# Patient Record
Sex: MALE
[De-identification: names, ages, dates, MRNs, and addresses within clinical notes are randomized per-mention and may not be internally consistent; named-entity substitution may affect disease eponyms.]

---

## 2019-03-18 ENCOUNTER — HOSPITAL ENCOUNTER (EMERGENCY)
Dept: HOSPITAL 31 - C.ER | Age: 69
Discharge: HOME | End: 2019-03-18
Payer: MEDICARE

## 2019-03-18 VITALS
RESPIRATION RATE: 20 BRPM | SYSTOLIC BLOOD PRESSURE: 138 MMHG | TEMPERATURE: 98.6 F | OXYGEN SATURATION: 99 % | HEART RATE: 67 BPM | DIASTOLIC BLOOD PRESSURE: 84 MMHG

## 2019-03-18 VITALS — BODY MASS INDEX: 28.2 KG/M2

## 2019-03-18 DIAGNOSIS — E78.00: ICD-10-CM

## 2019-03-18 DIAGNOSIS — H11.32: Primary | ICD-10-CM

## 2019-03-18 DIAGNOSIS — I10: ICD-10-CM

## 2019-03-18 NOTE — C.PDOC
History Of Present Illness


68 year old male presents to ED with complaint of redness to the left eye. 

Patient is not currently taking any blood thinners. He noticed blood in his eye 

when he was looking in the mirror. He denies wearing glasses or contact lenses. 

Patient denies any pain, visual changes, trauma, headache, fever, and chills. 





Chief Complaint (Nursing): Eye Problem


History Per: Patient


History/Exam Limitations: no limitations


Current Symptoms Are (Timing): Still Present


Associated Symptoms: Other (rendess)





Past Medical History


Reviewed: Historical Data, Nursing Documentation, Vital Signs


Vital Signs: 





                                Last Vital Signs











Temp  98.6 F   03/18/19 08:56


 


Pulse  67   03/18/19 08:56


 


Resp  20   03/18/19 08:56


 


BP  138/84   03/18/19 08:56


 


Pulse Ox  99   03/18/19 08:56














- Medical History


PMH: HTN, Hypercholesterolemia


Surgical History: No Surg Hx


Family History: States: Unknown Family Hx





- Social History


Hx Alcohol Use: No


Hx Substance Use: No





- Immunization History


Hx Tetanus Toxoid Vaccination: No


Hx Influenza Vaccination: No


Hx Pneumococcal Vaccination: No





Review Of Systems


Constitutional: Negative for: Fever, Chills, Weakness


Eyes: Positive for: Redness, Other (blood in the left eye).  Negative for: 

Vision Change


Skin: Negative for: Rash


Neurological: Negative for: Weakness, Numbness, Dizziness





Physical Exam





- Physical Exam


Appears: Well, Non-toxic, No Acute Distress


Skin: Normal Color, Warm, Dry


Head: Atraumatic, Normacephalic


Eye(s): bilateral: PERRL, EOMI, left: Other (subconjunctival hemorrhage)


Throat: Normal, No Erythema, No Exudate


Neck: Normal ROM, Supple


Chest: Symmetrical, No Deformity


Cardiovascular: Rhythm Regular, No Murmur


Respiratory: No Accessory Muscle Use


Neurological/Psych: Oriented x3, Normal Speech, Normal Cognition





ED Course And Treatment


O2 Sat by Pulse Oximetry: 99 (in RA)





Medical Decision Making


Medical Decision Making: 


Impression: 68 year old male presents to ED with complaint of redness to the 

left eye





Plan:





Patient told to follow up with PCP. 








Disposition


Counseled Patient/Family Regarding: Diagnosis, Need For Followup





- Disposition


Referrals: 


Tiffany Martinez MD [Medical Doctor] - 


Disposition: HOME/ ROUTINE


Disposition Time: 09:34


Condition: STABLE


Instructions:  Subconjunctival Hemorrhage


Forms:  Gen Discharge Inst [place]uneian, [facility] (Bruneian), Work Excuse


Print Language: Beninese





- Clinical Impression


Clinical Impression: 


 Subconjunctival hemorrhage of left eye








- Scribe Statement


The provider has reviewed the documentation as recorded by the Scribe (Norah Yates)








All medical record entries made by the Scribe were at my direction and 

personally dictated by me. I have reviewed the chart and agree that the record 

accurately reflects my personal performance of the history, physical exam, 

medical decision making, and the department course for this patient. I have also

personally directed, reviewed, and agree with the discharge instructions and 

disposition.

## 2020-03-06 ENCOUNTER — OFFICE VISIT (OUTPATIENT)
Dept: URGENT CARE | Facility: CLINIC | Age: 70
End: 2020-03-06
Payer: COMMERCIAL

## 2020-03-06 VITALS
HEIGHT: 69 IN | TEMPERATURE: 99 F | WEIGHT: 177 LBS | OXYGEN SATURATION: 97 % | HEART RATE: 80 BPM | RESPIRATION RATE: 18 BRPM | DIASTOLIC BLOOD PRESSURE: 77 MMHG | SYSTOLIC BLOOD PRESSURE: 127 MMHG | BODY MASS INDEX: 26.22 KG/M2

## 2020-03-06 DIAGNOSIS — R05.9 COUGH: Primary | ICD-10-CM

## 2020-03-06 PROCEDURE — 99202 OFFICE O/P NEW SF 15 MIN: CPT | Performed by: PHYSICIAN ASSISTANT

## 2020-03-06 PROCEDURE — 94640 AIRWAY INHALATION TREATMENT: CPT | Performed by: PHYSICIAN ASSISTANT

## 2020-03-06 RX ORDER — INHALER, ASSIST DEVICES
SPACER (EA) MISCELLANEOUS SEE ADMIN INSTRUCTIONS
Qty: 1 DEVICE | Refills: 0 | Status: SHIPPED | OUTPATIENT
Start: 2020-03-06

## 2020-03-06 RX ORDER — IPRATROPIUM BROMIDE AND ALBUTEROL SULFATE 2.5; .5 MG/3ML; MG/3ML
3 SOLUTION RESPIRATORY (INHALATION) ONCE
Status: COMPLETED | OUTPATIENT
Start: 2020-03-06 | End: 2020-03-06

## 2020-03-06 RX ORDER — ROSUVASTATIN CALCIUM 10 MG/1
10 TABLET, COATED ORAL DAILY
COMMUNITY

## 2020-03-06 RX ORDER — ALBUTEROL SULFATE 90 UG/1
2 AEROSOL, METERED RESPIRATORY (INHALATION) EVERY 4 HOURS PRN
Qty: 8.5 G | Refills: 0 | Status: SHIPPED | OUTPATIENT
Start: 2020-03-06

## 2020-03-06 RX ORDER — SODIUM CHLORIDE FOR INHALATION 0.9 %
3 VIAL, NEBULIZER (ML) INHALATION ONCE
Status: COMPLETED | OUTPATIENT
Start: 2020-03-06 | End: 2020-03-06

## 2020-03-06 RX ORDER — LISINOPRIL 10 MG/1
10 TABLET ORAL DAILY
COMMUNITY

## 2020-03-06 RX ADMIN — IPRATROPIUM BROMIDE AND ALBUTEROL SULFATE 3 ML: 2.5; .5 SOLUTION RESPIRATORY (INHALATION) at 16:59

## 2020-03-06 RX ADMIN — Medication 3 ML: at 16:59

## 2020-03-06 NOTE — PROGRESS NOTES
[facility]        NAME: Yany Conde is a 81 y o  male  : 1950    MRN: [id number]  DATE: 2020  TIME: 5:30 PM    Assessment and Plan   Cough [R05]  1  Cough  ipratropium-albuterol (DUO-NEB) 0 5-2 5 mg/3 mL inhalation solution 3 mL    sodium chloride 0 9 % inhalation solution 3 mL    albuterol (ProAir HFA) 90 mcg/act inhaler    Spacer Device for Inhaler    Mini neb     Patient Instructions   Use the inhaler as directed  Rinse your mouth after use  Mucinex DM for cough and congestion  Use a humidifier at bedtime  Steam showers may also help to reduce cough  Follow up with your family doctor in 3-5 days  Proceed to the ER if symptoms worsen  Discussed with patient that symptoms appear viral in nature  Advised use of inhaler for wheezing and SOB, Mucinex DM for cough and congestion relief, and follow-up if symptoms persist or worsen  Patient verbalized understanding and agreement  No questions at this time  Precautions given  Chief Complaint     Chief Complaint   Patient presents with    Shortness of Breath     Pt reports of worsening cough and shortness of breath x 3 days    Cough     History of Present Illness     51-year-old [place]an-speaking male accompanied by his wife presenting with complaint of cough x3 days  Reports a harsh, nonproductive cough associated with ECHOLS  Symptoms exacerbated in the cold  No fever, chills, sweats, nasal congestion, runny nose, sore throat, wheezing, abdominal pains, nausea or vomiting  No treatments tried  Wife sick with similar symptoms  No recent travel  Nonsmoker  Had the flu shot  Review of Systems   Review of Systems   Constitutional: Negative for chills, diaphoresis and fever  HENT: Negative for congestion, ear pain, rhinorrhea and sore throat  Respiratory: Positive for cough and shortness of breath  Negative for wheezing  Cardiovascular: Negative for chest pain and palpitations     Gastrointestinal: Negative for abdominal pain, diarrhea, nausea and vomiting  Musculoskeletal: Negative for myalgias  Skin: Negative for rash  Neurological: Negative for dizziness and headaches  Current Medications       Current Outpatient Medications:     Empagliflozin (Jardiance) 10 MG TABS, Take 10 mg by mouth every morning, Disp: , Rfl:     GLIMEPIRIDE PO, Take by mouth, Disp: , Rfl:     insulin detemir (LEVEMIR) 100 units/mL subcutaneous injection, Inject under the skin daily at bedtime, Disp: , Rfl:     lisinopril (ZESTRIL) 10 mg tablet, Take 10 mg by mouth daily, Disp: , Rfl:     rosuvastatin (CRESTOR) 10 MG tablet, Take 10 mg by mouth daily, Disp: , Rfl:     albuterol (ProAir HFA) 90 mcg/act inhaler, Inhale 2 puffs every 4 (four) hours as needed for wheezing or shortness of breath, Disp: 8 5 g, Rfl: 0  No current facility-administered medications for this visit  Current Allergies     Allergies as of 03/06/2020    (No Known Allergies)            The following portions of the patient's history were reviewed and updated as appropriate: allergies, current medications, past family history, past medical history, past social history, past surgical history and problem list      Past Medical History:   Diagnosis Date    Diabetes mellitus ([facility] [address] )     High cholesterol     Hypertension      History reviewed  No pertinent surgical history  History reviewed  No pertinent family history  Medications have been verified  Objective   /77   Pulse 80   Temp 99 °F (37 2 °C)   Resp 18   Ht 5' 9" (1 753 m)   Wt 80 3 kg (177 lb)   SpO2 97%   BMI 26 14 kg/m²      Physical Exam     Physical Exam   Constitutional: Vital signs are normal  He appears well-developed and well-nourished  He is cooperative  He does not appear ill  No distress  HENT:   Head: Normocephalic and atraumatic     Right Ear: Hearing, tympanic membrane, external ear and ear canal normal    Left Ear: Hearing, tympanic membrane, external ear and ear canal normal    Nose: Nose normal    Mouth/Throat: Uvula is midline, oropharynx is clear and moist and mucous membranes are normal  Mucous membranes are not pale, not dry and not cyanotic  No oral lesions  No uvula swelling  No oropharyngeal exudate, posterior oropharyngeal edema, posterior oropharyngeal erythema or tonsillar abscesses  Eyes: Conjunctivae and lids are normal  Right eye exhibits no discharge and no exudate  Left eye exhibits no discharge and no exudate  Neck: Trachea normal and phonation normal  Neck supple  No tracheal tenderness present  No neck rigidity  No edema and no erythema present  Cardiovascular: Normal rate, regular rhythm and normal heart sounds  Exam reveals no distant heart sounds  Pulmonary/Chest: Effort normal  No stridor  No respiratory distress  He has decreased breath sounds (diffuse)  He has wheezes (Diffuse end expiratory wheezing)  He has no rhonchi  He has no rales  Abdominal: Soft  Bowel sounds are normal  He exhibits no distension and no mass  There is no tenderness  There is no rigidity, no rebound and no guarding  Lymphadenopathy:     He has no cervical adenopathy  Neurological: He is alert  He is not disoriented  No cranial nerve deficit  Coordination and gait normal    Skin: Skin is warm, dry and intact  No rash noted  He is not diaphoretic  No erythema  No pallor  Psychiatric: He has a normal mood and affect  His behavior is normal  Judgment and thought content normal    Nursing note and vitals reviewed  Mini neb  Performed by: Rhett Gay PA-C  Authorized by: Rhett Gay PA-C     Number of treatments:  1  Treatment 1:   Pre-Procedure     Symptoms:  Cough and shortness of breath    Lung Sounds:  Diffuse end expiratory wheezing  decreased throughout  HR:  80    SP02:  97    Medication Administered:  Duoneb - Albuterol 2 5 mg/Atrovent 0 5 mg  Post-Procedure     Post-treatment symptoms: improved  Lung sounds:  Wheezing of upper lobes b/l  Otherwise CTA      HR:  80    SP02:  98      97

## 2020-03-06 NOTE — PATIENT INSTRUCTIONS
Use the inhaler as directed  Rinse your mouth after use  Mucinex DM for cough and congestion  Use a humidifier at bedtime  Steam showers may also help to reduce cough  Follow up with your family doctor in 3-5 days  Proceed to the ER if symptoms worsen  Use el inhalador barron se le indique  Enjuague luevano boca después de usar  Mucinex DM para la tos y la congestión  Use un humidificador a la hora de acostarse  Las duchas de vapor también pueden ayudar a reducir la tos  Senthil un seguimiento con luevano médico de jass en 3-5 días  Proceda a la bianka de emergencias si los síntomas empeoran  Forma de usar un inhalador con dosis medida   LO QUE [employer] SABER:   El inhalador con dosis medida es un dispositivo portátil que le administra jasmin dosis de medicamento en forma de scott  Usted inhala el medicamento profundamente hacia los pulmones para abrir ramona vías respiratorias  El medicamento o le da un alivio rápido, o le controla los síntomas en un término [place]  Los broncodilatadores le abren ramona vías respiratorias  [address] secreciones y facilita que usted las expulse  Los esteroides disminuyen la inflamación en ramona vías respiratorias  INSTRUCCIONES SOBRE EL [facility]ITAL[facility]:   Regrese a la bianka de emergencias si:   · Ramona labios y uñas se ponen azules o grises  · La piel entre ramona costillas o alrededor de luevano addison se hunde con cada respiración  · Usted tiene falta de [place], aun después de usar luevano inhalador  Pregúntele a luevano Johnny Marcial vitaminas y minerales son adecuados para usted  · Usted siente que la atomización (spray) del medicamento se quedo en luevano lengua o garganta en vez de irse para ramona pulmones  · Usted tiene que usar más atomizaciones del inhalador de lo que le indicaron para poder [place]  · A usted se le acaba el medicamento antes de la fecha de luevano próximo abastecimiento  · Usted siente que luevano medicamento no le está mejorando ramona síntomas      · Usted tiene preguntas o inquietudes acerca de luevano condición o cuidado  Cómo usar un inhalador con dosis medida:  Practique el uso del inhalador  Luevano medicamento funcionará mejor si usted lo [place] correctamente  Los siguientes pasos lo ayudarán a usar luevano inhalador correctamente:  · Prepare luevano inhalador:     ¨ Retire la tapa  Revise la boquilla para asegurarse que no hay objetos extraños que pudieran obstruir la salida del medicamento  ¨ Agite el inhalador para mezclar el medicamento  ¨ Sostenga el inhalador en posición vertical en forma de [place] con la boquilla dirigida hacia luevano boca  · Prepárese para inhalar el medicamento:      ¨ Mantenga la boca alejada de la boquilla del inhalador y exhale completamente para limpiar rose pulmones  ¨ Coloque la boquilla entre rose labios  Cierre rose labios alrededor de la boquilla para sellarla y evitar que se gotee el medicamento  · Comience a inhalar lentamente a través de luevano boca  conforme  presiona el frasco hacia abajo  [place] ayuda a que el medicamento llegue a rose pulmones  · Contenga luevano respiración al menos 5 segundos  [place] ayudará a que el medicamento llegue a cada parte de rose pulmones, incluyendo las partes más pequeñas llamadas alvéolos  · Despacio exhale el aire por los labios fruncidos  [place] ayuda a mantener rose vías respiratorias abiertas y permite que el medicamento sea absorbido a más áreas  · [address] se lo indique luevano médico  Espere aproximadamente 2 minutos entre cada inhalación  Si usted necesita usar un broncodilatador y un inhalador de esteroides, use el broncodilatador bird  Espere 5 minutos y use el inhalador esteroideo  · Senthil gárgaras con agua tibia para remover el medicamento que quedó en luevano boca y garganta  Cuide luevano inhalador correctamente:  [place] de nuevo la tapa al inhalador después de cada uso para mantener la boquilla limpia  Limpie el inhalador por lo menos jasmin vez a la semana   Quite el frasco y lave el soporte con agua tibia con Pamila Rout  Enjuague y deje que se sequen al [employer]  Asegúrese de que el soporte esté completamente seco antes de poner el envase de nuevo  Senthil jasmin gabriel de control con carpenter médico o especialista barron se lo indicaron:  Lleve carpenter inhalador a todas las citas  Es posible que le soliciten que use carpenter inhalador en las consultas médicas para que carpenter médico se asegure de que lo está utilizando en forma correcta  Anote rose preguntas para que se acuerde de hacerlas percy rose visitas  © 2017 [address] Information is for End User's use only and may not be sold, redistributed or otherwise used for commercial purposes  All illustrations and images included in CareNotes® are the copyrighted property of A D A M , Inc  or Jmaar Shine  Esta información es sólo para uso en educación  Carpenter intención no es darle un consejo médico sobre enfermedades o tratamientos  Colsulte con carpenter Kermitt Console farmacéutico antes de seguir cualquier régimen médico para saber si es seguro y efectivo para usted

## 2020-04-03 ENCOUNTER — OFFICE VISIT (OUTPATIENT)
Dept: URGENT CARE | Facility: CLINIC | Age: 70
End: 2020-04-03
Payer: COMMERCIAL

## 2020-04-03 VITALS
OXYGEN SATURATION: 95 % | TEMPERATURE: 97.4 F | HEART RATE: 82 BPM | HEIGHT: 69 IN | RESPIRATION RATE: 16 BRPM | BODY MASS INDEX: 26.22 KG/M2 | WEIGHT: 177 LBS

## 2020-04-03 DIAGNOSIS — R50.9 FEVER, UNSPECIFIED FEVER CAUSE: Primary | ICD-10-CM

## 2020-04-03 DIAGNOSIS — R05.9 COUGH: ICD-10-CM

## 2020-04-03 PROCEDURE — 87635 SARS-COV-2 COVID-19 AMP PRB: CPT | Performed by: NURSE PRACTITIONER

## 2020-04-03 PROCEDURE — 99213 OFFICE O/P EST LOW 20 MIN: CPT | Performed by: NURSE PRACTITIONER

## 2020-04-06 LAB — SARS-COV-2 RNA SPEC QL NAA+PROBE: DETECTED

## 2020-04-07 ENCOUNTER — TELEPHONE (OUTPATIENT)
Dept: URGENT CARE | Facility: CLINIC | Age: 70
End: 2020-04-07

## 2020-05-06 ENCOUNTER — PATIENT OUTREACH (OUTPATIENT)
Dept: CASE MANAGEMENT | Facility: HOSPITAL | Age: 70
End: 2020-05-06

## 2021-03-01 DIAGNOSIS — Z23 ENCOUNTER FOR IMMUNIZATION: ICD-10-CM

## 2021-12-08 ENCOUNTER — APPOINTMENT (OUTPATIENT)
Dept: RADIOLOGY | Facility: CLINIC | Age: 71
End: 2021-12-08
Payer: COMMERCIAL

## 2021-12-08 ENCOUNTER — OFFICE VISIT (OUTPATIENT)
Dept: URGENT CARE | Facility: CLINIC | Age: 71
End: 2021-12-08
Payer: COMMERCIAL

## 2021-12-08 VITALS
HEART RATE: 73 BPM | SYSTOLIC BLOOD PRESSURE: 140 MMHG | BODY MASS INDEX: 26.67 KG/M2 | WEIGHT: 176 LBS | OXYGEN SATURATION: 99 % | RESPIRATION RATE: 14 BRPM | TEMPERATURE: 98.1 F | DIASTOLIC BLOOD PRESSURE: 70 MMHG | HEIGHT: 68 IN

## 2021-12-08 DIAGNOSIS — S99.921A RIGHT FOOT INJURY, INITIAL ENCOUNTER: ICD-10-CM

## 2021-12-08 DIAGNOSIS — S90.31XA CONTUSION OF RIGHT FOOT, INITIAL ENCOUNTER: Primary | ICD-10-CM

## 2021-12-08 PROCEDURE — 73630 X-RAY EXAM OF FOOT: CPT

## 2021-12-08 PROCEDURE — 99213 OFFICE O/P EST LOW 20 MIN: CPT | Performed by: PHYSICIAN ASSISTANT

## 2021-12-08 RX ORDER — IBUPROFEN 600 MG/1
600 TABLET ORAL EVERY 8 HOURS PRN
Qty: 24 TABLET | Refills: 0 | Status: SHIPPED | OUTPATIENT
Start: 2021-12-08 | End: 2021-12-16

## 2023-03-16 ENCOUNTER — TELEPHONE (OUTPATIENT)
Dept: UROLOGY | Facility: AMBULATORY SURGERY CENTER | Age: 73
End: 2023-03-16

## 2023-03-16 NOTE — TELEPHONE ENCOUNTER
New Patient    What is the reason for the patient’s appointment? Urinary frequency pt is diabetic     What office location does the patient prefer? [facility]     Imaging/Lab Results:    Do we accept the patient's insurance or is the patient Self-Pay? Yes     Insurance Provider: Carey Mayers: [id number]  Member ID#: [id number]    Has the patient had any previous Urologist(s)? No     Have patient records been requested? If not are records showing in [address] [place] [address] Rd: records in [facility]     Has the patient had any outside testing done? No     Does the patient have a personal history of cancer?  No

## 2023-05-17 ENCOUNTER — HOSPITAL ENCOUNTER (EMERGENCY)
Facility: HOSPITAL | Age: 73
Discharge: HOME | End: 2023-05-17
Attending: EMERGENCY MEDICINE

## 2023-05-17 VITALS
RESPIRATION RATE: 18 BRPM | WEIGHT: 167.6 LBS | OXYGEN SATURATION: 98 % | HEART RATE: 68 BPM | BODY MASS INDEX: 24.75 KG/M2 | TEMPERATURE: 97.2 F | DIASTOLIC BLOOD PRESSURE: 80 MMHG | SYSTOLIC BLOOD PRESSURE: 160 MMHG

## 2023-05-17 DIAGNOSIS — R35.0 URINARY FREQUENCY: Primary | ICD-10-CM

## 2023-05-17 LAB
BACTERIA UR QL AUTO: NORMAL /HPF
BILIRUB UR QL STRIP: NEGATIVE
CLARITY UR: CLEAR
COLOR UR: YELLOW
GLUCOSE UR STRIP-MCNC: ABNORMAL MG/DL
HGB UR QL STRIP.AUTO: NEGATIVE
KETONES UR STRIP-MCNC: NEGATIVE MG/DL
LEUKOCYTE ESTERASE UR QL STRIP: ABNORMAL
NITRITE UR QL STRIP: NEGATIVE
NON-SQ EPI CELLS URNS QL MICRO: NORMAL /HPF
PH UR STRIP.AUTO: 6 [PH]
PROT UR STRIP-MCNC: ABNORMAL MG/DL
RBC #/AREA URNS AUTO: NORMAL /HPF
SP GR UR STRIP.AUTO: 1.02 (ref 1–1.03)
UROBILINOGEN UR QL STRIP.AUTO: 0.2 E.U./DL
WBC #/AREA URNS AUTO: NORMAL /HPF

## 2023-05-17 NOTE — Clinical Note
Radha London was seen and treated in our emergency department on 5/17/2023  Diagnosis:     Sylvain Gunn  may return to work on return date  He may return on this date: 05/19/2023         If you have any questions or concerns, please don't hesitate to call        Lilo Carter DO    ______________________________           _______________          _______________  Hospital Representative                              Date                                Time

## 2023-05-17 NOTE — Clinical Note
Kimberly Birmingham was seen and treated in our emergency department on 5/17/2023  Diagnosis:     Todd Carrillo  may return to work on return date  He may return on this date: 05/19/2023         If you have any questions or concerns, please don't hesitate to call        Jessica Mejia, DO    ______________________________           _______________          _______________  Hospital Representative                              Date                                Time

## 2023-05-17 NOTE — DISCHARGE INSTRUCTIONS
Return to the ER for further concerns or worsening symptoms  Follow up with your primary care physician and urology in 1-2 days

## 2023-05-18 LAB
C TRACH DNA SPEC QL NAA+PROBE: NEGATIVE
N GONORRHOEA DNA SPEC QL NAA+PROBE: NEGATIVE

## 2023-05-19 NOTE — ED PROVIDER NOTES
History  Chief Complaint   Patient presents with   • Urinary Frequency     Pt being followed by urology for frequent urination  Hx BPH  States needs work note because he cannot work because he constantly has to urinate  Patient is a 24-year-old male, primarily Gabonese-speaking that presents emergency department with complaint of persistent urinary frequency  Patient is requesting a note to excuse him from work, as he has to urinate every 20 minutes  Patient has a history of similar symptoms, follows with urology  He denies abdominal pain, fevers or chills  Patient also has a history of diabetes, hyperlipidemia, hypertension  He is requesting a prescription for Bladuril, which he got from [place] native [place]  Patient denies sexual activity, dysuria, penile discharge  Differential diagnosis includes but is not limited to UTI, overactive bladder, urethritis  History provided by:  Patient   used: Yes    Urinary Frequency  Severity:  Moderate  Onset quality:  Gradual  Timing:  Constant  Progression:  Unchanged  Chronicity:  Chronic  Associated symptoms: no abdominal pain, no chest pain, no cough, no diarrhea, no fever, no nausea, no rash, no shortness of breath, no vomiting and no wheezing        Prior to Admission Medications   Prescriptions Last Dose Informant Patient Reported? Taking?    Empagliflozin (JARDIANCE) 10 MG TABS tablet   Yes No   Sig: Take 10 mg by mouth every morning   Invokana 100 MG   Yes No   Levemir FlexPen 100 units/mL injection pen   Yes No   Lovaza 1 g capsule   Yes No   Multiple Vitamins-Minerals (Sentry) TABS   Yes No   OneTouch Verio test strip   Yes No   Spacer/Aero-Holding Chambers (AEROCHAMBER MINI CHAMBER) BILLY   No No   Sig: by Does not apply route see administration instructions   albuterol (ProAir HFA) 90 mcg/act inhaler   No No   Sig: Inhale 2 puffs every 4 (four) hours as needed for wheezing or shortness of breath   clotrimazole-betamethasone (LOTRISONE) 1-0 05 % cream   No No   Sig: Apply topically 2 (two) times a day   glimepiride (AMARYL) 4 mg tablet   Yes No   ibuprofen (MOTRIN) 600 mg tablet   No No   Sig: Take 1 tablet (600 mg total) by mouth every 8 (eight) hours as needed for mild pain for up to 8 days   lisinopril (ZESTRIL) 10 mg tablet   Yes No   Sig: Take 10 mg by mouth daily   rosuvastatin (CRESTOR) 10 MG tablet   Yes No   Sig: Take 10 mg by mouth daily   tamsulosin (FLOMAX) 0 4 mg   No No   Sig: Take 1 capsule (0 4 mg total) by mouth daily with dinner      Facility-Administered Medications: None       Past Medical History:   Diagnosis Date   • [facility] ([facility] )    • High cholesterol    • Hypertension        History reviewed  No pertinent surgical history  History reviewed  No pertinent family history  I have reviewed and agree with the history as documented  E-Cigarette/Vaping   • E-Cigarette Use Never User      E-Cigarette/Vaping Substances     Social History     Tobacco Use   • Smoking status: Former   • Smokeless tobacco: Never   Vaping Use   • Vaping Use: Never used   Substance Use Topics   • Alcohol use: Yes   • Drug use: Never       Review of Systems   Constitutional: Negative for chills and fever  Respiratory: Negative for cough, shortness of breath and wheezing  Cardiovascular: Negative for chest pain and palpitations  Gastrointestinal: Negative for abdominal pain, constipation, diarrhea, nausea and vomiting  Genitourinary: Positive for frequency  Negative for dysuria, flank pain, hematuria and urgency  Musculoskeletal: Negative for back pain  Skin: Negative for color change and rash  All other systems reviewed and are negative  Physical Exam  Physical Exam  Vitals and nursing note reviewed  Constitutional:       Appearance: He is well-developed  HENT:      Head: Normocephalic and atraumatic  Eyes:      Pupils: Pupils are equal, round, and reactive to light     Cardiovascular:      Rate and Rhythm: Normal rate and regular rhythm  Heart sounds: Normal heart sounds  Pulmonary:      Effort: Pulmonary effort is normal       Breath sounds: Normal breath sounds  Abdominal:      General: Bowel sounds are normal  There is no distension  Palpations: Abdomen is soft  There is no mass  Tenderness: There is no abdominal tenderness  There is no guarding or rebound  Musculoskeletal:      Cervical back: Normal range of motion and neck supple  Skin:     General: Skin is warm and dry  Capillary Refill: Capillary refill takes less than 2 seconds  Neurological:      General: No focal deficit present  Mental Status: He is alert and oriented to person, place, and time  Psychiatric:         Behavior: Behavior normal          Thought Content: Thought content normal          Judgment: Judgment normal          Vital Signs  ED Triage Vitals [05/17/23 0953]   Temperature Pulse Respirations Blood Pressure SpO2   (!) 97 2 °F (36 2 °C) 68 18 160/80 98 %      Temp Source Heart Rate Source Patient Position - Orthostatic VS BP Location FiO2 (%)   Tympanic Monitor Sitting Right arm --      Pain Score       --           Vitals:    05/17/23 0953   BP: 160/80   Pulse: 68   Patient Position - Orthostatic VS: Sitting         Visual Acuity      ED Medications  Medications - No data to display    Diagnostic Studies  Results Reviewed     Procedure Component Value Units Date/Time    Chlamydia/GC amplified DNA by PCR [[id number]]  (Normal) Collected: 05/17/23 1108    Lab Status: Final result Specimen: Urine, Other Updated: 05/18/23 1418     N gonorrhoeae, DNA Probe Negative     Chlamydia trachomatis, DNA Probe Negative    Narrative: This test was performed using the FDA-approved [place] 6800 CT/NG assay ([employer])  This test uses real-time PCR to detect Chlamydia trachomatis (CT) and Neisseria gonorrhoeae (NG)   This instrument and assay have been validated by the  and performing laboratory and verified by the performing laboratory  This test is intended as an aid in the diagnosis of chlamydial and gonococcal disease  This test has not been evaluated in patients younger than 15years of age and is not recommended for evaluation of suspected sexual abuse  This assay is not intended to replace other exams or tests for diagnosis of urogenital infection by causative factors other than Chalmydia trachomatis (CT) and Neisseria gonorrhoeae (NG)  Additional testing is recommended when the results do not correlate with clinical signs and symptoms  Procedural Limitations  This assay has only been validated for use with male and female urine, clinician-instructed self-collected vaginal swab specimens, clinician-collected vaginal swab specimens, endocervical swab specimens collected in yemi® PCR Media and cervical specimens collected in PreservCyt® Solution  Assay performance has not been validated for use with other collection media and/or specimen types  Detection of C  trachomatis and Jesus Minium is dependent on the number of organisms present in the specimen  Detection may be affected by specimen collection methods, patient factors, stage of infection, infecting strains, and presence of polymerase/PCR inhibitors  When CT is present at very high concentrations, the detection of NG present at concentrations near the limit of detection may be impacted  The presence of mucus in endocervical specimens may lead to false negative results  The presence of whole blood in urine and cervical specimens collected in PreservCyt Solution may lead to false negative and/or invalid test results  Urine testing is recommended to be performed on first catch urine samples  The effects of other collection variables have not been evaluated at this time  The effects of vaginal discharge, tampon use, douching, and other collection variables have not been evaluated at this time     This assay has not been evaluated with patients currently being treated with antimicrobial agents active against CT or NG, or patients with a history of hysterectomy  Urine Microscopic [[id number]]  (Normal) Collected: 05/17/23 1039    Lab Status: Final result Specimen: Urine, Clean Catch Updated: 05/17/23 1109     RBC, UA 0-1 /hpf      WBC, UA 0-1 /hpf      Epithelial Cells None Seen /hpf      Bacteria, UA None Seen /hpf     UA w Reflex to Microscopic w Reflex to Culture [[id number]]  (Abnormal) Collected: 05/17/23 1039    Lab Status: Final result Specimen: Urine, Clean Catch Updated: 05/17/23 1101     Color, UA Yellow     Clarity, UA Clear     Specific Orange, UA 1 020     pH, UA 6 0     Leukocytes, UA Elevated glucose may cause decreased leukocyte values  See urine microscopic for UWBC result     Nitrite, UA Negative     Protein, UA Trace mg/dl      Glucose, UA >=1000 (1%) mg/dl      Ketones, UA Negative mg/dl      Urobilinogen, UA 0 2 E U /dl      Bilirubin, UA Negative     Occult Blood, UA Negative                 No orders to display              Procedures  Procedures         ED Course                               SBIRT 20yo+    Flowsheet Row Most Recent Value   Initial Alcohol Screen: US AUDIT-C     1  How often do you have a drink containing alcohol? 0 Filed at: 05/17/2023 1052   2  How many drinks containing alcohol do you have on a typical day you are drinking? 0 Filed at: 05/17/2023 1052   3a  Male UNDER 65: How often do you have five or more drinks on one occasion? 0 Filed at: 05/17/2023 1052   3b  FEMALE Any Age, or MALE 65+: How often do you have 4 or more drinks on one occassion? 0 Filed at: 05/17/2023 1052   Audit-C Score 0 Filed at: 05/17/2023 1052   YANNI: How many times in the past year have you    Used an illegal drug or used a prescription medication for non-medical reasons?  Never Filed at: 05/17/2023 1052                    Medical Decision Making  68-year-old male presents with complaint of persistent urinary frequency  UA ordered and reviewed  No concern for UTI at this time  We will order GC cultures  Will defer empiric treatment, given that patient is not sexually active  Patient advised to follow-up with urology for persistent symptoms, and informed that I am unable to provide a work note excusing him for many weeks and will provide a note for 2 days  Patient agrees with discharge at this time  Amount and/or Complexity of Data Reviewed  Labs: ordered  Disposition  Final diagnoses:   Urinary frequency     Time reflects when diagnosis was documented in both MDM as applicable and the Disposition within this note     Time User Action Codes Description Comment    5/17/2023 11:50 AM Rylan Jessenia Add [R35 0] Urinary frequency       ED Disposition     ED Disposition   Discharge    Condition   Stable    Date/Time   Wed May 17, 2023 11:50 AM    Comment   Kimberly Birmingham discharge to home/self care                 Follow-up Information     Follow up With Specialties Details Why Contact Info Additional [address] [facility] Schedule an appointment as soon as possible for a visit in 1 day for follow up [address] [facility] [address]  [address] [facility] [address]   [address], [place], [place], [address], [phone]    Lai Cyr MD Urology Schedule an appointment as soon as possible for a visit in 1 day for follow up [address]  [phone]             Discharge Medication List as of 5/17/2023 11:52 AM      CONTINUE these medications which have NOT CHANGED    Details   albuterol (ProAir HFA) 90 mcg/act inhaler Inhale 2 puffs every 4 (four) hours as needed for wheezing or shortness of breath, Starting Fri 3/6/2020, Normal      clotrimazole-betamethasone (LOTRISONE) 1-0 05 % cream Apply topically 2 (two) times a day, Starting Tue 4/18/2023, Normal Empagliflozin (JARDIANCE) 10 MG TABS tablet Take 10 mg by mouth every morning, Historical Med      glimepiride (AMARYL) 4 mg tablet Starting Fri 4/7/2023, Historical Med      ibuprofen (MOTRIN) 600 mg tablet Take 1 tablet (600 mg total) by mouth every 8 (eight) hours as needed for mild pain for up to 8 days, Starting Wed 12/8/2021, Until Thu 12/16/2021 at 2359, Normal      Invokana 100 MG Starting Wed 3/8/2023, Historical Med      Levemir FlexPen 100 units/mL injection pen Starting Wed 3/8/2023, Historical Med      lisinopril (ZESTRIL) 10 mg tablet Take 10 mg by mouth daily, Historical Med      Lovaza 1 g capsule Starting Wed 3/8/2023, Historical Med      Multiple Vitamins-Minerals (Sentry) TABS Starting Wed 3/8/2023, Historical Med      OneTouch Verio test strip Historical Med      rosuvastatin (CRESTOR) 10 MG tablet Take 10 mg by mouth daily, Historical Med      Spacer/Aero-Holding Chambers (AEROCHAMBER MINI CHAMBER) BILLY by Does not apply route see administration instructions, Starting Fri 3/6/2020, Normal      tamsulosin (FLOMAX) 0 4 mg Take 1 capsule (0 4 mg total) by mouth daily with dinner, Starting Tue 4/18/2023, Normal             No discharge procedures on file      PDMP Review     None          ED Provider  Electronically Signed by           Britney Mcclellan DO  05/19/23 5218

## 2023-05-24 ENCOUNTER — APPOINTMENT (OUTPATIENT)
Dept: LAB | Facility: HOSPITAL | Age: 73
End: 2023-05-24

## 2023-05-24 DIAGNOSIS — N13.8 BPH WITH OBSTRUCTION/LOWER URINARY TRACT SYMPTOMS: ICD-10-CM

## 2023-05-24 DIAGNOSIS — N40.1 BPH WITH OBSTRUCTION/LOWER URINARY TRACT SYMPTOMS: ICD-10-CM

## 2023-05-24 DIAGNOSIS — R35.1 NOCTURIA: ICD-10-CM

## 2023-05-24 LAB — PSA SERPL-MCNC: 145.73 NG/ML (ref 0–4)

## 2023-05-26 DIAGNOSIS — C61 PROSTATE CANCER (HCC): Primary | ICD-10-CM

## 2023-06-02 ENCOUNTER — TELEPHONE (OUTPATIENT)
Dept: UROLOGY | Facility: CLINIC | Age: 73
End: 2023-06-02

## 2023-06-02 NOTE — TELEPHONE ENCOUNTER
----- Message from [facility] sent at 6/2/2023  3:38 PM EDT -----  Please assist in scheduling CAT scan and bone scan and follow-up for this patient with me after the test results

## 2023-06-07 NOTE — TELEPHONE ENCOUNTER
Called patient to notify him to schedule testing prior to his follow up appointment  Phone number has voicemail that was not set up and was not able to leave message  Tried calling the emergency contact his wife and same message no voicemail set up  Informed office of outcome of the call

## 2023-06-20 ENCOUNTER — APPOINTMENT (OUTPATIENT)
Dept: LAB | Facility: HOSPITAL | Age: 73
DRG: 723 | End: 2023-06-20
Payer: COMMERCIAL

## 2023-06-20 ENCOUNTER — HOSPITAL ENCOUNTER (OUTPATIENT)
Dept: RADIOLOGY | Facility: HOSPITAL | Age: 73
Discharge: HOME | DRG: 723 | End: 2023-06-20
Payer: COMMERCIAL

## 2023-06-20 DIAGNOSIS — C61 PROSTATE CANCER (HCC): ICD-10-CM

## 2023-06-20 LAB
ANION GAP SERPL CALCULATED.3IONS-SCNC: 7 MMOL/L (ref 4–13)
BUN SERPL-MCNC: 16 MG/DL (ref 5–25)
CALCIUM SERPL-MCNC: 9.2 MG/DL (ref 8.4–10.2)
CHLORIDE SERPL-SCNC: 104 MMOL/L (ref 96–108)
CO2 SERPL-SCNC: 25 MMOL/L (ref 21–32)
CREAT SERPL-MCNC: 1.92 MG/DL (ref 0.6–1.3)
GFR SERPL CREATININE-BSD FRML MDRD: 34 ML/MIN/1.73SQ M
GLUCOSE P FAST SERPL-MCNC: 55 MG/DL (ref 65–99)
POTASSIUM SERPL-SCNC: 4.4 MMOL/L (ref 3.5–5.3)
SODIUM SERPL-SCNC: 136 MMOL/L (ref 135–147)

## 2023-06-20 PROCEDURE — A9503 TC99M MEDRONATE: HCPCS

## 2023-06-20 PROCEDURE — 36415 COLL VENOUS BLD VENIPUNCTURE: CPT

## 2023-06-20 PROCEDURE — 74176 CT ABD & PELVIS W/O CONTRAST: CPT

## 2023-06-20 PROCEDURE — 80048 BASIC METABOLIC PNL TOTAL CA: CPT

## 2023-06-20 PROCEDURE — G1004 CDSM NDSC: HCPCS

## 2023-06-20 PROCEDURE — 78306 BONE IMAGING WHOLE BODY: CPT

## 2023-06-21 ENCOUNTER — PREP FOR PROCEDURE (OUTPATIENT)
Dept: INTERVENTIONAL RADIOLOGY/VASCULAR | Facility: CLINIC | Age: 73
End: 2023-06-21

## 2023-06-21 ENCOUNTER — TELEPHONE (OUTPATIENT)
Dept: UROLOGY | Facility: CLINIC | Age: 73
End: 2023-06-21

## 2023-06-21 ENCOUNTER — OFFICE VISIT (OUTPATIENT)
Dept: UROLOGY | Facility: CLINIC | Age: 73
End: 2023-06-21
Payer: COMMERCIAL

## 2023-06-21 ENCOUNTER — HOSPITAL ENCOUNTER (INPATIENT)
Facility: HOSPITAL | Age: 73
LOS: 5 days | Discharge: HOME HEALTH | DRG: 723 | End: 2023-06-26
Attending: EMERGENCY MEDICINE | Admitting: INTERNAL MEDICINE
Payer: COMMERCIAL

## 2023-06-21 VITALS
OXYGEN SATURATION: 99 % | DIASTOLIC BLOOD PRESSURE: 82 MMHG | SYSTOLIC BLOOD PRESSURE: 132 MMHG | HEART RATE: 72 BPM | BODY MASS INDEX: 24.88 KG/M2 | WEIGHT: 168 LBS | HEIGHT: 69 IN

## 2023-06-21 DIAGNOSIS — N17.9 AKI (ACUTE KIDNEY INJURY) (HCC): ICD-10-CM

## 2023-06-21 DIAGNOSIS — C61 PROSTATE CANCER (HCC): ICD-10-CM

## 2023-06-21 DIAGNOSIS — R31.9 HEMATURIA: ICD-10-CM

## 2023-06-21 DIAGNOSIS — R79.89 ELEVATED SERUM CREATININE: ICD-10-CM

## 2023-06-21 DIAGNOSIS — R33.9 URINARY RETENTION: ICD-10-CM

## 2023-06-21 DIAGNOSIS — N13.39 OTHER HYDRONEPHROSIS: Primary | ICD-10-CM

## 2023-06-21 DIAGNOSIS — N39.0 URINARY TRACT INFECTION: ICD-10-CM

## 2023-06-21 DIAGNOSIS — R97.20 ELEVATED PSA: Primary | ICD-10-CM

## 2023-06-21 PROBLEM — E11.9 TYPE 2 DIABETES MELLITUS, WITH LONG-TERM CURRENT USE OF INSULIN (HCC): Status: ACTIVE | Noted: 2023-06-21

## 2023-06-21 PROBLEM — Z79.4 TYPE 2 DIABETES MELLITUS, WITH LONG-TERM CURRENT USE OF INSULIN (HCC): Status: ACTIVE | Noted: 2023-06-21

## 2023-06-21 PROBLEM — I10 HYPERTENSION: Status: ACTIVE | Noted: 2023-06-21

## 2023-06-21 LAB
ALBUMIN SERPL BCP-MCNC: 3.9 G/DL (ref 3.5–5)
ALP SERPL-CCNC: 77 U/L (ref 34–104)
ALT SERPL W P-5'-P-CCNC: 12 U/L (ref 7–52)
ANION GAP SERPL CALCULATED.3IONS-SCNC: 7 MMOL/L
APTT PPP: 30 SECONDS (ref 23–37)
AST SERPL W P-5'-P-CCNC: 19 U/L (ref 13–39)
BACTERIA UR QL AUTO: ABNORMAL /HPF
BASOPHILS # BLD AUTO: 0.04 THOUSANDS/ÂΜL (ref 0–0.1)
BASOPHILS NFR BLD AUTO: 1 % (ref 0–1)
BILIRUB SERPL-MCNC: 0.35 MG/DL (ref 0.2–1)
BILIRUB UR QL STRIP: ABNORMAL
BUN SERPL-MCNC: 23 MG/DL (ref 5–25)
CALCIUM SERPL-MCNC: 8.9 MG/DL (ref 8.4–10.2)
CHLORIDE SERPL-SCNC: 101 MMOL/L (ref 96–108)
CLARITY UR: ABNORMAL
CO2 SERPL-SCNC: 27 MMOL/L (ref 21–32)
COLOR UR: ABNORMAL
CREAT SERPL-MCNC: 2.17 MG/DL (ref 0.6–1.3)
EOSINOPHIL # BLD AUTO: 0.08 THOUSAND/ÂΜL (ref 0–0.61)
EOSINOPHIL NFR BLD AUTO: 1 % (ref 0–6)
ERYTHROCYTE [DISTWIDTH] IN BLOOD BY AUTOMATED COUNT: 13.8 % (ref 11.6–15.1)
GFR SERPL CREATININE-BSD FRML MDRD: 29 ML/MIN/1.73SQ M
GLUCOSE SERPL-MCNC: 163 MG/DL (ref 65–140)
GLUCOSE UR STRIP-MCNC: ABNORMAL MG/DL
HCT VFR BLD AUTO: 41.5 % (ref 36.5–49.3)
HGB BLD-MCNC: 13.9 G/DL (ref 12–17)
HGB UR QL STRIP.AUTO: ABNORMAL
IMM GRANULOCYTES # BLD AUTO: 0.03 THOUSAND/UL (ref 0–0.2)
IMM GRANULOCYTES NFR BLD AUTO: 0 % (ref 0–2)
INR PPP: 0.98 (ref 0.84–1.19)
KETONES UR STRIP-MCNC: NEGATIVE MG/DL
LEUKOCYTE ESTERASE UR QL STRIP: ABNORMAL
LYMPHOCYTES # BLD AUTO: 2.33 THOUSANDS/ÂΜL (ref 0.6–4.47)
LYMPHOCYTES NFR BLD AUTO: 27 % (ref 14–44)
MCH RBC QN AUTO: 31.2 PG (ref 26.8–34.3)
MCHC RBC AUTO-ENTMCNC: 33.5 G/DL (ref 31.4–37.4)
MCV RBC AUTO: 93 FL (ref 82–98)
MONOCYTES # BLD AUTO: 0.67 THOUSAND/ÂΜL (ref 0.17–1.22)
MONOCYTES NFR BLD AUTO: 8 % (ref 4–12)
MUCOUS THREADS UR QL AUTO: ABNORMAL
NEUTROPHILS # BLD AUTO: 5.48 THOUSANDS/ÂΜL (ref 1.85–7.62)
NEUTS SEG NFR BLD AUTO: 63 % (ref 43–75)
NITRITE UR QL STRIP: POSITIVE
NON-SQ EPI CELLS URNS QL MICRO: ABNORMAL /HPF
NRBC BLD AUTO-RTO: 0 /100 WBCS
PH UR STRIP.AUTO: 6 [PH]
PLATELET # BLD AUTO: 298 THOUSANDS/UL (ref 149–390)
PMV BLD AUTO: 9 FL (ref 8.9–12.7)
POST-VOID RESIDUAL VOLUME, ML POC: 380 ML
POTASSIUM SERPL-SCNC: 4.6 MMOL/L (ref 3.5–5.3)
PROT SERPL-MCNC: 7.1 G/DL (ref 6.4–8.4)
PROT UR STRIP-MCNC: ABNORMAL MG/DL
PROTHROMBIN TIME: 13.1 SECONDS (ref 11.6–14.5)
RBC # BLD AUTO: 4.45 MILLION/UL (ref 3.88–5.62)
RBC #/AREA URNS AUTO: ABNORMAL /HPF
SODIUM SERPL-SCNC: 135 MMOL/L (ref 135–147)
SP GR UR STRIP.AUTO: 1.02 (ref 1–1.03)
UROBILINOGEN UR QL STRIP.AUTO: 1 E.U./DL
WBC # BLD AUTO: 8.63 THOUSAND/UL (ref 4.31–10.16)
WBC #/AREA URNS AUTO: ABNORMAL /HPF

## 2023-06-21 PROCEDURE — 84300 ASSAY OF URINE SODIUM: CPT | Performed by: INTERNAL MEDICINE

## 2023-06-21 PROCEDURE — 36415 COLL VENOUS BLD VENIPUNCTURE: CPT | Performed by: EMERGENCY MEDICINE

## 2023-06-21 PROCEDURE — 80053 COMPREHEN METABOLIC PANEL: CPT | Performed by: EMERGENCY MEDICINE

## 2023-06-21 PROCEDURE — 96365 THER/PROPH/DIAG IV INF INIT: CPT

## 2023-06-21 PROCEDURE — 82043 UR ALBUMIN QUANTITATIVE: CPT | Performed by: INTERNAL MEDICINE

## 2023-06-21 PROCEDURE — 81001 URINALYSIS AUTO W/SCOPE: CPT | Performed by: EMERGENCY MEDICINE

## 2023-06-21 PROCEDURE — 96375 TX/PRO/DX INJ NEW DRUG ADDON: CPT

## 2023-06-21 PROCEDURE — 82570 ASSAY OF URINE CREATININE: CPT | Performed by: INTERNAL MEDICINE

## 2023-06-21 PROCEDURE — 99213 OFFICE O/P EST LOW 20 MIN: CPT | Performed by: PHYSICIAN ASSISTANT

## 2023-06-21 PROCEDURE — 85610 PROTHROMBIN TIME: CPT | Performed by: EMERGENCY MEDICINE

## 2023-06-21 PROCEDURE — 85025 COMPLETE CBC W/AUTO DIFF WBC: CPT | Performed by: EMERGENCY MEDICINE

## 2023-06-21 PROCEDURE — 51798 US URINE CAPACITY MEASURE: CPT | Performed by: PHYSICIAN ASSISTANT

## 2023-06-21 PROCEDURE — 87086 URINE CULTURE/COLONY COUNT: CPT | Performed by: EMERGENCY MEDICINE

## 2023-06-21 PROCEDURE — 99284 EMERGENCY DEPT VISIT MOD MDM: CPT

## 2023-06-21 PROCEDURE — 85730 THROMBOPLASTIN TIME PARTIAL: CPT | Performed by: EMERGENCY MEDICINE

## 2023-06-21 RX ORDER — INSULIN GLARGINE 100 [IU]/ML
20 INJECTION, SOLUTION SUBCUTANEOUS
Status: DISCONTINUED | OUTPATIENT
Start: 2023-06-22 | End: 2023-06-26 | Stop reason: HOSPADM

## 2023-06-21 RX ORDER — ACETAMINOPHEN 325 MG/1
650 TABLET ORAL EVERY 6 HOURS PRN
Status: DISCONTINUED | OUTPATIENT
Start: 2023-06-21 | End: 2023-06-21

## 2023-06-21 RX ORDER — OXYCODONE HYDROCHLORIDE 5 MG/1
5 TABLET ORAL EVERY 6 HOURS PRN
Status: DISCONTINUED | OUTPATIENT
Start: 2023-06-21 | End: 2023-06-26 | Stop reason: HOSPADM

## 2023-06-21 RX ORDER — ATORVASTATIN CALCIUM 20 MG/1
20 TABLET, FILM COATED ORAL
Status: DISCONTINUED | OUTPATIENT
Start: 2023-06-22 | End: 2023-06-26 | Stop reason: HOSPADM

## 2023-06-21 RX ORDER — HYDROMORPHONE HCL/PF 1 MG/ML
0.5 SYRINGE (ML) INJECTION EVERY 6 HOURS PRN
Status: DISCONTINUED | OUTPATIENT
Start: 2023-06-21 | End: 2023-06-26 | Stop reason: HOSPADM

## 2023-06-21 RX ORDER — ALBUTEROL SULFATE 90 UG/1
2 AEROSOL, METERED RESPIRATORY (INHALATION) EVERY 4 HOURS PRN
Status: DISCONTINUED | OUTPATIENT
Start: 2023-06-21 | End: 2023-06-26 | Stop reason: HOSPADM

## 2023-06-21 RX ORDER — CEFTRIAXONE 1 G/50ML
1000 INJECTION, SOLUTION INTRAVENOUS ONCE
Status: COMPLETED | OUTPATIENT
Start: 2023-06-21 | End: 2023-06-21

## 2023-06-21 RX ORDER — TAMSULOSIN HYDROCHLORIDE 0.4 MG/1
0.4 CAPSULE ORAL
Status: DISCONTINUED | OUTPATIENT
Start: 2023-06-22 | End: 2023-06-26 | Stop reason: HOSPADM

## 2023-06-21 RX ORDER — ACETAMINOPHEN 325 MG/1
650 TABLET ORAL EVERY 6 HOURS PRN
Status: DISCONTINUED | OUTPATIENT
Start: 2023-06-21 | End: 2023-06-26 | Stop reason: HOSPADM

## 2023-06-21 RX ORDER — HYDROMORPHONE HCL/PF 1 MG/ML
0.5 SYRINGE (ML) INJECTION ONCE
Status: COMPLETED | OUTPATIENT
Start: 2023-06-21 | End: 2023-06-21

## 2023-06-21 RX ORDER — SODIUM CHLORIDE 9 MG/ML
75 INJECTION, SOLUTION INTRAVENOUS CONTINUOUS
Status: DISCONTINUED | OUTPATIENT
Start: 2023-06-21 | End: 2023-06-26 | Stop reason: HOSPADM

## 2023-06-21 RX ORDER — CEFTRIAXONE 1 G/50ML
1000 INJECTION, SOLUTION INTRAVENOUS EVERY 24 HOURS
Status: DISCONTINUED | OUTPATIENT
Start: 2023-06-22 | End: 2023-06-26 | Stop reason: HOSPADM

## 2023-06-21 RX ORDER — LIDOCAINE HYDROCHLORIDE 20 MG/ML
JELLY TOPICAL ONCE
Status: COMPLETED | OUTPATIENT
Start: 2023-06-21 | End: 2023-06-21

## 2023-06-21 RX ORDER — INSULIN LISPRO 100 [IU]/ML
1-5 INJECTION, SOLUTION INTRAVENOUS; SUBCUTANEOUS EVERY 6 HOURS SCHEDULED
Status: DISCONTINUED | OUTPATIENT
Start: 2023-06-22 | End: 2023-06-22

## 2023-06-21 RX ADMIN — CEFTRIAXONE 1000 MG: 1 INJECTION, SOLUTION INTRAVENOUS at 22:19

## 2023-06-21 RX ADMIN — LIDOCAINE HYDROCHLORIDE 5 APPLICATION: 20 JELLY TOPICAL at 22:40

## 2023-06-21 RX ADMIN — HYDROMORPHONE HYDROCHLORIDE 0.5 MG: 1 INJECTION, SOLUTION INTRAMUSCULAR; INTRAVENOUS; SUBCUTANEOUS at 23:11

## 2023-06-21 NOTE — TELEPHONE ENCOUNTER
Patient needs an ASAP prostate biopsy and cysto/TRUS with any doctor per Anastasia Campbell  Please assist with scheduling as schedules are booking out  Thank you! Also, clerical, please make sure IR appointment gets scheduled  Provider note:      Return for ASAP cysto and TRUS prostate biopsy/amb referral to IR for B/L PCN repeat BMP afterward

## 2023-06-21 NOTE — PROGRESS NOTES
"  UROLOGY PROGRESS NOTE   Patient Identifiers: Suzi Cline (MRN [id number])  Date of Service: 6/21/2023    Subjective:   82-year-old Yoruba-speaking male I saw him in April with lower urinary tract symptoms  Part of his evaluation included a PSA which came back at 145  I ordered a bone scan which was negative as well as a CAT scan which showed bilateral hydronephrosis and distended urinary bladder  There was abnormality in the bladder as well as an abnormal looking prostate  This was done without contrast his creatinine was 1 9  He comes in with his family today for follow-up  His son acts as an   He has pain while voiding  PVR over 300  He has lost about 5 pounds since I saw him      Reason for visit: Prostate cancer follow-up    Objective:     VITALS:    Vitals:    06/21/23 1448   BP: 132/82   Pulse: 72   SpO2: 99%           LABS:  No results found for: \"HGB\", \"HCT\", \"WBC\", \"PLT\"]    Lab Results   Component Value Date    K 4 4 06/20/2023     06/20/2023    CO2 25 06/20/2023    BUN 16 06/20/2023    CREATININE 1 92 (H) 06/20/2023    CALCIUM 9 2 06/20/2023   ]        INPATIENT MEDS:    Current Outpatient Medications:   •  albuterol (ProAir HFA) 90 mcg/act inhaler, Inhale 2 puffs every 4 (four) hours as needed for wheezing or shortness of breath, Disp: 8 5 g, Rfl: 0  •  clotrimazole-betamethasone (LOTRISONE) 1-0 05 % cream, Apply topically 2 (two) times a day, Disp: 30 g, Rfl: 0  •  Empagliflozin (JARDIANCE) 10 MG TABS tablet, Take 10 mg by mouth every morning, Disp: , Rfl:   •  Invokana 100 MG, , Disp: , Rfl:   •  Levemir FlexPen 100 units/mL injection pen, , Disp: , Rfl:   •  lisinopril (ZESTRIL) 10 mg tablet, Take 10 mg by mouth daily, Disp: , Rfl:   •  Lovaza 1 g capsule, , Disp: , Rfl:   •  Multiple Vitamins-Minerals (Sentry) TABS, , Disp: , Rfl:   •  OneTouch Verio test strip, , Disp: , Rfl:   •  rosuvastatin (CRESTOR) 10 MG tablet, Take 10 mg by mouth daily, Disp: , Rfl:   •  " "Spacer/Aero-Holding Chambers (AEROCHAMBER MINI CHAMBER) BILLY, by Does not apply route see administration instructions, Disp: 1 Device, Rfl: 0  •  tamsulosin (FLOMAX) 0 4 mg, Take 1 capsule (0 4 mg total) by mouth daily with dinner, Disp: 90 capsule, Rfl: 3  •  glimepiride (AMARYL) 4 mg tablet, , Disp: , Rfl:   •  ibuprofen (MOTRIN) 600 mg tablet, Take 1 tablet (600 mg total) by mouth every 8 (eight) hours as needed for mild pain for up to 8 days (Patient not taking: Reported on 6/21/2023), Disp: 24 tablet, Rfl: 0      Physical Exam:   /82 (BP Location: Left arm, Patient Position: Sitting, Cuff Size: Adult)   Pulse 72   Ht 5' 9\" (1 753 m)   Wt 76 2 kg (168 lb)   SpO2 99%   BMI 24 81 kg/m²   GEN: no acute distress    RESP: breathing comfortably with no accessory muscle use    ABD: Slightly tender and distended     EXT: no significant peripheral edema       RADIOLOGY:   IMPRESSION:        1  Severe bilateral hydronephrosis, worse on the right with suggestion of mass in the urinary bladder which is difficult to separate from the prostate gland  Extraprostatic extension is noted  The presence of a bladder mass needs to be excluded  2  Nonspecific  right lower lobe nodule  In view of patient's history of malignancy attention is needed on follow-up exam    Assessment:   #1  Elevated PSA and likely advanced prostate cancer  #2  Incomplete bladder emptying and hydronephrosis secondary to #1  #3    Acute kidney injury    Plan:   -I had a long discussion with the patient and his family  -I explained that there is a strong likelihood he has advanced prostate cancer  -Since he is not emptying his bladder we discussed 3 options of management  -0ne would be to wait until his cystoscopy and prostate biopsy  -2 would be to place a Cazares catheter and I explained that this may be difficult and uncomfortable and could cause bleeding  -3 in the 1 I recommended was for him to go to interventional radiology for bilateral " nephrostomy tubes which will divert urine away from his bladder and drain his kidneys most efficiently without the risk of bleeding  -They do agree to go to interventional radiology and I put in an ambulatory referral and spoke to Dr Rajesh Byrd  -They will contact him and arrange for the procedure as soon as possible  -He then will return to the office for a cystoscopy and prostate biopsy  -He will need to be started on ADT in the form of degarelix as well as an ambulatory referral to medical oncology and radiation oncology  -All questions answered  -The plan was also discussed with Dr Jayashree Johnson

## 2023-06-22 ENCOUNTER — ANESTHESIA (INPATIENT)
Dept: NON INVASIVE DIAGNOSTICS | Facility: HOSPITAL | Age: 73
DRG: 723 | End: 2023-06-22
Payer: COMMERCIAL

## 2023-06-22 ENCOUNTER — APPOINTMENT (OUTPATIENT)
Dept: NON INVASIVE DIAGNOSTICS | Facility: HOSPITAL | Age: 73
DRG: 723 | End: 2023-06-22
Attending: RADIOLOGY
Payer: COMMERCIAL

## 2023-06-22 DIAGNOSIS — N17.9 AKI (ACUTE KIDNEY INJURY) (HCC): ICD-10-CM

## 2023-06-22 DIAGNOSIS — R31.9 HEMATURIA: ICD-10-CM

## 2023-06-22 DIAGNOSIS — C61 PROSTATE CANCER (HCC): ICD-10-CM

## 2023-06-22 DIAGNOSIS — R33.9 URINARY RETENTION: ICD-10-CM

## 2023-06-22 DIAGNOSIS — R79.89 ELEVATED SERUM CREATININE: ICD-10-CM

## 2023-06-22 DIAGNOSIS — N13.39 OTHER HYDRONEPHROSIS: ICD-10-CM

## 2023-06-22 DIAGNOSIS — N39.0 URINARY TRACT INFECTION: ICD-10-CM

## 2023-06-22 LAB
ALBUMIN SERPL BCP-MCNC: 3.6 G/DL (ref 3.5–5)
ALP SERPL-CCNC: 70 U/L (ref 34–104)
ALT SERPL W P-5'-P-CCNC: 11 U/L (ref 7–52)
ANION GAP SERPL CALCULATED.3IONS-SCNC: 6 MMOL/L
APTT PPP: 33 SECONDS (ref 23–37)
AST SERPL W P-5'-P-CCNC: 18 U/L (ref 13–39)
BASOPHILS # BLD AUTO: 0.03 THOUSANDS/ÂΜL (ref 0–0.1)
BASOPHILS NFR BLD AUTO: 0 % (ref 0–1)
BILIRUB SERPL-MCNC: 0.42 MG/DL (ref 0.2–1)
BUN SERPL-MCNC: 22 MG/DL (ref 5–25)
CALCIUM SERPL-MCNC: 8.7 MG/DL (ref 8.4–10.2)
CHLORIDE SERPL-SCNC: 106 MMOL/L (ref 96–108)
CO2 SERPL-SCNC: 26 MMOL/L (ref 21–32)
CREAT SERPL-MCNC: 1.98 MG/DL (ref 0.6–1.3)
CREAT UR-MCNC: 106.2 MG/DL
CREAT UR-MCNC: 110 MG/DL
EOSINOPHIL # BLD AUTO: 0.09 THOUSAND/ÂΜL (ref 0–0.61)
EOSINOPHIL NFR BLD AUTO: 1 % (ref 0–6)
ERYTHROCYTE [DISTWIDTH] IN BLOOD BY AUTOMATED COUNT: 13.7 % (ref 11.6–15.1)
GFR SERPL CREATININE-BSD FRML MDRD: 33 ML/MIN/1.73SQ M
GLUCOSE SERPL-MCNC: 126 MG/DL (ref 65–140)
GLUCOSE SERPL-MCNC: 145 MG/DL (ref 65–140)
GLUCOSE SERPL-MCNC: 151 MG/DL (ref 65–140)
GLUCOSE SERPL-MCNC: 84 MG/DL (ref 65–140)
GLUCOSE SERPL-MCNC: 97 MG/DL (ref 65–140)
GLUCOSE SERPL-MCNC: 98 MG/DL (ref 65–140)
GLUCOSE SERPL-MCNC: 99 MG/DL (ref 65–140)
HCT VFR BLD AUTO: 41.1 % (ref 36.5–49.3)
HCV AB SER QL: NORMAL
HGB BLD-MCNC: 13.6 G/DL (ref 12–17)
IMM GRANULOCYTES # BLD AUTO: 0.04 THOUSAND/UL (ref 0–0.2)
IMM GRANULOCYTES NFR BLD AUTO: 0 % (ref 0–2)
INR PPP: 1.03 (ref 0.84–1.19)
LYMPHOCYTES # BLD AUTO: 2.45 THOUSANDS/ÂΜL (ref 0.6–4.47)
LYMPHOCYTES NFR BLD AUTO: 22 % (ref 14–44)
MAGNESIUM SERPL-MCNC: 2.4 MG/DL (ref 1.9–2.7)
MCH RBC QN AUTO: 31.1 PG (ref 26.8–34.3)
MCHC RBC AUTO-ENTMCNC: 33.1 G/DL (ref 31.4–37.4)
MCV RBC AUTO: 94 FL (ref 82–98)
MICROALBUMIN UR-MCNC: 469 MG/L (ref 0–20)
MICROALBUMIN/CREAT 24H UR: 426 MG/G CREATININE (ref 0–30)
MONOCYTES # BLD AUTO: 0.91 THOUSAND/ÂΜL (ref 0.17–1.22)
MONOCYTES NFR BLD AUTO: 8 % (ref 4–12)
NEUTROPHILS # BLD AUTO: 7.8 THOUSANDS/ÂΜL (ref 1.85–7.62)
NEUTS SEG NFR BLD AUTO: 69 % (ref 43–75)
NRBC BLD AUTO-RTO: 0 /100 WBCS
PLATELET # BLD AUTO: 305 THOUSANDS/UL (ref 149–390)
PMV BLD AUTO: 9.4 FL (ref 8.9–12.7)
POTASSIUM SERPL-SCNC: 4.5 MMOL/L (ref 3.5–5.3)
PROT SERPL-MCNC: 6.6 G/DL (ref 6.4–8.4)
PROTHROMBIN TIME: 13.6 SECONDS (ref 11.6–14.5)
RBC # BLD AUTO: 4.38 MILLION/UL (ref 3.88–5.62)
SODIUM 24H UR-SCNC: 50 MOL/L
SODIUM SERPL-SCNC: 138 MMOL/L (ref 135–147)
WBC # BLD AUTO: 11.32 THOUSAND/UL (ref 4.31–10.16)

## 2023-06-22 PROCEDURE — NC001 PR NO CHARGE: Performed by: INTERNAL MEDICINE

## 2023-06-22 PROCEDURE — 85025 COMPLETE CBC W/AUTO DIFF WBC: CPT | Performed by: INTERNAL MEDICINE

## 2023-06-22 PROCEDURE — 97166 OT EVAL MOD COMPLEX 45 MIN: CPT

## 2023-06-22 PROCEDURE — 0T9430Z DRAINAGE OF LEFT KIDNEY PELVIS WITH DRAINAGE DEVICE, PERCUTANEOUS APPROACH: ICD-10-PCS | Performed by: STUDENT IN AN ORGANIZED HEALTH CARE EDUCATION/TRAINING PROGRAM

## 2023-06-22 PROCEDURE — 50432 PLMT NEPHROSTOMY CATHETER: CPT

## 2023-06-22 PROCEDURE — C1729 CATH, DRAINAGE: HCPCS

## 2023-06-22 PROCEDURE — 85610 PROTHROMBIN TIME: CPT | Performed by: INTERNAL MEDICINE

## 2023-06-22 PROCEDURE — 85730 THROMBOPLASTIN TIME PARTIAL: CPT | Performed by: INTERNAL MEDICINE

## 2023-06-22 PROCEDURE — 50432 PLMT NEPHROSTOMY CATHETER: CPT | Performed by: INTERNAL MEDICINE

## 2023-06-22 PROCEDURE — 86803 HEPATITIS C AB TEST: CPT | Performed by: INTERNAL MEDICINE

## 2023-06-22 PROCEDURE — 80053 COMPREHEN METABOLIC PANEL: CPT | Performed by: INTERNAL MEDICINE

## 2023-06-22 PROCEDURE — 99223 1ST HOSP IP/OBS HIGH 75: CPT | Performed by: INTERNAL MEDICINE

## 2023-06-22 PROCEDURE — C1894 INTRO/SHEATH, NON-LASER: HCPCS

## 2023-06-22 PROCEDURE — 0T9330Z DRAINAGE OF RIGHT KIDNEY PELVIS WITH DRAINAGE DEVICE, PERCUTANEOUS APPROACH: ICD-10-PCS | Performed by: STUDENT IN AN ORGANIZED HEALTH CARE EDUCATION/TRAINING PROGRAM

## 2023-06-22 PROCEDURE — 83735 ASSAY OF MAGNESIUM: CPT | Performed by: INTERNAL MEDICINE

## 2023-06-22 PROCEDURE — 82948 REAGENT STRIP/BLOOD GLUCOSE: CPT

## 2023-06-22 RX ORDER — SODIUM CHLORIDE 9 MG/ML
10 INJECTION INTRAVENOUS DAILY
Qty: 900 ML | Refills: 0 | Status: SHIPPED | OUTPATIENT
Start: 2023-06-22 | End: 2023-06-30 | Stop reason: SDUPTHER

## 2023-06-22 RX ORDER — PROPOFOL 10 MG/ML
INJECTION, EMULSION INTRAVENOUS CONTINUOUS PRN
Status: DISCONTINUED | OUTPATIENT
Start: 2023-06-22 | End: 2023-06-22

## 2023-06-22 RX ORDER — INSULIN LISPRO 100 [IU]/ML
1-5 INJECTION, SOLUTION INTRAVENOUS; SUBCUTANEOUS
Status: DISCONTINUED | OUTPATIENT
Start: 2023-06-22 | End: 2023-06-23

## 2023-06-22 RX ORDER — INSULIN LISPRO 100 [IU]/ML
1-5 INJECTION, SOLUTION INTRAVENOUS; SUBCUTANEOUS
Status: DISCONTINUED | OUTPATIENT
Start: 2023-06-22 | End: 2023-06-26 | Stop reason: HOSPADM

## 2023-06-22 RX ORDER — MIDAZOLAM HYDROCHLORIDE 2 MG/2ML
INJECTION, SOLUTION INTRAMUSCULAR; INTRAVENOUS AS NEEDED
Status: DISCONTINUED | OUTPATIENT
Start: 2023-06-22 | End: 2023-06-22

## 2023-06-22 RX ORDER — PROPOFOL 10 MG/ML
INJECTION, EMULSION INTRAVENOUS AS NEEDED
Status: DISCONTINUED | OUTPATIENT
Start: 2023-06-22 | End: 2023-06-22

## 2023-06-22 RX ORDER — INSULIN LISPRO 100 [IU]/ML
1-5 INJECTION, SOLUTION INTRAVENOUS; SUBCUTANEOUS EVERY 6 HOURS SCHEDULED
Status: DISCONTINUED | OUTPATIENT
Start: 2023-06-22 | End: 2023-06-22

## 2023-06-22 RX ORDER — FENTANYL CITRATE 50 UG/ML
INJECTION, SOLUTION INTRAMUSCULAR; INTRAVENOUS AS NEEDED
Status: DISCONTINUED | OUTPATIENT
Start: 2023-06-22 | End: 2023-06-22

## 2023-06-22 RX ADMIN — ATORVASTATIN CALCIUM 20 MG: 20 TABLET, FILM COATED ORAL at 16:41

## 2023-06-22 RX ADMIN — FENTANYL CITRATE 25 MCG: 50 INJECTION, SOLUTION INTRAMUSCULAR; INTRAVENOUS at 11:53

## 2023-06-22 RX ADMIN — DEXMEDETOMIDINE 0.2 MCG/KG/HR: 100 INJECTION, SOLUTION, CONCENTRATE INTRAVENOUS at 11:24

## 2023-06-22 RX ADMIN — CEFTRIAXONE 1000 MG: 1 INJECTION, SOLUTION INTRAVENOUS at 22:43

## 2023-06-22 RX ADMIN — IOHEXOL 15 ML: 350 INJECTION, SOLUTION INTRAVENOUS at 12:13

## 2023-06-22 RX ADMIN — AMPICILLIN SODIUM AND SULBACTAM SODIUM 3 G: 2; 1 INJECTION, POWDER, FOR SOLUTION INTRAMUSCULAR; INTRAVENOUS at 11:36

## 2023-06-22 RX ADMIN — OXYCODONE HYDROCHLORIDE 5 MG: 5 TABLET ORAL at 05:53

## 2023-06-22 RX ADMIN — SODIUM CHLORIDE 150 ML/HR: 0.9 INJECTION, SOLUTION INTRAVENOUS at 05:50

## 2023-06-22 RX ADMIN — FENTANYL CITRATE 50 MCG: 50 INJECTION, SOLUTION INTRAMUSCULAR; INTRAVENOUS at 11:24

## 2023-06-22 RX ADMIN — SODIUM CHLORIDE 150 ML/HR: 0.9 INJECTION, SOLUTION INTRAVENOUS at 00:46

## 2023-06-22 RX ADMIN — TAMSULOSIN HYDROCHLORIDE 0.4 MG: 0.4 CAPSULE ORAL at 16:41

## 2023-06-22 RX ADMIN — INSULIN LISPRO 1 UNITS: 100 INJECTION, SOLUTION INTRAVENOUS; SUBCUTANEOUS at 22:43

## 2023-06-22 RX ADMIN — PROPOFOL 50 MCG/KG/MIN: 10 INJECTION, EMULSION INTRAVENOUS at 11:24

## 2023-06-22 RX ADMIN — INSULIN GLARGINE 20 UNITS: 100 INJECTION, SOLUTION SUBCUTANEOUS at 22:43

## 2023-06-22 RX ADMIN — INSULIN GLARGINE 20 UNITS: 100 INJECTION, SOLUTION SUBCUTANEOUS at 00:51

## 2023-06-22 RX ADMIN — FENTANYL CITRATE 25 MCG: 50 INJECTION, SOLUTION INTRAMUSCULAR; INTRAVENOUS at 11:49

## 2023-06-22 RX ADMIN — MIDAZOLAM 2 MG: 1 INJECTION INTRAMUSCULAR; INTRAVENOUS at 11:16

## 2023-06-22 RX ADMIN — PROPOFOL 30 MG: 10 INJECTION, EMULSION INTRAVENOUS at 11:24

## 2023-06-22 NOTE — TELEMEDICINE
e-Consult (IPC)  - Interventional Radiology  Blanca Kelley 81 y o  male MRN: [id number]  Unit/Bed#: [address] A Encounter: [id number]          Interventional Radiology has been consulted to evaluate Blanca Kelley    We were consulted by urology concerning this patient with hydronephrosis  Inpatient Consult to IR  Consult performed by: Bubba Dailey MD  Consult ordered by: William Eastman DO        06/22/23    Assessment/Recommendation:   Patient is a 81year-old male with urinary retention, hematuria, elveated PSA, found to have a mass in urinary bladder  CT of the abdomen and pelvis showed bilateral hydronephrosis  Bilateral PCNs were requested for decompression  We will plan for bilateral PCN placement with anesthesia today  Please maintain NPO status  5-10 minutes, >50% of the total time devoted to medical consultative verbal/EMR discussion between providers  Written report will be generated in the EMR  Thank you for allowing Interventional Radiology to participate in the care of Blanca Kelley  Please don't hesitate to call or TigerText us with any questions       Bubba Dailey MD

## 2023-06-22 NOTE — ASSESSMENT & PLAN NOTE
"Lab Results   Component Value Date    HGBA1C 7 4 (H) 05/24/2023       No results for input(s): \"POCGLU\" in the last 72 hours      Blood Sugar Average: Last 72 hrs:  · On PTA Levemir 40 units daily as well as Jardiance, Invokana, glimepiride  · We will put on glargine 20 units nightly as well as sliding scale insulin while n p o  overnight; hold PTA oral hypoglycemic agents  "

## 2023-06-22 NOTE — SEDATION DOCUMENTATION
Consent obtained with , wife at bedside procedure explained, questions answered   Wife back to pt room, pt taken into procedure room, prepped for b/l PCNs

## 2023-06-22 NOTE — SEDATION DOCUMENTATION
B/l PCN placed, 8 5 Fr to leg bags, continues with bloody urine  Pt to go to PACU before returning to room

## 2023-06-22 NOTE — OCCUPATIONAL THERAPY NOTE
OT EVALUATION       06/22/23 1030   Note Type   Note type Evaluation   Pain Assessment   Pain Assessment Tool 0-10   Pain Score 8   Pain Location/Orientation Location: Groin  (groin area)   Restrictions/Precautions   Other Precautions Pain   Home Living   Type of Home House   Home Layout Two level; Able to live on main level with bedroom/bathroom  (1 [address]NA)   Additional Comments pt was independent with ADLS and IADLS prior to admission   Prior Function   Level of [place] Independent with ADLs; Independent with functional mobility; Independent with IADLS   Lives With Spouse   Receives Help From Family   General   Family/Caregiver Present Yes  (wife)   ADL   Eating Assistance 7  Independent   Grooming Assistance 7  Independent   UB Bathing Assistance 7  Independent   LB [address] 5  Supervision/Setup   UB Dressing Assistance 7  Independent    [address]  5  Supervision/Setup   Bed Mobility   Supine to Sit 4  Minimal assistance   Sit to Supine 5  Supervision   Transfers   Sit to Stand 5  Supervision   Stand to Sit 5  Supervision   Toilet transfer 5  Supervision   Functional Mobility   Functional Mobility   (min assist/supervision to and from bathroom and short household distance in room)   Balance   Static Sitting Good   Dynamic Sitting Good   Static Standing Fair +   Dynamic Standing Fair   Activity Tolerance   Activity Tolerance Patient limited by pain   RUE Assessment   RUE Assessment WFL   LUE Assessment   LUE Assessment WFL   Cognition   Overall Cognitive Status WFL   Arousal/Participation Cooperative   Attention Within functional limits   Orientation Level Oriented X4   Following Commands Follows multistep commands with increased time or repetition   Comments pt speaks Citizen of [place] and some English   Assessment   Limitation Decreased ADL status; Decreased UE strength;Decreased Safe judgement during ADL;Decreased endurance;Decreased self-care trans;Decreased high-level ADLs  (decreased balance and mobility)   Prognosis Good   Assessment Patient evaluated by Occupational Therapy  Patient admitted with Hematuria  The patients occupational profile, medical and therapy history includes a extensive additional review of physical, cognitive, or psychosocial history related to current functional performance  Comorbidities affecting functional mobility and ADLS include: diabetes and hypertension  Prior to admission, patient was independent with functional mobility without assistive device, independent with ADLS and independent with IADLS  The evaluation identifies the following performance deficits: weakness, impaired balance, decreased endurance, increased fall risk, new onset of impairment of functional mobility, decreased ADLS, decreased IADLS, pain, decreased activity tolerance, decreased safety awareness and decreased strength, that result in activity limitations and/or participation restrictions  This evaluation requires clinical decision making of moderate complexity, because the patient may present with comorbidities that affect occupational performance and required minimal or moderate modification of tasks or assistance with the consideration of several treatment options  The Barthel Index was used as a functional outcome tool presenting with a score of Barthel Index Score: 60, indicating moderate limitations of functional mobility and ADLS  The patient's raw score on the -PAC Daily Activity Inpatient Short Form is 21  A raw score of greater than or equal to 19 suggests the patient may benefit from discharge to home  Please refer to the recommendation of the Occupational Therapist for safe discharge planning  Patient will benefit from skilled Occupational Therapy services to address above deficits and facilitate a safe return to prior level of function     Goals   Patient Goals less pain   STG Time Frame   (1-7 days)   Short Term Goal  Goals established to promote Patient Goals: less pain:   Bathing: independent; Lower Body Dressing: independent; Toileting: independent; Patient will increase ambulatory standard toilet transfer to independent with no assistive device to increase performance and safety with ADLS and functional mobility; Patient will increase standing tolerance to 5 minutes during ADL task to decrease assistance level and decrease fall risk; Patient will increase bed mobility to supervision in preparation for ADLS and transfers; Patient will increase functional mobility to and from bathroom with no assistive device independently to increase performance with ADLS and to use a toilet; Patient will tolerate 5 minutes of UE ROM/strengthening to increase general activity tolerance and performance in ADLS/IADLS; Patient will improve functional activity tolerance to 10 minutes of sustained functional tasks to increase participation in basic self-care and decrease assistance level;    Patient will increase dynamic standing balance to fair+ to improve postural stability and decrease fall risk during standing ADLS and transfers  LTG Time Frame   (8-14 days)   Long Term Goal Patient will increase standing tolerance to 10minutes during ADL task to decrease assistance level and decrease fall risk; Patient will increase bed mobility to independent in preparation for ADLS and transfers; Patient will tolerate 10 minutes of UE ROM/strengthening to increase general activity tolerance and performance in ADLS/IADLS; Patient will improve functional activity tolerance to 20 minutes of sustained functional tasks to increase participation in basic self-care and decrease assistance level;    Patient will increase dynamic standing balance to good to improve postural stability and decrease fall risk during standing ADLS and transfers    Pt will score >/= 24/24 on AM-PAC Daily Activity Inpatient scale to promote safe independence with ADLs and functional mobility; Pt will score >/= 90/100 on Barthel Index in order to decrease caregiver assistance needed and increase ability to perform ADLs and functional mobility  Plan   Treatment Interventions ADL retraining;Functional transfer training;UE strengthening/ROM; Endurance training;Equipment evaluation/education;Patient/family training; Activityengagement; Compensatory technique education   Goal Expiration Date 07/06/23   OT Frequency 3-5x/wk   Recommendation   OT Discharge Recommendation No rehabilitation needs   AM-PAC Daily Activity Inpatient   Lower Body Dressing 3   Bathing 3   Toileting 3   Upper Body Dressing 4   Grooming 4   Eating 4   Daily Activity Raw Score 21   Daily Activity Standardized Score (Calc for Raw Score >=11) 44 27   AM-PAC Applied Cognition Inpatient   Following a Speech/Presentation 4   Understanding Ordinary Conversation 4   Taking Medications 4   Remembering Where Things Are Placed or Put Away 4   Remembering List of 4-5 Errands 4   Taking Care of Complicated Tasks 4   Applied Cognition Raw Score 24   Applied Cognition Standardized Score 62 21   Barthel Index   Feeding 10   Bathing 0   Grooming Score 5   Dressing Score 5   Bladder Score 0   Bowels Score 10   Toilet Use Score 5   Transfers (Bed/Chair) Score 10   Mobility (Level Surface) Score 10   Stairs Score 5   Barthel Index Score 60   Licensure   [place] License Number  Bronwyn Dolan Deborajason Alvarado 89 OTR/L [id number]

## 2023-06-22 NOTE — DISCHARGE INSTRUCTIONS
Nephrostomy Tube Care     WHAT YOU NEED TO KNOW:   A nephrostomy tube is a catheter (thin plastic tube) that is inserted through your skin and into your kidney  The nephrostomy tube drains urine from your kidney into a collecting bag outside your body  You may need a nephrostomy tube when something is blocking the normal flow of urine  A nephrostomy tube may be used for a short or a long period of time  The nephrostomy tube comes out of your back, so you will need someone to help care for your nephrostomy tube  DISCHARGE INSTRUCTIONS:      How to clean the skin around the nephrostomy tube and change the bandage:  Since the nephrostomy tube comes out of your back, you will not be able to care for it by yourself  Ask someone to follow the general directions below to check and care for your nephrostomy tube  Gather the items you will need  Disposable (single use) under-pad, and a clean washcloth  Plain soap, warm water, and new medical gloves  Sterile gauze bandages  Clear adhesive dressing or medical tape  Skin barrier  Protective skin film  Trash bag  Remove the old bandage, and check the tube entry site  Have the patient lie on his side with the nephrostomy tube entry site facing up  Place the under-pad where it will catch drainage as you are working with the nephrostomy tube  Wash your hands with soap and water  Put on new medical gloves  Gently remove the old bandage, without pulling on the tube  Do this by holding the skin beside the tube with one hand  With the other hand, gently remove sticky tape and the skin barrier by pulling in the same direction as hair growth  Do not touch the side of the bandage that is placed over or around the tube  Throw the bandage and skin barrier away in a trash bag  Look for signs of infection, such as skin redness and swelling  Report any skin changes to healthcare providers  Clean the tube entry site      Hold the tube in place to keep it from being pulled out while you are cleaning around it  You will need to clean the area twice  For the first cleaning, wet a new gauze bandage with soap and water  Begin at the entry site of the tube  Wipe the skin in circles, moving away from the entry site  Remove blood and any other material with the gauze  Do this as often as needed  Use a new gauze bandage each time you clean the area, moving away from the entry site  For the second cleaning, wet a new gauze bandage with water  Begin at the entry site of the tube  Wipe the skin in circles, moving away from the entry site  Use a new gauze bandage each time you clean the area, moving away from the entry site  Gently pat the skin with a clean washcloth to dry it  Apply the skin barrier and bandages  Roll up a bandage to make it thick, and place it under  the place where the tube enters the skin  Place it to support the tube, and stop it from kinking or bending  Tape the bandage in place, and apply more bandages if directed by a healthcare provider  Bring the tubing forward to the front and tape it to the skin  Do not stretch the tube tight, because this may pull the nephrostomy tube out  How often to change the bandage  Change the bandage around the tube, every other day  If your bandages  get dirty or wet, change them right away, and as often as needed  If your nephrostomy tube is to be used for a long period of time, the tube needs to be changed every 2 to 3 months  Healthcare providers will tell you when you need to make an appointment to have your tube changed  How to care for the urine drainage bag:   Ask if you need to measure and write down how much urine is in the bag before you empty it  Drain urine out of the drainage bag when it is ½ to ? full  Open the spout at the bottom of the bag to empty the urine into the toilet  You may need to detach the drainage bag from the nephrostomy tube to change it    If so, attach a new drainage bag tightly to the nephrostomy tube  How to prevent problems with your nephrostomy tube:   Change bandages, directed  This helps to prevent infection  Throw away or clean your drainage bag as directed by your healthcare provider  Wipe the connecting ends of the drainage bag with alcohol before you reconnect the bag to the tube  This helps prevent infection  Keep the tube taped to your skin and connected to a drainage bag placed below the level of your kidneys  This helps prevent urine from backing up into your kidneys  You may wear a small drainage bag strapped to your leg to let you move around more easily  Check the catheter to be sure it is in place after you change your clothes or do other activities  Do not wear tight clothing over the tube  Place the tubing over your thigh rather than under it when you are sitting down  Be sure that nothing is pulling on the nephrostomy tube when you move around  Change positions if you see little or no urine in your drainage bag  Check to see if the urine tube is twisted or bent  Be sure that you are not sitting or lying on the tube  If there are no kinks and there is little or no urine in the drainage bag, tell your healthcare provider  Flush out the tube as directed  Some tubes get flushed one time a day with 10 mls of NSS You will be given a prescription for the flushes  To flush the nephrostomy tube, clean both connections with alcohol swap  Twist off the drainage bag tube and twist the saline syringe into the nephrostomy tube and flush briskly  Remove the syringe and twist the drainage bag tube back into the nephrostomy tube  Keep the site covered while you shower  Tape a piece of clear adhesive plastic over the dressing to keep it dry while you shower  Do not take tub baths      Contact Interventional Radiology at [phone] Federico PATIENTS: Contact Interventional Radiology at [phone]) Sammy Cordero PATIENTS: Contact Interventional Radiology at [phone]) if:  The skin around the nephrostomy tube is red, swollen, itches, or has a rash  You have a fever greater than 101 or chills  You have lower back or hip pain  There are changes in how your urine looks or smells  You have little or no urine draining from the nephrostomy tube  You have nausea and are vomiting  The black deb on your tube has moved, or the tube is longer than when it was put in  You have questions or concerns about your condition or care  The nephrostomy tube comes out completely  There is blood, pus, or a bad smell coming from the place where the tube enters your skin  Urine is leaking around the tube  The following pharmacies carry the flush syringes  [facility]                     [facility]       [address]  Phone [phone]            Phone [phone]  [address] & [facility]                      [address]                                 [phone]  Phone [phone]            Phone [phone]    [facility]                                                                         [facility] [phone]  [facility]    [facility]   Phone [phone]

## 2023-06-22 NOTE — H&P
[facility]  H&P  Name: Aashish Nelson 81 y o  male I MRN: [id number]  Unit/Bed#: ED 10 I Date of Admission: 6/21/2023   Date of Service: 6/22/2023 I Hospital Day: 1      Assessment/Plan   Other hydronephrosis  Assessment & Plan  · Urinary retention, suprapubic pain as well as hematuria  · Elevated serum creatinine at 2 17; unknown baseline  · Saw urology outpatient earlier in the day after having a highly elevated PSA in May  · CT abdomen pelvis without contrast in the ER: Severe bilateral hydronephrosis, worse on the right with suggestion of mass in the urinary bladder which is difficult to separate from the prostate gland  Extraprostatic extension is noted  The presence of a bladder mass needs to be excluded  Nonspecific right lower lobe nodule  In view of patient's history of malignancy attention is needed on follow-up exam   · ER staff discussed the case with urology on-call who reported patient will need admission to medicine with urology as well as IR consultations; will need bilateral nephrostomy tubes to be placed by IR as well as urology consultation for likely cystoscopy  · Cazares catheter placed in the ER  · Admit to medicine  Keep n p o  except for meds  Consult urology for cystoscopy as well as IR for bilateral nephrostomy tube placement  IV fluids  Repeat creatinine in the a m  hold nephrotoxic agents      * Hematuria  Assessment & Plan  · See hydronephrosis section above    Hypertension  Assessment & Plan  · Hold PTA lisinopril while admitted with elevated creatinine    Elevated serum creatinine  Assessment & Plan  · Creatinine 2 17 today, was 1 92 two days ago   · No other previous values in the system  · In the setting of severe bilateral hydronephrosis as well as underlying type 2 diabetes with unclear baseline creatinine  · See treatment above in hydronephrosis as well as hematuria sections    Elevated PSA  Assessment & Plan  · PSA highly elevated at 145 recently in "May  · Underwent nuclear medicine bone scan on June 20 with following impression: \" No focal tracer activity characteristic of osseous metastatic disease  Note is made of indeterminate focal tracer activity involving the right, likely T11 vertebral artery of uncertain clinical significance but favored to be degenerative in nature, see discussion above  Tracer activity within a significantly distended urinary bladder limits evaluation of the central pelvis/sacrum  · See hematuria section above    Type 2 diabetes mellitus, with long-term current use of insulin ([facility])  Assessment & Plan  Lab Results   Component Value Date    HGBA1C 7 4 (H) 05/24/2023       No results for input(s): \"POCGLU\" in the last 72 hours  Blood Sugar Average: Last 72 hrs:  · On PTA Levemir 40 units daily as well as Jardiance, Invokana, glimepiride  · We will put on glargine 20 units nightly as well as sliding scale insulin while n p o  overnight; hold PTA oral hypoglycemic agents             VTE Prophylaxis:sequential compression device   Code Status: Level 1 - Full Code       Anticipated Length of Stay:  Patient will be admitted on an Inpatient basis with an anticipated length of stay of  > 2 midnights  Justification for Hospital Stay: Please see detailed plans noted above  Chief Complaint:     Urinary retention, suprapubic pain, hematuria  History of Present Illness:  Blanca Kelley is a 81 y o  male who has past medical history significant for diabetes mellitus on insulin, hypertension, hyperlipidemia who presented to [address] [place] [address] ER on the evening of 6/21 with hematuria, suprapubic pain as well as urinary retention  Patient reports that he has been having issues with urinary retention for about the past month  This prompted him to get a PSA performed at the end of May in which she was found to be highly elevated    He then had an outpatient urology appointment on June 20 in which there was high concern for underlying " prostatic malignancy  Patient reports that he then went home and began to have hematuria the next day  He also reports having suprapubic abdominal pain  Patient then came to the ER where he was noted to be retaining significant amounts of urine and Cazares catheter needed to be placed  He also underwent a CT abdomen pelvis which had evidence of severe bilateral hydronephrosis  ER was in contact with urology team who reports patient will be needing admission for urology consultation for cystoscopy as well as IR consultation for bilateral nephrostomy tube placements  Patient denies any fevers  Patient denies any history of smoking in the last 40 years  Patient denies any history of alcohol abuse or drug abuse  Review of Systems:    Constitutional:  Denies fever or chills   Eyes:  Denies change in visual acuity   HENT:  Denies nasal congestion or sore throat   Respiratory:  Denies cough or shortness of breath   Cardiovascular:  Denies chest pain or edema   GI: Positive for abdominal pain  : Positive for urinary retention and hematuria  Musculoskeletal:  Denies back pain or joint pain   Integument:  Denies rash   Neurologic:  Denies headache or sensory changes   Endocrine:  Denies polyuria or polydipsia   Lymphatic:  Denies swollen glands   Psychiatric:  Denies depression or anxiety     Past Medical and Surgical History:   Past Medical History:   Diagnosis Date   • Diabetes mellitus ([facility] [address] )    • High cholesterol    • Hypertension      History reviewed  No pertinent surgical history  Meds/Allergies:  No current facility-administered medications on file prior to encounter       Current Outpatient Medications on File Prior to Encounter   Medication Sig Dispense Refill   • albuterol (ProAir HFA) 90 mcg/act inhaler Inhale 2 puffs every 4 (four) hours as needed for wheezing or shortness of breath 8 5 g 0   • clotrimazole-betamethasone (LOTRISONE) 1-0 05 % cream Apply topically 2 (two) times a day 30 g 0   • Empagliflozin (JARDIANCE) 10 MG TABS tablet Take 10 mg by mouth every morning     • glimepiride (AMARYL) 4 mg tablet  (Patient not taking: Reported on 6/21/2023)     • ibuprofen (MOTRIN) 600 mg tablet Take 1 tablet (600 mg total) by mouth every 8 (eight) hours as needed for mild pain for up to 8 days (Patient not taking: Reported on 6/21/2023) 24 tablet 0   • Invokana 100 MG      • Levemir FlexPen 100 units/mL injection pen      • lisinopril (ZESTRIL) 10 mg tablet Take 10 mg by mouth daily     • Lovaza 1 g capsule      • Multiple Vitamins-Minerals (Sentry) TABS      • OneTouch Verio test strip      • rosuvastatin (CRESTOR) 10 MG tablet Take 10 mg by mouth daily     • Spacer/Aero-Holding Chambers (AEROCHAMBER MINI CHAMBER) BILLY by Does not apply route see administration instructions 1 Device 0   • tamsulosin (FLOMAX) 0 4 mg Take 1 capsule (0 4 mg total) by mouth daily with dinner 90 capsule 3     Allergies: No Known Allergies    History:  Marital Status: /Civil Union     Substance Use History:   Social History     Substance and Sexual Activity   Alcohol Use Yes     Social History     Tobacco Use   Smoking Status Former   Smokeless Tobacco Never     Social History     Substance and Sexual Activity   Drug Use Never       Family History:  Family History   Problem Relation Age of Onset   • No Known Problems Father        Physical Exam:     Vitals:   Blood Pressure: 137/73 (06/21/23 2330)  Pulse: 64 (06/21/23 2330)  Temperature: 97 6 °F (36 4 °C) (06/21/23 2000)  Temp Source: Tympanic (06/21/23 2000)  Respirations: 20 (06/21/23 2315)  SpO2: 98 % (06/21/23 2330)    Constitutional:  A&Ox4  Eyes:  EOMI, No scleral icterus   HENT:   oropharynx moist, external ears normal, external nose normal   Respiratory:  No respiratory distress, no wheezing   Cardiovascular:  Normal rate, no murmurs   GI:  Soft, suprapubic pain, no guarding   :  Cazares catheter in place draining pink tinged urine  Musculoskeletal:  no tenderness, no deformities  Back- no tenderness  Integument:  no jaundice, no rash   Neurologic:  Alert &awake, communicative, CN 2-12 normal,  no focal deficits noted   Psychiatric:  Speech and behavior appropriate       Lab Results: I have personally reviewed pertinent reports  Results from last 7 days   Lab Units 06/21/23 2032   WBC Thousand/uL 8 63   HEMOGLOBIN g/dL 13 9   HEMATOCRIT % 41 5   PLATELETS Thousands/uL 298   NEUTROS PCT % 63   LYMPHS PCT % 27   MONOS PCT % 8   EOS PCT % 1     Results from last 7 days   Lab Units 06/21/23 2032   POTASSIUM mmol/L 4 6   CHLORIDE mmol/L 101   CO2 mmol/L 27   BUN mg/dL 23   CREATININE mg/dL 2 17*   CALCIUM mg/dL 8 9   ALK PHOS U/L 77   ALT U/L 12   AST U/L 19     Results from last 7 days   Lab Units 06/21/23 2032   INR  0 98         Imaging: I have personally reviewed pertinent reports  CT abdomen pelvis wo contrast    Result Date: 6/21/2023  Narrative: CT ABDOMEN AND PELVIS WITHOUT IV CONTRAST INDICATION:   C61: Malignant neoplasm of prostate  COMPARISON: Bone scan of the same day TECHNIQUE:  CT examination of the abdomen and pelvis was performed without intravenous contrast  Multiplanar 2D reformatted images were created from the source data  This examination, like all CT scans performed in the [facility], was performed utilizing techniques to minimize radiation dose exposure, including the use of iterative reconstruction and automated exposure control  Radiation dose length product (DLP) for this visit:  502 7 mGy-cm Absence of intravenous and oral contrast decreases the sensitivity of the exam  FINDINGS: ABDOMEN LOWER CHEST: Coronary artery calcifications  4 mm right lower lobe lung nodule series 2/44  LIVER/BILIARY TREE:  Unremarkable  GALLBLADDER:  No calcified gallstones  No pericholecystic inflammatory change  SPLEEN: Splenic calcifications  PANCREAS:  Unremarkable  ADRENAL GLANDS:  Unremarkable   KIDNEYS/URETERS: Bilateral hydronephrosis and hydroureter, worse on the right  Punctate nonobstructing right renal calculus  Ureteral dilatation extends to the right ureterovesical junction  There is suggestion of a mass in the urinary bladder with asymmetric right-sided wall thickening  This is difficult to separate from the enlarged prostate gland  STOMACH AND BOWEL:  Unremarkable  APPENDIX:  No findings to suggest appendicitis  ABDOMINOPELVIC CAVITY:  No ascites  No pneumoperitoneum  No lymphadenopathy  VESSELS: Atherosclerotic changes are present  No evidence of aneurysm  PELVIS REPRODUCTIVE ORGANS: Prostate enlargement  Please see findings described below  URINARY BLADDER: Asymmetric wall thickening suggesting bladder mass which involves large portion of the right lateral wall of the urinary bladder with a thickness of at least 3 cm series 601/74, series 2/157  Direct visualization is recommended  Urinary bladder is distended  There is fat stranding surrounding the urinary bladder  Distal right ureter is difficult to see, encased in soft tissue density along the abnormal wall of the urinary bladder  ABDOMINAL WALL/INGUINAL REGIONS:  Unremarkable  OSSEOUS STRUCTURES: Lumbar spondylosis old healed fractures of the left 10th and 11th ribs  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  Impression: 1  Severe bilateral hydronephrosis, worse on the right with suggestion of mass in the urinary bladder which is difficult to separate from the prostate gland  Extraprostatic extension is noted  The presence of a bladder mass needs to be excluded  2  Nonspecific  right lower lobe nodule   In view of patient's history of malignancy attention is needed on follow-up exam  I personally discussed this study with Ramon Garner on 6/21/2023 2:34 PM  Workstation performed: [id number]     NM bone scan whole body    Result Date: 6/20/2023  Narrative: BONE SCAN  WHOLE BODY INDICATION: C61: Malignant neoplasm of prostate PREVIOUS FILM CORRELATION:    Skeletal structures of CT of abdomen and pelvis dated June 20, 2023 TECHNIQUE:   This study was performed following the intravenous administration of 26 2 mCi Tc-99m labeled MDP  Delayed, anterior and posterior whole body images were acquired, 2-3 hours after radiopharmaceutical administration  FINDINGS: There is indeterminant focal tracer activity involving the right, likely T11 vertebral body without definitive suspicious correlate on concurrent CT and therefore presumably degenerative in nature with metastatic disease considered less likely  Findings can be further characterized with MRI and/or PSMA PET/CT as clinically indicated  There is otherwise no additional focal tracer activity to suggest osseous metastatic disease  This note that activity within a significantly distended urinary bladder limits evaluation of the sacrum and central pelvis  There is nonspecific multifocal tracer activity in a pattern most consistent with degenerative changes involve the right acromioclavicular articulation, cervical spine, and thoracolumbar spine  Both kidneys are visualized  Impression: 1  No focal tracer activity characteristic of osseous metastatic disease  Note is made of indeterminant focal tracer activity involving the right, likely T11 vertebral body of uncertain clinical significance but favored to be degenerative in nature, see discussion above  2  Tracer activity within a significantly distended urinary bladder limits evaluation of the central pelvis/sacrum  Workstation performed: [id number]       Total time for visit, including counseling/coordination of care: 45 minutes  Greater than 50% of this total time spent on direct patient counseling and coorination of care  Epic Records Reviewed as well as Records in Care Everywhere    ** Please Note: Dragon 360 Dictation voice to text software was used in the creation of this document   **

## 2023-06-22 NOTE — UTILIZATION REVIEW
Initial Clinical Review    Admission: Date/Time/Statement:   Admission Orders (From admission, onward)     Ordered        06/21/23 2327  INPATIENT ADMISSION  Once                      Orders Placed This Encounter   Procedures   • INPATIENT ADMISSION     Standing Status:   Standing     Number of Occurrences:   1     Order Specific Question:   Level of Care     Answer:   Med Surg [16]     Order Specific Question:   Estimated length of stay     Answer:   More than 2 Midnights     Order Specific Question:   Certification     Answer:   I certify that inpatient services are medically necessary for this patient for a duration of greater than two midnights  See H&P and MD Progress Notes for additional information about the patient's course of treatment  ED Arrival Information     Expected   -    Arrival   6/21/2023 19:33    Acuity   Urgent            Means of arrival   Walk-In    Escorted by   Family Member    Service   Hospitalist    Admission type   Emergency            Arrival complaint   blood in urine              Chief Complaint   Patient presents with   • Blood in Urine     Seen at urology office today, prob prostate cancer  Family states pt started passing pink urine after the  visit which prompted visit here  Painful urination and not emptying well       Initial Presentation:   81 yom to ER from urology office for hematuria, difficulty urinating with frequency, pain & pressure  Had CT and blood work yesterday  Diagnosed with enlarged prostate, possible bladder mass likely due to prostate cancer  Hx DM, hypercholesterolemia, HTN  Presents as above  Admission work-up showing bilateral hydronephrosis on imaging, elevated Cr, 800cc on bladder scan, +u/a  Admitted to inpatient status for hematuria  Started on IVABT & IVF, cultures pending  Per urol: gross hematuria  Cazares cath, IVF, IR for nephrostomy tubes & prostate bx      Date: 6/22/23   Day 2:   To IR for: Bilateral percutaneous nephrostomy tube placement  Anesthesia: MAC sedation  Findings: Ultrasound and fluoroscopic guided placement of bilateral 8 5 Bengali percutaneous nephrostomy catheters  Recommendations:  - Maintain both catheters to bag drainage  - Flush daily with 10 mL saline  - Plan for routine exchange in 3 months if catheters are still in place  8 5 Fr to leg bags, continues with bloody urine  Continue with IV ceftriaxone for coverage of urinary pathogens  IVF maintained      ED Triage Vitals [06/21/23 2000]   Temperature Pulse Respirations Blood Pressure SpO2   97 6 °F (36 4 °C) 68 20 136/68 98 %      Temp Source Heart Rate Source Patient Position - Orthostatic VS BP Location FiO2 (%)   Tympanic Monitor Sitting Right arm --      Pain Score       4          Wt Readings from Last 1 Encounters:   06/22/23 71 9 kg (158 lb 9 6 oz)     Additional Vital Signs:   Date/Time Temp Pulse Resp BP MAP (mmHg) SpO2 O2 Device Patient Position - Orthostatic VS   06/22/23 02:54:34 98 1 °F (36 7 °C) 57 18 127/64 85 99 % -- --   06/22/23 00:20:24 98 5 °F (36 9 °C) 60 18 149/79 102 99 % None (Room air) Lying   06/21/23 2330 -- 64 -- 137/73 99 98 % None (Room air) Lying   06/21/23 2315 -- 64 20 152/80 108 98 % -- --   06/21/23 2300 -- 66 -- 133/74 99 100 % -- --   06/21/23 2215 -- 62 20 134/73 98 99 % None (Room air) Lying   06/21/23 2115 -- 64 20 118/64 86 98 % None (Room air) --   06/21/23 2013 -- -- -- -- -- -- None (Room air) --   06/21/23 2000 97 6 °F (36 4 °C) 68 20 136/68 -- 98 % None (Room air) Sitting     Pertinent Labs/Diagnostic Test Results:     Results from last 7 days   Lab Units 06/22/23  0500 06/21/23 2032   WBC Thousand/uL 11 32* 8 63   HEMOGLOBIN g/dL 13 6 13 9   HEMATOCRIT % 41 1 41 5   PLATELETS Thousands/uL 305 298   NEUTROS ABS Thousands/µL 7 80* 5 48     Results from last 7 days   Lab Units 06/22/23  0500 06/21/23 2032 06/20/23  0832   SODIUM mmol/L 138 135 136   POTASSIUM mmol/L 4 5 4 6 4 4   CHLORIDE mmol/L 106 101 104   CO2 mmol/L 26 27 25   ANION GAP mmol/L 6 7 7   BUN mg/dL 22 23 16   CREATININE mg/dL 1 98* 2 17* 1 92*   EGFR ml/min/1 73sq m 33 29 34   CALCIUM mg/dL 8 7 8 9 9 2   MAGNESIUM mg/dL 2 4  --   --      Results from last 7 days   Lab Units 06/22/23  0500 06/21/23 2032   AST U/L 18 19   ALT U/L 11 12   ALK PHOS U/L 70 77   TOTAL PROTEIN g/dL 6 6 7 1   ALBUMIN g/dL 3 6 3 9   TOTAL BILIRUBIN mg/dL 0 42 0 35     Results from last 7 days   Lab Units 06/22/23  1319 06/22/23  0720 06/22/23  0548 06/22/23  0033   POC GLUCOSE mg/dl 84 97 98 126     Results from last 7 days   Lab Units 06/22/23  0500 06/21/23 2032   GLUCOSE RANDOM mg/dL 99 163*     Results from last 7 days   Lab Units 06/22/23  0500 06/21/23 2032   PROTIME seconds 13 6 13 1   INR  1 03 0 98   PTT seconds 33 30     Results from last 7 days   Lab Units 06/22/23  0500   HEP C AB  Non-reactive     Results from last 7 days   Lab Units 06/21/23 2054   CLARITY UA  Cloudy   COLOR UA  Brenda   SPEC GRAV UA  1 020   PH UA  6 0   GLUCOSE UA mg/dl >=1000 (1%)*   KETONES UA mg/dl Negative   BLOOD UA  Large*   PROTEIN UA mg/dl 100 (2+)*   NITRITE UA  Positive*   BILIRUBIN UA  Small*   UROBILINOGEN UA E U /dl 1 0   LEUKOCYTES UA  Elevated glucose may cause decreased leukocyte values   See urine microscopic for UWBC result*   WBC UA /hpf Field obscured, unable to enumerate*   RBC UA /hpf Innumerable*   BACTERIA UA /hpf Occasional   EPITHELIAL CELLS WET PREP /hpf Occasional   MUCUS THREADS  Occasional*   SODIUM UR  50   CREATININE UR mg/dL 110 0  106 2     ED Treatment:   Medication Administration from 06/21/2023 1933 to 06/22/2023 0015       Date/Time Order Dose Route Action     06/21/2023 2219 EDT cefTRIAXone (ROCEPHIN) IVPB (premix in dextrose) 1,000 mg 50 mL 1,000 mg Intravenous New Bag     06/21/2023 2240 EDT lidocaine (URO-JET) 2 % urethral/mucosal gel 5 Application Urethral Given     06/21/2023 9314 EDT HYDROmorphone (DILAUDID) injection 0 5 mg 0 5 mg Intravenous Given        Past Medical History:   Diagnosis Date   • Diabetes mellitus ([place] [address] )    • High cholesterol    • Hypertension      Present on Admission:  **None**      Admitting Diagnosis: Urinary retention [R33 9]  Blood in urine [R31 9]  Prostate cancer ([facility] ) [C61]  Urinary tract infection [N39 0]  Hematuria [R31 9]  PAUL (acute kidney injury) ([place] [address] ) [N17 9]  Age/Sex: 81 y o  male  Admission Orders:  Consult urology  Cazares cath  accuchecks  Pt/ot eval & tx  Scd/foot pumps  Consult IR    Scheduled Medications:  atorvastatin, 20 mg, Oral, Daily With Dinner  cefTRIAXone, 1,000 mg, Intravenous, Q24H  insulin glargine, 20 Units, Subcutaneous, HS  insulin lispro, 1-5 Units, Subcutaneous, Q6H RAJEEV  tamsulosin, 0 4 mg, Oral, Daily With Dinner    Continuous IV Infusions:  sodium chloride, 150 mL/hr, Intravenous, Continuous    PRN Meds:  acetaminophen, 650 mg, Oral, Q6H PRN  albuterol, 2 puff, Inhalation, Q4H PRN  HYDROmorphone, 0 5 mg, Intravenous, Q6H PRN  oxyCODONE, 5 mg, Oral, Q6H PRN  pneumococcal 20-robbie conj vacc, 0 5 mL, Intramuscular, Prior to discharge    Network Utilization Review Department  ATTENTION: Please call with any questions or concerns to [phone] and carefully listen to the prompts so that you are directed to the right person  All voicemails are confidential   [facility] all requests for admission clinical reviews, approved or denied determinations and any other requests to dedicated fax number below belonging to the campus where the patient is receiving treatment   List of dedicated fax numbers for the Facilities:  [address] DENIALS (Administrative/Medical Necessity) [phone]   [address] [place] [address] (Maternity/NICU/Pediatrics) [phone]   6 Mounika Carter [address] Emma Soto [address] [place] [address] [facility] [place] [phone] [address] [facility] 310 [facility] 134 263 [facility] [phone]

## 2023-06-22 NOTE — ANESTHESIA POSTPROCEDURE EVALUATION
Post-Op Assessment Note    CV Status:  Stable  Pain Score: 0    Pain management: adequate     Mental Status:  Alert and awake   Hydration Status:  Stable   PONV Controlled:  None   Airway Patency:  Patent      Post Op Vitals Reviewed: Yes      Staff: Anesthesiologist, CRNA         No notable events documented      BP   119/60   Temp      Pulse  80   Resp   14   SpO2   98

## 2023-06-22 NOTE — ASSESSMENT & PLAN NOTE
"· PSA highly elevated at 145 recently in May  · Underwent nuclear medicine bone scan on June 20 with following impression: \" No focal tracer activity characteristic of osseous metastatic disease  Note is made of indeterminate focal tracer activity involving the right, likely T11 vertebral artery of uncertain clinical significance but favored to be degenerative in nature, see discussion above  Tracer activity within a significantly distended urinary bladder limits evaluation of the central pelvis/sacrum     · See hematuria section above  "

## 2023-06-22 NOTE — PLAN OF CARE
Problem: PAIN - ADULT  Goal: Verbalizes/displays adequate comfort level or baseline comfort level  Description: Interventions:  - Encourage patient to monitor pain and request assistance  - Assess pain using appropriate pain scale  - Administer analgesics based on type and severity of pain and evaluate response  - Implement non-pharmacological measures as appropriate and evaluate response  - Consider cultural and social influences on pain and pain management  - Notify physician/advanced practitioner if interventions unsuccessful or patient reports new pain  Outcome: Progressing     Problem: INFECTION - ADULT  Goal: Absence or prevention of progression during hospitalization  Description: INTERVENTIONS:  - Assess and monitor for signs and symptoms of infection  - Monitor lab/diagnostic results  - Monitor all insertion sites, i e  indwelling lines, tubes, and drains  - Monitor endotracheal if appropriate and nasal secretions for changes in amount and color  - [place] appropriate cooling/warming therapies per order  - Administer medications as ordered  - Instruct and encourage patient and family to use good hand hygiene technique  - Identify and instruct in appropriate isolation precautions for identified infection/condition  Outcome: Progressing     Problem: SAFETY ADULT  Goal: Patient will remain free of falls  Description: INTERVENTIONS:  - Educate patient/family on patient safety including physical limitations  - Instruct patient to call for assistance with activity   - Consult OT/PT to assist with strengthening/mobility   - Keep Call bell within reach  - Keep bed low and locked with side rails adjusted as appropriate  - Keep care items and personal belongings within reach  - Initiate and maintain comfort rounds  - Make Fall Risk Sign visible to staff  - Apply yellow socks and bracelet for high fall risk patients  - Consider moving patient to room near nurses station  Outcome: Progressing     Problem: GENITOURINARY - ADULT  Goal: Maintains or returns to baseline urinary function  Description: INTERVENTIONS:  - Assess urinary function  - Encourage oral fluids to ensure adequate hydration if ordered  - Administer IV fluids as ordered to ensure adequate hydration  - Administer ordered medications as needed  - Offer frequent toileting  - Follow urinary retention protocol if ordered  Outcome: Progressing  Goal: Absence of urinary retention  Description: INTERVENTIONS:  - Assess patient’s ability to void and empty bladder  - Monitor I/O  - Bladder scan as needed  - Discuss with physician/AP medications to alleviate retention as needed  - Discuss catheterization for long term situations as appropriate  Outcome: Progressing  Goal: Urinary catheter remains patent  Description: INTERVENTIONS:  - Assess patency of urinary catheter  - If patient has a chronic ceballos, consider changing catheter if non-functioning  - Follow guidelines for intermittent irrigation of non-functioning urinary catheter  Outcome: Progressing

## 2023-06-22 NOTE — QUICK NOTE
Patient seen and examined  Quick note to follow-up admission from earlier this morning  81-year-old with history of diabetes, hyperlipidemia, hypertension admitted with hematuria and urinary retention  Had just been seen by urology the day prior  Recent CT scan concerning for prostate versus bladder neoplasm with obstructive uropathy  In the emergency room Cazares catheter was placed  Concern for UTI and acute kidney injury as well      Physical exam:  General: NAD resting comfortably in bed  Heart: RRR, no murmurs  Lungs: CTA B, respirations unlabored  Abdomen: Soft, nontender, nondistended  : Significant suprapubic tenderness to palpation  Lower extremities: No peripheral edema    · Continue with IV ceftriaxone for coverage of urinary pathogens  · Now status post bilateral nephrostomy tubes without complication  · Follow-up urology recommendations, may need cystoscopy and biopsy  · Repeat BMP in am  · F/u urine cultures

## 2023-06-22 NOTE — BRIEF OP NOTE (RAD/CATH)
INTERVENTIONAL RADIOLOGY PROCEDURE NOTE    Date: 6/22/2023    Procedure: Bilateral percutaneous nephrostomy tube placement    Preoperative diagnosis:   1  Hematuria    2  Urinary tract infection    3  PAUL (acute kidney injury) ([facility] [address] )    4  Prostate cancer ([facility] [address] )    5  Urinary retention    6  Other hydronephrosis         Postoperative diagnosis: Same  Surgeon: Rizwana Lebron MD     Assistant: None  No qualified resident was available  Blood loss: 2 mL    Specimens: None     Findings: Ultrasound and fluoroscopic guided placement of bilateral 8 5 Chinese percutaneous nephrostomy catheters  Complications: None immediate      Anesthesia: MAC sedation     Recommendations:  - Maintain both catheters to bag drainage  - Flush daily with 10 mL saline  - We will plan for routine exchange in 3 months if catheters are still in place

## 2023-06-22 NOTE — PLAN OF CARE
Problem: PAIN - ADULT  Goal: Verbalizes/displays adequate comfort level or baseline comfort level  Description: Interventions:  - Encourage patient to monitor pain and request assistance  - Assess pain using appropriate pain scale  - Administer analgesics based on type and severity of pain and evaluate response  - Implement non-pharmacological measures as appropriate and evaluate response  - Consider cultural and social influences on pain and pain management  - Notify physician/advanced practitioner if interventions unsuccessful or patient reports new pain  Outcome: Progressing     Problem: GENITOURINARY - ADULT  Goal: Maintains or returns to baseline urinary function  Description: INTERVENTIONS:  - Assess urinary function  - Encourage oral fluids to ensure adequate hydration if ordered  - Administer IV fluids as ordered to ensure adequate hydration  - Administer ordered medications as needed  - Offer frequent toileting  - Follow urinary retention protocol if ordered  Outcome: Progressing     Problem: GENITOURINARY - ADULT  Goal: Urinary catheter remains patent  Description: INTERVENTIONS:  - Assess patency of urinary catheter  - If patient has a chronic ceballos, consider changing catheter if non-functioning  - Follow guidelines for intermittent irrigation of non-functioning urinary catheter  Outcome: Progressing

## 2023-06-22 NOTE — ANESTHESIA PREPROCEDURE EVALUATION
Procedure:  IR NEPHROSTOMY TUBE PLACEMENT    Relevant Problems   CARDIO   (+) Hypertension      ENDO   (+) Type 2 diabetes mellitus, with long-term current use of insulin (HCC)      /RENAL   (+) Other hydronephrosis        Physical Exam    Airway    Mallampati score: II  TM Distance: >3 FB  Neck ROM: full     Dental   No notable dental hx     Cardiovascular  Cardiovascular exam normal    Pulmonary  Pulmonary exam normal     Other Findings        Anesthesia Plan  ASA Score- 3     Anesthesia Type- IV sedation with anesthesia with ASA Monitors  Additional Monitors:   Airway Plan:           Plan Factors-Exercise tolerance (METS): >4 METS  Chart reviewed  Existing labs reviewed  Patient summary reviewed  Patient is not a current smoker  Induction-     Postoperative Plan-     Informed Consent- Anesthetic plan and risks discussed with patient  I personally reviewed this patient with the CRNA  Discussed and agreed on the Anesthesia Plan with the CRNA  Aroldo Patel

## 2023-06-22 NOTE — ASSESSMENT & PLAN NOTE
· Creatinine 2 17 today, was 1 92 two days ago   · No other previous values in the system  · In the setting of severe bilateral hydronephrosis as well as underlying type 2 diabetes with unclear baseline creatinine  · See treatment above in hydronephrosis as well as hematuria sections

## 2023-06-22 NOTE — CONSULTS
H&P Exam - Urology       Patient: Estephania Khan   : 1950 Sex: male   MRN: [id number]     CSN: [id number]      History of Present Illness   HPI:  Estephania Khan is a 81 y o  Tajik-speaking male seen by [facility] urology for elevated  CAT scan confirmed bilateral hydronephrosis enlarged prostate recently seen in the office and scheduled for elective bilateral nephrostomy tube placement to avoid indwelling Cazares catheter and discomfort in light of voiding difficulties patient is to be scheduled for cystoscopy as well as prostate biopsy to confirm carcinoma which is yet to be done admitted yesterday undergoing bilateral nephrostomy tubes today [facility] urologic consult called for in light of his bilateral nephrostomy tube placement and mild hematuria from tube sites  Patient went into retention had Cazares catheter placed in the ER with repeat CAT scan now confirming severe bilateral hydronephrosis right greater than left in light of possible local advancement of prostate cancer attempt at cystoscopy stent insertions were aborted and patient did undergo bilateral nephrostomy tubes today        Review of Systems:   Constitutional:  Negative for activity change, fever, chills and diaphoresis  HENT: Negative for hearing loss and trouble swallowing  Eyes: Negative for itching and visual disturbance  Respiratory: Negative for chest tightness and shortness of breath  Cardiovascular: Negative for chest pain, edema  Gastrointestinal: Negative for abdominal distention, na abdominal pain, constipation, diarrhea, Nausea and vomiting  Genitourinary: Negative for decreased urine volume, difficulty urinating, dysuria, enuresis, frequency, hematuria and urgency  Musculoskeletal: Negative for gait problem and myalgias  Neurological: Negative for dizziness and headaches  Hematological: Does not bruise/bleed easily         Historical Information   Past Medical History:   Diagnosis Date   • "Diabetes mellitus ([facility] [address] )    • High cholesterol    • Hypertension      Past Surgical History:   Procedure Laterality Date   • IR NEPHROSTOMY TUBE PLACEMENT  6/22/2023     Social History   Social History     Substance and Sexual Activity   Alcohol Use Yes     Social History     Substance and Sexual Activity   Drug Use Never     Social History     Tobacco Use   Smoking Status Former   Smokeless Tobacco Never     Family History:   Family History   Problem Relation Age of Onset   • No Known Problems Father        Meds/Allergies   Medications Prior to Admission   Medication   • Empagliflozin (JARDIANCE) 10 MG TABS tablet   • lisinopril (ZESTRIL) 10 mg tablet   • Multiple Vitamins-Minerals (Sentry) TABS   • rosuvastatin (CRESTOR) 10 MG tablet   • tamsulosin (FLOMAX) 0 4 mg   • albuterol (ProAir HFA) 90 mcg/act inhaler   • clotrimazole-betamethasone (LOTRISONE) 1-0 05 % cream   • glimepiride (AMARYL) 4 mg tablet   • ibuprofen (MOTRIN) 600 mg tablet   • Invokana 100 MG   • Levemir FlexPen 100 units/mL injection pen   • Lovaza 1 g capsule   • OneTouch Verio test strip   • Spacer/Aero-Holding Chambers (AEROCHAMBER MINI CHAMBER) BILLY     No Known Allergies    Objective   Vitals: /60   Pulse 59   Temp 98 2 °F (36 8 °C)   Resp 18   Ht 5' 9\" (1 753 m)   Wt 71 9 kg (158 lb 9 6 oz)   SpO2 98%   BMI 23 42 kg/m²     Physical Exam:  General Alert awake   Normocephalic atraumatic PERRLA  Lungs clear bilaterally  Cardiac normal S1 normal S2  Abdomen soft, flank pain bilateral nephrostomy  Mildly hematuric urine  Exam deferred  Extremities no edema    I/O last 24 hours:   In: 410 [I V :410]  Out: 2815 [Urine:2815]    Invasive Devices     Peripheral Intravenous Line  Duration           Peripheral IV 06/21/23 Left Antecubital <1 day          Drain  Duration           Nephrostomy Left 8 Fr  <1 day    Nephrostomy Right 8 Fr  <1 day    Urethral Catheter Coude 16 Fr  <1 day                    Lab Results: CBC:   Lab Results " "  Component Value Date    WBC 11 32 (H) 06/22/2023    HGB 13 6 06/22/2023    HCT 41 1 06/22/2023    MCV 94 06/22/2023     06/22/2023    RBC 4 38 06/22/2023    MCH 31 1 06/22/2023    MCHC 33 1 06/22/2023    RDW 13 7 06/22/2023    MPV 9 4 06/22/2023    NRBC 0 06/22/2023     CMP:   Lab Results   Component Value Date     06/22/2023    CO2 26 06/22/2023    BUN 22 06/22/2023    CREATININE 1 98 (H) 06/22/2023    CALCIUM 8 7 06/22/2023    AST 18 06/22/2023    ALT 11 06/22/2023    ALKPHOS 70 06/22/2023    EGFR 33 06/22/2023     Urinalysis:   Lab Results   Component Value Date    COLORU Brenda 06/21/2023    CLARITYU Cloudy 06/21/2023    SPECGRAV 1 020 06/21/2023    PHUR 6 0 06/21/2023    LEUKOCYTESUR (A) 06/21/2023     Elevated glucose may cause decreased leukocyte values   See urine microscopic for UWBC result    NITRITE Positive (A) 06/21/2023    GLUCOSEU >=1000 (1%) (A) 06/21/2023    KETONESU Negative 06/21/2023    BILIRUBINUR Small (A) 06/21/2023    BLOODU Large (A) 06/21/2023     Urine Culture: No results found for: \"URINECX\"  PSA:   Lab Results   Component Value Date     73 (H) 05/24/2023           Assessment/ Plan:  Urinary retention most likely related to prostate obstruction and/or diabetic hypocontractile bladder  Elevated   More than likely prostate cancer advancing into the trigone causing retention bilateral hydro  Bilateral nephrostomy tubes today  Acute renal failure creatinine 2 17 trending up  Once failure clears patient can be discharged home with bilateral nephrostomy tubes and indwelling Cazares in light of retention  Be seen by [place] [id number] urology as outpatient to schedule cystoscopy and prostate biopsy      Deirdre Muñoz MD    "

## 2023-06-22 NOTE — ASSESSMENT & PLAN NOTE
· Urinary retention, suprapubic pain as well as hematuria  · Elevated serum creatinine at 2 17; unknown baseline  · Saw urology outpatient earlier in the day after having a highly elevated PSA in May  · CT abdomen pelvis without contrast in the ER: Severe bilateral hydronephrosis, worse on the right with suggestion of mass in the urinary bladder which is difficult to separate from the prostate gland  Extraprostatic extension is noted  The presence of a bladder mass needs to be excluded  Nonspecific right lower lobe nodule  In view of patient's history of malignancy attention is needed on follow-up exam   · ER staff discussed the case with urology on-call who reported patient will need admission to medicine with urology as well as IR consultations; will need bilateral nephrostomy tubes to be placed by IR as well as urology consultation for likely cystoscopy  · UA with evidence of pyuria, hematuria  · Cazares catheter placed in the ER  · Admit to medicine  Keep n p o  except for meds  Consult urology for cystoscopy as well as IR for bilateral nephrostomy tube placement  IV fluids  Repeat creatinine in the a m  hold nephrotoxic agents    · IV ceftriaxone for possible UTI

## 2023-06-22 NOTE — PHYSICAL THERAPY NOTE
PHYSICAL THERAPY     06/22/23 1200   Note Type   Note type Cancelled Session   Cancel Reasons Patient to operating room   Additional Comments for nephrostomy tube placement, will re-attempt at a later time   [address]ados Number  Brian Sharma PT [id number]

## 2023-06-23 LAB
ANION GAP SERPL CALCULATED.3IONS-SCNC: 8 MMOL/L
BACTERIA UR CULT: NORMAL
BUN SERPL-MCNC: 19 MG/DL (ref 5–25)
CALCIUM SERPL-MCNC: 9 MG/DL (ref 8.4–10.2)
CHLORIDE SERPL-SCNC: 106 MMOL/L (ref 96–108)
CO2 SERPL-SCNC: 24 MMOL/L (ref 21–32)
CREAT SERPL-MCNC: 1.56 MG/DL (ref 0.6–1.3)
ERYTHROCYTE [DISTWIDTH] IN BLOOD BY AUTOMATED COUNT: 13.7 % (ref 11.6–15.1)
GFR SERPL CREATININE-BSD FRML MDRD: 44 ML/MIN/1.73SQ M
GLUCOSE SERPL-MCNC: 167 MG/DL (ref 65–140)
GLUCOSE SERPL-MCNC: 188 MG/DL (ref 65–140)
GLUCOSE SERPL-MCNC: 68 MG/DL (ref 65–140)
GLUCOSE SERPL-MCNC: 75 MG/DL (ref 65–140)
GLUCOSE SERPL-MCNC: 77 MG/DL (ref 65–140)
HCT VFR BLD AUTO: 41.4 % (ref 36.5–49.3)
HGB BLD-MCNC: 13.6 G/DL (ref 12–17)
MCH RBC QN AUTO: 30.8 PG (ref 26.8–34.3)
MCHC RBC AUTO-ENTMCNC: 32.9 G/DL (ref 31.4–37.4)
MCV RBC AUTO: 94 FL (ref 82–98)
PLATELET # BLD AUTO: 285 THOUSANDS/UL (ref 149–390)
PMV BLD AUTO: 9.7 FL (ref 8.9–12.7)
POTASSIUM SERPL-SCNC: 4.6 MMOL/L (ref 3.5–5.3)
RBC # BLD AUTO: 4.42 MILLION/UL (ref 3.88–5.62)
SODIUM SERPL-SCNC: 138 MMOL/L (ref 135–147)
WBC # BLD AUTO: 10.69 THOUSAND/UL (ref 4.31–10.16)

## 2023-06-23 PROCEDURE — 80048 BASIC METABOLIC PNL TOTAL CA: CPT | Performed by: INTERNAL MEDICINE

## 2023-06-23 PROCEDURE — 85027 COMPLETE CBC AUTOMATED: CPT | Performed by: INTERNAL MEDICINE

## 2023-06-23 PROCEDURE — NC001 PR NO CHARGE: Performed by: PHYSICIAN ASSISTANT

## 2023-06-23 PROCEDURE — 99232 SBSQ HOSP IP/OBS MODERATE 35: CPT | Performed by: STUDENT IN AN ORGANIZED HEALTH CARE EDUCATION/TRAINING PROGRAM

## 2023-06-23 PROCEDURE — 97163 PT EVAL HIGH COMPLEX 45 MIN: CPT

## 2023-06-23 PROCEDURE — 97110 THERAPEUTIC EXERCISES: CPT

## 2023-06-23 PROCEDURE — 82948 REAGENT STRIP/BLOOD GLUCOSE: CPT

## 2023-06-23 RX ORDER — INSULIN LISPRO 100 [IU]/ML
1-5 INJECTION, SOLUTION INTRAVENOUS; SUBCUTANEOUS
Status: DISCONTINUED | OUTPATIENT
Start: 2023-06-23 | End: 2023-06-26 | Stop reason: HOSPADM

## 2023-06-23 RX ADMIN — INSULIN GLARGINE 20 UNITS: 100 INJECTION, SOLUTION SUBCUTANEOUS at 21:30

## 2023-06-23 RX ADMIN — ATORVASTATIN CALCIUM 20 MG: 20 TABLET, FILM COATED ORAL at 16:22

## 2023-06-23 RX ADMIN — CEFTRIAXONE 1000 MG: 1 INJECTION, SOLUTION INTRAVENOUS at 21:30

## 2023-06-23 RX ADMIN — OXYCODONE HYDROCHLORIDE 5 MG: 5 TABLET ORAL at 04:42

## 2023-06-23 RX ADMIN — SODIUM CHLORIDE 75 ML/HR: 0.9 INJECTION, SOLUTION INTRAVENOUS at 18:44

## 2023-06-23 RX ADMIN — INSULIN LISPRO 1 UNITS: 100 INJECTION, SOLUTION INTRAVENOUS; SUBCUTANEOUS at 11:25

## 2023-06-23 RX ADMIN — INSULIN LISPRO 1 UNITS: 100 INJECTION, SOLUTION INTRAVENOUS; SUBCUTANEOUS at 21:30

## 2023-06-23 RX ADMIN — SODIUM CHLORIDE 75 ML/HR: 0.9 INJECTION, SOLUTION INTRAVENOUS at 04:42

## 2023-06-23 RX ADMIN — TAMSULOSIN HYDROCHLORIDE 0.4 MG: 0.4 CAPSULE ORAL at 16:22

## 2023-06-23 NOTE — PROGRESS NOTES
[facility]  Progress Note  Name: Luis Fernando Mansfield  MRN: [id number]  Unit/Bed#: 2 [place] [address] A  Date of Admission: 6/21/2023   Date of Service: 6/23/2023 I Hospital Day: 2    Assessment/Plan   Other hydronephrosis  Assessment & Plan  Urinary retention, suprapubic pain, hematuria    · Elevated serum creatinine at 1 56; unknown baseline  · urology outpatient earlier in the day after having a highly elevated PSA in May  · CT abdomen pelvis without contrast: Severe bilateral hydronephrosis, worse on the right with suggestion of mass in the urinary bladder which is difficult to separate from the prostate gland  Extraprostatic extension is noted  The presence of a bladder mass needs to be excluded  Nonspecific right lower lobe nodule    In view of patient's history of malignancy attention is needed on follow-up exam   · ER staff discussed the case with urology on-call who reported patient will need admission to medicine with urology as well as IR consultations; will need bilateral nephrostomy tubes to be placed by IR as well as urology consultation for likely cystoscopy  · UA: With evidence of pyuria, hematuria  · Cazares catheter placed in the ER  · Urology recs:  · S/p 6/22 IR for s/p bilateral nephrostomy tube placement   · DC IV ceftriaxone for possible UTI  · Urinary retention most likely related to prostate obstruction and/or diabetic hypocontractile bladder  · Elevated , likely prostate cancer advancing causing retention bilateral hydro  · discharged home with bilateral nephrostomy tubes and indwelling Cazares in light of retention  · F/U [facility]'s urology as outpatient to schedule cystoscopy and prostate biopsy       * Hematuria  Assessment & Plan  Slowly improving    · See hydronephrosis section above    Elevated serum creatinine  Assessment & Plan  Creatinine 1 56 today, was 1 92 two days ago     · No other previous values in the system  · In the setting of severe bilateral "hydronephrosis as well as underlying type 2 diabetes with unclear baseline creatinine  · See treatment above in hydronephrosis as well as hematuria sections    Hypertension  Assessment & Plan  · Hold PTA lisinopril while admitted with elevated creatinine    Elevated PSA  Assessment & Plan  PSA highly elevated at 145 recently in May    · Underwent nuclear medicine bone scan on June 20 with following impression: \" No focal tracer activity characteristic of osseous metastatic disease  Note is made of indeterminate focal tracer activity involving the right, likely T11 vertebral artery of uncertain clinical significance but favored to be degenerative in nature, see discussion above  Tracer activity within a significantly distended urinary bladder limits evaluation of the central pelvis/sacrum  · See hematuria section above    Type 2 diabetes mellitus, with long-term current use of insulin [facility])  Assessment & Plan  Lab Results   Component Value Date    HGBA1C 7 4 (H) 05/24/2023       Recent Labs     06/22/23  1604 06/22/23  2116 06/23/23  0659 06/23/23  1106   POCGLU 145* 151* 77 167*       Blood Sugar Average: Last 72 hrs:  · (P) 118 125On PTA Levemir 40 units daily as well as Jardiance, Invokana, glimepiride  · glargine 20 units nightly as well as sliding scale insulin  · Restart on discharge hold PTA oral hypoglycemic agents         VTE Pharmacologic Prophylaxis:   Pharmacologic: N/A due to active hematuria  Mechanical VTE Prophylaxis in Place: Yes    Patient Centered Rounds:  I performed bedside rounds with nursing staff today      Discussions with Specialists or Other Care Team Provider: Urology, IR    Education and Discussions with Family / Patient: Yes at bedside daughter    Time Spent for Care:  45 minutes This time was spent on one or more of the following: performing physical exam; counseling and coordination of care; obtaining or reviewing history; documenting in the medical record; reviewing/ordering tests, " medications or procedures; communicating with other healthcare professionals and discussing with patient's family/caregivers  Current Length of Stay: 2 day(s)    Current Patient Status: Inpatient   Certification Statement: Clinical course and specialist recommendations    Discharge Plan: Anticipate discharge in 24-48 hrs to discharge location to be determined pending rehab evaluations  Code Status: Level 1 - Full Code      Subjective:   Patient seen and examined at bedside with daughter present, reported some lower abdominal pain, denied chest pain, shortness of breath, or back pain  Nursing reported no events overnight  Given update on plan of care  Objective:     Vitals:   Temp (24hrs), Av 6 °F (37 °C), Min:98 2 °F (36 8 °C), Max:99 1 °F (37 3 °C)    Temp:  [98 2 °F (36 8 °C)-99 1 °F (37 3 °C)] 98 6 °F (37 °C)  HR:  [59-74] 71  Resp:  [16-19] 18  BP: (115-123)/(58-63) 116/63  SpO2:  [95 %-98 %] 95 %  Body mass index is 23 42 kg/m²  Input and Output Summary (last 24 hours): Intake/Output Summary (Last 24 hours) at 2023 1352  Last data filed at 2023 0900  Gross per 24 hour   Intake 230 ml   Output 1925 ml   Net -1695 ml       Physical Exam:     Physical Exam  Vitals reviewed  Constitutional:       General: He is not in acute distress  Appearance: Normal appearance  HENT:      Head: Atraumatic  Nose: No congestion  Eyes:      General: No scleral icterus  Pupils: Pupils are equal, round, and reactive to light  Cardiovascular:      Rate and Rhythm: Normal rate  Heart sounds: No murmur heard  Pulmonary:      Effort: No respiratory distress  Breath sounds: No wheezing, rhonchi or rales  Abdominal:      Palpations: Abdomen is soft  Tenderness: There is abdominal tenderness in the suprapubic area  There is no right CVA tenderness, left CVA tenderness or guarding     Genitourinary:     Comments: Cazares in place, hematuria with sediment  Musculoskeletal: General: No swelling or deformity  Normal range of motion  Cervical back: No tenderness  Right lower leg: No edema  Left lower leg: No edema  Feet:      Right foot:      Skin integrity: No callus  Skin:     General: Skin is warm  Capillary Refill: Capillary refill takes less than 2 seconds  Findings: No lesion or rash  Neurological:      Mental Status: He is oriented to person, place, and time  Cranial Nerves: No cranial nerve deficit  Coordination: Coordination normal    Psychiatric:         Mood and Affect: Mood normal          Thought Content: Thought content normal          Additional Data:     Labs:    Results from last 7 days   Lab Units 06/23/23  0445 06/22/23  0500   WBC Thousand/uL 10 69* 11 32*   HEMOGLOBIN g/dL 13 6 13 6   HEMATOCRIT % 41 4 41 1   PLATELETS Thousands/uL 285 305   NEUTROS PCT %  --  69   LYMPHS PCT %  --  22   MONOS PCT %  --  8   EOS PCT %  --  1     Results from last 7 days   Lab Units 06/23/23  0445 06/22/23  0500   POTASSIUM mmol/L 4 6 4 5   CHLORIDE mmol/L 106 106   CO2 mmol/L 24 26   BUN mg/dL 19 22   CREATININE mg/dL 1 56* 1 98*   CALCIUM mg/dL 9 0 8 7   ALK PHOS U/L  --  70   ALT U/L  --  11   AST U/L  --  18     Results from last 7 days   Lab Units 06/22/23  0500   INR  1 03       * I Have Reviewed All Lab Data Listed Above    * Additional Pertinent Lab Tests Reviewed: No correct      Imaging:  Imaging Reports Reviewed Today Include: IR report    Recent Cultures (last 7 days):     Results from last 7 days   Lab Units 06/21/23 2054   URINE CULTURE  No Growth <1000 cfu/mL       Last 24 Hours Medication List:   Current Facility-Administered Medications   Medication Dose Route Frequency Provider Last Rate   • acetaminophen  650 mg Oral Q6H PRN Delpha Kourtneyos, DO     • albuterol  2 puff Inhalation Q4H PRN Delpha Kourtneyos, DO     • atorvastatin  20 mg Oral Daily With Dinner Baltazar David, DO     • cefTRIAXone  1,000 mg Intravenous Q24H Yanet Sharp "Shyanne Viramontes DO Stopped (06/22/23 3023)   • HYDROmorphone  0 5 mg Intravenous Q6H PRN Annalisa Kwok DO     • insulin glargine  20 Units Subcutaneous HS Annalisa Kwok DO     • insulin lispro  1-5 Units Subcutaneous HS [facility] Jefry DO     • insulin lispro  1-5 Units Subcutaneous TID [facility], DO     • oxyCODONE  5 mg Oral Q6H PRN Annalisa Kwok DO     • pneumococcal 20-robbie conj vacc  0 5 mL Intramuscular Prior to discharge Annalisa Kwok DO     • sodium chloride  75 mL/hr Intravenous Continuous [facility]  75 mL/hr (06/23/23 1253)   • tamsulosin  0 4 mg Oral Daily With [address] [facility], DO            Today, Patient Was Seen By: Ana Lindsey MD    ** Please Note: \"This note has been constructed using a voice recognition system  Therefore there may be syntax, spelling, and/or grammatical errors  Please call if you have any questions   \"**    "

## 2023-06-23 NOTE — CASE MANAGEMENT
Case Management Assessment & Discharge Planning Note    Patient name Ambar Rea  Location [address] [id number] MRN [id number]  : 1950 Date 2023       Current Admission Date: 2023  Current Admission Diagnosis:Hematuria   Patient Active Problem List    Diagnosis Date Noted   • Type 2 diabetes mellitus, with long-term current use of insulin ([facility] [address] ) 2023   • Elevated PSA 2023   • Other hydronephrosis 2023   • Elevated serum creatinine 2023   • Hypertension 2023   • Hematuria 2023      LOS (days): 2  Geometric Mean LOS (GMLOS) (days): 3 30  Days to GMLOS:1 7     OBJECTIVE:    Risk of Unplanned Readmission Score: 7 54       Current admission status: Inpatient  Referral Reason: Other (Discharge planning)    Preferred Pharmacy:   [facility] , [address] [address]  Phone: [phone] Fax: [phone]  - [facility], [address] [place] [address] [place]  Phone: [phone] Fax: [phone]    Primary Care Provider: No primary care provider on file  Primary Insurance: [facility]  Secondary Insurance:     ASSESSMENT:  Chad Julio Proxies    There are no active Health Care Proxies on file  Readmission Root Cause  30 Day Readmission: No    Patient Information  Admitted from[de-identified] Home  Mental Status: Alert  During Assessment patient was accompanied by: Other-Comment (daughter-in-law [facility])  Assessment information provided by[de-identified] Patient, Other - please comment  Primary Caregiver: Self  Support Systems: Son, Spouse/significant other, Family members  [place] of Residence: [address] [place] [address] do you live in?: [address] entry access options   Select all that apply : Stairs  Number of steps to enter home : 1  Type of Current Residence: 2 story home  Upon entering residence, is there a bedroom on the main floor (no further steps)?: Yes  Upon entering residence, is there a bathroom on the main floor (no further steps)?: Yes  In the last 12 months, was there a time when you were not able to pay the mortgage or rent on time?: No  In the last 12 months, was there a time when you did not have a steady place to sleep or slept in a shelter (including now)?: No  Homeless/housing insecurity resource given?: N/A  Living Arrangements: Lives w/ Spouse/significant other    Activities of Daily Living Prior to Admission  Functional Status: Independent  Completes ADLs independently?: Yes  Ambulates independently?: Yes  Does patient use assisted devices?: No  Does patient currently own DME?: No    Patient Information Continued  Income Source: Pension/FDC  Does patient have prescription coverage?: Yes  Within the past 12 months, you worried that your food would run out before you got the money to buy more : Never true  Within the past 12 months, the food you bought just didn't last and you didn't have money to get more : Never true  Food insecurity resource given?: N/A  Does patient receive dialysis treatments?: No  Does patient have a history of substance abuse?: No  Does patient have a history of Mental Health Diagnosis?: No    Means of Transportation  Means of Transport to Appts[de-identified] Drives Self  In the past 12 months, has lack of transportation kept you from medical appointments or from getting medications?: No  In the past 12 months, has lack of transportation kept you from meetings, work, or from getting things needed for daily living?: No  Was application for public transport provided?: N/A      DISCHARGE DETAILS:    Discharge planning discussed with[de-identified] Patient and daughter in law Angela House  Freedom of Choice: Yes     Comments - Freedom of Choice: SW spoke with patient and DIL at bedside to introduce role of CM, conduct assessment and discuss discharge planning    Patient is primarily Nigerien-speaking and translation was provided by his JAYLA Bauer  Patient lives at home with his wife and is independent at baseline  He has the ability to stay on the first floor of his home if needed  His preference is to return home at discharge and his wife will transport him  SW will continue to follow for discharge planning needs  CM contacted family/caregiver?: Yes  Were Treatment Team discharge recommendations reviewed with patient/caregiver?: Yes  Did patient/caregiver verbalize understanding of patient care needs?: Yes  Were patient/caregiver advised of the risks associated with not following Treatment Team discharge recommendations?: Yes    Contacts  Patient Contacts: Ramesh Stack (dtr in law)  Relationship to Patient[de-identified] Family  Contact Method: In Person  Reason/Outcome: Discharge [address]         Is the patient interested in [address] at discharge?: No    DME Referral Provided  Referral made for DME?: No    Other Referral/Resources/Interventions Provided:  Interventions: None Indicated    Would you like to participate in our [address] service program?  : No - Declined    Treatment Team Recommendation: Home  Discharge Destination Plan[de-identified] Home  Transport at Discharge : Family         IMM Given (Date):: 06/23/23  IMM Given to[de-identified] Patient (IMM reviewed with patient and JAYLA Bauer and signed by patient    Chinese-language copy given to patient and copy placed in scan bin for chart )

## 2023-06-23 NOTE — ASSESSMENT & PLAN NOTE
Urinary retention, suprapubic pain, hematuria    · Elevated serum creatinine at 1 56; unknown baseline  · urology outpatient earlier in the day after having a highly elevated PSA in May  · CT abdomen pelvis without contrast: Severe bilateral hydronephrosis, worse on the right with suggestion of mass in the urinary bladder which is difficult to separate from the prostate gland  Extraprostatic extension is noted  The presence of a bladder mass needs to be excluded  Nonspecific right lower lobe nodule    In view of patient's history of malignancy attention is needed on follow-up exam   · ER staff discussed the case with urology on-call who reported patient will need admission to medicine with urology as well as IR consultations; will need bilateral nephrostomy tubes to be placed by IR as well as urology consultation for likely cystoscopy  · UA: With evidence of pyuria, hematuria  · Cazares catheter placed in the ER  · Urology recs:  · S/p 6/22 IR for s/p bilateral nephrostomy tube placement   · DC IV ceftriaxone for possible UTI  · Urinary retention most likely related to prostate obstruction and/or diabetic hypocontractile bladder  · Elevated , likely prostate cancer advancing causing retention bilateral hydro  · discharged home with bilateral nephrostomy tubes and indwelling Cazares in light of retention  · F/U [facility] as outpatient to schedule cystoscopy and prostate biopsy

## 2023-06-23 NOTE — PHYSICAL THERAPY NOTE
PHYSICAL THERAPY EVALUATION/TREATMENT     06/23/23 1055   Note Type   Note type Evaluation   Pain Assessment   Pain Assessment Tool Whatley-Baker FACES   Whatley-Baker FACES Pain Rating 2  (Right flank area)   Restrictions/Precautions   Other Precautions Chair Alarm; Bed Alarm;Multiple lines; Fall Risk   Home Living   Type of [address] Two level;Stairs to enter with rails  (One-step to enter)   Home Equipment   (None)   Additional Comments Patient independent without assistive device prior to admission   Prior Function   Level of Baker Independent with ADLs; Independent with functional mobility; Independent with IADLS   Lives With Spouse   Receives Help From Family   Comments Family present able to complete translation due to mostly Central African-speaking, states patient is independent prior to admission without assistive device completing all ADLs and IADLs   General   Additional Pertinent History Chart reviewed, patient admitted with hematuria  Now undergoing medical work-up    Patient presents as generally weak and deconditioned although tolerating assisted gait well for short distances   Family/Caregiver Present Yes  (Daughter present)   Cognition   Overall Cognitive Status WFL   Arousal/Participation Cooperative   Attention Within functional limits   Orientation Level Oriented X4   Following Commands Follows multistep commands with increased time or repetition   Subjective   Subjective Patient states mild pain right flank area   RLE Assessment   RLE Assessment   (Range of motion within functional limits, strength 3+/4 -)   LLE Assessment   LLE Assessment   (Range of motion within functional limits, strength 3+/4-)   Coordination   Movements are Fluid and Coordinated 0   Coordination and Movement Description Decreased balance and coordination with higher-level balance activities and standing   Bed Mobility   Supine to Sit 7  Independent   Sit to Supine 7  Independent   Additional Comments Patient out of bed in bedside chair at end of session   Transfers   Sit to Stand 5  Supervision   Stand to Sit 5  Supervision   Ambulation/Elevation   Gait Assistance   (Supervision to min assist)   Additional items Assist x 1;Verbal cues; Tactile cues   Assistive Device   (None/handhold)   Distance 30 feet with change in direction  Shortened stride length and guarded gait patterning at times with widened base of support   Balance   Static Sitting Good   Dynamic Sitting Good   Static Standing Fair   Dynamic Standing Fair   Ambulatory Fair -   Activity Tolerance   Activity Tolerance Patient tolerated treatment well;Patient limited by fatigue   Nurse Made Aware Yes   Assessment   Prognosis Good   Problem List Decreased strength;Decreased range of motion;Decreased endurance; Impaired balance;Decreased mobility; Decreased coordination;Pain   Assessment Patient seen for Physical Therapy evaluation  Patient admitted with Hematuria  Comorbidities affecting patient's physical performance include:   Personal factors affecting patient at time of initial evaluation include: lives in two story house, inability to perform dynamic tasks in community, limited home support, inability to perform physical activity and inability to perform IADLS   Prior to admission, patient was independent with functional mobility without assistive device, independent with ADLS, independent with IADLS, living in a multi-level home, ambulating household distance, ambulating community distances and home with family assist   Please find objective findings from Physical Therapy assessment regarding body systems outlined above with impairments and limitations including weakness, decreased ROM, impaired balance, decreased endurance, impaired coordination, gait deviations, pain, decreased activity tolerance, decreased functional mobility tolerance and fall risk    The Barthel Index was used as a functional outcome tool presenting with a score of Barthel Index Score: 60 today indicating marked limitations of functional mobility and ADLS  Patient's clinical presentation is currently unstable/unpredictable as seen in patient's presentation of vital sign response, changing level of pain, increased fall risk, new onset of impairment of functional mobility, decreased endurance and new onset of weakness  Pt would benefit from continued Physical Therapy treatment to address deficits as defined above and maximize level of functional mobility  As demonstrated by objective findings, the assigned level of complexity for this evaluation is high  The patient's AM-PAC Basic Mobility Inpatient Short Form Raw Score is 20  A Raw score of greater than 16 suggests the patient may benefit from discharge to home  Please also refer to the recommendation of the Physical Therapist for safe discharge planning  Goals   Patient Goals To feel better and go home   STG Expiration Date 06/30/23   Short Term Goal #1 Transfers and gait independently without assistive device   Short Term Goal #2 Gait endurance to functional household distances   LTG Expiration Date 07/07/23   Long Term Goal #1 Gait endurance to functional community distances   Long Term Goal #2 Return to independent function including ADLs and IADLs as prior to admission   Plan   Treatment/Interventions ADL retraining;Functional transfer training;LE strengthening/ROM; Elevations; Therapeutic exercise; Endurance training;Patient/family training;Equipment eval/education; Bed mobility;Gait training; Compensatory technique education   PT Frequency Other (Comment);3-5x/wk   Recommendation   PT Discharge Recommendation Home with outpatient rehabilitation   Additional Comments out patient physical therapy as needed if generalized weakness and deconditioning continues upon return home   Michael 8 in Flat Bed Without Bedrails 4   Lying on Back to Sitting on Edge of Flat Bed Without Bedrails 4   Moving Bed to Chair 3 Standing Up From Chair Using Arms 3   Walk in Room 3   Climb 3-5 Stairs With Railing 3   Basic Mobility Inpatient Raw Score 20   Basic Mobility Standardized Score 43 99   Highest Level Of Mobility   -HL Goal 6: Walk 10 steps or more   -HLM Achieved 7: Walk 25 feet or more   Barthel Index   Feeding 10   Bathing 0   Grooming Score 5   Dressing Score 5   Bladder Score 0   Bowels Score 10   Toilet Use Score 5   Transfers (Bed/Chair) Score 10   Mobility (Level Surface) Score 10   Stairs Score 5   Barthel Index Score 60   Additional Treatment Session   Start Time 1040   End Time 1055   Treatment Assessment S: Patient with mild pain in right flank area/incisional tube site O: Bilateral lower extremity exercise completed as listed below A: Patient will benefit from increasing functional mobility with clinical staff and continued physical therapy to regain independent function   Exercises   Quad Sets Supine;5 reps;Bilateral   Heelslides Supine;10 reps;Bilateral   Hip Flexion Sitting;10 reps;Bilateral   Hip Abduction Sitting;10 reps;Bilateral   Knee AROM Long Arc Quad Sitting;10 reps;Bilateral   Ankle Pumps Sitting;10 reps;Bilateral   Balance training  Sidestepping and backward walking completed for balance and coordination   Licensure   [place] License Number  Chandler Dasilva, PT 4 0 [id number]

## 2023-06-23 NOTE — ASSESSMENT & PLAN NOTE
Lab Results   Component Value Date    HGBA1C 7 4 (H) 05/24/2023       Recent Labs     06/22/23  1604 06/22/23  2116 06/23/23  0659 06/23/23  1106   POCGLU 145* 151* 77 167*       Blood Sugar Average: Last 72 hrs:  · (P) 118 125On PTA Levemir 40 units daily as well as Jardiance, Invokana, glimepiride  · glargine 20 units nightly as well as sliding scale insulin  · Restart on discharge hold PTA oral hypoglycemic agents

## 2023-06-23 NOTE — ASSESSMENT & PLAN NOTE
"PSA highly elevated at 145 recently in May    · Underwent nuclear medicine bone scan on June 20 with following impression: \" No focal tracer activity characteristic of osseous metastatic disease  Note is made of indeterminate focal tracer activity involving the right, likely T11 vertebral artery of uncertain clinical significance but favored to be degenerative in nature, see discussion above  Tracer activity within a significantly distended urinary bladder limits evaluation of the central pelvis/sacrum     · See hematuria section above  "

## 2023-06-23 NOTE — UTILIZATION REVIEW
Date: 06/23/2023    Day 3: Has surpassed a 2nd midnight with active treatments and services  Bilateral percutaneous nephrostomy tube placement placed on 6/22/2023  Maintain both catheter to bag drainage  Flush daily with 10ml NSS  PT/OT  Continue IV Ceftriaxone  Follow up BMP  Follow up urine cultures

## 2023-06-23 NOTE — PLAN OF CARE
Problem: INFECTION - ADULT  Goal: Absence or prevention of progression during hospitalization  Description: INTERVENTIONS:  - Assess and monitor for signs and symptoms of infection  - Monitor lab/diagnostic results  - Monitor all insertion sites, i e  indwelling lines, tubes, and drains  - Monitor endotracheal if appropriate and nasal secretions for changes in amount and color  - [place] appropriate cooling/warming therapies per order  - Administer medications as ordered  - Instruct and encourage patient and family to use good hand hygiene technique  - Identify and instruct in appropriate isolation precautions for identified infection/condition  Outcome: Progressing     Problem: GENITOURINARY - ADULT  Goal: Maintains or returns to baseline urinary function  Description: INTERVENTIONS:  - Assess urinary function  - Encourage oral fluids to ensure adequate hydration if ordered  - Administer IV fluids as ordered to ensure adequate hydration  - Administer ordered medications as needed  - Offer frequent toileting  - Follow urinary retention protocol if ordered  Outcome: Progressing

## 2023-06-23 NOTE — PROGRESS NOTES
I received a Tiger text at 7 PM on Friday night from the nursing staff at [facility]   They reached out to Dr Huang Taylor because the family wanted to talk to the urologist   He recommended that they call me as I was the last 1 to see him in the office  After thorough review of the chart I called the family on the number provided  He was hospitalized with urinary retention and now has bilateral nephrostomy tubes which was my plan in the office on June 21  They are concerned because of blood in the tubes which I reassured them was normal in this situation  The family member who identified himself as the patient's brother was the [place]   I reviewed the patient's current urologic history and the plan which is as follows  He will be scheduled in the office as soon as possible for a cystoscopy and prostate biopsy  We talked about treatment including androgen deprivation therapy as well as possible oral  antiandrogen  He would have consultations to medical oncology and possibly radiation oncology once his biopsy is completed  All questions were answered  The office will contact the patient's son early next week once he has been discharged from the hospital to schedule the appropriate tests

## 2023-06-23 NOTE — PLAN OF CARE
Problem: PAIN - ADULT  Goal: Verbalizes/displays adequate comfort level or baseline comfort level  Description: Interventions:  - Encourage patient to monitor pain and request assistance  - Assess pain using appropriate pain scale  - Administer analgesics based on type and severity of pain and evaluate response  - Implement non-pharmacological measures as appropriate and evaluate response  - Consider cultural and social influences on pain and pain management  - Notify physician/advanced practitioner if interventions unsuccessful or patient reports new pain  Outcome: Progressing     Problem: INFECTION - ADULT  Goal: Absence or prevention of progression during hospitalization  Description: INTERVENTIONS:  - Assess and monitor for signs and symptoms of infection  - Monitor lab/diagnostic results  - Monitor all insertion sites, i e  indwelling lines, tubes, and drains  - Monitor endotracheal if appropriate and nasal secretions for changes in amount and color  - [place] appropriate cooling/warming therapies per order  - Administer medications as ordered  - Instruct and encourage patient and family to use good hand hygiene technique  - Identify and instruct in appropriate isolation precautions for identified infection/condition  Outcome: Progressing     Problem: SAFETY ADULT  Goal: Patient will remain free of falls  Description: INTERVENTIONS:  - Educate patient/family on patient safety including physical limitations  - Instruct patient to call for assistance with activity   - Consult OT/PT to assist with strengthening/mobility   - Keep Call bell within reach  - Keep bed low and locked with side rails adjusted as appropriate  - Keep care items and personal belongings within reach  - Initiate and maintain comfort rounds  - Make Fall Risk Sign visible to staff  - Offer Toileting every 2 Hours, in advance of need  - Initiate/Maintain bed alarm  - Obtain necessary fall risk management equipment: yes  - Apply yellow socks and bracelet for high fall risk patients  - Consider moving patient to room near nurses station  Outcome: Progressing     Problem: GENITOURINARY - ADULT  Goal: Maintains or returns to baseline urinary function  Description: INTERVENTIONS:  - Assess urinary function  - Encourage oral fluids to ensure adequate hydration if ordered  - Administer IV fluids as ordered to ensure adequate hydration  - Administer ordered medications as needed  - Offer frequent toileting  - Follow urinary retention protocol if ordered  Outcome: Progressing  Goal: Absence of urinary retention  Description: INTERVENTIONS:  - Assess patient’s ability to void and empty bladder  - Monitor I/O  - Bladder scan as needed  - Discuss with physician/AP medications to alleviate retention as needed  - Discuss catheterization for long term situations as appropriate  Outcome: Progressing  Goal: Urinary catheter remains patent  Description: INTERVENTIONS:  - Assess patency of urinary catheter  - If patient has a chronic ceballos, consider changing catheter if non-functioning  - Follow guidelines for intermittent irrigation of non-functioning urinary catheter  Outcome: Progressing     Problem: Nutrition/Hydration-ADULT  Goal: Nutrient/Hydration intake appropriate for improving, restoring or maintaining nutritional needs  Description: Monitor and assess patient's nutrition/hydration status for malnutrition  Collaborate with interdisciplinary team and initiate plan and interventions as ordered  Monitor patient's weight and dietary intake as ordered or per policy  Utilize nutrition screening tool and intervene as necessary  Determine patient's food preferences and provide high-protein, high-caloric foods as appropriate       INTERVENTIONS:  - Monitor oral intake, urinary output, labs, and treatment plans  - Assess nutrition and hydration status and recommend course of action  - Evaluate amount of meals eaten  - Assist patient with eating if necessary   - Allow adequate time for meals  - Recommend/ encourage appropriate diets, oral nutritional supplements, and vitamin/mineral supplements  - Order, calculate, and assess calorie counts as needed  - Recommend, monitor, and adjust tube feedings and TPN/PPN based on assessed needs  - Assess need for intravenous fluids  - Provide specific nutrition/hydration education as appropriate  - Include patient/family/caregiver in decisions related to nutrition  Outcome: Progressing     Problem: MOBILITY - ADULT  Goal: Maintain or return to baseline ADL function  Description: INTERVENTIONS:  -  Assess patient's ability to carry out ADLs; assess patient's baseline for ADL function and identify physical deficits which impact ability to perform ADLs (bathing, care of mouth/teeth, toileting, grooming, dressing, etc )  - Assess/evaluate cause of self-care deficits   - Assess range of motion  - Assess patient's mobility; develop plan if impaired  - Assess patient's need for assistive devices and provide as appropriate  - Encourage maximum independence but intervene and supervise when necessary  - Involve family in performance of ADLs  - Assess for home care needs following discharge   - Consider OT consult to assist with ADL evaluation and planning for discharge  - Provide patient education as appropriate  Outcome: Progressing  Goal: Maintains/Returns to pre admission functional level  Description: INTERVENTIONS:  - Perform BMAT or MOVE assessment daily    - Set and communicate daily mobility goal to care team and patient/family/caregiver  - Collaborate with rehabilitation services on mobility goals if consulted  - Perform Range of Motion 3 times a day  - Reposition patient every 2 hours    - Dangle patient 3 times a day  - Stand patient 3 times a day  - Ambulate patient 3 times a day  - Out of bed to chair 3 times a day   - Out of bed for meals 3 times a day  - Out of bed for toileting  - Record patient progress and toleration of activity level   Outcome: Progressing

## 2023-06-23 NOTE — ASSESSMENT & PLAN NOTE
Creatinine 1 56 today, was 1 92 two days ago     · No other previous values in the system  · In the setting of severe bilateral hydronephrosis as well as underlying type 2 diabetes with unclear baseline creatinine  · See treatment above in hydronephrosis as well as hematuria sections

## 2023-06-24 PROBLEM — E11.9 TYPE 2 DIABETES MELLITUS, WITH LONG-TERM CURRENT USE OF INSULIN (HCC): Chronic | Status: ACTIVE | Noted: 2023-06-21

## 2023-06-24 PROBLEM — Z79.4 TYPE 2 DIABETES MELLITUS, WITH LONG-TERM CURRENT USE OF INSULIN (HCC): Chronic | Status: ACTIVE | Noted: 2023-06-21

## 2023-06-24 PROBLEM — I10 HYPERTENSION: Chronic | Status: ACTIVE | Noted: 2023-06-21

## 2023-06-24 LAB
GLUCOSE SERPL-MCNC: 159 MG/DL (ref 65–140)
GLUCOSE SERPL-MCNC: 198 MG/DL (ref 65–140)
GLUCOSE SERPL-MCNC: 217 MG/DL (ref 65–140)
GLUCOSE SERPL-MCNC: 80 MG/DL (ref 65–140)

## 2023-06-24 PROCEDURE — 99232 SBSQ HOSP IP/OBS MODERATE 35: CPT | Performed by: STUDENT IN AN ORGANIZED HEALTH CARE EDUCATION/TRAINING PROGRAM

## 2023-06-24 PROCEDURE — 82948 REAGENT STRIP/BLOOD GLUCOSE: CPT

## 2023-06-24 RX ADMIN — INSULIN GLARGINE 20 UNITS: 100 INJECTION, SOLUTION SUBCUTANEOUS at 22:00

## 2023-06-24 RX ADMIN — CEFTRIAXONE 1000 MG: 1 INJECTION, SOLUTION INTRAVENOUS at 22:00

## 2023-06-24 RX ADMIN — SODIUM CHLORIDE 75 ML/HR: 0.9 INJECTION, SOLUTION INTRAVENOUS at 19:44

## 2023-06-24 RX ADMIN — INSULIN LISPRO 1 UNITS: 100 INJECTION, SOLUTION INTRAVENOUS; SUBCUTANEOUS at 16:22

## 2023-06-24 RX ADMIN — INSULIN LISPRO 1 UNITS: 100 INJECTION, SOLUTION INTRAVENOUS; SUBCUTANEOUS at 11:26

## 2023-06-24 RX ADMIN — TAMSULOSIN HYDROCHLORIDE 0.4 MG: 0.4 CAPSULE ORAL at 16:23

## 2023-06-24 RX ADMIN — INSULIN LISPRO 2 UNITS: 100 INJECTION, SOLUTION INTRAVENOUS; SUBCUTANEOUS at 22:00

## 2023-06-24 RX ADMIN — ATORVASTATIN CALCIUM 20 MG: 20 TABLET, FILM COATED ORAL at 16:23

## 2023-06-24 NOTE — ASSESSMENT & PLAN NOTE
Creatinine trending down y, was 2 17 POA    · No prior labs  · Likely secondary to severe bilateral hydronephrosis as well as underlying type 2 diabetes with unclear baseline creatinine  · See treatment above in hydronephrosis as well as hematuria sections

## 2023-06-24 NOTE — PROGRESS NOTES
"Progress Note - Urology      Patient: Mike Luz   : 1950 Sex: male   MRN: [id number]     CSN: [id number]  Unit/Bed#: 2 [address]     SUBJECTIVE:   Patient seen on afternoon rounds  Family members present   Long discussion with son   patient to go home with visiting nurse service  Discussion with  in light of insurance may take at least till Monday to get home visiting nurse services set  Family has no issues with draining nephrostomy tubes  Definitely will need visiting nurse available in case issue at home to avoid emergent ER visit  No follow-up yet set with [facility] urology for prostate biopsy and cystoscopy  Patient had 800 cc in the bladder when Cazares inserted in ER apparently now in retention  Despite nephrostomy tubes will need to go home with indwelling Cazares catheter draining hematuric urine          Objective   Vitals: /62   Pulse 82   Temp 98 1 °F (36 7 °C)   Resp 19   Ht 5' 9\" (1 753 m)   Wt 71 9 kg (158 lb 9 6 oz)   SpO2 96%   BMI 23 42 kg/m²     I/O last 24 hours:   In: 660 [P O :660]  Out: 5410 [Urine:5410]      Physical Exam:   General Alert awake   Normocephalic atraumatic PERRLA  Lungs clear bilaterally  Cardiac normal S1 normal S2  Abdomen soft, flank pain none  Nontender  Extremities no edema      Lab Results: CBC:   Lab Results   Component Value Date    WBC 10 69 (H) 2023    HGB 13 6 2023    HCT 41 4 2023    MCV 94 2023     2023    RBC 4 42 2023    MCH 30 8 2023    MCHC 32 9 2023    RDW 13 7 2023    MPV 9 7 2023    NRBC 0 2023     CMP:   Lab Results   Component Value Date     2023    CO2 24 2023    BUN 19 2023    CREATININE 1 56 (H) 2023    CALCIUM 9 0 2023    AST 18 2023    ALT 11 2023    ALKPHOS 70 2023    EGFR 44 2023     Urinalysis:   Lab Results   Component Value Date    COLORU Brenda 2023    CLARITYU Cloudy " 06/21/2023    SPECGRAV 1 020 06/21/2023    PHUR 6 0 06/21/2023    LEUKOCYTESUR (A) 06/21/2023     Elevated glucose may cause decreased leukocyte values   See urine microscopic for UWBC result    NITRITE Positive (A) 06/21/2023    GLUCOSEU >=1000 (1%) (A) 06/21/2023    KETONESU Negative 06/21/2023    BILIRUBINUR Small (A) 06/21/2023    BLOODU Large (A) 06/21/2023     Urine Culture:   Lab Results   Component Value Date    URINECX No Growth <1000 cfu/mL 06/21/2023     PSA:   Lab Results   Component Value Date     73 (H) 05/24/2023         Assessment/ Plan:  Urinary retention  Gross hematuria  Bilateral hydronephrosis  Elevated PSA consistent with prostate cancer possible trigone involvement  Acute renal failure creatinine trending down  45-minute discussion with family members and social service as above  To be discharged on Monday or Tuesday of pending visiting nurse service follow-up [facility] urology this week if possible son to look into appointment on Monday   no reason to consider emergency transfer to Tirso Caal MD

## 2023-06-24 NOTE — ASSESSMENT & PLAN NOTE
Lab Results   Component Value Date    HGBA1C 7 4 (H) 05/24/2023       Recent Labs     06/23/23  1604 06/23/23  2114 06/24/23  0725 06/24/23  1105   POCGLU 75 188* 80 198*       Blood Sugar Average: Last 72 hrs:  · (P) 933 [id number] PTA Levemir 40 units daily as well as Jardiance, Invokana, glimepiride  · glargine 20 units nightly as well as sliding scale insulin  · Restart on discharge hold PTA oral hypoglycemic agents

## 2023-06-24 NOTE — CASE MANAGEMENT
Case Management Discharge Planning Note    Patient name Excell [place]  Location 18 [facility] 204/2 [place] MRN [id number]  : 1950 Date 2023       Current Admission Date: 2023  Current Admission Diagnosis:Hematuria   Patient Active Problem List    Diagnosis Date Noted   • Type 2 diabetes mellitus, with long-term current use of insulin ([address] ) 2023   • Elevated PSA 2023   • Other hydronephrosis 2023   • Elevated serum creatinine 2023   • Hypertension 2023   • Hematuria 2023      LOS (days): 3  Geometric Mean LOS (GMLOS) (days): 3 30  Days to GMLOS:0 6     OBJECTIVE:  Risk of Unplanned Readmission Score: 7 6       Current admission status: Inpatient   Preferred Pharmacy:   [place] [id number], [address] [address] [id number]  Phone: [phone] Fax: [phone]  - Elizabeth Navarro  [facility] [address]  Phone: [phone] Fax: [phone]    Primary Care Provider: No primary care provider on file      Primary Insurance: [facility] REP  Secondary Insurance:     DISCHARGE DETAILS:     Requested  [address]         Is the patient interested in [facility] [employer] at discharge?: Yes  [address] Powers requested[de-identified] [address] Name[de-identified] Other  HHA External Referral Reason (only applicable if external HHA name selected): Services not provided in network or near patient location  [address] [place] [address] Provider[de-identified] PCP  Home Health Services Needed[de-identified] Urinary Incontinence Catheter Management, Other (comment) (bilateral nephrostomy tube management)  Homebound Criteria Met[de-identified] Requires the Assistance of Another Person for Safe Ambulation or to Leave the Home  Supporting Clincal Findings[de-identified] Limited Endurance, Fatigues Easliy in [employer]    DME Referral Provided  Referral made for DME?: No    Other Referral/Resources/Interventions Provided:  Interventions: [facility]  Referral Comments: [address] referrals for SN placed in [address]  Patient has followed with Dr Juan Vegas in [facility] [phone] and will be switching to Dr Jared Cortes at [facility] in July [address] agencies notified of same in referral for orders      Would you like to participate in our [address] service program?  : No - Declined    Treatment Team Recommendation: Home  Discharge Destination Plan[de-identified] Home  Transport at Discharge : Family

## 2023-06-24 NOTE — PLAN OF CARE
Problem: PAIN - ADULT  Goal: Verbalizes/displays adequate comfort level or baseline comfort level  Description: Interventions:  - Encourage patient to monitor pain and request assistance  - Assess pain using appropriate pain scale  - Administer analgesics based on type and severity of pain and evaluate response  - Implement non-pharmacological measures as appropriate and evaluate response  - Consider cultural and social influences on pain and pain management  - Notify physician/advanced practitioner if interventions unsuccessful or patient reports new pain  Outcome: Progressing     Problem: INFECTION - ADULT  Goal: Absence or prevention of progression during hospitalization  Description: INTERVENTIONS:  - Assess and monitor for signs and symptoms of infection  - Monitor lab/diagnostic results  - Monitor all insertion sites, i e  indwelling lines, tubes, and drains  - Monitor endotracheal if appropriate and nasal secretions for changes in amount and color  - [place] appropriate cooling/warming therapies per order  - Administer medications as ordered  - Instruct and encourage patient and family to use good hand hygiene technique  - Identify and instruct in appropriate isolation precautions for identified infection/condition  Outcome: Progressing     Problem: SAFETY ADULT  Goal: Patient will remain free of falls  Description: INTERVENTIONS:  - Educate patient/family on patient safety including physical limitations  - Instruct patient to call for assistance with activity   - Consult OT/PT to assist with strengthening/mobility   - Keep Call bell within reach  - Keep bed low and locked with side rails adjusted as appropriate  - Keep care items and personal belongings within reach  - Initiate and maintain comfort rounds  - Make Fall Risk Sign visible to staff  - Offer Toileting every 2 Hours, in advance of need  - Initiate/Maintain bed alarm  - Obtain necessary fall risk management equipment: bed alarm, yellow socks - Apply yellow socks and bracelet for high fall risk patients  - Consider moving patient to room near nurses station  Outcome: Progressing     Problem: GENITOURINARY - ADULT  Goal: Maintains or returns to baseline urinary function  Description: INTERVENTIONS:  - Assess urinary function  - Encourage oral fluids to ensure adequate hydration if ordered  - Administer IV fluids as ordered to ensure adequate hydration  - Administer ordered medications as needed  - Offer frequent toileting  - Follow urinary retention protocol if ordered  Outcome: Progressing  Goal: Absence of urinary retention  Description: INTERVENTIONS:  - Assess patient’s ability to void and empty bladder  - Monitor I/O  - Bladder scan as needed  - Discuss with physician/AP medications to alleviate retention as needed  - Discuss catheterization for long term situations as appropriate  Outcome: Progressing  Goal: Urinary catheter remains patent  Description: INTERVENTIONS:  - Assess patency of urinary catheter  - If patient has a chronic ceballos, consider changing catheter if non-functioning  - Follow guidelines for intermittent irrigation of non-functioning urinary catheter  Outcome: Progressing     Problem: Nutrition/Hydration-ADULT  Goal: Nutrient/Hydration intake appropriate for improving, restoring or maintaining nutritional needs  Description: Monitor and assess patient's nutrition/hydration status for malnutrition  Collaborate with interdisciplinary team and initiate plan and interventions as ordered  Monitor patient's weight and dietary intake as ordered or per policy  Utilize nutrition screening tool and intervene as necessary  Determine patient's food preferences and provide high-protein, high-caloric foods as appropriate       INTERVENTIONS:  - Monitor oral intake, urinary output, labs, and treatment plans  - Assess nutrition and hydration status and recommend course of action  - Evaluate amount of meals eaten  - Assist patient with eating if necessary   - Allow adequate time for meals  - Recommend/ encourage appropriate diets, oral nutritional supplements, and vitamin/mineral supplements  - Order, calculate, and assess calorie counts as needed  - Recommend, monitor, and adjust tube feedings and TPN/PPN based on assessed needs  - Assess need for intravenous fluids  - Provide specific nutrition/hydration education as appropriate  - Include patient/family/caregiver in decisions related to nutrition  Outcome: Progressing     Problem: MOBILITY - ADULT  Goal: Maintain or return to baseline ADL function  Description: INTERVENTIONS:  -  Assess patient's ability to carry out ADLs; assess patient's baseline for ADL function and identify physical deficits which impact ability to perform ADLs (bathing, care of mouth/teeth, toileting, grooming, dressing, etc )  - Assess/evaluate cause of self-care deficits   - Assess range of motion  - Assess patient's mobility; develop plan if impaired  - Assess patient's need for assistive devices and provide as appropriate  - Encourage maximum independence but intervene and supervise when necessary  - Involve family in performance of ADLs  - Assess for home care needs following discharge   - Consider OT consult to assist with ADL evaluation and planning for discharge  - Provide patient education as appropriate  Outcome: Progressing  Goal: Maintains/Returns to pre admission functional level  Description: INTERVENTIONS:  - Perform BMAT or MOVE assessment daily    - Set and communicate daily mobility goal to care team and patient/family/caregiver  - Collaborate with rehabilitation services on mobility goals if consulted  - Perform Range of Motion 3 times a day  - Reposition patient every 2 hours    - Dangle patient 3 times a day  - Stand patient 3 times a day  - Ambulate patient 3 times a day  - Out of bed to chair 3 times a day   - Out of bed for meals 3 times a day  - Out of bed for toileting  - Record patient progress and toleration of activity level   Outcome: Progressing

## 2023-06-24 NOTE — PROGRESS NOTES
[address]  Progress Note  Name: Tez Arnold  MRN: [id number]  Unit/Bed#: 2 [place] [address] A  Date of Admission: 6/21/2023   Date of Service: 6/24/2023 I Hospital Day: 3    Assessment/Plan   Other hydronephrosis  Assessment & Plan  Secondary to urinary retention, suprapubic pain, hematuria    · Elevated serum creatinine POA; unknown baseline  · Urology outpatient earlier in the day after having a highly elevated PSA in May  · CT A/P without contrast: Severe bilateral hydronephrosis, worse on the right with suggestion of mass in the urinary bladder which is difficult to separate from the prostate gland  Extraprostatic extension is noted  The presence of a bladder mass needs to be excluded  Nonspecific right lower lobe nodule    In view of patient's history of malignancy attention is needed on follow-up exam   · ER staff discussed the case with urology on-call who reported patient will need admission to medicine with urology as well as IR consultations; will need bilateral nephrostomy tubes to be placed by IR as well as urology consultation for likely cystoscopy  · UA: With evidence of pyuria, hematuria  · Cazares catheter placed in the ER  · Urology recs:  · S/p 6/22 IR for s/p bilateral nephrostomy tube placement   · DC IV ceftriaxone for possible UTI  · Urinary retention most likely related to prostate obstruction and/or diabetic hypocontractile bladder  · Elevated , likely prostate cancer advancing causing retention bilateral hydro  · discharged home with bilateral nephrostomy tubes and indwelling Cazares in light of retention  · F/U [facility]'s urology as outpatient to schedule cystoscopy and prostate biopsy  · Consider voiding trial       * Hematuria  Assessment & Plan  Slowly improving    · See hydronephrosis section above    Elevated serum creatinine  Assessment & Plan  Creatinine trending down y, was 2 17 POA    · No prior labs  · Likely secondary to severe bilateral "hydronephrosis as well as underlying type 2 diabetes with unclear baseline creatinine  · See treatment above in hydronephrosis as well as hematuria sections    Hypertension  Assessment & Plan  · BP stable  · Hold PTA lisinopril while admitted with elevated creatinine    Elevated PSA  Assessment & Plan  PSA highly elevated at 145 recently in May    · Underwent nuclear medicine bone scan on June 20 with following impression: \" No focal tracer activity characteristic of osseous metastatic disease  Note is made of indeterminate focal tracer activity involving the right, likely T11 vertebral artery of uncertain clinical significance but favored to be degenerative in nature, see discussion above  Tracer activity within a significantly distended urinary bladder limits evaluation of the central pelvis/sacrum  · See hematuria section above    Type 2 diabetes mellitus, with long-term current use of insulin [facility])  Assessment & Plan  Lab Results   Component Value Date    HGBA1C 7 4 (H) 05/24/2023       Recent Labs     06/23/23  1604 06/23/23  2114 06/24/23  0725 06/24/23  1105   POCGLU 75 188* 80 198*       Blood Sugar Average: Last 72 hrs:  · (P) 331 [id number] PTA Levemir 40 units daily as well as Jardiance, Invokana, glimepiride  · glargine 20 units nightly as well as sliding scale insulin  · Restart on discharge hold PTA oral hypoglycemic agents         VTE Pharmacologic Prophylaxis:   Pharmacologic: N/A due to active hematuria  Mechanical VTE Prophylaxis in Place: Yes    Patient Centered Rounds:  I performed bedside rounds with nursing staff today      Discussions with Specialists or Other Care Team Provider: Urology, IR    Education and Discussions with Family / Patient: Yes at bedside daughter    Time Spent for Care:  45 minutes This time was spent on one or more of the following: performing physical exam; counseling and coordination of care; obtaining or reviewing history; documenting in the medical record; " reviewing/ordering tests, medications or procedures; communicating with other healthcare professionals and discussing with patient's family/caregivers  Current Length of Stay: 3 day(s)    Current Patient Status: Inpatient   Certification Statement: Clinical course and specialist recommendations    Discharge Plan: Anticipate discharge in 24-48 hrs to discharge location to be determined pending rehab evaluations  Code Status: Level 1 - Full Code      Subjective:   Patient seen and examined at bedside with family member present, patient reported that he had some lower abdominal tenderness when pressing  Patient denied any other symptoms at this time  Patient's family eagerly awaiting plan of care and wanted to schedule outpatient cystoscopy and biopsy  Objective:     Vitals:   Temp (24hrs), Av 3 °F (36 8 °C), Min:98 °F (36 7 °C), Max:98 8 °F (37 1 °C)    Temp:  [98 °F (36 7 °C)-98 8 °F (37 1 °C)] 98 1 °F (36 7 °C)  HR:  [47-82] 82  Resp:  [18-19] 19  BP: (126-143)/(62-73) 126/62  SpO2:  [96 %-98 %] 96 %  Body mass index is 23 42 kg/m²  Input and Output Summary (last 24 hours): Intake/Output Summary (Last 24 hours) at 2023 1348  Last data filed at 2023 1100  Gross per 24 hour   Intake 480 ml   Output 3635 ml   Net -3155 ml       Physical Exam:     Physical Exam  Vitals reviewed  Constitutional:       General: He is not in acute distress  Appearance: Normal appearance  HENT:      Head: Atraumatic  Nose: No congestion  Eyes:      General: No scleral icterus  Pupils: Pupils are equal, round, and reactive to light  Cardiovascular:      Rate and Rhythm: Normal rate  Heart sounds: No murmur heard  Pulmonary:      Effort: No respiratory distress  Breath sounds: No wheezing, rhonchi or rales  Abdominal:      Palpations: Abdomen is soft  Tenderness: There is abdominal tenderness in the suprapubic area   There is no right CVA tenderness, left CVA tenderness or guarding  Genitourinary:     Comments: Cazares in place, hematuria with sediment  Musculoskeletal:         General: No swelling or deformity  Normal range of motion  Cervical back: No tenderness  Right lower leg: No edema  Left lower leg: No edema  Feet:      Right foot:      Skin integrity: No callus  Skin:     General: Skin is warm  Capillary Refill: Capillary refill takes less than 2 seconds  Findings: No lesion or rash  Neurological:      Mental Status: He is oriented to person, place, and time  Cranial Nerves: No cranial nerve deficit  Coordination: Coordination normal    Psychiatric:         Mood and Affect: Mood normal          Thought Content: Thought content normal          Additional Data:     Labs:    Results from last 7 days   Lab Units 06/23/23  0445 06/22/23  0500   WBC Thousand/uL 10 69* 11 32*   HEMOGLOBIN g/dL 13 6 13 6   HEMATOCRIT % 41 4 41 1   PLATELETS Thousands/uL 285 305   NEUTROS PCT %  --  69   LYMPHS PCT %  --  22   MONOS PCT %  --  8   EOS PCT %  --  1     Results from last 7 days   Lab Units 06/23/23  0445 06/22/23  0500   POTASSIUM mmol/L 4 6 4 5   CHLORIDE mmol/L 106 106   CO2 mmol/L 24 26   BUN mg/dL 19 22   CREATININE mg/dL 1 56* 1 98*   CALCIUM mg/dL 9 0 8 7   ALK PHOS U/L  --  70   ALT U/L  --  11   AST U/L  --  18     Results from last 7 days   Lab Units 06/22/23  0500   INR  1 03       * I Have Reviewed All Lab Data Listed Above    * Additional Pertinent Lab Tests Reviewed: No correct      Imaging:  Imaging Reports Reviewed Today Include: IR report    Recent Cultures (last 7 days):     Results from last 7 days   Lab Units 06/21/23 2054   URINE CULTURE  No Growth <1000 cfu/mL       Last 24 Hours Medication List:   Current Facility-Administered Medications   Medication Dose Route Frequency Provider Last Rate   • acetaminophen  650 mg Oral Q6H PRN John Doherty DO     • albuterol  2 puff Inhalation Q4H PRN John Doherty DO     • "atorvastatin  20 mg Oral Daily With Dinner Derotha Grit, DO     • cefTRIAXone  1,000 mg Intravenous Q24H Derotha Grit, DO 1,000 mg (06/23/23 2130)   • HYDROmorphone  0 5 mg Intravenous Q6H PRN Derotha Grit, DO     • insulin glargine  20 Units Subcutaneous HS Derotha Grit, DO     • insulin lispro  1-5 Units Subcutaneous HS More Prost Starsinic, DO     • insulin lispro  1-5 Units Subcutaneous TID [facility] [place] More Prost Starsinic, DO     • oxyCODONE  5 mg Oral Q6H PRN Derotha Grit, DO     • pneumococcal 20-robbie conj vacc  0 5 mL Intramuscular Prior to discharge Derconnie Grit, DO     • sodium chloride  75 mL/hr Intravenous Continuous More Prost Starsinic, DO 75 mL/hr (06/23/23 1844)   • tamsulosin  0 4 mg Oral Daily With [address], DO            Today, Patient Was Seen By: Liz Chao MD    ** Please Note: \"This note has been constructed using a voice recognition system  Therefore there may be syntax, spelling, and/or grammatical errors  Please call if you have any questions   \"**    "

## 2023-06-24 NOTE — ASSESSMENT & PLAN NOTE
Secondary to urinary retention, suprapubic pain, hematuria    · Elevated serum creatinine POA; unknown baseline  · Urology outpatient earlier in the day after having a highly elevated PSA in May  · CT A/P without contrast: Severe bilateral hydronephrosis, worse on the right with suggestion of mass in the urinary bladder which is difficult to separate from the prostate gland  Extraprostatic extension is noted  The presence of a bladder mass needs to be excluded  Nonspecific right lower lobe nodule    In view of patient's history of malignancy attention is needed on follow-up exam   · ER staff discussed the case with urology on-call who reported patient will need admission to medicine with urology as well as IR consultations; will need bilateral nephrostomy tubes to be placed by IR as well as urology consultation for likely cystoscopy  · UA: With evidence of pyuria, hematuria  · Cazares catheter placed in the ER  · Urology recs:  · S/p 6/22 IR for s/p bilateral nephrostomy tube placement   · DC IV ceftriaxone for possible UTI  · Urinary retention most likely related to prostate obstruction and/or diabetic hypocontractile bladder  · Elevated , likely prostate cancer advancing causing retention bilateral hydro  · discharged home with bilateral nephrostomy tubes and indwelling Cazares in light of retention  · F/U [facility] as outpatient to schedule cystoscopy and prostate biopsy  · Consider voiding trial  · discharge Monday / Tuesday of pendingCM for [facility] follow-up [facility] u[facility] this week if possible son to look into appointment on Monday  ·  no reason to consider emergency transfer to [facility]

## 2023-06-25 LAB
ALBUMIN SERPL BCP-MCNC: 3.3 G/DL (ref 3.5–5)
ALP SERPL-CCNC: 64 U/L (ref 34–104)
ALT SERPL W P-5'-P-CCNC: 9 U/L (ref 7–52)
ANION GAP SERPL CALCULATED.3IONS-SCNC: 6 MMOL/L
AST SERPL W P-5'-P-CCNC: 12 U/L (ref 13–39)
BASOPHILS # BLD AUTO: 0.04 THOUSANDS/ÂΜL (ref 0–0.1)
BASOPHILS NFR BLD AUTO: 1 % (ref 0–1)
BILIRUB SERPL-MCNC: 0.47 MG/DL (ref 0.2–1)
BUN SERPL-MCNC: 13 MG/DL (ref 5–25)
CALCIUM ALBUM COR SERPL-MCNC: 8.9 MG/DL (ref 8.3–10.1)
CALCIUM SERPL-MCNC: 8.3 MG/DL (ref 8.4–10.2)
CHLORIDE SERPL-SCNC: 106 MMOL/L (ref 96–108)
CO2 SERPL-SCNC: 26 MMOL/L (ref 21–32)
CREAT SERPL-MCNC: 1.43 MG/DL (ref 0.6–1.3)
EOSINOPHIL # BLD AUTO: 0.14 THOUSAND/ÂΜL (ref 0–0.61)
EOSINOPHIL NFR BLD AUTO: 2 % (ref 0–6)
ERYTHROCYTE [DISTWIDTH] IN BLOOD BY AUTOMATED COUNT: 13.5 % (ref 11.6–15.1)
GFR SERPL CREATININE-BSD FRML MDRD: 49 ML/MIN/1.73SQ M
GLUCOSE SERPL-MCNC: 157 MG/DL (ref 65–140)
GLUCOSE SERPL-MCNC: 184 MG/DL (ref 65–140)
GLUCOSE SERPL-MCNC: 211 MG/DL (ref 65–140)
GLUCOSE SERPL-MCNC: 71 MG/DL (ref 65–140)
GLUCOSE SERPL-MCNC: 76 MG/DL (ref 65–140)
HCT VFR BLD AUTO: 38.3 % (ref 36.5–49.3)
HGB BLD-MCNC: 12.8 G/DL (ref 12–17)
IMM GRANULOCYTES # BLD AUTO: 0.02 THOUSAND/UL (ref 0–0.2)
IMM GRANULOCYTES NFR BLD AUTO: 0 % (ref 0–2)
LYMPHOCYTES # BLD AUTO: 2.32 THOUSANDS/ÂΜL (ref 0.6–4.47)
LYMPHOCYTES NFR BLD AUTO: 26 % (ref 14–44)
MCH RBC QN AUTO: 31.2 PG (ref 26.8–34.3)
MCHC RBC AUTO-ENTMCNC: 33.4 G/DL (ref 31.4–37.4)
MCV RBC AUTO: 93 FL (ref 82–98)
MONOCYTES # BLD AUTO: 0.7 THOUSAND/ÂΜL (ref 0.17–1.22)
MONOCYTES NFR BLD AUTO: 8 % (ref 4–12)
NEUTROPHILS # BLD AUTO: 5.6 THOUSANDS/ÂΜL (ref 1.85–7.62)
NEUTS SEG NFR BLD AUTO: 63 % (ref 43–75)
NRBC BLD AUTO-RTO: 0 /100 WBCS
PLATELET # BLD AUTO: 303 THOUSANDS/UL (ref 149–390)
PMV BLD AUTO: 9 FL (ref 8.9–12.7)
POTASSIUM SERPL-SCNC: 3.9 MMOL/L (ref 3.5–5.3)
PROT SERPL-MCNC: 6.2 G/DL (ref 6.4–8.4)
RBC # BLD AUTO: 4.1 MILLION/UL (ref 3.88–5.62)
SODIUM SERPL-SCNC: 138 MMOL/L (ref 135–147)
WBC # BLD AUTO: 8.82 THOUSAND/UL (ref 4.31–10.16)

## 2023-06-25 PROCEDURE — 85025 COMPLETE CBC W/AUTO DIFF WBC: CPT | Performed by: STUDENT IN AN ORGANIZED HEALTH CARE EDUCATION/TRAINING PROGRAM

## 2023-06-25 PROCEDURE — 82948 REAGENT STRIP/BLOOD GLUCOSE: CPT

## 2023-06-25 PROCEDURE — 99232 SBSQ HOSP IP/OBS MODERATE 35: CPT | Performed by: STUDENT IN AN ORGANIZED HEALTH CARE EDUCATION/TRAINING PROGRAM

## 2023-06-25 PROCEDURE — 80053 COMPREHEN METABOLIC PANEL: CPT | Performed by: STUDENT IN AN ORGANIZED HEALTH CARE EDUCATION/TRAINING PROGRAM

## 2023-06-25 RX ADMIN — TAMSULOSIN HYDROCHLORIDE 0.4 MG: 0.4 CAPSULE ORAL at 16:32

## 2023-06-25 RX ADMIN — SODIUM CHLORIDE 75 ML/HR: 0.9 INJECTION, SOLUTION INTRAVENOUS at 19:44

## 2023-06-25 RX ADMIN — INSULIN LISPRO 1 UNITS: 100 INJECTION, SOLUTION INTRAVENOUS; SUBCUTANEOUS at 11:39

## 2023-06-25 RX ADMIN — CEFTRIAXONE 1000 MG: 1 INJECTION, SOLUTION INTRAVENOUS at 21:48

## 2023-06-25 RX ADMIN — INSULIN LISPRO 2 UNITS: 100 INJECTION, SOLUTION INTRAVENOUS; SUBCUTANEOUS at 21:48

## 2023-06-25 RX ADMIN — ATORVASTATIN CALCIUM 20 MG: 20 TABLET, FILM COATED ORAL at 16:32

## 2023-06-25 RX ADMIN — INSULIN GLARGINE 20 UNITS: 100 INJECTION, SOLUTION SUBCUTANEOUS at 21:48

## 2023-06-25 RX ADMIN — INSULIN LISPRO 1 UNITS: 100 INJECTION, SOLUTION INTRAVENOUS; SUBCUTANEOUS at 16:32

## 2023-06-25 NOTE — PROGRESS NOTES
[facility] [address]  Progress Note  Name: Charley Parker  MRN: [id number]  Unit/Bed#: 18 [facility] 207 I Date of Admission: 6/21/2023   Date of Service: 6/25/2023 I Hospital Day: 4    Assessment/Plan   Other hydronephrosis  Assessment & Plan  Secondary to urinary retention, suprapubic pain, hematuria    · Elevated serum creatinine POA; unknown baseline  · Urology outpatient earlier in the day after having a highly elevated PSA in May  · CT A/P without contrast: Severe bilateral hydronephrosis, worse on the right with suggestion of mass in the urinary bladder which is difficult to separate from the prostate gland  Extraprostatic extension is noted  The presence of a bladder mass needs to be excluded  Nonspecific right lower lobe nodule    In view of patient's history of malignancy attention is needed on follow-up exam   · ER staff discussed the case with urology on-call who reported patient will need admission to medicine with urology as well as IR consultations; will need bilateral nephrostomy tubes to be placed by IR as well as urology consultation for likely cystoscopy  · UA: With evidence of pyuria, hematuria  · Cazares catheter placed in the ER  · Urology recs:  · S/p 6/22 IR for s/p bilateral nephrostomy tube placement   · DC IV ceftriaxone for possible UTI  · Urinary retention most likely related to prostate obstruction and/or diabetic hypocontractile bladder  · Elevated , likely prostate cancer advancing causing retention bilateral hydro  · discharged home with bilateral nephrostomy tubes and indwelling Cazares in light of retention  · F/U [facility] u[facility] as outpatient to schedule cystoscopy and prostate biopsy  · Consider voiding trial  · discharge Monday / Tuesday of pendingCM for [facility] follow-up [facility] u[facility] this week if possible son to look into appointment on Monday  ·  no reason to consider emergency transfer to [facility]    * Hematuria  Assessment & Plan  Slowly "improving    · See hydronephrosis section above    Elevated serum creatinine  Assessment & Plan  Creatinine trending down y, was 2 17 POA    · No prior labs  · Likely secondary to severe bilateral hydronephrosis as well as underlying type 2 diabetes with unclear baseline creatinine  · See treatment above in hydronephrosis as well as hematuria sections    Hypertension  Assessment & Plan  · BP stable  · Hold PTA lisinopril while admitted with elevated creatinine    Elevated PSA  Assessment & Plan  PSA highly elevated at 145 recently in May    · Underwent nuclear medicine bone scan on June 20 with following impression: \" No focal tracer activity characteristic of osseous metastatic disease  Note is made of indeterminate focal tracer activity involving the right, likely T11 vertebral artery of uncertain clinical significance but favored to be degenerative in nature, see discussion above  Tracer activity within a significantly distended urinary bladder limits evaluation of the central pelvis/sacrum  · See hematuria section above    Type 2 diabetes mellitus, with long-term current use of insulin [facility])  Assessment & Plan  Lab Results   Component Value Date    HGBA1C 7 4 (H) 05/24/2023       Recent Labs     06/23/23  1604 06/23/23  2114 06/24/23  0725 06/24/23  1105   POCGLU 75 188* 80 198*       Blood Sugar Average: Last 72 hrs:  · (P) 494 [id number] PTA Levemir 40 units daily as well as Jardiance, Invokana, glimepiride  · glargine 20 units nightly as well as sliding scale insulin  · Restart on discharge hold PTA oral hypoglycemic agents         VTE Pharmacologic Prophylaxis:   Pharmacologic: N/A due to active hematuria  Mechanical VTE Prophylaxis in Place: Yes    Patient Centered Rounds:  I performed bedside rounds with nursing staff today      Discussions with Specialists or Other Care Team Provider: Urology, IR    Education and Discussions with Family / Patient: Yes at bedside daughter    Time Spent for Care: " 45 minutes This time was spent on one or more of the following: performing physical exam; counseling and coordination of care; obtaining or reviewing history; documenting in the medical record; reviewing/ordering tests, medications or procedures; communicating with other healthcare professionals and discussing with patient's family/caregivers  Current Length of Stay: 4 day(s)    Current Patient Status: Inpatient   Certification Statement: Clinical course and specialist recommendations    Discharge Plan: Anticipate discharge in 24-48 hrs to discharge location to be determined pending rehab evaluations  Code Status: Level 1 - Full Code      Subjective:   Patient seen and examined at bedside discussed plan of care regarding urology specialist recommendations, patient reported abdominal pain with seated upright or pressing on bladder  Patient had no other concerns at this time  Nursing reported no events overnight  Objective:     Vitals:   Temp (24hrs), Av 4 °F (37 4 °C), Min:98 9 °F (37 2 °C), Max:99 8 °F (37 7 °C)    Temp:  [98 9 °F (37 2 °C)-99 8 °F (37 7 °C)] 98 9 °F (37 2 °C)  HR:  [66-67] 67  Resp:  [17-18] 17  BP: (122-143)/(65-73) 122/65  SpO2:  [98 %-99 %] 98 %  Body mass index is 23 42 kg/m²  Input and Output Summary (last 24 hours): Intake/Output Summary (Last 24 hours) at 2023 1201  Last data filed at 2023 4204  Gross per 24 hour   Intake 1470 ml   Output 2500 ml   Net -1030 ml       Physical Exam:     Physical Exam  Vitals reviewed  Constitutional:       General: He is not in acute distress  Appearance: Normal appearance  HENT:      Head: Atraumatic  Nose: No congestion  Eyes:      General: No scleral icterus  Pupils: Pupils are equal, round, and reactive to light  Cardiovascular:      Rate and Rhythm: Normal rate  Heart sounds: No murmur heard  Pulmonary:      Effort: No respiratory distress  Breath sounds: No wheezing, rhonchi or rales  Abdominal:      General: Abdomen is flat  Palpations: Abdomen is soft  Tenderness: There is abdominal tenderness in the suprapubic area  There is no guarding  Genitourinary:     Comments: Cazares in place with red sediment  Musculoskeletal:         General: No swelling or deformity  Normal range of motion  Cervical back: No tenderness  Right lower leg: No edema  Left lower leg: No edema  Comments: Nephrostomy tube bag with yellow urine   Feet:      Right foot:      Skin integrity: No callus  Skin:     General: Skin is warm  Capillary Refill: Capillary refill takes less than 2 seconds  Findings: No lesion or rash  Neurological:      Mental Status: He is oriented to person, place, and time  Cranial Nerves: No cranial nerve deficit  Coordination: Coordination normal    Psychiatric:         Mood and Affect: Mood normal          Thought Content: Thought content normal          Additional Data:     Labs:    Results from last 7 days   Lab Units 06/25/23  0529   WBC Thousand/uL 8 82   HEMOGLOBIN g/dL 12 8   HEMATOCRIT % 38 3   PLATELETS Thousands/uL 303   NEUTROS PCT % 63   LYMPHS PCT % 26   MONOS PCT % 8   EOS PCT % 2     Results from last 7 days   Lab Units 06/25/23  0529   POTASSIUM mmol/L 3 9   CHLORIDE mmol/L 106   CO2 mmol/L 26   BUN mg/dL 13   CREATININE mg/dL 1 43*   CALCIUM mg/dL 8 3*   ALK PHOS U/L 64   ALT U/L 9   AST U/L 12*     Results from last 7 days   Lab Units 06/22/23  0500   INR  1 03       * I Have Reviewed All Lab Data Listed Above    * Additional Pertinent Lab Tests Reviewed: No correct      Imaging:  Imaging Reports Reviewed Today Include: IR report    Recent Cultures (last 7 days):     Results from last 7 days   Lab Units 06/21/23 2054   URINE CULTURE  No Growth <1000 cfu/mL       Last 24 Hours Medication List:   Current Facility-Administered Medications   Medication Dose Route Frequency Provider Last Rate   • acetaminophen  650 mg Oral Q6H PRN "Mirza Hernandez DO     • albuterol  2 puff Inhalation Q4H PRN Mirza Hernandez DO     • atorvastatin  20 mg Oral Daily With Dinner Mirza Hernandez DO     • cefTRIAXone  1,000 mg Intravenous Q24H Mirza Hernandez DO Stopped (06/24/23 2230)   • HYDROmorphone  0 5 mg Intravenous Q6H PRN Mirza Hernandez DO     • insulin glargine  20 Units Subcutaneous HS Mirza Hernandez DO     • insulin lispro  1-5 Units Subcutaneous HS Cb Press Jefry DO     • insulin lispro  1-5 Units Subcutaneous TID AC Cb Press Jefry, DO     • oxyCODONE  5 mg Oral Q6H PRN Mirza Hernandez DO     • pneumococcal 20-robbie conj vacc  0 5 mL Intramuscular Prior to discharge Mirza Hernandez DO     • sodium chloride  75 mL/hr Intravenous Continuous Cb Press Vidyainic, DO 75 mL/hr (06/24/23 1944)   • tamsulosin  0 4 mg Oral Daily With [address] [place] [address], DO            Today, Patient Was Seen By: Jose Martin Alejandre MD    ** Please Note: \"This note has been constructed using a voice recognition system  Therefore there may be syntax, spelling, and/or grammatical errors  Please call if you have any questions   \"**    "

## 2023-06-25 NOTE — PLAN OF CARE
Problem: PAIN - ADULT  Goal: Verbalizes/displays adequate comfort level or baseline comfort level  Description: Interventions:  - Encourage patient to monitor pain and request assistance  - Assess pain using appropriate pain scale  - Administer analgesics based on type and severity of pain and evaluate response  - Implement non-pharmacological measures as appropriate and evaluate response  - Consider cultural and social influences on pain and pain management  - Notify physician/advanced practitioner if interventions unsuccessful or patient reports new pain  Outcome: Progressing     Problem: INFECTION - ADULT  Goal: Absence or prevention of progression during hospitalization  Description: INTERVENTIONS:  - Assess and monitor for signs and symptoms of infection  - Monitor lab/diagnostic results  - Monitor all insertion sites, i e  indwelling lines, tubes, and drains  - Monitor endotracheal if appropriate and nasal secretions for changes in amount and color  - [place] appropriate cooling/warming therapies per order  - Administer medications as ordered  - Instruct and encourage patient and family to use good hand hygiene technique  - Identify and instruct in appropriate isolation precautions for identified infection/condition  Outcome: Progressing     Problem: SAFETY ADULT  Goal: Patient will remain free of falls  Description: INTERVENTIONS:  - Educate patient/family on patient safety including physical limitations  - Instruct patient to call for assistance with activity   - Consult OT/PT to assist with strengthening/mobility   - Keep Call bell within reach  - Keep bed low and locked with side rails adjusted as appropriate  - Keep care items and personal belongings within reach  - Initiate and maintain comfort rounds  - Make Fall Risk Sign visible to staff  - Offer Toileting every 2 Hours, in advance of need  - Initiate/Maintain bed alarm  - Obtain necessary fall risk management equipment: bed alarm, yellow socks - Apply yellow socks and bracelet for high fall risk patients  - Consider moving patient to room near nurses station  Outcome: Progressing     Problem: GENITOURINARY - ADULT  Goal: Maintains or returns to baseline urinary function  Description: INTERVENTIONS:  - Assess urinary function  - Encourage oral fluids to ensure adequate hydration if ordered  - Administer IV fluids as ordered to ensure adequate hydration  - Administer ordered medications as needed  - Offer frequent toileting  - Follow urinary retention protocol if ordered  Outcome: Progressing  Goal: Absence of urinary retention  Description: INTERVENTIONS:  - Assess patient’s ability to void and empty bladder  - Monitor I/O  - Bladder scan as needed  - Discuss with physician/AP medications to alleviate retention as needed  - Discuss catheterization for long term situations as appropriate  Outcome: Progressing  Goal: Urinary catheter remains patent  Description: INTERVENTIONS:  - Assess patency of urinary catheter  - If patient has a chronic ceballos, consider changing catheter if non-functioning  - Follow guidelines for intermittent irrigation of non-functioning urinary catheter  Outcome: Progressing     Problem: Nutrition/Hydration-ADULT  Goal: Nutrient/Hydration intake appropriate for improving, restoring or maintaining nutritional needs  Description: Monitor and assess patient's nutrition/hydration status for malnutrition  Collaborate with interdisciplinary team and initiate plan and interventions as ordered  Monitor patient's weight and dietary intake as ordered or per policy  Utilize nutrition screening tool and intervene as necessary  Determine patient's food preferences and provide high-protein, high-caloric foods as appropriate       INTERVENTIONS:  - Monitor oral intake, urinary output, labs, and treatment plans  - Assess nutrition and hydration status and recommend course of action  - Evaluate amount of meals eaten  - Assist patient with eating if necessary   - Allow adequate time for meals  - Recommend/ encourage appropriate diets, oral nutritional supplements, and vitamin/mineral supplements  - Order, calculate, and assess calorie counts as needed  - Recommend, monitor, and adjust tube feedings and TPN/PPN based on assessed needs  - Assess need for intravenous fluids  - Provide specific nutrition/hydration education as appropriate  - Include patient/family/caregiver in decisions related to nutrition  Outcome: Progressing     Problem: MOBILITY - ADULT  Goal: Maintain or return to baseline ADL function  Description: INTERVENTIONS:  -  Assess patient's ability to carry out ADLs; assess patient's baseline for ADL function and identify physical deficits which impact ability to perform ADLs (bathing, care of mouth/teeth, toileting, grooming, dressing, etc )  - Assess/evaluate cause of self-care deficits   - Assess range of motion  - Assess patient's mobility; develop plan if impaired  - Assess patient's need for assistive devices and provide as appropriate  - Encourage maximum independence but intervene and supervise when necessary  - Involve family in performance of ADLs  - Assess for home care needs following discharge   - Consider OT consult to assist with ADL evaluation and planning for discharge  - Provide patient education as appropriate  Outcome: Progressing  Goal: Maintains/Returns to pre admission functional level  Description: INTERVENTIONS:  - Perform BMAT or MOVE assessment daily    - Set and communicate daily mobility goal to care team and patient/family/caregiver  - Collaborate with rehabilitation services on mobility goals if consulted  - Perform Range of Motion 3 times a day  - Reposition patient every 2 hours    - Dangle patient 3 times a day  - Stand patient 3 times a day  - Ambulate patient 3 times a day  - Out of bed to chair 3 times a day   - Out of bed for meals 3 times a day  - Out of bed for toileting  - Record patient progress and toleration of activity level   Outcome: Progressing

## 2023-06-25 NOTE — PROGRESS NOTES
"Progress Note - Urology      Patient: Carolyn Travis   : 1950 Sex: male   MRN: [id number]     CSN: [id number]  Unit/Bed#: [address] [place] [address]     SUBJECTIVE:   Patient seen on morning rounds  Awaiting social service visiting nurse      Objective   Vitals: /65   Pulse 67   Temp 98 9 °F (37 2 °C)   Resp 17   Ht 5' 9\" (1 753 m)   Wt 71 9 kg (158 lb 9 6 oz)   SpO2 98%   BMI 23 42 kg/m²     I/O last 24 hours: In: 1950 [P O :980; I V :910; Other:10; IV Piggyback:50]  Out: 305 [Urine:3050]      Physical Exam:   General Alert awake   Normocephalic atraumatic PERRLA  Lungs clear bilaterally  Cardiac normal S1 normal S2  Abdomen soft, flank pain  Bilateral  nephro tubes minimal hematuria   ceballos gross hematuria  Extremities no edema      Lab Results: CBC:   Lab Results   Component Value Date    WBC 8 82 2023    HGB 12 8 2023    HCT 38 3 2023    MCV 93 2023     2023    RBC 4 10 2023    MCH 31 2 2023    MCHC 33 4 2023    RDW 13 5 2023    MPV 9 0 2023    NRBC 0 2023     CMP:   Lab Results   Component Value Date     2023    CO2 26 2023    BUN 13 2023    CREATININE 1 43 (H) 2023    CALCIUM 8 3 (L) 2023    AST 12 (L) 2023    ALT 9 2023    ALKPHOS 64 2023    EGFR 49 2023     Urinalysis:   Lab Results   Component Value Date    COLORU Brenda 2023    CLARITYU Cloudy 2023    SPECGRAV 1 020 2023    PHUR 6 0 2023    LEUKOCYTESUR (A) 2023     Elevated glucose may cause decreased leukocyte values   See urine microscopic for UWBC result    NITRITE Positive (A) 2023    GLUCOSEU >=1000 (1%) (A) 2023    KETONESU Negative 2023    BILIRUBINUR Small (A) 2023    BLOODU Large (A) 2023     Urine Culture:   Lab Results   Component Value Date    URINECX No Growth <1000 cfu/mL 2023     PSA:   Lab Results   Component Value Date     73 (H) " 05/24/2023         Assessment/ Plan:   elevated PSA   bilateral hydronephrosis   bilateral nephrostomy tubes   gross hematuria   long discussion with family members once again   to maximize Social Service sent home with Visiting Nurse possibly tomorrow pending insurance coverage  Check pvr        Diane Louis MD

## 2023-06-26 VITALS
HEART RATE: 75 BPM | OXYGEN SATURATION: 97 % | SYSTOLIC BLOOD PRESSURE: 119 MMHG | RESPIRATION RATE: 18 BRPM | TEMPERATURE: 98.9 F | HEIGHT: 69 IN | DIASTOLIC BLOOD PRESSURE: 64 MMHG | BODY MASS INDEX: 23.49 KG/M2 | WEIGHT: 158.6 LBS

## 2023-06-26 LAB
GLUCOSE SERPL-MCNC: 137 MG/DL (ref 65–140)
GLUCOSE SERPL-MCNC: 257 MG/DL (ref 65–140)
GLUCOSE SERPL-MCNC: 87 MG/DL (ref 65–140)

## 2023-06-26 PROCEDURE — 99239 HOSP IP/OBS DSCHRG MGMT >30: CPT | Performed by: STUDENT IN AN ORGANIZED HEALTH CARE EDUCATION/TRAINING PROGRAM

## 2023-06-26 PROCEDURE — 82948 REAGENT STRIP/BLOOD GLUCOSE: CPT

## 2023-06-26 RX ORDER — OXYCODONE HYDROCHLORIDE 5 MG/1
5 TABLET ORAL EVERY 8 HOURS PRN
Qty: 10 TABLET | Refills: 0 | Status: SHIPPED | OUTPATIENT
Start: 2023-06-26 | End: 2023-07-06

## 2023-06-26 RX ADMIN — INSULIN LISPRO 2 UNITS: 100 INJECTION, SOLUTION INTRAVENOUS; SUBCUTANEOUS at 11:36

## 2023-06-26 RX ADMIN — TAMSULOSIN HYDROCHLORIDE 0.4 MG: 0.4 CAPSULE ORAL at 16:19

## 2023-06-26 RX ADMIN — ATORVASTATIN CALCIUM 20 MG: 20 TABLET, FILM COATED ORAL at 16:19

## 2023-06-26 RX ADMIN — SODIUM CHLORIDE 75 ML/HR: 0.9 INJECTION, SOLUTION INTRAVENOUS at 08:56

## 2023-06-26 NOTE — QUICK NOTE
Tiger text message from  visiting nurse orders may need to be finalized with written documentation suggest they fax to my office will confirm visiting nurse arrangements until patient and family can make an appointment with [facility] for office cystoscopy prostate ultrasound and biopsy patient to be discharged today can follow-up in my office if need to home with penile catheter bilateral nephrostomy tubes

## 2023-06-26 NOTE — DISCHARGE SUMMARY
Alisha U  68   Discharge- Tres Snowball 1950, 81 y o  male MRN: [id number]  Unit/Bed#: [address] [place] [address] Encounter: [id number]  Primary Care Provider: No primary care provider on file  Date and time admitted to hospital: 6/21/2023  8:03 PM    Other hydronephrosis  Assessment & Plan  Secondary to urinary retention, suprapubic pain, hematuria    · Elevated serum creatinine POA; unknown baseline  · Urology outpatient earlier in the day after having a highly elevated PSA in May  · CT A/P without contrast: Severe bilateral hydronephrosis, worse on the right with suggestion of mass in the urinary bladder which is difficult to separate from the prostate gland  Extraprostatic extension is noted  The presence of a bladder mass needs to be excluded  Nonspecific right lower lobe nodule    In view of patient's history of malignancy attention is needed on follow-up exam   · ER staff discussed the case with urology on-call who reported patient will need admission to medicine with urology as well as IR consultations; will need bilateral nephrostomy tubes to be placed by IR as well as urology consultation for likely cystoscopy  · UA: With evidence of pyuria, hematuria  · Cazares catheter placed in the ER  · Urology recs:  · S/p 6/22 IR for s/p bilateral nephrostomy tube placement   · DC IV ceftriaxone for possible UTI  · Urinary retention most likely related to prostate obstruction and/or diabetic hypocontractile bladder  · Elevated , likely prostate cancer advancing causing retention bilateral hydro  · discharged home with bilateral nephrostomy tubes and indwelling Cazares in light of retention  · F/U [facility]'s urology as outpatient to schedule cystoscopy and prostate biopsy, brother informed  · Urology recs for OP voiding trial  · discharge with recs for VNA      * Hematuria  Assessment & Plan  Slowly improving    · See hydronephrosis section above    Elevated serum creatinine  Assessment & "Plan  Creatinine trending down y, was 2 17 POA    · No prior labs  · Likely secondary to severe bilateral hydronephrosis as well as underlying type 2 diabetes with unclear baseline creatinine  · See treatment above in hydronephrosis as well as hematuria sections    Hypertension  Assessment & Plan  · BP stable  · Hold PTA lisinopril while admitted with elevated creatinine    Elevated PSA  Assessment & Plan  PSA highly elevated at 145 recently in May    · Underwent nuclear medicine bone scan on June 20 with following impression: \" No focal tracer activity characteristic of osseous metastatic disease  Note is made of indeterminate focal tracer activity involving the right, likely T11 vertebral artery of uncertain clinical significance but favored to be degenerative in nature, see discussion above  Tracer activity within a significantly distended urinary bladder limits evaluation of the central pelvis/sacrum  · See hematuria section above    Type 2 diabetes mellitus, with long-term current use of insulin [facility])  Assessment & Plan  Lab Results   Component Value Date    HGBA1C 7 4 (H) 05/24/2023       Recent Labs     06/23/23  1604 06/23/23  2114 06/24/23  0725 06/24/23  1105   POCGLU 75 188* 80 198*       Blood Sugar Average: Last 72 hrs:  · (P) 453 [id number] PTA Levemir 40 units daily as well as Jardiance, Invokana, glimepiride  · glargine 20 units nightly as well as sliding scale insulin  · Restart on discharge hold PTA oral hypoglycemic agents    Medical Problems     Resolved Problems  Date Reviewed: 6/26/2023   None       Discharging Physician / Practitioner: Tammy Lambert MD  PCP: No primary care provider on file    Admission Date:   Admission Orders (From admission, onward)     Ordered        06/21/23 2327  INPATIENT ADMISSION  Once                      Discharge Date: 06/26/23      Consultations During Hospital Stay:  · Urology, IR    Procedures Performed:   · 6/22/23: IR for s/p bilateral nephrostomy " tube placement     Significant Findings / Test Results:   · N/A    Incidental Findings:   · N/A    Test Results Pending at Discharge (will require follow up):   · N/A     Outpatient Tests Requested:  · N/A    Complications:  N/A    Reason for Admission: hematuria, suprapubic pain as well as urinary retention    Hospital Course:   Nisreen Oakes is a 81 y o  male patient who originally presented to the hospital on 6/21/2023 due to hematuria, suprapubic pain as well as urinary retention  Upon arrival patient noted to have urinary retention and Cazares catheter was placed  Patient CT demonstrated severe bilateral hydronephrosis, urology team consulted  IR consulted for bilateral nephrostomy tube placement  Patient seen by on-call urologist and will follow-up with OP [facility] urology for cystoscopy and prostate biopsy  Patient had gross had hematuria in Cazares and symptomatic suprapubic tenderness will follow-up with urology as aforementioned  Patient completed course of empiric antibiotics for underlying UTI  Patient had elevated PSA and will follow-up with outpatient urology to rule out underlying prostate malignancy  Patient had elevated creatinine likely secondary to bilateral hydronephrosis, trending down, which will continue to follow-up outpatient  Patient has type 2 diabetes mellitus, will continue home PTA Levemir, Jardiance, Invokana and follow-up with PCP for monitoring  Patient has history of hypertension, currently controlled on patient will restart home lisinopril continue on renal function  Please see above list of diagnoses and related plan for additional information  Condition at Discharge: guarded    Discharge Day Visit / Exam:   Subjective patient seen and examined at bedside today, reported some suprapubic pain  Patient and family made aware of discharge planning, informed of [facility] follow-up and contacted patient's brother to schedule      Vitals: Blood Pressure: 121/60 (06/26/23 0733)  Pulse: "68 (06/26/23 0733)  Temperature: 98 4 °F (36 9 °C) (06/26/23 0733)  Temp Source: Oral (06/26/23 0733)  Respirations: 16 (06/26/23 0733)  Height: 5' 9\" (175 3 cm) (06/22/23 0020)  Weight - Scale: 71 9 kg (158 lb 9 6 oz) (06/22/23 0020)  SpO2: 98 % (06/26/23 0733)     Exam:   Physical Exam  Vitals and nursing note reviewed  Constitutional:       General: He is not in acute distress  Appearance: He is well-developed  HENT:      Head: Normocephalic and atraumatic  Eyes:      Conjunctiva/sclera: Conjunctivae normal    Cardiovascular:      Rate and Rhythm: Normal rate and regular rhythm  Heart sounds: No murmur heard  Pulmonary:      Effort: Pulmonary effort is normal  No respiratory distress  Breath sounds: Normal breath sounds  No wheezing or rales  Abdominal:      Palpations: Abdomen is soft  Tenderness: There is abdominal tenderness in the suprapubic area  There is no guarding  Comments: Nephrostomy tubes in place, yellow urine in bag   Musculoskeletal:         General: No swelling  Cervical back: Neck supple  Right lower leg: No edema  Left lower leg: No edema  Skin:     General: Skin is warm and dry  Capillary Refill: Capillary refill takes less than 2 seconds  Neurological:      Mental Status: He is alert and oriented to person, place, and time  Psychiatric:         Mood and Affect: Mood normal          Behavior: Behavior normal          Discussion with Family: Updated  (sister and brother) via phone  Discharge instructions/Information to patient and family:   See after visit summary for information provided to patient and family  Provisions for Follow-Up Care:  See after visit summary for information related to follow-up care and any pertinent home health orders         Disposition:   Home with VNA Services (Reminder: Complete face to face encounter)    Planned Readmission: No     Discharge Statement:  I spent 45 minutes discharging " the patient  This time was spent on the day of discharge  I had direct contact with the patient on the day of discharge  Greater than 50% of the total time was spent examining patient, answering all patient questions, arranging and discussing plan of care with patient as well as directly providing post-discharge instructions  Additional time then spent on discharge activities  Discharge Medications:  See after visit summary for reconciled discharge medications provided to patient and/or family        **Please Note: This note may have been constructed using a voice recognition system**

## 2023-06-26 NOTE — CASE MANAGEMENT
Case Management Progress Note    Patient name Raymond Gracia  Location [facility] [address] MRN [id number]  : 1950 Date 2023       LOS (days): 5  Geometric Mean LOS (GMLOS) (days): 3 30  Days to GMLOS:-1 4        OBJECTIVE:    Current admission status: Inpatient  Preferred Pharmacy:   [facility], [address] [address]  Phone: [phone] Fax: [phone]    [address], [place]  Phone: [phone] Fax: [phone]    Primary Care Provider: No primary care provider on file  Primary Insurance: [facility]  Secondary Insurance:     PROGRESS NOTE:    SW spoke with Kimmy Steel at [place] [address] Urology  Explained that [employer] will need verbal home care orders from one of pt's primary doctors in community before services can begin  Kimmy Steel offered to send priority message to physician to request orders be called to agency tomorrow morning  Per Nelsy at [employer] they would be able to initiate care once appropriate home care orders are received  Updated Dr Carlos Guevara and Dr Alfie Paniagua

## 2023-06-26 NOTE — CASE MANAGEMENT
Case Management Progress Note    Patient name Kari Whitaker  Location 18 [facility] [address] MRN [id number]  : 1950 Date 2023       LOS (days): 5  Geometric Mean LOS (GMLOS) (days): 3 30  Days to GMLOS:-1 4        OBJECTIVE:    Current admission status: Inpatient  Preferred Pharmacy:   [facility] , [address] [address]  Phone: [phone] Fax: [phone]    [address], [place]  Phone: [phone] Fax: [phone]    Primary Care Provider: No primary care provider on file  Primary Insurance: [facility]  Secondary Insurance:     PROGRESS NOTE:    SW following to assist with DCP  Home with home care services is recommended by physicians  [facility] Nurse is considering pt for services however cannot commit to accepting case without PCP to sign orders  SW provided agency with phone number for [facility] to explore willingness to write home care order  Updated Dr Kathe Hoang and Dr Darleen Amin on above

## 2023-06-26 NOTE — PLAN OF CARE
Problem: PAIN - ADULT  Goal: Verbalizes/displays adequate comfort level or baseline comfort level  Description: Interventions:  - Encourage patient to monitor pain and request assistance  - Assess pain using appropriate pain scale  - Administer analgesics based on type and severity of pain and evaluate response  - Implement non-pharmacological measures as appropriate and evaluate response  - Consider cultural and social influences on pain and pain management  - Notify physician/advanced practitioner if interventions unsuccessful or patient reports new pain  Outcome: Progressing     Problem: INFECTION - ADULT  Goal: Absence or prevention of progression during hospitalization  Description: INTERVENTIONS:  - Assess and monitor for signs and symptoms of infection  - Monitor lab/diagnostic results  - Monitor all insertion sites, i e  indwelling lines, tubes, and drains  - Monitor endotracheal if appropriate and nasal secretions for changes in amount and color  - [place] appropriate cooling/warming therapies per order  - Administer medications as ordered  - Instruct and encourage patient and family to use good hand hygiene technique  - Identify and instruct in appropriate isolation precautions for identified infection/condition  Outcome: Progressing     Problem: SAFETY ADULT  Goal: Patient will remain free of falls  Description: INTERVENTIONS:  - Educate patient/family on patient safety including physical limitations  - Instruct patient to call for assistance with activity   - Consult OT/PT to assist with strengthening/mobility   - Keep Call bell within reach  - Keep bed low and locked with side rails adjusted as appropriate  - Keep care items and personal belongings within reach  - Initiate and maintain comfort rounds  - Make Fall Risk Sign visible to staff  - Offer Toileting every 2 Hours, in advance of need  - Initiate/Maintain bed alarm  - Apply yellow socks and bracelet for high fall risk patients  - Consider moving patient to room near nurses station  Outcome: Progressing     Problem: GENITOURINARY - ADULT  Goal: Maintains or returns to baseline urinary function  Description: INTERVENTIONS:  - Assess urinary function  - Encourage oral fluids to ensure adequate hydration if ordered  - Administer IV fluids as ordered to ensure adequate hydration  - Administer ordered medications as needed  - Offer frequent toileting  - Follow urinary retention protocol if ordered  Outcome: Progressing  Goal: Absence of urinary retention  Description: INTERVENTIONS:  - Assess patient’s ability to void and empty bladder  - Monitor I/O  - Bladder scan as needed  - Discuss with physician/AP medications to alleviate retention as needed  - Discuss catheterization for long term situations as appropriate  Outcome: Progressing  Goal: Urinary catheter remains patent  Description: INTERVENTIONS:  - Assess patency of urinary catheter  - If patient has a chronic ceballos, consider changing catheter if non-functioning  - Follow guidelines for intermittent irrigation of non-functioning urinary catheter  Outcome: Progressing     Problem: Nutrition/Hydration-ADULT  Goal: Nutrient/Hydration intake appropriate for improving, restoring or maintaining nutritional needs  Description: Monitor and assess patient's nutrition/hydration status for malnutrition  Collaborate with interdisciplinary team and initiate plan and interventions as ordered  Monitor patient's weight and dietary intake as ordered or per policy  Utilize nutrition screening tool and intervene as necessary  Determine patient's food preferences and provide high-protein, high-caloric foods as appropriate       INTERVENTIONS:  - Monitor oral intake, urinary output, labs, and treatment plans  - Assess nutrition and hydration status and recommend course of action  - Evaluate amount of meals eaten  - Assist patient with eating if necessary   - Allow adequate time for meals  - Recommend/ encourage appropriate diets, oral nutritional supplements, and vitamin/mineral supplements  - Order, calculate, and assess calorie counts as needed  - Recommend, monitor, and adjust tube feedings and TPN/PPN based on assessed needs  - Assess need for intravenous fluids  - Provide specific nutrition/hydration education as appropriate  - Include patient/family/caregiver in decisions related to nutrition  Outcome: Progressing     Problem: MOBILITY - ADULT  Goal: Maintain or return to baseline ADL function  Description: INTERVENTIONS:  -  Assess patient's ability to carry out ADLs; assess patient's baseline for ADL function and identify physical deficits which impact ability to perform ADLs (bathing, care of mouth/teeth, toileting, grooming, dressing, etc )  - Assess/evaluate cause of self-care deficits   - Assess range of motion  - Assess patient's mobility; develop plan if impaired  - Assess patient's need for assistive devices and provide as appropriate  - Encourage maximum independence but intervene and supervise when necessary  - Involve family in performance of ADLs  - Assess for home care needs following discharge   - Consider OT consult to assist with ADL evaluation and planning for discharge  - Provide patient education as appropriate  Outcome: Progressing  Goal: Maintains/Returns to pre admission functional level  Description: INTERVENTIONS:  - Perform BMAT or MOVE assessment daily    - Set and communicate daily mobility goal to care team and patient/family/caregiver     - Collaborate with rehabilitation services on mobility goals if consulted  - Out of bed for toileting  - Record patient progress and toleration of activity level   Outcome: Progressing

## 2023-06-26 NOTE — PLAN OF CARE
Problem: PAIN - ADULT  Goal: Verbalizes/displays adequate comfort level or baseline comfort level  Description: Interventions:  - Encourage patient to monitor pain and request assistance  - Assess pain using appropriate pain scale  - Administer analgesics based on type and severity of pain and evaluate response  - Implement non-pharmacological measures as appropriate and evaluate response  - Consider cultural and social influences on pain and pain management  - Notify physician/advanced practitioner if interventions unsuccessful or patient reports new pain  6/26/2023 1658 by Leonel Stuart RN  Outcome: Adequate for Discharge  6/26/2023 0935 by Leonel Stuart RN  Outcome: Progressing     Problem: INFECTION - ADULT  Goal: Absence or prevention of progression during hospitalization  Description: INTERVENTIONS:  - Assess and monitor for signs and symptoms of infection  - Monitor lab/diagnostic results  - Monitor all insertion sites, i e  indwelling lines, tubes, and drains  - Monitor endotracheal if appropriate and nasal secretions for changes in amount and color  - [place] appropriate cooling/warming therapies per order  - Administer medications as ordered  - Instruct and encourage patient and family to use good hand hygiene technique  - Identify and instruct in appropriate isolation precautions for identified infection/condition  6/26/2023 1658 by Leonel Stuart RN  Outcome: Adequate for Discharge  6/26/2023 0935 by Leonel Stuart RN  Outcome: Progressing     Problem: SAFETY ADULT  Goal: Patient will remain free of falls  Description: INTERVENTIONS:  - Educate patient/family on patient safety including physical limitations  - Instruct patient to call for assistance with activity   - Consult OT/PT to assist with strengthening/mobility   - Keep Call bell within reach  - Keep bed low and locked with side rails adjusted as appropriate  - Keep care items and personal belongings within reach  - Initiate and maintain comfort rounds  - Make Fall Risk Sign visible to staff  - Offer Toileting every  Hours, in advance of need  - Initiate/Maintain alarm  - Obtain necessary fall risk management equipment:   - Apply yellow socks and bracelet for high fall risk patients  - Consider moving patient to room near nurses station  6/26/2023 1658 by Delmy Souza RN  Outcome: Adequate for Discharge  6/26/2023 0935 by Delmy Souza RN  Outcome: Progressing     Problem: GENITOURINARY - ADULT  Goal: Maintains or returns to baseline urinary function  Description: INTERVENTIONS:  - Assess urinary function  - Encourage oral fluids to ensure adequate hydration if ordered  - Administer IV fluids as ordered to ensure adequate hydration  - Administer ordered medications as needed  - Offer frequent toileting  - Follow urinary retention protocol if ordered  6/26/2023 1658 by Delmy Souza RN  Outcome: Adequate for Discharge  6/26/2023 0935 by Delmy Souza RN  Outcome: Progressing  Goal: Absence of urinary retention  Description: INTERVENTIONS:  - Assess patient’s ability to void and empty bladder  - Monitor I/O  - Bladder scan as needed  - Discuss with physician/AP medications to alleviate retention as needed  - Discuss catheterization for long term situations as appropriate  6/26/2023 1658 by Delmy Souza RN  Outcome: Adequate for Discharge  6/26/2023 0935 by Delmy Souza RN  Outcome: Progressing  Goal: Urinary catheter remains patent  Description: INTERVENTIONS:  - Assess patency of urinary catheter  - If patient has a chronic ceballos, consider changing catheter if non-functioning  - Follow guidelines for intermittent irrigation of non-functioning urinary catheter  6/26/2023 1658 by Delmy Souza RN  Outcome: Adequate for Discharge  6/26/2023 0935 by Delmy Souza RN  Outcome: Progressing     Problem: Nutrition/Hydration-ADULT  Goal: Nutrient/Hydration intake appropriate for improving, restoring or maintaining nutritional needs  Description: Monitor and assess patient's nutrition/hydration status for malnutrition  Collaborate with interdisciplinary team and initiate plan and interventions as ordered  Monitor patient's weight and dietary intake as ordered or per policy  Utilize nutrition screening tool and intervene as necessary  Determine patient's food preferences and provide high-protein, high-caloric foods as appropriate       INTERVENTIONS:  - Monitor oral intake, urinary output, labs, and treatment plans  - Assess nutrition and hydration status and recommend course of action  - Evaluate amount of meals eaten  - Assist patient with eating if necessary   - Allow adequate time for meals  - Recommend/ encourage appropriate diets, oral nutritional supplements, and vitamin/mineral supplements  - Order, calculate, and assess calorie counts as needed  - Recommend, monitor, and adjust tube feedings and TPN/PPN based on assessed needs  - Assess need for intravenous fluids  - Provide specific nutrition/hydration education as appropriate  - Include patient/family/caregiver in decisions related to nutrition  6/26/2023 1658 by Leonel Stuart RN  Outcome: Adequate for Discharge  6/26/2023 0935 by Leonel Stuart RN  Outcome: Progressing     Problem: MOBILITY - ADULT  Goal: Maintain or return to baseline ADL function  Description: INTERVENTIONS:  -  Assess patient's ability to carry out ADLs; assess patient's baseline for ADL function and identify physical deficits which impact ability to perform ADLs (bathing, care of mouth/teeth, toileting, grooming, dressing, etc )  - Assess/evaluate cause of self-care deficits   - Assess range of motion  - Assess patient's mobility; develop plan if impaired  - Assess patient's need for assistive devices and provide as appropriate  - Encourage maximum independence but intervene and supervise when necessary  - Involve family in performance of ADLs  - Assess for home care needs following discharge   - Consider OT consult to assist with ADL evaluation and planning for discharge  - Provide patient education as appropriate  6/26/2023 1658 by Delmy Delgado RN  Outcome: Adequate for Discharge  6/26/2023 0935 by Delmy Delgado RN  Outcome: Progressing  Goal: Maintains/Returns to pre admission functional level  Description: INTERVENTIONS:  - Perform BMAT or MOVE assessment daily    - Set and communicate daily mobility goal to care team and patient/family/caregiver  - Collaborate with rehabilitation services on mobility goals if consulted  - Perform Range of Motion  times a day  - Reposition patient every  hours    - Dangle patient  times a day  - Stand patient  times a day  - Ambulate patient  times a day  - Out of bed to chair  times a day   - Out of bed for meals times a day  - Out of bed for toileting  - Record patient progress and toleration of activity level   6/26/2023 1658 by Delmy Delgado RN  Outcome: Adequate for Discharge  6/26/2023 0935 by Delmy Delgado RN  Outcome: Progressing

## 2023-06-26 NOTE — TELEPHONE ENCOUNTER
Spoke with patients son and confirmed 6/28  appointment at [place]  Did go over enema instructions for prior to appointment  Patients son was understanding and thankful for call  Office location and address were provided

## 2023-06-26 NOTE — PLAN OF CARE
Problem: PAIN - ADULT  Goal: Verbalizes/displays adequate comfort level or baseline comfort level  Description: Interventions:  - Encourage patient to monitor pain and request assistance  - Assess pain using appropriate pain scale  - Administer analgesics based on type and severity of pain and evaluate response  - Implement non-pharmacological measures as appropriate and evaluate response  - Consider cultural and social influences on pain and pain management  - Notify physician/advanced practitioner if interventions unsuccessful or patient reports new pain  Outcome: Progressing     Problem: INFECTION - ADULT  Goal: Absence or prevention of progression during hospitalization  Description: INTERVENTIONS:  - Assess and monitor for signs and symptoms of infection  - Monitor lab/diagnostic results  - Monitor all insertion sites, i e  indwelling lines, tubes, and drains  - Monitor endotracheal if appropriate and nasal secretions for changes in amount and color  - [place] appropriate cooling/warming therapies per order  - Administer medications as ordered  - Instruct and encourage patient and family to use good hand hygiene technique  - Identify and instruct in appropriate isolation precautions for identified infection/condition  Outcome: Progressing     Problem: SAFETY ADULT  Goal: Patient will remain free of falls  Description: INTERVENTIONS:  - Educate patient/family on patient safety including physical limitations  - Instruct patient to call for assistance with activity   - Consult OT/PT to assist with strengthening/mobility   - Keep Call bell within reach  - Keep bed low and locked with side rails adjusted as appropriate  - Keep care items and personal belongings within reach  - Initiate and maintain comfort rounds  - Make Fall Risk Sign visible to staff  - Offer Toileting every x Hours, in advance of need  - Initiate/Maintain xalarm  - Obtain necessary fall risk management equipment: x  - Apply yellow socks and bracelet for high fall risk patients  - Consider moving patient to room near nurses station  Outcome: Progressing     Problem: GENITOURINARY - ADULT  Goal: Maintains or returns to baseline urinary function  Description: INTERVENTIONS:  - Assess urinary function  - Encourage oral fluids to ensure adequate hydration if ordered  - Administer IV fluids as ordered to ensure adequate hydration  - Administer ordered medications as needed  - Offer frequent toileting  - Follow urinary retention protocol if ordered  Outcome: Progressing  Goal: Absence of urinary retention  Description: INTERVENTIONS:  - Assess patient’s ability to void and empty bladder  - Monitor I/O  - Bladder scan as needed  - Discuss with physician/AP medications to alleviate retention as needed  - Discuss catheterization for long term situations as appropriate  Outcome: Progressing  Goal: Urinary catheter remains patent  Description: INTERVENTIONS:  - Assess patency of urinary catheter  - If patient has a chronic ceballos, consider changing catheter if non-functioning  - Follow guidelines for intermittent irrigation of non-functioning urinary catheter  Outcome: Progressing     Problem: Nutrition/Hydration-ADULT  Goal: Nutrient/Hydration intake appropriate for improving, restoring or maintaining nutritional needs  Description: Monitor and assess patient's nutrition/hydration status for malnutrition  Collaborate with interdisciplinary team and initiate plan and interventions as ordered  Monitor patient's weight and dietary intake as ordered or per policy  Utilize nutrition screening tool and intervene as necessary  Determine patient's food preferences and provide high-protein, high-caloric foods as appropriate       INTERVENTIONS:  - Monitor oral intake, urinary output, labs, and treatment plans  - Assess nutrition and hydration status and recommend course of action  - Evaluate amount of meals eaten  - Assist patient with eating if necessary   - Allow adequate time for meals  - Recommend/ encourage appropriate diets, oral nutritional supplements, and vitamin/mineral supplements  - Order, calculate, and assess calorie counts as needed  - Recommend, monitor, and adjust tube feedings and TPN/PPN based on assessed needs  - Assess need for intravenous fluids  - Provide specific nutrition/hydration education as appropriate  - Include patient/family/caregiver in decisions related to nutrition  Outcome: Progressing     Problem: MOBILITY - ADULT  Goal: Maintain or return to baseline ADL function  Description: INTERVENTIONS:  -  Assess patient's ability to carry out ADLs; assess patient's baseline for ADL function and identify physical deficits which impact ability to perform ADLs (bathing, care of mouth/teeth, toileting, grooming, dressing, etc )  - Assess/evaluate cause of self-care deficits   - Assess range of motion  - Assess patient's mobility; develop plan if impaired  - Assess patient's need for assistive devices and provide as appropriate  - Encourage maximum independence but intervene and supervise when necessary  - Involve family in performance of ADLs  - Assess for home care needs following discharge   - Consider OT consult to assist with ADL evaluation and planning for discharge  - Provide patient education as appropriate  Outcome: Progressing  Goal: Maintains/Returns to pre admission functional level  Description: INTERVENTIONS:  - Perform BMAT or MOVE assessment daily    - Set and communicate daily mobility goal to care team and patient/family/caregiver  - Collaborate with rehabilitation services on mobility goals if consulted  - Perform Range of Motion x times a day  - Reposition patient every x hours    - Dangle patient x times a day  - Stand patient x times a day  - Ambulate patient x times a day  - Out of bed to chair x times a day   - Out of bed for meals x times a day  - Out of bed for toileting  - Record patient progress and toleration of activity level   Outcome: Progressing

## 2023-06-26 NOTE — CASE MANAGEMENT
Case Management Discharge Planning Note    Patient name Zeus Summers  Location 18 [facility] 207/2 [address] 207 MRN [id number]  : 1950 Date 2023       Current Admission Date: 2023  Current Admission Diagnosis:Hematuria   Patient Active Problem List    Diagnosis Date Noted   • Type 2 diabetes mellitus, with long-term current use of insulin ([address] ) 2023   • Elevated PSA 2023   • Other hydronephrosis 2023   • Elevated serum creatinine 2023   • Hypertension 2023   • Hematuria 2023      LOS (days): 5  Geometric Mean LOS (GMLOS) (days): 3 30  Days to GMLOS:-1 2     OBJECTIVE:  Risk of Unplanned Readmission Score: 9 09     Current admission status: Inpatient   Preferred Pharmacy:   [facility] [address] [address] [id number]  Phone: [phone] Fax: [phone]  - Elizabeth Navarro [address]  Phone: [phone] Fax: [phone]    Primary Care Provider: No primary care provider on file  Primary Insurance: [facility]  Secondary Insurance:     DISCHARGE DETAILS:    Discharge planning discussed with[de-identified] Son, Cezar Harp,  and wife, Alex Hunt of Choice: Yes  Comments - Freedom of Choice: SW following to assist with planning  Case discussed with Dr Cheryl Moctezuma and discharge is anticipated today  Home care referrals are pending  SW spoke with wife (in person) and son (over phone) to review plans and IMM  At this time case is being considered by [facility]  Wife and son aware of family responsibilities regarding ceballos and nephrostomy tube care and home care agency role in supporting them  Son said he is planning on calling pt's urologist after our call to schedule follow up appointment  No other discharge needs expressed by family    CM contacted family/caregiver?: Yes  Were Treatment Team discharge recommendations reviewed with patient/caregiver?: Yes  Did patient/caregiver verbalize understanding of patient care needs?: Yes    Contacts  Patient Contacts: Sam Ny  Relationship to Patient[de-identified] Family (son/wife)  Contact Method: Phone, In Person  Reason/Outcome: Discharge Planning, Continuity of [address]         Is the patient interested in [facility] at discharge?: Yes  [address] requested[de-identified] [address] Name[de-identified] [facility] Provider[de-identified] PCP  [address] Needed[de-identified] Urinary Incontinence Catheter Management, Other (comment) (bilateral nephrostomy tube management)  Homebound Criteria Met[de-identified] Requires the Assistance of Another Person for Safe Ambulation or to Leave the Home  Supporting Clincal Findings[de-identified] Fatigues Easliy in [employer]    DME Referral Provided  Referral made for DME?: No    Other Referral/Resources/Interventions Provided:  Interventions: [facility]  Referral Comments: Home care referral pending with [employer]  Clinical update provided to agency via Aidin  Treatment Team Recommendation: Home with [address]  Discharge Destination Plan[de-identified] Home with Elizabeth at Discharge : Family    IMM Given (Date):: 06/26/23  IMM Given to[de-identified] Family (IMM reviewed with pt's son and wife  Both verbalized understanding and agree with discharge determination  Wife signed IMM and copy given    Copy also placed in scan bin for chart )

## 2023-06-26 NOTE — TELEPHONE ENCOUNTER
Pt under care of Evy Benitez    Last Seen: 6/21/23    Pt calling due to: Pt son calling and stated pt is hospitalized and will be discharged today and inquiring about setup for biopsy due to recent prostate cancer diagnosis         Pt can be reached at: Evy Barry

## 2023-06-26 NOTE — TELEPHONE ENCOUNTER
Asif the  from [facility] calling to request orders for home care  She stated the patient is going home with a ceballos and bilateral nephrostomy tubes and will need home care services for care but  [address] [place] [address] visiting nurses will not sign on without orders  She is requesting to contact Nelsy from [address] [place] [address] at [phone] to set up services    Alternative number for [address] [place] [address] visiting nurses is [phone] (main)    Once orders are signed and services are set up, Asif is requesting a call back at [phone]

## 2023-06-26 NOTE — PROGRESS NOTES
"Progress Note - Urology      Patient: Kari Whitaker   : 1950 Sex: male   MRN: [id number]     CSN: [id number]  Unit/Bed#: [address] [place] [address]     SUBJECTIVE:   Patient seen on morning rounds  Long discussion again with social service and family members to be discharged home and seen by Tunde Salazar urology in the next upcoming week for flex cystoscopy prostate ultrasound and biopsy in light of elevated PSA      Objective   Vitals: /60 (BP Location: Right arm)   Pulse 68   Temp 98 4 °F (36 9 °C) (Oral)   Resp 16   Ht 5' 9\" (1 753 m)   Wt 71 9 kg (158 lb 9 6 oz)   SpO2 98%   BMI 23 42 kg/m²     I/O last 24 hours: In: 2884 [I V :1775; Other:20]  Out: 5019 [Urine:3700]      Physical Exam:   General Alert awake   Normocephalic atraumatic PERRLA  Lungs clear bilaterally  Cardiac normal S1 normal S2  Abdomen soft, right and left nephrostomy tube draining clear urine  Cazares catheter draining hematuric urine  No abdominal pain  Rectal exam not done  Extremities no edema      Lab Results: CBC:   Lab Results   Component Value Date    WBC 8 82 2023    HGB 12 8 2023    HCT 38 3 2023    MCV 93 2023     2023    RBC 4 10 2023    MCH 31 2 2023    MCHC 33 4 2023    RDW 13 5 2023    MPV 9 0 2023    NRBC 0 2023     CMP:   Lab Results   Component Value Date     2023    CO2 26 2023    BUN 13 2023    CREATININE 1 43 (H) 2023    CALCIUM 8 3 (L) 2023    AST 12 (L) 2023    ALT 9 2023    ALKPHOS 64 2023    EGFR 49 2023     Urinalysis:   Lab Results   Component Value Date    COLORU Brenda 2023    CLARITYU Cloudy 2023    SPECGRAV 1 020 2023    PHUR 6 0 2023    LEUKOCYTESUR (A) 2023     Elevated glucose may cause decreased leukocyte values   See urine microscopic for UWBC result    NITRITE Positive (A) 2023    GLUCOSEU >=1000 (1%) (A) 2023    KETONESU Negative " 06/21/2023    BILIRUBINUR Small (A) 06/21/2023    BLOODU Large (A) 06/21/2023     Urine Culture:   Lab Results   Component Value Date    URINECX No Growth <1000 cfu/mL 06/21/2023     PSA:   Lab Results   Component Value Date     73 (H) 05/24/2023         Assessment/ Plan:  Elevated PSA  Bilateral hydronephrosis  Urinary retention  Status post emergency cystoscopy bilateral nephrostomy tube placement  Long discussion again with family members as well as social service to be discharged home with penile catheter due to 800 cc urinary retention when seen in the ER Friday  To schedule follow-up with [facility]'s urology this week  To schedule prostate ultrasound biopsy flex cystoscopy          Bryanna Islas MD

## 2023-06-26 NOTE — NURSING NOTE
Pt discharged from [address] [place] [address]  IV removed prior to discharge  Pt left with all their belongings  Pt left via wheelchair accompanied by PCA  Discharge instructions gone over with patient  No prescriptions written  Prescription sent over electronically to pt's pharmacy  All questions answered

## 2023-06-27 ENCOUNTER — TELEPHONE (OUTPATIENT)
Dept: CASE MANAGEMENT | Facility: HOSPITAL | Age: 73
End: 2023-06-27

## 2023-06-27 NOTE — TELEPHONE ENCOUNTER
Spoke with [facility], she does not need actual catheter or nephrostomy care orders  She is instead requesting the name of a provider who will sign orders and follow the care of the patient  Provided PA jhoan Yo   She will send orders to signed

## 2023-06-27 NOTE — UTILIZATION REVIEW
NOTIFICATION OF ADMISSION DISCHARGE   This is a Notification of Discharge from [address]  Please be advised that this patient has been discharge from our facility  Below you will find the admission and discharge date and time including the patient’s disposition  UTILIZATION REVIEW CONTACT:  Licha Estrada  Utilization   Network Utilization Review Department  Phone: [phone] x carefully listen to the prompts  All voicemails are confidential   Email: [email or web address]     ADMISSION INFORMATION  PRESENTATION DATE: 6/21/2023  8:03 PM  OBERVATION ADMISSION DATE:   INPATIENT ADMISSION DATE: 6/21/23 11:27 PM   DISCHARGE DATE: 6/26/2023  6:48 PM   DISPOSITION:Home with Home Health Care    IMPORTANT INFORMATION:  Send all requests for admission clinical reviews, approved or denied determinations and any other requests to dedicated fax number below belonging to the campus where the patient is receiving treatment   List of dedicated fax numbers:  [address] DENIALS (Administrative/Medical Necessity) [phone]   [address] (Maternity/NICU/Pediatrics) [phone]   [facility] [phone]   [facility] [phone]   [address] [phone]   220 [facility] [phone] 90 [facility] [phone]   [address] [place] [address] [phone]   [facility]  [phone]   4051 [facility] [phone]   412 [facility] 850 [facility] [phone]

## 2023-06-27 NOTE — PROGRESS NOTES
-- Patient: Guillermina Eduardo  -- MRN: [id number]  -- Aidin Request ID: [id number]  -- Level of care reserved: [address] [place] [address]  -- Partner Reserved: [address] [place] [address] [phone]  -- Clinical needs requested:  -- Geography searched: [phone]  -- Start of Service:  -- Request sent: 4:18pm EDT on 6/24/2023 by Adan Milligan  -- Partner reserved: 11:32am EDT on 6/27/2023 by Larry Castanon  -- Choice list shared: 11:01am EDT on 6/27/2023 by Larry Castanon Pt called and wants to reschedule date of his surgery again  It is now being changed from 10/15/18 to 12/21/18

## 2023-06-27 NOTE — TELEPHONE ENCOUNTER
Message received over Aidin from Terrance Skelton from [employer] that verbal home care orders were receive and agency will be contacting family to arrange start of care  [facility] will be reserved in [address]

## 2023-06-28 ENCOUNTER — PROCEDURE VISIT (OUTPATIENT)
Dept: UROLOGY | Facility: AMBULATORY SURGERY CENTER | Age: 73
End: 2023-06-28
Payer: COMMERCIAL

## 2023-06-28 VITALS
OXYGEN SATURATION: 95 % | SYSTOLIC BLOOD PRESSURE: 96 MMHG | HEART RATE: 65 BPM | BODY MASS INDEX: 23.04 KG/M2 | DIASTOLIC BLOOD PRESSURE: 78 MMHG | WEIGHT: 156 LBS

## 2023-06-28 DIAGNOSIS — R97.20 ELEVATED PSA: Primary | ICD-10-CM

## 2023-06-28 PROCEDURE — 55700 PR PROSTATE NEEDLE BIOPSY ANY APPROACH: CPT | Performed by: UROLOGY

## 2023-06-28 PROCEDURE — 76942 ECHO GUIDE FOR BIOPSY: CPT | Performed by: UROLOGY

## 2023-06-28 PROCEDURE — 52000 CYSTOURETHROSCOPY: CPT | Performed by: UROLOGY

## 2023-06-28 PROCEDURE — 96372 THER/PROPH/DIAG INJ SC/IM: CPT

## 2023-06-28 PROCEDURE — G0416 PROSTATE BIOPSY, ANY MTHD: HCPCS | Performed by: STUDENT IN AN ORGANIZED HEALTH CARE EDUCATION/TRAINING PROGRAM

## 2023-06-28 PROCEDURE — 88344 IMHCHEM/IMCYTCHM EA MLT ANTB: CPT | Performed by: STUDENT IN AN ORGANIZED HEALTH CARE EDUCATION/TRAINING PROGRAM

## 2023-06-28 PROCEDURE — 88342 IMHCHEM/IMCYTCHM 1ST ANTB: CPT | Performed by: STUDENT IN AN ORGANIZED HEALTH CARE EDUCATION/TRAINING PROGRAM

## 2023-06-28 RX ORDER — CEFTRIAXONE 1 G/1
1000 INJECTION, POWDER, FOR SOLUTION INTRAMUSCULAR; INTRAVENOUS ONCE
Status: COMPLETED | OUTPATIENT
Start: 2023-06-28 | End: 2023-06-28

## 2023-06-28 RX ADMIN — CEFTRIAXONE 1000 MG: 1 INJECTION, POWDER, FOR SOLUTION INTRAMUSCULAR; INTRAVENOUS at 14:24

## 2023-06-28 NOTE — PROGRESS NOTES
This patient had been initially seen by our [facility] team with concern for bladder outlet obstruction due to prostate cancer    He then went to the emergency department at [facility] and was managed by Dr Saravia Members  He required a Cazares catheter as well as bilateral nephrostomy tube placement after CT scan revealed bladder outlet obstruction and bilateral distal ureteral obstruction, likely from prostate cancer  He is seen today as first available appointment for cystoscopy and transrectal ultrasound-guided prostate biopsy  Rocephin IM was given 1 hour prior to procedure  On examination, his prostate is hard diffusely consistent with prostate cancer  Bilateral nephrostomy bags draining clear yellow urine  Mild hematuria via Cazares catheter  Cystoscopy     Date/Time 6/28/2023 2:00 PM     Performed by  Erik Pedersen MD   Authorized by Erik Pedersen MD         Procedure Details:  Procedure type: cystoscopy    Additional Procedure Details: His Cazares catheter was removed  Penis was prepped with chlorhexidine  2% lidocaine jelly was instilled per urethra  The 16 Algerian flexible cystoscope was placed  Urethra is normal   Prostate has inflammatory bullous change  Evaluation of the bladder reveals abnormal trigone with bullous changes throughout, consistent with possible prostate cancer  Ureteral orifices are not identifiable  The lateral walls and dome of the bladder are normal appearing  Patient became uncomfortable with bladder spasm  Penis was therefore reprepped and a 16 Western Ximena coudé catheter was placed without difficulty  Some urine was left in the bladder and it was plugged  TRUS prostate biopsy  Patient was then turned to the left lateral decubitus position  Viscous lidocaine jelly was instilled into the rectum  Transrectal ultrasonography was then performed  The prostate measured 25 grams  No obvious mass visible on ultrasound    5 cc of 2% lidocaine were then injected bilaterally between the junction of the base of the prostate and the seminal vesicles  Ultrasound guidance was utilized to place the needle into proper position for the administration of the local anesthetic  A standard 12 core biopsy was then performed  Overall the patient tolerated the procedure and there were no complications  Plan  Patient will need a follow-up appointment to discuss results further, preferably with Greenlandic-speaking provider  They understandably had many questions regarding their drainage tubes and long-term management as well as regarding potential cancer  I have also sent a task for preauthorization of Firmagon 240 mg which will likely be indicated at that time

## 2023-06-28 NOTE — LETTER
June 28, 2023       No Recipients    Patient: Suzi Cline   YOB: 1950   Date of Visit: 6/28/2023       Dear Dr Roslyn Bone Recipients: Thank you for referring Suzi Cline to me for evaluation  Below are my notes for this consultation  If you have questions, please do not hesitate to call me  I look forward to following your patient along with you  Sincerely,        Ortiz Bettencourt MD        CC:   No Recipients    Ortiz Bettencourt MD  6/28/2023  4:30 PM  Sign when Signing Visit  This patient had been initially seen by our [facility] team with concern for bladder outlet obstruction due to prostate cancer    He then went to the emergency department at [facility] and was managed by Dr Tonio Bae  He required a Cazares catheter as well as bilateral nephrostomy tube placement after CT scan revealed bladder outlet obstruction and bilateral distal ureteral obstruction, likely from prostate cancer  He is seen today as first available appointment for cystoscopy and transrectal ultrasound-guided prostate biopsy  Rocephin IM was given 1 hour prior to procedure  On examination, his prostate is hard diffusely consistent with prostate cancer  Bilateral nephrostomy bags draining clear yellow urine  Mild hematuria via Cazares catheter  Cystoscopy     Date/Time 6/28/2023 2:00 PM     Performed by  Ortiz Bettencourt MD   Authorized by Ortiz Bettencourt MD         Procedure Details:  Procedure type: cystoscopy    Additional Procedure Details: His Cazares catheter was removed  Penis was prepped with chlorhexidine  2% lidocaine jelly was instilled per urethra  The 16 Hungarian flexible cystoscope was placed  Urethra is normal   Prostate has inflammatory bullous change  Evaluation of the bladder reveals abnormal trigone with bullous changes throughout, consistent with possible prostate cancer  Ureteral orifices are not identifiable    The lateral walls and dome of the bladder are normal appearing  Patient became uncomfortable with bladder spasm  Penis was therefore reprepped and a 16 Western Ximena coudé catheter was placed without difficulty  Some urine was left in the bladder and it was plugged  TRUS prostate biopsy  Patient was then turned to the left lateral decubitus position  Viscous lidocaine jelly was instilled into the rectum  Transrectal ultrasonography was then performed  The prostate measured 25 grams  No obvious mass visible on ultrasound  5 cc of 2% lidocaine were then injected bilaterally between the junction of the base of the prostate and the seminal vesicles  Ultrasound guidance was utilized to place the needle into proper position for the administration of the local anesthetic  A standard 12 core biopsy was then performed  Overall the patient tolerated the procedure and there were no complications  Plan  Patient will need a follow-up appointment to discuss results further, preferably with Uzbek-speaking provider  They understandably had many questions regarding their drainage tubes and long-term management as well as regarding potential cancer  I have also sent a task for preauthorization of Firmagon 240 mg which will likely be indicated at that time

## 2023-06-28 NOTE — TELEPHONE ENCOUNTER
We saw Icelandic speaking pt today Ambar Rea for his Biopsy and could not find a date for the 2 week f/u  Please assist in finding a date for this pt

## 2023-06-28 NOTE — Clinical Note
Can we find a spot for this poor gentleman with DV (or FT) Ghanaian speaking family with apparent significant prostate cancer who will need management

## 2023-06-29 ENCOUNTER — TELEPHONE (OUTPATIENT)
Dept: UROLOGY | Facility: AMBULATORY SURGERY CENTER | Age: 73
End: 2023-06-29

## 2023-06-29 ENCOUNTER — TELEPHONE (OUTPATIENT)
Dept: UROLOGY | Facility: CLINIC | Age: 73
End: 2023-06-29

## 2023-06-29 PROBLEM — Z71.2 ENCOUNTER TO DISCUSS TEST RESULTS: Status: ACTIVE | Noted: 2023-06-29

## 2023-06-29 PROBLEM — Z76.89 ENCOUNTER FOR SUPPORT AND COORDINATION OF TRANSITION OF CARE: Status: ACTIVE | Noted: 2023-06-29

## 2023-06-29 NOTE — TELEPHONE ENCOUNTER
Please initiate approval for firmagon loading dose, to be followed 4 weeks later by lupron 6 months depot

## 2023-06-29 NOTE — TELEPHONE ENCOUNTER
Noted on Excel spreadsheet for DOS 7/5/23  FIRMAGON 240mg - [employer] Medicare Complete - APPROVED (Auth#: [id number]) and valid from 6/29/23 until 6/29/24  Office stock okay to use   *VLD 6/29/23

## 2023-06-29 NOTE — TELEPHONE ENCOUNTER
Patients visiting nurse Janene Rosales calling in stating patient is running out of Saline syringes and an order must be put in  Patient needs nephrostomy tubes flushed daily         Pharmacy:      [facility]/pharmacy #[id number] Mauro Keys, [address] [place] [address], [address] [place] [address]   Phone:  [phone]  Fax:  [phone]     CB: [phone]  Tracey Gerard

## 2023-06-29 NOTE — PROGRESS NOTES
Problem List Items Addressed This Visit        Genitourinary    Other hydronephrosis    Bilateral hydronephrosis     Ada Brooks and his family and I had a productive consultation today. 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 He is currently managed with bilateral nephrostomy tubes as well as a Cazares catheter. I recommend that his Cazares catheter be removed as this is causing him irritation and bleeding and the urine draining from his nephrostomy tubes is clear yellow. I have placed an order for interventional radiology to convert him to nephroureteral catheters if possible and eventually into internal double-J stents. We will see how he tolerates these and plan for intermittent double-J stent exchanges in the operating room         Relevant Medications    sodium chloride, PF, 0.9 %    Other Relevant Orders    Ambulatory Referral to Hematology / Oncology    Ambulatory Referral to Interventional Radiology    Prostate cancer [facility])     He tolerated his prostate biopsy well. He has been having bleeding with his Cazares catheter. He has scanty drainage from the urethral Cazares catheter and clear yellow urine from his nephrostomy tubes. I reviewed with him the meaning of his prostate biopsy showing grade group 5 disease and all cores representative of high risk prostate cancer invading into the bladder based on cystoscopic and imaging findings. I do not believe his disease to be surgically curable but I do recommend aggressive management with systemic antiandrogen therapy started today with Firmagon to be followed by Lupron as well as referral to medical oncology for consideration of advanced androgen deprivation therapy.   I have also placed a referral to radiation oncology given that he may be deemed to have some potential treatment benefit from local therapy, however this will be determined based on consultation with our radiation oncology colleagues. I spoke with him today about goals of care and his new diagnosis and the importance of consideration of how aggressive he wishes to be going forward. All questions and concerns answered and addressed today in [place]. He is status post loading dose of Firmagon today and will return in 4 weeks. At that time I also hope to be able to see him myself to see how he is doing. I do suspect that his pain and discomfort will improve with active treatment given his high PSA of 145 and bilateral hydronephrosis indicative of locally advanced prostate cancer         Relevant Medications    degarelix acetate (FIRMAGON) injection 240 mg    Other Relevant Orders    Ambulatory Referral to Radiation Oncology       Other    Elevated PSA - Primary     Due to prostate cancer         Relevant Orders    Ambulatory Referral to Hematology / Oncology    Ambulatory Referral to Interventional Radiology    Hematuria     Likely due to high risk prostate cancer. He is status post cystoscopy as well as bilateral nephrostomy tube placement. Encounter to discuss test results    Encounter for support and coordination of transition of care     All questions and concerns answered and addressed. I have reviewed all of his urologic care both related by him as well as that documented in the medical record. With regard to his voiding, if he is seen to have troubles urinating after systemic therapy I will evaluate him for a channel transurethral resection of prostate in the future                Discussion:      I had a productive consultation today with Jessika Heriberto and his family. We talked about his new diagnosis of high risk prostate cancer and the fact that it appears to be invading into the base of the bladder indicating that this is metastatic disease. His urine draining from his nephrostomy tubes is clear yellow.   He does wish to be aggressive in his management and I have referred him to both medical oncology and radiation oncology. The hope is to be able to convert him to nephroureteral catheters followed by internal ureteral stents at some point. All questions and concerns answered and addressed. His Cazares catheter is being removed today. He will keep track of his bladder symptoms. If he is seen to go into urinary retention a Cazares catheter can be replaced, 20 [place] coudé tip. Long-term, if he is in retention after conversion to internal ureteral stents and after systemic antiandrogen therapy we will move to proceed to transurethral resection of prostate for debulking of his urethral disease burden to promote voiding. I have spent a total time of 65 minutes on 07/05/23 in caring for this patient including Diagnostic results, Prognosis, Risks and benefits of tx options, Instructions for management, Patient and family education, Importance of tx compliance, Impressions, Counseling / Coordination of care, Documenting in the medical record, Reviewing / ordering tests, medicine, procedures  , Obtaining or reviewing history   and Communicating with other healthcare professionals . Portions of the above record have been created with voice recognition software. Occasional wrong word or "sound alike" substitution may have occurred due to the inherent limitations of voice recognition software. Read the chart carefully and recognize, using context, where substitution may have occurred.     Assessment and plan:       Please see problem oriented charting for the assessment plan of today's urological complaints      Monse Otero MD      Chief Complaint     As listed above      History of Present Illness     Gio Gu is a 81 y.o. man with PSA over 100 and bilateral hydronephrosis now s/p bilateral percutaneous drainage of the renal units and cystoscopy and prostate biopsy    Prostate biopsy results show extensive Grade group 5 prostate cancer    ECOG is 1    New complaints include hematuria, ongoing, also bladder spasms    We have ordered conversion to PCNU's and possible conversion to JJ stents in the future. The following portions of the patient's history were reviewed and updated as appropriate: allergies, current medications, past family history, past medical history, past social history, past surgical history and problem list.    Detailed Urologic History     - please refer to HPI    Review of Systems     Review of Systems   Constitutional: Negative. HENT: Negative. Eyes: Negative. Respiratory: Negative. Cardiovascular: Negative. Gastrointestinal: Negative. Endocrine: Negative. Genitourinary: Negative. As per HPI   Musculoskeletal: Negative. Skin: Negative. Allergic/Immunologic: Negative. Neurological: Negative. Hematological: Negative. Psychiatric/Behavioral: Negative. Allergies     No Known Allergies    Physical Exam     Physical Exam  Vitals reviewed. Constitutional:       General: He is not in acute distress. Appearance: Normal appearance. He is not ill-appearing, toxic-appearing or diaphoretic. HENT:      Head: Normocephalic and atraumatic. Eyes:      General: No scleral icterus. Right eye: No discharge. Left eye: No discharge. Cardiovascular:      Pulses: Normal pulses. Pulmonary:      Effort: Pulmonary effort is normal.   Abdominal:      General: There is no distension. Palpations: There is no mass. Musculoskeletal:         General: No swelling. Skin:     General: Skin is warm. Neurological:      General: No focal deficit present. Mental Status: He is alert and oriented to person, place, and time. Cranial Nerves: No cranial nerve deficit. Psychiatric:         Mood and Affect: Mood normal.         Behavior: Behavior normal.         Thought Content:  Thought content normal.         Judgment: Judgment normal.             Vital Signs  Vitals:    07/05/23 1551   BP: 126/80   BP Location: Right arm   Patient Position: Sitting   Cuff Size: Standard   Pulse: 75   Resp: 18   SpO2: 98%   Weight: 71.2 kg (157 lb)   Height: 5' 9" (1.753 m)         Current Medications       Current Outpatient Medications:   •  albuterol (ProAir HFA) 90 mcg/act inhaler, Inhale 2 puffs every 4 (four) hours as needed for wheezing or shortness of breath, Disp: 8.5 g, Rfl: 0  •  Empagliflozin (JARDIANCE) 10 MG TABS tablet, Take 10 mg by mouth every morning, Disp: , Rfl:   •  Invokana 100 MG, , Disp: , Rfl:   •  Levemir FlexPen 100 units/mL injection pen, , Disp: , Rfl:   •  lisinopril (ZESTRIL) 10 mg tablet, Take 10 mg by mouth daily, Disp: , Rfl:   •  Lovaza 1 g capsule, , Disp: , Rfl:   •  Multiple Vitamins-Minerals (Sentry) TABS, , Disp: , Rfl:   •  OneTouch Verio test strip, , Disp: , Rfl:   •  oxyCODONE (ROXICODONE) 5 immediate release tablet, Take 1 tablet (5 mg total) by mouth every 8 (eight) hours as needed for severe pain for up to 10 days Max Daily Amount: 15 mg, Disp: 10 tablet, Rfl: 0  •  rosuvastatin (CRESTOR) 10 MG tablet, Take 10 mg by mouth daily, Disp: , Rfl:   •  sodium chloride, PF, 0.9 %, 10 mL by Intracatheter route daily Intracatheter flushing daily.  May substitute prefilled syringe with normal saline 10 mL vials, 10 mL syringes, and 18 g blunt needles, Disp: 900 mL, Rfl: 3  •  sodium chloride, PF, 0.9 %, 10 mL by Intracatheter route daily, Disp: 300 mL, Rfl: 3  •  sulfamethoxazole-trimethoprim (BACTRIM DS) 800-160 mg per tablet, Take 1 tablet by mouth 2 (two) times a day, Disp: , Rfl:   •  tamsulosin (FLOMAX) 0.4 mg, Take 1 capsule (0.4 mg total) by mouth daily with dinner, Disp: 90 capsule, Rfl: 3  •  glimepiride (AMARYL) 4 mg tablet, , Disp: , Rfl:   •  Spacer/Aero-Holding Chambers (AEROCHAMBER MINI CHAMBER) BILLY, by Does not apply route see administration instructions (Patient not taking: Reported on 6/22/2023), Disp: 1 Device, Rfl: 0    Current Facility-Administered Medications:   •  degarelix acetate [facility]) injection 240 mg, 240 mg, Subcutaneous, Once, Mnose Otero MD      Active Problems     Patient Active Problem List   Diagnosis   • Type 2 diabetes mellitus, with long-term current use of insulin ([address])   • Elevated PSA   • Other hydronephrosis   • Elevated serum creatinine   • Hypertension   • Hematuria   • Encounter to discuss test results   • Encounter for support and coordination of transition of care   • Bilateral hydronephrosis   • Prostate cancer [facility])         Past Medical History     Past Medical History:   Diagnosis Date   • Diabetes mellitus ([address])    • High cholesterol    • Hypertension          Surgical History     Past Surgical History:   Procedure Laterality Date   • IR NEPHROSTOMY TUBE PLACEMENT  6/22/2023         Family History     Family History   Problem Relation Age of Onset   • No Known Problems Father          Social History     Social History     Social History     Tobacco Use   Smoking Status Former   Smokeless Tobacco Never         Pertinent Lab Values     Lab Results   Component Value Date    CREATININE 1.43 (H) 06/25/2023       Lab Results   Component Value Date    .73 (H) 05/24/2023               Pertinent Imaging       The patient's images were reviewed by me personally and also in real time with them in the examination room using our PACS imaging system. The imaging findings are significant for bilateral hydronephrosis and concern for high risk prostate cancer.

## 2023-06-30 DIAGNOSIS — R79.89 ELEVATED SERUM CREATININE: ICD-10-CM

## 2023-06-30 DIAGNOSIS — C61 PROSTATE CANCER (HCC): ICD-10-CM

## 2023-06-30 DIAGNOSIS — N13.39 OTHER HYDRONEPHROSIS: ICD-10-CM

## 2023-06-30 DIAGNOSIS — N17.9 AKI (ACUTE KIDNEY INJURY) (HCC): ICD-10-CM

## 2023-06-30 DIAGNOSIS — R33.9 URINARY RETENTION: ICD-10-CM

## 2023-06-30 DIAGNOSIS — N39.0 URINARY TRACT INFECTION: ICD-10-CM

## 2023-06-30 DIAGNOSIS — R31.9 HEMATURIA: ICD-10-CM

## 2023-06-30 RX ORDER — SODIUM CHLORIDE 9 MG/ML
10 INJECTION INTRAVENOUS DAILY
Qty: 900 ML | Refills: 3 | Status: SHIPPED | OUTPATIENT
Start: 2023-06-30 | End: 2023-07-17 | Stop reason: SDUPTHER

## 2023-06-30 NOTE — TELEPHONE ENCOUNTER
Called and spoke to henri Hawthorne order was sent to pharmacy  Marine also had a question regarding dressing changes of the nephrostomy tube  Informed Marine I would speak with provider in office and call back  Spoke to [employer] in office  Boone stated dressing can be changed every other day and PRN depending on drainage output  Spoke to Marine, henri [employer] recommendations  Marine does not have fax number on hand but requested we e-mailed it to her at [email or web address]

## 2023-06-30 NOTE — TELEPHONE ENCOUNTER
Loading dose firmagon approved for 7/5 visit  This will have to be sent to prior auth for lupron on 7/5 after patient given loading dose firmagon

## 2023-06-30 NOTE — TELEPHONE ENCOUNTER
[facility] orders for dressing change    -Change nephrostomy dressing every other day or PRN if drainage output has increased           Elizabet Turcios

## 2023-07-03 PROCEDURE — 88344 IMHCHEM/IMCYTCHM EA MLT ANTB: CPT | Performed by: STUDENT IN AN ORGANIZED HEALTH CARE EDUCATION/TRAINING PROGRAM

## 2023-07-03 PROCEDURE — G0416 PROSTATE BIOPSY, ANY MTHD: HCPCS | Performed by: STUDENT IN AN ORGANIZED HEALTH CARE EDUCATION/TRAINING PROGRAM

## 2023-07-03 PROCEDURE — 88342 IMHCHEM/IMCYTCHM 1ST ANTB: CPT | Performed by: STUDENT IN AN ORGANIZED HEALTH CARE EDUCATION/TRAINING PROGRAM

## 2023-07-03 NOTE — TELEPHONE ENCOUNTER
Pt's nurse calling she is requesting orders for dressing change once a week or Prn as needs and for order to flush nephrostomy tubes. She did state with last order pts saline is not covered by insurance.       Nurse number [phone]

## 2023-07-05 ENCOUNTER — OFFICE VISIT (OUTPATIENT)
Dept: UROLOGY | Facility: CLINIC | Age: 73
End: 2023-07-05
Payer: COMMERCIAL

## 2023-07-05 ENCOUNTER — TELEPHONE (OUTPATIENT)
Dept: UROLOGY | Facility: CLINIC | Age: 73
End: 2023-07-05

## 2023-07-05 VITALS
HEIGHT: 69 IN | DIASTOLIC BLOOD PRESSURE: 80 MMHG | SYSTOLIC BLOOD PRESSURE: 126 MMHG | HEART RATE: 75 BPM | WEIGHT: 157 LBS | RESPIRATION RATE: 18 BRPM | OXYGEN SATURATION: 98 % | BODY MASS INDEX: 23.25 KG/M2

## 2023-07-05 DIAGNOSIS — Z71.2 ENCOUNTER TO DISCUSS TEST RESULTS: ICD-10-CM

## 2023-07-05 DIAGNOSIS — R31.0 GROSS HEMATURIA: ICD-10-CM

## 2023-07-05 DIAGNOSIS — R97.20 ELEVATED PSA: Primary | ICD-10-CM

## 2023-07-05 DIAGNOSIS — N13.39 OTHER HYDRONEPHROSIS: ICD-10-CM

## 2023-07-05 DIAGNOSIS — N13.30 BILATERAL HYDRONEPHROSIS: ICD-10-CM

## 2023-07-05 DIAGNOSIS — C61 PROSTATE CANCER (HCC): ICD-10-CM

## 2023-07-05 DIAGNOSIS — Z76.89 ENCOUNTER FOR SUPPORT AND COORDINATION OF TRANSITION OF CARE: ICD-10-CM

## 2023-07-05 PROCEDURE — 1124F ACP DISCUSS-NO DSCNMKR DOCD: CPT | Performed by: UROLOGY

## 2023-07-05 PROCEDURE — 99215 OFFICE O/P EST HI 40 MIN: CPT | Performed by: UROLOGY

## 2023-07-05 PROCEDURE — 96402 CHEMO HORMON ANTINEOPL SQ/IM: CPT

## 2023-07-05 RX ORDER — SULFAMETHOXAZOLE AND TRIMETHOPRIM 800; 160 MG/1; MG/1
1 TABLET ORAL 2 TIMES DAILY
COMMUNITY
Start: 2023-06-29

## 2023-07-05 RX ORDER — SODIUM CHLORIDE 9 MG/ML
10 INJECTION INTRAVENOUS DAILY
Qty: 300 ML | Refills: 3 | Status: SHIPPED | OUTPATIENT
Start: 2023-07-05 | End: 2023-10-03

## 2023-07-05 NOTE — ASSESSMENT & PLAN NOTE
All questions and concerns answered and addressed. I have reviewed all of his urologic care both related by him as well as that documented in the medical record.     With regard to his voiding, if he is seen to have troubles urinating after systemic therapy I will evaluate him for a channel transurethral resection of prostate in the future

## 2023-07-05 NOTE — TELEPHONE ENCOUNTER
Jihan Turner, visiting nurse. Unable to leave voicemail. When Power Traore calls back, clinical staff ok to give verbal orders for weekly nephrostomy tube dressing changes and PRN as needed, as well as daily nephrostomy tube flush orders per Dr Gabby Rahman.

## 2023-07-05 NOTE — ASSESSMENT & PLAN NOTE
Courtney Desai and his family and I had a productive consultation today. 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 He is currently managed with bilateral nephrostomy tubes as well as a Cazares catheter. I recommend that his Cazares catheter be removed as this is causing him irritation and bleeding and the urine draining from his nephrostomy tubes is clear yellow. I have placed an order for interventional radiology to convert him to nephroureteral catheters if possible and eventually into internal double-J stents.   We will see how he tolerates these and plan for intermittent double-J stent exchanges in the operating room

## 2023-07-05 NOTE — ASSESSMENT & PLAN NOTE
Likely due to high risk prostate cancer. He is status post cystoscopy as well as bilateral nephrostomy tube placement.

## 2023-07-05 NOTE — ASSESSMENT & PLAN NOTE
He tolerated his prostate biopsy well. He has been having bleeding with his Cazares catheter. He has scanty drainage from the urethral Cazares catheter and clear yellow urine from his nephrostomy tubes. I reviewed with him the meaning of his prostate biopsy showing grade group 5 disease and all cores representative of high risk prostate cancer invading into the bladder based on cystoscopic and imaging findings. I do not believe his disease to be surgically curable but I do recommend aggressive management with systemic antiandrogen therapy started today with Firmagon to be followed by Lupron as well as referral to medical oncology for consideration of advanced androgen deprivation therapy. I have also placed a referral to radiation oncology given that he may be deemed to have some potential treatment benefit from local therapy, however this will be determined based on consultation with our radiation oncology colleagues. I spoke with him today about goals of care and his new diagnosis and the importance of consideration of how aggressive he wishes to be going forward. All questions and concerns answered and addressed today in [place]. He is status post loading dose of Firmagon today and will return in 4 weeks. At that time I also hope to be able to see him myself to see how he is doing.   I do suspect that his pain and discomfort will improve with active treatment given his high PSA of 145 and bilateral hydronephrosis indicative of locally advanced prostate cancer

## 2023-07-05 NOTE — TELEPHONE ENCOUNTER
CLERICAL:    PLEASE MAKE SURE THE PT GETS SCHEDULED FOR THE FOLLOWING BEFORE CLOSING THIS ENCOUNTER:    RAD ONC  IR  HEMA/ONCO      CLINICAL: PER DV HE ALSO NEEDS 4 WEEK LUPRON/OV WITH HIM. ONCE SCHEDULED PLEASE SEND TO APPROPRIATE PEOPLE FOR LUPRON APPROVAL.

## 2023-07-06 ENCOUNTER — TELEPHONE (OUTPATIENT)
Dept: HEMATOLOGY ONCOLOGY | Facility: CLINIC | Age: 73
End: 2023-07-06

## 2023-07-06 NOTE — TELEPHONE ENCOUNTER
I called Catherine Golden in response to a referral that was received for patient to establish care with Medical Oncology. Outreach was made to schedule a consultation. .    A consultation was scheduled for patient during this call. Patient is scheduled on 7/31/23 at 3:20pm with Dr. Josefina Brambila at the [facility].

## 2023-07-06 NOTE — TELEPHONE ENCOUNTER
Scheduled patient for 4 week visit with Dr. Hardeep Sanchez. Patient should get Lupron that day (it is written in the appointment notes) please call patient/facility and confirm date, time, location.

## 2023-07-06 NOTE — TELEPHONE ENCOUNTER
I called and spoke to patient's son. I confirmed appt with patient's son. Date, time, and location has been reviewed.

## 2023-07-06 NOTE — TELEPHONE ENCOUNTER
Second attempt:     Marquise Gr, visiting nurse. Unable to leave voicemail. When BIBER calls back, clinical staff ok to give verbal orders for weekly nephrostomy tube dressing changes and PRN as needed, as well as daily nephrostomy tube flush orders per Dr Terri Graff.

## 2023-07-07 ENCOUNTER — PREP FOR PROCEDURE (OUTPATIENT)
Dept: INTERVENTIONAL RADIOLOGY/VASCULAR | Facility: CLINIC | Age: 73
End: 2023-07-07

## 2023-07-07 DIAGNOSIS — N13.30 BILATERAL HYDRONEPHROSIS: Primary | ICD-10-CM

## 2023-07-07 RX ORDER — CIPROFLOXACIN 250 MG/1
500 TABLET, FILM COATED ORAL ONCE
OUTPATIENT
Start: 2023-07-07 | End: 2023-07-07

## 2023-07-07 RX ORDER — SODIUM CHLORIDE 9 MG/ML
75 INJECTION, SOLUTION INTRAVENOUS CONTINUOUS
OUTPATIENT
Start: 2023-07-07

## 2023-07-07 NOTE — TELEPHONE ENCOUNTER
Third attempt:    Amanda Hall, visiting nurse. Unable to leave voicemail. When Beatrice Gentile calls back, clinical staff ok to give verbal orders for weekly nephrostomy tube dressing changes and PRN as needed, as well as daily nephrostomy tube flush orders per Dr Marsha Rodriguez.

## 2023-07-07 NOTE — TELEPHONE ENCOUNTER
Attempted to call. No answer and still unable to leave message     "When Wileen Nixon calls back, clinical staff ok to give verbal orders for weekly nephrostomy tube dressing changes and PRN as needed, as well as daily nephrostomy tube flush orders per Dr Gates Comment. "

## 2023-07-16 NOTE — PROGRESS NOTES
Name: Apolinar Batch      : 1950      MRN: [id number]  Encounter Provider: Domingo Loya MD  Encounter Date: 2023   Encounter department: [address]     1. Encounter to establish care    2. Type 2 diabetes mellitus with other specified complication, with long-term current use of insulin (HCC)  Assessment & Plan:    Lab Results   Component Value Date    HGBA1C 7.4 (H) 2023       A1c from  as above. Medication regimen includes glimepiride 4 mg daily, Invokana 100 mg daily. Patient is on statin and ACE inhibitor. Will follow-up in 3 months. Orders:  -     Ambulatory Referral to Ophthalmology    3. Primary hypertension  Assessment & Plan:  Controlled on current medication, patient on lisinopril 10 mg daily. BP this visit 125/65. 4. Prostate cancer [facility])  Assessment & Plan:  Prostate cancer with metastasis to the bladder. Following heme-onc. Scheduled to initiate treatment with Lupron in the next 2 weeks. 5. Bilateral hydronephrosis  Assessment & Plan:  Patient is s/p nephrostomy tube placement, today he is reporting that he is passing minimal amounts of urine via his urethra and is having dysuria. Had ceballos removed aprox 2 weeks ago. · Explained that this could be irritation after having Ceballos catheter. · Infection not likely but cannot be ruled out, there would not be enough urine to determine if there is infection. Urine from nephrostomy would not be likely to yield any results. · Recommended patient to call urologist to discuss the symptoms. Orders:  -     sodium chloride, PF, 0.9 %; 10 mL by Intracatheter route daily Intracatheter flushing daily. May substitute prefilled syringe with normal saline 10 mL vials, 10 mL syringes, and 18 g blunt needles    6. Nephrostomy status (HCC)  -     sodium chloride, PF, 0.9 %; 10 mL by Intracatheter route daily Intracatheter flushing daily.  May substitute prefilled syringe with normal saline 10 mL vials, 10 mL syringes, and 18 g blunt needles    7. Encounter for screening colonoscopy  -     Ambulatory Referral to Gastroenterology; Future      RTC in 3 months to discuss Diabetes         Subjective      HPI     Payton Doan is presenting to the office to establish care. He has history of hypertension, diabetes, prostate cancer. He is status post nephrostomy tube placement bilaterally. Today he reports dysuria, onset 2 days ago. Review of Systems   Constitutional: Negative for chills and fever. Respiratory: Negative for shortness of breath. Cardiovascular: Negative for chest pain. Gastrointestinal: Negative for abdominal pain. Musculoskeletal: Positive for back pain (due to sleep posture after nephrostomy tube placement). Current Outpatient Medications on File Prior to Visit   Medication Sig   • albuterol (ProAir HFA) 90 mcg/act inhaler Inhale 2 puffs every 4 (four) hours as needed for wheezing or shortness of breath   • glimepiride (AMARYL) 4 mg tablet    • Invokana 100 MG    • Levemir FlexPen 100 units/mL injection pen 40 Units   • lisinopril (ZESTRIL) 10 mg tablet Take 10 mg by mouth daily   • Lovaza 1 g capsule 2 (two) times a day   • Multiple Vitamins-Minerals (Sentry) TABS    • OneTouch Verio test strip    • rosuvastatin (CRESTOR) 10 MG tablet Take 10 mg by mouth daily   • sodium chloride, PF, 0.9 % 10 mL by Intracatheter route daily   • Spacer/Aero-Holding Chambers (AEROCHAMBER MINI CHAMBER) BILLY by Does not apply route see administration instructions (Patient not taking: Reported on 6/22/2023)       Objective     /65 (BP Location: Left arm, Patient Position: Sitting, Cuff Size: Standard)   Pulse 74   Temp 98.2 °F (36.8 °C) (Tympanic)   Resp 16   Ht 5' 9" (1.753 m)   Wt 71.2 kg (157 lb)   SpO2 96%   BMI 23.18 kg/m²     Physical Exam  Vitals reviewed. Constitutional:       General: He is not in acute distress. Appearance: Normal appearance.    HENT:      Head: Normocephalic and atraumatic. Cardiovascular:      Rate and Rhythm: Normal rate and regular rhythm. Pulmonary:      Effort: Pulmonary effort is normal. No respiratory distress. Breath sounds: Normal breath sounds. Abdominal:      Palpations: Abdomen is soft. Tenderness: There is no abdominal tenderness. Genitourinary:     Comments: Bilateral nephrostomy tubes in place, do not appear infected. Musculoskeletal:      Right lower leg: No edema. Left lower leg: No edema. Neurological:      General: No focal deficit present. Mental Status: He is alert.             Yesy Michel MD

## 2023-07-17 ENCOUNTER — OFFICE VISIT (OUTPATIENT)
Dept: FAMILY MEDICINE CLINIC | Facility: CLINIC | Age: 73
End: 2023-07-17
Payer: COMMERCIAL

## 2023-07-17 VITALS
OXYGEN SATURATION: 96 % | HEART RATE: 74 BPM | WEIGHT: 157 LBS | TEMPERATURE: 98.2 F | HEIGHT: 69 IN | RESPIRATION RATE: 16 BRPM | DIASTOLIC BLOOD PRESSURE: 65 MMHG | SYSTOLIC BLOOD PRESSURE: 125 MMHG | BODY MASS INDEX: 23.25 KG/M2

## 2023-07-17 DIAGNOSIS — Z93.6 NEPHROSTOMY STATUS (HCC): ICD-10-CM

## 2023-07-17 DIAGNOSIS — N13.30 BILATERAL HYDRONEPHROSIS: ICD-10-CM

## 2023-07-17 DIAGNOSIS — Z76.89 ENCOUNTER TO ESTABLISH CARE: Primary | ICD-10-CM

## 2023-07-17 DIAGNOSIS — Z79.4 TYPE 2 DIABETES MELLITUS WITH OTHER SPECIFIED COMPLICATION, WITH LONG-TERM CURRENT USE OF INSULIN (HCC): Chronic | ICD-10-CM

## 2023-07-17 DIAGNOSIS — C61 PROSTATE CANCER (HCC): ICD-10-CM

## 2023-07-17 DIAGNOSIS — E11.69 TYPE 2 DIABETES MELLITUS WITH OTHER SPECIFIED COMPLICATION, WITH LONG-TERM CURRENT USE OF INSULIN (HCC): Chronic | ICD-10-CM

## 2023-07-17 DIAGNOSIS — Z12.11 ENCOUNTER FOR SCREENING COLONOSCOPY: ICD-10-CM

## 2023-07-17 DIAGNOSIS — I10 PRIMARY HYPERTENSION: Chronic | ICD-10-CM

## 2023-07-17 PROCEDURE — 99213 OFFICE O/P EST LOW 20 MIN: CPT | Performed by: FAMILY MEDICINE

## 2023-07-17 RX ORDER — SODIUM CHLORIDE 9 MG/ML
10 INJECTION INTRAVENOUS DAILY
Qty: 900 ML | Refills: 3 | Status: SHIPPED | OUTPATIENT
Start: 2023-07-17

## 2023-07-17 NOTE — ASSESSMENT & PLAN NOTE
Patient is s/p nephrostomy tube placement, today he is reporting that he is passing minimal amounts of urine via his urethra and is having dysuria. Had ceballos removed aprox 2 weeks ago. · Explained that this could be irritation after having Ceballos catheter. · Infection not likely but cannot be ruled out, there would not be enough urine to determine if there is infection. Urine from nephrostomy would not be likely to yield any results. · Recommended patient to call urologist to discuss the symptoms.

## 2023-07-17 NOTE — ASSESSMENT & PLAN NOTE
Prostate cancer with metastasis to the bladder. Following heme-onc. Scheduled to initiate treatment with Lupron in the next 2 weeks.

## 2023-07-18 ENCOUNTER — OFFICE VISIT (OUTPATIENT)
Dept: UROLOGY | Facility: CLINIC | Age: 73
End: 2023-07-18
Payer: COMMERCIAL

## 2023-07-18 VITALS
WEIGHT: 158 LBS | SYSTOLIC BLOOD PRESSURE: 102 MMHG | HEART RATE: 66 BPM | HEIGHT: 69 IN | OXYGEN SATURATION: 98 % | BODY MASS INDEX: 23.4 KG/M2 | DIASTOLIC BLOOD PRESSURE: 64 MMHG

## 2023-07-18 DIAGNOSIS — N39.0 URINARY TRACT INFECTION WITHOUT HEMATURIA, SITE UNSPECIFIED: Primary | ICD-10-CM

## 2023-07-18 LAB — POST-VOID RESIDUAL VOLUME, ML POC: 50 ML

## 2023-07-18 PROCEDURE — 51798 US URINE CAPACITY MEASURE: CPT | Performed by: PHYSICIAN ASSISTANT

## 2023-07-18 PROCEDURE — 99213 OFFICE O/P EST LOW 20 MIN: CPT | Performed by: PHYSICIAN ASSISTANT

## 2023-07-18 RX ORDER — CIPROFLOXACIN 250 MG/1
250 TABLET, FILM COATED ORAL EVERY 12 HOURS SCHEDULED
Qty: 10 TABLET | Refills: 0 | Status: SHIPPED | OUTPATIENT
Start: 2023-07-18 | End: 2023-07-19 | Stop reason: SDUPTHER

## 2023-07-18 NOTE — ASSESSMENT & PLAN NOTE
Lab Results   Component Value Date    HGBA1C 7.4 (H) 05/24/2023       A1c from 5/23 as above. Medication regimen includes glimepiride 4 mg daily, Invokana 100 mg daily. Patient is on statin and ACE inhibitor. Will follow-up in 3 months. Biopsy Photograph Reviewed: Yes

## 2023-07-18 NOTE — PROGRESS NOTES
1. Urinary tract infection without hematuria, site unspecified  POCT Measure PVR    ciprofloxacin (CIPRO) 250 mg tablet          Assessment and plan:     1. Dysuria  2. Acute cystitis  - emptying bladder well since ceballos removal. Urine primarily through b/l PCNs  - urine culture obtained and will start on antibiotics in the meantime    3. Prostate caner  - f/u as scheduled for lupron and next PSA    4. Bilateral hydronephrosis  - PCN conversions to PCNUs at upcoming IR visit    Yulisa Saldivar PA-C      Chief Complaint     Prostate cancer      History of Present Illness     Tc Ferrell is a 81 y.o. male presenting today for an acute visit. Patient was seen in consultation April 2023 with lower urinary tract symptoms. A PSA was ordered which was elevated at 145. CT revealed bilateral hydronephrosis, distended urinary bladder. Patient underwent bilateral nephrostomy tube placement and ceballos drainage. State biopsy 6/28/2023 revealing Liana 9 (4+5) prostate cancer    Patient was seen 2 weeks ago in the office and recommendations were for Ceballos removal.  Plan will be for interventional radiology for conversion to nephroureteral catheters with hopeful eventual internal double-J stents. firmagon 240mg given 7/5/23    Patient presents today for an acute visit due to dysuria. He is not voiding significant volumes via his urethra given diversion through the bilateral nephrostomy tubes. Patient unable to provide urine specimen  PVR 50mL    Review of Systems     Review of Systems   Constitutional: Negative. HENT: Negative. Eyes: Negative. Respiratory: Negative. Cardiovascular: Negative. Gastrointestinal: Negative. Endocrine: Negative. Genitourinary: Positive for dysuria. As per HPI   Musculoskeletal: Negative. Skin: Negative. Allergic/Immunologic: Negative. Neurological: Negative. Hematological: Negative. Psychiatric/Behavioral: Negative.         Allergies No Known Allergies    Physical Exam     Physical Exam  Vitals reviewed. Constitutional:       General: He is not in acute distress. Appearance: Normal appearance. He is not ill-appearing, toxic-appearing or diaphoretic. HENT:      Head: Normocephalic and atraumatic. Eyes:      General: No scleral icterus. Right eye: No discharge. Left eye: No discharge. Cardiovascular:      Pulses: Normal pulses. Pulmonary:      Effort: Pulmonary effort is normal.   Abdominal:      General: There is no distension. Palpations: There is no mass. Genitourinary:     Comments: Bilateral percutaneous nephrostomy tubes in place draining clear yellow urine. Testicles descended bilaterally into the scrotum. No erythema or swelling. Scant purulent material noted at the tip of urethral meatus. Musculoskeletal:         General: No swelling. Skin:     General: Skin is warm. Neurological:      General: No focal deficit present. Mental Status: He is alert and oriented to person, place, and time. Cranial Nerves: No cranial nerve deficit. Psychiatric:         Mood and Affect: Mood normal.         Behavior: Behavior normal.         Thought Content:  Thought content normal.         Judgment: Judgment normal.             Vital Signs  Vitals:    07/18/23 1453   BP: 102/64   Pulse: 66   SpO2: 98%   Weight: 71.7 kg (158 lb)   Height: 5' 9" (1.753 m)         Current Medications       Current Outpatient Medications:   •  albuterol (ProAir HFA) 90 mcg/act inhaler, Inhale 2 puffs every 4 (four) hours as needed for wheezing or shortness of breath, Disp: 8.5 g, Rfl: 0  •  ciprofloxacin (CIPRO) 250 mg tablet, Take 1 tablet (250 mg total) by mouth every 12 (twelve) hours for 5 days, Disp: 10 tablet, Rfl: 0  •  glimepiride (AMARYL) 4 mg tablet, , Disp: , Rfl:   •  Invokana 100 MG, , Disp: , Rfl:   •  Levemir FlexPen 100 units/mL injection pen, 40 Units, Disp: , Rfl:   •  lisinopril (ZESTRIL) 10 mg tablet, Take 10 mg by mouth daily, Disp: , Rfl:   •  Lovaza 1 g capsule, 2 (two) times a day, Disp: , Rfl:   •  Multiple Vitamins-Minerals (Sentry) TABS, , Disp: , Rfl:   •  OneTouch Verio test strip, , Disp: , Rfl:   •  rosuvastatin (CRESTOR) 10 MG tablet, Take 10 mg by mouth daily, Disp: , Rfl:   •  sodium chloride, PF, 0.9 %, 10 mL by Intracatheter route daily, Disp: 300 mL, Rfl: 3  •  sodium chloride, PF, 0.9 %, 10 mL by Intracatheter route daily Intracatheter flushing daily.  May substitute prefilled syringe with normal saline 10 mL vials, 10 mL syringes, and 18 g blunt needles, Disp: 900 mL, Rfl: 3  •  Spacer/Aero-Holding Chambers (AEROCHAMBER MINI CHAMBER) BILLY, by Does not apply route see administration instructions (Patient not taking: Reported on 6/22/2023), Disp: 1 Device, Rfl: 0      Active Problems     Patient Active Problem List   Diagnosis   • Type 2 diabetes mellitus, with long-term current use of insulin (HCC)   • Elevated PSA   • Other hydronephrosis   • Elevated serum creatinine   • Hypertension   • Hematuria   • Encounter to discuss test results   • Encounter for support and coordination of transition of care   • Bilateral hydronephrosis   • Prostate cancer [facility])         Past Medical History     Past Medical History:   Diagnosis Date   • Diabetes mellitus ([address])    • High cholesterol    • Hypertension          Surgical History     Past Surgical History:   Procedure Laterality Date   • IR NEPHROSTOMY TUBE PLACEMENT  6/22/2023         Family History     Family History   Problem Relation Age of Onset   • No Known Problems Father          Social History     Social History     Social History     Tobacco Use   Smoking Status Former   • Passive exposure: Past   Smokeless Tobacco Never         Pertinent Lab Values     Lab Results   Component Value Date    CREATININE 1.43 (H) 06/25/2023       Lab Results   Component Value Date    .73 (H) 05/24/2023               Pertinent Imaging       The patient's images were reviewed by me personally and also in real time with them in the examination room using our PACS imaging system. The imaging findings are significant for bilateral hydronephrosis and concern for high risk prostate cancer.

## 2023-07-19 ENCOUNTER — TELEPHONE (OUTPATIENT)
Dept: GENETICS | Facility: CLINIC | Age: 73
End: 2023-07-19

## 2023-07-19 ENCOUNTER — PATIENT OUTREACH (OUTPATIENT)
Dept: HEMATOLOGY ONCOLOGY | Facility: CLINIC | Age: 73
End: 2023-07-19

## 2023-07-19 DIAGNOSIS — C61 PROSTATE CANCER (HCC): Primary | ICD-10-CM

## 2023-07-19 DIAGNOSIS — N39.0 URINARY TRACT INFECTION WITHOUT HEMATURIA, SITE UNSPECIFIED: ICD-10-CM

## 2023-07-19 RX ORDER — CIPROFLOXACIN 250 MG/1
250 TABLET, FILM COATED ORAL EVERY 12 HOURS SCHEDULED
Qty: 10 TABLET | Refills: 0 | Status: SHIPPED | OUTPATIENT
Start: 2023-07-19 | End: 2023-07-24

## 2023-07-19 NOTE — PROGRESS NOTES
Call to pt and introduced myself as Nurse Navigator and explained my role in his care with coordinating appts, being a point of contact, providing support and connecting him with available resources as needed. General assessment completed and evaluated for any barriers to care. Referral to Oncology Social Work placed. Answered questions to pt's satisfaction. Reviewed upcoming appts. Provided support and encouraged to call with any questions or concerns. Future Appointments   Date Time Provider [address]   7/31/2023 10:30 AM AN [address] RESOURCE AN Rad Onc [facility]   7/31/2023 11:00 AM Dameon Up MD AN Rad Onc [facility]   7/31/2023  3:20 PM Rohit Mazariegos DO HEM ONC [facility]   8/11/2023  8:00 AM Kd Perales MD URO [facility]   9/28/2023 10:00 AM [place] IR/CATH LAB [address] [place] [address]   10/23/2023  8:00 AM Dhara Cm MD [facility]     April 2023-presented with lower urinary tract symptoms. A PSA was ordered which was elevated at 145. CT revealed bilateral hydronephrosis, distended urinary bladder. Patient underwent bilateral nephrostomy tube placement and ceballos drainage.    State biopsy 6/28/2023 revealing Liana 9 (4+5) prostate cancer     Radha Baeza 7/5/23

## 2023-07-19 NOTE — TELEPHONE ENCOUNTER
I called Mehrdad Bob to schedule a new patient appointment with the Cancer Risk and Genetics Program. Spoke to his son Ruben Anand. Outcome:  Genetics appointment scheduled for Aug 22 at 9:00 am    Personal/Family History Related to Appointment:  Prostate Cancer. No known fhx of cancer per stepson Brain. Non-Taoist. History of Genetic Testing:  Patient reports no personal or family history of genetic testing    Genetics Family History Questionnaire: The patient declined providing and e-mail to send an invite link for our genetics family history intake. The patient understands that a detailed family history will be collected during the visit.

## 2023-07-21 ENCOUNTER — PATIENT OUTREACH (OUTPATIENT)
Dept: CASE MANAGEMENT | Facility: OTHER | Age: 73
End: 2023-07-21

## 2023-07-21 NOTE — PROGRESS NOTES
[facility] received SW referral. Pt has his consults with Dr. Cyndi Snell and Dr. Rosendo Gonsales on 7/31. OSW will place outreach after his consults to complete the distress thermometer and psychosocial assessment.

## 2023-07-28 ENCOUNTER — TELEPHONE (OUTPATIENT)
Dept: UROLOGY | Facility: AMBULATORY SURGERY CENTER | Age: 73
End: 2023-07-28

## 2023-07-28 NOTE — TELEPHONE ENCOUNTER
Mary Alice from [facility] called and stated pt is requesting orders for outpatient PT due to pt was discharged from inpatient PT     [phone]

## 2023-07-31 ENCOUNTER — CLINICAL SUPPORT (OUTPATIENT)
Dept: RADIATION ONCOLOGY | Facility: HOSPITAL | Age: 73
End: 2023-07-31
Attending: STUDENT IN AN ORGANIZED HEALTH CARE EDUCATION/TRAINING PROGRAM
Payer: COMMERCIAL

## 2023-07-31 ENCOUNTER — CONSULT (OUTPATIENT)
Dept: HEMATOLOGY ONCOLOGY | Facility: CLINIC | Age: 73
End: 2023-07-31
Payer: COMMERCIAL

## 2023-07-31 VITALS
RESPIRATION RATE: 18 BRPM | TEMPERATURE: 97.4 F | WEIGHT: 160 LBS | HEART RATE: 72 BPM | SYSTOLIC BLOOD PRESSURE: 132 MMHG | OXYGEN SATURATION: 98 % | DIASTOLIC BLOOD PRESSURE: 84 MMHG | BODY MASS INDEX: 23.7 KG/M2 | HEIGHT: 69 IN

## 2023-07-31 VITALS
BODY MASS INDEX: 23.99 KG/M2 | DIASTOLIC BLOOD PRESSURE: 74 MMHG | WEIGHT: 162 LBS | RESPIRATION RATE: 17 BRPM | SYSTOLIC BLOOD PRESSURE: 122 MMHG | TEMPERATURE: 98.5 F | OXYGEN SATURATION: 96 % | HEART RATE: 80 BPM | HEIGHT: 69 IN

## 2023-07-31 DIAGNOSIS — C61 PROSTATE CANCER (HCC): ICD-10-CM

## 2023-07-31 DIAGNOSIS — R97.20 ELEVATED PSA: ICD-10-CM

## 2023-07-31 DIAGNOSIS — C61 PROSTATE CANCER (HCC): Primary | ICD-10-CM

## 2023-07-31 DIAGNOSIS — N13.30 BILATERAL HYDRONEPHROSIS: ICD-10-CM

## 2023-07-31 PROCEDURE — 99204 OFFICE O/P NEW MOD 45 MIN: CPT | Performed by: INTERNAL MEDICINE

## 2023-07-31 PROCEDURE — 99205 OFFICE O/P NEW HI 60 MIN: CPT | Performed by: STUDENT IN AN ORGANIZED HEALTH CARE EDUCATION/TRAINING PROGRAM

## 2023-07-31 PROCEDURE — 99211 OFF/OP EST MAY X REQ PHY/QHP: CPT | Performed by: STUDENT IN AN ORGANIZED HEALTH CARE EDUCATION/TRAINING PROGRAM

## 2023-07-31 NOTE — PROGRESS NOTES
Tarah Bowen  1950  [facility] [place]  [address] [facility] [address] [phone]    Chief Complaint   Patient presents with   • Consult           Oncology History   Prostate cancer [facility])   2023 Initial Diagnosis    Prostate cancer ([address])     6/28/2023 Biopsy    A. Prostate, right lateral base:  - Prostatic adenocarcinoma, Liana score 4 + 5 = 9, Prognostic Grade Group 5,  involving 90% of 1 needle core  and measuring  11 mm in length. B. Prostate, right medial base:  - Prostatic adenocarcinoma, Overton score 4 + 5 = 9, Prognostic Grade Group 5,  involving 70% of 1 needle core  and measuring  10 mm in length. C. Prostate, right lateral mid:  - Prostatic adenocarcinoma, Liana score 4 + 5 = 9, Prognostic Grade Group 5,  involving 90% of 1 needle core  and measuring  12 mm in length. Comment: Immunohistochemistry for a prostate multiplex stain (p63, K903, and P504S) and NKX3.1 demonstrate invasive carcinoma. D. Prostate, right medial mid:  - Prostatic adenocarcinoma, Overton score 4 + 5 = 9, Prognostic Grade Group 5,  involving 95% of 1 needle core  and measuring  15 mm in length. E. Prostate, right lateral apex:  - Prostatic adenocarcinoma, Overton score 4 + 5 = 9, Prognostic Grade Group 5,  involving 90% of 1 needle core  and measuring  12 mm in length. - Perineural invasion identified. Comment: Immunohistochemistry for a prostate multiplex stain (p63, K903, and P504S) and NKX3.1 demonstrate invasive carcinoma. F. Prostate, right medial apex:  - Prostatic adenocarcinoma, Liana score 4 + 5 = 9, Prognostic Grade Group 5,  involving 100% of 1 needle core  and measuring  20 mm in length. G. Prostate, left lateral base:  - Prostatic adenocarcinoma, Liana score 4 + 5 = 9, Prognostic Grade Group 5,  involving 75% of 1 needle core  and measuring  10 mm in length. - Perineural invasion identified.   - Lymphovascular invasion is identified. H. Prostate, left medial base:  - Prostatic adenocarcinoma, Liana score 4 + 5 = 9, Prognostic Grade Group 5,  involving 95% of 1 needle core  and measuring  14 mm in length. - Perineural invasion identified. Comment: There is a focus of closely approximated gastrointestinal epithelium; NKX3. 1 shows no definite invasion of the GI epithelium. I. Prostate, left lateral mid:  - Prostatic adenocarcinoma, Laina score 4 + 5 = 9, Prognostic Grade Group 5,  involving 95% of 1 needle core  and measuring  12 mm in length. J. Prostate, left medial mid:  - Prostatic adenocarcinoma, Liana score 4 + 5 = 9, Prognostic Grade Group 5,  involving 85% of 1 needle core  and measuring  13 mm in length. Comment: Immunohistochemistry for a prostate multiplex stain (p63, K903, and P504S) and NKX3.1 demonstrate invasive carcinoma. K. Prostate, left lateral apex:  - Prostatic adenocarcinoma, Liana score 4 + 5 = 9, Prognostic Grade Group 5,  involving 25% of 1 needle core  and measuring  3 mm in length. L. Prostate, left medial apex :  - Prostatic adenocarcinoma, Brimson score 4 + 5 = 9, Prognostic Grade Group 5,  involving 95% of 1 needle core  and measuring  15 mm in length. - Perineural invasion identified. Comment:   Cribriform glands are present within the pattern 4 component. This feature has been associated with adverse clinical outcomes and molecular features typically seen in advanced disease. (The 2019 Genitourinary Pathology Society (GUPS) White Paper on Contemporary Grading of Prostate Cancer. Arch Pathol Lab Med. 2021 Apr 1;145(4):461-493.)     7/5/2023 -  Hormone Therapy    Firmagon loading dose on 7/5/23, followed by Lupron          History of Present Illness:  April 2023 patient presented with urinary frequency. . CT showed distended urinary bladder with bilateral hydronephrosis.   Urinary bladder mass inseparable from the prostate. Extraprostatic extension noted. Bone scan showed indeterminate activity at T11 vertebral body. Bilateral nephrostomy tubes and Cazares catheter placed. Prostate biopsy showed adenocarcinoma, Liana 9. Audria Knee administered July 5, 2023. Review of Systems   Constitutional: Negative for chills and fever. HENT: Negative for nosebleeds. Eyes: Negative for discharge. Respiratory: Negative for cough and shortness of breath. Cardiovascular: Negative for chest pain. Gastrointestinal: Negative for abdominal pain, constipation and diarrhea. Endocrine: Negative for polydipsia. Genitourinary: Negative for hematuria. Musculoskeletal: Negative for arthralgias. Skin: Negative for color change. Allergic/Immunologic: Negative for immunocompromised state. Neurological: Negative for dizziness and headaches. Hematological: Negative for adenopathy. Psychiatric/Behavioral: Negative for agitation.        Patient Active Problem List   Diagnosis   • Type 2 diabetes mellitus, with long-term current use of insulin (HCC)   • Elevated PSA   • Other hydronephrosis   • Elevated serum creatinine   • Hypertension   • Hematuria   • Encounter to discuss test results   • Encounter for support and coordination of transition of care   • Bilateral hydronephrosis   • Prostate cancer [facility])     Past Medical History:   Diagnosis Date   • Diabetes mellitus ([address])    • High cholesterol    • Hypertension    • Prostate cancer [facility])      Past Surgical History:   Procedure Laterality Date   • IR NEPHROSTOMY TUBE PLACEMENT  06/22/2023    bilateral   • US GUIDED PROSTATE BIOPSY       Family History   Problem Relation Age of Onset   • Liver cancer Father      Social History     Socioeconomic History   • Marital status: /Civil Union     Spouse name: Not on file   • Number of children: Not on file   • Years of education: Not on file   • Highest education level: Not on file   Occupational History   • Not on file Tobacco Use   • Smoking status: Former     Packs/day: 0.25     Types: Cigarettes     Quit date: 2000     Years since quittin.5     Passive exposure: Past   • Smokeless tobacco: Never   • Tobacco comments:     States he only smoked on the weekends   Vaping Use   • Vaping Use: Never used   Substance and Sexual Activity   • Alcohol use: Not Currently     Comment: socially   • Drug use: Never   • Sexual activity: Not Currently     Partners: Female   Other Topics Concern   • Not on file   Social History Narrative   • Not on file     Social Determinants of Health     Financial Resource Strain: Not on file   Food Insecurity: No Food Insecurity (2023)    Hunger Vital Sign    • Worried About Running Out of Food in the Last Year: Never true    • Ran Out of Food in the Last Year: Never true   Transportation Needs: No Transportation Needs (2023)    PRAPARE - Transportation    • Lack of Transportation (Medical): No    • Lack of Transportation (Non-Medical):  No   Physical Activity: Not on file   Stress: Not on file   Social Connections: Not on file   Intimate Partner Violence: Not on file   Housing Stability: Unknown (2023)    Housing Stability Vital Sign    • Unable to Pay for Housing in the Last Year: No    • Number of Places Lived in the Last Year: Not on file    • Unstable Housing in the Last Year: No       Current Outpatient Medications:   •  albuterol (ProAir HFA) 90 mcg/act inhaler, Inhale 2 puffs every 4 (four) hours as needed for wheezing or shortness of breath, Disp: 8.5 g, Rfl: 0  •  glimepiride (AMARYL) 4 mg tablet, Take 4 mg by mouth daily with breakfast, Disp: , Rfl:   •  Invokana 100 MG, Take 100 mg by mouth daily before breakfast, Disp: , Rfl:   •  Levemir FlexPen 100 units/mL injection pen, Inject 40 Units under the skin daily, Disp: , Rfl:   •  lisinopril (ZESTRIL) 10 mg tablet, Take 10 mg by mouth daily, Disp: , Rfl:   •  Lovaza 1 g capsule, 2 (two) times a day, Disp: , Rfl:   •  Multiple Vitamins-Minerals (Sentry) TABS, Take 1 tablet by mouth daily, Disp: , Rfl:   •  OneTouch Verio test strip, , Disp: , Rfl:   •  rosuvastatin (CRESTOR) 10 MG tablet, Take 10 mg by mouth daily, Disp: , Rfl:   •  sodium chloride, PF, 0.9 %, 10 mL by Intracatheter route daily, Disp: 300 mL, Rfl: 3  •  sodium chloride, PF, 0.9 %, 10 mL by Intracatheter route daily Intracatheter flushing daily. May substitute prefilled syringe with normal saline 10 mL vials, 10 mL syringes, and 18 g blunt needles, Disp: 900 mL, Rfl: 3  •  Spacer/Aero-Holding Chambers (AEROCHAMBER MINI CHAMBER) BILLY, by Does not apply route see administration instructions, Disp: 1 Device, Rfl: 0  No Known Allergies  Vitals:    07/31/23 1551   BP: 122/74   Pulse: 80   Resp: 17   Temp: 98.5 °F (36.9 °C)   SpO2: 96%       Physical Exam  Constitutional:       Appearance: He is well-developed. HENT:      Head: Normocephalic and atraumatic. Eyes:      Pupils: Pupils are equal, round, and reactive to light. Cardiovascular:      Rate and Rhythm: Normal rate and regular rhythm. Heart sounds: No murmur heard. Pulmonary:      Breath sounds: Normal breath sounds. No wheezing or rales. Abdominal:      Palpations: Abdomen is soft. Tenderness: There is no abdominal tenderness. Musculoskeletal:         General: No tenderness. Normal range of motion. Cervical back: Neck supple. Lymphadenopathy:      Cervical: No cervical adenopathy. Skin:     Findings: No erythema or rash. Neurological:      Mental Status: He is alert and oriented to person, place, and time. Cranial Nerves: No cranial nerve deficit. Deep Tendon Reflexes: Reflexes are normal and symmetric. Psychiatric:         Behavior: Behavior normal.           Labs:  CBC, Coags, BMP, Mg, Phos     Imaging  No results found. I reviewed the above laboratory and imaging data.     Discussion/Summary: In summary, this is a 65-year-old male with a history of newly diagnosed prostate cancer, Liana 9, at least locally advanced. PET/CT scheduled for 3 weeks from now. Clinically he feels reasonably well. Cazares catheter has been removed. We reviewed that if he has advanced disease treatment with [facility] analog plus aromatase inhibitor and bone strengthening agent would be reasonable. Alternatively, if disease is more localized, pelvis radiation with concomitant [facility] analog +/- abiraterone would be applicable for  "very high risk" localized disease. Next generation sequencing is requested. I reviewed the above considerations with the patient and his family. They voiced understanding and agreement.

## 2023-07-31 NOTE — PROGRESS NOTES
Juan M Weiner 1950 is a 81 y.o. male Radiation Oncology consult for GS 9 (4+5) prostate cancer, referred by Dr. Kaylyn Felder. 81year old male with history of DM type 2 and HTN, presented to the ER on 6/21/23 with hematuria, suprapubic pain and urinary retention. Cazares catheter was placed and CT revealed bladder outlet obstruction and severe b/l hydronephrosis. Bilateral nephrostomy tubes were placed. PSA elevated at 145.73. He followed up with Urology outpatient for cystoscopy and prostate biopsy on 6/28/23, revealing GS 9 (4+5) grade group 5 in 12 cores. He was started on Firmagon on 7/5/23 and plan to start Lupron in 4 weeks. He is referred to Med Onc and Rad Onc. Component      Latest Ref Rng 5/24/2023   PSA, Total      0.00 - 4.00 ng/mL 145.73 (H)       6/20/23 CT SCAN  IMPRESSION:  1. Severe bilateral hydronephrosis, worse on the right with suggestion of mass in the urinary bladder which is difficult to separate from the prostate gland. Extraprostatic extension is noted. The presence of a bladder mass needs to be excluded. 2. Nonspecific  right lower lobe nodule. In view of patient's history of malignancy attention is needed on follow-up exam.    BONE SCAN:  IMPRESSION:  1. No focal tracer activity characteristic of osseous metastatic disease. Note is made of indeterminant focal tracer activity involving the right, likely T11 vertebral body of uncertain clinical significance but favored to be degenerative in nature, see discussion above. 2. Tracer activity within a significantly distended urinary bladder limits evaluation of the central pelvis/sacrum. 6/28/23 Cystoscopy and Prostate biopsy  Prostate adenocarcinoma, GS 4+5 = 9, grade group 5 in 12 cores      7/5/23 Urology, Dr. Kaylyn Felder  Prostate biopsy showing grade group 5 disease and all cores representative of high risk prostate cancer invading into the bladder based on cystoscopic and imaging findings.     Do not believe disease is surgically curable but recommend aggressive management with systemic antiandrogen therapy started today with Firmagon to be followed by Lupron as well as referral to medical oncology for consideration of advanced androgen deprivation therapy. Also placed a referral to radiation oncology given that he may be deemed to have some potential treatment benefit from local therapy, however this will be determined based on consultation with radiation oncology colleagues. Follow-up in 4 weeks. Upcoming appts:  7/31/23 Med Onc, Dr. Reynaldo Munoz  [facility]11[facility]               Oncology History   Prostate cancer [facility])   2023 Initial Diagnosis    Prostate cancer ([address])     6/28/2023 Biopsy    A. Prostate, right lateral base:  - Prostatic adenocarcinoma, Sanders score 4 + 5 = 9, Prognostic Grade Group 5,  involving 90% of 1 needle core  and measuring  11 mm in length. B. Prostate, right medial base:  - Prostatic adenocarcinoma, Liana score 4 + 5 = 9, Prognostic Grade Group 5,  involving 70% of 1 needle core  and measuring  10 mm in length. C. Prostate, right lateral mid:  - Prostatic adenocarcinoma, Sanders score 4 + 5 = 9, Prognostic Grade Group 5,  involving 90% of 1 needle core  and measuring  12 mm in length. Comment: Immunohistochemistry for a prostate multiplex stain (p63, K903, and P504S) and NKX3.1 demonstrate invasive carcinoma. D. Prostate, right medial mid:  - Prostatic adenocarcinoma, Liana score 4 + 5 = 9, Prognostic Grade Group 5,  involving 95% of 1 needle core  and measuring  15 mm in length. E. Prostate, right lateral apex:  - Prostatic adenocarcinoma, Liana score 4 + 5 = 9, Prognostic Grade Group 5,  involving 90% of 1 needle core  and measuring  12 mm in length. - Perineural invasion identified. Comment: Immunohistochemistry for a prostate multiplex stain (p63, K903, and P504S) and NKX3.1 demonstrate invasive carcinoma.      F. Prostate, right medial apex:  - Prostatic adenocarcinoma, Liana score 4 + 5 = 9, Prognostic Grade Group 5,  involving 100% of 1 needle core  and measuring  20 mm in length. G. Prostate, left lateral base:  - Prostatic adenocarcinoma, [place] score 4 + 5 = 9, Prognostic Grade Group 5,  involving 75% of 1 needle core  and measuring  10 mm in length. - Perineural invasion identified. - Lymphovascular invasion is identified. H. Prostate, left medial base:  - Prostatic adenocarcinoma, Liana score 4 + 5 = 9, Prognostic Grade Group 5,  involving 95% of 1 needle core  and measuring  14 mm in length. - Perineural invasion identified. Comment: There is a focus of closely approximated gastrointestinal epithelium; NKX3. 1 shows no definite invasion of the GI epithelium. I. Prostate, left lateral mid:  - Prostatic adenocarcinoma, [place] score 4 + 5 = 9, Prognostic Grade Group 5,  involving 95% of 1 needle core  and measuring  12 mm in length. J. Prostate, left medial mid:  - Prostatic adenocarcinoma, [place] score 4 + 5 = 9, Prognostic Grade Group 5,  involving 85% of 1 needle core  and measuring  13 mm in length. Comment: Immunohistochemistry for a prostate multiplex stain (p63, K903, and P504S) and NKX3.1 demonstrate invasive carcinoma. K. Prostate, left lateral apex:  - Prostatic adenocarcinoma, [place] score 4 + 5 = 9, Prognostic Grade Group 5,  involving 25% of 1 needle core  and measuring  3 mm in length. L. Prostate, left medial apex :  - Prostatic adenocarcinoma, [place] score 4 + 5 = 9, Prognostic Grade Group 5,  involving 95% of 1 needle core  and measuring  15 mm in length. - Perineural invasion identified. Comment:   Cribriform glands are present within the pattern 4 component. This feature has been associated with adverse clinical outcomes and molecular features typically seen in advanced disease.   (The 2019 Genitourinary Pathology Society (GUPS) White Paper on Contemporary Grading of Prostate Cancer. Arch Pathol Lab Med. 2021 Apr 1;145(4):461-493.)     7/5/2023 -  Hormone Therapy    Firmagon loading dose on 7/5/23, followed by Lupron          Review of Systems:  Review of Systems   Constitutional: Positive for appetite change (decreased appetite), fatigue and unexpected weight change (about 10lbs in 3 months). Negative for chills and fever. HENT: Negative. Eyes: Positive for photophobia (with cell phone use, eyes tire after 5-10 min). Wears glasses, possible glaucoma   Respiratory: Negative. Negative for cough and shortness of breath. Cardiovascular: Negative. Gastrointestinal: Positive for abdominal pain and constipation (was doing stool softer and eating fiber). Negative for blood in stool, diarrhea, nausea and vomiting. Endocrine: Positive for cold intolerance ("cold flashes" always cold). Negative for heat intolerance. Genitourinary: Positive for decreased urine volume, difficulty urinating and dysuria. Negative for frequency, hematuria and urgency. Has bilateral nephrostomy tubes, sometimes urinate through penis, has to push/strain to urinate, stream stops and starts, denies any hematuria, nocturia x1   Musculoskeletal: Positive for back pain (lower back pains). Negative for arthralgias and myalgias. Skin: Negative. Allergic/Immunologic: Negative. Neurological: Positive for weakness (generalized). Negative for dizziness, light-headedness, numbness and headaches. Hematological: Does not bruise/bleed easily. Psychiatric/Behavioral: Negative for sleep disturbance. The patient is nervous/anxious (reports some stress and anxiety with prognosis). Clinical Trial: no    IPSS Questionnaire (AUA-7): Over the past month…    1)  How often have you had a sensation of not emptying your bladder completely after you finish urinating? 3 - About half the time   2)  How often have you had to urinate again less than two hours after you finished urinating?  0 - Not at all   3)  How often have you found you stopped and started again several times when you urinated? 4 - More than half the time   4) How difficult have you found it to postpone urination? 0 - Not at all   5) How often have you had a weak urinary stream?  3 - About half the time   6) How often have you had to push or strain to begin urination? 5 - Almost always   7) How many times did you most typically get up to urinate from the time you went to bed until the time you got up in the morning?   1 - 1 time   Total Score:  16       Pain assessment: 4    PFT n/a    Prior Radiation no    Teaching NCI packet reviewed and given      MST Completed    Implantable Devices (Port, pacemaker, pain stimulator) no    Hip Replacement no    Health Maintenance   Topic Date Due   • Medicare Annual Wellness Visit (AWV)  Never done   • COVID-19 Vaccine (1) Never done   • Pneumococcal Vaccine: 65+ Years (1 - PCV) Never done   • Diabetic Foot Exam  Never done   • DM Eye Exam  Never done   • Colorectal Cancer Screening  Never done   • Fall Risk  Never done   • Depression Screening  04/03/2021   • Influenza Vaccine (1) 09/01/2023   • HEMOGLOBIN A1C  11/24/2023   • Kidney Health Evaluation: Albumin/Creatinine Ratio  06/21/2024   • Kidney Health Evaluation: GFR  06/25/2024   • BMI: Adult  07/18/2024   • Hepatitis C Screening  Completed   • HIB Vaccine  Aged Out   • IPV Vaccine  Aged Out   • Hepatitis A Vaccine  Aged Out   • Meningococcal ACWY Vaccine  Aged Out   • HPV Vaccine  Aged Out       Past Medical History:   Diagnosis Date   • Diabetes mellitus ([address])    • High cholesterol    • Hypertension        Past Surgical History:   Procedure Laterality Date   • IR NEPHROSTOMY TUBE PLACEMENT  6/22/2023       Family History   Problem Relation Age of Onset   • No Known Problems Father        Social History     Tobacco Use   • Smoking status: Former     Passive exposure: Past   • Smokeless tobacco: Never   Vaping Use   • Vaping Use: Never used Substance Use Topics   • Alcohol use: Yes   • Drug use: Never          Current Outpatient Medications:   •  albuterol (ProAir HFA) 90 mcg/act inhaler, Inhale 2 puffs every 4 (four) hours as needed for wheezing or shortness of breath, Disp: 8.5 g, Rfl: 0  •  glimepiride (AMARYL) 4 mg tablet, , Disp: , Rfl:   •  Invokana 100 MG, , Disp: , Rfl:   •  Levemir FlexPen 100 units/mL injection pen, 40 Units, Disp: , Rfl:   •  lisinopril (ZESTRIL) 10 mg tablet, Take 10 mg by mouth daily, Disp: , Rfl:   •  Lovaza 1 g capsule, 2 (two) times a day, Disp: , Rfl:   •  Multiple Vitamins-Minerals (Sentry) TABS, , Disp: , Rfl:   •  OneTouch Verio test strip, , Disp: , Rfl:   •  rosuvastatin (CRESTOR) 10 MG tablet, Take 10 mg by mouth daily, Disp: , Rfl:   •  sodium chloride, PF, 0.9 %, 10 mL by Intracatheter route daily, Disp: 300 mL, Rfl: 3  •  sodium chloride, PF, 0.9 %, 10 mL by Intracatheter route daily Intracatheter flushing daily. May substitute prefilled syringe with normal saline 10 mL vials, 10 mL syringes, and 18 g blunt needles, Disp: 900 mL, Rfl: 3  •  Spacer/Aero-Holding Chambers (AEROCHAMBER MINI CHAMBER) BILLY, by Does not apply route see administration instructions (Patient not taking: Reported on 6/22/2023), Disp: 1 Device, Rfl: 0    No Known Allergies     There were no vitals filed for this visit.

## 2023-07-31 NOTE — LETTER
July 31, 2023     Michelle Duron, [address] [address]    Patient: Harlan Cochran   YOB: 1950   Date of Visit: 7/31/2023       Dear Dr. Юлия Bender: Thank you for referring Harlan Cochran to me for evaluation. Below are my notes for this consultation. If you have questions, please do not hesitate to call me. I look forward to following your patient along with you. Sincerely,        Hayden Osullivan, DO        CC: MD Goldie Shaikh MD Leonia Mood, DO  7/31/2023  4:43 PM  Sign when Signing Visit    Harlan Cochran  1950  [address] [place] [address]  [phone]    Chief Complaint   Patient presents with   • Consult           Oncology History   Prostate cancer [facility])   2023 Initial Diagnosis    Prostate cancer ([address] [facility])     6/28/2023 Biopsy    A. Prostate, right lateral base:  - Prostatic adenocarcinoma, Liana score 4 + 5 = 9, Prognostic Grade Group 5,  involving 90% of 1 needle core  and measuring  11 mm in length. B. Prostate, right medial base:  - Prostatic adenocarcinoma, Liana score 4 + 5 = 9, Prognostic Grade Group 5,  involving 70% of 1 needle core  and measuring  10 mm in length. C. Prostate, right lateral mid:  - Prostatic adenocarcinoma, Liana score 4 + 5 = 9, Prognostic Grade Group 5,  involving 90% of 1 needle core  and measuring  12 mm in length. Comment: Immunohistochemistry for a prostate multiplex stain (p63, K903, and P504S) and NKX3.1 demonstrate invasive carcinoma. D. Prostate, right medial mid:  - Prostatic adenocarcinoma, Liana score 4 + 5 = 9, Prognostic Grade Group 5,  involving 95% of 1 needle core  and measuring  15 mm in length.       E. Prostate, right lateral apex:  - Prostatic adenocarcinoma, [place] score 4 + 5 = 9, Prognostic Grade Group 5,  involving 90% of 1 needle core and measuring  12 mm in length. - Perineural invasion identified. Comment: Immunohistochemistry for a prostate multiplex stain (p63, K903, and P504S) and NKX3.1 demonstrate invasive carcinoma. F. Prostate, right medial apex:  - Prostatic adenocarcinoma, Liana score 4 + 5 = 9, Prognostic Grade Group 5,  involving 100% of 1 needle core  and measuring  20 mm in length. G. Prostate, left lateral base:  - Prostatic adenocarcinoma, Liana score 4 + 5 = 9, Prognostic Grade Group 5,  involving 75% of 1 needle core  and measuring  10 mm in length. - Perineural invasion identified. - Lymphovascular invasion is identified. H. Prostate, left medial base:  - Prostatic adenocarcinoma, [place] score 4 + 5 = 9, Prognostic Grade Group 5,  involving 95% of 1 needle core  and measuring  14 mm in length. - Perineural invasion identified. Comment: There is a focus of closely approximated gastrointestinal epithelium; NKX3. 1 shows no definite invasion of the GI epithelium. I. Prostate, left lateral mid:  - Prostatic adenocarcinoma, Liana score 4 + 5 = 9, Prognostic Grade Group 5,  involving 95% of 1 needle core  and measuring  12 mm in length. J. Prostate, left medial mid:  - Prostatic adenocarcinoma, Liana score 4 + 5 = 9, Prognostic Grade Group 5,  involving 85% of 1 needle core  and measuring  13 mm in length. Comment: Immunohistochemistry for a prostate multiplex stain (p63, K903, and P504S) and NKX3.1 demonstrate invasive carcinoma. K. Prostate, left lateral apex:  - Prostatic adenocarcinoma, Liana score 4 + 5 = 9, Prognostic Grade Group 5,  involving 25% of 1 needle core  and measuring  3 mm in length. L. Prostate, left medial apex :  - Prostatic adenocarcinoma, [place] score 4 + 5 = 9, Prognostic Grade Group 5,  involving 95% of 1 needle core  and measuring  15 mm in length. - Perineural invasion identified.      Comment:   Cribriform glands are present within the pattern 4 component. This feature has been associated with adverse clinical outcomes and molecular features typically seen in advanced disease. (The 2019 Genitourinary Pathology Society (GUPS) White Paper on Contemporary Grading of Prostate Cancer. Arch Pathol Lab Med. 2021 Apr 1;145(4):461-493.)     7/5/2023 -  Hormone Therapy    Firmagon loading dose on 7/5/23, followed by Lupron          History of Present Illness:  April 2023 patient presented with urinary frequency. . CT showed distended urinary bladder with bilateral hydronephrosis. Urinary bladder mass inseparable from the prostate. Extraprostatic extension noted. Bone scan showed indeterminate activity at T11 vertebral body. Bilateral nephrostomy tubes and Cazares catheter placed. Prostate biopsy showed adenocarcinoma, [place] 9. Charlies Brash administered July 5, 2023. Review of Systems   Constitutional: Negative for chills and fever. HENT: Negative for nosebleeds. Eyes: Negative for discharge. Respiratory: Negative for cough and shortness of breath. Cardiovascular: Negative for chest pain. Gastrointestinal: Negative for abdominal pain, constipation and diarrhea. Endocrine: Negative for polydipsia. Genitourinary: Negative for hematuria. Musculoskeletal: Negative for arthralgias. Skin: Negative for color change. Allergic/Immunologic: Negative for immunocompromised state. Neurological: Negative for dizziness and headaches. Hematological: Negative for adenopathy. Psychiatric/Behavioral: Negative for agitation.        Patient Active Problem List   Diagnosis   • Type 2 diabetes mellitus, with long-term current use of insulin (HCC)   • Elevated PSA   • Other hydronephrosis   • Elevated serum creatinine   • Hypertension   • Hematuria   • Encounter to discuss test results   • Encounter for support and coordination of transition of care   • Bilateral hydronephrosis   • Prostate cancer [facility])     Past Medical History: Diagnosis Date   • Diabetes mellitus ([address] [facility])    • High cholesterol    • Hypertension    • Prostate cancer [facility])      Past Surgical History:   Procedure Laterality Date   • IR NEPHROSTOMY TUBE PLACEMENT  2023    bilateral   • US GUIDED PROSTATE BIOPSY       Family History   Problem Relation Age of Onset   • Liver cancer Father      Social History     Socioeconomic History   • Marital status: /Civil Union     Spouse name: Not on file   • Number of children: Not on file   • Years of education: Not on file   • Highest education level: Not on file   Occupational History   • Not on file   Tobacco Use   • Smoking status: Former     Packs/day: 0.25     Types: Cigarettes     Quit date:      Years since quittin.5     Passive exposure: Past   • Smokeless tobacco: Never   • Tobacco comments:     States he only smoked on the weekends   Vaping Use   • Vaping Use: Never used   Substance and Sexual Activity   • Alcohol use: Not Currently     Comment: socially   • Drug use: Never   • Sexual activity: Not Currently     Partners: Female   Other Topics Concern   • Not on file   Social History Narrative   • Not on file     Social Determinants of Health     Financial Resource Strain: Not on file   Food Insecurity: No Food Insecurity (2023)    Hunger Vital Sign    • Worried About Running Out of Food in the Last Year: Never true    • Ran Out of Food in the Last Year: Never true   Transportation Needs: No Transportation Needs (2023)    PRAPARE - Transportation    • Lack of Transportation (Medical): No    • Lack of Transportation (Non-Medical):  No   Physical Activity: Not on file   Stress: Not on file   Social Connections: Not on file   Intimate Partner Violence: Not on file   Housing Stability: Unknown (2023)    Housing Stability Vital Sign    • Unable to Pay for Housing in the Last Year: No    • Number of Places Lived in the Last Year: Not on file    • Unstable Housing in the Last Year: No       Current Outpatient Medications:   •  albuterol (ProAir HFA) 90 mcg/act inhaler, Inhale 2 puffs every 4 (four) hours as needed for wheezing or shortness of breath, Disp: 8.5 g, Rfl: 0  •  glimepiride (AMARYL) 4 mg tablet, Take 4 mg by mouth daily with breakfast, Disp: , Rfl:   •  Invokana 100 MG, Take 100 mg by mouth daily before breakfast, Disp: , Rfl:   •  Levemir FlexPen 100 units/mL injection pen, Inject 40 Units under the skin daily, Disp: , Rfl:   •  lisinopril (ZESTRIL) 10 mg tablet, Take 10 mg by mouth daily, Disp: , Rfl:   •  Lovaza 1 g capsule, 2 (two) times a day, Disp: , Rfl:   •  Multiple Vitamins-Minerals (Sentry) TABS, Take 1 tablet by mouth daily, Disp: , Rfl:   •  OneTouch Verio test strip, , Disp: , Rfl:   •  rosuvastatin (CRESTOR) 10 MG tablet, Take 10 mg by mouth daily, Disp: , Rfl:   •  sodium chloride, PF, 0.9 %, 10 mL by Intracatheter route daily, Disp: 300 mL, Rfl: 3  •  sodium chloride, PF, 0.9 %, 10 mL by Intracatheter route daily Intracatheter flushing daily. May substitute prefilled syringe with normal saline 10 mL vials, 10 mL syringes, and 18 g blunt needles, Disp: 900 mL, Rfl: 3  •  Spacer/Aero-Holding Chambers (AEROCHAMBER MINI CHAMBER) BILLY, by Does not apply route see administration instructions, Disp: 1 Device, Rfl: 0  No Known Allergies  Vitals:    07/31/23 1551   BP: 122/74   Pulse: 80   Resp: 17   Temp: 98.5 °F (36.9 °C)   SpO2: 96%       Physical Exam  Constitutional:       Appearance: He is well-developed. HENT:      Head: Normocephalic and atraumatic. Eyes:      Pupils: Pupils are equal, round, and reactive to light. Cardiovascular:      Rate and Rhythm: Normal rate and regular rhythm. Heart sounds: No murmur heard. Pulmonary:      Breath sounds: Normal breath sounds. No wheezing or rales. Abdominal:      Palpations: Abdomen is soft. Tenderness: There is no abdominal tenderness. Musculoskeletal:         General: No tenderness. Normal range of motion.       Cervical back: Neck supple. Lymphadenopathy:      Cervical: No cervical adenopathy. Skin:     Findings: No erythema or rash. Neurological:      Mental Status: He is alert and oriented to person, place, and time. Cranial Nerves: No cranial nerve deficit. Deep Tendon Reflexes: Reflexes are normal and symmetric. Psychiatric:         Behavior: Behavior normal.           Labs:  CBC, Coags, BMP, Mg, Phos     Imaging  No results found. I reviewed the above laboratory and imaging data. Discussion/Summary: In summary, this is a 70-year-old male with a history of newly diagnosed prostate cancer, Liana 9, at least locally advanced. PET/CT scheduled for 3 weeks from now. Clinically he feels reasonably well. Cazares catheter has been removed. We reviewed that if he has advanced disease treatment with [facility] analog plus aromatase inhibitor and bone strengthening agent would be reasonable. Alternatively, if disease is more localized, pelvis radiation with concomitant [facility] analog +/- abiraterone would be applicable for  "very high risk" localized disease. Next generation sequencing is requested. I reviewed the above considerations with the patient and his family. They voiced understanding and agreement.

## 2023-08-01 ENCOUNTER — TELEPHONE (OUTPATIENT)
Dept: NUTRITION | Facility: HOSPITAL | Age: 73
End: 2023-08-01

## 2023-08-01 NOTE — TELEPHONE ENCOUNTER
Received notification by radiation oncology RN Mary Carmen Kaufman on 7/31 that pt has triggered for oncology nutrition care (reason for referral: Malnutrition Screening Tool (MST) Triggers: scored a 2 indicating 2-13# (0.9-6 kg) recent wt loss and is eating poorly due to a decreased appetite. (Date of MST: 7/31/23) and family request due to pt has decreased appetite and family requesting, pt is also diabetic). Contacted Eduardo's Son Hardeep Ceja  and introduced self and explained the reason for today's call. Spoke with Oliver's son Hardeep Ceja  today who reports patient has had decreased appetite and intake since his prostate cancer diagnosis. Hardeep Ceja notes patient has had an improvement in his appetite and intake with resulting slight weight gain. Patient and family interested in diet education for patient related to his diet recommendations during tx (? Need low fiber with RT) and his DM- Hardeep Ceja notes patient has never managed his DM with his diet or followed specific dietary guidelines. Called patient and his wife with  services as per Sumit's request.   Outpatient Oncology Nutrition Consultation   Type of Consult: Initial Consult  Care Location: Telephone Call    Reason for referral: weight loss and diet education     Nutrition Assessment:   Oncology Diagnosis & Treatments: prostate cancer to have PET CT   Oncology History   Prostate cancer ([address])   2023 Initial Diagnosis    Prostate cancer ([address])     6/28/2023 Biopsy    A. Prostate, right lateral base:  - Prostatic adenocarcinoma, [place] score 4 + 5 = 9, Prognostic Grade Group 5,  involving 90% of 1 needle core  and measuring  11 mm in length. B. Prostate, right medial base:  - Prostatic adenocarcinoma, [place] score 4 + 5 = 9, Prognostic Grade Group 5,  involving 70% of 1 needle core  and measuring  10 mm in length.       C. Prostate, right lateral mid:  - Prostatic adenocarcinoma, [place] score 4 + 5 = 9, Prognostic Grade Group 5,  involving 90% of 1 needle core  and measuring  12 mm in length. Comment: Immunohistochemistry for a prostate multiplex stain (p63, K903, and P504S) and NKX3.1 demonstrate invasive carcinoma. D. Prostate, right medial mid:  - Prostatic adenocarcinoma, [place] score 4 + 5 = 9, Prognostic Grade Group 5,  involving 95% of 1 needle core  and measuring  15 mm in length. E. Prostate, right lateral apex:  - Prostatic adenocarcinoma, [place] score 4 + 5 = 9, Prognostic Grade Group 5,  involving 90% of 1 needle core  and measuring  12 mm in length. - Perineural invasion identified. Comment: Immunohistochemistry for a prostate multiplex stain (p63, K903, and P504S) and NKX3.1 demonstrate invasive carcinoma. F. Prostate, right medial apex:  - Prostatic adenocarcinoma, [place] score 4 + 5 = 9, Prognostic Grade Group 5,  involving 100% of 1 needle core  and measuring  20 mm in length. G. Prostate, left lateral base:  - Prostatic adenocarcinoma, [place] score 4 + 5 = 9, Prognostic Grade Group 5,  involving 75% of 1 needle core  and measuring  10 mm in length. - Perineural invasion identified. - Lymphovascular invasion is identified. H. Prostate, left medial base:  - Prostatic adenocarcinoma, [place] score 4 + 5 = 9, Prognostic Grade Group 5,  involving 95% of 1 needle core  and measuring  14 mm in length. - Perineural invasion identified. Comment: There is a focus of closely approximated gastrointestinal epithelium; NKX3. 1 shows no definite invasion of the GI epithelium. I. Prostate, left lateral mid:  - Prostatic adenocarcinoma, [place] score 4 + 5 = 9, Prognostic Grade Group 5,  involving 95% of 1 needle core  and measuring  12 mm in length. J. Prostate, left medial mid:  - Prostatic adenocarcinoma, [place] score 4 + 5 = 9, Prognostic Grade Group 5,  involving 85% of 1 needle core  and measuring  13 mm in length.        Comment: Immunohistochemistry for a prostate multiplex stain (p63, K903, and P504S) and NKX3.1 demonstrate invasive carcinoma. K. Prostate, left lateral apex:  - Prostatic adenocarcinoma, [place] score 4 + 5 = 9, Prognostic Grade Group 5,  involving 25% of 1 needle core  and measuring  3 mm in length. L. Prostate, left medial apex :  - Prostatic adenocarcinoma, [place] score 4 + 5 = 9, Prognostic Grade Group 5,  involving 95% of 1 needle core  and measuring  15 mm in length. - Perineural invasion identified. Comment:   Cribriform glands are present within the pattern 4 component. This feature has been associated with adverse clinical outcomes and molecular features typically seen in advanced disease. (The 2019 Genitourinary Pathology Society (GUPS) White Paper on Contemporary Grading of Prostate Cancer.  Arch Pathol Lab Med. 2021 Apr 1;145(4):461-493.)     7/5/2023 -  Hormone Therapy    Firmagon loading dose on 7/5/23, followed by Sulma        Past Medical & Surgical Hx:   Patient Active Problem List   Diagnosis   • Type 2 diabetes mellitus, with long-term current use of insulin (HCC)   • Elevated PSA   • Other hydronephrosis   • Elevated serum creatinine   • Hypertension   • Hematuria   • Encounter to discuss test results   • Encounter for support and coordination of transition of care   • Bilateral hydronephrosis   • Prostate cancer [facility])     Past Medical History:   Diagnosis Date   • Diabetes mellitus ([address])    • High cholesterol    • Hypertension    • Prostate cancer [facility])      Past Surgical History:   Procedure Laterality Date   • IR NEPHROSTOMY TUBE PLACEMENT  06/22/2023    bilateral   • US GUIDED PROSTATE BIOPSY         Review of Medications:   Vitamins, Supplements and Herbals: No, pt denies taking supplements    Current Outpatient Medications:   •  albuterol (ProAir HFA) 90 mcg/act inhaler, Inhale 2 puffs every 4 (four) hours as needed for wheezing or shortness of breath, Disp: 8.5 g, Rfl: 0  •  glimepiride (AMARYL) 4 mg tablet, Take 4 mg by mouth daily with breakfast, Disp: , Rfl:   •  Invokana 100 MG, Take 100 mg by mouth daily before breakfast, Disp: , Rfl:   •  Levemir FlexPen 100 units/mL injection pen, Inject 40 Units under the skin daily, Disp: , Rfl:   •  lisinopril (ZESTRIL) 10 mg tablet, Take 10 mg by mouth daily, Disp: , Rfl:   •  Lovaza 1 g capsule, 2 (two) times a day, Disp: , Rfl:   •  Multiple Vitamins-Minerals (Sentry) TABS, Take 1 tablet by mouth daily, Disp: , Rfl:   •  OneTouch Verio test strip, , Disp: , Rfl:   •  rosuvastatin (CRESTOR) 10 MG tablet, Take 10 mg by mouth daily, Disp: , Rfl:   •  sodium chloride, PF, 0.9 %, 10 mL by Intracatheter route daily, Disp: 300 mL, Rfl: 3  •  sodium chloride, PF, 0.9 %, 10 mL by Intracatheter route daily Intracatheter flushing daily.  May substitute prefilled syringe with normal saline 10 mL vials, 10 mL syringes, and 18 g blunt needles, Disp: 900 mL, Rfl: 3  •  Spacer/Aero-Holding Chambers (AEROCHAMBER MINI CHAMBER) BILLY, by Does not apply route see administration instructions, Disp: 1 Device, Rfl: 0    Most Recent Lab Results:   Lab Results   Component Value Date    WBC 8.82 06/25/2023    NEUTROABS 5.60 06/25/2023    ALT 9 06/25/2023    AST 12 (L) 06/25/2023    ALB 3.3 (L) 06/25/2023    SODIUM 138 06/25/2023    SODIUM 138 06/23/2023    K 3.9 06/25/2023    K 4.6 06/23/2023     06/25/2023    BUN 13 06/25/2023    BUN 19 06/23/2023    CREATININE 1.43 (H) 06/25/2023    CREATININE 1.56 (H) 06/23/2023    EGFR 49 06/25/2023    POCGLU 137 06/26/2023    GLUF 55 (L) 06/20/2023    GLUC 71 06/25/2023    HGBA1C 7.4 (H) 05/24/2023    CALCIUM 8.3 (L) 06/25/2023    MG 2.4 06/22/2023       Anthropometric Measurements:   Height: 5/9"  Ht Readings from Last 1 Encounters:   07/31/23 5' 9" (1.753 m)     Wt Readings from Last 20 Encounters:   07/31/23 73.5 kg (162 lb)   07/31/23 72.6 kg (160 lb)   07/18/23 71.7 kg (158 lb)   07/17/23 71.2 kg (157 lb)   07/05/23 71.2 kg (157 lb)   06/28/23 70.8 kg (156 lb)   06/22/23 71.9 kg (158 lb 9.6 oz)   06/21/23 76.2 kg (168 lb)   05/17/23 76 kg (167 lb 9.6 oz)   04/18/23 79.8 kg (176 lb)   12/08/21 79.8 kg (176 lb)   04/03/20 80.3 kg (177 lb)   03/06/20 80.3 kg (177 lb)       Weight History:   • Usual Weight: 175#  • Varian: (n/a) n/a#  • Home Scale: (n/a) n/a#  Oncology Nutrition-Anthropometrics    Flowsheet Row Telephone from 8/1/2023 in [address] [facility]   Patient age (years): 81 years   Patient (male) height (in): 71 in   Current weight (lbs): 161.66 lbs   Current weight to be used for anthropometric calculations (kg) 73.5 kg   BMI: 23.9   IBW male 160 lb   IBW (kg) male 72.7 kg   IBW % (male) 101 %   Adjusted BW (male): 160.4 lbs   Adjusted BW in kg (male): 72.9 kg   % weight change after 1 week: 2.5 %   Weight change after 1 week (lbs) 4 lbs   % weight change after 1 month: 3.8 %   Weight change after 1 month (lbs) 6 lbs   % weight change after 3 months: -8 %   Weight change after 3 months (lbs) -14 lbs          Nutrition-Focused Physical Findings: n/a due to telephone call    Food/Nutrition-Related History & Client/Social History:    Current Nutrition Impact Symptoms:  [] Nausea  [x] Reduced Appetite now resolved  [] Acid Reflux    [] Vomiting  [x] Unintended Wt Loss - resolved recently  [] Malabsorption    [] Diarrhea  [] Unintended Wt Gain  [] Dumping Syndrome    [] Constipation  [] Thick Mucous/Secretions  [] Abdominal Pain    [] Dysgeusia (Altered Taste)  [] Xerostomia (Dry Mouth)  [] Gas    [] Dysosmia (Altered Smell)  [] Thrush  [] Difficulty Chewing    [] Oral Mucositis (Sore Mouth)  [] Fatigue  [] Hyperglycemia   Lab Results   Component Value Date    HGBA1C 7.4 (H) 05/24/2023      [] Odynophagia  [] Esophagitis  [] Other:    [] Dysphagia  [] Early Satiety  [] No Problems Eating      Food Allergies & Intolerances: no    Current Diet: Cancer-Preventative Eating Pattern-- wife has removed all processed foods from patient's diet.    Current Nutrition Intake: back to his usual intake over past week or two   Appetite: Good  Nutrition Route: PO  Oral Care: n/a   Activity level: sedentary     24 Hr Diet Recall:   Breakfast: green juices with berries and carrots and spinach, half cup coffee, slice of whole grain bread with 1 egg   Snack: banana or other fruits such as watermelon, mandarin oranges and mangos   Lunch: he has chicken soup or meat or fish with salad (with Ramón dressing)  and added bean  Snack: n/a    Dinner: potatoes or rice with chicken or fish and vegetables(broccoli at times as well as tomatoes, cucumber - with  - patient does not eat any red meat or pork   Snack: may have more fruit to snack     Beverages: water (he drinks 6-8 bottles per day as per doctor's office nurse ), juice- cranberry and/or orange a few glasses a day along with smoothie at breakfast   Supplements:   • N/A      Oncology Nutrition-Estimated Needs    Flowsheet Row Telephone from 2023 in [facility]   Weight type used Actual weight   Weight in kilograms (kg) used for estimated needs 73.5 kg   Energy needs formula: Other (range)   Minumum range value (kcal/kg): 27   Maximum range value (kcal/kg): 30   Energy needs based on minimum value: 1985 kcal   Energy needs based on maximum value: 2205 kcal   Protein needs formula: 1-1.2 g/kg   Protein needs based on 1 g/kg 73 g   Protein needs based on 1.2 g/k g   Fluid needs formula: 30-35 mL/kg   Fluid needs based on 30 mL/kg 2208 mL   Fluid needs in ounces 75 oz   Fluid needs based on 35 mL/kg 2576 mL   Fluid needs in ounces 87 oz           Discussion & Intervention:   Deb Obrien was evaluated today for an initial RD consultation regarding unintended weight loss and pt request for dietary education . Deb Obrien is planned to undergo tx for prostate cancer- exact plan to be determined following further testing .  Patient's wife is concerned about what type of diet patient should be following. She notes that since patient's diagnosis he is no longer eating any processed foods- She is preparing all meals for him from scratch and avoiding all red meat, pork and added sugars. Patient is not drinking any soda. Wife notes patient has been receptive to these changes- he is happy to eat no processed foods or red meats. Reviewed 24 hour recall, which revealed an adequate po intake, and discussed ways to optimize nutrient intake. Also reviewed the importance of wt management throughout the tx process and the role of a high kcal/ high protein diet and a cancer preventative diet in managing wt and overall health. Based on today's assessment, discussion included: MNT for: weight loss and cancer prevetative diet, how to modify foods for anticipated nutrition impact symptoms pt may experience during CA tx, choosing a variety of colorful, plant-based foods to support a cancer preventative diet, adequate hydration & fluid choices and discussed how diet may need to be modified as patient undergoes treatment. Patient's wife notes she was told patient may need to reduce fiber in his diet during treatment and that she has education materials on this from RT. Discussed with wife that this is correct but that for now, patient is able to consume fiber containing foods. Also discussed with wife that should patient have difficulty managing BG levels during treatment, it may be beneficial for him to cut back on juices/fruits    Moving forward, Payton Doan was encouraged to increase kcal, protein, and fluid intakes and will continue current nutrition plan of care . Materials Provided: not applicable  All questions and concerns addressed during today’s visit. Payton Doan and his spouse have RD contact information. Nutrition Diagnosis:   • Increased Nutrient Needs (kcal & pro) related to increased demand for nutrients and disease state as evidenced by cancer dx and pt undergoing tx for cancer.   • Food and nutrition related knowledge deficit related to Lack of prior exposure to accurate nutrition related information as evidenced by No prior knowledge of need for food and nutrition related recommendations. Monitoring & Evaluation:   Goals:  · weight maintenance/stabilization  · adequate nutrition impact symptom management  · Dietary modifications as indicated during treatment     · Progress Towards Goals: Initiated    Nutrition Rx & Recommendations:    Continue current diet - avoiding processed foods, balanced meals and snacks   Adequate fluid intake during the day - 5-6 bottles of water (10-12 fluid ounces each)Plus other fluids in juices and smoothie  Incorporate physical activity as able/allowed. Alter food choices and eating patterns to accommodate changing needs.   continue to monitor BG levels at home (DIL checks frequently per wife)   continue diet high in fruits/vegetablew and limited processed foods      Follow Up Plan: will call patient's wife once patient's treatment plan is in place after August 29, 2023   Recommend Referral to Other Providers: none at this time

## 2023-08-01 NOTE — PROGRESS NOTES
Consultation - Radiation Oncology      UPR:[id number] : 1950  Encounter: [id number]  Patient Information: [address]  Chief Complaint   Patient presents with   • Consult     Radiation Oncology     Cancer Staging   No matching staging information was found for the patient. Assessment and Plan:  Mr. Jimena Graham is a 81year old man with recently diagnosed very high risk (cT4, GS 4+5, ) vs metastatic prostate adenocarcinoma who presented with urinary retention and hematuria and is seen today in consideration of RT. We reviewed the patient's recent clinical history, pathology report, and imaging studies. While the patient does not have clear evidence of metastatic disease on CT Abd/pelvis without contrast or bone scan, he remains at exceptionally high risk for harboring metastatic disease given his diffuse GG 5 cancer and PSA of 145. Review of his prior CT abd/pelvis also shows multiple small pelvic and retroperitoneal LNs, which could represent kaylie spread. For this reason I have recommended PSMA PET for staging in order to determine his extent of disease. If this demonstrates metastatic disease then I will recommend systemic therapy with the possible addition of palliative RT to his prostate pending response to treatment. If localized disease is demonstrated he may be a candidate for higher dose definitive treatment, though potentially not with his current disease extent. He will return to clinic following completion of PSMA PET to review further and discuss next steps in treatment. Summary:  - PSMA PET recommended. Further recommendation pending completion of staging. History of Present Illness   Mr. Jimena Graham is a 81year old man who initially presented to the ED with gross hematuria and urinary retention (23).  CT Abd/Pelv (23) demonstrated severe bilateral hydronephrosis, worse on the right than left with suggestion of mass in the urinary bladder which is difficult to separate from the prostate gland, extraprostatic extension is noted. Bone scan (6/20/23) demonstrated indeterminate focal tracer activity involving the right Z64, felt to be uncertain but favored to represent degenerative disease. PSA was elevated to 145. 73. The patient underwent prostate biopsy (6/28/23) prostate adenocarcinoma, GS 4+5 in 12/12 cores. Cystoscopy (6/28/23) demonstrated abnormal findings in the trigone with bullous changes throughout consistent with possible prostate cancer invasion. The patient was started on firmagon (7/5/23) and referred to our office for consideration of RT. Currently he is doing fair overall. He has undergone interval bilateral nephrostomy tube placement with ceballos catheter removal. He does still produce some urine through the penis, but has significant obstructive symptoms feeling he needs to push/strain. He does have fatigue, appetite, and weight loss. Historical Information   Oncology History   Prostate cancer ([address])   2023 Initial Diagnosis    Prostate cancer ([address])     6/28/2023 Biopsy    A. Prostate, right lateral base:  - Prostatic adenocarcinoma, Liana score 4 + 5 = 9, Prognostic Grade Group 5,  involving 90% of 1 needle core  and measuring  11 mm in length. B. Prostate, right medial base:  - Prostatic adenocarcinoma, [place] score 4 + 5 = 9, Prognostic Grade Group 5,  involving 70% of 1 needle core  and measuring  10 mm in length. C. Prostate, right lateral mid:  - Prostatic adenocarcinoma, [place] score 4 + 5 = 9, Prognostic Grade Group 5,  involving 90% of 1 needle core  and measuring  12 mm in length. Comment: Immunohistochemistry for a prostate multiplex stain (p63, K903, and P504S) and NKX3.1 demonstrate invasive carcinoma. D. Prostate, right medial mid:  - Prostatic adenocarcinoma, [place] score 4 + 5 = 9, Prognostic Grade Group 5,  involving 95% of 1 needle core  and measuring  15 mm in length. E. Prostate, right lateral apex:  - Prostatic adenocarcinoma, [place] score 4 + 5 = 9, Prognostic Grade Group 5,  involving 90% of 1 needle core  and measuring  12 mm in length. - Perineural invasion identified. Comment: Immunohistochemistry for a prostate multiplex stain (p63, K903, and P504S) and NKX3.1 demonstrate invasive carcinoma. F. Prostate, right medial apex:  - Prostatic adenocarcinoma, Liana score 4 + 5 = 9, Prognostic Grade Group 5,  involving 100% of 1 needle core  and measuring  20 mm in length. G. Prostate, left lateral base:  - Prostatic adenocarcinoma, [place] score 4 + 5 = 9, Prognostic Grade Group 5,  involving 75% of 1 needle core  and measuring  10 mm in length. - Perineural invasion identified. - Lymphovascular invasion is identified. H. Prostate, left medial base:  - Prostatic adenocarcinoma, Liana score 4 + 5 = 9, Prognostic Grade Group 5,  involving 95% of 1 needle core  and measuring  14 mm in length. - Perineural invasion identified. Comment: There is a focus of closely approximated gastrointestinal epithelium; NKX3. 1 shows no definite invasion of the GI epithelium. I. Prostate, left lateral mid:  - Prostatic adenocarcinoma, [place] score 4 + 5 = 9, Prognostic Grade Group 5,  involving 95% of 1 needle core  and measuring  12 mm in length. J. Prostate, left medial mid:  - Prostatic adenocarcinoma, [place] score 4 + 5 = 9, Prognostic Grade Group 5,  involving 85% of 1 needle core  and measuring  13 mm in length. Comment: Immunohistochemistry for a prostate multiplex stain (p63, K903, and P504S) and NKX3.1 demonstrate invasive carcinoma. K. Prostate, left lateral apex:  - Prostatic adenocarcinoma, [place] score 4 + 5 = 9, Prognostic Grade Group 5,  involving 25% of 1 needle core  and measuring  3 mm in length.         L. Prostate, left medial apex :  - Prostatic adenocarcinoma, Liana score 4 + 5 = 9, Prognostic Grade Group 5,  involving 95% of 1 needle core  and measuring  15 mm in length. - Perineural invasion identified. Comment:   Cribriform glands are present within the pattern 4 component. This feature has been associated with adverse clinical outcomes and molecular features typically seen in advanced disease. (The 2019 Genitourinary Pathology Society (GUPS) White Paper on Contemporary Grading of Prostate Cancer.  Arch Pathol Lab Med. 2021;145(4):461-124.)     2023 -  Hormone Therapy    Firmagon loading dose on 23, followed by [facility]            Past Medical History:   Diagnosis Date   • Diabetes mellitus ([address])    • High cholesterol    • Hypertension    • Prostate cancer [facility])      Past Surgical History:   Procedure Laterality Date   • IR NEPHROSTOMY TUBE PLACEMENT  2023    bilateral   • US GUIDED PROSTATE BIOPSY         Family History   Problem Relation Age of Onset   • Liver cancer Father        Social History   Social History     Substance and Sexual Activity   Alcohol Use Not Currently    Comment: socially     Social History     Substance and Sexual Activity   Drug Use Never     Social History     Tobacco Use   Smoking Status Former   • Packs/day: 0.25   • Types: Cigarettes   • Quit date:    • Years since quittin.5   • Passive exposure: Past   Smokeless Tobacco Never   Tobacco Comments    States he only smoked on the weekends         Meds/Allergies     Current Outpatient Medications:   •  albuterol (ProAir HFA) 90 mcg/act inhaler, Inhale 2 puffs every 4 (four) hours as needed for wheezing or shortness of breath, Disp: 8.5 g, Rfl: 0  •  glimepiride (AMARYL) 4 mg tablet, Take 4 mg by mouth daily with breakfast, Disp: , Rfl:   •  Invokana 100 MG, Take 100 mg by mouth daily before breakfast, Disp: , Rfl:   •  Levemir FlexPen 100 units/mL injection pen, Inject 40 Units under the skin daily, Disp: , Rfl:   •  lisinopril (ZESTRIL) 10 mg tablet, Take 10 mg by mouth daily, Disp: , Rfl:   •  Lovaza 1 g capsule, 2 (two) times a day, Disp: , Rfl:   •  Multiple Vitamins-Minerals (Sentry) TABS, Take 1 tablet by mouth daily, Disp: , Rfl:   •  OneTouch Verio test strip, , Disp: , Rfl:   •  rosuvastatin (CRESTOR) 10 MG tablet, Take 10 mg by mouth daily, Disp: , Rfl:   •  sodium chloride, PF, 0.9 %, 10 mL by Intracatheter route daily, Disp: 300 mL, Rfl: 3  •  sodium chloride, PF, 0.9 %, 10 mL by Intracatheter route daily Intracatheter flushing daily. May substitute prefilled syringe with normal saline 10 mL vials, 10 mL syringes, and 18 g blunt needles, Disp: 900 mL, Rfl: 3  •  Spacer/Aero-Holding Chambers (AEROCHAMBER MINI CHAMBER) BILLY, by Does not apply route see administration instructions, Disp: 1 Device, Rfl: 0  No Known Allergies    Review of Systems   Constitutional: Positive for appetite change (decreased appetite), fatigue and unexpected weight change (about 10lbs in 3 months). Negative for chills and fever. HENT: Negative. Eyes: Positive for photophobia (with cell phone use, eyes tire after 5-10 min). Wears glasses, possible glaucoma   Respiratory: Negative. Negative for cough and shortness of breath. Cardiovascular: Negative. Gastrointestinal: Positive for abdominal pain and constipation (was doing stool softer and eating fiber). Negative for blood in stool, diarrhea, nausea and vomiting. Endocrine: Positive for cold intolerance ("cold flashes" always cold). Negative for heat intolerance. Genitourinary: Positive for decreased urine volume, difficulty urinating and dysuria. Negative for frequency, hematuria and urgency. Has bilateral nephrostomy tubes, sometimes urinate through penis, has to push/strain to urinate, stream stops and starts, denies any hematuria, nocturia x1   Musculoskeletal: Positive for back pain (lower back pains). Negative for arthralgias and myalgias. Skin: Negative. Allergic/Immunologic: Negative. Neurological: Positive for weakness (generalized).  Negative for dizziness, light-headedness, numbness and headaches. Hematological: Does not bruise/bleed easily. Psychiatric/Behavioral: Negative for sleep disturbance. The patient is nervous/anxious (reports some stress and anxiety with prognosis).       IPSS Questionnaire (AUA-7): Over the past month…     1)  How often have you had a sensation of not emptying your bladder completely after you finish urinating? 3 - About half the time   2)  How often have you had to urinate again less than two hours after you finished urinating? 0 - Not at all   3)  How often have you found you stopped and started again several times when you urinated? 4 - More than half the time   4) How difficult have you found it to postpone urination? 0 - Not at all   5) How often have you had a weak urinary stream?  3 - About half the time   6) How often have you had to push or strain to begin urination? 5 - Almost always   7) How many times did you most typically get up to urinate from the time you went to bed until the time you got up in the morning? 1 - 1 time   Total Score:  16             OBJECTIVE:   /84 (BP Location: Left arm, Patient Position: Sitting, Cuff Size: Standard)   Pulse 72   Temp (!) 97.4 °F (36.3 °C) (Temporal)   Resp 18   Ht 5' 9" (1.753 m)   Wt 72.6 kg (160 lb)   SpO2 98%   BMI 23.63 kg/m²   Pain Assessment:  4  Performance Status: ECOG/Zubrod/WHO: 2 - Symptomatic, <50% confined to bed    Physical Exam   Well appearing. NAD. No increased work of breathing. Extremities warm and well perfused. ANA deferred.    RESULTS  Lab Results    Chemistry        Component Value Date/Time    K 3.9 06/25/2023 0529     06/25/2023 0529    CO2 26 06/25/2023 0529    BUN 13 06/25/2023 0529    CREATININE 1.43 (H) 06/25/2023 0529        Component Value Date/Time    CALCIUM 8.3 (L) 06/25/2023 0529    ALKPHOS 64 06/25/2023 0529    AST 12 (L) 06/25/2023 0529    ALT 9 06/25/2023 0529            Lab Results   Component Value Date    WBC 8.82 06/25/2023    HGB 12.8 06/25/2023    HCT 38.3 06/25/2023    MCV 93 06/25/2023     06/25/2023       Imaging Studies  No results found. Pathology: As above. ASSESSMENT  1. Prostate cancer [facility])  Ambulatory Referral to Radiation Oncology    CANCELED: NM PET CT skull base to mid thigh        Cancer Staging   No matching staging information was found for the patient. PLAN/DISCUSSION  No orders of the defined types were placed in this encounter. Ector Vela MD  1/7/9671,3:29 AM    Total time spent reviewing EMR, seeing patient in consultation, documenting visit, placing treatment orders, and communicating with other medical providers: 70 minutes    Portions of the record may have been created with voice recognition software. Occasional wrong word or "sound a like" substitutions may have occurred due to the inherent limitations of voice recognition software. Read the chart carefully and recognize, using context, where substitutions have occurred.

## 2023-08-03 NOTE — TELEPHONE ENCOUNTER
2nd attempt: TRIED TO CALL SON AS HE IS PRIMARY NUMBER TO CALL AND HIS PHONE [address] TO BE LEFT. WILL TRY AGAIN LATER.

## 2023-08-03 NOTE — TELEPHONE ENCOUNTER
TRIED TO CALL SON AS HE IS PRIMARY NUMBER TO CALL AND HIS PHONE [address] TO BE LEFT. WILL TRY AGAIN LATER.

## 2023-08-04 ENCOUNTER — PATIENT OUTREACH (OUTPATIENT)
Dept: CASE MANAGEMENT | Facility: OTHER | Age: 73
End: 2023-08-04

## 2023-08-04 NOTE — TELEPHONE ENCOUNTER
3rd attempt: TRIED TO CALL SON AS HE IS PRIMARY NUMBER TO CALL AND HIS PHONE [address] TO BE LEFT. WILL TRY AGAIN LATER.

## 2023-08-04 NOTE — PROGRESS NOTES
Biopsychosocial and Barriers Assessment    Cancer Diagnosis: Prostate  Home/Cell Phone: h- [phone]   Emergency Contact: Gabriela Lat- [phone]  Marital Status:   Interpretation concerns, speaks another language, preferred language: English  Cultural concerns: none  Ability to read or write: yes    Caregiver/Support: Strong support from spouse and son  Children: 3- two live in [place]  Child/Elder care: [address]: [address]: 2 steps to enter  Lives With: spouse  Daily Living Activities: independent  [address]: none  Ambulation: independent     Preferred Pharmacy: [facility]  High co-pays with insurance: [facility]  High co-pays with medication coverage: none  No medication coverage: n/a    Primary Care Provider: Dr. Rylie Daniel  Hx of [facility] [address]: none  Hx of Short term rehab: none  Mental Health Hx: none  Substance Abuse Hx: none  Employment: retired   Status/Location: [address] [place] [address] to pay bills: Difficult at times  POA/LW/AD: not addressed  Transportation Plan/Concerns: self/son      What do you know about your Cancer Diagnosis    What has your doctor told you about your cancer diagnosis: Prostate Cancer. What has your doctor told you about your cancer treatment: Pt states he will be having radiation in the near future. What specific concerns do you have about your diagnosis and treatment: None at this time. Have you been made aware of any hair loss associated with treatment: N/A    Additional Comments:  OSW placed outreach TC to pt this morning using the  service # [id number]. Pt completed a Dt, where he scored a 5/10. He reports some fatigue and financial concerns. OSW suggested that he apply for SNAP benefits, as well as any aging services he may qualify for. OSW provided him with the contact information and encouraged him to call and inquire/apply.  OSW also educated him on most utility companies and that they offer programs to assist financially. Pt states he will call and inquire. Pt is well supported by his spouse and son. He has 2 other children who reside in [place]. Pt was very appreciative of the information. Pt also states that he received a bill from [place] that he cannot afford to pay. OSW offered to email the hospital financial counselors and request that they send a hardship application/tho care application. He was agreeable. OSW emailed and requested that the forms be sent to the pt. Pt does not have any questions at this time. OSW will close the referral at this time. He was encouraged to call with any questions/concerns.

## 2023-08-07 ENCOUNTER — LAB REQUISITION (OUTPATIENT)
Dept: LAB | Facility: HOSPITAL | Age: 73
End: 2023-08-07

## 2023-08-07 DIAGNOSIS — C61 MALIGNANT NEOPLASM OF PROSTATE (HCC): ICD-10-CM

## 2023-08-07 DIAGNOSIS — R97.20 ELEVATED PROSTATE SPECIFIC ANTIGEN (PSA): ICD-10-CM

## 2023-08-07 PROCEDURE — 88363 XM ARCHIVE TISSUE MOLEC ANAL: CPT | Performed by: STUDENT IN AN ORGANIZED HEALTH CARE EDUCATION/TRAINING PROGRAM

## 2023-08-09 ENCOUNTER — PATIENT OUTREACH (OUTPATIENT)
Dept: HEMATOLOGY ONCOLOGY | Facility: CLINIC | Age: 73
End: 2023-08-09

## 2023-08-09 NOTE — PROGRESS NOTES
Received notification that PSMA pet had been approved. Call to PET dept and requested sooner appt. Only option available in 8/17/23 at 6:30am.    Called all available phone numbers and unable to reach pt or his son. Used [phone] to attempt to reach pt. 1st attempt someone answered and did not speak. Next attempt no answer and no voicemail set-up. Multiple attempts to reach son also but line rings, no answer and recording states the call can not be completed at this time.

## 2023-08-10 PROBLEM — Z93.6 NEPHROSTOMY STATUS (HCC): Status: ACTIVE | Noted: 2023-08-10

## 2023-08-10 PROBLEM — Z79.818 ENCOUNTER FOR MONITORING ANDROGEN DEPRIVATION THERAPY: Status: ACTIVE | Noted: 2023-08-10

## 2023-08-10 NOTE — PROGRESS NOTES
Problem List Items Addressed This Visit        Genitourinary    Other hydronephrosis    Bilateral hydronephrosis     Bilateral nephrostomies, some trouble with drainage and encrustation, I contacted IR About moving up his tube change, consideration can be given to internalization of his stents in the future         Prostate cancer ([address]) - Primary     Metastatic prostate cancer to the bladder, nephrostomy tube dependent at this time, on ADT, lupron given today, following with medical oncology         Relevant Medications    leuprolide (LUPRON DEPOT 6 MONTH KIT) IM injection kit 45 mg    Other Relevant Orders    PSA Total, Diagnostic    PSA Total, Diagnostic       Other    Elevated PSA     Due to prostate cancer         Encounter to discuss test results     Reviewed with him all findings, he is looking better than at his last visit, we need PSA today and in 6 months, ECOG 0         Encounter for monitoring androgen deprivation therapy     Continue Lupron, start calcium and vitamin D         Relevant Medications    Calcium Carb-Cholecalciferol (calcium carbonate-vitamin D) 500 mg-5 mcg tablet    Nephrostomy status ([address])         Portions of the above record have been created with voice recognition software. Occasional wrong word or "sound alike" substitution may have occurred due to the inherent limitations of voice recognition software. Read the chart carefully and recognize, using context, where substitution may have occurred.     Assessment and plan:       Please see problem oriented charting for the assessment plan of today's urological complaints      Vijaya Robledo MD      Chief Complaint     As listed above      History of Present Illness     Juan M Weiner is a 81 y.o. man with high risk metastatic prostate cancer and problems and updates as above    The following portions of the patient's history were reviewed and updated as appropriate: allergies, current medications, past family history, past medical history, past social history, past surgical history and problem list.    Detailed Urologic History     - please refer to HPI    Review of Systems     Review of Systems   Constitutional: Negative. HENT: Negative. Eyes: Negative. Respiratory: Negative. Cardiovascular: Negative. Gastrointestinal: Negative. Endocrine: Negative. Genitourinary: Negative. Musculoskeletal: Negative. Skin: Negative. Allergic/Immunologic: Negative. Neurological: Negative. Hematological: Negative. Psychiatric/Behavioral: Negative. Allergies     No Known Allergies    Physical Exam     Physical Exam  Vitals reviewed. Constitutional:       General: He is not in acute distress. Appearance: Normal appearance. He is not ill-appearing, toxic-appearing or diaphoretic. Comments: Looks more vital than at his last visit   HENT:      Head: Normocephalic and atraumatic. Eyes:      General: No scleral icterus. Right eye: No discharge. Left eye: No discharge. Cardiovascular:      Pulses: Normal pulses. Pulmonary:      Effort: Pulmonary effort is normal.   Abdominal:      General: There is no distension. Musculoskeletal:         General: No swelling. Skin:     Coloration: Skin is not jaundiced. Neurological:      General: No focal deficit present. Mental Status: He is alert. Cranial Nerves: No cranial nerve deficit. Psychiatric:         Mood and Affect: Mood normal.         Behavior: Behavior normal.         Thought Content:  Thought content normal.         Judgment: Judgment normal.             Vital Signs  Vitals:    08/11/23 0757   BP: 130/70   BP Location: Left arm   Patient Position: Sitting   Cuff Size: Large   Pulse: 82   Resp: 16   SpO2: 98%   Weight: 73.4 kg (161 lb 12.8 oz)   Height: 5' 9" (1.753 m)         Current Medications       Current Outpatient Medications:   •  albuterol (ProAir HFA) 90 mcg/act inhaler, Inhale 2 puffs every 4 (four) hours as needed for wheezing or shortness of breath, Disp: 8.5 g, Rfl: 0  •  Calcium Carb-Cholecalciferol (calcium carbonate-vitamin D) 500 mg-5 mcg tablet, Take 1 tablet by mouth 2 (two) times a day with meals, Disp: 180 tablet, Rfl: 3  •  glimepiride (AMARYL) 4 mg tablet, Take 4 mg by mouth daily with breakfast, Disp: , Rfl:   •  Invokana 100 MG, Take 100 mg by mouth daily before breakfast, Disp: , Rfl:   •  Levemir FlexPen 100 units/mL injection pen, Inject 40 Units under the skin daily, Disp: , Rfl:   •  lisinopril (ZESTRIL) 10 mg tablet, Take 10 mg by mouth daily, Disp: , Rfl:   •  Lovaza 1 g capsule, 2 (two) times a day, Disp: , Rfl:   •  Multiple Vitamins-Minerals (Sentry) TABS, Take 1 tablet by mouth daily, Disp: , Rfl:   •  OneTouch Verio test strip, , Disp: , Rfl:   •  rosuvastatin (CRESTOR) 10 MG tablet, Take 10 mg by mouth daily, Disp: , Rfl:   •  sodium chloride, PF, 0.9 %, 10 mL by Intracatheter route daily, Disp: 300 mL, Rfl: 3  •  sodium chloride, PF, 0.9 %, 10 mL by Intracatheter route daily Intracatheter flushing daily.  May substitute prefilled syringe with normal saline 10 mL vials, 10 mL syringes, and 18 g blunt needles, Disp: 900 mL, Rfl: 3  •  Spacer/Aero-Holding Chambers (AEROCHAMBER MINI CHAMBER) BILLY, by Does not apply route see administration instructions, Disp: 1 Device, Rfl: 0    Current Facility-Administered Medications:   •  leuprolide (LUPRON DEPOT 6 MONTH KIT) IM injection kit 45 mg, 45 mg, Intramuscular, Once, Zuleika Graham MD      Active Problems     Patient Active Problem List   Diagnosis   • Type 2 diabetes mellitus, with long-term current use of insulin (HCC)   • Elevated PSA   • Other hydronephrosis   • Elevated serum creatinine   • Hypertension   • Encounter to discuss test results   • Bilateral hydronephrosis   • Prostate cancer [facility])   • Encounter for monitoring androgen deprivation therapy   • Nephrostomy status [facility])         Past Medical History     Past Medical History: Diagnosis Date   • Diabetes mellitus ([address])    • High cholesterol    • Hypertension    • Prostate cancer [facility])          Surgical History     Past Surgical History:   Procedure Laterality Date   • IR NEPHROSTOMY TUBE PLACEMENT  2023    bilateral   • US GUIDED PROSTATE BIOPSY           Family History     Family History   Problem Relation Age of Onset   • Liver cancer Father          Social History     Social History     Social History     Tobacco Use   Smoking Status Former   • Packs/day: 0.25   • Types: Cigarettes   • Quit date:    • Years since quittin.6   • Passive exposure: Past   Smokeless Tobacco Never   Tobacco Comments    States he only smoked on the weekends         Pertinent Lab Values     Lab Results   Component Value Date    CREATININE 1.43 (H) 2023       Lab Results   Component Value Date    .73 (H) 2023

## 2023-08-11 ENCOUNTER — CLINICAL SUPPORT (OUTPATIENT)
Dept: UROLOGY | Facility: CLINIC | Age: 73
End: 2023-08-11
Payer: COMMERCIAL

## 2023-08-11 ENCOUNTER — APPOINTMENT (OUTPATIENT)
Dept: LAB | Facility: AMBULARY SURGERY CENTER | Age: 73
End: 2023-08-11
Payer: COMMERCIAL

## 2023-08-11 ENCOUNTER — PATIENT OUTREACH (OUTPATIENT)
Dept: HEMATOLOGY ONCOLOGY | Facility: CLINIC | Age: 73
End: 2023-08-11

## 2023-08-11 ENCOUNTER — TELEPHONE (OUTPATIENT)
Dept: UROLOGY | Facility: CLINIC | Age: 73
End: 2023-08-11

## 2023-08-11 VITALS
SYSTOLIC BLOOD PRESSURE: 130 MMHG | RESPIRATION RATE: 16 BRPM | OXYGEN SATURATION: 98 % | HEART RATE: 82 BPM | HEIGHT: 69 IN | DIASTOLIC BLOOD PRESSURE: 70 MMHG | BODY MASS INDEX: 23.96 KG/M2 | WEIGHT: 161.8 LBS

## 2023-08-11 DIAGNOSIS — Z71.2 ENCOUNTER TO DISCUSS TEST RESULTS: ICD-10-CM

## 2023-08-11 DIAGNOSIS — Z79.818 ENCOUNTER FOR MONITORING ANDROGEN DEPRIVATION THERAPY: ICD-10-CM

## 2023-08-11 DIAGNOSIS — C61 PROSTATE CANCER (HCC): ICD-10-CM

## 2023-08-11 DIAGNOSIS — Z79.4 DIABETES MELLITUS DUE TO UNDERLYING CONDITION WITH HYPERGLYCEMIA, WITH LONG-TERM CURRENT USE OF INSULIN (HCC): ICD-10-CM

## 2023-08-11 DIAGNOSIS — N17.9 AKI (ACUTE KIDNEY INJURY) (HCC): ICD-10-CM

## 2023-08-11 DIAGNOSIS — C61 PROSTATE CANCER (HCC): Primary | ICD-10-CM

## 2023-08-11 DIAGNOSIS — N13.39 OTHER HYDRONEPHROSIS: ICD-10-CM

## 2023-08-11 DIAGNOSIS — N13.30 BILATERAL HYDRONEPHROSIS: ICD-10-CM

## 2023-08-11 DIAGNOSIS — R35.0 URINARY FREQUENCY: ICD-10-CM

## 2023-08-11 DIAGNOSIS — E08.65 DIABETES MELLITUS DUE TO UNDERLYING CONDITION WITH HYPERGLYCEMIA, WITH LONG-TERM CURRENT USE OF INSULIN (HCC): ICD-10-CM

## 2023-08-11 DIAGNOSIS — R97.20 ELEVATED PSA: ICD-10-CM

## 2023-08-11 DIAGNOSIS — Z93.6 NEPHROSTOMY STATUS (HCC): ICD-10-CM

## 2023-08-11 LAB
ANION GAP SERPL CALCULATED.3IONS-SCNC: 4 MMOL/L
BUN SERPL-MCNC: 28 MG/DL (ref 5–25)
CALCIUM SERPL-MCNC: 9.1 MG/DL (ref 8.3–10.1)
CHLORIDE SERPL-SCNC: 107 MMOL/L (ref 96–108)
CO2 SERPL-SCNC: 26 MMOL/L (ref 21–32)
CREAT SERPL-MCNC: 1.43 MG/DL (ref 0.6–1.3)
GFR SERPL CREATININE-BSD FRML MDRD: 49 ML/MIN/1.73SQ M
GLUCOSE P FAST SERPL-MCNC: 213 MG/DL (ref 65–99)
POTASSIUM SERPL-SCNC: 4.2 MMOL/L (ref 3.5–5.3)
PSA SERPL-MCNC: 6.01 NG/ML (ref 0–4)
SODIUM SERPL-SCNC: 137 MMOL/L (ref 135–147)

## 2023-08-11 PROCEDURE — 84153 ASSAY OF PSA TOTAL: CPT

## 2023-08-11 PROCEDURE — 80048 BASIC METABOLIC PNL TOTAL CA: CPT

## 2023-08-11 PROCEDURE — 99214 OFFICE O/P EST MOD 30 MIN: CPT | Performed by: UROLOGY

## 2023-08-11 PROCEDURE — 83036 HEMOGLOBIN GLYCOSYLATED A1C: CPT

## 2023-08-11 PROCEDURE — 96402 CHEMO HORMON ANTINEOPL SQ/IM: CPT

## 2023-08-11 PROCEDURE — 36415 COLL VENOUS BLD VENIPUNCTURE: CPT

## 2023-08-11 RX ORDER — LANOLIN ALCOHOL/MO/W.PET/CERES
1 CREAM (GRAM) TOPICAL 2 TIMES DAILY WITH MEALS
Qty: 180 TABLET | Refills: 3 | Status: SHIPPED | OUTPATIENT
Start: 2023-08-11 | End: 2024-08-05

## 2023-08-11 RX ORDER — SODIUM CHLORIDE 9 MG/ML
10 INJECTION INTRAVENOUS DAILY
Qty: 300 ML | Refills: 3 | Status: SHIPPED | OUTPATIENT
Start: 2023-08-11 | End: 2023-11-09

## 2023-08-11 NOTE — ASSESSMENT & PLAN NOTE
Reviewed with him all findings, he is looking better than at his last visit, we need PSA today and in 6 months, ECOG 0

## 2023-08-11 NOTE — TELEPHONE ENCOUNTER
Return in about 6 months (around 2/11/2024) for PSA today, then PSA and lupron in 6 months - Dr Sal Landin.

## 2023-08-11 NOTE — ASSESSMENT & PLAN NOTE
Bilateral nephrostomies, some trouble with drainage and encrustation, I contacted IR About moving up his tube change, consideration can be given to internalization of his stents in the future

## 2023-08-11 NOTE — PROGRESS NOTES
Outreach to Jh, pt's son. Advised I tried to reach him and his father on Wednesday with multiple attempts but unable to reach about moving up [address] PET. Offered 8/17/23 at 6:30am at [facility] and [place] accepted the appt. Provided prep instructions of hydrating well and location address. Call to PET dept and confirmed appt.

## 2023-08-11 NOTE — ASSESSMENT & PLAN NOTE
Metastatic prostate cancer to the bladder, nephrostomy tube dependent at this time, on ADT, lupron given today, following with medical oncology

## 2023-08-12 LAB
EST. AVERAGE GLUCOSE BLD GHB EST-MCNC: 148 MG/DL
HBA1C MFR BLD: 6.8 %

## 2023-08-17 ENCOUNTER — HOSPITAL ENCOUNTER (OUTPATIENT)
Dept: RADIOLOGY | Age: 73
Discharge: HOME | End: 2023-08-17
Payer: COMMERCIAL

## 2023-08-17 DIAGNOSIS — R97.20 ELEVATED PROSTATE SPECIFIC ANTIGEN (PSA): ICD-10-CM

## 2023-08-17 DIAGNOSIS — R94.8 ABNORMAL RADIONUCLIDE BONE SCAN: ICD-10-CM

## 2023-08-17 DIAGNOSIS — C61 PROSTATE CANCER (HCC): ICD-10-CM

## 2023-08-17 PROCEDURE — G1004 CDSM NDSC: HCPCS

## 2023-08-17 PROCEDURE — 78815 PET IMAGE W/CT SKULL-THIGH: CPT

## 2023-08-17 PROCEDURE — A9552 F18 FDG: HCPCS

## 2023-08-21 ENCOUNTER — TELEPHONE (OUTPATIENT)
Dept: GENETICS | Facility: CLINIC | Age: 73
End: 2023-08-21

## 2023-08-21 NOTE — PROGRESS NOTES
Pre-Test Genetic Counseling Consult Note    Patient Name: Monica Urrutia DOB/Age: 1950/72 y.o. Referring Provider: Elizabeth Azul MD    Date of Service: 2023  Genetic Counselor: Namita Doe MS, [facility]  Interpretation Services: [employer] phone  Prashant Acharya ([id number])  [facility] [address] was referred to the [address] [place], [place] [address] Floor and Genetic Assessment Program due to his recent diagnosis of very high risk prostate cancer. he presents today to discuss the possibility of a hereditary cancer syndrome, options for genetic testing, and implications for him and his family. His wife Stratford Friend, accompanied him to the appointment. Cancer History and Treatment:   Oncology History   Prostate cancer ([address])    Initial Diagnosis    Prostate cancer ([address])     2023 Biopsy    A. Prostate, right lateral base:  - Prostatic adenocarcinoma, [place] score 4 + 5 = 9, Prognostic Grade Group 5,  involving 90% of 1 needle core  and measuring  11 mm in length. B. Prostate, right medial base:  - Prostatic adenocarcinoma, [place] score 4 + 5 = 9, Prognostic Grade Group 5,  involving 70% of 1 needle core  and measuring  10 mm in length. C. Prostate, right lateral mid:  - Prostatic adenocarcinoma, [place] score 4 + 5 = 9, Prognostic Grade Group 5,  involving 90% of 1 needle core  and measuring  12 mm in length. Comment: Immunohistochemistry for a prostate multiplex stain (p63, K903, and P504S) and NKX3.1 demonstrate invasive carcinoma. D. Prostate, right medial mid:  - Prostatic adenocarcinoma, [place] score 4 + 5 = 9, Prognostic Grade Group 5,  involving 95% of 1 needle core  and measuring  15 mm in length. E. Prostate, right lateral apex:  - Prostatic adenocarcinoma, [place] score 4 + 5 = 9, Prognostic Grade Group 5,  involving 90% of 1 needle core  and measuring  12 mm in length. - Perineural invasion identified. Comment: Immunohistochemistry for a prostate multiplex stain (p63, K903, and P504S) and NKX3.1 demonstrate invasive carcinoma. F. Prostate, right medial apex:  - Prostatic adenocarcinoma, [place] score 4 + 5 = 9, Prognostic Grade Group 5,  involving 100% of 1 needle core  and measuring  20 mm in length. G. Prostate, left lateral base:  - Prostatic adenocarcinoma, [place] score 4 + 5 = 9, Prognostic Grade Group 5,  involving 75% of 1 needle core  and measuring  10 mm in length. - Perineural invasion identified. - Lymphovascular invasion is identified. H. Prostate, left medial base:  - Prostatic adenocarcinoma, [place] score 4 + 5 = 9, Prognostic Grade Group 5,  involving 95% of 1 needle core  and measuring  14 mm in length. - Perineural invasion identified. Comment: There is a focus of closely approximated gastrointestinal epithelium; NKX3. 1 shows no definite invasion of the GI epithelium. I. Prostate, left lateral mid:  - Prostatic adenocarcinoma, [place] score 4 + 5 = 9, Prognostic Grade Group 5,  involving 95% of 1 needle core  and measuring  12 mm in length. J. Prostate, left medial mid:  - Prostatic adenocarcinoma, [place] score 4 + 5 = 9, Prognostic Grade Group 5,  involving 85% of 1 needle core  and measuring  13 mm in length. Comment: Immunohistochemistry for a prostate multiplex stain (p63, K903, and P504S) and NKX3.1 demonstrate invasive carcinoma. K. Prostate, left lateral apex:  - Prostatic adenocarcinoma, [place] score 4 + 5 = 9, Prognostic Grade Group 5,  involving 25% of 1 needle core  and measuring  3 mm in length. L. Prostate, left medial apex :  - Prostatic adenocarcinoma, [place] score 4 + 5 = 9, Prognostic Grade Group 5,  involving 95% of 1 needle core  and measuring  15 mm in length. - Perineural invasion identified. Comment:   Cribriform glands are present within the pattern 4 component.   This feature has been associated with adverse clinical outcomes and molecular features typically seen in advanced disease. (The 2019 Genitourinary Pathology Society (GUPS) White Paper on Contemporary Grading of Prostate Cancer. Arch Pathol Lab Med. 2021 Apr 1;145(4):461-787.)     7/5/2023 -  Hormone Therapy    Firmagon loading dose on 7/5/23, followed by Lupron        Screening Hx:   Not assessed     Medical and Surgical History  Pertinent surgical history:   Past Surgical History:   Procedure Laterality Date   • IR NEPHROSTOMY TUBE PLACEMENT  06/22/2023    bilateral   • US GUIDED PROSTATE BIOPSY        Pertinent medical history:  Past Medical History:   Diagnosis Date   • Diabetes mellitus ([address])    • High cholesterol    • Hypertension    • Prostate cancer ([address])          Other History:  Height:   Ht Readings from Last 1 Encounters:   08/11/23 5' 9" (1.753 m)     Weight:   Wt Readings from Last 1 Encounters:   08/11/23 73.4 kg (161 lb 12.8 oz)       Relevant Family History   Patient reports no Ashkenazi Anglican ancestry. Daughter (d.32) history of melanoma (dx 28)    Maternal Family History:  No reported history of cancer    Paternal Family History:   Father (d.87) history of liver cancer (dx 68) +tobacco   Grandmother with history of stomach cancer    Please refer to the scanned pedigree in the Media Tab for a complete family history     *All history is reported as provided by the patient; records are not available for review, except where indicated. Assessment:  We discussed sporadic, familial and hereditary cancer. We also discussed the many factors that influence our risk for cancer such as age, environmental exposures, lifestyle choices and family history. We reviewed the indications suggestive of a hereditary predisposition to cancer.     Genetic testing is indicated for [facility] based on the following criteria: Meets NCCN V2.2023 Testing Criteria for Prostate Cancer Susceptibility Genes: personal history of very high risk/metastatic prostate cancer    The risks, benefits, and limitations of genetic testing were reviewed with the patient, as well as genetic discrimination laws, and possible test results (positive, negative, variants of uncertain significance) and their clinical implications. If positive for a mutation, options for managing cancer risk including increased surveillance, chemoprevention, and in some cases prophylactic surgery were discussed. Davey Linn was informed that if a hereditary cancer syndrome was identified in him, first degree relatives (parents, siblings, and children) have a chance of also inheriting the condition. Genetic testing would allow for predictive genetic testing in other relatives, who may also be at risk depending on their degree of relation. Billing:  Most individuals pay <$100 for hereditary cancer genetic testing. If insurance covers the cost of the testing, individuals may still pay out of pocket secondary to co-pays, co-insurance, or deductibles. If the cost of the testing exceeds $100, the lab will reach out to the patient via phone or e-mail. The patient will then have the option to proceed with the testing, cancel the testing, or elect the self-pay option of $250. Davey Esteves verbalized understanding and requested that the lab reaches out with any out of pocket cost prior to proceeding with testing. We will message Jin Maciel and place that request.    Plan: Patient decided to proceed with testing and provided consent.     Summary:     Sample Collection:  The patient's blood sample was drawn in the office on 8/22 by medical assistant Joni Gonzalez    Genetic Testing Preformed: Aundria Aw + RNA (36 genes): APC, ELOY, AXIN2 BARD1, BRCA1, BRCA2, BRIP1, BMPR1A, CDH1, CDK4, CDKN2A, CHEK2, DICER1, EPCAM, GREM1, HOXB13, MLH1, MSH2, MSH3, MSH6, MUTYH, NBN, NF1, NTHL1, PALB2, PMS2, POLD1, POLE, PTEN, RAD51C, RAD51D, RECQL SMAD4, SMARCA4, STK11, TP53    Result Call Information:  Results take approximately 2-3 weeks to complete once test is started. Silverio Faulkner will be notified via [employer] once results are available. Additional recommendations for surveillance/medical management will be made pending genetic test results.

## 2023-08-21 NOTE — TELEPHONE ENCOUNTER
Spoke with Roverto Baldwin son Cheryle Hunger, confirmed tomorrow's appointment. Provided the address and explained that I will also be sending a [employer] message with additional address information.

## 2023-08-22 ENCOUNTER — DOCUMENTATION (OUTPATIENT)
Dept: GENETICS | Facility: CLINIC | Age: 73
End: 2023-08-22

## 2023-08-22 ENCOUNTER — CLINICAL SUPPORT (OUTPATIENT)
Dept: GENETICS | Facility: CLINIC | Age: 73
End: 2023-08-22
Payer: COMMERCIAL

## 2023-08-22 DIAGNOSIS — C61 PROSTATE CANCER (HCC): Primary | ICD-10-CM

## 2023-08-22 DIAGNOSIS — Z80.0 FAMILY HISTORY OF STOMACH CANCER: ICD-10-CM

## 2023-08-22 PROCEDURE — 36415 COLL VENOUS BLD VENIPUNCTURE: CPT

## 2023-08-22 NOTE — LETTER
2023     Katherine Guardado, [address]  [address]    Patient: Matthieu Cunha  YOB: 1950  Date of Visit: 2023      Dear Dr. Bony Rios: Thank you for referring Matthieu Cunha to me for evaluation. Below are my notes for this consultation. If you have questions, please do not hesitate to call me. I look forward to following your patient along with you. Sincerely,        Birgit Hurd        CC: Flora Rose MD        Pre-Test Genetic Counseling Consult Note    Patient Name: Matthieu Cunha   /Age: 1950/72 y.o. Referring Provider: Katherine Guardado MD    Date of Service: 2023  Genetic Counselor: Birgit Hurd MS, [facility]  Interpretation Services: [email or web address]  Kylee Emanuel ([id number])  [facility]: [facility] [address] was referred to the [address] [place], [place] [address] Floor and Genetic Assessment Program due to his recent diagnosis of very high risk prostate cancer. he presents today to discuss the possibility of a hereditary cancer syndrome, options for genetic testing, and implications for him and his family. His wife Doreen Dye, accompanied him to the appointment. Cancer History and Treatment:   Oncology History   Prostate cancer ([facility])    Initial Diagnosis    Prostate cancer ([address] [facility])     2023 Biopsy    A. Prostate, right lateral base:  - Prostatic adenocarcinoma, [place] score 4 + 5 = 9, Prognostic Grade Group 5,  involving 90% of 1 needle core  and measuring  11 mm in length. B. Prostate, right medial base:  - Prostatic adenocarcinoma, [place] score 4 + 5 = 9, Prognostic Grade Group 5,  involving 70% of 1 needle core  and measuring  10 mm in length. C. Prostate, right lateral mid:  - Prostatic adenocarcinoma, [place] score 4 + 5 = 9, Prognostic Grade Group 5,  involving 90% of 1 needle core  and measuring  12 mm in length.       Comment: Immunohistochemistry for a prostate multiplex stain (p63, K903, and P504S) and NKX3.1 demonstrate invasive carcinoma. D. Prostate, right medial mid:  - Prostatic adenocarcinoma, [place] score 4 + 5 = 9, Prognostic Grade Group 5,  involving 95% of 1 needle core  and measuring  15 mm in length. E. Prostate, right lateral apex:  - Prostatic adenocarcinoma, [place] score 4 + 5 = 9, Prognostic Grade Group 5,  involving 90% of 1 needle core  and measuring  12 mm in length. - Perineural invasion identified. Comment: Immunohistochemistry for a prostate multiplex stain (p63, K903, and P504S) and NKX3.1 demonstrate invasive carcinoma. F. Prostate, right medial apex:  - Prostatic adenocarcinoma, [place] score 4 + 5 = 9, Prognostic Grade Group 5,  involving 100% of 1 needle core  and measuring  20 mm in length. G. Prostate, left lateral base:  - Prostatic adenocarcinoma, [place] score 4 + 5 = 9, Prognostic Grade Group 5,  involving 75% of 1 needle core  and measuring  10 mm in length. - Perineural invasion identified. - Lymphovascular invasion is identified. H. Prostate, left medial base:  - Prostatic adenocarcinoma, [place] score 4 + 5 = 9, Prognostic Grade Group 5,  involving 95% of 1 needle core  and measuring  14 mm in length. - Perineural invasion identified. Comment: There is a focus of closely approximated gastrointestinal epithelium; NKX3. 1 shows no definite invasion of the GI epithelium. I. Prostate, left lateral mid:  - Prostatic adenocarcinoma, [place] score 4 + 5 = 9, Prognostic Grade Group 5,  involving 95% of 1 needle core  and measuring  12 mm in length. J. Prostate, left medial mid:  - Prostatic adenocarcinoma, [place] score 4 + 5 = 9, Prognostic Grade Group 5,  involving 85% of 1 needle core  and measuring  13 mm in length. Comment: Immunohistochemistry for a prostate multiplex stain (p63, K903, and P504S) and NKX3.1 demonstrate invasive carcinoma.      K. Prostate, left lateral apex:  - Prostatic adenocarcinoma, [place] score 4 + 5 = 9, Prognostic Grade Group 5,  involving 25% of 1 needle core  and measuring  3 mm in length. L. Prostate, left medial apex :  - Prostatic adenocarcinoma, [place] score 4 + 5 = 9, Prognostic Grade Group 5,  involving 95% of 1 needle core  and measuring  15 mm in length. - Perineural invasion identified. Comment:   Cribriform glands are present within the pattern 4 component. This feature has been associated with adverse clinical outcomes and molecular features typically seen in advanced disease. (The 2019 Genitourinary Pathology Society (GUPS) White Paper on Contemporary Grading of Prostate Cancer. Arch Pathol Lab Med. 2021 Apr 1;145(4):461-493.)     7/5/2023 -  Hormone Therapy    Firmagon loading dose on 7/5/23, followed by Lupron        Screening Hx:   Not assessed     Medical and Surgical History  Pertinent surgical history:   Past Surgical History:   Procedure Laterality Date   • IR NEPHROSTOMY TUBE PLACEMENT  06/22/2023    bilateral   • US GUIDED PROSTATE BIOPSY        Pertinent medical history:  Past Medical History:   Diagnosis Date   • Diabetes mellitus ([address])    • High cholesterol    • Hypertension    • Prostate cancer ([address])          Other History:  Height:   Ht Readings from Last 1 Encounters:   08/11/23 5' 9" (1.753 m)     Weight:   Wt Readings from Last 1 Encounters:   08/11/23 73.4 kg (161 lb 12.8 oz)       Relevant Family History   Patient reports no Ashkenazi Episcopalian ancestry. Daughter (d.32) history of melanoma (dx 28)    Maternal Family History:  No reported history of cancer    Paternal Family History:   Father (d.87) history of liver cancer (dx 68) +tobacco   Grandmother with history of stomach cancer    Please refer to the scanned pedigree in the Media Tab for a complete family history     *All history is reported as provided by the patient; records are not available for review, except where indicated. Assessment:   We discussed sporadic, familial and hereditary cancer. We also discussed the many factors that influence our risk for cancer such as age, environmental exposures, lifestyle choices and family history. We reviewed the indications suggestive of a hereditary predisposition to cancer. Genetic testing is indicated for Margarito based on the following criteria: Meets NCCN V2.2023 Testing Criteria for Prostate Cancer Susceptibility Genes: personal history of very high risk/metastatic prostate cancer    The risks, benefits, and limitations of genetic testing were reviewed with the patient, as well as genetic discrimination laws, and possible test results (positive, negative, variants of uncertain significance) and their clinical implications. If positive for a mutation, options for managing cancer risk including increased surveillance, chemoprevention, and in some cases prophylactic surgery were discussed. Margarito was informed that if a hereditary cancer syndrome was identified in him, first degree relatives (parents, siblings, and children) have a chance of also inheriting the condition. Genetic testing would allow for predictive genetic testing in other relatives, who may also be at risk depending on their degree of relation. Billing:  Most individuals pay <$100 for hereditary cancer genetic testing. If insurance covers the cost of the testing, individuals may still pay out of pocket secondary to co-pays, co-insurance, or deductibles. If the cost of the testing exceeds $100, the lab will reach out to the patient via phone or e-mail. The patient will then have the option to proceed with the testing, cancel the testing, or elect the self-pay option of $250. Margarito verbalized understanding and requested that the lab reaches out with any out of pocket cost prior to proceeding with testing.  We will message Jin Maciel and place that request.    Plan: Patient decided to proceed with testing and provided consent. Summary:     Sample Collection:  The patient's blood sample was drawn in the office on 8/22 by medical assistant Topher Gallardo    Genetic Testing Preformed: Gabi Mallory + RNA (36 genes): APC, ELOY, AXIN2 BARD1, BRCA1, BRCA2, BRIP1, BMPR1A, CDH1, CDK4, CDKN2A, CHEK2, DICER1, EPCAM, GREM1, HOXB13, MLH1, MSH2, MSH3, MSH6, MUTYH, NBN, NF1, NTHL1, PALB2, PMS2, POLD1, POLE, PTEN, RAD51C, RAD51D, RECQL SMAD4, SMARCA4, STK11, TP53    Result Call Information:  Results take approximately 2-3 weeks to complete once test is started. Joesph Gracia will be notified via [employer] once results are available. Additional recommendations for surveillance/medical management will be made pending genetic test results.

## 2023-08-24 LAB — SCAN RESULT: NORMAL

## 2023-08-28 ENCOUNTER — CLINICAL SUPPORT (OUTPATIENT)
Dept: RADIATION ONCOLOGY | Facility: HOSPITAL | Age: 73
End: 2023-08-28
Attending: STUDENT IN AN ORGANIZED HEALTH CARE EDUCATION/TRAINING PROGRAM
Payer: COMMERCIAL

## 2023-08-28 VITALS
TEMPERATURE: 97 F | RESPIRATION RATE: 18 BRPM | HEART RATE: 72 BPM | DIASTOLIC BLOOD PRESSURE: 78 MMHG | WEIGHT: 167 LBS | SYSTOLIC BLOOD PRESSURE: 110 MMHG | BODY MASS INDEX: 24.66 KG/M2 | OXYGEN SATURATION: 98 %

## 2023-08-28 DIAGNOSIS — C61 PROSTATE CANCER (HCC): Primary | ICD-10-CM

## 2023-08-28 PROCEDURE — 99214 OFFICE O/P EST MOD 30 MIN: CPT | Performed by: STUDENT IN AN ORGANIZED HEALTH CARE EDUCATION/TRAINING PROGRAM

## 2023-08-28 PROCEDURE — 99211 OFF/OP EST MAY X REQ PHY/QHP: CPT | Performed by: STUDENT IN AN ORGANIZED HEALTH CARE EDUCATION/TRAINING PROGRAM

## 2023-08-28 NOTE — PROGRESS NOTES
Dionte Winslow 1950 is a 81 y.o. male with history of DM type 2 and HTN, presented to the ER on 6/21/23 with hematuria, suprapubic pain and urinary retention. Cazares catheter was placed and CT revealed bladder outlet obstruction and severe b/l hydronephrosis. Bilateral nephrostomy tubes were placed. PSA elevated at 145.73. He followed up with Urology outpatient for cystoscopy and prostate biopsy on 6/28/23, revealing GS 9 (4+5) grade group 5 in 12 cores. He was started on Firmagon on 7/5/23 and plan to start Lupron in 4 weeks. The pt was seen in consult on 07/31/23. He returns today to go over his PET CT results. 8/11/23 Dr. Hannah Ambriz  Metastatic prostate cancer to the bladder, nephrostomy tube dependent at this time, on ADT, lupron given today, following with medical oncology      08/17/23 - NM PET CT skull base to mid thigh  IMPRESSION:  1. Fairly diffuse radiotracer uptake at the prostate gland compatible with patient's known malignancy. 2.  Contiguous activity extending from the prostate gland to the bladder walls posteriorly and laterally suspicious for direct invasion. Contiguous activity radiotracer uptake at the level of the bilateral seminal vesicle gland suspicious for local invasion. 3. Patchy radiotracer uptake in the right internal iliac region extending along the right distal ureter suspicious for local spread of disease. Scattered small PSMA avid lymph nodes along the left iliac chain suspicious for metastasis. 4.  Innumerable PSMA avid osseous lesions compatible with metastasis. The study was marked in EPIC for significant notification. Lab Results   Component Value Date    PSA 6.01 (H) 08/11/2023    .73 (H) 05/24/2023            Oncology History   Prostate cancer ([address])   2023 Initial Diagnosis    Prostate cancer ([address])     6/28/2023 Biopsy    A.  Prostate, right lateral base:  - Prostatic adenocarcinoma, [place] score 4 + 5 = 9, Prognostic Grade Group 5,  involving 90% of 1 needle core  and measuring  11 mm in length. B. Prostate, right medial base:  - Prostatic adenocarcinoma, [place] score 4 + 5 = 9, Prognostic Grade Group 5,  involving 70% of 1 needle core  and measuring  10 mm in length. C. Prostate, right lateral mid:  - Prostatic adenocarcinoma, [place] score 4 + 5 = 9, Prognostic Grade Group 5,  involving 90% of 1 needle core  and measuring  12 mm in length. Comment: Immunohistochemistry for a prostate multiplex stain (p63, K903, and P504S) and NKX3.1 demonstrate invasive carcinoma. D. Prostate, right medial mid:  - Prostatic adenocarcinoma, [place] score 4 + 5 = 9, Prognostic Grade Group 5,  involving 95% of 1 needle core  and measuring  15 mm in length. E. Prostate, right lateral apex:  - Prostatic adenocarcinoma, [place] score 4 + 5 = 9, Prognostic Grade Group 5,  involving 90% of 1 needle core  and measuring  12 mm in length. - Perineural invasion identified. Comment: Immunohistochemistry for a prostate multiplex stain (p63, K903, and P504S) and NKX3.1 demonstrate invasive carcinoma. F. Prostate, right medial apex:  - Prostatic adenocarcinoma, [place] score 4 + 5 = 9, Prognostic Grade Group 5,  involving 100% of 1 needle core  and measuring  20 mm in length. G. Prostate, left lateral base:  - Prostatic adenocarcinoma, [place] score 4 + 5 = 9, Prognostic Grade Group 5,  involving 75% of 1 needle core  and measuring  10 mm in length. - Perineural invasion identified. - Lymphovascular invasion is identified. H. Prostate, left medial base:  - Prostatic adenocarcinoma, [place] score 4 + 5 = 9, Prognostic Grade Group 5,  involving 95% of 1 needle core  and measuring  14 mm in length. - Perineural invasion identified. Comment: There is a focus of closely approximated gastrointestinal epithelium; NKX3. 1 shows no definite invasion of the GI epithelium.       I. Prostate, left lateral mid:  - Prostatic adenocarcinoma, [place] score 4 + 5 = 9, Prognostic Grade Group 5,  involving 95% of 1 needle core  and measuring  12 mm in length. J. Prostate, left medial mid:  - Prostatic adenocarcinoma, [place] score 4 + 5 = 9, Prognostic Grade Group 5,  involving 85% of 1 needle core  and measuring  13 mm in length. Comment: Immunohistochemistry for a prostate multiplex stain (p63, K903, and P504S) and NKX3.1 demonstrate invasive carcinoma. K. Prostate, left lateral apex:  - Prostatic adenocarcinoma, [place] score 4 + 5 = 9, Prognostic Grade Group 5,  involving 25% of 1 needle core  and measuring  3 mm in length. L. Prostate, left medial apex :  - Prostatic adenocarcinoma, [place] score 4 + 5 = 9, Prognostic Grade Group 5,  involving 95% of 1 needle core  and measuring  15 mm in length. - Perineural invasion identified. Comment:   Cribriform glands are present within the pattern 4 component. This feature has been associated with adverse clinical outcomes and molecular features typically seen in advanced disease. (The 2019 Genitourinary Pathology Society (GUPS) White Paper on Contemporary Grading of Prostate Cancer. Arch Pathol Lab Med. 2021 Apr 1;145(4):461-493.)     7/5/2023 -  Hormone Therapy    Firmagon loading dose on 7/5/23, followed by Lupron          Review of Systems:  Review of Systems   Constitutional: Positive for appetite change (decreased appetite) and fatigue. Negative for chills, fever and unexpected weight change. HENT: Negative. Eyes: Positive for photophobia (with cell phone use, eyes tire after 5-10 min). Wears glasses, possible glaucoma   Respiratory: Negative. Negative for cough and shortness of breath. Cardiovascular: Negative. Gastrointestinal: Negative for abdominal pain, blood in stool, constipation, diarrhea, nausea and vomiting. Endocrine: Negative for cold intolerance and heat intolerance.    Genitourinary: Positive for decreased urine volume, difficulty urinating (b/l nephrostomy tubes ) and flank pain (at times where nephrostomy tubes are). Negative for dysuria, frequency, hematuria and urgency. Has bilateral nephrostomy tubes, sometimes urinate through penis, has to push/strain to urinate, stream stops and starts, denies any hematuria, nocturia x1   Musculoskeletal: Positive for back pain (lower back pains). Negative for arthralgias and myalgias. Skin: Negative. Allergic/Immunologic: Negative. Neurological: Positive for weakness (generalized). Negative for dizziness, light-headedness, numbness and headaches. Hematological: Does not bruise/bleed easily. Psychiatric/Behavioral: Negative for sleep disturbance. The patient is not nervous/anxious.         Clinical Trial: no      Health Maintenance   Topic Date Due   • Medicare Annual Wellness Visit (AWV)  Never done   • COVID-19 Vaccine (1) Never done   • Pneumococcal Vaccine: 65+ Years (1 - PCV) Never done   • Diabetic Foot Exam  Never done   • DM Eye Exam  Never done   • Colorectal Cancer Screening  Never done   • Fall Risk  Never done   • Influenza Vaccine (1) 09/01/2023   • HEMOGLOBIN A1C  02/11/2024   • Kidney Health Evaluation: Albumin/Creatinine Ratio  06/21/2024   • Depression Screening  07/31/2024   • BMI: Adult  08/11/2024   • Kidney Health Evaluation: GFR  08/11/2024   • Hepatitis C Screening  Completed   • HIB Vaccine  Aged Out   • IPV Vaccine  Aged Out   • Hepatitis A Vaccine  Aged Out   • Meningococcal ACWY Vaccine  Aged Out   • HPV Vaccine  Aged Out     Patient Active Problem List   Diagnosis   • Type 2 diabetes mellitus, with long-term current use of insulin ([address])   • Elevated PSA   • Other hydronephrosis   • Elevated serum creatinine   • Hypertension   • Encounter to discuss test results   • Bilateral hydronephrosis   • Prostate cancer [facility])   • Encounter for monitoring androgen deprivation therapy   • Nephrostomy status ([address])     Past Medical History:   Diagnosis Date   • Diabetes mellitus ([address])    • High cholesterol    • Hypertension    • Prostate cancer [facility])      Past Surgical History:   Procedure Laterality Date   • IR NEPHROSTOMY TUBE PLACEMENT  2023    bilateral   • US GUIDED PROSTATE BIOPSY       Family History   Problem Relation Age of Onset   • Liver cancer Father      Social History     Socioeconomic History   • Marital status: /Civil Union     Spouse name: Not on file   • Number of children: Not on file   • Years of education: Not on file   • Highest education level: Not on file   Occupational History   • Not on file   Tobacco Use   • Smoking status: Former     Packs/day: 0.25     Types: Cigarettes     Quit date:      Years since quittin.6     Passive exposure: Past   • Smokeless tobacco: Never   • Tobacco comments:     States he only smoked on the weekends   Vaping Use   • Vaping Use: Never used   Substance and Sexual Activity   • Alcohol use: Not Currently     Comment: socially   • Drug use: Never   • Sexual activity: Not Currently     Partners: Female   Other Topics Concern   • Not on file   Social History Narrative   • Not on file     Social Determinants of Health     Financial Resource Strain: Not on file   Food Insecurity: No Food Insecurity (2023)    Hunger Vital Sign    • Worried About Running Out of Food in the Last Year: Never true    • Ran Out of Food in the Last Year: Never true   Transportation Needs: No Transportation Needs (2023)    PRAPARE - Transportation    • Lack of Transportation (Medical): No    • Lack of Transportation (Non-Medical):  No   Physical Activity: Not on file   Stress: Not on file   Social Connections: Not on file   Intimate Partner Violence: Not on file   Housing Stability: Unknown (2023)    Housing Stability Vital Sign    • Unable to Pay for Housing in the Last Year: No    • Number of Places Lived in the Last Year: Not on file    • Unstable Housing in the Last Year: No       Current Outpatient Medications:   •  albuterol (ProAir HFA) 90 mcg/act inhaler, Inhale 2 puffs every 4 (four) hours as needed for wheezing or shortness of breath, Disp: 8.5 g, Rfl: 0  •  Calcium Carb-Cholecalciferol (calcium carbonate-vitamin D) 500 mg-5 mcg tablet, Take 1 tablet by mouth 2 (two) times a day with meals, Disp: 180 tablet, Rfl: 3  •  glimepiride (AMARYL) 4 mg tablet, Take 4 mg by mouth daily with breakfast, Disp: , Rfl:   •  Invokana 100 MG, Take 100 mg by mouth daily before breakfast, Disp: , Rfl:   •  Levemir FlexPen 100 units/mL injection pen, Inject 40 Units under the skin daily, Disp: , Rfl:   •  lisinopril (ZESTRIL) 10 mg tablet, Take 10 mg by mouth daily, Disp: , Rfl:   •  Lovaza 1 g capsule, 2 (two) times a day, Disp: , Rfl:   •  Multiple Vitamins-Minerals (Sentry) TABS, Take 1 tablet by mouth daily, Disp: , Rfl:   •  OneTouch Verio test strip, , Disp: , Rfl:   •  rosuvastatin (CRESTOR) 10 MG tablet, Take 10 mg by mouth daily, Disp: , Rfl:   •  sodium chloride, PF, 0.9 %, 10 mL by Intracatheter route daily Intracatheter flushing daily.  May substitute prefilled syringe with normal saline 10 mL vials, 10 mL syringes, and 18 g blunt needles, Disp: 900 mL, Rfl: 3  •  sodium chloride, PF, 0.9 %, 10 mL by Intracatheter route daily, Disp: 300 mL, Rfl: 3  •  Spacer/Aero-Holding Chambers (AEROCHAMBER MINI CHAMBER) BILLY, by Does not apply route see administration instructions, Disp: 1 Device, Rfl: 0  No Known Allergies  Vitals:    08/28/23 1507   BP: 110/78   BP Location: Left arm   Pulse: 72   Resp: 18   Temp: (!) 97 °F (36.1 °C)   TempSrc: Temporal   SpO2: 98%   Weight: 75.8 kg (167 lb)

## 2023-08-29 NOTE — PROGRESS NOTES
Follow-up - Radiation Oncology   Mimi Ingram 1950 81 y.o. male [id number]      History of Present Illness   Cancer Staging   Prostate cancer [facility])  Staging form: Prostate, AJCC 8th Edition  - Clinical: Stage IVB (cT4, cN1, cM1b, PSA: 145, Grade Group: 5) - Signed by Robert Desai MD on 8/28/2023  Prostate specific antigen (PSA) range: 20 or greater  Histologic grading system: 5 grade system    Mr. Mimi Ingram is a 81year old man with recently diagnosed very high risk (cT4, GS 4+5, ) vs metastatic prostate adenocarcinoma who presented with urinary retention and hematuria. He was seen in initial consultation on 7/31/23 with recommendation for PSMA PET for completion of staging given his very high risk of having metastases. He returns for follow-up to review imaging. Interval History:  The patient was last seen in clinic on 7/31/23 at the time of consultation. PSMA PET (8/17/23) demonstrated:   1. Fairly diffuse radiotracer uptake at the prostate gland compatible with patient's known malignancy. 2.  Contiguous activity extending from the prostate gland to the bladder walls posteriorly and laterally suspicious for direct invasion. Contiguous activity radiotracer uptake at the level of the bilateral seminal vesicle gland suspicious for local invasion. 3. Patchy radiotracer uptake in the right internal iliac region extending along the right distal ureter suspicious for local spread of disease. Scattered small PSMA avid lymph nodes along the left iliac chain suspicious for metastasis. 4.  Innumerable PSMA avid osseous lesions compatible with metastasis. Repeat PSA was 6.01 (8/11/23). Currently the patient is doing fair overall. He has no substantial bone pain. He has had ongoing difficulty with urination, though has noted some improvement in the ability to pass urine through his penis since starting hormonal therapy.   He is scheduled for follow-up with urology in the near future for possible nephrostomy tube change or stent insertion. Historical Information   Oncology History   Prostate cancer ([address])   2023 Initial Diagnosis    Prostate cancer ([address])     6/28/2023 Biopsy    A. Prostate, right lateral base:  - Prostatic adenocarcinoma, [place] score 4 + 5 = 9, Prognostic Grade Group 5,  involving 90% of 1 needle core  and measuring  11 mm in length. B. Prostate, right medial base:  - Prostatic adenocarcinoma, [place] score 4 + 5 = 9, Prognostic Grade Group 5,  involving 70% of 1 needle core  and measuring  10 mm in length. C. Prostate, right lateral mid:  - Prostatic adenocarcinoma, [place] score 4 + 5 = 9, Prognostic Grade Group 5,  involving 90% of 1 needle core  and measuring  12 mm in length. Comment: Immunohistochemistry for a prostate multiplex stain (p63, K903, and P504S) and NKX3.1 demonstrate invasive carcinoma. D. Prostate, right medial mid:  - Prostatic adenocarcinoma, [place] score 4 + 5 = 9, Prognostic Grade Group 5,  involving 95% of 1 needle core  and measuring  15 mm in length. E. Prostate, right lateral apex:  - Prostatic adenocarcinoma, [place] score 4 + 5 = 9, Prognostic Grade Group 5,  involving 90% of 1 needle core  and measuring  12 mm in length. - Perineural invasion identified. Comment: Immunohistochemistry for a prostate multiplex stain (p63, K903, and P504S) and NKX3.1 demonstrate invasive carcinoma. F. Prostate, right medial apex:  - Prostatic adenocarcinoma, [place] score 4 + 5 = 9, Prognostic Grade Group 5,  involving 100% of 1 needle core  and measuring  20 mm in length. G. Prostate, left lateral base:  - Prostatic adenocarcinoma, [place] score 4 + 5 = 9, Prognostic Grade Group 5,  involving 75% of 1 needle core  and measuring  10 mm in length. - Perineural invasion identified. - Lymphovascular invasion is identified.      H. Prostate, left medial base:  - Prostatic adenocarcinoma, [place] score 4 + 5 = 9, Prognostic Grade Group 5,  involving 95% of 1 needle core  and measuring  14 mm in length. - Perineural invasion identified. Comment: There is a focus of closely approximated gastrointestinal epithelium; NKX3. 1 shows no definite invasion of the GI epithelium. I. Prostate, left lateral mid:  - Prostatic adenocarcinoma, [place] score 4 + 5 = 9, Prognostic Grade Group 5,  involving 95% of 1 needle core  and measuring  12 mm in length. J. Prostate, left medial mid:  - Prostatic adenocarcinoma, [place] score 4 + 5 = 9, Prognostic Grade Group 5,  involving 85% of 1 needle core  and measuring  13 mm in length. Comment: Immunohistochemistry for a prostate multiplex stain (p63, K903, and P504S) and NKX3.1 demonstrate invasive carcinoma. K. Prostate, left lateral apex:  - Prostatic adenocarcinoma, [place] score 4 + 5 = 9, Prognostic Grade Group 5,  involving 25% of 1 needle core  and measuring  3 mm in length. L. Prostate, left medial apex :  - Prostatic adenocarcinoma, [place] score 4 + 5 = 9, Prognostic Grade Group 5,  involving 95% of 1 needle core  and measuring  15 mm in length. - Perineural invasion identified. Comment:   Cribriform glands are present within the pattern 4 component. This feature has been associated with adverse clinical outcomes and molecular features typically seen in advanced disease. (The 2019 Genitourinary Pathology Society (GUPS) White Paper on Contemporary Grading of Prostate Cancer.  Arch Pathol Lab Med. 2021 Apr 1;145(4):461-493.)     7/5/2023 -  Hormone Therapy    Firmagon loading dose on 7/5/23, followed by Lupron      8/28/2023 -  Cancer Staged    Staging form: Prostate, AJCC 8th Edition  - Clinical: Stage IVB (cT4, cN1, cM1b, PSA: 145, Grade Group: 5) - Signed by Noel Nguyen MD on 8/28/2023  Prostate specific antigen (PSA) range: 20 or greater  Histologic grading system: 5 grade system           Past Medical History:   Diagnosis Date   • Diabetes mellitus ([address] )    • High cholesterol    • Hypertension    • Prostate cancer [facility])      Past Surgical History:   Procedure Laterality Date   • IR NEPHROSTOMY TUBE PLACEMENT  2023    bilateral   • US GUIDED PROSTATE BIOPSY         Social History   Social History     Substance and Sexual Activity   Alcohol Use Not Currently    Comment: socially     Social History     Substance and Sexual Activity   Drug Use Never     Social History     Tobacco Use   Smoking Status Former   • Packs/day: 0.25   • Types: Cigarettes   • Quit date:    • Years since quittin.6   • Passive exposure: Past   Smokeless Tobacco Never   Tobacco Comments    States he only smoked on the weekends         Meds/Allergies     Current Outpatient Medications:   •  albuterol (ProAir HFA) 90 mcg/act inhaler, Inhale 2 puffs every 4 (four) hours as needed for wheezing or shortness of breath, Disp: 8.5 g, Rfl: 0  •  Calcium Carb-Cholecalciferol (calcium carbonate-vitamin D) 500 mg-5 mcg tablet, Take 1 tablet by mouth 2 (two) times a day with meals, Disp: 180 tablet, Rfl: 3  •  glimepiride (AMARYL) 4 mg tablet, Take 4 mg by mouth daily with breakfast, Disp: , Rfl:   •  Invokana 100 MG, Take 100 mg by mouth daily before breakfast, Disp: , Rfl:   •  Levemir FlexPen 100 units/mL injection pen, Inject 40 Units under the skin daily, Disp: , Rfl:   •  lisinopril (ZESTRIL) 10 mg tablet, Take 10 mg by mouth daily, Disp: , Rfl:   •  Lovaza 1 g capsule, 2 (two) times a day, Disp: , Rfl:   •  Multiple Vitamins-Minerals (Sentry) TABS, Take 1 tablet by mouth daily, Disp: , Rfl:   •  OneTouch Verio test strip, , Disp: , Rfl:   •  rosuvastatin (CRESTOR) 10 MG tablet, Take 10 mg by mouth daily, Disp: , Rfl:   •  sodium chloride, PF, 0.9 %, 10 mL by Intracatheter route daily Intracatheter flushing daily.  May substitute prefilled syringe with normal saline 10 mL vials, 10 mL syringes, and 18 g blunt needles, Disp: 900 mL, Rfl: 3  •  sodium chloride, PF, 0.9 %, 10 mL by Intracatheter route daily, Disp: 300 mL, Rfl: 3  •  Spacer/Aero-Holding Chambers (AEROCHAMBER MINI CHAMBER) BILLY, by Does not apply route see administration instructions, Disp: 1 Device, Rfl: 0  No Known Allergies    Review of Systems   Constitutional: Positive for appetite change (decreased appetite) and fatigue. Negative for chills, fever and unexpected weight change. HENT: Negative. Eyes: Positive for photophobia (with cell phone use, eyes tire after 5-10 min). Wears glasses, possible glaucoma   Respiratory: Negative. Negative for cough and shortness of breath. Cardiovascular: Negative. Gastrointestinal: Negative for abdominal pain, blood in stool, constipation, diarrhea, nausea and vomiting. Endocrine: Negative for cold intolerance and heat intolerance. Genitourinary: Positive for decreased urine volume, difficulty urinating (b/l nephrostomy tubes ) and flank pain (at times where nephrostomy tubes are). Negative for dysuria, frequency, hematuria and urgency. Has bilateral nephrostomy tubes, sometimes urinate through penis, has to push/strain to urinate, stream stops and starts, denies any hematuria, nocturia x1   Musculoskeletal: Positive for back pain (lower back pains). Negative for arthralgias and myalgias. Skin: Negative. Allergic/Immunologic: Negative. Neurological: Positive for weakness (generalized). Negative for dizziness, light-headedness, numbness and headaches. Hematological: Does not bruise/bleed easily. Psychiatric/Behavioral: Negative for sleep disturbance. The patient is not nervous/anxious.         OBJECTIVE:   /78 (BP Location: Left arm)   Pulse 72   Temp (!) 97 °F (36.1 °C) (Temporal)   Resp 18   Wt 75.8 kg (167 lb)   SpO2 98%   BMI 24.66 kg/m²   Pain Assessment:  0  ECOG/Zubrod/WHO: 1 - Symptomatic but completely ambulatory    Physical Exam   Well appearing. NAD. No increased work of breathing.    Extremities warm and well perfused. ANA deferred. RESULTS    Lab Results:   Recent Results (from the past 672 hour(s))   Ancillary Pathology Sendout (AP only)    Collection Time: 08/07/23  9:38 AM   Result Value Ref Range    Scan Result Mi Profile Results    HEMOGLOBIN A1C W/ EAG ESTIMATION    Collection Time: 08/11/23  8:50 AM   Result Value Ref Range    Hemoglobin A1C 6.8 (H) Normal 4.0-5.6%; PreDiabetic 5.7-6.4%; Diabetic >=6.5%; Glycemic control for adults with diabetes <7.0% %     mg/dl   Basic metabolic panel    Collection Time: 08/11/23  8:50 AM   Result Value Ref Range    Sodium 137 135 - 147 mmol/L    Potassium 4.2 3.5 - 5.3 mmol/L    Chloride 107 96 - 108 mmol/L    CO2 26 21 - 32 mmol/L    ANION GAP 4 mmol/L    BUN 28 (H) 5 - 25 mg/dL    Creatinine 1.43 (H) 0.60 - 1.30 mg/dL    Glucose, Fasting 213 (H) 65 - 99 mg/dL    Calcium 9.1 8.3 - 10.1 mg/dL    eGFR 49 ml/min/1.73sq m   PSA Total, Diagnostic    Collection Time: 08/11/23  8:50 AM   Result Value Ref Range    PSA, Diagnostic 6.01 (H) 0.00 - 4.00 ng/mL   MISCELLANEOUS LAB TEST    Collection Time: 08/22/23  9:31 AM   Result Value Ref Range    Miscellaneous Lab Test Result         Imaging Studies:NM PET CT skull base to mid thigh    Result Date: 8/17/2023  Narrative: PSMA PET/CT SCAN INDICATION: Indeterminate bone scan. Elevated PSA. C61: Malignant neoplasm of prostate R97.20: Elevated prostate specific antigen (PSA) R94.8: Abnormal results of function studies of other organs and systems MODIFIER: PS COMPARISON: CT abdomen pelvis 6/20/2023, bone scan 6/20/2023 CELL TYPE: Prostate adenocarcinoma TECHNIQUE:   5.4 mCi Illucix PSMA administered IV. Multiplanar attenuation corrected and non attenuation corrected PET images were acquired 60 minutes post injection. Contiguous, low dose, axial CT sections were obtained from the vertex through the femurs . Intravenous or oral contrast was not utilized.   This examination, like all CT scans performed in the [facility], was performed utilizing techniques to minimize radiation dose exposure, including the use of iterative reconstruction and automated exposure control. FINDINGS: VISUALIZED BRAIN: No acute abnormalities are seen. HEAD/NECK: There is a physiologic distribution of the radiotracer. No PSMA avid cervical adenopathy is seen. CT images: Unremarkable. CHEST: No PSMA avid soft tissue lesions are seen. Previously noted 4 mm right lower lobe nodule questionably present on current low-dose CT images. No focal uptake here. CT images: Moderate coronary artery calcifications. ABDOMEN: No PSMA avid soft tissue lesions are seen. CT images: Calcified granuloma in the spleen posteriorly. Interval bilateral nephrostomy tube placement. No hydronephrosis. Fang Redhead PELVIS: Fairly diffuse radiotracer uptake at the prostate gland which would be compatible with the patient's known malignancy, SUV max of 28. Contiguous activity extends along the posterior bladder wall and along the lateral walls of the bladder more extensive on the right suggesting direct invasion. There is radiotracer uptake at the level of the bilateral seminal vesicle glands, SUV max of 8.4 on the right and 6.8 on the left. There are few small lymph nodes along the left iliac chains with PSMA activity. Left external iliac chain lymph node demonstrates SUV max of 11. See image 237 series 1200. This measures 7 mm short axis image 249 series 3. Patchy radiotracer uptake in the right internal iliac region along the course of the right ureter SUV max of 5.7. There is soft tissue fullness infiltration here on CT. See for example image 229 series 1200. CT images: There is air in the bladder lumen probably iatrogenic. OSSEOUS STRUCTURES: Innumerable PSMA avid osseous lesions. Some of these correspond to sclerotic lesions on CT. Reference lesions as noted below: C1 lesion at the left lateral mass demonstrates SUV max 7.1. C5 vertebral body lesion demonstrates SUV max 7.6. T12 vertebral body lesion demonstrates SUV max 7.3. L5 vertebral body lesion on the left demonstrates SUV max of 5.1. Right third rib lesion laterally demonstrates SUV max of 5.5. Right iliac lesion centrally demonstrates SUV max of 28. Left sacral lesion demonstrates SUV max 4.7. Left femoral neck lesion demonstrates SUV max of 6.1. Lesion at the left hip near the lesser trochanter demonstrates a SUV  max of 6.4. CT images: Scattered degenerative spurring of the spine. Impression: 1. Fairly diffuse radiotracer uptake at the prostate gland compatible with patient's known malignancy. 2.  Contiguous activity extending from the prostate gland to the bladder walls posteriorly and laterally suspicious for direct invasion. Contiguous activity radiotracer uptake at the level of the bilateral seminal vesicle gland suspicious for local invasion. 3. Patchy radiotracer uptake in the right internal iliac region extending along the right distal ureter suspicious for local spread of disease. Scattered small PSMA avid lymph nodes along the left iliac chain suspicious for metastasis. 4.  Innumerable PSMA avid osseous lesions compatible with metastasis. The study was marked in EPIC for significant notification. Workstation performed: [id number]           Assessment/Plan:  Orders Placed This Encounter   Procedures   • Ambulatory referral to Hematology / Oncology      We reviewed the patient's PSMA PET scan in detail, which on my review demonstrates an intensely avid prostate with bladder invasion as well as numerous osseous metastatic sites, primarily throughout his axial skeleton. We discussed that given this finding, his disease would be considered incurable and that definitive treatment for his prostate cancer would not be recommended. Palliative intent RT could still be administered, however at this time the patient has minimal symptoms, apart from obstruction, which is improving with hormonal therapy.   We discussed that in addition to Lupron, additional systemic therapy such as novel antiandrogens or chemotherapy (e.g. docetaxel) could be considered and a referral was placed to medical oncology. I will remain available should any questions or concerns arise at any point or should any of his osseous metastases become symptomatic. Sophia Waterman MD  8/29/2023,1:10 PM    Portions of the record may have been created with voice recognition software.  Occasional wrong word or "sound a like" substitutions may have occurred due to the inherent limitations of voice recognition software.  Read the chart carefully and recognize, using context, where substitutions have occurred.

## 2023-08-30 ENCOUNTER — TELEPHONE (OUTPATIENT)
Dept: HEMATOLOGY ONCOLOGY | Facility: CLINIC | Age: 73
End: 2023-08-30

## 2023-08-30 NOTE — TELEPHONE ENCOUNTER
I called Juan Connor in response to a referral that was received for patient to establish care with Hematology. Outreach was made to schedule patient a follow up as he is established with Dr. Raisa Vieira    No answer. The referral has been closed.

## 2023-08-31 ENCOUNTER — TELEPHONE (OUTPATIENT)
Dept: NUTRITION | Facility: HOSPITAL | Age: 73
End: 2023-08-31

## 2023-08-31 ENCOUNTER — TELEPHONE (OUTPATIENT)
Dept: HEMATOLOGY ONCOLOGY | Facility: CLINIC | Age: 73
End: 2023-08-31

## 2023-08-31 NOTE — TELEPHONE ENCOUNTER
Patient Call    Who are you speaking with? son in law    If it is not the patient, are they listed on an active communication consent form? Yes   What is the reason for this call? They want to know if pt needs to be sooner and discuss treatment   Does this require a call back? Yes   If a call back is required, please list [facility] call back number [phone]   If a call back is required, advise that a message will be forwarded to their care team and someone will return their call as soon as possible. Did you relay this information to the patient?  Yes

## 2023-08-31 NOTE — TELEPHONE ENCOUNTER
Follow-up call placed to [facility] via  services. No answer and no voicemail available at this time to leave a message.      Will attempt to reach patient again within the next two weeks

## 2023-08-31 NOTE — TELEPHONE ENCOUNTER
Appointment Schedule   Who are you speaking with? Child   If it is not the patient, are they listed on an active communication consent form? Yes   Which provider is the appointment scheduled with? Dr. Delta Castaneda   At which location is the appointment scheduled for? [facility]   When is the appointment scheduled? Please list date and time 9/20/23 2pm   What is the reason for this appointment? Per nurse   Did patient voice understanding of the details of this appointment? Yes   Was the no show policy reviewed with patient?  Yes

## 2023-09-07 ENCOUNTER — TELEPHONE (OUTPATIENT)
Dept: NUTRITION | Facility: HOSPITAL | Age: 73
End: 2023-09-07

## 2023-09-07 NOTE — TELEPHONE ENCOUNTER
Attempted again to reach patient for oncology nutrition follow-up. Left VM with cell phone number listed letting patient know that oncology dietitian was reaching out to see how he is doing. Asked that patient please return our call should he have any nutrition questions or concerns. I have evaluated the patient in conjunction with the resident and agree with the above history, physical, assessment, and plan l

## 2023-09-18 ENCOUNTER — TELEPHONE (OUTPATIENT)
Dept: GENETICS | Facility: CLINIC | Age: 73
End: 2023-09-18

## 2023-09-18 NOTE — TELEPHONE ENCOUNTER
I tried to call Reed Mosley to schedule a result appointment with Sohail Acharya, unable to connect with him. His phone rang then message stating unable to complete call at this time try again later.

## 2023-09-19 ENCOUNTER — TELEPHONE (OUTPATIENT)
Dept: GENETICS | Facility: CLINIC | Age: 73
End: 2023-09-19

## 2023-09-19 NOTE — TELEPHONE ENCOUNTER
Spoke with patient's son Joaquina Alvarado and let him know that we've been trying to reach his father to review genetic test results. Provided patient's son with best call back number.

## 2023-09-20 ENCOUNTER — OFFICE VISIT (OUTPATIENT)
Dept: HEMATOLOGY ONCOLOGY | Facility: CLINIC | Age: 73
End: 2023-09-20
Payer: COMMERCIAL

## 2023-09-20 VITALS
RESPIRATION RATE: 15 BRPM | SYSTOLIC BLOOD PRESSURE: 139 MMHG | OXYGEN SATURATION: 99 % | TEMPERATURE: 97.7 F | HEIGHT: 69 IN | WEIGHT: 170 LBS | HEART RATE: 58 BPM | BODY MASS INDEX: 25.18 KG/M2 | DIASTOLIC BLOOD PRESSURE: 75 MMHG

## 2023-09-20 DIAGNOSIS — C61 PROSTATE CANCER (HCC): Primary | ICD-10-CM

## 2023-09-20 DIAGNOSIS — C79.51 METASTASIS TO BONE (HCC): ICD-10-CM

## 2023-09-20 PROCEDURE — 99215 OFFICE O/P EST HI 40 MIN: CPT | Performed by: INTERNAL MEDICINE

## 2023-09-20 RX ORDER — DEXAMETHASONE 0.5 MG/1
0.5 TABLET ORAL
Qty: 30 TABLET | Refills: 2 | Status: SHIPPED | OUTPATIENT
Start: 2023-09-20 | End: 2023-10-20

## 2023-09-20 RX ORDER — MAGNESIUM HYDROXIDE 1200 MG/15ML
LIQUID ORAL
Qty: 900 ML | Refills: 0 | OUTPATIENT
Start: 2023-09-20

## 2023-09-20 NOTE — PROGRESS NOTES
[facility]  [address] [place] [id number]  [address] [place] [address] Drive, [phone]  09/20/23    Discussion:   In summary, this is a 79-year-old male with a history of prostate cancer, Ehrhardt 9, at least locally advanced. August 17, 2023 PET/CT showed multiple bony lesions with PSMA hypermetabolism, SUV up to 28 in the right iliac region. He has started [place] in July 2023, Lupron in August 2023. We reviewed that the goal of treatment is disease control, palliative benefit, survival benefit but that cure is not likely. Oldham Haile is recommended. 250 mg with low-fat meal.  Dexamethasone 0.5 mg p.o. daily for avoidance of adrenal insufficiency. We discussed consideration for bone strengthening agent. Teeth are in poor repair. Dental evaluation/clearance is requested. Bone strengthening agent is deferred for now. I reviewed the above considerations with the patient and his family. They voiced understanding and agreement. Repeat blood work and follow-up appointment in 3 months. I discussed the above with the patient. The patient and his family. Voiced understanding and agreement.  ______________________________________________________________________    Chief Complaint   Patient presents with   • Follow-up       HPI:  Oncology History   Prostate cancer ([facility])   2023 Initial Diagnosis    Prostate cancer ([facility])     6/28/2023 Biopsy    A. Prostate, right lateral base:  - Prostatic adenocarcinoma, Ehrhardt score 4 + 5 = 9, Prognostic Grade Group 5,  involving 90% of 1 needle core  and measuring  11 mm in length. B. Prostate, right medial base:  - Prostatic adenocarcinoma, Liana score 4 + 5 = 9, Prognostic Grade Group 5,  involving 70% of 1 needle core  and measuring  10 mm in length.       C. Prostate, right lateral mid:  - Prostatic adenocarcinoma, Ehrhardt score 4 + 5 = 9, Prognostic Grade Group 5,  involving 90% of 1 needle core  and measuring  12 mm in length. Comment: Immunohistochemistry for a prostate multiplex stain (p63, K903, and P504S) and NKX3.1 demonstrate invasive carcinoma. D. Prostate, right medial mid:  - Prostatic adenocarcinoma, Buras score 4 + 5 = 9, Prognostic Grade Group 5,  involving 95% of 1 needle core  and measuring  15 mm in length. E. Prostate, right lateral apex:  - Prostatic adenocarcinoma, Liana score 4 + 5 = 9, Prognostic Grade Group 5,  involving 90% of 1 needle core  and measuring  12 mm in length. - Perineural invasion identified. Comment: Immunohistochemistry for a prostate multiplex stain (p63, K903, and P504S) and NKX3.1 demonstrate invasive carcinoma. F. Prostate, right medial apex:  - Prostatic adenocarcinoma, Liana score 4 + 5 = 9, Prognostic Grade Group 5,  involving 100% of 1 needle core  and measuring  20 mm in length. G. Prostate, left lateral base:  - Prostatic adenocarcinoma, Liana score 4 + 5 = 9, Prognostic Grade Group 5,  involving 75% of 1 needle core  and measuring  10 mm in length. - Perineural invasion identified. - Lymphovascular invasion is identified. H. Prostate, left medial base:  - Prostatic adenocarcinoma, Liana score 4 + 5 = 9, Prognostic Grade Group 5,  involving 95% of 1 needle core  and measuring  14 mm in length. - Perineural invasion identified. Comment: There is a focus of closely approximated gastrointestinal epithelium; NKX3. 1 shows no definite invasion of the GI epithelium. I. Prostate, left lateral mid:  - Prostatic adenocarcinoma, Buras score 4 + 5 = 9, Prognostic Grade Group 5,  involving 95% of 1 needle core  and measuring  12 mm in length. J. Prostate, left medial mid:  - Prostatic adenocarcinoma, Buras score 4 + 5 = 9, Prognostic Grade Group 5,  involving 85% of 1 needle core  and measuring  13 mm in length.        Comment: Immunohistochemistry for a prostate multiplex stain (p63, K903, and P504S) and NKX3.1 demonstrate invasive carcinoma. K. Prostate, left lateral apex:  - Prostatic adenocarcinoma, Liana score 4 + 5 = 9, Prognostic Grade Group 5,  involving 25% of 1 needle core  and measuring  3 mm in length. L. Prostate, left medial apex :  - Prostatic adenocarcinoma, Liana score 4 + 5 = 9, Prognostic Grade Group 5,  involving 95% of 1 needle core  and measuring  15 mm in length. - Perineural invasion identified. Comment:   Cribriform glands are present within the pattern 4 component. This feature has been associated with adverse clinical outcomes and molecular features typically seen in advanced disease. (The 2019 Genitourinary Pathology Society (GUPS) White Paper on Contemporary Grading of Prostate Cancer. Arch Pathol Lab Med. 2021 Apr 1;145(4):461-493.)     7/5/2023 -  Hormone Therapy    Firmagon loading dose on 7/5/23, followed by Lupron      8/28/2023 -  Cancer Staged    Staging form: Prostate, AJCC 8th Edition  - Clinical: Stage IVB (cT4, cN1, cM1b, PSA: 145, Grade Group: 5) - Signed by Flor Loja MD on 8/28/2023  Prostate specific antigen (PSA) range: 20 or greater  Histologic grading system: 5 grade system           Interval History: Clinically stable. ECOG-  0 - Asymptomatic    Review of Systems   Constitutional: Negative for chills and fever. HENT: Negative for nosebleeds. Eyes: Negative for discharge. Respiratory: Negative for cough and shortness of breath. Cardiovascular: Negative for chest pain. Gastrointestinal: Negative for abdominal pain, constipation and diarrhea. Endocrine: Negative for polydipsia. Genitourinary: Negative for hematuria. Musculoskeletal: Negative for arthralgias. Skin: Negative for color change. Allergic/Immunologic: Negative for immunocompromised state. Neurological: Negative for dizziness and headaches. Hematological: Negative for adenopathy. Psychiatric/Behavioral: Negative for agitation.        Past Medical History:   Diagnosis Date   • Diabetes mellitus ([facility])    • High cholesterol    • Hypertension    • Prostate cancer [facility])      Patient Active Problem List   Diagnosis   • Type 2 diabetes mellitus, with long-term current use of insulin ([facility])   • Elevated PSA   • Other hydronephrosis   • Elevated serum creatinine   • Hypertension   • Encounter to discuss test results   • Bilateral hydronephrosis   • Prostate cancer ([facility])   • Encounter for monitoring androgen deprivation therapy   • Nephrostomy status (HCC)       Current Outpatient Medications:   •  albuterol (ProAir HFA) 90 mcg/act inhaler, Inhale 2 puffs every 4 (four) hours as needed for wheezing or shortness of breath, Disp: 8.5 g, Rfl: 0  •  Calcium Carb-Cholecalciferol (calcium carbonate-vitamin D) 500 mg-5 mcg tablet, Take 1 tablet by mouth 2 (two) times a day with meals, Disp: 180 tablet, Rfl: 3  •  glimepiride (AMARYL) 4 mg tablet, Take 4 mg by mouth daily with breakfast, Disp: , Rfl:   •  Invokana 100 MG, Take 100 mg by mouth daily before breakfast, Disp: , Rfl:   •  Levemir FlexPen 100 units/mL injection pen, Inject 40 Units under the skin daily, Disp: , Rfl:   •  lisinopril (ZESTRIL) 10 mg tablet, Take 10 mg by mouth daily, Disp: , Rfl:   •  Lovaza 1 g capsule, 2 (two) times a day, Disp: , Rfl:   •  Multiple Vitamins-Minerals (Sentry) TABS, Take 1 tablet by mouth daily, Disp: , Rfl:   •  OneTouch Verio test strip, , Disp: , Rfl:   •  rosuvastatin (CRESTOR) 10 MG tablet, Take 10 mg by mouth daily, Disp: , Rfl:   •  sodium chloride, PF, 0.9 %, 10 mL by Intracatheter route daily Intracatheter flushing daily.  May substitute prefilled syringe with normal saline 10 mL vials, 10 mL syringes, and 18 g blunt needles, Disp: 900 mL, Rfl: 3  •  sodium chloride, PF, 0.9 %, 10 mL by Intracatheter route daily, Disp: 300 mL, Rfl: 3  •  Spacer/Aero-Holding Chambers (AEROCHAMBER MINI CHAMBER) BILLY, by Does not apply route see administration instructions, Disp: 1 Device, Rfl: 0  No Known Allergies  Past Surgical History:   Procedure Laterality Date   • IR NEPHROSTOMY TUBE PLACEMENT  06/22/2023    bilateral   • US GUIDED PROSTATE BIOPSY       Social History     Objective:  Vitals:    09/20/23 1414   BP: 139/75   BP Location: Right arm   Patient Position: Sitting   Cuff Size: Standard   Pulse: 58   Resp: 15   Temp: 97.7 °F (36.5 °C)   TempSrc: Temporal   SpO2: 99%   Weight: 77.1 kg (170 lb)   Height: 5' 9" (1.753 m)     Physical Exam  Constitutional:       Appearance: He is well-developed. HENT:      Head: Normocephalic and atraumatic. Eyes:      Pupils: Pupils are equal, round, and reactive to light. Cardiovascular:      Rate and Rhythm: Normal rate and regular rhythm. Heart sounds: No murmur heard. Pulmonary:      Breath sounds: Normal breath sounds. No wheezing or rales. Abdominal:      Palpations: Abdomen is soft. Tenderness: There is no abdominal tenderness. Musculoskeletal:         General: No tenderness. Normal range of motion. Cervical back: Neck supple. Lymphadenopathy:      Cervical: No cervical adenopathy. Skin:     Findings: No erythema or rash. Neurological:      Mental Status: He is alert and oriented to person, place, and time. Cranial Nerves: No cranial nerve deficit. Deep Tendon Reflexes: Reflexes are normal and symmetric. Psychiatric:         Behavior: Behavior normal.           Labs: I personally reviewed the labs and imaging pertinent to this patient care.

## 2023-09-21 ENCOUNTER — TELEPHONE (OUTPATIENT)
Dept: GENETICS | Facility: CLINIC | Age: 73
End: 2023-09-21

## 2023-09-21 DIAGNOSIS — C61 PROSTATE CANCER (HCC): Primary | ICD-10-CM

## 2023-09-21 RX ORDER — ABIRATERONE ACETATE 250 MG/1
250 TABLET ORAL DAILY
Qty: 30 TABLET | Refills: 11 | Status: SHIPPED | OUTPATIENT
Start: 2023-09-21

## 2023-09-21 NOTE — TELEPHONE ENCOUNTER
Post-Test Genetic Counseling Consult Note    Today I spoke with Oliver's son Karine Donahue over the phone to review the results of his genetic test for hereditary cancer. We met previously on 8/22 for pre-test counseling. A copy of this consult note and genetic test result will be shared with the patient. 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 The significance of the ELOY variant is currently not known and therefore this test result cannot be used to help determine [place] cancer risks. It is possible that the variant was seen in only a handful of individuals, or there may be conflicting or incomplete information in the medical literature about the variant and its association with hereditary cancer. Risks and Testing for Family Members:    Genetic testing for this variant is not recommended for relatives who wish to determine their cancer risks for purposes of determining medical management. The presence or absence of this variant in a relative is not clinically meaningful unless the variant is reclassified in the future. The laboratory will continue to accumulate information on this variant and will reclassify it as either a positive or negative genetic test result when they are confident that they have adequate information.  As updated information is obtained, we will notify Margarito with the updated information. It is important to note that the majority of variants of uncertain significance are reclassified as likely benign or benign as additional information about the variant becomes available. Despite this result, Oliver's first-degree relatives may be at increased risk for the cancers based on the family history. We recommend they discuss screening and management recommendations with their healthcare providers. If Monserrat Soto has any affected family members with a cancer diagnosis, especially at a young age, they may still consider genetic testing. Relatives who wish to pursue genetic testing can reach out to the [address] [employer] [address]e S ([phone]) to schedule an appointment or visit [email or web address] to identify a local genetic counselor. Plan:   There are no additional recommendations based on Oliver's result. he should continue cancer screening and medical management as clinically indicated and as determined appropriate by his healthcare providers. VUS Result: Monserrat Soto was strongly encouraged to contact us regarding any changes in his personal or family history of cancer as these changes could alter our recommendation regarding genetic testing and/or cancer screening. Monserrat Soto was also encouraged to follow up with us on an annual basis as variant classifications are subject to change.

## 2023-09-28 ENCOUNTER — HOSPITAL ENCOUNTER (OUTPATIENT)
Dept: NON INVASIVE DIAGNOSTICS | Facility: HOSPITAL | Age: 73
Discharge: HOME | End: 2023-09-28
Attending: INTERNAL MEDICINE
Payer: COMMERCIAL

## 2023-09-28 DIAGNOSIS — R33.9 URINARY RETENTION: ICD-10-CM

## 2023-09-28 DIAGNOSIS — Z79.4 TYPE 2 DIABETES MELLITUS WITH OTHER SPECIFIED COMPLICATION, WITH LONG-TERM CURRENT USE OF INSULIN (HCC): Primary | Chronic | ICD-10-CM

## 2023-09-28 DIAGNOSIS — C61 PROSTATE CANCER (HCC): Primary | ICD-10-CM

## 2023-09-28 DIAGNOSIS — R31.9 HEMATURIA: ICD-10-CM

## 2023-09-28 DIAGNOSIS — R79.89 ELEVATED SERUM CREATININE: ICD-10-CM

## 2023-09-28 DIAGNOSIS — N13.39 OTHER HYDRONEPHROSIS: ICD-10-CM

## 2023-09-28 DIAGNOSIS — N17.9 AKI (ACUTE KIDNEY INJURY) (HCC): ICD-10-CM

## 2023-09-28 DIAGNOSIS — E11.69 TYPE 2 DIABETES MELLITUS WITH OTHER SPECIFIED COMPLICATION, WITH LONG-TERM CURRENT USE OF INSULIN (HCC): Primary | Chronic | ICD-10-CM

## 2023-09-28 DIAGNOSIS — N39.0 URINARY TRACT INFECTION: ICD-10-CM

## 2023-09-28 PROCEDURE — 50435 EXCHANGE NEPHROSTOMY CATH: CPT

## 2023-09-28 PROCEDURE — C1769 GUIDE WIRE: HCPCS

## 2023-09-28 PROCEDURE — 50435 EXCHANGE NEPHROSTOMY CATH: CPT | Performed by: RADIOLOGY

## 2023-09-28 PROCEDURE — C1729 CATH, DRAINAGE: HCPCS

## 2023-09-28 RX ADMIN — IOHEXOL 15 ML: 350 INJECTION, SOLUTION INTRAVENOUS at 13:48

## 2023-09-28 NOTE — DISCHARGE INSTRUCTIONS
Nephrostomy Tube Care     WHAT YOU NEED TO KNOW:   A nephrostomy tube is a catheter (thin plastic tube) that is inserted through your skin and into your kidney. The nephrostomy tube drains urine from your kidney into a collecting bag outside your body. You may need a nephrostomy tube when something is blocking the normal flow of urine. A nephrostomy tube may be used for a short or a long period of time. The nephrostomy tube comes out of your back, so you will need someone to help care for your nephrostomy tube. DISCHARGE INSTRUCTIONS:      How to clean the skin around the nephrostomy tube and change the bandage:  Since the nephrostomy tube comes out of your back, you will not be able to care for it by yourself. Ask someone to follow the general directions below to check and care for your nephrostomy tube. Gather the items you will need. Disposable (single use) under-pad, and a clean washcloth  Plain soap, warm water, and new medical gloves  Sterile gauze bandages  Clear adhesive dressing or medical tape  Skin barrier  Protective skin film  Trash bag  Remove the old bandage, and check the tube entry site. Have the patient lie on his side with the nephrostomy tube entry site facing up. Place the under-pad where it will catch drainage as you are working with the nephrostomy tube. Wash your hands with soap and water. Put on new medical gloves. Gently remove the old bandage, without pulling on the tube. Do this by holding the skin beside the tube with one hand. With the other hand, gently remove sticky tape and the skin barrier by pulling in the same direction as hair growth. Do not touch the side of the bandage that is placed over or around the tube. Throw the bandage and skin barrier away in a trash bag. Look for signs of infection, such as skin redness and swelling. Report any skin changes to healthcare providers. Clean the tube entry site.     Hold the tube in place to keep it from being pulled out while you are cleaning around it. You will need to clean the area twice. For the first cleaning, wet a new gauze bandage with soap and water. Begin at the entry site of the tube. Wipe the skin in circles, moving away from the entry site. Remove blood and any other material with the gauze. Do this as often as needed. Use a new gauze bandage each time you clean the area, moving away from the entry site. For the second cleaning, wet a new gauze bandage with water. Begin at the entry site of the tube. Wipe the skin in circles, moving away from the entry site. Use a new gauze bandage each time you clean the area, moving away from the entry site. Gently pat the skin with a clean washcloth to dry it. Apply the skin barrier and bandages. Roll up a bandage to make it thick, and place it under  the place where the tube enters the skin. Place it to support the tube, and stop it from kinking or bending. Tape the bandage in place, and apply more bandages if directed by a healthcare provider. Bring the tubing forward to the front and tape it to the skin. Do not stretch the tube tight, because this may pull the nephrostomy tube out. How often to change the bandage. Change the bandage around the tube, every other day. If your bandages  get dirty or wet, change them right away, and as often as needed. If your nephrostomy tube is to be used for a long period of time, the tube needs to be changed every 2 to 3 months. Healthcare providers will tell you when you need to make an appointment to have your tube changed. How to care for the urine drainage bag:   Ask if you need to measure and write down how much urine is in the bag before you empty it. Drain urine out of the drainage bag when it is ½ to ? full. Open the spout at the bottom of the bag to empty the urine into the toilet. You may need to detach the drainage bag from the nephrostomy tube to change it. . If so, attach a new drainage bag tightly to the nephrostomy tube. How to prevent problems with your nephrostomy tube:   Change bandages, directed. This helps to prevent infection. Throw away or clean your drainage bag as directed by your healthcare provider. Wipe the connecting ends of the drainage bag with alcohol before you reconnect the bag to the tube. This helps prevent infection. Keep the tube taped to your skin and connected to a drainage bag placed below the level of your kidneys. This helps prevent urine from backing up into your kidneys. You may wear a small drainage bag strapped to your leg to let you move around more easily. Check the catheter to be sure it is in place after you change your clothes or do other activities. Do not wear tight clothing over the tube. Place the tubing over your thigh rather than under it when you are sitting down. Be sure that nothing is pulling on the nephrostomy tube when you move around. Change positions if you see little or no urine in your drainage bag. Check to see if the urine tube is twisted or bent. Be sure that you are not sitting or lying on the tube. If there are no kinks and there is little or no urine in the drainage bag, tell your healthcare provider. Flush out the tube as directed. Some tubes get flushed one time a day with 10 mls of NSS You will be given a prescription for the flushes. To flush the nephrostomy tube, clean both connections with alcohol swap. Twist off the drainage bag tube and twist the saline syringe into the nephrostomy tube and flush briskly. Remove the syringe and twist the drainage bag tube back into the nephrostomy tube. Keep the site covered while you shower. Tape a piece of clear adhesive plastic over the dressing to keep it dry while you shower. Do not take tub baths.     Contact Interventional Radiology at [phone] Federico PATIENTS: Contact Interventional Radiology at [phone]) Ciarra Coronel PATIENTS: Contact Interventional Radiology at [phone]) if:  The skin around the nephrostomy tube is red, swollen, itches, or has a rash. You have a fever greater than 101 or chills. You have lower back or hip pain. There are changes in how your urine looks or smells. You have little or no urine draining from the nephrostomy tube. You have nausea and are vomiting. The black deb on your tube has moved, or the tube is longer than when it was put in. You have questions or concerns about your condition or care. The nephrostomy tube comes out completely. There is blood, pus, or a bad smell coming from the place where the tube enters your skin. Urine is leaking around the tube. The following pharmacies carry the flush syringes. [facility]                     [facility]                    [address] [place] [address]  Phone [phone]            Phone [phone]  [address] [facility]. [address] [place] [address], [address] [place] [address]                                 [phone]  Phone [phone]            Phone [phone]    [facility]                                                                         [facility] [phone]  [address] [place] [address].   [place]   Phone [phone]

## 2023-09-28 NOTE — SEDATION DOCUMENTATION
Pt here for PCN routine exchange. B/l 8 fr PCN, sites intact, tubes draining yellow urine without difficulty.

## 2023-10-02 ENCOUNTER — TELEPHONE (OUTPATIENT)
Dept: HEMATOLOGY ONCOLOGY | Facility: CLINIC | Age: 73
End: 2023-10-02

## 2023-10-02 NOTE — TELEPHONE ENCOUNTER
7 Day Follow Up Call  Spoke with son Swathi Foster     Consent uploaded: [email or web address]    Medication provided by: [facility]    Confirmed drug, dose and schedule: yes, 250 mg daily (low fat meal) with Dex     Adherence/pill count: no missed doses per son - "he's been on top of it"    Side effect discussion: fatigue    Pt aware of refill process: yes    Pt aware of follow up appt/labs/testing: follow up appt 12/21 with labs prior

## 2023-10-03 RX ORDER — CANAGLIFLOZIN 100 MG/1
100 TABLET, FILM COATED ORAL
Qty: 90 TABLET | Refills: 0 | Status: SHIPPED | OUTPATIENT
Start: 2023-10-03

## 2023-10-03 NOTE — TELEPHONE ENCOUNTER
I will provide one time refill since patient has appointment with me on 10/23. There is suspicion that medication could be causing patient's balanitis per pt's urologist, therefore we will discuss this more during our appointment.

## 2023-10-23 ENCOUNTER — OFFICE VISIT (OUTPATIENT)
Dept: FAMILY MEDICINE CLINIC | Facility: CLINIC | Age: 73
End: 2023-10-23
Payer: COMMERCIAL

## 2023-10-23 VITALS
HEART RATE: 68 BPM | OXYGEN SATURATION: 99 % | HEIGHT: 69 IN | RESPIRATION RATE: 14 BRPM | BODY MASS INDEX: 26.22 KG/M2 | DIASTOLIC BLOOD PRESSURE: 66 MMHG | SYSTOLIC BLOOD PRESSURE: 132 MMHG | WEIGHT: 177 LBS

## 2023-10-23 DIAGNOSIS — L60.2 ONYCHOGRYPHOSIS: ICD-10-CM

## 2023-10-23 DIAGNOSIS — E11.69 TYPE 2 DIABETES MELLITUS WITH OTHER SPECIFIED COMPLICATION, WITH LONG-TERM CURRENT USE OF INSULIN (HCC): Primary | ICD-10-CM

## 2023-10-23 DIAGNOSIS — I10 PRIMARY HYPERTENSION: ICD-10-CM

## 2023-10-23 DIAGNOSIS — Z79.4 TYPE 2 DIABETES MELLITUS WITH OTHER SPECIFIED COMPLICATION, WITH LONG-TERM CURRENT USE OF INSULIN (HCC): Primary | ICD-10-CM

## 2023-10-23 PROCEDURE — 99213 OFFICE O/P EST LOW 20 MIN: CPT | Performed by: FAMILY MEDICINE

## 2023-10-23 RX ORDER — BLOOD-GLUCOSE METER
EACH MISCELLANEOUS
Qty: 100 EACH | Refills: 3 | Status: SHIPPED | OUTPATIENT
Start: 2023-10-23

## 2023-10-23 RX ORDER — LANCETS 30 GAUGE
EACH MISCELLANEOUS
Qty: 1 KIT | Refills: 0 | Status: SHIPPED | OUTPATIENT
Start: 2023-10-23

## 2023-10-23 RX ORDER — LISINOPRIL 10 MG/1
10 TABLET ORAL DAILY
Qty: 90 TABLET | Refills: 3 | Status: SHIPPED | OUTPATIENT
Start: 2023-10-23

## 2023-10-23 RX ORDER — LANCETS 33 GAUGE
EACH MISCELLANEOUS
Qty: 100 EACH | Refills: 3 | Status: SHIPPED | OUTPATIENT
Start: 2023-10-23

## 2023-10-23 RX ORDER — ROSUVASTATIN CALCIUM 10 MG/1
10 TABLET, COATED ORAL DAILY
Qty: 90 TABLET | Refills: 3 | Status: SHIPPED | OUTPATIENT
Start: 2023-10-23

## 2023-10-23 NOTE — PROGRESS NOTES
Family Medicine Office Visit  Zev Knight 72 y.o. male   MRN: [id number] : 1950  ENCOUNTER: 10/23/2023 8:34 AM    Assessment and Plan     1. Type 2 diabetes mellitus with other specified complication, with long-term current use of insulin (HCC)  Assessment & Plan:    Lab Results   Component Value Date    HGBA1C 6.8 (H) 2023     Last A1c as above. Current diabetic treatments: Invokana 100mg, glymepiride 4mg daily and insulin levemir 40units daily. Has not checked sugars regularly since glucometer not working. Has been on PO steroids since 4 weeks ago as part of his tx for prostate CA.   - POCT BG level today non fasting 170  - Sent new glucometer with supplies to pharmacy, advised to monitor especially in the setting of steroid use  - Continue lifestyle modifications  - Will continue diabetic medication as prescribed  - Diabetic foot exam performed today  - Reminded patient of the importance of DM eye examination  - All questions were answered to patient's satisfaction    Orders:  -     Blood Glucose Monitoring Suppl (OneTouch Verio Reflect) w/Device KIT; Check blood sugars once daily. Please substitute with appropriate alternative as covered by patient's insurance. Dx: E11.65  -     glucose blood (OneTouch Verio) test strip; Check blood sugars once daily. Please substitute with appropriate alternative as covered by patient's insurance. Dx: E11.65  -     OneTouch Delica Lancets 10M MISC; Check blood sugars once daily. Please substitute with appropriate alternative as covered by patient's insurance. Dx: E11.65  -     rosuvastatin (CRESTOR) 10 MG tablet; Take 1 tablet (10 mg total) by mouth daily    2. Primary hypertension  Assessment & Plan:  BP at goal, refill of lisinopril provided    Orders:  -     lisinopril (ZESTRIL) 10 mg tablet; Take 1 tablet (10 mg total) by mouth daily    3. Onychogryphosis      Return in about 3 months (around 2024).     Associated orders were discussed and explained to the pt. Pertinent care gaps were addressed. Pt voiced understanding and acceptance with A/P. Pt will call the office if any further questions/concerns. Assessment and plan was discussed with attending physician    Chief Complaint     No chief complaint on file. History of Present Illness     Linda Montelongo is a 72y.o.-year-old male who presents today for DM follow up. Diabetes  He presents for his follow-up diabetic visit. He has type 2 diabetes mellitus. His disease course has been stable. There are no hypoglycemic associated symptoms. Associated symptoms include fatigue. Pertinent negatives for diabetes include no blurred vision, no chest pain, no foot paresthesias, no foot ulcerations, no polydipsia, no polyphagia, no polyuria and no weight loss. Risk factors for coronary artery disease include male sex, sedentary lifestyle and diabetes mellitus. Current diabetic treatments: Invokana 100mg, glymepiride 4mg daily and insulin levemir 40units daily. He is compliant with treatment all of the time. He is following a diabetic diet. Home blood sugar record trend: Has not been able to monitor his blood sugars since his glucometer is not working. Of note, patient is currently on steroids as part of his prostate CA treatment. He does not see a podiatrist.Eye exam is not current (Exam script rovided back in july).        Current Outpatient Medications on File Prior to Visit   Medication Sig    abiraterone (ZYTIGA) 250 mg tablet Take 1 tablet (250 mg total) by mouth daily With a low fat meal    albuterol (ProAir HFA) 90 mcg/act inhaler Inhale 2 puffs every 4 (four) hours as needed for wheezing or shortness of breath    Calcium Carb-Cholecalciferol (calcium carbonate-vitamin D) 500 mg-5 mcg tablet Take 1 tablet by mouth 2 (two) times a day with meals    glimepiride (AMARYL) 4 mg tablet Take 4 mg by mouth daily with breakfast    Invokana 100 MG Take 1 tablet (100 mg total) by mouth daily before breakfast Levemir FlexPen 100 units/mL injection pen Inject 40 Units under the skin daily    Lovaza 1 g capsule 2 (two) times a day    Multiple Vitamins-Minerals (Sentry) TABS Take 1 tablet by mouth daily    sodium chloride, PF, 0.9 % 10 mL by Intracatheter route daily Intracatheter flushing daily. May substitute prefilled syringe with normal saline 10 mL vials, 10 mL syringes, and 18 g blunt needles    sodium chloride, PF, 0.9 % 10 mL by Intracatheter route daily    Spacer/Aero-Holding Chambers (AEROCHAMBER MINI CHAMBER) BILLY by Does not apply route see administration instructions       Review of Systems     Review of Systems   Constitutional:  Positive for fatigue. Negative for weight loss. Eyes:  Negative for blurred vision. Cardiovascular:  Negative for chest pain. Endocrine: Negative for polydipsia, polyphagia and polyuria. Objective     /66 (BP Location: Left arm, Patient Position: Sitting, Cuff Size: Standard)   Pulse 68   Resp 14   Ht 5' 9" (1.753 m)   Wt 80.3 kg (177 lb)   SpO2 99%   BMI 26.14 kg/m²     Physical Exam  Constitutional:       Appearance: Normal appearance. Cardiovascular:      Rate and Rhythm: Normal rate and regular rhythm. Pulses: no weak pulses          Dorsalis pedis pulses are 1+ on the right side and 1+ on the left side. Heart sounds: Normal heart sounds. Pulmonary:      Effort: Pulmonary effort is normal. No respiratory distress. Breath sounds: Normal breath sounds. Musculoskeletal:      Right lower leg: No edema. Left lower leg: No edema. Feet:      Right foot:      Skin integrity: Dry skin present. No ulcer. Left foot:      Skin integrity: Dry skin present. No ulcer. Neurological:      Mental Status: He is alert. Patient's shoes and socks removed. Right Foot/Ankle   Right Foot Inspection  Skin Exam: skin intact and dry skin. No ulcer. Toe Exam: ROM and strength within normal limits.      Sensory   Vibration: intact  Proprioception: intact  Monofilament testing: intact    Vascular  Capillary refills: < 3 seconds  The right DP pulse is 1+. Left Foot/Ankle  Left Foot Inspection  Skin Exam: skin intact and dry skin. No ulcer. Toe Exam: ROM and strength within normal limits. Sensory   Vibration: intact  Proprioception: intact  Monofilament testing: intact    Vascular  Capillary refills: < 3 seconds  The left DP pulse is 1+. Assign Risk Category  No deformity present  No loss of protective sensation  No weak pulses  Risk: 0      Joana Prince MD  Family Medicine PGY-3  [facility]         Parts of this note were dictated using [employer]anic dictation software and may have sounds-like errors due to variation in pronunciation.

## 2023-10-29 NOTE — ASSESSMENT & PLAN NOTE
Lab Results   Component Value Date    HGBA1C 6.8 (H) 08/11/2023     Last A1c as above. Current diabetic treatments: Invokana 100mg, glymepiride 4mg daily and insulin levemir 40units daily. Has not checked sugars regularly since glucometer not working.  Has been on PO steroids since 4 weeks ago as part of his tx for prostate CA.   - POCT BG level today non fasting 170  - Sent new glucometer with supplies to pharmacy, advised to monitor especially in the setting of steroid use  - Continue lifestyle modifications  - Will continue diabetic medication as prescribed  - Diabetic foot exam performed today  - Reminded patient of the importance of DM eye examination  - All questions were answered to patient's satisfaction

## 2023-11-28 ENCOUNTER — OFFICE VISIT (OUTPATIENT)
Dept: GASTROENTEROLOGY | Facility: CLINIC | Age: 73
End: 2023-11-28
Payer: COMMERCIAL

## 2023-11-28 ENCOUNTER — TELEPHONE (OUTPATIENT)
Dept: GASTROENTEROLOGY | Facility: CLINIC | Age: 73
End: 2023-11-28

## 2023-11-28 VITALS
SYSTOLIC BLOOD PRESSURE: 117 MMHG | HEIGHT: 69 IN | BODY MASS INDEX: 26.22 KG/M2 | WEIGHT: 177 LBS | HEART RATE: 73 BPM | DIASTOLIC BLOOD PRESSURE: 70 MMHG

## 2023-11-28 DIAGNOSIS — Z12.11 ENCOUNTER FOR SCREENING COLONOSCOPY: Primary | ICD-10-CM

## 2023-11-28 PROCEDURE — 99203 OFFICE O/P NEW LOW 30 MIN: CPT | Performed by: PHYSICIAN ASSISTANT

## 2023-11-28 RX ORDER — DEXAMETHASONE 0.5 MG/1
0.5 TABLET ORAL
COMMUNITY
Start: 2023-11-23

## 2023-11-28 NOTE — PROGRESS NOTES
82-year-old maleConsultation to colonoscopySt. [facility]  Dania Storm 72 y.o. male MRN: [id number]  Encounter: [id number]          ASSESSMENT AND PLAN:      1. Encounter for screening colonoscopy  Patient presents for screening colonoscopy. Does have prostate cancer with direct invasion into the bladder and iliac region with osseous metastases. Colonoscopy will be ordered for colon cancer screening as he has not had this performed in the past.  GoLytely prep has been ordered. Use of translation services was utilized. - Ambulatory Referral to Gastroenterology        ______________________________________________________________________    HPI:    72year old male who presents for consultation to colonoscopy. Patient does have history of prostate cancer with CAT scan showing fairly diffuse radiotracer uptake in the prostate gland with contiguous activity extending from prostate gland to the bladder wall suspicious for direct invasion as well as patchy radiotracer uptake in the right internal iliac region with innumerable osseous lesions compatible with metastases. He is following with oncology and  Is currently on treatment for this. Does have history of type 2 diabetes. Patient denies any prior colonoscopy. Just went for genetic testing which did show a variant of  nncertain significance and this cannot be determined whether this is associated with an increased risk of cancer or harmless genetic change. Patient denies any GI issues at this time such as diarrhea or constipation. Denies any family history of gastrointestinal malignancy. REVIEW OF SYSTEMS:    CONSTITUTIONAL: Denies any fever, chills, rigors, and weight loss. HEENT: No earache or tinnitus. Denies hearing loss or visual disturbances. CARDIOVASCULAR: No chest pain or palpitations.    RESPIRATORY: Denies any cough, hemoptysis, shortness of breath or dyspnea on exertion. GASTROINTESTINAL: As noted in the History of Present Illness. GENITOURINARY: No problems with urination. Denies any hematuria or dysuria. NEUROLOGIC: No dizziness or vertigo, denies headaches. MUSCULOSKELETAL: Denies any muscle or joint pain. SKIN: Denies skin rashes or itching. ENDOCRINE: Denies excessive thirst. Denies intolerance to heat or cold. PSYCHOSOCIAL: Denies depression or anxiety. Denies any recent memory loss.        Historical Information   Past Medical History:   Diagnosis Date    Diabetes mellitus ([address] [facility])     High cholesterol     Hypertension     Prostate cancer [facility])      Past Surgical History:   Procedure Laterality Date    IR NEPHROSTOMY TUBE CHECK/CHANGE/REPOSITION/REINSERTION/UPSIZE  2023    IR NEPHROSTOMY TUBE PLACEMENT  2023    bilateral    US GUIDED PROSTATE BIOPSY       Social History   Social History     Substance and Sexual Activity   Alcohol Use Not Currently    Comment: socially     Social History     Substance and Sexual Activity   Drug Use Never     Social History     Tobacco Use   Smoking Status Former    Packs/day: 0.25    Types: Cigarettes    Quit date: 2000    Years since quittin.9    Passive exposure: Past   Smokeless Tobacco Never   Tobacco Comments    States he only smoked on the weekends     Family History   Problem Relation Age of Onset    Liver cancer Father        Meds/Allergies       Current Outpatient Medications:     abiraterone (ZYTIGA) 250 mg tablet    albuterol (ProAir HFA) 90 mcg/act inhaler    Blood Glucose Monitoring Suppl (OneTouch Verio Reflect) w/Device KIT    Calcium Carb-Cholecalciferol (calcium carbonate-vitamin D) 500 mg-5 mcg tablet    glimepiride (AMARYL) 4 mg tablet    glucose blood (OneTouch Verio) test strip    Invokana 100 MG    Levemir FlexPen 100 units/mL injection pen    lisinopril (ZESTRIL) 10 mg tablet    Lovaza 1 g capsule    Multiple Vitamins-Minerals (Sentry) TABS    OneTouch Delica Lancets 70Q MISC rosuvastatin (CRESTOR) 10 MG tablet    sodium chloride, PF, 0.9 %    sodium chloride, PF, 0.9 %    Spacer/Aero-Holding Chambers (AEROCHAMBER MINI CHAMBER) BILLY    No Known Allergies        Objective     There were no vitals taken for this visit. There is no height or weight on file to calculate BMI. PHYSICAL EXAM:      General Appearance:   Alert, cooperative, no distress   HEENT:   Normocephalic, atraumatic, anicteric. Neck:  Supple, symmetrical, trachea midline   Lungs:   Clear to auscultation bilaterally; no rales, rhonchi or wheezing; respirations unlabored    Heart[de-identified]   Regular rate and rhythm; no murmur, rub, or gallop. Abdomen:   Soft, non-tender, non-distended; normal bowel sounds; no masses, no organomegaly    Genitalia:   Deferred    Rectal:   Deferred    Extremities:  No cyanosis, clubbing or edema    Pulses:  2+ and symmetric    Skin:  No jaundice, rashes, or lesions    Lymph nodes:  No palpable cervical lymphadenopathy        Lab Results:   No visits with results within 1 Day(s) from this visit. Latest known visit with results is:   Clinical Support on 08/22/2023   Component Date Value    Miscellaneous Lab Test R* 08/22/2023           Radiology Results:   No results found.

## 2023-11-28 NOTE — TELEPHONE ENCOUNTER
Scheduled date of colonoscopy (as of today):12/12/23  Physician performing colonoscopy:Dr. Tam  Location of colonoscopy:[facility]  Bowel prep reviewed with patient:Grace  Instructions reviewed with patient by:catrachito  Clearances: n/a

## 2023-12-07 ENCOUNTER — TELEPHONE (OUTPATIENT)
Dept: GASTROENTEROLOGY | Facility: CLINIC | Age: 73
End: 2023-12-07

## 2023-12-07 ENCOUNTER — TELEPHONE (OUTPATIENT)
Age: 73
End: 2023-12-07

## 2023-12-07 NOTE — TELEPHONE ENCOUNTER
Spoke to son confirming pt's colonoscopy scheduled on 12/12/23 at [facility] with Dr. Emery Shell.  Informed [facility] would be calling the day prior with the arrival time. Informed son to please have pt hold his Invokana starting tomorrow 12/8 and to please review the diabetic instructions that were given during his office visit on 11/28. He took our phone number and will have pt call us back if does not have instructions or if has any questions.

## 2023-12-07 NOTE — TELEPHONE ENCOUNTER
Rescheduled date of colonoscopy (as of today): 1/9/2024  Physician performing colonoscopy: DR Yoanna Fox  Location of colonoscopy: [facility]  Bowel prep reviewed with patient: GOLYTELY  Instructions reviewed with patient by: Previously reviewed with pt.   Clearances:

## 2023-12-19 ENCOUNTER — TELEPHONE (OUTPATIENT)
Dept: HEMATOLOGY ONCOLOGY | Facility: CLINIC | Age: 73
End: 2023-12-19

## 2023-12-21 ENCOUNTER — OFFICE VISIT (OUTPATIENT)
Dept: HEMATOLOGY ONCOLOGY | Facility: CLINIC | Age: 73
End: 2023-12-21
Payer: COMMERCIAL

## 2023-12-21 VITALS
RESPIRATION RATE: 17 BRPM | SYSTOLIC BLOOD PRESSURE: 118 MMHG | HEART RATE: 79 BPM | OXYGEN SATURATION: 97 % | TEMPERATURE: 97.6 F | DIASTOLIC BLOOD PRESSURE: 76 MMHG | HEIGHT: 69 IN | WEIGHT: 178.8 LBS | BODY MASS INDEX: 26.48 KG/M2

## 2023-12-21 DIAGNOSIS — C79.51 METASTASIS TO BONE (HCC): ICD-10-CM

## 2023-12-21 DIAGNOSIS — C61 PROSTATE CANCER (HCC): Primary | ICD-10-CM

## 2023-12-21 PROCEDURE — 99213 OFFICE O/P EST LOW 20 MIN: CPT | Performed by: INTERNAL MEDICINE

## 2023-12-21 NOTE — PROGRESS NOTES
[facility] [place]  [address]  [facility] [address]  [phone]    Oliver Astudillo,1950, [id number]  12/21/23    Discussion:   In summary, this is a 73-year-old male with history of prostate cancer, Liana 9, with bone metastases by PET/CT in August 2023.  Firmagon July 2023, Lupron started August 2023.  Zytiga 250 mg and dexamethasone 0.5 mg p.o. daily with low-fat meal.  Clinically he is doing well.  He generally functions without restriction.  He did not have blood work done prior to today's visit as requested.  He will go to have the blood work done now.  Presuming this is favorable, I arrange for follow-up in 3 months with repeat blood work just prior.  I discussed the above with the patient.  The patient and his daughter-in-law voiced understanding and agreement.  ______________________________________________________________________    Chief Complaint   Patient presents with    Follow-up       HPI:  Oncology History   Prostate cancer (HCC)   2023 Initial Diagnosis    Prostate cancer (HCC)     6/28/2023 Biopsy    A. Prostate, right lateral base:  - Prostatic adenocarcinoma, [employer] score 4 + 5 = 9, Prognostic Grade Group 5,  involving 90% of 1 needle core  and measuring  11 mm in length.        B. Prostate, right medial base:  - Prostatic adenocarcinoma, [employer] score 4 + 5 = 9, Prognostic Grade Group 5,  involving 70% of 1 needle core  and measuring  10 mm in length.      C. Prostate, right lateral mid:  - Prostatic adenocarcinoma, Liana score 4 + 5 = 9, Prognostic Grade Group 5,  involving 90% of 1 needle core  and measuring  12 mm in length.      Comment: Immunohistochemistry for a prostate multiplex stain (p63, K903, and P504S) and NKX3.1 demonstrate invasive carcinoma.     D. Prostate, right medial mid:  - Prostatic adenocarcinoma, [place] score 4 + 5 = 9, Prognostic Grade Group 5,  involving 95% of 1 needle core  and measuring  15 mm  in length.      E. Prostate, right lateral apex:  - Prostatic adenocarcinoma, Liana score 4 + 5 = 9, Prognostic Grade Group 5,  involving 90% of 1 needle core  and measuring  12 mm in length.   - Perineural invasion identified.     Comment: Immunohistochemistry for a prostate multiplex stain (p63, K903, and P504S) and NKX3.1 demonstrate invasive carcinoma.     F. Prostate, right medial apex:  - Prostatic adenocarcinoma, [place] score 4 + 5 = 9, Prognostic Grade Group 5,  involving 100% of 1 needle core  and measuring  20 mm in length.      G. Prostate, left lateral base:  - Prostatic adenocarcinoma, [place] score 4 + 5 = 9, Prognostic Grade Group 5,  involving 75% of 1 needle core  and measuring  10 mm in length.   - Perineural invasion identified.  - Lymphovascular invasion is identified.     H. Prostate, left medial base:  - Prostatic adenocarcinoma, [place] score 4 + 5 = 9, Prognostic Grade Group 5,  involving 95% of 1 needle core  and measuring  14 mm in length.   - Perineural invasion identified.     Comment: There is a focus of closely approximated gastrointestinal epithelium; NKX3.1 shows no definite invasion of the GI epithelium.      I. Prostate, left lateral mid:  - Prostatic adenocarcinoma, Liana score 4 + 5 = 9, Prognostic Grade Group 5,  involving 95% of 1 needle core  and measuring  12 mm in length.       J. Prostate, left medial mid:  - Prostatic adenocarcinoma, [place] score 4 + 5 = 9, Prognostic Grade Group 5,  involving 85% of 1 needle core  and measuring  13 mm in length.       Comment: Immunohistochemistry for a prostate multiplex stain (p63, K903, and P504S) and NKX3.1 demonstrate invasive carcinoma.     K. Prostate, left lateral apex:  - Prostatic adenocarcinoma, Liana score 4 + 5 = 9, Prognostic Grade Group 5,  involving 25% of 1 needle core  and measuring  3 mm in length.        L. Prostate, left medial apex :  - Prostatic adenocarcinoma, [place] score 4 + 5 = 9, Prognostic Grade Group  5,  involving 95% of 1 needle core  and measuring  15 mm in length.     - Perineural invasion identified.     Comment:   Cribriform glands are present within the pattern 4 component.  This feature has been associated with adverse clinical outcomes and molecular features typically seen in advanced disease.  (The 2019 Genitourinary Pathology Society (GUPS) White Paper on Contemporary Grading of Prostate Cancer. Arch Pathol Lab Med. 2021 Apr 1;145(4):531-046.)     7/5/2023 -  Hormone Therapy    Firmagon loading dose on 7/5/23, followed by Lupron      8/28/2023 -  Cancer Staged    Staging form: Prostate, AJCC 8th Edition  - Clinical: Stage IVB (cT4, cN1, cM1b, PSA: 145, Grade Group: 5) - Signed by Dov Andrea MD on 8/28/2023  Prostate specific antigen (PSA) range: 20 or greater  Histologic grading system: 5 grade system           Interval History: Clinically stable.  ECOG-  0 - Asymptomatic    Review of Systems   Constitutional:  Negative for chills and fever.   HENT:  Negative for nosebleeds.    Eyes:  Negative for discharge.   Respiratory:  Negative for cough and shortness of breath.    Cardiovascular:  Negative for chest pain.   Gastrointestinal:  Negative for abdominal pain, constipation and diarrhea.   Endocrine: Negative for polydipsia.   Genitourinary:  Negative for hematuria.   Musculoskeletal:  Negative for arthralgias.   Skin:  Negative for color change.   Allergic/Immunologic: Negative for immunocompromised state.   Neurological:  Negative for dizziness and headaches.   Hematological:  Negative for adenopathy.   Psychiatric/Behavioral:  Negative for agitation.        Past Medical History:   Diagnosis Date    Diabetes mellitus (HCC)     High cholesterol     Hypertension     Prostate cancer (HCC)      Patient Active Problem List   Diagnosis    Type 2 diabetes mellitus, with long-term current use of insulin (HCC)    Elevated PSA    Other hydronephrosis    Elevated serum creatinine    Hypertension     Encounter to discuss test results    Bilateral hydronephrosis    Prostate cancer (HCC)    Encounter for monitoring androgen deprivation therapy    Nephrostomy status (HCC)       Current Outpatient Medications:     Blood Glucose Monitoring Suppl (OneTouch Verio Reflect) w/Device KIT, Check blood sugars once daily. Please substitute with appropriate alternative as covered by patient's insurance. Dx: E11.65, Disp: 1 kit, Rfl: 0    dexamethasone (DECADRON) 0.5 mg tablet, Take 0.5 mg by mouth daily with breakfast, Disp: , Rfl:     glimepiride (AMARYL) 4 mg tablet, Take 4 mg by mouth daily with breakfast, Disp: , Rfl:     glucose blood (OneTouch Verio) test strip, Check blood sugars once daily. Please substitute with appropriate alternative as covered by patient's insurance. Dx: E11.65, Disp: 100 each, Rfl: 3    Invokana 100 MG, Take 1 tablet (100 mg total) by mouth daily before breakfast, Disp: 90 tablet, Rfl: 0    Levemir FlexPen 100 units/mL injection pen, Inject 40 Units under the skin daily, Disp: , Rfl:     lisinopril (ZESTRIL) 10 mg tablet, Take 1 tablet (10 mg total) by mouth daily, Disp: 90 tablet, Rfl: 3    Lovaza 1 g capsule, 2 (two) times a day, Disp: , Rfl:     Multiple Vitamins-Minerals (Sentry) TABS, Take 1 tablet by mouth daily, Disp: , Rfl:     OneTouch Delica Lancets 33G MISC, Check blood sugars once daily. Please substitute with appropriate alternative as covered by patient's insurance. Dx: E11.65, Disp: 100 each, Rfl: 3    rosuvastatin (CRESTOR) 10 MG tablet, Take 1 tablet (10 mg total) by mouth daily, Disp: 90 tablet, Rfl: 3    sodium chloride, PF, 0.9 %, 10 mL by Intracatheter route daily Intracatheter flushing daily. May substitute prefilled syringe with normal saline 10 mL vials, 10 mL syringes, and 18 g blunt needles, Disp: 900 mL, Rfl: 3    abiraterone (ZYTIGA) 250 mg tablet, Take 1 tablet (250 mg total) by mouth daily With a low fat meal, Disp: 30 tablet, Rfl: 11    albuterol (ProAir HFA) 90  "mcg/act inhaler, Inhale 2 puffs every 4 (four) hours as needed for wheezing or shortness of breath (Patient not taking: Reported on 12/21/2023), Disp: 8.5 g, Rfl: 0    Calcium Carb-Cholecalciferol (calcium carbonate-vitamin D) 500 mg-5 mcg tablet, Take 1 tablet by mouth 2 (two) times a day with meals (Patient not taking: Reported on 11/28/2023), Disp: 180 tablet, Rfl: 3    polyethylene glycol (GOLYTELY) 4000 mL solution, Take 4,000 mL by mouth once for 1 dose, Disp: 4000 mL, Rfl: 0    sodium chloride, PF, 0.9 %, 10 mL by Intracatheter route daily, Disp: 300 mL, Rfl: 3    Spacer/Aero-Holding Chambers (AEROCHAMBER MINI CHAMBER) BILLY, by Does not apply route see administration instructions, Disp: 1 Device, Rfl: 0  No Known Allergies  Past Surgical History:   Procedure Laterality Date    IR NEPHROSTOMY TUBE CHECK/CHANGE/REPOSITION/REINSERTION/UPSIZE  9/28/2023    IR NEPHROSTOMY TUBE PLACEMENT  06/22/2023    bilateral    US GUIDED PROSTATE BIOPSY       Social History     Objective:  Vitals:    12/21/23 1559   BP: 118/76   BP Location: Left arm   Patient Position: Sitting   Cuff Size: Adult   Pulse: 79   Resp: 17   Temp: 97.6 °F (36.4 °C)   SpO2: 97%   Weight: 81.1 kg (178 lb 12.8 oz)   Height: 5' 9\" (1.753 m)     Physical Exam  Constitutional:       Appearance: He is well-developed.   HENT:      Head: Normocephalic and atraumatic.      Mouth/Throat:      Mouth: Mucous membranes are moist.   Eyes:      Pupils: Pupils are equal, round, and reactive to light.   Cardiovascular:      Rate and Rhythm: Normal rate and regular rhythm.      Heart sounds: No murmur heard.  Pulmonary:      Breath sounds: Normal breath sounds. No wheezing or rales.   Abdominal:      Palpations: Abdomen is soft.      Tenderness: There is no abdominal tenderness.   Musculoskeletal:         General: No tenderness. Normal range of motion.      Cervical back: Neck supple.   Lymphadenopathy:      Cervical: No cervical adenopathy.   Skin:     Findings: No " erythema or rash.   Neurological:      Mental Status: He is alert and oriented to person, place, and time.      Cranial Nerves: No cranial nerve deficit.      Deep Tendon Reflexes: Reflexes are normal and symmetric.   Psychiatric:         Behavior: Behavior normal.           Labs:  I personally reviewed the labs and imaging pertinent to this patient care.

## 2023-12-27 ENCOUNTER — APPOINTMENT (OUTPATIENT)
Dept: LAB | Facility: HOSPITAL | Age: 73
End: 2023-12-27
Attending: INTERNAL MEDICINE
Payer: COMMERCIAL

## 2023-12-27 LAB
ALBUMIN SERPL BCP-MCNC: 3.9 G/DL (ref 3.5–5)
ALP SERPL-CCNC: 76 U/L (ref 34–104)
ALT SERPL W P-5'-P-CCNC: 28 U/L (ref 7–52)
ANION GAP SERPL CALCULATED.3IONS-SCNC: 7 MMOL/L
AST SERPL W P-5'-P-CCNC: 31 U/L (ref 13–39)
BASOPHILS # BLD AUTO: 0.06 THOUSANDS/ÂΜL (ref 0–0.1)
BASOPHILS NFR BLD AUTO: 1 % (ref 0–1)
BILIRUB SERPL-MCNC: 0.76 MG/DL (ref 0.2–1)
BUN SERPL-MCNC: 28 MG/DL (ref 5–25)
CALCIUM SERPL-MCNC: 9.4 MG/DL (ref 8.4–10.2)
CHLORIDE SERPL-SCNC: 101 MMOL/L (ref 96–108)
CO2 SERPL-SCNC: 29 MMOL/L (ref 21–32)
CREAT SERPL-MCNC: 1.21 MG/DL (ref 0.6–1.3)
EOSINOPHIL # BLD AUTO: 0.19 THOUSAND/ÂΜL (ref 0–0.61)
EOSINOPHIL NFR BLD AUTO: 2 % (ref 0–6)
ERYTHROCYTE [DISTWIDTH] IN BLOOD BY AUTOMATED COUNT: 12.7 % (ref 11.6–15.1)
GFR SERPL CREATININE-BSD FRML MDRD: 59 ML/MIN/1.73SQ M
GLUCOSE SERPL-MCNC: 254 MG/DL (ref 65–140)
HCT VFR BLD AUTO: 39.3 % (ref 36.5–49.3)
HGB BLD-MCNC: 13.8 G/DL (ref 12–17)
IMM GRANULOCYTES # BLD AUTO: 0.08 THOUSAND/UL (ref 0–0.2)
IMM GRANULOCYTES NFR BLD AUTO: 1 % (ref 0–2)
LYMPHOCYTES # BLD AUTO: 3.57 THOUSANDS/ÂΜL (ref 0.6–4.47)
LYMPHOCYTES NFR BLD AUTO: 30 % (ref 14–44)
MCH RBC QN AUTO: 32.2 PG (ref 26.8–34.3)
MCHC RBC AUTO-ENTMCNC: 35.1 G/DL (ref 31.4–37.4)
MCV RBC AUTO: 92 FL (ref 82–98)
MONOCYTES # BLD AUTO: 0.77 THOUSAND/ÂΜL (ref 0.17–1.22)
MONOCYTES NFR BLD AUTO: 6 % (ref 4–12)
NEUTROPHILS # BLD AUTO: 7.29 THOUSANDS/ÂΜL (ref 1.85–7.62)
NEUTS SEG NFR BLD AUTO: 60 % (ref 43–75)
NRBC BLD AUTO-RTO: 0 /100 WBCS
PLATELET # BLD AUTO: 326 THOUSANDS/UL (ref 149–390)
PMV BLD AUTO: 10.2 FL (ref 8.9–12.7)
POTASSIUM SERPL-SCNC: 3.6 MMOL/L (ref 3.5–5.3)
PROT SERPL-MCNC: 7.7 G/DL (ref 6.4–8.4)
PSA SERPL-MCNC: 0.16 NG/ML (ref 0–4)
RBC # BLD AUTO: 4.28 MILLION/UL (ref 3.88–5.62)
SODIUM SERPL-SCNC: 137 MMOL/L (ref 135–147)
WBC # BLD AUTO: 11.96 THOUSAND/UL (ref 4.31–10.16)

## 2023-12-27 PROCEDURE — 85025 COMPLETE CBC W/AUTO DIFF WBC: CPT | Performed by: INTERNAL MEDICINE

## 2023-12-27 PROCEDURE — 36415 COLL VENOUS BLD VENIPUNCTURE: CPT | Performed by: INTERNAL MEDICINE

## 2023-12-27 PROCEDURE — 84153 ASSAY OF PSA TOTAL: CPT | Performed by: INTERNAL MEDICINE

## 2023-12-27 PROCEDURE — 80053 COMPREHEN METABOLIC PANEL: CPT | Performed by: INTERNAL MEDICINE

## 2023-12-28 ENCOUNTER — TELEPHONE (OUTPATIENT)
Dept: HEMATOLOGY ONCOLOGY | Facility: CLINIC | Age: 73
End: 2023-12-28

## 2023-12-28 ENCOUNTER — HOSPITAL ENCOUNTER (OUTPATIENT)
Dept: NON INVASIVE DIAGNOSTICS | Facility: HOSPITAL | Age: 73
Discharge: HOME | End: 2023-12-28
Attending: RADIOLOGY
Payer: COMMERCIAL

## 2023-12-28 DIAGNOSIS — C61 PROSTATE CANCER (HCC): ICD-10-CM

## 2023-12-28 DIAGNOSIS — C61 MALIGNANT NEOPLASM OF PROSTATE (HCC): ICD-10-CM

## 2023-12-28 PROCEDURE — 50435 EXCHANGE NEPHROSTOMY CATH: CPT | Performed by: RADIOLOGY

## 2023-12-28 PROCEDURE — C1729 CATH, DRAINAGE: HCPCS

## 2023-12-28 PROCEDURE — C1769 GUIDE WIRE: HCPCS

## 2023-12-28 PROCEDURE — 50435 EXCHANGE NEPHROSTOMY CATH: CPT

## 2023-12-28 RX ORDER — LIDOCAINE WITH 8.4% SOD BICARB 0.9%(10ML)
SYRINGE (ML) INJECTION AS NEEDED
Status: COMPLETED | OUTPATIENT
Start: 2023-12-28 | End: 2023-12-28

## 2023-12-28 RX ORDER — DEXAMETHASONE 0.5 MG/1
TABLET ORAL
Qty: 30 TABLET | Refills: 2 | Status: SHIPPED | OUTPATIENT
Start: 2023-12-28 | End: 2023-12-29 | Stop reason: SDUPTHER

## 2023-12-28 RX ADMIN — Medication 5 ML: at 11:39

## 2023-12-28 RX ADMIN — IOHEXOL 15 ML: 350 INJECTION, SOLUTION INTRAVENOUS at 11:48

## 2023-12-28 RX ADMIN — Medication 5 ML: at 11:34

## 2023-12-28 NOTE — TELEPHONE ENCOUNTER
Called pt via interpretor# [id number]. Pt unavailable but spoke with his son. Reviewed lab results. Advised him pt should follow up as scheduled in March with labs repeated just prior to that appt. He voiced understanding.

## 2023-12-28 NOTE — SEDATION DOCUMENTATION
Procedure completed by Dr Murphy, pt tolerated without issues. Education provided to pt prior to procedure. Tubes sutured, dry dressing to each site. Pt sent home with new supplies.

## 2023-12-28 NOTE — DISCHARGE INSTRUCTIONS
Cuidado de la sonda de nefrostomía    LO QUE NECESITAS SABER:  Jasmin sonda de nefrostomía es un catéter (tubo de plástico millan) que se inserta a través de la piel hasta el riñón. La sonda de nefrostomía drena la orina de luevano riñón a jasmin bolsa colectora fuera de luevano cuerpo. Es posible que necesite jasmin sonda de nefrostomía cuando algo bloquea el flujo normal de orina. Jasmin sonda de nefrostomía se puede usar por un período de tiempo corto o markus. La sonda de nefrostomía sale de luevano espalda, por lo que necesitará que alguien le ayude a cuidarla.    INSTRUCCIONES DE DESCARGA:     Cómo limpiar la piel alrededor de la sonda de nefrostomía y cambiar el vendaje: Dado que la sonda de nefrostomía sale por la espalda, no podrá cuidarla usted mismo. Pídale a alguien que siga las instrucciones generales a continuación para revisar y cuidar luevano tubo de nefrostomía.  Reúna los elementos que necesitará.        Almohadilla desechable (de un solo uso) y jasmin toallita limpia  Jabón común, agua tibia y guantes médicos nuevos  Vendajes de gasa estériles  Vendaje adhesivo transparente o cinta médica  reed de la piel  Película protectora de la piel  Bolsa de basura  Quite el vendaje anabelel y revise el sitio de entrada del tubo. Senthil que el paciente se acueste de lado con el sitio de entrada de la sonda de nefrostomía hacia arriba. Coloque la almohadilla inferior donde atrapará el drenaje mientras trabaja con la sonda de nefrostomía.  Lávate las cleo con jabón y agua. Póngase guantes médicos nuevos.  Retire suavemente el vendaje anabelle, sin tirar del tubo. Senthil esto sujetando la piel al lado del tubo con jasmin mano. Con la otra mano, retire suavemente la cinta adhesiva y la reed cutánea tirando en la misma dirección en que crece el vello. No toque el lado del vendaje que se coloca sobre o alrededor del tubo. Deseche el vendaje y la reed cutánea en jasmin bolsa de basura.  Busque signos de infección, barron enrojecimiento e hinchazón de la  piel. Informe cualquier cambio en la piel a los proveedores de atención médica.  Limpie el sitio de entrada del tubo.  Sostenga el tubo en luevano lugar para evitar que se salga mientras limpia a luevano alrededor.  Deberá limpiar el área dos veces. Para la primera limpieza, humedezca jasmin venda de gasa nueva con agua y jabón. Comience en el sitio de entrada del tubo. Limpie la piel en círculos, alejándose del sitio de entrada. Retire la taco y cualquier otro material con la gasa. Senthil esto tantas veces barron sea necesario. Use jasmin nueva venda de gasa cada vez que limpie el área, alejándose del sitio de entrada.  Para la segunda limpieza, humedezca jasmin gasa nueva con agua. Comience en el sitio de entrada del tubo. Limpie la piel en círculos, alejándose del sitio de entrada. Use jasmin nueva venda de gasa cada vez que limpie el área, alejándose del sitio de entrada.   Acaricie suavemente la piel con un paño limpio para secarla.     Aplique la reed cutánea y los vendajes.   Enrolle un vendaje para que sea más grueso y colóquelo debajo del lugar donde el tubo entra en la piel. Colóquelo para sostener el tubo y evite que se doble o doble. Pegue el vendaje con cinta adhesiva en luevano lugar y aplique más vendajes si se lo indica un proveedor de atención médica.  Lleve el tubo hacia el frente y péguelo con cinta adhesiva a la piel. No estire demasiado la sonda, ya que esto puede sacar la sonda de nefrostomía.  Con qué frecuencia cambiar el vendaje. Cambie el vendaje alrededor del tubo cada dos días. Si rose vendajes se ensucian o mojan, cámbielos de inmediato y con la frecuencia que sea necesaria. Si la sonda de nefrostomía se va a utilizar percy un período prolongado, es necesario cambiarla cada 2 o 3 meses. Los proveedores de atención médica le dirán cuándo debe programar jasmin gabriel para que le cambien la sonda.    Cómo cuidar la bolsa de drenaje de orina:  Pregunte si necesita medir y anotar cuánta orina hay en la bolsa antes de  vaciarla. Drene la orina de la bolsa de drenaje cuando esté entre ½ y ? completo. Joey el marisol en la parte inferior de la bolsa para vaciar la orina en el inodoro.  Es posible que deba quitar la bolsa de drenaje de la sonda de nefrostomía para cambiarla. Si es así, coloque jasmin nueva bolsa de drenaje firmemente en la sonda de nefrostomía.  Cómo prevenir problemas con luevano tubo de nefrostomía:  Cambiar vendajes, dirigido. Hemet ayuda a prevenir infecciones. Deseche o limpie la bolsa de drenaje según las indicaciones de luevano proveedor de atención médica.    Limpie los extremos de conexión de la bolsa de drenaje con alcohol antes de volver a conectar la bolsa al tubo. Hemet ayuda a prevenir infecciones.    Mantenga el tubo pegado a luevano piel y conectado a jasmin bolsa de drenaje colocada debajo del nivel de rose riñones. Hemet ayuda a evitar que la orina regrese a los riñones. Puede usar jasmin pequeña bolsa de drenaje atada a la pierna para que pueda moverse más fácilmente.    Revise el catéter para asegurarse de que esté en luevano lugar después de cambiarse de ropa o realizar otras actividades. No use ropa ajustada sobre el tubo. Coloque el tubo sobre luevano muslo en lugar de debajo de él cuando esté sentado. Asegúrese de que nada tire de la sonda de nefrostomía cuando se mueva.    Cambie de posición si ve poca o ninguna orina en la bolsa de drenaje. Verifique si el tubo de orina está torcido o doblado. Asegúrese de no estar sentado o acostado    En el tubo. Si no hay torceduras y hay poca o nada de orina en la bolsa de drenaje, informe a luevano proveedor de atención médica.     Enjuague el tubo barron se indica. Algunas trompas se enjuagan jasmin vez al día con 10 ml de NSS. Se le dará jasmin receta para los enjuagues.  Para enjuagar el tubo de nefrostomía, limpie ambas conexiones con alcohol. Desenrosque el tubo de la bolsa de drenaje y gire la jeringa de solución salina en el tubo de nefrostomía y enjuague enérgicamente. Retire la jeringa y vuelva a  girar el tubo de la bolsa de drenaje en el tubo de nefrostomía.  Mantenga el sitio cubierto mientras se ducha. Pegue con cinta un trozo de plástico adhesivo transparente sobre el vendaje para mantenerlo seco mientras se ducha. No tome pema de fan.    Comuníquese con [employer] [facility] [phone] (PACIENTES DE HERNANDEZ: Comuníquese con [employer] [facility]cion[facility] [phone]) (PACIENTES DE QUETA: Comuníquese con [employer]iología Intervencion[facility] al [phone]) si:  La piel alrededor de la sonda de nefrostomía está enrojecida, hinchada, con picazón o con sarpullido.  Tiene fiebre superior a 101 o escalofríos.   Tiene dolor en la parte inferior de la espalda o en la cadera.   Hay cambios en el aspecto o el olor de la orina.   Tiene poca o ninguna orina drenando del tubo de nefrostomía.  Tiene náuseas y está vomitando.   La elida lorenza en luevano tubo se ha movido, o el tubo es más markus que cuando se lo colocó.   Tiene preguntas o inquietudes sobre luevano condición o atención.  La sonda de nefrostomía sale completamente.  Hay taco, pus o mal olor que sale del lugar donde el tubo entra en la piel.  La orina se escapa alrededor del tubo.        Las siguientes farmacias tienen jeringas de lavado.       [facility]  [address] [facility] [phone]  Donna LARSON  Teléfono [phone] Teléfono [phone] [facility]                                                                                                   [phone]  [facility]  [address] [facility]  Yoselyn LARSON [phone]  Teléfono [phone] Teléfono [phone]    [facility] [phone]  [address]  Teléfono [phone]

## 2023-12-28 NOTE — BRIEF OP NOTE (RAD/CATH)
INTERVENTIONAL RADIOLOGY PROCEDURE NOTE    Date: 12/28/2023    Procedure:   Procedure Summary       Date: 12/28/23 Room / Location: [facility]    Anesthesia Start:  Anesthesia Stop:     Procedure: IR NEPHROSTOMY TUBE CHECK/CHANGE/REPOSITION/REINSERTION/UPSIZE Diagnosis:       Prostate cancer (HCC)      (Bladder mass for routine 3 month tube changes)    Scheduled Providers:  Responsible Provider:     Anesthesia Type: Not recorded ASA Status: Not recorded            Preoperative diagnosis:   1. Prostate cancer (HCC)         Postoperative diagnosis: Same.    Surgeon: Pro Murphy MD     Assistant: None. No qualified resident was available.    Blood loss: None    Specimens: None     Findings: Bilateral nephrostomy tube exchange.    Complications: None immediate.    Anesthesia: local

## 2023-12-29 DIAGNOSIS — C61 MALIGNANT NEOPLASM OF PROSTATE (HCC): ICD-10-CM

## 2023-12-29 RX ORDER — DEXAMETHASONE 0.5 MG/1
TABLET ORAL
Qty: 90 TABLET | Refills: 0 | Status: SHIPPED | OUTPATIENT
Start: 2023-12-29

## 2023-12-29 RX ORDER — DEXAMETHASONE 0.5 MG/1
TABLET ORAL
Qty: 90 TABLET | Refills: 1 | Status: SHIPPED | OUTPATIENT
Start: 2023-12-29 | End: 2023-12-29 | Stop reason: SDUPTHER

## 2023-12-30 ENCOUNTER — APPOINTMENT (EMERGENCY)
Dept: RADIOLOGY | Facility: HOSPITAL | Age: 73
End: 2023-12-30
Payer: COMMERCIAL

## 2023-12-30 ENCOUNTER — HOSPITAL ENCOUNTER (EMERGENCY)
Facility: HOSPITAL | Age: 73
Discharge: HOME | End: 2023-12-30
Attending: EMERGENCY MEDICINE | Admitting: EMERGENCY MEDICINE
Payer: COMMERCIAL

## 2023-12-30 VITALS
TEMPERATURE: 97.3 F | OXYGEN SATURATION: 98 % | HEART RATE: 66 BPM | RESPIRATION RATE: 18 BRPM | DIASTOLIC BLOOD PRESSURE: 81 MMHG | SYSTOLIC BLOOD PRESSURE: 141 MMHG

## 2023-12-30 DIAGNOSIS — N12 PYELONEPHRITIS: Primary | ICD-10-CM

## 2023-12-30 DIAGNOSIS — R10.9 RIGHT FLANK PAIN: ICD-10-CM

## 2023-12-30 LAB
AMORPH URATE CRY URNS QL MICRO: ABNORMAL /HPF
BACTERIA UR QL AUTO: ABNORMAL /HPF
BILIRUB UR QL STRIP: NEGATIVE
CLARITY UR: ABNORMAL
COLOR UR: ABNORMAL
GLUCOSE UR STRIP-MCNC: ABNORMAL MG/DL
HGB UR QL STRIP.AUTO: ABNORMAL
KETONES UR STRIP-MCNC: NEGATIVE MG/DL
LEUKOCYTE ESTERASE UR QL STRIP: ABNORMAL
NITRITE UR QL STRIP: POSITIVE
NON-SQ EPI CELLS URNS QL MICRO: ABNORMAL /HPF
PH UR STRIP.AUTO: 6 [PH]
PROT UR STRIP-MCNC: ABNORMAL MG/DL
RBC #/AREA URNS AUTO: ABNORMAL /HPF
SP GR UR STRIP.AUTO: <=1.005 (ref 1–1.03)
UROBILINOGEN UR QL STRIP.AUTO: 0.2 E.U./DL
WBC #/AREA URNS AUTO: ABNORMAL /HPF

## 2023-12-30 PROCEDURE — 87147 CULTURE TYPE IMMUNOLOGIC: CPT | Performed by: EMERGENCY MEDICINE

## 2023-12-30 PROCEDURE — 87086 URINE CULTURE/COLONY COUNT: CPT | Performed by: EMERGENCY MEDICINE

## 2023-12-30 PROCEDURE — 99284 EMERGENCY DEPT VISIT MOD MDM: CPT

## 2023-12-30 PROCEDURE — 87077 CULTURE AEROBIC IDENTIFY: CPT | Performed by: EMERGENCY MEDICINE

## 2023-12-30 PROCEDURE — G1004 CDSM NDSC: HCPCS

## 2023-12-30 PROCEDURE — 99284 EMERGENCY DEPT VISIT MOD MDM: CPT | Performed by: EMERGENCY MEDICINE

## 2023-12-30 PROCEDURE — 81001 URINALYSIS AUTO W/SCOPE: CPT | Performed by: EMERGENCY MEDICINE

## 2023-12-30 PROCEDURE — 74176 CT ABD & PELVIS W/O CONTRAST: CPT

## 2023-12-30 RX ORDER — CIPROFLOXACIN 500 MG/1
500 TABLET, FILM COATED ORAL 2 TIMES DAILY
Qty: 14 TABLET | Refills: 0 | Status: SHIPPED | OUTPATIENT
Start: 2023-12-31 | End: 2023-12-31

## 2023-12-30 RX ORDER — CIPROFLOXACIN 500 MG/1
500 TABLET, FILM COATED ORAL ONCE
Status: COMPLETED | OUTPATIENT
Start: 2023-12-30 | End: 2023-12-30

## 2023-12-30 RX ORDER — IBUPROFEN 400 MG/1
400 TABLET ORAL ONCE
Status: COMPLETED | OUTPATIENT
Start: 2023-12-30 | End: 2023-12-30

## 2023-12-30 RX ORDER — ACETAMINOPHEN 325 MG/1
975 TABLET ORAL ONCE
Status: COMPLETED | OUTPATIENT
Start: 2023-12-30 | End: 2023-12-30

## 2023-12-30 RX ADMIN — IBUPROFEN 400 MG: 400 TABLET, FILM COATED ORAL at 17:55

## 2023-12-30 RX ADMIN — CIPROFLOXACIN HYDROCHLORIDE 500 MG: 500 TABLET, FILM COATED ORAL at 20:44

## 2023-12-30 RX ADMIN — ACETAMINOPHEN 975 MG: 325 TABLET ORAL at 17:55

## 2023-12-30 NOTE — ED PROVIDER NOTES
"History  Chief Complaint   Patient presents with    Pain     Patient has bilateral nephrostomy tube and patient states he was emptying the bag on both but the left side he feels that it got misplaced ecause everytime he moves or lies down it hurts.     Pt is a 72yo M who presents for right flank pain.  Patient reports he has history of bilateral nephrostomy tubes due to prostate cancer.  Patient states 2 days ago he had a routine change of his bilateral nephrostomy tubes.  Patient reports since the procedure he has had right-sided flank pain.  Patient reports the pain is a 7 out of 10.  Patient reports due to this he believes the nephrostomy tube is in the wrong spot.  Patient reports he did not have pain with the previous nephrostomy tube.  Patient reports both nephrostomy tubes are putting out urine.  Patient reports the right is actually putting out more urine than the left.  Patient denies any fevers or chills or other systemic symptoms.  Wife expresses concerns about the suture that it is \"not in the right spot\".  She states that the positioning of it has not changed since he was discharged.  Patient has not been taking anything at home for pain.    Pt is Telugu speaking only and  used throughout encounter.           Prior to Admission Medications   Prescriptions Last Dose Informant Patient Reported? Taking?   Blood Glucose Monitoring Suppl (OneTouch Verio Reflect) w/Device KIT  Self No No   Sig: Check blood sugars once daily. Please substitute with appropriate alternative as covered by patient's insurance. Dx: E11.65   Calcium Carb-Cholecalciferol (calcium carbonate-vitamin D) 500 mg-5 mcg tablet  Self No No   Sig: Take 1 tablet by mouth 2 (two) times a day with meals   Patient not taking: Reported on 11/28/2023   Invokana 100 MG  Self No No   Sig: Take 1 tablet (100 mg total) by mouth daily before breakfast   Levemir FlexPen 100 units/mL injection pen  Family Member, Self Yes No   Sig: Inject 40 " Units under the skin daily   Lovaza 1 g capsule  Family Member, Self Yes No   Si (two) times a day   Multiple Vitamins-Minerals (Sentry) TABS  Family Member, Self Yes No   Sig: Take 1 tablet by mouth daily   OneTouch Delica Lancets 33G MISC  Self No No   Sig: Check blood sugars once daily. Please substitute with appropriate alternative as covered by patient's insurance. Dx: E11.65   Spacer/Aero-Holding Chambers (AEROCHAMBER MINI CHAMBER) BILLY  Family Member, Self No No   Sig: by Does not apply route see administration instructions   abiraterone (ZYTIGA) 250 mg tablet  Self No No   Sig: Take 1 tablet (250 mg total) by mouth daily With a low fat meal   albuterol (ProAir HFA) 90 mcg/act inhaler  Family Member, Self No No   Sig: Inhale 2 puffs every 4 (four) hours as needed for wheezing or shortness of breath   Patient not taking: Reported on 2023   dexamethasone (DECADRON) 0.5 mg tablet   No No   Sig: TAKE 1 TABLET (0.5 MG TOTAL) BY MOUTH DAILY WITH BREAKFAST   glimepiride (AMARYL) 4 mg tablet  Family Member, Self Yes No   Sig: Take 4 mg by mouth daily with breakfast   glucose blood (OneTouch Verio) test strip  Self No No   Sig: Check blood sugars once daily. Please substitute with appropriate alternative as covered by patient's insurance. Dx: E11.65   lisinopril (ZESTRIL) 10 mg tablet  Self No No   Sig: Take 1 tablet (10 mg total) by mouth daily   polyethylene glycol (GOLYTELY) 4000 mL solution   No No   Sig: Take 4,000 mL by mouth once for 1 dose   rosuvastatin (CRESTOR) 10 MG tablet  Self No No   Sig: Take 1 tablet (10 mg total) by mouth daily   sodium chloride, PF, 0.9 %  Family Member, Self No No   Sig: 10 mL by Intracatheter route daily Intracatheter flushing daily. May substitute prefilled syringe with normal saline 10 mL vials, 10 mL syringes, and 18 g blunt needles   sodium chloride, PF, 0.9 %   No No   Sig: 10 mL by Intracatheter route daily      Facility-Administered Medications: None       Past  Medical History:   Diagnosis Date    Diabetes mellitus (HCC)     High cholesterol     Hypertension     Prostate cancer (HCC)        Past Surgical History:   Procedure Laterality Date    IR NEPHROSTOMY TUBE CHECK/CHANGE/REPOSITION/REINSERTION/UPSIZE  2023    IR NEPHROSTOMY TUBE CHECK/CHANGE/REPOSITION/REINSERTION/UPSIZE  2023    IR NEPHROSTOMY TUBE PLACEMENT  2023    bilateral    US GUIDED PROSTATE BIOPSY         Family History   Problem Relation Age of Onset    Liver cancer Father      I have reviewed and agree with the history as documented.    E-Cigarette/Vaping    E-Cigarette Use Never User      E-Cigarette/Vaping Substances    Nicotine No     THC No     CBD No     Flavoring No     Other No     Unknown No      Social History     Tobacco Use    Smoking status: Former     Current packs/day: 0.00     Types: Cigarettes     Quit date:      Years since quittin.0     Passive exposure: Past    Smokeless tobacco: Never    Tobacco comments:     States he only smoked on the weekends   Vaping Use    Vaping status: Never Used   Substance Use Topics    Alcohol use: Not Currently     Comment: socially    Drug use: Never       Review of Systems   Genitourinary:  Positive for flank pain.   All other systems reviewed and are negative.      Physical Exam  Physical Exam  Vitals reviewed.   Constitutional:       General: He is not in acute distress.     Appearance: He is well-developed. He is not toxic-appearing or diaphoretic.   HENT:      Head: Normocephalic and atraumatic.      Right Ear: External ear normal.      Left Ear: External ear normal.      Nose: Nose normal.      Mouth/Throat:      Pharynx: Oropharynx is clear.   Eyes:      Extraocular Movements: Extraocular movements intact.      Pupils: Pupils are equal, round, and reactive to light.   Cardiovascular:      Rate and Rhythm: Normal rate and regular rhythm.      Heart sounds: Normal heart sounds.   Pulmonary:      Effort: Pulmonary effort is  normal. No respiratory distress.      Breath sounds: Normal breath sounds. No stridor. No wheezing.   Abdominal:      General: Bowel sounds are normal. There is no distension.      Palpations: Abdomen is soft.      Tenderness: There is no abdominal tenderness.   Musculoskeletal:         General: Normal range of motion.      Cervical back: Normal range of motion and neck supple.   Skin:     General: Skin is warm and dry.      Capillary Refill: Capillary refill takes less than 2 seconds.      Coloration: Skin is not pale.      Findings: No erythema or rash.          Neurological:      General: No focal deficit present.      Mental Status: He is alert and oriented to person, place, and time.   Psychiatric:         Speech: Speech normal.         Behavior: Behavior is cooperative.         Vital Signs  ED Triage Vitals   Temperature Pulse Respirations Blood Pressure SpO2   12/30/23 1610 12/30/23 1611 12/30/23 1611 12/30/23 1611 12/30/23 1611   (!) 97.3 °F (36.3 °C) 76 18 120/58 100 %      Temp src Heart Rate Source Patient Position - Orthostatic VS BP Location FiO2 (%)   -- 12/30/23 1611 12/30/23 1611 12/30/23 1611 --    Monitor Sitting Left arm       Pain Score       12/30/23 1755       7           Vitals:    12/30/23 1611 12/30/23 1941 12/30/23 1945   BP: 120/58 141/81 141/81   Pulse: 76 60 66   Patient Position - Orthostatic VS: Sitting           Visual Acuity      ED Medications  Medications   acetaminophen (TYLENOL) tablet 975 mg (975 mg Oral Given 12/30/23 1755)   ibuprofen (MOTRIN) tablet 400 mg (400 mg Oral Given 12/30/23 1755)   ciprofloxacin (CIPRO) tablet 500 mg (500 mg Oral Given 12/30/23 2044)       Diagnostic Studies  Results Reviewed       Procedure Component Value Units Date/Time    Urine Microscopic [[id number]]  (Abnormal) Collected: 12/30/23 1940    Lab Status: Final result Specimen: Urine, Right Nephrostomy Updated: 12/30/23 2055     RBC, UA 1-2 /hpf      WBC, UA 30-50 /hpf      Epithelial Cells  Occasional /hpf      Bacteria, UA Innumerable /hpf      AMORPH URATES Moderate /hpf     Urine culture [[id number]] Collected: 12/30/23 1940    Lab Status: In process Specimen: Urine, Right Nephrostomy Updated: 12/30/23 2055    UA w Reflex to Microscopic w Reflex to Culture [[id number]]  (Abnormal) Collected: 12/30/23 1940    Lab Status: Final result Specimen: Urine, Right Nephrostomy Updated: 12/30/23 1950     Color, UA Light Yellow     Clarity, UA Cloudy     Specific Gravity, UA <=1.005     pH, UA 6.0     Leukocytes, UA Trace     Nitrite, UA Positive     Protein, UA 30 (1+) mg/dl      Glucose, UA >=1000 (1%) mg/dl      Ketones, UA Negative mg/dl      Urobilinogen, UA 0.2 E.U./dl      Bilirubin, UA Negative     Occult Blood, UA Small                   CT abdomen pelvis wo contrast   Final Result by Sherri López MD (12/30 1902)      1. Bilateral percutaneous nephrostomy tubes with the tips within the renal collecting systems without hydronephrosis.  Asymmetric right ureterectasis with periureteral edema.      2. Numerous sclerotic lesions throughout the osseous structures in keeping with known metastases as noted on prior PET/CT.                  Workstation performed: [id number]                    Procedures  Procedures         ED Course  ED Course as of 12/31/23 0022   Sat Dec 30, 2023   1729 IR contacted via TT. Recommended CT abd w/o contrast to confirm placement.    1912 CT abdomen pelvis wo contrast  Bilateral percutaneous nephrostomy tubes with the tips within the renal collecting systems without hydronephrosis.  Asymmetric right ureterectasis with periureteral edema.   1916 UA ordered from R sided nephrostomy tube.   1928 Urology made aware of CT findings for further recs.    1951 Leukocytes, UA(!): Trace   1951 Nitrite, UA(!): Positive  Suspicious for infection.   1951 Blood, UA(!): Small   1952 Urology made aware of UA results.    1953 Urology in agreement with abx for pyelo and no indication for further  w/u at this time. As pt with stable vitals, okay for outpt management of pyelo with strict return precautions.    2035 Pt made aware of all results and plan to start abx. Pt agreeable to plan.                                              Medical Decision Making  Pt is a 72yo M who presents with flank pain.     Differential diagnosis to include but not limited to misplaced tube, post-procedural pain, infection.  Will obtain CT. Will treat symptomatically and reassess. See ED course for results and details.    Plan to discharge pt with f/u to PCP and urology. Discussed returning the ED with new or worsening of symptoms. Discussed use of over the counter medications as stated on the bottle as needed for pain. Discussed taking new medication as prescribed and to completion. Pt expressed understanding of discharge instructions, return precautions, and medication instructions and is stable for discharge at this time. All questions were answered and pt was discharged without incident.       Amount and/or Complexity of Data Reviewed  Labs: ordered. Decision-making details documented in ED Course.  Radiology: ordered. Decision-making details documented in ED Course.    Risk  OTC drugs.  Prescription drug management.             Disposition  Final diagnoses:   Right flank pain   Pyelonephritis     Time reflects when diagnosis was documented in both MDM as applicable and the Disposition within this note       Time User Action Codes Description Comment    12/30/2023  8:35 PM Ni Boyce Add [R10.9] Right flank pain     12/30/2023  8:35 PM Ni Boyce Add [N12] Pyelonephritis     12/30/2023  8:35 PM Ni Boyce Modify [R10.9] Right flank pain     12/30/2023  8:35 PM Ni Boyce Modify [N12] Pyelonephritis           ED Disposition       ED Disposition   Discharge    Condition   Stable    Date/Time   Sat Dec 30, 2023  8:35 PM    Comment   Oliver Astudillo discharge to home/self care.                    Follow-up Information       Follow up With Specialties Details Why Contact Info Additional Information    [facility] [place] [facility] Call  As needed [address]  [address]  [facility] [address] [phone] [facility], [address], [address], [place], [place], [address], [phone]            Discharge Medication List as of 12/30/2023  8:36 PM        START taking these medications    Details   ciprofloxacin (CIPRO) 500 mg tablet Take 1 tablet (500 mg total) by mouth 2 (two) times a day for 7 days Do not start before December 31, 2023., Starting Sun 12/31/2023, Until Sun 1/7/2024, Normal           CONTINUE these medications which have NOT CHANGED    Details   abiraterone (ZYTIGA) 250 mg tablet Take 1 tablet (250 mg total) by mouth daily With a low fat meal, Starting Thu 9/21/2023, Normal      albuterol (ProAir HFA) 90 mcg/act inhaler Inhale 2 puffs every 4 (four) hours as needed for wheezing or shortness of breath, Starting Fri 3/6/2020, Normal      Blood Glucose Monitoring Suppl (OneTouch Verio Reflect) w/Device KIT Check blood sugars once daily. Please substitute with appropriate alternative as covered by patient's insurance. Dx: E11.65, Normal      Calcium Carb-Cholecalciferol (calcium carbonate-vitamin D) 500 mg-5 mcg tablet Take 1 tablet by mouth 2 (two) times a day with meals, Starting Fri 8/11/2023, Until Mon 8/5/2024, Normal      dexamethasone (DECADRON) 0.5 mg tablet TAKE 1 TABLET (0.5 MG TOTAL) BY MOUTH DAILY WITH BREAKFAST, Normal      glimepiride (AMARYL) 4 mg tablet Take 4 mg by mouth daily with breakfast, Starting Fri 4/7/2023, Historical Med      glucose blood (OneTouch Verio) test strip Check blood sugars once daily. Please substitute with appropriate alternative as covered by patient's insurance. Dx: E11.65, Normal      Invokana 100 MG Take 1 tablet (100 mg total) by mouth daily before breakfast, Starting Tue 10/3/2023, Normal       Levemir FlexPen 100 units/mL injection pen Inject 40 Units under the skin daily, Starting Wed 3/8/2023, Historical Med      lisinopril (ZESTRIL) 10 mg tablet Take 1 tablet (10 mg total) by mouth daily, Starting Mon 10/23/2023, Normal      Lovaza 1 g capsule 2 (two) times a day, Starting Wed 3/8/2023, Historical Med      Multiple Vitamins-Minerals (Sentry) TABS Take 1 tablet by mouth daily, Starting Wed 3/8/2023, Historical Med      OneTouch Delica Lancets 33G MISC Check blood sugars once daily. Please substitute with appropriate alternative as covered by patient's insurance. Dx: E11.65, Normal      polyethylene glycol (GOLYTELY) 4000 mL solution Take 4,000 mL by mouth once for 1 dose, Starting Tue 11/28/2023, Normal      rosuvastatin (CRESTOR) 10 MG tablet Take 1 tablet (10 mg total) by mouth daily, Starting Mon 10/23/2023, Normal      sodium chloride, PF, 0.9 % 10 mL by Intracatheter route daily Intracatheter flushing daily. May substitute prefilled syringe with normal saline 10 mL vials, 10 mL syringes, and 18 g blunt needles, Starting Mon 7/17/2023, Normal      Spacer/Aero-Holding Chambers (AEROCHAMBER MINI CHAMBER) BILLY by Does not apply route see administration instructions, Starting Fri 3/6/2020, Normal             No discharge procedures on file.    PDMP Review         Value Time User    PDMP Reviewed  Yes 6/26/2023  3:59 PM Rubina De Los Santos MD            ED Provider  Electronically Signed by             Ni Boyce MD  12/31/23 0022

## 2023-12-31 RX ORDER — CIPROFLOXACIN 500 MG/1
500 TABLET, FILM COATED ORAL 2 TIMES DAILY
Qty: 14 TABLET | Refills: 0 | Status: SHIPPED | OUTPATIENT
Start: 2023-12-31 | End: 2024-01-07

## 2023-12-31 NOTE — DISCHARGE INSTRUCTIONS
Follow-up with urology for further care. Contact info provided below if needed.  Use over the counter medications as stated on the bottle as needed for pain control.  Take your new medications as prescribed and to completion.  Return to the ED with new or worsening symptoms.

## 2024-01-02 ENCOUNTER — PATIENT MESSAGE (OUTPATIENT)
Dept: GASTROENTEROLOGY | Facility: CLINIC | Age: 74
End: 2024-01-02

## 2024-01-02 LAB
BACTERIA UR CULT: ABNORMAL

## 2024-01-03 DIAGNOSIS — Z12.11 ENCOUNTER FOR SCREENING COLONOSCOPY: ICD-10-CM

## 2024-01-08 ENCOUNTER — OFFICE VISIT (OUTPATIENT)
Dept: FAMILY MEDICINE CLINIC | Facility: CLINIC | Age: 74
End: 2024-01-08
Payer: COMMERCIAL

## 2024-01-08 ENCOUNTER — ANESTHESIA EVENT (OUTPATIENT)
Dept: ANESTHESIOLOGY | Facility: HOSPITAL | Age: 74
End: 2024-01-08

## 2024-01-08 ENCOUNTER — ANESTHESIA (OUTPATIENT)
Dept: ANESTHESIOLOGY | Facility: HOSPITAL | Age: 74
End: 2024-01-08

## 2024-01-08 VITALS
BODY MASS INDEX: 25.93 KG/M2 | RESPIRATION RATE: 21 BRPM | OXYGEN SATURATION: 99 % | SYSTOLIC BLOOD PRESSURE: 102 MMHG | HEART RATE: 80 BPM | TEMPERATURE: 98.4 F | HEIGHT: 68 IN | WEIGHT: 171.13 LBS | DIASTOLIC BLOOD PRESSURE: 54 MMHG

## 2024-01-08 DIAGNOSIS — E11.69 TYPE 2 DIABETES MELLITUS WITH OTHER SPECIFIED COMPLICATION, WITH LONG-TERM CURRENT USE OF INSULIN (HCC): Primary | Chronic | ICD-10-CM

## 2024-01-08 DIAGNOSIS — Z79.4 TYPE 2 DIABETES MELLITUS WITH OTHER SPECIFIED COMPLICATION, WITH LONG-TERM CURRENT USE OF INSULIN (HCC): Primary | Chronic | ICD-10-CM

## 2024-01-08 DIAGNOSIS — Z93.6 NEPHROSTOMY STATUS (HCC): ICD-10-CM

## 2024-01-08 DIAGNOSIS — K59.00 CONSTIPATION, UNSPECIFIED CONSTIPATION TYPE: ICD-10-CM

## 2024-01-08 LAB
SL AMB POCT GLUCOSE BLD: 278
SL AMB POCT HEMOGLOBIN AIC: 11.1 (ref ?–6.5)

## 2024-01-08 PROCEDURE — 83036 HEMOGLOBIN GLYCOSYLATED A1C: CPT | Performed by: FAMILY MEDICINE

## 2024-01-08 PROCEDURE — 82948 REAGENT STRIP/BLOOD GLUCOSE: CPT | Performed by: FAMILY MEDICINE

## 2024-01-08 PROCEDURE — 99213 OFFICE O/P EST LOW 20 MIN: CPT | Performed by: FAMILY MEDICINE

## 2024-01-08 RX ORDER — GLIMEPIRIDE 2 MG/1
2 TABLET ORAL
Qty: 90 TABLET | Refills: 1 | Status: SHIPPED | OUTPATIENT
Start: 2024-01-08

## 2024-01-08 RX ORDER — SODIUM CHLORIDE 9 MG/ML
10 INJECTION, SOLUTION INTRAMUSCULAR; INTRAVENOUS; SUBCUTANEOUS DAILY
Qty: 900 ML | Refills: 3 | Status: SHIPPED | OUTPATIENT
Start: 2024-01-08 | End: 2024-01-08

## 2024-01-08 RX ORDER — PEN NEEDLE, DIABETIC 31 G X1/4"
NEEDLE, DISPOSABLE MISCELLANEOUS
COMMUNITY
Start: 2023-12-04 | End: 2024-01-08 | Stop reason: SDUPTHER

## 2024-01-08 RX ORDER — POLYETHYLENE GLYCOL 3350 17 G/17G
17 POWDER, FOR SOLUTION ORAL DAILY PRN
Qty: 30 EACH | Refills: 3 | Status: SHIPPED | OUTPATIENT
Start: 2024-01-08

## 2024-01-08 RX ORDER — PEN NEEDLE, DIABETIC 31 G X1/4"
NEEDLE, DISPOSABLE MISCELLANEOUS
Qty: 100 EACH | Refills: 3 | Status: SHIPPED | OUTPATIENT
Start: 2024-01-08

## 2024-01-08 RX ORDER — SODIUM CHLORIDE 9 MG/ML
10 INJECTION, SOLUTION INTRAMUSCULAR; INTRAVENOUS; SUBCUTANEOUS DAILY
Qty: 900 ML | Refills: 0 | Status: SHIPPED | OUTPATIENT
Start: 2024-01-08

## 2024-01-08 RX ORDER — SODIUM CHLORIDE, SODIUM LACTATE, POTASSIUM CHLORIDE, CALCIUM CHLORIDE 600; 310; 30; 20 MG/100ML; MG/100ML; MG/100ML; MG/100ML
125 INJECTION, SOLUTION INTRAVENOUS CONTINUOUS
Status: CANCELLED | OUTPATIENT
Start: 2024-01-08

## 2024-01-08 NOTE — PROGRESS NOTES
Family Medicine Office Visit  Oliver Astudillo 73 y.o. male   MRN: [id number] : 1950  ENCOUNTER:2024    Assessment and Plan     1. Type 2 diabetes mellitus with other specified complication, with long-term current use of insulin ([facility])  Assessment & Plan:    Lab Results   Component Value Date    HGBA1C 11.1 (A) 2024     Chronic, uncontrolled.  A1c increased from 6.8-11.1 in the past 6 months.  Patient is following diabetic diet but eating more fruits recently, wife concerned that he is not following diabetic diet. He is also on Decadron 0.5 mg daily for prostate cancer.  Explained that this increase is likely related to use of oral steroids   Will increase Amaryl from 4 mg daily to 6 mg daily (take 4 mg with breakfast and 2 mg with lunch for a total of 6 mg daily).  Patient used to be on Invokana but it is no longer being covered by insurance.  Will start patient on Steglatro, advised to contact the office if there are any issues with coverage.  Increase insulin Levemir to 45 units at bedtime  Will send referral to nutrition services  RTO in 3 months or earlier if needed    Orders:  -     Ambulatory Referral to Nutrition Services; Future  -     POCT hemoglobin A1c  -     POCT blood glucose  -     glimepiride (AMARYL) 2 mg tablet; Take 1 tablet (2 mg total) by mouth daily with lunch  -     Ertugliflozin L-PyroglutamicAc 5 MG TABS; Take 5 mg by mouth daily with breakfast  -     Easy Touch Pen Needles 31G X 6 MM MISC; Use daily at bedtime    2. Nephrostomy status ([facility])  Assessment & Plan:  Pt reporting some discomfort after his nephrostomy tube was exchanged, this has happened in the past. States nephrostomy tubes have been draining appropriately. No fever, chills.  Recommended patient to reach out to urology if discomfort does not improve or worsen- He does has f/u with uro scheduled for next week.  Advised patient to look out for signs of infection such as fever, chills, pain or redness at nephrostomy  site.    Orders:  -     sodium chloride, PF, 0.9 %; 10 mL by Intracatheter route daily Intracatheter flushing daily. May substitute prefilled syringe with normal saline 10 mL vials, 10 mL syringes, and 18 g blunt needles    3. Constipation, unspecified constipation type  -     polyethylene glycol (MIRALAX) 17 g packet; Take 17 g by mouth daily as needed (constipation)        No follow-ups on file.      Associated orders were discussed and explained to the pt. Pertinent care gaps were addressed. Pt voiced understanding and acceptance with A/P. Pt will call the office if any further questions/concerns.     Assessment and plan was discussed with attending physician    Chief Complaint     Chief Complaint   Patient presents with    Follow-up     Patient needs refills of medications        History of Present Illness     Oliver Astudillo is a 73 y.o.-year-old male who presents today for DM follow up.  He also reported some discomfort after his nephrostomy tube was exchanged, however, this has happened in the past.  States nephrostomy tubes have been draining appropriately.    Diabetes  He has type 2 diabetes mellitus. His disease course has been worsening. Pertinent negatives for diabetes include no chest pain, no polydipsia, no polyphagia and no polyuria. There are no hypoglycemic complications. Risk factors for coronary artery disease include male sex and diabetes mellitus. Current diabetic treatments: Invokana, glimepiride, insulin Levemir. He is following a generally healthy (Generally healthy diet but does eat a lot of fruits) diet. Meal planning includes avoidance of concentrated sweets. He has not had a previous visit with a dietitian. He rarely participates in exercise.       Current Outpatient Medications on File Prior to Visit   Medication Sig    abiraterone (ZYTIGA) 250 mg tablet Take 1 tablet (250 mg total) by mouth daily With a low fat meal    albuterol (ProAir HFA) 90 mcg/act inhaler Inhale 2 puffs every 4  (four) hours as needed for wheezing or shortness of breath (Patient not taking: Reported on 12/21/2023)    Blood Glucose Monitoring Suppl (OneTouch Verio Reflect) w/Device KIT Check blood sugars once daily. Please substitute with appropriate alternative as covered by patient's insurance. Dx: E11.65    Calcium Carb-Cholecalciferol (calcium carbonate-vitamin D) 500 mg-5 mcg tablet Take 1 tablet by mouth 2 (two) times a day with meals (Patient not taking: Reported on 11/28/2023)    dexamethasone (DECADRON) 0.5 mg tablet TAKE 1 TABLET (0.5 MG TOTAL) BY MOUTH DAILY WITH BREAKFAST    glimepiride (AMARYL) 4 mg tablet Take 4 mg by mouth daily with breakfast    glucose blood (OneTouch Verio) test strip Check blood sugars once daily. Please substitute with appropriate alternative as covered by patient's insurance. Dx: E11.65    Levemir FlexPen 100 units/mL injection pen Inject 45 Units under the skin daily at bedtime    lisinopril (ZESTRIL) 10 mg tablet Take 1 tablet (10 mg total) by mouth daily    Lovaza 1 g capsule 2 (two) times a day    Multiple Vitamins-Minerals (Sentry) TABS Take 1 tablet by mouth daily    OneTouch Delica Lancets 33G MISC Check blood sugars once daily. Please substitute with appropriate alternative as covered by patient's insurance. Dx: E11.65    polyethylene glycol (GOLYTELY) 4000 mL solution Take 4,000 mL by mouth once for 1 dose    rosuvastatin (CRESTOR) 10 MG tablet Take 1 tablet (10 mg total) by mouth daily (Patient taking differently: Take 10 mg by mouth daily at bedtime)    Spacer/Aero-Holding Chambers (AEROCHAMBER MINI CHAMBER) BILLY by Does not apply route see administration instructions       Review of Systems     Review of Systems   Constitutional:  Negative for chills and fever.   Respiratory:  Negative for cough and shortness of breath.    Cardiovascular:  Negative for chest pain.   Gastrointestinal:  Positive for constipation. Negative for abdominal pain.   Endocrine: Negative for  "polydipsia, polyphagia and polyuria.   Genitourinary:  Negative for difficulty urinating.   Skin:  Negative for rash.       Objective     /54 (BP Location: Left arm, Patient Position: Sitting, Cuff Size: Standard)   Pulse 80   Temp 98.4 °F (36.9 °C) (Temporal)   Resp 21   Ht 5' 8\" (1.727 m)   Wt 77.6 kg (171 lb 2 oz)   SpO2 99%   BMI 26.02 kg/m²     Physical Exam  Vitals reviewed.   Constitutional:       General: He is not in acute distress.  Cardiovascular:      Rate and Rhythm: Normal rate and regular rhythm.   Pulmonary:      Effort: Pulmonary effort is normal.      Breath sounds: Normal breath sounds.   Musculoskeletal:      Right lower leg: No edema.      Left lower leg: No edema.   Neurological:      Mental Status: He is alert.         Eugenia Springer MD  Family Medicine PGY-3  [facility]         Parts of this note were dictated using M*Modal dictation software and may have sounds-like errors due to variation in pronunciation.  "

## 2024-01-09 ENCOUNTER — ANESTHESIA (OUTPATIENT)
Dept: GASTROENTEROLOGY | Facility: AMBULARY SURGERY CENTER | Age: 74
End: 2024-01-09

## 2024-01-09 ENCOUNTER — ANESTHESIA EVENT (OUTPATIENT)
Dept: GASTROENTEROLOGY | Facility: AMBULARY SURGERY CENTER | Age: 74
End: 2024-01-09

## 2024-01-09 ENCOUNTER — HOSPITAL ENCOUNTER (OUTPATIENT)
Dept: GASTROENTEROLOGY | Facility: AMBULARY SURGERY CENTER | Age: 74
Setting detail: OUTPATIENT SURGERY
Discharge: HOME | End: 2024-01-09
Attending: INTERNAL MEDICINE
Payer: COMMERCIAL

## 2024-01-09 VITALS
RESPIRATION RATE: 20 BRPM | TEMPERATURE: 96.4 F | HEART RATE: 56 BPM | DIASTOLIC BLOOD PRESSURE: 65 MMHG | SYSTOLIC BLOOD PRESSURE: 136 MMHG | OXYGEN SATURATION: 98 %

## 2024-01-09 DIAGNOSIS — Z12.11 ENCOUNTER FOR SCREENING COLONOSCOPY: ICD-10-CM

## 2024-01-09 LAB
GLUCOSE SERPL-MCNC: 108 MG/DL (ref 65–140)
GLUCOSE SERPL-MCNC: 58 MG/DL (ref 65–140)

## 2024-01-09 PROCEDURE — 0DBH8ZZ EXCISION OF CECUM, VIA NATURAL OR ARTIFICIAL OPENING ENDOSCOPIC: ICD-10-PCS | Performed by: INTERNAL MEDICINE

## 2024-01-09 PROCEDURE — 82948 REAGENT STRIP/BLOOD GLUCOSE: CPT

## 2024-01-09 PROCEDURE — 88305 TISSUE EXAM BY PATHOLOGIST: CPT | Performed by: PATHOLOGY

## 2024-01-09 PROCEDURE — 45385 COLONOSCOPY W/LESION REMOVAL: CPT | Performed by: INTERNAL MEDICINE

## 2024-01-09 RX ORDER — EPHEDRINE SULFATE 50 MG/ML
INJECTION INTRAVENOUS AS NEEDED
Status: DISCONTINUED | OUTPATIENT
Start: 2024-01-09 | End: 2024-01-09

## 2024-01-09 RX ORDER — DEXTROSE MONOHYDRATE 25 G/50ML
25 INJECTION, SOLUTION INTRAVENOUS ONCE
Status: DISCONTINUED | OUTPATIENT
Start: 2024-01-09 | End: 2024-01-13 | Stop reason: HOSPADM

## 2024-01-09 RX ORDER — PROPOFOL 10 MG/ML
INJECTION, EMULSION INTRAVENOUS CONTINUOUS PRN
Status: DISCONTINUED | OUTPATIENT
Start: 2024-01-09 | End: 2024-01-09

## 2024-01-09 RX ORDER — GLYCOPYRROLATE 0.2 MG/ML
INJECTION INTRAMUSCULAR; INTRAVENOUS AS NEEDED
Status: DISCONTINUED | OUTPATIENT
Start: 2024-01-09 | End: 2024-01-09

## 2024-01-09 RX ORDER — LIDOCAINE HYDROCHLORIDE 10 MG/ML
INJECTION, SOLUTION EPIDURAL; INFILTRATION; INTRACAUDAL; PERINEURAL AS NEEDED
Status: DISCONTINUED | OUTPATIENT
Start: 2024-01-09 | End: 2024-01-09

## 2024-01-09 RX ORDER — SODIUM CHLORIDE, SODIUM LACTATE, POTASSIUM CHLORIDE, CALCIUM CHLORIDE 600; 310; 30; 20 MG/100ML; MG/100ML; MG/100ML; MG/100ML
125 INJECTION, SOLUTION INTRAVENOUS CONTINUOUS
Status: DISCONTINUED | OUTPATIENT
Start: 2024-01-09 | End: 2024-01-13 | Stop reason: HOSPADM

## 2024-01-09 RX ORDER — PROPOFOL 10 MG/ML
INJECTION, EMULSION INTRAVENOUS AS NEEDED
Status: DISCONTINUED | OUTPATIENT
Start: 2024-01-09 | End: 2024-01-09

## 2024-01-09 RX ADMIN — EPHEDRINE SULFATE 5 MG: 50 INJECTION, SOLUTION INTRAVENOUS at 08:58

## 2024-01-09 RX ADMIN — SODIUM CHLORIDE, SODIUM LACTATE, POTASSIUM CHLORIDE, AND CALCIUM CHLORIDE 125 ML/HR: .6; .31; .03; .02 INJECTION, SOLUTION INTRAVENOUS at 08:00

## 2024-01-09 RX ADMIN — LIDOCAINE HYDROCHLORIDE 50 MG: 10 INJECTION, SOLUTION EPIDURAL; INFILTRATION; INTRACAUDAL; PERINEURAL at 08:35

## 2024-01-09 RX ADMIN — PROPOFOL 30 MG: 10 INJECTION, EMULSION INTRAVENOUS at 08:36

## 2024-01-09 RX ADMIN — PROPOFOL 70 MCG/KG/MIN: 10 INJECTION, EMULSION INTRAVENOUS at 08:36

## 2024-01-09 RX ADMIN — PROPOFOL 50 MG: 10 INJECTION, EMULSION INTRAVENOUS at 08:35

## 2024-01-09 RX ADMIN — PROPOFOL 50 MG: 10 INJECTION, EMULSION INTRAVENOUS at 08:50

## 2024-01-09 RX ADMIN — GLYCOPYRROLATE 0.2 MCG: 0.2 INJECTION, SOLUTION INTRAMUSCULAR; INTRAVENOUS at 08:41

## 2024-01-09 NOTE — ANESTHESIA PREPROCEDURE EVALUATION
Procedure:  PRE-OP ONLY    Relevant Problems   CARDIO   (+) Hypertension      ENDO   (+) Type 2 diabetes mellitus, with long-term current use of insulin (HCC)      /RENAL   (+) Bilateral hydronephrosis   (+) Prostate cancer (HCC)      Other   (+) Nephrostomy status (HCC)      bilateral nephrostomy tube   Recent pyelonephritis, treated with ABX       Anesthesia Plan  ASA Score- 3     Anesthesia Type- IV sedation with anesthesia with ASA Monitors.         Additional Monitors:     Airway Plan:            Plan Factors-    Chart reviewed.    Patient summary reviewed.    Patient is not a current smoker.              Induction- intravenous.    Postoperative Plan-     Informed Consent-

## 2024-01-09 NOTE — ANESTHESIA POSTPROCEDURE EVALUATION
Post-Op Assessment Note    CV Status:  Stable  Pain Score: 0    Pain management: adequate       Mental Status:  Alert   Hydration Status:  Stable   PONV Controlled:  None   Airway Patency:  Patent     Post Op Vitals Reviewed: Yes      Staff: Anesthesiologist, CRNA               BP   109/64   Temp      Pulse  64   Resp   14   SpO2   98

## 2024-01-09 NOTE — ANESTHESIA PREPROCEDURE EVALUATION
Procedure:  COLONOSCOPY    Relevant Problems   CARDIO   (+) Hypertension      ENDO   (+) Type 2 diabetes mellitus, with long-term current use of insulin (HCC)      /RENAL   (+) Bilateral hydronephrosis   (+) Other hydronephrosis   (+) Prostate cancer (HCC)      bilateral nephrostomy tube   Recent pyelonephritis, treated with ABX  Physical Exam    Airway    Mallampati score: II  TM Distance: >3 FB  Neck ROM: full     Dental    upper dentures and lower dentures    Cardiovascular      Pulmonary      Other Findings        Anesthesia Plan  ASA Score- 3     Anesthesia Type- IV sedation with anesthesia with ASA Monitors.         Additional Monitors:     Airway Plan:     Comment: FS 58. Pt took insulin medication. Will provide D 50  .       Plan Factors-    Chart reviewed.    Patient summary reviewed.    Patient is not a current smoker.              Induction- intravenous.    Postoperative Plan-     Informed Consent- Anesthetic plan and risks discussed with patient (with help of ).  I personally reviewed this patient with the CRNA. Discussed and agreed on the Anesthesia Plan with the CRNA..

## 2024-01-09 NOTE — H&P
History and Physical - [facility]  Oliver Astudillo 73 y.o. male MRN: [id number]                  HPI: Oliver Astudillo is a 73 y.o. year old male who presents for first screening colonoscopy      REVIEW OF SYSTEMS: Per the HPI, and otherwise unremarkable.    Historical Information   Past Medical History:   Diagnosis Date    Diabetes mellitus (HCC)     High cholesterol     Hypertension     Prostate cancer (HCC)      Past Surgical History:   Procedure Laterality Date    IR NEPHROSTOMY TUBE CHECK/CHANGE/REPOSITION/REINSERTION/UPSIZE  2023    IR NEPHROSTOMY TUBE CHECK/CHANGE/REPOSITION/REINSERTION/UPSIZE  2023    IR NEPHROSTOMY TUBE PLACEMENT  2023    bilateral    US GUIDED PROSTATE BIOPSY       Social History   Social History     Substance and Sexual Activity   Alcohol Use Not Currently    Comment: socially     Social History     Substance and Sexual Activity   Drug Use Never     Social History     Tobacco Use   Smoking Status Former    Current packs/day: 0.00    Types: Cigarettes    Quit date:     Years since quittin.0    Passive exposure: Past   Smokeless Tobacco Never   Tobacco Comments    States he only smoked on the weekends     Family History   Problem Relation Age of Onset    Liver cancer Father        Meds/Allergies       Current Outpatient Medications:     abiraterone (ZYTIGA) 250 mg tablet    Blood Glucose Monitoring Suppl (OneTouch Verio Reflect) w/Device KIT    dexamethasone (DECADRON) 0.5 mg tablet    Easy Touch Pen Needles 31G X 6 MM MISC    Ertugliflozin L-PyroglutamicAc 5 MG TABS    glimepiride (AMARYL) 2 mg tablet    glimepiride (AMARYL) 4 mg tablet    glucose blood (OneTouch Verio) test strip    Levemir FlexPen 100 units/mL injection pen    lisinopril (ZESTRIL) 10 mg tablet    Lovaza 1 g capsule    Multiple Vitamins-Minerals (Sentry) TABS    OneTouch Delica Lancets 33G MISC    polyethylene glycol (MIRALAX) 17 g packet    rosuvastatin (CRESTOR) 10 MG  tablet    Spacer/Aero-Holding Chambers (AEROCHAMBER MINI CHAMBER) BILLY    albuterol (ProAir HFA) 90 mcg/act inhaler    Calcium Carb-Cholecalciferol (calcium carbonate-vitamin D) 500 mg-5 mcg tablet    polyethylene glycol (GOLYTELY) 4000 mL solution    sodium chloride, PF, 0.9 %    Current Facility-Administered Medications:     lactated ringers infusion, 125 mL/hr, Intravenous, Continuous, 125 mL/hr at 01/09/24 0800    No Known Allergies    Objective     BP (!) 172/72   Pulse 57   Temp (!) 96.4 °F (35.8 °C) (Temporal)   Resp 18   SpO2 99%       PHYSICAL EXAM    Gen: NAD  Head: NCAT  CV: RRR  CHEST: Clear  ABD: soft, NT/ND  EXT: no edema      ASSESSMENT/PLAN:  This is a 73 y.o. year old male here for cpolonoscopy, and he is stable and optimized for his procedure.

## 2024-01-11 ENCOUNTER — HOSPITAL ENCOUNTER (INPATIENT)
Facility: HOSPITAL | Age: 74
LOS: 4 days | Discharge: HOME | DRG: 698 | End: 2024-01-16
Attending: EMERGENCY MEDICINE | Admitting: INTERNAL MEDICINE
Payer: COMMERCIAL

## 2024-01-11 ENCOUNTER — APPOINTMENT (EMERGENCY)
Dept: RADIOLOGY | Facility: HOSPITAL | Age: 74
DRG: 698 | End: 2024-01-11
Payer: COMMERCIAL

## 2024-01-11 ENCOUNTER — OFFICE VISIT (OUTPATIENT)
Dept: UROLOGY | Facility: CLINIC | Age: 74
End: 2024-01-11
Payer: COMMERCIAL

## 2024-01-11 VITALS
HEART RATE: 63 BPM | SYSTOLIC BLOOD PRESSURE: 130 MMHG | WEIGHT: 171 LBS | HEIGHT: 68 IN | OXYGEN SATURATION: 100 % | DIASTOLIC BLOOD PRESSURE: 80 MMHG | RESPIRATION RATE: 18 BRPM | BODY MASS INDEX: 25.91 KG/M2

## 2024-01-11 DIAGNOSIS — N12 PYELITIS: Primary | ICD-10-CM

## 2024-01-11 DIAGNOSIS — N28.89 URETERITIS: ICD-10-CM

## 2024-01-11 DIAGNOSIS — N12 PYELONEPHRITIS: ICD-10-CM

## 2024-01-11 DIAGNOSIS — Z93.6 NEPHROSTOMY STATUS (HCC): ICD-10-CM

## 2024-01-11 DIAGNOSIS — C61 PROSTATE CANCER (HCC): ICD-10-CM

## 2024-01-11 DIAGNOSIS — R10.9 FLANK PAIN: ICD-10-CM

## 2024-01-11 DIAGNOSIS — C61 PROSTATE CANCER (HCC): Primary | ICD-10-CM

## 2024-01-11 LAB
ALBUMIN SERPL BCP-MCNC: 3.6 G/DL (ref 3.5–5)
ALP SERPL-CCNC: 79 U/L (ref 34–104)
ALT SERPL W P-5'-P-CCNC: 20 U/L (ref 7–52)
ANION GAP SERPL CALCULATED.3IONS-SCNC: 7 MMOL/L
AST SERPL W P-5'-P-CCNC: 15 U/L (ref 13–39)
BACTERIA UR QL AUTO: ABNORMAL /HPF
BASOPHILS # BLD AUTO: 0.04 THOUSANDS/ÂΜL (ref 0–0.1)
BASOPHILS NFR BLD AUTO: 0 % (ref 0–1)
BILIRUB SERPL-MCNC: 0.5 MG/DL (ref 0.2–1)
BILIRUB UR QL STRIP: NEGATIVE
BUN SERPL-MCNC: 20 MG/DL (ref 5–25)
CALCIUM SERPL-MCNC: 9 MG/DL (ref 8.4–10.2)
CHLORIDE SERPL-SCNC: 98 MMOL/L (ref 96–108)
CLARITY UR: ABNORMAL
CO2 SERPL-SCNC: 25 MMOL/L (ref 21–32)
COLOR UR: ABNORMAL
CREAT SERPL-MCNC: 1.18 MG/DL (ref 0.6–1.3)
EOSINOPHIL # BLD AUTO: 0.02 THOUSAND/ÂΜL (ref 0–0.61)
EOSINOPHIL NFR BLD AUTO: 0 % (ref 0–6)
ERYTHROCYTE [DISTWIDTH] IN BLOOD BY AUTOMATED COUNT: 12.3 % (ref 11.6–15.1)
GFR SERPL CREATININE-BSD FRML MDRD: 61 ML/MIN/1.73SQ M
GLUCOSE SERPL-MCNC: 347 MG/DL (ref 65–140)
GLUCOSE UR STRIP-MCNC: ABNORMAL MG/DL
HCT VFR BLD AUTO: 38 % (ref 36.5–49.3)
HGB BLD-MCNC: 13.4 G/DL (ref 12–17)
HGB UR QL STRIP.AUTO: ABNORMAL
IMM GRANULOCYTES # BLD AUTO: 0.05 THOUSAND/UL (ref 0–0.2)
IMM GRANULOCYTES NFR BLD AUTO: 0 % (ref 0–2)
KETONES UR STRIP-MCNC: NEGATIVE MG/DL
LACTATE SERPL-SCNC: 1.8 MMOL/L (ref 0.5–2)
LEUKOCYTE ESTERASE UR QL STRIP: ABNORMAL
LYMPHOCYTES # BLD AUTO: 1.81 THOUSANDS/ÂΜL (ref 0.6–4.47)
LYMPHOCYTES NFR BLD AUTO: 13 % (ref 14–44)
MCH RBC QN AUTO: 32.2 PG (ref 26.8–34.3)
MCHC RBC AUTO-ENTMCNC: 35.3 G/DL (ref 31.4–37.4)
MCV RBC AUTO: 91 FL (ref 82–98)
MONOCYTES # BLD AUTO: 0.83 THOUSAND/ÂΜL (ref 0.17–1.22)
MONOCYTES NFR BLD AUTO: 6 % (ref 4–12)
NEUTROPHILS # BLD AUTO: 11.34 THOUSANDS/ÂΜL (ref 1.85–7.62)
NEUTS SEG NFR BLD AUTO: 81 % (ref 43–75)
NITRITE UR QL STRIP: POSITIVE
NON-SQ EPI CELLS URNS QL MICRO: ABNORMAL /HPF
NRBC BLD AUTO-RTO: 0 /100 WBCS
PH UR STRIP.AUTO: 6 [PH]
PLATELET # BLD AUTO: 377 THOUSANDS/UL (ref 149–390)
PMV BLD AUTO: 9.4 FL (ref 8.9–12.7)
POTASSIUM SERPL-SCNC: 4.2 MMOL/L (ref 3.5–5.3)
PROT SERPL-MCNC: 7.4 G/DL (ref 6.4–8.4)
PROT UR STRIP-MCNC: ABNORMAL MG/DL
RBC # BLD AUTO: 4.16 MILLION/UL (ref 3.88–5.62)
RBC #/AREA URNS AUTO: ABNORMAL /HPF
SODIUM SERPL-SCNC: 130 MMOL/L (ref 135–147)
SP GR UR STRIP.AUTO: 1.01 (ref 1–1.03)
UROBILINOGEN UR QL STRIP.AUTO: 0.2 E.U./DL
WBC # BLD AUTO: 14.09 THOUSAND/UL (ref 4.31–10.16)
WBC #/AREA URNS AUTO: ABNORMAL /HPF

## 2024-01-11 PROCEDURE — 99285 EMERGENCY DEPT VISIT HI MDM: CPT | Performed by: EMERGENCY MEDICINE

## 2024-01-11 PROCEDURE — 74177 CT ABD & PELVIS W/CONTRAST: CPT

## 2024-01-11 PROCEDURE — 85025 COMPLETE CBC W/AUTO DIFF WBC: CPT | Performed by: EMERGENCY MEDICINE

## 2024-01-11 PROCEDURE — G1004 CDSM NDSC: HCPCS

## 2024-01-11 PROCEDURE — 87186 SC STD MICRODIL/AGAR DIL: CPT | Performed by: EMERGENCY MEDICINE

## 2024-01-11 PROCEDURE — 96375 TX/PRO/DX INJ NEW DRUG ADDON: CPT

## 2024-01-11 PROCEDURE — 81001 URINALYSIS AUTO W/SCOPE: CPT | Performed by: EMERGENCY MEDICINE

## 2024-01-11 PROCEDURE — 87086 URINE CULTURE/COLONY COUNT: CPT | Performed by: EMERGENCY MEDICINE

## 2024-01-11 PROCEDURE — 87040 BLOOD CULTURE FOR BACTERIA: CPT | Performed by: EMERGENCY MEDICINE

## 2024-01-11 PROCEDURE — 96402 CHEMO HORMON ANTINEOPL SQ/IM: CPT

## 2024-01-11 PROCEDURE — 80053 COMPREHEN METABOLIC PANEL: CPT | Performed by: EMERGENCY MEDICINE

## 2024-01-11 PROCEDURE — 99213 OFFICE O/P EST LOW 20 MIN: CPT | Performed by: PHYSICIAN ASSISTANT

## 2024-01-11 PROCEDURE — 87077 CULTURE AEROBIC IDENTIFY: CPT | Performed by: EMERGENCY MEDICINE

## 2024-01-11 PROCEDURE — 99284 EMERGENCY DEPT VISIT MOD MDM: CPT

## 2024-01-11 PROCEDURE — 83605 ASSAY OF LACTIC ACID: CPT | Performed by: EMERGENCY MEDICINE

## 2024-01-11 PROCEDURE — 36415 COLL VENOUS BLD VENIPUNCTURE: CPT | Performed by: EMERGENCY MEDICINE

## 2024-01-11 PROCEDURE — 88305 TISSUE EXAM BY PATHOLOGIST: CPT | Performed by: PATHOLOGY

## 2024-01-11 RX ORDER — MORPHINE SULFATE 4 MG/ML
4 INJECTION, SOLUTION INTRAMUSCULAR; INTRAVENOUS ONCE
Status: COMPLETED | OUTPATIENT
Start: 2024-01-11 | End: 2024-01-11

## 2024-01-11 RX ADMIN — IOHEXOL 100 ML: 350 INJECTION, SOLUTION INTRAVENOUS at 22:23

## 2024-01-11 RX ADMIN — MORPHINE SULFATE 4 MG: 4 INJECTION INTRAVENOUS at 21:23

## 2024-01-11 NOTE — RESULT ENCOUNTER NOTE
Please call the patient regarding his abnormal result. Had polyp and prep was so so,  , Follow up colonscopy in 3 years  Follow up in my office as needed

## 2024-01-12 PROBLEM — E78.5 HYPERLIPIDEMIA: Status: ACTIVE | Noted: 2024-01-12

## 2024-01-12 PROBLEM — N12 PYELONEPHRITIS: Status: ACTIVE | Noted: 2024-01-12

## 2024-01-12 PROBLEM — E87.1 HYPONATREMIA: Status: ACTIVE | Noted: 2024-01-12

## 2024-01-12 LAB
ANION GAP SERPL CALCULATED.3IONS-SCNC: 6 MMOL/L
BASOPHILS # BLD AUTO: 0.03 THOUSANDS/ÂΜL (ref 0–0.1)
BASOPHILS NFR BLD AUTO: 0 % (ref 0–1)
BUN SERPL-MCNC: 16 MG/DL (ref 5–25)
CALCIUM SERPL-MCNC: 9.1 MG/DL (ref 8.4–10.2)
CHLORIDE SERPL-SCNC: 102 MMOL/L (ref 96–108)
CO2 SERPL-SCNC: 27 MMOL/L (ref 21–32)
CREAT SERPL-MCNC: 0.99 MG/DL (ref 0.6–1.3)
EOSINOPHIL # BLD AUTO: 0.04 THOUSAND/ÂΜL (ref 0–0.61)
EOSINOPHIL NFR BLD AUTO: 0 % (ref 0–6)
ERYTHROCYTE [DISTWIDTH] IN BLOOD BY AUTOMATED COUNT: 12.2 % (ref 11.6–15.1)
GFR SERPL CREATININE-BSD FRML MDRD: 76 ML/MIN/1.73SQ M
GLUCOSE SERPL-MCNC: 140 MG/DL (ref 65–140)
GLUCOSE SERPL-MCNC: 155 MG/DL (ref 65–140)
GLUCOSE SERPL-MCNC: 194 MG/DL (ref 65–140)
GLUCOSE SERPL-MCNC: 213 MG/DL (ref 65–140)
GLUCOSE SERPL-MCNC: 255 MG/DL (ref 65–140)
GLUCOSE SERPL-MCNC: 93 MG/DL (ref 65–140)
HCT VFR BLD AUTO: 38.1 % (ref 36.5–49.3)
HGB BLD-MCNC: 13.3 G/DL (ref 12–17)
IMM GRANULOCYTES # BLD AUTO: 0.06 THOUSAND/UL (ref 0–0.2)
IMM GRANULOCYTES NFR BLD AUTO: 1 % (ref 0–2)
LYMPHOCYTES # BLD AUTO: 2.32 THOUSANDS/ÂΜL (ref 0.6–4.47)
LYMPHOCYTES NFR BLD AUTO: 19 % (ref 14–44)
MAGNESIUM SERPL-MCNC: 1.8 MG/DL (ref 1.9–2.7)
MCH RBC QN AUTO: 31.8 PG (ref 26.8–34.3)
MCHC RBC AUTO-ENTMCNC: 34.9 G/DL (ref 31.4–37.4)
MCV RBC AUTO: 91 FL (ref 82–98)
MONOCYTES # BLD AUTO: 0.8 THOUSAND/ÂΜL (ref 0.17–1.22)
MONOCYTES NFR BLD AUTO: 7 % (ref 4–12)
NEUTROPHILS # BLD AUTO: 9.12 THOUSANDS/ÂΜL (ref 1.85–7.62)
NEUTS SEG NFR BLD AUTO: 73 % (ref 43–75)
NRBC BLD AUTO-RTO: 0 /100 WBCS
PLATELET # BLD AUTO: 364 THOUSANDS/UL (ref 149–390)
PMV BLD AUTO: 9.7 FL (ref 8.9–12.7)
POTASSIUM SERPL-SCNC: 3.8 MMOL/L (ref 3.5–5.3)
RBC # BLD AUTO: 4.18 MILLION/UL (ref 3.88–5.62)
SODIUM SERPL-SCNC: 135 MMOL/L (ref 135–147)
WBC # BLD AUTO: 12.37 THOUSAND/UL (ref 4.31–10.16)

## 2024-01-12 PROCEDURE — 80048 BASIC METABOLIC PNL TOTAL CA: CPT | Performed by: NURSE PRACTITIONER

## 2024-01-12 PROCEDURE — 82948 REAGENT STRIP/BLOOD GLUCOSE: CPT

## 2024-01-12 PROCEDURE — 96365 THER/PROPH/DIAG IV INF INIT: CPT

## 2024-01-12 PROCEDURE — 85025 COMPLETE CBC W/AUTO DIFF WBC: CPT | Performed by: NURSE PRACTITIONER

## 2024-01-12 PROCEDURE — 83735 ASSAY OF MAGNESIUM: CPT | Performed by: NURSE PRACTITIONER

## 2024-01-12 PROCEDURE — 99223 1ST HOSP IP/OBS HIGH 75: CPT | Performed by: NURSE PRACTITIONER

## 2024-01-12 RX ORDER — LANOLIN ALCOHOL/MO/W.PET/CERES
400 CREAM (GRAM) TOPICAL 2 TIMES DAILY
Status: COMPLETED | OUTPATIENT
Start: 2024-01-12 | End: 2024-01-12

## 2024-01-12 RX ORDER — CHLORAL HYDRATE 500 MG
1000 CAPSULE ORAL 2 TIMES DAILY
Status: DISCONTINUED | OUTPATIENT
Start: 2024-01-12 | End: 2024-01-16 | Stop reason: HOSPADM

## 2024-01-12 RX ORDER — CEFTRIAXONE 1 G/50ML
1000 INJECTION, SOLUTION INTRAVENOUS EVERY 24 HOURS
Status: DISCONTINUED | OUTPATIENT
Start: 2024-01-12 | End: 2024-01-12

## 2024-01-12 RX ORDER — SACCHAROMYCES BOULARDII 250 MG
250 CAPSULE ORAL 2 TIMES DAILY
Status: DISCONTINUED | OUTPATIENT
Start: 2024-01-12 | End: 2024-01-16 | Stop reason: HOSPADM

## 2024-01-12 RX ORDER — CEFTRIAXONE 1 G/50ML
1000 INJECTION, SOLUTION INTRAVENOUS EVERY 24 HOURS
Status: DISCONTINUED | OUTPATIENT
Start: 2024-01-12 | End: 2024-01-15

## 2024-01-12 RX ORDER — ENOXAPARIN SODIUM 100 MG/ML
40 INJECTION SUBCUTANEOUS DAILY
Status: DISCONTINUED | OUTPATIENT
Start: 2024-01-12 | End: 2024-01-16 | Stop reason: HOSPADM

## 2024-01-12 RX ORDER — ABIRATERONE ACETATE 250 MG/1
250 TABLET ORAL DAILY
Status: DISCONTINUED | OUTPATIENT
Start: 2024-01-12 | End: 2024-01-16 | Stop reason: HOSPADM

## 2024-01-12 RX ORDER — POLYETHYLENE GLYCOL 3350 17 G/17G
17 POWDER, FOR SOLUTION ORAL DAILY PRN
Status: DISCONTINUED | OUTPATIENT
Start: 2024-01-12 | End: 2024-01-16 | Stop reason: HOSPADM

## 2024-01-12 RX ORDER — INSULIN LISPRO 100 [IU]/ML
1-5 INJECTION, SOLUTION INTRAVENOUS; SUBCUTANEOUS
Status: DISCONTINUED | OUTPATIENT
Start: 2024-01-12 | End: 2024-01-16 | Stop reason: HOSPADM

## 2024-01-12 RX ORDER — ACETAMINOPHEN 325 MG/1
650 TABLET ORAL EVERY 6 HOURS PRN
Status: DISCONTINUED | OUTPATIENT
Start: 2024-01-12 | End: 2024-01-16 | Stop reason: HOSPADM

## 2024-01-12 RX ORDER — PRAVASTATIN SODIUM 80 MG/1
80 TABLET ORAL
Status: DISCONTINUED | OUTPATIENT
Start: 2024-01-12 | End: 2024-01-16 | Stop reason: HOSPADM

## 2024-01-12 RX ORDER — INSULIN LISPRO 100 [IU]/ML
1-5 INJECTION, SOLUTION INTRAVENOUS; SUBCUTANEOUS
Status: DISCONTINUED | OUTPATIENT
Start: 2024-01-12 | End: 2024-01-15

## 2024-01-12 RX ORDER — INSULIN LISPRO 100 [IU]/ML
5 INJECTION, SOLUTION INTRAVENOUS; SUBCUTANEOUS
Status: DISCONTINUED | OUTPATIENT
Start: 2024-01-12 | End: 2024-01-12

## 2024-01-12 RX ORDER — CEFEPIME HYDROCHLORIDE 2 G/50ML
2000 INJECTION, SOLUTION INTRAVENOUS ONCE
Status: COMPLETED | OUTPATIENT
Start: 2024-01-12 | End: 2024-01-12

## 2024-01-12 RX ORDER — ONDANSETRON 2 MG/ML
4 INJECTION INTRAMUSCULAR; INTRAVENOUS EVERY 6 HOURS PRN
Status: DISCONTINUED | OUTPATIENT
Start: 2024-01-12 | End: 2024-01-16 | Stop reason: HOSPADM

## 2024-01-12 RX ORDER — DEXAMETHASONE 0.5 MG/1
0.5 TABLET ORAL DAILY
Status: DISCONTINUED | OUTPATIENT
Start: 2024-01-12 | End: 2024-01-16 | Stop reason: HOSPADM

## 2024-01-12 RX ORDER — LISINOPRIL 10 MG/1
10 TABLET ORAL DAILY
Status: DISCONTINUED | OUTPATIENT
Start: 2024-01-12 | End: 2024-01-16 | Stop reason: HOSPADM

## 2024-01-12 RX ORDER — SODIUM CHLORIDE 9 MG/ML
50 INJECTION, SOLUTION INTRAVENOUS CONTINUOUS
Status: DISPENSED | OUTPATIENT
Start: 2024-01-12 | End: 2024-01-12

## 2024-01-12 RX ADMIN — DEXAMETHASONE 0.5 MG: 0.5 TABLET ORAL at 09:23

## 2024-01-12 RX ADMIN — PRAVASTATIN SODIUM 80 MG: 80 TABLET ORAL at 16:53

## 2024-01-12 RX ADMIN — CEFEPIME HYDROCHLORIDE 2000 MG: 2 INJECTION, SOLUTION INTRAVENOUS at 00:48

## 2024-01-12 RX ADMIN — Medication 250 MG: at 16:53

## 2024-01-12 RX ADMIN — INSULIN LISPRO 5 UNITS: 100 INJECTION, SOLUTION INTRAVENOUS; SUBCUTANEOUS at 09:22

## 2024-01-12 RX ADMIN — INSULIN LISPRO 1 UNITS: 100 INJECTION, SOLUTION INTRAVENOUS; SUBCUTANEOUS at 16:53

## 2024-01-12 RX ADMIN — ENOXAPARIN SODIUM 40 MG: 40 INJECTION SUBCUTANEOUS at 09:22

## 2024-01-12 RX ADMIN — LISINOPRIL 10 MG: 10 TABLET ORAL at 09:22

## 2024-01-12 RX ADMIN — SODIUM CHLORIDE 50 ML/HR: 0.9 INJECTION, SOLUTION INTRAVENOUS at 06:09

## 2024-01-12 RX ADMIN — Medication 250 MG: at 09:22

## 2024-01-12 RX ADMIN — Medication 400 MG: at 09:22

## 2024-01-12 RX ADMIN — ABIRATERONE ACETATE 250 MG: 250 TABLET, FILM COATED ORAL at 09:21

## 2024-01-12 RX ADMIN — CEFTRIAXONE 1000 MG: 1 INJECTION, SOLUTION INTRAVENOUS at 12:09

## 2024-01-12 RX ADMIN — OMEGA-3 FATTY ACIDS CAP 1000 MG 1000 MG: 1000 CAP at 16:54

## 2024-01-12 RX ADMIN — OMEGA-3 FATTY ACIDS CAP 1000 MG 1000 MG: 1000 CAP at 09:22

## 2024-01-12 RX ADMIN — INSULIN DETEMIR 45 UNITS: 100 INJECTION, SOLUTION SUBCUTANEOUS at 21:49

## 2024-01-12 RX ADMIN — INSULIN LISPRO 2 UNITS: 100 INJECTION, SOLUTION INTRAVENOUS; SUBCUTANEOUS at 21:49

## 2024-01-12 RX ADMIN — INSULIN LISPRO 5 UNITS: 100 INJECTION, SOLUTION INTRAVENOUS; SUBCUTANEOUS at 12:08

## 2024-01-12 RX ADMIN — Medication 1 TABLET: at 09:22

## 2024-01-12 RX ADMIN — Medication 400 MG: at 16:53

## 2024-01-12 NOTE — PLAN OF CARE
Problem: GENITOURINARY - ADULT  Goal: Maintains or returns to baseline urinary function  Description: INTERVENTIONS:  - Assess urinary function  - Encourage oral fluids to ensure adequate hydration if ordered  - Administer IV fluids as ordered to ensure adequate hydration  - Administer ordered medications as needed  - Offer frequent toileting  - Follow urinary retention protocol if ordered  Outcome: Progressing  Goal: Absence of urinary retention  Description: INTERVENTIONS:  - Assess patient’s ability to void and empty bladder  - Monitor I/O  - Bladder scan as needed  - Discuss with physician/AP medications to alleviate retention as needed  - Discuss catheterization for long term situations as appropriate  Outcome: Progressing  Goal: Urinary catheter remains patent  Description: INTERVENTIONS:  - Assess patency of urinary catheter  - If patient has a chronic ceballos, consider changing catheter if non-functioning  - Follow guidelines for intermittent irrigation of non-functioning urinary catheter  Outcome: Progressing

## 2024-01-12 NOTE — PLAN OF CARE
Problem: Potential for Falls  Goal: Patient will remain free of falls  Description: INTERVENTIONS:  - Educate patient/family on patient safety including physical limitations  - Instruct patient to call for assistance with activity   - Consult OT/PT to assist with strengthening/mobility   - Keep Call bell within reach  - Keep bed low and locked with side rails adjusted as appropriate  - Keep care items and personal belongings within reach  - Initiate and maintain comfort rounds  - Make Fall Risk Sign visible to staff  - Offer Toileting every 2 Hours, in advance of need  - Initiate/Maintain bed alarm  - Apply yellow socks and bracelet for high fall risk patients  - Consider moving patient to room near nurses station  Outcome: Progressing     Problem: GENITOURINARY - ADULT  Goal: Maintains or returns to baseline urinary function  Description: INTERVENTIONS:  - Assess urinary function  - Encourage oral fluids to ensure adequate hydration if ordered  - Administer IV fluids as ordered to ensure adequate hydration  - Administer ordered medications as needed  - Offer frequent toileting  - Follow urinary retention protocol if ordered  Outcome: Progressing  Goal: Absence of urinary retention  Description: INTERVENTIONS:  - Assess patient’s ability to void and empty bladder  - Monitor I/O  - Bladder scan as needed  - Discuss with physician/AP medications to alleviate retention as needed  - Discuss catheterization for long term situations as appropriate  Outcome: Progressing  Goal: Urinary catheter remains patent  Description: INTERVENTIONS:  - Assess patency of urinary catheter  - If patient has a chronic ceballos, consider changing catheter if non-functioning  - Follow guidelines for intermittent irrigation of non-functioning urinary catheter  Outcome: Progressing     Problem: METABOLIC, FLUID AND ELECTROLYTES - ADULT  Goal: Electrolytes maintained within normal limits  Description: INTERVENTIONS:  - Monitor labs and assess  patient for signs and symptoms of electrolyte imbalances  - Administer electrolyte replacement as ordered  - Monitor response to electrolyte replacements, including repeat lab results as appropriate  - Instruct patient on fluid and nutrition as appropriate  Outcome: Progressing  Goal: Fluid balance maintained  Description: INTERVENTIONS:  - Monitor labs   - Monitor I/O and WT  - Instruct patient on fluid and nutrition as appropriate  - Assess for signs & symptoms of volume excess or deficit  Outcome: Progressing  Goal: Glucose maintained within target range  Description: INTERVENTIONS:  - Monitor Blood Glucose as ordered  - Assess for signs and symptoms of hyperglycemia and hypoglycemia  - Administer ordered medications to maintain glucose within target range  - Assess nutritional intake and initiate nutrition service referral as needed  Outcome: Progressing     Problem: INFECTION - ADULT  Goal: Absence or prevention of progression during hospitalization  Description: INTERVENTIONS:  - Assess and monitor for signs and symptoms of infection  - Monitor lab/diagnostic results  - Monitor all insertion sites, i.e. indwelling lines, tubes, and drains  - Monitor endotracheal if appropriate and nasal secretions for changes in amount and color  - [place]lee appropriate cooling/warming therapies per order  - Administer medications as ordered  - Instruct and encourage patient and family to use good hand hygiene technique  - Identify and instruct in appropriate isolation precautions for identified infection/condition  Outcome: Progressing  Goal: Absence of fever/infection during neutropenic period  Description: INTERVENTIONS:  - Monitor WBC    Outcome: Progressing     Problem: DISCHARGE PLANNING  Goal: Discharge to home or other facility with appropriate resources  Description: INTERVENTIONS:  - Identify barriers to discharge w/patient and caregiver  - Arrange for needed discharge resources and transportation as appropriate  -  Identify discharge learning needs (meds, wound care, etc.)  - Arrange for interpretive services to assist at discharge as needed  - Refer to Case Management Department for coordinating discharge planning if the patient needs post-hospital services based on physician/advanced practitioner order or complex needs related to functional status, cognitive ability, or social support system  Outcome: Progressing     Problem: Knowledge Deficit  Goal: Patient/family/caregiver demonstrates understanding of disease process, treatment plan, medications, and discharge instructions  Description: Complete learning assessment and assess knowledge base.  Interventions:  - Provide teaching at level of understanding  - Provide teaching via preferred learning methods  Outcome: Progressing

## 2024-01-12 NOTE — ASSESSMENT & PLAN NOTE
Patient presents with pain in right kidney region when flushing right nephrostomy tube started 1/11. Denies fever chills, dysuria, urinary urgency frequency.  Reports had colonoscopy on Tuesday 1/9.  Chart reviewed.  History of metastatic prostate cancer diagnosed in May 2023.  Had urinary retention, status post bilateral percutaneous nephrostomy tube insertion.  Patient has been treated with Lupron, Abiraterone and dexamethasone.  Received Lupron injection on 1/11.  Bilateral nephrostomy tubes were changed on 12/28.  Patient was seen in ED on 12/30 for right flank pain since nephrostomy tube change.  CT at that time showed properly positioned bilateral percutaneous nephrostomy tubes, asymmetric right  ureterectasis with periureteral edema.  UA consistent with infection.  Patient was discharged on Cipro for 7 days.  Completed antibiotic on Saturday.  Urine culture grew large colony E. coli and Enterobacter Cloacae, small colony of lactobacillus species and Enterococcus faecalis.  Repeat CT abdomen pelvis with IV contrast today showed -  right pyelitis/ureteritis and cystitis, possible right pyelonephritis.   UA from right nephrostomy tube consistent with infection  Given cefepime in ED.  Will change to Rocephin.  Follow-up repeat urine culture  Consult urology

## 2024-01-12 NOTE — ASSESSMENT & PLAN NOTE
Lab Results   Component Value Date    HGBA1C 11.1 (A) 01/08/2024       Recent Labs     01/12/24  0215 01/12/24  0705   POCGLU 194* 140       Blood Sugar Average: Last 72 hrs:  (P) 167  On Levemir glimepiride ertugliflozin at home  Reports blood sugar 300s today at home.  Continue Levemir  Hold oral agents  Add mealtime Humalog  SSI  Diabetic diet

## 2024-01-12 NOTE — ED PROVIDER NOTES
History  Chief Complaint   Patient presents with    Flank Pain     Patient seen by urology today. Has prostate cancer. Patient has bilateral nephrostomy tubes. Started with right flank pain 10 am today. Seen here on  had pyelonephritis. Temp 100.3 in triage.      Patient is a 73-year-old male with a history of prostate CA -bilateral percutaneous nephrostomy tubes, hypertension, diabetes, dyslipidemia that presents to the emergency department complaint of right flank pain that began after he received a Lupron shot earlier today.  He denies fevers or chills.      History provided by:  Patient   used: Yes (son)    Flank Pain  Associated symptoms: no chest pain, no chills, no constipation, no cough, no diarrhea, no dysuria, no fever, no hematuria, no nausea, no shortness of breath and no vomiting        Prior to Admission Medications   Prescriptions Last Dose Informant Patient Reported? Taking?   Blood Glucose Monitoring Suppl (OneTouch Verio Reflect) w/Device KIT  Self, Family Member No No   Sig: Check blood sugars once daily. Please substitute with appropriate alternative as covered by patient's insurance. Dx: E11.65   Calcium Carb-Cholecalciferol (calcium carbonate-vitamin D) 500 mg-5 mcg tablet  Self, Family Member No No   Sig: Take 1 tablet by mouth 2 (two) times a day with meals   Patient not taking: Reported on 2023   Easy Touch Pen Needles 31G X 6 MM MISC  Family Member, Self No No   Sig: Use daily at bedtime   Ertugliflozin L-PyroglutamicAc 5 MG TABS  Family Member, Self No No   Sig: Take 5 mg by mouth daily with breakfast   Levemir FlexPen 100 units/mL injection pen  Family Member, Self Yes No   Sig: Inject 45 Units under the skin daily at bedtime   Lovaza 1 g capsule  Family Member, Self Yes No   Si (two) times a day   Multiple Vitamins-Minerals (Sentry) TABS  Family Member, Self Yes No   Sig: Take 1 tablet by mouth daily   OneTouch Delica Lancets 33G MISC  Self, Family  Member No No   Sig: Check blood sugars once daily. Please substitute with appropriate alternative as covered by patient's insurance. Dx: E11.65   Spacer/Aero-Holding Chambers (AEROCHAMBER MINI CHAMBER) BILLY  Family Member, Self No No   Sig: by Does not apply route see administration instructions   abiraterone (ZYTIGA) 250 mg tablet  Self, Family Member No No   Sig: Take 1 tablet (250 mg total) by mouth daily With a low fat meal   albuterol (ProAir HFA) 90 mcg/act inhaler  Family Member, Self No No   Sig: Inhale 2 puffs every 4 (four) hours as needed for wheezing or shortness of breath   Patient not taking: Reported on 12/21/2023   dexamethasone (DECADRON) 0.5 mg tablet  Family Member, Self No No   Sig: TAKE 1 TABLET (0.5 MG TOTAL) BY MOUTH DAILY WITH BREAKFAST   glimepiride (AMARYL) 2 mg tablet  Family Member, Self No No   Sig: Take 1 tablet (2 mg total) by mouth daily with lunch   glimepiride (AMARYL) 4 mg tablet  Family Member, Self Yes No   Sig: Take 4 mg by mouth daily with breakfast   glucose blood (OneTouch Verio) test strip  Self, Family Member No No   Sig: Check blood sugars once daily. Please substitute with appropriate alternative as covered by patient's insurance. Dx: E11.65   lisinopril (ZESTRIL) 10 mg tablet  Self, Family Member No No   Sig: Take 1 tablet (10 mg total) by mouth daily   polyethylene glycol (GOLYTELY) 4000 mL solution   No No   Sig: Take 4,000 mL by mouth once for 1 dose   polyethylene glycol (MIRALAX) 17 g packet  Family Member, Self No No   Sig: Take 17 g by mouth daily as needed (constipation)   rosuvastatin (CRESTOR) 10 MG tablet  Self, Family Member No No   Sig: Take 1 tablet (10 mg total) by mouth daily   sodium chloride, PF, 0.9 %  Family Member, Self No No   Sig: 10 mL by Intracatheter route daily Intracatheter flushing daily. May substitute prefilled syringe with normal saline 10 mL vials, 10 mL syringes, and 18 g blunt needles      Facility-Administered Medications Last  Administration Doses Remaining   leuprolide (LUPRON DEPOT 6 MONTH KIT) IM injection kit 45 mg 2024  9:01 AM 0          Past Medical History:   Diagnosis Date    Diabetes mellitus (HCC)     High cholesterol     Hypertension     Prostate cancer (HCC)        Past Surgical History:   Procedure Laterality Date    IR NEPHROSTOMY TUBE CHECK/CHANGE/REPOSITION/REINSERTION/UPSIZE  2023    IR NEPHROSTOMY TUBE CHECK/CHANGE/REPOSITION/REINSERTION/UPSIZE  2023    IR NEPHROSTOMY TUBE PLACEMENT  2023    bilateral    US GUIDED PROSTATE BIOPSY         Family History   Problem Relation Age of Onset    Liver cancer Father      I have reviewed and agree with the history as documented.    E-Cigarette/Vaping    E-Cigarette Use Never User      E-Cigarette/Vaping Substances    Nicotine No     THC No     CBD No     Flavoring No     Other No     Unknown No      Social History     Tobacco Use    Smoking status: Former     Current packs/day: 0.00     Types: Cigarettes     Quit date:      Years since quittin.0     Passive exposure: Past    Smokeless tobacco: Never    Tobacco comments:     States he only smoked on the weekends   Vaping Use    Vaping status: Never Used   Substance Use Topics    Alcohol use: Not Currently     Comment: socially    Drug use: Never       Review of Systems   Constitutional:  Negative for chills and fever.   Respiratory:  Negative for cough, shortness of breath and wheezing.    Cardiovascular:  Negative for chest pain and palpitations.   Gastrointestinal:  Negative for abdominal pain, constipation, diarrhea, nausea and vomiting.   Genitourinary:  Positive for flank pain. Negative for dysuria, hematuria and urgency.   Musculoskeletal:  Negative for back pain.   Skin:  Negative for color change and rash.   All other systems reviewed and are negative.      Physical Exam  Physical Exam  Vitals and nursing note reviewed.   Constitutional:       Appearance: He is well-developed.   HENT:       Head: Normocephalic and atraumatic.   Eyes:      Pupils: Pupils are equal, round, and reactive to light.   Cardiovascular:      Rate and Rhythm: Normal rate and regular rhythm.      Heart sounds: Normal heart sounds.   Pulmonary:      Effort: Pulmonary effort is normal.      Breath sounds: Normal breath sounds.   Abdominal:      General: Bowel sounds are normal. There is no distension.      Palpations: Abdomen is soft. There is no mass.      Tenderness: There is no abdominal tenderness. There is right CVA tenderness. There is no guarding or rebound.   Musculoskeletal:      Cervical back: Normal range of motion and neck supple.      Comments: Intact nephrostomy tubes noted bilaterally   Skin:     General: Skin is warm and dry.      Capillary Refill: Capillary refill takes less than 2 seconds.   Neurological:      General: No focal deficit present.      Mental Status: He is alert and oriented to person, place, and time.   Psychiatric:         Behavior: Behavior normal.         Thought Content: Thought content normal.         Judgment: Judgment normal.         Vital Signs  ED Triage Vitals [01/11/24 2028]   Temperature Pulse Respirations Blood Pressure SpO2   100.3 °F (37.9 °C) 78 18 164/74 98 %      Temp Source Heart Rate Source Patient Position - Orthostatic VS BP Location FiO2 (%)   Tympanic Monitor Sitting Left arm --      Pain Score       9           Vitals:    01/11/24 2130 01/11/24 2145 01/11/24 2200 01/11/24 2230   BP: 158/72 137/77 133/68 159/80   Pulse: 82 80 74 70   Patient Position - Orthostatic VS:             Visual Acuity      ED Medications  Medications   cefepime (MAXIPIME) IVPB (premix in dextrose) 2,000 mg 50 mL (2,000 mg Intravenous New Bag 1/12/24 0048)   morphine injection 4 mg (4 mg Intravenous Given 1/11/24 2123)   iohexol (OMNIPAQUE) 350 MG/ML injection (MULTI-DOSE) 100 mL (100 mL Intravenous Given 1/11/24 2223)       Diagnostic Studies  Results Reviewed       Procedure Component Value Units  Date/Time    Urine Microscopic [[id number]]  (Abnormal) Collected: 01/11/24 2137    Lab Status: Final result Specimen: Urine, Right Nephrostomy Updated: 01/11/24 2245     RBC, UA 0-1 /hpf      WBC, UA Innumerable /hpf      Epithelial Cells None Seen /hpf      Bacteria, UA Innumerable /hpf     Urine culture [[id number]] Collected: 01/11/24 2137    Lab Status: In process Specimen: Urine, Right Nephrostomy Updated: 01/11/24 2245    UA w Reflex to Microscopic w Reflex to Culture [[id number]]  (Abnormal) Collected: 01/11/24 2137    Lab Status: Final result Specimen: Urine, Right Nephrostomy Updated: 01/11/24 2222     Color, UA Light Yellow     Clarity, UA Slightly Cloudy     Specific Gravity, UA 1.010     pH, UA 6.0     Leukocytes, UA Moderate     Nitrite, UA Positive     Protein, UA 30 (1+) mg/dl      Glucose, UA >=1000 (1%) mg/dl      Ketones, UA Negative mg/dl      Urobilinogen, UA 0.2 E.U./dl      Bilirubin, UA Negative     Occult Blood, UA Large    Lactic acid, plasma (w/reflex if result > 2.0) [[id number]]  (Normal) Collected: 01/11/24 2108    Lab Status: Final result Specimen: Blood from Arm, Left Updated: 01/11/24 2130     LACTIC ACID 1.8 mmol/L     Narrative:      Result may be elevated if tourniquet was used during collection.    Comprehensive metabolic panel [[id number]]  (Abnormal) Collected: 01/11/24 2108    Lab Status: Final result Specimen: Blood from Arm, Left Updated: 01/11/24 2130     Sodium 130 mmol/L      Potassium 4.2 mmol/L      Chloride 98 mmol/L      CO2 25 mmol/L      ANION GAP 7 mmol/L      BUN 20 mg/dL      Creatinine 1.18 mg/dL      Glucose 347 mg/dL      Calcium 9.0 mg/dL      AST 15 U/L      ALT 20 U/L      Alkaline Phosphatase 79 U/L      Total Protein 7.4 g/dL      Albumin 3.6 g/dL      Total Bilirubin 0.50 mg/dL      eGFR 61 ml/min/1.73sq m     Narrative:      National Kidney Disease Foundation guidelines for Chronic Kidney Disease (CKD):     Stage 1 with normal or high GFR (GFR > 90  mL/min/1.73 square meters)    Stage 2 Mild CKD (GFR = 60-89 mL/min/1.73 square meters)    Stage 3A Moderate CKD (GFR = 45-59 mL/min/1.73 square meters)    Stage 3B Moderate CKD (GFR = 30-44 mL/min/1.73 square meters)    Stage 4 Severe CKD (GFR = 15-29 mL/min/1.73 square meters)    Stage 5 End Stage CKD (GFR <15 mL/min/1.73 square meters)  Note: GFR calculation is accurate only with a steady state creatinine    CBC and differential [[id number]]  (Abnormal) Collected: 01/11/24 2108    Lab Status: Final result Specimen: Blood from Arm, Left Updated: 01/11/24 2114     WBC 14.09 Thousand/uL      RBC 4.16 Million/uL      Hemoglobin 13.4 g/dL      Hematocrit 38.0 %      MCV 91 fL      MCH 32.2 pg      MCHC 35.3 g/dL      RDW 12.3 %      MPV 9.4 fL      Platelets 377 Thousands/uL      nRBC 0 /100 WBCs      Neutrophils Relative 81 %      Immat GRANS % 0 %      Lymphocytes Relative 13 %      Monocytes Relative 6 %      Eosinophils Relative 0 %      Basophils Relative 0 %      Neutrophils Absolute 11.34 Thousands/µL      Immature Grans Absolute 0.05 Thousand/uL      Lymphocytes Absolute 1.81 Thousands/µL      Monocytes Absolute 0.83 Thousand/µL      Eosinophils Absolute 0.02 Thousand/µL      Basophils Absolute 0.04 Thousands/µL     Blood culture #2 [[id number]] Collected: 01/11/24 2108    Lab Status: In process Specimen: Blood from Arm, Right Updated: 01/11/24 2113    Blood culture #1 [[id number]] Collected: 01/11/24 2108    Lab Status: In process Specimen: Blood from Arm, Left Updated: 01/11/24 2113                   CT abdomen pelvis with contrast   Final Result by Milan Butler MD (01/11 2341)      Exam limited by motion artifact. Findings consistent with right pyelitis/ureteritis and cystitis noted. Increased asymmetrical right perinephric and periureteral inflammatory stranding with slightly delayed right nephrogram and subtle heterogeneous    attenuation/enhancement of the right kidney raising suspicion for right  pyelonephritis. Recommend correlation with urinalysis. Additional ancillary findings detailed above.         Workstation performed: [id number]                    Procedures  Procedures         ED Course                               SBIRT 22yo+      Flowsheet Row Most Recent Value   Initial Alcohol Screen: US AUDIT-C     1. How often do you have a drink containing alcohol? 0 Filed at: 01/11/2024 2119   2. How many drinks containing alcohol do you have on a typical day you are drinking?  0 Filed at: 01/11/2024 2119   3b. FEMALE Any Age, or MALE 65+: How often do you have 4 or more drinks on one occassion? 0 Filed at: 01/11/2024 2119   Audit-C Score 0 Filed at: 01/11/2024 2119   YANNI: How many times in the past year have you...    Used an illegal drug or used a prescription medication for non-medical reasons? Never Filed at: 01/11/2024 2119                      Medical Decision Making  73-year-old male in the ED with complaint of left flank pain.  Differential diagnosis includes but is not limited to pyelonephritis, intra-abdominal abscess, ureteral colic, musculoskeletal pain.  CBC, CMP, urinalysis, CT abdomen pelvis ordered and reviewed -patient with pyelitis/ureteritis and suspected pyelonephritis.  Patient started on cefepime, and recently completed a course of ciprofloxacin.  Will admit to [facility]    Amount and/or Complexity of Data Reviewed  Labs: ordered.  Radiology: ordered.    Risk  Prescription drug management.  Decision regarding hospitalization.             Disposition  Final diagnoses:   Pyelitis   Ureteritis   Pyelonephritis   Flank pain     Time reflects when diagnosis was documented in both MDM as applicable and the Disposition within this note       Time User Action Codes Description Comment    1/12/2024 12:40 AM Oyesanmi, Olubusola O Add [N12] Pyelitis     1/12/2024 12:40 AM Oyesanmi, Olubusola O Add [N28.89] Ureteritis     1/12/2024 12:41 AM Oyesanmi, Olubusola O Add [N12] Pyelonephritis     1/12/2024  12:41 AM Radha Card [R10.9] Flank pain           ED Disposition       ED Disposition   Admit    Condition   Stable    Date/Time   Fri Jan 12, 2024 0040    Comment   Case was discussed with Virgie BENSON and the patient's admission status was agreed to be Admission Status: observation status to the service of Dr. Martinez .               Follow-up Information    None         Patient's Medications   Discharge Prescriptions    No medications on file       No discharge procedures on file.    PDMP Review         Value Time User    PDMP Reviewed  Yes 6/26/2023  3:59 PM Rubina De Los Santos MD            ED Provider  Electronically Signed by             Radha Card DO  01/12/24 0058

## 2024-01-12 NOTE — CASE MANAGEMENT
Case Management Assessment & Discharge Planning Note    Patient name Oliver Astudillo  Location [address] [facility]/[address] MRN [id number]  : 1950 Date 2024       Current Admission Date: 2024  Current Admission Diagnosis:Pyelonephritis   Patient Active Problem List    Diagnosis Date Noted    Pyelonephritis 2024    Hyponatremia 2024    Hyperlipidemia 2024    Encounter for monitoring androgen deprivation therapy 08/10/2023    Nephrostomy status (HCC) 08/10/2023    Bilateral hydronephrosis 2023    Prostate cancer (HCC) 2023    Encounter to discuss test results 2023    Type 2 diabetes mellitus, with long-term current use of insulin (HCC) 2023    Elevated PSA 2023    Other hydronephrosis 2023    Elevated serum creatinine 2023    Hypertension 2023      LOS (days): 0  Geometric Mean LOS (GMLOS) (days):   Days to GMLOS:     OBJECTIVE:    Risk of Unplanned Readmission Score: 15.43     Current admission status: Inpatient      Preferred Pharmacy:   [id number] [address] [address]  Phone: [phone] Fax: [phone]    Primary Care Provider: Eugenia Rodriguez MD    Primary Insurance: [employer] REP  Secondary Insurance:     ASSESSMENT:  Active Health Care Proxies    There are no active Health Care Proxies on file.       Readmission Root Cause  30 Day Readmission: No    Patient Information  Admitted from:: Home  Mental Status: Alert  During Assessment patient was accompanied by: Not accompanied during assessment  Assessment information provided by:: Son  Primary Caregiver: Family  Caregiver's Name:: Wife Erlinda and Son Sumit  Caregiver's Relationship to Patient:: Family Member  Caregiver's Telephone Number:: Sumit- [phone]  Support Systems: Spouse/significant other, Son  County of Residence: [place]  What city do you live in?: [place]  Type of Current  Residence: 2 story home (Patient lives in two story home but lives on the first level of home. First level has bedroom and bathroom.)  Upon entering residence, is there a bedroom on the main floor (no further steps)?: Yes  Upon entering residence, is there a bathroom on the main floor (no further steps)?: Yes  Living Arrangements: Lives w/ Spouse/significant other    Activities of Daily Living Prior to Admission  Functional Status: Independent  Ambulates independently?: Yes  Does patient use assisted devices?: No  Does patient currently own DME?: Yes  What DME does the patient currently own?: Straight Cane, Shower Chair  Does patient have a history of Outpatient Therapy (PT/OT)?: No  Does the patient have a history of Short-Term Rehab?: No  Does patient have a history of HHC?: Yes (Patient had home nursing services during cancer treatment. Son Sumit stated he cannot recall the name of the agency but believes it was twice a week for about two months.)  Does patient currently have HHC?: No      Patient Information Continued  Income Source: Pension/longterm  Does patient have prescription coverage?: Yes  Does patient receive dialysis treatments?: No  Does patient have a history of substance abuse?: No  Does patient have a history of Mental Health Diagnosis?: No      Means of Transportation  Means of Transport to Appts:: Drives Self      Housing Stability: Unknown (6/23/2023)    Housing Stability Vital Sign     Unable to Pay for Housing in the Last Year: No     Number of Places Lived in the Last Year: Not on file     Unstable Housing in the Last Year: No   Food Insecurity: No Food Insecurity (6/23/2023)    Hunger Vital Sign     Worried About Running Out of Food in the Last Year: Never true     Ran Out of Food in the Last Year: Never true   Transportation Needs: No Transportation Needs (7/26/2023)    Received from [employer]    [facility] : Transportation     Lack of Transportation (Medical): No     Lack of  Transportation (Non-Medical): No     Patient Unable or Declines to Respond: No   Utilities: Not on file       DISCHARGE DETAILS:    Discharge planning discussed with:: Patient's Son Sumit  Freedom of Choice: Yes    CM contacted family/caregiver?: Yes    Contacts  Patient Contacts: Sumit  Relationship to Patient:: Family  Contact Method: Phone  Phone Number: [phone]  Reason/Outcome: Discharge Planning, Emergency Contact, Continuity of Care      CM met briefly with patient bedside to introduce role. CM called patient's Son Sumit to complete assessment and discuss discharge planning. Sumit stated patient is independent for the most part, doesn't currently use any DME and currently drives. CM will continue to follow patient for discharge needs.

## 2024-01-12 NOTE — CONSULTS
H&P Exam - Urology       Patient: Oliver Astudillo   : 1950 Sex: male   MRN: [id number]     CSN: [id number]      History of Present Illness   HPI:  Oliver Astudillo is a 73 y.o. male followed by [facility] urology history of high-grade prostate adenocarcinoma [place] grade 9 urinary retention sphincter incompetency bilateral nephrostomy tubes on androgen ablation hormones Zytiga and steroids seen yesterday and treated for complicated UTI presenting back to see [facility] ER earlier this morning found on CAT scan to have what appears to be right pyelonephritis.  Patient admitted for IV antibiotics        Review of Systems:   Constitutional:  Negative for activity change, fever, chills and diaphoresis.   HENT: Negative for hearing loss and trouble swallowing.   Eyes: Negative for itching and visual disturbance.   Respiratory: Negative for chest tightness and shortness of breath.   Cardiovascular: Negative for chest pain, edema.   Gastrointestinal: Negative for abdominal distention, na abdominal pain, constipation, diarrhea, Nausea and vomiting.   Genitourinary: Negative for decreased urine volume, difficulty urinating, dysuria, enuresis, frequency, hematuria and urgency.   Musculoskeletal: Negative for gait problem and myalgias.   Neurological: Negative for dizziness and headaches.   Hematological: Does not bruise/bleed easily.       Historical Information   Past Medical History:   Diagnosis Date    Diabetes mellitus (HCC)     High cholesterol     Hypertension     Prostate cancer (HCC)      Past Surgical History:   Procedure Laterality Date    IR NEPHROSTOMY TUBE CHECK/CHANGE/REPOSITION/REINSERTION/UPSIZE  2023    IR NEPHROSTOMY TUBE CHECK/CHANGE/REPOSITION/REINSERTION/UPSIZE  2023    IR NEPHROSTOMY TUBE PLACEMENT  2023    bilateral    US GUIDED PROSTATE BIOPSY       Social History   Social History     Substance and Sexual Activity   Alcohol Use Not Currently    Comment: socially     Social  "History     Substance and Sexual Activity   Drug Use Never     Social History     Tobacco Use   Smoking Status Former    Current packs/day: 0.00    Types: Cigarettes    Quit date:     Years since quittin.0    Passive exposure: Past   Smokeless Tobacco Never   Tobacco Comments    States he only smoked on the weekends     Family History:   Family History   Problem Relation Age of Onset    Liver cancer Father        Meds/Allergies   Facility-Administered Medications Prior to Admission   Medication    [COMPLETED] leuprolide (LUPRON DEPOT 6 MONTH KIT) IM injection kit 45 mg     Medications Prior to Admission   Medication    dexamethasone (DECADRON) 0.5 mg tablet    Ertugliflozin L-PyroglutamicAc 5 MG TABS    glimepiride (AMARYL) 2 mg tablet    glimepiride (AMARYL) 4 mg tablet    Levemir FlexPen 100 units/mL injection pen    lisinopril (ZESTRIL) 10 mg tablet    Lovaza 1 g capsule    Multiple Vitamins-Minerals (Sentry) TABS    rosuvastatin (CRESTOR) 10 MG tablet    sodium chloride, PF, 0.9 %    abiraterone (ZYTIGA) 250 mg tablet    albuterol (ProAir HFA) 90 mcg/act inhaler    Blood Glucose Monitoring Suppl (OneTouch Verio Reflect) w/Device KIT    Calcium Carb-Cholecalciferol (calcium carbonate-vitamin D) 500 mg-5 mcg tablet    Easy Touch Pen Needles 31G X 6 MM MISC    glucose blood (OneTouch Verio) test strip    OneTouch Delica Lancets 33G MISC    polyethylene glycol (GOLYTELY) 4000 mL solution    polyethylene glycol (MIRALAX) 17 g packet    Spacer/Aero-Holding Chambers (AEROCHAMBER MINI CHAMBER) BILLY     No Known Allergies    Objective   Vitals: /67   Pulse 71   Temp 98.7 °F (37.1 °C)   Resp 18   Ht 5' 8\" (1.727 m)   Wt 80.7 kg (177 lb 14.4 oz)   SpO2 97%   BMI 27.05 kg/m²     Physical Exam:  General Alert awake   Normocephalic atraumatic PERRLA  Lungs clear bilaterally  Cardiac normal S1 normal S2  Abdomen soft, flank pain  Bilateral nephrostomy tubes minimally hematuric urine    Extremities no " edema    I/O last 24 hours:  In: -   Out: 200 [Urine:200]    Invasive Devices       Peripheral Intravenous Line  Duration             Peripheral IV 01/11/24 Distal;Left;Upper;Ventral (anterior) Arm <1 day              Drain  Duration             Urethral Catheter Coude 16 Fr. 204 days    Nephrostomy Left 8.5 Fr. 14 days    Nephrostomy Right 8.5 Fr. 14 days                        Lab Results: CBC:   Lab Results   Component Value Date    WBC 12.37 (H) 01/12/2024    HGB 13.3 01/12/2024    HCT 38.1 01/12/2024    MCV 91 01/12/2024     01/12/2024    RBC 4.18 01/12/2024    MCH 31.8 01/12/2024    MCHC 34.9 01/12/2024    RDW 12.2 01/12/2024    MPV 9.7 01/12/2024    NRBC 0 01/12/2024     CMP:   Lab Results   Component Value Date     01/12/2024    CO2 27 01/12/2024    BUN 16 01/12/2024    CREATININE 0.99 01/12/2024    CALCIUM 9.1 01/12/2024    AST 15 01/11/2024    ALT 20 01/11/2024    ALKPHOS 79 01/11/2024    EGFR 76 01/12/2024     Urinalysis:   Lab Results   Component Value Date    COLORU Light Yellow 01/11/2024    CLARITYU Slightly Cloudy 01/11/2024    SPECGRAV 1.010 01/11/2024    PHUR 6.0 01/11/2024    LEUKOCYTESUR Moderate (A) 01/11/2024    NITRITE Positive (A) 01/11/2024    GLUCOSEU >=1000 (1%) (A) 01/11/2024    KETONESU Negative 01/11/2024    BILIRUBINUR Negative 01/11/2024    BLOODU Large (A) 01/11/2024     Urine Culture:   Lab Results   Component Value Date    URINECX >100,000 cfu/ml Escherichia coli (A) 12/30/2023    URINECX >100,000 cfu/ml Enterobacter cloacae (A) 12/30/2023    URINECX 4292-4976 cfu/ml Lactobacillus species (A) 12/30/2023    URINECX <1000 cfu/ml Enterococcus faecalis (A) 12/30/2023     PSA:   Lab Results   Component Value Date    PSA 0.16 12/27/2023    PSA 6.01 (H) 08/11/2023    .73 (H) 05/24/2023           Assessment/ Plan:  Right pyelonephritis  Bilateral nephrostomy tube  Poorly differentiated prostate cancer advanced with mets initial   Hormone therapy last PSA  0.16  Urine cultures  Continue antibiotics      Casey Talbert MD

## 2024-01-12 NOTE — ASSESSMENT & PLAN NOTE
As above, known metastatic prostate cancer.  Numerous sclerotic lesions throughout the osseous structures on CT  Received Lupron injection yesterday.  Continue Zytiga and dexamethasone.  Bilateral nephrostomy tube care.  Continue flush with 10 cc saline daily.

## 2024-01-12 NOTE — H&P
[facility]  H&P  Name: Oliver Astudillo 73 y.o. male I MRN: [id number]  Unit/Bed#: 2 [address] Date of Admission: 1/11/2024   Date of Service: 1/12/2024 I Hospital Day: 0      Assessment/Plan   * Pyelonephritis  Assessment & Plan  Patient presents with pain in right kidney region when flushing right nephrostomy tube started 1/11. Denies fever chills, dysuria, urinary urgency frequency.  Reports had colonoscopy on Tuesday 1/9.  Chart reviewed.  History of metastatic prostate cancer diagnosed in May 2023.  Had urinary retention, status post bilateral percutaneous nephrostomy tube insertion.  Patient has been treated with Lupron, Abiraterone and dexamethasone.  Received Lupron injection on 1/11.  Bilateral nephrostomy tubes were changed on 12/28.  Patient was seen in ED on 12/30 for right flank pain since nephrostomy tube change.  CT at that time showed properly positioned bilateral percutaneous nephrostomy tubes, asymmetric right  ureterectasis with periureteral edema.  UA consistent with infection.  Patient was discharged on Cipro for 7 days.  Completed antibiotic on Saturday.  Urine culture grew large colony E. coli and Enterobacter Cloacae, small colony of lactobacillus species and Enterococcus faecalis.  Repeat CT abdomen pelvis with IV contrast today showed -  right pyelitis/ureteritis and cystitis, possible right pyelonephritis.   UA from right nephrostomy tube consistent with infection  Given cefepime in ED.  Will change to Rocephin.  Follow-up repeat urine culture  Consult urology    Hyperlipidemia  Assessment & Plan  Continue statin    Prostate cancer (HCC)  Assessment & Plan  As above, known metastatic prostate cancer.  Numerous sclerotic lesions throughout the osseous structures on CT  Received Lupron injection yesterday.  Continue Zytiga and dexamethasone.  Bilateral nephrostomy tube care.  Continue flush with 10 cc saline daily.      Hypertension  Assessment & Plan  Continue  lisinopril with holding parameter  BP stable    Type 2 diabetes mellitus, with long-term current use of insulin ([facility])  Assessment & Plan  Lab Results   Component Value Date    HGBA1C 11.1 (A) 01/08/2024       Recent Labs     01/12/24  0215 01/12/24  0705   POCGLU 194* 140       Blood Sugar Average: Last 72 hrs:  (P) 167  On Levemir glimepiride ertugliflozin at home  Reports blood sugar 300s today at home.  Continue Levemir  Hold oral agents  Add mealtime Humalog  SSI  Diabetic diet               VTE Prophylaxis: Enoxaparin (Lovenox)  / reason for no mechanical VTE prophylaxis on l ovenox    Code Status: Full code  POLST: POLST form is not discussed and not completed at this time.    Anticipated Length of Stay:  Patient will be admitted on an Inpatient basis with an anticipated length of stay of  > 2 midnights.   Justification for Hospital Stay: Pyelonephritis    Total Time for Visit, including Counseling / Coordination of Care: 45 minutes.  Greater than 50% of this total time spent on direct patient counseling and coordination of care.    Chief Complaint:   Right kidney pain when flushing right nephrostomy tube started today    History of Present Illness:    Oliver Astudillo is a 73 y.o. male with PMH of metastatic prostate cancer, type II diabetes, hypertension, hyperlipidemia who presents with pain in right kidney region when flushing right nephrostomy tube started 1/11.   Denies fever chills, dysuria, urinary urgency frequency.  Reports had colonoscopy on Tuesday 1/9.  Wife reports patient received Lupron injection earlier during the day on 1/11.  Wife reports both nephrostomy tubes were changed on 12/28 here.  Patient had  right flank pain after that, was seen in ED here, was given antibiotic for infection, completed antibiotic on Saturday.  Wife denies patient has history of frequent UTIs.  Wife reports blood sugar was high today around 300s.  Patient denies chest pain, headache, SOB, nausea vomiting diarrhea  constipation.  Reports slight dizziness at times.    Patient and wife both speak Danish, utilized  on tablet.        Review of Systems:    Review of Systems   Genitourinary:         Right kidney pain when flushing nephrostomy tube started 1/11   Neurological:  Positive for dizziness.   All other systems reviewed and are negative.      Past Medical and Surgical History:     Past Medical History:   Diagnosis Date    Diabetes mellitus (HCC)     High cholesterol     Hypertension     Prostate cancer (HCC)        Past Surgical History:   Procedure Laterality Date    IR NEPHROSTOMY TUBE CHECK/CHANGE/REPOSITION/REINSERTION/UPSIZE  9/28/2023    IR NEPHROSTOMY TUBE CHECK/CHANGE/REPOSITION/REINSERTION/UPSIZE  12/28/2023    IR NEPHROSTOMY TUBE PLACEMENT  06/22/2023    bilateral    US GUIDED PROSTATE BIOPSY         Meds/Allergies:    Prior to Admission medications    Medication Sig Start Date End Date Taking? Authorizing Provider   dexamethasone (DECADRON) 0.5 mg tablet TAKE 1 TABLET (0.5 MG TOTAL) BY MOUTH DAILY WITH BREAKFAST 12/29/23  Yes ALTA Rasheed   Ertugliflozin L-PyroglutamicAc 5 MG TABS Take 5 mg by mouth daily with breakfast 1/8/24 7/6/24 Yes Eugenia Rodriguez MD   glimepiride (AMARYL) 2 mg tablet Take 1 tablet (2 mg total) by mouth daily with lunch 1/8/24  Yes Eugenia Rodriguez MD   glimepiride (AMARYL) 4 mg tablet Take 4 mg by mouth daily with breakfast 4/7/23  Yes Historical Provider, MD   Levemir FlexPen 100 units/mL injection pen Inject 45 Units under the skin daily at bedtime 3/8/23  Yes Historical Provider, MD   lisinopril (ZESTRIL) 10 mg tablet Take 1 tablet (10 mg total) by mouth daily 10/23/23  Yes Eugenia Rodriguez MD   Lovaza 1 g capsule 2 (two) times a day 3/8/23  Yes Historical Provider, MD   Multiple Vitamins-Minerals (Sentry) TABS Take 1 tablet by mouth daily 3/8/23  Yes Historical Provider, MD   rosuvastatin (CRESTOR) 10 MG tablet Take 1 tablet (10 mg total) by mouth  daily  Patient taking differently: Take 10 mg by mouth daily at bedtime 10/23/23  Yes Eugenia Rodriguez MD   sodium chloride, PF, 0.9 % 10 mL by Intracatheter route daily Intracatheter flushing daily. May substitute prefilled syringe with normal saline 10 mL vials, 10 mL syringes, and 18 g blunt needles 1/8/24  Yes Eugenia Rodriguez MD   abiraterone (ZYTIGA) 250 mg tablet Take 1 tablet (250 mg total) by mouth daily With a low fat meal 9/21/23   Ramone Coyne DO   albuterol (ProAir HFA) 90 mcg/act inhaler Inhale 2 puffs every 4 (four) hours as needed for wheezing or shortness of breath  Patient not taking: Reported on 12/21/2023 3/6/20   Kelly Martinez PA-C   Blood Glucose Monitoring Suppl (OneTouch Verio Reflect) w/Device KIT Check blood sugars once daily. Please substitute with appropriate alternative as covered by patient's insurance. Dx: E11.65 10/23/23   Eugenia Rodriguez MD   Calcium Carb-Cholecalciferol (calcium carbonate-vitamin D) 500 mg-5 mcg tablet Take 1 tablet by mouth 2 (two) times a day with meals  Patient not taking: Reported on 11/28/2023 8/11/23 8/5/24  Jun Multani MD   Easy Touch Pen Needles 31G X 6 MM MISC Use daily at bedtime 1/8/24   Eugenia Rodriguez MD   glucose blood (OneTouch Verio) test strip Check blood sugars once daily. Please substitute with appropriate alternative as covered by patient's insurance. Dx: E11.65 10/23/23   MD Sara AndersonTouch Delica Lancets 33G MISC Check blood sugars once daily. Please substitute with appropriate alternative as covered by patient's insurance. Dx: E11.65 10/23/23   Eugenia Rodriguez MD   polyethylene glycol (GOLYTELY) 4000 mL solution Take 4,000 mL by mouth once for 1 dose 1/3/24 1/3/24  Ihsan Souza PA-C   polyethylene glycol (MIRALAX) 17 g packet Take 17 g by mouth daily as needed (constipation) 1/8/24   Eugenia Rodriguez MD   Spacer/Aero-Holding Chambers (AEROCHAMBER MINI CHAMBER) BILLY by  "Does not apply route see administration instructions 3/6/20   Kelly Martinez PA-C     I have reviewed home medications with patient personally.    Allergies: No Known Allergies    Social History:     Marital Status: /Civil Union   Occupation: Retired  Patient Pre-hospital Living Situation: Wife  Patient Pre-hospital Level of Mobility: Independent  Patient Pre-hospital Diet Restrictions: Diabetic cardiac  Substance Use History:   Social History     Substance and Sexual Activity   Alcohol Use Not Currently    Comment: socially     Social History     Tobacco Use   Smoking Status Former    Current packs/day: 0.00    Types: Cigarettes    Quit date:     Years since quittin.0    Passive exposure: Past   Smokeless Tobacco Never   Tobacco Comments    States he only smoked on the weekends     Social History     Substance and Sexual Activity   Drug Use Never       Family History:    non-contributory    Physical Exam:     Vitals:   Blood Pressure: 132/67 (24)  Pulse: 71 (24)  Temperature: 98.7 °F (37.1 °C) (24)  Temp Source: Oral (24)  Respirations: 18 (24)  Height: 5' 8\" (172.7 cm) (24)  Weight - Scale: 80.7 kg (177 lb 14.4 oz) (24)  SpO2: 97 % (24)    Physical Exam  Vitals and nursing note reviewed.   Constitutional:       Appearance: He is well-developed.   HENT:      Head: Normocephalic and atraumatic.   Neck:      Thyroid: No thyromegaly.      Vascular: No JVD.      Trachea: No tracheal deviation.   Cardiovascular:      Rate and Rhythm: Normal rate and regular rhythm.      Heart sounds: Normal heart sounds.   Pulmonary:      Effort: Pulmonary effort is normal. No respiratory distress.      Breath sounds: No wheezing or rales.      Comments: Breath sounds clear diminished bilateral, on room air, respiration easy  Abdominal:      General: Bowel sounds are normal. There is no distension.      Palpations: Abdomen is " soft.      Tenderness: There is no abdominal tenderness. There is no guarding.   Genitourinary:     Comments: Bilateral nephrostomy tube in situ, draining slight cloudy urine, left nephrostomy tube questionable blood-tinged urine. + Right CVA tenderness.  Musculoskeletal:      Cervical back: Neck supple.      Right lower leg: No edema.      Left lower leg: No edema.   Skin:     General: Skin is warm and dry.   Neurological:      General: No focal deficit present.      Mental Status: He is alert and oriented to person, place, and time.   Psychiatric:         Mood and Affect: Mood normal.         Judgment: Judgment normal.         Additional Data:     Lab Results: I have personally reviewed pertinent reports.      Results from last 7 days   Lab Units 01/12/24  0629   WBC Thousand/uL 12.37*   HEMOGLOBIN g/dL 13.3   HEMATOCRIT % 38.1   PLATELETS Thousands/uL 364   NEUTROS PCT % 73   LYMPHS PCT % 19   MONOS PCT % 7   EOS PCT % 0     Results from last 7 days   Lab Units 01/12/24  0629 01/11/24  2108   POTASSIUM mmol/L 3.8 4.2   CHLORIDE mmol/L 102 98   CO2 mmol/L 27 25   BUN mg/dL 16 20   CREATININE mg/dL 0.99 1.18   CALCIUM mg/dL 9.1 9.0   ALK PHOS U/L  --  79   ALT U/L  --  20   AST U/L  --  15           Imaging: I have personally reviewed pertinent reports.      CT abdomen pelvis with contrast    Result Date: 1/11/2024  Narrative: CT ABDOMEN AND PELVIS WITH IV CONTRAST INDICATION: Abdominal pain, acute, nonlocalized right flank pain. COMPARISON: 12/30/2023. TECHNIQUE: CT examination of the abdomen and pelvis was performed. Multiplanar 2D reformatted images were created from the source data. This examination, like all CT scans performed in the [facility], was performed utilizing techniques to minimize radiation dose exposure, including the use of iterative reconstruction and automated exposure control. Radiation dose length product (DLP) for this visit: 523.46 mGy-cm IV Contrast: 100 mL of iohexol  (OMNIPAQUE) Enteric Contrast: Enteric contrast was not administered. FINDINGS: Exam limited by motion artifact. ABDOMEN LOWER CHEST: No clinically significant abnormality identified in the visualized lower chest. LIVER/BILIARY TREE: Unremarkable. GALLBLADDER: No calcified gallstones. No pericholecystic inflammatory change. SPLEEN: Unremarkable. Accessory splenule in the left upper abdomen PANCREAS: Unremarkable. ADRENAL GLANDS: Unremarkable. KIDNEYS/URETERS: Kidneys are normal in size and position. Bilateral percutaneous nephrostomy tubes are visualized with appropriate positioning of the pigtail loops in the renal pelvis. Findings are unchanged compared to the prior study. There is otherwise normal appearance of the left kidney. No left hydronephrosis, nephrolithiasis, focal renal mass, or perinephric fluid collection seen. There is asymmetrical new right perinephric inflammatory stranding and right periureteral inflammatory stranding noted with new subtle wall thickening of the right ureter with mucosal hyperenhancement. Air density within the right ureter and urinary bladder also visualized. There is mild asymmetrical delayed right nephrogram with heterogeneous enhancement in parts of the right kidney. Overall findings suggest right pyelitis/ureteritis and possibly right pyelonephritis. Recommend clinical correlation  with urinalysis. STOMACH AND BOWEL: Stomach is contracted limiting evaluation. The small and large bowel loops appear normal in course and caliber without obstruction or inflammation. Terminal ileum and appendix are unremarkable. Mild scattered descending and sigmoid colon diverticulosis without evidence for acute diverticulitis.. APPENDIX: No findings to suggest appendicitis. ABDOMINOPELVIC CAVITY: No ascites. No pneumoperitoneum. No lymphadenopathy. VESSELS: Atherosclerotic changes are present.  No evidence of aneurysm. PELVIS REPRODUCTIVE ORGANS: Unremarkable for patient's age. URINARY BLADDER:  Moderately distended containing air density in the anterior antidependent bladder. No intraluminal mass or calculi seen. Mild diffuse bladder wall thickening with minimal hazy perivesicular pelvic stranding suggesting cystitis. ABDOMINAL WALL/INGUINAL REGIONS: Unremarkable. OSSEOUS STRUCTURES: No acute fracture or subluxation. Extensive multilevel degenerative changes of the visualized thoracolumbar spine with multiple sclerotic lesions throughout the axial skeleton consistent with blastic osseous metastatic disease again noted.     Impression: Exam limited by motion artifact. Findings consistent with right pyelitis/ureteritis and cystitis noted. Increased asymmetrical right perinephric and periureteral inflammatory stranding with slightly delayed right nephrogram and subtle heterogeneous attenuation/enhancement of the right kidney raising suspicion for right pyelonephritis. Recommend correlation with urinalysis. Additional ancillary findings detailed above. Workstation performed: [id number]     Colonoscopy    Result Date: 1/9/2024  Narrative: Table formatting from the original result was not included. [address] [address] [phone] DATE OF SERVICE: 1/09/24 PHYSICIAN(S): Attending: Ulysses Tam MD Fellow: No Staff Documented INDICATION: Encounter for screening colonoscopy POST-OP DIAGNOSIS: See the impression below. HISTORY: Prior colonoscopy: No prior colonoscopy. BOWEL PREPARATION: Miralax/Dulcolax PREPROCEDURE: Informed consent was obtained for the procedure, including sedation. Risks including but not limited to bleeding, infection, perforation, adverse drug reaction and aspiration were explained in detail. Also explained about less than 100% sensitivity with the exam and other alternatives. The patient was placed in the left lateral decubitus position. Procedure: Colonoscopy DETAILS OF PROCEDURE: Patient was taken to the procedure room where a time out was  performed to confirm correct patient and correct procedure. The patient underwent monitored anesthesia care, which was administered by an anesthesia professional. The patient's blood pressure, heart rate, level of consciousness, oxygen, respirations and ECG were monitored throughout the procedure. A digital rectal exam was performed. The scope was introduced through the anus and advanced to the cecum. Retroflexion was performed in the rectum. The quality of bowel preparation was evaluated using the [place] Bowel Preparation Scale with scores of: transverse colon = 1, left colon = 1. Bowel prep was not adequate. The patient experienced no blood loss. The procedure was not difficult. The patient tolerated the procedure well. There were no apparent adverse events. ANESTHESIA INFORMATION: ASA: III Anesthesia Type: IV Sedation with Anesthesia MEDICATIONS: lactated ringers infusion 200 mL*  *From user-documented volume (Totals for administrations occurring from 0825 to 0903 on 01/09/24) FINDINGS: One 7 mm sessile polyp in the cecum; performed cold snare with complete en bloc removal and retrieved specimen Few small, scattered diverticula of mild severity in the ascending colon, transverse colon, descending colon and sigmoid colon Internal small (grade 1) hemorrhoids EVENTS: Procedure Events Event Event Time ENDO CECUM REACHED 1/9/2024  8:48 AM ENDO SCOPE OUT TIME 1/9/2024  9:01 AM SPECIMENS: ID Type Source Tests Collected by Time Destination 1 : cold snare polyp Tissue Large Intestine, Cecum TISSUE EXAM Ulysses Tam MD 1/9/2024  8:56 AM  EQUIPMENT: Colonoscope -     Impression: Subcentimeter polyp in the cecum was removed with cold snare Scattered diverticulosis of mild severity in the ascending colon, transverse colon, descending colon and sigmoid colon Small (grade 1) hemorrhoids RECOMMENDATION:  Repeat colonoscopy in 3 years  Personal history of colon polyps Inadequate bowel preparation    Ulysses Tam MD     CT  abdomen pelvis wo contrast    Result Date: 12/30/2023  Narrative: CT ABDOMEN AND PELVIS WITHOUT IV CONTRAST INDICATION:   Nephrostomy catheter displacement R sided pain s/p nephrostomy tube exchange; Confirm nephrostomy placement. COMPARISON: CT abdomen pelvis 6/20/2023 TECHNIQUE:  CT examination of the abdomen and pelvis was performed without intravenous contrast. Multiplanar 2D reformatted images were created from the source data. This examination, like all CT scans performed in the [facility], was performed utilizing techniques to minimize radiation dose exposure, including the use of iterative reconstruction and automated exposure control. Radiation dose length product (DLP) for this visit:  539.84 mGy-cm Enteric contrast was not administered. FINDINGS: ABDOMEN LOWER CHEST: Coronary artery calcifications. LIVER/BILIARY TREE:  Unremarkable. GALLBLADDER:  No calcified gallstones. No pericholecystic inflammatory change. SPLEEN: Stable splenic calcifications. PANCREAS:  Unremarkable. ADRENAL GLANDS:  Unremarkable. KIDNEYS/URETERS: Bilateral percutaneous nephrostomy tubes with the tips within the renal collecting systems. Air within the right renal collecting system may be due to instrumentation. No significant hydronephrosis. Asymmetric right ureterectasis with periureteral edema. STOMACH AND BOWEL: There is colonic diverticulosis without evidence of acute diverticulitis. APPENDIX: Normal. ABDOMINOPELVIC CAVITY:  No ascites.  No pneumoperitoneum.  No lymphadenopathy. VESSELS: Atherosclerotic changes are present.  No evidence of aneurysm. PELVIS REPRODUCTIVE ORGANS:  Unremarkable for patient's age. URINARY BLADDER: Air within the bladder can be due to recent instrumentation. ABDOMINAL WALL/INGUINAL REGIONS: Small bilateral fat-containing inguinal hernias. OSSEOUS STRUCTURES: Numerous sclerotic lesions throughout the osseous structures in keeping with known metastases as noted on prior PET/CT.      Impression: 1. Bilateral percutaneous nephrostomy tubes with the tips within the renal collecting systems without hydronephrosis.  Asymmetric right ureterectasis with periureteral edema. 2. Numerous sclerotic lesions throughout the osseous structures in keeping with known metastases as noted on prior PET/CT. Workstation performed: [id number]     IR nephrostomy tube check/change/reposition/reinsertion/upsize    Result Date: 12/28/2023  Narrative: Bilateral nephrostomy tube check and exchange. Clinical History: 73-year-old male with bladder cancer, here for routine nephrostomy tube exchange. Contrast: 15 mL of iohexol (OMNIPAQUE) Fluoro time: 2.2 MIN Number of Images: Multiple Radiation dose: 17 mGy Conscious sedation time: NONE Technique/intervention: The patient was brought to the interventional radiology suite and placed prone on the table. The right and left back were prepped and draped in the standard sterile fashion. A timeout was performed per protocol. 1% lidocaine was used for local anesthesia. Contrast injected through the existing nephrostomy tube catheters confirmed appropriate position within the renal collecting system. The skin sutures were removed and the pigtail locking mechanisms were released. Both catheters were removed over a Bentson wire. New 8 Hebrew catheters were advanced over the Bentson wire with the pigtail curled in the renal collecting systems. Contrast injected confirmed appropriate position. The pigtail locking mechanisms were engaged and a single suture was placed  to secure the catheter to the skin.     Impression: Impression: 1.  Bilateral nephrostomy tube check and exchange as described. Workstation performed: [id number]       EKG, Pathology, and Other Studies Reviewed on Admission:   EKG: NA    [employer]scripts Records Reviewed: Yes     ** Please Note: Dragon 360 Dictation voice to text software may have been used in the creation of this document. **

## 2024-01-13 LAB
ANION GAP SERPL CALCULATED.3IONS-SCNC: 6 MMOL/L
BUN SERPL-MCNC: 18 MG/DL (ref 5–25)
CALCIUM SERPL-MCNC: 9 MG/DL (ref 8.4–10.2)
CHLORIDE SERPL-SCNC: 106 MMOL/L (ref 96–108)
CO2 SERPL-SCNC: 26 MMOL/L (ref 21–32)
CREAT SERPL-MCNC: 0.91 MG/DL (ref 0.6–1.3)
ERYTHROCYTE [DISTWIDTH] IN BLOOD BY AUTOMATED COUNT: 12.2 % (ref 11.6–15.1)
GFR SERPL CREATININE-BSD FRML MDRD: 84 ML/MIN/1.73SQ M
GLUCOSE SERPL-MCNC: 142 MG/DL (ref 65–140)
GLUCOSE SERPL-MCNC: 156 MG/DL (ref 65–140)
GLUCOSE SERPL-MCNC: 217 MG/DL (ref 65–140)
GLUCOSE SERPL-MCNC: 219 MG/DL (ref 65–140)
GLUCOSE SERPL-MCNC: 77 MG/DL (ref 65–140)
GLUCOSE SERPL-MCNC: 79 MG/DL (ref 65–140)
HCT VFR BLD AUTO: 35.9 % (ref 36.5–49.3)
HGB BLD-MCNC: 12.5 G/DL (ref 12–17)
MAGNESIUM SERPL-MCNC: 2 MG/DL (ref 1.9–2.7)
MCH RBC QN AUTO: 31.7 PG (ref 26.8–34.3)
MCHC RBC AUTO-ENTMCNC: 34.8 G/DL (ref 31.4–37.4)
MCV RBC AUTO: 91 FL (ref 82–98)
PLATELET # BLD AUTO: 330 THOUSANDS/UL (ref 149–390)
PMV BLD AUTO: 9.6 FL (ref 8.9–12.7)
POTASSIUM SERPL-SCNC: 3.5 MMOL/L (ref 3.5–5.3)
RBC # BLD AUTO: 3.94 MILLION/UL (ref 3.88–5.62)
SODIUM SERPL-SCNC: 138 MMOL/L (ref 135–147)
WBC # BLD AUTO: 9.62 THOUSAND/UL (ref 4.31–10.16)

## 2024-01-13 PROCEDURE — 82948 REAGENT STRIP/BLOOD GLUCOSE: CPT

## 2024-01-13 PROCEDURE — 80048 BASIC METABOLIC PNL TOTAL CA: CPT | Performed by: INTERNAL MEDICINE

## 2024-01-13 PROCEDURE — 85027 COMPLETE CBC AUTOMATED: CPT | Performed by: INTERNAL MEDICINE

## 2024-01-13 PROCEDURE — 99232 SBSQ HOSP IP/OBS MODERATE 35: CPT | Performed by: STUDENT IN AN ORGANIZED HEALTH CARE EDUCATION/TRAINING PROGRAM

## 2024-01-13 PROCEDURE — 83735 ASSAY OF MAGNESIUM: CPT | Performed by: INTERNAL MEDICINE

## 2024-01-13 PROCEDURE — NC001 PR NO CHARGE: Performed by: STUDENT IN AN ORGANIZED HEALTH CARE EDUCATION/TRAINING PROGRAM

## 2024-01-13 RX ADMIN — INSULIN LISPRO 1 UNITS: 100 INJECTION, SOLUTION INTRAVENOUS; SUBCUTANEOUS at 22:01

## 2024-01-13 RX ADMIN — DEXAMETHASONE 0.5 MG: 0.5 TABLET ORAL at 08:05

## 2024-01-13 RX ADMIN — Medication 1 TABLET: at 08:07

## 2024-01-13 RX ADMIN — ENOXAPARIN SODIUM 40 MG: 40 INJECTION SUBCUTANEOUS at 09:44

## 2024-01-13 RX ADMIN — ABIRATERONE ACETATE 250 MG: 250 TABLET, FILM COATED ORAL at 08:05

## 2024-01-13 RX ADMIN — LISINOPRIL 10 MG: 10 TABLET ORAL at 08:07

## 2024-01-13 RX ADMIN — Medication 250 MG: at 08:07

## 2024-01-13 RX ADMIN — INSULIN DETEMIR 45 UNITS: 100 INJECTION, SOLUTION SUBCUTANEOUS at 22:00

## 2024-01-13 RX ADMIN — INSULIN LISPRO 1 UNITS: 100 INJECTION, SOLUTION INTRAVENOUS; SUBCUTANEOUS at 12:30

## 2024-01-13 RX ADMIN — CEFTRIAXONE 1000 MG: 1 INJECTION, SOLUTION INTRAVENOUS at 14:13

## 2024-01-13 RX ADMIN — OMEGA-3 FATTY ACIDS CAP 1000 MG 1000 MG: 1000 CAP at 08:07

## 2024-01-13 RX ADMIN — OMEGA-3 FATTY ACIDS CAP 1000 MG 1000 MG: 1000 CAP at 17:03

## 2024-01-13 RX ADMIN — Medication 250 MG: at 17:03

## 2024-01-13 RX ADMIN — PRAVASTATIN SODIUM 80 MG: 80 TABLET ORAL at 17:03

## 2024-01-13 RX ADMIN — INSULIN LISPRO 1 UNITS: 100 INJECTION, SOLUTION INTRAVENOUS; SUBCUTANEOUS at 17:03

## 2024-01-13 NOTE — ASSESSMENT & PLAN NOTE
Lab Results   Component Value Date    HGBA1C 11.1 (A) 01/08/2024       Recent Labs     01/12/24  2048 01/13/24  0316 01/13/24  0657 01/13/24  1111   POCGLU 255* 142* 77 156*         Blood Sugar Average: Last 72 hrs:  (P) 158.75  On Levemir glimepiride ertugliflozin at home  Reports blood sugar 300s today at home.  Continue Levemir  Hold oral agents  Add mealtime Humalog  SSI  Diabetic diet

## 2024-01-13 NOTE — ASSESSMENT & PLAN NOTE
Patient presents with pain in right kidney region when flushing right nephrostomy tube started 1/11. Denies fever chills, dysuria, urinary urgency frequency.  Reports had colonoscopy on Tuesday 1/9.  Chart reviewed.  History of metastatic prostate cancer diagnosed in May 2023.  Had urinary retention, status post bilateral percutaneous nephrostomy tube insertion.  Patient has been treated with Lupron, Abiraterone and dexamethasone.  Received Lupron injection on 1/11.  Bilateral nephrostomy tubes were changed on 12/28.  Patient was seen in ED on 12/30 for right flank pain since nephrostomy tube change.  CT at that time showed properly positioned bilateral percutaneous nephrostomy tubes, asymmetric right  ureterectasis with periureteral edema.  UA consistent with infection.  Patient was discharged on Cipro for 7 days.  Completed antibiotic on Saturday.  Urine culture grew large colony E. coli and Enterobacter Cloacae, small colony of lactobacillus species and Enterococcus faecalis.  Repeat CT abdomen pelvis with IV contrast today showed -  right pyelitis/ureteritis and cystitis, possible right pyelonephritis.   UA from right nephrostomy tube consistent with infection  Given cefepime in ED, transitioned to rocephin - continue  Prelim culture with E coli, sensitivities pending  ID consult   Appreciate urology recommendations  IR consultation on Monday for possible nephrostomy tube exchange

## 2024-01-13 NOTE — CONSULTS
e-Consult (IPC)  - Interventional Radiology  Oliver Astudillo 73 y.o. male MRN: [id number]  Unit/Bed#: [address] [place] [address] Encounter: [id number]          Interventional Radiology has been consulted to evaluate Oliver Astudillo     We were consulted by COLLEEN concerning this patient with prostate cancer leading to urinary obstruction treated with bilateral percutaneous nephrostomy tubes since 6/22/23, most recently exchanged 12/28/23.  He presented to ED on 12/30 with right flank pain after that exchange and urine cultures grew multiple bacteria (E. coli, E. cloacae, Lactobacilli, E. faecalis), clinically consistent with infection rather than colonization.  He was treated with a one week course of ciprofloxacin with improved symptoms that then recurred leading to repeat presentation to ED on 1/11/24; repeat cultures demonstrated clearance of multiple of the pathogens but persistent positive E. coli being treated with IV ceftriaxone.      He is clinically improving but Mr. Astudillo and his family have reported leakage associated with the right nephrostomy (unclear whether from the skin or the tubing) that has been bothersome.      Inpatient Consult to IR  Consult performed by: Kilo San MD  Consult ordered by: Fabio Fung DO        01/13/24    Assessment/Recommendation:   Symptoms, persistently positive urine culture, and imaging are consistent with a true pyelonephritis rather than asymptomatic colonization of chronic indwelling nephrostomy catheters; agree with continuing antibiotic treatment.      If there is ongoing clinical improvement, there is no clear role for exchange of these newly exchanged catheters in the treatment of this infection; early exchange may be considered if there is failure of antibiotic treatment.    IR will plan to evaluate the catheter at bedside on Monday given concerns for leakage.  Depending on findings and Mr. Astudillo's clinical course, we will make further recommendations  at that time regarding nephrostomy exchange.      5-10 minutes, >50% of the total time devoted to medical consultative verbal/EMR discussion between providers. Written report will be generated in the EMR.     Thank you for allowing Interventional Radiology to participate in the care of Oliver Astudillo. Please don't hesitate to call or TigerText us with any questions.     Kilo San MD

## 2024-01-13 NOTE — PLAN OF CARE
Problem: Potential for Falls  Goal: Patient will remain free of falls  Description: INTERVENTIONS:  - Educate patient/family on patient safety including physical limitations  - Instruct patient to call for assistance with activity   - Consult OT/PT to assist with strengthening/mobility   - Keep Call bell within reach  - Keep bed low and locked with side rails adjusted as appropriate  - Keep care items and personal belongings within reach  - Initiate and maintain comfort rounds  - Make Fall Risk Sign visible to staff  - Offer Toileting every 2 Hours, in advance of need  - Initiate/Maintain bed alarm  - Obtain necessary fall risk management equipment: socks  - Apply yellow socks and bracelet for high fall risk patients  - Consider moving patient to room near nurses station  Outcome: Progressing     Problem: GENITOURINARY - ADULT  Goal: Maintains or returns to baseline urinary function  Description: INTERVENTIONS:  - Assess urinary function  - Encourage oral fluids to ensure adequate hydration if ordered  - Administer IV fluids as ordered to ensure adequate hydration  - Administer ordered medications as needed  - Offer frequent toileting  - Follow urinary retention protocol if ordered  Outcome: Progressing  Goal: Absence of urinary retention  Description: INTERVENTIONS:  - Assess patient’s ability to void and empty bladder  - Monitor I/O  - Bladder scan as needed  - Discuss with physician/AP medications to alleviate retention as needed  - Discuss catheterization for long term situations as appropriate  Outcome: Progressing  Goal: Urinary catheter remains patent  Description: INTERVENTIONS:  - Assess patency of urinary catheter  - If patient has a chronic ceballos, consider changing catheter if non-functioning  - Follow guidelines for intermittent irrigation of non-functioning urinary catheter  Outcome: Progressing     Problem: METABOLIC, FLUID AND ELECTROLYTES - ADULT  Goal: Electrolytes maintained within normal  limits  Description: INTERVENTIONS:  - Monitor labs and assess patient for signs and symptoms of electrolyte imbalances  - Administer electrolyte replacement as ordered  - Monitor response to electrolyte replacements, including repeat lab results as appropriate  - Instruct patient on fluid and nutrition as appropriate  Outcome: Progressing  Goal: Fluid balance maintained  Description: INTERVENTIONS:  - Monitor labs   - Monitor I/O and WT  - Instruct patient on fluid and nutrition as appropriate  - Assess for signs & symptoms of volume excess or deficit  Outcome: Progressing  Goal: Glucose maintained within target range  Description: INTERVENTIONS:  - Monitor Blood Glucose as ordered  - Assess for signs and symptoms of hyperglycemia and hypoglycemia  - Administer ordered medications to maintain glucose within target range  - Assess nutritional intake and initiate nutrition service referral as needed  Outcome: Progressing     Problem: INFECTION - ADULT  Goal: Absence or prevention of progression during hospitalization  Description: INTERVENTIONS:  - Assess and monitor for signs and symptoms of infection  - Monitor lab/diagnostic results  - Monitor all insertion sites, i.e. indwelling lines, tubes, and drains  - Monitor endotracheal if appropriate and nasal secretions for changes in amount and color  - [place] appropriate cooling/warming therapies per order  - Administer medications as ordered  - Instruct and encourage patient and family to use good hand hygiene technique  - Identify and instruct in appropriate isolation precautions for identified infection/condition  Outcome: Progressing  Goal: Absence of fever/infection during neutropenic period  Description: INTERVENTIONS:  - Monitor WBC    Outcome: Progressing     Problem: DISCHARGE PLANNING  Goal: Discharge to home or other facility with appropriate resources  Description: INTERVENTIONS:  - Identify barriers to discharge w/patient and caregiver  - Arrange for  needed discharge resources and transportation as appropriate  - Identify discharge learning needs (meds, wound care, etc.)  - Arrange for interpretive services to assist at discharge as needed  - Refer to Case Management Department for coordinating discharge planning if the patient needs post-hospital services based on physician/advanced practitioner order or complex needs related to functional status, cognitive ability, or social support system  Outcome: Progressing     Problem: Knowledge Deficit  Goal: Patient/family/caregiver demonstrates understanding of disease process, treatment plan, medications, and discharge instructions  Description: Complete learning assessment and assess knowledge base.  Interventions:  - Provide teaching at level of understanding  - Provide teaching via preferred learning methods  Outcome: Progressing

## 2024-01-13 NOTE — PLAN OF CARE
Problem: Potential for Falls  Goal: Patient will remain free of falls  Description: INTERVENTIONS:  - Educate patient/family on patient safety including physical limitations  - Instruct patient to call for assistance with activity   - Consult OT/PT to assist with strengthening/mobility   - Keep Call bell within reach  - Keep bed low and locked with side rails adjusted as appropriate  - Keep care items and personal belongings within reach  - Initiate and maintain comfort rounds  - Make Fall Risk Sign visible to staff  - Offer Toileting every 2 Hours, in advance of need  - Initiate/Maintain bed alarm  - Obtain necessary fall risk management equipment: socks  - Apply yellow socks and bracelet for high fall risk patients  - Consider moving patient to room near nurses station  1/13/2024 0731 by Amanda De La Cruz RN  Outcome: Progressing  1/13/2024 0731 by Amanda De La Cruz RN  Outcome: Progressing     Problem: GENITOURINARY - ADULT  Goal: Maintains or returns to baseline urinary function  Description: INTERVENTIONS:  - Assess urinary function  - Encourage oral fluids to ensure adequate hydration if ordered  - Administer IV fluids as ordered to ensure adequate hydration  - Administer ordered medications as needed  - Offer frequent toileting  - Follow urinary retention protocol if ordered  1/13/2024 0731 by Amanda De La Cruz RN  Outcome: Progressing  1/13/2024 0731 by Amanda De La Cruz RN  Outcome: Progressing  Goal: Absence of urinary retention  Description: INTERVENTIONS:  - Assess patient’s ability to void and empty bladder  - Monitor I/O  - Bladder scan as needed  - Discuss with physician/AP medications to alleviate retention as needed  - Discuss catheterization for long term situations as appropriate  1/13/2024 0731 by Amanda De La Cruz RN  Outcome: Progressing  1/13/2024 0731 by Amanda De La Cruz RN  Outcome: Progressing  Goal: Urinary catheter remains patent  Description: INTERVENTIONS:  - Assess patency of urinary catheter  - If  patient has a chronic ceballos, consider changing catheter if non-functioning  - Follow guidelines for intermittent irrigation of non-functioning urinary catheter  1/13/2024 0731 by Amanda De La Cruz RN  Outcome: Progressing  1/13/2024 0731 by Amanda De La Cruz RN  Outcome: Progressing     Problem: METABOLIC, FLUID AND ELECTROLYTES - ADULT  Goal: Electrolytes maintained within normal limits  Description: INTERVENTIONS:  - Monitor labs and assess patient for signs and symptoms of electrolyte imbalances  - Administer electrolyte replacement as ordered  - Monitor response to electrolyte replacements, including repeat lab results as appropriate  - Instruct patient on fluid and nutrition as appropriate  1/13/2024 0731 by Amanda De La Cruz RN  Outcome: Progressing  1/13/2024 0731 by Amanda De La Cruz RN  Outcome: Progressing  Goal: Fluid balance maintained  Description: INTERVENTIONS:  - Monitor labs   - Monitor I/O and WT  - Instruct patient on fluid and nutrition as appropriate  - Assess for signs & symptoms of volume excess or deficit  1/13/2024 0731 by Amanda De La Cruz RN  Outcome: Progressing  1/13/2024 0731 by Amanda De La Cruz RN  Outcome: Progressing  Goal: Glucose maintained within target range  Description: INTERVENTIONS:  - Monitor Blood Glucose as ordered  - Assess for signs and symptoms of hyperglycemia and hypoglycemia  - Administer ordered medications to maintain glucose within target range  - Assess nutritional intake and initiate nutrition service referral as needed  1/13/2024 0731 by Amanda De La Cruz RN  Outcome: Progressing  1/13/2024 0731 by Amanda De La Cruz RN  Outcome: Progressing     Problem: INFECTION - ADULT  Goal: Absence or prevention of progression during hospitalization  Description: INTERVENTIONS:  - Assess and monitor for signs and symptoms of infection  - Monitor lab/diagnostic results  - Monitor all insertion sites, i.e. indwelling lines, tubes, and drains  - Monitor endotracheal if appropriate and nasal  secretions for changes in amount and color  - [place] appropriate cooling/warming therapies per order  - Administer medications as ordered  - Instruct and encourage patient and family to use good hand hygiene technique  - Identify and instruct in appropriate isolation precautions for identified infection/condition  1/13/2024 0731 by Amanda De La Cruz RN  Outcome: Progressing  1/13/2024 0731 by Amanda De La Cruz RN  Outcome: Progressing  Goal: Absence of fever/infection during neutropenic period  Description: INTERVENTIONS:  - Monitor WBC    1/13/2024 0731 by Amanda De La Cruz RN  Outcome: Progressing  1/13/2024 0731 by Amanda De La Cruz RN  Outcome: Progressing     Problem: DISCHARGE PLANNING  Goal: Discharge to home or other facility with appropriate resources  Description: INTERVENTIONS:  - Identify barriers to discharge w/patient and caregiver  - Arrange for needed discharge resources and transportation as appropriate  - Identify discharge learning needs (meds, wound care, etc.)  - Arrange for interpretive services to assist at discharge as needed  - Refer to Case Management Department for coordinating discharge planning if the patient needs post-hospital services based on physician/advanced practitioner order or complex needs related to functional status, cognitive ability, or social support system  1/13/2024 0731 by Amanda De La Cruz RN  Outcome: Progressing  1/13/2024 0731 by Amanda De La Cruz RN  Outcome: Progressing     Problem: Knowledge Deficit  Goal: Patient/family/caregiver demonstrates understanding of disease process, treatment plan, medications, and discharge instructions  Description: Complete learning assessment and assess knowledge base.  Interventions:  - Provide teaching at level of understanding  - Provide teaching via preferred learning methods  1/13/2024 0731 by Amanda De La Cruz RN  Outcome: Progressing  1/13/2024 0731 by Amanda De La Cruz RN  Outcome: Progressing

## 2024-01-13 NOTE — PROGRESS NOTES
"Progress Note - Urology      Patient: Oliver Astudillo   : 1950 Sex: male   MRN: [id number]     CSN: [id number]  Unit/Bed#: [address] [address]     SUBJECTIVE:   Patient seen on afternoon rounds\  Having issues with right nephrostomy tube long phone call with son discussing infection tube change and to prevent in the future highlighted below        Objective   Vitals: /69 (BP Location: Right arm)   Pulse 68   Temp 98.6 °F (37 °C) (Oral)   Resp 18   Ht 5' 8\" (1.727 m)   Wt 80.7 kg (177 lb 14.4 oz)   SpO2 95%   BMI 27.05 kg/m²     I/O last 24 hours:  In: -   Out: 615 [Urine:615]      Physical Exam:   General Alert awake   Normocephalic atraumatic PERRLA  Lungs clear bilaterally  Cardiac normal S1 normal S2  Abdomen soft, flank pain none  Bilateral nephrostomy tubes draining clear urine  No obvious urine leakage from right bag which is an issue for them  Extremities no edema      Lab Results: CBC:   Lab Results   Component Value Date    WBC 9.62 2024    HGB 12.5 2024    HCT 35.9 (L) 2024    MCV 91 2024     2024    RBC 3.94 2024    MCH 31.7 2024    MCHC 34.8 2024    RDW 12.2 2024    MPV 9.6 2024    NRBC 0 2024     CMP:   Lab Results   Component Value Date     2024    CO2 26 2024    BUN 18 2024    CREATININE 0.91 2024    CALCIUM 9.0 2024    AST 15 2024    ALT 20 2024    ALKPHOS 79 2024    EGFR 84 2024     Urinalysis:   Lab Results   Component Value Date    COLORU Light Yellow 2024    CLARITYU Slightly Cloudy 2024    SPECGRAV 1.010 2024    PHUR 6.0 2024    LEUKOCYTESUR Moderate (A) 2024    NITRITE Positive (A) 2024    GLUCOSEU >=1000 (1%) (A) 2024    KETONESU Negative 2024    BILIRUBINUR Negative 2024    BLOODU Large (A) 2024     Urine Culture:   Lab Results   Component Value Date    URINECX >100,000 cfu/ml Escherichia " coli (A) 01/11/2024     PSA:   Lab Results   Component Value Date    PSA 0.16 12/27/2023    PSA 6.01 (H) 08/11/2023    .73 (H) 05/24/2023         Assessment/ Plan:  Right pyelonephritis  Advanced prostate cancer on hormone therapy [place]  : Telephone call with translation from son to family members wishing to avoid his right pyelonephritis again with appropriate antibiotics on next tube change suggest that when urine cultures return patient will go on a 3-day course before next tube change to prevent another infection will reach out to interventional radiology to discuss this with patient before next tube change  Finally patient states that has leakage from either exit site of tube or bag not sure will put IR consult in for Monday to check right tube and tubing          Casey Talbert MD

## 2024-01-13 NOTE — PLAN OF CARE
Problem: Potential for Falls  Goal: Patient will remain free of falls  Description: INTERVENTIONS:  - Educate patient/family on patient safety including physical limitations  - Instruct patient to call for assistance with activity   - Consult OT/PT to assist with strengthening/mobility   - Keep Call bell within reach  - Keep bed low and locked with side rails adjusted as appropriate  - Keep care items and personal belongings within reach  - Initiate and maintain comfort rounds  - Make Fall Risk Sign visible to staff  - Offer Toileting every 2 Hours, in advance of need  - Initiate/Maintain bed alarm  - Obtain necessary fall risk management equipment: socks  - Apply yellow socks and bracelet for high fall risk patients  - Consider moving patient to room near nurses station  Outcome: Progressing     Problem: GENITOURINARY - ADULT  Goal: Maintains or returns to baseline urinary function  Description: INTERVENTIONS:  - Assess urinary function  - Encourage oral fluids to ensure adequate hydration if ordered  - Administer IV fluids as ordered to ensure adequate hydration  - Administer ordered medications as needed  - Offer frequent toileting  - Follow urinary retention protocol if ordered  Outcome: Progressing  Goal: Absence of urinary retention  Description: INTERVENTIONS:  - Assess patient’s ability to void and empty bladder  - Monitor I/O  - Bladder scan as needed  - Discuss with physician/AP medications to alleviate retention as needed  - Discuss catheterization for long term situations as appropriate  Outcome: Progressing  Goal: Urinary catheter remains patent  Description: INTERVENTIONS:  - Assess patency of urinary catheter  - If patient has a chronic ceballos, consider changing catheter if non-functioning  - Follow guidelines for intermittent irrigation of non-functioning urinary catheter  Outcome: Progressing     Problem: METABOLIC, FLUID AND ELECTROLYTES - ADULT  Goal: Electrolytes maintained within normal  limits  Description: INTERVENTIONS:  - Monitor labs and assess patient for signs and symptoms of electrolyte imbalances  - Administer electrolyte replacement as ordered  - Monitor response to electrolyte replacements, including repeat lab results as appropriate  - Instruct patient on fluid and nutrition as appropriate  Outcome: Progressing  Goal: Fluid balance maintained  Description: INTERVENTIONS:  - Monitor labs   - Monitor I/O and WT  - Instruct patient on fluid and nutrition as appropriate  - Assess for signs & symptoms of volume excess or deficit  Outcome: Progressing  Goal: Glucose maintained within target range  Description: INTERVENTIONS:  - Monitor Blood Glucose as ordered  - Assess for signs and symptoms of hyperglycemia and hypoglycemia  - Administer ordered medications to maintain glucose within target range  - Assess nutritional intake and initiate nutrition service referral as needed  Outcome: Progressing     Problem: INFECTION - ADULT  Goal: Absence or prevention of progression during hospitalization  Description: INTERVENTIONS:  - Assess and monitor for signs and symptoms of infection  - Monitor lab/diagnostic results  - Monitor all insertion sites, i.e. indwelling lines, tubes, and drains  - Monitor endotracheal if appropriate and nasal secretions for changes in amount and color  - [place] appropriate cooling/warming therapies per order  - Administer medications as ordered  - Instruct and encourage patient and family to use good hand hygiene technique  - Identify and instruct in appropriate isolation precautions for identified infection/condition  Outcome: Progressing  Goal: Absence of fever/infection during neutropenic period  Description: INTERVENTIONS:  - Monitor WBC    Outcome: Progressing     Problem: DISCHARGE PLANNING  Goal: Discharge to home or other facility with appropriate resources  Description: INTERVENTIONS:  - Identify barriers to discharge w/patient and caregiver  - Arrange for  needed discharge resources and transportation as appropriate  - Identify discharge learning needs (meds, wound care, etc.)  - Arrange for interpretive services to assist at discharge as needed  - Refer to Case Management Department for coordinating discharge planning if the patient needs post-hospital services based on physician/advanced practitioner order or complex needs related to functional status, cognitive ability, or social support system  Outcome: Progressing

## 2024-01-13 NOTE — PROGRESS NOTES
INTERNAL MEDICINE PROGRESS NOTE     Name: Oliver Astudillo   Age & Sex: 73 y.o. male   MRN: [id number]  Unit/Bed#: [address] [place] [address]   Encounter: [id number]  Hospital Stay Days: 1      ASSESSMENT/PLAN     Principal Problem:    Pyelonephritis  Active Problems:    Type 2 diabetes mellitus, with long-term current use of insulin (HCC)    Hypertension    Prostate cancer (HCC)    Hyperlipidemia      Hyperlipidemia  Assessment & Plan  Continue statin    Prostate cancer (HCC)  Assessment & Plan  As above, known metastatic prostate cancer.  Numerous sclerotic lesions throughout the osseous structures on CT  Received Lupron injection yesterday.  Continue Zytiga and dexamethasone.  Bilateral nephrostomy tube care.  Continue flush with 10 cc saline daily.      Hypertension  Assessment & Plan  Continue lisinopril with holding parameter  BP stable    Type 2 diabetes mellitus, with long-term current use of insulin (HCC)  Assessment & Plan  Lab Results   Component Value Date    HGBA1C 11.1 (A) 01/08/2024       Recent Labs     01/12/24  2048 01/13/24  0316 01/13/24  0657 01/13/24  1111   POCGLU 255* 142* 77 156*         Blood Sugar Average: Last 72 hrs:  (P) 158.75  On Levemir glimepiride ertugliflozin at home  Reports blood sugar 300s today at home.  Continue Levemir  Hold oral agents  Add mealtime Humalog  SSI  Diabetic diet      * Pyelonephritis  Assessment & Plan  Patient presents with pain in right kidney region when flushing right nephrostomy tube started 1/11. Denies fever chills, dysuria, urinary urgency frequency.  Reports had colonoscopy on Tuesday 1/9.  Chart reviewed.  History of metastatic prostate cancer diagnosed in May 2023.  Had urinary retention, status post bilateral percutaneous nephrostomy tube insertion.  Patient has been treated with Lupron, Abiraterone and dexamethasone.  Received Lupron injection on 1/11.  Bilateral nephrostomy tubes were changed on 12/28.  Patient was seen in ED on 12/30 for right flank pain  since nephrostomy tube change.  CT at that time showed properly positioned bilateral percutaneous nephrostomy tubes, asymmetric right  ureterectasis with periureteral edema.  UA consistent with infection.  Patient was discharged on Cipro for 7 days.  Completed antibiotic on Saturday.  Urine culture grew large colony E. coli and Enterobacter Cloacae, small colony of lactobacillus species and Enterococcus faecalis.  Repeat CT abdomen pelvis with IV contrast today showed -  right pyelitis/ureteritis and cystitis, possible right pyelonephritis.   UA from right nephrostomy tube consistent with infection  Given cefepime in ED.  Will change to Rocephin.  Follow culture data for antibiotic stewardship  Appreciate urology recommendations  IR consultation on Monday for possible nephrostomy tube exchange        VTE Pharmacologic Prophylaxis:   Pharmacologic: Enoxaparin (Lovenox)  Mechanical VTE Prophylaxis in Place: No    Patient Centered Rounds: I have performed bedside rounds with nursing staff today.    Discussions with Specialists or Other Care Team Provider: Appreciate urology recommendations    Education and Discussions with Family / Patient: Will discuss with son this afternoon over phone    Time Spent for Care: 30 minutes.  More than 50% of total time spent on counseling and coordination of care as described above.    Current Length of Stay: 1 day(s)    Current Patient Status: Inpatient   Certification Statement: The patient will continue to require additional inpatient hospital stay due to complicated pyelonephritis    Discharge Plan / Estimated Discharge Date: 24-48 hours    Code Status: Level 1 - Full Code    Subjective:     Patient seen and examined at bedside this morning, overall feeling well, IV antibiotic, follow-up culture sensitivities, follow-up with urology, no urgent indication for bilateral nephrostomy tube replacements.    Objective:   Vitals:   Temp (24hrs), Av.5 °F (36.9 °C), Min:98.4 °F (36.9 °C),  Max:98.6 °F (37 °C)    Temp:  [98.4 °F (36.9 °C)-98.6 °F (37 °C)] 98.6 °F (37 °C)  HR:  [68-85] 68  Resp:  [16-18] 18  BP: (126-128)/(68-69) 128/69  SpO2:  [94 %-96 %] 95 %  Body mass index is 27.05 kg/m².     Input and Output Summary (last 24 hours):     Intake/Output Summary (Last 24 hours) at 1/13/2024 1407  Last data filed at 1/13/2024 0629  Gross per 24 hour   Intake --   Output 515 ml   Net -515 ml     Physical Exam:   Physical Exam  Constitutional:       General: He is not in acute distress.  HENT:      Head: Normocephalic and atraumatic.   Eyes:      General: No scleral icterus.     Conjunctiva/sclera: Conjunctivae normal.   Cardiovascular:      Rate and Rhythm: Normal rate and regular rhythm.      Pulses: Normal pulses.      Heart sounds: No murmur heard.  Pulmonary:      Effort: Pulmonary effort is normal. No respiratory distress.      Breath sounds: Normal breath sounds. No wheezing or rales.   Abdominal:      General: Bowel sounds are normal. There is no distension.      Tenderness: There is no abdominal tenderness.   Musculoskeletal:         General: No swelling. Normal range of motion.   Skin:     General: Skin is warm and dry.      Capillary Refill: Capillary refill takes less than 2 seconds.      Coloration: Skin is not jaundiced.   Neurological:      General: No focal deficit present.      Mental Status: He is alert and oriented to person, place, and time. Mental status is at baseline.   Psychiatric:         Mood and Affect: Mood normal.         Behavior: Behavior normal.         Additional Data:   Basic Laboratory Review:   Results from last 7 days   Lab Units 01/13/24  0627 01/12/24  0629 01/11/24  2108   SODIUM mmol/L 138 135 130*   POTASSIUM mmol/L 3.5 3.8 4.2   CHLORIDE mmol/L 106 102 98   CO2 mmol/L 26 27 25   BUN mg/dL 18 16 20   CREATININE mg/dL 0.91 0.99 1.18   GLUCOSE RANDOM mg/dL 79 155* 347*   CALCIUM mg/dL 9.0 9.1 9.0   ALBUMIN g/dL  --   --  3.6   ALK PHOS U/L  --   --  79   ALT U/L   --   --  20   AST U/L  --   --  15   EGFR ml/min/1.73sq m 84 76 61       Results from last 7 days   Lab Units 01/13/24  0627 01/12/24  0629 01/11/24  2108   WBC Thousand/uL 9.62 12.37* 14.09*   HEMOGLOBIN g/dL 12.5 13.3 13.4   HEMATOCRIT % 35.9* 38.1 38.0   PLATELETS Thousands/uL 330 364 377   NEUTROS PCT %  --  73 81*   LYMPHS PCT %  --  19 13*   MONOS PCT %  --  7 6   EOS PCT %  --  0 0   BASOS PCT %  --  0 0   NEUTROS ABS Thousands/µL  --  9.12* 11.34*   LYMPHS ABS Thousands/µL  --  2.32 1.81   MONOS ABS Thousand/µL  --  0.80 0.83   EOS ABS Thousand/µL  --  0.04 0.02   BASOS ABS Thousands/µL  --  0.03 0.04               Additional Labs:  Results from last 7 days   Lab Units 01/13/24 0627 01/12/24 0629 01/11/24 2108   LACTIC ACID mmol/L  --   --  1.8   MAGNESIUM mg/dL 2.0 1.8*  --           Results from last 7 days   Lab Units 01/08/24  0858   HEMOGLOBIN A1C  11.1*       Recent Cultures (last 7 days):   Results from last 7 days   Lab Units 01/11/24  2137 01/11/24 2108   BLOOD CULTURE   --  No Growth at 24 hrs.  No Growth at 24 hrs.   URINE CULTURE  >100,000 cfu/ml Escherichia coli*  --        * I Have Reviewed All Lab Data Listed Above.  * Additional Pertinent Lab Tests Reviewed: All Labs Within Last 24 Hours Reviewed    Imaging:  Prior imaging studies and reports reviewed    Last 24 Hours Medication List:   Current Facility-Administered Medications   Medication Dose Route Frequency Provider Last Rate    abiraterone  250 mg Oral Daily ALTA Ferguson      acetaminophen  650 mg Oral Q6H PRN ALTA Ferguson      cefTRIAXone  1,000 mg Intravenous Q24H ALTA Ferguson 1,000 mg (01/12/24 1209)    dexamethasone  0.5 mg Oral Daily ALTA Ferguson      enoxaparin  40 mg Subcutaneous Daily Cuiyin Yurik, CRNP      fish oil  1,000 mg Oral BID ALTA Ferguson      insulin detemir  45 Units Subcutaneous HS ALTA Ferguson      insulin lispro  1-5 Units Subcutaneous TID AC ALTA Ferguson      insulin  lispro  1-5 Units Subcutaneous HS Derek Rodríguez, CRMARCELINO      insulin lispro  1-5 Units Subcutaneous 0200 Derek Rodríguez, ALTA      lisinopril  10 mg Oral Daily Derek Rodríguez, ALTA      multivitamin-minerals  1 tablet Oral Daily Derek Rodríguez, ALTA      ondansetron  4 mg Intravenous Q6H PRN Derek Rodríguez, ALTA      polyethylene glycol  17 g Oral Daily PRN ALTA Ferguson      pravastatin  80 mg Oral Daily With Dinner Derek Rodríguez, ALTA      saccharomyces boulardii  250 mg Oral BID Derek Rodríguez, ALTA       Today, Patient Was Seen By: Fabio Fung DO

## 2024-01-14 LAB
ALBUMIN SERPL BCP-MCNC: 3.2 G/DL (ref 3.5–5)
ALP SERPL-CCNC: 61 U/L (ref 34–104)
ALT SERPL W P-5'-P-CCNC: 13 U/L (ref 7–52)
ANION GAP SERPL CALCULATED.3IONS-SCNC: 7 MMOL/L
AST SERPL W P-5'-P-CCNC: 12 U/L (ref 13–39)
BACTERIA UR CULT: ABNORMAL
BASOPHILS # BLD AUTO: 0.03 THOUSANDS/ÂΜL (ref 0–0.1)
BASOPHILS NFR BLD AUTO: 0 % (ref 0–1)
BILIRUB SERPL-MCNC: 0.5 MG/DL (ref 0.2–1)
BUN SERPL-MCNC: 18 MG/DL (ref 5–25)
CALCIUM ALBUM COR SERPL-MCNC: 9.9 MG/DL (ref 8.3–10.1)
CALCIUM SERPL-MCNC: 9.3 MG/DL (ref 8.4–10.2)
CHLORIDE SERPL-SCNC: 105 MMOL/L (ref 96–108)
CO2 SERPL-SCNC: 27 MMOL/L (ref 21–32)
CREAT SERPL-MCNC: 0.98 MG/DL (ref 0.6–1.3)
EOSINOPHIL # BLD AUTO: 0.16 THOUSAND/ÂΜL (ref 0–0.61)
EOSINOPHIL NFR BLD AUTO: 2 % (ref 0–6)
ERYTHROCYTE [DISTWIDTH] IN BLOOD BY AUTOMATED COUNT: 12.3 % (ref 11.6–15.1)
GFR SERPL CREATININE-BSD FRML MDRD: 77 ML/MIN/1.73SQ M
GLUCOSE SERPL-MCNC: 180 MG/DL (ref 65–140)
GLUCOSE SERPL-MCNC: 319 MG/DL (ref 65–140)
GLUCOSE SERPL-MCNC: 336 MG/DL (ref 65–140)
GLUCOSE SERPL-MCNC: 59 MG/DL (ref 65–140)
GLUCOSE SERPL-MCNC: 64 MG/DL (ref 65–140)
GLUCOSE SERPL-MCNC: 91 MG/DL (ref 65–140)
HCT VFR BLD AUTO: 38.2 % (ref 36.5–49.3)
HGB BLD-MCNC: 13.2 G/DL (ref 12–17)
IMM GRANULOCYTES # BLD AUTO: 0.03 THOUSAND/UL (ref 0–0.2)
IMM GRANULOCYTES NFR BLD AUTO: 0 % (ref 0–2)
LYMPHOCYTES # BLD AUTO: 4.14 THOUSANDS/ÂΜL (ref 0.6–4.47)
LYMPHOCYTES NFR BLD AUTO: 40 % (ref 14–44)
MCH RBC QN AUTO: 31.5 PG (ref 26.8–34.3)
MCHC RBC AUTO-ENTMCNC: 34.6 G/DL (ref 31.4–37.4)
MCV RBC AUTO: 91 FL (ref 82–98)
MONOCYTES # BLD AUTO: 0.68 THOUSAND/ÂΜL (ref 0.17–1.22)
MONOCYTES NFR BLD AUTO: 7 % (ref 4–12)
NEUTROPHILS # BLD AUTO: 5.28 THOUSANDS/ÂΜL (ref 1.85–7.62)
NEUTS SEG NFR BLD AUTO: 51 % (ref 43–75)
NRBC BLD AUTO-RTO: 0 /100 WBCS
PLATELET # BLD AUTO: 393 THOUSANDS/UL (ref 149–390)
PMV BLD AUTO: 9.6 FL (ref 8.9–12.7)
POTASSIUM SERPL-SCNC: 3.4 MMOL/L (ref 3.5–5.3)
PROT SERPL-MCNC: 7 G/DL (ref 6.4–8.4)
RBC # BLD AUTO: 4.19 MILLION/UL (ref 3.88–5.62)
SODIUM SERPL-SCNC: 139 MMOL/L (ref 135–147)
WBC # BLD AUTO: 10.32 THOUSAND/UL (ref 4.31–10.16)

## 2024-01-14 PROCEDURE — 85025 COMPLETE CBC W/AUTO DIFF WBC: CPT | Performed by: STUDENT IN AN ORGANIZED HEALTH CARE EDUCATION/TRAINING PROGRAM

## 2024-01-14 PROCEDURE — 82948 REAGENT STRIP/BLOOD GLUCOSE: CPT

## 2024-01-14 PROCEDURE — 99232 SBSQ HOSP IP/OBS MODERATE 35: CPT | Performed by: NURSE PRACTITIONER

## 2024-01-14 PROCEDURE — 80053 COMPREHEN METABOLIC PANEL: CPT | Performed by: STUDENT IN AN ORGANIZED HEALTH CARE EDUCATION/TRAINING PROGRAM

## 2024-01-14 RX ORDER — POTASSIUM CHLORIDE 20 MEQ/1
40 TABLET, EXTENDED RELEASE ORAL ONCE
Status: COMPLETED | OUTPATIENT
Start: 2024-01-14 | End: 2024-01-14

## 2024-01-14 RX ADMIN — Medication 1 TABLET: at 08:08

## 2024-01-14 RX ADMIN — DEXAMETHASONE 0.5 MG: 0.5 TABLET ORAL at 08:09

## 2024-01-14 RX ADMIN — Medication 250 MG: at 17:23

## 2024-01-14 RX ADMIN — INSULIN LISPRO 1 UNITS: 100 INJECTION, SOLUTION INTRAVENOUS; SUBCUTANEOUS at 11:31

## 2024-01-14 RX ADMIN — INSULIN DETEMIR 45 UNITS: 100 INJECTION, SOLUTION SUBCUTANEOUS at 22:31

## 2024-01-14 RX ADMIN — INSULIN LISPRO 3 UNITS: 100 INJECTION, SOLUTION INTRAVENOUS; SUBCUTANEOUS at 16:28

## 2024-01-14 RX ADMIN — ENOXAPARIN SODIUM 40 MG: 40 INJECTION SUBCUTANEOUS at 08:08

## 2024-01-14 RX ADMIN — PRAVASTATIN SODIUM 80 MG: 80 TABLET ORAL at 16:28

## 2024-01-14 RX ADMIN — Medication 250 MG: at 08:08

## 2024-01-14 RX ADMIN — CEFTRIAXONE 1000 MG: 1 INJECTION, SOLUTION INTRAVENOUS at 11:31

## 2024-01-14 RX ADMIN — OMEGA-3 FATTY ACIDS CAP 1000 MG 1000 MG: 1000 CAP at 17:23

## 2024-01-14 RX ADMIN — ABIRATERONE ACETATE 250 MG: 250 TABLET, FILM COATED ORAL at 08:09

## 2024-01-14 RX ADMIN — INSULIN LISPRO 3 UNITS: 100 INJECTION, SOLUTION INTRAVENOUS; SUBCUTANEOUS at 22:32

## 2024-01-14 RX ADMIN — OMEGA-3 FATTY ACIDS CAP 1000 MG 1000 MG: 1000 CAP at 08:08

## 2024-01-14 RX ADMIN — POTASSIUM CHLORIDE 40 MEQ: 1500 TABLET, EXTENDED RELEASE ORAL at 11:31

## 2024-01-14 NOTE — PLAN OF CARE
Problem: Potential for Falls  Goal: Patient will remain free of falls  Description: INTERVENTIONS:  - Educate patient/family on patient safety including physical limitations  - Instruct patient to call for assistance with activity   - Consult OT/PT to assist with strengthening/mobility   - Keep Call bell within reach  - Keep bed low and locked with side rails adjusted as appropriate  - Keep care items and personal belongings within reach  - Initiate and maintain comfort rounds  - Make Fall Risk Sign visible to staff  - Offer Toileting every 2 Hours, in advance of need  - Initiate/Maintain bed alarm  - Obtain necessary fall risk management equipment: socks  - Apply yellow socks and bracelet for high fall risk patients  - Consider moving patient to room near nurses station  Outcome: Progressing     Problem: GENITOURINARY - ADULT  Goal: Maintains or returns to baseline urinary function  Description: INTERVENTIONS:  - Assess urinary function  - Encourage oral fluids to ensure adequate hydration if ordered  - Administer IV fluids as ordered to ensure adequate hydration  - Administer ordered medications as needed  - Offer frequent toileting  - Follow urinary retention protocol if ordered  Outcome: Progressing  Goal: Absence of urinary retention  Description: INTERVENTIONS:  - Assess patient’s ability to void and empty bladder  - Monitor I/O  - Bladder scan as needed  - Discuss with physician/AP medications to alleviate retention as needed  - Discuss catheterization for long term situations as appropriate  Outcome: Progressing  Goal: Urinary catheter remains patent  Description: INTERVENTIONS:  - Assess patency of urinary catheter  - If patient has a chronic ceballos, consider changing catheter if non-functioning  - Follow guidelines for intermittent irrigation of non-functioning urinary catheter  Outcome: Progressing     Problem: METABOLIC, FLUID AND ELECTROLYTES - ADULT  Goal: Electrolytes maintained within normal  limits  Description: INTERVENTIONS:  - Monitor labs and assess patient for signs and symptoms of electrolyte imbalances  - Administer electrolyte replacement as ordered  - Monitor response to electrolyte replacements, including repeat lab results as appropriate  - Instruct patient on fluid and nutrition as appropriate  Outcome: Progressing  Goal: Fluid balance maintained  Description: INTERVENTIONS:  - Monitor labs   - Monitor I/O and WT  - Instruct patient on fluid and nutrition as appropriate  - Assess for signs & symptoms of volume excess or deficit  Outcome: Progressing  Goal: Glucose maintained within target range  Description: INTERVENTIONS:  - Monitor Blood Glucose as ordered  - Assess for signs and symptoms of hyperglycemia and hypoglycemia  - Administer ordered medications to maintain glucose within target range  - Assess nutritional intake and initiate nutrition service referral as needed  Outcome: Progressing     Problem: INFECTION - ADULT  Goal: Absence or prevention of progression during hospitalization  Description: INTERVENTIONS:  - Assess and monitor for signs and symptoms of infection  - Monitor lab/diagnostic results  - Monitor all insertion sites, i.e. indwelling lines, tubes, and drains  - Monitor endotracheal if appropriate and nasal secretions for changes in amount and color  - [place] appropriate cooling/warming therapies per order  - Administer medications as ordered  - Instruct and encourage patient and family to use good hand hygiene technique  - Identify and instruct in appropriate isolation precautions for identified infection/condition  Outcome: Progressing  Goal: Absence of fever/infection during neutropenic period  Description: INTERVENTIONS:  - Monitor WBC    Outcome: Progressing     Problem: DISCHARGE PLANNING  Goal: Discharge to home or other facility with appropriate resources  Description: INTERVENTIONS:  - Identify barriers to discharge w/patient and caregiver  - Arrange for  needed discharge resources and transportation as appropriate  - Identify discharge learning needs (meds, wound care, etc.)  - Arrange for interpretive services to assist at discharge as needed  - Refer to Case Management Department for coordinating discharge planning if the patient needs post-hospital services based on physician/advanced practitioner order or complex needs related to functional status, cognitive ability, or social support system  Outcome: Progressing     Problem: Knowledge Deficit  Goal: Patient/family/caregiver demonstrates understanding of disease process, treatment plan, medications, and discharge instructions  Description: Complete learning assessment and assess knowledge base.  Interventions:  - Provide teaching at level of understanding  - Provide teaching via preferred learning methods  Outcome: Progressing

## 2024-01-14 NOTE — PROGRESS NOTES
[facility]  Progress Note  Name: Oliver Astudillo I  MRN: [id number]  Unit/Bed#: [address] [address] I Date of Admission: 1/11/2024   Date of Service: 1/14/2024 I Hospital Day: 2    Assessment/Plan   * Pyelonephritis  Assessment & Plan  Patient presents with pain in right kidney region when flushing right nephrostomy tube started 1/11. Denies fever chills, dysuria, urinary urgency frequency.  Reports had colonoscopy on Tuesday 1/9.  Chart reviewed.  History of metastatic prostate cancer diagnosed in May 2023.  Had urinary retention, status post bilateral percutaneous nephrostomy tube insertion.  Patient has been treated with Lupron, Abiraterone and dexamethasone.  Received Lupron injection on 1/11.  Bilateral nephrostomy tubes were changed on 12/28.  Patient was seen in ED on 12/30 for right flank pain since nephrostomy tube change.  CT at that time showed properly positioned bilateral percutaneous nephrostomy tubes, asymmetric right  ureterectasis with periureteral edema.  UA consistent with infection.  Patient was discharged on Cipro for 7 days.  Completed antibiotic on Saturday.  Urine culture grew large colony E. coli and Enterobacter Cloacae, small colony of lactobacillus species and Enterococcus faecalis.  Repeat CT abdomen pelvis with IV contrast today showed -  right pyelitis/ureteritis and cystitis, possible right pyelonephritis.   UA from right nephrostomy tube consistent with infection  Given cefepime in ED, transitioned to rocephin - continue  Prelim culture with E coli, sensitivities pending  ID consult   Appreciate urology recommendations  IR consultation on Monday for possible nephrostomy tube exchange    Hyperlipidemia  Assessment & Plan  Continue statin    Prostate cancer (HCC)  Assessment & Plan  As above, known metastatic prostate cancer.  Numerous sclerotic lesions throughout the osseous structures on CT  Received Lupron injection yesterday.  Continue Zytiga and  dexamethasone.  Bilateral nephrostomy tube care.  Continue flush with 10 cc saline daily.      Hypertension  Assessment & Plan  Continue lisinopril with holding parameter  BP stable    Type 2 diabetes mellitus, with long-term current use of insulin ([facility])  Assessment & Plan  Lab Results   Component Value Date    HGBA1C 11.1 (A) 2024       Recent Labs     24  2048 24  0316 24  0657 24  1111   POCGLU 255* 142* 77 156*         Blood Sugar Average: Last 72 hrs:  (P) 158.75  On Levemir glimepiride ertugliflozin at home  Reports blood sugar 300s today at home.  Continue Levemir  Hold oral agents  Add mealtime Humalog  SSI  Diabetic diet             VTE Pharmacologic Prophylaxis:   Pharmacologic: Enoxaparin (Lovenox)  Mechanical VTE Prophylaxis in Place: Yes    Patient Centered Rounds: I have performed bedside rounds with nursing staff today.  Discussions with Specialists or Other Care Team Provider: Yes  Education and Discussions with Family / Patient:Yes  Time Spent for Care: 20 minutes.  More than 50% of total time spent on counseling and coordination of care as described above.    Current Length of Stay: 2 day(s)  Current Patient Status: Inpatient     Discharge Plan: IR consult in AM, ID recs     Code Status: Level 1 - Full Code      Subjective:   Patient seen utilizing  services (#[id number]).  He reports feeling better and is anxious to go home.  He has mild pain to the left urostomy, leaking has subsided.        Objective:     Vitals:   Temp (24hrs), Av.3 °F (36.8 °C), Min:98.2 °F (36.8 °C), Max:98.4 °F (36.9 °C)    Temp:  [98.2 °F (36.8 °C)-98.4 °F (36.9 °C)] 98.2 °F (36.8 °C)  HR:  [56-79] 70  Resp:  [16-18] 18  BP: (123-138)/(65-70) 123/65  SpO2:  [92 %-97 %] 97 %  Body mass index is 27.05 kg/m².     Input and Output Summary (last 24 hours):     Intake/Output Summary (Last 24 hours) at 2024 1047  Last data filed at 2024 0815  Gross per 24 hour   Intake 20 ml    Output 504 ml   Net -484 ml        Physical Exam:     Physical Exam  Vitals and nursing note reviewed.   Constitutional:       Appearance: Normal appearance.   HENT:      Head: Normocephalic.      Nose: Nose normal.   Eyes:      Extraocular Movements: Extraocular movements intact.   Cardiovascular:      Rate and Rhythm: Normal rate.   Pulmonary:      Effort: Pulmonary effort is normal.      Breath sounds: Normal breath sounds.   Abdominal:      General: Abdomen is flat. Bowel sounds are normal.      Palpations: Abdomen is soft.      Comments: Right urostomy tube in place draining pale yellow to bag  Dressing noted without drainage    Musculoskeletal:         General: Normal range of motion.   Skin:     General: Skin is warm and dry.   Neurological:      General: No focal deficit present.      Mental Status: He is alert and oriented to person, place, and time.         Additional Data:     Labs:    Results from last 7 days   Lab Units 01/14/24  0629 01/13/24  0627 01/12/24 0629 01/11/24  2108   WBC Thousand/uL 10.32* 9.62 12.37* 14.09*   HEMOGLOBIN g/dL 13.2 12.5 13.3 13.4   HEMATOCRIT % 38.2 35.9* 38.1 38.0   PLATELETS Thousands/uL 393* 330 364 377   NEUTROS PCT % 51  --  73 81*     Results from last 7 days   Lab Units 01/14/24  0629 01/13/24  0627 01/12/24  0629 01/11/24  2108   SODIUM mmol/L 139 138 135 130*   POTASSIUM mmol/L 3.4* 3.5 3.8 4.2   CHLORIDE mmol/L 105 106 102 98   CO2 mmol/L 27 26 27 25   BUN mg/dL 18 18 16 20   CREATININE mg/dL 0.98 0.91 0.99 1.18   CALCIUM mg/dL 9.3 9.0 9.1 9.0   TOTAL BILIRUBIN mg/dL 0.50  --   --  0.50   ALK PHOS U/L 61  --   --  79   ALT U/L 13  --   --  20   AST U/L 12*  --   --  15             Lab Results   Component Value Date/Time    HGBA1C 11.1 (A) 01/08/2024 08:58 AM    HGBA1C 6.8 (H) 08/11/2023 08:50 AM     Results from last 7 days   Lab Units 01/14/24  0736 01/14/24  0710 01/13/24 2040 01/13/24  1610 01/13/24  1111 01/13/24  0657 01/13/24  0316 01/12/24 2048  "01/12/24  1550 01/12/24  1113 01/12/24  0705 01/12/24  0215   POC GLUCOSE mg/dl 91 64* 219* 217* 156* 77 142* 255* 213* 93 140 194*     Results from last 7 days   Lab Units 01/11/24  2108   LACTIC ACID mmol/L 1.8       * I Have Reviewed All Lab Data Listed Above.  * Additional Pertinent Lab Tests Reviewed: All Labs Within Last 24 Hours Reviewed    Imaging:     CT abdomen pelvis with contrast   Final Result by Milan Butler MD (01/11 2341)      Exam limited by motion artifact. Findings consistent with right pyelitis/ureteritis and cystitis noted. Increased asymmetrical right perinephric and periureteral inflammatory stranding with slightly delayed right nephrogram and subtle heterogeneous    attenuation/enhancement of the right kidney raising suspicion for right pyelonephritis. Recommend correlation with urinalysis. Additional ancillary findings detailed above.         Workstation performed: [id number]         IR other    (Results Pending)     Imaging Reports Reviewed by myself    Cultures:   Blood Culture:   Lab Results   Component Value Date    BLOODCX No Growth at 24 hrs. 01/11/2024    BLOODCX No Growth at 24 hrs. 01/11/2024     Urine Culture:   Lab Results   Component Value Date    URINECX >100,000 cfu/ml Escherichia coli (A) 01/11/2024    URINECX >100,000 cfu/ml Escherichia coli (A) 12/30/2023    URINECX >100,000 cfu/ml Enterobacter cloacae (A) 12/30/2023    URINECX 1371-9881 cfu/ml Lactobacillus species (A) 12/30/2023    URINECX <1000 cfu/ml Enterococcus faecalis (A) 12/30/2023     Sputum Culture: No components found for: \"SPUTUMCX\"  Wound Culture: No results found for: \"WOUNDCULT\"    Last 24 Hours Medication List:   Current Facility-Administered Medications   Medication Dose Route Frequency Provider Last Rate    abiraterone  250 mg Oral Daily ALTA Ferguson      acetaminophen  650 mg Oral Q6H PRN Cuiyin GALE RodríguezNP      cefTRIAXone  1,000 mg Intravenous Q24H CuiGALE CovarrubiasNP 1,000 mg (01/13/24 1413)    " dexamethasone  0.5 mg Oral Daily Cuiyin Yurik, CRNP      enoxaparin  40 mg Subcutaneous Daily Cuiyin Tammierik, CRNP      fish oil  1,000 mg Oral BID Cuiyin Tammierik, CRNP      insulin detemir  45 Units Subcutaneous HS Cuiyin Yurik, CRNP      insulin lispro  1-5 Units Subcutaneous TID AC Cuiyin Yurik, CRNP      insulin lispro  1-5 Units Subcutaneous HS Cuiyin Yurik, CRNP      insulin lispro  1-5 Units Subcutaneous 0200 Cuigerdan Tammierik, CRNP      lisinopril  10 mg Oral Daily Cuiyin Yurik, CRNP      multivitamin-minerals  1 tablet Oral Daily Cuiyiarnold Yurik, ALTA      ondansetron  4 mg Intravenous Q6H PRN Cuigerdaarnold Tammierik, CRNP      polyethylene glycol  17 g Oral Daily PRN Cuin Tammierik, CRNP      potassium chloride  40 mEq Oral Once ALTA Ma      pravastatin  80 mg Oral Daily With Dinner Cunoel Rodríguez, ALTA      saccharomyces boulardii  250 mg Oral BID [facility] General[facility] Rodríguez, ALTA          Today, Patient Was Seen By: ALTA Ma    ** Please Note: Dragon 360 Dictation voice to text software may have been used in the creation of this document. **

## 2024-01-14 NOTE — PLAN OF CARE
Problem: Potential for Falls  Goal: Patient will remain free of falls  Description: INTERVENTIONS:  - Educate patient/family on patient safety including physical limitations  - Instruct patient to call for assistance with activity   - Consult OT/PT to assist with strengthening/mobility   - Keep Call bell within reach  - Keep bed low and locked with side rails adjusted as appropriate  - Keep care items and personal belongings within reach  - Initiate and maintain comfort rounds  - Make Fall Risk Sign visible to staff  - Offer Toileting every 2 Hours, in advance of need  - Initiate/Maintain bed alarm  - Obtain necessary fall risk management equipment: socks  - Apply yellow socks and bracelet for high fall risk patients  - Consider moving patient to room near nurses station  Outcome: Progressing     Problem: GENITOURINARY - ADULT  Goal: Maintains or returns to baseline urinary function  Description: INTERVENTIONS:  - Assess urinary function  - Encourage oral fluids to ensure adequate hydration if ordered  - Administer IV fluids as ordered to ensure adequate hydration  - Administer ordered medications as needed  - Offer frequent toileting  - Follow urinary retention protocol if ordered  Outcome: Progressing  Goal: Absence of urinary retention  Description: INTERVENTIONS:  - Assess patient’s ability to void and empty bladder  - Monitor I/O  - Bladder scan as needed  - Discuss with physician/AP medications to alleviate retention as needed  - Discuss catheterization for long term situations as appropriate  Outcome: Progressing  Goal: Urinary catheter remains patent  Description: INTERVENTIONS:  - Assess patency of urinary catheter  - If patient has a chronic ceballos, consider changing catheter if non-functioning  - Follow guidelines for intermittent irrigation of non-functioning urinary catheter  Outcome: Progressing     Problem: METABOLIC, FLUID AND ELECTROLYTES - ADULT  Goal: Electrolytes maintained within normal  limits  Description: INTERVENTIONS:  - Monitor labs and assess patient for signs and symptoms of electrolyte imbalances  - Administer electrolyte replacement as ordered  - Monitor response to electrolyte replacements, including repeat lab results as appropriate  - Instruct patient on fluid and nutrition as appropriate  Outcome: Progressing  Goal: Fluid balance maintained  Description: INTERVENTIONS:  - Monitor labs   - Monitor I/O and WT  - Instruct patient on fluid and nutrition as appropriate  - Assess for signs & symptoms of volume excess or deficit  Outcome: Progressing  Goal: Glucose maintained within target range  Description: INTERVENTIONS:  - Monitor Blood Glucose as ordered  - Assess for signs and symptoms of hyperglycemia and hypoglycemia  - Administer ordered medications to maintain glucose within target range  - Assess nutritional intake and initiate nutrition service referral as needed  Outcome: Progressing     Problem: INFECTION - ADULT  Goal: Absence or prevention of progression during hospitalization  Description: INTERVENTIONS:  - Assess and monitor for signs and symptoms of infection  - Monitor lab/diagnostic results  - Monitor all insertion sites, i.e. indwelling lines, tubes, and drains  - Monitor endotracheal if appropriate and nasal secretions for changes in amount and color  - Saline appropriate cooling/warming therapies per order  - Administer medications as ordered  - Instruct and encourage patient and family to use good hand hygiene technique  - Identify and instruct in appropriate isolation precautions for identified infection/condition  Outcome: Progressing  Goal: Absence of fever/infection during neutropenic period  Description: INTERVENTIONS:  - Monitor WBC    Outcome: Progressing     Problem: DISCHARGE PLANNING  Goal: Discharge to home or other facility with appropriate resources  Description: INTERVENTIONS:  - Identify barriers to discharge w/patient and caregiver  - Arrange for  needed discharge resources and transportation as appropriate  - Identify discharge learning needs (meds, wound care, etc.)  - Arrange for interpretive services to assist at discharge as needed  - Refer to Case Management Department for coordinating discharge planning if the patient needs post-hospital services based on physician/advanced practitioner order or complex needs related to functional status, cognitive ability, or social support system  Outcome: Progressing     Problem: Knowledge Deficit  Goal: Patient/family/caregiver demonstrates understanding of disease process, treatment plan, medications, and discharge instructions  Description: Complete learning assessment and assess knowledge base.  Interventions:  - Provide teaching at level of understanding  - Provide teaching via preferred learning methods  Outcome: Progressing

## 2024-01-14 NOTE — UTILIZATION REVIEW
Initial Clinical Review    Admission: Date/Time/Statement:   Admission Orders (From admission, onward)       Ordered        01/12/24 0122  INPATIENT ADMISSION  Once                          Orders Placed This Encounter   Procedures    INPATIENT ADMISSION     Standing Status:   Standing     Number of Occurrences:   1     Order Specific Question:   Level of Care     Answer:   Med Surg [16]     Order Specific Question:   Estimated length of stay     Answer:   More than 2 Midnights     Order Specific Question:   Certification     Answer:   I certify that inpatient services are medically necessary for this patient for a duration of greater than two midnights. See H&P and MD Progress Notes for additional information about the patient's course of treatment.     ED Arrival Information       Expected   -    Arrival   1/11/2024 20:13    Acuity   Urgent              Means of arrival   Walk-In    Escorted by   Family Member    Service   Hospitalist    Admission type   Emergency              Arrival complaint   FLANK PAIN             Chief Complaint   Patient presents with    Flank Pain     Patient seen by urology today. Has prostate cancer. Patient has bilateral nephrostomy tubes. Started with right flank pain 10 am today. Seen here on 12/30 had pyelonephritis. Temp 100.3 in triage.        Initial Presentation: 73 y.o. male presents to ed from home on 1-11 with history of prostate cancer with bilateral nephrostomy tube for evaluation and treatment of right flank pain 10/10 and fever. Clinical assessment significant for pain 9/10, temp 100.3,  Imaging shows R pyelonephritis. UA: mod leukocytes, positive nitrite,  , GLUCOSE 347,WBC 14.09.  Initially treated with iv mso4 x1.  iv cefepime.      Date: 1-12-24   Day 2: Admit to inpatient med surg for pyelonephritis.      Start iv cefepime. Consult urology completed.  Poorly differentiated prostate cancer advanced with mets initial . Hormone therapy last PSA 0.16.   recommend follow up cultures and continue iv antibiotics.  Received Lupron injection. Continue zytiga and dexamethasone.     Date: 1-13-24     Day 3: Has surpassed a 2nd midnight with active treatments and services, which include IR consult to evaluate right nephrostomy leakage, iv ceftriaxone.     ED Triage Vitals [01/11/24 2028]   100.3 °F (37.9 °C) 78 18 164/74 98 %      Tympanic Monitor         Pain Score       9          01/12/24 80.7 kg (177 lb 14.4 oz)     Additional Vital Signs:                Date/Time Temp Pulse Resp BP MAP (mmHg) SpO2 O2 Device   01/14/24 07:24:39 98.2 °F (36.8 °C) 70 18 123/65 84 97 % None (Room air)   01/14/24 00:21:12 98.4 °F (36.9 °C) 64 16 134/70 91 92 % --   01/13/24 2100 -- 79 -- -- -- 97 % --   01/13/24 2056 -- -- -- -- -- 94 % None (Room air)   01/13/24 1934 98.2 °F (36.8 °C) 56 18 137/70 92 97 % --   01/13/24 14:36:46 98.4 °F (36.9 °C) 59 18 138/70 93 95 % None (Room air)   01/13/24 0800 -- -- -- -- -- 94 % None (Room air)   01/13/24 0700 98.6 °F (37 °C) 67 18 128/69 89 -- --   01/13/24 06:58:07 98.6 °F (37 °C) 68 18 128/69 89 95 % None (Room air)   01/12/24 23:23:08 98.5 °F (36.9 °C) 70 16 126/68 87 96 % --   01/12/24 2148 -- -- -- -- -- -- None (Room air)   01/12/24 2100 -- 85 -- -- -- 94 % --   01/12/24 19:07:14 98.4 °F (36.9 °C) 68 18 127/68 88 96 % --   01/12/24 07:27:14 -- 71 -- 132/67 89 97 % --   01/12/24 07:06:28 98.7 °F (37.1 °C) 72 -- 135/68 90 97 % --   01/12/24 07:05:58 98.7 °F (37.1 °C) 77 -- 135/68 90 96 % --   01/12/24 0240 98.4 °F (36.9 °C) 73 18 137/90 -- -- --   01/12/24 01:56:26 98.4 °F (36.9 °C) 73 -- 137/70 92 97 % --   01/12/24 0100 -- 78 18 135/74 99 97 % --           Pertinent Labs/Diagnostic Test Results:     CT abdomen pelvis with contrast   Final  (01/11 2341)      Exam limited by motion artifact. Findings consistent with right pyelitis/ureteritis and cystitis noted. Increased asymmetrical right perinephric and periureteral inflammatory stranding with  slightly delayed right nephrogram and subtle heterogeneous    attenuation/enhancement of the right kidney raising suspicion for right pyelonephritis. Recommend correlation with urinalysis. Additional ancillary findings detailed above.            Results from last 7 days   Lab Units 01/14/24  0629 01/13/24  0627 01/12/24 0629 01/11/24  2108   WBC Thousand/uL 10.32* 9.62 12.37* 14.09*   HEMOGLOBIN g/dL 13.2 12.5 13.3 13.4   HEMATOCRIT % 38.2 35.9* 38.1 38.0   PLATELETS Thousands/uL 393* 330 364 377   NEUTROS ABS Thousands/µL 5.28  --  9.12* 11.34*         Results from last 7 days   Lab Units 01/14/24  0629 01/13/24  0627 01/12/24 0629 01/11/24  2108   SODIUM mmol/L 139 138 135 130*   POTASSIUM mmol/L 3.4* 3.5 3.8 4.2   CHLORIDE mmol/L 105 106 102 98   CO2 mmol/L 27 26 27 25   ANION GAP mmol/L 7 6 6 7   BUN mg/dL 18 18 16 20   CREATININE mg/dL 0.98 0.91 0.99 1.18   EGFR ml/min/1.73sq m 77 84 76 61   CALCIUM mg/dL 9.3 9.0 9.1 9.0   MAGNESIUM mg/dL  --  2.0 1.8*  --      Results from last 7 days   Lab Units 01/14/24  0629 01/11/24  2108   AST U/L 12* 15   ALT U/L 13 20   ALK PHOS U/L 61 79   TOTAL PROTEIN g/dL 7.0 7.4   ALBUMIN g/dL 3.2* 3.6   TOTAL BILIRUBIN mg/dL 0.50 0.50     Results from last 7 days   Lab Units 01/14/24  1108 01/14/24  0736 01/14/24  0710 01/13/24  2040 01/13/24  1610 01/13/24  1111 01/13/24  0657 01/13/24  0316 01/12/24  2048 01/12/24  1550 01/12/24  1113 01/12/24  0705   POC GLUCOSE mg/dl 180* 91 64* 219* 217* 156* 77 142* 255* 213* 93 140     Results from last 7 days   Lab Units 01/14/24  0629 01/13/24  0627 01/12/24  0629 01/11/24  2108 01/08/24  0858   GLUCOSE RANDOM mg/dL 59* 79 155* 347* 278         Results from last 7 days   Lab Units 01/08/24  0858   HEMOGLOBIN A1C  11.1*         Results from last 7 days   Lab Units 01/11/24  2108   LACTIC ACID mmol/L 1.8       Results from last 7 days   Lab Units 01/11/24  2137   CLARITY UA  Slightly Cloudy   COLOR UA  Light Yellow   SPEC GRAV UA  1.010    PH UA  6.0   GLUCOSE UA mg/dl >=1000 (1%)*   KETONES UA mg/dl Negative   BLOOD UA  Large*   PROTEIN UA mg/dl 30 (1+)*   NITRITE UA  Positive*   BILIRUBIN UA  Negative   UROBILINOGEN UA E.U./dl 0.2   LEUKOCYTES UA  Moderate*   WBC UA /hpf Innumerable*   RBC UA /hpf 0-1   BACTERIA UA /hpf Innumerable*   EPITHELIAL CELLS WET PREP /hpf None Seen         Results from last 7 days   Lab Units 01/11/24 2137 01/11/24 2108   BLOOD CULTURE   --  No Growth at 48 hrs.  No Growth at 48 hrs.   URINE CULTURE  >100,000 cfu/ml Escherichia coli*  50,000-59,000 cfu/ml Escherichia coli*  10,000-19,000 cfu/ml Lactobacillus species*  --        ED Treatment:   Medication Administration from 01/11/2024 2012 to 01/12/2024 0155         Date/Time Order Dose Route Action     01/11/2024 2123 EST morphine injection 4 mg 4 mg Intravenous Given     01/12/2024 0048 EST cefepime (MAXIPIME) IVPB (premix in dextrose) 2,000 mg 50 mL 2,000 mg Intravenous New Bag          Past Medical History:   Diagnosis Date    Diabetes mellitus (HCC)     High cholesterol     Hypertension     Prostate cancer (HCC)      Present on Admission:   Hypertension   Prostate cancer (HCC)      Admitting Diagnosis:     Pyelitis [N12]  Ureteritis [N28.89]  Pyelonephritis [N12]  Flank pain [R10.9]    Age/Sex: 73 y.o. male    Scheduled Medications:    abiraterone, 250 mg, Oral, Daily  cefTRIAXone, 1,000 mg, Intravenous, Q24H  dexamethasone, 0.5 mg, Oral, Daily  enoxaparin, 40 mg, Subcutaneous, Daily  fish oil, 1,000 mg, Oral, BID  insulin detemir, 45 Units, Subcutaneous, HS  insulin lispro, 1-5 Units, Subcutaneous, TID AC  insulin lispro, 1-5 Units, Subcutaneous, HS  insulin lispro, 1-5 Units, Subcutaneous, 0200  lisinopril, 10 mg, Oral, Daily  multivitamin-minerals, 1 tablet, Oral, Daily  pravastatin, 80 mg, Oral, Daily With Dinner  saccharomyces boulardii, 250 mg, Oral, BID      Continuous IV Infusions:     PRN Meds:  acetaminophen, 650 mg, Oral, Q6H PRN  ondansetron, 4 mg,  Intravenous, Q6H PRN  polyethylene glycol, 17 g, Oral, Daily PRN        IP CONSULT TO UROLOGY  INPATIENT CONSULT TO IR  IP CONSULT TO INFECTIOUS DISEASES    Network Utilization Review Department  ATTENTION: Please call with any questions or concerns to [phone] and carefully listen to the prompts so that you are directed to the right person. All voicemails are confidential.   For Discharge needs, contact Care Management DC Support Team at [phone] opt. [phone]  Send all requests for admission clinical reviews, approved or denied determinations and any other requests to dedicated fax number below belonging to the campus where the patient is receiving treatment. List of dedicated fax numbers for the Facilities:  FACILITY NAME UR FAX NUMBER   ADMISSION DENIALS (Administrative/Medical Necessity) [phone]   DISCHARGE SUPPORT TEAM (NETWORK) [phone]   PARENT CHILD HEALTH (Maternity/NICU/Pediatrics) [phone]   [facility] [phone]   [facility] [phone]   [facility] [phone]   [facility] [phone]   [facility] [phone]   [facility] [phone]   [facility] [phone]   [facility] [phone]   [facility] [phone]   [facility] [phone]   [facility] [phone]

## 2024-01-14 NOTE — PROGRESS NOTES
"Progress Note - Urology      Patient: Oliver Astudillo   : 1950 Sex: male   MRN: [id number]     CSN: [id number]  Unit/Bed#: [address] [place] [address]     SUBJECTIVE:   Patient seen on afternoon rounds  No issues other than right nephrostomy leakage either from external tubing or bag itself  IR consult to reevaluate right side  E. coli infection transitioning to Rocephin      Objective   Vitals: /65 (BP Location: Right arm)   Pulse 70   Temp 98.2 °F (36.8 °C) (Oral)   Resp 18   Ht 5' 8\" (1.727 m)   Wt 80.7 kg (177 lb 14.4 oz)   SpO2 97%   BMI 27.05 kg/m²     I/O last 24 hours:  In: 20 [Other:20]  Out: 704 [Urine:704]      Physical Exam:   General Alert awake   Normocephalic atraumatic PERRLA  Lungs clear bilaterally  Cardiac normal S1 normal S2  Abdomen soft, flank pain none  Right left nephrostomy tube clear urine  Extremities no edema      Lab Results: CBC:   Lab Results   Component Value Date    WBC 10.32 (H) 2024    HGB 13.2 2024    HCT 38.2 2024    MCV 91 2024     (H) 2024    RBC 4.19 2024    MCH 31.5 2024    MCHC 34.6 2024    RDW 12.3 2024    MPV 9.6 2024    NRBC 0 2024     CMP:   Lab Results   Component Value Date     2024    CO2 27 2024    BUN 18 2024    CREATININE 0.98 2024    CALCIUM 9.3 2024    AST 12 (L) 2024    ALT 13 2024    ALKPHOS 61 2024    EGFR 77 2024     Urinalysis:   Lab Results   Component Value Date    COLORU Light Yellow 2024    CLARITYU Slightly Cloudy 2024    SPECGRAV 1.010 2024    PHUR 6.0 2024    LEUKOCYTESUR Moderate (A) 2024    NITRITE Positive (A) 2024    GLUCOSEU >=1000 (1%) (A) 2024    KETONESU Negative 2024    BILIRUBINUR Negative 2024    BLOODU Large (A) 2024     Urine Culture:   Lab Results   Component Value Date    URINECX >100,000 cfu/ml Escherichia coli (A) 2024    URINECX " 50,000-59,000 cfu/ml Escherichia coli (A) 01/11/2024    URINECX 10,000-19,000 cfu/ml Lactobacillus species (A) 01/11/2024     PSA:   Lab Results   Component Value Date    PSA 0.16 12/27/2023    PSA 6.01 (H) 08/11/2023    .73 (H) 05/24/2023         Assessment/ Plan:  Right pyelonephritis  Right nephrostomy tube issues  Continue Rocephin  IR consult Monday for possible right tube reevaluation no change  To follow-up with [facility] urology for history of metastatic prostate cancer on Lupron/Zytiga and dexamethasone          Casey Talbert MD

## 2024-01-14 NOTE — ASSESSMENT & PLAN NOTE
Lab Results   Component Value Date    HGBA1C 11.1 (A) 01/08/2024     Chronic, uncontrolled.  A1c increased from 6.8-11.1 in the past 6 months.  Patient is following diabetic diet but eating more fruits recently, wife concerned that he is not following diabetic diet. He is also on Decadron 0.5 mg daily for prostate cancer.  Explained that this increase is likely related to use of oral steroids   Will increase Amaryl from 4 mg daily to 6 mg daily (take 4 mg with breakfast and 2 mg with lunch for a total of 6 mg daily).  Patient used to be on Invokana but it is no longer being covered by insurance.  Will start patient on Steglatro, advised to contact the office if there are any issues with coverage.  Increase insulin Levemir to 45 units at bedtime  Will send referral to nutrition services  RTO in 3 months or earlier if needed

## 2024-01-14 NOTE — ASSESSMENT & PLAN NOTE
Pt reporting some discomfort after his nephrostomy tube was exchanged, this has happened in the past. States nephrostomy tubes have been draining appropriately. No fever, chills.  Recommended patient to reach out to urology if discomfort does not improve or worsen- He does has f/u with uro scheduled for next week.  Advised patient to look out for signs of infection such as fever, chills, pain or redness at nephrostomy site.

## 2024-01-15 ENCOUNTER — APPOINTMENT (OUTPATIENT)
Dept: NON INVASIVE DIAGNOSTICS | Facility: HOSPITAL | Age: 74
DRG: 698 | End: 2024-01-15
Attending: STUDENT IN AN ORGANIZED HEALTH CARE EDUCATION/TRAINING PROGRAM
Payer: COMMERCIAL

## 2024-01-15 PROBLEM — T83.098A MALFUNCTION OF NEPHROSTOMY TUBE (HCC): Status: ACTIVE | Noted: 2024-01-15

## 2024-01-15 PROBLEM — U07.1 COVID-19: Status: ACTIVE | Noted: 2024-01-15

## 2024-01-15 LAB
ANION GAP SERPL CALCULATED.3IONS-SCNC: 7 MMOL/L
BASOPHILS # BLD AUTO: 0.04 THOUSANDS/ÂΜL (ref 0–0.1)
BASOPHILS NFR BLD AUTO: 1 % (ref 0–1)
BUN SERPL-MCNC: 28 MG/DL (ref 5–25)
CALCIUM SERPL-MCNC: 9.3 MG/DL (ref 8.4–10.2)
CHLORIDE SERPL-SCNC: 103 MMOL/L (ref 96–108)
CO2 SERPL-SCNC: 27 MMOL/L (ref 21–32)
CREAT SERPL-MCNC: 0.98 MG/DL (ref 0.6–1.3)
EOSINOPHIL # BLD AUTO: 0.14 THOUSAND/ÂΜL (ref 0–0.61)
EOSINOPHIL NFR BLD AUTO: 2 % (ref 0–6)
ERYTHROCYTE [DISTWIDTH] IN BLOOD BY AUTOMATED COUNT: 12.1 % (ref 11.6–15.1)
GFR SERPL CREATININE-BSD FRML MDRD: 77 ML/MIN/1.73SQ M
GLUCOSE SERPL-MCNC: 144 MG/DL (ref 65–140)
GLUCOSE SERPL-MCNC: 164 MG/DL (ref 65–140)
GLUCOSE SERPL-MCNC: 211 MG/DL (ref 65–140)
GLUCOSE SERPL-MCNC: 221 MG/DL (ref 65–140)
GLUCOSE SERPL-MCNC: 226 MG/DL (ref 65–140)
GLUCOSE SERPL-MCNC: 291 MG/DL (ref 65–140)
HCT VFR BLD AUTO: 37.8 % (ref 36.5–49.3)
HGB BLD-MCNC: 12.3 G/DL (ref 12–17)
IMM GRANULOCYTES # BLD AUTO: 0.05 THOUSAND/UL (ref 0–0.2)
IMM GRANULOCYTES NFR BLD AUTO: 1 % (ref 0–2)
LYMPHOCYTES # BLD AUTO: 2.88 THOUSANDS/ÂΜL (ref 0.6–4.47)
LYMPHOCYTES NFR BLD AUTO: 34 % (ref 14–44)
MAGNESIUM SERPL-MCNC: 2 MG/DL (ref 1.9–2.7)
MCH RBC QN AUTO: 30.6 PG (ref 26.8–34.3)
MCHC RBC AUTO-ENTMCNC: 32.5 G/DL (ref 31.4–37.4)
MCV RBC AUTO: 94 FL (ref 82–98)
MONOCYTES # BLD AUTO: 0.6 THOUSAND/ÂΜL (ref 0.17–1.22)
MONOCYTES NFR BLD AUTO: 7 % (ref 4–12)
NEUTROPHILS # BLD AUTO: 4.89 THOUSANDS/ÂΜL (ref 1.85–7.62)
NEUTS SEG NFR BLD AUTO: 55 % (ref 43–75)
NRBC BLD AUTO-RTO: 0 /100 WBCS
PLATELET # BLD AUTO: 375 THOUSANDS/UL (ref 149–390)
PMV BLD AUTO: 10.2 FL (ref 8.9–12.7)
POTASSIUM SERPL-SCNC: 3.9 MMOL/L (ref 3.5–5.3)
RBC # BLD AUTO: 4.02 MILLION/UL (ref 3.88–5.62)
SARS-COV-2 RNA RESP QL NAA+PROBE: POSITIVE
SODIUM SERPL-SCNC: 137 MMOL/L (ref 135–147)
WBC # BLD AUTO: 8.6 THOUSAND/UL (ref 4.31–10.16)

## 2024-01-15 PROCEDURE — 80048 BASIC METABOLIC PNL TOTAL CA: CPT | Performed by: NURSE PRACTITIONER

## 2024-01-15 PROCEDURE — 87635 SARS-COV-2 COVID-19 AMP PRB: CPT | Performed by: INTERNAL MEDICINE

## 2024-01-15 PROCEDURE — 83735 ASSAY OF MAGNESIUM: CPT | Performed by: NURSE PRACTITIONER

## 2024-01-15 PROCEDURE — 82948 REAGENT STRIP/BLOOD GLUCOSE: CPT

## 2024-01-15 PROCEDURE — 50431 NJX PX NFROSGRM &/URTRGRM: CPT

## 2024-01-15 PROCEDURE — 85025 COMPLETE CBC W/AUTO DIFF WBC: CPT | Performed by: NURSE PRACTITIONER

## 2024-01-15 PROCEDURE — 99232 SBSQ HOSP IP/OBS MODERATE 35: CPT | Performed by: PHYSICIAN ASSISTANT

## 2024-01-15 RX ORDER — INSULIN LISPRO 100 [IU]/ML
5 INJECTION, SOLUTION INTRAVENOUS; SUBCUTANEOUS
Status: DISCONTINUED | OUTPATIENT
Start: 2024-01-15 | End: 2024-01-16 | Stop reason: HOSPADM

## 2024-01-15 RX ORDER — CEFAZOLIN SODIUM 2 G/50ML
2000 SOLUTION INTRAVENOUS EVERY 8 HOURS
Status: DISCONTINUED | OUTPATIENT
Start: 2024-01-16 | End: 2024-01-16 | Stop reason: HOSPADM

## 2024-01-15 RX ADMIN — DEXAMETHASONE 0.5 MG: 0.5 TABLET ORAL at 08:12

## 2024-01-15 RX ADMIN — ABIRATERONE ACETATE 250 MG: 250 TABLET, FILM COATED ORAL at 08:12

## 2024-01-15 RX ADMIN — Medication 250 MG: at 08:10

## 2024-01-15 RX ADMIN — INSULIN LISPRO 2 UNITS: 100 INJECTION, SOLUTION INTRAVENOUS; SUBCUTANEOUS at 17:29

## 2024-01-15 RX ADMIN — Medication 250 MG: at 17:28

## 2024-01-15 RX ADMIN — INSULIN LISPRO 1 UNITS: 100 INJECTION, SOLUTION INTRAVENOUS; SUBCUTANEOUS at 23:34

## 2024-01-15 RX ADMIN — INSULIN LISPRO 2 UNITS: 100 INJECTION, SOLUTION INTRAVENOUS; SUBCUTANEOUS at 08:09

## 2024-01-15 RX ADMIN — OMEGA-3 FATTY ACIDS CAP 1000 MG 1000 MG: 1000 CAP at 17:28

## 2024-01-15 RX ADMIN — OMEGA-3 FATTY ACIDS CAP 1000 MG 1000 MG: 1000 CAP at 08:10

## 2024-01-15 RX ADMIN — Medication 1 TABLET: at 08:10

## 2024-01-15 RX ADMIN — INSULIN DETEMIR 45 UNITS: 100 INJECTION, SOLUTION SUBCUTANEOUS at 23:34

## 2024-01-15 RX ADMIN — CEFTRIAXONE 1000 MG: 1 INJECTION, SOLUTION INTRAVENOUS at 12:48

## 2024-01-15 RX ADMIN — PRAVASTATIN SODIUM 80 MG: 80 TABLET ORAL at 17:28

## 2024-01-15 RX ADMIN — INSULIN LISPRO 5 UNITS: 100 INJECTION, SOLUTION INTRAVENOUS; SUBCUTANEOUS at 17:51

## 2024-01-15 RX ADMIN — IOHEXOL 10 ML: 350 INJECTION, SOLUTION INTRAVENOUS at 15:53

## 2024-01-15 RX ADMIN — ENOXAPARIN SODIUM 40 MG: 40 INJECTION SUBCUTANEOUS at 08:13

## 2024-01-15 RX ADMIN — INSULIN LISPRO 2 UNITS: 100 INJECTION, SOLUTION INTRAVENOUS; SUBCUTANEOUS at 02:32

## 2024-01-15 RX ADMIN — INSULIN LISPRO 1 UNITS: 100 INJECTION, SOLUTION INTRAVENOUS; SUBCUTANEOUS at 12:46

## 2024-01-15 NOTE — NURSING NOTE
Pt seen at bedside, oob in chair.  Pt states some pain right flank.  B/l PCN to bag drainage.left PCN draining without difficulty. Right PCN with minimal output.  Right PCN flushed without difficulty, pt denies any pain with flushing. Leg bag changed, no leaking notes with flushing.  Will make IR doc aware.

## 2024-01-15 NOTE — SEDATION DOCUMENTATION
Procedure completed by Dr Ho. Pt tolerated without issues. R PCN flushed, new dressing applied. New tubing applied to L PCN, trimmed to pt preference. Education provided to pt prior to and throughout procedure. Transported back to inpt room by IR staff, bedside report given.

## 2024-01-15 NOTE — ASSESSMENT & PLAN NOTE
Right flank pain with flushing tube on 1/11/24  Bilateral nephrostomy tubes were changed on 12/28.    Patient was seen in ED on 12/30 for right flank pain since nephrostomy tube change.  CT at that time showed properly positioned bilateral percutaneous nephrostomy tubes, asymmetric right  ureterectasis with periureteral edema.  UA consistent with infection.  Patient was discharged on Cipro for 7 days.  Completed antibiotic on Saturday.    CT AP on admission (1/11/24): Findings consistent with right pyelitis/ureteritis and cystitis noted. Increased asymmetrical right perinephric and periureteral inflammatory stranding with slightly delayed right nephrogram and subtle heterogeneous attenuation/enhancement of the right kidney raising suspicion for right pyelonephritis.  UA from right nephrostomy tube consistent with infection  Day #4 IV abx (cefepime x 1 and now 4 doses of ceftriaxone)  Urine culture with 2 strains of E. Coli, both susceptible to ancef  Stop IV ceftriaxone - change to IV ancef and plan for discharge on PO cephalosporin to complete course. Would use cefadroxil given BID dosing and complete total 7 day course (if discharged 1/16 after ceftriaxone would be an additional 2 days on discharge).  Await IR tube check  Urology following  Planning for prophylactic ABX prior to tube changes to prevent recurrent infections

## 2024-01-15 NOTE — PROGRESS NOTES
[facility]  Progress Note  Name: Oliver Astudillo I  MRN: [id number]  Unit/Bed#: [address] [place] [address] Date of Admission: 1/11/2024   Date of Service: 1/15/2024 I Hospital Day: 3    * Pyelonephritis  Assessment & Plan  Right flank pain with flushing tube on 1/11/24  Bilateral nephrostomy tubes were changed on 12/28.    Patient was seen in ED on 12/30 for right flank pain since nephrostomy tube change.  CT at that time showed properly positioned bilateral percutaneous nephrostomy tubes, asymmetric right  ureterectasis with periureteral edema.  UA consistent with infection.  Patient was discharged on Cipro for 7 days.  Completed antibiotic on Saturday.    CT AP on admission (1/11/24): Findings consistent with right pyelitis/ureteritis and cystitis noted. Increased asymmetrical right perinephric and periureteral inflammatory stranding with slightly delayed right nephrogram and subtle heterogeneous attenuation/enhancement of the right kidney raising suspicion for right pyelonephritis.  UA from right nephrostomy tube consistent with infection  Day #4 IV abx (cefepime x 1 and now 4 doses of ceftriaxone)  Urine culture with 2 strains of E. Coli, both susceptible to ancef  Stop IV ceftriaxone - change to IV ancef and plan for discharge on PO cephalosporin to complete course. Would use cefadroxil given BID dosing   Await IR tube check  Urology following  Planning for prophylactic ABX prior to tube changes to prevent recurrent infections    Malfunction of nephrostomy tube (HCC)  Assessment & Plan  Right nephrostomy tube with minimal drainage and per patient/family was leaking  Discussed with IR nurse - to proceed with tube check with fluoro this afternoon    Prostate cancer (HCC)  Assessment & Plan  Numerous sclerotic lesions throughout the osseous structures on CT  Received Lupron injection prior to admission.  Continue Zytiga and dexamethasone.  Follows with urology as outpt      COVID-19  Assessment &  Plan  Tested when roommate was to be placed in room with pt on 1/15 - returned positive  Incidental finding  Asymptomatic  Hold off on ABX  Afebrile, no URI symptoms    Hypertension  Assessment & Plan  -130s  Continue lisinopril    Type 2 diabetes mellitus, with long-term current use of insulin (HCC)  Assessment & Plan  Lab Results   Component Value Date    HGBA1C 11.1 (A) 01/08/2024       Recent Labs     01/14/24  2109 01/15/24  0229 01/15/24  0758 01/15/24  1125   POCGLU 336* 291* 164* 211*         Blood Sugar Average: Last 72 hrs:  (P) 186.0621483282351328  On Levemir, glimepiride and ertugliflozin at home  Continue Levemir - AM glucose 144  Hold oral agents  Add mealtime Humalog 5 units TID with meals  Discontinue 2 AM SSI coverage  ADA diet               VTE Pharmacologic Prophylaxis: VTE Score: 5 High Risk (Score >/= 5) - Pharmacological DVT Prophylaxis Ordered: enoxaparin (Lovenox). Sequential Compression Devices Ordered.    Mobility:   Basic Mobility Inpatient Raw Score: 22  JH-HLM Goal: 7: Walk 25 feet or more  JH-HLM Achieved: 6: Walk 10 steps or more  HLM Goal NOT achieved. Continue with multidisciplinary rounding and encourage appropriate mobility to improve upon HLM goals.    Patient Centered Rounds: I performed bedside rounds with nursing staff today.   Discussions with Specialists or Other Care Team Provider: nursing, IR    Education and Discussions with Family / Patient: Updated  (son) via phone.    Total Time Spent on Date of Encounter in care of patient:  mins. This time was spent on one or more of the following: performing physical exam; counseling and coordination of care; obtaining or reviewing history; documenting in the medical record; reviewing/ordering tests, medications or procedures; communicating with other healthcare professionals and discussing with patient's family/caregivers.    Current Length of Stay: 3 day(s)  Current Patient Status: Inpatient   Certification  "Statement: The patient will continue to require additional inpatient hospital stay due to IV ABX, tube check  Discharge Plan: Anticipate discharge tomorrow to home.    Code Status: Level 1 - Full Code    Subjective:   Feeling okay  Annoyed that he has covid as feels like he got it here  Has no URI symptoms  Son wants him to stay inpt as long as possible for ABX as \"does not rememebr to take them at home\" - understands need to ensure he is compliant with meds on discharge    Objective:     Vitals:   Temp (24hrs), Av °F (36.7 °C), Min:96.8 °F (36 °C), Max:98.6 °F (37 °C)    Temp:  [96.8 °F (36 °C)-98.6 °F (37 °C)] 96.8 °F (36 °C)  HR:  [69-78] 75  Resp:  [18] 18  BP: (127-141)/(59-70) 127/69  SpO2:  [91 %-98 %] 98 %  Body mass index is 27.05 kg/m².     Input and Output Summary (last 24 hours):     Intake/Output Summary (Last 24 hours) at 1/15/2024 1528  Last data filed at 1/15/2024 1517  Gross per 24 hour   Intake 200 ml   Output 150 ml   Net 50 ml       Physical Exam:   Physical Exam  Vitals and nursing note reviewed.   Constitutional:       General: He is not in acute distress.     Appearance: Normal appearance. He is not diaphoretic.      Comments: Son translated via phone   HENT:      Head: Normocephalic and atraumatic.      Mouth/Throat:      Mouth: Mucous membranes are moist.   Cardiovascular:      Rate and Rhythm: Normal rate and regular rhythm.   Pulmonary:      Effort: Pulmonary effort is normal.      Breath sounds: Normal breath sounds. No stridor. No wheezing, rhonchi or rales.   Abdominal:      General: Bowel sounds are normal.      Palpations: Abdomen is soft. There is no mass.      Tenderness: There is no abdominal tenderness. There is no guarding.      Comments: Neph tubes with no drainage from sites. Cloudy urine in bags - small rash around tape lines on right side   Musculoskeletal:      Right lower leg: No edema.      Left lower leg: No edema.   Skin:     General: Skin is warm and dry. "   Neurological:      Mental Status: He is alert.   Psychiatric:         Mood and Affect: Mood normal.         Behavior: Behavior normal.          Additional Data:     Labs:  Results from last 7 days   Lab Units 01/15/24  0530   WBC Thousand/uL 8.60   HEMOGLOBIN g/dL 12.3   HEMATOCRIT % 37.8   PLATELETS Thousands/uL 375   NEUTROS PCT % 55   LYMPHS PCT % 34   MONOS PCT % 7   EOS PCT % 2     Results from last 7 days   Lab Units 01/15/24  0530 01/14/24  0629   SODIUM mmol/L 137 139   POTASSIUM mmol/L 3.9 3.4*   CHLORIDE mmol/L 103 105   CO2 mmol/L 27 27   BUN mg/dL 28* 18   CREATININE mg/dL 0.98 0.98   ANION GAP mmol/L 7 7   CALCIUM mg/dL 9.3 9.3   ALBUMIN g/dL  --  3.2*   TOTAL BILIRUBIN mg/dL  --  0.50   ALK PHOS U/L  --  61   ALT U/L  --  13   AST U/L  --  12*   GLUCOSE RANDOM mg/dL 144* 59*         Results from last 7 days   Lab Units 01/15/24  1125 01/15/24  0758 01/15/24  0229 01/14/24  2109 01/14/24  1543 01/14/24  1108 01/14/24  0736 01/14/24  0710 01/13/24  2040 01/13/24  1610 01/13/24  1111 01/13/24  0657   POC GLUCOSE mg/dl 211* 164* 291* 336* 319* 180* 91 64* 219* 217* 156* 77         Results from last 7 days   Lab Units 01/11/24  2108   LACTIC ACID mmol/L 1.8       Lines/Drains:  Invasive Devices       Peripheral Intravenous Line  Duration             Peripheral IV 01/11/24 Distal;Left;Upper;Ventral (anterior) Arm 3 days              Drain  Duration              207 days    Nephrostomy Left 8.5 Fr. 18 days    Nephrostomy Right 8.5 Fr. 18 days                      Imaging: Reviewed radiology reports from this admission including: abdominal/pelvic CT    Recent Cultures (last 7 days):   Results from last 7 days   Lab Units 01/11/24  2137 01/11/24  2108   BLOOD CULTURE   --  No Growth at 72 hrs.  No Growth at 72 hrs.   URINE CULTURE  >100,000 cfu/ml Escherichia coli*  50,000-59,000 cfu/ml Escherichia coli*  10,000-19,000 cfu/ml Lactobacillus species*  --        Last 24 Hours Medication List:   Current  Facility-Administered Medications   Medication Dose Route Frequency Provider Last Rate    abiraterone  250 mg Oral Daily Cuinoel Rodríguez, ALTA      acetaminophen  650 mg Oral Q6H PRN ALTA Ferguson      [START ON 1/16/2024] cefazolin  2,000 mg Intravenous Q8H Kelly Lindsay PA-C      dexamethasone  0.5 mg Oral Daily Cuiyiarnold Chorik, ALTA      enoxaparin  40 mg Subcutaneous Daily Cuinoel Chorik, ALTA      fish oil  1,000 mg Oral BID Cuiyin Tammierik, CRMARCELINO      insulin detemir  45 Units Subcutaneous HS Cuiyin Yurik, ALTA      insulin lispro  1-5 Units Subcutaneous TID AC Cuigerdan Yurik, ALTA      insulin lispro  1-5 Units Subcutaneous HS Cuiyin Yurik, ALTA      insulin lispro  5 Units Subcutaneous TID With Meals Kelly Lindsay PA-C      lisinopril  10 mg Oral Daily Cuinoel Rodríguez, ALTA      multivitamin-minerals  1 tablet Oral Daily Cuinoel Rodríguez, ALTA      ondansetron  4 mg Intravenous Q6H PRN Cuinoel Yuribraxton, ALTA      polyethylene glycol  17 g Oral Daily PRN Cuiyiarnold Yurik, ALTA      pravastatin  80 mg Oral Daily With Dinner Cuinoel Choribraxton, ALTA      saccharomyces boulardii  250 mg Oral BID Cuiyiarnold Choribraxton, ALTA          Today, Patient Was Seen By: Kelly Lindsay PA-C    **Please Note: This note may have been constructed using a voice recognition system.**

## 2024-01-15 NOTE — ASSESSMENT & PLAN NOTE
Lab Results   Component Value Date    HGBA1C 11.1 (A) 01/08/2024       Recent Labs     01/14/24  2109 01/15/24  0229 01/15/24  0758 01/15/24  1125   POCGLU 336* 291* 164* 211*         Blood Sugar Average: Last 72 hrs:  (P) 186.5225845725950010  On Levemir, glimepiride and ertugliflozin at home  Diabetic diet

## 2024-01-15 NOTE — ASSESSMENT & PLAN NOTE
Right flank pain with flushing tube on 1/11/24  Bilateral nephrostomy tubes were changed on 12/28.    Patient was seen in ED on 12/30 for right flank pain since nephrostomy tube change.  CT at that time showed properly positioned bilateral percutaneous nephrostomy tubes, asymmetric right  ureterectasis with periureteral edema.  UA consistent with infection.  Patient was discharged on Cipro for 7 days.  Completed antibiotic on Saturday.    CT AP on admission (1/11/24): Findings consistent with right pyelitis/ureteritis and cystitis noted. Increased asymmetrical right perinephric and periureteral inflammatory stranding with slightly delayed right nephrogram and subtle heterogeneous attenuation/enhancement of the right kidney raising suspicion for right pyelonephritis.  IR tube check completed   UA from right nephrostomy tube consistent with infection  Day #4 IV abx (cefepime x 1 and now 4 doses of ceftriaxone)  Urine culture with 2 strains of E. Coli, both susceptible to ancef  Stop IV ceftriaxone - change to IV ancef and plan for discharge on PO cephalosporin to complete course. Would use cefadroxil given BID dosing   Discharged with Cefadroxil 1g BID x 7days  Outpatient follow up with Urology in 1-2 weeks

## 2024-01-15 NOTE — ASSESSMENT & PLAN NOTE
Tested when roommate was to be placed in room with pt on 1/15 - returned positive  Incidental finding  Asymptomatic  Hold off on ABX  Afebrile, no URI symptoms

## 2024-01-15 NOTE — ASSESSMENT & PLAN NOTE
Lab Results   Component Value Date    HGBA1C 11.1 (A) 01/08/2024       Recent Labs     01/14/24  2109 01/15/24  0229 01/15/24  0758 01/15/24  1125   POCGLU 336* 291* 164* 211*         Blood Sugar Average: Last 72 hrs:  (P) 186.9847190138936537  On Levemir, glimepiride and ertugliflozin at home  Continue Levemir - AM glucose 144  Hold oral agents  Add mealtime Humalog 5 units TID with meals  Discontinue 2 AM SSI coverage  ADA diet

## 2024-01-15 NOTE — ASSESSMENT & PLAN NOTE
Numerous sclerotic lesions throughout the osseous structures on CT  Received Lupron injection prior to admission.  Continue Zytiga and dexamethasone.  Follows with urology as outpt

## 2024-01-15 NOTE — PLAN OF CARE
Problem: GENITOURINARY - ADULT  Goal: Maintains or returns to baseline urinary function  Description: INTERVENTIONS:  - Assess urinary function  - Encourage oral fluids to ensure adequate hydration if ordered  - Administer IV fluids as ordered to ensure adequate hydration  - Administer ordered medications as needed  - Offer frequent toileting  - Follow urinary retention protocol if ordered  Outcome: Progressing     Problem: GENITOURINARY - ADULT  Goal: Urinary catheter remains patent  Description: INTERVENTIONS:  - Assess patency of urinary catheter  - If patient has a chronic ceballos, consider changing catheter if non-functioning  - Follow guidelines for intermittent irrigation of non-functioning urinary catheter  Outcome: Progressing     Problem: METABOLIC, FLUID AND ELECTROLYTES - ADULT  Goal: Electrolytes maintained within normal limits  Description: INTERVENTIONS:  - Monitor labs and assess patient for signs and symptoms of electrolyte imbalances  - Administer electrolyte replacement as ordered  - Monitor response to electrolyte replacements, including repeat lab results as appropriate  - Instruct patient on fluid and nutrition as appropriate  Outcome: Progressing

## 2024-01-15 NOTE — ASSESSMENT & PLAN NOTE
Right nephrostomy tube with minimal drainage and per patient/family was leaking  IR tube check completed on 1/15/23

## 2024-01-15 NOTE — ASSESSMENT & PLAN NOTE
Right nephrostomy tube with minimal drainage and per patient/family was leaking  Discussed with IR nurse - to proceed with tube check with fluoro this afternoon

## 2024-01-16 VITALS
BODY MASS INDEX: 26.96 KG/M2 | DIASTOLIC BLOOD PRESSURE: 68 MMHG | OXYGEN SATURATION: 96 % | SYSTOLIC BLOOD PRESSURE: 124 MMHG | HEART RATE: 82 BPM | HEIGHT: 68 IN | WEIGHT: 177.9 LBS | RESPIRATION RATE: 16 BRPM | TEMPERATURE: 98.3 F

## 2024-01-16 LAB
ANION GAP SERPL CALCULATED.3IONS-SCNC: 9 MMOL/L
BASOPHILS # BLD AUTO: 0.03 THOUSANDS/ÂΜL (ref 0–0.1)
BASOPHILS NFR BLD AUTO: 0 % (ref 0–1)
BUN SERPL-MCNC: 22 MG/DL (ref 5–25)
CALCIUM SERPL-MCNC: 9.4 MG/DL (ref 8.4–10.2)
CHLORIDE SERPL-SCNC: 105 MMOL/L (ref 96–108)
CO2 SERPL-SCNC: 24 MMOL/L (ref 21–32)
CREAT SERPL-MCNC: 0.94 MG/DL (ref 0.6–1.3)
EOSINOPHIL # BLD AUTO: 0.14 THOUSAND/ÂΜL (ref 0–0.61)
EOSINOPHIL NFR BLD AUTO: 2 % (ref 0–6)
ERYTHROCYTE [DISTWIDTH] IN BLOOD BY AUTOMATED COUNT: 12.4 % (ref 11.6–15.1)
GFR SERPL CREATININE-BSD FRML MDRD: 81 ML/MIN/1.73SQ M
GLUCOSE SERPL-MCNC: 59 MG/DL (ref 65–140)
GLUCOSE SERPL-MCNC: 91 MG/DL (ref 65–140)
HCT VFR BLD AUTO: 38.5 % (ref 36.5–49.3)
HGB BLD-MCNC: 13.2 G/DL (ref 12–17)
IMM GRANULOCYTES # BLD AUTO: 0.04 THOUSAND/UL (ref 0–0.2)
IMM GRANULOCYTES NFR BLD AUTO: 1 % (ref 0–2)
LYMPHOCYTES # BLD AUTO: 2.73 THOUSANDS/ÂΜL (ref 0.6–4.47)
LYMPHOCYTES NFR BLD AUTO: 31 % (ref 14–44)
MCH RBC QN AUTO: 31.6 PG (ref 26.8–34.3)
MCHC RBC AUTO-ENTMCNC: 34.3 G/DL (ref 31.4–37.4)
MCV RBC AUTO: 92 FL (ref 82–98)
MONOCYTES # BLD AUTO: 0.61 THOUSAND/ÂΜL (ref 0.17–1.22)
MONOCYTES NFR BLD AUTO: 7 % (ref 4–12)
NEUTROPHILS # BLD AUTO: 5.3 THOUSANDS/ÂΜL (ref 1.85–7.62)
NEUTS SEG NFR BLD AUTO: 59 % (ref 43–75)
NRBC BLD AUTO-RTO: 0 /100 WBCS
PLATELET # BLD AUTO: 370 THOUSANDS/UL (ref 149–390)
PMV BLD AUTO: 9.7 FL (ref 8.9–12.7)
POTASSIUM SERPL-SCNC: 3.7 MMOL/L (ref 3.5–5.3)
RBC # BLD AUTO: 4.18 MILLION/UL (ref 3.88–5.62)
SODIUM SERPL-SCNC: 138 MMOL/L (ref 135–147)
WBC # BLD AUTO: 8.85 THOUSAND/UL (ref 4.31–10.16)

## 2024-01-16 PROCEDURE — 82948 REAGENT STRIP/BLOOD GLUCOSE: CPT

## 2024-01-16 PROCEDURE — 99239 HOSP IP/OBS DSCHRG MGMT >30: CPT | Performed by: INTERNAL MEDICINE

## 2024-01-16 PROCEDURE — 80048 BASIC METABOLIC PNL TOTAL CA: CPT | Performed by: PHYSICIAN ASSISTANT

## 2024-01-16 PROCEDURE — 85025 COMPLETE CBC W/AUTO DIFF WBC: CPT | Performed by: PHYSICIAN ASSISTANT

## 2024-01-16 RX ORDER — CEFADROXIL 1000 MG/1
1 TABLET ORAL 2 TIMES DAILY
Qty: 14 TABLET | Refills: 0 | Status: SHIPPED | OUTPATIENT
Start: 2024-01-16 | End: 2024-01-23

## 2024-01-16 RX ADMIN — CEFAZOLIN SODIUM 2000 MG: 2 SOLUTION INTRAVENOUS at 00:10

## 2024-01-16 RX ADMIN — LISINOPRIL 10 MG: 10 TABLET ORAL at 10:03

## 2024-01-16 RX ADMIN — ENOXAPARIN SODIUM 40 MG: 40 INJECTION SUBCUTANEOUS at 10:02

## 2024-01-16 RX ADMIN — Medication 250 MG: at 10:02

## 2024-01-16 RX ADMIN — DEXAMETHASONE 0.5 MG: 0.5 TABLET ORAL at 10:12

## 2024-01-16 RX ADMIN — CEFAZOLIN SODIUM 2000 MG: 2 SOLUTION INTRAVENOUS at 10:54

## 2024-01-16 RX ADMIN — OMEGA-3 FATTY ACIDS CAP 1000 MG 1000 MG: 1000 CAP at 10:02

## 2024-01-16 RX ADMIN — Medication 1 TABLET: at 10:02

## 2024-01-16 RX ADMIN — ABIRATERONE ACETATE 250 MG: 250 TABLET, FILM COATED ORAL at 10:12

## 2024-01-16 NOTE — DISCHARGE SUMMARY
Critical access hospital  Discharge- Oliver Astudillo 1950, 73 y.o. male MRN: [id number]  Unit/Bed#: [address] [place] [address] Encounter: [id number]  Primary Care Provider: Eugenia Rodriguez MD   Date and time admitted to hospital: 1/11/2024  8:42 PM    * Pyelonephritis  Assessment & Plan  Pyelonephritis likely due to bilateral percutaneous nephrostomy tubes   Right flank pain with flushing tube on 1/11/24  Bilateral nephrostomy tubes were changed on 12/28.    Patient was seen in ED on 12/30 for right flank pain since nephrostomy tube change.  CT at that time showed properly positioned bilateral percutaneous nephrostomy tubes, asymmetric right  ureterectasis with periureteral edema.  UA consistent with infection.  Patient was discharged on Cipro for 7 days.  Completed antibiotic on Saturday.    CT AP on admission (1/11/24): Findings consistent with right pyelitis/ureteritis and cystitis noted. Increased asymmetrical right perinephric and periureteral inflammatory stranding with slightly delayed right nephrogram and subtle heterogeneous attenuation/enhancement of the right kidney raising suspicion for right pyelonephritis.  IR tube check completed   UA from right nephrostomy tube consistent with infection  Day #4 IV abx (cefepime x 1 and now 4 doses of ceftriaxone)  Urine culture with 2 strains of E. Coli, both susceptible to ancef  Stop IV ceftriaxone - change to IV ancef and plan for discharge on PO cephalosporin to complete course. Would use cefadroxil given BID dosing   Discharged with Cefadroxil 1g BID x 7days  Outpatient follow up with Urology in 1-2 weeks    COVID-19  Assessment & Plan  Tested when roommate was to be placed in room with pt on 1/15 - returned positive  Incidental finding  Asymptomatic  Hold off on ABX  Afebrile, no URI symptoms    Malfunction of nephrostomy tube (HCC)  Assessment & Plan  Right nephrostomy tube with minimal drainage and per patient/family was leaking  IR tube check  completed on 1/15/23    Prostate cancer (HCC)  Assessment & Plan  Numerous sclerotic lesions throughout the osseous structures on CT  Received Lupron injection prior to admission.  Continue Zytiga and dexamethasone.  Follows with urology as outpt      Hypertension  Assessment & Plan  -130s  Continue lisinopril    Type 2 diabetes mellitus, with long-term current use of insulin (HCC)  Assessment & Plan  Lab Results   Component Value Date    HGBA1C 11.1 (A) 01/08/2024       Recent Labs     01/14/24  2109 01/15/24  0229 01/15/24  0758 01/15/24  1125   POCGLU 336* 291* 164* 211*         Blood Sugar Average: Last 72 hrs:  (P) 186.0561504817738557  On Levemir, glimepiride and ertugliflozin at home  Diabetic diet        Medical Problems       Resolved Problems  Date Reviewed: 1/16/2024   None       Discharging Physician / Practitioner: Becki Shen PA-C  PCP: Eugenia Rodriguez MD  Admission Date:   Admission Orders (From admission, onward)       Ordered        01/12/24 0122  INPATIENT ADMISSION  Once                          Discharge Date: 01/16/24    Consultations During Hospital Stay:  Urology    Procedures Performed:   IR tube check    Significant Findings / Test Results:   CT A/P: Exam limited by motion artifact. Findings consistent with right pyelitis/ureteritis and cystitis noted. Increased asymmetrical right perinephric and periureteral inflammatory stranding with slightly delayed right nephrogram and subtle heterogeneous attenuation/enhancement of the right kidney raising suspicion for right pyelonephritis. Recommend correlation with urinalysis. Additional ancillary findings detailed above.    Incidental Findings:   None    Test Results Pending at Discharge (will require follow up):   None     Outpatient Tests Requested:  None    Complications:  None    Reason for Admission: Pyelonephritis    Hospital Course:   Oliver Astudillo is a 73 y.o. male patient who originally presented to the hospital on  "1/11/2024 due to right flank pain since changing of nephrostomy tube on 12/30.  Patient underwent CT scan which was consistent with right pyelitis/ureteritis and cystitis noted.  Patient was consulted by urology and IR.  Patient incidentally tested positive for COVID-19, but asymptomatic and currently oxygenating well on room air.  Patient had bilateral nephrostomy tube checks, which were completed on 1/15/2023.  Patient given 4 days of IV antibiotics, including cefepime and ceftriaxone.  Ceftriaxone was then stopped and patient was started on IV Ancef.  Patient to be discharged on oral cefadroxil with outpatient follow-up with urology team.      Please see above list of diagnoses and related plan for additional information.     Condition at Discharge: stable    Discharge Day Visit / Exam:   Subjective:  Patient reports feeling well today and ready to go home    Vitals: Blood Pressure: 161/71 (01/15/24 2330)  Pulse: 64 (01/15/24 2330)  Temperature: (!) 97.3 °F (36.3 °C) (01/15/24 2330)  Temp Source: Temporal (01/15/24 2330)  Respirations: 16 (01/15/24 2330)  Height: 5' 8\" (172.7 cm) (01/12/24 0240)  Weight - Scale: 80.7 kg (177 lb 14.4 oz) (01/12/24 0240)  SpO2: 98 % (01/15/24 2330)  Exam:   Physical Exam  Vitals and nursing note reviewed.   Constitutional:       General: He is not in acute distress.     Appearance: He is well-developed.   HENT:      Head: Normocephalic and atraumatic.   Eyes:      Conjunctiva/sclera: Conjunctivae normal.   Cardiovascular:      Rate and Rhythm: Normal rate and regular rhythm.      Heart sounds: No murmur heard.  Pulmonary:      Effort: Pulmonary effort is normal. No respiratory distress.      Breath sounds: Normal breath sounds.   Abdominal:      Palpations: Abdomen is soft.      Tenderness: There is no abdominal tenderness.   Musculoskeletal:         General: No swelling.      Cervical back: Neck supple.   Skin:     General: Skin is warm and dry.      Capillary Refill: Capillary " refill takes less than 2 seconds.   Neurological:      Mental Status: He is alert. Mental status is at baseline.   Psychiatric:         Mood and Affect: Mood normal.          Discussion with Family: Updated  (son) via phone.    Discharge instructions/Information to patient and family:   See after visit summary for information provided to patient and family.      Provisions for Follow-Up Care:  See after visit summary for information related to follow-up care and any pertinent home health orders.      Mobility at time of Discharge:   Basic Mobility Inpatient Raw Score: 23  JH-HLM Goal: 7: Walk 25 feet or more  JH-HLM Achieved: 7: Walk 25 feet or more  HLM Goal achieved. Continue to encourage appropriate mobility.     Disposition:   Home    Planned Readmission: None     Discharge Statement:  I spent 55 minutes discharging the patient. This time was spent on the day of discharge. I had direct contact with the patient on the day of discharge. Greater than 50% of the total time was spent examining patient, answering all patient questions, arranging and discussing plan of care with patient as well as directly providing post-discharge instructions.  Additional time then spent on discharge activities.    Discharge Medications:  See after visit summary for reconciled discharge medications provided to patient and/or family.      **Please Note: This note may have been constructed using a voice recognition system**

## 2024-01-16 NOTE — PLAN OF CARE
Problem: Potential for Falls  Goal: Patient will remain free of falls  Description: INTERVENTIONS:  - Educate patient/family on patient safety including physical limitations  - Instruct patient to call for assistance with activity   - Consult OT/PT to assist with strengthening/mobility   - Keep Call bell within reach  - Keep bed low and locked with side rails adjusted as appropriate  - Keep care items and personal belongings within reach  - Initiate and maintain comfort rounds  - Make Fall Risk Sign visible to staff  - Offer Toileting every 2 Hours, in advance of need  - Initiate/Maintain bed alarm  - Obtain necessary fall risk management equipment: socks  - Apply yellow socks and bracelet for high fall risk patients  - Consider moving patient to room near nurses station  Outcome: Progressing     Problem: GENITOURINARY - ADULT  Goal: Maintains or returns to baseline urinary function  Description: INTERVENTIONS:  - Assess urinary function  - Encourage oral fluids to ensure adequate hydration if ordered  - Administer IV fluids as ordered to ensure adequate hydration  - Administer ordered medications as needed  - Offer frequent toileting  - Follow urinary retention protocol if ordered  Outcome: Progressing     Problem: METABOLIC, FLUID AND ELECTROLYTES - ADULT  Goal: Electrolytes maintained within normal limits  Description: INTERVENTIONS:  - Monitor labs and assess patient for signs and symptoms of electrolyte imbalances  - Administer electrolyte replacement as ordered  - Monitor response to electrolyte replacements, including repeat lab results as appropriate  - Instruct patient on fluid and nutrition as appropriate  Outcome: Progressing     Problem: INFECTION - ADULT  Goal: Absence or prevention of progression during hospitalization  Description: INTERVENTIONS:  - Assess and monitor for signs and symptoms of infection  - Monitor lab/diagnostic results  - Monitor all insertion sites, i.e. indwelling lines, tubes,  and drains  - Monitor endotracheal if appropriate and nasal secretions for changes in amount and color  - [place] appropriate cooling/warming therapies per order  - Administer medications as ordered  - Instruct and encourage patient and family to use good hand hygiene technique  - Identify and instruct in appropriate isolation precautions for identified infection/condition  Outcome: Progressing     Problem: DISCHARGE PLANNING  Goal: Discharge to home or other facility with appropriate resources  Description: INTERVENTIONS:  - Identify barriers to discharge w/patient and caregiver  - Arrange for needed discharge resources and transportation as appropriate  - Identify discharge learning needs (meds, wound care, etc.)  - Arrange for interpretive services to assist at discharge as needed  - Refer to Case Management Department for coordinating discharge planning if the patient needs post-hospital services based on physician/advanced practitioner order or complex needs related to functional status, cognitive ability, or social support system  Outcome: Progressing     Problem: Knowledge Deficit  Goal: Patient/family/caregiver demonstrates understanding of disease process, treatment plan, medications, and discharge instructions  Description: Complete learning assessment and assess knowledge base.  Interventions:  - Provide teaching at level of understanding  - Provide teaching via preferred learning methods  Outcome: Progressing

## 2024-01-16 NOTE — DISCHARGE INSTR - AVS FIRST PAGE
Please follow up with Urology office in 1-2 weeks  Prescription for Cefadroxil 1g twice daily for 7 days sent to pharmacy

## 2024-01-17 ENCOUNTER — CLINICAL SUPPORT (OUTPATIENT)
Dept: NUTRITION | Facility: HOSPITAL | Age: 74
End: 2024-01-17
Payer: COMMERCIAL

## 2024-01-17 VITALS — WEIGHT: 176.6 LBS | BODY MASS INDEX: 26.85 KG/M2

## 2024-01-17 DIAGNOSIS — E11.69 TYPE 2 DIABETES MELLITUS WITH OTHER SPECIFIED COMPLICATION, WITH LONG-TERM CURRENT USE OF INSULIN (HCC): Chronic | ICD-10-CM

## 2024-01-17 DIAGNOSIS — Z79.4 TYPE 2 DIABETES MELLITUS WITH OTHER SPECIFIED COMPLICATION, WITH LONG-TERM CURRENT USE OF INSULIN (HCC): Chronic | ICD-10-CM

## 2024-01-17 LAB
BACTERIA BLD CULT: NORMAL
BACTERIA BLD CULT: NORMAL

## 2024-01-17 PROCEDURE — 97802 MEDICAL NUTRITION INDIV IN: CPT | Performed by: DIETITIAN, REGISTERED

## 2024-01-17 NOTE — UTILIZATION REVIEW
NOTIFICATION OF ADMISSION DISCHARGE   This is a Notification of Discharge from [facility]. Please be advised that this patient has been discharge from our facility. Below you will find the admission and discharge date and time including the patient’s disposition.   UTILIZATION REVIEW CONTACT:  Fariha Garsia  Utilization   Network Utilization Review Department  Phone: [phone] x carefully listen to the prompts. All voicemails are confidential.  Email: [email or web address]     ADMISSION INFORMATION  PRESENTATION DATE: 1/11/2024  8:42 PM  OBERVATION ADMISSION DATE:   INPATIENT ADMISSION DATE: 1/12/24  1:22 AM   DISCHARGE DATE: 1/16/2024  1:01 PM   DISPOSITION:Home/Self Care    Network Utilization Review Department  ATTENTION: Please call with any questions or concerns to [phone] and carefully listen to the prompts so that you are directed to the right person. All voicemails are confidential.   For Discharge needs, contact Care Management DC Support Team at [phone] opt. 2  Send all requests for admission clinical reviews, approved or denied determinations and any other requests to dedicated fax number below belonging to the campus where the patient is receiving treatment. List of dedicated fax numbers for the Facilities:  FACILITY NAME UR FAX NUMBER   ADMISSION DENIALS (Administrative/Medical Necessity) [phone]   DISCHARGE SUPPORT TEAM ([facility]) [phone]   PARENT CHILD HEALTH (Maternity/NICU/Pediatrics) [phone]   [facility] [phone]   [facility] [phone]   [facility] [phone]   [facility] [phone]   [facility] [phone]   [facility] [phone]   [facility] [phone]   [facility] [phone]    [facility] [phone]   Formerly [facility] [phone]   General acute hospital [phone]

## 2024-01-18 NOTE — PROGRESS NOTES
Nutrition Assessment Form    Patient Name: Oliver Astudillo    YOB: 1950    Sex: Male     Assessment Date: 1/17/2024  Start Time: 11:02 Stop Time: 11:45 Total Minutes: 43     Data:  Present at session: self and wife   Parent/Patient Concerns/reason for visit: Uncontrolled diabetes   Medical Dx/Reason for Referral: E11.69, Z79.4 Type 2 DM   Past Medical History:   Diagnosis Date    Diabetes mellitus (HCC)     High cholesterol     Hypertension     Prostate cancer (HCC)        Current Outpatient Medications   Medication Sig Dispense Refill    abiraterone (ZYTIGA) 250 mg tablet Take 1 tablet (250 mg total) by mouth daily With a low fat meal 30 tablet 11    albuterol (ProAir HFA) 90 mcg/act inhaler Inhale 2 puffs every 4 (four) hours as needed for wheezing or shortness of breath (Patient not taking: Reported on 12/21/2023) 8.5 g 0    Blood Glucose Monitoring Suppl (OneTouch Verio Reflect) w/Device KIT Check blood sugars once daily. Please substitute with appropriate alternative as covered by patient's insurance. Dx: E11.65 1 kit 0    Calcium Carb-Cholecalciferol (calcium carbonate-vitamin D) 500 mg-5 mcg tablet Take 1 tablet by mouth 2 (two) times a day with meals (Patient not taking: Reported on 11/28/2023) 180 tablet 3    cefadroxil (DURICEF) 1000 mg tablet Take 1 tablet (1,000 mg total) by mouth 2 (two) times a day for 7 days 14 tablet 0    dexamethasone (DECADRON) 0.5 mg tablet TAKE 1 TABLET (0.5 MG TOTAL) BY MOUTH DAILY WITH BREAKFAST 90 tablet 0    Easy Touch Pen Needles 31G X 6 MM MISC Use daily at bedtime 100 each 3    Ertugliflozin L-PyroglutamicAc 5 MG TABS Take 5 mg by mouth daily with breakfast 30 tablet 5    glimepiride (AMARYL) 2 mg tablet Take 1 tablet (2 mg total) by mouth daily with lunch 90 tablet 1    glimepiride (AMARYL) 4 mg tablet Take 4 mg by mouth daily with breakfast      glucose blood (OneTouch Verio) test strip Check blood sugars once daily. Please substitute with appropriate  alternative as covered by patient's insurance. Dx: E11.65 100 each 3    Levemir FlexPen 100 units/mL injection pen Inject 45 Units under the skin daily at bedtime      lisinopril (ZESTRIL) 10 mg tablet Take 1 tablet (10 mg total) by mouth daily 90 tablet 3    Lovaza 1 g capsule 2 (two) times a day      Multiple Vitamins-Minerals (Sentry) TABS Take 1 tablet by mouth daily      OneTouch Delica Lancets 33G MISC Check blood sugars once daily. Please substitute with appropriate alternative as covered by patient's insurance. Dx: E11.65 100 each 3    polyethylene glycol (GOLYTELY) 4000 mL solution Take 4,000 mL by mouth once for 1 dose 4000 mL 0    polyethylene glycol (MIRALAX) 17 g packet Take 17 g by mouth daily as needed (constipation) 30 each 3    rosuvastatin (CRESTOR) 10 MG tablet Take 1 tablet (10 mg total) by mouth daily (Patient taking differently: Take 10 mg by mouth daily at bedtime) 90 tablet 3    sodium chloride, PF, 0.9 % 10 mL by Intracatheter route daily Intracatheter flushing daily. May substitute prefilled syringe with normal saline 10 mL vials, 10 mL syringes, and 18 g blunt needles 900 mL 0    Spacer/Aero-Holding Chambers (AEROCHAMBER MINI CHAMBER) BILLY by Does not apply route see administration instructions 1 Device 0     No current facility-administered medications for this visit.        Additional Meds/Supplements:    Special Learning Needs/barriers to learning/any new barriers    Height: HC Readings from Last 5 Encounters:   No data found for HC      Weight: Wt Readings from Last 10 Encounters:   01/17/24 80.1 kg (176 lb 9.6 oz)   01/12/24 80.7 kg (177 lb 14.4 oz)   01/11/24 77.6 kg (171 lb)   01/08/24 77.6 kg (171 lb 2 oz)   12/21/23 81.1 kg (178 lb 12.8 oz)   11/28/23 80.3 kg (177 lb)   10/23/23 80.3 kg (177 lb)   09/20/23 77.1 kg (170 lb)   08/28/23 75.8 kg (167 lb)   08/11/23 73.4 kg (161 lb 12.8 oz)     Estimated body mass index is 26.85 kg/m² as calculated from the following:    Height as of  "24: 5' 8\" (1.727 m).    Weight as of this encounter: 80.1 kg (176 lb 9.6 oz).   Recent Weight Change: []Yes     [x]No  Amount:       Energy Needs: No calculation needed   No Known Allergies or intolerances    Social History     Substance and Sexual Activity   Alcohol Use Not Currently    Comment: socially       Social History     Tobacco Use   Smoking Status Former    Current packs/day: 0.00    Types: Cigarettes    Quit date:     Years since quittin.0    Passive exposure: Past   Smokeless Tobacco Never   Tobacco Comments    States he only smoked on the weekends       Who shops? patient and spouse   Who cooks/cooking methods/Eating out/take out habits   patient and spouse     Exercise:       Other: ie: Sleep habits/ stress level/ work habits household-lives with ?/ food security    Prior Nutritional Counseling? []Yes     [x]No  When:      Why:         Diet Hx:  Breakfast: Diet:  Coffee, toast, fruit, juice, egg   Lunch: Rice or mashed potatoes with chicken/beef/fish and vegetables/salad and soup about 3 times per week         Dinner: Rice, beans, Chicken/beef/fish, juice         Snacks: Fruit  Drinks juice and coffee with milk   Other Notes/ Initial Assessment:  PT and wife were at visit.  Was able to provide PT handouts on carbohydrate controlled diet in [place] which allowed for easier communication.  Also provided PT and his wife with portion control booklet which also discussed plate method of eating.  Discussed sources of CHO and juices and snacks.      Updated assessment (Follow up note only):           Nutrition Diagnosis:   Excess carbohydrate intake  related to Physiological causes requiring modified carbohydrate intake (i.e. diabetes mellitus) as  evidenced by Estimated carbohydrate intake that is consistently more than recommended amounts       Any change or new dx since previous visit:     Nutrition Diagnosis:         Medical Nutrition Therapy Intervention:  [x]Individualized Meal Plan: " "Plate method of eating []Understanding Lab Values   []Basic Pathophysiology of Disease []Food/Medication Interactions   []Food Diary []Exercise   [x]Lifestyle/Behavior Modification Techniques: 4-5 servings of CHO per meal, 1-2 CHO for snacks []Medication, Mechanism of Action   []Label Reading: CHO/ Na/ Fat/ other_________ []Self Blood Glucose Monitoring   []Weight/BMI Goals: gain/lose/maintain []Other -           Comprehension: []Excellent  []Very Good  [x]Good  []Fair   []Poor    Receptivity: []Excellent  []Very Good  [x]Good  []Fair   []Poor    Expected Compliance: []Excellent  []Very Good  [x]Good  []Fair   []Poor        Goals (initial)/ Progress made on previous goals/new goals:  Follow plate method of eating   2.    4-5 servings of CHO per meal   3.    1-2 servings of CHO for snacks with some protein       No follow-ups on file.  Labs:  CMP  Lab Results   Component Value Date    K 3.7 01/16/2024     01/16/2024    CO2 24 01/16/2024    BUN 22 01/16/2024    CREATININE 0.94 01/16/2024    GLUF 213 (H) 08/11/2023    CALCIUM 9.4 01/16/2024    CORRECTEDCA 9.9 01/14/2024    AST 12 (L) 01/14/2024    ALT 13 01/14/2024    ALKPHOS 61 01/14/2024    EGFR 81 01/16/2024       BMP  Lab Results   Component Value Date    CALCIUM 9.4 01/16/2024    K 3.7 01/16/2024    CO2 24 01/16/2024     01/16/2024    BUN 22 01/16/2024    CREATININE 0.94 01/16/2024       Lipids  No results found for: \"CHOL\"  No results found for: \"HDL\"  No results found for: \"LDLCALC\"  No results found for: \"TRIG\"  No results found for: \"CHOLHDL\"    Hemoglobin A1C  Lab Results   Component Value Date    HGBA1C 11.1 (A) 01/08/2024       Fasting Glucose  Lab Results   Component Value Date    GLUF 213 (H) 08/11/2023       Insulin     Thyroid  No results found for: \"TSH\", \"R0UDXOD\", \"I7QHFTE\", \"THYROIDAB\"    Hepatic Function Panel  Lab Results   Component Value Date    ALT 13 01/14/2024    AST 12 (L) 01/14/2024    ALKPHOS 61 01/14/2024       Celiac Disease " "Antibody Panel  No results found for: \"ENDOMYSIAL IGA\", \"GLIADIN IGA\", \"GLIADIN IGG\", \"IGA\", \"TISSUE TRANSGLUT AB\", \"TTG IGA\"   Iron  No results found for: \"IRON\", \"TIBC\", \"FERRITIN\"         Gilma Ferrer RD  [facility] CLINICAL NUTRITION [facility]  [address] [place] [id number]    "

## 2024-01-30 ENCOUNTER — APPOINTMENT (EMERGENCY)
Dept: RADIOLOGY | Facility: HOSPITAL | Age: 74
DRG: 698 | End: 2024-01-30
Payer: COMMERCIAL

## 2024-01-30 ENCOUNTER — HOSPITAL ENCOUNTER (INPATIENT)
Facility: HOSPITAL | Age: 74
LOS: 2 days | Discharge: HOME | DRG: 698 | End: 2024-02-02
Attending: EMERGENCY MEDICINE | Admitting: STUDENT IN AN ORGANIZED HEALTH CARE EDUCATION/TRAINING PROGRAM
Payer: COMMERCIAL

## 2024-01-30 DIAGNOSIS — E11.9 TYPE 2 DIABETES MELLITUS, WITH LONG-TERM CURRENT USE OF INSULIN (HCC): Chronic | ICD-10-CM

## 2024-01-30 DIAGNOSIS — Z79.818 ENCOUNTER FOR MONITORING ANDROGEN DEPRIVATION THERAPY: ICD-10-CM

## 2024-01-30 DIAGNOSIS — N13.30 HYDRONEPHROSIS, UNSPECIFIED HYDRONEPHROSIS TYPE: ICD-10-CM

## 2024-01-30 DIAGNOSIS — Z93.6 NEPHROSTOMY STATUS (HCC): ICD-10-CM

## 2024-01-30 DIAGNOSIS — T83.098A MALFUNCTION OF NEPHROSTOMY TUBE (HCC): ICD-10-CM

## 2024-01-30 DIAGNOSIS — N39.0 UTI (URINARY TRACT INFECTION): Primary | ICD-10-CM

## 2024-01-30 DIAGNOSIS — K59.00 CONSTIPATION: ICD-10-CM

## 2024-01-30 DIAGNOSIS — Z79.4 TYPE 2 DIABETES MELLITUS, WITH LONG-TERM CURRENT USE OF INSULIN (HCC): Chronic | ICD-10-CM

## 2024-01-30 PROBLEM — R97.20 ELEVATED PSA: Status: RESOLVED | Noted: 2023-06-21 | Resolved: 2024-01-30

## 2024-01-30 PROBLEM — D72.829 LEUKOCYTOSIS: Status: ACTIVE | Noted: 2024-01-30

## 2024-01-30 PROBLEM — U07.1 COVID-19: Status: RESOLVED | Noted: 2024-01-15 | Resolved: 2024-01-30

## 2024-01-30 LAB
ALBUMIN SERPL BCG-MCNC: 3.6 G/DL (ref 3.5–5)
ALP SERPL-CCNC: 82 U/L (ref 34–104)
ALT SERPL W P-5'-P-CCNC: 12 U/L (ref 7–52)
ANION GAP SERPL CALCULATED.3IONS-SCNC: 9 MMOL/L
AST SERPL W P-5'-P-CCNC: 18 U/L (ref 13–39)
BACTERIA UR QL AUTO: ABNORMAL /HPF
BASOPHILS # BLD AUTO: 0.05 THOUSANDS/ΜL (ref 0–0.1)
BASOPHILS NFR BLD AUTO: 0 % (ref 0–1)
BILIRUB SERPL-MCNC: 0.62 MG/DL (ref 0.2–1)
BILIRUB UR QL STRIP: NEGATIVE
BUN SERPL-MCNC: 19 MG/DL (ref 5–25)
CALCIUM SERPL-MCNC: 9.4 MG/DL (ref 8.4–10.2)
CARDIAC TROPONIN I PNL SERPL HS: <2 NG/L
CHLORIDE SERPL-SCNC: 96 MMOL/L (ref 96–108)
CLARITY UR: ABNORMAL
CO2 SERPL-SCNC: 24 MMOL/L (ref 21–32)
COLOR UR: YELLOW
CREAT SERPL-MCNC: 1.34 MG/DL (ref 0.6–1.3)
EOSINOPHIL # BLD AUTO: 0.01 THOUSAND/ΜL (ref 0–0.61)
EOSINOPHIL NFR BLD AUTO: 0 % (ref 0–6)
ERYTHROCYTE [DISTWIDTH] IN BLOOD BY AUTOMATED COUNT: 12.5 % (ref 11.6–15.1)
GFR SERPL CREATININE-BSD FRML MDRD: 52 ML/MIN/1.73SQ M
GLUCOSE SERPL-MCNC: 315 MG/DL (ref 65–140)
GLUCOSE SERPL-MCNC: 331 MG/DL (ref 65–140)
GLUCOSE UR STRIP-MCNC: ABNORMAL MG/DL
HCT VFR BLD AUTO: 39.8 % (ref 36.5–49.3)
HGB BLD-MCNC: 13.4 G/DL (ref 12–17)
HGB UR QL STRIP.AUTO: ABNORMAL
IMM GRANULOCYTES # BLD AUTO: 0.09 THOUSAND/UL (ref 0–0.2)
IMM GRANULOCYTES NFR BLD AUTO: 1 % (ref 0–2)
KETONES UR STRIP-MCNC: NEGATIVE MG/DL
LACTATE SERPL-SCNC: 1.9 MMOL/L (ref 0.5–2)
LEUKOCYTE ESTERASE UR QL STRIP: ABNORMAL
LYMPHOCYTES # BLD AUTO: 1.76 THOUSANDS/ΜL (ref 0.6–4.47)
LYMPHOCYTES NFR BLD AUTO: 11 % (ref 14–44)
MCH RBC QN AUTO: 31 PG (ref 26.8–34.3)
MCHC RBC AUTO-ENTMCNC: 33.7 G/DL (ref 31.4–37.4)
MCV RBC AUTO: 92 FL (ref 82–98)
MONOCYTES # BLD AUTO: 0.67 THOUSAND/ΜL (ref 0.17–1.22)
MONOCYTES NFR BLD AUTO: 4 % (ref 4–12)
NEUTROPHILS # BLD AUTO: 13.95 THOUSANDS/ΜL (ref 1.85–7.62)
NEUTS SEG NFR BLD AUTO: 84 % (ref 43–75)
NITRITE UR QL STRIP: POSITIVE
NON-SQ EPI CELLS URNS QL MICRO: ABNORMAL /HPF
NRBC BLD AUTO-RTO: 0 /100 WBCS
OTHER STN SPEC: ABNORMAL
PH UR STRIP.AUTO: 5 [PH]
PLATELET # BLD AUTO: 374 THOUSANDS/UL (ref 149–390)
PMV BLD AUTO: 9.7 FL (ref 8.9–12.7)
POTASSIUM SERPL-SCNC: 4.7 MMOL/L (ref 3.5–5.3)
PROT SERPL-MCNC: 8.2 G/DL (ref 6.4–8.4)
PROT UR STRIP-MCNC: ABNORMAL MG/DL
RBC # BLD AUTO: 4.32 MILLION/UL (ref 3.88–5.62)
RBC #/AREA URNS AUTO: ABNORMAL /HPF
SODIUM 24H UR-SCNC: 87 MOL/L
SODIUM SERPL-SCNC: 129 MMOL/L (ref 135–147)
SP GR UR STRIP.AUTO: 1.01 (ref 1–1.03)
UROBILINOGEN UR QL STRIP.AUTO: 0.2 E.U./DL
WBC # BLD AUTO: 16.53 THOUSAND/UL (ref 4.31–10.16)
WBC #/AREA URNS AUTO: ABNORMAL /HPF

## 2024-01-30 PROCEDURE — 87077 CULTURE AEROBIC IDENTIFY: CPT | Performed by: EMERGENCY MEDICINE

## 2024-01-30 PROCEDURE — 84300 ASSAY OF URINE SODIUM: CPT

## 2024-01-30 PROCEDURE — 87040 BLOOD CULTURE FOR BACTERIA: CPT | Performed by: EMERGENCY MEDICINE

## 2024-01-30 PROCEDURE — 99285 EMERGENCY DEPT VISIT HI MDM: CPT | Performed by: EMERGENCY MEDICINE

## 2024-01-30 PROCEDURE — 99223 1ST HOSP IP/OBS HIGH 75: CPT | Performed by: STUDENT IN AN ORGANIZED HEALTH CARE EDUCATION/TRAINING PROGRAM

## 2024-01-30 PROCEDURE — 82948 REAGENT STRIP/BLOOD GLUCOSE: CPT

## 2024-01-30 PROCEDURE — 96361 HYDRATE IV INFUSION ADD-ON: CPT

## 2024-01-30 PROCEDURE — 99284 EMERGENCY DEPT VISIT MOD MDM: CPT

## 2024-01-30 PROCEDURE — 87186 SC STD MICRODIL/AGAR DIL: CPT | Performed by: EMERGENCY MEDICINE

## 2024-01-30 PROCEDURE — G1004 CDSM NDSC: HCPCS

## 2024-01-30 PROCEDURE — 87086 URINE CULTURE/COLONY COUNT: CPT | Performed by: EMERGENCY MEDICINE

## 2024-01-30 PROCEDURE — 36415 COLL VENOUS BLD VENIPUNCTURE: CPT | Performed by: EMERGENCY MEDICINE

## 2024-01-30 PROCEDURE — 96374 THER/PROPH/DIAG INJ IV PUSH: CPT

## 2024-01-30 PROCEDURE — 96375 TX/PRO/DX INJ NEW DRUG ADDON: CPT

## 2024-01-30 PROCEDURE — 80053 COMPREHEN METABOLIC PANEL: CPT | Performed by: EMERGENCY MEDICINE

## 2024-01-30 PROCEDURE — 83935 ASSAY OF URINE OSMOLALITY: CPT

## 2024-01-30 PROCEDURE — 81001 URINALYSIS AUTO W/SCOPE: CPT | Performed by: EMERGENCY MEDICINE

## 2024-01-30 PROCEDURE — 84484 ASSAY OF TROPONIN QUANT: CPT | Performed by: EMERGENCY MEDICINE

## 2024-01-30 PROCEDURE — 87106 FUNGI IDENTIFICATION YEAST: CPT | Performed by: EMERGENCY MEDICINE

## 2024-01-30 PROCEDURE — 85025 COMPLETE CBC W/AUTO DIFF WBC: CPT | Performed by: EMERGENCY MEDICINE

## 2024-01-30 PROCEDURE — 83605 ASSAY OF LACTIC ACID: CPT | Performed by: EMERGENCY MEDICINE

## 2024-01-30 PROCEDURE — 74177 CT ABD & PELVIS W/CONTRAST: CPT

## 2024-01-30 RX ORDER — ALBUTEROL SULFATE 90 UG/1
2 INHALANT RESPIRATORY (INHALATION) EVERY 4 HOURS PRN
Status: DISCONTINUED | OUTPATIENT
Start: 2024-01-30 | End: 2024-02-02 | Stop reason: HOSPADM

## 2024-01-30 RX ORDER — DOCUSATE SODIUM 100 MG/1
100 CAPSULE, LIQUID FILLED ORAL DAILY
Status: DISCONTINUED | OUTPATIENT
Start: 2024-01-31 | End: 2024-02-02 | Stop reason: HOSPADM

## 2024-01-30 RX ORDER — DEXAMETHASONE 0.5 MG/1
0.5 TABLET ORAL DAILY
Status: DISCONTINUED | OUTPATIENT
Start: 2024-01-31 | End: 2024-02-02 | Stop reason: HOSPADM

## 2024-01-30 RX ORDER — ACETAMINOPHEN 325 MG/1
650 TABLET ORAL EVERY 6 HOURS PRN
Status: DISCONTINUED | OUTPATIENT
Start: 2024-01-30 | End: 2024-01-30

## 2024-01-30 RX ORDER — INSULIN LISPRO 100 [IU]/ML
1-6 INJECTION, SOLUTION INTRAVENOUS; SUBCUTANEOUS
Status: DISCONTINUED | OUTPATIENT
Start: 2024-01-30 | End: 2024-02-01

## 2024-01-30 RX ORDER — HEPARIN SODIUM 5000 [USP'U]/ML
5000 INJECTION, SOLUTION INTRAVENOUS; SUBCUTANEOUS EVERY 8 HOURS SCHEDULED
Status: DISCONTINUED | OUTPATIENT
Start: 2024-01-30 | End: 2024-02-02 | Stop reason: HOSPADM

## 2024-01-30 RX ORDER — PRAVASTATIN SODIUM 80 MG/1
80 TABLET ORAL
Status: DISCONTINUED | OUTPATIENT
Start: 2024-01-31 | End: 2024-02-02 | Stop reason: HOSPADM

## 2024-01-30 RX ORDER — CEFTRIAXONE 1 G/50ML
1000 INJECTION, SOLUTION INTRAVENOUS ONCE
Status: COMPLETED | OUTPATIENT
Start: 2024-01-30 | End: 2024-01-30

## 2024-01-30 RX ORDER — SODIUM CHLORIDE 9 MG/ML
10 INJECTION INTRAVENOUS DAILY
Status: DISCONTINUED | OUTPATIENT
Start: 2024-01-31 | End: 2024-01-31

## 2024-01-30 RX ORDER — DOCUSATE SODIUM 100 MG/1
100 CAPSULE, LIQUID FILLED ORAL DAILY
Status: DISCONTINUED | OUTPATIENT
Start: 2024-01-31 | End: 2024-01-30

## 2024-01-30 RX ORDER — ONDANSETRON 2 MG/ML
4 INJECTION INTRAMUSCULAR; INTRAVENOUS ONCE
Status: COMPLETED | OUTPATIENT
Start: 2024-01-30 | End: 2024-01-30

## 2024-01-30 RX ORDER — CEFTRIAXONE 1 G/50ML
1000 INJECTION, SOLUTION INTRAVENOUS EVERY 24 HOURS
Status: DISCONTINUED | OUTPATIENT
Start: 2024-01-31 | End: 2024-02-02 | Stop reason: HOSPADM

## 2024-01-30 RX ORDER — HYDROMORPHONE HCL IN WATER/PF 6 MG/30 ML
0.2 PATIENT CONTROLLED ANALGESIA SYRINGE INTRAVENOUS
Status: DISCONTINUED | OUTPATIENT
Start: 2024-01-30 | End: 2024-02-02 | Stop reason: HOSPADM

## 2024-01-30 RX ORDER — ABIRATERONE ACETATE 250 MG/1
250 TABLET ORAL DAILY
Status: DISCONTINUED | OUTPATIENT
Start: 2024-01-31 | End: 2024-02-02 | Stop reason: HOSPADM

## 2024-01-30 RX ORDER — SODIUM CHLORIDE 9 MG/ML
150 INJECTION, SOLUTION INTRAVENOUS CONTINUOUS
Status: DISCONTINUED | OUTPATIENT
Start: 2024-01-30 | End: 2024-01-31

## 2024-01-30 RX ORDER — OXYCODONE HYDROCHLORIDE 5 MG/1
5 TABLET ORAL EVERY 4 HOURS PRN
Status: DISCONTINUED | OUTPATIENT
Start: 2024-01-30 | End: 2024-02-02 | Stop reason: HOSPADM

## 2024-01-30 RX ORDER — INSULIN GLARGINE 100 [IU]/ML
45 INJECTION, SOLUTION SUBCUTANEOUS
Status: DISCONTINUED | OUTPATIENT
Start: 2024-01-30 | End: 2024-01-31

## 2024-01-30 RX ORDER — MORPHINE SULFATE 4 MG/ML
4 INJECTION, SOLUTION INTRAMUSCULAR; INTRAVENOUS ONCE
Status: COMPLETED | OUTPATIENT
Start: 2024-01-30 | End: 2024-01-30

## 2024-01-30 RX ORDER — LISINOPRIL 10 MG/1
10 TABLET ORAL DAILY
Status: DISCONTINUED | OUTPATIENT
Start: 2024-01-31 | End: 2024-01-30

## 2024-01-30 RX ORDER — LANOLIN ALCOHOL/MO/W.PET/CERES
1 CREAM (GRAM) TOPICAL 2 TIMES DAILY WITH MEALS
Status: DISCONTINUED | OUTPATIENT
Start: 2024-01-31 | End: 2024-02-02 | Stop reason: HOSPADM

## 2024-01-30 RX ORDER — ACETAMINOPHEN 325 MG/1
650 TABLET ORAL EVERY 4 HOURS PRN
Status: DISCONTINUED | OUTPATIENT
Start: 2024-01-30 | End: 2024-02-02 | Stop reason: HOSPADM

## 2024-01-30 RX ADMIN — INSULIN GLARGINE 45 UNITS: 100 INJECTION, SOLUTION SUBCUTANEOUS at 21:45

## 2024-01-30 RX ADMIN — SODIUM CHLORIDE 150 ML/HR: 0.9 INJECTION, SOLUTION INTRAVENOUS at 16:46

## 2024-01-30 RX ADMIN — INSULIN LISPRO 5 UNITS: 100 INJECTION, SOLUTION INTRAVENOUS; SUBCUTANEOUS at 21:44

## 2024-01-30 RX ADMIN — Medication 2.5 MG: at 22:41

## 2024-01-30 RX ADMIN — CEFTRIAXONE 1000 MG: 1 INJECTION, SOLUTION INTRAVENOUS at 18:58

## 2024-01-30 RX ADMIN — MORPHINE SULFATE 4 MG: 4 INJECTION INTRAVENOUS at 16:58

## 2024-01-30 RX ADMIN — HEPARIN SODIUM 5000 UNITS: 5000 INJECTION INTRAVENOUS; SUBCUTANEOUS at 21:46

## 2024-01-30 RX ADMIN — SODIUM CHLORIDE 150 ML/HR: 0.9 INJECTION, SOLUTION INTRAVENOUS at 23:05

## 2024-01-30 RX ADMIN — ONDANSETRON 4 MG: 2 INJECTION INTRAMUSCULAR; INTRAVENOUS at 16:58

## 2024-01-30 RX ADMIN — IOHEXOL 100 ML: 350 INJECTION, SOLUTION INTRAVENOUS at 18:52

## 2024-01-30 NOTE — CONSULTS
H&P Exam - Urology       Patient: Oliver Astudillo   : 1950 Sex: male   MRN: [id number]     CSN: [id number]      History of Present Illness   HPI:  Oliver Astudillo is a 73 y.o. 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   HENT: Negative for hearing loss and trouble swallowing.   Eyes: Negative for itching and visual disturbance.   Respiratory: Negative for chest tightness and shortness of breath.   Cardiovascular: Negative for chest pain, edema.   Gastrointestinal: Negative for abdominal distention, na abdominal pain, constipation, diarrhea, Nausea and vomiting.   Genitourinary: Negative for decreased urine volume, difficulty urinating, dysuria, enuresis, frequency, hematuria and urgency.   Musculoskeletal: Negative for gait problem and myalgias.   Neurological: Negative for dizziness and headaches.   Hematological: Does not bruise/bleed easily.       Historical Information   Past Medical History:   Diagnosis Date    Diabetes mellitus (HCC)     High cholesterol     Hypertension     Prostate cancer (HCC)      Past Surgical History:   Procedure Laterality Date    IR NEPHROSTOMY TUBE CHECK/CHANGE/REPOSITION/REINSERTION/UPSIZE  2023    IR NEPHROSTOMY TUBE CHECK/CHANGE/REPOSITION/REINSERTION/UPSIZE  2023    IR NEPHROSTOMY TUBE PLACEMENT  2023    bilateral    IR OTHER  1/15/2024    US GUIDED PROSTATE BIOPSY       Social History   Social History     Substance and Sexual Activity   Alcohol Use Not Currently     Comment: socially     Social History     Substance and Sexual Activity   Drug Use Never     Social History     Tobacco Use   Smoking Status Former    Current packs/day: 0.00    Types: Cigarettes    Quit date:     Years since quittin.0    Passive exposure: Past   Smokeless Tobacco Never   Tobacco Comments    States he only smoked on the weekends     Family History:   Family History   Problem Relation Age of Onset    Liver cancer Father        Meds/Allergies   (Not in a hospital admission)    No Known Allergies    Objective   Vitals: /76 (BP Location: Left arm)   Pulse 71   Temp 98.8 °F (37.1 °C) (Tympanic)   Resp 18   Wt 79.4 kg (175 lb)   SpO2 98%   BMI 26.61 kg/m²     Physical Exam:  General Alert awake   Normocephalic atraumatic PERRLA  Lungs clear bilaterally  Cardiac normal S1 normal S2  Abdomen soft, flank pain  Right nephrostomy tube obstructed irrigated 20 cc normal saline pyuric urine taken and sent  Left nephrostomy tube draining irrigated 20 cc normal saline pyuric urine sent  Extremities no edema    No intake/output data recorded.    Invasive Devices       Peripheral Intravenous Line  Duration             Peripheral IV 24 Distal;Left;Upper;Ventral (anterior) Arm <1 day              Drain  Duration             Nephrostomy Left 8.5 Fr. 33 days    Nephrostomy Right 8.5 Fr. 33 days                        Lab Results: CBC:   Lab Results   Component Value Date    WBC 16.53 (H) 2024    HGB 13.4 2024    HCT 39.8 2024    MCV 92 2024     2024    RBC 4.32 2024    MCH 31.0 2024    MCHC 33.7 2024    RDW 12.5 2024    MPV 9.7 2024    NRBC 0 2024     CMP:   Lab Results   Component Value Date    CL 96 2024    CO2 24 2024    BUN 19 2024    CREATININE 1.34 (H) 2024    CALCIUM 9.4 2024    AST 18 2024    ALT 12 2024    ALKPHOS 82 2024    EGFR 52 2024     Urinalysis:   Lab  Results   Component Value Date    COLORU Yellow 01/30/2024    CLARITYU Cloudy 01/30/2024    SPECGRAV 1.015 01/30/2024    PHUR 5.0 01/30/2024    LEUKOCYTESUR Large (A) 01/30/2024    NITRITE Positive (A) 01/30/2024    GLUCOSEU 100 (1/10%) (A) 01/30/2024    KETONESU Negative 01/30/2024    BILIRUBINUR Negative 01/30/2024    BLOODU Large (A) 01/30/2024     Urine Culture:   Lab Results   Component Value Date    URINECX >100,000 cfu/ml Escherichia coli (A) 01/11/2024    URINECX 50,000-59,000 cfu/ml Escherichia coli (A) 01/11/2024    URINECX 10,000-19,000 cfu/ml Lactobacillus species (A) 01/11/2024     PSA:   Lab Results   Component Value Date    PSA 0.16 12/27/2023    PSA 6.01 (H) 08/11/2023    .73 (H) 05/24/2023           Assessment/ Plan:  Bilateral hydronephrosis with advanced prostate cancer  Bilateral nephrostomy tubes  Right tube obstructed irrigated with infected pus sent for culture  Left tube also noting pyuria  Culture sent right and left kidney  CAT scan tonight  With schedule interventional radiology to change nephrostomy tube since they appear to be infected with previous admission just a few weeks ago      Casey Talbert MD

## 2024-01-30 NOTE — ASSESSMENT & PLAN NOTE
Poor drainage out of right nephrostomy tube per patient/family. IR tube check completed on 1/15/23

## 2024-01-30 NOTE — ASSESSMENT & PLAN NOTE
Lab Results   Component Value Date    HGBA1C 11.1 (A) 01/08/2024     Home meds: Levemir 45 un HS, Glimepiride 2 mg lunch, Glimepiride 4 mg breakfast, Steglatro 5 mg. Glucose acutely elevated likely due to steroid use and infection.    Pt having hypoglycemic episodes in the AM, poor PO intake.    Plan:  Decrease glargine to 30 U HS  Resume home medications  Monitor carefully for hypoglycemic episodes  Bring log to next PCP appointment   Continue to monitor glucose levels   Diabetic diet

## 2024-01-30 NOTE — ASSESSMENT & PLAN NOTE
Lab Results   Component Value Date    HGBA1C 11.1 (A) 01/08/2024     Home meds: Levemir 45 un HS, Glimepiride 2 mg lunch? Glimepiride 4 mg breakfast?? Steglatro 5 mg?

## 2024-01-30 NOTE — ED PROVIDER NOTES
History  Chief Complaint   Patient presents with    Flank Pain     Here with wife. Patient has bilateral nephrostomy tubes. States right tube not working for about a week. Started yesterday with pain left flank and abdomen, states left tube is working. Pain penis with pee pee he states      73-year-old male presents here with his wife states he has bilateral nephrostomy tubes.  She states they replaced back in May.  Unsure why they were placed at that time.  She states for the last 2 weeks there is been no urine coming out of the right tube and there is still urine coming out of the left tube.  Patient presents today with abdominal pain which she has had for couple days.  No other complaints        Prior to Admission Medications   Prescriptions Last Dose Informant Patient Reported? Taking?   Blood Glucose Monitoring Suppl (OneTouch Verio Reflect) w/Device KIT Past Week Self, Family Member No Yes   Sig: Check blood sugars once daily. Please substitute with appropriate alternative as covered by patient's insurance. Dx: E11.65   Calcium Carb-Cholecalciferol (calcium carbonate-vitamin D) 500 mg-5 mcg tablet Unknown Self, Family Member No No   Sig: Take 1 tablet by mouth 2 (two) times a day with meals   Easy Touch Pen Needles 31G X 6 MM MISC 2024 Family Member, Self No Yes   Sig: Use daily at bedtime   Ertugliflozin L-PyroglutamicAc 5 MG TABS 2024 Family Member, Self No Yes   Sig: Take 5 mg by mouth daily with breakfast   Levemir FlexPen 100 units/mL injection pen 2024 Family Member, Self Yes Yes   Sig: Inject 45 Units under the skin daily at bedtime   Lovaza 1 g capsule 2024 Family Member, Self Yes Yes   Si (two) times a day   Multiple Vitamins-Minerals (Sentry) TABS 2024 Family Member, Self Yes Yes   Sig: Take 1 tablet by mouth daily   OneTouch Delica Lancets 33G MISC Past Week Self, Family Member No Yes   Sig: Check blood sugars once daily. Please substitute with appropriate alternative as  covered by patient's insurance. Dx: E11.65   Spacer/Aero-Holding Chambers (AEROCHAMBER MINI CHAMBER) BILLY  Family Member, Self No No   Sig: by Does not apply route see administration instructions   abiraterone (ZYTIGA) 250 mg tablet 1/30/2024 Self, Family Member No Yes   Sig: Take 1 tablet (250 mg total) by mouth daily With a low fat meal   albuterol (ProAir HFA) 90 mcg/act inhaler Past Week Family Member, Self No Yes   Sig: Inhale 2 puffs every 4 (four) hours as needed for wheezing or shortness of breath   dexamethasone (DECADRON) 0.5 mg tablet 1/30/2024 Family Member, Self No Yes   Sig: TAKE 1 TABLET (0.5 MG TOTAL) BY MOUTH DAILY WITH BREAKFAST   glimepiride (AMARYL) 2 mg tablet 1/30/2024 Family Member, Self No Yes   Sig: Take 1 tablet (2 mg total) by mouth daily with lunch   glimepiride (AMARYL) 4 mg tablet 1/30/2024 Family Member, Self Yes Yes   Sig: Take 4 mg by mouth daily with breakfast   glucose blood (OneTouch Verio) test strip Past Week Self, Family Member No Yes   Sig: Check blood sugars once daily. Please substitute with appropriate alternative as covered by patient's insurance. Dx: E11.65   lisinopril (ZESTRIL) 10 mg tablet 1/30/2024 Self, Family Member No Yes   Sig: Take 1 tablet (10 mg total) by mouth daily   polyethylene glycol (GOLYTELY) 4000 mL solution   No No   Sig: Take 4,000 mL by mouth once for 1 dose   polyethylene glycol (MIRALAX) 17 g packet Past Week Family Member, Self No Yes   Sig: Take 17 g by mouth daily as needed (constipation)   rosuvastatin (CRESTOR) 10 MG tablet 1/29/2024 Self, Family Member No Yes   Sig: Take 1 tablet (10 mg total) by mouth daily   Patient taking differently: Take 10 mg by mouth daily at bedtime   sodium chloride, PF, 0.9 % 1/29/2024 Family Member, Self No Yes   Sig: 10 mL by Intracatheter route daily Intracatheter flushing daily. May substitute prefilled syringe with normal saline 10 mL vials, 10 mL syringes, and 18 g blunt needles      Facility-Administered  Medications: None       Past Medical History:   Diagnosis Date    COVID-19 01/15/2024    Diabetes mellitus (HCC)     Elevated PSA 2023    High cholesterol     Hypertension     Prostate cancer (HCC)        Past Surgical History:   Procedure Laterality Date    IR NEPHROSTOMY TUBE CHECK/CHANGE/REPOSITION/REINSERTION/UPSIZE  2023    IR NEPHROSTOMY TUBE CHECK/CHANGE/REPOSITION/REINSERTION/UPSIZE  2023    IR NEPHROSTOMY TUBE PLACEMENT  2023    bilateral    IR OTHER  1/15/2024    US GUIDED PROSTATE BIOPSY         Family History   Problem Relation Age of Onset    Liver cancer Father      I have reviewed and agree with the history as documented.    E-Cigarette/Vaping    E-Cigarette Use Never User      E-Cigarette/Vaping Substances    Nicotine No     THC No     CBD No     Flavoring No     Other No     Unknown No      Social History     Tobacco Use    Smoking status: Former     Current packs/day: 0.00     Types: Cigarettes     Quit date:      Years since quittin.0     Passive exposure: Past    Smokeless tobacco: Never    Tobacco comments:     States he only smoked on the weekends   Vaping Use    Vaping status: Never Used   Substance Use Topics    Alcohol use: Not Currently     Comment: socially    Drug use: Never       Review of Systems   Constitutional:  Negative for activity change, chills, diaphoresis and fever.   HENT:  Negative for congestion, ear pain, nosebleeds, sore throat, trouble swallowing and voice change.    Eyes:  Negative for pain, discharge and redness.   Respiratory:  Negative for apnea, cough, choking, shortness of breath, wheezing and stridor.    Cardiovascular:  Negative for chest pain and palpitations.   Gastrointestinal:  Positive for abdominal pain. Negative for abdominal distention, constipation, diarrhea, nausea and vomiting.   Endocrine: Negative for polydipsia.   Genitourinary:  Positive for decreased urine volume and flank pain. Negative for difficulty urinating,  dysuria, frequency, hematuria and urgency.   Musculoskeletal:  Negative for back pain, gait problem, joint swelling, myalgias, neck pain and neck stiffness.   Skin:  Negative for pallor and rash.   Neurological:  Negative for dizziness, tremors, syncope, speech difficulty, weakness, numbness and headaches.   Hematological:  Negative for adenopathy.   Psychiatric/Behavioral:  Negative for confusion, hallucinations, self-injury and suicidal ideas. The patient is not nervous/anxious.        Physical Exam  Physical Exam  Vitals and nursing note reviewed.   Constitutional:       General: He is not in acute distress.     Appearance: He is well-developed. He is not diaphoretic.   HENT:      Head: Normocephalic and atraumatic.      Right Ear: External ear normal.      Left Ear: External ear normal.      Nose: Nose normal.   Eyes:      Conjunctiva/sclera: Conjunctivae normal.      Pupils: Pupils are equal, round, and reactive to light.   Cardiovascular:      Rate and Rhythm: Normal rate and regular rhythm.      Heart sounds: Normal heart sounds.   Pulmonary:      Effort: Pulmonary effort is normal.      Breath sounds: Normal breath sounds.   Abdominal:      General: Bowel sounds are normal.      Palpations: Abdomen is soft.      Tenderness: There is abdominal tenderness.      Comments: Diffuse tenderness across his lower abdomen.  Into bilateral flanks.  Entrance to tubes looks well.  Does have urine in the left tube none in the right.   Musculoskeletal:         General: Normal range of motion.      Cervical back: Normal range of motion and neck supple.   Skin:     General: Skin is warm and dry.   Neurological:      Mental Status: He is alert and oriented to person, place, and time.      Deep Tendon Reflexes: Reflexes are normal and symmetric.   Psychiatric:         Behavior: Behavior is cooperative.         Vital Signs  ED Triage Vitals [01/30/24 1413]   Temperature Pulse Respirations Blood Pressure SpO2   98.8 °F (37.1 °C)  71 18 140/76 98 %      Temp Source Heart Rate Source Patient Position - Orthostatic VS BP Location FiO2 (%)   Tympanic Monitor Sitting Left arm --      Pain Score       8           Vitals:    01/30/24 1413 01/30/24 2025 01/30/24 2047   BP: 140/76 154/73 155/78   Pulse: 71 71 76   Patient Position - Orthostatic VS: Sitting Lying Lying         Visual Acuity      ED Medications  Medications   sodium chloride 0.9 % infusion (150 mL/hr Intravenous New Bag 1/30/24 1646)   cefTRIAXone (ROCEPHIN) IVPB (premix in dextrose) 1,000 mg 50 mL (has no administration in time range)   albuterol (PROVENTIL HFA,VENTOLIN HFA) inhaler 2 puff (has no administration in time range)   calcium carbonate-vitamin D 500 mg-5 mcg tablet 1 tablet (has no administration in time range)   multivitamin stress formula tablet 1 tablet (has no administration in time range)   pravastatin (PRAVACHOL) tablet 80 mg (has no administration in time range)   sodium chloride (PF) 0.9 % injection 10 mL (has no administration in time range)   heparin (porcine) subcutaneous injection 5,000 Units (5,000 Units Subcutaneous Given 1/30/24 2146)   acetaminophen (TYLENOL) tablet 650 mg (has no administration in time range)   morphine injection 2 mg (has no administration in time range)   docusate sodium (COLACE) capsule 100 mg (has no administration in time range)   insulin glargine (LANTUS) subcutaneous injection 45 Units 0.45 mL (45 Units Subcutaneous Given 1/30/24 2145)   insulin lispro (HumaLOG) 100 units/mL subcutaneous injection 1-6 Units (5 Units Subcutaneous Given 1/30/24 2144)   abiraterone (ZYTIGA) tablet 250 mg (has no administration in time range)   dexamethasone (DECADRON) tablet 0.5 mg (has no administration in time range)   ondansetron (ZOFRAN) injection 4 mg (4 mg Intravenous Given 1/30/24 1658)   morphine injection 4 mg (4 mg Intravenous Given 1/30/24 1658)   cefTRIAXone (ROCEPHIN) IVPB (premix in dextrose) 1,000 mg 50 mL (0 mg Intravenous Stopped  1/30/24 1928)   iohexol (OMNIPAQUE) 350 MG/ML injection (MULTI-DOSE) 100 mL (100 mL Intravenous Given 1/30/24 1852)       Diagnostic Studies  Results Reviewed       Procedure Component Value Units Date/Time    Blood culture #1 [[id number]] Collected: 01/30/24 1636    Lab Status: Preliminary result Specimen: Blood from Arm, Left Updated: 01/30/24 2201     Blood Culture Received in Microbiology Lab. Culture in Progress.    Blood culture #2 [[id number]] Collected: 01/30/24 1636    Lab Status: Preliminary result Specimen: Blood from Hand, Right Updated: 01/30/24 2201     Blood Culture Received in Microbiology Lab. Culture in Progress.    Sodium, urine, random [[id number]] Collected: 01/30/24 1636    Lab Status: Final result Specimen: Urine, Clean Catch Updated: 01/30/24 2109     Sodium, Ur 87    Osmolality, Urine, random [[id number]] Collected: 01/30/24 1636    Lab Status: In process Specimen: Urine, Clean Catch Updated: 01/30/24 2049    Urine culture [[id number]] Collected: 01/30/24 2028    Lab Status: In process Specimen: Urine, Left Nephrostomy Updated: 01/30/24 2039    Urine culture [[id number]] Collected: 01/30/24 2030    Lab Status: In process Specimen: Urine, Right Nephrostomy Updated: 01/30/24 2039    Urine Microscopic [[id number]]  (Abnormal) Collected: 01/30/24 1636    Lab Status: Final result Specimen: Urine, Clean Catch Updated: 01/30/24 1800     RBC, UA 2-4 /hpf      WBC, UA Innumerable /hpf      Epithelial Cells Occasional /hpf      Bacteria, UA Innumerable /hpf      OTHER OBSERVATIONS Yeast Cells Present    Lactic acid, plasma (w/reflex if result > 2.0) [[id number]]  (Normal) Collected: 01/30/24 1636    Lab Status: Final result Specimen: Blood from Arm, Left Updated: 01/30/24 1715     LACTIC ACID 1.9 mmol/L     Narrative:      Result may be elevated if tourniquet was used during collection.    HS Troponin 0hr (reflex protocol) [[id number]]  (Normal) Collected: 01/30/24 1636    Lab Status: Final result Specimen:  Blood from Arm, Left Updated: 01/30/24 1713     hs TnI 0hr <2 ng/L     Comprehensive metabolic panel [[id number]]  (Abnormal) Collected: 01/30/24 1636    Lab Status: Final result Specimen: Blood from Arm, Left Updated: 01/30/24 1709     Sodium 129 mmol/L      Potassium 4.7 mmol/L      Chloride 96 mmol/L      CO2 24 mmol/L      ANION GAP 9 mmol/L      BUN 19 mg/dL      Creatinine 1.34 mg/dL      Glucose 331 mg/dL      Calcium 9.4 mg/dL      AST 18 U/L      ALT 12 U/L      Alkaline Phosphatase 82 U/L      Total Protein 8.2 g/dL      Albumin 3.6 g/dL      Total Bilirubin 0.62 mg/dL      eGFR 52 ml/min/1.73sq m     Narrative:      [employer] guidelines for Chronic Kidney Disease (CKD):     Stage 1 with normal or high GFR (GFR > 90 mL/min/1.73 square meters)    Stage 2 Mild CKD (GFR = 60-89 mL/min/1.73 square meters)    Stage 3A Moderate CKD (GFR = 45-59 mL/min/1.73 square meters)    Stage 3B Moderate CKD (GFR = 30-44 mL/min/1.73 square meters)    Stage 4 Severe CKD (GFR = 15-29 mL/min/1.73 square meters)    Stage 5 End Stage CKD (GFR <15 mL/min/1.73 square meters)  Note: GFR calculation is accurate only with a steady state creatinine    UA (URINE) with reflex to Scope [[id number]]  (Abnormal) Collected: 01/30/24 1636    Lab Status: Final result Specimen: Urine, Clean Catch Updated: 01/30/24 1708     Color, UA Yellow     Clarity, UA Cloudy     Specific Gravity, UA 1.015     pH, UA 5.0     Leukocytes, UA Large     Nitrite, UA Positive     Protein,  (2+) mg/dl      Glucose,  (1/10%) mg/dl      Ketones, UA Negative mg/dl      Urobilinogen, UA 0.2 E.U./dl      Bilirubin, UA Negative     Occult Blood, UA Large    CBC and differential [[id number]]  (Abnormal) Collected: 01/30/24 1636    Lab Status: Final result Specimen: Blood from Arm, Left Updated: 01/30/24 1649     WBC 16.53 Thousand/uL      RBC 4.32 Million/uL      Hemoglobin 13.4 g/dL      Hematocrit 39.8 %      MCV 92 fL      MCH 31.0 pg       MCHC 33.7 g/dL      RDW 12.5 %      MPV 9.7 fL      Platelets 374 Thousands/uL      nRBC 0 /100 WBCs      Neutrophils Relative 84 %      Immat GRANS % 1 %      Lymphocytes Relative 11 %      Monocytes Relative 4 %      Eosinophils Relative 0 %      Basophils Relative 0 %      Neutrophils Absolute 13.95 Thousands/µL      Immature Grans Absolute 0.09 Thousand/uL      Lymphocytes Absolute 1.76 Thousands/µL      Monocytes Absolute 0.67 Thousand/µL      Eosinophils Absolute 0.01 Thousand/µL      Basophils Absolute 0.05 Thousands/µL     Urine culture [[id number]] Collected: 01/30/24 1637    Lab Status: In process Specimen: Urine, Left Nephrostomy Updated: 01/30/24 1647                   CT abdomen pelvis with contrast   Final Result by Kimberly Chapin MD (01/30 2055)      Bilateral percutaneous nephrostomy catheters remaining stable and in position within the renal pelves. Interval decrease in right-sided hydronephrosis with persistent abnormal urothelial enhancement and thickening throughout the renal pelvis and ureter    on the right. On the left interval increase in degree of hydronephrosis and urothelial thickening. See above for further details.      Diffuse sclerotic osseous metastases without significant change.         Workstation performed: [id number]                    Procedures  Procedures         ED Course                               SBIRT 22yo+      Flowsheet Row Most Recent Value   Initial Alcohol Screen: US AUDIT-C     1. How often do you have a drink containing alcohol? 0 Filed at: 01/30/2024 1600   2. How many drinks containing alcohol do you have on a typical day you are drinking?  0 Filed at: 01/30/2024 1600   3a. Male UNDER 65: How often do you have five or more drinks on one occasion? 0 Filed at: 01/30/2024 1600   3b. FEMALE Any Age, or MALE 65+: How often do you have 4 or more drinks on one occassion? 0 Filed at: 01/30/2024 1600   Audit-C Score 0 Filed at: 01/30/2024 1600    YANNI: How many times in the past year have you...    Used an illegal drug or used a prescription medication for non-medical reasons? Never Filed at: 01/30/2024 1600                      Medical Decision Making  Amount and/or Complexity of Data Reviewed  Labs: ordered.    Risk  Prescription drug management.  Decision regarding hospitalization.             Disposition  Final diagnoses:   UTI (urinary tract infection)     Time reflects when diagnosis was documented in both MDM as applicable and the Disposition within this note       Time User Action Codes Description Comment    1/30/2024  6:46 PM Matti Newman Add [N39.0] UTI (urinary tract infection)     1/30/2024  7:59 PM Chanell Foss Add [N13.30] Hydronephrosis, unspecified hydronephrosis type           ED Disposition       ED Disposition   Admit    Condition   Stable    Date/Time   Tue Jan 30, 2024 4407    Comment   Case was discussed with [facility] residents and the patient's admission status was agreed to be Admission Status: observation status to the service of Dr. De Los Santos .               Follow-up Information    None         Current Discharge Medication List        CONTINUE these medications which have NOT CHANGED    Details   abiraterone (ZYTIGA) 250 mg tablet Take 1 tablet (250 mg total) by mouth daily With a low fat meal  Qty: 30 tablet, Refills: 11    Associated Diagnoses: Prostate cancer (HCC)      albuterol (ProAir HFA) 90 mcg/act inhaler Inhale 2 puffs every 4 (four) hours as needed for wheezing or shortness of breath  Qty: 8.5 g, Refills: 0    Comments: Substitution to a formulary equivalent within the same pharmaceutical class is authorized.  Associated Diagnoses: Cough      Blood Glucose Monitoring Suppl (OneTouch Verio Reflect) w/Device KIT Check blood sugars once daily. Please substitute with appropriate alternative as covered by patient's insurance. Dx: E11.65  Qty: 1 kit, Refills: 0    Associated Diagnoses: Type 2 diabetes mellitus with other  specified complication, with long-term current use of insulin ([facility])      dexamethasone (DECADRON) 0.5 mg tablet TAKE 1 TABLET (0.5 MG TOTAL) BY MOUTH DAILY WITH BREAKFAST  Qty: 90 tablet, Refills: 0    Comments: DX Code Needed  PATIENT ASKING FOR REFILLS.  Associated Diagnoses: Malignant neoplasm of prostate ([facility])      Easy Touch Pen Needles 31G X 6 MM MISC Use daily at bedtime  Qty: 100 each, Refills: 3    Associated Diagnoses: Type 2 diabetes mellitus with other specified complication, with long-term current use of insulin ([facility])      Ertugliflozin L-PyroglutamicAc 5 MG TABS Take 5 mg by mouth daily with breakfast  Qty: 30 tablet, Refills: 5    Associated Diagnoses: Type 2 diabetes mellitus with other specified complication, with long-term current use of insulin ([facility])      !! glimepiride (AMARYL) 2 mg tablet Take 1 tablet (2 mg total) by mouth daily with lunch  Qty: 90 tablet, Refills: 1    Associated Diagnoses: Type 2 diabetes mellitus with other specified complication, with long-term current use of insulin ([facility])      !! glimepiride (AMARYL) 4 mg tablet Take 4 mg by mouth daily with breakfast      glucose blood (OneTouch Verio) test strip Check blood sugars once daily. Please substitute with appropriate alternative as covered by patient's insurance. Dx: E11.65  Qty: 100 each, Refills: 3    Associated Diagnoses: Type 2 diabetes mellitus with other specified complication, with long-term current use of insulin ([facility])      Levemir FlexPen 100 units/mL injection pen Inject 45 Units under the skin daily at bedtime      lisinopril (ZESTRIL) 10 mg tablet Take 1 tablet (10 mg total) by mouth daily  Qty: 90 tablet, Refills: 3    Associated Diagnoses: Primary hypertension      Lovaza 1 g capsule 2 (two) times a day      Multiple Vitamins-Minerals (Sentry) TABS Take 1 tablet by mouth daily      OneTouch Delica Lancets 33G MISC Check blood sugars once daily. Please substitute with appropriate alternative as covered by patient's  insurance. Dx: E11.65  Qty: 100 each, Refills: 3    Associated Diagnoses: Type 2 diabetes mellitus with other specified complication, with long-term current use of insulin (HCC)      polyethylene glycol (MIRALAX) 17 g packet Take 17 g by mouth daily as needed (constipation)  Qty: 30 each, Refills: 3    Associated Diagnoses: Constipation, unspecified constipation type      rosuvastatin (CRESTOR) 10 MG tablet Take 1 tablet (10 mg total) by mouth daily  Qty: 90 tablet, Refills: 3    Associated Diagnoses: Type 2 diabetes mellitus with other specified complication, with long-term current use of insulin (HCC)      sodium chloride, PF, 0.9 % 10 mL by Intracatheter route daily Intracatheter flushing daily. May substitute prefilled syringe with normal saline 10 mL vials, 10 mL syringes, and 18 g blunt needles  Qty: 900 mL, Refills: 0    Associated Diagnoses: Nephrostomy status (HCC)      Calcium Carb-Cholecalciferol (calcium carbonate-vitamin D) 500 mg-5 mcg tablet Take 1 tablet by mouth 2 (two) times a day with meals  Qty: 180 tablet, Refills: 3    Associated Diagnoses: Encounter for monitoring androgen deprivation therapy      polyethylene glycol (GOLYTELY) 4000 mL solution Take 4,000 mL by mouth once for 1 dose  Qty: 4000 mL, Refills: 0    Associated Diagnoses: Encounter for screening colonoscopy      Spacer/Aero-Holding Chambers (AEROCHAMBER MINI CHAMBER) BILLY by Does not apply route see administration instructions  Qty: 1 Device, Refills: 0    Associated Diagnoses: Cough       !! - Potential duplicate medications found. Please discuss with provider.          No discharge procedures on file.    PDMP Review         Value Time User    PDMP Reviewed  Yes 6/26/2023  3:59 PM Rubina De Los Santos MD            ED Provider  Electronically Signed by             Matti Newman DO  01/30/24 3767

## 2024-01-30 NOTE — ASSESSMENT & PLAN NOTE
Chronic, POA BP appears to be well controlled in 140s/70s. Home regimen includes lisinopril 10mg daily. Secondary to soft BP during hospitalization, continue to hold antihypertensives.

## 2024-01-30 NOTE — ASSESSMENT & PLAN NOTE
RESOLVED Na 129 on admission, likely due to poor oral intake. Urine sodium 87, urine osmolality 382.    Continue IVF NS  Continue to monitor Na on Am labs   Strict I/O's

## 2024-01-30 NOTE — ASSESSMENT & PLAN NOTE
Cr 1.34 on admission. BL appears to be around 0.9-1.2. Likely due to dehydration from poor oral intake     Continue IVF NS 150cc/hr   Continue to monitor Cr  Avoid nephrotoxic agents, if possible

## 2024-01-31 ENCOUNTER — APPOINTMENT (OUTPATIENT)
Dept: NON INVASIVE DIAGNOSTICS | Facility: HOSPITAL | Age: 74
DRG: 698 | End: 2024-01-31
Attending: STUDENT IN AN ORGANIZED HEALTH CARE EDUCATION/TRAINING PROGRAM
Payer: COMMERCIAL

## 2024-01-31 PROBLEM — E87.1 HYPONATREMIA: Status: RESOLVED | Noted: 2024-01-12 | Resolved: 2024-01-31

## 2024-01-31 LAB
ALBUMIN SERPL BCG-MCNC: 3.2 G/DL (ref 3.5–5)
ALP SERPL-CCNC: 60 U/L (ref 34–104)
ALT SERPL W P-5'-P-CCNC: 9 U/L (ref 7–52)
ANION GAP SERPL CALCULATED.3IONS-SCNC: 6 MMOL/L
AST SERPL W P-5'-P-CCNC: 9 U/L (ref 13–39)
BASOPHILS # BLD AUTO: 0.03 THOUSANDS/ΜL (ref 0–0.1)
BASOPHILS NFR BLD AUTO: 0 % (ref 0–1)
BILIRUB SERPL-MCNC: 0.45 MG/DL (ref 0.2–1)
BUN SERPL-MCNC: 16 MG/DL (ref 5–25)
CALCIUM ALBUM COR SERPL-MCNC: 9.7 MG/DL (ref 8.3–10.1)
CALCIUM SERPL-MCNC: 9.1 MG/DL (ref 8.4–10.2)
CHLORIDE SERPL-SCNC: 105 MMOL/L (ref 96–108)
CO2 SERPL-SCNC: 26 MMOL/L (ref 21–32)
CREAT SERPL-MCNC: 1.19 MG/DL (ref 0.6–1.3)
EOSINOPHIL # BLD AUTO: 0.07 THOUSAND/ΜL (ref 0–0.61)
EOSINOPHIL NFR BLD AUTO: 1 % (ref 0–6)
ERYTHROCYTE [DISTWIDTH] IN BLOOD BY AUTOMATED COUNT: 12.5 % (ref 11.6–15.1)
GFR SERPL CREATININE-BSD FRML MDRD: 61 ML/MIN/1.73SQ M
GLUCOSE SERPL-MCNC: 109 MG/DL (ref 65–140)
GLUCOSE SERPL-MCNC: 110 MG/DL (ref 65–140)
GLUCOSE SERPL-MCNC: 130 MG/DL (ref 65–140)
GLUCOSE SERPL-MCNC: 155 MG/DL (ref 65–140)
GLUCOSE SERPL-MCNC: 184 MG/DL (ref 65–140)
GLUCOSE SERPL-MCNC: 55 MG/DL (ref 65–140)
GLUCOSE SERPL-MCNC: 62 MG/DL (ref 65–140)
GLUCOSE SERPL-MCNC: 88 MG/DL (ref 65–140)
HCT VFR BLD AUTO: 34.6 % (ref 36.5–49.3)
HGB BLD-MCNC: 11.9 G/DL (ref 12–17)
IMM GRANULOCYTES # BLD AUTO: 0.04 THOUSAND/UL (ref 0–0.2)
IMM GRANULOCYTES NFR BLD AUTO: 0 % (ref 0–2)
LYMPHOCYTES # BLD AUTO: 2.74 THOUSANDS/ΜL (ref 0.6–4.47)
LYMPHOCYTES NFR BLD AUTO: 28 % (ref 14–44)
MCH RBC QN AUTO: 31.6 PG (ref 26.8–34.3)
MCHC RBC AUTO-ENTMCNC: 34.4 G/DL (ref 31.4–37.4)
MCV RBC AUTO: 92 FL (ref 82–98)
MONOCYTES # BLD AUTO: 0.74 THOUSAND/ΜL (ref 0.17–1.22)
MONOCYTES NFR BLD AUTO: 7 % (ref 4–12)
NEUTROPHILS # BLD AUTO: 6.35 THOUSANDS/ΜL (ref 1.85–7.62)
NEUTS SEG NFR BLD AUTO: 64 % (ref 43–75)
NRBC BLD AUTO-RTO: 0 /100 WBCS
OSMOLALITY UR: 382 MMOL/KG
PLATELET # BLD AUTO: 329 THOUSANDS/UL (ref 149–390)
PMV BLD AUTO: 9.6 FL (ref 8.9–12.7)
POTASSIUM SERPL-SCNC: 4.2 MMOL/L (ref 3.5–5.3)
PROT SERPL-MCNC: 6.9 G/DL (ref 6.4–8.4)
RBC # BLD AUTO: 3.77 MILLION/UL (ref 3.88–5.62)
SODIUM SERPL-SCNC: 137 MMOL/L (ref 135–147)
WBC # BLD AUTO: 9.97 THOUSAND/UL (ref 4.31–10.16)

## 2024-01-31 PROCEDURE — 85025 COMPLETE CBC W/AUTO DIFF WBC: CPT

## 2024-01-31 PROCEDURE — 80053 COMPREHEN METABOLIC PANEL: CPT

## 2024-01-31 PROCEDURE — 99153 MOD SED SAME PHYS/QHP EA: CPT

## 2024-01-31 PROCEDURE — 97161 PT EVAL LOW COMPLEX 20 MIN: CPT

## 2024-01-31 PROCEDURE — C1729 CATH, DRAINAGE: HCPCS

## 2024-01-31 PROCEDURE — 99152 MOD SED SAME PHYS/QHP 5/>YRS: CPT | Performed by: STUDENT IN AN ORGANIZED HEALTH CARE EDUCATION/TRAINING PROGRAM

## 2024-01-31 PROCEDURE — 0T25X0Z CHANGE DRAINAGE DEVICE IN KIDNEY, EXTERNAL APPROACH: ICD-10-PCS | Performed by: STUDENT IN AN ORGANIZED HEALTH CARE EDUCATION/TRAINING PROGRAM

## 2024-01-31 PROCEDURE — 50435 EXCHANGE NEPHROSTOMY CATH: CPT

## 2024-01-31 PROCEDURE — 99232 SBSQ HOSP IP/OBS MODERATE 35: CPT | Performed by: STUDENT IN AN ORGANIZED HEALTH CARE EDUCATION/TRAINING PROGRAM

## 2024-01-31 PROCEDURE — NC001 PR NO CHARGE: Performed by: STUDENT IN AN ORGANIZED HEALTH CARE EDUCATION/TRAINING PROGRAM

## 2024-01-31 PROCEDURE — C1769 GUIDE WIRE: HCPCS

## 2024-01-31 PROCEDURE — 99152 MOD SED SAME PHYS/QHP 5/>YRS: CPT

## 2024-01-31 PROCEDURE — 82948 REAGENT STRIP/BLOOD GLUCOSE: CPT

## 2024-01-31 PROCEDURE — 50435 EXCHANGE NEPHROSTOMY CATH: CPT | Performed by: STUDENT IN AN ORGANIZED HEALTH CARE EDUCATION/TRAINING PROGRAM

## 2024-01-31 RX ORDER — SODIUM CHLORIDE 9 MG/ML
10 INJECTION, SOLUTION INTRAMUSCULAR; INTRAVENOUS; SUBCUTANEOUS DAILY
Qty: 300 ML | Refills: 3 | Status: ON HOLD | OUTPATIENT
Start: 2024-01-31 | End: 2024-03-18 | Stop reason: SDUPTHER

## 2024-01-31 RX ORDER — LISINOPRIL 10 MG/1
10 TABLET ORAL DAILY
Status: DISCONTINUED | OUTPATIENT
Start: 2024-01-31 | End: 2024-01-31

## 2024-01-31 RX ORDER — INSULIN GLARGINE 100 [IU]/ML
34 INJECTION, SOLUTION SUBCUTANEOUS
Status: DISCONTINUED | OUTPATIENT
Start: 2024-01-31 | End: 2024-02-01

## 2024-01-31 RX ORDER — INSULIN GLARGINE 100 [IU]/ML
40 INJECTION, SOLUTION SUBCUTANEOUS
Status: DISCONTINUED | OUTPATIENT
Start: 2024-01-31 | End: 2024-01-31

## 2024-01-31 RX ORDER — DEXTROSE MONOHYDRATE 25 G/50ML
12.5 INJECTION, SOLUTION INTRAVENOUS ONCE
Status: COMPLETED | OUTPATIENT
Start: 2024-01-31 | End: 2024-01-31

## 2024-01-31 RX ORDER — FENTANYL CITRATE 50 UG/ML
INJECTION, SOLUTION INTRAMUSCULAR; INTRAVENOUS AS NEEDED
Status: COMPLETED | OUTPATIENT
Start: 2024-01-31 | End: 2024-01-31

## 2024-01-31 RX ORDER — SODIUM CHLORIDE 9 MG/ML
10 INJECTION INTRAVENOUS EVERY 12 HOURS SCHEDULED
Status: DISCONTINUED | OUTPATIENT
Start: 2024-02-01 | End: 2024-02-02 | Stop reason: HOSPADM

## 2024-01-31 RX ORDER — SODIUM CHLORIDE 9 MG/ML
10 INJECTION, SOLUTION INTRAMUSCULAR; INTRAVENOUS; SUBCUTANEOUS DAILY
Qty: 300 ML | Refills: 3 | Status: SHIPPED | OUTPATIENT
Start: 2024-01-31 | End: 2024-04-12

## 2024-01-31 RX ORDER — MIDAZOLAM HYDROCHLORIDE 2 MG/2ML
INJECTION, SOLUTION INTRAMUSCULAR; INTRAVENOUS AS NEEDED
Status: COMPLETED | OUTPATIENT
Start: 2024-01-31 | End: 2024-01-31

## 2024-01-31 RX ADMIN — FENTANYL CITRATE 25 MCG: 50 INJECTION, SOLUTION INTRAMUSCULAR; INTRAVENOUS at 15:21

## 2024-01-31 RX ADMIN — DOCUSATE SODIUM 100 MG: 100 CAPSULE, LIQUID FILLED ORAL at 20:40

## 2024-01-31 RX ADMIN — MIDAZOLAM 0.5 MG: 1 INJECTION INTRAMUSCULAR; INTRAVENOUS at 15:21

## 2024-01-31 RX ADMIN — DEXTROSE AND SODIUM CHLORIDE 75 ML/HR: 5; .9 INJECTION, SOLUTION INTRAVENOUS at 06:31

## 2024-01-31 RX ADMIN — FENTANYL CITRATE 25 MCG: 50 INJECTION, SOLUTION INTRAMUSCULAR; INTRAVENOUS at 15:26

## 2024-01-31 RX ADMIN — HEPARIN SODIUM 5000 UNITS: 5000 INJECTION INTRAVENOUS; SUBCUTANEOUS at 05:04

## 2024-01-31 RX ADMIN — SODIUM CHLORIDE 150 ML/HR: 0.9 INJECTION, SOLUTION INTRAVENOUS at 05:02

## 2024-01-31 RX ADMIN — INSULIN LISPRO 1 UNITS: 100 INJECTION, SOLUTION INTRAVENOUS; SUBCUTANEOUS at 21:36

## 2024-01-31 RX ADMIN — IOHEXOL 20 ML: 350 INJECTION, SOLUTION INTRAVENOUS at 15:46

## 2024-01-31 RX ADMIN — Medication 1 TABLET: at 18:01

## 2024-01-31 RX ADMIN — DEXTROSE MONOHYDRATE 12.5 G: 25 INJECTION, SOLUTION INTRAVENOUS at 06:30

## 2024-01-31 RX ADMIN — ABIRATERONE ACETATE 250 MG: 250 TABLET, FILM COATED ORAL at 08:18

## 2024-01-31 RX ADMIN — PRAVASTATIN SODIUM 80 MG: 80 TABLET ORAL at 18:01

## 2024-01-31 RX ADMIN — DEXAMETHASONE 0.5 MG: 0.5 TABLET ORAL at 08:25

## 2024-01-31 RX ADMIN — FENTANYL CITRATE 50 MCG: 50 INJECTION, SOLUTION INTRAMUSCULAR; INTRAVENOUS at 15:15

## 2024-01-31 RX ADMIN — Medication 1 TABLET: at 08:25

## 2024-01-31 RX ADMIN — CEFTRIAXONE 1000 MG: 1 INJECTION, SOLUTION INTRAVENOUS at 17:32

## 2024-01-31 RX ADMIN — MIDAZOLAM 0.5 MG: 1 INJECTION INTRAMUSCULAR; INTRAVENOUS at 15:26

## 2024-01-31 RX ADMIN — MIDAZOLAM 1 MG: 1 INJECTION INTRAMUSCULAR; INTRAVENOUS at 15:15

## 2024-01-31 RX ADMIN — SODIUM CHLORIDE 10 ML: 9 INJECTION, SOLUTION INTRAMUSCULAR; INTRAVENOUS; SUBCUTANEOUS at 09:54

## 2024-01-31 RX ADMIN — HEPARIN SODIUM 5000 UNITS: 5000 INJECTION INTRAVENOUS; SUBCUTANEOUS at 21:36

## 2024-01-31 RX ADMIN — INSULIN GLARGINE 34 UNITS: 100 INJECTION, SOLUTION SUBCUTANEOUS at 21:36

## 2024-01-31 RX ADMIN — B-COMPLEX W/ C & FOLIC ACID TAB 1 TABLET: TAB at 08:25

## 2024-01-31 NOTE — ASSESSMENT & PLAN NOTE
Patient has hx of constipation and reports to be taking dulcolax at home. Patients last BM in 1/30    Colace daily

## 2024-01-31 NOTE — ASSESSMENT & PLAN NOTE
Patient with history of prostate cancer and bilateral nephrostomy tubes presenting with 8/10 suprapubic pain radiating to left lower back.  Patient states this pain started yesterday and is associated with nausea.  Patient states the pain is resolved with Tylenol.  Of note patient was recently discharged on 1/16 with a similar problem however it was right-sided flank pain and patient was treated with antibiotics.  IR evaluation at that time stated that there was no indication for tube replacements.  Patient's nephrostomy tubes were last replaced on 12/28.    WBC 16.53, UA large leuks, pos nitrite, large occult blood, LA 1.9, Trop 2    ED: Morphine 4 mg, Zofran 4 mg, IV NS maintence, Rocephin 1g IV   CT A/P - Bilateral percutaneous nephrostomy catheters remaining stable and in position within the renal pelves. Interval decrease in right-sided hydronephrosis with persistent abnormal urothelial enhancement and thickening throughout the renal pelvis and ureter on the right. On the left interval increase in degree of hydronephrosis and urothelial thickening.    Poor urine output from right nephrostomy tube, is s/p tube change 1/31.     Renal NMR:  Examination demonstrates patency of the nephrostomy catheters bilaterally, with radiopharmaceutical activity seen within both catheters and collection bags. Right-sided hydro-ureteronephrosis.    Plan:  Abx  See UTI  Follow up with urology on outpatient basis

## 2024-01-31 NOTE — CONSULTS
e-Consult (IPC)  - Interventional Radiology  Oliver Astudillo 73 y.o. male MRN: [id number]  Unit/Bed#: [address] [place] [address] Encounter: [id number]          Interventional Radiology has been consulted to evaluate Oliver Astudillo    We were consulted by COLLEEN concerning this patient with prostate cancer leading to urinary obstruction treated with bilateral percutaneous nephrostomy tubes since 6/22/23, most recently exchanged 12/28/23, who has had recurrent nephrostomy tube dysfunction and urinary tract infections since last exchange.    He initially presented to ED on 12/30 with right flank pain after that exchange and urine cultures grew multiple bacteria (E. coli, E. cloacae, Lactobacilli, E. faecalis), clinically consistent with infection rather than colonization.  He was treated with a one week course of ciprofloxacin with improved symptoms that then recurred leading to repeat presentation to ED on 1/11/24; repeat cultures demonstrated clearance of multiple of the pathogens but persistent positive E. coli being treated with IV ceftriaxone.      On 1/15/24, the nephrostomy tubes were evaluated at bedside by the IR team without significant dysfunction; a right nephrostomy tube check under fluoroscopy was performed on the same date with findings of some debris but no other issues.    He presented again 1/30/24 with left flank pain and poor output from the right tube.  The right tube was cleared at bedside by Dr. Talbert; he noted pyuria from both tubes.    Inpatient Consult to IR  Consult performed by: Kilo San MD  Consult ordered by: Yas Webber DO      01/31/24    Assessment/Recommendation:   73-year-old man with prostate cancer leading to urinary obstruction treated with bilateral percutaneous nephrostomy tubes since 6/22/23, most recently exchanged 12/28/23, who has had recurrent nephrostomy tube dysfunction and urinary tract infections since last exchange.    Given recurrent obstructions, we will plan  for exchange/upsizing of both tubes to optimize drainage and reduce chances of obstruction leading to infection.      5-10 minutes, >50% of the total time devoted to medical consultative verbal/EMR discussion between providers. Written report will be generated in the EMR.     Thank you for allowing Interventional Radiology to participate in the care of Oliver Astudillo. Please don't hesitate to call or TigerText us with any questions.     Kilo San MD

## 2024-01-31 NOTE — ASSESSMENT & PLAN NOTE
Numerous sclerotic lesions noted on CT. Last received lupron on 1/11. Sees  Dr. Coyne at medical oncology     Continue Lupron q6 months, Zytiga and dexamethasone   F/u urology

## 2024-01-31 NOTE — OCCUPATIONAL THERAPY NOTE
OT EVALUATION       01/31/24 1340   Note Type   Note type Screen   Additional Comments Patient is independent with ADLS per PT, no skilled OT needs required at this time.   Licensure   [place] License Number  Goldie Bustos MS OTR/L [id number]

## 2024-01-31 NOTE — UTILIZATION REVIEW
Initial Clinical Review  OBSERVATION STATUS 1/30/24 CONVERTED TO INPATIENT ADMISSION 1/31/24 AS NEEDING CONTINUED IVF IV ABX AND B/L NEPHROSTOMY TUBE EXCHANGE  Admission: Date/Time/Statement:   Admission Orders (From admission, onward)       Ordered        01/31/24 1413  Inpatient Admission  Once            01/30/24 1912  Place in Observation  Once                          Orders Placed This Encounter   Procedures    Place in Observation     Standing Status:   Standing     Number of Occurrences:   1     Order Specific Question:   Level of Care     Answer:   Med Surg [16]    Inpatient Admission     Standing Status:   Standing     Number of Occurrences:   1     Order Specific Question:   Level of Care     Answer:   Med Surg [16]     Order Specific Question:   Estimated length of stay     Answer:   More than 2 Midnights     Order Specific Question:   Certification     Answer:   I certify that inpatient services are medically necessary for this patient for a duration of greater than two midnights. See H&P and MD Progress Notes for additional information about the patient's course of treatment.     ED Arrival Information       Expected   -    Arrival   1/30/2024 14:04    Acuity   Urgent              Means of arrival   Walk-In    Escorted by   Spouse    Service   Family Medicine    Admission type   Emergency              Arrival complaint   Flank Pain             Chief Complaint   Patient presents with    Flank Pain     Here with wife. Patient has bilateral nephrostomy tubes. States right tube not working for about a week. Started yesterday with pain left flank and abdomen, states left tube is working. Pain penis with pee pee he states        Initial Presentation: 73 y.o. male  PMHx of DM, HTN, HLD, prostate cancer currently on chemotherapy, and bilateral nephrostomy tubes recently changed on 12/28. who presents with 8/10 suprapubic pain radiating to left lower back. difficulty urinating since yesterday.   WBC 16.53, UA  large leuks, pos nitrite, large occult blood, LA 1.9, Trop 2   CT scan ordered  Continue IV Abx Rocephin, Urology seen pt in ED who irrigated both R and L tubes noted to have pyuria pending cx. Urine ,blood cx obtained, continue IVF, cotninue Lupron, Zytiga and dexamethasone for Prostate cancer  Cr 1.34 continue IVF   Hyponatremia Na 129 continue IVF NS   DM type 2 ISS  UROLOGY CONSULT  Bilateral hydronephrosis with advanced prostate cancer  Bilateral nephrostomy tubes  Right tube obstructed irrigated with infected pus sent for culture  Left tube also noting pyuria  Culture sent right and left kidney  CAT scan tonight  With schedule interventional radiology to change nephrostomy tube since they appear to be infected with previous admission just a few weeks ago    Date: 1/31/24   Day 2: Converted to Inpatient Status as needing continued IVF, IV Abx and b/l nephrostomy exchange  IR CONSULT  Given recurrent obstructions, we will plan for exchange/upsizing of both tubes to optimize drainage and reduce chances of obstruction leading to infection.       Attending  Patient had episode hypoglycemia with Blood gluocse 61,asymptomatic resolved with D5ns half amp D50. UTI continue IV Rocephin  Hydronephrosis  IR to exchange/upsizing b/l nephrostomy tubes . Continue IV Rocephin , pain regiment tylenol /morphine prn breakthrough pain      ED Triage Vitals [01/30/24 1413]   Temperature Pulse Respirations Blood Pressure SpO2   98.8 °F (37.1 °C) 71 18 140/76 98 %      Temp Source Heart Rate Source Patient Position - Orthostatic VS BP Location FiO2 (%)   Tympanic Monitor Sitting Left arm --      Pain Score       8          Wt Readings from Last 1 Encounters:   01/31/24 78.4 kg (172 lb 12.8 oz)     Additional Vital Signs:   01/31/24 07:37:09 98.4 °F (36.9 °C) 66 17 121/60 80 97 % -- --   01/30/24 23:18:48 98.8 °F (37.1 °C) 77 18 116/64 81 93 % None (Room air) Lying   01/30/24 20:47:29 99.5 °F (37.5 °C) 76 20 155/78 104 96 % None  (Room air) Lying   01/30/24 2025 -- 71 20 154/73 -- 97 % None (Room air) Lying     Pertinent Labs/Diagnostic Test Results:   CT abdomen pelvis with contrast   Final Result by Kimberly Chapin MD (01/30 2055)      Bilateral percutaneous nephrostomy catheters remaining stable and in position within the renal pelves. Interval decrease in right-sided hydronephrosis with persistent abnormal urothelial enhancement and thickening throughout the renal pelvis and ureter    on the right. On the left interval increase in degree of hydronephrosis and urothelial thickening. See above for further details.      Diffuse sclerotic osseous metastases without significant change.         Workstation performed: [id number]         IR nephrostomy tube check/change/reposition/reinsertion/upsize    (Results Pending)         Results from last 7 days   Lab Units 01/31/24  0458 01/30/24  1636   WBC Thousand/uL 9.97 16.53*   HEMOGLOBIN g/dL 11.9* 13.4   HEMATOCRIT % 34.6* 39.8   PLATELETS Thousands/uL 329 374   NEUTROS ABS Thousands/µL 6.35 13.95*         Results from last 7 days   Lab Units 01/31/24  0458 01/30/24  1636   SODIUM mmol/L 137 129*   POTASSIUM mmol/L 4.2 4.7   CHLORIDE mmol/L 105 96   CO2 mmol/L 26 24   ANION GAP mmol/L 6 9   BUN mg/dL 16 19   CREATININE mg/dL 1.19 1.34*   EGFR ml/min/1.73sq m 61 52   CALCIUM mg/dL 9.1 9.4     Results from last 7 days   Lab Units 01/31/24  0458 01/30/24  1636   AST U/L 9* 18   ALT U/L 9 12   ALK PHOS U/L 60 82   TOTAL PROTEIN g/dL 6.9 8.2   ALBUMIN g/dL 3.2* 3.6   TOTAL BILIRUBIN mg/dL 0.45 0.62     Results from last 7 days   Lab Units 01/31/24  1103 01/31/24  0711 01/31/24  0648 01/31/24  0629 01/31/24  0152 01/30/24  2100   POC GLUCOSE mg/dl 88 110 130 55* 155* 315*     Results from last 7 days   Lab Units 01/31/24  0458 01/30/24  1636   GLUCOSE RANDOM mg/dL 62* 331*     Results from last 7 days   Lab Units 01/30/24  1636   HS TNI 0HR ng/L <2     Results from last 7 days   Lab  Units 01/30/24  1636   LACTIC ACID mmol/L 1.9     Results from last 7 days   Lab Units 01/30/24  1636   OSMO UR mmol/     Results from last 7 days   Lab Units 01/30/24  1636   CLARITY UA  Cloudy   COLOR UA  Yellow   SPEC GRAV UA  1.015   PH UA  5.0   GLUCOSE UA mg/dl 100 (1/10%)*   KETONES UA mg/dl Negative   BLOOD UA  Large*   PROTEIN UA mg/dl 100 (2+)*   NITRITE UA  Positive*   BILIRUBIN UA  Negative   UROBILINOGEN UA E.U./dl 0.2   LEUKOCYTES UA  Large*   WBC UA /hpf Innumerable*   RBC UA /hpf 2-4   BACTERIA UA /hpf Innumerable*   EPITHELIAL CELLS WET PREP /hpf Occasional   SODIUM UR  87     Results from last 7 days   Lab Units 01/30/24  1636   BLOOD CULTURE  Received in Microbiology Lab. Culture in Progress.  Received in Microbiology Lab. Culture in Progress.       ED Treatment:   Medication Administration from 01/30/2024 1404 to 01/30/2024 2041         Date/Time Order Dose Route Action Action by Comments     01/30/2024 1646 EST sodium chloride 0.9 % infusion 150 mL/hr Intravenous New Bag Jeanette Flores RN --     01/30/2024 1658 EST ondansetron (ZOFRAN) injection 4 mg 4 mg Intravenous Given Jeanette Flores RN --     01/30/2024 1658 EST morphine injection 4 mg 4 mg Intravenous Given Jeanette Flores RN --     01/30/2024 1928 EST cefTRIAXone (ROCEPHIN) IVPB (premix in dextrose) 1,000 mg 50 mL 0 mg Intravenous Stopped Jeanette Flores RN --     01/30/2024 1858 EST cefTRIAXone (ROCEPHIN) IVPB (premix in dextrose) 1,000 mg 50 mL 1,000 mg Intravenous New Bag Jacinta Soliman RN --     01/30/2024 1852 EST iohexol (OMNIPAQUE) 350 MG/ML injection (MULTI-DOSE) 100 mL 100 mL Intravenous Given Rosalva Garcia --          Past Medical History:   Diagnosis Date    COVID-19 01/15/2024    Diabetes mellitus (HCC)     Elevated PSA 06/21/2023    High cholesterol     Hypertension     Prostate cancer (HCC)      Present on Admission:   Hydronephrosis   Prostate cancer (HCC)   Elevated serum creatinine    Hyperlipidemia   Hypertension   (Resolved) Hyponatremia      Admitting Diagnosis: UTI (urinary tract infection) [N39.0]  Flank pain [R10.9]  Hydronephrosis, unspecified hydronephrosis type [N13.30]  Age/Sex: 73 y.o. male  Admission Orders:  Scheduled Medications:  abiraterone, 250 mg, Oral, Daily  calcium carbonate-vitamin D, 1 tablet, Oral, BID With Meals  cefTRIAXone, 1,000 mg, Intravenous, Q24H  dexamethasone, 0.5 mg, Oral, Daily  docusate sodium, 100 mg, Oral, Daily  heparin (porcine), 5,000 Units, Subcutaneous, Q8H RAJEEV  insulin glargine, 40 Units, Subcutaneous, HS  insulin lispro, 1-6 Units, Subcutaneous, 4x Daily (AC & HS)  multivitamin stress formula, 1 tablet, Oral, Daily  pravastatin, 80 mg, Oral, Daily With Dinner  sodium chloride (PF), 10 mL, Intracatheter, Daily      Continuous IV Infusions:  dextrose 5 % and sodium chloride 0.9 %, 75 mL/hr, Intravenous, Continuous      PRN Meds:  acetaminophen, 650 mg, Oral, Q4H PRN  albuterol, 2 puff, Inhalation, Q4H PRN  HYDROmorphone, 0.2 mg, Intravenous, Q3H PRN  oxyCODONE, 2.5 mg, Oral, Q4H PRN   Or  oxyCODONE, 5 mg, Oral, Q4H PRN        IP CONSULT TO UROLOGY  IP CONSULT TO UROLOGY  INPATIENT CONSULT TO IR    Network Utilization Review Department  ATTENTION: Please call with any questions or concerns to [phone] and carefully listen to the prompts so that you are directed to the right person. All voicemails are confidential.   For Discharge needs, contact Care Management DC Support Team at [phone] opt. [phone]  Send all requests for admission clinical reviews, approved or denied determinations and any other requests to dedicated fax number below belonging to the campus where the patient is receiving treatment. List of dedicated fax numbers for the Facilities:  FACILITY NAME UR FAX NUMBER   ADMISSION DENIALS (Administrative/Medical Necessity) [phone]   DISCHARGE SUPPORT TEAM (NETWORK) [phone]   PARENT CHILD HEALTH (Maternity/NICU/Pediatrics)  [phone]   [facility] [phone]   [facility] [phone]   [facility]iatric hospital [phone]   [facility] [phone]   [facility] [phone]   [facility] [phone]   [facility] [phone]   [facility] [phone]   [facility] [phone]   [facility] [phone]   [facility] [phone]   [facility] [phone]

## 2024-01-31 NOTE — SEDATION DOCUMENTATION
Pt arrived for B/L PCN tube exchange/upsize in no apparent distress. Pt positioned prone on procedure table. Pt tolerated well. Per Dr San pt to return for tube change in 1 month. Pt aware, expresses understanding. Pt educated and transferred to [address] [place] [address]. Bedside report to be given to nurse

## 2024-01-31 NOTE — BRIEF OP NOTE (RAD/CATH)
INTERVENTIONAL RADIOLOGY PROCEDURE NOTE    Date: 1/31/2024    Procedure:   Procedure Summary       Date:  Room / Location:     Anesthesia Start:  Anesthesia Stop:     Procedure:  Diagnosis:     Scheduled Providers:  Responsible Provider:     Anesthesia Type: Not recorded ASA Status: Not recorded            Preoperative diagnosis:   1. UTI (urinary tract infection)    2. Hydronephrosis, unspecified hydronephrosis type    3. Malfunction of nephrostomy tube (HCC)         Postoperative diagnosis: Same.    Surgeon: Kilo San MD     Assistant: None. No qualified resident was available.    Blood loss: Minimal    Specimens: None     Findings:   Extensive debris in both tubes and collecting systems, a combination of stone/sediment and cloudy amorphous material.    Both tubes upsized to 10.2 Fr x 25 cm MPD catheters and then serially irrigated until output cleared and filling defects on nephrostograms had resolved.    Plan for flushing 10mL normal saline twice a day and short interval exchange in 1 month given degree of dysfunction recently.  Will re-evaluate frequency of exchange at that point.    Complications: None immediate.    Anesthesia: conscious sedation

## 2024-01-31 NOTE — PROGRESS NOTES
Daily Progress Note - [facility]  Family Medicine Residency  Oliver Astudillo 73 y.o. male MRN: [id number]  Unit/Bed#: [address] [address] Encounter: [id number]  Admitting Physician: Yas Webber DO  PCP: Eugenia Rodriguez MD  Date of Admission:  1/30/2024  3:24 PM    Summary:     IVFs: dextrose 5 % and sodium chloride 0.9 %, Last Rate: 75 mL/hr (01/31/24 0631)      Diet: Diet NPO  VTE:  Heparin . Mechanical prophylaxis in place.   Patient Centered Rounds: I have performed bedside rounds with nursing staff today.  Specialists/Other Care Team Provider:       LOS: 0 day(s)  Status: Observation   Disposition: The patient will continue to require additional inpatient hospital stay due to complicated UTI  Code Status: Level 1 - Full Code  IR  Urology    Assessment and Plan    * Hydronephrosis  Assessment & Plan  Patient with history of prostate cancer and bilateral nephrostomy tubes presenting with 8/10 suprapubic pain radiating to left lower back.  Patient states this pain started yesterday and is associated with nausea.  Patient states the pain is resolved with Tylenol.  Of note patient was recently discharged on 1/16 with a similar problem however it was right-sided flank pain and patient was treated with antibiotics.  IR evaluation at that time stated that there was no indication for tube replacements.  Patient's nephrostomy tubes were last replaced on 12/28.    WBC 16.53, UA large leuks, pos nitrite, large occult blood, LA 1.9, Trop 2    ED: Morphine 4 mg, Zofran 4 mg, IV NS maintence, Rocephin 1g IV   CT A/P - Bilateral percutaneous nephrostomy catheters remaining stable and in position within the renal pelves. Interval decrease in right-sided hydronephrosis with persistent abnormal urothelial enhancement and thickening throughout the renal pelvis and ureter on the right. On the left interval increase in degree of hydronephrosis and urothelial thickening.    Plan:  Abx  Rocephin (1/30 - )  Continue to  appreciate urology recommendations  Consulted IR  Irrigate bilateral nephrostomy tubes every shift   Pain regimen - tylenol q6 PRN for mild, moderate and morphine q6 PRNN for breakthrough     Elevated serum creatinine  Assessment & Plan  Cr 1.34 on admission. BL appears to be around 0.9-1.2. Likely due to dehydration from poor oral intake     Continue IVF NS 150cc/hr   Continue to monitor Cr  Avoid nephrotoxic agents, if possible     Constipation  Assessment & Plan  Patient has hx of constipation and reports to be taking dulcolax at home. Patients last BM in 1/30    Colace daily     Leukocytosis  Assessment & Plan  POA WBC 16.53, v/s stable. Blood cultures collected in the ED prior to abx.     Monitor for signs of sepsis   F/u Bcx   Continue  NS ml/hr    Hyperlipidemia  Assessment & Plan  Chronic  On Crestor 10 mg daily    Prostate cancer (HCC)  Assessment & Plan  Numerous sclerotic lesions noted on CT. Last received lupron on 1/11. Sees  Dr. Coyne at medical oncology     Continue Lupron q6 months, Zytiga and dexamethasone   F/u urology     Hypertension  Assessment & Plan  Chronic, POA BP appears to be well controlled in 140s/70s. Home regimen includes lisinopril 10mg daily     Hold lisinopril 10mg in the setting on elevated Cr  Monitor BP    Type 2 diabetes mellitus, with long-term current use of insulin ([facility])  Assessment & Plan    Lab Results   Component Value Date    HGBA1C 11.1 (A) 01/08/2024     Home meds: Levemir 45 un HS, Glimepiride 2 mg lunch, Glimepiride 4 mg breakfast, Steglatro 5 mg    Glucose acutely elevated likely due to steroid use and infection    Continue glargine (alternate for long acting insulin) 40 units nightly  ISS  Continue to monitor glucose levels   Diabetic diet   Hypoglycemic protocol     Hyponatremia-resolved as of 1/31/2024  Assessment & Plan  RESOLVED Na 129 on admission, likely due to poor oral intake. Urine sodium 87, urine osmolality 382.    Continue IVF NS  Continue to  monitor Na on Am labs   Strict I/O's             Subjective:   Patient evaluated at bedside, currently NPO.  Patient reports that he is hungry and frustrated with n.p.o. status.  Patient had event of hypoglycemia with blood glucose 61, asymptomatic during that time, resolved with D5 normal saline and half amp of D50.  Contacted IR, plan to exchange tubes during hospitalization.  Urine output on left, no output on right.  Patient denying any CVA tenderness, fevers, chills, nausea, vomiting.    Objective     Objective:   Vitals:   Temp (24hrs), Av.9 °F (37.2 °C), Min:98.4 °F (36.9 °C), Max:99.5 °F (37.5 °C)    Temp:  [98.4 °F (36.9 °C)-99.5 °F (37.5 °C)] 98.4 °F (36.9 °C)  HR:  [66-77] 66  Resp:  [17-20] 17  BP: (116-155)/(60-78) 121/60  SpO2:  [93 %-98 %] 97 %  Body mass index is 27.06 kg/m².     Input and Output Summary (last 24 hours):       Intake/Output Summary (Last 24 hours) at 2024 1259  Last data filed at 2024 0735  Gross per 24 hour   Intake 1905 ml   Output 795 ml   Net 1110 ml       Physical Exam:   Physical Exam  Vitals reviewed. Exam conducted with a chaperone present.   Constitutional:       General: He is not in acute distress.     Appearance: Normal appearance. He is not ill-appearing.   HENT:      Head: Normocephalic and atraumatic.   Cardiovascular:      Rate and Rhythm: Normal rate and regular rhythm.      Pulses: Normal pulses.      Heart sounds: Normal heart sounds. No murmur heard.  Pulmonary:      Effort: Pulmonary effort is normal. No respiratory distress.      Breath sounds: Normal breath sounds. No wheezing.   Abdominal:      General: Abdomen is flat. Bowel sounds are normal. There is no distension.      Palpations: Abdomen is soft. There is no mass.      Tenderness: There is abdominal tenderness (Suprapubic). There is no guarding or rebound.   Genitourinary:     Comments: Bilateral nephrostomy tubes in place, with yellow urine  Musculoskeletal:      Right lower leg: No edema.       Left lower leg: No edema.   Skin:     General: Skin is warm and dry.   Neurological:      Mental Status: He is alert and oriented to person, place, and time.   Psychiatric:         Mood and Affect: Mood normal.         Behavior: Behavior normal.         Additional Data:     Labs:  Results from last 7 days   Lab Units 01/31/24  0458   WBC Thousand/uL 9.97   HEMOGLOBIN g/dL 11.9*   HEMATOCRIT % 34.6*   PLATELETS Thousands/uL 329   NEUTROS PCT % 64   LYMPHS PCT % 28   MONOS PCT % 7   EOS PCT % 1     Results from last 7 days   Lab Units 01/31/24  0458   POTASSIUM mmol/L 4.2   CHLORIDE mmol/L 105   CO2 mmol/L 26   BUN mg/dL 16   CREATININE mg/dL 1.19   CALCIUM mg/dL 9.1   ALK PHOS U/L 60   ALT U/L 9   AST U/L 9*         Results from last 7 days   Lab Units 01/31/24  1103 01/31/24  0711 01/31/24  0648 01/31/24  0629 01/31/24  0152   POC GLUCOSE mg/dl 88 110 130 55* 155*           * I Have Reviewed All Lab Data Listed Above.  * Additional Pertinent Lab Tests Reviewed: All Labs Within Last 24 Hours Reviewed    Imaging:    Imaging Reports Reviewed Today Include: none  Imaging Personally Reviewed by Myself Includes:  none    Recent Cultures (last 7 days):     Results from last 7 days   Lab Units 01/30/24  1636   BLOOD CULTURE  Received in Microbiology Lab. Culture in Progress.  Received in Microbiology Lab. Culture in Progress.       Last 24 Hours Medication List:   Current Facility-Administered Medications   Medication Dose Route Frequency Provider Last Rate    abiraterone  250 mg Oral Daily Chanell Foss MD      acetaminophen  650 mg Oral Q4H PRN James Bruce MD      albuterol  2 puff Inhalation Q4H PRN Chanell Foss MD      calcium carbonate-vitamin D  1 tablet Oral BID With Meals Chanell Foss MD      cefTRIAXone  1,000 mg Intravenous Q24H Chanell Foss MD      dexamethasone  0.5 mg Oral Daily Chanell Foss MD      dextrose 5 % and sodium chloride 0.9 %  75 mL/hr Intravenous Continuous Chanell Foss  MD 75 mL/hr (01/31/24 0631)    docusate sodium  100 mg Oral Daily Chanell Foss MD      heparin (porcine)  5,000 Units Subcutaneous Q8H RAJEEV Chanell Foss MD      HYDROmorphone  0.2 mg Intravenous Q3H PRN James Bruce MD      insulin glargine  40 Units Subcutaneous HS Chanell Foss MD      insulin lispro  1-6 Units Subcutaneous 4x Daily (AC & HS) Chanell Foss MD      multivitamin stress formula  1 tablet Oral Daily Chanell Foss MD      oxyCODONE  2.5 mg Oral Q4H PRN James Bruce MD      Or    oxyCODONE  5 mg Oral Q4H PRN James Bruce MD      pravastatin  80 mg Oral Daily With Dinner Chanell Foss MD      sodium chloride (PF)  10 mL Intracatheter Daily Chanell Foss MD                 Patient Information Sharing: With the consent of Oliver Astudillo , their loved ones were notified today by inpatient team of the patient’s condition and current plan.  All questions answered.     Time Spent for Care: 30 minutes.  More than 50% of total time spent on counseling and coordination of care as described above.    ** Please Note: Dictation voice to text software may have been used in the creation of this document. **    Yas Webber DO  01/31/24  12:59 PM

## 2024-01-31 NOTE — PHYSICAL THERAPY NOTE
PT EVALUATION  PT evaluation completed.  Pt is near his baseline level of function.  Pt remains independent with ambulation and ADLs. No additional skilled PT or OT needs noted at this time.  Recommend pt ambulate ad shama.      01/31/24 1341   PT Last Visit   PT Visit Date 01/31/24   Note Type   Note type Evaluation   Pain Assessment   Pain Assessment Tool 0-10   Pain Score No Pain   Restrictions/Precautions   Other Precautions   (nephrostomy tubes)   Home Living   Type of Home House   Home Layout Two level;Other (Comment)  (1 TAWANA)   Prior Function   Level of [place] Independent with ADLs;Independent with functional mobility   Lives With Spouse   General   Additional Pertinent History Pt admitted with hydronephrosis.  Pt has history of prostate cancer and B nephrostomy tubes.   Family/Caregiver Present Yes  (spouse)   Cognition   Overall Cognitive Status WFL   Arousal/Participation Alert   Orientation Level Unable to assess  (language barrier)   Following Commands Follows one step commands without difficulty   Subjective   Subjective Pt agreed to walk in hallway   RUE Assessment   RUE Assessment WFL   LUE Assessment   LUE Assessment WFL   RLE Assessment   RLE Assessment WFL   LLE Assessment   LLE Assessment WFL   Bed Mobility   Supine to Sit 7  Independent   Sit to Supine 7  Independent   Transfers   Sit to Stand 7  Independent   Stand to Sit 7  Independent   Stand pivot 7  Independent   Ambulation/Elevation   Gait pattern WNL   Gait Assistance 7  Independent   Assistive Device   (none)   Distance 300 feet   Balance   Static Sitting Good   Dynamic Sitting Good   Static Standing Good   Dynamic Standing Good   Ambulatory Good   Assessment   Prognosis Good   Problem List   (none)   Assessment Patient is an 73 y.o. year old male seen for Physical Therapy evaluation. Patient admitted with Hydronephrosis.  Comorbidities affecting patient's physical performance include: prostate cancer, nephrostomy tubes.  Personal  factors affecting patient at time of initial evaluation include: inability to live alone. Prior to admission, patient was independent with functional mobility without assistive device and independent with ADLS.  Please find objective findings from Physical Therapy assessment regarding body systems outlined above with impairments and limitations including baseline.  The Barthel Index was used as a functional outcome tool presenting with a score of barthel score 85 today indicating minimal limitations of functional mobility and ADLS.  Patient's clinical presentation is currently stable  as seen in patient's presentation of near baseline. As demonstrated by objective findings, the assigned level of complexity for this evaluation is low.The patient's AM-PAC Basic Mobility Inpatient Short Form Raw Score is 23. A Raw score of greater than 16 suggests the patient may benefit from discharge to home. Please also refer to the recommendation of the Physical Therapist for safe discharge planning.   Plan   Treatment/Interventions   (Recommend pt ambulate ad shama.)   Discharge Recommendation   Rehab Resource Intensity Level, PT No post-acute rehabilitation needs   AM-PAC Basic Mobility Inpatient   Turning in Flat Bed Without Bedrails 4   Lying on Back to Sitting on Edge of Flat Bed Without Bedrails 4   Moving Bed to Chair 4   Standing Up From Chair Using Arms 4   Walk in Room 4   Climb 3-5 Stairs With Railing 3   Basic Mobility Inpatient Raw Score 23   Basic Mobility Standardized Score 50.88   Highest Level Of Mobility   JH-HLM Goal 7: Walk 25 feet or more   JH-HLM Achieved 8: Walk 250 feet ot more   Barthel Index   Feeding 10   Bathing 5   Grooming Score 5   Dressing Score 10   Bladder Score 0   Bowels Score 10   Toilet Use Score 10   Transfers (Bed/Chair) Score 15   Mobility (Level Surface) Score 15   Stairs Score 5   Barthel Index Score 85   End of Consult   Patient Position at End of Consult All needs within reach;Bedside  chair   Licensure   [place] License Number  Anita Acosta PT  [id number]

## 2024-01-31 NOTE — ASSESSMENT & PLAN NOTE
POA WBC 16.53, v/s stable. Blood cultures collected in the ED prior to abx.     Monitor for signs of sepsis   F/u Bcx   Continue  NS ml/hr

## 2024-01-31 NOTE — PLAN OF CARE
Problem: Potential for Falls  Goal: Patient will remain free of falls  Description: INTERVENTIONS:  - Educate patient/family on patient safety including physical limitations  - Instruct patient to call for assistance with activity   - Consult OT/PT to assist with strengthening/mobility   - Keep Call bell within reach  - Keep bed low and locked with side rails adjusted as appropriate  - Keep care items and personal belongings within reach  - Initiate and maintain comfort rounds  - Make Fall Risk Sign visible to staff  - Offer Toileting every  Hours, in advance of need  - Initiate/Maintain alarm  - Obtain necessary fall risk management equipment:   - Apply yellow socks and bracelet for high fall risk patients  - Consider moving patient to room near nurses station  1/31/2024 0740 by Letha Ferrera RN  Outcome: Progressing  1/31/2024 0740 by Letha Ferrera RN  Outcome: Progressing     Problem: PAIN - ADULT  Goal: Verbalizes/displays adequate comfort level or baseline comfort level  Description: Interventions:  - Encourage patient to monitor pain and request assistance  - Assess pain using appropriate pain scale  - Administer analgesics based on type and severity of pain and evaluate response  - Implement non-pharmacological measures as appropriate and evaluate response  - Consider cultural and social influences on pain and pain management  - Notify physician/advanced practitioner if interventions unsuccessful or patient reports new pain  1/31/2024 0740 by Letha Ferrera RN  Outcome: Progressing  1/31/2024 0740 by Letha Ferrera RN  Outcome: Progressing     Problem: INFECTION - ADULT  Goal: Absence or prevention of progression during hospitalization  Description: INTERVENTIONS:  - Assess and monitor for signs and symptoms of infection  - Monitor lab/diagnostic results  - Monitor all insertion sites, i.e. indwelling lines, tubes, and drains  - [place] appropriate cooling/warming therapies per  order  - Administer medications as ordered  - Instruct and encourage patient and family to use good hand hygiene technique  - Identify and instruct in appropriate isolation precautions for identified infection/condition  1/31/2024 0740 by Letha Ferrera RN  Outcome: Progressing  1/31/2024 0740 by Letha Ferrera RN  Outcome: Progressing     Problem: SAFETY ADULT  Goal: Patient will remain free of falls  Description: INTERVENTIONS:  - Educate patient/family on patient safety including physical limitations  - Instruct patient to call for assistance with activity   - Consult OT/PT to assist with strengthening/mobility   - Keep Call bell within reach  - Keep bed low and locked with side rails adjusted as appropriate  - Keep care items and personal belongings within reach  - Initiate and maintain comfort rounds  - Make Fall Risk Sign visible to staff  - Offer Toileting every  Hours, in advance of need  - Initiate/Maintain alarm  - Obtain necessary fall risk management equipment:   - Apply yellow socks and bracelet for high fall risk patients  - Consider moving patient to room near nurses station  1/31/2024 0740 by Letha Ferrera RN  Outcome: Progressing  1/31/2024 0740 by Letha Ferrera RN  Outcome: Progressing  Goal: Maintain or return to baseline ADL function  Description: INTERVENTIONS:  -  Assess patient's ability to carry out ADLs; assess patient's baseline for ADL function and identify physical deficits which impact ability to perform ADLs (bathing, care of mouth/teeth, toileting, grooming, dressing, etc.)  - Assess/evaluate cause of self-care deficits   - Assess range of motion  - Assess patient's mobility; develop plan if impaired  - Assess patient's need for assistive devices and provide as appropriate  - Encourage maximum independence but intervene and supervise when necessary  - Involve family in performance of ADLs  - Assess for home care needs following discharge   - Consider OT  consult to assist with ADL evaluation and planning for discharge  - Provide patient education as appropriate  1/31/2024 0740 by Letha Ferrera RN  Outcome: Progressing  1/31/2024 0740 by Letha Ferrera RN  Outcome: Progressing  Goal: Maintains/Returns to pre admission functional level  Description: INTERVENTIONS:  - Perform AM-PAC 6 Click Basic Mobility/ Daily Activity assessment daily.  - Set and communicate daily mobility goal to care team and patient/family/caregiver.   - Collaborate with rehabilitation services on mobility goals if consulted  - Perform Range of Motion  times a day.  - Reposition patient every  hours.  - Dangle patient  times a day  - Stand patient  times a day  - Ambulate patient  times a day  - Out of bed to chair  times a day   - Out of bed for meals  times a day  - Out of bed for toileting  - Record patient progress and toleration of activity level   1/31/2024 0740 by Letha Ferrera RN  Outcome: Progressing  1/31/2024 0740 by Letha Ferrera RN  Outcome: Progressing     Problem: DISCHARGE PLANNING  Goal: Discharge to home or other facility with appropriate resources  Description: INTERVENTIONS:  - Identify barriers to discharge w/patient and caregiver  - Arrange for needed discharge resources and transportation as appropriate  - Identify discharge learning needs (meds, wound care, etc.)  - Arrange for interpretive services to assist at discharge as needed  - Refer to Case Management Department for coordinating discharge planning if the patient needs post-hospital services based on physician/advanced practitioner order or complex needs related to functional status, cognitive ability, or social support system  1/31/2024 0740 by Letha Ferrera RN  Outcome: Progressing  1/31/2024 0740 by Letha Ferrera RN  Outcome: Progressing     Problem: Knowledge Deficit  Goal: Patient/family/caregiver demonstrates understanding of disease process, treatment plan,  medications, and discharge instructions  Description: Complete learning assessment and assess knowledge base.  Interventions:  - Provide teaching at level of understanding  - Provide teaching via preferred learning methods  Outcome: Progressing     Problem: GENITOURINARY - ADULT  Goal: Maintains or returns to baseline urinary function  Description: INTERVENTIONS:  - Assess urinary function  - Encourage oral fluids to ensure adequate hydration if ordered  - Administer IV fluids as ordered to ensure adequate hydration  - Administer ordered medications as needed  - Offer frequent toileting  - Follow urinary retention protocol if ordered  Outcome: Progressing     Problem: METABOLIC, FLUID AND ELECTROLYTES - ADULT  Goal: Electrolytes maintained within normal limits  Description: INTERVENTIONS:  - Monitor labs and assess patient for signs and symptoms of electrolyte imbalances  - Administer electrolyte replacement as ordered  - Monitor response to electrolyte replacements, including repeat lab results as appropriate  - Instruct patient on fluid and nutrition as appropriate  Outcome: Progressing  Goal: Fluid balance maintained  Description: INTERVENTIONS:  - Monitor labs   - Monitor I/O and WT  - Instruct patient on fluid and nutrition as appropriate  - Assess for signs & symptoms of volume excess or deficit  Outcome: Progressing

## 2024-01-31 NOTE — H&P
History and Physical - [facility]  Family Medicine Residency     Patient Information: Oliver Astudillo 73 y.o. male MRN: [id number]  Unit/Bed#: SHAWN Encounter: [id number]  Admitting Physician: Rubina De Los Santos MD   PCP: Eugenia Rodriguez MD  Date of Admission:  01/30/24     Assessment and Plan  * Hydronephrosis  Assessment & Plan  Patient with history of prostate cancer and bilateral nephrostomy tubes presenting with 8/10 suprapubic pain radiating to left lower back.  Patient states this pain started yesterday and is associated with nausea.  Patient states the pain is resolved with Tylenol.  Of note patient was recently discharged on 1/16 with a similar problem however it was right-sided flank pain and patient was treated with antibiotics.  IR evaluation at that time stated that there was no indication for tube replacements.  Patient's nephrostomy tubes were last replaced on 12/28.    WBC 16.53, UA large leuks, pos nitrite, large occult blood, LA 1.9, Trop 2    ED: Morphine 4 mg, Zofran 4 mg, IV NS maintence, Rocephin 1g IV   CT A/P - final read pending     Continue IV Abx Rocephin   Urology, Dr. Talbert evaluated the patient in the ED who irrigated both R and L tubes and noted to have pyuria, pending cultures  Irrigate bilateral nephrostomy tubes every shift  IR consult to change nephrostomy tubes due to concerns for infection   F/u CT A/P w/contrast results   Urology following   Pain regimen - tylenol q6 PRN for mild, moderate and morphine q6 PRNN for breakthrough     Leukocytosis  Assessment & Plan  POA WBC 16.53, v/s stable. Blood cultures collected in the ED.     Monitor for signs of sepsis   F/u Bcx   Continue IVF     Prostate cancer (HCC)  Assessment & Plan  Numerous sclerotic lesions noted on CT. Last received lupron on 1/11. Sees  Dr. Coyne at medical oncology     Continue Lupron q6 months, Zytiga and dexamethasone   F/u urology     Elevated serum creatinine  Assessment & Plan  Cr 1.34 on  admission. BL appears to be around 0.9-1.2. Likely due to dehydration from poor oral intake     Continue IVF NS 150cc/hr   Continue to monitor Cr  Avoid nephrotoxic agents, if possible     Hyponatremia  Assessment & Plan  Na 129 on admission, likely due to poor oral intake     Continue IVF NS  Continue to monitor Na on Am labs   Strict I/O's   Urine sodium and osmol pending       Type 2 diabetes mellitus, with long-term current use of insulin (HCC)  Assessment & Plan    Lab Results   Component Value Date    HGBA1C 11.1 (A) 01/08/2024     Home meds: Levemir 45 un HS, Glimepiride 2 mg lunch, Glimepiride 4 mg breakfast, Steglatro 5 mg    Glucose acutely elevated likely due to steroid use and infection    Continue glargine (alternate for long acting insulin) 45 units nightly  ISS  Continue to monitor glucose levels   Diabetic diet   Hypoglycemic protocol     Hyperlipidemia  Assessment & Plan  Chronic  On Crestor 10 mg daily    Hypertension  Assessment & Plan  Chronic, POA BP appears to be well controlled in 140s/70s. Home regimen includes lisinopril 10mg daily     Hold lisinopril 10mg in the setting on elevated Cr  Monitor BP and restart lisinopril once Cr down trending          Fluids: IVF NS 150cc/hr   Electrolyte repletion: Replete as needed.  Nutrition:        Diet Orders   (From admission, onward)                 Start     Ordered    01/31/24 0001  Diet NPO  Diet effective midnight        References:    Adult Nutrition Support Algorithm    RD Therapeutic Diet Order Protocol   Question Answer Comment   Diet Type NPO    RD to adjust diet per protocol? Yes        01/30/24 2011 01/30/24 2012  Diet Rupesh/CHO Controlled; Consistent Carbohydrate Diet Level 2 (5 carb servings/75 grams CHO/meal); Sodium 2 GM  Diet effective now        References:    Adult Nutrition Support Algorithm    RD Therapeutic Diet Order Protocol   Question Answer Comment   Diet Type Rupesh/CHO Controlled    Rupesh/CHO Controlled Consistent Carbohydrate  Diet Level 2 (5 carb servings/75 grams CHO/meal)    Other Restriction(s): Sodium 2 GM    RD to adjust diet per protocol? Yes        01/30/24 2011                Diabetic diet   VTE Prophylaxis: VTE Score: 6 High Risk (Score >/= 5) - Pharmacological DVT Prophylaxis Ordered: heparin. Sequential Compression Devices Ordered.  Code Status: Level 1 - Full Code  Anticipated Length of Stay:  Patient will be admitted on an Observation basis with an anticipated length of stay of  greater than 2 midnights.     Justification for Hospital Stay: hydronephrosis and left sided flank pain   Total Time for Visit, including Counseling / Coordination of Care: 45 mins. Greater than 50% of this total time spent on direct patient counseling and coordination of care.  Patient Information Sharing: Son and wife at bedside. All questions answered.     Chief Complaint:     Chief Complaint   Patient presents with    Flank Pain     Here with wife. Patient has bilateral nephrostomy tubes. States right tube not working for about a week. Started yesterday with pain left flank and abdomen, states left tube is working. Pain penis with pee pee he states        Subjective    History of Present Illness:  Oliver Astudillo is a 73 y.o. male with PMHx of DM, HTN, HLD, prostate cancer currently on chemotherapy, and bilateral nephrostomy tubes recently changed on 12/28. who presents with 8/10 suprapubic pain radiating to left lower back. Patient also endorses difficulty urinating since yesterday. Symptoms are associated with nausea and dizziness since 3 days. Patient's pain is relieved with tylenol. Patient also has constipation and takes dulcolax for it. Last BM was this morning. Patient denies hematuria, fevers, chills, chest pain, SOB, headaches, blurry vision. Patient is on active chemo treatment. Of note, patient was recently discharged on 1/16 due to similar symptoms with right sided flank pain. Urology and IR was consulted at that time and patient was  discharged on 7 day course of cefadroxil which the patient completed. Nephrostomy tubes were not replaced during recent hospitalization. Dr. Talbert evaluated the pt in the ED and irrigated both R and L nephrostomy tubes.      Review of Systems:  Review of Systems   Constitutional:  Positive for fatigue. Negative for chills and fever.   Eyes:  Negative for pain and visual disturbance.   Respiratory:  Negative for cough and shortness of breath.    Cardiovascular:  Negative for chest pain and palpitations.   Gastrointestinal:  Positive for constipation and nausea. Negative for abdominal pain (Suprapubic pain) and vomiting.   Genitourinary:  Positive for difficulty urinating, dysuria, flank pain (left side) and penile pain. Negative for decreased urine volume and hematuria.   Musculoskeletal:  Negative for arthralgias and back pain.   Skin:  Negative for color change and rash.   Allergic/Immunologic: Positive for immunocompromised state.   Neurological:  Positive for dizziness. Negative for seizures, syncope, light-headedness and headaches.   All other systems reviewed and are negative.       Past Medical History:   Diagnosis Date    COVID-19 01/15/2024    Diabetes mellitus (HCC)     Elevated PSA 06/21/2023    High cholesterol     Hypertension     Prostate cancer (HCC)      Past Surgical History:   Procedure Laterality Date    IR NEPHROSTOMY TUBE CHECK/CHANGE/REPOSITION/REINSERTION/UPSIZE  9/28/2023    IR NEPHROSTOMY TUBE CHECK/CHANGE/REPOSITION/REINSERTION/UPSIZE  12/28/2023    IR NEPHROSTOMY TUBE PLACEMENT  06/22/2023    bilateral    IR OTHER  1/15/2024    US GUIDED PROSTATE BIOPSY       No Known Allergies  Prior to Admission Medications   Prescriptions Last Dose Informant Patient Reported? Taking?   Blood Glucose Monitoring Suppl (OneTouch Verio Reflect) w/Device KIT Past Week Self, Family Member No Yes   Sig: Check blood sugars once daily. Please substitute with appropriate alternative as covered by patient's  insurance. Dx: E11.65   Calcium Carb-Cholecalciferol (calcium carbonate-vitamin D) 500 mg-5 mcg tablet Past Month Self, Family Member No Yes   Sig: Take 1 tablet by mouth 2 (two) times a day with meals   Easy Touch Pen Needles 31G X 6 MM MISC 2024 Family Member, Self No Yes   Sig: Use daily at bedtime   Ertugliflozin L-PyroglutamicAc 5 MG TABS 2024 Family Member, Self No Yes   Sig: Take 5 mg by mouth daily with breakfast   Levemir FlexPen 100 units/mL injection pen 2024 Family Member, Self Yes Yes   Sig: Inject 45 Units under the skin daily at bedtime   Lovaza 1 g capsule 2024 Family Member, Self Yes Yes   Si (two) times a day   Multiple Vitamins-Minerals (Sentry) TABS 2024 Family Member, Self Yes Yes   Sig: Take 1 tablet by mouth daily   OneTouch Delica Lancets 33G MISC Past Week Self, Family Member No Yes   Sig: Check blood sugars once daily. Please substitute with appropriate alternative as covered by patient's insurance. Dx: E11.65   Spacer/Aero-Holding Chambers (AEROCHAMBER MINI CHAMBER) BILLY  Family Member, Self No No   Sig: by Does not apply route see administration instructions   abiraterone (ZYTIGA) 250 mg tablet 2024 Self, Family Member No Yes   Sig: Take 1 tablet (250 mg total) by mouth daily With a low fat meal   albuterol (ProAir HFA) 90 mcg/act inhaler Past Week Family Member, Self No Yes   Sig: Inhale 2 puffs every 4 (four) hours as needed for wheezing or shortness of breath   dexamethasone (DECADRON) 0.5 mg tablet 2024 Family Member, Self No Yes   Sig: TAKE 1 TABLET (0.5 MG TOTAL) BY MOUTH DAILY WITH BREAKFAST   glimepiride (AMARYL) 2 mg tablet 2024 Family Member, Self No Yes   Sig: Take 1 tablet (2 mg total) by mouth daily with lunch   glimepiride (AMARYL) 4 mg tablet 2024 Family Member, Self Yes Yes   Sig: Take 4 mg by mouth daily with breakfast   glucose blood (OneTouch Verio) test strip Past Week Self, Family Member No Yes   Sig: Check blood sugars  once daily. Please substitute with appropriate alternative as covered by patient's insurance. Dx: E11.65   lisinopril (ZESTRIL) 10 mg tablet 2024 Self, Family Member No Yes   Sig: Take 1 tablet (10 mg total) by mouth daily   polyethylene glycol (GOLYTELY) 4000 mL solution   No No   Sig: Take 4,000 mL by mouth once for 1 dose   polyethylene glycol (MIRALAX) 17 g packet Past Week Family Member, Self No Yes   Sig: Take 17 g by mouth daily as needed (constipation)   rosuvastatin (CRESTOR) 10 MG tablet 2024 Self, Family Member No Yes   Sig: Take 1 tablet (10 mg total) by mouth daily   Patient taking differently: Take 10 mg by mouth daily at bedtime   sodium chloride, PF, 0.9 % 2024 Family Member, Self No Yes   Sig: 10 mL by Intracatheter route daily Intracatheter flushing daily. May substitute prefilled syringe with normal saline 10 mL vials, 10 mL syringes, and 18 g blunt needles      Facility-Administered Medications: None     Social History     Socioeconomic History    Marital status: /Civil Union     Spouse name: Not on file    Number of children: Not on file    Years of education: Not on file    Highest education level: Not on file   Occupational History    Not on file   Tobacco Use    Smoking status: Former     Current packs/day: 0.00     Types: Cigarettes     Quit date:      Years since quittin.0     Passive exposure: Past    Smokeless tobacco: Never    Tobacco comments:     States he only smoked on the weekends   Vaping Use    Vaping status: Never Used   Substance and Sexual Activity    Alcohol use: Not Currently     Comment: socially    Drug use: Never    Sexual activity: Not Currently     Partners: Female   Other Topics Concern    Not on file   Social History Narrative    Not on file     Social Determinants of Health     Financial Resource Strain: Not on file   Food Insecurity: No Food Insecurity (2023)    Hunger Vital Sign     Worried About Running Out of Food in the Last  Year: Never true     Ran Out of Food in the Last Year: Never true   Transportation Needs: No Transportation Needs (7/26/2023)    Received from [facility]    OASIS : Transportation     Lack of Transportation (Medical): No     Lack of Transportation (Non-Medical): No     Patient Unable or Declines to Respond: No   Physical Activity: Not on file   Stress: Not on file   Social Connections: Feeling Socially Integrated (7/26/2023)    Received from [facility]    OASIS : Social Isolation     Frequency of experiencing loneliness or isolation: Never   Intimate Partner Violence: Not on file   Housing Stability: Unknown (6/23/2023)    Housing Stability Vital Sign     Unable to Pay for Housing in the Last Year: No     Number of Places Lived in the Last Year: Not on file     Unstable Housing in the Last Year: No     Family History   Problem Relation Age of Onset    Liver cancer Father      Patient Pre-hospital Living Situation: home  Patient Pre-hospital Level of Mobility: not limited   Patient Pre-hospital Diet Restrictions: none       Objective     Physical Exam:   Vitals:   Blood Pressure: 154/73 (01/30/24 2025)  Pulse: 71 (01/30/24 2025)  Temperature: 98.8 °F (37.1 °C) (01/30/24 1413)  Temp Source: Tympanic (01/30/24 1413)  Respirations: 20 (01/30/24 2025)  Weight - Scale: 79.4 kg (175 lb) (01/30/24 1413)  SpO2: 97 % (01/30/24 2025)     Physical Exam  Vitals reviewed. Exam conducted with a chaperone present.   Constitutional:       General: He is not in acute distress.     Appearance: Normal appearance. He is not ill-appearing.   HENT:      Head: Normocephalic and atraumatic.   Cardiovascular:      Rate and Rhythm: Normal rate and regular rhythm.      Pulses: Normal pulses.      Heart sounds: Normal heart sounds. No murmur heard.  Pulmonary:      Effort: Pulmonary effort is normal. No respiratory distress.      Breath sounds: Normal breath sounds. No wheezing.   Abdominal:      General: Abdomen is flat.  Bowel sounds are normal. There is no distension.      Palpations: Abdomen is soft. There is no mass.      Tenderness: There is abdominal tenderness (Suprapubic). There is left CVA tenderness. There is no guarding or rebound.   Genitourinary:     Comments: Bilateral nephrostomy tubes in place, with yellow urine, no blood  Musculoskeletal:      Right lower leg: No edema.      Left lower leg: No edema.   Skin:     General: Skin is warm and dry.   Neurological:      Mental Status: He is alert and oriented to person, place, and time.   Psychiatric:         Mood and Affect: Mood normal.         Behavior: Behavior normal.       Lab Results: I have personally reviewed pertinent reports.    Results from last 7 days   Lab Units 01/30/24  1636   WBC Thousand/uL 16.53*   HEMOGLOBIN g/dL 13.4   HEMATOCRIT % 39.8   PLATELETS Thousands/uL 374   NEUTROS PCT % 84*   LYMPHS PCT % 11*   MONOS PCT % 4   EOS PCT % 0     Results from last 7 days   Lab Units 01/30/24  1636   POTASSIUM mmol/L 4.7   CHLORIDE mmol/L 96   CO2 mmol/L 24   BUN mg/dL 19   CREATININE mg/dL 1.34*   CALCIUM mg/dL 9.4   ALK PHOS U/L 82   ALT U/L 12   AST U/L 18   EGFR ml/min/1.73sq m 52         Results from last 7 days   Lab Units 01/30/24  1636   LACTIC ACID mmol/L 1.9              Results from last 7 days   Lab Units 01/30/24  1636   COLOR UA  Yellow   CLARITY UA  Cloudy   SPEC GRAV UA  1.015   PH UA  5.0   LEUKOCYTES UA  Large*   NITRITE UA  Positive*   GLUCOSE UA mg/dl 100 (1/10%)*   KETONES UA mg/dl Negative   BILIRUBIN UA  Negative   BLOOD UA  Large*      Results from last 7 days   Lab Units 01/30/24  1636   RBC UA /hpf 2-4   WBC UA /hpf Innumerable*   EPITHELIAL CELLS WET PREP /hpf Occasional   BACTERIA UA /hpf Innumerable*      Results from last 7 days   Lab Units 01/30/24  1636   HS TNI 0HR ng/L <2          Imaging: I have personally reviewed pertinent reports.    No results found.      EKG, Pathology, and Other Studies Reviewed on Admission:   EKG  Result  Date: 01/30/24        ** Please Note: This note has been constructed using a voice recognition system **     James Bruce MD  01/30/24  8:32 PM

## 2024-01-31 NOTE — PLAN OF CARE
Problem: Potential for Falls  Goal: Patient will remain free of falls  Description: INTERVENTIONS:  - Educate patient/family on patient safety including physical limitations  - Instruct patient to call for assistance with activity   - Consult OT/PT to assist with strengthening/mobility   - Keep Call bell within reach  - Keep bed low and locked with side rails adjusted as appropriate  - Keep care items and personal belongings within reach  - Initiate and maintain comfort rounds  - Make Fall Risk Sign visible to staff  - Offer Toileting every 2 Hours, in advance of need  - Initiate/Maintain bed alarm  - Apply yellow socks and bracelet for high fall risk patients  - Consider moving patient to room near nurses station  Outcome: Progressing     Problem: PAIN - ADULT  Goal: Verbalizes/displays adequate comfort level or baseline comfort level  Description: Interventions:  - Encourage patient to monitor pain and request assistance  - Assess pain using appropriate pain scale  - Administer analgesics based on type and severity of pain and evaluate response  - Implement non-pharmacological measures as appropriate and evaluate response  - Consider cultural and social influences on pain and pain management  - Notify physician/advanced practitioner if interventions unsuccessful or patient reports new pain  Outcome: Progressing     Problem: INFECTION - ADULT  Goal: Absence or prevention of progression during hospitalization  Description: INTERVENTIONS:  - Assess and monitor for signs and symptoms of infection  - Monitor lab/diagnostic results  - Monitor all insertion sites, i.e. indwelling lines, tubes, and drains  - [place] appropriate cooling/warming therapies per order  - Administer medications as ordered  - Instruct and encourage patient and family to use good hand hygiene technique  - Identify and instruct in appropriate isolation precautions for identified infection/condition  Outcome: Progressing     Problem: SAFETY  ADULT  Goal: Patient will remain free of falls  Description: INTERVENTIONS:  - Educate patient/family on patient safety including physical limitations  - Instruct patient to call for assistance with activity   - Consult OT/PT to assist with strengthening/mobility   - Keep Call bell within reach  - Keep bed low and locked with side rails adjusted as appropriate  - Keep care items and personal belongings within reach  - Initiate and maintain comfort rounds  - Make Fall Risk Sign visible to staff  - Offer Toileting every 2 Hours, in advance of need  - Initiate/Maintain bed alarm  - Apply yellow socks and bracelet for high fall risk patients  - Consider moving patient to room near nurses station  Outcome: Progressing  Goal: Maintain or return to baseline ADL function  Description: INTERVENTIONS:  -  Assess patient's ability to carry out ADLs; assess patient's baseline for ADL function and identify physical deficits which impact ability to perform ADLs (bathing, care of mouth/teeth, toileting, grooming, dressing, etc.)  - Assess/evaluate cause of self-care deficits   - Assess range of motion  - Assess patient's mobility; develop plan if impaired  - Assess patient's need for assistive devices and provide as appropriate  - Encourage maximum independence but intervene and supervise when necessary  - Involve family in performance of ADLs  - Assess for home care needs following discharge   - Consider OT consult to assist with ADL evaluation and planning for discharge  - Provide patient education as appropriate  Outcome: Progressing  Goal: Maintains/Returns to pre admission functional level  Description: INTERVENTIONS:  - Perform AM-PAC 6 Click Basic Mobility/ Daily Activity assessment daily.  - Set and communicate daily mobility goal to care team and patient/family/caregiver.   - Collaborate with rehabilitation services on mobility goals if consulted  - Perform Range of Motion 3 times a day.  - Reposition patient every 2  hours.  - Dangle patient 2 times a day  - Stand patient 2 times a day  - Ambulate patient 2 times a day  - Out of bed to chair 2 times a day   - Out of bed for meals 2 times a day  - Out of bed for toileting  - Record patient progress and toleration of activity level   Outcome: Progressing     Problem: DISCHARGE PLANNING  Goal: Discharge to home or other facility with appropriate resources  Description: INTERVENTIONS:  - Identify barriers to discharge w/patient and caregiver  - Arrange for needed discharge resources and transportation as appropriate  - Identify discharge learning needs (meds, wound care, etc.)  - Arrange for interpretive services to assist at discharge as needed  - Refer to Case Management Department for coordinating discharge planning if the patient needs post-hospital services based on physician/advanced practitioner order or complex needs related to functional status, cognitive ability, or social support system  Outcome: Progressing     Problem: Knowledge Deficit  Goal: Patient/family/caregiver demonstrates understanding of disease process, treatment plan, medications, and discharge instructions  Description: Complete learning assessment and assess knowledge base.  Interventions:  - Provide teaching at level of understanding  - Provide teaching via preferred learning methods  Outcome: Progressing     Problem: GENITOURINARY - ADULT  Goal: Maintains or returns to baseline urinary function  Description: INTERVENTIONS:  - Assess urinary function  - Encourage oral fluids to ensure adequate hydration if ordered  - Administer IV fluids as ordered to ensure adequate hydration  - Administer ordered medications as needed  - Offer frequent toileting  - Follow urinary retention protocol if ordered  Outcome: Progressing     Problem: METABOLIC, FLUID AND ELECTROLYTES - ADULT  Goal: Electrolytes maintained within normal limits  Description: INTERVENTIONS:  - Monitor labs and assess patient for signs and  symptoms of electrolyte imbalances  - Administer electrolyte replacement as ordered  - Monitor response to electrolyte replacements, including repeat lab results as appropriate  - Instruct patient on fluid and nutrition as appropriate  Outcome: Progressing  Goal: Fluid balance maintained  Description: INTERVENTIONS:  - Monitor labs   - Monitor I/O and WT  - Instruct patient on fluid and nutrition as appropriate  - Assess for signs & symptoms of volume excess or deficit  Outcome: Progressing

## 2024-02-01 PROBLEM — N39.0 UTI (URINARY TRACT INFECTION): Status: ACTIVE | Noted: 2024-02-01

## 2024-02-01 LAB
ALBUMIN SERPL BCG-MCNC: 3.2 G/DL (ref 3.5–5)
ALP SERPL-CCNC: 65 U/L (ref 34–104)
ALT SERPL W P-5'-P-CCNC: 8 U/L (ref 7–52)
ANION GAP SERPL CALCULATED.3IONS-SCNC: 8 MMOL/L
AST SERPL W P-5'-P-CCNC: 10 U/L (ref 13–39)
BACTERIA UR CULT: ABNORMAL
BASOPHILS # BLD AUTO: 0.03 THOUSANDS/ΜL (ref 0–0.1)
BASOPHILS NFR BLD AUTO: 0 % (ref 0–1)
BILIRUB SERPL-MCNC: 0.4 MG/DL (ref 0.2–1)
BUN SERPL-MCNC: 17 MG/DL (ref 5–25)
CALCIUM ALBUM COR SERPL-MCNC: 9.7 MG/DL (ref 8.3–10.1)
CALCIUM SERPL-MCNC: 9.1 MG/DL (ref 8.4–10.2)
CHLORIDE SERPL-SCNC: 102 MMOL/L (ref 96–108)
CO2 SERPL-SCNC: 25 MMOL/L (ref 21–32)
CREAT SERPL-MCNC: 1.06 MG/DL (ref 0.6–1.3)
EOSINOPHIL # BLD AUTO: 0.09 THOUSAND/ΜL (ref 0–0.61)
EOSINOPHIL NFR BLD AUTO: 1 % (ref 0–6)
ERYTHROCYTE [DISTWIDTH] IN BLOOD BY AUTOMATED COUNT: 12.4 % (ref 11.6–15.1)
GFR SERPL CREATININE-BSD FRML MDRD: 70 ML/MIN/1.73SQ M
GLUCOSE SERPL-MCNC: 139 MG/DL (ref 65–140)
GLUCOSE SERPL-MCNC: 177 MG/DL (ref 65–140)
GLUCOSE SERPL-MCNC: 246 MG/DL (ref 65–140)
GLUCOSE SERPL-MCNC: 306 MG/DL (ref 65–140)
GLUCOSE SERPL-MCNC: 59 MG/DL (ref 65–140)
GLUCOSE SERPL-MCNC: 74 MG/DL (ref 65–140)
HCT VFR BLD AUTO: 34.6 % (ref 36.5–49.3)
HGB BLD-MCNC: 12 G/DL (ref 12–17)
IMM GRANULOCYTES # BLD AUTO: 0.05 THOUSAND/UL (ref 0–0.2)
IMM GRANULOCYTES NFR BLD AUTO: 1 % (ref 0–2)
LYMPHOCYTES # BLD AUTO: 2.19 THOUSANDS/ΜL (ref 0.6–4.47)
LYMPHOCYTES NFR BLD AUTO: 20 % (ref 14–44)
MCH RBC QN AUTO: 31.3 PG (ref 26.8–34.3)
MCHC RBC AUTO-ENTMCNC: 34.7 G/DL (ref 31.4–37.4)
MCV RBC AUTO: 90 FL (ref 82–98)
MONOCYTES # BLD AUTO: 0.73 THOUSAND/ΜL (ref 0.17–1.22)
MONOCYTES NFR BLD AUTO: 7 % (ref 4–12)
NEUTROPHILS # BLD AUTO: 7.63 THOUSANDS/ΜL (ref 1.85–7.62)
NEUTS SEG NFR BLD AUTO: 71 % (ref 43–75)
NRBC BLD AUTO-RTO: 0 /100 WBCS
PLATELET # BLD AUTO: 314 THOUSANDS/UL (ref 149–390)
PMV BLD AUTO: 9.4 FL (ref 8.9–12.7)
POTASSIUM SERPL-SCNC: 3.5 MMOL/L (ref 3.5–5.3)
PROT SERPL-MCNC: 6.8 G/DL (ref 6.4–8.4)
RBC # BLD AUTO: 3.83 MILLION/UL (ref 3.88–5.62)
SODIUM SERPL-SCNC: 135 MMOL/L (ref 135–147)
WBC # BLD AUTO: 10.72 THOUSAND/UL (ref 4.31–10.16)

## 2024-02-01 PROCEDURE — 85025 COMPLETE CBC W/AUTO DIFF WBC: CPT

## 2024-02-01 PROCEDURE — 82948 REAGENT STRIP/BLOOD GLUCOSE: CPT

## 2024-02-01 PROCEDURE — NC001 PR NO CHARGE: Performed by: STUDENT IN AN ORGANIZED HEALTH CARE EDUCATION/TRAINING PROGRAM

## 2024-02-01 PROCEDURE — 80053 COMPREHEN METABOLIC PANEL: CPT

## 2024-02-01 PROCEDURE — 99239 HOSP IP/OBS DSCHRG MGMT >30: CPT | Performed by: STUDENT IN AN ORGANIZED HEALTH CARE EDUCATION/TRAINING PROGRAM

## 2024-02-01 PROCEDURE — NC001 PR NO CHARGE: Performed by: RADIOLOGY

## 2024-02-01 RX ORDER — FLUCONAZOLE 200 MG/100ML
800 INJECTION, SOLUTION INTRAVENOUS EVERY 24 HOURS
Status: DISCONTINUED | OUTPATIENT
Start: 2024-02-01 | End: 2024-02-02

## 2024-02-01 RX ORDER — SODIUM CHLORIDE 9 MG/ML
10 INJECTION INTRAVENOUS EVERY 12 HOURS SCHEDULED
Status: DISCONTINUED | OUTPATIENT
Start: 2024-02-01 | End: 2024-02-01

## 2024-02-01 RX ORDER — INSULIN GLARGINE 100 [IU]/ML
30 INJECTION, SOLUTION SUBCUTANEOUS
Status: DISCONTINUED | OUTPATIENT
Start: 2024-02-01 | End: 2024-02-02 | Stop reason: HOSPADM

## 2024-02-01 RX ORDER — POTASSIUM CHLORIDE 1500 MG/1
40 TABLET, EXTENDED RELEASE ORAL ONCE
Status: COMPLETED | OUTPATIENT
Start: 2024-02-01 | End: 2024-02-01

## 2024-02-01 RX ORDER — INSULIN LISPRO 100 [IU]/ML
1-5 INJECTION, SOLUTION INTRAVENOUS; SUBCUTANEOUS
Status: DISCONTINUED | OUTPATIENT
Start: 2024-02-01 | End: 2024-02-02 | Stop reason: HOSPADM

## 2024-02-01 RX ORDER — SODIUM CHLORIDE 9 MG/ML
10 INJECTION INTRAVENOUS EVERY 12 HOURS SCHEDULED
Status: DISCONTINUED | OUTPATIENT
Start: 2024-02-01 | End: 2024-02-02 | Stop reason: HOSPADM

## 2024-02-01 RX ADMIN — SODIUM CHLORIDE, PRESERVATIVE FREE 10 ML: 5 INJECTION INTRAVENOUS at 08:53

## 2024-02-01 RX ADMIN — Medication 800 MG: at 21:03

## 2024-02-01 RX ADMIN — INSULIN GLARGINE 30 UNITS: 100 INJECTION, SOLUTION SUBCUTANEOUS at 21:27

## 2024-02-01 RX ADMIN — CEFTRIAXONE 1000 MG: 1 INJECTION, SOLUTION INTRAVENOUS at 16:49

## 2024-02-01 RX ADMIN — HEPARIN SODIUM 5000 UNITS: 5000 INJECTION INTRAVENOUS; SUBCUTANEOUS at 13:14

## 2024-02-01 RX ADMIN — ACETAMINOPHEN 650 MG: 325 TABLET ORAL at 16:48

## 2024-02-01 RX ADMIN — ABIRATERONE ACETATE 250 MG: 250 TABLET, FILM COATED ORAL at 09:16

## 2024-02-01 RX ADMIN — Medication 1 TABLET: at 16:25

## 2024-02-01 RX ADMIN — DOCUSATE SODIUM 100 MG: 100 CAPSULE, LIQUID FILLED ORAL at 21:03

## 2024-02-01 RX ADMIN — HEPARIN SODIUM 5000 UNITS: 5000 INJECTION INTRAVENOUS; SUBCUTANEOUS at 06:00

## 2024-02-01 RX ADMIN — PRAVASTATIN SODIUM 80 MG: 80 TABLET ORAL at 16:25

## 2024-02-01 RX ADMIN — INSULIN LISPRO 3 UNITS: 100 INJECTION, SOLUTION INTRAVENOUS; SUBCUTANEOUS at 21:28

## 2024-02-01 RX ADMIN — HEPARIN SODIUM 5000 UNITS: 5000 INJECTION INTRAVENOUS; SUBCUTANEOUS at 21:27

## 2024-02-01 RX ADMIN — Medication 1 TABLET: at 07:33

## 2024-02-01 RX ADMIN — SODIUM CHLORIDE, PRESERVATIVE FREE 10 ML: 5 INJECTION INTRAVENOUS at 21:31

## 2024-02-01 RX ADMIN — DEXAMETHASONE 0.5 MG: 0.5 TABLET ORAL at 08:50

## 2024-02-01 RX ADMIN — INSULIN LISPRO 3 UNITS: 100 INJECTION, SOLUTION INTRAVENOUS; SUBCUTANEOUS at 16:27

## 2024-02-01 RX ADMIN — POTASSIUM CHLORIDE 40 MEQ: 1500 TABLET, EXTENDED RELEASE ORAL at 08:50

## 2024-02-01 RX ADMIN — Medication 2.5 MG: at 07:32

## 2024-02-01 RX ADMIN — SODIUM CHLORIDE, PRESERVATIVE FREE 10 ML: 5 INJECTION INTRAVENOUS at 08:52

## 2024-02-01 RX ADMIN — B-COMPLEX W/ C & FOLIC ACID TAB 1 TABLET: TAB at 08:51

## 2024-02-01 NOTE — PROGRESS NOTES
"Progress Note - Urology      Patient: Oliver Astudillo   : 1950 Sex: male   MRN: [id number]     CSN: [id number]  Unit/Bed#: [address] [place] [address]     SUBJECTIVE:   Patient seen on evening rounds  Bilateral nephrostomy tubes exchanged today due to obstruction chronic UTIs increased in size and irrigated till all debris removed  Long discussion with wife and son wife will need to irrigate tubes at least twice a day in light of debris to reduce chances of obstruction in the future as noted last night in ER      Objective   Vitals: /71   Pulse 66   Temp 98.4 °F (36.9 °C)   Resp 21   Ht 5' 7\" (1.702 m)   Wt 78.4 kg (172 lb 12.8 oz)   SpO2 99%   BMI 27.06 kg/m²     I/O last 24 hours:  In: 1905 [P.O.:480; I.V.:1375; IV Piggyback:50]  Out: 795 [Urine:320; Other:475]      Physical Exam:   General Alert awake   Normocephalic atraumatic PERRLA  Lungs clear bilaterally  Cardiac normal S1 normal S2  Abdomen soft, flank pain  Nephrostomy tube draining mildly hematuric urine  Extremities no edema      Lab Results: CBC:   Lab Results   Component Value Date    WBC 9.97 2024    HGB 11.9 (L) 2024    HCT 34.6 (L) 2024    MCV 92 2024     2024    RBC 3.77 (L) 2024    MCH 31.6 2024    MCHC 34.4 2024    RDW 12.5 2024    MPV 9.6 2024    NRBC 0 2024     CMP:   Lab Results   Component Value Date     2024    CO2 26 2024    BUN 16 2024    CREATININE 1.19 2024    CALCIUM 9.1 2024    AST 9 (L) 2024    ALT 9 2024    ALKPHOS 60 2024    EGFR 61 2024     Urinalysis:   Lab Results   Component Value Date    COLORU Yellow 2024    CLARITYU Cloudy 2024    SPECGRAV 1.015 2024    PHUR 5.0 2024    LEUKOCYTESUR Large (A) 2024    NITRITE Positive (A) 2024    GLUCOSEU 100 (1/10%) (A) 2024    KETONESU Negative 2024    BILIRUBINUR Negative 2024    BLOODU Large (A) " 01/30/2024     Urine Culture:   Lab Results   Component Value Date    URINECX >100,000 cfu/ml Escherichia coli (A) 01/11/2024    URINECX 50,000-59,000 cfu/ml Escherichia coli (A) 01/11/2024    URINECX 10,000-19,000 cfu/ml Lactobacillus species (A) 01/11/2024     PSA:   Lab Results   Component Value Date    PSA 0.16 12/27/2023    PSA 6.01 (H) 08/11/2023    .73 (H) 05/24/2023         Assessment/ Plan:  Bilateral hydronephrosis  Advanced metastatic prostate cancer  Bilateral obstructed nephrostomy tubes  Irrigated in the ER by myself yesterday  IR consult noted bilateral tubes exchanged larger size put in  Both will still need to irrigate tubes twice a day in light of debris          Casey Talbert MD

## 2024-02-01 NOTE — DISCHARGE SUMMARY
Discharge Summary - [facility]     Patient Information: Oliver Astudillo 73 y.o. male MRN: [id number]  Unit/Bed#: [address] [address] Encounter: [id number]     Admitting Physician: Rubina De Los Santos MD  Discharging Physician/Practitioner: Rubina De Los Santos MD   PCP: Eugenia Rodriguez MD  Admission Date:   Admission Orders (From admission, onward)       Ordered        01/31/24 1413  Inpatient Admission  Once            01/30/24 1912  Place in Observation  Once                          Discharge Date: 02/02/24      Reason for Admission: UTI (urinary tract infection) [N39.0]  Flank pain [R10.9]  Hydronephrosis, unspecified hydronephrosis type [N13.30]     Discharge Diagnoses:      Principal Problem:    Hydronephrosis  Active Problems:    UTI (urinary tract infection)    Type 2 diabetes mellitus, with long-term current use of insulin (HCC)    Hypertension    Prostate cancer (HCC)    Hyperlipidemia    Constipation  Resolved Problems:    Elevated serum creatinine    Hyponatremia    Leukocytosis       Consultations During Hospital Stay:  ·       urology  -IR     Procedures Performed:   ·       bilateral nephrostomy tube change      Significant Findings / Test Results:   2/1  Na 138, Cr 0.97  Renal NMR:  Examination demonstrates patency of the nephrostomy catheters bilaterally, with radiopharmaceutical activity seen within both catheters and collection bags. Right-sided hydro-ureteronephrosis.    1/31: Na 137, Cr 1.19, WBC 9.97    1/30: Na 129, Cr 1.34, WBC 16.53, UA large leuks, pos nitrite, large occult blood, LA 1.9, Trop 2    CT A/P: Bilateral percutaneous nephrostomy catheters remaining stable and in position within the renal pelves. Interval decrease in right-sided hydronephrosis with persistent abnormal urothelial enhancement and thickening throughout the renal pelvis and ureter on the right. On the left interval increase in degree of hydronephrosis and urothelial thickening. See above  for further details.Diffuse sclerotic osseous metastases without significant change     Incidental Findings:   ·       none      Test Results Pending at Discharge (will require follow up):   ·       urine creatinine      Outpatient Tests Requested:  - CMP in one week     Outpatient follow-up Requested:  ·       PCP  -urology     Complications:    Complicated UTI requiring bilateral nephrostomy tube replacement     Things to address at first visit after hospitalization   -Are you having urine output bilaterally?  -Have you been able to follow up with urology?  -How is your pain level?    Hospital Course:      Oliver Astudillo is a 73 y.o. male patient who originally presented to the hospital on 1/30/2024 due to suprapubic pain radiating to left lower back. Patient also endorses difficulty urinating.  PMHx of DM, HTN, HLD, prostate cancer currently on chemotherapy, and bilateral nephrostomy tubes recently changed on 12/28. Urine culture was collected and patient was started on antibiotics. IR replaced bilateral drains and patient began to report improvement of symptoms.    * Hydronephrosis  Assessment & Plan  Patient with history of prostate cancer and bilateral nephrostomy tubes presenting with 8/10 suprapubic pain radiating to left lower back.  Patient states this pain started yesterday and is associated with nausea.  Patient states the pain is resolved with Tylenol.  Of note patient was recently discharged on 1/16 with a similar problem however it was right-sided flank pain and patient was treated with antibiotics.  IR evaluation at that time stated that there was no indication for tube replacements.  Patient's nephrostomy tubes were last replaced on 12/28.    WBC 16.53, UA large leuks, pos nitrite, large occult blood, LA 1.9, Trop 2    ED: Morphine 4 mg, Zofran 4 mg, IV NS maintence, Rocephin 1g IV   CT A/P - Bilateral percutaneous nephrostomy catheters remaining stable and in position within the renal pelves.  Interval decrease in right-sided hydronephrosis with persistent abnormal urothelial enhancement and thickening throughout the renal pelvis and ureter on the right. On the left interval increase in degree of hydronephrosis and urothelial thickening.    Poor urine output from right nephrostomy tube, is s/p tube change 1/31.     Renal NMR:  Examination demonstrates patency of the nephrostomy catheters bilaterally, with radiopharmaceutical activity seen within both catheters and collection bags. Right-sided hydro-ureteronephrosis.    Plan:  Abx  See UTI  Follow up with urology on outpatient basis    UTI (urinary tract infection)  Assessment & Plan  Pt presenting of flank pain and poor nephrostomy tube output. Complicated UTI related to nephrostomy tubes    RIGHT: Urine culture >100,000 cfu/ml Gram Negative Gary    LEFT:  > 100,000 Lactobacillus    70-79,000 Candida     Sensitive to Cefazolin    Plan:   -Follow up with urology  -Rocephin (1/30 -2/1)  -Keflex 500 mg BID for total of 10 days   -Fluconazole 800 mg IV x 1  -Fluconazole 400 mg daily for total of 7 days      Elevated serum creatinine-resolved as of 2/2/2024  Assessment & Plan  Cr 1.34 on admission. BL appears to be around 0.9-1.2. Likely due to dehydration from poor oral intake     Continue IVF NS 150cc/hr   Continue to monitor Cr  Avoid nephrotoxic agents, if possible     Constipation  Assessment & Plan  Patient has hx of constipation and reports to be taking dulcolax at home. Patients last BM in 1/30    Colace daily     Hyperlipidemia  Assessment & Plan  Chronic  On Crestor 10 mg daily    Prostate cancer (HCC)  Assessment & Plan  Numerous sclerotic lesions noted on CT. Last received lupron on 1/11. Sees  Dr. Coyne at medical oncology     Continue Lupron q6 months, Zytiga and dexamethasone   F/u urology     Hypertension  Assessment & Plan  Chronic, POA BP appears to be well controlled in 140s/70s. Home regimen includes lisinopril 10mg daily. Secondary to soft  "BP during hospitalization, continue to hold antihypertensives.    Type 2 diabetes mellitus, with long-term current use of insulin ([facility])  Assessment & Plan    Lab Results   Component Value Date    HGBA1C 11.1 (A) 01/08/2024     Home meds: Levemir 45 un HS, Glimepiride 2 mg lunch, Glimepiride 4 mg breakfast, Steglatro 5 mg. Glucose acutely elevated likely due to steroid use and infection.    Pt having hypoglycemic episodes in the AM, poor PO intake.    Plan:  Decrease glargine to 30 U HS  Resume home medications  Monitor carefully for hypoglycemic episodes  Bring log to next PCP appointment   Continue to monitor glucose levels   Diabetic diet     Leukocytosis-resolved as of 2/2/2024  Assessment & Plan  POA WBC 16.53, v/s stable. Blood cultures collected in the ED prior to abx.     Monitor for signs of sepsis   F/u Bcx   Continue  NS ml/hr    Hyponatremia-resolved as of 1/31/2024  Assessment & Plan  RESOLVED Na 129 on admission, likely due to poor oral intake. Urine sodium 87, urine osmolality 382.    Continue IVF NS  Continue to monitor Na on Am labs   Strict I/O's          Condition at Discharge: stable      Discharge Day Visit / Exam:      Vitals: Blood Pressure: 108/73 (02/02/24 1531)  Pulse: 73 (02/02/24 0907)  Temperature: 98.1 °F (36.7 °C) (02/02/24 1531)  Temp Source: Oral (02/01/24 2242)  Respirations: 18 (02/02/24 0907)  Height: 5' 7\" (170.2 cm) (01/30/24 2047)  Weight - Scale: 77.9 kg (171 lb 13 oz) (02/02/24 0600)  SpO2: 97 % (02/02/24 0907)  Exam:   Physical Exam  Vitals reviewed. Exam conducted with a chaperone present.   Constitutional:       General: He is not in acute distress.     Appearance: Normal appearance. He is not ill-appearing.   HENT:      Head: Normocephalic and atraumatic.   Cardiovascular:      Rate and Rhythm: Normal rate and regular rhythm.      Pulses: Normal pulses.      Heart sounds: Normal heart sounds. No murmur heard.  Pulmonary:      Effort: Pulmonary effort is normal. No " respiratory distress.      Breath sounds: Normal breath sounds. No wheezing.   Abdominal:      General: Abdomen is flat. Bowel sounds are normal. There is no distension.      Palpations: Abdomen is soft. There is no mass.      Tenderness: There is abdominal tenderness (Suprapubic). There is no guarding or rebound.   Genitourinary:     Comments: Bilateral nephrostomy tubes in place, with yellow urine  Musculoskeletal:      Right lower leg: No edema.      Left lower leg: No edema.   Skin:     General: Skin is warm and dry.   Neurological:      Mental Status: He is alert and oriented to person, place, and time.   Psychiatric:         Mood and Affect: Mood normal.         Behavior: Behavior normal.         Patient Information Sharing: With the consent of Oliver Astudillo, their loved ones were notified today by inpatient team of the patient’s condition and current plan.  All questions answered.      Discharge instructions/Information to patient and family:   See after visit summary for information provided to patient and family.       Discharge Medications:     Medication List      CONTINUE taking these medications     AeroChamber Mini Chamber Isaura; by Does not apply route see   administration instructions   Easy Touch Pen Needles 31G X 6 MM Misc; Generic drug: Insulin Pen   Needle; Use daily at bedtime   OneTouch Delica Lancets 33G Misc; Check blood sugars once daily. Please   substitute with appropriate alternative as covered by patient's insurance.   Dx: E11.65   OneTouch Verio Reflect w/Device Kit; Check blood sugars once daily.   Please substitute with appropriate alternative as covered by patient's   insurance. Dx: E11.65     ASK your doctor about these medications     abiraterone 250 mg tablet; Commonly known as: ZYTIGA; Take 1 tablet (250   mg total) by mouth daily With a low fat meal   albuterol 90 mcg/act inhaler; Commonly known as: ProAir HFA; Inhale 2   puffs every 4 (four) hours as needed for wheezing or  shortness of breath   calcium carbonate-vitamin D 500 mg-5 mcg tablet; Take 1 tablet by mouth   2 (two) times a day with meals   dexamethasone 0.5 mg tablet; Commonly known as: DECADRON; TAKE 1 TABLET   (0.5 MG TOTAL) BY MOUTH DAILY WITH BREAKFAST   Ertugliflozin L-PyroglutamicAc 5 MG Tabs; Take 5 mg by mouth daily with   breakfast   * glimepiride 4 mg tablet; Commonly known as: AMARYL   * glimepiride 2 mg tablet; Commonly known as: AMARYL; Take 1 tablet (2   mg total) by mouth daily with lunch   Levemir FlexPen 100 units/mL injection pen; Generic drug: insulin   detemir   lisinopril 10 mg tablet; Commonly known as: ZESTRIL; Take 1 tablet (10   mg total) by mouth daily   Lovaza 1 g capsule; Generic drug: omega-3-acid ethyl esters   OneTouch Verio test strip; Generic drug: glucose blood; Check blood   sugars once daily. Please substitute with appropriate alternative as   covered by patient's insurance. Dx: E11.65   polyethylene glycol 17 g packet; Commonly known as: MIRALAX; Take 17 g   by mouth daily as needed (constipation)   polyethylene glycol 4000 mL solution; Commonly known as: GOLYTELY; Take   4,000 mL by mouth once for 1 dose   rosuvastatin 10 MG tablet; Commonly known as: CRESTOR; Take 1 tablet (10   mg total) by mouth daily   Sentry Tabs   * sodium chloride (PF) 0.9 %; 10 mL by Intracatheter route daily   Intracatheter flushing daily. May substitute prefilled syringe with normal   saline 10 mL vials, 10 mL syringes, and 18 g blunt needles; Ask about:   Which instructions should I use?   * sodium chloride (PF) 0.9 %; 10 mL by Intracatheter route daily   Intracatheter flushing daily. May substitute prefilled syringe with normal   saline 10 mL vials, 10 mL syringes, and 18 g blunt needles; Ask about:   Which instructions should I use?   * sodium chloride (PF) 0.9 %; 10 mL by Intracatheter route daily for 120   doses Intracatheter flushing daily. May substitute prefilled syringe with   normal saline 10 mL  vials, 10 mL syringes, and 18 g blunt needles; Ask   about: Which instructions should I use?  * This list has 5 medication(s) that are the same as other medications   prescribed for you. Read the directions carefully, and ask your doctor or   other care provider to review them with you.        Disposition:   Home     For Discharges to [facility]:   ·       Not Applicable to this Patient - Not Applicable to this Patient     Discharge Statement:  I spent 45 minutes discharging the patient. This time was spent on the day of discharge. I had direct contact with the patient on the day of discharge. Greater than 50% of the total time was spent examining patient, answering all patient questions, arranging and discussing plan of care with patient as well as directly providing post-discharge instructions.  Additional time then spent on discharge activities.     ** Please Note: This note has been constructed using a voice recognition system **     Yas Webber DO   02/02/24  5:57 PM

## 2024-02-01 NOTE — ASSESSMENT & PLAN NOTE
Pt presenting of flank pain and poor nephrostomy tube output. Complicated UTI related to nephrostomy tubes    RIGHT: Urine culture >100,000 cfu/ml Gram Negative Gary    LEFT:  > 100,000 Lactobacillus    70-79,000 Candida     Sensitive to Cefazolin    Plan:   -Follow up with urology  -Rocephin (1/30 -2/1)  -Keflex 500 mg BID for total of 10 days   -Fluconazole 800 mg IV x 1  -Fluconazole 400 mg daily for total of 7 days

## 2024-02-01 NOTE — QUICK NOTE
On evaluation this morning, patient continues to complain of right sided flank pain. No urine output from right nephrostomy tube. Appropriate urine output noted on left. Nurses report appropriate flushing Q12H.     After discussion with urologist on call, recommended re-consult to IR and renal DMSA/DTPA. Attending is aware.

## 2024-02-01 NOTE — PLAN OF CARE
Problem: Potential for Falls  Goal: Patient will remain free of falls  Description: INTERVENTIONS:  - Educate patient/family on patient safety including physical limitations  - Instruct patient to call for assistance with activity   - Consult OT/PT to assist with strengthening/mobility   - Keep Call bell within reach  - Keep bed low and locked with side rails adjusted as appropriate  - Keep care items and personal belongings within reach  - Initiate and maintain comfort rounds  - Make Fall Risk Sign visible to staff  - Offer Toileting every 2 Hours, in advance of need  - Initiate/Maintain bed alarm  - Apply yellow socks and bracelet for high fall risk patients  - Consider moving patient to room near nurses station  Outcome: Progressing     Problem: PAIN - ADULT  Goal: Verbalizes/displays adequate comfort level or baseline comfort level  Description: Interventions:  - Encourage patient to monitor pain and request assistance  - Assess pain using appropriate pain scale  - Administer analgesics based on type and severity of pain and evaluate response  - Implement non-pharmacological measures as appropriate and evaluate response  - Consider cultural and social influences on pain and pain management  - Notify physician/advanced practitioner if interventions unsuccessful or patient reports new pain  Outcome: Progressing     Problem: INFECTION - ADULT  Goal: Absence or prevention of progression during hospitalization  Description: INTERVENTIONS:  - Assess and monitor for signs and symptoms of infection  - Monitor lab/diagnostic results  - Monitor all insertion sites, i.e. indwelling lines, tubes, and drains  - [place] appropriate cooling/warming therapies per order  - Administer medications as ordered  - Instruct and encourage patient and family to use good hand hygiene technique  - Identify and instruct in appropriate isolation precautions for identified infection/condition  Outcome: Progressing     Problem: SAFETY  ADULT  Goal: Patient will remain free of falls  Description: INTERVENTIONS:  - Educate patient/family on patient safety including physical limitations  - Instruct patient to call for assistance with activity   - Consult OT/PT to assist with strengthening/mobility   - Keep Call bell within reach  - Keep bed low and locked with side rails adjusted as appropriate  - Keep care items and personal belongings within reach  - Initiate and maintain comfort rounds  - Make Fall Risk Sign visible to staff  - Offer Toileting every 2 Hours, in advance of need  - Initiate/Maintain bed alarm  - Apply yellow socks and bracelet for high fall risk patients  - Consider moving patient to room near nurses station  Outcome: Progressing  Goal: Maintain or return to baseline ADL function  Description: INTERVENTIONS:  -  Assess patient's ability to carry out ADLs; assess patient's baseline for ADL function and identify physical deficits which impact ability to perform ADLs (bathing, care of mouth/teeth, toileting, grooming, dressing, etc.)  - Assess/evaluate cause of self-care deficits   - Assess range of motion  - Assess patient's mobility; develop plan if impaired  - Assess patient's need for assistive devices and provide as appropriate  - Encourage maximum independence but intervene and supervise when necessary  - Involve family in performance of ADLs  - Assess for home care needs following discharge   - Consider OT consult to assist with ADL evaluation and planning for discharge  - Provide patient education as appropriate  Outcome: Progressing  Goal: Maintains/Returns to pre admission functional level  Description: INTERVENTIONS:  - Perform AM-PAC 6 Click Basic Mobility/ Daily Activity assessment daily.  - Set and communicate daily mobility goal to care team and patient/family/caregiver.   - Collaborate with rehabilitation services on mobility goals if consulted  - Perform Range of Motion 3 times a day.  - Reposition patient every 2  hours.  - Dangle patient 2 times a day  - Stand patient 2 times a day  - Ambulate patient 2 times a day  - Out of bed to chair 2 times a day   - Out of bed for meals 2 times a day  - Out of bed for toileting  - Record patient progress and toleration of activity level   Outcome: Progressing     Problem: DISCHARGE PLANNING  Goal: Discharge to home or other facility with appropriate resources  Description: INTERVENTIONS:  - Identify barriers to discharge w/patient and caregiver  - Arrange for needed discharge resources and transportation as appropriate  - Identify discharge learning needs (meds, wound care, etc.)  - Arrange for interpretive services to assist at discharge as needed  - Refer to Case Management Department for coordinating discharge planning if the patient needs post-hospital services based on physician/advanced practitioner order or complex needs related to functional status, cognitive ability, or social support system  Outcome: Progressing     Problem: Knowledge Deficit  Goal: Patient/family/caregiver demonstrates understanding of disease process, treatment plan, medications, and discharge instructions  Description: Complete learning assessment and assess knowledge base.  Interventions:  - Provide teaching at level of understanding  - Provide teaching via preferred learning methods  Outcome: Progressing     Problem: GENITOURINARY - ADULT  Goal: Maintains or returns to baseline urinary function  Description: INTERVENTIONS:  - Assess urinary function  - Encourage oral fluids to ensure adequate hydration if ordered  - Administer IV fluids as ordered to ensure adequate hydration  - Administer ordered medications as needed  - Offer frequent toileting  - Follow urinary retention protocol if ordered  Outcome: Progressing     Problem: METABOLIC, FLUID AND ELECTROLYTES - ADULT  Goal: Electrolytes maintained within normal limits  Description: INTERVENTIONS:  - Monitor labs and assess patient for signs and  symptoms of electrolyte imbalances  - Administer electrolyte replacement as ordered  - Monitor response to electrolyte replacements, including repeat lab results as appropriate  - Instruct patient on fluid and nutrition as appropriate  Outcome: Progressing  Goal: Fluid balance maintained  Description: INTERVENTIONS:  - Monitor labs   - Monitor I/O and WT  - Instruct patient on fluid and nutrition as appropriate  - Assess for signs & symptoms of volume excess or deficit  Outcome: Progressing

## 2024-02-01 NOTE — PROGRESS NOTES
"Progress Note - Urology      Patient: Oliver Astudillo   : 1950 Sex: male   MRN: [id number]     CSN: [id number]  Unit/Bed#: [address] [place] [address]     SUBJECTIVE:   Patient seen on morning rounds  Discussion with hospital staff despite right tube changed yesterday with increasing size and hand irrigated to remove debris by myself last night still no urine output patient now to be scheduled for IR reevaluation for right nephrostogram to see if tube in appropriate position to be done today        Objective   Vitals: /74   Pulse 78   Temp 98.8 °F (37.1 °C)   Resp 18   Ht 5' 7\" (1.702 m)   Wt 77.9 kg (171 lb 12.8 oz)   SpO2 95%   BMI 26.91 kg/m²     I/O last 24 hours:  In: 20 [I.V.:20]  Out: 350 [Other:350]      Physical Exam:   General Alert awake   Normocephalic atraumatic PERRLA  Lungs clear bilaterally  Cardiac normal S1 normal S2  Abdomen soft, flank pain right  Right nephrostomy tube minimal to no output urine  Left nephrostomy tube draining well  Extremities no edema      Lab Results: CBC:   Lab Results   Component Value Date    WBC 10.72 (H) 2024    HGB 12.0 2024    HCT 34.6 (L) 2024    MCV 90 2024     2024    RBC 3.83 (L) 2024    MCH 31.3 2024    MCHC 34.7 2024    RDW 12.4 2024    MPV 9.4 2024    NRBC 0 2024     CMP:   Lab Results   Component Value Date     2024    CO2 25 2024    BUN 17 2024    CREATININE 1.06 2024    CALCIUM 9.1 2024    AST 10 (L) 2024    ALT 8 2024    ALKPHOS 65 2024    EGFR 70 2024     Urinalysis:   Lab Results   Component Value Date    COLORU Yellow 2024    CLARITYU Cloudy 2024    SPECGRAV 1.015 2024    PHUR 5.0 2024    LEUKOCYTESUR Large (A) 2024    NITRITE Positive (A) 2024    GLUCOSEU 100 (1/10%) (A) 2024    KETONESU Negative 2024    BILIRUBINUR Negative 2024    BLOODU Large (A) 2024 "     Urine Culture:   Lab Results   Component Value Date    URINECX >100,000 cfu/ml Gram Negative Gary (A) 01/30/2024     PSA:   Lab Results   Component Value Date    PSA 0.16 12/27/2023    PSA 6.01 (H) 08/11/2023    .73 (H) 05/24/2023         Assessment/ Plan:  Metastatic prostate cancer  Local advancement  Bilateral nephrostomy tubes  Initially obstructed infected  IR changed yesterday larger size  No drainage right tube despite hand irrigation by myself at the bedside  IR to do right nephrostogram to check tube placement  If tube in adequate position no urine output appears to have nonfunctioning kidney?  DTPA scan to be done1          Casey Talbert MD

## 2024-02-01 NOTE — PROGRESS NOTES
Daily Progress Note - [facility]  Family Medicine Residency  Oliver Astudlilo 73 y.o. male MRN: [id number]  Unit/Bed#: 2 [place] [address] Encounter: [id number]  Admitting Physician: Yas Webber DO  PCP: Eugenia Rodriguez MD  Date of Admission:  1/30/2024  3:24 PM    Summary:     IVFs:    Diet: Diet Rupesh/CHO Controlled; Consistent Carbohydrate Diet Level 2 (5 carb servings/75 grams CHO/meal); Sodium 2 GM  VTE:  Heparin . Mechanical prophylaxis in place.   Patient Centered Rounds: I have performed bedside rounds with nursing staff today.  Specialists/Other Care Team Provider:   Urology  IR    LOS: 1 day(s)  Status: Inpatient   Disposition: The patient will continue to require additional inpatient hospital stay due to poor output from nephrostomy tube  Code Status: Level 1 - Full Code      Assessment and Plan    * Hydronephrosis  Assessment & Plan  Patient with history of prostate cancer and bilateral nephrostomy tubes presenting with 8/10 suprapubic pain radiating to left lower back.  Patient states this pain started yesterday and is associated with nausea.  Patient states the pain is resolved with Tylenol.  Of note patient was recently discharged on 1/16 with a similar problem however it was right-sided flank pain and patient was treated with antibiotics.  IR evaluation at that time stated that there was no indication for tube replacements.  Patient's nephrostomy tubes were last replaced on 12/28.    WBC 16.53, UA large leuks, pos nitrite, large occult blood, LA 1.9, Trop 2    ED: Morphine 4 mg, Zofran 4 mg, IV NS maintence, Rocephin 1g IV   CT A/P - Bilateral percutaneous nephrostomy catheters remaining stable and in position within the renal pelves. Interval decrease in right-sided hydronephrosis with persistent abnormal urothelial enhancement and thickening throughout the renal pelvis and ureter on the right. On the left interval increase in degree of hydronephrosis and urothelial  thickening.    Poor urine output from right nephrostomy tube, is s/p tube change 1/31.     Plan:  Abx  Rocephin (1/30 - )  Continue to appreciate urology recommendations  Pending renal function NMR scan per urology recommendations  Consulted IR  Plan for tube check via IR  Irrigate bilateral nephrostomy tubes every shift   Pain regimen - tylenol q6 PRN for mild, moderate and morphine q6 PRN for breakthrough     UTI (urinary tract infection)  Assessment & Plan  Pt presenting of flank pain and poor nephrostomy tube output. Complicated UTI related to nephrostomy tubes    Urine culture >100,000 cfu/ml Gram Negative Gary     Plan:   -Continue Rocephin (1/30 -)  -Pending sensitivities     Elevated serum creatinine  Assessment & Plan  Cr 1.34 on admission. BL appears to be around 0.9-1.2. Likely due to dehydration from poor oral intake     Continue IVF NS 150cc/hr   Continue to monitor Cr  Avoid nephrotoxic agents, if possible     Constipation  Assessment & Plan  Patient has hx of constipation and reports to be taking dulcolax at home. Patients last BM in 1/30    Colace daily     Leukocytosis  Assessment & Plan  POA WBC 16.53, v/s stable. Blood cultures collected in the ED prior to abx.     Monitor for signs of sepsis   F/u Bcx   Continue  NS ml/hr    Hyperlipidemia  Assessment & Plan  Chronic  On Crestor 10 mg daily    Prostate cancer (HCC)  Assessment & Plan  Numerous sclerotic lesions noted on CT. Last received lupron on 1/11. Sees  Dr. Coyne at medical oncology     Continue Lupron q6 months, Zytiga and dexamethasone   F/u urology     Hypertension  Assessment & Plan  Chronic, POA BP appears to be well controlled in 140s/70s. Home regimen includes lisinopril 10mg daily     Hold lisinopril 10mg in the setting on elevated Cr  Monitor BP    Type 2 diabetes mellitus, with long-term current use of insulin (HCC)  Assessment & Plan    Lab Results   Component Value Date    HGBA1C 11.1 (A) 01/08/2024     Home meds: Levemir  45 un HS, Glimepiride 2 mg lunch, Glimepiride 4 mg breakfast, Steglatro 5 mg. Glucose acutely elevated likely due to steroid use and infection.    Pt having hypoglycemic episodes in the AM, poor PO intake.    Plan:  Decrease glargine to 30 U HS  ISS  Continue to monitor glucose levels   Diabetic diet   Hypoglycemic protocol     Hyponatremia-resolved as of 2024  Assessment & Plan  RESOLVED Na 129 on admission, likely due to poor oral intake. Urine sodium 87, urine osmolality 382.    Continue IVF NS  Continue to monitor Na on Am labs   Strict I/O's         Subjective:   Patient evaluated at bedside, continuing to complain of right sided flank pain. States that he noted appropriate left sided urine output. No right sided urine output, stating that he is making urine via urethra.     Denies fevers, chills, N/V/D.     Objective     Objective:   Vitals:   Temp (24hrs), Av.6 °F (37 °C), Min:98.3 °F (36.8 °C), Max:98.8 °F (37.1 °C)    Temp:  [98.3 °F (36.8 °C)-98.8 °F (37.1 °C)] 98.8 °F (37.1 °C)  HR:  [62-78] 78  Resp:  [14-71] 18  BP: (123-162)/(69-76) 137/74  SpO2:  [95 %-100 %] 95 %  Body mass index is 26.91 kg/m².     Input and Output Summary (last 24 hours):       Intake/Output Summary (Last 24 hours) at 2024 1538  Last data filed at 2024 0853  Gross per 24 hour   Intake 20 ml   Output --   Net 20 ml       Physical Exam:   Physical Exam  Vitals reviewed. Exam conducted with a chaperone present.   Constitutional:       General: He is not in acute distress.     Appearance: Normal appearance. He is not ill-appearing.   HENT:      Head: Normocephalic and atraumatic.   Cardiovascular:      Rate and Rhythm: Normal rate and regular rhythm.      Pulses: Normal pulses.      Heart sounds: Normal heart sounds. No murmur heard.  Pulmonary:      Effort: Pulmonary effort is normal. No respiratory distress.      Breath sounds: Normal breath sounds. No wheezing.   Abdominal:      General: Abdomen is flat. Bowel  sounds are normal. There is no distension.      Palpations: Abdomen is soft. There is no mass.      Tenderness: There is abdominal tenderness. There is right CVA tenderness. There is no guarding or rebound.   Genitourinary:     Comments: Bilateral nephrostomy tubes in place, with yellow urine  Musculoskeletal:      Right lower leg: No edema.      Left lower leg: No edema.   Skin:     General: Skin is warm and dry.   Neurological:      Mental Status: He is alert and oriented to person, place, and time.   Psychiatric:         Mood and Affect: Mood normal.         Behavior: Behavior normal.         Additional Data:     Labs:  Results from last 7 days   Lab Units 02/01/24  0608   WBC Thousand/uL 10.72*   HEMOGLOBIN g/dL 12.0   HEMATOCRIT % 34.6*   PLATELETS Thousands/uL 314   NEUTROS PCT % 71   LYMPHS PCT % 20   MONOS PCT % 7   EOS PCT % 1     Results from last 7 days   Lab Units 02/01/24  0608   POTASSIUM mmol/L 3.5   CHLORIDE mmol/L 102   CO2 mmol/L 25   BUN mg/dL 17   CREATININE mg/dL 1.06   CALCIUM mg/dL 9.1   ALK PHOS U/L 65   ALT U/L 8   AST U/L 10*         Results from last 7 days   Lab Units 02/01/24  1059 02/01/24  0758 02/01/24  0714 01/31/24  2042 01/31/24  1558   POC GLUCOSE mg/dl 139 177* 59* 184* 109           * I Have Reviewed All Lab Data Listed Above.  * Additional Pertinent Lab Tests Reviewed: All Labs Within Last 24 Hours Reviewed    Imaging:    Imaging Reports Reviewed Today Include: none  Imaging Personally Reviewed by Myself Includes:  none    Recent Cultures (last 7 days):     Results from last 7 days   Lab Units 01/30/24  2030 01/30/24  2028 01/30/24  1637 01/30/24  1636   BLOOD CULTURE   --   --   --  No Growth at 24 hrs.  No Growth at 24 hrs.   URINE CULTURE  >100,000 cfu/ml Gram Negative Gary* >100,000 cfu/ml Lactobacillus species*  70,000-79,000 cfu/ml Candida albicans* >100,000 cfu/ml Gram Negative Gary Enteric Like*  --        Last 24 Hours Medication List:   Current Facility-Administered  Medications   Medication Dose Route Frequency Provider Last Rate    abiraterone  250 mg Oral Daily Chanell Foss MD      acetaminophen  650 mg Oral Q4H PRN James Bruce MD      albuterol  2 puff Inhalation Q4H PRN Chanell Foss MD      calcium carbonate-vitamin D  1 tablet Oral BID With Meals Chanell Foss MD      cefTRIAXone  1,000 mg Intravenous Q24H Chanell Foss MD 1,000 mg (01/31/24 6992)    dexamethasone  0.5 mg Oral Daily Chanell Foss MD      docusate sodium  100 mg Oral Daily Chanell Foss MD      heparin (porcine)  5,000 Units Subcutaneous Q8H [facility] Chanell Foss MD      HYDROmorphone  0.2 mg Intravenous Q3H PRN James Bruce MD      insulin glargine  30 Units Subcutaneous HS Yas Webber DO      insulin lispro  1-6 Units Subcutaneous 4x Daily (AC & HS) Chanell Foss MD      multivitamin stress formula  1 tablet Oral Daily Chanell Foss MD      oxyCODONE  2.5 mg Oral Q4H PRN James Bruce MD      Or    oxyCODONE  5 mg Oral Q4H PRN James Bruce MD      pravastatin  80 mg Oral Daily With Dinner Chanell Foss MD      sodium chloride (PF)  10 mL Intracatheter Q12H RAJEEV Chanell Foss MD      sodium chloride (PF)  10 mL Intracatheter Q12H [facility] Yas Webber DO                 Patient Information Sharing: With the consent of Oliver Astudillo , their loved ones were notified today by inpatient team of the patient’s condition and current plan.  All questions answered.     Time Spent for Care: 30 minutes.  More than 50% of total time spent on counseling and coordination of care as described above.    ** Please Note: Dictation voice to text software may have been used in the creation of this document. **    Yas Webber DO  02/01/24  3:38 PM

## 2024-02-01 NOTE — UTILIZATION REVIEW
Date: 2/1/24    Day 3: Has surpassed a 2nd midnight with active treatments and services, which include IVABT, pain mgt. Monitor output, VS, follow labs    See initial review completed by Dee Dee on 1/31/24.

## 2024-02-01 NOTE — CONSULTS
[facility] Record Consult  IR has been consulted to evaluate the patient, determine the appropriate procedure, and whether or not a procedure can and should be performed regarding the care of Oliver Astudillo.    We were consulted by medicine concerning right flank pain post nephrostomy tube exchange with no urine output, and to possibly perform a tube check if medically appropriate for the patient. The patient is aware that a specialty consultation is taking place, and agrees to the IR Consult on their behalf.     Assessment/Plan:   73 year old male with history of prostate cancer and chronic bilateral nephrostomy tubes. Tubes exchanged and upsized yesterday in IR. Patient reports persistent pain and no drainage from the right tube since exchange. Will plan for a tube check today or tomorrow pending IR schedule.    I spent 10 minutes in medical consultative time evaluating the medical record and imaging of Oliver Astudillo.    Thank you for allowing [facility] to participate in the care of Olivre Astudillo. Please don't hesitate to call or TigerText us with any questions.     Pro Murphy MD

## 2024-02-02 ENCOUNTER — APPOINTMENT (OUTPATIENT)
Dept: NON INVASIVE DIAGNOSTICS | Facility: HOSPITAL | Age: 74
DRG: 698 | End: 2024-02-02
Payer: COMMERCIAL

## 2024-02-02 ENCOUNTER — APPOINTMENT (OUTPATIENT)
Dept: RADIOLOGY | Facility: HOSPITAL | Age: 74
DRG: 698 | End: 2024-02-02
Payer: COMMERCIAL

## 2024-02-02 VITALS
SYSTOLIC BLOOD PRESSURE: 108 MMHG | DIASTOLIC BLOOD PRESSURE: 73 MMHG | HEART RATE: 73 BPM | HEIGHT: 67 IN | BODY MASS INDEX: 26.97 KG/M2 | RESPIRATION RATE: 18 BRPM | WEIGHT: 171.81 LBS | OXYGEN SATURATION: 97 % | TEMPERATURE: 98.1 F

## 2024-02-02 DIAGNOSIS — Z79.4 TYPE 2 DIABETES MELLITUS, WITH LONG-TERM CURRENT USE OF INSULIN (HCC): Chronic | ICD-10-CM

## 2024-02-02 DIAGNOSIS — E11.9 TYPE 2 DIABETES MELLITUS, WITH LONG-TERM CURRENT USE OF INSULIN (HCC): Chronic | ICD-10-CM

## 2024-02-02 PROBLEM — R79.89 ELEVATED SERUM CREATININE: Status: RESOLVED | Noted: 2023-06-21 | Resolved: 2024-02-02

## 2024-02-02 PROBLEM — D72.829 LEUKOCYTOSIS: Status: RESOLVED | Noted: 2024-01-30 | Resolved: 2024-02-02

## 2024-02-02 LAB
ALBUMIN SERPL BCG-MCNC: 3.1 G/DL (ref 3.5–5)
ALP SERPL-CCNC: 66 U/L (ref 34–104)
ALT SERPL W P-5'-P-CCNC: 9 U/L (ref 7–52)
ANION GAP SERPL CALCULATED.3IONS-SCNC: 5 MMOL/L
AST SERPL W P-5'-P-CCNC: 10 U/L (ref 13–39)
BACTERIA UR CULT: ABNORMAL
BASOPHILS # BLD AUTO: 0.03 THOUSANDS/ΜL (ref 0–0.1)
BASOPHILS NFR BLD AUTO: 0 % (ref 0–1)
BILIRUB SERPL-MCNC: 0.4 MG/DL (ref 0.2–1)
BUN SERPL-MCNC: 14 MG/DL (ref 5–25)
CALCIUM ALBUM COR SERPL-MCNC: 9.9 MG/DL (ref 8.3–10.1)
CALCIUM SERPL-MCNC: 9.2 MG/DL (ref 8.4–10.2)
CHLORIDE SERPL-SCNC: 104 MMOL/L (ref 96–108)
CO2 SERPL-SCNC: 29 MMOL/L (ref 21–32)
CREAT SERPL-MCNC: 0.97 MG/DL (ref 0.6–1.3)
EOSINOPHIL # BLD AUTO: 0.09 THOUSAND/ΜL (ref 0–0.61)
EOSINOPHIL NFR BLD AUTO: 1 % (ref 0–6)
ERYTHROCYTE [DISTWIDTH] IN BLOOD BY AUTOMATED COUNT: 12.4 % (ref 11.6–15.1)
GFR SERPL CREATININE-BSD FRML MDRD: 78 ML/MIN/1.73SQ M
GLUCOSE SERPL-MCNC: 152 MG/DL (ref 65–140)
GLUCOSE SERPL-MCNC: 212 MG/DL (ref 65–140)
GLUCOSE SERPL-MCNC: 65 MG/DL (ref 65–140)
GLUCOSE SERPL-MCNC: 76 MG/DL (ref 65–140)
HCT VFR BLD AUTO: 34.2 % (ref 36.5–49.3)
HGB BLD-MCNC: 11.3 G/DL (ref 12–17)
IMM GRANULOCYTES # BLD AUTO: 0.02 THOUSAND/UL (ref 0–0.2)
IMM GRANULOCYTES NFR BLD AUTO: 0 % (ref 0–2)
LYMPHOCYTES # BLD AUTO: 2.68 THOUSANDS/ΜL (ref 0.6–4.47)
LYMPHOCYTES NFR BLD AUTO: 30 % (ref 14–44)
MCH RBC QN AUTO: 30.4 PG (ref 26.8–34.3)
MCHC RBC AUTO-ENTMCNC: 33 G/DL (ref 31.4–37.4)
MCV RBC AUTO: 92 FL (ref 82–98)
MONOCYTES # BLD AUTO: 0.57 THOUSAND/ΜL (ref 0.17–1.22)
MONOCYTES NFR BLD AUTO: 7 % (ref 4–12)
NEUTROPHILS # BLD AUTO: 5.43 THOUSANDS/ΜL (ref 1.85–7.62)
NEUTS SEG NFR BLD AUTO: 62 % (ref 43–75)
NRBC BLD AUTO-RTO: 0 /100 WBCS
PLATELET # BLD AUTO: 303 THOUSANDS/UL (ref 149–390)
PMV BLD AUTO: 9.5 FL (ref 8.9–12.7)
POTASSIUM SERPL-SCNC: 3.8 MMOL/L (ref 3.5–5.3)
PROT SERPL-MCNC: 6.9 G/DL (ref 6.4–8.4)
RBC # BLD AUTO: 3.72 MILLION/UL (ref 3.88–5.62)
SODIUM SERPL-SCNC: 138 MMOL/L (ref 135–147)
WBC # BLD AUTO: 8.82 THOUSAND/UL (ref 4.31–10.16)

## 2024-02-02 PROCEDURE — 85025 COMPLETE CBC W/AUTO DIFF WBC: CPT

## 2024-02-02 PROCEDURE — 50431 NJX PX NFROSGRM &/URTRGRM: CPT

## 2024-02-02 PROCEDURE — 50431 NJX PX NFROSGRM &/URTRGRM: CPT | Performed by: STUDENT IN AN ORGANIZED HEALTH CARE EDUCATION/TRAINING PROGRAM

## 2024-02-02 PROCEDURE — A9562 TC99M MERTIATIDE: HCPCS

## 2024-02-02 PROCEDURE — 80053 COMPREHEN METABOLIC PANEL: CPT

## 2024-02-02 PROCEDURE — G1004 CDSM NDSC: HCPCS

## 2024-02-02 PROCEDURE — 82948 REAGENT STRIP/BLOOD GLUCOSE: CPT

## 2024-02-02 PROCEDURE — 78707 K FLOW/FUNCT IMAGE W/O DRUG: CPT

## 2024-02-02 RX ORDER — DOCUSATE SODIUM 100 MG/1
100 CAPSULE, LIQUID FILLED ORAL DAILY PRN
Qty: 30 CAPSULE | Refills: 0 | Status: SHIPPED | OUTPATIENT
Start: 2024-02-02 | End: 2024-03-24

## 2024-02-02 RX ORDER — POTASSIUM CHLORIDE 1500 MG/1
20 TABLET, EXTENDED RELEASE ORAL ONCE
Status: COMPLETED | OUTPATIENT
Start: 2024-02-02 | End: 2024-02-02

## 2024-02-02 RX ORDER — FLUCONAZOLE 100 MG/1
400 TABLET ORAL DAILY
Status: DISCONTINUED | OUTPATIENT
Start: 2024-02-02 | End: 2024-02-02 | Stop reason: HOSPADM

## 2024-02-02 RX ORDER — INSULIN DETEMIR 100 [IU]/ML
30 INJECTION, SOLUTION SUBCUTANEOUS
Qty: 15 ML | Refills: 0 | Status: SHIPPED | OUTPATIENT
Start: 2024-02-02 | End: 2024-02-05

## 2024-02-02 RX ORDER — FLUCONAZOLE 200 MG/1
400 TABLET ORAL DAILY
Qty: 10 TABLET | Refills: 0 | Status: SHIPPED | OUTPATIENT
Start: 2024-02-03 | End: 2024-02-08

## 2024-02-02 RX ORDER — CEPHALEXIN 500 MG/1
500 CAPSULE ORAL EVERY 12 HOURS SCHEDULED
Qty: 14 CAPSULE | Refills: 0 | Status: SHIPPED | OUTPATIENT
Start: 2024-02-02 | End: 2024-02-09

## 2024-02-02 RX ADMIN — IOHEXOL 10 ML: 350 INJECTION, SOLUTION INTRAVENOUS at 08:58

## 2024-02-02 RX ADMIN — POTASSIUM CHLORIDE 20 MEQ: 1500 TABLET, EXTENDED RELEASE ORAL at 09:20

## 2024-02-02 RX ADMIN — FLUCONAZOLE 400 MG: 100 TABLET ORAL at 15:44

## 2024-02-02 RX ADMIN — DEXAMETHASONE 0.5 MG: 0.5 TABLET ORAL at 09:20

## 2024-02-02 RX ADMIN — B-COMPLEX W/ C & FOLIC ACID TAB 1 TABLET: TAB at 09:20

## 2024-02-02 RX ADMIN — INSULIN LISPRO 2 UNITS: 100 INJECTION, SOLUTION INTRAVENOUS; SUBCUTANEOUS at 16:53

## 2024-02-02 RX ADMIN — Medication 1 TABLET: at 15:45

## 2024-02-02 RX ADMIN — PRAVASTATIN SODIUM 80 MG: 80 TABLET ORAL at 15:44

## 2024-02-02 RX ADMIN — Medication 1 TABLET: at 09:21

## 2024-02-02 RX ADMIN — ABIRATERONE ACETATE 250 MG: 250 TABLET, FILM COATED ORAL at 09:19

## 2024-02-02 RX ADMIN — HEPARIN SODIUM 5000 UNITS: 5000 INJECTION INTRAVENOUS; SUBCUTANEOUS at 15:44

## 2024-02-02 RX ADMIN — INSULIN LISPRO 1 UNITS: 100 INJECTION, SOLUTION INTRAVENOUS; SUBCUTANEOUS at 12:06

## 2024-02-02 RX ADMIN — HEPARIN SODIUM 5000 UNITS: 5000 INJECTION INTRAVENOUS; SUBCUTANEOUS at 05:41

## 2024-02-02 RX ADMIN — CEFTRIAXONE 1000 MG: 1 INJECTION, SOLUTION INTRAVENOUS at 18:23

## 2024-02-02 NOTE — PROGRESS NOTES
"Progress Note - Urology      Patient: Oliver Astudillo   : 1950 Sex: male   MRN: [id number]     CSN: [id number]  Unit/Bed#: [address] [place] [address]     SUBJECTIVE:   Patient seen on morning rounds  Right tube checked in position no urine output indicative of nonfunctioning right kidney  Most likely acute obstruction causing worsening ATN      Objective   Vitals: /70   Pulse 75   Temp 98.4 °F (36.9 °C)   Resp 18   Ht 5' 7\" (1.702 m)   Wt 77.9 kg (171 lb 13 oz)   SpO2 98%   BMI 26.91 kg/m²     I/O last 24 hours:  In: 60 [I.V.:20; Other:40]  Out: 970 [Urine:970]      Physical Exam:   General Alert awake   Normocephalic atraumatic PERRLA  Lungs clear bilaterally  Cardiac normal S1 normal S2  Abdomen soft, flank pain  Extremities no edema      Lab Results: CBC:   Lab Results   Component Value Date    WBC 8.82 2024    HGB 11.3 (L) 2024    HCT 34.2 (L) 2024    MCV 92 2024     2024    RBC 3.72 (L) 2024    MCH 30.4 2024    MCHC 33.0 2024    RDW 12.4 2024    MPV 9.5 2024    NRBC 0 2024     CMP:   Lab Results   Component Value Date     2024    CO2 29 2024    BUN 14 2024    CREATININE 0.97 2024    CALCIUM 9.2 2024    AST 10 (L) 2024    ALT 9 2024    ALKPHOS 66 2024    EGFR 78 2024     Urinalysis:   Lab Results   Component Value Date    COLORU Yellow 2024    CLARITYU Cloudy 2024    SPECGRAV 1.015 2024    PHUR 5.0 2024    LEUKOCYTESUR Large (A) 2024    NITRITE Positive (A) 2024    GLUCOSEU 100 (1/10%) (A) 2024    KETONESU Negative 2024    BILIRUBINUR Negative 2024    BLOODU Large (A) 2024     Urine Culture:   Lab Results   Component Value Date    URINECX >100,000 cfu/ml Gram Negative Gary (A) 2024     PSA:   Lab Results   Component Value Date    PSA 0.16 2023    PSA 6.01 (H) 2023    .73 (H) 2023 "         Assessment/ Plan:  Metastatic advanced prostate cancer  Bilateral hydronephrosis  Bilateral nephrostomy tubes  Right obstructed tube  Patient appears to have nonfunctioning right kidney with no urine output no obvious obstruction/worsening ATN  Most likely related to recent right obstructed tube ATN   functioning now completely from left kidney output  Do not feel renal study is warranted since tube is patent ATN may clear to some extent with urine output increasing no further therapy on the right tube          Casey Talbert MD

## 2024-02-02 NOTE — DISCHARGE INSTR - AVS FIRST PAGE
-Continue to flush tubes at least twice per day    -Continue to monitor BG     -Continue to monitor BP

## 2024-02-02 NOTE — PLAN OF CARE
Problem: Potential for Falls  Goal: Patient will remain free of falls  Description: INTERVENTIONS:  - Educate patient/family on patient safety including physical limitations  - Instruct patient to call for assistance with activity   - Consult OT/PT to assist with strengthening/mobility   - Keep Call bell within reach  - Keep bed low and locked with side rails adjusted as appropriate  - Keep care items and personal belongings within reach  - Initiate and maintain comfort rounds  - Make Fall Risk Sign visible to staff  - Offer Toileting every 2 Hours, in advance of need  - Initiate/Maintain bed alarm  - Obtain necessary fall risk management equipment: call bell   - Apply yellow socks and bracelet for high fall risk patients  - Consider moving patient to room near nurses station  Outcome: Progressing     Problem: PAIN - ADULT  Goal: Verbalizes/displays adequate comfort level or baseline comfort level  Description: Interventions:  - Encourage patient to monitor pain and request assistance  - Assess pain using appropriate pain scale  - Administer analgesics based on type and severity of pain and evaluate response  - Implement non-pharmacological measures as appropriate and evaluate response  - Consider cultural and social influences on pain and pain management  - Notify physician/advanced practitioner if interventions unsuccessful or patient reports new pain  Outcome: Progressing     Problem: INFECTION - ADULT  Goal: Absence or prevention of progression during hospitalization  Description: INTERVENTIONS:  - Assess and monitor for signs and symptoms of infection  - Monitor lab/diagnostic results  - Monitor all insertion sites, i.e. indwelling lines, tubes, and drains  - Gerald appropriate cooling/warming therapies per order  - Administer medications as ordered  - Instruct and encourage patient and family to use good hand hygiene technique  - Identify and instruct in appropriate isolation precautions for identified  infection/condition  Outcome: Progressing     Problem: SAFETY ADULT  Goal: Patient will remain free of falls  Description: INTERVENTIONS:  - Educate patient/family on patient safety including physical limitations  - Instruct patient to call for assistance with activity   - Consult OT/PT to assist with strengthening/mobility   - Keep Call bell within reach  - Keep bed low and locked with side rails adjusted as appropriate  - Keep care items and personal belongings within reach  - Initiate and maintain comfort rounds  - Make Fall Risk Sign visible to staff  - Offer Toileting every 2 Hours, in advance of need  - Initiate/Maintain bed alarm  - Obtain necessary fall risk management equipment: call bell   - Apply yellow socks and bracelet for high fall risk patients  - Consider moving patient to room near nurses station  Outcome: Progressing  Goal: Maintain or return to baseline ADL function  Description: INTERVENTIONS:  -  Assess patient's ability to carry out ADLs; assess patient's baseline for ADL function and identify physical deficits which impact ability to perform ADLs (bathing, care of mouth/teeth, toileting, grooming, dressing, etc.)  - Assess/evaluate cause of self-care deficits   - Assess range of motion  - Assess patient's mobility; develop plan if impaired  - Assess patient's need for assistive devices and provide as appropriate  - Encourage maximum independence but intervene and supervise when necessary  - Involve family in performance of ADLs  - Assess for home care needs following discharge   - Consider OT consult to assist with ADL evaluation and planning for discharge  - Provide patient education as appropriate  Outcome: Progressing  Goal: Maintains/Returns to pre admission functional level  Description: INTERVENTIONS:  - Perform AM-PAC 6 Click Basic Mobility/ Daily Activity assessment daily.  - Set and communicate daily mobility goal to care team and patient/family/caregiver.   - Collaborate with  rehabilitation services on mobility goals if consulted  - Perform Range of Motion 3 times a day.  - Reposition patient every 3 hours.  - Dangle patient 3 times a day  - Stand patient 3 times a day  - Ambulate patient 3 times a day  - Out of bed to chair 3 times a day   - Out of bed for meals 3 times a day  - Out of bed for toileting  - Record patient progress and toleration of activity level   Outcome: Progressing     Problem: DISCHARGE PLANNING  Goal: Discharge to home or other facility with appropriate resources  Description: INTERVENTIONS:  - Identify barriers to discharge w/patient and caregiver  - Arrange for needed discharge resources and transportation as appropriate  - Identify discharge learning needs (meds, wound care, etc.)  - Arrange for interpretive services to assist at discharge as needed  - Refer to Case Management Department for coordinating discharge planning if the patient needs post-hospital services based on physician/advanced practitioner order or complex needs related to functional status, cognitive ability, or social support system  Outcome: Progressing     Problem: Knowledge Deficit  Goal: Patient/family/caregiver demonstrates understanding of disease process, treatment plan, medications, and discharge instructions  Description: Complete learning assessment and assess knowledge base.  Interventions:  - Provide teaching at level of understanding  - Provide teaching via preferred learning methods  Outcome: Progressing     Problem: GENITOURINARY - ADULT  Goal: Maintains or returns to baseline urinary function  Description: INTERVENTIONS:  - Assess urinary function  - Encourage oral fluids to ensure adequate hydration if ordered  - Administer IV fluids as ordered to ensure adequate hydration  - Administer ordered medications as needed  - Offer frequent toileting  - Follow urinary retention protocol if ordered  Outcome: Progressing     Problem: METABOLIC, FLUID AND ELECTROLYTES - ADULT  Goal:  Electrolytes maintained within normal limits  Description: INTERVENTIONS:  - Monitor labs and assess patient for signs and symptoms of electrolyte imbalances  - Administer electrolyte replacement as ordered  - Monitor response to electrolyte replacements, including repeat lab results as appropriate  - Instruct patient on fluid and nutrition as appropriate  Outcome: Progressing  Goal: Fluid balance maintained  Description: INTERVENTIONS:  - Monitor labs   - Monitor I/O and WT  - Instruct patient on fluid and nutrition as appropriate  - Assess for signs & symptoms of volume excess or deficit  Outcome: Progressing

## 2024-02-02 NOTE — PLAN OF CARE
Problem: Potential for Falls  Goal: Patient will remain free of falls  Description: INTERVENTIONS:  - Educate patient/family on patient safety including physical limitations  - Instruct patient to call for assistance with activity   - Consult OT/PT to assist with strengthening/mobility   - Keep Call bell within reach  - Keep bed low and locked with side rails adjusted as appropriate  - Keep care items and personal belongings within reach  - Initiate and maintain comfort rounds  - Make Fall Risk Sign visible to staff  - Offer Toileting every 2 Hours, in advance of need  - Initiate/Maintain bed alarm  - Obtain necessary fall risk management equipment: yellow socks yellow bracelet   Problem: SAFETY ADULT  Goal: Maintain or return to baseline ADL function  Description: INTERVENTIONS:  -  Assess patient's ability to carry out ADLs; assess patient's baseline for ADL function and identify physical deficits which impact ability to perform ADLs (bathing, care of mouth/teeth, toileting, grooming, dressing, etc.)  - Assess/evaluate cause of self-care deficits   - Assess range of motion  - Assess patient's mobility; develop plan if impaired  - Assess patient's need for assistive devices and provide as appropriate  - Encourage maximum independence but intervene and supervise when necessary  - Involve family in performance of ADLs  - Assess for home care needs following discharge   - Consider OT consult to assist with ADL evaluation and planning for discharge  - Provide patient education as appropriate  Outcome: Progressing     Problem: INFECTION - ADULT  Goal: Absence or prevention of progression during hospitalization  Description: INTERVENTIONS:  - Assess and monitor for signs and symptoms of infection  - Monitor lab/diagnostic results  - Monitor all insertion sites, i.e. indwelling lines, tubes, and drains  - [place] appropriate cooling/warming therapies per order  - Administer medications as ordered  - Instruct and  encourage patient and family to use good hand hygiene technique  - Identify and instruct in appropriate isolation precautions for identified infection/condition  Outcome: Progressing     Problem: PAIN - ADULT  Goal: Verbalizes/displays adequate comfort level or baseline comfort level  Description: Interventions:  - Encourage patient to monitor pain and request assistance  - Assess pain using appropriate pain scale  - Administer analgesics based on type and severity of pain and evaluate response  - Implement non-pharmacological measures as appropriate and evaluate response  - Consider cultural and social influences on pain and pain management  - Notify physician/advanced practitioner if interventions unsuccessful or patient reports new pain  Outcome: Progressing     Problem: METABOLIC, FLUID AND ELECTROLYTES - ADULT  Goal: Electrolytes maintained within normal limits  Description: INTERVENTIONS:  - Monitor labs and assess patient for signs and symptoms of electrolyte imbalances  - Administer electrolyte replacement as ordered  - Monitor response to electrolyte replacements, including repeat lab results as appropriate  - Instruct patient on fluid and nutrition as appropriate  Outcome: Progressing     Problem: GENITOURINARY - ADULT  Goal: Maintains or returns to baseline urinary function  Description: INTERVENTIONS:  - Assess urinary function  - Encourage oral fluids to ensure adequate hydration if ordered  - Administer IV fluids as ordered to ensure adequate hydration  - Administer ordered medications as needed  - Offer frequent toileting  - Follow urinary retention protocol if ordered  Outcome: Progressing     Problem: METABOLIC, FLUID AND ELECTROLYTES - ADULT  Goal: Fluid balance maintained  Description: INTERVENTIONS:  - Monitor labs   - Monitor I/O and WT  - Instruct patient on fluid and nutrition as appropriate  - Assess for signs & symptoms of volume excess or deficit  Outcome: Progressing     - Apply yellow  socks and bracelet for high fall risk patients  - Consider moving patient to room near nurses station  Outcome: Progressing

## 2024-02-02 NOTE — DISCHARGE INSTRUCTIONS
Nephrostomy Tube Care     WHAT YOU NEED TO KNOW:   A nephrostomy tube is a catheter (thin plastic tube) that is inserted through your skin and into your kidney. The nephrostomy tube drains urine from your kidney into a collecting bag outside your body. You may need a nephrostomy tube when something is blocking the normal flow of urine. A nephrostomy tube may be used for a short or a long period of time. The nephrostomy tube comes out of your back, so you will need someone to help care for your nephrostomy tube.          DISCHARGE INSTRUCTIONS:      How to clean the skin around the nephrostomy tube and change the bandage:  Since the nephrostomy tube comes out of your back, you will not be able to care for it by yourself. Ask someone to follow the general directions below to check and care for your nephrostomy tube.   Gather the items you will need.          Disposable (single use) under-pad, and a clean washcloth  Plain soap, warm water, and new medical gloves  Sterile gauze bandages  Clear adhesive dressing or medical tape  Skin barrier  Protective skin film  Trash bag  Remove the old bandage, and check the tube entry site.    Have the patient lie on his side with the nephrostomy tube entry site facing up. Place the under-pad where it will catch drainage as you are working with the nephrostomy tube.   Wash your hands with soap and water. Put on new medical gloves.  Gently remove the old bandage, without pulling on the tube. Do this by holding the skin beside the tube with one hand. With the other hand, gently remove sticky tape and the skin barrier by pulling in the same direction as hair growth. Do not touch the side of the bandage that is placed over or around the tube. Throw the bandage and skin barrier away in a trash bag.  Look for signs of infection, such as skin redness and swelling. Report any skin changes to healthcare providers.  Clean the tube entry site.    Hold the tube in place to keep it from  being pulled out while you are cleaning around it.  You will need to clean the area twice. For the first cleaning, wet a new gauze bandage with soap and water.  Begin at the entry site of the tube. Wipe the skin in circles, moving away from the entry site. Remove blood and any other material with the gauze. Do this as often as needed. Use a new gauze bandage each time you clean the area, moving away from the entry site.   For the second cleaning, wet a new gauze bandage with water. Begin at the entry site of the tube. Wipe the skin in circles, moving away from the entry site. Use a new gauze bandage each time you clean the area, moving away from the entry site.   Gently pat the skin with a clean washcloth to dry it.    Apply the skin barrier and bandages.    Roll up a bandage to make it thick, and place it under  the place where the tube enters the skin. Place it to support the tube, and stop it from kinking or bending. Tape the bandage in place, and apply more bandages if directed by a healthcare provider.   Bring the tubing forward to the front and tape it to the skin. Do not stretch the tube tight, because this may pull the nephrostomy tube out.  How often to change the bandage.  Change the bandage around the tube, every other day. If your bandages  get dirty or wet, change them right away, and as often as needed. If your nephrostomy tube is to be used for a long period of time, the tube needs to be changed every 2 to 3 months. Healthcare providers will tell you when you need to make an appointment to have your tube changed.     How to care for the urine drainage bag:   Ask if you need to measure and write down how much urine is in the bag before you empty it. Drain urine out of the drainage bag when it is ½ to ? full. Open the spout at the bottom of the bag to empty the urine into the toilet.   You may need to detach the drainage bag from the nephrostomy tube to change it.. If so, attach a new drainage bag  tightly to the nephrostomy tube.     How to prevent problems with your nephrostomy tube:   Change bandages, directed.  This helps to prevent infection. Throw away or clean your drainage bag as directed by your healthcare provider.    Wipe the connecting ends of the drainage bag with alcohol before you reconnect the bag to the tube.  This helps prevent infection.     Keep the tube taped to your skin and connected to a drainage bag placed below the level of your kidneys.  This helps prevent urine from backing up into your kidneys. You may wear a small drainage bag strapped to your leg to let you move around more easily.    Check the catheter to be sure it is in place after you change your clothes or do other activities.  Do not wear tight clothing over the tube. Place the tubing over your thigh rather than under it when you are sitting down. Be sure that nothing is pulling on the nephrostomy tube when you move around.    Change positions if you see little or no urine in your drainage bag.  Check to see if the urine tube is twisted or bent. Be sure that you are not sitting or lying on the tube. If there are no kinks and there is little or no urine in the drainage bag, tell your healthcare provider.    Flush out the tube as directed. Some tubes get flushed one time a day with 10 mls of NSS You will be given a prescription for the flushes.  To flush the nephrostomy tube, clean both connections with alcohol swap. Twist off the drainage bag tube and twist the saline syringe into the nephrostomy tube and flush briskly. Remove the syringe and twist the drainage bag tube back into the nephrostomy tube.  Keep the site covered while you shower.  Tape a piece of clear adhesive plastic over the dressing to keep it dry while you shower. Do not take tub baths.    Contact Interventional Radiology at [phone] ([facility] PATIENTS: Contact Interventional Radiology at [phone]) ([facility] PATIENTS: Contact Interventional Radiology at  [phone]) if:  The skin around the nephrostomy tube is red, swollen, itches, or has a rash.   You have a fever greater than 101 or chills.  You have lower back or hip pain.  There are changes in how your urine looks or smells.  You have little or no urine draining from the nephrostomy tube.   You have nausea and are vomiting.  The black deb on your tube has moved, or the tube is longer than when it was put in.   You have questions or concerns about your condition or care.  The nephrostomy tube comes out completely.   There is blood, pus, or a bad smell coming from the place where the tube enters your skin.  Urine is leaking around the tube.        The following pharmacies carry the flush syringes.       [facility]                     [address]                     [address] [facility]                    [phone]  [place]                       [place]  Phone [phone]            Phone [phone]                 [facility]                                                                                                   [phone]  [facility]             [facility]                             [address] [place], [place] [address] [facility]   Yoselyn LARSON                                 [phone]  Phone [phone]            Phone [phone]    [facility]                                                                         [facility] [phone]  Marie Carter.  [place]   Phone [phone]

## 2024-02-02 NOTE — BRIEF OP NOTE (RAD/CATH)
INTERVENTIONAL RADIOLOGY PROCEDURE NOTE    Date: 2/2/2024    Procedure:   Procedure Summary       Date:  Room / Location:     Anesthesia Start:  Anesthesia Stop:     Procedure:  Diagnosis:     Scheduled Providers:  Responsible Provider:     Anesthesia Type: Not recorded ASA Status: Not recorded            Preoperative diagnosis:   1. UTI (urinary tract infection)    2. Hydronephrosis, unspecified hydronephrosis type    3. Malfunction of nephrostomy tube (HCC)         Postoperative diagnosis: Same.    Surgeon: Kilo San MD     Assistant: None. No qualified resident was available.    Blood loss: None    Specimens: None     Findings:   Right antegrade nephrostogram via PCN demonstrated appropriate position of the catheter pigtail in the renal pelvis.  There were multifocal strictures in the ureter, some severe-appearing, but contrast passed easily to the urinary bladder.  No residual/recurrent filling defects were present in the right upper urinary tract.    No evidence of tube dysfunction - given lack of urine output, agree with MAG3 nuclear medicine study to evaluate renal function.    Complications: None immediate.    Anesthesia: none

## 2024-02-02 NOTE — SEDATION DOCUMENTATION
Tube check completed. Per Dr. San, nephrostomy tube in appropriate position. Patient returned to inpatient room via wheelchair

## 2024-02-03 NOTE — NURSING NOTE
Discharge to home via walking accompanied by wife and RN , AVS reviewed, written instructions given to patient, patient verbalized understanding, E-prescriptions given, IV removed per protocol, masimo removed per protocol,  all belongings taken.

## 2024-02-04 DIAGNOSIS — C61 MALIGNANT NEOPLASM OF PROSTATE (HCC): ICD-10-CM

## 2024-02-04 LAB
BACTERIA BLD CULT: NORMAL
BACTERIA BLD CULT: NORMAL

## 2024-02-05 RX ORDER — DEXAMETHASONE 0.5 MG/1
TABLET ORAL
Qty: 90 TABLET | Refills: 0 | Status: SHIPPED | OUTPATIENT
Start: 2024-02-05

## 2024-02-05 RX ORDER — INSULIN GLARGINE 100 [IU]/ML
30 INJECTION, SOLUTION SUBCUTANEOUS
Qty: 15 ML | Refills: 2 | Status: SHIPPED | OUTPATIENT
Start: 2024-02-05

## 2024-02-07 RX ORDER — CANAGLIFLOZIN 100 MG/1
TABLET, FILM COATED ORAL
COMMUNITY
Start: 2024-01-30

## 2024-02-08 ENCOUNTER — OFFICE VISIT (OUTPATIENT)
Dept: UROLOGY | Facility: CLINIC | Age: 74
End: 2024-02-08
Payer: COMMERCIAL

## 2024-02-08 VITALS
OXYGEN SATURATION: 99 % | SYSTOLIC BLOOD PRESSURE: 118 MMHG | DIASTOLIC BLOOD PRESSURE: 80 MMHG | WEIGHT: 171 LBS | BODY MASS INDEX: 26.84 KG/M2 | HEIGHT: 67 IN | HEART RATE: 74 BPM

## 2024-02-08 DIAGNOSIS — N39.0 UTI (URINARY TRACT INFECTION): ICD-10-CM

## 2024-02-08 DIAGNOSIS — Z93.6 NEPHROSTOMY STATUS (HCC): ICD-10-CM

## 2024-02-08 DIAGNOSIS — N13.30 HYDRONEPHROSIS, UNSPECIFIED HYDRONEPHROSIS TYPE: ICD-10-CM

## 2024-02-08 PROCEDURE — 99213 OFFICE O/P EST LOW 20 MIN: CPT | Performed by: PHYSICIAN ASSISTANT

## 2024-02-08 RX ORDER — LISINOPRIL 5 MG/1
5 TABLET ORAL DAILY
COMMUNITY

## 2024-02-12 NOTE — PROGRESS NOTES
UROLOGY PROGRESS NOTE   Patient Identifiers: Oliver Astudillo (MRN [id number])  Date of Service: 2/12/2024    Subjective:   73-year-old Yoruba-speaking man history of [place] 9 metastatic prostate cancer.  He was recently hospitalized with urinary tract infection and sepsis.  He has bilateral nephrostomy tubes.  His treatment has been Lupron and abiraterone.  He has lost a little weight but otherwise denies any bone pain and no specific complaints.  Of course he is anxious to try and get his nephrostomy tubes out and I told him and his wife that is not recommended at this time.    Reason for visit: Sepsis and prostate cancer follow-up    Objective:     VITALS:    Vitals:    02/08/24 0913   BP: 118/80   Pulse: 74   SpO2: 99%           LABS:  Lab Results   Component Value Date    HGB 11.3 (L) 02/02/2024    HCT 34.2 (L) 02/02/2024    WBC 8.82 02/02/2024     02/02/2024   ]    Lab Results   Component Value Date    K 3.8 02/02/2024     02/02/2024    CO2 29 02/02/2024    BUN 14 02/02/2024    CREATININE 0.97 02/02/2024    CALCIUM 9.2 02/02/2024   ]        INPATIENT MEDS:    Current Outpatient Medications:     abiraterone (ZYTIGA) 250 mg tablet, Take 1 tablet (250 mg total) by mouth daily With a low fat meal, Disp: 30 tablet, Rfl: 11    Blood Glucose Monitoring Suppl (OneTouch Verio Reflect) w/Device KIT, Check blood sugars once daily. Please substitute with appropriate alternative as covered by patient's insurance. Dx: E11.65, Disp: 1 kit, Rfl: 0    Calcium Carb-Cholecalciferol (calcium carbonate-vitamin D) 500 mg-5 mcg tablet, Take 1 tablet by mouth 2 (two) times a day with meals, Disp: 180 tablet, Rfl: 3    docusate sodium (COLACE) 100 mg capsule, Take 1 capsule (100 mg total) by mouth daily as needed for constipation, Disp: 30 capsule, Rfl: 0    Easy Touch Pen Needles 31G X 6 MM MISC, Use daily at bedtime, Disp: 100 each, Rfl: 3    Ertugliflozin L-PyroglutamicAc 5 MG TABS, Take 5 mg by mouth daily with  breakfast, Disp: 30 tablet, Rfl: 5    glimepiride (AMARYL) 2 mg tablet, Take 1 tablet (2 mg total) by mouth daily with lunch, Disp: 90 tablet, Rfl: 1    glimepiride (AMARYL) 4 mg tablet, Take 4 mg by mouth daily with breakfast, Disp: , Rfl:     glucose blood (OneTouch Verio) test strip, Check blood sugars once daily. Please substitute with appropriate alternative as covered by patient's insurance. Dx: E11.65, Disp: 100 each, Rfl: 3    Invokana 100 MG, , Disp: , Rfl:     lisinopril (ZESTRIL) 5 mg tablet, Take 5 mg by mouth daily, Disp: , Rfl:     Multiple Vitamins-Minerals (Sentry) TABS, Take 1 tablet by mouth daily, Disp: , Rfl:     OneTouch Delica Lancets 33G MISC, Check blood sugars once daily. Please substitute with appropriate alternative as covered by patient's insurance. Dx: E11.65, Disp: 100 each, Rfl: 3    rosuvastatin (CRESTOR) 10 MG tablet, Take 1 tablet (10 mg total) by mouth daily (Patient taking differently: Take 10 mg by mouth daily at bedtime), Disp: 90 tablet, Rfl: 3    Spacer/Aero-Holding Chambers (AEROCHAMBER MINI CHAMBER) BILLY, by Does not apply route see administration instructions, Disp: 1 Device, Rfl: 0    albuterol (ProAir HFA) 90 mcg/act inhaler, Inhale 2 puffs every 4 (four) hours as needed for wheezing or shortness of breath, Disp: 8.5 g, Rfl: 0    dexamethasone (DECADRON) 0.5 mg tablet, TAKE 1 TABLET (0.5 MG TOTAL) BY MOUTH DAILY WITH BREAKFAST, Disp: 90 tablet, Rfl: 0    Insulin Glargine Solostar (Lantus SoloStar) 100 UNIT/ML SOPN, Inject 0.3 mL (30 Units total) under the skin daily at bedtime (Patient not taking: Reported on 2/8/2024), Disp: 15 mL, Rfl: 2    sodium chloride, PF, 0.9 %, 10 mL by Intracatheter route daily Intracatheter flushing daily. May substitute prefilled syringe with normal saline 10 mL vials, 10 mL syringes, and 18 g blunt needles (Patient not taking: Reported on 2/8/2024), Disp: 900 mL, Rfl: 0    sodium chloride, PF, 0.9 %, 10 mL by Intracatheter route daily  "Intracatheter flushing daily. May substitute prefilled syringe with normal saline 10 mL vials, 10 mL syringes, and 18 g blunt needles (Patient not taking: Reported on 2/8/2024), Disp: 300 mL, Rfl: 3    sodium chloride, PF, 0.9 %, 10 mL by Intracatheter route daily for 120 doses Intracatheter flushing daily. May substitute prefilled syringe with normal saline 10 mL vials, 10 mL syringes, and 18 g blunt needles (Patient not taking: Reported on 2/8/2024), Disp: 300 mL, Rfl: 3      Physical Exam:   /80 (BP Location: Left arm, Patient Position: Sitting, Cuff Size: Standard)   Pulse 74   Ht 5' 7\" (1.702 m)   Wt 77.6 kg (171 lb)   SpO2 99%   BMI 26.78 kg/m²   GEN: no acute distress    RESP: breathing comfortably with no accessory muscle use    ABD: soft, non-tender, non-distended   INCISION:    EXT: no significant peripheral edema   Bilateral nephrostomies in place draining clear yellow urine  no clots     RADIOLOGY:   IMPRESSION:     1. Examination demonstrates patency of the nephrostomy catheters bilaterally, with radiopharmaceutical activity seen within both catheters and collection bags.  2. Right-sided hydro-ureteronephrosis.    Assessment:   #1.  Sepsis and urinary tract infection  #2.  Metastatic prostate cancer    Plan:   -He will follow-up with medical oncology as scheduled and with urology for his next Lupron injection  -  -  -          "

## 2024-02-16 ENCOUNTER — TELEPHONE (OUTPATIENT)
Dept: FAMILY MEDICINE CLINIC | Facility: CLINIC | Age: 74
End: 2024-02-16

## 2024-02-16 NOTE — TELEPHONE ENCOUNTER
Dr. Gian Boyd brought the temporary placard  Copy scan in the system  Call when is ready thanks

## 2024-02-21 PROBLEM — N39.0 UTI (URINARY TRACT INFECTION): Status: RESOLVED | Noted: 2024-02-01 | Resolved: 2024-02-21

## 2024-02-21 PROBLEM — N12 PYELONEPHRITIS: Status: RESOLVED | Noted: 2024-01-12 | Resolved: 2024-02-21

## 2024-02-21 NOTE — TELEPHONE ENCOUNTER
Dr. Springer      The Patient called again looking for the form.  When you have a time thanks. Please when you come to the office, see is you can fill the form for Oliver ferguson

## 2024-03-04 ENCOUNTER — HOSPITAL ENCOUNTER (OUTPATIENT)
Dept: NON INVASIVE DIAGNOSTICS | Facility: HOSPITAL | Age: 74
Discharge: HOME | End: 2024-03-04
Attending: STUDENT IN AN ORGANIZED HEALTH CARE EDUCATION/TRAINING PROGRAM
Payer: COMMERCIAL

## 2024-03-04 DIAGNOSIS — N13.39 OTHER HYDRONEPHROSIS: Primary | ICD-10-CM

## 2024-03-04 DIAGNOSIS — C61 PROSTATE CANCER (HCC): ICD-10-CM

## 2024-03-04 DIAGNOSIS — N39.0 UTI (URINARY TRACT INFECTION): ICD-10-CM

## 2024-03-04 DIAGNOSIS — T83.098A MALFUNCTION OF NEPHROSTOMY TUBE (HCC): ICD-10-CM

## 2024-03-04 PROCEDURE — C1769 GUIDE WIRE: HCPCS | Performed by: RADIOLOGY

## 2024-03-04 PROCEDURE — C1729 CATH, DRAINAGE: HCPCS | Performed by: RADIOLOGY

## 2024-03-04 PROCEDURE — 50435 EXCHANGE NEPHROSTOMY CATH: CPT

## 2024-03-04 RX ORDER — LIDOCAINE WITH 8.4% SOD BICARB 0.9%(10ML)
SYRINGE (ML) INJECTION AS NEEDED
Status: COMPLETED | OUTPATIENT
Start: 2024-03-04 | End: 2024-03-04

## 2024-03-04 RX ADMIN — Medication 2 ML: at 10:15

## 2024-03-04 RX ADMIN — IOHEXOL 15 ML: 350 INJECTION, SOLUTION INTRAVENOUS at 10:41

## 2024-03-04 RX ADMIN — Medication 2 ML: at 10:21

## 2024-03-04 NOTE — SEDATION DOCUMENTATION
Procedure ended. Bilateral 10fr nephrostomy tubes exchanged by Dr. Ho. Dressing to sites. Tubes to bag drainage

## 2024-03-04 NOTE — BRIEF OP NOTE (RAD/CATH)
INTERVENTIONAL RADIOLOGY PROCEDURE NOTE    Date: 3/4/2024    Procedure:   Procedure Summary       Date: 03/04/24 Room / Location: [facility]    Anesthesia Start:  Anesthesia Stop:     Procedure: IR NEPHROSTOMY TUBE CHECK/CHANGE/REPOSITION/REINSERTION/UPSIZE Diagnosis:       UTI (urinary tract infection)      Malfunction of nephrostomy tube (HCC)      (Malignant urinary obstruction. Recurrent tube dysfunction and infection following tube change Dec 2023, tubes upsized to 10Fr 1/31/24. Plan for short interval exchange given degree of dysfunction.)    Scheduled Providers: Wenceslao Ho MD Responsible Provider:     Anesthesia Type: Not recorded ASA Status: Not recorded            Preoperative diagnosis:   1. UTI (urinary tract infection)    2. Malfunction of nephrostomy tube (HCC)         Postoperative diagnosis: Same.    Surgeon: Wenceslao Ho MD     Assistant: None. No qualified resident was available.    Blood loss: None    Specimens: None     Findings: Uneventful routine exchange of bilateral 10F PCN catheters.    Complications: None immediate.    Anesthesia: local

## 2024-03-04 NOTE — DISCHARGE INSTRUCTIONS
Nephrostomy Tube Care     WHAT YOU NEED TO KNOW:   A nephrostomy tube is a catheter (thin plastic tube) that is inserted through your skin and into your kidney. The nephrostomy tube drains urine from your kidney into a collecting bag outside your body. You may need a nephrostomy tube when something is blocking the normal flow of urine. A nephrostomy tube may be used for a short or a long period of time. The nephrostomy tube comes out of your back, so you will need someone to help care for your nephrostomy tube.          DISCHARGE INSTRUCTIONS:      How to clean the skin around the nephrostomy tube and change the bandage:  Since the nephrostomy tube comes out of your back, you will not be able to care for it by yourself. Ask someone to follow the general directions below to check and care for your nephrostomy tube.   Gather the items you will need.          Disposable (single use) under-pad, and a clean washcloth  Plain soap, warm water, and new medical gloves  Sterile gauze bandages  Clear adhesive dressing or medical tape  Skin barrier  Protective skin film  Trash bag  Remove the old bandage, and check the tube entry site.    Have the patient lie on his side with the nephrostomy tube entry site facing up. Place the under-pad where it will catch drainage as you are working with the nephrostomy tube.   Wash your hands with soap and water. Put on new medical gloves.  Gently remove the old bandage, without pulling on the tube. Do this by holding the skin beside the tube with one hand. With the other hand, gently remove sticky tape and the skin barrier by pulling in the same direction as hair growth. Do not touch the side of the bandage that is placed over or around the tube. Throw the bandage and skin barrier away in a trash bag.  Look for signs of infection, such as skin redness and swelling. Report any skin changes to healthcare providers.  Clean the tube entry site.    Hold the tube in place to keep it from  being pulled out while you are cleaning around it.  You will need to clean the area twice. For the first cleaning, wet a new gauze bandage with soap and water.  Begin at the entry site of the tube. Wipe the skin in circles, moving away from the entry site. Remove blood and any other material with the gauze. Do this as often as needed. Use a new gauze bandage each time you clean the area, moving away from the entry site.   For the second cleaning, wet a new gauze bandage with water. Begin at the entry site of the tube. Wipe the skin in circles, moving away from the entry site. Use a new gauze bandage each time you clean the area, moving away from the entry site.   Gently pat the skin with a clean washcloth to dry it.    Apply the skin barrier and bandages.    Roll up a bandage to make it thick, and place it under  the place where the tube enters the skin. Place it to support the tube, and stop it from kinking or bending. Tape the bandage in place, and apply more bandages if directed by a healthcare provider.   Bring the tubing forward to the front and tape it to the skin. Do not stretch the tube tight, because this may pull the nephrostomy tube out.  How often to change the bandage.  Change the bandage around the tube, every other day. If your bandages  get dirty or wet, change them right away, and as often as needed. If your nephrostomy tube is to be used for a long period of time, the tube needs to be changed every 2 to 3 months. Healthcare providers will tell you when you need to make an appointment to have your tube changed.     How to care for the urine drainage bag:   Ask if you need to measure and write down how much urine is in the bag before you empty it. Drain urine out of the drainage bag when it is ½ to ? full. Open the spout at the bottom of the bag to empty the urine into the toilet.   You may need to detach the drainage bag from the nephrostomy tube to change it.. If so, attach a new drainage bag  tightly to the nephrostomy tube.     How to prevent problems with your nephrostomy tube:   Change bandages, directed.  This helps to prevent infection. Throw away or clean your drainage bag as directed by your healthcare provider.    Wipe the connecting ends of the drainage bag with alcohol before you reconnect the bag to the tube.  This helps prevent infection.     Keep the tube taped to your skin and connected to a drainage bag placed below the level of your kidneys.  This helps prevent urine from backing up into your kidneys. You may wear a small drainage bag strapped to your leg to let you move around more easily.    Check the catheter to be sure it is in place after you change your clothes or do other activities.  Do not wear tight clothing over the tube. Place the tubing over your thigh rather than under it when you are sitting down. Be sure that nothing is pulling on the nephrostomy tube when you move around.    Change positions if you see little or no urine in your drainage bag.  Check to see if the urine tube is twisted or bent. Be sure that you are not sitting or lying on the tube. If there are no kinks and there is little or no urine in the drainage bag, tell your healthcare provider.    Flush out the tube as directed. Some tubes get flushed one time a day with 10 mls of NSS You will be given a prescription for the flushes.  To flush the nephrostomy tube, clean both connections with alcohol swap. Twist off the drainage bag tube and twist the saline syringe into the nephrostomy tube and flush briskly. Remove the syringe and twist the drainage bag tube back into the nephrostomy tube.  Keep the site covered while you shower.  Tape a piece of clear adhesive plastic over the dressing to keep it dry while you shower. Do not take tub baths.    Contact Interventional Radiology at [phone] ([facility] PATIENTS: Contact Interventional Radiology at [phone]) ([facility] PATIENTS: Contact Interventional Radiology at  [phone]) if:  The skin around the nephrostomy tube is red, swollen, itches, or has a rash.   You have a fever greater than 101 or chills.  You have lower back or hip pain.  There are changes in how your urine looks or smells.  You have little or no urine draining from the nephrostomy tube.   You have nausea and are vomiting.  The black deb on your tube has moved, or the tube is longer than when it was put in.   You have questions or concerns about your condition or care.  The nephrostomy tube comes out completely.   There is blood, pus, or a bad smell coming from the place where the tube enters your skin.  Urine is leaking around the tube.        The following pharmacies carry the flush syringes.       [facility]                     [address]                     [address] [facility]                    [phone]  [place]                       [facility]  Phone [phone]            Phone [phone]                 [facility]                                                                                                   [phone]  [facility]             [facility]                             [address] [place], [place] [address] [facility]   Yoselyn LARSON                                 [phone]  Phone [phone]            Phone [phone]    [facility]                                                                         [facility] [phone]  Marie Carter.  [place]   Phone [phone]

## 2024-03-18 ENCOUNTER — HOSPITAL ENCOUNTER (INPATIENT)
Facility: HOSPITAL | Age: 74
LOS: 6 days | Discharge: HOME | DRG: 699 | End: 2024-03-24
Attending: EMERGENCY MEDICINE | Admitting: FAMILY MEDICINE
Payer: COMMERCIAL

## 2024-03-18 ENCOUNTER — APPOINTMENT (OUTPATIENT)
Dept: LAB | Facility: HOSPITAL | Age: 74
DRG: 699 | End: 2024-03-18
Attending: INTERNAL MEDICINE
Payer: COMMERCIAL

## 2024-03-18 ENCOUNTER — APPOINTMENT (EMERGENCY)
Dept: RADIOLOGY | Facility: HOSPITAL | Age: 74
DRG: 699 | End: 2024-03-18
Payer: COMMERCIAL

## 2024-03-18 DIAGNOSIS — Z79.4 TYPE 2 DIABETES MELLITUS WITH OTHER SPECIFIED COMPLICATION, WITH LONG-TERM CURRENT USE OF INSULIN (HCC): Chronic | ICD-10-CM

## 2024-03-18 DIAGNOSIS — C61 PROSTATE CANCER (HCC): ICD-10-CM

## 2024-03-18 DIAGNOSIS — T83.512D URINARY TRACT INFECTION ASSOCIATED WITH NEPHROSTOMY CATHETER, SUBSEQUENT ENCOUNTER: ICD-10-CM

## 2024-03-18 DIAGNOSIS — N99.528 NEPHROSTOMY COMPLICATION (HCC): ICD-10-CM

## 2024-03-18 DIAGNOSIS — Z93.6 NEPHROSTOMY STATUS (HCC): ICD-10-CM

## 2024-03-18 DIAGNOSIS — N39.0 URINARY TRACT INFECTION ASSOCIATED WITH NEPHROSTOMY CATHETER, SUBSEQUENT ENCOUNTER: ICD-10-CM

## 2024-03-18 DIAGNOSIS — N28.89 URETERITIS: ICD-10-CM

## 2024-03-18 DIAGNOSIS — N13.39 OTHER HYDRONEPHROSIS: ICD-10-CM

## 2024-03-18 DIAGNOSIS — T83.512A URINARY TRACT INFECTION ASSOCIATED WITH NEPHROSTOMY CATHETER, INITIAL ENCOUNTER  (HCC): ICD-10-CM

## 2024-03-18 DIAGNOSIS — N39.0 URINARY TRACT INFECTION ASSOCIATED WITH NEPHROSTOMY CATHETER, INITIAL ENCOUNTER  (HCC): ICD-10-CM

## 2024-03-18 DIAGNOSIS — N12 PYELONEPHRITIS: ICD-10-CM

## 2024-03-18 DIAGNOSIS — K59.00 CONSTIPATION, UNSPECIFIED CONSTIPATION TYPE: ICD-10-CM

## 2024-03-18 DIAGNOSIS — E11.69 TYPE 2 DIABETES MELLITUS WITH OTHER SPECIFIED COMPLICATION, WITH LONG-TERM CURRENT USE OF INSULIN (HCC): Chronic | ICD-10-CM

## 2024-03-18 DIAGNOSIS — T83.098A MALFUNCTION OF NEPHROSTOMY TUBE (HCC): ICD-10-CM

## 2024-03-18 DIAGNOSIS — N39.0 UTI (URINARY TRACT INFECTION): Primary | ICD-10-CM

## 2024-03-18 DIAGNOSIS — C79.51 METASTASIS TO BONE (HCC): ICD-10-CM

## 2024-03-18 DIAGNOSIS — N39.0 UTI (URINARY TRACT INFECTION): ICD-10-CM

## 2024-03-18 PROBLEM — R42 DIZZINESS: Status: ACTIVE | Noted: 2024-03-18

## 2024-03-18 LAB
ALBUMIN SERPL BCP-MCNC: 3.8 G/DL (ref 3.5–5)
ALP SERPL-CCNC: 91 U/L (ref 34–104)
ALT SERPL W P-5'-P-CCNC: 16 U/L (ref 7–52)
ANION GAP SERPL CALCULATED.3IONS-SCNC: 9 MMOL/L (ref 4–13)
AST SERPL W P-5'-P-CCNC: 12 U/L (ref 13–39)
BACTERIA UR QL AUTO: ABNORMAL /HPF
BASOPHILS # BLD AUTO: 0.04 THOUSANDS/ÂΜL (ref 0–0.1)
BASOPHILS NFR BLD AUTO: 0 % (ref 0–1)
BILIRUB SERPL-MCNC: 0.61 MG/DL (ref 0.2–1)
BILIRUB UR QL STRIP: NEGATIVE
BUN SERPL-MCNC: 24 MG/DL (ref 5–25)
CALCIUM SERPL-MCNC: 9.8 MG/DL (ref 8.4–10.2)
CHLORIDE SERPL-SCNC: 97 MMOL/L (ref 96–108)
CLARITY UR: ABNORMAL
CO2 SERPL-SCNC: 24 MMOL/L (ref 21–32)
COLOR UR: ABNORMAL
CREAT SERPL-MCNC: 1.19 MG/DL (ref 0.6–1.3)
EOSINOPHIL # BLD AUTO: 0.03 THOUSAND/ÂΜL (ref 0–0.61)
EOSINOPHIL NFR BLD AUTO: 0 % (ref 0–6)
ERYTHROCYTE [DISTWIDTH] IN BLOOD BY AUTOMATED COUNT: 13.2 % (ref 11.6–15.1)
GFR SERPL CREATININE-BSD FRML MDRD: 61 ML/MIN/1.73SQ M
GLUCOSE SERPL-MCNC: 375 MG/DL (ref 65–140)
GLUCOSE SERPL-MCNC: 408 MG/DL (ref 65–140)
GLUCOSE SERPL-MCNC: 468 MG/DL (ref 65–140)
GLUCOSE UR STRIP-MCNC: ABNORMAL MG/DL
HCT VFR BLD AUTO: 40.3 % (ref 36.5–49.3)
HGB BLD-MCNC: 13.6 G/DL (ref 12–17)
HGB UR QL STRIP.AUTO: ABNORMAL
IMM GRANULOCYTES # BLD AUTO: 0.04 THOUSAND/UL (ref 0–0.2)
IMM GRANULOCYTES NFR BLD AUTO: 0 % (ref 0–2)
KETONES UR STRIP-MCNC: NEGATIVE MG/DL
LEUKOCYTE ESTERASE UR QL STRIP: ABNORMAL
LYMPHOCYTES # BLD AUTO: 2.12 THOUSANDS/ÂΜL (ref 0.6–4.47)
LYMPHOCYTES NFR BLD AUTO: 17 % (ref 14–44)
MCH RBC QN AUTO: 30.5 PG (ref 26.8–34.3)
MCHC RBC AUTO-ENTMCNC: 33.7 G/DL (ref 31.4–37.4)
MCV RBC AUTO: 90 FL (ref 82–98)
MONOCYTES # BLD AUTO: 0.48 THOUSAND/ÂΜL (ref 0.17–1.22)
MONOCYTES NFR BLD AUTO: 4 % (ref 4–12)
NEUTROPHILS # BLD AUTO: 10.04 THOUSANDS/ÂΜL (ref 1.85–7.62)
NEUTS SEG NFR BLD AUTO: 79 % (ref 43–75)
NITRITE UR QL STRIP: POSITIVE
NON-SQ EPI CELLS URNS QL MICRO: ABNORMAL /HPF
NRBC BLD AUTO-RTO: 0 /100 WBCS
OTHER STN SPEC: ABNORMAL
PH UR STRIP.AUTO: 6 [PH]
PLATELET # BLD AUTO: 306 THOUSANDS/UL (ref 149–390)
PLATELET # BLD AUTO: 359 THOUSANDS/UL (ref 149–390)
PMV BLD AUTO: 10.1 FL (ref 8.9–12.7)
PMV BLD AUTO: 9.8 FL (ref 8.9–12.7)
POTASSIUM SERPL-SCNC: 4.4 MMOL/L (ref 3.5–5.3)
PROT SERPL-MCNC: 8.1 G/DL (ref 6.4–8.4)
PROT UR STRIP-MCNC: ABNORMAL MG/DL
PSA SERPL-MCNC: 1.96 NG/ML (ref 0–4)
RBC # BLD AUTO: 4.46 MILLION/UL (ref 3.88–5.62)
RBC #/AREA URNS AUTO: ABNORMAL /HPF
SODIUM SERPL-SCNC: 130 MMOL/L (ref 135–147)
SP GR UR STRIP.AUTO: 1.02 (ref 1–1.03)
UROBILINOGEN UR QL STRIP.AUTO: 1 E.U./DL
WBC # BLD AUTO: 12.75 THOUSAND/UL (ref 4.31–10.16)
WBC #/AREA URNS AUTO: ABNORMAL /HPF

## 2024-03-18 PROCEDURE — 87086 URINE CULTURE/COLONY COUNT: CPT | Performed by: EMERGENCY MEDICINE

## 2024-03-18 PROCEDURE — 96365 THER/PROPH/DIAG IV INF INIT: CPT

## 2024-03-18 PROCEDURE — 84153 ASSAY OF PSA TOTAL: CPT

## 2024-03-18 PROCEDURE — 87186 SC STD MICRODIL/AGAR DIL: CPT | Performed by: EMERGENCY MEDICINE

## 2024-03-18 PROCEDURE — 87077 CULTURE AEROBIC IDENTIFY: CPT | Performed by: EMERGENCY MEDICINE

## 2024-03-18 PROCEDURE — 36415 COLL VENOUS BLD VENIPUNCTURE: CPT

## 2024-03-18 PROCEDURE — 83935 ASSAY OF URINE OSMOLALITY: CPT

## 2024-03-18 PROCEDURE — 84300 ASSAY OF URINE SODIUM: CPT

## 2024-03-18 PROCEDURE — 99285 EMERGENCY DEPT VISIT HI MDM: CPT | Performed by: EMERGENCY MEDICINE

## 2024-03-18 PROCEDURE — 81001 URINALYSIS AUTO W/SCOPE: CPT | Performed by: EMERGENCY MEDICINE

## 2024-03-18 PROCEDURE — 85049 AUTOMATED PLATELET COUNT: CPT

## 2024-03-18 PROCEDURE — 74177 CT ABD & PELVIS W/CONTRAST: CPT

## 2024-03-18 PROCEDURE — 85025 COMPLETE CBC W/AUTO DIFF WBC: CPT

## 2024-03-18 PROCEDURE — 99284 EMERGENCY DEPT VISIT MOD MDM: CPT

## 2024-03-18 PROCEDURE — 80053 COMPREHEN METABOLIC PANEL: CPT

## 2024-03-18 PROCEDURE — 82948 REAGENT STRIP/BLOOD GLUCOSE: CPT

## 2024-03-18 RX ORDER — OXYCODONE HYDROCHLORIDE AND ACETAMINOPHEN 5; 325 MG/1; MG/1
1 TABLET ORAL ONCE
Status: COMPLETED | OUTPATIENT
Start: 2024-03-18 | End: 2024-03-18

## 2024-03-18 RX ORDER — HEPARIN SODIUM 5000 [USP'U]/ML
5000 INJECTION, SOLUTION INTRAVENOUS; SUBCUTANEOUS EVERY 8 HOURS SCHEDULED
Status: DISCONTINUED | OUTPATIENT
Start: 2024-03-19 | End: 2024-03-24 | Stop reason: HOSPADM

## 2024-03-18 RX ORDER — CEFTRIAXONE 1 G/50ML
1000 INJECTION, SOLUTION INTRAVENOUS ONCE
Status: COMPLETED | OUTPATIENT
Start: 2024-03-18 | End: 2024-03-18

## 2024-03-18 RX ORDER — CALCIUM CARBONATE 500 MG/1
1000 TABLET, CHEWABLE ORAL DAILY PRN
Status: DISCONTINUED | OUTPATIENT
Start: 2024-03-18 | End: 2024-03-24 | Stop reason: HOSPADM

## 2024-03-18 RX ORDER — CEFEPIME HYDROCHLORIDE 2 G/50ML
2000 INJECTION, SOLUTION INTRAVENOUS EVERY 12 HOURS
Status: DISCONTINUED | OUTPATIENT
Start: 2024-03-19 | End: 2024-03-22

## 2024-03-18 RX ORDER — HEPARIN SODIUM 5000 [USP'U]/ML
5000 INJECTION, SOLUTION INTRAVENOUS; SUBCUTANEOUS EVERY 8 HOURS SCHEDULED
Status: DISCONTINUED | OUTPATIENT
Start: 2024-03-18 | End: 2024-03-18

## 2024-03-18 RX ORDER — POLYETHYLENE GLYCOL 3350 17 G/17G
17 POWDER, FOR SOLUTION ORAL DAILY PRN
Status: DISCONTINUED | OUTPATIENT
Start: 2024-03-18 | End: 2024-03-21

## 2024-03-18 RX ORDER — INSULIN LISPRO 100 [IU]/ML
8 INJECTION, SOLUTION INTRAVENOUS; SUBCUTANEOUS ONCE
Status: COMPLETED | OUTPATIENT
Start: 2024-03-18 | End: 2024-03-18

## 2024-03-18 RX ORDER — ONDANSETRON 2 MG/ML
4 INJECTION INTRAMUSCULAR; INTRAVENOUS EVERY 6 HOURS PRN
Status: DISCONTINUED | OUTPATIENT
Start: 2024-03-18 | End: 2024-03-24 | Stop reason: HOSPADM

## 2024-03-18 RX ORDER — ACETAMINOPHEN 325 MG/1
650 TABLET ORAL EVERY 6 HOURS PRN
Status: DISCONTINUED | OUTPATIENT
Start: 2024-03-18 | End: 2024-03-21

## 2024-03-18 RX ADMIN — INSULIN LISPRO 8 UNITS: 100 INJECTION, SOLUTION INTRAVENOUS; SUBCUTANEOUS at 22:30

## 2024-03-18 RX ADMIN — CEFEPIME HYDROCHLORIDE 2000 MG: 2 INJECTION, SOLUTION INTRAVENOUS at 23:47

## 2024-03-18 RX ADMIN — OXYCODONE HYDROCHLORIDE AND ACETAMINOPHEN 1 TABLET: 5; 325 TABLET ORAL at 17:49

## 2024-03-18 RX ADMIN — INSULIN DETEMIR 30 UNITS: 100 INJECTION, SOLUTION SUBCUTANEOUS at 22:30

## 2024-03-18 RX ADMIN — IOHEXOL 100 ML: 350 INJECTION, SOLUTION INTRAVENOUS at 18:21

## 2024-03-18 RX ADMIN — HEPARIN SODIUM 5000 UNITS: 5000 INJECTION, SOLUTION INTRAVENOUS; SUBCUTANEOUS at 22:30

## 2024-03-18 RX ADMIN — CEFTRIAXONE 1000 MG: 1 INJECTION, SOLUTION INTRAVENOUS at 19:00

## 2024-03-18 NOTE — ED NOTES
Pt's wife leaving the bedside now. Would like son Sumit to be called with room number. Phone numbers confirmed in chart.     Erlinda Ramirez RN  03/18/24 1947

## 2024-03-18 NOTE — ED PROVIDER NOTES
History  Chief Complaint   Patient presents with    Flank Pain     Bilateral flank pain, states his kidneys hurt. Israeli speaking     HPI  Patient is 73-year-old male history of diabetes, prostate cancer, presence of bilateral nephrostomy tubes presenting for evaluation of bilateral flank pain left worse than the right as well as pain with flushing nephrostomy tube and decreased output of urine into the left nephrostomy bag.  Patient has been feeling chills over the course of the past day, denies fevers.  Patient does continue to be able to urinate normally through his urethra.  Patient states some nausea without vomiting.  Patient states some dysuria over the same interval, denies urinary frequency or urgency.  Prior to Admission Medications   Prescriptions Last Dose Informant Patient Reported? Taking?   Blood Glucose Monitoring Suppl (OneTouch Verio Reflect) w/Device KIT  Self, Family Member No No   Sig: Check blood sugars once daily. Please substitute with appropriate alternative as covered by patient's insurance. Dx: E11.65   Calcium Carb-Cholecalciferol (calcium carbonate-vitamin D) 500 mg-5 mcg tablet  Self, Family Member No No   Sig: Take 1 tablet by mouth 2 (two) times a day with meals   Easy Touch Pen Needles 31G X 6 MM MISC  Family Member, Self No No   Sig: Use daily at bedtime   Ertugliflozin L-PyroglutamicAc 5 MG TABS  Family Member, Self No No   Sig: Take 5 mg by mouth daily with breakfast   Insulin Glargine Solostar (Lantus SoloStar) 100 UNIT/ML SOPN   No No   Sig: Inject 0.3 mL (30 Units total) under the skin daily at bedtime   Patient not taking: Reported on 2/8/2024   Invokana 100 MG   Yes No   Multiple Vitamins-Minerals (Sentry) TABS  Family Member, Self Yes No   Sig: Take 1 tablet by mouth daily   OneTouch Delica Lancets 33G MISC  Self, Family Member No No   Sig: Check blood sugars once daily. Please substitute with appropriate alternative as covered by patient's insurance. Dx: E11.65    Spacer/Aero-Holding Chambers (AEROCHAMBER MINI CHAMBER) BILLY  Family Member, Self No No   Sig: by Does not apply route see administration instructions   abiraterone (ZYTIGA) 250 mg tablet  Self, Family Member No No   Sig: Take 1 tablet (250 mg total) by mouth daily With a low fat meal   albuterol (ProAir HFA) 90 mcg/act inhaler  Family Member, Self No No   Sig: Inhale 2 puffs every 4 (four) hours as needed for wheezing or shortness of breath   dexamethasone (DECADRON) 0.5 mg tablet   No No   Sig: TAKE 1 TABLET (0.5 MG TOTAL) BY MOUTH DAILY WITH BREAKFAST   docusate sodium (COLACE) 100 mg capsule   No No   Sig: Take 1 capsule (100 mg total) by mouth daily as needed for constipation   glimepiride (AMARYL) 2 mg tablet  Family Member, Self No No   Sig: Take 1 tablet (2 mg total) by mouth daily with lunch   glimepiride (AMARYL) 4 mg tablet  Family Member, Self Yes No   Sig: Take 4 mg by mouth daily with breakfast   glucose blood (OneTouch Verio) test strip  Self, Family Member No No   Sig: Check blood sugars once daily. Please substitute with appropriate alternative as covered by patient's insurance. Dx: E11.65   lisinopril (ZESTRIL) 5 mg tablet   Yes No   Sig: Take 5 mg by mouth daily   rosuvastatin (CRESTOR) 10 MG tablet  Self, Family Member No No   Sig: Take 1 tablet (10 mg total) by mouth daily   Patient taking differently: Take 10 mg by mouth daily at bedtime   sodium chloride, PF, 0.9 %  Family Member, Self No No   Sig: 10 mL by Intracatheter route daily Intracatheter flushing daily. May substitute prefilled syringe with normal saline 10 mL vials, 10 mL syringes, and 18 g blunt needles   Patient not taking: Reported on 2/8/2024   sodium chloride, PF, 0.9 %   No No   Sig: 10 mL by Intracatheter route daily Intracatheter flushing daily. May substitute prefilled syringe with normal saline 10 mL vials, 10 mL syringes, and 18 g blunt needles   Patient not taking: Reported on 2/8/2024   sodium chloride, PF, 0.9 %   No  No   Sig: 10 mL by Intracatheter route daily for 120 doses Intracatheter flushing daily. May substitute prefilled syringe with normal saline 10 mL vials, 10 mL syringes, and 18 g blunt needles   Patient not taking: Reported on 2024      Facility-Administered Medications: None       Past Medical History:   Diagnosis Date    COVID-19 01/15/2024    Diabetes mellitus (HCC)     Elevated PSA 2023    High cholesterol     Hypertension     Prostate cancer (HCC)        Past Surgical History:   Procedure Laterality Date    IR NEPHROSTOMY TUBE CHECK/CHANGE/REPOSITION/REINSERTION/UPSIZE  2023    IR NEPHROSTOMY TUBE CHECK/CHANGE/REPOSITION/REINSERTION/UPSIZE  2023    IR NEPHROSTOMY TUBE CHECK/CHANGE/REPOSITION/REINSERTION/UPSIZE  2024    IR NEPHROSTOMY TUBE CHECK/CHANGE/REPOSITION/REINSERTION/UPSIZE  2024    IR NEPHROSTOMY TUBE CHECK/CHANGE/REPOSITION/REINSERTION/UPSIZE  3/4/2024    IR NEPHROSTOMY TUBE PLACEMENT  2023    bilateral    IR OTHER  1/15/2024    US GUIDED PROSTATE BIOPSY         Family History   Problem Relation Age of Onset    Liver cancer Father      I have reviewed and agree with the history as documented.    E-Cigarette/Vaping    E-Cigarette Use Never User      E-Cigarette/Vaping Substances    Nicotine No     THC No     CBD No     Flavoring No     Other No     Unknown No      Social History     Tobacco Use    Smoking status: Former     Current packs/day: 0.00     Types: Cigarettes     Quit date:      Years since quittin.2     Passive exposure: Past    Smokeless tobacco: Never    Tobacco comments:     States he only smoked on the weekends   Vaping Use    Vaping status: Never Used   Substance Use Topics    Alcohol use: Not Currently     Comment: socially    Drug use: Never       Review of Systems   Constitutional:  Positive for chills. Negative for fever.   Respiratory:  Negative for cough and shortness of breath.    Gastrointestinal:  Positive for abdominal pain and  nausea. Negative for abdominal distention, diarrhea and vomiting.   Genitourinary:  Positive for dysuria and flank pain. Negative for difficulty urinating, frequency and urgency.   Musculoskeletal:  Negative for arthralgias and myalgias.   Neurological:  Negative for headaches.   Psychiatric/Behavioral:  Negative for confusion.    All other systems reviewed and are negative.      Physical Exam  Physical Exam  Vitals and nursing note reviewed.   Constitutional:       General: He is not in acute distress.     Appearance: He is well-developed. He is not diaphoretic.   HENT:      Head: Normocephalic and atraumatic.      Right Ear: External ear normal.      Left Ear: External ear normal.      Nose: Nose normal.      Mouth/Throat:      Pharynx: No oropharyngeal exudate.   Eyes:      Conjunctiva/sclera: Conjunctivae normal.      Pupils: Pupils are equal, round, and reactive to light.   Cardiovascular:      Rate and Rhythm: Normal rate and regular rhythm.      Heart sounds: Normal heart sounds. No murmur heard.     No friction rub. No gallop.   Pulmonary:      Effort: Pulmonary effort is normal. No respiratory distress.      Breath sounds: Normal breath sounds. No wheezing.   Chest:      Chest wall: No tenderness.   Abdominal:      General: Bowel sounds are normal. There is no distension.      Palpations: Abdomen is soft. There is no mass.      Tenderness: There is no abdominal tenderness. There is no guarding or rebound.   Musculoskeletal:         General: No deformity.   Skin:     General: Skin is warm and dry.      Capillary Refill: Capillary refill takes less than 2 seconds.   Neurological:      Mental Status: He is alert and oriented to person, place, and time.   Psychiatric:         Behavior: Behavior normal.         Vital Signs  ED Triage Vitals   Temperature Pulse Respirations Blood Pressure SpO2   03/18/24 1454 03/18/24 1454 03/18/24 1454 03/18/24 1454 03/18/24 1454   98.4 °F (36.9 °C) 64 18 (!) 187/73 98 %       Temp src Heart Rate Source Patient Position - Orthostatic VS BP Location FiO2 (%)   -- 03/18/24 1906 03/18/24 1906 03/18/24 1906 --    Monitor Lying Left arm       Pain Score       03/18/24 1749       8           Vitals:    03/18/24 1454 03/18/24 1906   BP: (!) 187/73 126/60   Pulse: 64 83   Patient Position - Orthostatic VS:  Lying         Visual Acuity      ED Medications  Medications   oxyCODONE-acetaminophen (PERCOCET) 5-325 mg per tablet 1 tablet (1 tablet Oral Given 3/18/24 1749)   iohexol (OMNIPAQUE) 350 MG/ML injection (MULTI-DOSE) 100 mL (100 mL Intravenous Given 3/18/24 1821)   cefTRIAXone (ROCEPHIN) IVPB (premix in dextrose) 1,000 mg 50 mL (0 mg Intravenous Stopped 3/18/24 1918)       Diagnostic Studies  Results Reviewed       Procedure Component Value Units Date/Time    Urine culture [[id number]]     Lab Status: No result Specimen: Urine     Urinalysis with microscopic [[id number]]  (Abnormal) Collected: 03/18/24 1709    Lab Status: Final result Specimen: Urine, Clean Catch Updated: 03/18/24 1739     Color, UA Light Yellow     Clarity, UA Cloudy     Specific Gravity, UA 1.020     pH, UA 6.0     Leukocytes, UA Moderate     Nitrite, UA Positive     Protein,  (2+) mg/dl      Glucose, UA >=1000 (1%) mg/dl      Ketones, UA Negative mg/dl      Urobilinogen, UA 1.0 E.U./dl      Bilirubin, UA Negative     Occult Blood, UA Large     RBC, UA 10-20 /hpf      WBC, UA 30-50 /hpf      Epithelial Cells None Seen /hpf      Bacteria, UA Innumerable /hpf      OTHER OBSERVATIONS Yeast Cells Present                   CT abdomen pelvis with contrast    (Results Pending)              Procedures  Procedures         ED Course                               SBIRT 20yo+      Flowsheet Row Most Recent Value   Initial Alcohol Screen: US AUDIT-C     1. How often do you have a drink containing alcohol? 0 Filed at: 03/18/2024 1456   2. How many drinks containing alcohol do you have on a typical day you are drinking?  0 Filed at:  03/18/2024 1456   3a. Male UNDER 65: How often do you have five or more drinks on one occasion? 0 Filed at: 03/18/2024 1456   3b. FEMALE Any Age, or MALE 65+: How often do you have 4 or more drinks on one occassion? 0 Filed at: 03/18/2024 1456   Audit-C Score 0 Filed at: 03/18/2024 1456   YANNI: How many times in the past year have you...    Used an illegal drug or used a prescription medication for non-medical reasons? Never Filed at: 03/18/2024 1456                      Medical Decision Making  I obtained history from the patient and from the patient's wife using a [employer] [place] .  I reviewed external medical documentation.  Patient was evaluated for similar complaint 2 months ago and ultimately was admitted for IV antibiotics.  Patient uncomfortable appearing but nontoxic.  Patient with a leukocytosis on lab work earlier in the day today but SIRS negative.  I ordered a urinalysis which was positive for bacteria and nitrates.  I treated patient with Rocephin as well as a Percocet for pain control.  I ordered a CT abdomen pelvis with IV contrast to evaluate for pyelonephritis, perinephric abscess, as well as adequate nephrostomy tube placement.  I signed patient out pending results of CT abdomen pelvis with plan for admission.  Patient agreeable with this plan.    Amount and/or Complexity of Data Reviewed  Labs: ordered.  Radiology: ordered.    Risk  Prescription drug management.             Disposition  Final diagnoses:   None     ED Disposition       None          Follow-up Information    None         Patient's Medications   Discharge Prescriptions    No medications on file       No discharge procedures on file.    PDMP Review         Value Time User    PDMP Reviewed  Yes 6/26/2023  3:59 PM Rubina De Los Santos MD            ED Provider  Electronically Signed by             John Pena MD  03/18/24 1955

## 2024-03-19 ENCOUNTER — APPOINTMENT (INPATIENT)
Dept: NON INVASIVE DIAGNOSTICS | Facility: HOSPITAL | Age: 74
DRG: 699 | End: 2024-03-19
Payer: COMMERCIAL

## 2024-03-19 LAB
AORTIC ROOT: 3.8 CM
AV LVOT PEAK GRADIENT: 3 MMHG
AV PEAK GRADIENT: 6 MMHG
BSA FOR ECHO PROCEDURE: 1.91 M2
DOP CALC LVOT AREA: 3.46 CM2
DOP CALC LVOT DIAMETER: 2.1 CM
E WAVE DECELERATION TIME: 187 MS
E/A RATIO: 0.76
FRACTIONAL SHORTENING: 30 (ref 28–44)
GLUCOSE SERPL-MCNC: 161 MG/DL (ref 65–140)
GLUCOSE SERPL-MCNC: 231 MG/DL (ref 65–140)
GLUCOSE SERPL-MCNC: 232 MG/DL (ref 65–140)
GLUCOSE SERPL-MCNC: 268 MG/DL (ref 65–140)
GLUCOSE SERPL-MCNC: 282 MG/DL (ref 65–140)
INTERVENTRICULAR SEPTUM IN DIASTOLE (PARASTERNAL SHORT AXIS VIEW): 1.1 CM
INTERVENTRICULAR SEPTUM: 1.1 CM (ref 0.6–1.1)
LAAS-AP2: 16.2 CM2
LAAS-AP4: 15.9 CM2
LACTATE SERPL-SCNC: 2.6 MMOL/L (ref 0.5–2)
LEFT ATRIUM AREA SYSTOLE SINGLE PLANE A4C: 15.7 CM2
LEFT ATRIUM SIZE: 3.6 CM
LEFT ATRIUM VOLUME (MOD BIPLANE): 44 ML
LEFT ATRIUM VOLUME INDEX (MOD BIPLANE): 23 ML/M2
LEFT INTERNAL DIMENSION IN SYSTOLE: 3.1 CM (ref 2.1–4)
LEFT VENTRICULAR INTERNAL DIMENSION IN DIASTOLE: 4.4 CM (ref 3.5–6)
LEFT VENTRICULAR POSTERIOR WALL IN END DIASTOLE: 1.1 CM
LEFT VENTRICULAR STROKE VOLUME: 48 ML
LVSV (TEICH): 48 ML
MV E'TISSUE VEL-LAT: 10 CM/S
MV E'TISSUE VEL-SEP: 10 CM/S
MV PEAK A VEL: 0.89 M/S
MV PEAK E VEL: 68 CM/S
MV STENOSIS PRESSURE HALF TIME: 54 MS
MV VALVE AREA P 1/2 METHOD: 4.07
OSMOLALITY UR: 410 MMOL/KG
PV PEAK GRADIENT: 3 MMHG
RIGHT ATRIUM AREA SYSTOLE A4C: 12.9 CM2
RIGHT VENTRICLE ID DIMENSION: 3 CM
SL CV LEFT ATRIUM LENGTH A2C: 4.6 CM
SL CV PED ECHO LEFT VENTRICLE DIASTOLIC VOLUME (MOD BIPLANE) 2D: 87 ML
SL CV PED ECHO LEFT VENTRICLE SYSTOLIC VOLUME (MOD BIPLANE) 2D: 39 ML
SODIUM 24H UR-SCNC: 46 MOL/L
TR MAX PG: 27 MMHG
TR PEAK VELOCITY: 2.6 M/S
TRICUSPID ANNULAR PLANE SYSTOLIC EXCURSION: 2 CM
TRICUSPID VALVE PEAK REGURGITATION VELOCITY: 2.58 M/S

## 2024-03-19 PROCEDURE — 83605 ASSAY OF LACTIC ACID: CPT

## 2024-03-19 PROCEDURE — NC001 PR NO CHARGE

## 2024-03-19 PROCEDURE — 87040 BLOOD CULTURE FOR BACTERIA: CPT

## 2024-03-19 PROCEDURE — 93306 TTE W/DOPPLER COMPLETE: CPT | Performed by: INTERNAL MEDICINE

## 2024-03-19 PROCEDURE — 82948 REAGENT STRIP/BLOOD GLUCOSE: CPT

## 2024-03-19 PROCEDURE — 99223 1ST HOSP IP/OBS HIGH 75: CPT | Performed by: INTERNAL MEDICINE

## 2024-03-19 PROCEDURE — 93306 TTE W/DOPPLER COMPLETE: CPT

## 2024-03-19 RX ORDER — GLIMEPIRIDE 2 MG/1
2 TABLET ORAL
Status: DISCONTINUED | OUTPATIENT
Start: 2024-03-19 | End: 2024-03-19

## 2024-03-19 RX ORDER — SODIUM CHLORIDE 9 MG/ML
10 INJECTION, SOLUTION INTRAMUSCULAR; INTRAVENOUS; SUBCUTANEOUS DAILY
Qty: 300 ML | Refills: 3 | OUTPATIENT
Start: 2024-03-19 | End: 2024-07-17

## 2024-03-19 RX ORDER — LISINOPRIL 5 MG/1
5 TABLET ORAL DAILY
Status: DISCONTINUED | OUTPATIENT
Start: 2024-03-19 | End: 2024-03-22

## 2024-03-19 RX ORDER — OXYCODONE HYDROCHLORIDE 10 MG/1
10 TABLET ORAL 2 TIMES DAILY PRN
Status: DISCONTINUED | OUTPATIENT
Start: 2024-03-19 | End: 2024-03-19

## 2024-03-19 RX ORDER — ABIRATERONE ACETATE 250 MG/1
250 TABLET ORAL DAILY
Status: DISCONTINUED | OUTPATIENT
Start: 2024-03-19 | End: 2024-03-24 | Stop reason: HOSPADM

## 2024-03-19 RX ORDER — GLIMEPIRIDE 2 MG/1
2 TABLET ORAL
Status: DISCONTINUED | OUTPATIENT
Start: 2024-03-19 | End: 2024-03-22

## 2024-03-19 RX ORDER — ALBUTEROL SULFATE 90 UG/1
2 AEROSOL, METERED RESPIRATORY (INHALATION) EVERY 4 HOURS PRN
Status: DISCONTINUED | OUTPATIENT
Start: 2024-03-19 | End: 2024-03-24 | Stop reason: HOSPADM

## 2024-03-19 RX ORDER — GLIMEPIRIDE 2 MG/1
4 TABLET ORAL
Status: DISCONTINUED | OUTPATIENT
Start: 2024-03-20 | End: 2024-03-22

## 2024-03-19 RX ORDER — SODIUM CHLORIDE 9 MG/ML
10 INJECTION, SOLUTION INTRAMUSCULAR; INTRAVENOUS; SUBCUTANEOUS DAILY
Qty: 300 ML | Refills: 3 | Status: SHIPPED | OUTPATIENT
Start: 2024-03-19 | End: 2024-07-17

## 2024-03-19 RX ORDER — SODIUM CHLORIDE 9 MG/ML
10 INJECTION, SOLUTION INTRAMUSCULAR; INTRAVENOUS; SUBCUTANEOUS DAILY
Qty: 300 ML | Refills: 2 | Status: SHIPPED | OUTPATIENT
Start: 2024-03-19 | End: 2024-06-17

## 2024-03-19 RX ORDER — INSULIN LISPRO 100 [IU]/ML
1-6 INJECTION, SOLUTION INTRAVENOUS; SUBCUTANEOUS
Status: DISCONTINUED | OUTPATIENT
Start: 2024-03-19 | End: 2024-03-24 | Stop reason: HOSPADM

## 2024-03-19 RX ORDER — OXYCODONE HYDROCHLORIDE 5 MG/1
5 TABLET ORAL EVERY 6 HOURS PRN
Status: DISCONTINUED | OUTPATIENT
Start: 2024-03-19 | End: 2024-03-20

## 2024-03-19 RX ORDER — SODIUM CHLORIDE 9 MG/ML
125 INJECTION, SOLUTION INTRAVENOUS CONTINUOUS
Status: DISCONTINUED | OUTPATIENT
Start: 2024-03-19 | End: 2024-03-23

## 2024-03-19 RX ORDER — PRAVASTATIN SODIUM 80 MG/1
80 TABLET ORAL
Status: DISCONTINUED | OUTPATIENT
Start: 2024-03-19 | End: 2024-03-24 | Stop reason: HOSPADM

## 2024-03-19 RX ORDER — OXYCODONE HYDROCHLORIDE AND ACETAMINOPHEN 5; 325 MG/1; MG/1
1 TABLET ORAL EVERY 6 HOURS PRN
Status: DISCONTINUED | OUTPATIENT
Start: 2024-03-19 | End: 2024-03-19

## 2024-03-19 RX ORDER — SODIUM CHLORIDE 9 MG/ML
10 INJECTION, SOLUTION INTRAMUSCULAR; INTRAVENOUS; SUBCUTANEOUS DAILY
Qty: 900 ML | Refills: 0 | OUTPATIENT
Start: 2024-03-19

## 2024-03-19 RX ADMIN — PRAVASTATIN SODIUM 80 MG: 80 TABLET ORAL at 17:21

## 2024-03-19 RX ADMIN — Medication 1 TABLET: at 09:11

## 2024-03-19 RX ADMIN — HEPARIN SODIUM 5000 UNITS: 5000 INJECTION, SOLUTION INTRAVENOUS; SUBCUTANEOUS at 21:16

## 2024-03-19 RX ADMIN — OXYCODONE HYDROCHLORIDE 5 MG: 5 TABLET ORAL at 14:12

## 2024-03-19 RX ADMIN — HEPARIN SODIUM 5000 UNITS: 5000 INJECTION, SOLUTION INTRAVENOUS; SUBCUTANEOUS at 14:12

## 2024-03-19 RX ADMIN — SODIUM CHLORIDE 75 ML/HR: 0.9 INJECTION, SOLUTION INTRAVENOUS at 01:57

## 2024-03-19 RX ADMIN — OXYCODONE HYDROCHLORIDE 5 MG: 5 TABLET ORAL at 21:17

## 2024-03-19 RX ADMIN — HEPARIN SODIUM 5000 UNITS: 5000 INJECTION, SOLUTION INTRAVENOUS; SUBCUTANEOUS at 05:39

## 2024-03-19 RX ADMIN — INSULIN LISPRO 3 UNITS: 100 INJECTION, SOLUTION INTRAVENOUS; SUBCUTANEOUS at 12:53

## 2024-03-19 RX ADMIN — CEFEPIME HYDROCHLORIDE 2000 MG: 2 INJECTION, SOLUTION INTRAVENOUS at 12:55

## 2024-03-19 RX ADMIN — INSULIN LISPRO 4 UNITS: 100 INJECTION, SOLUTION INTRAVENOUS; SUBCUTANEOUS at 21:16

## 2024-03-19 RX ADMIN — SODIUM CHLORIDE 75 ML/HR: 0.9 INJECTION, SOLUTION INTRAVENOUS at 23:55

## 2024-03-19 RX ADMIN — LISINOPRIL 5 MG: 5 TABLET ORAL at 09:11

## 2024-03-19 RX ADMIN — GLIMEPIRIDE 2 MG: 2 TABLET ORAL at 17:20

## 2024-03-19 RX ADMIN — INSULIN DETEMIR 35 UNITS: 100 INJECTION, SOLUTION SUBCUTANEOUS at 21:17

## 2024-03-19 RX ADMIN — OXYCODONE HYDROCHLORIDE 5 MG: 5 TABLET ORAL at 03:40

## 2024-03-19 RX ADMIN — INSULIN LISPRO 3 UNITS: 100 INJECTION, SOLUTION INTRAVENOUS; SUBCUTANEOUS at 17:21

## 2024-03-19 RX ADMIN — INSULIN LISPRO 1 UNITS: 100 INJECTION, SOLUTION INTRAVENOUS; SUBCUTANEOUS at 09:12

## 2024-03-19 RX ADMIN — Medication 2.5 MG: at 09:12

## 2024-03-19 NOTE — H&P
History and Physical - [facility]  Family Medicine Residency     Patient Information: Oliver Astudillo 73 y.o. male MRN: [id number]  Unit/Bed#: [address] [place] [address] Encounter: [id number]  Admitting Physician: Uriel Alejandra MD   PCP: Eugenia Rodriguez MD  Date of Admission:  03/19/24     Assessment and Plan     * Urinary tract infection associated with nephrostomy catheter   Assessment & Plan  Patient presented to the ED with complaint of bilateral flank is worse on the left side.  He also complains of  pain with flushing nephrostomy tube and decreased output of urine into the left nephrostomy bag.  He  has been feeling chills and nausea but denies any fever or vomiting. Patient complains of some dysuria but denies urinary frequency or urgency.   CT abdomen pelvis with contrast 03/18/2024 : Bilateral percutaneous nephrostomy tubes in expected position. Mild right hydronephrosis. Bilateral pyelitis and ureteritis. No clear evidence of nephritis. Findings suggestive of cystitis.  On admission UA was positive for nitrates and leukocytes with protein, glucose, bacteria, BC and WBC suggestive of urinary tract infection.  Prior urine cultures were positive for E. coli  Patient had elevated leukocyte count likely steroid mediated and due to concurrent infection. However patient was afebrile.   Lactic acid 2.6    Likely due to urinary tract obstruction and nephrostomy tube dysfunction    In the ER patient received Rocephin 1000 mg and Percocet    Plan  Urine and blood cultures pending  Will start the patient on empiric cefepime 2 g every 12 hour  IR consulted for possible nephrostomy tube placement  Monitor WBC count and fever curve  Pain regimen - tylenol q6 PRN for mild, moderate pain and Percocet q6 PRN for severe pain    Nephrostomy status (HCC)  Assessment & Plan  Patient presents with complaint of decreased urine output in left nephrostomy bag and flank pain worse on left side.  Pt has h/o prostate  cancer leading to urinary obstruction treated with bilateral percutaneous nephrostomy tubes since 6/22/23.  Nephrostomy tube was most recently exchanged in the last hospitalization 01/31/2024.  Pt has had recurrent nephrostomy tube dysfunction and urinary tract infections since last few months. Pt states that he irrigates tubes at least twice daily  Ct scan showed bilateral percutaneous nephrostomy tubes in expected position with mild right hydronephrosis, bilateral pyelitis and ureteritis.    Plan  IR consulted for possible nephrostomy tube exchange    Hyponatremia  Assessment & Plan  Na 130 on admission. Likely hypovolemic hyponatremia from poor oral intake.    Plan  Continue IVF NS at 75 ml/hr  Continue to monitor BMP daily  Strict input and output monitoring  Urine sodium and osmol pending        Prostate cancer (HCC)  Assessment & Plan  History of advanced metastatic prostate cancer diagnosed in May 2023.  He last received lupron on 1/11. Sees  Dr. Coyne at medical oncology     Plan   Continue Lupron q6 months, Zytiga and dexamethasone     Type 2 diabetes mellitus, with long-term current use of insulin ([facility])  Assessment & Plan  Lab Results   Component Value Date    HGBA1C 11.1 (A) 01/08/2024       Recent Labs     03/18/24  2148 03/18/24  2151   POCGLU 468* 408*       Blood Sugar Average: Last 72 hrs:  (P) 438    Chronic, uncontrolled. Last A1c was 11.1. Home medications include Levemir 30 units HS, Glimepiride 2 mg lunch, Glimepiride 4 mg breakfast, Steglatro 5 mg at  breakfast and Invokana 100 mg.  Patient has been compliant with all his medications; that his morning blood sugar today was elevated  Blood sugars on admission were elevated    Plan   Hold home medications  Patient started on insulin sliding scale and Accu-Cheks ACHS  Continue with Levemir 30 units at bedtime  Carbohydrate controlled diet  Hypoglycemia protocol  Monitor blood sugars as patient is on steroids       Dizziness  Assessment &  Plan  Patient has had episodes of dizziness and lightheadedness whenever he walks or does some activity.  As per wife, patient had an episode of dizziness when walking up the stairs and nearly fell over but was caught by his wife  Patient has no prior echo on file    Plan  Check orthostatic vitals  Follow-up echo    Hyperlipidemia  Assessment & Plan  Chronic, stable.  Patient is on Crestor 10 mg daily at home    Plan  Crestor not in hospital formulary; replaced with pravastatin    Hypertension  Assessment & Plan  Chronic, stable.  On admission, BP was elevated at 187/73 mm Hg and then normalized. Home regimen includes lisinopril 10mg daily     Plan  Continue with lisinopril 10 mg with holding parameters  Will add labetalol or hydralazine if blood pressure remains elevated  Monitor vitals and blood pressure daily         Fluids: IV hydration with normal saline 75 mill per hour  Electrolyte repletion: Replete  and as needed.  Nutrition:        Diet Orders   (From admission, onward)                 Start     Ordered    03/18/24 2148  Diet Rupesh/CHO Controlled; Consistent Carbohydrate Diet Level 2 (5 carb servings/75 grams CHO/meal)  Diet effective now        References:    Adult Nutrition Support Algorithm    RD Therapeutic Diet Order Protocol   Question Answer Comment   Diet Type Rupesh/CHO Controlled    Rupesh/CHO Controlled Consistent Carbohydrate Diet Level 2 (5 carb servings/75 grams CHO/meal)    RD to adjust diet per protocol? Yes        03/18/24 2149                   VTE Prophylaxis: VTE Score: 9 High Risk (Score >/= 5) - Pharmacological DVT Prophylaxis Ordered: heparin. Sequential Compression Devices Ordered.  Code Status: Level 1 - Full Code  Anticipated Length of Stay:  Patient will be admitted on an Inpatient basis with an anticipated length of stay of  less than 2 midnights.     Justification for Hospital Stay: Management of urinary tract infection with possible nephrostomy tube replacement  Total Time for Visit,  including Counseling / Coordination of Care: 45 mins. Greater than 50% of this total time spent on direct patient counseling and coordination of care.  Patient Information Sharing: With the consent of Oliver Astudillo, their loved ones wife was notified today by inpatient team of the patient’s condition and current plan.     Chief Complaint:     Chief Complaint   Patient presents with    Flank Pain     Bilateral flank pain, states his kidneys hurt. [place] speaking       Subjective      History of Present Illness:     Oliver Astudillo is a 73 y.o. male with PMHx of DM, HTN, HLD, prostate cancer currently on chemotherapy, and bilateral nephrostomy tubes recently changed on 01/31 who presents with bilateral flank pain which is worse on the left side 1 day.  Patient also complains of decreased urine output in the left nephrostomy bag.  Complains of difficulty urinating and dysuria since today morning.  Has had constant chills but denies any fever.  Some nausea but no episodes of vomiting.  Patient also reports that he has dizziness and lightheadedness whenever he walks.  Patient has a history of prostate cancer diagnosed in May 2023 and has been on bilateral nephrostomy tubes since June 2023.  He is on Lupron, abiraterone and dexamethasone  for prostate cancer and has been following up with urology and oncology outpatient. Patient has history of recurrent nephrostomy tube dysfunction and urinary tract infections with many nephrostomy tube replacements.  Of note, patient was recently hospitalized on  1/30/2024 for similar complaints; IR had replaced the nephrostomy tubes leading to improvement in symptoms.  Patient denies any chest pain, shortness of breath, abdominal pain.   In the ER patient was afebrile and vitals were stable. He received 1 dose of Rocephin in the ER.      Review of Systems:  Review of Systems   Constitutional:  Positive for chills. Negative for fever.   HENT: Negative.  Negative for ear pain and sore  throat.    Eyes: Negative.  Negative for pain and visual disturbance.   Respiratory: Negative.  Negative for apnea, cough, choking, chest tightness, shortness of breath, wheezing and stridor.    Cardiovascular: Negative.  Negative for chest pain, palpitations and leg swelling.   Gastrointestinal:  Positive for nausea. Negative for abdominal pain, diarrhea, rectal pain and vomiting.   Genitourinary:  Positive for decreased urine volume, dysuria and flank pain. Negative for hematuria.   Musculoskeletal:  Negative for arthralgias and back pain.   Skin: Negative.  Negative for color change and rash.   Neurological:  Positive for dizziness and light-headedness. Negative for tremors, seizures, syncope, facial asymmetry, speech difficulty, weakness, numbness and headaches.   Hematological: Negative.  Negative for adenopathy. Does not bruise/bleed easily.   Psychiatric/Behavioral: Negative.  Negative for sleep disturbance. The patient is not nervous/anxious.    All other systems reviewed and are negative.       Past Medical History:   Diagnosis Date    COVID-19 01/15/2024    Diabetes mellitus (HCC)     Elevated PSA 06/21/2023    High cholesterol     Hypertension     Prostate cancer (HCC)      Past Surgical History:   Procedure Laterality Date    IR NEPHROSTOMY TUBE CHECK/CHANGE/REPOSITION/REINSERTION/UPSIZE  9/28/2023    IR NEPHROSTOMY TUBE CHECK/CHANGE/REPOSITION/REINSERTION/UPSIZE  12/28/2023    IR NEPHROSTOMY TUBE CHECK/CHANGE/REPOSITION/REINSERTION/UPSIZE  1/31/2024    IR NEPHROSTOMY TUBE CHECK/CHANGE/REPOSITION/REINSERTION/UPSIZE  2/2/2024    IR NEPHROSTOMY TUBE CHECK/CHANGE/REPOSITION/REINSERTION/UPSIZE  3/4/2024    IR NEPHROSTOMY TUBE PLACEMENT  06/22/2023    bilateral    IR OTHER  1/15/2024    US GUIDED PROSTATE BIOPSY       No Known Allergies  Prior to Admission Medications   Prescriptions Last Dose Informant Patient Reported? Taking?   Blood Glucose Monitoring Suppl (OneTouch Verio Reflect) w/Device KIT 3/18/2024  Self, Family Member No Yes   Sig: Check blood sugars once daily. Please substitute with appropriate alternative as covered by patient's insurance. Dx: E11.65   Calcium Carb-Cholecalciferol (calcium carbonate-vitamin D) 500 mg-5 mcg tablet Unknown Self, Family Member No No   Sig: Take 1 tablet by mouth 2 (two) times a day with meals   Easy Touch Pen Needles 31G X 6 MM MISC 3/18/2024 Family Member, Self No Yes   Sig: Use daily at bedtime   Ertugliflozin L-PyroglutamicAc 5 MG TABS 3/18/2024 Family Member, Self No Yes   Sig: Take 5 mg by mouth daily with breakfast   Insulin Glargine Solostar (Lantus SoloStar) 100 UNIT/ML SOPN Not Taking  No No   Sig: Inject 0.3 mL (30 Units total) under the skin daily at bedtime   Patient not taking: Reported on 2/8/2024   Invokana 100 MG Unknown  Yes No   Multiple Vitamins-Minerals (Sentry) TABS Not Taking Family Member, Self Yes No   Sig: Take 1 tablet by mouth daily   Patient not taking: Reported on 3/18/2024   OneTouch Delica Lancets 33G [facility] 3/18/2024 Self, Family Member No Yes   Sig: Check blood sugars once daily. Please substitute with appropriate alternative as covered by patient's insurance. Dx: E11.65   Spacer/Aero-Holding Chambers (AEROCHAMBER MINI CHAMBER) BILLY Unknown Family Member, Self No No   Sig: by Does not apply route see administration instructions   abiraterone (ZYTIGA) 250 mg tablet 3/18/2024 Self, Family Member No Yes   Sig: Take 1 tablet (250 mg total) by mouth daily With a low fat meal   albuterol (ProAir HFA) 90 mcg/act inhaler Unknown Family Member, Self No No   Sig: Inhale 2 puffs every 4 (four) hours as needed for wheezing or shortness of breath   dexamethasone (DECADRON) 0.5 mg tablet 3/17/2024  No Yes   Sig: TAKE 1 TABLET (0.5 MG TOTAL) BY MOUTH DAILY WITH BREAKFAST   docusate sodium (COLACE) 100 mg capsule   No No   Sig: Take 1 capsule (100 mg total) by mouth daily as needed for constipation   glimepiride (AMARYL) 2 mg tablet 3/17/2024 Family Member, Self  No Yes   Sig: Take 1 tablet (2 mg total) by mouth daily with lunch   glimepiride (AMARYL) 4 mg tablet 3/18/2024 Family Member, Self Yes Yes   Sig: Take 4 mg by mouth daily with breakfast   glucose blood (OneTouch Verio) test strip 3/18/2024 Self, Family Member No Yes   Sig: Check blood sugars once daily. Please substitute with appropriate alternative as covered by patient's insurance. Dx: E11.65   lisinopril (ZESTRIL) 5 mg tablet 3/18/2024  Yes Yes   Sig: Take 5 mg by mouth daily   rosuvastatin (CRESTOR) 10 MG tablet 3/17/2024 Self, Family Member No Yes   Sig: Take 1 tablet (10 mg total) by mouth daily   Patient taking differently: Take 10 mg by mouth daily at bedtime   sodium chloride, PF, 0.9 % Not Taking Family Member, Self No No   Sig: 10 mL by Intracatheter route daily Intracatheter flushing daily. May substitute prefilled syringe with normal saline 10 mL vials, 10 mL syringes, and 18 g blunt needles   Patient not taking: Reported on 2/8/2024   sodium chloride, PF, 0.9 % Not Taking  No No   Sig: 10 mL by Intracatheter route daily Intracatheter flushing daily. May substitute prefilled syringe with normal saline 10 mL vials, 10 mL syringes, and 18 g blunt needles   Patient not taking: Reported on 2/8/2024   sodium chloride, PF, 0.9 % Not Taking  No No   Sig: 10 mL by Intracatheter route daily for 120 doses Intracatheter flushing daily. May substitute prefilled syringe with normal saline 10 mL vials, 10 mL syringes, and 18 g blunt needles   Patient not taking: Reported on 2/8/2024      Facility-Administered Medications: None     Social History     Socioeconomic History    Marital status: /Civil Union     Spouse name: Not on file    Number of children: Not on file    Years of education: Not on file    Highest education level: Not on file   Occupational History    Not on file   Tobacco Use    Smoking status: Former     Current packs/day: 0.00     Types: Cigarettes     Quit date: 2000     Years since quitting:  "24.2     Passive exposure: Past    Smokeless tobacco: Never    Tobacco comments:     States he only smoked on the weekends   Vaping Use    Vaping status: Never Used   Substance and Sexual Activity    Alcohol use: Not Currently     Comment: socially    Drug use: Never    Sexual activity: Not Currently     Partners: Female   Other Topics Concern    Not on file   Social History Narrative    Not on file     Social Determinants of Health     Financial Resource Strain: Not on file   Food Insecurity: No Food Insecurity (2/1/2024)    Hunger Vital Sign     Worried About Running Out of Food in the Last Year: Never true     Ran Out of Food in the Last Year: Never true   Transportation Needs: No Transportation Needs (2/1/2024)    PRAPARE - Transportation     Lack of Transportation (Medical): No     Lack of Transportation (Non-Medical): No   Physical Activity: Not on file   Stress: Not on file   Social Connections: Feeling Socially Integrated (7/26/2023)    Received from [facility]    OASIS : Social Isolation     Frequency of experiencing loneliness or isolation: Never   Intimate Partner Violence: Not on file   Housing Stability: Low Risk  (2/1/2024)    Housing Stability Vital Sign     Unable to Pay for Housing in the Last Year: No     Number of Places Lived in the Last Year: 1     Unstable Housing in the Last Year: No     Family History   Problem Relation Age of Onset    Liver cancer Father         Patient Pre-hospital Living Situation: Home  Patient Pre-hospital Level of Mobility: Pt is mobile, has occasional dizziness when walking  Patient Pre-hospital Diet Restrictions: Diabetic diet          Objective     Physical Exam:   Vitals:   Blood Pressure: 158/76 (03/18/24 2100)  Pulse: 81 (03/18/24 2100)  Temperature: 97.9 °F (36.6 °C) (03/18/24 2100)  Temp Source: Oral (03/18/24 2100)  Respirations: 18 (03/18/24 2100)  Height: 5' 8\" (172.7 cm) (03/18/24 2221)  SpO2: 94 % (03/18/24 2100)     Physical Exam  Constitutional: "       General: He is not in acute distress.     Appearance: Normal appearance. He is not ill-appearing, toxic-appearing or diaphoretic.   HENT:      Mouth/Throat:      Mouth: Mucous membranes are dry.   Cardiovascular:      Rate and Rhythm: Normal rate and regular rhythm.      Pulses: Normal pulses.      Heart sounds: Normal heart sounds. No murmur heard.     No friction rub. No gallop.   Pulmonary:      Effort: Pulmonary effort is normal. No respiratory distress.      Breath sounds: Normal breath sounds. No stridor. No wheezing, rhonchi or rales.   Chest:      Chest wall: No tenderness.   Abdominal:      General: Bowel sounds are normal.      Palpations: Abdomen is soft.      Tenderness: There is abdominal tenderness.      Comments: Bilateral nephrostomy tubes in place, with yellow urine, no blood  Decreased urine present in left nephrostomy bag   Musculoskeletal:         General: No swelling, tenderness, deformity or signs of injury. Normal range of motion.      Right lower leg: No edema.      Left lower leg: No edema.   Skin:     Capillary Refill: Capillary refill takes less than 2 seconds.      Coloration: Skin is not jaundiced or pale.      Findings: Rash present. No bruising, erythema or lesion.   Neurological:      General: No focal deficit present.      Mental Status: He is alert and oriented to person, place, and time. Mental status is at baseline.      Cranial Nerves: No cranial nerve deficit.      Sensory: No sensory deficit.      Motor: No weakness.      Coordination: Coordination normal.      Gait: Gait normal.      Deep Tendon Reflexes: Reflexes normal.   Psychiatric:         Mood and Affect: Mood normal.         Behavior: Behavior normal.         Thought Content: Thought content normal.           Lab Results: I have personally reviewed pertinent reports.    Results from last 7 days   Lab Units 03/18/24  2228 03/18/24  1310   WBC Thousand/uL  --  12.75*   HEMOGLOBIN g/dL  --  13.6   HEMATOCRIT %  --   40.3   PLATELETS Thousands/uL 306 359   NEUTROS PCT %  --  79*   LYMPHS PCT %  --  17   MONOS PCT %  --  4   EOS PCT %  --  0     Results from last 7 days   Lab Units 03/18/24  1310   POTASSIUM mmol/L 4.4   CHLORIDE mmol/L 97   CO2 mmol/L 24   BUN mg/dL 24   CREATININE mg/dL 1.19   CALCIUM mg/dL 9.8   ALK PHOS U/L 91   ALT U/L 16   AST U/L 12*   EGFR ml/min/1.73sq m 61         Results from last 7 days   Lab Units 03/19/24  0055   LACTIC ACID mmol/L 2.6*              Results from last 7 days   Lab Units 03/18/24  1709   COLOR UA  Light Yellow   CLARITY UA  Cloudy   SPEC GRAV UA  1.020   PH UA  6.0   LEUKOCYTES UA  Moderate*   NITRITE UA  Positive*   GLUCOSE UA mg/dl >=1000 (1%)*   KETONES UA mg/dl Negative   BILIRUBIN UA  Negative   BLOOD UA  Large*      Results from last 7 days   Lab Units 03/18/24  1709   RBC UA /hpf 10-20*   WBC UA /hpf 30-50*   EPITHELIAL CELLS WET PREP /hpf None Seen   BACTERIA UA /hpf Innumerable*                  Imaging: I have personally reviewed pertinent reports.    CT abdomen pelvis with contrast    Result Date: 3/18/2024  1. Bilateral percutaneous nephrostomy tubes in expected position. Mild right hydronephrosis. 2. Bilateral pyelitis and ureteritis. No clear evidence of nephritis. 3. Findings suggestive of cystitis. Workstation performed: [id number]         EKG, Pathology, and Other Studies Reviewed on Admission:   EKG  Result Date: 03/19/24  Impression:           ** Please Note: This note has been constructed using a voice recognition system **     Jarad Thomas MD  03/19/24  3:11 AM

## 2024-03-19 NOTE — ASSESSMENT & PLAN NOTE
History of advanced metastatic prostate cancer diagnosed in May 2023.  He last received lupron on 1/11. Sees  Dr. Coyne at medical oncology     Plan   Continue Lupron q6 months, Zytiga and dexamethasone   Monitor blood sugars ACHS while on dexamethasone.  Continue to follow with oncology as outpatient

## 2024-03-19 NOTE — ASSESSMENT & PLAN NOTE
Pt has h/o prostate cancer leading to urinary obstruction treated with bilateral percutaneous nephrostomy tubes since 6/22/23  Patient presents with complaint of decreased urine output in left nephrostomy bag and flank pain worse on left side.  Nephrostomy tube was most recently exchanged in the last hospitalization 01/31/2024.  Pt has had recurrent nephrostomy tube dysfunction and urinary tract infections since last few months.   Pt states that he irrigates tubes at least twice daily  Ct scan showed bilateral percutaneous nephrostomy tubes in expected position with mild right hydronephrosis, bilateral pyelitis and ureteritis.    Plan  Patient has follow-up nephrostomy tube check/exchange on 5/28/2024.  Patient discharged with prescription for UA with reflex to scope prior to that appointment

## 2024-03-19 NOTE — UTILIZATION REVIEW
Initial Clinical Review    Admission: Date/Time/Statement:   Admission Orders (From admission, onward)       Ordered        03/18/24 2044  INPATIENT ADMISSION  Once                          Orders Placed This Encounter   Procedures    INPATIENT ADMISSION     Standing Status:   Standing     Number of Occurrences:   1     Order Specific Question:   Level of Care     Answer:   Med Surg [16]     Order Specific Question:   Estimated length of stay     Answer:   More than 2 Midnights     Order Specific Question:   Certification     Answer:   I certify that inpatient services are medically necessary for this patient for a duration of greater than two midnights. See H&P and MD Progress Notes for additional information about the patient's course of treatment.     ED Arrival Information       Expected   -    Arrival   3/18/2024 14:49    Acuity   Urgent              Means of arrival   Walk-In    Escorted by   Family Member    Service   Hospitalist    Admission type   Emergency              Arrival complaint   flank pain             Chief Complaint   Patient presents with    Flank Pain     Bilateral flank pain, states his kidneys hurt. Divehi speaking       Initial Presentation:   73 yom to ER from home for evaluation of bilateral flank pain left worse than the right as well as pain with flushing nephrostomy tube and decreased output of urine into the left nephrostomy bag, chills, nausea, dysuria. Hx diabetes, prostate cancer, bilateral nephrostomy tubes. Presents hypertensive with s/s as above. Admission CT showing bilateral nephrostomy tubes in expected position. R hydronephrosis. Bilateral pyelitis, ureteritis, cystitis. Labs: WBC 12.75, Na 130, ALT 12, gluc 375, lactic acid 2.6, u/a+gluc, blood, prot, nitrite, leuk, WBC, bacteria.  Admitted to ipatient status for UTI 2nd urinary tract obstruction and nephrostomy tube dysfunction. Started on IVF, IVABT, cultures pending.     Date: 3/19/24   Day 2:   Dx: UTI associated with  nephrostomy tubes. IVABT changed to Cefepime. Urine & blood cultures pending. Monitor WBC count and fever curve. IR consulted for nephrostomy tube eval, started on flushes. Bilateral nephrostomy tubes, draining light yellow-colored urine. No surrounding erythema or tenderness, no purulent discharge noticed. Abd tenderness, R CVA tenderness, bilateral flank pain, painful urination requiring Oxycodone for pain control.     Per IR: Currently both PCN's are in good stable position and are functioning. Will place orders for daily flushing of both PCN's while inpatient.      3/20/24- Day 3: Has surpassed a 2nd midnight with active treatments and services.  Dx: UTI 2nd nephrostomy tube. Reports 9/10 L flank pain, decreased urine output from R nephrostomy tube. Increased pain noted with tube flushes, unable to tolerate; Percocet given, Oxycodone prn.  IVABT continued, final cultures pending. Dose IV Diflucan added, to start po dosing tomorrow. IVF maintained. IV Mg, K  repletion given. Continue to monitor urine output, pain mgt.       ED Triage Vitals   Temperature Pulse Respirations Blood Pressure SpO2   03/18/24 1454 03/18/24 1454 03/18/24 1454 03/18/24 1454 03/18/24 1454   98.4 °F (36.9 °C) 64 18 (!) 187/73 98 %      Temp Source Heart Rate Source Patient Position - Orthostatic VS BP Location FiO2 (%)   03/18/24 2100 03/18/24 1906 03/18/24 1906 03/18/24 1906 --   Oral Monitor Lying Left arm       Pain Score       03/18/24 1749       8          Wt Readings from Last 1 Encounters:   03/19/24 77.6 kg (171 lb)     Additional Vital Signs:   Date/Time Temp Pulse Resp BP MAP (mmHg) SpO2 O2 Device Patient Position - Orthostatic VS   03/20/24 0700 98 °F (36.7 °C) 75 17 126/66 90 96 % None (Room air) Lying   03/19/24 2100 98.6 °F (37 °C) 95 16 162/77 111 95 % None (Room air) Lying   03/19/24 1626 99.5 °F (37.5 °C) 80 16 168/75 108 98 % None (Room air) Lying   03/19/24 1320 -- 68 -- 157/68 -- -- -- --   03/19/24 0900 99.5 °F (37.5  °C) 74 -- 157/68 98 96 % None (Room air) Sitting   03/18/24 2100 97.9 °F (36.6 °C) 81 18 158/76 -- 94 % None (Room air) Sitting   03/18/24 1906 -- 83 16 126/60 -- 95 % None (Room air) Lying   03/18/24 1454 98.4 °F (36.9 °C) 64 18 187/73 Abnormal  -- 98 % -- --     Pertinent Labs/Diagnostic Test Results:   3/19 Echo=    Left Ventricle: Left ventricular cavity size is normal. Wall thickness is mildly increased. The left ventricular ejection fraction is 60%.  GLS is -17.2%.  Systolic function is normal. Although no diagnostic regional wall motion abnormality was identified, this possibility cannot be completely excluded on the basis of this study. Diastolic function is normal for age.    Right Ventricle: Systolic function is normal.    Tricuspid Valve: There is trace regurgitation.    Pulmonic Valve: There is trace regurgitation.    CT abdomen pelvis with contrast   Final Result (03/18 2011)         1. Bilateral percutaneous nephrostomy tubes in expected position. Mild right hydronephrosis.   2. Bilateral pyelitis and ureteritis. No clear evidence of nephritis.   3. Findings suggestive of cystitis.     Results from last 7 days   Lab Units 03/20/24  0501 03/18/24  2228 03/18/24  1310   WBC Thousand/uL 14.49*  --  12.75*   HEMOGLOBIN g/dL 12.4  --  13.6   HEMATOCRIT % 37.2  --  40.3   PLATELETS Thousands/uL 329 306 359   NEUTROS ABS Thousands/µL 11.54*  --  10.04*     Results from last 7 days   Lab Units 03/20/24  0501 03/18/24  1310   SODIUM mmol/L 134* 130*   POTASSIUM mmol/L 3.4* 4.4   CHLORIDE mmol/L 102 97   CO2 mmol/L 24 24   ANION GAP mmol/L 8 9   BUN mg/dL 18 24   CREATININE mg/dL 1.20 1.19   EGFR ml/min/1.73sq m 60 61   CALCIUM mg/dL 9.2 9.8   MAGNESIUM mg/dL 1.8*  --      Results from last 7 days   Lab Units 03/20/24  0501 03/18/24  1310   AST U/L 14 12*   ALT U/L 12 16   ALK PHOS U/L 59 91   TOTAL PROTEIN g/dL 7.2 8.1   ALBUMIN g/dL 3.2* 3.8   TOTAL BILIRUBIN mg/dL 0.71 0.61     Results from last 7 days   Lab  Units 03/20/24  1044 03/20/24  0711 03/20/24  0639 03/20/24  0325 03/19/24  2103 03/19/24  1700 03/19/24  1137 03/19/24  0721 03/19/24  0206 03/18/24  2151 03/18/24  2148   POC GLUCOSE mg/dl 168* 102 112 128 282* 268* 232* 161* 231* 408* 468*     Results from last 7 days   Lab Units 03/20/24  0501 03/18/24  1310   GLUCOSE RANDOM mg/dL 103 375*     Results from last 7 days   Lab Units 03/19/24  0055   LACTIC ACID mmol/L 2.6*     Results from last 7 days   Lab Units 03/18/24  2356   OSMO UR mmol/     Results from last 7 days   Lab Units 03/18/24  2356 03/18/24  1709   CLARITY UA   --  Cloudy   COLOR UA   --  Light Yellow   SPEC GRAV UA   --  1.020   PH UA   --  6.0   GLUCOSE UA mg/dl  --  >=1000 (1%)*   KETONES UA mg/dl  --  Negative   BLOOD UA   --  Large*   PROTEIN UA mg/dl  --  100 (2+)*   NITRITE UA   --  Positive*   BILIRUBIN UA   --  Negative   UROBILINOGEN UA E.U./dl  --  1.0   LEUKOCYTES UA   --  Moderate*   WBC UA /hpf  --  30-50*   RBC UA /hpf  --  10-20*   BACTERIA UA /hpf  --  Innumerable*   EPITHELIAL CELLS WET PREP /hpf  --  None Seen   SODIUM UR  46  --      Results from last 7 days   Lab Units 03/19/24  0055 03/19/24  0043 03/18/24  1709   BLOOD CULTURE  No Growth at 24 hrs. No Growth at 24 hrs.  --    URINE CULTURE   --   --  >100,000 cfu/ml Gram Negative Gary*       ED Treatment:   Medication Administration from 03/18/2024 1449 to 03/18/2024 2145         Date/Time Order Dose Route Action     03/18/2024 1749 EDT oxyCODONE-acetaminophen (PERCOCET) 5-325 mg per tablet 1 tablet 1 tablet Oral Given     03/18/2024 1821 EDT iohexol (OMNIPAQUE) 350 MG/ML injection (MULTI-DOSE) 100 mL 100 mL Intravenous Given     03/18/2024 1900 EDT cefTRIAXone (ROCEPHIN) IVPB (premix in dextrose) 1,000 mg 50 mL 1,000 mg Intravenous New Bag          Past Medical History:   Diagnosis Date    COVID-19 01/15/2024    Diabetes mellitus (HCC)     Elevated PSA 06/21/2023    High cholesterol     Hypertension     Prostate cancer  (HCC)      Present on Admission:   Hypertension   Prostate cancer (HCC)   Hyperlipidemia   Hyponatremia   Urinary tract infection associated with nephrostomy catheter    Dizziness      Admitting Diagnosis: Ureteritis [N28.89]  UTI (urinary tract infection) [N39.0]  Flank pain [R10.9]  Nephrostomy complication (HCC) [N99.528]  Age/Sex: 73 y.o. male  Admission Orders:  Scd/foot pumps  Pt/ot eval & tx  Consult IR  Accuchecks    Nephrostomy tube #1:  Location: Nephrostomy   Laterality: Left   Tube to: Bag drainage   Record Tube Output? Yes   Record Tube Output: Every 8 Hours   Flush Tube? Yes   Flush tube with: 10ml NSS   Flush frequency: Once Each Day       Nephrostomy tube #2:   Location: Nephrostomy   Laterality: Right   Tube to: Bag drainage   Record Tube Output? Yes   Record Tube Output: Every 8 Hours   Flush Tube? Yes   Flush tube with: 10ml NSS   Flush frequency: Once Each Day       Scheduled Medications:  Medications 03/11 03/12 03/13 03/14 03/15 03/16 03/17 03/18 03/19 03/20   abiraterone (ZYTIGA) tablet 250 mg  Dose: 250 mg  Freq: Daily Route: PO  Start: 03/19/24 0900   Admin Instructions:               0900      0839        cefepime (MAXIPIME) IVPB (premix in dextrose) 2,000 mg 50 mL  Dose: 2,000 mg  Freq: Every 12 hours Route: IV  Last Dose: Stopped (03/20/24 0130)  Start: 03/19/24 0000   Order specific questions:              2347      1255      0100     0130     1200        cefTRIAXone (ROCEPHIN) IVPB (premix in dextrose) 1,000 mg 50 mL  Dose: 1,000 mg  Freq: Once Route: IV  Last Dose: Stopped (03/18/24 1918)  Start: 03/18/24 1900 End: 03/18/24 1918   Admin Instructions:      Order specific questions:              1900 1918          fluconazole (DIFLUCAN) IVPB (premix) 200 mg  Dose: 200 mg  Freq: Once Route: IV  Start: 03/20/24 1000   Admin Instructions:                1000        fluconazole (DIFLUCAN) tablet 200 mg  Dose: 200 mg  Freq: Daily Route: PO  Start: 03/21/24 0900   Admin Instructions:                    glimepiride (AMARYL) tablet 2 mg  Dose: 2 mg  Freq: Daily with dinner Route: PO  Start: 03/19/24 1715            1720      1630        glimepiride (AMARYL) tablet 2 mg  Dose: 2 mg  Freq: Daily with lunch Route: PO  Start: 03/19/24 1715 End: 03/19/24 1710            1710-D/C'd       glimepiride (AMARYL) tablet 4 mg  Dose: 4 mg  Freq: Daily with breakfast Route: PO  Start: 03/20/24 0730             0839         heparin (porcine) subcutaneous injection 5,000 Units  Dose: 5,000 Units  Freq: Every 8 hours scheduled Route: SC  Start: 03/19/24 0600   Admin Instructions:               0539     1412     2116      0502     1400     2200         heparin (porcine) subcutaneous injection 5,000 Units  Dose: 5,000 Units  Freq: Every 8 hours scheduled Route: SC  Start: 03/18/24 2200 End: 03/18/24 2301   Admin Instructions:              2230     2301-D/C'd        insulin detemir (LEVEMIR) subcutaneous injection 30 Units  Dose: 30 Units  Freq: Daily at bedtime Route: SC  Start: 03/18/24 2215 End: 03/19/24 1705   Admin Instructions:              2230      1705-D/C'd       insulin detemir (LEVEMIR) subcutaneous injection 35 Units  Dose: 35 Units  Freq: Daily at bedtime Route: SC  Start: 03/19/24 2200   Admin Instructions:               2117 2200         insulin lispro (HumALOG/ADMELOG) 100 units/mL subcutaneous injection 1-6 Units  Dose: 1-6 Units  Freq: 4 times daily (before meals and at bedtime) Route: SC  Start: 03/19/24 0700   Admin Instructions:               0996     1259     1723 [id number] (1976) [phone]     2206        insulin lispro (HumALOG/ADMELOG) 100 units/mL subcutaneous injection 8 Units  Dose: 8 Units  Freq: Once Route: SC  Indications of Use: HYPERGLYCEMIA,TYPE 2 DIABETES MELLITUS  Start: 03/18/24 2215 End: 03/18/24 2230   Admin Instructions:              2230          lisinopril (ZESTRIL) tablet 5 mg  Dose: 5 mg  Freq: Daily Route: PO  Start: 03/19/24 0900   Admin Instructions:       Order specific questions:               0911      0839        magnesium sulfate 2 g/50 mL IVPB (premix) 2 g  Dose: 2 g  Freq: Once Route: IV  Last Dose: 2 g (03/20/24 0841)  Start: 03/20/24 0645 End: 03/20/24 1041   Admin Instructions:                0841        multivitamin-minerals (CENTRUM) tablet 1 tablet  Dose: 1 tablet  Freq: Daily Route: PO  Start: 03/19/24 0900   Admin Instructions:               0911      0839        oxyCODONE-acetaminophen (PERCOCET) 5-325 mg per tablet 1 tablet  Dose: 1 tablet  Freq: Once Route: PO  Start: 03/20/24 0545 End: 03/20/24 0558   Admin Instructions:                0558        oxyCODONE-acetaminophen (PERCOCET) 5-325 mg per tablet 1 tablet  Dose: 1 tablet  Freq: Once Route: PO  Start: 03/18/24 1745 End: 03/18/24 1749   Admin Instructions:              1749          potassium chloride (Klor-Con M20) CR tablet 40 mEq  Dose: 40 mEq  Freq: Once Route: PO  Start: 03/20/24 0700 End: 03/20/24 0700   Admin Instructions:                0700-D/C'd  (0736)        potassium chloride (Klor-Con M20) CR tablet 40 mEq  Dose: 40 mEq  Freq: Once Route: PO  Start: 03/20/24 0645 End: 03/20/24 0839   Admin Instructions:                0839        pravastatin (PRAVACHOL) tablet 80 mg  Dose: 80 mg  Freq: Daily with dinner Route: PO  Start: 03/19/24 1630            1721      1630        Legend:       [id number]/1603/1703/1803/1903/20        Continuous Meds Sorted by Name  for Oliver Astudillo as of 03/11/24 through 3/20/24  Legend:       Medications 03/11 03/12 03/13 03/14 03/15 03/16 03/17 03/18 03/19 03/20   sodium chloride 0.9 % infusion  Rate: 75 mL/hr Dose: 75 mL/hr  Freq: Continuous Route: IV  Indications of Use: IV Hydration  Last Dose: 75 mL/hr (03/20/24 0700)  Start: 03/19/24 0200            0157     1900     2000     2100     2200     2300     2355      0000     0100     0200     0300     0400     0500     0600     0700        Legend:        [id number]/1503/1603/1703/1803/1903/20        PRN Meds Sorted by Name  for Oliver Astudillo as of 03/11/24 through 3/20/24  Legend:         Medications 03/11 03/12 03/13 03/14 03/15 03/16 03/17 03/18 03/19 03/20   acetaminophen (TYLENOL) tablet 650 mg  Dose: 650 mg  Freq: Every 6 hours PRN Route: PO  PRN Reason: mild pain  Indications of Use: FEVER,HEADACHE,MILD PAIN  Start: 03/18/24 2148                albuterol (PROVENTIL HFA,VENTOLIN HFA) inhaler 2 puff  Dose: 2 puff  Freq: Every 4 hours PRN Route: IN  PRN Reasons: wheezing,shortness of breath  Start: 03/19/24 0148   Admin Instructions:                   calcium carbonate (TUMS) chewable tablet 1,000 mg  Dose: 1,000 mg  Freq: Daily PRN Route: PO  PRN Reasons: indigestion,heartburn  Start: 03/18/24 2148   Admin Instructions:                   HYDROmorphone HCl (DILAUDID) injection 0.2 mg  Dose: 0.2 mg  Freq: Every 4 hours PRN Route: IV  PRN Reason: breakthrough pain  Start: 03/20/24 1029   Admin Instructions:                   iohexol (OMNIPAQUE) 350 MG/ML injection (MULTI-DOSE) 100 mL  Dose: 100 mL  Freq: Once in imaging Route: IV  PRN Reason: contrast  Start: 03/18/24 1818 End: 03/18/24 1821           1821          ondansetron (ZOFRAN) injection 4 mg  Dose: 4 mg  Freq: Every 6 hours PRN Route: IV  PRN Reasons: nausea,vomiting  Start: 03/18/24 2148   Admin Instructions:                   oxyCODONE (ROXICODONE) immediate release tablet 10 mg  Dose: 10 mg  Freq: 2 times daily PRN Route: PO  PRN Reasons: severe pain,breakthrough pain  Start: 03/19/24 0317 End: 03/19/24 0333   Admin Instructions:      Order specific questions:               0333-D/C'd       oxyCODONE (ROXICODONE) IR tablet 5 mg  Dose: 5 mg  Freq: Every 6 hours PRN Route: PO  PRN Reason: severe pain  Start: 03/20/24 1029   Admin Instructions:                   oxyCODONE (ROXICODONE) IR tablet 5 mg  Dose: 5 mg  Freq: Every 6 hours PRN Route: PO  PRN Reasons: severe  pain,breakthrough pain  Start: 03/19/24 0334 End: 03/20/24 1030   Admin Instructions:               0340     1412 [C]     2117      1030-D/C'd      oxyCODONE (ROXICODONE) split tablet 2.5 mg  Dose: 2.5 mg  Freq: Every 6 hours PRN Route: PO  PRN Reason: moderate pain  Start: 03/19/24 0850   Admin Instructions:               0912      0209 [C]     0839        oxyCODONE-acetaminophen (PERCOCET) 5-325 mg per tablet 1 tablet  Dose: 1 tablet  Freq: Every 6 hours PRN Route: PO  PRN Reasons: moderate pain,severe pain  Start: 03/19/24 0309 End: 03/19/24 0318   Admin Instructions:               0318-D/C'd       polyethylene glycol (MIRALAX) packet 17 g  Dose: 17 g  Freq: Daily PRN Route: PO  PRN Reason: constipation  Start: 03/18/24 2148   Admin Instructions:                     Network Utilization Review Department  ATTENTION: Please call with any questions or concerns to [phone] and carefully listen to the prompts so that you are directed to the right person. All voicemails are confidential.   For Discharge needs, contact Care Management DC Support Team at [phone] opt. 2  Send all requests for admission clinical reviews, approved or denied determinations and any other requests to dedicated fax number below belonging to the campus where the patient is receiving treatment. List of dedicated fax numbers for the Facilities:  FACILITY NAME UR FAX NUMBER   ADMISSION DENIALS (Administrative/Medical Necessity) [phone]   DISCHARGE SUPPORT TEAM (NETWORK) [phone]   PARENT CHILD HEALTH (Maternity/NICU/Pediatrics) [phone]   [facility] [phone]   [facility] [phone]   [facility] [phone]   [facility] [phone]   [facility] [phone]   [facility] [phone]   [facility] [phone]   [facility]  [facility] [phone]   [facility] [phone]   [facility] [phone]   [facility] [phone]   [facility] [phone]

## 2024-03-19 NOTE — ASSESSMENT & PLAN NOTE
Patient had bilateral flank  left>> right side.  He also complains of  pain with flushing nephrostomy tube and decreased output of urine into the left nephrostomy bag.  He had mild dysuria, chills and nausea but denied any fever or vomiting.  CT abdomen pelvis with contrast was done that showed bilateral percutaneous nephrostomy tubes in expected position. Mild right hydronephrosis. Bilateral pyelitis and ureteritis. No clear evidence of nephritis. Findings suggestive of cystitis.UA was positive for nitrates and leukocytes with protein, glucose, bacteria, BC and WBC  Repeat Urine Cultures of L and R nephrostomy tubes grew Enterococcus faecalis and Candida.  Patient continued to have some abdominal pain, and so CT scan abdomen pelvis with contrast was repeated. Repeat CT scan abdomen pelvis with contrast:   Bilateral percutaneous nephrostomy tubes are in place. Mild left hydronephrosis is similar to prior study. New enhancement within the distal left ureter may be due to hyperemia. Unchanged bladder wall thickening likely representing cystitis. Inflammation of the intrarenal collecting systems and ureters is similar to prior study.Patient received a 7-day antibiotic course that included cefepime, cefazolin, vancomycin, fluconazole.    Start Augmentin 1 tablet twice daily for 3 days (total of 10 days) on 3/25/2024.  Start fluconazole 1 tablet daily for 10 days (total of 14 days) on 3/25/2024-4/3/2024   Follow-up final urine cultures and sensitivities   IR consulted, continue to appreciate recs:  Nephrostomy tubes repositioned on 03/20/24  Recommending flushing once a day only  Patient was discharged in stable condition, with follow-up requested for endocrinology, urology, PCP

## 2024-03-19 NOTE — ASSESSMENT & PLAN NOTE
Resolved    Na 130 on admission. Likely hyperglycemic hyponatremia. Urine sodium and urine Osmolality: WNL    Plan  Continue to monitor BMP daily  Strict input and output monitoring

## 2024-03-19 NOTE — ASSESSMENT & PLAN NOTE
Chronic, stable.  Patient is on Crestor 10 mg daily at home    Plan  Crestor not in hospital formulary; replaced with pravastatin  Continue with pravastatin as outpatient

## 2024-03-19 NOTE — PLAN OF CARE
Problem: PAIN - ADULT  Goal: Verbalizes/displays adequate comfort level or baseline comfort level  Description: Interventions:  - Encourage patient to monitor pain and request assistance  - Assess pain using appropriate pain scale  - Administer analgesics based on type and severity of pain and evaluate response  - Implement non-pharmacological measures as appropriate and evaluate response  - Consider cultural and social influences on pain and pain management  - Notify physician/advanced practitioner if interventions unsuccessful or patient reports new pain  Outcome: Progressing     Problem: INFECTION - ADULT  Goal: Absence or prevention of progression during hospitalization  Description: INTERVENTIONS:  - Assess and monitor for signs and symptoms of infection  - Monitor lab/diagnostic results  - Monitor all insertion sites, i.e. indwelling lines, tubes, and drains  - Monitor endotracheal if appropriate and nasal secretions for changes in amount and color  - [place] appropriate cooling/warming therapies per order  - Administer medications as ordered  - Instruct and encourage patient and family to use good hand hygiene technique  - Identify and instruct in appropriate isolation precautions for identified infection/condition  Outcome: Progressing  Goal: Absence of fever/infection during neutropenic period  Description: INTERVENTIONS:  - Monitor WBC    Outcome: Progressing     Problem: DISCHARGE PLANNING  Goal: Discharge to home or other facility with appropriate resources  Description: INTERVENTIONS:  - Identify barriers to discharge w/patient and caregiver  - Arrange for needed discharge resources and transportation as appropriate  - Identify discharge learning needs (meds, wound care, etc.)  - Arrange for interpretive services to assist at discharge as needed  - Refer to Case Management Department for coordinating discharge planning if the patient needs post-hospital services based on physician/advanced  practitioner order or complex needs related to functional status, cognitive ability, or social support system  Outcome: Progressing     Problem: Knowledge Deficit  Goal: Patient/family/caregiver demonstrates understanding of disease process, treatment plan, medications, and discharge instructions  Description: Complete learning assessment and assess knowledge base.  Interventions:  - Provide teaching at level of understanding  - Provide teaching via preferred learning methods  Outcome: Progressing

## 2024-03-19 NOTE — TELEMEDICINE
e-Consult (IPC)  - Interventional Radiology  Oliver Astudillo 73 y.o. male MRN: [id number]  Unit/Bed#: [address] [place] [address] Encounter: [id number]          Interventional Radiology has been consulted to evaluate lOiver Astudillo    We were consulted by Internal Medicine concerning this patient with bilateral nephrostomy tubes.    Inpatient Consult to IR  Consult performed by: ALTA Mott  Consult ordered by: Jarad Thomas MD        03/19/24    Assessment/Recommendation:   Oliver Astudillo, 73y male with HLD, T2DM, HTN, Prostate cancer and chronic bilateral nephrostomy tubes.    Patient presented to the ER yesterday for c/o bilateral flank pain.    Patient had a CT abd/pelvis w/contrast that showed -  1. Bilateral percutaneous nephrostomy tubes in expected position. Mild right hydronephrosis.  2. Bilateral pyelitis and ureteritis. No clear evidence of nephritis.  3. Findings suggestive of cystitis.    Patient had last routine bilateral PCN exchange on 3/4/24.  Exchange was uneventful.    Currently both PCN's are in good stable position and are functioning.    Will place orders for daily flushing of both PCN's while inpatient.    Patient does follow with Urology as an outpatient. Recommend Urology consult regarding cystitis findings.    No current necessity for PCN exchange. Recommend continuing with scheduled routine exchanges outpatient.    Please contact IR if unable to flush tubes,leaking while flushing, if tubes become dislodged or if output is less than 10 ml.    Feel free to reach out to IR with any questions or concerns.         21-30 minutes, >50% of the total time devoted to medical consultative verbal/EMR discussion between providers. Written report will be generated in the EMR.     Thank you for allowing Interventional Radiology to participate in the care of Oliver Astudillo. Please don't hesitate to call or TigerText us with any questions.     ALTA Mott

## 2024-03-19 NOTE — ASSESSMENT & PLAN NOTE
Chronic, stable.  On admission, BP was elevated at 187/73 mm Hg   Home regimen includes lisinopril 10mg daily     Plan  lisinopril 10 mg was continued during admission along with close monitoring of vitals  Continue home regimen as outpatient

## 2024-03-19 NOTE — ASSESSMENT & PLAN NOTE
Lab Results   Component Value Date    HGBA1C 11.1 (A) 01/08/2024       Recent Labs     03/24/24  0235 03/24/24  0747 03/24/24  0811 03/24/24  1218   POCGLU 200* 47* 68 99       Blood Sugar Average: Last 72 hrs:  (P) 114.8015844363831529    Chronic, uncontrolled. Last A1c was 11.1. Home medications include Levemir 30 units HS, Glimepiride 2 mg lunch, Glimepiride 4 mg breakfast, Steglatro 5 mg at  breakfast.  His Ivokana was changed to Steglatro due to insurance issues. Per patient, he has been compliant with all his medications    Plan   Home medications of Steglatro discontinued during this admission.  Likely contributing to frequent UTIs.  Discharged on the following regimen:  Amaryl 2 mg daily with breakfast  Metformin 500 mg twice daily  Insulin glargine 30 units at bedtime  Monitor blood sugars ACHS  Patient instructed to follow-up with primary care provider to titrate medications as needed outpatient.  Carbohydrate controlled diet, provided handout  Diabetic education provided

## 2024-03-19 NOTE — CASE MANAGEMENT
Case Management Assessment & Discharge Planning Note    Patient name Oliver Astudillo  Location [address] [facility] [facility] MRN [id number]  : 1950 Date 3/19/2024       Current Admission Date: 3/18/2024  Current Admission Diagnosis:Urinary tract infection associated with nephrostomy catheter    Patient Active Problem List    Diagnosis Date Noted    Dizziness 2024    Constipation 2024    Malfunction of nephrostomy tube (HCC) 01/15/2024    Urinary tract infection associated with nephrostomy catheter  2024    Hyponatremia 2024    Hyperlipidemia 2024    Encounter for monitoring androgen deprivation therapy 08/10/2023    Nephrostomy status ([facility]) 08/10/2023    Hydronephrosis 2023    Prostate cancer ([facility]) 2023    Type 2 diabetes mellitus, with long-term current use of insulin ([facility]) 2023    Other hydronephrosis 2023    Hypertension 2023      LOS (days): 1  Geometric Mean LOS (GMLOS) (days): 3.3  Days to GMLOS:2.5     OBJECTIVE:    Risk of Unplanned Readmission Score: 18.43     Current admission status: Inpatient    Preferred Pharmacy:   [facility]/pharmacy #[id number] [place], [place] - [address] [address]  Phone: [phone] Fax: [phone]    [facility] [place], [place] - [address]  [address]  [facility] [address]  Phone: [phone] Fax: [phone]    Primary Care Provider: Eugenia Rodriguez MD    Primary Insurance: A[employer] MC REP  Secondary Insurance:     ASSESSMENT:  Active Health Care Proxies    There are no active Health Care Proxies on file.       Readmission Root Cause  30 Day Readmission: No    Patient Information  Admitted from:: Home  Mental Status: Alert  During Assessment patient was accompanied by: Not accompanied during assessment  Assessment information provided by:: Son (Patient's Son Sumit)  Primary Caregiver: Spouse  Caregiver's Name:: Son Sumit and Wife  Erlinda  Caregiver's Relationship to Patient:: Family Member  Caregiver's Telephone Number:: Sumit [phone]  Support Systems: Self, Spouse/significant other, Son  County of Residence: [place]  What city do you live in?: [place]  Type of Current Residence: 2 story home (Patient lives in two story home with his wife however they reside only on the first level of the home. First level of home has a bedroom and bathroom.)  Upon entering residence, is there a bedroom on the main floor (no further steps)?: Yes  Upon entering residence, is there a bathroom on the main floor (no further steps)?: Yes  Living Arrangements: Lives w/ Spouse/significant other    Activities of Daily Living Prior to Admission  Functional Status: Independent  Completes ADLs independently?: Yes  Ambulates independently?: Yes  Does patient use assisted devices?: No  Does patient currently own DME?: Yes  What DME does the patient currently own?: Shower Chair, Straight Cane  Does patient have a history of Outpatient Therapy (PT/OT)?: No  Does the patient have a history of Short-Term Rehab?: No  Does patient have a history of HHC?: Yes (Home nursing services during in the past during cancer treatment.)  Does patient currently have HHC?: No      Patient Information Continued  Income Source: Pension/FDC  Does patient have prescription coverage?: Yes  Does patient receive dialysis treatments?: No  Does patient have a history of substance abuse?: No    PHQ 2/9 Screening   Reviewed PHQ 2/9 Depression Screening Score?: No    Means of Transportation  Means of Transport to Appts:: Drives Self      Social Determinants of Health (SDOH)      Flowsheet Row Most Recent Value   Housing Stability    In the last 12 months, was there a time when you were not able to pay the mortgage or rent on time? N   In the last 12 months, how many places have you lived? 1   In the last 12 months, was there a time when you did not have a steady place to sleep or slept  in a shelter (including now)? N   Transportation Needs    In the past 12 months, has lack of transportation kept you from medical appointments or from getting medications? no   In the past 12 months, has lack of transportation kept you from meetings, work, or from getting things needed for daily living? No   Food Insecurity    Within the past 12 months, you worried that your food would run out before you got the money to buy more. Never true   Within the past 12 months, the food you bought just didn't last and you didn't have money to get more. Never true   Utilities    In the past 12 months has the electric, gas, oil, or water company threatened to shut off services in your home? No            DISCHARGE DETAILS:    Discharge planning discussed with:: Patient's Son Sumit  Freedom of Choice: Yes    CM contacted family/caregiver?: Yes    Contacts  Patient Contacts: Sumit  Relationship to Patient:: Family  Contact Method: Phone  Phone Number: [phone]  Reason/Outcome: Discharge Planning, Emergency Contact, Continuity of Care      CM called patient's Son Sumit to introduce role, complete assessment, and discuss discharge planning. Sumit confirmed patient is still independent with all ADL's, ambulates independently without the use of any DME, and drives.     CM will continue to follow patient for discharge needs.

## 2024-03-19 NOTE — PROGRESS NOTES
Daily Progress Note - [facility]  Family Medicine Residency  Oliver Astudillo 73 y.o. male MRN: [id number]  Unit/Bed#: [address] [place] [address] Encounter: [id number]  Admitting Physician: Uriel Alejandra MD   PCP: Eugenia Rodriguez MD  Date of Admission:  3/18/2024  3:43 PM    Assessment and Plan    * Urinary tract infection associated with nephrostomy catheter   Assessment & Plan  Patient had bilateral flank  left>> right side.  He also complains of  pain with flushing nephrostomy tube and decreased output of urine into the left nephrostomy bag.  He has mild dysuria, chills and nausea but denied any fever or vomiting.    CT abdomen pelvis with contrast: Bilateral percutaneous nephrostomy tubes in expected position. Mild right hydronephrosis. Bilateral pyelitis and ureteritis. No clear evidence of nephritis. Findings suggestive of cystitis.    UA was positive for nitrates and leukocytes with protein, glucose, bacteria, BC and WBC  Prior urine cultures were positive for E. Coli and enterobacter.    WBC: 12.75   Lactic acid 2.6    Likely due to urinary tract obstruction and nephrostomy tube dysfunction    In the ER patient received Rocephin 1000 mg and Percocet    Plan  Urine and blood cultures pending  Continue cefepime 2 g every 12 hour  IR consulted, appreciate recs:  Due to recent change of nephrostomy tubes, only flushing recommended, currently both PCN's are in good stable position and are functioning  Monitor WBC count and fever curve  Pain regimen - tylenol q6 PRN for mild, moderate pain and Percocet q6 PRN for severe pain    Type 2 diabetes mellitus, with long-term current use of insulin (HCC)  Assessment & Plan  Lab Results   Component Value Date    HGBA1C 11.1 (A) 01/08/2024       Recent Labs     03/18/24  2151 03/19/24  0206 03/19/24  0721 03/19/24  1137   POCGLU 408* 231* 161* 232*       Blood Sugar Average: Last 72 hrs:  (P) 300    Chronic, uncontrolled. Last A1c was 11.1. Home medications  include Levemir 30 units HS, Glimepiride 2 mg lunch, Glimepiride 4 mg breakfast, Steglatro 5 mg at  breakfast.  His Ivokana was changed to Steglatro due to insurance issues. Per patient, he has been compliant with all his medications    Plan   Hold home medications  Patient started on insulin sliding scale and Accu-Cheks ACHS  Continue with Levemir 30 units at bedtime  Carbohydrate controlled diet  Hypoglycemia protocol  Monitor blood sugars as patient is on steroids       Dizziness  Assessment & Plan  Patient has had episodes of dizziness and lightheadedness whenever he walks or does some activity.  As per wife, patient had an episode of dizziness when walking up the stairs and nearly fell over but was caught by his wife  Patient has no prior echo on file    Patient is not complaining of dizziness/lightheadedness anymore, and is able to ambulate without difficulty to the bathroom     Plan  Orthostatic vitals  ECHO: Left ventricular cavity size is normal. Wall thickness is mildly increased. The left ventricular ejection fraction is 60%. GLS is -17.2%. Systolic function is normal.     Hyperlipidemia  Assessment & Plan  Chronic, stable.  Patient is on Crestor 10 mg daily at home    Plan  Crestor not in hospital formulary; replaced with pravastatin    Hyponatremia  Assessment & Plan  Na 130 on admission. Likely hyperglycemic hyponatremia.    03/19: Corrected Na: 139    Plan  Continue IVF NS at 75 ml/hr  Continue to monitor BMP daily  Strict input and output monitoring  Urine sodium and urine Osmolality: WNL       Nephrostomy status (HCC)  Assessment & Plan  Pt has h/o prostate cancer leading to urinary obstruction treated with bilateral percutaneous nephrostomy tubes since 6/22/23  Patient presents with complaint of decreased urine output in left nephrostomy bag and flank pain worse on left side.  Nephrostomy tube was most recently exchanged in the last hospitalization 01/31/2024.  Pt has had recurrent nephrostomy tube  dysfunction and urinary tract infections since last few months.   Pt states that he irrigates tubes at least twice daily  Ct scan showed bilateral percutaneous nephrostomy tubes in expected position with mild right hydronephrosis, bilateral pyelitis and ureteritis.    Plan  IR consulted, initiate recommendations: See above    Prostate cancer (HCC)  Assessment & Plan  History of advanced metastatic prostate cancer diagnosed in May 2023.  He last received lupron on 1/11. Sees  Dr. Coyne at medical oncology     Plan   Continue Lupron q6 months, Zytiga and dexamethasone     Hypertension  Assessment & Plan  Chronic, stable.  On admission, BP was elevated at 187/73 mm Hg   Home regimen includes lisinopril 10mg daily     Plan  Continue with lisinopril 10 mg with holding parameters  Will add labetalol or hydralazine if blood pressure remains elevated  Monitor vitals and blood pressure daily        VTE Pharmacologic Prophylaxis: VTE Score: 9 High Risk (Score >/= 5) - Pharmacological DVT Prophylaxis Ordered: heparin. Sequential Compression Devices Ordered.      Patient Centered Rounds: I have performed bedside rounds with nursing staff today.    Discussions with Specialists or Other Care Team Provider: Done, neurology    Education and Discussions with Patient: Done  Patient Information Sharing: With the consent of Oliver Astudillo , their loved ones (son) were notified today by inpatient team of the patient’s condition and current plan.  All questions answered.   Time Spent for Care: 45 minutes.  More than 50% of total time spent on counseling and coordination of care as described above.    Current Length of Stay: 1 day(s)    Current Patient Status: Inpatient   Certification Statement: The patient will continue to require additional inpatient hospital stay due to UTI associated with nephrostomy    Discharge Plan: 48 to 72 hours    Code Status: Level 1 - Full Code    Subjective:   Patient was seen and examined, sitting  comfortably in his recliner. Today he reports pain, 7/8 in his flanks.  He received pain medication at night, that did improve the pain only slightly.  He notes that he has a very painful urination with intermittent stream.  The urination is painful throughout.  Overnight no other acute events. Currently patient denies any fever, chills, shortness of breath, cough, chest pain, palpitations, light headedness, headaches, vision change, abdominal pain, N/V/D, urinary symptoms.  Last bowel movement was yesterday that was normal.    Objective     Objective:   Vitals:   Temp (24hrs), Av.6 °F (37 °C), Min:97.9 °F (36.6 °C), Max:99.5 °F (37.5 °C)    Temp:  [97.9 °F (36.6 °C)-99.5 °F (37.5 °C)] 99.5 °F (37.5 °C)  HR:  [64-83] 68  Resp:  [16-18] 18  BP: (126-187)/(60-76) 157/68  SpO2:  [94 %-98 %] 96 %  Body mass index is 26 kg/m².     Input and Output Summary (last 24 hours):       Intake/Output Summary (Last 24 hours) at 3/19/2024 1453  Last data filed at 3/19/2024 1001  Gross per 24 hour   Intake 300 ml   Output 555 ml   Net -255 ml       Physical Exam:   Physical Exam  Vitals reviewed.   Constitutional:       Appearance: Normal appearance. He is normal weight.   HENT:      Mouth/Throat:      Mouth: Mucous membranes are moist.   Eyes:      Pupils: Pupils are equal, round, and reactive to light.   Cardiovascular:      Rate and Rhythm: Normal rate and regular rhythm.      Pulses: Normal pulses.      Heart sounds: Normal heart sounds. No murmur heard.  Pulmonary:      Effort: Pulmonary effort is normal. No respiratory distress.      Breath sounds: Normal breath sounds.   Abdominal:      General: Bowel sounds are normal. There is no distension.      Palpations: Abdomen is soft.      Tenderness: There is abdominal tenderness. There is right CVA tenderness (Mild tenderness on the right). There is no left CVA tenderness.   Genitourinary:     Comments: Bilateral nephrostomy tubes, draining light yellow-colored urine.  No  surrounding erythema or tenderness, no purulent discharge noticed  Musculoskeletal:         General: Normal range of motion.      Cervical back: Normal range of motion.      Right lower leg: No edema.      Left lower leg: No edema.   Skin:     Capillary Refill: Capillary refill takes less than 2 seconds.   Neurological:      General: No focal deficit present.      Mental Status: He is alert and oriented to person, place, and time.      Motor: No weakness.      Gait: Gait normal.      Deep Tendon Reflexes: Reflexes normal.   Psychiatric:         Mood and Affect: Mood normal.         Behavior: Behavior normal.         Thought Content: Thought content normal.       Additional Data:     Labs:  Results from last 7 days   Lab Units 03/18/24  2228 03/18/24  1310   WBC Thousand/uL  --  12.75*   HEMOGLOBIN g/dL  --  13.6   HEMATOCRIT %  --  40.3   PLATELETS Thousands/uL 306 359   NEUTROS PCT %  --  79*   LYMPHS PCT %  --  17   MONOS PCT %  --  4   EOS PCT %  --  0     Results from last 7 days   Lab Units 03/18/24  1310   POTASSIUM mmol/L 4.4   CHLORIDE mmol/L 97   CO2 mmol/L 24   BUN mg/dL 24   CREATININE mg/dL 1.19   CALCIUM mg/dL 9.8   ALK PHOS U/L 91   ALT U/L 16   AST U/L 12*         Results from last 7 days   Lab Units 03/19/24  1137 03/19/24  0721 03/19/24  0206 03/18/24  2151 03/18/24  2148   POC GLUCOSE mg/dl 232* 161* 231* 408* 468*           * I Have Reviewed All Lab Data Listed Above.  * Additional Pertinent Lab Tests Reviewed: All Labs For Current Hospital Admission Reviewed      Recent Cultures (last 7 days):     Results from last 7 days   Lab Units 03/19/24  0055 03/19/24  0043   BLOOD CULTURE  Received in Microbiology Lab. Culture in Progress. Received in Microbiology Lab. Culture in Progress.       Last 24 Hours Medication List:   Current Facility-Administered Medications   Medication Dose Route Frequency Provider Last Rate    abiraterone  250 mg Oral Daily Jarad Thomas MD      acetaminophen  650 mg  Oral Q6H PRN Jarad Thomas MD      albuterol  2 puff Inhalation Q4H PRN Jarad Thomas MD      calcium carbonate  1,000 mg Oral Daily PRN Jarad Thomas MD      cefepime  2,000 mg Intravenous Q12H Jarad Thomas MD 2,000 mg (03/19/24 1255)    heparin (porcine)  5,000 Units Subcutaneous Q8H RAJEEV Jarad Thomas MD      insulin detemir  30 Units Subcutaneous HS Jarad Thomas MD      insulin lispro  1-6 Units Subcutaneous 4x Daily (AC & HS) Jarad Thomas MD      lisinopril  5 mg Oral Daily Jarad Thomas MD      multivitamin-minerals  1 tablet Oral Daily Jarad Thomas MD      ondansetron  4 mg Intravenous Q6H PRN Jarad Thomas MD      oxyCODONE  5 mg Oral Q6H PRN Jarad Thomas MD      oxyCODONE  2.5 mg Oral Q6H PRN Radha Sanchez MD      polyethylene glycol  17 g Oral Daily PRN Jarad Thomas MD      pravastatin  80 mg Oral Daily With Dinner Jarad Thomas MD      sodium chloride  75 mL/hr Intravenous Continuous Jarad Thomas MD 75 mL/hr (03/19/24 0157)             ** Please Note: Dictation voice to text software may have been used in the creation of this document. **    Radha Sanchez MD  03/19/24  2:53 PM

## 2024-03-19 NOTE — ASSESSMENT & PLAN NOTE
Resolved     patient had episodes of dizziness and lightheadedness whenever he walks or does some activity.  As per wife, patient had an episode of dizziness when walking up the stairs and nearly fell over but was caught by his wife  Patient has no prior echo on file    Patient is not complaining of dizziness/lightheadedness anymore, and is able to ambulate without difficulty to the bathroom     Plan  Orthostatic vitals  ECHO: Left ventricular cavity size is normal. Wall thickness is mildly increased. The left ventricular ejection fraction is 60%. GLS is -17.2%. Systolic function is normal.

## 2024-03-20 ENCOUNTER — APPOINTMENT (OUTPATIENT)
Dept: NON INVASIVE DIAGNOSTICS | Facility: HOSPITAL | Age: 74
DRG: 699 | End: 2024-03-20
Payer: COMMERCIAL

## 2024-03-20 ENCOUNTER — TELEPHONE (OUTPATIENT)
Dept: HEMATOLOGY ONCOLOGY | Facility: CLINIC | Age: 74
End: 2024-03-20

## 2024-03-20 LAB
ALBUMIN SERPL BCP-MCNC: 3.2 G/DL (ref 3.5–5)
ALP SERPL-CCNC: 59 U/L (ref 34–104)
ALT SERPL W P-5'-P-CCNC: 12 U/L (ref 7–52)
ANION GAP SERPL CALCULATED.3IONS-SCNC: 8 MMOL/L (ref 4–13)
AST SERPL W P-5'-P-CCNC: 14 U/L (ref 13–39)
BASOPHILS # BLD AUTO: 0.02 THOUSANDS/ÂΜL (ref 0–0.1)
BASOPHILS NFR BLD AUTO: 0 % (ref 0–1)
BILIRUB SERPL-MCNC: 0.71 MG/DL (ref 0.2–1)
BUN SERPL-MCNC: 18 MG/DL (ref 5–25)
CALCIUM ALBUM COR SERPL-MCNC: 9.8 MG/DL (ref 8.3–10.1)
CALCIUM SERPL-MCNC: 9.2 MG/DL (ref 8.4–10.2)
CHLORIDE SERPL-SCNC: 102 MMOL/L (ref 96–108)
CO2 SERPL-SCNC: 24 MMOL/L (ref 21–32)
CREAT SERPL-MCNC: 1.2 MG/DL (ref 0.6–1.3)
EOSINOPHIL # BLD AUTO: 0.01 THOUSAND/ÂΜL (ref 0–0.61)
EOSINOPHIL NFR BLD AUTO: 0 % (ref 0–6)
ERYTHROCYTE [DISTWIDTH] IN BLOOD BY AUTOMATED COUNT: 13.3 % (ref 11.6–15.1)
GFR SERPL CREATININE-BSD FRML MDRD: 60 ML/MIN/1.73SQ M
GLUCOSE SERPL-MCNC: 102 MG/DL (ref 65–140)
GLUCOSE SERPL-MCNC: 103 MG/DL (ref 65–140)
GLUCOSE SERPL-MCNC: 112 MG/DL (ref 65–140)
GLUCOSE SERPL-MCNC: 127 MG/DL (ref 65–140)
GLUCOSE SERPL-MCNC: 128 MG/DL (ref 65–140)
GLUCOSE SERPL-MCNC: 168 MG/DL (ref 65–140)
GLUCOSE SERPL-MCNC: 177 MG/DL (ref 65–140)
HCT VFR BLD AUTO: 37.2 % (ref 36.5–49.3)
HGB BLD-MCNC: 12.4 G/DL (ref 12–17)
IMM GRANULOCYTES # BLD AUTO: 0.05 THOUSAND/UL (ref 0–0.2)
IMM GRANULOCYTES NFR BLD AUTO: 0 % (ref 0–2)
LYMPHOCYTES # BLD AUTO: 1.92 THOUSANDS/ÂΜL (ref 0.6–4.47)
LYMPHOCYTES NFR BLD AUTO: 13 % (ref 14–44)
MAGNESIUM SERPL-MCNC: 1.8 MG/DL (ref 1.9–2.7)
MCH RBC QN AUTO: 30.1 PG (ref 26.8–34.3)
MCHC RBC AUTO-ENTMCNC: 33.3 G/DL (ref 31.4–37.4)
MCV RBC AUTO: 90 FL (ref 82–98)
MONOCYTES # BLD AUTO: 0.95 THOUSAND/ÂΜL (ref 0.17–1.22)
MONOCYTES NFR BLD AUTO: 7 % (ref 4–12)
NEUTROPHILS # BLD AUTO: 11.54 THOUSANDS/ÂΜL (ref 1.85–7.62)
NEUTS SEG NFR BLD AUTO: 80 % (ref 43–75)
NRBC BLD AUTO-RTO: 0 /100 WBCS
PLATELET # BLD AUTO: 329 THOUSANDS/UL (ref 149–390)
PMV BLD AUTO: 9.9 FL (ref 8.9–12.7)
POTASSIUM SERPL-SCNC: 3.4 MMOL/L (ref 3.5–5.3)
PROT SERPL-MCNC: 7.2 G/DL (ref 6.4–8.4)
RBC # BLD AUTO: 4.12 MILLION/UL (ref 3.88–5.62)
SODIUM SERPL-SCNC: 134 MMOL/L (ref 135–147)
WBC # BLD AUTO: 14.49 THOUSAND/UL (ref 4.31–10.16)

## 2024-03-20 PROCEDURE — 82948 REAGENT STRIP/BLOOD GLUCOSE: CPT

## 2024-03-20 PROCEDURE — 50431 NJX PX NFROSGRM &/URTRGRM: CPT

## 2024-03-20 PROCEDURE — 93005 ELECTROCARDIOGRAM TRACING: CPT

## 2024-03-20 PROCEDURE — 85025 COMPLETE CBC W/AUTO DIFF WBC: CPT

## 2024-03-20 PROCEDURE — 83735 ASSAY OF MAGNESIUM: CPT

## 2024-03-20 PROCEDURE — 80053 COMPREHEN METABOLIC PANEL: CPT

## 2024-03-20 PROCEDURE — NC001 PR NO CHARGE: Performed by: RADIOLOGY

## 2024-03-20 PROCEDURE — 50431 NJX PX NFROSGRM &/URTRGRM: CPT | Performed by: RADIOLOGY

## 2024-03-20 PROCEDURE — 0TW5X0Z REVISION OF DRAINAGE DEVICE IN KIDNEY, EXTERNAL APPROACH: ICD-10-PCS | Performed by: INTERNAL MEDICINE

## 2024-03-20 PROCEDURE — 99232 SBSQ HOSP IP/OBS MODERATE 35: CPT | Performed by: INTERNAL MEDICINE

## 2024-03-20 RX ORDER — OXYCODONE HYDROCHLORIDE 5 MG/1
5 TABLET ORAL EVERY 6 HOURS PRN
Status: DISCONTINUED | OUTPATIENT
Start: 2024-03-20 | End: 2024-03-23

## 2024-03-20 RX ORDER — POTASSIUM CHLORIDE 20 MEQ/1
40 TABLET, EXTENDED RELEASE ORAL ONCE
Status: DISCONTINUED | OUTPATIENT
Start: 2024-03-20 | End: 2024-03-20

## 2024-03-20 RX ORDER — POTASSIUM CHLORIDE 20 MEQ/1
40 TABLET, EXTENDED RELEASE ORAL ONCE
Status: COMPLETED | OUTPATIENT
Start: 2024-03-20 | End: 2024-03-20

## 2024-03-20 RX ORDER — LIDOCAINE WITH 8.4% SOD BICARB 0.9%(10ML)
SYRINGE (ML) INJECTION AS NEEDED
Status: COMPLETED | OUTPATIENT
Start: 2024-03-20 | End: 2024-03-20

## 2024-03-20 RX ORDER — HYDROMORPHONE HCL IN WATER/PF 6 MG/30 ML
0.2 PATIENT CONTROLLED ANALGESIA SYRINGE INTRAVENOUS EVERY 4 HOURS PRN
Status: DISCONTINUED | OUTPATIENT
Start: 2024-03-20 | End: 2024-03-21

## 2024-03-20 RX ORDER — MAGNESIUM SULFATE HEPTAHYDRATE 40 MG/ML
2 INJECTION, SOLUTION INTRAVENOUS ONCE
Status: COMPLETED | OUTPATIENT
Start: 2024-03-20 | End: 2024-03-20

## 2024-03-20 RX ORDER — FLUCONAZOLE 100 MG/1
200 TABLET ORAL DAILY
Status: DISCONTINUED | OUTPATIENT
Start: 2024-03-21 | End: 2024-03-22

## 2024-03-20 RX ORDER — FLUCONAZOLE 2 MG/ML
200 INJECTION, SOLUTION INTRAVENOUS ONCE
Status: COMPLETED | OUTPATIENT
Start: 2024-03-20 | End: 2024-03-20

## 2024-03-20 RX ORDER — OXYCODONE HYDROCHLORIDE AND ACETAMINOPHEN 5; 325 MG/1; MG/1
1 TABLET ORAL ONCE
Status: COMPLETED | OUTPATIENT
Start: 2024-03-20 | End: 2024-03-20

## 2024-03-20 RX ADMIN — SODIUM CHLORIDE 75 ML/HR: 0.9 INJECTION, SOLUTION INTRAVENOUS at 18:00

## 2024-03-20 RX ADMIN — GLIMEPIRIDE 2 MG: 2 TABLET ORAL at 16:59

## 2024-03-20 RX ADMIN — PRAVASTATIN SODIUM 80 MG: 80 TABLET ORAL at 16:59

## 2024-03-20 RX ADMIN — LISINOPRIL 5 MG: 5 TABLET ORAL at 08:39

## 2024-03-20 RX ADMIN — CEFEPIME HYDROCHLORIDE 2000 MG: 2 INJECTION, SOLUTION INTRAVENOUS at 01:00

## 2024-03-20 RX ADMIN — OXYCODONE HYDROCHLORIDE 5 MG: 5 TABLET ORAL at 21:01

## 2024-03-20 RX ADMIN — OXYCODONE HYDROCHLORIDE AND ACETAMINOPHEN 1 TABLET: 5; 325 TABLET ORAL at 05:58

## 2024-03-20 RX ADMIN — GLIMEPIRIDE 4 MG: 2 TABLET ORAL at 08:39

## 2024-03-20 RX ADMIN — Medication 2.5 MG: at 08:39

## 2024-03-20 RX ADMIN — CEFEPIME HYDROCHLORIDE 2000 MG: 2 INJECTION, SOLUTION INTRAVENOUS at 12:53

## 2024-03-20 RX ADMIN — Medication 2.5 MG: at 02:09

## 2024-03-20 RX ADMIN — MAGNESIUM SULFATE HEPTAHYDRATE 2 G: 40 INJECTION, SOLUTION INTRAVENOUS at 08:41

## 2024-03-20 RX ADMIN — HEPARIN SODIUM 5000 UNITS: 5000 INJECTION, SOLUTION INTRAVENOUS; SUBCUTANEOUS at 05:02

## 2024-03-20 RX ADMIN — POTASSIUM CHLORIDE 40 MEQ: 1500 TABLET, EXTENDED RELEASE ORAL at 08:39

## 2024-03-20 RX ADMIN — INSULIN LISPRO 1 UNITS: 100 INJECTION, SOLUTION INTRAVENOUS; SUBCUTANEOUS at 12:18

## 2024-03-20 RX ADMIN — INSULIN DETEMIR 35 UNITS: 100 INJECTION, SOLUTION SUBCUTANEOUS at 22:44

## 2024-03-20 RX ADMIN — ABIRATERONE ACETATE 250 MG: 250 TABLET ORAL at 08:39

## 2024-03-20 RX ADMIN — IOHEXOL 10 ML: 350 INJECTION, SOLUTION INTRAVENOUS at 14:16

## 2024-03-20 RX ADMIN — HEPARIN SODIUM 5000 UNITS: 5000 INJECTION, SOLUTION INTRAVENOUS; SUBCUTANEOUS at 22:44

## 2024-03-20 RX ADMIN — INSULIN LISPRO 1 UNITS: 100 INJECTION, SOLUTION INTRAVENOUS; SUBCUTANEOUS at 16:58

## 2024-03-20 RX ADMIN — HEPARIN SODIUM 5000 UNITS: 5000 INJECTION, SOLUTION INTRAVENOUS; SUBCUTANEOUS at 16:58

## 2024-03-20 RX ADMIN — Medication 5 ML: at 13:56

## 2024-03-20 RX ADMIN — Medication 1 TABLET: at 08:39

## 2024-03-20 RX ADMIN — FLUCONAZOLE 200 MG: 2 INJECTION, SOLUTION INTRAVENOUS at 10:30

## 2024-03-20 NOTE — TELEPHONE ENCOUNTER
Appointment Change  Cancel, Reschedule, Change to Virtual      Who are you speaking with? Child   If it is not the patient, is the caller listed on the communication consent form? Yes   Which provider is the appointment scheduled with? Dr. Coyne   When was the original appointment scheduled?    Please list date and time 3/21/24 @ 1:40pm   At which location is the appointment scheduled to take place? [facility]   Was the appointment rescheduled?     Was the appointment changed from an in person visit to a virtual visit?    If so, please list the details of the change. Yes 4/1/24 @ 2pm   What is the reason for the appointment change? Patient is in the hospital       Was STAR transport scheduled? no   Does STAR transport need to be scheduled for the new visit (if applicable) no   Does the patient need an infusion appointment rescheduled? no   Does the patient have an upcoming infusion appointment scheduled? If so, when? no   Is the patient undergoing chemotherapy? no   For appointments cancelled with less than 24 hours:  Was the no-show policy reviewed? yes

## 2024-03-20 NOTE — NURSING NOTE
Patient shouting in pain when left nephrostomy tube flushed. Unable to tolerate 10 cc flush. Provider notified.

## 2024-03-20 NOTE — CONSULTS
H&P Exam - Urology       Patient: Oliver Astudillo   : 1950 Sex: male   MRN: [id number]     CSN: [id number]      History of Present Illness   HPI:  Oliver Astudillo is a 73 y.o. male known to me from previous [facility] admissions last 2023 and recently  history of advanced adenocarcinoma the prostate bilateral nephrostomy tubes followed by [facility] urology on Zytiga and steroids with antigen ablation admitted for complicated UTI and thalami on IV cefepime CAT scan confirming mild right hydronephrosis bilateral pyelitis and ureteritis possible cystitis        Review of Systems:   Constitutional:  Negative for activity change, fever, chills and diaphoresis.   HENT: Negative for hearing loss and trouble swallowing.   Eyes: Negative for itching and visual disturbance.   Respiratory: Negative for chest tightness and shortness of breath.   Cardiovascular: Negative for chest pain, edema.   Gastrointestinal: Negative for abdominal distention, na abdominal pain, constipation, diarrhea, Nausea and vomiting.   Genitourinary: Negative for decreased urine volume, difficulty urinating, dysuria, enuresis, frequency, hematuria and urgency.   Musculoskeletal: Negative for gait problem and myalgias.   Neurological: Negative for dizziness and headaches.   Hematological: Does not bruise/bleed easily.       Historical Information   Past Medical History:   Diagnosis Date    COVID-19 01/15/2024    Diabetes mellitus (HCC)     Elevated PSA 2023    High cholesterol     Hypertension     Prostate cancer (HCC)      Past Surgical History:   Procedure Laterality Date    IR NEPHROSTOMY TUBE CHECK/CHANGE/REPOSITION/REINSERTION/UPSIZE  2023    IR NEPHROSTOMY TUBE CHECK/CHANGE/REPOSITION/REINSERTION/UPSIZE  2023    IR NEPHROSTOMY TUBE CHECK/CHANGE/REPOSITION/REINSERTION/UPSIZE  2024    IR NEPHROSTOMY TUBE CHECK/CHANGE/REPOSITION/REINSERTION/UPSIZE  2024    IR NEPHROSTOMY TUBE  "CHECK/CHANGE/REPOSITION/REINSERTION/UPSIZE  3/4/2024    IR NEPHROSTOMY TUBE PLACEMENT  2023    bilateral    IR OTHER  1/15/2024    US GUIDED PROSTATE BIOPSY       Social History   Social History     Substance and Sexual Activity   Alcohol Use Not Currently    Comment: socially     Social History     Substance and Sexual Activity   Drug Use Never     Social History     Tobacco Use   Smoking Status Former    Current packs/day: 0.00    Types: Cigarettes    Quit date:     Years since quittin.2    Passive exposure: Past   Smokeless Tobacco Never   Tobacco Comments    States he only smoked on the weekends     Family History:   Family History   Problem Relation Age of Onset    Liver cancer Father        Meds/Allergies   Medications Prior to Admission   Medication    abiraterone (ZYTIGA) 250 mg tablet    Blood Glucose Monitoring Suppl (OneTouch Verio Reflect) w/Device KIT    dexamethasone (DECADRON) 0.5 mg tablet    Easy Touch Pen Needles 31G X 6 MM MISC    Ertugliflozin L-PyroglutamicAc 5 MG TABS    glimepiride (AMARYL) 2 mg tablet    glimepiride (AMARYL) 4 mg tablet    glucose blood (OneTouch Verio) test strip    lisinopril (ZESTRIL) 5 mg tablet    OneTouch Delica Lancets 33G MISC    rosuvastatin (CRESTOR) 10 MG tablet    albuterol (ProAir HFA) 90 mcg/act inhaler    Calcium Carb-Cholecalciferol (calcium carbonate-vitamin D) 500 mg-5 mcg tablet    docusate sodium (COLACE) 100 mg capsule    Insulin Glargine Solostar (Lantus SoloStar) 100 UNIT/ML SOPN    Invokana 100 MG    Multiple Vitamins-Minerals (Sentry) TABS    sodium chloride, PF, 0.9 %    sodium chloride, PF, 0.9 %    Spacer/Aero-Holding Chambers (AEROCHAMBER MINI CHAMBER) BILLY     No Known Allergies    Objective   Vitals: /66 (BP Location: Right arm)   Pulse 75   Temp 98 °F (36.7 °C) (Oral)   Resp 17   Ht 5' 8\" (1.727 m)   Wt 77.6 kg (171 lb)   SpO2 96%   BMI 26.00 kg/m²     Physical Exam:  General Alert awake   Normocephalic atraumatic " PERRLA  Lungs clear bilaterally  Cardiac normal S1 normal S2  Abdomen soft, flank pain bilateral nephrostomy tubes  Extremities no edema    I/O last 24 hours:  In: 2563.8 [P.O.:300; I.V.:2178.8; Other:35; IV Piggyback:50]  Out: 535 [Urine:535]    Invasive Devices       Peripheral Intravenous Line  Duration             Long-Dwell Peripheral IV (Midline) 03/19/24 Left Brachial 1 day    Peripheral IV 03/18/24 Right Antecubital 1 day              Drain  Duration             Nephrostomy Left 10 Fr. 16 days    Nephrostomy Right 10 Fr. 16 days                        Lab Results: CBC:   Lab Results   Component Value Date    WBC 14.49 (H) 03/20/2024    HGB 12.4 03/20/2024    HCT 37.2 03/20/2024    MCV 90 03/20/2024     03/20/2024    RBC 4.12 03/20/2024    MCH 30.1 03/20/2024    MCHC 33.3 03/20/2024    RDW 13.3 03/20/2024    MPV 9.9 03/20/2024    NRBC 0 03/20/2024     CMP:   Lab Results   Component Value Date     03/20/2024    CO2 24 03/20/2024    BUN 18 03/20/2024    CREATININE 1.20 03/20/2024    CALCIUM 9.2 03/20/2024    AST 14 03/20/2024    ALT 12 03/20/2024    ALKPHOS 59 03/20/2024    EGFR 60 03/20/2024     Urinalysis:   Lab Results   Component Value Date    COLORU Light Yellow 03/18/2024    CLARITYU Cloudy 03/18/2024    SPECGRAV 1.020 03/18/2024    PHUR 6.0 03/18/2024    LEUKOCYTESUR Moderate (A) 03/18/2024    NITRITE Positive (A) 03/18/2024    GLUCOSEU >=1000 (1%) (A) 03/18/2024    KETONESU Negative 03/18/2024    BILIRUBINUR Negative 03/18/2024    BLOODU Large (A) 03/18/2024     Urine Culture:   Lab Results   Component Value Date    URINECX >100,000 cfu/ml Gram Negative Gary (A) 03/18/2024     PSA:   Lab Results   Component Value Date    PSA 1.96 03/18/2024    PSA 0.16 12/27/2023    PSA 6.01 (H) 08/11/2023    .73 (H) 05/24/2023           Assessment/ Plan:  Complicated UTI  Bilateral hydronephrosis secondary to advancing localized prostate cancer on hormone/Zytiga and steroids  Cultures  pending  Interventional radiology consult check left nephrostomy tube with poor drainage as noted on last consult      Casey Talbert MD

## 2024-03-20 NOTE — NURSING NOTE
Bilateral nephrostomy tubes flushed with 10ml NS. Patient reports some pain when tubes flushed. Some resistance noted to right nephrostomy tube. Provider made aware.

## 2024-03-20 NOTE — SEDATION DOCUMENTATION
Procedure completed by Dr Murphy. Per his assessment, tubes in the correct place, L tube re-sutured. Dry dressings placed to site. Transported back to [facility] by IR staff, bedside report given.

## 2024-03-20 NOTE — PROGRESS NOTES
Daily Progress Note - [facility]  Family Medicine Residency  Oliver Astudillo 73 y.o. male MRN: [id number]  Unit/Bed#: [address] [place] [address] Encounter: [id number]  Admitting Physician: Uriel Alejandra MD   PCP: Eugenia Rodriguez MD  Date of Admission:  3/18/2024  3:43 PM    Assessment and Plan    * Urinary tract infection associated with nephrostomy catheter   Assessment & Plan  Patient had bilateral flank  left>> right side.  He also complains of  pain with flushing nephrostomy tube and decreased output of urine into the left nephrostomy bag.  He has mild dysuria, chills and nausea but denied any fever or vomiting.    CT abdomen pelvis with contrast: Bilateral percutaneous nephrostomy tubes in expected position. Mild right hydronephrosis. Bilateral pyelitis and ureteritis. No clear evidence of nephritis. Findings suggestive of cystitis.    UA was positive for nitrates and leukocytes with protein, glucose, bacteria, BC and WBC  Prior urine cultures were positive for E. Coli and enterobacter.    WBC: 12.75   Lactic acid 2.6    Likely due to urinary tract obstruction and nephrostomy tube dysfunction    In the ER patient received Rocephin 1000 mg and Percocet    Plan  Urine and blood cultures pending  Continue cefepime 2 g every 12 hour  IR consulted, appreciate recs:  Due to recent change of nephrostomy tubes, only flushing recommended, currently both PCN's are in good stable position and are functioning  Monitor WBC count and fever curve  Pain regimen - tylenol q6 PRN for mild, moderate pain and Percocet q6 PRN for severe pain    Type 2 diabetes mellitus, with long-term current use of insulin (HCC)  Assessment & Plan  Lab Results   Component Value Date    HGBA1C 11.1 (A) 01/08/2024       Recent Labs     03/19/24  2103 03/20/24  0325 03/20/24  0639 03/20/24  0711   POCGLU 282* 128 112 102       Blood Sugar Average: Last 72 hrs:  (P) 239.2    Chronic, uncontrolled. Last A1c was 11.1. Home medications  include Levemir 30 units HS, Glimepiride 2 mg lunch, Glimepiride 4 mg breakfast, Steglatro 5 mg at  breakfast.  His Ivokana was changed to Steglatro due to insurance issues. Per patient, he has been compliant with all his medications    Plan   Hold home medications  Patient started on insulin sliding scale and Accu-Cheks ACHS  Continue with Levemir 30 units at bedtime  Carbohydrate controlled diet  Hypoglycemia protocol  Monitor blood sugars as patient is on steroids       Dizziness  Assessment & Plan  Patient has had episodes of dizziness and lightheadedness whenever he walks or does some activity.  As per wife, patient had an episode of dizziness when walking up the stairs and nearly fell over but was caught by his wife  Patient has no prior echo on file    Patient is not complaining of dizziness/lightheadedness anymore, and is able to ambulate without difficulty to the bathroom     Plan  Orthostatic vitals  ECHO: Left ventricular cavity size is normal. Wall thickness is mildly increased. The left ventricular ejection fraction is 60%. GLS is -17.2%. Systolic function is normal.     Hyperlipidemia  Assessment & Plan  Chronic, stable.  Patient is on Crestor 10 mg daily at home    Plan  Crestor not in hospital formulary; replaced with pravastatin    Hyponatremia  Assessment & Plan  Na 130 on admission. Likely hyperglycemic hyponatremia.    03/19: Corrected Na: 139    Plan  Continue IVF NS at 75 ml/hr  Continue to monitor BMP daily  Strict input and output monitoring  Urine sodium and urine Osmolality: WNL       Nephrostomy status (HCC)  Assessment & Plan  Pt has h/o prostate cancer leading to urinary obstruction treated with bilateral percutaneous nephrostomy tubes since 6/22/23  Patient presents with complaint of decreased urine output in left nephrostomy bag and flank pain worse on left side.  Nephrostomy tube was most recently exchanged in the last hospitalization 01/31/2024.  Pt has had recurrent nephrostomy tube  dysfunction and urinary tract infections since last few months.   Pt states that he irrigates tubes at least twice daily  Ct scan showed bilateral percutaneous nephrostomy tubes in expected position with mild right hydronephrosis, bilateral pyelitis and ureteritis.    Plan  IR consulted, initiate recommendations: See above    Prostate cancer (HCC)  Assessment & Plan  History of advanced metastatic prostate cancer diagnosed in May 2023.  He last received lupron on 1/11. Sees  Dr. Coyne at medical oncology     Plan   Continue Lupron q6 months, Zytiga and dexamethasone     Hypertension  Assessment & Plan  Chronic, stable.  On admission, BP was elevated at 187/73 mm Hg   Home regimen includes lisinopril 10mg daily     Plan  Continue with lisinopril 10 mg with holding parameters  Will add labetalol or hydralazine if blood pressure remains elevated  Monitor vitals and blood pressure daily        VTE Pharmacologic Prophylaxis: VTE Score: 9 High Risk (Score >/= 5) - Pharmacological DVT Prophylaxis Ordered: heparin. Sequential Compression Devices Ordered.    Patient Centered Rounds: I have performed bedside rounds with nursing staff today.    Discussions with Specialists or Other Care Team Provider: Done, IR    Education and Discussions with Family / Patient: Done  Patient Information Sharing: With the consent of Oliver Astudillo , their loved ones (Zara goldberg) were notified today by inpatient team of the patient’s condition and current plan.  All questions answered.     Time Spent for Care: 45 minutes.  More than 50% of total time spent on counseling and coordination of care as described above.    Current Length of Stay: 2 day(s)    Current Patient Status: Inpatient   Certification Statement: The patient will continue to require additional inpatient hospital stay due to check patency of the nephrostomy tubes and resolution of UTI    Discharge Plan: 48 hours    Code Status: Level 1 - Full Code    Subjective:   Patient  was seen and examined at bedside. Today he reports not being able to sleep last night due to pain.  He reports his pain has 9/10 in the left flank, and decreased output from the right nephrostomy tube.  He is not being able to get out of bed and go to the bathroom and using a urinal now, due to multiple accidents.  He is not able to get in time to the bathroom and leaks.  He also reports mild dysuria and difficulty getting out of bed. overnight he did not have any fever but was restless. Currently patient denies any chills, shortness of breath, cough, chest pain, palpitations, light headedness, headaches, vision change, abdominal pain, N/V/D.  His last bowel movement was yesterday that was normal.    Objective     Objective:   Vitals:   Temp (24hrs), Av.7 °F (37.1 °C), Min:98 °F (36.7 °C), Max:99.5 °F (37.5 °C)    Temp:  [98 °F (36.7 °C)-99.5 °F (37.5 °C)] 98 °F (36.7 °C)  HR:  [68-95] 75  Resp:  [16-17] 17  BP: (126-168)/(66-77) 126/66  SpO2:  [95 %-98 %] 96 %  Body mass index is 26 kg/m².     Input and Output Summary (last 24 hours):       Intake/Output Summary (Last 24 hours) at 3/20/2024 0900  Last data filed at 3/20/2024 0700  Gross per 24 hour   Intake 2248.75 ml   Output 445 ml   Net 1803.75 ml       Physical Exam:   Physical Exam  Vitals reviewed.   Constitutional:       Appearance: Normal appearance. He is normal weight.   HENT:      Mouth/Throat:      Mouth: Mucous membranes are moist.   Eyes:      Extraocular Movements: Extraocular movements intact.      Pupils: Pupils are equal, round, and reactive to light.   Cardiovascular:      Rate and Rhythm: Normal rate and regular rhythm.      Pulses: Normal pulses.      Heart sounds: Normal heart sounds. No murmur heard.  Pulmonary:      Effort: No respiratory distress.      Breath sounds: Normal breath sounds. No stridor.      Comments: Decreased effort due to pain  Abdominal:      General: Bowel sounds are normal. There is no distension.      Palpations:  Abdomen is soft.      Tenderness: There is abdominal tenderness (Left flank). There is left CVA tenderness. There is no right CVA tenderness.   Musculoskeletal:         General: Normal range of motion.      Cervical back: Normal range of motion.      Right lower leg: No edema.      Left lower leg: No edema.   Skin:     Capillary Refill: Capillary refill takes less than 2 seconds.   Neurological:      General: No focal deficit present.      Mental Status: He is alert and oriented to person, place, and time.      Motor: Weakness present.   Psychiatric:         Mood and Affect: Mood normal.         Behavior: Behavior normal.         Thought Content: Thought content normal.         Additional Data:     Labs:  Results from last 7 days   Lab Units 03/20/24  0501   WBC Thousand/uL 14.49*   HEMOGLOBIN g/dL 12.4   HEMATOCRIT % 37.2   PLATELETS Thousands/uL 329   NEUTROS PCT % 80*   LYMPHS PCT % 13*   MONOS PCT % 7   EOS PCT % 0     Results from last 7 days   Lab Units 03/20/24  0501   POTASSIUM mmol/L 3.4*   CHLORIDE mmol/L 102   CO2 mmol/L 24   BUN mg/dL 18   CREATININE mg/dL 1.20   CALCIUM mg/dL 9.2   ALK PHOS U/L 59   ALT U/L 12   AST U/L 14         Results from last 7 days   Lab Units 03/20/24  0711 03/20/24  0639 03/20/24  0325 03/19/24  2103 03/19/24  1700   POC GLUCOSE mg/dl 102 112 128 282* 268*           * I Have Reviewed All Lab Data Listed Above.  * Additional Pertinent Lab Tests Reviewed: All Labs For Current Hospital Admission Reviewed      Recent Cultures (last 7 days):     Results from last 7 days   Lab Units 03/19/24  0055 03/19/24  0043 03/18/24  1709   BLOOD CULTURE  Received in Microbiology Lab. Culture in Progress. Received in Microbiology Lab. Culture in Progress.  --    URINE CULTURE   --   --  >100,000 cfu/ml Gram Negative Gary*       Last 24 Hours Medication List:   Current Facility-Administered Medications   Medication Dose Route Frequency Provider Last Rate    abiraterone  250 mg Oral Daily Jarad  Corby Thomas MD      acetaminophen  650 mg Oral Q6H PRN Jarad Thomas MD      albuterol  2 puff Inhalation Q4H PRN Jarad Thomas MD      calcium carbonate  1,000 mg Oral Daily PRN Jarad Thomas MD      cefepime  2,000 mg Intravenous Q12H Jarad Thomas MD Stopped (03/20/24 0130)    glimepiride  2 mg Oral Daily With Dinner Radha Sanchez MD      glimepiride  4 mg Oral Daily With Breakfast Radha Sanchez MD      heparin (porcine)  5,000 Units Subcutaneous Q8H [id number] Jarad Thomas MD      insulin detemir  35 Units Subcutaneous HS Radha Sanchez MD      insulin lispro  1-6 Units Subcutaneous 4x Daily (AC & HS) Jarad Thomas MD      lisinopril  5 mg Oral Daily Jarad Thomas MD      magnesium sulfate  2 g Intravenous Once Anabella Alyson Maldonado MD 2 g (03/20/24 0841)    multivitamin-minerals  1 tablet Oral Daily Jarad Thomas MD      ondansetron  4 mg Intravenous Q6H PRN Jarad Thomas MD      oxyCODONE  5 mg Oral Q6H PRN Jarad Thomas MD      oxyCODONE  2.5 mg Oral Q6H PRN Radha Sanchez MD      polyethylene glycol  17 g Oral Daily PRN Jarad Thomas MD      pravastatin  80 mg Oral Daily With Dinner Jarad Thomas MD      sodium chloride  75 mL/hr Intravenous Continuous Jarad Thomas MD 75 mL/hr (03/20/24 0700)             ** Please Note: Dictation voice to text software may have been used in the creation of this document. **    Radha Sanchez MD  03/20/24  9:00 AM

## 2024-03-20 NOTE — PHYSICAL THERAPY NOTE
PHYSICAL THERAPY     03/20/24 8072   Note Type   Note type Cancelled Session   Cancel Reasons Patient off floor/test   Additional Comments patient off nursing unit at this time for procedure. will re-attempt at a later time as appropriate.   Licensure   [place] License Number  Ely Edwards PT [id number]

## 2024-03-20 NOTE — PLAN OF CARE
Plan of care reviewed w/ patient.    Problem: PAIN - ADULT  Goal: Verbalizes/displays adequate comfort level or baseline comfort level  Description: Interventions:  - Encourage patient to monitor pain and request assistance  - Assess pain using appropriate pain scale  - Administer analgesics based on type and severity of pain and evaluate response  - Implement non-pharmacological measures as appropriate and evaluate response  - Consider cultural and social influences on pain and pain management  - Notify physician/advanced practitioner if interventions unsuccessful or patient reports new pain  Outcome: Progressing     Problem: INFECTION - ADULT  Goal: Absence or prevention of progression during hospitalization  Description: INTERVENTIONS:  - Assess and monitor for signs and symptoms of infection  - Monitor lab/diagnostic results  - Monitor all insertion sites, i.e. indwelling lines, tubes, and drains  - Monitor endotracheal if appropriate and nasal secretions for changes in amount and color  - [place] appropriate cooling/warming therapies per order  - Administer medications as ordered  - Instruct and encourage patient and family to use good hand hygiene technique  - Identify and instruct in appropriate isolation precautions for identified infection/condition  Outcome: Progressing  Goal: Absence of fever/infection during neutropenic period  Description: INTERVENTIONS:  - Monitor WBC    Outcome: Progressing     Problem: SAFETY ADULT  Goal: Patient will remain free of falls  Description: INTERVENTIONS:  - Educate patient/family on patient safety including physical limitations  - Instruct patient to call for assistance with activity   - Consult OT/PT to assist with strengthening/mobility   - Keep Call bell within reach  - Keep bed low and locked with side rails adjusted as appropriate  - Keep care items and personal belongings within reach  - Initiate and maintain comfort rounds  - Make Fall Risk Sign visible to  staff  - Offer Toileting every 2 Hours, in advance of need  - Apply yellow socks and bracelet for high fall risk patients  - Consider moving patient to room near nurses station  Outcome: Progressing  Goal: Maintain or return to baseline ADL function  Description: INTERVENTIONS:  -  Assess patient's ability to carry out ADLs; assess patient's baseline for ADL function and identify physical deficits which impact ability to perform ADLs (bathing, care of mouth/teeth, toileting, grooming, dressing, etc.)  - Assess/evaluate cause of self-care deficits   - Assess range of motion  - Assess patient's mobility; develop plan if impaired  - Assess patient's need for assistive devices and provide as appropriate  - Encourage maximum independence but intervene and supervise when necessary  - Involve family in performance of ADLs  - Assess for home care needs following discharge   - Consider OT consult to assist with ADL evaluation and planning for discharge  - Provide patient education as appropriate  Outcome: Progressing  Goal: Maintains/Returns to pre admission functional level  Description: INTERVENTIONS:  - Perform AM-PAC 6 Click Basic Mobility/ Daily Activity assessment daily.  - Set and communicate daily mobility goal to care team and patient/family/caregiver.   - Collaborate with rehabilitation services on mobility goals if consulted  - Reposition patient every 2 hours.  - Stand patient 3 times a day  - Ambulate patient 3 times a day  - Out of bed to chair 3 times a day   - Out of bed for meals 3 times a day  - Out of bed for toileting  - Record patient progress and toleration of activity level   Outcome: Progressing     Problem: DISCHARGE PLANNING  Goal: Discharge to home or other facility with appropriate resources  Description: INTERVENTIONS:  - Identify barriers to discharge w/patient and caregiver  - Arrange for needed discharge resources and transportation as appropriate  - Identify discharge learning needs (meds,  wound care, etc.)  - Arrange for interpretive services to assist at discharge as needed  - Refer to Case Management Department for coordinating discharge planning if the patient needs post-hospital services based on physician/advanced practitioner order or complex needs related to functional status, cognitive ability, or social support system  Outcome: Progressing     Problem: Knowledge Deficit  Goal: Patient/family/caregiver demonstrates understanding of disease process, treatment plan, medications, and discharge instructions  Description: Complete learning assessment and assess knowledge base.  Interventions:  - Provide teaching at level of understanding  - Provide teaching via preferred learning methods  Outcome: Progressing

## 2024-03-20 NOTE — PLAN OF CARE
Problem: PAIN - ADULT  Goal: Verbalizes/displays adequate comfort level or baseline comfort level  Description: Interventions:  - Encourage patient to monitor pain and request assistance  - Assess pain using appropriate pain scale  - Administer analgesics based on type and severity of pain and evaluate response  - Implement non-pharmacological measures as appropriate and evaluate response  - Consider cultural and social influences on pain and pain management  - Notify physician/advanced practitioner if interventions unsuccessful or patient reports new pain  Outcome: Progressing     Problem: INFECTION - ADULT  Goal: Absence or prevention of progression during hospitalization  Description: INTERVENTIONS:  - Assess and monitor for signs and symptoms of infection  - Monitor lab/diagnostic results  - Monitor all insertion sites, i.e. indwelling lines, tubes, and drains  - Monitor endotracheal if appropriate and nasal secretions for changes in amount and color  - [place] appropriate cooling/warming therapies per order  - Administer medications as ordered  - Instruct and encourage patient and family to use good hand hygiene technique  - Identify and instruct in appropriate isolation precautions for identified infection/condition  Outcome: Progressing

## 2024-03-20 NOTE — DISCHARGE INSTRUCTIONS
"     TUBE CARE INSTRUCTIONS    Care after your procedure:    Resume your normal diet. Small sips of flat soda will help with nausea.    1. The properly functioning catheter should be forward flushed once (1x) daily with 10ml of normal saline using clean technique. You will be given a prescription for flushes.   To flush the tube, clean both connections with alcohol swab.Twist off the drainage bag/ bulb  tubing and twist the saline syringe into the drainage tube and flush. Remove the syringe and twist the drainage bag / bulb tubing tubing back on.    2. The drainage bag/bulb may be emptied as necessary. Keep a record of the amount of fluid you drain from your tube. This should be done with clean technique as well.     3. A fresh dressing should be applied daily over the tube insertion site.     4. As the tube is secured to the skin with only a suture,try not to pull on your tube. Tub baths are not permitted. Showers are permitted if the patient's skin entry site is prevented from getting wet. Similarly, washcloth \"baths\" are acceptable.     Contact Interventional Radiology at [phone] ([place] PATIENTS: Contact Interventional Radiology at [phone]) ([place] PATIENTS: Contact Interventional Radiology at [phone]) if:    1. Leakage or large amounts of liquid around the catheter.    2. Fever of 101 degrees lasting several hours without other obvious cause (such as sore throat, flu, etc).    3. Persistent nausea or vomiting.    4. Diminished drainage, which may be associated with pressure or pain. Or when the     drainage from your tube is less than 10mls for 48 hours.    5. Catheter pulled back or falls out.      The following pharmacies carry the flush syringes.       [facility]                     [facility] [address]                     [address] [place] [address]                         [phone]  Juan Weber " PA  Phone [phone]            Phone [phone]                                                                                                       Isha Hagan   [facility]             [facility]                                [phone]  [address] [place] [address] NEO LARSON  Phone [phone]            Phone [phone]                      [facility]                                                                                                          [phone]  [facility]  [address]  Juan LARSON                                                                               [facility]  Phone [phone]

## 2024-03-20 NOTE — CONSULTS
[facility] Electronic Health Record Consult  IR has been consulted to evaluate the patient, determine the appropriate procedure, and whether or not a procedure can and should be performed regarding the care of Oliver Astudillo.    We were consulted by medicine concerning Minimal nephrostomy tube output and pain, and to possibly perform a tube check if medically appropriate for the patient. The patient is aware that a specialty consultation is taking place, and agrees to the IR Consult on their behalf.     Assessment/Plan:   73 year old male with bilateral nephrostomy tubes. Reported low right output and pain at left nephrostomy tube site which is difficult to flush. Plan for tube check.    I spent 10 minutes in medical consultative time evaluating the medical record and imaging of Oliver Astudillo.    Thank you for allowing Interventional Radiology to participate in the care of Oliver Astudillo. Please don't hesitate to call or TigerText us with any questions.     Pro Murphy MD

## 2024-03-21 ENCOUNTER — APPOINTMENT (INPATIENT)
Dept: RADIOLOGY | Facility: HOSPITAL | Age: 74
DRG: 699 | End: 2024-03-21
Payer: COMMERCIAL

## 2024-03-21 LAB
ALBUMIN SERPL BCP-MCNC: 3 G/DL (ref 3.5–5)
ALBUMIN SERPL BCP-MCNC: 3.1 G/DL (ref 3.5–5)
ALP SERPL-CCNC: 66 U/L (ref 34–104)
ALP SERPL-CCNC: 66 U/L (ref 34–104)
ALT SERPL W P-5'-P-CCNC: 11 U/L (ref 7–52)
ALT SERPL W P-5'-P-CCNC: 11 U/L (ref 7–52)
ANION GAP SERPL CALCULATED.3IONS-SCNC: 7 MMOL/L (ref 4–13)
ANION GAP SERPL CALCULATED.3IONS-SCNC: 8 MMOL/L (ref 4–13)
AST SERPL W P-5'-P-CCNC: 12 U/L (ref 13–39)
AST SERPL W P-5'-P-CCNC: 13 U/L (ref 13–39)
BACTERIA UR CULT: ABNORMAL
BASOPHILS # BLD AUTO: 0.04 THOUSANDS/ÂΜL (ref 0–0.1)
BASOPHILS # BLD AUTO: 0.04 THOUSANDS/ÂΜL (ref 0–0.1)
BASOPHILS NFR BLD AUTO: 0 % (ref 0–1)
BASOPHILS NFR BLD AUTO: 0 % (ref 0–1)
BILIRUB SERPL-MCNC: 0.7 MG/DL (ref 0.2–1)
BILIRUB SERPL-MCNC: 0.7 MG/DL (ref 0.2–1)
BUN SERPL-MCNC: 15 MG/DL (ref 5–25)
BUN SERPL-MCNC: 15 MG/DL (ref 5–25)
CALCIUM ALBUM COR SERPL-MCNC: 9.7 MG/DL (ref 8.3–10.1)
CALCIUM ALBUM COR SERPL-MCNC: 9.7 MG/DL (ref 8.3–10.1)
CALCIUM SERPL-MCNC: 8.9 MG/DL (ref 8.4–10.2)
CALCIUM SERPL-MCNC: 9 MG/DL (ref 8.4–10.2)
CHLORIDE SERPL-SCNC: 105 MMOL/L (ref 96–108)
CHLORIDE SERPL-SCNC: 106 MMOL/L (ref 96–108)
CO2 SERPL-SCNC: 22 MMOL/L (ref 21–32)
CO2 SERPL-SCNC: 23 MMOL/L (ref 21–32)
CREAT SERPL-MCNC: 1.21 MG/DL (ref 0.6–1.3)
CREAT SERPL-MCNC: 1.22 MG/DL (ref 0.6–1.3)
EOSINOPHIL # BLD AUTO: 0.06 THOUSAND/ÂΜL (ref 0–0.61)
EOSINOPHIL # BLD AUTO: 0.07 THOUSAND/ÂΜL (ref 0–0.61)
EOSINOPHIL NFR BLD AUTO: 1 % (ref 0–6)
EOSINOPHIL NFR BLD AUTO: 1 % (ref 0–6)
ERYTHROCYTE [DISTWIDTH] IN BLOOD BY AUTOMATED COUNT: 13.4 % (ref 11.6–15.1)
ERYTHROCYTE [DISTWIDTH] IN BLOOD BY AUTOMATED COUNT: 13.4 % (ref 11.6–15.1)
GFR SERPL CREATININE-BSD FRML MDRD: 59 ML/MIN/1.73SQ M
GFR SERPL CREATININE-BSD FRML MDRD: 59 ML/MIN/1.73SQ M
GLUCOSE SERPL-MCNC: 106 MG/DL (ref 65–140)
GLUCOSE SERPL-MCNC: 180 MG/DL (ref 65–140)
GLUCOSE SERPL-MCNC: 189 MG/DL (ref 65–140)
GLUCOSE SERPL-MCNC: 214 MG/DL (ref 65–140)
GLUCOSE SERPL-MCNC: 97 MG/DL (ref 65–140)
GLUCOSE SERPL-MCNC: 97 MG/DL (ref 65–140)
HCT VFR BLD AUTO: 34.6 % (ref 36.5–49.3)
HCT VFR BLD AUTO: 35.2 % (ref 36.5–49.3)
HGB BLD-MCNC: 11.8 G/DL (ref 12–17)
HGB BLD-MCNC: 12.1 G/DL (ref 12–17)
IMM GRANULOCYTES # BLD AUTO: 0.05 THOUSAND/UL (ref 0–0.2)
IMM GRANULOCYTES # BLD AUTO: 0.07 THOUSAND/UL (ref 0–0.2)
IMM GRANULOCYTES NFR BLD AUTO: 0 % (ref 0–2)
IMM GRANULOCYTES NFR BLD AUTO: 1 % (ref 0–2)
LYMPHOCYTES # BLD AUTO: 2.52 THOUSANDS/ÂΜL (ref 0.6–4.47)
LYMPHOCYTES # BLD AUTO: 2.68 THOUSANDS/ÂΜL (ref 0.6–4.47)
LYMPHOCYTES NFR BLD AUTO: 24 % (ref 14–44)
LYMPHOCYTES NFR BLD AUTO: 24 % (ref 14–44)
MAGNESIUM SERPL-MCNC: 2 MG/DL (ref 1.9–2.7)
MCH RBC QN AUTO: 30.4 PG (ref 26.8–34.3)
MCH RBC QN AUTO: 30.6 PG (ref 26.8–34.3)
MCHC RBC AUTO-ENTMCNC: 34.1 G/DL (ref 31.4–37.4)
MCHC RBC AUTO-ENTMCNC: 34.4 G/DL (ref 31.4–37.4)
MCV RBC AUTO: 89 FL (ref 82–98)
MCV RBC AUTO: 89 FL (ref 82–98)
MONOCYTES # BLD AUTO: 0.72 THOUSAND/ÂΜL (ref 0.17–1.22)
MONOCYTES # BLD AUTO: 0.73 THOUSAND/ÂΜL (ref 0.17–1.22)
MONOCYTES NFR BLD AUTO: 7 % (ref 4–12)
MONOCYTES NFR BLD AUTO: 7 % (ref 4–12)
NEUTROPHILS # BLD AUTO: 7.3 THOUSANDS/ÂΜL (ref 1.85–7.62)
NEUTROPHILS # BLD AUTO: 7.63 THOUSANDS/ÂΜL (ref 1.85–7.62)
NEUTS SEG NFR BLD AUTO: 67 % (ref 43–75)
NEUTS SEG NFR BLD AUTO: 68 % (ref 43–75)
NRBC BLD AUTO-RTO: 0 /100 WBCS
NRBC BLD AUTO-RTO: 0 /100 WBCS
PLATELET # BLD AUTO: 298 THOUSANDS/UL (ref 149–390)
PLATELET # BLD AUTO: 304 THOUSANDS/UL (ref 149–390)
PMV BLD AUTO: 9.7 FL (ref 8.9–12.7)
PMV BLD AUTO: 9.8 FL (ref 8.9–12.7)
POTASSIUM SERPL-SCNC: 3.6 MMOL/L (ref 3.5–5.3)
POTASSIUM SERPL-SCNC: 3.6 MMOL/L (ref 3.5–5.3)
PROT SERPL-MCNC: 6.9 G/DL (ref 6.4–8.4)
PROT SERPL-MCNC: 6.9 G/DL (ref 6.4–8.4)
RBC # BLD AUTO: 3.88 MILLION/UL (ref 3.88–5.62)
RBC # BLD AUTO: 3.96 MILLION/UL (ref 3.88–5.62)
SODIUM SERPL-SCNC: 135 MMOL/L (ref 135–147)
SODIUM SERPL-SCNC: 136 MMOL/L (ref 135–147)
WBC # BLD AUTO: 10.72 THOUSAND/UL (ref 4.31–10.16)
WBC # BLD AUTO: 11.19 THOUSAND/UL (ref 4.31–10.16)

## 2024-03-21 PROCEDURE — 80053 COMPREHEN METABOLIC PANEL: CPT

## 2024-03-21 PROCEDURE — 97163 PT EVAL HIGH COMPLEX 45 MIN: CPT

## 2024-03-21 PROCEDURE — 97110 THERAPEUTIC EXERCISES: CPT

## 2024-03-21 PROCEDURE — 85025 COMPLETE CBC W/AUTO DIFF WBC: CPT

## 2024-03-21 PROCEDURE — 99232 SBSQ HOSP IP/OBS MODERATE 35: CPT | Performed by: INTERNAL MEDICINE

## 2024-03-21 PROCEDURE — 82948 REAGENT STRIP/BLOOD GLUCOSE: CPT

## 2024-03-21 PROCEDURE — 83735 ASSAY OF MAGNESIUM: CPT

## 2024-03-21 PROCEDURE — 74177 CT ABD & PELVIS W/CONTRAST: CPT

## 2024-03-21 RX ORDER — SENNOSIDES 8.6 MG
1 TABLET ORAL
Status: DISCONTINUED | OUTPATIENT
Start: 2024-03-21 | End: 2024-03-23

## 2024-03-21 RX ORDER — POTASSIUM CHLORIDE 20 MEQ/1
20 TABLET, EXTENDED RELEASE ORAL ONCE
Status: COMPLETED | OUTPATIENT
Start: 2024-03-21 | End: 2024-03-21

## 2024-03-21 RX ORDER — POLYETHYLENE GLYCOL 3350 17 G/17G
17 POWDER, FOR SOLUTION ORAL DAILY
Status: DISCONTINUED | OUTPATIENT
Start: 2024-03-21 | End: 2024-03-24

## 2024-03-21 RX ORDER — ACETAMINOPHEN 325 MG/1
975 TABLET ORAL EVERY 6 HOURS SCHEDULED
Status: DISCONTINUED | OUTPATIENT
Start: 2024-03-21 | End: 2024-03-23

## 2024-03-21 RX ORDER — HYDROMORPHONE HCL/PF 1 MG/ML
0.5 SYRINGE (ML) INJECTION ONCE
Status: COMPLETED | OUTPATIENT
Start: 2024-03-21 | End: 2024-03-21

## 2024-03-21 RX ORDER — DOCUSATE SODIUM 100 MG/1
100 CAPSULE, LIQUID FILLED ORAL 2 TIMES DAILY
Status: DISCONTINUED | OUTPATIENT
Start: 2024-03-21 | End: 2024-03-23

## 2024-03-21 RX ADMIN — IOHEXOL 100 ML: 350 INJECTION, SOLUTION INTRAVENOUS at 14:41

## 2024-03-21 RX ADMIN — ACETAMINOPHEN 975 MG: 325 TABLET ORAL at 23:56

## 2024-03-21 RX ADMIN — CEFEPIME HYDROCHLORIDE 2000 MG: 2 INJECTION, SOLUTION INTRAVENOUS at 11:41

## 2024-03-21 RX ADMIN — HEPARIN SODIUM 5000 UNITS: 5000 INJECTION, SOLUTION INTRAVENOUS; SUBCUTANEOUS at 05:53

## 2024-03-21 RX ADMIN — Medication 1 TABLET: at 08:34

## 2024-03-21 RX ADMIN — POLYETHYLENE GLYCOL 3350 17 G: 17 POWDER, FOR SOLUTION ORAL at 10:05

## 2024-03-21 RX ADMIN — INSULIN LISPRO 1 UNITS: 100 INJECTION, SOLUTION INTRAVENOUS; SUBCUTANEOUS at 12:09

## 2024-03-21 RX ADMIN — ACETAMINOPHEN 975 MG: 325 TABLET ORAL at 14:30

## 2024-03-21 RX ADMIN — DOCUSATE SODIUM 100 MG: 100 CAPSULE, LIQUID FILLED ORAL at 12:07

## 2024-03-21 RX ADMIN — CEFEPIME HYDROCHLORIDE 2000 MG: 2 INJECTION, SOLUTION INTRAVENOUS at 23:57

## 2024-03-21 RX ADMIN — OXYCODONE HYDROCHLORIDE 5 MG: 5 TABLET ORAL at 08:35

## 2024-03-21 RX ADMIN — PRAVASTATIN SODIUM 80 MG: 80 TABLET ORAL at 16:42

## 2024-03-21 RX ADMIN — POTASSIUM CHLORIDE 20 MEQ: 1500 TABLET, EXTENDED RELEASE ORAL at 08:34

## 2024-03-21 RX ADMIN — SENNOSIDES 8.6 MG: 8.6 TABLET, FILM COATED ORAL at 21:48

## 2024-03-21 RX ADMIN — INSULIN LISPRO 2 UNITS: 100 INJECTION, SOLUTION INTRAVENOUS; SUBCUTANEOUS at 21:50

## 2024-03-21 RX ADMIN — ABIRATERONE ACETATE 250 MG: 250 TABLET ORAL at 08:36

## 2024-03-21 RX ADMIN — Medication 2.5 MG: at 12:07

## 2024-03-21 RX ADMIN — OXYCODONE HYDROCHLORIDE 5 MG: 5 TABLET ORAL at 16:42

## 2024-03-21 RX ADMIN — DOCUSATE SODIUM 100 MG: 100 CAPSULE, LIQUID FILLED ORAL at 17:08

## 2024-03-21 RX ADMIN — LISINOPRIL 5 MG: 5 TABLET ORAL at 08:35

## 2024-03-21 RX ADMIN — FLUCONAZOLE 200 MG: 100 TABLET ORAL at 08:34

## 2024-03-21 RX ADMIN — GLIMEPIRIDE 4 MG: 2 TABLET ORAL at 08:34

## 2024-03-21 RX ADMIN — SODIUM CHLORIDE 75 ML/HR: 0.9 INJECTION, SOLUTION INTRAVENOUS at 06:49

## 2024-03-21 RX ADMIN — HYDROMORPHONE HYDROCHLORIDE 0.5 MG: 1 INJECTION, SOLUTION INTRAMUSCULAR; INTRAVENOUS; SUBCUTANEOUS at 21:44

## 2024-03-21 RX ADMIN — HEPARIN SODIUM 5000 UNITS: 5000 INJECTION, SOLUTION INTRAVENOUS; SUBCUTANEOUS at 21:48

## 2024-03-21 RX ADMIN — INSULIN DETEMIR 35 UNITS: 100 INJECTION, SOLUTION SUBCUTANEOUS at 21:46

## 2024-03-21 RX ADMIN — HEPARIN SODIUM 5000 UNITS: 5000 INJECTION, SOLUTION INTRAVENOUS; SUBCUTANEOUS at 14:30

## 2024-03-21 RX ADMIN — GLIMEPIRIDE 2 MG: 2 TABLET ORAL at 16:42

## 2024-03-21 RX ADMIN — CEFEPIME HYDROCHLORIDE 2000 MG: 2 INJECTION, SOLUTION INTRAVENOUS at 00:33

## 2024-03-21 RX ADMIN — INSULIN LISPRO 1 UNITS: 100 INJECTION, SOLUTION INTRAVENOUS; SUBCUTANEOUS at 16:42

## 2024-03-21 NOTE — ASSESSMENT & PLAN NOTE
Resolved    Patient had a BM  Continue MiraLAX per oral daily PRN  Continue Colace twice daily  CT scan abdomen pelvis with contrast : see above

## 2024-03-21 NOTE — PROGRESS NOTES
Daily Progress Note - [facility]  Family Medicine Residency  Oliver Astudillo 73 y.o. male MRN: [id number]  Unit/Bed#: [address] [place] [address] Encounter: [id number]  Admitting Physician: Uriel Alejandra MD   PCP: Eugenia Rodriguez MD  Date of Admission:  3/18/2024  3:43 PM    Assessment and Plan    * Urinary tract infection associated with nephrostomy catheter   Assessment & Plan  Patient had bilateral flank  left>> right side.  He also complains of  pain with flushing nephrostomy tube and decreased output of urine into the left nephrostomy bag.  He has mild dysuria, chills and nausea but denied any fever or vomiting.    CT abdomen pelvis with contrast: Bilateral percutaneous nephrostomy tubes in expected position. Mild right hydronephrosis. Bilateral pyelitis and ureteritis. No clear evidence of nephritis. Findings suggestive of cystitis.    UA was positive for nitrates and leukocytes with protein, glucose, bacteria, BC and WBC  Prior urine cultures were positive for E. Coli and enterobacter.    WBC: 12.75 --> 10  Lactic acid 2.6    Likely due to urinary tract obstruction and nephrostomy tube dysfunction    Plan  Follow Blood cutures, Urine Culture : G - Rods  Continue cefepime 2 g q12 hourly  Continue fluconazole  mg until cultures result  IR consulted, appreciate recs:  Nephrostomy tubes repositioned on 03/20/24  Recommending flushing only   Pain regimen - tylenol q6 PRN for mild, oxycodone 2.5 moderate pain and severe and breakthrough 5 mg oxycodone every 6  Consult Urology, Dr. Talbert's recs  CT scan abdomen pelvis with contrast ordered,for ongoing pain in the lower left pelvis    Type 2 diabetes mellitus, with long-term current use of insulin (HCC)  Assessment & Plan  Lab Results   Component Value Date    HGBA1C 11.1 (A) 01/08/2024       Recent Labs     03/20/24  1552 03/20/24  2108 03/21/24  0701 03/21/24  1101   POCGLU 177* 127 106 189*       Blood Sugar Average: Last 72 hrs:  (P)  210.6    Chronic, uncontrolled. Last A1c was 11.1. Home medications include Levemir 30 units HS, Glimepiride 2 mg lunch, Glimepiride 4 mg breakfast, Steglatro 5 mg at  breakfast.  His Ivokana was changed to Steglatro due to insurance issues. Per patient, he has been compliant with all his medications    Plan   Patient started on insulin sliding scale and Accu-Cheks ACHS  Continue with Levemir 35 units at bedtime  Start Amaryl 4 mg daily with breakfast, Amaryl 2 mg daily with dinner  Carbohydrate controlled diet  Hypoglycemia protocol  Monitor blood sugars as patient is on steroids       Dizziness  Assessment & Plan  Patient has had episodes of dizziness and lightheadedness whenever he walks or does some activity.  As per wife, patient had an episode of dizziness when walking up the stairs and nearly fell over but was caught by his wife  Patient has no prior echo on file    Patient is not complaining of dizziness/lightheadedness anymore, and is able to ambulate without difficulty to the bathroom     Plan  Orthostatic vitals  ECHO: Left ventricular cavity size is normal. Wall thickness is mildly increased. The left ventricular ejection fraction is 60%. GLS is -17.2%. Systolic function is normal.     Constipation  Assessment & Plan  Patient has not have a bowel movement since 3 days, but he is passing gas  Start MiraLAX per oral daily  Start Colace twice daily  CT scan abdomen pelvis with contrast ordered     Hyperlipidemia  Assessment & Plan  Chronic, stable.  Patient is on Crestor 10 mg daily at home    Plan  Crestor not in hospital formulary; replaced with pravastatin    Hyponatremia  Assessment & Plan  Na 130 on admission. Likely hyperglycemic hyponatremia. Urine sodium and urine Osmolality: WNL      Plan  Continue IVF NS at 75 ml/hr  Continue to monitor BMP daily  Strict input and output monitoring    Nephrostomy status (HCC)  Assessment & Plan  Pt has h/o prostate cancer leading to urinary obstruction treated  with bilateral percutaneous nephrostomy tubes since 6/22/23  Patient presents with complaint of decreased urine output in left nephrostomy bag and flank pain worse on left side.  Nephrostomy tube was most recently exchanged in the last hospitalization 01/31/2024.  Pt has had recurrent nephrostomy tube dysfunction and urinary tract infections since last few months.   Pt states that he irrigates tubes at least twice daily  Ct scan showed bilateral percutaneous nephrostomy tubes in expected position with mild right hydronephrosis, bilateral pyelitis and ureteritis.    Plan  IR consulted, initiate recommendations: See above    Prostate cancer (HCC)  Assessment & Plan  History of advanced metastatic prostate cancer diagnosed in May 2023.  He last received lupron on 1/11. Sees  Dr. Coyne at medical oncology     Plan   Continue Lupron q6 months, Zytiga and dexamethasone     Hypertension  Assessment & Plan  Chronic, stable.  On admission, BP was elevated at 187/73 mm Hg   Home regimen includes lisinopril 10mg daily     Plan  Continue with lisinopril 10 mg with holding parameters  Will add labetalol or hydralazine if blood pressure remains elevated  Monitor vitals and blood pressure daily        VTE Pharmacologic Prophylaxis: VTE Score: 9 High Risk (Score >/= 5) - Pharmacological DVT Prophylaxis Ordered: heparin. Sequential Compression Devices Ordered.    Patient Centered Rounds: I have performed bedside rounds with nursing staff today.    Discussions with Specialists or Other Care Team Provider: Done    Education and Discussions with Family / Patient: Done  Patient Information Sharing: With the consent of Oliver Baudilio , their loved ones (son) were notified today by inpatient team of the patient’s condition and current plan.  All questions answered.   Time Spent for Care: .  More than 50% of total time spent on counseling and coordination of care as described above.    Current Length of Stay: 3 day(s)    Current Patient  Status: Inpatient   Certification Statement: The patient will continue to require additional inpatient hospital stay due to urinary tract infection    Discharge Plan: 24-48 hrs    Code Status: Level 1 - Full Code    Subjective:   Patient was seen and examined at bedside. Today he reports pain in his lower pelvic region that is 7/10.  Reports having slight debridement of pain in the left nephrostomy tube after repositioning yesterday he is still on the bedside, and tries to go to the bathroom to be symptoms as well. Overnight no acute events.  Patient seems to be very frustrated with the nephrostomy tubes. currently patient denies any fever, chills, shortness of breath, cough, chest pain, palpitations, light headedness, headaches, vision change, N/V/D, urinary symptoms.  He did not have any bowel movement yesterday.    Objective     Objective:   Vitals:   Temp (24hrs), Av.5 °F (36.9 °C), Min:97.8 °F (36.6 °C), Max:98.8 °F (37.1 °C)    Temp:  [97.8 °F (36.6 °C)-98.8 °F (37.1 °C)] 98.8 °F (37.1 °C)  HR:  [70-82] 70  Resp:  [17-18] 18  BP: (123-163)/(61-92) 163/92  SpO2:  [94 %-98 %] 95 %  Body mass index is 26 kg/m².     Input and Output Summary (last 24 hours):       Intake/Output Summary (Last 24 hours) at 3/21/2024 1251  Last data filed at 3/21/2024 0649  Gross per 24 hour   Intake 1786.25 ml   Output 2025 ml   Net -238.75 ml       Physical Exam:   Physical Exam  Vitals reviewed.   Constitutional:       Appearance: Normal appearance. He is normal weight.   HENT:      Mouth/Throat:      Mouth: Mucous membranes are moist.   Eyes:      Extraocular Movements: Extraocular movements intact.      Pupils: Pupils are equal, round, and reactive to light.   Cardiovascular:      Rate and Rhythm: Normal rate and regular rhythm.      Pulses: Normal pulses.      Heart sounds: Normal heart sounds. No murmur heard.  Pulmonary:      Effort: No respiratory distress.      Breath sounds: Normal breath sounds. No stridor.       Comments: Decreased effort due to pain  Abdominal:      General: Bowel sounds are normal. There is no distension.      Palpations: Abdomen is soft.      Tenderness: There is abdominal tenderness (Left flank, lower pelvic region). There is left CVA tenderness. There is no right CVA tenderness.   Genitourinary:     Comments: Nephrostomy tubes in place, drainage noticed.  78 ml right side, 250 ml left side  Musculoskeletal:         General: Normal range of motion.      Cervical back: Normal range of motion.      Right lower leg: No edema.      Left lower leg: No edema.   Skin:     Capillary Refill: Capillary refill takes less than 2 seconds.   Neurological:      General: No focal deficit present.      Mental Status: He is alert and oriented to person, place, and time.      Motor: Weakness present.   Psychiatric:         Mood and Affect: Mood normal.         Behavior: Behavior normal.         Thought Content: Thought content normal.           Additional Data:     Labs:  Results from last 7 days   Lab Units 03/21/24  0540   WBC Thousand/uL 10.72*   HEMOGLOBIN g/dL 12.1   HEMATOCRIT % 35.2*   PLATELETS Thousands/uL 304   NEUTROS PCT % 67   LYMPHS PCT % 24   MONOS PCT % 7   EOS PCT % 1     Results from last 7 days   Lab Units 03/21/24  0540   POTASSIUM mmol/L 3.6   CHLORIDE mmol/L 105   CO2 mmol/L 23   BUN mg/dL 15   CREATININE mg/dL 1.22   CALCIUM mg/dL 9.0   ALK PHOS U/L 66   ALT U/L 11   AST U/L 13         Results from last 7 days   Lab Units 03/21/24  1101 03/21/24  0701 03/20/24  2108 03/20/24  1552 03/20/24  1044   POC GLUCOSE mg/dl 189* 106 127 177* 168*           * I Have Reviewed All Lab Data Listed Above.  * Additional Pertinent Lab Tests Reviewed: All Labs For Current Hospital Admission Reviewed        Recent Cultures (last 7 days):     Results from last 7 days   Lab Units 03/19/24  0055 03/19/24  0043 03/18/24  1709   BLOOD CULTURE  No Growth at 48 hrs. No Growth at 48 hrs.  --    URINE CULTURE   --   --   >100,000 cfu/ml Escherichia coli*       Last 24 Hours Medication List:   Current Facility-Administered Medications   Medication Dose Route Frequency Provider Last Rate    abiraterone  250 mg Oral Daily Jarad Thomas MD      acetaminophen  650 mg Oral Q6H PRN Jarad Thomas MD      albuterol  2 puff Inhalation Q4H PRN Jarad Thomas MD      calcium carbonate  1,000 mg Oral Daily PRN Jarad Thomas MD      cefepime  2,000 mg Intravenous Q12H Jarad Thomas MD 2,000 mg (03/21/24 1141)    docusate sodium  100 mg Oral BID Radha Sanchez MD      fluconazole  200 mg Oral Daily Radha Sanchez MD      glimepiride  2 mg Oral Daily With Dinner Radha Sanchez MD      glimepiride  4 mg Oral Daily With Breakfast Radha Sanchez MD      heparin (porcine)  5,000 Units Subcutaneous Q8H RAJEEV Jarad Thomas MD      insulin detemir  35 Units Subcutaneous HS Radha Sanchez MD      insulin lispro  1-6 Units Subcutaneous 4x Daily (AC & HS) Jarad Thomas MD      lisinopril  5 mg Oral Daily Jarad Thomas MD      multivitamin-minerals  1 tablet Oral Daily Jarad Thomas MD      ondansetron  4 mg Intravenous Q6H PRN Jarad Thomas MD      oxyCODONE  5 mg Oral Q6H PRN Radha Sanchez MD      oxyCODONE  2.5 mg Oral Q6H PRN Radha Sanchez MD      polyethylene glycol  17 g Oral Daily Radha Sanchez MD      pravastatin  80 mg Oral Daily With Dinner Jarad Thomas MD      sodium chloride  75 mL/hr Intravenous Continuous Jarad Thomas MD 75 mL/hr (03/21/24 0649)             ** Please Note: Dictation voice to text software may have been used in the creation of this document. **    Radha Sanchez MD  03/21/24  12:51 PM

## 2024-03-21 NOTE — PHYSICAL THERAPY NOTE
PHYSICAL THERAPY EVALUATION/TREATMENT     03/21/24 1340   PT Last Visit   PT Visit Date 03/21/24   Note Type   Note type Evaluation   Pain Assessment   Pain Assessment Tool Whatley-Baker FACES   Whatley-Baker FACES Pain Rating 6  (Bilateral flank and back pain)   Restrictions/Precautions   Other Precautions Fall Risk;Pain  (Nephrostomy bags)   Home Living   Type of Home House   Home Equipment   (None)   Additional Comments Patient independent without assistive device prior to admission   Prior Function   Level of [place] Independent with ADLs;Independent with functional mobility   Lives With Spouse   Receives Help From Family   Comments Patient reports wife assisting at times as needed due to medical complications   General   Additional Pertinent History Chart reviewed, patient admitted with UTI associated with nephrostomy catheters.  Patient typically ambulating independently in his home prior to admission now with pain as a limiting factor   Family/Caregiver Present No   Cognition   Overall Cognitive Status WFL   Arousal/Participation Cooperative   Orientation Level Oriented X4   Following Commands Follows multistep commands with increased time or repetition   Subjective   Subjective Patient reports continued flank and back pain   RLE Assessment   RLE Assessment   (Range of motion within functional limits, strength 3+/4 -)   LLE Assessment   LLE Assessment   (Range of motion within functional limits, strength 3+/4 -)   Coordination   Movements are Fluid and Coordinated 0   Coordination and Movement Description Decreased coordination and balance with gait and higher-level balance activity   Bed Mobility   Supine to Sit 7  Independent   Sit to Supine 7  Independent   Transfers   Sit to Stand 5  Supervision   Stand to Sit 7  Independent   Ambulation/Elevation   Gait Assistance 4  Minimal assist   Additional items Assist x 1;Verbal cues;Tactile cues   Assistive Device   (None)   Distance 40 feet with change in  direction.  Slow gait patterning and guarded patterning as well   Balance   Static Sitting Fair +   Dynamic Sitting Fair   Static Standing Fair   Dynamic Standing Fair -   Ambulatory Fair -   Activity Tolerance   Activity Tolerance Patient limited by fatigue;Patient limited by pain   Nurse Made Aware Yes   Assessment   Prognosis Good   Problem List Decreased strength;Decreased endurance;Decreased range of motion;Impaired balance;Decreased mobility;Decreased coordination;Pain   Assessment Patient seen for Physical Therapy evaluation. Patient admitted with Urinary tract infection associated with nephrostomy catheter .  Comorbidities affecting patient's physical performance include: .  Personal factors affecting patient at time of initial evaluation include: inability to ambulate household distances, inability to navigate community distances, inability to navigate level surfaces without external assistance, inability to perform dynamic tasks in community, limited home support, inability to perform physical activity, inability to perform ADLS, and inability to perform IADLS . Prior to admission, patient was independent with functional mobility without assistive device, independent with ADLS, requiring assist for IADLS, ambulating household distance, ambulating community distances, and home with family assist.  Please find objective findings from Physical Therapy assessment regarding body systems outlined above with impairments and limitations including weakness, decreased ROM, impaired balance, decreased endurance, impaired coordination, gait deviations, pain, decreased activity tolerance, decreased functional mobility tolerance, and fall risk.  The Barthel Index was used as a functional outcome tool presenting with a score of Barthel Index Score: 65 today indicating marked limitations of functional mobility and ADLS.  Patient's clinical presentation is currently unstable/unpredictable as seen in patient's presentation  of vital sign response, changing level of pain, increased fall risk, new onset of impairment of functional mobility, decreased endurance, and new onset of weakness. Pt would benefit from continued Physical Therapy treatment to address deficits as defined above and maximize level of functional mobility. As demonstrated by objective findings, the assigned level of complexity for this evaluation is high.The patient's [facility] Basic Mobility Inpatient Short Form Raw Score is 22. A Raw score of greater than 16 suggests the patient may benefit from discharge to home. Please also refer to the recommendation of the Physical Therapist for safe discharge planning.   Goals   Patient Goals To feel better and go home   STG Expiration Date 03/28/24   Short Term Goal #1 Transfers and gait independently with roller walker   Short Term Goal #2 Gait endurance to functional household distances   LTG Expiration Date 04/04/24   Long Term Goal #1 Strength bilateral lower extremities 4 -/5   Long Term Goal #2 Transfers and gait independently without assistive device as prior to admission for functional community distances   Plan   Treatment/Interventions Functional transfer training;LE strengthening/ROM;Therapeutic exercise;Endurance training;Patient/family training;Equipment eval/education;Gait training;Compensatory technique education   PT Frequency 3-5x/wk   Discharge Recommendation   Rehab Resource Intensity Level, PT III (Minimum Resource Intensity)   AM-PAC Basic Mobility Inpatient   Turning in Flat Bed Without Bedrails 4   Lying on Back to Sitting on Edge of Flat Bed Without Bedrails 4   Moving Bed to Chair 4   Standing Up From Chair Using Arms 4   Walk in Room 3   Climb 3-5 Stairs With Railing 3   Basic Mobility Inpatient Raw Score 22   Basic Mobility Standardized Score 47.4   [facility] Highest Level Of Mobility   JH-HLM Goal 7: Walk 25 feet or more   -HLM Achieved 7: Walk 25 feet or more   Barthel Index   Feeding 10   Bathing  0   Grooming Score 0   Dressing Score 5   Bladder Score 0   Bowels Score 10   Toilet Use Score 10   Transfers (Bed/Chair) Score 15   Mobility (Level Surface) Score 10   Stairs Score 5   Barthel Index Score 65   Additional Treatment Session   Start Time 1325   End Time 1340   Treatment Assessment s: patient wanting walker for home O: BLE exercise completed as listed below.  Gait training with roller walker with supervision to independent for 40 feet with improved gait patterning A: Patient will benefit from increasing functional mobility with clinical staff and continued physical therapy with progression as tolerated to regain independent function   Exercises   Hip Flexion Sitting;10 reps;Bilateral  (Alternating)   Hip Abduction Sitting;10 reps;Bilateral  (Alternating)   Knee AROM Long Arc Quad Sitting;10 reps;Bilateral  (Alternating)   Ankle Pumps Sitting;10 reps;Bilateral   Balance training  Sidestepping and backward walking completed for balance and coordination   Licensure   [place] License Number  Ely Edwards, PT 4 0 [id number] [id number]

## 2024-03-21 NOTE — PLAN OF CARE
Problem: PAIN - ADULT  Goal: Verbalizes/displays adequate comfort level or baseline comfort level  Description: Interventions:  - Encourage patient to monitor pain and request assistance  - Assess pain using appropriate pain scale  - Administer analgesics based on type and severity of pain and evaluate response  - Implement non-pharmacological measures as appropriate and evaluate response  - Consider cultural and social influences on pain and pain management  - Notify physician/advanced practitioner if interventions unsuccessful or patient reports new pain  Outcome: Progressing     Problem: INFECTION - ADULT  Goal: Absence or prevention of progression during hospitalization  Description: INTERVENTIONS:  - Assess and monitor for signs and symptoms of infection  - Monitor lab/diagnostic results  - Monitor all insertion sites, i.e. indwelling lines, tubes, and drains  - Monitor endotracheal if appropriate and nasal secretions for changes in amount and color  - [place] appropriate cooling/warming therapies per order  - Administer medications as ordered  - Instruct and encourage patient and family to use good hand hygiene technique  - Identify and instruct in appropriate isolation precautions for identified infection/condition  Outcome: Progressing  Goal: Absence of fever/infection during neutropenic period  Description: INTERVENTIONS:  - Monitor WBC    Outcome: Progressing     Problem: SAFETY ADULT  Goal: Patient will remain free of falls  Description: INTERVENTIONS:  - Educate patient/family on patient safety including physical limitations  - Instruct patient to call for assistance with activity   - Consult OT/PT to assist with strengthening/mobility   - Keep Call bell within reach  - Keep bed low and locked with side rails adjusted as appropriate  - Keep care items and personal belongings within reach  - Initiate and maintain comfort rounds  - Make Fall Risk Sign visible to staff  - Offer Toileting every  Hours,  in advance of need  - Initiate/Maintain alarm  - Obtain necessary fall risk management equipment:   - Apply yellow socks and bracelet for high fall risk patients  - Consider moving patient to room near nurses station  Outcome: Progressing  Goal: Maintain or return to baseline ADL function  Description: INTERVENTIONS:  -  Assess patient's ability to carry out ADLs; assess patient's baseline for ADL function and identify physical deficits which impact ability to perform ADLs (bathing, care of mouth/teeth, toileting, grooming, dressing, etc.)  - Assess/evaluate cause of self-care deficits   - Assess range of motion  - Assess patient's mobility; develop plan if impaired  - Assess patient's need for assistive devices and provide as appropriate  - Encourage maximum independence but intervene and supervise when necessary  - Involve family in performance of ADLs  - Assess for home care needs following discharge   - Consider OT consult to assist with ADL evaluation and planning for discharge  - Provide patient education as appropriate  Outcome: Progressing  Goal: Maintains/Returns to pre admission functional level  Description: INTERVENTIONS:  - Perform AM-PAC 6 Click Basic Mobility/ Daily Activity assessment daily.  - Set and communicate daily mobility goal to care team and patient/family/caregiver.   - Collaborate with rehabilitation services on mobility goals if consulted  - Perform Range of Motion  times a day.  - Reposition patient every  hours.  - Dangle patient  times a day  - Stand patient  times a day  - Ambulate patient  times a day  - Out of bed to chair  times a day   - Out of bed for meals  times a day  - Out of bed for toileting  - Record patient progress and toleration of activity level   Outcome: Progressing     Problem: DISCHARGE PLANNING  Goal: Discharge to home or other facility with appropriate resources  Description: INTERVENTIONS:  - Identify barriers to discharge w/patient and caregiver  - Arrange for  needed discharge resources and transportation as appropriate  - Identify discharge learning needs (meds, wound care, etc.)  - Arrange for interpretive services to assist at discharge as needed  - Refer to Case Management Department for coordinating discharge planning if the patient needs post-hospital services based on physician/advanced practitioner order or complex needs related to functional status, cognitive ability, or social support system  Outcome: Progressing     Problem: Knowledge Deficit  Goal: Patient/family/caregiver demonstrates understanding of disease process, treatment plan, medications, and discharge instructions  Description: Complete learning assessment and assess knowledge base.  Interventions:  - Provide teaching at level of understanding  - Provide teaching via preferred learning methods  Outcome: Progressing

## 2024-03-21 NOTE — PLAN OF CARE
Problem: PAIN - ADULT  Goal: Verbalizes/displays adequate comfort level or baseline comfort level  Description: Interventions:  - Encourage patient to monitor pain and request assistance  - Assess pain using appropriate pain scale  - Administer analgesics based on type and severity of pain and evaluate response  - Implement non-pharmacological measures as appropriate and evaluate response  - Consider cultural and social influences on pain and pain management  - Notify physician/advanced practitioner if interventions unsuccessful or patient reports new pain  3/21/2024 0315 by Shefali Howell RN  Outcome: Progressing  3/21/2024 0315 by Shefali Howell RN  Outcome: Progressing     Problem: INFECTION - ADULT  Goal: Absence or prevention of progression during hospitalization  Description: INTERVENTIONS:  - Assess and monitor for signs and symptoms of infection  - Monitor lab/diagnostic results  - Monitor all insertion sites, i.e. indwelling lines, tubes, and drains  - Monitor endotracheal if appropriate and nasal secretions for changes in amount and color  - [place] appropriate cooling/warming therapies per order  - Administer medications as ordered  - Instruct and encourage patient and family to use good hand hygiene technique  - Identify and instruct in appropriate isolation precautions for identified infection/condition  3/21/2024 0315 by Shefali Howell RN  Outcome: Progressing  3/21/2024 0315 by Shefali Howell RN  Outcome: Progressing     Problem: INFECTION - ADULT  Goal: Absence of fever/infection during neutropenic period  Description: INTERVENTIONS:  - Monitor WBC    3/21/2024 0315 by Shefali Howell RN  Outcome: Progressing  3/21/2024 0315 by Shefali Howell RN  Outcome: Progressing     Problem: SAFETY ADULT  Goal: Patient will remain free of falls  Description: INTERVENTIONS:  - Educate patient/family on patient safety including physical limitations  - Instruct patient to call for assistance with activity    - Consult OT/PT to assist with strengthening/mobility   - Keep Call bell within reach  - Keep bed low and locked with side rails adjusted as appropriate  - Keep care items and personal belongings within reach  - Initiate and maintain comfort rounds  - Make Fall Risk Sign visible to staff  - Offer Toileting every 2 Hours, in advance of need  - Initiate/Maintain bed alarm  Problem: DISCHARGE PLANNING  Goal: Discharge to home or other facility with appropriate resources  Description: INTERVENTIONS:  - Identify barriers to discharge w/patient and caregiver  - Arrange for needed discharge resources and transportation as appropriate  - Identify discharge learning needs (meds, wound care, etc.)  - Arrange for interpretive services to assist at discharge as needed  - Refer to Case Management Department for coordinating discharge planning if the patient needs post-hospital services based on physician/advanced practitioner order or complex needs related to functional status, cognitive ability, or social support system  3/21/2024 0315 by Shefali Howell RN  Outcome: Progressing  3/21/2024 0315 by Shefali Howell RN  Outcome: Progressing     Problem: Knowledge Deficit  Goal: Patient/family/caregiver demonstrates understanding of disease process, treatment plan, medications, and discharge instructions  Description: Complete learning assessment and assess knowledge base.  Interventions:  - Provide teaching at level of understanding  - Provide teaching via preferred learning methods  3/21/2024 0315 by Shefali Howell RN  Outcome: Progressing  3/21/2024 0315 by Shefali Howell RN  Outcome: Progressing     - Apply yellow socks and bracelet for high fall risk patients  - Consider moving patient to room near nurses station  3/21/2024 0315 by Shefali Howell RN  Outcome: Progressing  3/21/2024 0315 by Shefali Howell RN  Outcome: Progressing

## 2024-03-21 NOTE — PROGRESS NOTES
"Progress Note - Urology      Patient: Oliver Astudillo   : 1950 Sex: male   MRN: [id number]     CSN: [id number]  Unit/Bed#: [address] [place] [address]     SUBJECTIVE:   Patient seen on afternoon rounds  Had left nephrostomy tube resutured by IR yesterday  Having some ongoing left pelvic pain CAT scan with IV contrast pending today      Objective   Vitals: /77 (BP Location: Right arm)   Pulse 64   Temp 98.7 °F (37.1 °C) (Oral)   Resp 18   Ht 5' 8\" (1.727 m)   Wt 77.6 kg (171 lb)   SpO2 97%   BMI 26.00 kg/m²     I/O last 24 hours:  In: 1801.3 [I.V.:1786.3; Other:15]  Out:  [Urine:]      Physical Exam:   General Alert awake   Normocephalic atraumatic PERRLA  Lungs clear bilaterally  Cardiac normal S1 normal S2  Abdomen soft, flank pain nephrostomy tubes functioning  Extremities no edema      Lab Results: CBC:   Lab Results   Component Value Date    WBC 10.72 (H) 2024    HGB 12.1 2024    HCT 35.2 (L) 2024    MCV 89 2024     2024    RBC 3.96 2024    MCH 30.6 2024    MCHC 34.4 2024    RDW 13.4 2024    MPV 9.8 2024    NRBC 0 2024     CMP:   Lab Results   Component Value Date     2024    CO2 23 2024    BUN 15 2024    CREATININE 1.22 2024    CALCIUM 9.0 2024    AST 13 2024    ALT 11 2024    ALKPHOS 66 2024    EGFR 59 2024     Urinalysis:   Lab Results   Component Value Date    COLORU Light Yellow 2024    CLARITYU Cloudy 2024    SPECGRAV 1.020 2024    PHUR 6.0 2024    LEUKOCYTESUR Moderate (A) 2024    NITRITE Positive (A) 2024    GLUCOSEU >=1000 (1%) (A) 2024    KETONESU Negative 2024    BILIRUBINUR Negative 2024    BLOODU Large (A) 2024     Urine Culture:   Lab Results   Component Value Date    URINECX >100,000 cfu/ml Escherichia coli (A) 2024     PSA:   Lab Results   Component Value Date    PSA 1.96 2024    " PSA 0.16 12/27/2023    PSA 6.01 (H) 08/11/2023    .73 (H) 05/24/2023         Assessment/ Plan:  Bilateral hydronephrosis/bilateral nephrostomy tubes  Advanced prostate cancer  CAT scan IV contrast pending  Continue cefepime  Blood cultures urine cultures positive awaiting sensitivity          Casey Talbert MD

## 2024-03-21 NOTE — QUICK NOTE
Patient's son Sumit contacted via telephone listed.  Patient's son updated on condition, prognosis and all questions were answered.    Patient's son reported patient has been having increased pain today and is wondering about pain regimen.  Patient's son advised on standing pain regimen and advised orders are as needed.  Record was reviewed and patient has received 7.5 mg of oxycodone for pain control since talking with son.    Plan to talk with patient and update pain regimen if patient's pain is not adequately controlled on current medication.

## 2024-03-21 NOTE — OCCUPATIONAL THERAPY NOTE
Occupational Therapy Cancellation     Patient Name: Oliver Astudillo  Today's Date: 3/21/2024  Problem List  Principal Problem:    Urinary tract infection associated with nephrostomy catheter   Active Problems:    Type 2 diabetes mellitus, with long-term current use of insulin (HCC)    Hypertension    Prostate cancer (HCC)    Nephrostomy status (HCC)    Hyponatremia    Hyperlipidemia    Dizziness    Past Medical History  Past Medical History:   Diagnosis Date    COVID-19 01/15/2024    Diabetes mellitus (HCC)     Elevated PSA 06/21/2023    High cholesterol     Hypertension     Prostate cancer (HCC)      Past Surgical History  Past Surgical History:   Procedure Laterality Date    IR NEPHROSTOMY TUBE CHECK/CHANGE/REPOSITION/REINSERTION/UPSIZE  9/28/2023    IR NEPHROSTOMY TUBE CHECK/CHANGE/REPOSITION/REINSERTION/UPSIZE  12/28/2023    IR NEPHROSTOMY TUBE CHECK/CHANGE/REPOSITION/REINSERTION/UPSIZE  1/31/2024    IR NEPHROSTOMY TUBE CHECK/CHANGE/REPOSITION/REINSERTION/UPSIZE  2/2/2024    IR NEPHROSTOMY TUBE CHECK/CHANGE/REPOSITION/REINSERTION/UPSIZE  3/4/2024    IR NEPHROSTOMY TUBE CHECK/CHANGE/REPOSITION/REINSERTION/UPSIZE  3/20/2024    IR NEPHROSTOMY TUBE PLACEMENT  06/22/2023    bilateral    IR OTHER  1/15/2024    US GUIDED PROSTATE BIOPSY             03/21/24 0829   Note Type   Note type Cancelled Session  (Thurs 3/21/24)   Cancel Reasons Other  (per PCA reporting + pain. RN going in to address)   Additional Comments OT orders received and chart review completed. Will cancel pend improved pain control and continue to follow as appropriate/ schedule allows.   Licensure   [place] License Number  Jaci Martinez, OTR/L [id number]     Jaci Martinez OTR/L  [id number]  [id number]

## 2024-03-22 PROBLEM — N17.9 ACUTE KIDNEY INJURY (HCC): Status: ACTIVE | Noted: 2024-03-22

## 2024-03-22 LAB
ANION GAP SERPL CALCULATED.3IONS-SCNC: 6 MMOL/L (ref 4–13)
BACTERIA UR QL AUTO: ABNORMAL /HPF
BASOPHILS # BLD AUTO: 0.03 THOUSANDS/ÂΜL (ref 0–0.1)
BASOPHILS NFR BLD AUTO: 0 % (ref 0–1)
BILIRUB UR QL STRIP: NEGATIVE
BUN SERPL-MCNC: 19 MG/DL (ref 5–25)
CALCIUM SERPL-MCNC: 9.6 MG/DL (ref 8.4–10.2)
CHLORIDE SERPL-SCNC: 102 MMOL/L (ref 96–108)
CLARITY UR: ABNORMAL
CO2 SERPL-SCNC: 24 MMOL/L (ref 21–32)
COLOR UR: YELLOW
CREAT SERPL-MCNC: 1.8 MG/DL (ref 0.6–1.3)
EOSINOPHIL # BLD AUTO: 0.05 THOUSAND/ÂΜL (ref 0–0.61)
EOSINOPHIL NFR BLD AUTO: 0 % (ref 0–6)
ERYTHROCYTE [DISTWIDTH] IN BLOOD BY AUTOMATED COUNT: 13.3 % (ref 11.6–15.1)
GFR SERPL CREATININE-BSD FRML MDRD: 37 ML/MIN/1.73SQ M
GLUCOSE SERPL-MCNC: 107 MG/DL (ref 65–140)
GLUCOSE SERPL-MCNC: 75 MG/DL (ref 65–140)
GLUCOSE SERPL-MCNC: 76 MG/DL (ref 65–140)
GLUCOSE SERPL-MCNC: 97 MG/DL (ref 65–140)
GLUCOSE SERPL-MCNC: 98 MG/DL (ref 65–140)
GLUCOSE UR STRIP-MCNC: NEGATIVE MG/DL
HCT VFR BLD AUTO: 34.7 % (ref 36.5–49.3)
HGB BLD-MCNC: 11.9 G/DL (ref 12–17)
HGB UR QL STRIP.AUTO: ABNORMAL
IMM GRANULOCYTES # BLD AUTO: 0.07 THOUSAND/UL (ref 0–0.2)
IMM GRANULOCYTES NFR BLD AUTO: 1 % (ref 0–2)
KETONES UR STRIP-MCNC: NEGATIVE MG/DL
LEUKOCYTE ESTERASE UR QL STRIP: ABNORMAL
LYMPHOCYTES # BLD AUTO: 1.67 THOUSANDS/ÂΜL (ref 0.6–4.47)
LYMPHOCYTES NFR BLD AUTO: 12 % (ref 14–44)
MAGNESIUM SERPL-MCNC: 2.1 MG/DL (ref 1.9–2.7)
MCH RBC QN AUTO: 30.7 PG (ref 26.8–34.3)
MCHC RBC AUTO-ENTMCNC: 34.3 G/DL (ref 31.4–37.4)
MCV RBC AUTO: 89 FL (ref 82–98)
MONOCYTES # BLD AUTO: 1.02 THOUSAND/ÂΜL (ref 0.17–1.22)
MONOCYTES NFR BLD AUTO: 7 % (ref 4–12)
NEUTROPHILS # BLD AUTO: 11.49 THOUSANDS/ÂΜL (ref 1.85–7.62)
NEUTS SEG NFR BLD AUTO: 80 % (ref 43–75)
NITRITE UR QL STRIP: NEGATIVE
NON-SQ EPI CELLS URNS QL MICRO: ABNORMAL /HPF
NRBC BLD AUTO-RTO: 0 /100 WBCS
OTHER STN SPEC: ABNORMAL
PH UR STRIP.AUTO: 6 [PH]
PLATELET # BLD AUTO: 293 THOUSANDS/UL (ref 149–390)
PMV BLD AUTO: 10.4 FL (ref 8.9–12.7)
POTASSIUM SERPL-SCNC: 3.7 MMOL/L (ref 3.5–5.3)
PROT UR STRIP-MCNC: ABNORMAL MG/DL
RBC # BLD AUTO: 3.88 MILLION/UL (ref 3.88–5.62)
RBC #/AREA URNS AUTO: ABNORMAL /HPF
SODIUM SERPL-SCNC: 132 MMOL/L (ref 135–147)
SP GR UR STRIP.AUTO: <=1.005 (ref 1–1.03)
UROBILINOGEN UR QL STRIP.AUTO: 0.2 E.U./DL
WBC # BLD AUTO: 14.33 THOUSAND/UL (ref 4.31–10.16)
WBC #/AREA URNS AUTO: ABNORMAL /HPF

## 2024-03-22 PROCEDURE — 82948 REAGENT STRIP/BLOOD GLUCOSE: CPT

## 2024-03-22 PROCEDURE — 83735 ASSAY OF MAGNESIUM: CPT

## 2024-03-22 PROCEDURE — 85025 COMPLETE CBC W/AUTO DIFF WBC: CPT

## 2024-03-22 PROCEDURE — 99232 SBSQ HOSP IP/OBS MODERATE 35: CPT | Performed by: INTERNAL MEDICINE

## 2024-03-22 PROCEDURE — 87106 FUNGI IDENTIFICATION YEAST: CPT

## 2024-03-22 PROCEDURE — 87086 URINE CULTURE/COLONY COUNT: CPT

## 2024-03-22 PROCEDURE — 97165 OT EVAL LOW COMPLEX 30 MIN: CPT

## 2024-03-22 PROCEDURE — 80048 BASIC METABOLIC PNL TOTAL CA: CPT

## 2024-03-22 PROCEDURE — 81001 URINALYSIS AUTO W/SCOPE: CPT

## 2024-03-22 RX ORDER — HYDROMORPHONE HCL/PF 1 MG/ML
0.5 SYRINGE (ML) INJECTION EVERY 4 HOURS PRN
Status: DISCONTINUED | OUTPATIENT
Start: 2024-03-22 | End: 2024-03-23

## 2024-03-22 RX ORDER — POTASSIUM CHLORIDE 20 MEQ/1
20 TABLET, EXTENDED RELEASE ORAL ONCE
Status: DISCONTINUED | OUTPATIENT
Start: 2024-03-22 | End: 2024-03-22

## 2024-03-22 RX ORDER — POTASSIUM CHLORIDE 20 MEQ/1
20 TABLET, EXTENDED RELEASE ORAL ONCE
Status: COMPLETED | OUTPATIENT
Start: 2024-03-22 | End: 2024-03-22

## 2024-03-22 RX ORDER — CEFAZOLIN SODIUM 2 G/50ML
2000 SOLUTION INTRAVENOUS EVERY 8 HOURS
Status: DISCONTINUED | OUTPATIENT
Start: 2024-03-22 | End: 2024-03-24 | Stop reason: HOSPADM

## 2024-03-22 RX ORDER — GLIMEPIRIDE 2 MG/1
2 TABLET ORAL
Status: DISCONTINUED | OUTPATIENT
Start: 2024-03-22 | End: 2024-03-24

## 2024-03-22 RX ORDER — BISACODYL 10 MG
10 SUPPOSITORY, RECTAL RECTAL ONCE
Status: DISCONTINUED | OUTPATIENT
Start: 2024-03-22 | End: 2024-03-23

## 2024-03-22 RX ADMIN — ACETAMINOPHEN 975 MG: 325 TABLET ORAL at 11:15

## 2024-03-22 RX ADMIN — OXYCODONE HYDROCHLORIDE 5 MG: 5 TABLET ORAL at 05:46

## 2024-03-22 RX ADMIN — POLYETHYLENE GLYCOL 3350 17 G: 17 POWDER, FOR SOLUTION ORAL at 08:30

## 2024-03-22 RX ADMIN — HEPARIN SODIUM 5000 UNITS: 5000 INJECTION, SOLUTION INTRAVENOUS; SUBCUTANEOUS at 14:59

## 2024-03-22 RX ADMIN — ACETAMINOPHEN 975 MG: 325 TABLET ORAL at 23:51

## 2024-03-22 RX ADMIN — POTASSIUM CHLORIDE 20 MEQ: 1500 TABLET, EXTENDED RELEASE ORAL at 08:29

## 2024-03-22 RX ADMIN — CEFAZOLIN SODIUM 2000 MG: 2 SOLUTION INTRAVENOUS at 10:06

## 2024-03-22 RX ADMIN — ACETAMINOPHEN 975 MG: 325 TABLET ORAL at 17:14

## 2024-03-22 RX ADMIN — CEFAZOLIN SODIUM 2000 MG: 2 SOLUTION INTRAVENOUS at 16:59

## 2024-03-22 RX ADMIN — DOCUSATE SODIUM 100 MG: 100 CAPSULE, LIQUID FILLED ORAL at 17:14

## 2024-03-22 RX ADMIN — FLUCONAZOLE 200 MG: 100 TABLET ORAL at 08:29

## 2024-03-22 RX ADMIN — HEPARIN SODIUM 5000 UNITS: 5000 INJECTION, SOLUTION INTRAVENOUS; SUBCUTANEOUS at 05:52

## 2024-03-22 RX ADMIN — GLIMEPIRIDE 2 MG: 2 TABLET ORAL at 08:25

## 2024-03-22 RX ADMIN — ABIRATERONE ACETATE 250 MG: 250 TABLET ORAL at 10:50

## 2024-03-22 RX ADMIN — DOCUSATE SODIUM 100 MG: 100 CAPSULE, LIQUID FILLED ORAL at 08:30

## 2024-03-22 RX ADMIN — HYDROMORPHONE HYDROCHLORIDE 0.5 MG: 1 INJECTION, SOLUTION INTRAMUSCULAR; INTRAVENOUS; SUBCUTANEOUS at 11:39

## 2024-03-22 RX ADMIN — ALBUTEROL SULFATE 2 PUFF: 90 AEROSOL, METERED RESPIRATORY (INHALATION) at 22:30

## 2024-03-22 RX ADMIN — ONDANSETRON 4 MG: 2 INJECTION INTRAMUSCULAR; INTRAVENOUS at 11:39

## 2024-03-22 RX ADMIN — Medication 1 TABLET: at 08:29

## 2024-03-22 RX ADMIN — PRAVASTATIN SODIUM 80 MG: 80 TABLET ORAL at 16:59

## 2024-03-22 RX ADMIN — INSULIN DETEMIR 35 UNITS: 100 INJECTION, SOLUTION SUBCUTANEOUS at 21:32

## 2024-03-22 RX ADMIN — SENNOSIDES 8.6 MG: 8.6 TABLET, FILM COATED ORAL at 21:32

## 2024-03-22 RX ADMIN — HEPARIN SODIUM 5000 UNITS: 5000 INJECTION, SOLUTION INTRAVENOUS; SUBCUTANEOUS at 21:32

## 2024-03-22 NOTE — CASE MANAGEMENT
Case Management Discharge Planning Note    Patient name Oliver Astudillo  Location [address] [place] [address] [place] [address] MRN [id number]  : 1950 Date 3/22/2024       Current Admission Date: 3/18/2024  Current Admission Diagnosis:Urinary tract infection associated with nephrostomy catheter    Patient Active Problem List    Diagnosis Date Noted    Acute kidney injury (HCC) 2024    Dizziness 2024    Constipation 2024    Malfunction of nephrostomy tube (HCC) 01/15/2024    Urinary tract infection associated with nephrostomy catheter  2024    Hyponatremia 2024    Hyperlipidemia 2024    Encounter for monitoring androgen deprivation therapy 08/10/2023    Nephrostomy status (HCC) 08/10/2023    Hydronephrosis 2023    Prostate cancer ([facility]) 2023    Type 2 diabetes mellitus, with long-term current use of insulin (HCC) 2023    Other hydronephrosis 2023    Hypertension 2023      LOS (days): 4  Geometric Mean LOS (GMLOS) (days): 3.3  Days to GMLOS:-0.4     OBJECTIVE:  Risk of Unplanned Readmission Score: 25.86     Current admission status: Inpatient   Preferred Pharmacy:   [facility]/pharmacy #[id number] [place], [place] - [address]  [address] [facility] [address] [facility] [address]  Phone: [phone] Fax: [phone]    [facility] [place], [place] - [address]  [address]  [facility] [address]  Phone: [phone] Fax: [phone]    Primary Care Provider: Eugenia Rodriguez MD    Primary Insurance: [facility] REP  Secondary Insurance:     DISCHARGE DETAILS:    Discharge planning discussed with:: Patient's Son Sumit  Freedom of Choice: Yes     CM contacted family/caregiver?: Yes    Contacts  Patient Contacts: Sumit  Relationship to Patient:: Family  Contact Method: Phone  Phone Number: [phone]  Reason/Outcome: Discharge Planning, Emergency Contact, Continuity of Care    IMM Given (Date):: 24 (IMM reviewed in detailed voicemail left for  patient's Son Sumit. Copy placed in scan bin for patient's chart.)  IMM Given to:: Family  Family notified:: Son Sumit    CM called patient's Son Sumit to present and review IMM for potential discharge this weekend. CM left detailed message with [employer] contact phone number. CM left contact information should Sumit have any questions for concerns.     IMM reviewed with patient's caregiver, patient's caregiver agrees with discharge determination.

## 2024-03-22 NOTE — ASSESSMENT & PLAN NOTE
Resolving    Cr  1.80--> 1.3  Was Likely in the setting of IV contrast given for CT abdomen pelvis    Monitor BMP

## 2024-03-22 NOTE — PROGRESS NOTES
Daily Progress Note - [facility]  Family Medicine Residency  Oliver Astudillo 73 y.o. male MRN: [id number]  Unit/Bed#: [address] [place] [address] Encounter: [id number]  Admitting Physician: Uriel Alejandra MD   PCP: Eugenia Rodriguez MD  Date of Admission:  3/18/2024  3:43 PM    Assessment and Plan    * Urinary tract infection associated with nephrostomy catheter   Assessment & Plan  Patient had bilateral flank  left>> right side.  He also complains of  pain with flushing nephrostomy tube and decreased output of urine into the left nephrostomy bag.  He has mild dysuria, chills and nausea but denied any fever or vomiting.    CT abdomen pelvis with contrast: Bilateral percutaneous nephrostomy tubes in expected position. Mild right hydronephrosis. Bilateral pyelitis and ureteritis. No clear evidence of nephritis. Findings suggestive of cystitis.    UA was positive for nitrates and leukocytes with protein, glucose, bacteria, BC and WBC  Prior urine cultures were positive for E. Coli and enterobacter    WBC: 12.75 --> 10-->14  Lactic acid 2.6  Repeat CT scan abdomen pelvis with contrast:       Bilateral percutaneous nephrostomy tubes are in place. Mild left hydronephrosis is similar to prior study. New enhancement within the distal left ureter may be due to hyperemia. Unchanged bladder wall thickening likely representing cystitis. Inflammation of the intrarenal collecting systems and ureters is similar to prior study.    Likely due to urinary tract obstruction and nephrostomy tube dysfunction    Plan  Blood cutures NG @ 72 hrs, Urine Culture : G - Rods  Resend Urine cultures separately from nephrostomy tubes  Discontinue cefepime 2 g q12 hourly  Discontinue Fuconazole  Start Cefazolin 2gm q 8 hourly  Increase IVF to 125ml/hr  IR consulted, continue to appreciate recs:  Nephrostomy tubes repositioned on 03/20/24  Recommending flushing only   Pain regimen - tylenol q6 PRN for mild, oxycodone 2.5 moderate pain and  5mg oxycodone for severe and breakthrough 0.5 mg of dilaudid  Consult Urology, Dr. Talbert's recs    Type 2 diabetes mellitus, with long-term current use of insulin (HCC)  Assessment & Plan  Lab Results   Component Value Date    HGBA1C 11.1 (A) 01/08/2024       Recent Labs     03/21/24  1611 03/21/24  2106 03/22/24  0723 03/22/24  1118   POCGLU 180* 214* 75 97       Blood Sugar Average: Last 72 hrs:  (P) 167.0305272962770734    Chronic, uncontrolled. Last A1c was 11.1. Home medications include Levemir 30 units HS, Glimepiride 2 mg lunch, Glimepiride 4 mg breakfast, Steglatro 5 mg at  breakfast.  His Ivokana was changed to Steglatro due to insurance issues. Per patient, he has been compliant with all his medications    Plan   Patient started on insulin sliding scale and Accu-Cheks ACHS  Continue with Levemir 35 units at bedtime  Discontinue Amaryl 4 mg daily with breakfast, Continue Amaryl 2 mg daily with dinner  Carbohydrate controlled diet  Hypoglycemia protocol  Monitor blood sugars as patient is on steroids       Acute kidney injury (HCC)  Assessment & Plan  Cr today 1.80,  Likely in the setting of IV contrast given for CT abdomen pelvis    Monitor BMP  IVF increased to 125ml/hr    Dizziness  Assessment & Plan  Patient has had episodes of dizziness and lightheadedness whenever he walks or does some activity.  As per wife, patient had an episode of dizziness when walking up the stairs and nearly fell over but was caught by his wife  Patient has no prior echo on file    Patient is not complaining of dizziness/lightheadedness anymore, and is able to ambulate without difficulty to the bathroom     Plan  Orthostatic vitals  ECHO: Left ventricular cavity size is normal. Wall thickness is mildly increased. The left ventricular ejection fraction is 60%. GLS is -17.2%. Systolic function is normal.     Constipation  Assessment & Plan  Patient has not have a bowel movement since 3 days, but he is passing gas  Start MiraLAX  per oral daily  Start Colace twice daily  CT scan abdomen pelvis with contrast ordered     Hyperlipidemia  Assessment & Plan  Chronic, stable.  Patient is on Crestor 10 mg daily at home    Plan  Crestor not in hospital formulary; replaced with pravastatin    Hyponatremia  Assessment & Plan  Resolved    Na 130 on admission. Likely hyperglycemic hyponatremia. Urine sodium and urine Osmolality: WNL      Plan  Increase IVF NS to 125 ml/hr  Continue to monitor BMP daily  Strict input and output monitoring    Nephrostomy status (HCC)  Assessment & Plan  Pt has h/o prostate cancer leading to urinary obstruction treated with bilateral percutaneous nephrostomy tubes since 6/22/23  Patient presents with complaint of decreased urine output in left nephrostomy bag and flank pain worse on left side.  Nephrostomy tube was most recently exchanged in the last hospitalization 01/31/2024.  Pt has had recurrent nephrostomy tube dysfunction and urinary tract infections since last few months.   Pt states that he irrigates tubes at least twice daily  Ct scan showed bilateral percutaneous nephrostomy tubes in expected position with mild right hydronephrosis, bilateral pyelitis and ureteritis.    Plan  IR consulted, continue to appreciate recommendations: See above    Prostate cancer (HCC)  Assessment & Plan  History of advanced metastatic prostate cancer diagnosed in May 2023.  He last received lupron on 1/11. Sees  Dr. Coyne at medical oncology     Plan   Continue Lupron q6 months, Zytiga and dexamethasone     Hypertension  Assessment & Plan  Chronic, stable.  On admission, BP was elevated at 187/73 mm Hg   Home regimen includes lisinopril 10mg daily     Plan  Continue with lisinopril 10 mg with holding parameters  Will add labetalol or hydralazine if blood pressure remains elevated  Monitor vitals and blood pressure daily        VTE Pharmacologic Prophylaxis: VTE Score: 9 High Risk (Score >/= 5) - Pharmacological DVT Prophylaxis  Ordered: heparin. Sequential Compression Devices Ordered.    Patient Centered Rounds: I have performed bedside rounds with nursing staff today.    Discussions with Specialists or Other Care Team Provider: Done    Education and Discussions with Family / Patient: Done  Patient Information Sharing: With the consent of Oliver Astudillo , their loved ones (Sone, wife) were notified today by inpatient team of the patient’s condition and current plan.  All questions answered  Time Spent for Care: 45 minutes.  More than 50% of total time spent on counseling and coordination of care as described above.    Current Length of Stay: 4 day(s)    Current Patient Status: Inpatient   Certification Statement: The patient will continue to require additional inpatient hospital stay due to Resolution of infection    Discharge Plan: TBD    Code Status: Level 1 - Full Code    Subjective:   Patient was seen and examined at bedside with wife.  His son was taken on the phone for translation and updates.  He was informed about the CT scan results.Today he reports feeling better than yesterday after receiving IV pain meds last night.. Overnight no acute events, except episodes of pain where he received pain medication.  He was able to get enough sleep and feels better this morning. Currently patient denies any fever, chills, shortness of breath, cough, chest pain, palpitations, light headedness, headaches, vision change, abdominal pain, N/V/D, urinary symptoms.  He has not had a bowel movement since 4 days,.    Objective     Objective:   Vitals:   Temp (24hrs), Av.7 °F (37.1 °C), Min:97.9 °F (36.6 °C), Max:100.1 °F (37.8 °C)    Temp:  [97.9 °F (36.6 °C)-100.1 °F (37.8 °C)] 98.1 °F (36.7 °C)  HR:  [64-83] 78  Resp:  [16-18] 16  BP: (132-170)/(60-77) 132/60  SpO2:  [95 %-98 %] 98 %  Body mass index is 26 kg/m².     Input and Output Summary (last 24 hours):       Intake/Output Summary (Last 24 hours) at 3/22/2024 1512  Last data filed at  3/22/2024 1241  Gross per 24 hour   Intake 480 ml   Output 280 ml   Net 200 ml       Physical Exam:   Physical Exam  Vitals reviewed.   Constitutional:       Appearance: Normal appearance. He is normal weight.   Cardiovascular:      Rate and Rhythm: Normal rate and regular rhythm.      Pulses: Normal pulses.      Heart sounds: Normal heart sounds. No murmur heard.  Pulmonary:      Effort: Pulmonary effort is normal. No respiratory distress.      Breath sounds: Normal breath sounds.   Abdominal:      General: Bowel sounds are normal. There is no distension.      Palpations: Abdomen is soft.      Tenderness: There is abdominal tenderness (mild tenderness in lower left abdominal pain). There is no right CVA tenderness or left CVA tenderness.   Musculoskeletal:         General: Normal range of motion.   Neurological:      General: No focal deficit present.      Mental Status: He is alert and oriented to person, place, and time.      Motor: No weakness.   Psychiatric:         Mood and Affect: Mood normal.         Behavior: Behavior normal.         Thought Content: Thought content normal.           Additional Data:     Labs:  Results from last 7 days   Lab Units 03/22/24  0555   WBC Thousand/uL 14.33*   HEMOGLOBIN g/dL 11.9*   HEMATOCRIT % 34.7*   PLATELETS Thousands/uL 293   NEUTROS PCT % 80*   LYMPHS PCT % 12*   MONOS PCT % 7   EOS PCT % 0     Results from last 7 days   Lab Units 03/22/24  0555 03/21/24  0540   POTASSIUM mmol/L 3.7 3.6   CHLORIDE mmol/L 102 105   CO2 mmol/L 24 23   BUN mg/dL 19 15   CREATININE mg/dL 1.80* 1.22   CALCIUM mg/dL 9.6 9.0   ALK PHOS U/L  --  66   ALT U/L  --  11   AST U/L  --  13         Results from last 7 days   Lab Units 03/22/24  1118 03/22/24  0723 03/21/24  2106 03/21/24  1611 03/21/24  1101   POC GLUCOSE mg/dl 97 75 214* 180* 189*           * I Have Reviewed All Lab Data Listed Above.  * Additional Pertinent Lab Tests Reviewed: All Labs For Current Hospital Admission  Reviewed    Imaging:    Imaging Reports Reviewed Today Include: CT scan  Imaging Personally Reviewed by Myself Includes:  Ctr scan abd and pelvis    Recent Cultures (last 7 days):     Results from last 7 days   Lab Units 03/19/24  0055 03/19/24  0043 03/18/24  1709   BLOOD CULTURE  No Growth at 72 hrs. No Growth at 72 hrs.  --    URINE CULTURE   --   --  >100,000 cfu/ml Escherichia coli*       Last 24 Hours Medication List:   Current Facility-Administered Medications   Medication Dose Route Frequency Provider Last Rate    abiraterone  250 mg Oral Daily Jarad Thomas MD      acetaminophen  975 mg Oral Q6H RAJEEV Radha Sanchez MD      albuterol  2 puff Inhalation Q4H PRN Jarad Thomas MD      calcium carbonate  1,000 mg Oral Daily PRN Jarad Thomas MD      cefazolin  2,000 mg Intravenous Q8H Radha Sanchez MD 2,000 mg (03/22/24 1006)    docusate sodium  100 mg Oral BID Radha Sanchez MD      glimepiride  2 mg Oral Daily With Breakfast Cb Martinez MD      heparin (porcine)  5,000 Units Subcutaneous Q8H RAJEEV Jarad Thomas MD      HYDROmorphone  0.5 mg Intravenous Q4H PRN Cb Martinez MD      insulin detemir  35 Units Subcutaneous HS Radha Sanchez MD      insulin lispro  1-6 Units Subcutaneous 4x Daily (AC & HS) Jarad Thomas MD      multivitamin-minerals  1 tablet Oral Daily Jarad Thomas MD      ondansetron  4 mg Intravenous Q6H PRN Jarad Thomas MD      oxyCODONE  5 mg Oral Q6H PRN Radha Sanchez MD      oxyCODONE  2.5 mg Oral Q6H PRN Radha Sanchez MD      polyethylene glycol  17 g Oral Daily Radha Sanchez MD      pravastatin  80 mg Oral Daily With Dinner Jarad Thomas MD      senna  1 tablet Oral HS Jun Alexander MD      sodium chloride  125 mL/hr Intravenous Continuous Radha Sanchez  mL/hr (03/22/24 1011)             ** Please Note: Dictation voice to text software may have been used in the creation of this document. **    Radha Sanchez,  MD  03/22/24  3:12 PM

## 2024-03-22 NOTE — OCCUPATIONAL THERAPY NOTE
OT EVALUATION       03/22/24 0927   OT Last Visit   OT Visit Date 03/22/24   Note Type   Note type Evaluation   Pain Assessment   Pain Assessment Tool 0-10   Pain Score 8   Pain Location/Orientation Location: Abdomen   Restrictions/Precautions   Other Precautions Fall Risk;Pain  (nephrostomy)   Home Living   Type of Home House   Bathroom Shower/Tub Walk-in shower   Prior Function   Level of [place] Independent with ADLs;Independent with functional mobility   Lives With Spouse   Receives Help From Family   ADL   Eating Assistance 7  Independent   Grooming Assistance 7  Independent   UB Bathing Assistance 7  Independent   LB Bathing Assistance 7  Independent   UB Dressing Assistance 7  Independent   LB Dressing Assistance 7  Independent   LB Dressing Deficit Don/doff R shoe;Don/doff L shoe;Don/doff R sock;Don/doff L sock   Toileting Assistance  7  Independent   Transfers   Sit to Stand 5  Supervision   Stand to Sit 5  Supervision   Functional Mobility   Functional Mobility 5  Supervision   Additional Comments short household distance in hallway with RW   Balance   Static Sitting Fair +   Dynamic Sitting Fair   Static Standing Fair   Dynamic Standing Fair   Activity Tolerance   Activity Tolerance Patient limited by fatigue;Patient limited by pain   Nurse Made Aware yes   RUE Assessment   RUE Assessment WFL   LUE Assessment   LUE Assessment WFL   Cognition   Overall Cognitive Status WFL   Arousal/Participation Cooperative   Attention Within functional limits   Orientation Level Oriented X4   Following Commands Follows multistep commands with increased time or repetition   Assessment   Limitation Decreased ADL status;Decreased UE strength;Decreased Safe judgement during ADL;Decreased endurance;Decreased self-care trans;Decreased high-level ADLs  (decreased balance and mobility)   Prognosis Good   Assessment Patient evaluated by Occupational Therapy.  Patient admitted with Urinary tract infection associated with  nephrostomy catheter .  The patients occupational profile, medical and therapy history includes a extensive additional review of physical, cognitive, or psychosocial history related to current functional performance.  Comorbidities affecting functional mobility and ADLS include: cancer, diabetes, and hypertension.  Prior to admission, patient was independent with functional mobility without assistive device and independent with ADLS.  The evaluation identifies the following performance deficits: no deficits, that result in activity limitations and/or participation restrictions. This evaluation requires clinical decision making of low complexity, because the patients presents with no comorbidities that affect occupational performance and required no modification of tasks or assistance with consideration of a limited number of treatment options.  The Barthel Index was used as a functional outcome tool presenting with a score of Barthel Index Score: 75, indicating minimal limitations of functional mobility and ADLS.  The patient's raw score on the AM-PAC Daily Activity Inpatient Short Form is 23. A raw score of greater than or equal to 19 suggests the patient may benefit from discharge to home. Please refer to the recommendation of the Occupational Therapist for safe discharge planning.  Patient is independent with ADLS and has a supportive family at home, no further skilled OT needs required at this time.  Defer to PT for functional mobility and balance.   Plan   Treatment Interventions ADL retraining;Functional transfer training;UE strengthening/ROM;Endurance training;Patient/family training;Equipment evaluation/education;Activityengagement;Compensatory technique education   Goal Expiration Date 04/05/24   OT Frequency 2-3x/wk   Discharge Recommendation   Rehab Resource Intensity Level, OT No post-acute rehabilitation needs   AM-PAC Daily Activity Inpatient   Lower Body Dressing 4   Bathing 3   Toileting 4   Upper  Body Dressing 4   Grooming 4   Eating 4   Daily Activity Raw Score 23   Daily Activity Standardized Score (Calc for Raw Score >=11) 51.12   AM-PAC Applied Cognition Inpatient   Following a Speech/Presentation 4   Understanding Ordinary Conversation 4   Taking Medications 4   Remembering Where Things Are Placed or Put Away 4   Remembering List of 4-5 Errands 4   Taking Care of Complicated Tasks 4   Applied Cognition Raw Score 24   Applied Cognition Standardized Score 62.21   Barthel Index   Feeding 10   Bathing 0   Grooming Score 5   Dressing Score 10   Bladder Score 0   Bowels Score 10   Toilet Use Score 10   Transfers (Bed/Chair) Score 15   Mobility (Level Surface) Score 10   Stairs Score 5   Barthel Index Score 75   Licensure   [place] License Number  Goldie Bustos MS OTR/L [id number]

## 2024-03-22 NOTE — QUICK NOTE
Subjective  Patient seen and examined at bedside.     Patient reported to author via [employer]  that his pain has not been adequately controlled today.  Patient reported he has received multiple doses of the oxycodone however pain has not changed.  Patient reports pain is more in his front abdomen left lower and mid area.  Patient reports he also has not had a bowel movement in over 3 days.  Concern for constipation with use of opioid medications.  Patient is already on bowel regimen including Dulcolax and MiraLAX.  Patient recently went for CT scan of the abdomen pelvis pending official read.  Patient describes the pain as achy and sharp worsened with palpation.       Objective  Vitals:    03/21/24 1919   BP: 142/65   Pulse: 64   Resp: 18   Temp: 97.9 °F (36.6 °C)   SpO2: 98%      Physical Exam   Constitutional: He is oriented to person, place, and time. He is cooperative. He appears ill.   Abdominal: Soft. Bowel sounds are decreased. There is abdominal tenderness in the left lower quadrant.   Neurological: He is alert and oriented to person, place, and time.   Psychiatric: His speech is normal. His affect is angry (patient reports he is upset as he has been in pain all day and that medications have not helped).        Assessment/plan  Left lower quadrant abdominal pain  Suspected constipation versus diverticulitis.  Pending CT scan  Patient given 0.5 mg of Dilaudid IV  Patient started on senna  Patient advised to communicate with nurse within our if pain has not subsided or if bowel movement is made      Jun Alexander MD  Family Medicine PGY-3  3/21/2024  8:52 PM

## 2024-03-22 NOTE — PLAN OF CARE
Problem: PAIN - ADULT  Goal: Verbalizes/displays adequate comfort level or baseline comfort level  Description: Interventions:  - Encourage patient to monitor pain and request assistance  - Assess pain using appropriate pain scale  - Administer analgesics based on type and severity of pain and evaluate response  - Implement non-pharmacological measures as appropriate and evaluate response  - Consider cultural and social influences on pain and pain management  - Notify physician/advanced practitioner if interventions unsuccessful or patient reports new pain  Outcome: Progressing     Problem: INFECTION - ADULT  Goal: Absence or prevention of progression during hospitalization  Description: INTERVENTIONS:  - Assess and monitor for signs and symptoms of infection  - Monitor lab/diagnostic results  - Monitor all insertion sites, i.e. indwelling lines, tubes, and drains  - Monitor endotracheal if appropriate and nasal secretions for changes in amount and color  - [place] appropriate cooling/warming therapies per order  - Administer medications as ordered  - Instruct and encourage patient and family to use good hand hygiene technique  - Identify and instruct in appropriate isolation precautions for identified infection/condition  Outcome: Progressing  Goal: Absence of fever/infection during neutropenic period  Description: INTERVENTIONS:  - Monitor WBC    Outcome: Progressing     Problem: SAFETY ADULT  Goal: Patient will remain free of falls  Description: INTERVENTIONS:  - Educate patient/family on patient safety including physical limitations  - Instruct patient to call for assistance with activity   - Consult OT/PT to assist with strengthening/mobility   - Keep Call bell within reach  - Keep bed low and locked with side rails adjusted as appropriate  - Keep care items and personal belongings within reach  - Initiate and maintain comfort rounds  - Make Fall Risk Sign visible to staff  - Offer Toileting every  Hours,  in advance of need  - Initiate/Maintain alarm  - Obtain necessary fall risk management equipment:   - Apply yellow socks and bracelet for high fall risk patients  - Consider moving patient to room near nurses station  Outcome: Progressing  Goal: Maintain or return to baseline ADL function  Description: INTERVENTIONS:  -  Assess patient's ability to carry out ADLs; assess patient's baseline for ADL function and identify physical deficits which impact ability to perform ADLs (bathing, care of mouth/teeth, toileting, grooming, dressing, etc.)  - Assess/evaluate cause of self-care deficits   - Assess range of motion  - Assess patient's mobility; develop plan if impaired  - Assess patient's need for assistive devices and provide as appropriate  - Encourage maximum independence but intervene and supervise when necessary  - Involve family in performance of ADLs  - Assess for home care needs following discharge   - Consider OT consult to assist with ADL evaluation and planning for discharge  - Provide patient education as appropriate  Outcome: Progressing  Goal: Maintains/Returns to pre admission functional level  Description: INTERVENTIONS:  - Perform AM-PAC 6 Click Basic Mobility/ Daily Activity assessment daily.  - Set and communicate daily mobility goal to care team and patient/family/caregiver.   - Collaborate with rehabilitation services on mobility goals if consulted  - Perform Range of Motion  times a day.  - Reposition patient every  hours.  - Dangle patient  times a day  - Stand patient  times a day  - Ambulate patient  times a day  - Out of bed to chair  times a day   - Out of bed for meals times a day  - Out of bed for toileting  - Record patient progress and toleration of activity level   Outcome: Progressing     Problem: Knowledge Deficit  Goal: Patient/family/caregiver demonstrates understanding of disease process, treatment plan, medications, and discharge instructions  Description: Complete learning  assessment and assess knowledge base.  Interventions:  - Provide teaching at level of understanding  - Provide teaching via preferred learning methods  Outcome: Progressing

## 2024-03-23 PROBLEM — E87.6 HYPOKALEMIA: Status: ACTIVE | Noted: 2024-03-23

## 2024-03-23 LAB
ANION GAP SERPL CALCULATED.3IONS-SCNC: 8 MMOL/L (ref 4–13)
BACTERIA UR QL AUTO: ABNORMAL /HPF
BASOPHILS # BLD AUTO: 0.03 THOUSANDS/ÂΜL (ref 0–0.1)
BASOPHILS NFR BLD AUTO: 0 % (ref 0–1)
BILIRUB UR QL STRIP: NEGATIVE
BUN SERPL-MCNC: 16 MG/DL (ref 5–25)
CALCIUM SERPL-MCNC: 9 MG/DL (ref 8.4–10.2)
CHLORIDE SERPL-SCNC: 110 MMOL/L (ref 96–108)
CLARITY UR: ABNORMAL
CO2 SERPL-SCNC: 22 MMOL/L (ref 21–32)
COLOR UR: YELLOW
CREAT SERPL-MCNC: 1.39 MG/DL (ref 0.6–1.3)
EOSINOPHIL # BLD AUTO: 0.14 THOUSAND/ÂΜL (ref 0–0.61)
EOSINOPHIL NFR BLD AUTO: 1 % (ref 0–6)
ERYTHROCYTE [DISTWIDTH] IN BLOOD BY AUTOMATED COUNT: 13.5 % (ref 11.6–15.1)
GFR SERPL CREATININE-BSD FRML MDRD: 50 ML/MIN/1.73SQ M
GLUCOSE SERPL-MCNC: 133 MG/DL (ref 65–140)
GLUCOSE SERPL-MCNC: 168 MG/DL (ref 65–140)
GLUCOSE SERPL-MCNC: 183 MG/DL (ref 65–140)
GLUCOSE SERPL-MCNC: 47 MG/DL (ref 65–140)
GLUCOSE SERPL-MCNC: 62 MG/DL (ref 65–140)
GLUCOSE SERPL-MCNC: 66 MG/DL (ref 65–140)
GLUCOSE UR STRIP-MCNC: NEGATIVE MG/DL
HCT VFR BLD AUTO: 33.5 % (ref 36.5–49.3)
HGB BLD-MCNC: 11.2 G/DL (ref 12–17)
HGB UR QL STRIP.AUTO: ABNORMAL
IMM GRANULOCYTES # BLD AUTO: 0.05 THOUSAND/UL (ref 0–0.2)
IMM GRANULOCYTES NFR BLD AUTO: 1 % (ref 0–2)
KETONES UR STRIP-MCNC: NEGATIVE MG/DL
LEUKOCYTE ESTERASE UR QL STRIP: ABNORMAL
LYMPHOCYTES # BLD AUTO: 2.19 THOUSANDS/ÂΜL (ref 0.6–4.47)
LYMPHOCYTES NFR BLD AUTO: 21 % (ref 14–44)
MAGNESIUM SERPL-MCNC: 2.2 MG/DL (ref 1.9–2.7)
MCH RBC QN AUTO: 30.3 PG (ref 26.8–34.3)
MCHC RBC AUTO-ENTMCNC: 33.4 G/DL (ref 31.4–37.4)
MCV RBC AUTO: 91 FL (ref 82–98)
MONOCYTES # BLD AUTO: 0.74 THOUSAND/ÂΜL (ref 0.17–1.22)
MONOCYTES NFR BLD AUTO: 7 % (ref 4–12)
NEUTROPHILS # BLD AUTO: 7.33 THOUSANDS/ÂΜL (ref 1.85–7.62)
NEUTS SEG NFR BLD AUTO: 70 % (ref 43–75)
NITRITE UR QL STRIP: NEGATIVE
NON-SQ EPI CELLS URNS QL MICRO: ABNORMAL /HPF
NRBC BLD AUTO-RTO: 0 /100 WBCS
OTHER STN SPEC: ABNORMAL
PH UR STRIP.AUTO: 6 [PH]
PLATELET # BLD AUTO: 338 THOUSANDS/UL (ref 149–390)
PMV BLD AUTO: 9.5 FL (ref 8.9–12.7)
POTASSIUM SERPL-SCNC: 2.9 MMOL/L (ref 3.5–5.3)
PROT UR STRIP-MCNC: ABNORMAL MG/DL
RBC # BLD AUTO: 3.7 MILLION/UL (ref 3.88–5.62)
RBC #/AREA URNS AUTO: ABNORMAL /HPF
SODIUM SERPL-SCNC: 140 MMOL/L (ref 135–147)
SP GR UR STRIP.AUTO: <=1.005 (ref 1–1.03)
UROBILINOGEN UR QL STRIP.AUTO: 0.2 E.U./DL
WBC # BLD AUTO: 10.48 THOUSAND/UL (ref 4.31–10.16)
WBC #/AREA URNS AUTO: ABNORMAL /HPF

## 2024-03-23 PROCEDURE — 87077 CULTURE AEROBIC IDENTIFY: CPT

## 2024-03-23 PROCEDURE — 83735 ASSAY OF MAGNESIUM: CPT

## 2024-03-23 PROCEDURE — 87086 URINE CULTURE/COLONY COUNT: CPT

## 2024-03-23 PROCEDURE — 82948 REAGENT STRIP/BLOOD GLUCOSE: CPT

## 2024-03-23 PROCEDURE — 87107 FUNGI IDENTIFICATION MOLD: CPT

## 2024-03-23 PROCEDURE — 87186 SC STD MICRODIL/AGAR DIL: CPT

## 2024-03-23 PROCEDURE — 81001 URINALYSIS AUTO W/SCOPE: CPT

## 2024-03-23 PROCEDURE — 85025 COMPLETE CBC W/AUTO DIFF WBC: CPT

## 2024-03-23 PROCEDURE — 80048 BASIC METABOLIC PNL TOTAL CA: CPT

## 2024-03-23 PROCEDURE — 87147 CULTURE TYPE IMMUNOLOGIC: CPT

## 2024-03-23 PROCEDURE — 99232 SBSQ HOSP IP/OBS MODERATE 35: CPT | Performed by: INTERNAL MEDICINE

## 2024-03-23 RX ORDER — POTASSIUM CHLORIDE 14.9 MG/ML
20 INJECTION INTRAVENOUS ONCE
Status: COMPLETED | OUTPATIENT
Start: 2024-03-23 | End: 2024-03-24

## 2024-03-23 RX ORDER — ACETAMINOPHEN 325 MG/1
975 TABLET ORAL EVERY 8 HOURS PRN
Status: DISCONTINUED | OUTPATIENT
Start: 2024-03-23 | End: 2024-03-24 | Stop reason: HOSPADM

## 2024-03-23 RX ORDER — POTASSIUM CHLORIDE 14.9 MG/ML
20 INJECTION INTRAVENOUS ONCE
Status: COMPLETED | OUTPATIENT
Start: 2024-03-23 | End: 2024-03-23

## 2024-03-23 RX ORDER — BISACODYL 10 MG
10 SUPPOSITORY, RECTAL RECTAL ONCE
Status: DISCONTINUED | OUTPATIENT
Start: 2024-03-23 | End: 2024-03-24 | Stop reason: HOSPADM

## 2024-03-23 RX ORDER — AMOXICILLIN 250 MG
1 CAPSULE ORAL
Status: DISCONTINUED | OUTPATIENT
Start: 2024-03-23 | End: 2024-03-23

## 2024-03-23 RX ORDER — AMOXICILLIN 250 MG
1 CAPSULE ORAL 2 TIMES DAILY
Status: DISCONTINUED | OUTPATIENT
Start: 2024-03-23 | End: 2024-03-24 | Stop reason: HOSPADM

## 2024-03-23 RX ORDER — SODIUM CHLORIDE, SODIUM LACTATE, POTASSIUM CHLORIDE, CALCIUM CHLORIDE 600; 310; 30; 20 MG/100ML; MG/100ML; MG/100ML; MG/100ML
75 INJECTION, SOLUTION INTRAVENOUS CONTINUOUS
Status: DISCONTINUED | OUTPATIENT
Start: 2024-03-23 | End: 2024-03-24

## 2024-03-23 RX ORDER — POTASSIUM CHLORIDE 29.8 MG/ML
40 INJECTION INTRAVENOUS ONCE
Status: DISCONTINUED | OUTPATIENT
Start: 2024-03-23 | End: 2024-03-23 | Stop reason: CLARIF

## 2024-03-23 RX ADMIN — PRAVASTATIN SODIUM 80 MG: 80 TABLET ORAL at 16:54

## 2024-03-23 RX ADMIN — POLYETHYLENE GLYCOL 3350 17 G: 17 POWDER, FOR SOLUTION ORAL at 08:17

## 2024-03-23 RX ADMIN — INSULIN DETEMIR 30 UNITS: 100 INJECTION, SOLUTION SUBCUTANEOUS at 22:09

## 2024-03-23 RX ADMIN — HEPARIN SODIUM 5000 UNITS: 5000 INJECTION, SOLUTION INTRAVENOUS; SUBCUTANEOUS at 22:07

## 2024-03-23 RX ADMIN — POTASSIUM CHLORIDE 20 MEQ: 14.9 INJECTION, SOLUTION INTRAVENOUS at 12:30

## 2024-03-23 RX ADMIN — SENNOSIDES AND DOCUSATE SODIUM 1 TABLET: 50; 8.6 TABLET ORAL at 10:26

## 2024-03-23 RX ADMIN — CEFAZOLIN SODIUM 2000 MG: 2 SOLUTION INTRAVENOUS at 01:04

## 2024-03-23 RX ADMIN — CEFAZOLIN SODIUM 2000 MG: 2 SOLUTION INTRAVENOUS at 16:56

## 2024-03-23 RX ADMIN — CEFAZOLIN SODIUM 2000 MG: 2 SOLUTION INTRAVENOUS at 10:25

## 2024-03-23 RX ADMIN — POTASSIUM CHLORIDE 20 MEQ: 14.9 INJECTION, SOLUTION INTRAVENOUS at 09:30

## 2024-03-23 RX ADMIN — SODIUM CHLORIDE, SODIUM LACTATE, POTASSIUM CHLORIDE, AND CALCIUM CHLORIDE 75 ML/HR: .6; .31; .03; .02 INJECTION, SOLUTION INTRAVENOUS at 10:25

## 2024-03-23 RX ADMIN — INSULIN LISPRO 1 UNITS: 100 INJECTION, SOLUTION INTRAVENOUS; SUBCUTANEOUS at 22:10

## 2024-03-23 RX ADMIN — SODIUM CHLORIDE 125 ML/HR: 0.9 INJECTION, SOLUTION INTRAVENOUS at 05:52

## 2024-03-23 RX ADMIN — INSULIN LISPRO 1 UNITS: 100 INJECTION, SOLUTION INTRAVENOUS; SUBCUTANEOUS at 16:54

## 2024-03-23 RX ADMIN — GLIMEPIRIDE 2 MG: 2 TABLET ORAL at 08:17

## 2024-03-23 RX ADMIN — HEPARIN SODIUM 5000 UNITS: 5000 INJECTION, SOLUTION INTRAVENOUS; SUBCUTANEOUS at 14:25

## 2024-03-23 RX ADMIN — Medication 1 TABLET: at 08:17

## 2024-03-23 RX ADMIN — POTASSIUM CHLORIDE 20 MEQ: 200 INJECTION, SOLUTION INTRAVENOUS at 13:30

## 2024-03-23 RX ADMIN — HEPARIN SODIUM 5000 UNITS: 5000 INJECTION, SOLUTION INTRAVENOUS; SUBCUTANEOUS at 05:56

## 2024-03-23 RX ADMIN — POTASSIUM CHLORIDE 20 MEQ: 14.9 INJECTION, SOLUTION INTRAVENOUS at 15:45

## 2024-03-23 RX ADMIN — SENNOSIDES AND DOCUSATE SODIUM 1 TABLET: 50; 8.6 TABLET ORAL at 17:20

## 2024-03-23 RX ADMIN — ABIRATERONE ACETATE 250 MG: 250 TABLET ORAL at 08:18

## 2024-03-23 NOTE — PLAN OF CARE
Problem: PAIN - ADULT  Goal: Verbalizes/displays adequate comfort level or baseline comfort level  Description: Interventions:  - Encourage patient to monitor pain and request assistance  - Assess pain using appropriate pain scale  - Administer analgesics based on type and severity of pain and evaluate response  - Implement non-pharmacological measures as appropriate and evaluate response  - Consider cultural and social influences on pain and pain management  - Notify physician/advanced practitioner if interventions unsuccessful or patient reports new pain  Outcome: Progressing     Problem: INFECTION - ADULT  Goal: Absence or prevention of progression during hospitalization  Description: INTERVENTIONS:  - Assess and monitor for signs and symptoms of infection  - Monitor lab/diagnostic results  - Monitor all insertion sites, i.e. indwelling lines, tubes, and drains  - Monitor endotracheal if appropriate and nasal secretions for changes in amount and color  - [place] appropriate cooling/warming therapies per order  - Administer medications as ordered  - Instruct and encourage patient and family to use good hand hygiene technique  - Identify and instruct in appropriate isolation precautions for identified infection/condition  Outcome: Progressing  Goal: Absence of fever/infection during neutropenic period  Description: INTERVENTIONS:  - Monitor WBC    Outcome: Progressing     Problem: SAFETY ADULT  Goal: Patient will remain free of falls  Description: INTERVENTIONS:  - Educate patient/family on patient safety including physical limitations  - Instruct patient to call for assistance with activity   - Consult OT/PT to assist with strengthening/mobility   - Keep Call bell within reach  - Keep bed low and locked with side rails adjusted as appropriate  - Keep care items and personal belongings within reach  - Initiate and maintain comfort rounds  - Make Fall Risk Sign visible to staff  - Apply yellow socks and bracelet  for high fall risk patients  - Consider moving patient to room near nurses station  Outcome: Progressing  Goal: Maintain or return to baseline ADL function  Description: INTERVENTIONS:  -  Assess patient's ability to carry out ADLs; assess patient's baseline for ADL function and identify physical deficits which impact ability to perform ADLs (bathing, care of mouth/teeth, toileting, grooming, dressing, etc.)  - Assess/evaluate cause of self-care deficits   - Assess range of motion  - Assess patient's mobility; develop plan if impaired  - Assess patient's need for assistive devices and provide as appropriate  - Encourage maximum independence but intervene and supervise when necessary  - Involve family in performance of ADLs  - Assess for home care needs following discharge   - Consider OT consult to assist with ADL evaluation and planning for discharge  - Provide patient education as appropriate  Outcome: Progressing  Goal: Maintains/Returns to pre admission functional level  Description: INTERVENTIONS:  - Perform AM-PAC 6 Click Basic Mobility/ Daily Activity assessment daily.  - Set and communicate daily mobility goal to care team and patient/family/caregiver.   - Collaborate with rehabilitation services on mobility goals if consulted  - Perform Range of Motion 3 times a day.  - Reposition patient every 3 hours.  - Dangle patient 3 times a day  - Stand patient 3 times a day  - Ambulate patient 3 times a day  - Out of bed to chair 3 times a day   - Out of bed for meals 3 times a day  - Out of bed for toileting  - Record patient progress and toleration of activity level   Outcome: Progressing     Problem: DISCHARGE PLANNING  Goal: Discharge to home or other facility with appropriate resources  Description: INTERVENTIONS:  - Identify barriers to discharge w/patient and caregiver  - Arrange for needed discharge resources and transportation as appropriate  - Identify discharge learning needs (meds, wound care, etc.)  -  Arrange for interpretive services to assist at discharge as needed  - Refer to Case Management Department for coordinating discharge planning if the patient needs post-hospital services based on physician/advanced practitioner order or complex needs related to functional status, cognitive ability, or social support system  Outcome: Progressing     Problem: Knowledge Deficit  Goal: Patient/family/caregiver demonstrates understanding of disease process, treatment plan, medications, and discharge instructions  Description: Complete learning assessment and assess knowledge base.  Interventions:  - Provide teaching at level of understanding  - Provide teaching via preferred learning methods  Outcome: Progressing

## 2024-03-23 NOTE — PROGRESS NOTES
Daily Progress Note - [facility]  Family Medicine Residency  Oliver Astudillo 73 y.o. male MRN: [id number]  Unit/Bed#: [address] [place] [address] Encounter: [id number]  Admitting Physician: Yas Webber DO  PCP: Eugenia Rodriguez MD  Date of Admission:  3/18/2024  3:43 PM    Summary:     IVFs: lactated ringers, Last Rate: 75 mL/hr (03/23/24 1025)      Diet: Diet Rupesh/CHO Controlled; Consistent Carbohydrate Diet Level 2 (5 carb servings/75 grams CHO/meal)  VTE:  Heparin . Mechanical prophylaxis in place.   Patient Centered Rounds: I have performed bedside rounds with nursing staff today.  Specialists/Other Care Team Provider: urology,    LOS: 5 day(s)  Status: Inpatient   Disposition: The patient will continue to require additional inpatient hospital stay due to complicated UTI  Code Status: Level 1 - Full Code      Assessment and Plan    * Urinary tract infection associated with nephrostomy catheter   Assessment & Plan  Patient had bilateral flank  left>> right side.  He also complains of  pain with flushing nephrostomy tube and decreased output of urine into the left nephrostomy bag.  He has mild dysuria, chills and nausea but denied any fever or vomiting.    CT abdomen pelvis with contrast: Bilateral percutaneous nephrostomy tubes in expected position. Mild right hydronephrosis. Bilateral pyelitis and ureteritis. No clear evidence of nephritis. Findings suggestive of cystitis.    UA was positive for nitrates and leukocytes with protein, glucose, bacteria, BC and WBC  Prior urine cultures were positive for E. Coli and enterobacter    WBC: peak of 14  Lactic acid 2.6  Repeat CT scan abdomen pelvis with contrast:       Bilateral percutaneous nephrostomy tubes are in place. Mild left hydronephrosis is similar to prior study. New enhancement within the distal left ureter may be due to hyperemia. Unchanged bladder wall thickening likely representing cystitis. Inflammation of the intrarenal collecting systems  and ureters is similar to prior study.    Likely due to urinary tract obstruction and nephrostomy tube dysfunction    Plan  Blood cutures NG @ 72 hrs, Urine Culture : G - Rods  Pending Urine cultures separately from nephrostomy tubes  Discontinue cefepime 2 g q12 hourly  Discontinue Fuconazole  Start Cefazolin 2gm q 8 hourly  IR consulted, continue to appreciate recs:  Nephrostomy tubes repositioned on 03/20/24  Recommending flushing only   Consult Urology, Dr. Talbert's recs    Hypokalemia  Assessment & Plan  K 2.9, start telemetry, IV potassium repleted, switch IVF to LR. F/u AM BMP    Acute kidney injury (HCC)  Assessment & Plan  Cr today 1.80,  Likely in the setting of IV contrast given for CT abdomen pelvis    Monitor BMP  IVF increased to 125ml/hr    Dizziness  Assessment & Plan  Patient has had episodes of dizziness and lightheadedness whenever he walks or does some activity.  As per wife, patient had an episode of dizziness when walking up the stairs and nearly fell over but was caught by his wife  Patient has no prior echo on file    Patient is not complaining of dizziness/lightheadedness anymore, and is able to ambulate without difficulty to the bathroom     Plan  Orthostatic vitals  ECHO: Left ventricular cavity size is normal. Wall thickness is mildly increased. The left ventricular ejection fraction is 60%. GLS is -17.2%. Systolic function is normal.     Constipation  Assessment & Plan  Patient has not have a bowel movement since 3 days, but he is passing gas  Start MiraLAX per oral daily  Start Colace twice daily  CT scan abdomen pelvis with contrast ordered     Hyperlipidemia  Assessment & Plan  Chronic, stable.  Patient is on Crestor 10 mg daily at home    Plan  Crestor not in hospital formulary; replaced with pravastatin    Hyponatremia  Assessment & Plan  Resolved    Na 130 on admission. Likely hyperglycemic hyponatremia. Urine sodium and urine Osmolality: WNL    Plan  Increase IVF NS to 125  ml/hr  Continue to monitor BMP daily  Strict input and output monitoring    Nephrostomy status ([facility])  Assessment & Plan  Pt has h/o prostate cancer leading to urinary obstruction treated with bilateral percutaneous nephrostomy tubes since 6/22/23  Patient presents with complaint of decreased urine output in left nephrostomy bag and flank pain worse on left side.  Nephrostomy tube was most recently exchanged in the last hospitalization 01/31/2024.  Pt has had recurrent nephrostomy tube dysfunction and urinary tract infections since last few months.   Pt states that he irrigates tubes at least twice daily  Ct scan showed bilateral percutaneous nephrostomy tubes in expected position with mild right hydronephrosis, bilateral pyelitis and ureteritis.    Plan  IR consulted, continue to appreciate recommendations: See above    Prostate cancer ([facility])  Assessment & Plan  History of advanced metastatic prostate cancer diagnosed in May 2023.  He last received lupron on 1/11. Sees  Dr. Coyne at medical oncology     Plan   Continue Lupron q6 months, Zytiga and dexamethasone     Hypertension  Assessment & Plan  Chronic, stable.  On admission, BP was elevated at 187/73 mm Hg   Home regimen includes lisinopril 10mg daily     Plan  Continue with lisinopril 10 mg with holding parameters  Will add labetalol or hydralazine if blood pressure remains elevated  Monitor vitals and blood pressure daily    Type 2 diabetes mellitus, with long-term current use of insulin ([facility])  Assessment & Plan  Lab Results   Component Value Date    HGBA1C 11.1 (A) 01/08/2024       Recent Labs     03/22/24  2057 03/23/24  0700 03/23/24  0809 03/23/24  1053   POCGLU 107 47* 66 133       Blood Sugar Average: Last 72 hrs:  (P) [id number]    Chronic, uncontrolled. Last A1c was 11.1. Home medications include Levemir 30 units HS, Glimepiride 2 mg lunch, Glimepiride 4 mg breakfast, Steglatro 5 mg at  breakfast.  His Ivokana was changed to Steglatro due to  insurance issues. Per patient, he has been compliant with all his medications    Plan   Patient started on insulin sliding scale and Accu-Cheks ACHS  Decrease Levemir to 30U HS  Discontinue Amaryl 4 mg daily with breakfast  Continue Amaryl 2 mg daily with dinner  Carbohydrate controlled diet  Hypoglycemia protocol  Advised to eat snacks before bedtime            Subjective:   Patient evaluated at bedside, noting bilateral nephrostomy tube output. Decrease in pain from yesterday, starting to feel better.       Objective     Objective:   Vitals:   Temp (24hrs), Av.1 °F (36.7 °C), Min:98 °F (36.7 °C), Max:98.3 °F (36.8 °C)    Temp:  [98 °F (36.7 °C)-98.3 °F (36.8 °C)] 98 °F (36.7 °C)  HR:  [58-68] 58  Resp:  [18] 18  BP: (112-138)/(56-68) 132/63  SpO2:  [96 %-99 %] 96 %  Body mass index is 26 kg/m².     Input and Output Summary (last 24 hours):       Intake/Output Summary (Last 24 hours) at 3/23/2024 1445  Last data filed at 3/23/2024 0616  Gross per 24 hour   Intake 160 ml   Output 2066 ml   Net -1906 ml       Physical Exam:   Physical Exam  HENT:      Head: Normocephalic and atraumatic.      Right Ear: External ear normal.      Left Ear: External ear normal.      Mouth/Throat:      Pharynx: Oropharynx is clear.   Eyes:      Conjunctiva/sclera: Conjunctivae normal.   Cardiovascular:      Rate and Rhythm: Normal rate and regular rhythm.      Heart sounds: Normal heart sounds.   Pulmonary:      Effort: Pulmonary effort is normal.      Breath sounds: Normal breath sounds.   Abdominal:      General: There is no distension.      Palpations: Abdomen is soft.      Tenderness: There is no abdominal tenderness.      Comments: Bilateral nephrostomy tubes in place   Musculoskeletal:         General: Normal range of motion.   Skin:     General: Skin is warm and dry.   Neurological:      Mental Status: He is alert and oriented to person, place, and time.         Additional Data:     Labs:  Results from last 7 days   Lab Units  03/23/24  0610   WBC Thousand/uL 10.48*   HEMOGLOBIN g/dL 11.2*   HEMATOCRIT % 33.5*   PLATELETS Thousands/uL 338   NEUTROS PCT % 70   LYMPHS PCT % 21   MONOS PCT % 7   EOS PCT % 1     Results from last 7 days   Lab Units 03/23/24  0610 03/22/24  0555 03/21/24  0540   POTASSIUM mmol/L 2.9*   < > 3.6   CHLORIDE mmol/L 110*   < > 105   CO2 mmol/L 22   < > 23   BUN mg/dL 16   < > 15   CREATININE mg/dL 1.39*   < > 1.22   CALCIUM mg/dL 9.0   < > 9.0   ALK PHOS U/L  --   --  66   ALT U/L  --   --  11   AST U/L  --   --  13    < > = values in this interval not displayed.         Results from last 7 days   Lab Units 03/23/24  1053 03/23/24  0809 03/23/24  0700 03/22/24  2057 03/22/24  1553   POC GLUCOSE mg/dl 133 66 47* 107 98           * I Have Reviewed All Lab Data Listed Above.  * Additional Pertinent Lab Tests Reviewed: All Labs Within Last 24 Hours Reviewed    Imaging:    Imaging Reports Reviewed Today Include: none  Imaging Personally Reviewed by Myself Includes:  none    Recent Cultures (last 7 days):     Results from last 7 days   Lab Units 03/22/24  1101 03/19/24  0055 03/19/24  0043 03/18/24  1709   BLOOD CULTURE   --  No Growth After 4 Days. No Growth After 4 Days.  --    URINE CULTURE  Culture results to follow.  --   --  >100,000 cfu/ml Escherichia coli*       Last 24 Hours Medication List:   Current Facility-Administered Medications   Medication Dose Route Frequency Provider Last Rate    abiraterone  250 mg Oral Daily Jarad Thomas MD      acetaminophen  975 mg Oral Q8H PRN Yas Webber DO      albuterol  2 puff Inhalation Q4H PRN Jarad Thomas MD      bisacodyl  10 mg Rectal Once Purvi Richard MD      calcium carbonate  1,000 mg Oral Daily PRN Jarad Thomas MD      cefazolin  2,000 mg Intravenous Q8H Radha Sanchez MD 2,000 mg (03/23/24 1025)    glimepiride  2 mg Oral Daily With Breakfast Cb Martinez MD      heparin (porcine)  5,000 Units Subcutaneous Q8H [facility] Jarad Thomas,  MD      insulin detemir  30 Units Subcutaneous HS Yas Webber DO      insulin lispro  1-6 Units Subcutaneous 4x Daily (AC & HS) Jarad Thomas MD      lactated ringers  75 mL/hr Intravenous Continuous Yas Webber DO 75 mL/hr (03/23/24 1025)    multivitamin-minerals  1 tablet Oral Daily Jarad Thomas MD      ondansetron  4 mg Intravenous Q6H PRN Jarad Thomas MD      oxyCODONE  2.5 mg Oral Q6H PRN Yas Webber DO      polyethylene glycol  17 g Oral Daily Radha Sanchez MD      potassium chloride  20 mEq Intravenous Once Yas Webber DO 20 mEq (03/23/24 1330)    Followed by    potassium chloride  20 mEq Intravenous Once Yas Webber DO      pravastatin  80 mg Oral Daily With Dinner Jarad Thomas MD      senna-docusate sodium  1 tablet Oral BID Purvi Richard MD                 Patient Information Sharing: With the consent of Oliver Astudillo , their loved ones were notified today by inpatient team of the patient’s condition and current plan.  All questions answered.   Time Spent for Care: 30 minutes.  More than 50% of total time spent on counseling and coordination of care as described above.    ** Please Note: Dictation voice to text software may have been used in the creation of this document. **    Yas Webber DO  03/23/24  2:45 PM

## 2024-03-23 NOTE — PLAN OF CARE
Problem: PAIN - ADULT  Goal: Verbalizes/displays adequate comfort level or baseline comfort level  Description: Interventions:  - Encourage patient to monitor pain and request assistance  - Assess pain using appropriate pain scale  - Administer analgesics based on type and severity of pain and evaluate response  - Implement non-pharmacological measures as appropriate and evaluate response  - Consider cultural and social influences on pain and pain management  - Notify physician/advanced practitioner if interventions unsuccessful or patient reports new pain  Outcome: Progressing     Problem: INFECTION - ADULT  Goal: Absence or prevention of progression during hospitalization  Description: INTERVENTIONS:  - Assess and monitor for signs and symptoms of infection  - Monitor lab/diagnostic results  - Monitor all insertion sites, i.e. indwelling lines, tubes, and drains  - Monitor endotracheal if appropriate and nasal secretions for changes in amount and color  - [place] appropriate cooling/warming therapies per order  - Administer medications as ordered  - Instruct and encourage patient and family to use good hand hygiene technique  - Identify and instruct in appropriate isolation precautions for identified infection/condition  Outcome: Progressing  Goal: Absence of fever/infection during neutropenic period  Description: INTERVENTIONS:  - Monitor WBC    Outcome: Progressing     Problem: SAFETY ADULT  Goal: Patient will remain free of falls  Description: INTERVENTIONS:  - Educate patient/family on patient safety including physical limitations  - Instruct patient to call for assistance with activity   - Consult OT/PT to assist with strengthening/mobility   - Keep Call bell within reach  - Keep bed low and locked with side rails adjusted as appropriate  - Keep care items and personal belongings within reach  - Initiate and maintain comfort rounds  - Make Fall Risk Sign visible to staff  - Offer Toileting every  Hours,  in advance of need  - Initiate/Maintain alarm  - Obtain necessary fall risk management equipment:   - Apply yellow socks and bracelet for high fall risk patients  - Consider moving patient to room near nurses station  Outcome: Progressing  Goal: Maintain or return to baseline ADL function  Description: INTERVENTIONS:  -  Assess patient's ability to carry out ADLs; assess patient's baseline for ADL function and identify physical deficits which impact ability to perform ADLs (bathing, care of mouth/teeth, toileting, grooming, dressing, etc.)  - Assess/evaluate cause of self-care deficits   - Assess range of motion  - Assess patient's mobility; develop plan if impaired  - Assess patient's need for assistive devices and provide as appropriate  - Encourage maximum independence but intervene and supervise when necessary  - Involve family in performance of ADLs  - Assess for home care needs following discharge   - Consider OT consult to assist with ADL evaluation and planning for discharge  - Provide patient education as appropriate  Outcome: Progressing  Goal: Maintains/Returns to pre admission functional level  Description: INTERVENTIONS:  - Perform AM-PAC 6 Click Basic Mobility/ Daily Activity assessment daily.  - Set and communicate daily mobility goal to care team and patient/family/caregiver.   - Collaborate with rehabilitation services on mobility goals if consulted  - Perform Range of Motion  times a day.  - Reposition patient every  hours.  - Dangle patient  times a day  - Stand patient  times a day  - Ambulate patient  times a day  - Out of bed to chair  times a day   - Out of bed for meals  times a day  - Out of bed for toileting  - Record patient progress and toleration of activity level   Outcome: Progressing     Problem: DISCHARGE PLANNING  Goal: Discharge to home or other facility with appropriate resources  Description: INTERVENTIONS:  - Identify barriers to discharge w/patient and caregiver  - Arrange for  needed discharge resources and transportation as appropriate  - Identify discharge learning needs (meds, wound care, etc.)  - Arrange for interpretive services to assist at discharge as needed  - Refer to Case Management Department for coordinating discharge planning if the patient needs post-hospital services based on physician/advanced practitioner order or complex needs related to functional status, cognitive ability, or social support system  Outcome: Progressing     Problem: Knowledge Deficit  Goal: Patient/family/caregiver demonstrates understanding of disease process, treatment plan, medications, and discharge instructions  Description: Complete learning assessment and assess knowledge base.  Interventions:  - Provide teaching at level of understanding  - Provide teaching via preferred learning methods  Outcome: Progressing

## 2024-03-24 VITALS
OXYGEN SATURATION: 99 % | WEIGHT: 171 LBS | TEMPERATURE: 98.2 F | RESPIRATION RATE: 17 BRPM | SYSTOLIC BLOOD PRESSURE: 129 MMHG | HEIGHT: 68 IN | DIASTOLIC BLOOD PRESSURE: 66 MMHG | BODY MASS INDEX: 25.91 KG/M2 | HEART RATE: 69 BPM

## 2024-03-24 PROBLEM — R42 DIZZINESS: Status: RESOLVED | Noted: 2024-03-18 | Resolved: 2024-03-24

## 2024-03-24 PROBLEM — E87.1 HYPONATREMIA: Status: RESOLVED | Noted: 2024-01-12 | Resolved: 2024-03-24

## 2024-03-24 PROBLEM — K59.00 CONSTIPATION: Status: RESOLVED | Noted: 2024-01-30 | Resolved: 2024-03-24

## 2024-03-24 PROBLEM — E87.6 HYPOKALEMIA: Status: RESOLVED | Noted: 2024-03-23 | Resolved: 2024-03-24

## 2024-03-24 PROBLEM — N17.9 ACUTE KIDNEY INJURY (HCC): Status: RESOLVED | Noted: 2024-03-22 | Resolved: 2024-03-24

## 2024-03-24 LAB
ANION GAP SERPL CALCULATED.3IONS-SCNC: 6 MMOL/L (ref 4–13)
ATRIAL RATE: 61 BPM
BACTERIA BLD CULT: NORMAL
BACTERIA BLD CULT: NORMAL
BACTERIA UR CULT: ABNORMAL
BASOPHILS # BLD AUTO: 0.04 THOUSANDS/ÂΜL (ref 0–0.1)
BASOPHILS NFR BLD AUTO: 1 % (ref 0–1)
BUN SERPL-MCNC: 9 MG/DL (ref 5–25)
CALCIUM SERPL-MCNC: 8.4 MG/DL (ref 8.4–10.2)
CHLORIDE SERPL-SCNC: 110 MMOL/L (ref 96–108)
CO2 SERPL-SCNC: 25 MMOL/L (ref 21–32)
CREAT SERPL-MCNC: 1.09 MG/DL (ref 0.6–1.3)
EOSINOPHIL # BLD AUTO: 0.09 THOUSAND/ÂΜL (ref 0–0.61)
EOSINOPHIL NFR BLD AUTO: 1 % (ref 0–6)
ERYTHROCYTE [DISTWIDTH] IN BLOOD BY AUTOMATED COUNT: 13.6 % (ref 11.6–15.1)
GFR SERPL CREATININE-BSD FRML MDRD: 67 ML/MIN/1.73SQ M
GLUCOSE SERPL-MCNC: 200 MG/DL (ref 65–140)
GLUCOSE SERPL-MCNC: 46 MG/DL (ref 65–140)
GLUCOSE SERPL-MCNC: 47 MG/DL (ref 65–140)
GLUCOSE SERPL-MCNC: 54 MG/DL (ref 65–140)
GLUCOSE SERPL-MCNC: 68 MG/DL (ref 65–140)
GLUCOSE SERPL-MCNC: 87 MG/DL (ref 65–140)
GLUCOSE SERPL-MCNC: 99 MG/DL (ref 65–140)
HCT VFR BLD AUTO: 31.4 % (ref 36.5–49.3)
HGB BLD-MCNC: 10.4 G/DL (ref 12–17)
IMM GRANULOCYTES # BLD AUTO: 0.04 THOUSAND/UL (ref 0–0.2)
IMM GRANULOCYTES NFR BLD AUTO: 1 % (ref 0–2)
LYMPHOCYTES # BLD AUTO: 2.28 THOUSANDS/ÂΜL (ref 0.6–4.47)
LYMPHOCYTES NFR BLD AUTO: 28 % (ref 14–44)
MCH RBC QN AUTO: 29.7 PG (ref 26.8–34.3)
MCHC RBC AUTO-ENTMCNC: 33.1 G/DL (ref 31.4–37.4)
MCV RBC AUTO: 90 FL (ref 82–98)
MONOCYTES # BLD AUTO: 0.52 THOUSAND/ÂΜL (ref 0.17–1.22)
MONOCYTES NFR BLD AUTO: 6 % (ref 4–12)
NEUTROPHILS # BLD AUTO: 5.15 THOUSANDS/ÂΜL (ref 1.85–7.62)
NEUTS SEG NFR BLD AUTO: 63 % (ref 43–75)
NRBC BLD AUTO-RTO: 0 /100 WBCS
P AXIS: 27 DEGREES
PLATELET # BLD AUTO: 350 THOUSANDS/UL (ref 149–390)
PMV BLD AUTO: 9.3 FL (ref 8.9–12.7)
POTASSIUM SERPL-SCNC: 3.1 MMOL/L (ref 3.5–5.3)
PR INTERVAL: 142 MS
QRS AXIS: 8 DEGREES
QRSD INTERVAL: 106 MS
QT INTERVAL: 440 MS
QTC INTERVAL: 442 MS
RBC # BLD AUTO: 3.5 MILLION/UL (ref 3.88–5.62)
SODIUM SERPL-SCNC: 141 MMOL/L (ref 135–147)
T WAVE AXIS: 22 DEGREES
VENTRICULAR RATE: 61 BPM
WBC # BLD AUTO: 8.12 THOUSAND/UL (ref 4.31–10.16)

## 2024-03-24 PROCEDURE — 99239 HOSP IP/OBS DSCHRG MGMT >30: CPT | Performed by: INTERNAL MEDICINE

## 2024-03-24 PROCEDURE — 93010 ELECTROCARDIOGRAM REPORT: CPT | Performed by: INTERNAL MEDICINE

## 2024-03-24 PROCEDURE — 85025 COMPLETE CBC W/AUTO DIFF WBC: CPT

## 2024-03-24 PROCEDURE — 82948 REAGENT STRIP/BLOOD GLUCOSE: CPT

## 2024-03-24 PROCEDURE — 80048 BASIC METABOLIC PNL TOTAL CA: CPT

## 2024-03-24 RX ORDER — AMOXICILLIN AND CLAVULANATE POTASSIUM 875; 125 MG/1; MG/1
1 TABLET, FILM COATED ORAL EVERY 12 HOURS SCHEDULED
Qty: 6 TABLET | Refills: 0 | Status: SHIPPED | OUTPATIENT
Start: 2024-03-25 | End: 2024-03-28

## 2024-03-24 RX ORDER — AMOXICILLIN 250 MG
1 CAPSULE ORAL 2 TIMES DAILY
Start: 2024-03-24

## 2024-03-24 RX ORDER — FLUCONAZOLE 100 MG/1
200 TABLET ORAL DAILY
Status: DISCONTINUED | OUTPATIENT
Start: 2024-03-24 | End: 2024-03-24 | Stop reason: HOSPADM

## 2024-03-24 RX ORDER — FLUCONAZOLE 200 MG/1
200 TABLET ORAL DAILY
Qty: 10 TABLET | Refills: 0 | Status: SHIPPED | OUTPATIENT
Start: 2024-03-25 | End: 2024-04-04

## 2024-03-24 RX ORDER — POTASSIUM CHLORIDE 20 MEQ/1
40 TABLET, EXTENDED RELEASE ORAL ONCE
Status: COMPLETED | OUTPATIENT
Start: 2024-03-24 | End: 2024-03-24

## 2024-03-24 RX ORDER — GLIMEPIRIDE 2 MG/1
2 TABLET ORAL
Qty: 30 TABLET | Refills: 1 | Status: SHIPPED | OUTPATIENT
Start: 2024-03-24

## 2024-03-24 RX ORDER — POLYETHYLENE GLYCOL 3350 17 G/17G
17 POWDER, FOR SOLUTION ORAL DAILY PRN
Status: DISCONTINUED | OUTPATIENT
Start: 2024-03-24 | End: 2024-03-24 | Stop reason: HOSPADM

## 2024-03-24 RX ORDER — POTASSIUM CHLORIDE 14.9 MG/ML
20 INJECTION INTRAVENOUS ONCE
Qty: 100 ML | Refills: 0 | Status: COMPLETED | OUTPATIENT
Start: 2024-03-24 | End: 2024-03-24

## 2024-03-24 RX ORDER — POLYETHYLENE GLYCOL 3350 17 G/17G
17 POWDER, FOR SOLUTION ORAL DAILY PRN
Start: 2024-03-24

## 2024-03-24 RX ORDER — DEXTROSE MONOHYDRATE 25 G/50ML
25 INJECTION, SOLUTION INTRAVENOUS ONCE
Status: COMPLETED | OUTPATIENT
Start: 2024-03-24 | End: 2024-03-24

## 2024-03-24 RX ADMIN — FLUCONAZOLE 200 MG: 100 TABLET ORAL at 12:29

## 2024-03-24 RX ADMIN — VANCOMYCIN HYDROCHLORIDE 1500 MG: 10 INJECTION, POWDER, LYOPHILIZED, FOR SOLUTION INTRAVENOUS at 12:28

## 2024-03-24 RX ADMIN — POLYETHYLENE GLYCOL 3350 17 G: 17 POWDER, FOR SOLUTION ORAL at 10:16

## 2024-03-24 RX ADMIN — POTASSIUM CHLORIDE 40 MEQ: 1500 TABLET, EXTENDED RELEASE ORAL at 10:29

## 2024-03-24 RX ADMIN — Medication 1 TABLET: at 10:00

## 2024-03-24 RX ADMIN — POTASSIUM CHLORIDE 20 MEQ: 14.9 INJECTION, SOLUTION INTRAVENOUS at 10:00

## 2024-03-24 RX ADMIN — CEFAZOLIN SODIUM 2000 MG: 2 SOLUTION INTRAVENOUS at 10:00

## 2024-03-24 RX ADMIN — CEFAZOLIN SODIUM 2000 MG: 2 SOLUTION INTRAVENOUS at 02:25

## 2024-03-24 RX ADMIN — DEXTROSE MONOHYDRATE 25 ML: 25 INJECTION, SOLUTION INTRAVENOUS at 02:18

## 2024-03-24 RX ADMIN — HEPARIN SODIUM 5000 UNITS: 5000 INJECTION, SOLUTION INTRAVENOUS; SUBCUTANEOUS at 06:22

## 2024-03-24 RX ADMIN — HEPARIN SODIUM 5000 UNITS: 5000 INJECTION, SOLUTION INTRAVENOUS; SUBCUTANEOUS at 13:02

## 2024-03-24 RX ADMIN — SENNOSIDES AND DOCUSATE SODIUM 1 TABLET: 50; 8.6 TABLET ORAL at 10:00

## 2024-03-24 RX ADMIN — ABIRATERONE ACETATE 250 MG: 250 TABLET ORAL at 10:16

## 2024-03-24 NOTE — PROGRESS NOTES
Oliver Astudillo is a 73 y.o. male who is currently ordered Vancomycin IV with management by the Pharmacy Consult service.  Relevant clinical data and objective / subjective history reviewed.  Vancomycin Assessment:  Indication and Goal AUC/Trough: Urinary tract infection (goal -600, trough >10), -600, trough >10  Clinical Status: stable  Micro:   pending  Renal Function:  SCr: 1.09 mg/dL  CrCl: 59.6 mL/min  Renal replacement: Not on dialysis  Days of Therapy: 1  Current Dose: 1500 mg IV q24h  Vancomycin Plan:  New Dosing:    Estimated AUC: 488 mcg*hr/mL  Estimated Trough: 11.7 mcg/mL  Next Level: 3/27/24 at 0600  Renal Function Monitoring: Daily BMP and UOP  Pharmacy will continue to follow closely for s/sx of nephrotoxicity, infusion reactions and appropriateness of therapy.  BMP and CBC will be ordered per protocol. We will continue to follow the patient’s culture results and clinical progress daily.    Chel Alejandra, Pharmacist

## 2024-03-24 NOTE — DISCHARGE INSTR - AVS FIRST PAGE
For your diabetes,  Start taking Amaryl 2 g daily with breakfast  Start taking metformin 500 mg twice a day. (Sent to [facility] pharmacy in [place])  Start taking insulin Lantus 30 units nightly  Stop taking your Ertugliflozin    please check your blood sugars before each meal and at bedtime  Eat a diabetic, low carbohydrate diet.  Please follow-up with your primary care provider to monitor your blood sugars while on these medications    For your urinary tract infection,  Please start taking Augmentin 1 tablet every 12 hours on 3/25 for 3 days total.  Please start taking fluconazole 1 tablet daily on 3/25 for 10 days total.  You will complete this medication on 4/3.    You have a nephrostomy tube check/exchange scheduled on 5/28/2024.  Please provide [facility] laboratory services urine sample at least 1 week prior to this appointment (5/20/2024)  Please flush your nephrostomy tubes once a day      En español:    Para luevano diabetes,   Empezar a leroy Amaryl 2 g al día   Comience a leroy metformina 500 mg dos veces al día. (Enviado a la farmacia [facility] en [place])   Comience a leroy insulina Lantus 30 unidades todas las noches   Deje de leroy luevano Ertugliflozina     Controle rose niveles de azúcar en la taco antes de cada comida y a la hora de acostarse   Consuma jasmin dieta baja en carbohidratos para diabéticos   Senthil un seguimiento con luevano proveedor de atención primaria para controlar rose niveles de azúcar en la taco mientras ketan estos medicamentos    Para la infección de las vías urinarias,   Comience a leroy Augmentin 1 comprimido cada 12 horas el 3/25/2024 percy 3 días en total.   Comience a leroy fluconazol 1 comprimido al día el 3/25/2024 percy 10 días en total.  Completará monica medicamento el 4/3/2024    Tiene programada jasmin revisión/intercambio de tubos de nefrostomía programada para el 5/28/2024.  Proporcione jasmin muestra de orina de los servicios de laboratorio de [facility] al menos 1 semana antes de  esta gabriel (5/20/2024)    Enjuague los tubos de nefrostomía jasmin vez al día

## 2024-03-24 NOTE — PLAN OF CARE
Problem: PAIN - ADULT  Goal: Verbalizes/displays adequate comfort level or baseline comfort level  Description: Interventions:  - Encourage patient to monitor pain and request assistance  - Assess pain using appropriate pain scale  - Administer analgesics based on type and severity of pain and evaluate response  - Implement non-pharmacological measures as appropriate and evaluate response  - Consider cultural and social influences on pain and pain management  - Notify physician/advanced practitioner if interventions unsuccessful or patient reports new pain  Outcome: Progressing     Problem: INFECTION - ADULT  Goal: Absence or prevention of progression during hospitalization  Description: INTERVENTIONS:  - Assess and monitor for signs and symptoms of infection  - Monitor lab/diagnostic results  - Monitor all insertion sites, i.e. indwelling lines, tubes, and drains  - Monitor endotracheal if appropriate and nasal secretions for changes in amount and color  - [place] appropriate cooling/warming therapies per order  - Administer medications as ordered  - Instruct and encourage patient and family to use good hand hygiene technique  - Identify and instruct in appropriate isolation precautions for identified infection/condition  Outcome: Progressing  Goal: Absence of fever/infection during neutropenic period  Description: INTERVENTIONS:  - Monitor WBC    Outcome: Progressing     Problem: SAFETY ADULT  Goal: Patient will remain free of falls  Description: INTERVENTIONS:  - Educate patient/family on patient safety including physical limitations  - Instruct patient to call for assistance with activity   - Consult OT/PT to assist with strengthening/mobility   - Keep Call bell within reach  - Keep bed low and locked with side rails adjusted as appropriate  - Keep care items and personal belongings within reach  - Initiate and maintain comfort rounds  - Make Fall Risk Sign visible to staff  - Offer Toileting every  Hours,  in advance of need  - Initiate/Maintain alarm  - Obtain necessary fall risk management equipment:   - Apply yellow socks and bracelet for high fall risk patients  - Consider moving patient to room near nurses station  Outcome: Progressing  Goal: Maintain or return to baseline ADL function  Description: INTERVENTIONS:  -  Assess patient's ability to carry out ADLs; assess patient's baseline for ADL function and identify physical deficits which impact ability to perform ADLs (bathing, care of mouth/teeth, toileting, grooming, dressing, etc.)  - Assess/evaluate cause of self-care deficits   - Assess range of motion  - Assess patient's mobility; develop plan if impaired  - Assess patient's need for assistive devices and provide as appropriate  - Encourage maximum independence but intervene and supervise when necessary  - Involve family in performance of ADLs  - Assess for home care needs following discharge   - Consider OT consult to assist with ADL evaluation and planning for discharge  - Provide patient education as appropriate  Outcome: Progressing  Goal: Maintains/Returns to pre admission functional level  Description: INTERVENTIONS:  - Perform AM-PAC 6 Click Basic Mobility/ Daily Activity assessment daily.  - Set and communicate daily mobility goal to care team and patient/family/caregiver.   - Collaborate with rehabilitation services on mobility goals if consulted  - Perform Range of Motion  times a day.  - Reposition patient every  hours.  - Dangle patient  times a day  - Stand patient  times a day  - Ambulate patient  times a day  - Out of bed to chair  times a day   - Out of bed for meals times a day  - Out of bed for toileting  - Record patient progress and toleration of activity level   Outcome: Progressing     Problem: DISCHARGE PLANNING  Goal: Discharge to home or other facility with appropriate resources  Description: INTERVENTIONS:  - Identify barriers to discharge w/patient and caregiver  - Arrange for  needed discharge resources and transportation as appropriate  - Identify discharge learning needs (meds, wound care, etc.)  - Arrange for interpretive services to assist at discharge as needed  - Refer to Case Management Department for coordinating discharge planning if the patient needs post-hospital services based on physician/advanced practitioner order or complex needs related to functional status, cognitive ability, or social support system  Outcome: Progressing     Problem: Knowledge Deficit  Goal: Patient/family/caregiver demonstrates understanding of disease process, treatment plan, medications, and discharge instructions  Description: Complete learning assessment and assess knowledge base.  Interventions:  - Provide teaching at level of understanding  - Provide teaching via preferred learning methods  Outcome: Progressing

## 2024-03-24 NOTE — DISCHARGE SUMMARY
Discharge Summary - [facility]     Patient Information: Oliver Astudillo 73 y.o. male MRN: [id number]  Unit/Bed#: [address] [place] [address] Encounter: [id number]     Admitting Physician: Uriel Alejandra MD  Discharging Physician/Practitioner: Cb Martinez MD   PCP: Eugenia Rodriguez MD  Admission Date:   Admission Orders (From admission, onward)       Ordered        03/18/24 2044  INPATIENT ADMISSION  Once                          Discharge Date: 03/24/24      Reason for Admission: Ureteritis [N28.89]  UTI (urinary tract infection) [N39.0]  Flank pain [R10.9]  Nephrostomy complication (HCC) [N99.528]     Discharge Diagnoses:      Principal Problem:    Urinary tract infection associated with nephrostomy catheter   Active Problems:    Type 2 diabetes mellitus, with long-term current use of insulin (HCC)    Hypertension    Prostate cancer (HCC)    Nephrostomy status (HCC)    Hyperlipidemia  Resolved Problems:    Hyponatremia    Constipation    Dizziness    Acute kidney injury (HCC)    Hypokalemia       Consultations During Hospital Stay:  ·       Urology     Procedures Performed:   ·       Repositioning of bilateral nephrostomy tubes     Significant Findings / Test Results:   3/24: K 3.1, Na 141, WBC 8.12, UC enteroccus and Candida (right), entericus and Candida (left)  3/23:K 2.9, hgb 11.2,   3/22: Na 132, Cr 1.8, WBC 14.33, Hgb 11.9, blood cultures negative, Urine culture growth of E. coli sensitive to cefazolin  3/21:WBC 10.72, Hgb 12.1, , K3.6  03/20: Na: 136, K3.6, WBC 11.1, CR 1.1  03/19:24 , K3.4, Mg 1.8, WBC 14.48, Hb 12.4  03/18/2024 : WBC 12.75, hemoglobin 13.6, sodium 130, glucose 375, PSA 1.96, UA positive for nitrites and leukocytes, protein, glucose, lactic acid 2.6    CT abdomen pelvis with contrast 03/18/2024 : Bilateral percutaneous nephrostomy tubes in expected position. Mild right hydronephrosis. Bilateral pyelitis and ureteritis. No clear evidence  of nephritis. Findings suggestive of cystitis.     Incidental Findings:   ·       None      Test Results Pending at Discharge (will require follow up):   ·       Final urine culture and sensitivities     Outpatient Tests Requested:  ·       Urine analysis 1 week prior to appointment for nephrostomy tube check/exchange on 05/20/2024     Outpatient follow-up Requested:  ·       PCP, endocrinology, urology, IR for tube check    Complications:  None    Things to address at first visit after hospitalization   How is the pain in your abdomen?  Are you flushing your nephrostomy tubes every day?  Because the output from both nephrostomy tubes?  Did you follow-up with oncology/endocrinology/PCP?  Did you finish the antibiotic and antifungal course prescribed to you on discharge?  Are you taking all your antidiabetic medications as prescribed?  Are you checking your blood glucose levels at home?    HPI from admision  Oliver Astudillo is a 73 y.o. male with PMHx of DM, HTN, HLD, prostate cancer currently on chemotherapy, and bilateral nephrostomy tubes recently changed on 01/31 who presents with bilateral flank pain which is worse on the left side 1 day.  Patient also complains of decreased urine output in the left nephrostomy bag.  Complains of difficulty urinating and dysuria since today morning.  Has had constant chills but denies any fever.  Some nausea but no episodes of vomiting.  Patient also reports that he has dizziness and lightheadedness whenever he walks.  Patient has a history of prostate cancer diagnosed in May 2023 and has been on bilateral nephrostomy tubes since June 2023.  He is on Lupron, abiraterone and dexamethasone  for prostate cancer and has been following up with urology and oncology outpatient. Patient has history of recurrent nephrostomy tube dysfunction and urinary tract infections with many nephrostomy tube replacements.  Of note, patient was recently hospitalized on  1/30/2024 for similar  complaints; IR had replaced the nephrostomy tubes leading to improvement in symptoms.  Patient denies any chest pain, shortness of breath, abdominal pain.   In the ER patient was afebrile and vitals were stable. He received 1 dose of Rocephin in the ER.     Hospital Course:     Oliver Astudillo is a 73 y.o. male patient with history of uncontrolled diabetes mellitus type 2, hypertension, dyslipidemia, prostate cancer on treatment, s/p bilateral PCN undergoing routine exchange, last on 3/4, history of recurrent UTI presented with left more than right flank pain, dysuria associated with chills. On admission, UA was consistent with UTI with associated leukocytosis and hyperglycemia. Patient reported improvement with persistent flank pain more pronounced on left, CT abdomen-bilateral nephrostomy tube in expected position, mild chronic right hydronephrosis. Evidence of cystitis, pyelitis and ureteritis.  Patient was treated for his UTI with cefepime, vancomycin, cefazolin and fluconazole.  IR was taken on board who repositioned his left nephrostomy tube and recommended daily flushing.  Patient subsequently developed left lower quadrant tenderness without any rebound and constipation.  Patient was started on a bowel regimen that helped him have a bowel movement and his pain subsequently resolved.  His urine cultures grew Enterococcus and Candida for which he was discharged on Augmentin and fluconazole with a follow-up requested with PCP, urology, endocrinology and tube check with IR.  His right and left nephrostomy tubes are draining well on discharge, his labs revealed improving leukocytosis and improving kidney function.  He was discharged with diabetic education and optimized diabetes regimen.    * Urinary tract infection associated with nephrostomy catheter   Assessment & Plan  Patient had bilateral flank  left>> right side.  He also complains of  pain with flushing nephrostomy tube and decreased output of urine into  the left nephrostomy bag.  He had mild dysuria, chills and nausea but denied any fever or vomiting.  CT abdomen pelvis with contrast was done that showed bilateral percutaneous nephrostomy tubes in expected position. Mild right hydronephrosis. Bilateral pyelitis and ureteritis. No clear evidence of nephritis. Findings suggestive of cystitis.UA was positive for nitrates and leukocytes with protein, glucose, bacteria, BC and WBC  Repeat Urine Cultures of L and R nephrostomy tubes grew Enterococcus faecalis and Candida.  Patient continued to have some abdominal pain, and so CT scan abdomen pelvis with contrast was repeated. Repeat CT scan abdomen pelvis with contrast:   Bilateral percutaneous nephrostomy tubes are in place. Mild left hydronephrosis is similar to prior study. New enhancement within the distal left ureter may be due to hyperemia. Unchanged bladder wall thickening likely representing cystitis. Inflammation of the intrarenal collecting systems and ureters is similar to prior study.Patient received a 7-day antibiotic course that included cefepime, cefazolin, vancomycin, fluconazole.    Start Augmentin 1 tablet twice daily for 3 days (total of 10 days) on 3/25/2024.  Start fluconazole 1 tablet daily for 10 days (total of 14 days) on 3/25/2024-4/3/2024   Follow-up final urine cultures and sensitivities   IR consulted, continue to appreciate recs:  Nephrostomy tubes repositioned on 03/20/24  Recommending flushing once a day only  Patient was discharged in stable condition, with follow-up requested for endocrinology, urology, PCP    Type 2 diabetes mellitus, with long-term current use of insulin ([facility])  Assessment & Plan  Lab Results   Component Value Date    HGBA1C 11.1 (A) 01/08/2024       Recent Labs     03/24/24  0235 03/24/24  0747 03/24/24  0811 03/24/24  1218   POCGLU 200* 47* 68 99       Blood Sugar Average: Last 72 hrs:  (P) [id number]    Chronic, uncontrolled. Last A1c was 11.1. Home  medications include Levemir 30 units HS, Glimepiride 2 mg lunch, Glimepiride 4 mg breakfast, Steglatro 5 mg at  breakfast.  His Ivokana was changed to Steglatro due to insurance issues. Per patient, he has been compliant with all his medications    Plan   Home medications of Steglatro discontinued during this admission.  Likely contributing to frequent UTIs.  Discharged on the following regimen:  Amaryl 2 mg daily with breakfast  Metformin 500 mg twice daily  Insulin glargine 30 units at bedtime  Monitor blood sugars ACHS  Patient instructed to follow-up with primary care provider to titrate medications as needed outpatient.  Carbohydrate controlled diet, provided handout  Diabetic education provided      Hyperlipidemia  Assessment & Plan  Chronic, stable.  Patient is on Crestor 10 mg daily at home    Plan  Crestor not in hospital formulary; replaced with pravastatin  Continue with pravastatin as outpatient    Nephrostomy status ([facility])  Assessment & Plan  Pt has h/o prostate cancer leading to urinary obstruction treated with bilateral percutaneous nephrostomy tubes since 6/22/23  Patient presents with complaint of decreased urine output in left nephrostomy bag and flank pain worse on left side.  Nephrostomy tube was most recently exchanged in the last hospitalization 01/31/2024.  Pt has had recurrent nephrostomy tube dysfunction and urinary tract infections since last few months.   Pt states that he irrigates tubes at least twice daily  Ct scan showed bilateral percutaneous nephrostomy tubes in expected position with mild right hydronephrosis, bilateral pyelitis and ureteritis.    Plan  Patient has follow-up nephrostomy tube check/exchange on 5/28/2024.  Patient discharged with prescription for UA with reflex to scope prior to that appointment    Prostate cancer ([facility])  Assessment & Plan  History of advanced metastatic prostate cancer diagnosed in May 2023.  He last received lupron on 1/11. Sees  Dr. Coyne at [facility]  oncology     Plan   Continue Lupron q6 months, Zytiga and dexamethasone   Monitor blood sugars ACHS while on dexamethasone.  Continue to follow with oncology as outpatient    Hypertension  Assessment & Plan  Chronic, stable.  On admission, BP was elevated at 187/73 mm Hg   Home regimen includes lisinopril 10mg daily     Plan  lisinopril 10 mg was continued during admission along with close monitoring of vitals  Continue home regimen as outpatient    Hypokalemia-resolved as of 3/24/2024  Assessment & Plan  K 2.9, start telemetry, IV potassium repleted, switch IVF to LR.   Follow and replete as needed    Acute kidney injury (HCC)-resolved as of 3/24/2024  Assessment & Plan  Resolving    Cr  1.80--> 1.3  Was Likely in the setting of IV contrast given for CT abdomen pelvis    Monitor BMP    Dizziness-resolved as of 3/24/2024  Assessment & Plan  Resolved     patient had episodes of dizziness and lightheadedness whenever he walks or does some activity.  As per wife, patient had an episode of dizziness when walking up the stairs and nearly fell over but was caught by his wife  Patient has no prior echo on file    Patient is not complaining of dizziness/lightheadedness anymore, and is able to ambulate without difficulty to the bathroom     Plan  Orthostatic vitals  ECHO: Left ventricular cavity size is normal. Wall thickness is mildly increased. The left ventricular ejection fraction is 60%. GLS is -17.2%. Systolic function is normal.     Constipation-resolved as of 3/24/2024  Assessment & Plan  Resolved    Patient had a BM  Continue MiraLAX per oral daily PRN  Continue Colace twice daily  CT scan abdomen pelvis with contrast : see above    Hyponatremia-resolved as of 3/24/2024  Assessment & Plan  Resolved    Na 130 on admission. Likely hyperglycemic hyponatremia. Urine sodium and urine Osmolality: WNL    Plan  Continue to monitor BMP daily  Strict input and output monitoring          Condition at Discharge: stable     "  Discharge Day Visit / Exam:      Vitals: Blood Pressure: 153/67 (03/24/24 0700)  Pulse: 63 (03/24/24 0700)  Temperature: 97.8 °F (36.6 °C) (03/24/24 0700)  Temp Source: Oral (03/24/24 0700)  Respirations: 16 (03/24/24 0700)  Height: 5' 8\" (172.7 cm) (03/19/24 1320)  Weight - Scale: 77.6 kg (171 lb) (03/19/24 1320)  SpO2: 96 % (03/24/24 0700)  Exam:   Physical Exam  Vitals reviewed.   Constitutional:       Appearance: Normal appearance. He is normal weight.   HENT:      Mouth/Throat:      Mouth: Mucous membranes are moist.   Eyes:      Pupils: Pupils are equal, round, and reactive to light.   Cardiovascular:      Rate and Rhythm: Normal rate and regular rhythm.      Pulses: Normal pulses.      Heart sounds: Normal heart sounds. No murmur heard.  Pulmonary:      Effort: Pulmonary effort is normal. No respiratory distress.      Breath sounds: Normal breath sounds.   Abdominal:      General: Bowel sounds are normal. There is no distension.      Palpations: Abdomen is soft.      Tenderness: There is no abdominal tenderness. There is no right CVA tenderness or left CVA tenderness.      Comments: Bilateral nephrostomy tubes in place. No surrounding erythema, tenderness or leaks   Musculoskeletal:         General: Normal range of motion.      Cervical back: Normal range of motion.      Right lower leg: No edema.      Left lower leg: No edema.   Skin:     Capillary Refill: Capillary refill takes 2 to 3 seconds.   Neurological:      General: No focal deficit present.      Mental Status: He is alert and oriented to person, place, and time.      Motor: No weakness.      Gait: Gait normal.   Psychiatric:         Mood and Affect: Mood normal.         Behavior: Behavior normal.         Thought Content: Thought content normal.            Discussion with Family: Done  Patient Information Sharing: With the consent of Oliver Astudillo, their loved ones (Wife, son, brother) were notified today by inpatient team of the patient’s " condition and current plan.  All questions answered.     Discharge instructions/Information to patient and family:   See after visit summary for information provided to patient and family.       Discharge Medications:     Medication List      START taking these medications     amoxicillin-clavulanate 875-125 mg per tablet; Commonly known as:   AUGMENTIN; Take 1 tablet by mouth every 12 (twelve) hours for 3 days Do   not start before March 25, 2024.; Start taking on: March 25, 2024   fluconazole 200 mg tablet; Commonly known as: DIFLUCAN; Take 1 tablet   (200 mg total) by mouth daily for 10 days; Start taking on: March 25, 2024   Insulin Glargine Solostar 100 UNIT/ML Sopn; Commonly known as: Lantus   SoloStar; Inject 0.3 mL (30 Units total) under the skin daily at bedtime   metFORMIN 500 mg tablet; Commonly known as: GLUCOPHAGE; Take 1 tablet   (500 mg total) by mouth 2 (two) times a day with meals   polyethylene glycol 17 g packet; Commonly known as: MIRALAX; Take 17 g   by mouth daily as needed (for constipation)   senna-docusate sodium 8.6-50 mg per tablet; Commonly known as: SENOKOT   S; Take 1 tablet by mouth 2 (two) times a day     CHANGE how you take these medications     glimepiride 2 mg tablet; Commonly known as: AMARYL; Take 1 tablet (2 mg   total) by mouth daily with breakfast; What changed: medication strength,   how much to take, Another medication with the same name was removed.   Continue taking this medication, and follow the directions you see here.   rosuvastatin 10 MG tablet; Commonly known as: CRESTOR; Take 1 tablet (10   mg total) by mouth daily; What changed: when to take this   * sodium chloride (PF) 0.9 %; 10 mL by Intracatheter route daily   Intracatheter flushing daily. May substitute prefilled syringe with normal   saline 10 mL vials, 10 mL syringes, and 18 g blunt needles; What changed:   Another medication with the same name was added. Make sure you understand   how and when to take  each.   * sodium chloride (PF) 0.9 %; 10 mL by Intracatheter route daily   Intracatheter flushing daily. May substitute prefilled syringe with normal   saline 10 mL vials, 10 mL syringes, and 18 g blunt needles; What changed:   You were already taking a medication with the same name, and this   prescription was added. Make sure you understand how and when to take   each.  * This list has 2 medication(s) that are the same as other medications   prescribed for you. Read the directions carefully, and ask your doctor or   other care provider to review them with you.     CONTINUE taking these medications     abiraterone 250 mg tablet; Commonly known as: ZYTIGA; Take 1 tablet (250   mg total) by mouth daily With a low fat meal   AeroChamber Mini Chamber Isaura; by Does not apply route see   administration instructions   albuterol 90 mcg/act inhaler; Commonly known as: ProAir HFA; Inhale 2   puffs every 4 (four) hours as needed for wheezing or shortness of breath   calcium carbonate-vitamin D 500 mg-5 mcg tablet; Take 1 tablet by mouth   2 (two) times a day with meals   dexamethasone 0.5 mg tablet; Commonly known as: DECADRON; TAKE 1 TABLET   (0.5 MG TOTAL) BY MOUTH DAILY WITH BREAKFAST   Easy Touch Pen Needles 31G X 6 MM Misc; Generic drug: Insulin Pen   Needle; Use daily at bedtime   Invokana 100 MG; Generic drug: canagliflozin   lisinopril 5 mg tablet; Commonly known as: ZESTRIL   OneTouch Delica Lancets 33G Misc; Check blood sugars once daily. Please   substitute with appropriate alternative as covered by patient's insurance.   Dx: E11.65   OneTouch Verio Reflect w/Device Kit; Check blood sugars once daily.   Please substitute with appropriate alternative as covered by patient's   insurance. Dx: E11.65   OneTouch Verio test strip; Generic drug: glucose blood; Check blood   sugars once daily. Please substitute with appropriate alternative as   covered by patient's insurance. Dx: E11.65     STOP taking these medications      docusate sodium 100 mg capsule; Commonly known as: COLACE   Ertugliflozin L-PyroglutamicAc 5 MG Tabs     ASK your doctor about these medications     Sentry Tabs   * sodium chloride (PF) 0.9 %; 10 mL by Intracatheter route daily   Intracatheter flushing daily. May substitute prefilled syringe with normal   saline 10 mL vials, 10 mL syringes, and 18 g blunt needles   * sodium chloride (PF) 0.9 %; 10 mL by Intracatheter route daily for 120   doses Intracatheter flushing daily. May substitute prefilled syringe with   normal saline 10 mL vials, 10 mL syringes, and 18 g blunt needles  * This list has 2 medication(s) that are the same as other medications   prescribed for you. Read the directions carefully, and ask your doctor or   other care provider to review them with you.        Disposition:   Home     For Discharges to [facility]:   ·       Not Applicable to this Patient - Not Applicable to this Patient     Discharge Statement:  I spent 45 minutes discharging the patient. This time was spent on the day of discharge. I had direct contact with the patient on the day of discharge. Greater than 50% of the total time was spent examining patient, answering all patient questions, arranging and discussing plan of care with patient as well as directly providing post-discharge instructions.  Additional time then spent on discharge activities.     ** Please Note: This note has been constructed using a voice recognition system **     Radha Sanchez MD   03/24/24  3:34 PM

## 2024-03-24 NOTE — NURSING NOTE
Pt discharged to home with wife.  Discharge instructions given to patient in [place], went over with pt.

## 2024-03-24 NOTE — PLAN OF CARE
Problem: PAIN - ADULT  Goal: Verbalizes/displays adequate comfort level or baseline comfort level  Description: Interventions:  - Encourage patient to monitor pain and request assistance  - Assess pain using appropriate pain scale  - Administer analgesics based on type and severity of pain and evaluate response  - Implement non-pharmacological measures as appropriate and evaluate response  - Consider cultural and social influences on pain and pain management  - Notify physician/advanced practitioner if interventions unsuccessful or patient reports new pain  3/24/2024 1530 by Amanda De La Cruz RN  Outcome: Adequate for Discharge  3/24/2024 0823 by Amanda De La Cruz RN  Outcome: Progressing     Problem: INFECTION - ADULT  Goal: Absence or prevention of progression during hospitalization  Description: INTERVENTIONS:  - Assess and monitor for signs and symptoms of infection  - Monitor lab/diagnostic results  - Monitor all insertion sites, i.e. indwelling lines, tubes, and drains  - Monitor endotracheal if appropriate and nasal secretions for changes in amount and color  - [place] appropriate cooling/warming therapies per order  - Administer medications as ordered  - Instruct and encourage patient and family to use good hand hygiene technique  - Identify and instruct in appropriate isolation precautions for identified infection/condition  3/24/2024 1530 by Amanda De La Cruz RN  Outcome: Adequate for Discharge  3/24/2024 0823 by Amanda De La Cruz RN  Outcome: Progressing  Goal: Absence of fever/infection during neutropenic period  Description: INTERVENTIONS:  - Monitor WBC    3/24/2024 1530 by Amanda De La Cruz RN  Outcome: Adequate for Discharge  3/24/2024 0823 by Amanda De La Cruz RN  Outcome: Progressing     Problem: SAFETY ADULT  Goal: Patient will remain free of falls  Description: INTERVENTIONS:  - Educate patient/family on patient safety including physical limitations  - Instruct patient to call for assistance with activity   -  Consult OT/PT to assist with strengthening/mobility   - Keep Call bell within reach  - Keep bed low and locked with side rails adjusted as appropriate  - Keep care items and personal belongings within reach  - Initiate and maintain comfort rounds  - Make Fall Risk Sign visible to staff  - Offer Toileting every  Hours, in advance of need  - Initiate/Maintain alarm  - Obtain necessary fall risk management equipment:   - Apply yellow socks and bracelet for high fall risk patients  - Consider moving patient to room near nurses station  3/24/2024 1530 by Amanda De La Cruz RN  Outcome: Adequate for Discharge  3/24/2024 0823 by Amanda De La Cruz RN  Outcome: Progressing  Goal: Maintain or return to baseline ADL function  Description: INTERVENTIONS:  -  Assess patient's ability to carry out ADLs; assess patient's baseline for ADL function and identify physical deficits which impact ability to perform ADLs (bathing, care of mouth/teeth, toileting, grooming, dressing, etc.)  - Assess/evaluate cause of self-care deficits   - Assess range of motion  - Assess patient's mobility; develop plan if impaired  - Assess patient's need for assistive devices and provide as appropriate  - Encourage maximum independence but intervene and supervise when necessary  - Involve family in performance of ADLs  - Assess for home care needs following discharge   - Consider OT consult to assist with ADL evaluation and planning for discharge  - Provide patient education as appropriate  3/24/2024 1530 by Amanda De La Cruz RN  Outcome: Adequate for Discharge  3/24/2024 0823 by Amanda De La Cruz RN  Outcome: Progressing  Goal: Maintains/Returns to pre admission functional level  Description: INTERVENTIONS:  - Perform AM-PAC 6 Click Basic Mobility/ Daily Activity assessment daily.  - Set and communicate daily mobility goal to care team and patient/family/caregiver.   - Collaborate with rehabilitation services on mobility goals if consulted  - Perform Range of Motion   times a day.  - Reposition patient every  hours.  - Dangle patient  times a day  - Stand patient  times a day  - Ambulate patient  times a day  - Out of bed to chair  times a day   - Out of bed for meals times a day  - Out of bed for toileting  - Record patient progress and toleration of activity level   3/24/2024 1530 by Amanda De La Cruz RN  Outcome: Adequate for Discharge  3/24/2024 0823 by Amanda De La Cruz RN  Outcome: Progressing     Problem: DISCHARGE PLANNING  Goal: Discharge to home or other facility with appropriate resources  Description: INTERVENTIONS:  - Identify barriers to discharge w/patient and caregiver  - Arrange for needed discharge resources and transportation as appropriate  - Identify discharge learning needs (meds, wound care, etc.)  - Arrange for interpretive services to assist at discharge as needed  - Refer to Case Management Department for coordinating discharge planning if the patient needs post-hospital services based on physician/advanced practitioner order or complex needs related to functional status, cognitive ability, or social support system  3/24/2024 1530 by Amanda De La Cruz RN  Outcome: Adequate for Discharge  3/24/2024 0823 by Amanda De La Cruz RN  Outcome: Progressing     Problem: Knowledge Deficit  Goal: Patient/family/caregiver demonstrates understanding of disease process, treatment plan, medications, and discharge instructions  Description: Complete learning assessment and assess knowledge base.  Interventions:  - Provide teaching at level of understanding  - Provide teaching via preferred learning methods  3/24/2024 1530 by Amanda De La Cruz RN  Outcome: Adequate for Discharge  3/24/2024 0823 by Amanda De La Cruz RN  Outcome: Progressing

## 2024-03-24 NOTE — PLAN OF CARE
Problem: PAIN - ADULT  Goal: Verbalizes/displays adequate comfort level or baseline comfort level  Description: Interventions:  - Encourage patient to monitor pain and request assistance  - Assess pain using appropriate pain scale  - Administer analgesics based on type and severity of pain and evaluate response  - Implement non-pharmacological measures as appropriate and evaluate response  - Consider cultural and social influences on pain and pain management  - Notify physician/advanced practitioner if interventions unsuccessful or patient reports new pain  Outcome: Progressing     Problem: INFECTION - ADULT  Goal: Absence or prevention of progression during hospitalization  Description: INTERVENTIONS:  - Assess and monitor for signs and symptoms of infection  - Monitor lab/diagnostic results  - Monitor all insertion sites, i.e. indwelling lines, tubes, and drains  - Monitor endotracheal if appropriate and nasal secretions for changes in amount and color  - [place] appropriate cooling/warming therapies per order  - Administer medications as ordered  - Instruct and encourage patient and family to use good hand hygiene technique  - Identify and instruct in appropriate isolation precautions for identified infection/condition  Outcome: Progressing  Goal: Absence of fever/infection during neutropenic period  Description: INTERVENTIONS:  - Monitor WBC    Outcome: Progressing     Problem: SAFETY ADULT  Goal: Patient will remain free of falls  Description: INTERVENTIONS:  - Educate patient/family on patient safety including physical limitations  - Instruct patient to call for assistance with activity   - Consult OT/PT to assist with strengthening/mobility   - Keep Call bell within reach  - Keep bed low and locked with side rails adjusted as appropriate  - Keep care items and personal belongings within reach  - Initiate and maintain comfort rounds  - Make Fall Risk Sign visible to staff  - Offer Toileting every 2 Hours,  in advance of need  - Initiate/Maintain bed alarm    - Apply yellow socks and bracelet for high fall risk patients  - Consider moving patient to room near nurses station  Outcome: Progressing  Goal: Maintain or return to baseline ADL function  Description: INTERVENTIONS:  -  Assess patient's ability to carry out ADLs; assess patient's baseline for ADL function and identify physical deficits which impact ability to perform ADLs (bathing, care of mouth/teeth, toileting, grooming, dressing, etc.)  - Assess/evaluate cause of self-care deficits   - Assess range of motion  - Assess patient's mobility; develop plan if impaired  - Assess patient's need for assistive devices and provide as appropriate  - Encourage maximum independence but intervene and supervise when necessary  - Involve family in performance of ADLs  - Assess for home care needs following discharge   - Consider OT consult to assist with ADL evaluation and planning for discharge  - Provide patient education as appropriate  Outcome: Progressing  Goal: Maintains/Returns to pre admission functional level  Description: INTERVENTIONS:  - Perform AM-PAC 6 Click Basic Mobility/ Daily Activity assessment daily.  - Set and communicate daily mobility goal to care team and patient/family/caregiver.   - Collaborate with rehabilitation services on mobility goals if consulted  - Perform Range of Motion 4 times a day.  - Reposition patient every 2 hours.    - Ambulate patient 4 times a day  - Out of bed to chair 3 times a day   - Out of bed for meals 3 times a day  - Out of bed for toileting  - Record patient progress and toleration of activity level   Outcome: Progressing     Problem: DISCHARGE PLANNING  Goal: Discharge to home or other facility with appropriate resources  Description: INTERVENTIONS:  - Identify barriers to discharge w/patient and caregiver  - Arrange for needed discharge resources and transportation as appropriate  - Identify discharge learning needs  (meds, wound care, etc.)  - Arrange for interpretive services to assist at discharge as needed  - Refer to Case Management Department for coordinating discharge planning if the patient needs post-hospital services based on physician/advanced practitioner order or complex needs related to functional status, cognitive ability, or social support system  Outcome: Progressing     Problem: Knowledge Deficit  Goal: Patient/family/caregiver demonstrates understanding of disease process, treatment plan, medications, and discharge instructions  Description: Complete learning assessment and assess knowledge base.  Interventions:  - Provide teaching at level of understanding  - Provide teaching via preferred learning methods  Outcome: Progressing

## 2024-03-24 NOTE — PLAN OF CARE
Problem: Knowledge Deficit  Goal: Patient/family/caregiver demonstrates understanding of disease process, treatment plan, medications, and discharge instructions  Description: Complete learning assessment and assess knowledge base.  Interventions:  - Provide teaching at level of understanding  - Provide teaching via preferred learning methods  3/24/2024 1530 by Amanda De La Cruz RN  Outcome: Adequate for Discharge  3/24/2024 1530 by Amanda De La Cruz RN  Outcome: Adequate for Discharge  3/24/2024 0823 by Amanda De La Cruz RN  Outcome: Progressing     Problem: DISCHARGE PLANNING  Goal: Discharge to home or other facility with appropriate resources  Description: INTERVENTIONS:  - Identify barriers to discharge w/patient and caregiver  - Arrange for needed discharge resources and transportation as appropriate  - Identify discharge learning needs (meds, wound care, etc.)  - Arrange for interpretive services to assist at discharge as needed  - Refer to Case Management Department for coordinating discharge planning if the patient needs post-hospital services based on physician/advanced practitioner order or complex needs related to functional status, cognitive ability, or social support system  3/24/2024 1530 by Amanda De La Cruz RN  Outcome: Adequate for Discharge  3/24/2024 1530 by Amanda De La Cruz RN  Outcome: Adequate for Discharge  3/24/2024 0823 by Amanda De La Cruz RN  Outcome: Progressing     Problem: SAFETY ADULT  Goal: Patient will remain free of falls  Description: INTERVENTIONS:  - Educate patient/family on patient safety including physical limitations  - Instruct patient to call for assistance with activity   - Consult OT/PT to assist with strengthening/mobility   - Keep Call bell within reach  - Keep bed low and locked with side rails adjusted as appropriate  - Keep care items and personal belongings within reach  - Initiate and maintain comfort rounds  - Make Fall Risk Sign visible to staff  - Offer Toileting every   Hours, in advance of need  - Initiate/Maintain alarm  - Obtain necessary fall risk management equipment:   - Apply yellow socks and bracelet for high fall risk patients  - Consider moving patient to room near nurses station  3/24/2024 1530 by Amanda De La Cruz RN  Outcome: Adequate for Discharge  3/24/2024 1530 by Amanda De La Cruz RN  Outcome: Adequate for Discharge  3/24/2024 0823 by Amanda De La Cruz RN  Outcome: Progressing  Goal: Maintain or return to baseline ADL function  Description: INTERVENTIONS:  -  Assess patient's ability to carry out ADLs; assess patient's baseline for ADL function and identify physical deficits which impact ability to perform ADLs (bathing, care of mouth/teeth, toileting, grooming, dressing, etc.)  - Assess/evaluate cause of self-care deficits   - Assess range of motion  - Assess patient's mobility; develop plan if impaired  - Assess patient's need for assistive devices and provide as appropriate  - Encourage maximum independence but intervene and supervise when necessary  - Involve family in performance of ADLs  - Assess for home care needs following discharge   - Consider OT consult to assist with ADL evaluation and planning for discharge  - Provide patient education as appropriate  3/24/2024 1530 by Amanda De La Cruz RN  Outcome: Adequate for Discharge  3/24/2024 1530 by Amanda De La Cruz RN  Outcome: Adequate for Discharge  3/24/2024 0823 by Amanda De La Cruz RN  Outcome: Progressing  Goal: Maintains/Returns to pre admission functional level  Description: INTERVENTIONS:  - Perform AM-PAC 6 Click Basic Mobility/ Daily Activity assessment daily.  - Set and communicate daily mobility goal to care team and patient/family/caregiver.   - Collaborate with rehabilitation services on mobility goals if consulted  - Perform Range of Motion  times a day.  - Reposition patient every  hours.  - Dangle patient  times a day  - Stand patient  times a day  - Ambulate patient  times a day  - Out of bed to chair   times a day   - Out of bed for meals times a day  - Out of bed for toileting  - Record patient progress and toleration of activity level   3/24/2024 1530 by Amanda De La Cruz RN  Outcome: Adequate for Discharge  3/24/2024 1530 by Amanda De La Cruz RN  Outcome: Adequate for Discharge  3/24/2024 0823 by Amanda De La Cruz RN  Outcome: Progressing     Problem: INFECTION - ADULT  Goal: Absence or prevention of progression during hospitalization  Description: INTERVENTIONS:  - Assess and monitor for signs and symptoms of infection  - Monitor lab/diagnostic results  - Monitor all insertion sites, i.e. indwelling lines, tubes, and drains  - Monitor endotracheal if appropriate and nasal secretions for changes in amount and color  - [place] appropriate cooling/warming therapies per order  - Administer medications as ordered  - Instruct and encourage patient and family to use good hand hygiene technique  - Identify and instruct in appropriate isolation precautions for identified infection/condition  3/24/2024 1530 by Amanda De La Cruz RN  Outcome: Adequate for Discharge  3/24/2024 1530 by Amanda De La Cruz RN  Outcome: Adequate for Discharge  3/24/2024 0823 by Amanda De La Cruz RN  Outcome: Progressing  Goal: Absence of fever/infection during neutropenic period  Description: INTERVENTIONS:  - Monitor WBC    3/24/2024 1530 by Amanda De La Cruz RN  Outcome: Adequate for Discharge  3/24/2024 1530 by Amanda De La Cruz RN  Outcome: Adequate for Discharge  3/24/2024 0823 by Amanda De La Cruz RN  Outcome: Progressing     Problem: PAIN - ADULT  Goal: Verbalizes/displays adequate comfort level or baseline comfort level  Description: Interventions:  - Encourage patient to monitor pain and request assistance  - Assess pain using appropriate pain scale  - Administer analgesics based on type and severity of pain and evaluate response  - Implement non-pharmacological measures as appropriate and evaluate response  - Consider cultural and social influences on pain  and pain management  - Notify physician/advanced practitioner if interventions unsuccessful or patient reports new pain  3/24/2024 1530 by Amanda De La Cruz RN  Outcome: Adequate for Discharge  3/24/2024 1530 by Amanda De La Cruz RN  Outcome: Adequate for Discharge  3/24/2024 0823 by Amanda De La Cruz, RN  Outcome: Progressing      AMS

## 2024-03-24 NOTE — PROGRESS NOTES
Daily Progress Note - [facility]  Family Medicine Residency  Oliver Astudillo 73 y.o. male MRN: [id number]  Unit/Bed#: [address] [place] [address] Encounter: [id number]  Admitting Physician: Uriel Alejandra MD   PCP: Eugenia Rodriguez MD  Date of Admission:  3/18/2024  3:43 PM    Assessment and Plan    * Urinary tract infection associated with nephrostomy catheter   Assessment & Plan  Patient had bilateral flank  left>> right side.  He also complains of  pain with flushing nephrostomy tube and decreased output of urine into the left nephrostomy bag.  He has mild dysuria, chills and nausea but denied any fever or vomiting.    CT abdomen pelvis with contrast: Bilateral percutaneous nephrostomy tubes in expected position. Mild right hydronephrosis. Bilateral pyelitis and ureteritis. No clear evidence of nephritis. Findings suggestive of cystitis.    UA was positive for nitrates and leukocytes with protein, glucose, bacteria, BC and WBC  Prior urine cultures were positive for E. Coli and enterobacter    WBC: peak of 14  Lactic acid 2.6  Repeat CT scan abdomen pelvis with contrast:       Bilateral percutaneous nephrostomy tubes are in place. Mild left hydronephrosis is similar to prior study. New enhancement within the distal left ureter may be due to hyperemia. Unchanged bladder wall thickening likely representing cystitis. Inflammation of the intrarenal collecting systems and ureters is similar to prior study.    Likely due to urinary tract obstruction and nephrostomy tube dysfunction    Plan  Blood cutures NG @ 72 hrs, Urine Culture : G - Rods  Urine cultures separately from nephrostomy tubes: R Enterocoocus and candida , L pending  Discontinue cefepime 2 g q12 hourly  Continue Cefazolin 2gm q 8 hourly  Start Vancomycin, pharmacy consulted , van trough  Start fluconazole  IR consulted, continue to appreciate recs:  Nephrostomy tubes repositioned on 03/20/24  Recommending flushing only   Consult Urology,   Nitish's recs    Type 2 diabetes mellitus, with long-term current use of insulin (HCC)  Assessment & Plan  Lab Results   Component Value Date    HGBA1C 11.1 (A) 01/08/2024       Recent Labs     03/24/24  0235 03/24/24  0747 03/24/24  0811 03/24/24  1218   POCGLU 200* 47* 68 99       Blood Sugar Average: Last 72 hrs:  (P) 114.4576714908646297    Chronic, uncontrolled. Last A1c was 11.1. Home medications include Levemir 30 units HS, Glimepiride 2 mg lunch, Glimepiride 4 mg breakfast, Steglatro 5 mg at  breakfast.  His Ivokana was changed to Steglatro due to insurance issues. Per patient, he has been compliant with all his medications    Plan   Patient started on insulin sliding scale and Accu-Cheks ACHS  Decrease Levemir to 25 U HS  Discontinue Amaryl   Carbohydrate controlled diet  Hypoglycemia protocol  Advised to eat snacks before bedtime  Diabetic education provided      Hypokalemia  Assessment & Plan  K 2.9, start telemetry, IV potassium repleted, switch IVF to LR.   Follow and replete as needed    Acute kidney injury (HCC)  Assessment & Plan  Resolving    Cr  1.80--> 1.3  Was Likely in the setting of IV contrast given for CT abdomen pelvis    Monitor BMP    Dizziness  Assessment & Plan  Patient has had episodes of dizziness and lightheadedness whenever he walks or does some activity.  As per wife, patient had an episode of dizziness when walking up the stairs and nearly fell over but was caught by his wife  Patient has no prior echo on file    Patient is not complaining of dizziness/lightheadedness anymore, and is able to ambulate without difficulty to the bathroom     Plan  Orthostatic vitals  ECHO: Left ventricular cavity size is normal. Wall thickness is mildly increased. The left ventricular ejection fraction is 60%. GLS is -17.2%. Systolic function is normal.     Constipation  Assessment & Plan  Resolved    Patient had a BM  Continue MiraLAX per oral daily PRN  Continue Colace twice daily  CT scan abdomen  pelvis with contrast : see above    Hyperlipidemia  Assessment & Plan  Chronic, stable.  Patient is on Crestor 10 mg daily at home    Plan  Crestor not in hospital formulary; replaced with pravastatin    Hyponatremia  Assessment & Plan  Resolved    Na 130 on admission. Likely hyperglycemic hyponatremia. Urine sodium and urine Osmolality: WNL    Plan  Continue to monitor BMP daily  Strict input and output monitoring    Nephrostomy status (HCC)  Assessment & Plan  Pt has h/o prostate cancer leading to urinary obstruction treated with bilateral percutaneous nephrostomy tubes since 6/22/23  Patient presents with complaint of decreased urine output in left nephrostomy bag and flank pain worse on left side.  Nephrostomy tube was most recently exchanged in the last hospitalization 01/31/2024.  Pt has had recurrent nephrostomy tube dysfunction and urinary tract infections since last few months.   Pt states that he irrigates tubes at least twice daily  Ct scan showed bilateral percutaneous nephrostomy tubes in expected position with mild right hydronephrosis, bilateral pyelitis and ureteritis.    Plan  IR consulted, continue to appreciate recommendations: See above    Prostate cancer (HCC)  Assessment & Plan  History of advanced metastatic prostate cancer diagnosed in May 2023.  He last received lupron on 1/11. Sees  Dr. Coyne at medical oncology     Plan   Continue Lupron q6 months, Zytiga and dexamethasone     Hypertension  Assessment & Plan  Chronic, stable.  On admission, BP was elevated at 187/73 mm Hg   Home regimen includes lisinopril 10mg daily     Plan  Continue with lisinopril 10 mg with holding parameters  Will add labetalol or hydralazine if blood pressure remains elevated  Monitor vitals and blood pressure daily        VTE Pharmacologic Prophylaxis: VTE Score: 9 High Risk (Score >/= 5) - Pharmacological DVT Prophylaxis Ordered: heparin. Sequential Compression Devices Ordered.    Patient Centered Rounds: I have  performed bedside rounds with nursing staff today.    Discussions with Specialists or Other Care Team Provider: Done    Education and Discussions with Family / Patient: Done  Patient Information Sharing: With the consent of Oliver Astudillo , their loved ones (Brain) were notified today by inpatient team of the patient’s condition and current plan.  All questions answered.     Time Spent for Care: 1 hour.  More than 50% of total time spent on counseling and coordination of care as described above.    Current Length of Stay: 6 day(s)    Current Patient Status: Inpatient   Certification Statement: The patient will continue to require additional inpatient hospital stay due to UTI asociated with nephrosotomy tubes    Discharge Plan: TBD    Code Status: Level 1 - Full Code    Subjective:   Patient was seen and examined at bedside. Today he reports feeling much better. He wasn't able to sleep last night due to having people in the room frequently to check him. Overnight no acute events. Currently patient denies any fever, chills, shortness of breath, cough, chest pain, palpitations, light headedness, headaches, vision change, abdominal pain, N/V/D, urinary symptoms.He had two big BM yesterday. He has output from both nephrostomy tubes and is able to ambulate without difficulty     Objective     Objective:   Vitals:   Temp (24hrs), Av.3 °F (36.8 °C), Min:97.8 °F (36.6 °C), Max:98.6 °F (37 °C)    Temp:  [97.8 °F (36.6 °C)-98.6 °F (37 °C)] 97.8 °F (36.6 °C)  HR:  [62-68] 63  Resp:  [13-19] 16  BP: (129-153)/(59-67) 153/67  SpO2:  [93 %-99 %] 96 %  Body mass index is 26 kg/m².     Input and Output Summary (last 24 hours):       Intake/Output Summary (Last 24 hours) at 3/24/2024 1336  Last data filed at 3/24/2024 0601  Gross per 24 hour   Intake 510 ml   Output 2125 ml   Net -1615 ml       Physical Exam:   Physical Exam  Vitals reviewed.   Constitutional:       Appearance: Normal appearance. He is normal weight.   HENT:       Mouth/Throat:      Mouth: Mucous membranes are moist.   Eyes:      Pupils: Pupils are equal, round, and reactive to light.   Cardiovascular:      Rate and Rhythm: Normal rate and regular rhythm.      Pulses: Normal pulses.      Heart sounds: Normal heart sounds. No murmur heard.  Pulmonary:      Effort: Pulmonary effort is normal. No respiratory distress.      Breath sounds: Normal breath sounds.   Abdominal:      General: Bowel sounds are normal. There is no distension.      Palpations: Abdomen is soft.      Tenderness: There is no abdominal tenderness. There is no right CVA tenderness or left CVA tenderness.      Comments: Bilateral nephrostomy tubes in place. No surrounding erythema, tenderness or leaks   Musculoskeletal:         General: Normal range of motion.      Cervical back: Normal range of motion.      Right lower leg: No edema.      Left lower leg: No edema.   Skin:     Capillary Refill: Capillary refill takes 2 to 3 seconds.   Neurological:      General: No focal deficit present.      Mental Status: He is alert and oriented to person, place, and time.      Motor: No weakness.      Gait: Gait normal.   Psychiatric:         Mood and Affect: Mood normal.         Behavior: Behavior normal.         Thought Content: Thought content normal.           Additional Data:     Labs:  Results from last 7 days   Lab Units 03/24/24  0634   WBC Thousand/uL 8.12   HEMOGLOBIN g/dL 10.4*   HEMATOCRIT % 31.4*   PLATELETS Thousands/uL 350   NEUTROS PCT % 63   LYMPHS PCT % 28   MONOS PCT % 6   EOS PCT % 1     Results from last 7 days   Lab Units 03/24/24  0634 03/22/24  0555 03/21/24  0540   POTASSIUM mmol/L 3.1*   < > 3.6   CHLORIDE mmol/L 110*   < > 105   CO2 mmol/L 25   < > 23   BUN mg/dL 9   < > 15   CREATININE mg/dL 1.09   < > 1.22   CALCIUM mg/dL 8.4   < > 9.0   ALK PHOS U/L  --   --  66   ALT U/L  --   --  11   AST U/L  --   --  13    < > = values in this interval not displayed.         Results from last 7 days   Lab  Units 03/24/24  1218 03/24/24  0811 03/24/24  0747 03/24/24  0235 03/24/24  0211   POC GLUCOSE mg/dl 99 68 47* 200* 54*           * I Have Reviewed All Lab Data Listed Above.  * Additional Pertinent Lab Tests Reviewed: All Labs For Current Hospital Admission Reviewed        Recent Cultures (last 7 days):     Results from last 7 days   Lab Units 03/23/24  0610 03/22/24  1101 03/19/24  0055 03/19/24  0043 03/18/24  1709   BLOOD CULTURE   --   --  No Growth After 5 Days. No Growth After 5 Days.  --    URINE CULTURE  >100,000 cfu/ml Yeast*  30,000-39,000 cfu/ml Enterococcus faecalis* 50,000-59,000 cfu/ml Candida albicans*  --   --  >100,000 cfu/ml Escherichia coli*       Last 24 Hours Medication List:   Current Facility-Administered Medications   Medication Dose Route Frequency Provider Last Rate    abiraterone  250 mg Oral Daily Jarad Thomas MD      acetaminophen  975 mg Oral Q8H PRN Yas Webber, DO      albuterol  2 puff Inhalation Q4H PRN Jarad Thomas MD      bisacodyl  10 mg Rectal Once Purvi Richard MD      calcium carbonate  1,000 mg Oral Daily PRN Jarad Thomas MD      cefazolin  2,000 mg Intravenous Q8H Radha Sanchez MD 2,000 mg (03/24/24 1000)    fluconazole  200 mg Oral Daily Radha Sanchez MD      heparin (porcine)  5,000 Units Subcutaneous Q8H [facility] Jarad Thomas MD      insulin detemir  25 Units Subcutaneous HS Yas Webber DO      insulin lispro  1-6 Units Subcutaneous 4x Daily (AC & HS) Jarad Thomas MD      multivitamin-minerals  1 tablet Oral Daily Jarad Thomas MD      ondansetron  4 mg Intravenous Q6H PRN Jarad Thomas MD      oxyCODONE  2.5 mg Oral Q6H PRN Yas Webber,       polyethylene glycol  17 g Oral Daily PRN Radha Sanchez MD      pravastatin  80 mg Oral Daily With Dinner Jarad Thomas MD      senna-docusate sodium  1 tablet Oral BID Purvi Richard MD      vancomycin  1,500 mg Intravenous Q24H Radha Sanchez MD                ** Please Note: Dictation voice to text software may have been used in the creation of this document. **    Radha Sanchez MD  03/24/24  1:36 PM

## 2024-03-26 ENCOUNTER — TELEPHONE (OUTPATIENT)
Age: 74
End: 2024-03-26

## 2024-03-26 LAB
BACTERIA UR CULT: ABNORMAL

## 2024-03-26 NOTE — TELEPHONE ENCOUNTER
----- Message from Radha Sanchez MD sent at 3/24/2024  4:34 PM EDT -----  Dear Team,     This patient Oliver Astudillo  is being discharged from the hospital. Can you please set up his TCM?   Patient has an upcoming appointment with Dr. Springer, can it be moved so he can see her asap?    Thanks,  Radha Sanchez MD  PGY1  [facility]

## 2024-03-26 NOTE — TELEPHONE ENCOUNTER
Called to schedule. Spoke with son. Informed Dr Springer is on matt next week- unavailable for TCM. Son said as long as it was a [place]adeshi speaking dr that would be fine. Scheduled with Dr Mckeon for 4/4

## 2024-03-27 ENCOUNTER — TRANSITIONAL CARE MANAGEMENT (OUTPATIENT)
Age: 74
End: 2024-03-27

## 2024-04-01 ENCOUNTER — OFFICE VISIT (OUTPATIENT)
Dept: HEMATOLOGY ONCOLOGY | Facility: CLINIC | Age: 74
End: 2024-04-01
Payer: COMMERCIAL

## 2024-04-01 VITALS
HEART RATE: 74 BPM | HEIGHT: 68 IN | DIASTOLIC BLOOD PRESSURE: 80 MMHG | TEMPERATURE: 97.3 F | OXYGEN SATURATION: 100 % | RESPIRATION RATE: 17 BRPM | BODY MASS INDEX: 25.91 KG/M2 | WEIGHT: 171 LBS | SYSTOLIC BLOOD PRESSURE: 140 MMHG

## 2024-04-01 DIAGNOSIS — C79.51 METASTASIS TO BONE (HCC): ICD-10-CM

## 2024-04-01 DIAGNOSIS — C61 MALIGNANT NEOPLASM OF PROSTATE (HCC): Primary | ICD-10-CM

## 2024-04-01 PROCEDURE — G2211 COMPLEX E/M VISIT ADD ON: HCPCS | Performed by: INTERNAL MEDICINE

## 2024-04-01 PROCEDURE — 99215 OFFICE O/P EST HI 40 MIN: CPT | Performed by: INTERNAL MEDICINE

## 2024-04-01 RX ORDER — INSULIN DETEMIR 100 [IU]/ML
INJECTION, SOLUTION SUBCUTANEOUS
COMMUNITY
Start: 2024-02-21 | End: 2024-04-12 | Stop reason: SDUPTHER

## 2024-04-01 RX ORDER — OMEGA-3-ACID ETHYL ESTERS 1 G/1
CAPSULE, LIQUID FILLED ORAL
COMMUNITY
Start: 2024-03-26

## 2024-04-01 NOTE — PROGRESS NOTES
[facility] HEMATOLOGY ONCOLOGY SPECIALISTS [place]  [address] [facility] [address]  [facility] [place] [address]  [phone]    Oliver Astudillo,1950, [id number]  04/01/24    Discussion:   In summary, this is a 73-year-old male with a history of prostate cancer, [place] 9 with bone metastases by PSMA PET/CT August 2023.  Firmagon July 2023, Lupron started August 2023. Zytiga 250 mg and dexamethasone 0.5 mg p.o. daily with low-fat meal.   Recent WBC 8.1, hemoglobin 10.4, platelet count 350, normal differential.  BMP shows potassium 3.1, otherwise normal.  March 18, 2024 PSA was 1.9, previous value 0.1 December 2023.  He was recently hospitalized with left greater than right flank pain, dysuria, chills.  Felt to have complicated urinary tract infection, treated for the same.  He is following a low carbohydrate diet with some improvement in blood sugar values.  We reviewed the above PSA changes.  Possibly reflecting complicated urinary tract infection versus tumor progression.  We agreed to follow-up in 2 months with repeat blood work just prior to that.  I reviewed the above with the patient and his wife.  They voiced understanding and agreement.  Professional interpretation provided by K[employer].    ______________________________________________________________________    Chief Complaint   Patient presents with    Follow-up       HPI:  Oncology History   Prostate cancer (HCC)   5/24/2023 Initial Diagnosis    May 2023 patient presented with urinary frequency. . CT showed distended urinary bladder with bilateral hydronephrosis. Urinary bladder mass inseparable from the prostate. Extraprostatic extension noted. Bone scan showed indeterminate activity at T11 vertebral body. Bilateral nephrostomy tubes and Cazares catheter placed. Prostate biopsy showed adenocarcinoma, [place] 9.      6/28/2023 Biopsy    A. Prostate, right lateral base:  - Prostatic adenocarcinoma, Liana score 4 + 5 = 9, Prognostic Grade Group  5,  involving 90% of 1 needle core  and measuring  11 mm in length.        B. Prostate, right medial base:  - Prostatic adenocarcinoma, [place] score 4 + 5 = 9, Prognostic Grade Group 5,  involving 70% of 1 needle core  and measuring  10 mm in length.      C. Prostate, right lateral mid:  - Prostatic adenocarcinoma, [place] score 4 + 5 = 9, Prognostic Grade Group 5,  involving 90% of 1 needle core  and measuring  12 mm in length.      Comment: Immunohistochemistry for a prostate multiplex stain (p63, K903, and P504S) and NKX3.1 demonstrate invasive carcinoma.     D. Prostate, right medial mid:  - Prostatic adenocarcinoma, [place] score 4 + 5 = 9, Prognostic Grade Group 5,  involving 95% of 1 needle core  and measuring  15 mm in length.      E. Prostate, right lateral apex:  - Prostatic adenocarcinoma, Liana score 4 + 5 = 9, Prognostic Grade Group 5,  involving 90% of 1 needle core  and measuring  12 mm in length.   - Perineural invasion identified.     Comment: Immunohistochemistry for a prostate multiplex stain (p63, K903, and P504S) and NKX3.1 demonstrate invasive carcinoma.     F. Prostate, right medial apex:  - Prostatic adenocarcinoma, [place] score 4 + 5 = 9, Prognostic Grade Group 5,  involving 100% of 1 needle core  and measuring  20 mm in length.      G. Prostate, left lateral base:  - Prostatic adenocarcinoma, [place] score 4 + 5 = 9, Prognostic Grade Group 5,  involving 75% of 1 needle core  and measuring  10 mm in length.   - Perineural invasion identified.  - Lymphovascular invasion is identified.     H. Prostate, left medial base:  - Prostatic adenocarcinoma, [place] score 4 + 5 = 9, Prognostic Grade Group 5,  involving 95% of 1 needle core  and measuring  14 mm in length.   - Perineural invasion identified.     Comment: There is a focus of closely approximated gastrointestinal epithelium; NKX3.1 shows no definite invasion of the GI epithelium.      I. Prostate, left lateral mid:  - Prostatic  adenocarcinoma, Liana score 4 + 5 = 9, Prognostic Grade Group 5,  involving 95% of 1 needle core  and measuring  12 mm in length.       J. Prostate, left medial mid:  - Prostatic adenocarcinoma, Liana score 4 + 5 = 9, Prognostic Grade Group 5,  involving 85% of 1 needle core  and measuring  13 mm in length.       Comment: Immunohistochemistry for a prostate multiplex stain (p63, K903, and P504S) and NKX3.1 demonstrate invasive carcinoma.     K. Prostate, left lateral apex:  - Prostatic adenocarcinoma, Liana score 4 + 5 = 9, Prognostic Grade Group 5,  involving 25% of 1 needle core  and measuring  3 mm in length.        L. Prostate, left medial apex :  - Prostatic adenocarcinoma, Liana score 4 + 5 = 9, Prognostic Grade Group 5,  involving 95% of 1 needle core  and measuring  15 mm in length.     - Perineural invasion identified.     Comment:   Cribriform glands are present within the pattern 4 component.  This feature has been associated with adverse clinical outcomes and molecular features typically seen in advanced disease.  (The 2019 Genitourinary Pathology Society (GUPS) White Paper on Contemporary Grading of Prostate Cancer. Arch Pathol Lab Med. 2021 Apr 1;145(4):461-493.)     7/5/2023 -  Hormone Therapy    Firmagon loading dose on 7/5/23, followed by Lupron      8/28/2023 -  Cancer Staged    Staging form: Prostate, AJCC 8th Edition  - Clinical: Stage IVB (cT4, cN1, cM1b, PSA: 145, Grade Group: 5) - Signed by Dov Andrea MD on 8/28/2023  Prostate specific antigen (PSA) range: 20 or greater  Histologic grading system: 5 grade system           Interval History: Clinically stable.  ECOG-  0 - Asymptomatic    Review of Systems   Constitutional:  Negative for chills and fever.   HENT:  Negative for nosebleeds.    Eyes:  Negative for discharge.   Respiratory:  Negative for cough and shortness of breath.    Cardiovascular:  Negative for chest pain.   Gastrointestinal:  Negative for abdominal pain,  constipation and diarrhea.   Endocrine: Negative for polydipsia.   Genitourinary:  Negative for hematuria.   Musculoskeletal:  Negative for arthralgias.   Skin:  Negative for color change.   Allergic/Immunologic: Negative for immunocompromised state.   Neurological:  Negative for dizziness and headaches.   Hematological:  Negative for adenopathy.   Psychiatric/Behavioral:  Negative for agitation.        Past Medical History:   Diagnosis Date    COVID-19 01/15/2024    Diabetes mellitus (HCC)     Elevated PSA 06/21/2023    High cholesterol     Hypertension     Prostate cancer (HCC)      Patient Active Problem List   Diagnosis    Type 2 diabetes mellitus, with long-term current use of insulin (HCC)    Other hydronephrosis    Hypertension    Hydronephrosis    Prostate cancer (HCC)    Encounter for monitoring androgen deprivation therapy    Nephrostomy status (HCC)    Urinary tract infection associated with nephrostomy catheter  (HCC)    Hyperlipidemia    Malfunction of nephrostomy tube (HCC)    Metastasis to bone (HCC)       Current Outpatient Medications:     abiraterone (ZYTIGA) 250 mg tablet, Take 1 tablet (250 mg total) by mouth daily With a low fat meal, Disp: 30 tablet, Rfl: 11    albuterol (ProAir HFA) 90 mcg/act inhaler, Inhale 2 puffs every 4 (four) hours as needed for wheezing or shortness of breath, Disp: 8.5 g, Rfl: 0    Blood Glucose Monitoring Suppl (OneTouch Verio Reflect) w/Device KIT, Check blood sugars once daily. Please substitute with appropriate alternative as covered by patient's insurance. Dx: E11.65, Disp: 1 kit, Rfl: 0    Calcium Carb-Cholecalciferol (calcium carbonate-vitamin D) 500 mg-5 mcg tablet, Take 1 tablet by mouth 2 (two) times a day with meals, Disp: 180 tablet, Rfl: 3    dexamethasone (DECADRON) 0.5 mg tablet, TAKE 1 TABLET (0.5 MG TOTAL) BY MOUTH DAILY WITH BREAKFAST, Disp: 90 tablet, Rfl: 0    Easy Touch Pen Needles 31G X 6 MM MISC, Use daily at bedtime, Disp: 100 each, Rfl: 3     fluconazole (DIFLUCAN) 200 mg tablet, Take 1 tablet (200 mg total) by mouth daily for 10 days, Disp: 10 tablet, Rfl: 0    glimepiride (AMARYL) 2 mg tablet, Take 1 tablet (2 mg total) by mouth daily with breakfast, Disp: 30 tablet, Rfl: 1    glucose blood (OneTouch Verio) test strip, Check blood sugars once daily. Please substitute with appropriate alternative as covered by patient's insurance. Dx: E11.65, Disp: 100 each, Rfl: 3    Insulin Glargine Solostar (Lantus SoloStar) 100 UNIT/ML SOPN, Inject 0.3 mL (30 Units total) under the skin daily at bedtime, Disp: 15 mL, Rfl: 2    Levemir FlexPen 100 units/mL injection pen, , Disp: , Rfl:     lisinopril (ZESTRIL) 5 mg tablet, Take 5 mg by mouth daily, Disp: , Rfl:     metFORMIN (GLUCOPHAGE) 500 mg tablet, Take 1 tablet (500 mg total) by mouth 2 (two) times a day with meals, Disp: 60 tablet, Rfl: 1    omega-3-acid ethyl esters (LOVAZA) 1 g capsule, , Disp: , Rfl:     OneTouch Delica Lancets 33G MISC, Check blood sugars once daily. Please substitute with appropriate alternative as covered by patient's insurance. Dx: E11.65, Disp: 100 each, Rfl: 3    polyethylene glycol (MIRALAX) 17 g packet, Take 17 g by mouth daily as needed (for constipation), Disp: , Rfl:     rosuvastatin (CRESTOR) 10 MG tablet, Take 1 tablet (10 mg total) by mouth daily (Patient taking differently: Take 10 mg by mouth daily at bedtime), Disp: 90 tablet, Rfl: 3    senna-docusate sodium (SENOKOT S) 8.6-50 mg per tablet, Take 1 tablet by mouth 2 (two) times a day, Disp: , Rfl:     sodium chloride, PF, 0.9 %, 10 mL by Intracatheter route daily Intracatheter flushing daily. May substitute prefilled syringe with normal saline 10 mL vials, 10 mL syringes, and 18 g blunt needles, Disp: 300 mL, Rfl: 3    sodium chloride, PF, 0.9 %, 10 mL by Intracatheter route daily Intracatheter flushing daily. May substitute prefilled syringe with normal saline 10 mL vials, 10 mL syringes, and 18 g blunt needles, Disp: 300  "mL, Rfl: 2    Spacer/Aero-Holding Chambers (AEROCHAMBER MINI CHAMBER) BILLY, by Does not apply route see administration instructions, Disp: 1 Device, Rfl: 0    Multiple Vitamins-Minerals (Sentry) TABS, Take 1 tablet by mouth daily (Patient not taking: Reported on 3/18/2024), Disp: , Rfl:     sodium chloride, PF, 0.9 %, 10 mL by Intracatheter route daily Intracatheter flushing daily. May substitute prefilled syringe with normal saline 10 mL vials, 10 mL syringes, and 18 g blunt needles (Patient not taking: Reported on 2/8/2024), Disp: 900 mL, Rfl: 0    sodium chloride, PF, 0.9 %, 10 mL by Intracatheter route daily for 120 doses Intracatheter flushing daily. May substitute prefilled syringe with normal saline 10 mL vials, 10 mL syringes, and 18 g blunt needles (Patient not taking: Reported on 2/8/2024), Disp: 300 mL, Rfl: 3  No Known Allergies  Past Surgical History:   Procedure Laterality Date    IR NEPHROSTOMY TUBE CHECK/CHANGE/REPOSITION/REINSERTION/UPSIZE  9/28/2023    IR NEPHROSTOMY TUBE CHECK/CHANGE/REPOSITION/REINSERTION/UPSIZE  12/28/2023    IR NEPHROSTOMY TUBE CHECK/CHANGE/REPOSITION/REINSERTION/UPSIZE  1/31/2024    IR NEPHROSTOMY TUBE CHECK/CHANGE/REPOSITION/REINSERTION/UPSIZE  2/2/2024    IR NEPHROSTOMY TUBE CHECK/CHANGE/REPOSITION/REINSERTION/UPSIZE  3/4/2024    IR NEPHROSTOMY TUBE CHECK/CHANGE/REPOSITION/REINSERTION/UPSIZE  3/20/2024    IR NEPHROSTOMY TUBE PLACEMENT  06/22/2023    bilateral    IR OTHER  1/15/2024    US GUIDED PROSTATE BIOPSY       Social History     Objective:  Vitals:    04/01/24 1406   BP: 140/80   BP Location: Left arm   Patient Position: Sitting   Cuff Size: Adult   Pulse: 74   Resp: 17   Temp: (!) 97.3 °F (36.3 °C)   SpO2: 100%   Weight: 77.6 kg (171 lb)   Height: 5' 8\" (1.727 m)     Physical Exam  Constitutional:       Appearance: He is well-developed.   HENT:      Head: Normocephalic and atraumatic.      Mouth/Throat:      Mouth: Mucous membranes are moist.   Eyes:      Pupils: " Pupils are equal, round, and reactive to light.   Cardiovascular:      Rate and Rhythm: Normal rate and regular rhythm.      Heart sounds: No murmur heard.  Pulmonary:      Breath sounds: Normal breath sounds. No wheezing or rales.   Abdominal:      Palpations: Abdomen is soft.      Tenderness: There is no abdominal tenderness.   Musculoskeletal:         General: No tenderness. Normal range of motion.      Cervical back: Neck supple.   Lymphadenopathy:      Cervical: No cervical adenopathy.   Skin:     Findings: No erythema or rash.   Neurological:      Mental Status: He is alert and oriented to person, place, and time.      Cranial Nerves: No cranial nerve deficit.      Deep Tendon Reflexes: Reflexes are normal and symmetric.   Psychiatric:         Behavior: Behavior normal.           Labs:  I personally reviewed the labs and imaging pertinent to this patient care.

## 2024-04-03 NOTE — PROGRESS NOTES
Assessment & Plan     1. Hospital discharge follow-up    2. Physical deconditioning  -     Ambulatory Referral to Physical Therapy; Future    3. Constipation, unspecified constipation type  -     senna-docusate sodium (SENOKOT S) 8.6-50 mg per tablet; Take 1 tablet by mouth daily    4. Cough  -     albuterol (ProAir HFA) 90 mcg/act inhaler; Inhale 2 puffs every 4 (four) hours as needed for wheezing or shortness of breath    5. Nephrostomy status (HCC)  -     sodium chloride, PF, 0.9 %; 10 mL by Intracatheter route every 12 (twelve) hours Intracatheter flushing daily. May substitute prefilled syringe with normal saline 10 mL vials, 10 mL syringes, and 18 g blunt needles         Subjective     Transitional Care Management Review:   Oliver Astudillo is a 73 y.o. male here for TCM follow up.     During the TCM phone call patient stated:  TCM Call       Date and time call was made  3/27/2024  1:33 PM    Hospital care reviewed  Records reviewed    Patient was hospitial[facility] at  [facility]    Date of Admission  03/18/24    Date of discharge  03/24/24    Diagnosis  Urinary tract infection associated with nephrostomy catheter    Disposition  Home    Were the patients medications reviewed and updated  Yes    Current Symptoms  None          TCM Call       Post hospital issues  None    Should patient be enrolled in anticoag monitoring?  No    Scheduled for follow up?  No    Did you obtain your prescribed medications  Yes    Do you need help managing your prescriptions or medications  No    Is transportation to your appointment needed  No    I have advised the patient to call PCP with any new or worsening symptoms  Nancy Oconnor LPN    Living Arrangements  Family members    Support System  Family    The type of support provided  Emotional; Physical    Do you have social support  Yes, as much as I need    Are you recieving any outpatient services  No    Are you recieving home care services  No    Are you using any  community resources  No    Current waiver services  No    Have you fallen in the last 12 months  No    Interperter language line needed  Terri Boyd is a 73 year old with uncontrolled T2DM, HTN, dyslipidemia, prostate cancer on tx with bilateral PCN presented to the hospital with UTI. He presented with evidence of cystitis, pyelitis and ureteritis.  Patient was treated for his UTI with cefepime, vancomycin, cefazolin and fluconazole.  IR was taken on board who repositioned his left nephrostomy tube and recommended daily flushing.  Patient subsequently developed left lower quadrant tenderness without any rebound and constipation.  Patient was started on a bowel regimen that helped him have a bowel movement and his pain subsequently resolved.  His urine cultures grew Enterococcus and Candida for which he was discharged on Augmentin and fluconazole with a follow-up requested with PCP, urology, endocrinology and tube check with IR.  He does not have abdominal pain; he has been flushing his nephrostomy tubes daily. Today is his last day of the antifungal. Additionally he has been checking his blood sugars and home and taking his antidiabetic medication as prescribed.    Review of Systems   Constitutional:  Negative for chills and fever.   HENT:  Negative for ear pain and sore throat.    Eyes:  Negative for pain and visual disturbance.   Respiratory:  Negative for cough and shortness of breath.    Cardiovascular:  Negative for chest pain and palpitations.   Gastrointestinal:  Negative for abdominal pain and vomiting.   Genitourinary:  Negative for dysuria and hematuria.   Musculoskeletal:  Negative for arthralgias and back pain.   Skin:  Negative for color change and rash.   Neurological:  Negative for seizures and syncope.   All other systems reviewed and are negative.      Objective     /66 (BP Location: Left arm, Patient Position: Sitting)   Pulse 75   Resp 19   Wt 76.7 kg (169 lb)   SpO2 98%   BMI  25.70 kg/m²      Physical Exam  Vitals and nursing note reviewed.   Constitutional:       General: He is not in acute distress.     Appearance: He is well-developed.   HENT:      Head: Normocephalic and atraumatic.      Right Ear: External ear normal.      Left Ear: External ear normal.      Nose: Nose normal.      Mouth/Throat:      Mouth: Mucous membranes are moist.      Pharynx: Oropharynx is clear.   Eyes:      Extraocular Movements: Extraocular movements intact.      Conjunctiva/sclera: Conjunctivae normal.   Cardiovascular:      Rate and Rhythm: Normal rate and regular rhythm.      Heart sounds: No murmur heard.  Pulmonary:      Effort: Pulmonary effort is normal. No respiratory distress.      Breath sounds: Normal breath sounds.   Abdominal:      Palpations: Abdomen is soft.      Tenderness: There is no abdominal tenderness.   Musculoskeletal:         General: No swelling. Normal range of motion.      Cervical back: Normal range of motion and neck supple.   Skin:     General: Skin is warm and dry.      Capillary Refill: Capillary refill takes less than 2 seconds.   Neurological:      Mental Status: He is alert and oriented to person, place, and time. Mental status is at baseline.   Psychiatric:         Mood and Affect: Mood normal.         Behavior: Behavior normal.         Thought Content: Thought content normal.         Judgment: Judgment normal.     Medications have been reviewed by provider in current encounter    Meenu Esposito MD

## 2024-04-04 ENCOUNTER — OFFICE VISIT (OUTPATIENT)
Age: 74
End: 2024-04-04

## 2024-04-04 VITALS
WEIGHT: 169 LBS | DIASTOLIC BLOOD PRESSURE: 66 MMHG | RESPIRATION RATE: 19 BRPM | BODY MASS INDEX: 25.7 KG/M2 | OXYGEN SATURATION: 98 % | SYSTOLIC BLOOD PRESSURE: 119 MMHG | HEART RATE: 75 BPM

## 2024-04-04 DIAGNOSIS — K59.00 CONSTIPATION, UNSPECIFIED CONSTIPATION TYPE: ICD-10-CM

## 2024-04-04 DIAGNOSIS — R53.81 PHYSICAL DECONDITIONING: ICD-10-CM

## 2024-04-04 DIAGNOSIS — Z09 HOSPITAL DISCHARGE FOLLOW-UP: Primary | ICD-10-CM

## 2024-04-04 DIAGNOSIS — R05.9 COUGH: ICD-10-CM

## 2024-04-04 DIAGNOSIS — E11.00 TYPE 2 DIABETES MELLITUS WITH HYPEROSMOLARITY WITHOUT COMA, WITH LONG-TERM CURRENT USE OF INSULIN (HCC): Chronic | ICD-10-CM

## 2024-04-04 DIAGNOSIS — Z79.4 TYPE 2 DIABETES MELLITUS WITH HYPEROSMOLARITY WITHOUT COMA, WITH LONG-TERM CURRENT USE OF INSULIN (HCC): Chronic | ICD-10-CM

## 2024-04-04 DIAGNOSIS — Z93.6 NEPHROSTOMY STATUS (HCC): ICD-10-CM

## 2024-04-04 LAB — SL AMB POCT HEMOGLOBIN AIC: 9.2 (ref ?–6.5)

## 2024-04-04 PROCEDURE — 99495 TRANSJ CARE MGMT MOD F2F 14D: CPT | Performed by: FAMILY MEDICINE

## 2024-04-04 PROCEDURE — 83036 HEMOGLOBIN GLYCOSYLATED A1C: CPT | Performed by: FAMILY MEDICINE

## 2024-04-04 RX ORDER — AMOXICILLIN 250 MG
1 CAPSULE ORAL DAILY
Qty: 90 TABLET | Refills: 1 | Status: SHIPPED | OUTPATIENT
Start: 2024-04-04 | End: 2024-04-12 | Stop reason: SDUPTHER

## 2024-04-04 RX ORDER — ALBUTEROL SULFATE 90 UG/1
2 AEROSOL, METERED RESPIRATORY (INHALATION) EVERY 4 HOURS PRN
Qty: 18 G | Refills: 4 | Status: SHIPPED | OUTPATIENT
Start: 2024-04-04 | End: 2024-04-05

## 2024-04-04 RX ORDER — SODIUM CHLORIDE 9 MG/ML
10 INJECTION, SOLUTION INTRAMUSCULAR; INTRAVENOUS; SUBCUTANEOUS EVERY 12 HOURS
Qty: 600 ML | Refills: 0 | Status: SHIPPED | OUTPATIENT
Start: 2024-04-04 | End: 2024-04-12

## 2024-04-05 ENCOUNTER — TELEPHONE (OUTPATIENT)
Age: 74
End: 2024-04-05

## 2024-04-05 DIAGNOSIS — R06.02 SOB (SHORTNESS OF BREATH): Primary | ICD-10-CM

## 2024-04-05 RX ORDER — ALBUTEROL SULFATE 90 UG/1
2 AEROSOL, METERED RESPIRATORY (INHALATION) EVERY 6 HOURS PRN
Qty: 18 G | Refills: 0 | Status: SHIPPED | OUTPATIENT
Start: 2024-04-05 | End: 2024-04-12 | Stop reason: SDUPTHER

## 2024-04-05 NOTE — TELEPHONE ENCOUNTER
Received Alternative Requested from [facility] for ProAir HFA 90mcg. This medication is not covered by insurance.    Scanned copy into encounter.    Dr. Mckeon, please review and send over a new RX. Thank you!

## 2024-04-08 ENCOUNTER — RA CDI HCC (OUTPATIENT)
Dept: OTHER | Facility: HOSPITAL | Age: 74
End: 2024-04-08

## 2024-04-12 ENCOUNTER — OFFICE VISIT (OUTPATIENT)
Age: 74
End: 2024-04-12

## 2024-04-12 VITALS
BODY MASS INDEX: 26.08 KG/M2 | HEART RATE: 60 BPM | WEIGHT: 172.1 LBS | SYSTOLIC BLOOD PRESSURE: 110 MMHG | DIASTOLIC BLOOD PRESSURE: 60 MMHG | RESPIRATION RATE: 20 BRPM | TEMPERATURE: 97.9 F | OXYGEN SATURATION: 100 % | HEIGHT: 68 IN

## 2024-04-12 DIAGNOSIS — Z79.4 TYPE 2 DIABETES MELLITUS WITH OTHER SPECIFIED COMPLICATION, WITH LONG-TERM CURRENT USE OF INSULIN (HCC): Primary | Chronic | ICD-10-CM

## 2024-04-12 DIAGNOSIS — Z93.6 NEPHROSTOMY STATUS (HCC): ICD-10-CM

## 2024-04-12 DIAGNOSIS — R06.02 SOB (SHORTNESS OF BREATH): ICD-10-CM

## 2024-04-12 DIAGNOSIS — E11.69 TYPE 2 DIABETES MELLITUS WITH OTHER SPECIFIED COMPLICATION, WITH LONG-TERM CURRENT USE OF INSULIN (HCC): Primary | Chronic | ICD-10-CM

## 2024-04-12 DIAGNOSIS — K59.00 CONSTIPATION, UNSPECIFIED CONSTIPATION TYPE: ICD-10-CM

## 2024-04-12 RX ORDER — ALBUTEROL SULFATE 90 UG/1
2 AEROSOL, METERED RESPIRATORY (INHALATION) EVERY 6 HOURS PRN
Qty: 18 G | Refills: 0 | Status: SHIPPED | OUTPATIENT
Start: 2024-04-12

## 2024-04-12 RX ORDER — GLIMEPIRIDE 4 MG/1
4 TABLET ORAL
Qty: 90 TABLET | Refills: 3 | Status: SHIPPED | OUTPATIENT
Start: 2024-04-12 | End: 2025-04-07

## 2024-04-12 RX ORDER — INSULIN DETEMIR 100 [IU]/ML
30 INJECTION, SOLUTION SUBCUTANEOUS
Qty: 15 ML | Refills: 1 | Status: SHIPPED | OUTPATIENT
Start: 2024-04-12

## 2024-04-12 RX ORDER — POLYETHYLENE GLYCOL 3350 17 G/17G
17 POWDER, FOR SOLUTION ORAL DAILY PRN
Qty: 100 EACH | Refills: 1 | Status: SHIPPED | OUTPATIENT
Start: 2024-04-12

## 2024-04-12 RX ORDER — AMOXICILLIN 250 MG
1 CAPSULE ORAL DAILY
Qty: 90 TABLET | Refills: 1 | Status: SHIPPED | OUTPATIENT
Start: 2024-04-12

## 2024-04-12 RX ORDER — LISINOPRIL 5 MG/1
5 TABLET ORAL DAILY
Qty: 30 TABLET | Refills: 5 | Status: SHIPPED | OUTPATIENT
Start: 2024-04-12

## 2024-04-12 RX ORDER — ROSUVASTATIN CALCIUM 10 MG/1
10 TABLET, COATED ORAL
Qty: 90 TABLET | Refills: 1 | Status: SHIPPED | OUTPATIENT
Start: 2024-04-12

## 2024-04-12 RX ORDER — GLIMEPIRIDE 2 MG/1
2 TABLET ORAL
Qty: 90 TABLET | Refills: 3 | Status: SHIPPED | OUTPATIENT
Start: 2024-04-12

## 2024-04-12 RX ORDER — SODIUM CHLORIDE 9 MG/ML
10 INJECTION, SOLUTION INTRAMUSCULAR; INTRAVENOUS; SUBCUTANEOUS DAILY
Qty: 600 ML | Refills: 2 | Status: SHIPPED | OUTPATIENT
Start: 2024-04-12 | End: 2024-10-09

## 2024-04-12 NOTE — LETTER
April 12, 2024     Patient: Oliver Astudillo  YOB: 1950  Date of Visit: 4/12/2024      To Whom it May Concern:    The above named patient has a medical necessity for a handicap placard. The patient's diagnosis is #5:     Has lost the use of one or more limbs as a consequence of paralysis, amputation, or other permanent disability.   Is severely and permanently disabled and cannot walk without the use of or assistance from a brace, cane, crutch, another person, prosthetic device, wheelchair, or other assistive device.   Suffers from lung disease to such an extent that the applicant's forced (respiratory) expiratory volume for one second when measured by spirometry, in less than one liter, or the arterial oxygen tension is less than sixty mm/hg on room air at rest; or uses portable oxygen   Has a cardiac condition to the extent that the applicant's functional limitations are classified in severity as Class III or Class IV according to standards set by the American Heart Association   Is severely and permanently limited in the ability to walk because of an arthritic, neurological, or orthopedic condition; or cannot walk two hundred feet without stopping to rest.  Has a permanent sight impairment of both eyes as certified by the [place] [employer].    If you have any questions, please feel free to call our office. Thank you.      Sincerely,            Eugenia Rodriguez MD

## 2024-04-12 NOTE — PROGRESS NOTES
"Family Medicine Office Visit  Oliver Astudillo 73 y.o. male   MRN: [id number] : 1950  ENCOUNTER: 2024    Assessment and Plan     1. Type 2 diabetes mellitus with other specified complication, with long-term current use of insulin (HCC)  Assessment & Plan:  Lab Results   Component Value Date    HGBA1C 9.2 (A) 2024       No results for input(s): \"POCGLU\" in the last 72 hours.      Blood Sugar Average: Last 72 hrs:      Chronic, uncontrolled but improving. Last A1c was 11.1, now 9.2. He reports compliance with all medications. He has been making changes to diet.  [facility] recently d/c during last admission due to frequent UTIs  Will increase metformin to 1000mg BID  Continue other diabetic medications as folllows:  Amaryl 4mg daily with breakfast  Amaryl 2 mg daily with dinner  Insulin glargine 30 units at bedtime  Monitor blood sugars ACHS, bring logs next visit  Continue statin, ace inhibitor  Carbohydrate controlled diet  Diabetic education provided    Orders:  -     Levemir FlexPen 100 units/mL injection pen; Inject 30 Units under the skin daily at bedtime  -     glimepiride (AMARYL) 2 mg tablet; Take 1 tablet (2 mg total) by mouth daily with dinner  -     glimepiride (AMARYL) 4 mg tablet; Take 1 tablet (4 mg total) by mouth daily with breakfast  -     metFORMIN (GLUCOPHAGE) 1000 MG tablet; Take 1 tablet (1,000 mg total) by mouth 2 (two) times a day with meals  -     lisinopril (ZESTRIL) 5 mg tablet; Take 1 tablet (5 mg total) by mouth daily  -     rosuvastatin (CRESTOR) 10 MG tablet; Take 1 tablet (10 mg total) by mouth daily at bedtime    2. Constipation, unspecified constipation type  -     polyethylene glycol (MIRALAX) 17 g packet; Take 17 g by mouth daily as needed (for constipation)  -     senna-docusate sodium (SENOKOT S) 8.6-50 mg per tablet; Take 1 tablet by mouth daily    3. Nephrostomy status (HCC)  -     sodium chloride, PF, 0.9 %; 10 mL by Intracatheter route daily Intracatheter " flushing daily. May substitute prefilled syringe with normal saline 10 mL vials, 10 mL syringes, and 18 g blunt needles    4. SOB (shortness of breath)  -     albuterol (Ventolin HFA) 90 mcg/act inhaler; Inhale 2 puffs every 6 (six) hours as needed for wheezing        Return in about 2 months (around 6/12/2024) for Next scheduled follow up.      Associated orders were discussed and explained to the patient, they expressed understanding and acceptance with A/P. Patient will call the office if any further questions/concerns.     Assessment and plan was discussed with attending physician    Chief Complaint     Chief Complaint   Patient presents with    Follow-up       History of Present Illness     Oliver Astudillo is a 73 y.o.-year-old male who presents today for follow up DM. He is also requesting refill of medications since he will be tyraveling for the next 2 months.      Diabetes  He presents for his follow-up diabetic visit. He has type 2 (there is also a component of steroid induced hyperglycemia) diabetes mellitus. His disease course has been improving. There are no hypoglycemic associated symptoms. Pertinent negatives for hypoglycemia include no headaches. Pertinent negatives for diabetes include no chest pain, no fatigue, no polydipsia, no polyphagia, no polyuria and no weight loss. There are no hypoglycemic complications. There are no diabetic complications. Risk factors for coronary artery disease include male sex, sedentary lifestyle, diabetes mellitus and hypertension. Current diabetic treatment includes oral agent (dual therapy) and insulin injections (long acting insulin). He is following a generally healthy diet. Meal planning includes avoidance of concentrated sweets. An ACE inhibitor/angiotensin II receptor blocker is being taken.       Current Outpatient Medications on File Prior to Visit   Medication Sig    abiraterone (ZYTIGA) 250 mg tablet Take 1 tablet (250 mg total) by mouth daily With a low fat  meal    Blood Glucose Monitoring Suppl (OneTouch Verio Reflect) w/Device KIT Check blood sugars once daily. Please substitute with appropriate alternative as covered by patient's insurance. Dx: E11.65    dexamethasone (DECADRON) 0.5 mg tablet TAKE 1 TABLET (0.5 MG TOTAL) BY MOUTH DAILY WITH BREAKFAST    Easy Touch Pen Needles 31G X 6 MM MISC Use daily at bedtime    glucose blood (OneTouch Verio) test strip Check blood sugars once daily. Please substitute with appropriate alternative as covered by patient's insurance. Dx: E11.65    Multiple Vitamins-Minerals (Sentry) TABS Take 1 tablet by mouth daily    omega-3-acid ethyl esters (LOVAZA) 1 g capsule     OneTouch Delica Lancets 33G MISC Check blood sugars once daily. Please substitute with appropriate alternative as covered by patient's insurance. Dx: E11.65    Spacer/Aero-Holding Chambers (AEROCHAMBER MINI CHAMBER) BILLY by Does not apply route see administration instructions    [DISCONTINUED] albuterol (Ventolin HFA) 90 mcg/act inhaler Inhale 2 puffs every 6 (six) hours as needed for wheezing    [DISCONTINUED] glimepiride (AMARYL) 2 mg tablet Take 1 tablet (2 mg total) by mouth daily with breakfast    [DISCONTINUED] Insulin Glargine Solostar (Lantus SoloStar) 100 UNIT/ML SOPN Inject 0.3 mL (30 Units total) under the skin daily at bedtime    [DISCONTINUED] Levemir FlexPen 100 units/mL injection pen     [DISCONTINUED] lisinopril (ZESTRIL) 5 mg tablet Take 5 mg by mouth daily    [DISCONTINUED] metFORMIN (GLUCOPHAGE) 500 mg tablet Take 1 tablet (500 mg total) by mouth 2 (two) times a day with meals    [DISCONTINUED] polyethylene glycol (MIRALAX) 17 g packet Take 17 g by mouth daily as needed (for constipation)    [DISCONTINUED] rosuvastatin (CRESTOR) 10 MG tablet Take 1 tablet (10 mg total) by mouth daily (Patient taking differently: Take 10 mg by mouth daily at bedtime)    [DISCONTINUED] senna-docusate sodium (SENOKOT S) 8.6-50 mg per tablet Take 1 tablet by mouth daily  "   [DISCONTINUED] sodium chloride, PF, 0.9 % 10 mL by Intracatheter route daily for 120 doses Intracatheter flushing daily. May substitute prefilled syringe with normal saline 10 mL vials, 10 mL syringes, and 18 g blunt needles (Patient not taking: Reported on 4/12/2024)    [DISCONTINUED] sodium chloride, PF, 0.9 % 10 mL by Intracatheter route daily Intracatheter flushing daily. May substitute prefilled syringe with normal saline 10 mL vials, 10 mL syringes, and 18 g blunt needles (Patient not taking: Reported on 4/12/2024)    [DISCONTINUED] sodium chloride, PF, 0.9 % 10 mL by Intracatheter route daily Intracatheter flushing daily. May substitute prefilled syringe with normal saline 10 mL vials, 10 mL syringes, and 18 g blunt needles (Patient not taking: Reported on 4/12/2024)    [DISCONTINUED] sodium chloride, PF, 0.9 % 10 mL by Intracatheter route every 12 (twelve) hours Intracatheter flushing daily. May substitute prefilled syringe with normal saline 10 mL vials, 10 mL syringes, and 18 g blunt needles (Patient not taking: Reported on 4/12/2024)       Review of Systems     Review of Systems   Constitutional:  Negative for chills, fatigue, fever and weight loss.   Respiratory:  Negative for cough and shortness of breath.    Cardiovascular:  Negative for chest pain.   Gastrointestinal:  Negative for abdominal pain.   Endocrine: Negative for polydipsia, polyphagia and polyuria.   Skin:  Negative for rash.   Neurological:  Negative for headaches.       Objective     /60 (BP Location: Right arm, Patient Position: Sitting, Cuff Size: Adult)   Pulse 60   Temp 97.9 °F (36.6 °C) (Tympanic)   Resp 20   Ht 5' 8\" (1.727 m)   Wt 78.1 kg (172 lb 1.6 oz)   SpO2 100%   BMI 26.17 kg/m²     Physical Exam  Vitals reviewed.   Constitutional:       General: He is not in acute distress.     Appearance: Normal appearance.   Cardiovascular:      Rate and Rhythm: Normal rate and regular rhythm.      Heart sounds: Normal heart " sounds.   Pulmonary:      Effort: Pulmonary effort is normal. No respiratory distress.      Breath sounds: Normal breath sounds.   Abdominal:      Palpations: Abdomen is soft.      Tenderness: There is no abdominal tenderness.   Genitourinary:     Comments: B/L nephrostomy tubes in place, covered in clean dressing. No signs of infection   Musculoskeletal:      Right lower leg: No edema.      Left lower leg: No edema.   Neurological:      Mental Status: He is alert.         Eugenia Springer MD  Family Medicine PGY-3  [facility]         Parts of this note were dictated using M*Modal dictation software and may have sounds-like errors due to variation in pronunciation.

## 2024-04-12 NOTE — ASSESSMENT & PLAN NOTE
"Lab Results   Component Value Date    HGBA1C 9.2 (A) 04/04/2024       No results for input(s): \"POCGLU\" in the last 72 hours.      Blood Sugar Average: Last 72 hrs:      Chronic, uncontrolled but improving. Last A1c was 11.1, now 9.2. He reports compliance with all medications. He has been making changes to diet.  [facility] d/c during last admission due to frequent UTIs  Will increase metformin to 1000mg BID  Continue other diabetic medications as folllows:  Amaryl 4mg daily with breakfast  Amaryl 2 mg daily with dinner  Insulin glargine 30 units at bedtime  Monitor blood sugars ACHS, bring logs next visit  Continue statin, ace inhibitor  Carbohydrate controlled diet  Diabetic education provided  "

## 2024-04-23 RX ORDER — LISINOPRIL 10 MG/1
TABLET ORAL
COMMUNITY
Start: 2024-03-05

## 2024-04-25 ENCOUNTER — CONSULT (OUTPATIENT)
Dept: ENDOCRINOLOGY | Facility: CLINIC | Age: 74
End: 2024-04-25
Payer: COMMERCIAL

## 2024-04-25 VITALS
BODY MASS INDEX: 26.84 KG/M2 | HEART RATE: 63 BPM | WEIGHT: 171 LBS | OXYGEN SATURATION: 99 % | HEIGHT: 67 IN | SYSTOLIC BLOOD PRESSURE: 110 MMHG | DIASTOLIC BLOOD PRESSURE: 70 MMHG

## 2024-04-25 DIAGNOSIS — C79.51 METASTASIS TO BONE (HCC): ICD-10-CM

## 2024-04-25 DIAGNOSIS — Z79.4 TYPE 2 DIABETES MELLITUS WITH OTHER SPECIFIED COMPLICATION, WITH LONG-TERM CURRENT USE OF INSULIN (HCC): Chronic | ICD-10-CM

## 2024-04-25 DIAGNOSIS — N18.31 STAGE 3A CHRONIC KIDNEY DISEASE (HCC): ICD-10-CM

## 2024-04-25 DIAGNOSIS — E16.2 HYPOGLYCEMIA: ICD-10-CM

## 2024-04-25 DIAGNOSIS — Z79.4 TYPE 2 DIABETES MELLITUS WITH HYPERGLYCEMIA, WITH LONG-TERM CURRENT USE OF INSULIN (HCC): Primary | ICD-10-CM

## 2024-04-25 DIAGNOSIS — E11.69 TYPE 2 DIABETES MELLITUS WITH OTHER SPECIFIED COMPLICATION, WITH LONG-TERM CURRENT USE OF INSULIN (HCC): Chronic | ICD-10-CM

## 2024-04-25 DIAGNOSIS — E87.6 HYPOKALEMIA: ICD-10-CM

## 2024-04-25 DIAGNOSIS — C61 PROSTATE CANCER (HCC): ICD-10-CM

## 2024-04-25 DIAGNOSIS — Z93.6 NEPHROSTOMY STATUS (HCC): ICD-10-CM

## 2024-04-25 DIAGNOSIS — E11.65 TYPE 2 DIABETES MELLITUS WITH HYPERGLYCEMIA, WITH LONG-TERM CURRENT USE OF INSULIN (HCC): Primary | ICD-10-CM

## 2024-04-25 PROCEDURE — 99204 OFFICE O/P NEW MOD 45 MIN: CPT | Performed by: INTERNAL MEDICINE

## 2024-04-25 RX ORDER — GLIMEPIRIDE 2 MG/1
2 TABLET ORAL
Qty: 90 TABLET | Refills: 3 | OUTPATIENT
Start: 2024-04-25

## 2024-04-25 RX ORDER — OMEGA-3-ACID ETHYL ESTERS 1 G/1
CAPSULE, LIQUID FILLED ORAL
OUTPATIENT
Start: 2024-04-25

## 2024-04-25 RX ORDER — BLOOD SUGAR DIAGNOSTIC
STRIP MISCELLANEOUS
Qty: 200 EACH | Refills: 3 | Status: SHIPPED | OUTPATIENT
Start: 2024-04-25

## 2024-04-25 RX ORDER — INSULIN DETEMIR 100 [IU]/ML
32 INJECTION, SOLUTION SUBCUTANEOUS
Qty: 15 ML | Refills: 1 | Status: SHIPPED | OUTPATIENT
Start: 2024-04-25

## 2024-04-25 RX ORDER — GLUCOSAMINE HCL/CHONDROITIN SU 500-400 MG
CAPSULE ORAL
Qty: 100 EACH | Refills: 1 | Status: SHIPPED | OUTPATIENT
Start: 2024-04-25

## 2024-04-25 RX ORDER — LANCETS 33 GAUGE
EACH MISCELLANEOUS
Qty: 100 EACH | Refills: 3 | Status: SHIPPED | OUTPATIENT
Start: 2024-04-25

## 2024-04-25 RX ORDER — SODIUM CHLORIDE 9 MG/ML
10 INJECTION, SOLUTION INTRAMUSCULAR; INTRAVENOUS; SUBCUTANEOUS DAILY
Qty: 600 ML | Refills: 2 | OUTPATIENT
Start: 2024-04-25 | End: 2024-10-22

## 2024-04-25 NOTE — PATIENT INSTRUCTIONS
Increase levemir to 32 units at bedtime   Continue rest of current regimen   Please check blood sugars more often atleast 1-2 times a day before meals and at bedtime, send log for review   Labs to be done as ordered now and in 3 months prior to follow up   We will request a continuous glucose monitor for you - Dexcom CGM    ------------------------------------------------------------------------------------------  Aumentar el nivel a 32 unidades a la hora de acostarse   Continuar con el nohemi del régimen actual   Controle los niveles de azúcar en la taco con más frecuencia al menos 1-2 veces al día antes de las comidas y a la hora de acostarse, envíe el registro para luevano revisión   Los análisis de laboratorio se realizarán según lo ordenado ahora y en 3 meses antes del seguimiento   Le solicitaremos un monitor continuo de glucosa - Dexcom CGM

## 2024-04-25 NOTE — PROGRESS NOTES
ENDOCRINE INITIAL OFFICE VISIT  [facility] - [facility]    NAME: Oliver Astudillo  AGE: 73 y.o. SEX: male  : 1950     DATE: 2024     Assessment and Plan:     Type 2 diabetes mellitus on insulin  -Patient is diagnosed with diabetes mellitus type 2 in .  On insulin since .  -HbA1c April is 9.2%, previous one on January 11.1%  -Currently on glimepiride 4 mg with breakfast, 2 mg with dinner, metformin 500 mg twice daily, Levemir 30 units at bedtime.  -Previously he was on sulfonylureas which was discontinued due to recurrent UTIs,  -Last albumin:creatinine ratio in , on lisinopril  -Diabetic eye exam completed last week which was unremarkable  -Denies any symptoms of neuropathy  -No history of CAD/stroke/TIA  -Denies symptoms of PVD.  -Patient's mother has type 2 diabetes mellitus.    Recurrent UTI  Has bilateral nephrostomy tubes    Plan  -Increase Levemir to 32 units  -Continue glimepiride 4 mg with breakfast, 2 mg with dinner, metformin 500 mg twice daily  -Check blood glucose at least 2 times a day  -Follow up with nutritionist  -HbA1C, CMP, lipid panel  - follow up in 3 months    Return in about 3 months (around 2024).     Chief Complaint:     No chief complaint on file.     History of Present Illness:     73-year-old male with type 2 diabetes presents to the clinic to establish care.  Patient was diagnosed with type 2 diabetes mellitus in , on insulin since .  Most recent A1c this month 9.2%, previous A1c in January 11.1%.  Patient is currently on glimepiride 4 mg with breakfast, 2 mg with dinner, metformin 500 mg twice daily, Levemir 30 units at bedtime.  Patient follows up with the nutritionist.  Eating less amount of rice and flour.  Not engaging in regular exercises.  Albumin creatinine ratio was in  which was 426.  Completed diabetic eye exam.  No evidence of neuropathy, PVD.  No history of  CAD/stroke/TIA.    The following portions of the patient's history were reviewed and updated as appropriate: allergies, current medications, past family history, past medical history, past social history, past surgical history and problem list.     Review of Systems:     Review of Systems   Constitutional:  Negative for chills and fever.   HENT:  Negative for ear pain and sore throat.    Eyes:  Negative for pain and visual disturbance.   Respiratory:  Negative for cough and shortness of breath.    Cardiovascular:  Negative for chest pain and palpitations.   Gastrointestinal:  Negative for abdominal pain and vomiting.   Genitourinary:  Negative for dysuria and hematuria.   Musculoskeletal:  Negative for arthralgias and back pain.   Skin:  Negative for color change and rash.   Neurological:  Negative for seizures and syncope.   All other systems reviewed and are negative.       Past Medical History:     Past Medical History:   Diagnosis Date    COVID-19 01/15/2024    Diabetes mellitus (HCC)     Elevated PSA 06/21/2023    High cholesterol     Hypertension     Prostate cancer (HCC)         Past Surgical History:     Past Surgical History:   Procedure Laterality Date    IR NEPHROSTOMY TUBE CHECK/CHANGE/REPOSITION/REINSERTION/UPSIZE  9/28/2023    IR NEPHROSTOMY TUBE CHECK/CHANGE/REPOSITION/REINSERTION/UPSIZE  12/28/2023    IR NEPHROSTOMY TUBE CHECK/CHANGE/REPOSITION/REINSERTION/UPSIZE  1/31/2024    IR NEPHROSTOMY TUBE CHECK/CHANGE/REPOSITION/REINSERTION/UPSIZE  2/2/2024    IR NEPHROSTOMY TUBE CHECK/CHANGE/REPOSITION/REINSERTION/UPSIZE  3/4/2024    IR NEPHROSTOMY TUBE CHECK/CHANGE/REPOSITION/REINSERTION/UPSIZE  3/20/2024    IR NEPHROSTOMY TUBE PLACEMENT  06/22/2023    bilateral    IR OTHER  1/15/2024    US GUIDED PROSTATE BIOPSY          Social History:   He reports that he quit smoking about 24 years ago. His smoking use included cigarettes. He has been exposed to tobacco smoke. He has never used smokeless tobacco. He  reports that he does not currently use alcohol. He reports that he does not use drugs.     Family History:     Family History   Problem Relation Age of Onset    Uterine cancer Mother     Liver cancer Father     No Known Problems Sister     No Known Problems Brother     No Known Problems Son     Diabetes type II Daughter     No Known Problems Daughter     Cancer Daughter         Current Medications:     Current Outpatient Medications:     abiraterone (ZYTIGA) 250 mg tablet, Take 1 tablet (250 mg total) by mouth daily With a low fat meal, Disp: 30 tablet, Rfl: 11    Alcohol Swabs 70 % PADS, To clean the skin prior to injecting insulin, Disp: 100 each, Rfl: 1    Blood Glucose Monitoring Suppl (OneTouch Verio Reflect) w/Device KIT, Check blood sugars once daily. Please substitute with appropriate alternative as covered by patient's insurance. Dx: E11.65, Disp: 1 kit, Rfl: 0    dexamethasone (DECADRON) 0.5 mg tablet, TAKE 1 TABLET (0.5 MG TOTAL) BY MOUTH DAILY WITH BREAKFAST, Disp: 90 tablet, Rfl: 0    glimepiride (AMARYL) 2 mg tablet, Take 1 tablet (2 mg total) by mouth daily with dinner, Disp: 90 tablet, Rfl: 3    glimepiride (AMARYL) 4 mg tablet, Take 1 tablet (4 mg total) by mouth daily with breakfast, Disp: 90 tablet, Rfl: 3    glucose blood (OneTouch Verio) test strip, Check blood sugars twice a daily. Please substitute with appropriate alternative as covered by patient's insurance. Dx: E11.65, Disp: 200 each, Rfl: 3    Levemir FlexPen 100 units/mL injection pen, Inject 32 Units under the skin daily at bedtime, Disp: 15 mL, Rfl: 1    metFORMIN (GLUCOPHAGE) 1000 MG tablet, Take 1 tablet (1,000 mg total) by mouth 2 (two) times a day with meals, Disp: 180 tablet, Rfl: 1    Multiple Vitamins-Minerals (Sentry) TABS, Take 1 tablet by mouth daily, Disp: , Rfl:     omega-3-acid ethyl esters (LOVAZA) 1 g capsule, , Disp: , Rfl:     OneTouch Delica Lancets 33G MISC, Check blood sugars once daily. Please substitute with  "appropriate alternative as covered by patient's insurance. Dx: E11.65, Disp: 100 each, Rfl: 3    rosuvastatin (CRESTOR) 10 MG tablet, Take 1 tablet (10 mg total) by mouth daily at bedtime, Disp: 90 tablet, Rfl: 1    senna-docusate sodium (SENOKOT S) 8.6-50 mg per tablet, Take 1 tablet by mouth daily, Disp: 90 tablet, Rfl: 1    albuterol (Ventolin HFA) 90 mcg/act inhaler, Inhale 2 puffs every 6 (six) hours as needed for wheezing, Disp: 18 g, Rfl: 0    Easy Touch Pen Needles 31G X 6 MM MISC, Use daily at bedtime, Disp: 100 each, Rfl: 3    lisinopril (ZESTRIL) 10 mg tablet, NARCISO TANMAY TABLETA VIA ORAL DIARIAMENTE, Disp: , Rfl:     lisinopril (ZESTRIL) 5 mg tablet, Take 1 tablet (5 mg total) by mouth daily (Patient not taking: Reported on 4/25/2024), Disp: 30 tablet, Rfl: 5    polyethylene glycol (MIRALAX) 17 g packet, Take 17 g by mouth daily as needed (for constipation), Disp: 100 each, Rfl: 1    sodium chloride, PF, 0.9 %, 10 mL by Intracatheter route daily Intracatheter flushing daily. May substitute prefilled syringe with normal saline 10 mL vials, 10 mL syringes, and 18 g blunt needles (Patient not taking: Reported on 4/25/2024), Disp: 600 mL, Rfl: 2    Spacer/Aero-Holding Chambers (AEROCHAMBER MINI CHAMBER) BILLY, by Does not apply route see administration instructions (Patient not taking: Reported on 4/25/2024), Disp: 1 Device, Rfl: 0     Allergies:   No Known Allergies     Physical Exam:     /70 (BP Location: Left arm, Patient Position: Sitting, Cuff Size: Large)   Pulse 63   Ht 5' 7\" (1.702 m)   Wt 77.6 kg (171 lb)   SpO2 99%   BMI 26.78 kg/m²     Physical Exam  Constitutional:       General: He is not in acute distress.     Appearance: Normal appearance. He is not ill-appearing.   HENT:      Head: Normocephalic.      Nose: No congestion.      Mouth/Throat:      Mouth: Mucous membranes are moist.      Pharynx: Oropharynx is clear.   Cardiovascular:      Rate and Rhythm: Normal rate and regular rhythm. "      Pulses: Normal pulses.      Heart sounds: Normal heart sounds.   Pulmonary:      Effort: Pulmonary effort is normal.      Breath sounds: Normal breath sounds.   Abdominal:      General: Bowel sounds are normal.      Palpations: Abdomen is soft.   Musculoskeletal:         General: Signs of injury (Superficial wound on right shin) present. No swelling.      Right lower leg: No edema.      Left lower leg: No edema.   Skin:     General: Skin is warm and dry.      Capillary Refill: Capillary refill takes less than 2 seconds.      Findings: No bruising or erythema.   Neurological:      Mental Status: He is alert and oriented to person, place, and time.          Data:     Laboratory Results: I have personally reviewed the pertinent laboratory results/reports   Radiology/Other Diagnostic Testing Results: I have personally reviewed pertinent reports.      Zack Mcfadden MD  [facility]

## 2024-04-26 ENCOUNTER — OFFICE VISIT (OUTPATIENT)
Age: 74
End: 2024-04-26

## 2024-04-26 VITALS
HEIGHT: 68 IN | TEMPERATURE: 98.6 F | WEIGHT: 170 LBS | SYSTOLIC BLOOD PRESSURE: 138 MMHG | BODY MASS INDEX: 25.76 KG/M2 | DIASTOLIC BLOOD PRESSURE: 82 MMHG | RESPIRATION RATE: 18 BRPM | HEART RATE: 64 BPM

## 2024-04-26 DIAGNOSIS — Z23 ENCOUNTER FOR IMMUNIZATION: ICD-10-CM

## 2024-04-26 DIAGNOSIS — Z13.6 ENCOUNTER FOR ABDOMINAL AORTIC ANEURYSM (AAA) SCREENING: ICD-10-CM

## 2024-04-26 DIAGNOSIS — Z00.00 MEDICARE ANNUAL WELLNESS VISIT, SUBSEQUENT: Primary | ICD-10-CM

## 2024-04-26 PROCEDURE — G0009 ADMIN PNEUMOCOCCAL VACCINE: HCPCS | Performed by: FAMILY MEDICINE

## 2024-04-26 PROCEDURE — 90677 PCV20 VACCINE IM: CPT | Performed by: FAMILY MEDICINE

## 2024-04-26 PROCEDURE — G0439 PPPS, SUBSEQ VISIT: HCPCS | Performed by: FAMILY MEDICINE

## 2024-04-26 PROCEDURE — 1123F ACP DISCUSS/DSCN MKR DOCD: CPT | Performed by: FAMILY MEDICINE

## 2024-04-26 NOTE — PROGRESS NOTES
Assessment and Plan:     Problem List Items Addressed This Visit    None  Visit Diagnoses       Medicare annual wellness visit, subsequent    -  Primary    Encounter for immunization        Relevant Orders    Pneumococcal Conjugate Vaccine 20-valent (Pcv20) (Completed)    Encounter for abdominal aortic aneurysm (AAA) screening        Relevant Orders    US abdominal aorta screening aaa          BMI Counseling: Body mass index is 25.85 kg/m². The BMI is above normal. Nutrition recommendations include decreasing portion sizes, encouraging healthy choices of fruits and vegetables and limiting drinks that contain sugar. Exercise recommendations include moderate physical activity 150 minutes/week and exercising 3-5 times per week. Rationale for BMI follow-up plan is due to patient being overweight or obese.     Depression Screening and Follow-up Plan: Patient assessed for underlying major depression. Brief counseling provided and recommend additional follow-up/re-evaluation next office visit.       Preventive health issues were discussed with patient, and age appropriate screening tests were ordered as noted in patient's After Visit Summary.  Personalized health advice and appropriate referrals for health education or preventive services given if needed, as noted in patient's After Visit Summary.     History of Present Illness:     Patient presents for a Medicare Wellness Visit    Patient seen and examined in office for annual physical and to receive pneumococcal vaccine in anticipation of upcoming [place] to [place].       Patient Care Team:  Eugenia Rodriguez MD as PCP - General (Family Medicine)  Flor Goodson as PCP - PCP-[facility] ([facility])  Dov Andrea MD (Radiation Oncology)  Anita Lombardo (Nutrition)  Estela Patterson MD (Endocrinology)     Review of Systems:     Review of Systems   Constitutional:  Negative for chills and fever.   HENT:  Negative for ear pain and sore  throat.    Eyes:  Negative for pain and visual disturbance.   Respiratory:  Negative for cough and shortness of breath.    Cardiovascular:  Negative for chest pain and palpitations.   Gastrointestinal:  Negative for abdominal pain and vomiting.   Genitourinary:  Negative for dysuria and hematuria.   Musculoskeletal:  Negative for arthralgias and back pain.   Skin:  Negative for color change and rash.   Neurological:  Negative for seizures and syncope.   All other systems reviewed and are negative.       Problem List:     Patient Active Problem List   Diagnosis    Type 2 diabetes mellitus, with long-term current use of insulin (HCC)    Other hydronephrosis    Hypertension    Hydronephrosis    Prostate cancer (HCC)    Encounter for monitoring androgen deprivation therapy    Nephrostomy status (HCC)    Urinary tract infection associated with nephrostomy catheter  (HCC)    Hyperlipidemia    Malfunction of nephrostomy tube (HCC)    Hypokalemia    Metastasis to bone (HCC)    Stage 3a chronic kidney disease (HCC)    Hypoglycemia      Past Medical and Surgical History:     Past Medical History:   Diagnosis Date    COVID-19 01/15/2024    Diabetes mellitus (HCC)     Elevated PSA 06/21/2023    High cholesterol     Hypertension     Prostate cancer (HCC)      Past Surgical History:   Procedure Laterality Date    IR NEPHROSTOMY TUBE CHECK/CHANGE/REPOSITION/REINSERTION/UPSIZE  9/28/2023    IR NEPHROSTOMY TUBE CHECK/CHANGE/REPOSITION/REINSERTION/UPSIZE  12/28/2023    IR NEPHROSTOMY TUBE CHECK/CHANGE/REPOSITION/REINSERTION/UPSIZE  1/31/2024    IR NEPHROSTOMY TUBE CHECK/CHANGE/REPOSITION/REINSERTION/UPSIZE  2/2/2024    IR NEPHROSTOMY TUBE CHECK/CHANGE/REPOSITION/REINSERTION/UPSIZE  3/4/2024    IR NEPHROSTOMY TUBE CHECK/CHANGE/REPOSITION/REINSERTION/UPSIZE  3/20/2024    IR NEPHROSTOMY TUBE PLACEMENT  06/22/2023    bilateral    IR OTHER  1/15/2024    US GUIDED PROSTATE BIOPSY        Family History:     Family History   Problem Relation  Age of Onset    Uterine cancer Mother     Liver cancer Father     No Known Problems Sister     No Known Problems Brother     No Known Problems Son     Diabetes type II Daughter     No Known Problems Daughter     Cancer Daughter       Social History:     Social History     Socioeconomic History    Marital status: /Civil Union     Spouse name: Not on file    Number of children: 3    Years of education: Not on file    Highest education level: Not on file   Occupational History    Not on file   Tobacco Use    Smoking status: Former     Current packs/day: 0.00     Types: Cigarettes     Quit date: 2000     Years since quittin.3     Passive exposure: Past    Smokeless tobacco: Never    Tobacco comments:     States he only smoked on the weekends   Vaping Use    Vaping status: Never Used   Substance and Sexual Activity    Alcohol use: Not Currently     Comment: socially    Drug use: Never    Sexual activity: Yes     Partners: Female   Other Topics Concern    Not on file   Social History Narrative    Not on file     Social Determinants of Health     Financial Resource Strain: Low Risk  (2024)    Overall Financial Resource Strain (CARDIA)     Difficulty of Paying Living Expenses: Not hard at all   Food Insecurity: No Food Insecurity (3/19/2024)    Hunger Vital Sign     Worried About Running Out of Food in the Last Year: Never true     Ran Out of Food in the Last Year: Never true   Transportation Needs: No Transportation Needs (3/19/2024)    PRAPARE - Transportation     Lack of Transportation (Medical): No     Lack of Transportation (Non-Medical): No   Physical Activity: Not on file   Stress: Not on file   Social Connections: Feeling Socially Integrated (2023)    Received from [facility]    OASIS : Social Isolation     Frequency of experiencing loneliness or isolation: Never   Intimate Partner Violence: Not on file   Housing Stability: Low Risk  (3/19/2024)    Housing Stability Vital Sign      Unable to Pay for Housing in the Last Year: No     Number of Places Lived in the Last Year: 1     Unstable Housing in the Last Year: No      Medications and Allergies:     Current Outpatient Medications   Medication Sig Dispense Refill    abiraterone (ZYTIGA) 250 mg tablet Take 1 tablet (250 mg total) by mouth daily With a low fat meal 30 tablet 11    Alcohol Swabs 70 % PADS To clean the skin prior to injecting insulin 100 each 1    Blood Glucose Monitoring Suppl (OneTouch Verio Reflect) w/Device KIT Check blood sugars once daily. Please substitute with appropriate alternative as covered by patient's insurance. Dx: E11.65 1 kit 0    Easy Touch Pen Needles 31G X 6 MM MISC Use daily at bedtime 100 each 3    glimepiride (AMARYL) 2 mg tablet Take 1 tablet (2 mg total) by mouth daily with dinner 90 tablet 3    glimepiride (AMARYL) 4 mg tablet Take 1 tablet (4 mg total) by mouth daily with breakfast 90 tablet 3    glucose blood (OneTouch Verio) test strip Check blood sugars twice a daily. Please substitute with appropriate alternative as covered by patient's insurance. Dx: E11.65 200 each 3    Levemir FlexPen 100 units/mL injection pen Inject 32 Units under the skin daily at bedtime 15 mL 1    lisinopril (ZESTRIL) 10 mg tablet NARCISO TANMAY TABLETA VIA ORAL DIARIAMENTE      metFORMIN (GLUCOPHAGE) 1000 MG tablet Take 1 tablet (1,000 mg total) by mouth 2 (two) times a day with meals 180 tablet 1    Multiple Vitamins-Minerals (Sentry) TABS Take 1 tablet by mouth daily      omega-3-acid ethyl esters (LOVAZA) 1 g capsule       OneTouch Delica Lancets 33G MISC Check blood sugars once daily. Please substitute with appropriate alternative as covered by patient's insurance. Dx: E11.65 100 each 3    polyethylene glycol (MIRALAX) 17 g packet Take 17 g by mouth daily as needed (for constipation) 100 each 1    rosuvastatin (CRESTOR) 10 MG tablet Take 1 tablet (10 mg total) by mouth daily at bedtime 90 tablet 1    senna-docusate sodium  (SENOKOT S) 8.6-50 mg per tablet Take 1 tablet by mouth daily 90 tablet 1    albuterol (Ventolin HFA) 90 mcg/act inhaler Inhale 2 puffs every 6 (six) hours as needed for wheezing (Patient not taking: Reported on 4/26/2024) 18 g 0    dexamethasone (DECADRON) 0.5 mg tablet TAKE 1 TABLET (0.5 MG TOTAL) BY MOUTH DAILY WITH BREAKFAST 90 tablet 0    lisinopril (ZESTRIL) 5 mg tablet Take 1 tablet (5 mg total) by mouth daily (Patient not taking: Reported on 4/25/2024) 30 tablet 5    sodium chloride, PF, 0.9 % 10 mL by Intracatheter route daily Intracatheter flushing daily. May substitute prefilled syringe with normal saline 10 mL vials, 10 mL syringes, and 18 g blunt needles (Patient not taking: Reported on 4/25/2024) 600 mL 2    Spacer/Aero-Holding Chambers (AEROCHAMBER MINI CHAMBER) BILLY by Does not apply route see administration instructions (Patient not taking: Reported on 4/25/2024) 1 Device 0     No current facility-administered medications for this visit.     No Known Allergies   Immunizations:     Immunization History   Administered Date(s) Administered    Pneumococcal Conjugate Vaccine 20-valent (Pcv20), Polysace 04/26/2024      Health Maintenance:         Topic Date Due    Colorectal Cancer Screening  01/08/2027    Hepatitis C Screening  Completed         Topic Date Due    COVID-19 Vaccine (1) Never done    Influenza Vaccine (1) Never done      Medicare Screening Tests and Risk Assessments:         Health Risk Assessment:   Patient rates overall health as very good. Patient feels that their physical health rating is same. Patient is satisfied with their life. Eyesight was rated as same. Hearing was rated as same. Patient feels that their emotional and mental health rating is much better. Patients states they are never, rarely angry. Patient states they are never, rarely unusually tired/fatigued. Pain experienced in the last 7 days has been none. Patient states that he has experienced no weight loss or gain in last  6 months.     Fall Risk Screening:   In the past year, patient has experienced: no history of falling in past year      Home Safety:  Patient does not have trouble with stairs inside or outside of their home. Patient has working smoke alarms and has working carbon monoxide detector. Home safety hazards include: none.     Nutrition:   Current diet is Regular, Diabetic and No Added Salt.     Medications:   Patient is not currently taking any over-the-counter supplements. Patient is able to manage medications.     Activities of Daily Living (ADLs)/Instrumental Activities of Daily Living (IADLs):   Walk and transfer into and out of bed and chair?: Yes  Dress and groom yourself?: Yes    Bathe or shower yourself?: Yes    Feed yourself? Yes  Do your laundry/housekeeping?: Yes  Manage your money, pay your bills and track your expenses?: Yes  Make your own meals?: Yes    Do your own shopping?: Yes    Previous Hospitalizations:   Any hospitalizations or ED visits within the last 12 months?: Yes      Hospitalization Comments: UTI involving nephrostomy    Advance Care Planning:   Living will: No    Durable POA for healthcare: No    Advanced directive: No    Advanced directive counseling given: Yes    ACP document given: Yes    End of Life Decisions reviewed with patient: No      PREVENTIVE SCREENINGS      Cardiovascular Screening:    General: Screening Not Indicated and History Lipid Disorder      Diabetes Screening:     General: Screening Not Indicated and History Diabetes      Colorectal Cancer Screening:     General: Screening Current      Prostate Cancer Screening:    General: History Prostate Cancer      Abdominal Aortic Aneurysm (AAA) Screening:    Risk factors include: age between 65-74 yo and tobacco use        General: Risks and Benefits Discussed    Due for: Screening AAA Ultrasound      Lung Cancer Screening:     General: Screening Not Indicated      Hepatitis C Screening:    General: Screening Current    Screening,  "Brief Intervention, and Referral to Treatment (SBIRT)    Screening      Single Item Drug Screening:  How often have you used an illegal drug (including marijuana) or a prescription medication for non-medical reasons in the past year? never    Single Item Drug Screen Score: 0  Interpretation: Negative screen for possible drug use disorder    No results found.     Physical Exam:     /82 (BP Location: Right arm, Patient Position: Sitting, Cuff Size: Standard)   Pulse 64   Temp 98.6 °F (37 °C) (Tympanic)   Resp 18   Ht 5' 8\" (1.727 m)   Wt 77.1 kg (170 lb)   BMI 25.85 kg/m²     Physical Exam  Vitals and nursing note reviewed.   Constitutional:       General: He is not in acute distress.     Appearance: He is well-developed.   HENT:      Head: Normocephalic and atraumatic.   Eyes:      Conjunctiva/sclera: Conjunctivae normal.   Cardiovascular:      Rate and Rhythm: Normal rate and regular rhythm.      Heart sounds: No murmur heard.  Pulmonary:      Effort: Pulmonary effort is normal. No respiratory distress.      Breath sounds: Normal breath sounds.   Abdominal:      Palpations: Abdomen is soft.      Tenderness: There is no abdominal tenderness.      Comments: Nephrostomy with dressing clean, dry   Musculoskeletal:         General: No swelling.      Cervical back: Neck supple.      Right lower leg: No edema.      Left lower leg: No edema.   Skin:     General: Skin is warm and dry.      Capillary Refill: Capillary refill takes less than 2 seconds.   Neurological:      General: No focal deficit present.      Mental Status: He is alert and oriented to person, place, and time.   Psychiatric:         Mood and Affect: Mood normal.          Guanako Bell MD  "

## 2024-04-26 NOTE — PATIENT INSTRUCTIONS
Medicare Preventive Visit Patient Instructions  Thank you for completing your Welcome to Medicare Visit or Medicare Annual Wellness Visit today. Your next wellness visit will be due in one year (4/27/2025).  The screening/preventive services that you may require over the next 5-10 years are detailed below. Some tests may not apply to you based off risk factors and/or age. Screening tests ordered at today's visit but not completed yet may show as past due. Also, please note that scanned in results may not display below.  Preventive Screenings:  Service Recommendations Previous Testing/Comments   Colorectal Cancer Screening  Colonoscopy    Fecal Occult Blood Test (FOBT)/Fecal Immunochemical Test (FIT)  Fecal DNA/Cologuard Test  Flexible Sigmoidoscopy Age: 45-75 years old   Colonoscopy: every 10 years (May be performed more frequently if at higher risk)  OR  FOBT/FIT: every 1 year  OR  Cologuard: every 3 years  OR  Sigmoidoscopy: every 5 years  Screening may be recommended earlier than age 45 if at higher risk for colorectal cancer. Also, an individualized decision between you and your healthcare provider will decide whether screening between the ages of 76-85 would be appropriate. Colonoscopy: 01/09/2024  FOBT/FIT: Not on file  Cologuard: Not on file  Sigmoidoscopy: Not on file          Prostate Cancer Screening Individualized decision between patient and health care provider in men between ages of 55-69   Medicare will cover every 12 months beginning on the day after your 50th birthday PSA: 1.96 ng/mL           Hepatitis C Screening Once for adults born between 1945 and 1965  More frequently in patients at high risk for Hepatitis C Hep C Antibody: 06/22/2023        Diabetes Screening 1-2 times per year if you're at risk for diabetes or have pre-diabetes Fasting glucose: 213 mg/dL (8/11/2023)  A1C: 9.2 (4/4/2024)      Cholesterol Screening Once every 5 years if you don't have a lipid disorder. May order more often  based on risk factors. Lipid panel: Not on file         Other Preventive Screenings Covered by Medicare:  Abdominal Aortic Aneurysm (AAA) Screening: covered once if your at risk. You're considered to be at risk if you have a family history of AAA or a male between the age of 65-75 who smoking at least 100 cigarettes in your lifetime.  Lung Cancer Screening: covers low dose CT scan once per year if you meet all of the following conditions: (1) Age 55-77; (2) No signs or symptoms of lung cancer; (3) Current smoker or have quit smoking within the last 15 years; (4) You have a tobacco smoking history of at least 20 pack years (packs per day x number of years you smoked); (5) You get a written order from a healthcare provider.  Glaucoma Screening: covered annually if you're considered high risk: (1) You have diabetes OR (2) Family history of glaucoma OR (3)  aged 50 and older OR (4)  American aged 65 and older  Osteoporosis Screening: covered every 2 years if you meet one of the following conditions: (1) Have a vertebral abnormality; (2) On glucocorticoid therapy for more than 3 months; (3) Have primary hyperparathyroidism; (4) On osteoporosis medications and need to assess response to drug therapy.  HIV Screening: covered annually if you're between the age of 15-65. Also covered annually if you are younger than 15 and older than 65 with risk factors for HIV infection. For pregnant patients, it is covered up to 3 times per pregnancy.    Immunizations:  Immunization Recommendations   Influenza Vaccine Annual influenza vaccination during flu season is recommended for all persons aged >= 6 months who do not have contraindications   Pneumococcal Vaccine   * Pneumococcal conjugate vaccine = PCV13 (Prevnar 13), PCV15 (Vaxneuvance), PCV20 (Prevnar 20)  * Pneumococcal polysaccharide vaccine = PPSV23 (Pneumovax) Adults 19-63 yo with certain risk factors or if 65+ yo  If never received any pneumonia  vaccine: recommend Prevnar 20 (PCV20)  Give PCV20 if previously received 1 dose of PCV13 or PPSV23   Hepatitis B Vaccine 3 dose series if at intermediate or high risk (ex: diabetes, end stage renal disease, liver disease)   Respiratory syncytial virus (RSV) Vaccine - COVERED BY MEDICARE PART D  * RSVPreF3 (Arexvy) CDC recommends that adults 60 years of age and older may receive a single dose of RSV vaccine using shared clinical decision-making (SCDM)   Tetanus (Td) Vaccine - COST NOT COVERED BY MEDICARE PART B Following completion of primary series, a booster dose should be given every 10 years to maintain immunity against tetanus. Td may also be given as tetanus wound prophylaxis.   Tdap Vaccine - COST NOT COVERED BY MEDICARE PART B Recommended at least once for all adults. For pregnant patients, recommended with each pregnancy.   Shingles Vaccine (Shingrix) - COST NOT COVERED BY MEDICARE PART B  2 shot series recommended in those 19 years and older who have or will have weakened immune systems or those 50 years and older     Health Maintenance Due:      Topic Date Due   • Colorectal Cancer Screening  01/08/2027   • Hepatitis C Screening  Completed     Immunizations Due:      Topic Date Due   • COVID-19 Vaccine (1) Never done   • Pneumococcal Vaccine: 65+ Years (1 of 2 - PCV) Never done   • Influenza Vaccine (1) Never done     Advance Directives   What are advance directives?  Advance directives are legal documents that state your wishes and plans for medical care. These plans are made ahead of time in case you lose your ability to make decisions for yourself. Advance directives can apply to any medical decision, such as the treatments you want, and if you want to donate organs.   What are the types of advance directives?  There are many types of advance directives, and each state has rules about how to use them. You may choose a combination of any of the following:  Living will:  This is a written record of the  treatment you want. You can also choose which treatments you do not want, which to limit, and which to stop at a certain time. This includes surgery, medicine, IV fluid, and tube feedings.   Durable power of  for healthcare (DPAHC):  This is a written record that states who you want to make healthcare choices for you when you are unable to make them for yourself. This person, called a proxy, is usually a family member or a friend. You may choose more than 1 proxy.  Do not resuscitate (DNR) order:  A DNR order is used in case your heart stops beating or you stop breathing. It is a request not to have certain forms of treatment, such as CPR. A DNR order may be included in other types of advance directives.  Medical directive:  This covers the care that you want if you are in a coma, near death, or unable to make decisions for yourself. You can list the treatments you want for each condition. Treatment may include pain medicine, surgery, blood transfusions, dialysis, IV or tube feedings, and a ventilator (breathing machine).  Values history:  This document has questions about your views, beliefs, and how you feel and think about life. This information can help others choose the care that you would choose.  Why are advance directives important?  An advance directive helps you control your care. Although spoken wishes may be used, it is better to have your wishes written down. Spoken wishes can be misunderstood, or not followed. Treatments may be given even if you do not want them. An advance directive may make it easier for your family to make difficult choices about your care.   Weight Management   Why it is important to manage your weight:  Being overweight increases your risk of health conditions such as heart disease, high blood pressure, type 2 diabetes, and certain types of cancer. It can also increase your risk for osteoarthritis, sleep apnea, and other respiratory problems. Aim for a slow, steady weight  loss. Even a small amount of weight loss can lower your risk of health problems.  How to lose weight safely:  A safe and healthy way to lose weight is to eat fewer calories and get regular exercise. You can lose up about 1 pound a week by decreasing the number of calories you eat by 500 calories each day.   Healthy meal plan for weight management:  A healthy meal plan includes a variety of foods, contains fewer calories, and helps you stay healthy. A healthy meal plan includes the following:  Eat whole-grain foods more often.  A healthy meal plan should contain fiber. Fiber is the part of grains, fruits, and vegetables that is not broken down by your body. Whole-grain foods are healthy and provide extra fiber in your diet. Some examples of whole-grain foods are whole-wheat breads and pastas, oatmeal, brown rice, and bulgur.  Eat a variety of vegetables every day.  Include dark, leafy greens such as spinach, kale, demetria greens, and mustard greens. Eat yellow and orange vegetables such as carrots, sweet potatoes, and winter squash.   Eat a variety of fruits every day.  Choose fresh or canned fruit (canned in its own juice or light syrup) instead of juice. Fruit juice has very little or no fiber.  Eat low-fat dairy foods.  Drink fat-free (skim) milk or 1% milk. Eat fat-free yogurt and low-fat cottage cheese. Try low-fat cheeses such as mozzarella and other reduced-fat cheeses.  Choose meat and other protein foods that are low in fat.  Choose beans or other legumes such as split peas or lentils. Choose fish, skinless poultry (chicken or turkey), or lean cuts of red meat (beef or pork). Before you cook meat or poultry, cut off any visible fat.   Use less fat and oil.  Try baking foods instead of frying them. Add less fat, such as margarine, sour cream, regular salad dressing and mayonnaise to foods. Eat fewer high-fat foods. Some examples of high-fat foods include french fries, doughnuts, ice cream, and cakes.  Eat  fewer sweets.  Limit foods and drinks that are high in sugar. This includes candy, cookies, regular soda, and sweetened drinks.  Exercise:  Exercise at least 30 minutes per day on most days of the week. Some examples of exercise include walking, biking, dancing, and swimming. You can also fit in more physical activity by taking the stairs instead of the elevator or parking farther away from stores. Ask your healthcare provider about the best exercise plan for you.      © Copyright [employer] 2018 Information is for End User's use only and may not be sold, redistributed or otherwise used for commercial purposes. All illustrations and images included in CareNotes® are the copyrighted property of [employer] or [employer]

## 2024-04-29 ENCOUNTER — APPOINTMENT (OUTPATIENT)
Dept: LAB | Facility: HOSPITAL | Age: 74
End: 2024-04-29
Payer: COMMERCIAL

## 2024-04-29 DIAGNOSIS — E87.6 HYPOKALEMIA: ICD-10-CM

## 2024-04-29 DIAGNOSIS — Z93.6 NEPHROSTOMY STATUS (HCC): ICD-10-CM

## 2024-04-29 DIAGNOSIS — C61 MALIGNANT NEOPLASM OF PROSTATE (HCC): ICD-10-CM

## 2024-04-29 DIAGNOSIS — N18.31 STAGE 3A CHRONIC KIDNEY DISEASE (HCC): ICD-10-CM

## 2024-04-29 LAB
ALBUMIN SERPL BCP-MCNC: 3.9 G/DL (ref 3.5–5)
ALP SERPL-CCNC: 81 U/L (ref 34–104)
ALT SERPL W P-5'-P-CCNC: 13 U/L (ref 7–52)
ANION GAP SERPL CALCULATED.3IONS-SCNC: 8 MMOL/L (ref 4–13)
AST SERPL W P-5'-P-CCNC: 11 U/L (ref 13–39)
BACTERIA UR QL AUTO: ABNORMAL /HPF
BASOPHILS # BLD AUTO: 0.05 THOUSANDS/ÂΜL (ref 0–0.1)
BASOPHILS NFR BLD AUTO: 0 % (ref 0–1)
BILIRUB SERPL-MCNC: 0.76 MG/DL (ref 0.2–1)
BILIRUB UR QL STRIP: NEGATIVE
BUN SERPL-MCNC: 26 MG/DL (ref 5–25)
CALCIUM SERPL-MCNC: 9.8 MG/DL (ref 8.4–10.2)
CHLORIDE SERPL-SCNC: 99 MMOL/L (ref 96–108)
CLARITY UR: ABNORMAL
CO2 SERPL-SCNC: 27 MMOL/L (ref 21–32)
COLOR UR: YELLOW
CREAT SERPL-MCNC: 1.22 MG/DL (ref 0.6–1.3)
EOSINOPHIL # BLD AUTO: 0.15 THOUSAND/ÂΜL (ref 0–0.61)
EOSINOPHIL NFR BLD AUTO: 1 % (ref 0–6)
ERYTHROCYTE [DISTWIDTH] IN BLOOD BY AUTOMATED COUNT: 13.9 % (ref 11.6–15.1)
GFR SERPL CREATININE-BSD FRML MDRD: 59 ML/MIN/1.73SQ M
GLUCOSE P FAST SERPL-MCNC: 155 MG/DL (ref 65–99)
GLUCOSE UR STRIP-MCNC: NEGATIVE MG/DL
HCT VFR BLD AUTO: 41.4 % (ref 36.5–49.3)
HGB BLD-MCNC: 13.6 G/DL (ref 12–17)
HGB UR QL STRIP.AUTO: ABNORMAL
IMM GRANULOCYTES # BLD AUTO: 0.06 THOUSAND/UL (ref 0–0.2)
IMM GRANULOCYTES NFR BLD AUTO: 1 % (ref 0–2)
KETONES UR STRIP-MCNC: NEGATIVE MG/DL
LEUKOCYTE ESTERASE UR QL STRIP: ABNORMAL
LYMPHOCYTES # BLD AUTO: 2.5 THOUSANDS/ÂΜL (ref 0.6–4.47)
LYMPHOCYTES NFR BLD AUTO: 21 % (ref 14–44)
MCH RBC QN AUTO: 30.4 PG (ref 26.8–34.3)
MCHC RBC AUTO-ENTMCNC: 32.9 G/DL (ref 31.4–37.4)
MCV RBC AUTO: 92 FL (ref 82–98)
MONOCYTES # BLD AUTO: 0.79 THOUSAND/ÂΜL (ref 0.17–1.22)
MONOCYTES NFR BLD AUTO: 7 % (ref 4–12)
NEUTROPHILS # BLD AUTO: 8.34 THOUSANDS/ÂΜL (ref 1.85–7.62)
NEUTS SEG NFR BLD AUTO: 70 % (ref 43–75)
NITRITE UR QL STRIP: NEGATIVE
NON-SQ EPI CELLS URNS QL MICRO: ABNORMAL /HPF
NRBC BLD AUTO-RTO: 0 /100 WBCS
PH UR STRIP.AUTO: 5.5 [PH]
PLATELET # BLD AUTO: 313 THOUSANDS/UL (ref 149–390)
PMV BLD AUTO: 10.2 FL (ref 8.9–12.7)
POTASSIUM SERPL-SCNC: 4.3 MMOL/L (ref 3.5–5.3)
PROT SERPL-MCNC: 7.8 G/DL (ref 6.4–8.4)
PROT UR STRIP-MCNC: ABNORMAL MG/DL
PSA SERPL-MCNC: 6.57 NG/ML (ref 0–4)
RBC # BLD AUTO: 4.48 MILLION/UL (ref 3.88–5.62)
RBC #/AREA URNS AUTO: ABNORMAL /HPF
SODIUM SERPL-SCNC: 134 MMOL/L (ref 135–147)
SP GR UR STRIP.AUTO: 1.02 (ref 1–1.03)
UROBILINOGEN UR STRIP-ACNC: <2 MG/DL
WBC # BLD AUTO: 11.89 THOUSAND/UL (ref 4.31–10.16)
WBC #/AREA URNS AUTO: ABNORMAL /HPF

## 2024-04-29 PROCEDURE — 85025 COMPLETE CBC W/AUTO DIFF WBC: CPT

## 2024-04-29 PROCEDURE — 80053 COMPREHEN METABOLIC PANEL: CPT

## 2024-04-29 PROCEDURE — 36415 COLL VENOUS BLD VENIPUNCTURE: CPT

## 2024-04-29 PROCEDURE — 81001 URINALYSIS AUTO W/SCOPE: CPT

## 2024-04-29 PROCEDURE — 84153 ASSAY OF PSA TOTAL: CPT

## 2024-04-30 LAB
DME PARACHUTE DELIVERY DATE ACTUAL: NORMAL
DME PARACHUTE DELIVERY DATE EXPECTED: NORMAL
DME PARACHUTE DELIVERY DATE REQUESTED: NORMAL
DME PARACHUTE ITEM DESCRIPTION: NORMAL
DME PARACHUTE ITEM DESCRIPTION: NORMAL
DME PARACHUTE ORDER STATUS: NORMAL
DME PARACHUTE SUPPLIER NAME: NORMAL
DME PARACHUTE SUPPLIER PHONE: NORMAL

## 2024-05-01 PROBLEM — N39.0 URINARY TRACT INFECTION ASSOCIATED WITH NEPHROSTOMY CATHETER  (HCC): Status: RESOLVED | Noted: 2024-01-12 | Resolved: 2024-05-01

## 2024-05-01 PROBLEM — T83.512A URINARY TRACT INFECTION ASSOCIATED WITH NEPHROSTOMY CATHETER  (HCC): Status: RESOLVED | Noted: 2024-01-12 | Resolved: 2024-05-01

## 2024-05-06 DIAGNOSIS — C61 MALIGNANT NEOPLASM OF PROSTATE (HCC): ICD-10-CM

## 2024-05-06 RX ORDER — DEXAMETHASONE 0.5 MG/1
TABLET ORAL
Qty: 90 TABLET | Refills: 1 | Status: SHIPPED | OUTPATIENT
Start: 2024-05-06

## 2024-05-06 NOTE — TELEPHONE ENCOUNTER
Medication Refill Request   Who are you speaking with? Child Sumit   If it is not the patient, are they listed on an active communication consent form?     Yes   Which medication is being requested for refill?  Please list medication name and dosage  dexamethasone (DECADRON) 0.5 mg tablet     How many pills does the patient have left?  2   Preferred Pharmacy / Address    [facility]/pharmacy #[id number] - [address] [address]  Phone: [phone]  Fax: [phone]      Who is the prescribing provider? ALTA Stuart   Call back number  [phone]   Relevant Information  Sumit states that patient is traveling out of the country tonight and needs the medication refilled as soon as possible. Sumit would like a call back when the prescription is sent to the pharmacy.

## 2024-05-28 ENCOUNTER — APPOINTMENT (EMERGENCY)
Dept: RADIOLOGY | Facility: HOSPITAL | Age: 74
DRG: 543 | End: 2024-05-28
Payer: COMMERCIAL

## 2024-05-28 ENCOUNTER — HOSPITAL ENCOUNTER (INPATIENT)
Facility: HOSPITAL | Age: 74
LOS: 2 days | Discharge: HOME | DRG: 543 | End: 2024-05-31
Attending: EMERGENCY MEDICINE | Admitting: FAMILY MEDICINE
Payer: COMMERCIAL

## 2024-05-28 DIAGNOSIS — C79.9 MULTIPLE LESIONS OF METASTATIC MALIGNANCY (HCC): ICD-10-CM

## 2024-05-28 DIAGNOSIS — Z93.6 NEPHROSTOMY STATUS (HCC): ICD-10-CM

## 2024-05-28 DIAGNOSIS — M54.9 BACK PAIN: Primary | ICD-10-CM

## 2024-05-28 DIAGNOSIS — N39.0 URINARY TRACT INFECTION: ICD-10-CM

## 2024-05-28 DIAGNOSIS — N30.90 CYSTITIS: ICD-10-CM

## 2024-05-28 DIAGNOSIS — Z79.4 TYPE 2 DIABETES MELLITUS WITH OTHER SPECIFIED COMPLICATION, WITH LONG-TERM CURRENT USE OF INSULIN (HCC): ICD-10-CM

## 2024-05-28 DIAGNOSIS — C79.51 PROSTATE CANCER METASTATIC TO BONE (HCC): ICD-10-CM

## 2024-05-28 DIAGNOSIS — E11.69 TYPE 2 DIABETES MELLITUS WITH OTHER SPECIFIED COMPLICATION, WITH LONG-TERM CURRENT USE OF INSULIN (HCC): ICD-10-CM

## 2024-05-28 DIAGNOSIS — C61 PROSTATE CANCER METASTATIC TO BONE (HCC): ICD-10-CM

## 2024-05-28 LAB
ALBUMIN SERPL BCG-MCNC: 3.6 G/DL (ref 3.5–5)
ALP SERPL-CCNC: 76 U/L (ref 34–104)
ALT SERPL W P-5'-P-CCNC: 14 U/L (ref 7–52)
ANION GAP SERPL CALCULATED.3IONS-SCNC: 7 MMOL/L (ref 4–13)
AST SERPL W P-5'-P-CCNC: 13 U/L (ref 13–39)
BACTERIA UR QL AUTO: ABNORMAL /HPF
BASOPHILS # BLD AUTO: 0.04 THOUSANDS/ÂΜL (ref 0–0.1)
BASOPHILS NFR BLD AUTO: 0 % (ref 0–1)
BILIRUB SERPL-MCNC: 0.54 MG/DL (ref 0.2–1)
BILIRUB UR QL STRIP: NEGATIVE
BUN SERPL-MCNC: 18 MG/DL (ref 5–25)
CALCIUM SERPL-MCNC: 9.6 MG/DL (ref 8.4–10.2)
CHLORIDE SERPL-SCNC: 103 MMOL/L (ref 96–108)
CLARITY UR: CLEAR
CO2 SERPL-SCNC: 25 MMOL/L (ref 21–32)
COLOR UR: YELLOW
CREAT SERPL-MCNC: 1.07 MG/DL (ref 0.6–1.3)
EOSINOPHIL # BLD AUTO: 0.05 THOUSAND/ÂΜL (ref 0–0.61)
EOSINOPHIL NFR BLD AUTO: 0 % (ref 0–6)
ERYTHROCYTE [DISTWIDTH] IN BLOOD BY AUTOMATED COUNT: 13.5 % (ref 11.6–15.1)
GFR SERPL CREATININE-BSD FRML MDRD: 69 ML/MIN/1.73SQ M
GLUCOSE SERPL-MCNC: 216 MG/DL (ref 65–140)
GLUCOSE SERPL-MCNC: 234 MG/DL (ref 65–140)
GLUCOSE UR STRIP-MCNC: NEGATIVE MG/DL
HCT VFR BLD AUTO: 36.5 % (ref 36.5–49.3)
HGB BLD-MCNC: 11.9 G/DL (ref 12–17)
HGB UR QL STRIP.AUTO: ABNORMAL
IMM GRANULOCYTES # BLD AUTO: 0.05 THOUSAND/UL (ref 0–0.2)
IMM GRANULOCYTES NFR BLD AUTO: 0 % (ref 0–2)
KETONES UR STRIP-MCNC: NEGATIVE MG/DL
LEUKOCYTE ESTERASE UR QL STRIP: ABNORMAL
LYMPHOCYTES # BLD AUTO: 2.07 THOUSANDS/ÂΜL (ref 0.6–4.47)
LYMPHOCYTES NFR BLD AUTO: 18 % (ref 14–44)
MAGNESIUM SERPL-MCNC: 1.7 MG/DL (ref 1.9–2.7)
MCH RBC QN AUTO: 30.1 PG (ref 26.8–34.3)
MCHC RBC AUTO-ENTMCNC: 32.6 G/DL (ref 31.4–37.4)
MCV RBC AUTO: 92 FL (ref 82–98)
MONOCYTES # BLD AUTO: 0.5 THOUSAND/ÂΜL (ref 0.17–1.22)
MONOCYTES NFR BLD AUTO: 4 % (ref 4–12)
NEUTROPHILS # BLD AUTO: 8.93 THOUSANDS/ÂΜL (ref 1.85–7.62)
NEUTS SEG NFR BLD AUTO: 78 % (ref 43–75)
NITRITE UR QL STRIP: POSITIVE
NON-SQ EPI CELLS URNS QL MICRO: ABNORMAL /HPF
NRBC BLD AUTO-RTO: 0 /100 WBCS
PH UR STRIP.AUTO: 6.5 [PH]
PLATELET # BLD AUTO: 424 THOUSANDS/UL (ref 149–390)
PMV BLD AUTO: 9.5 FL (ref 8.9–12.7)
POTASSIUM SERPL-SCNC: 4.2 MMOL/L (ref 3.5–5.3)
PROCALCITONIN SERPL-MCNC: <0.05 NG/ML
PROT SERPL-MCNC: 7.5 G/DL (ref 6.4–8.4)
PROT UR STRIP-MCNC: ABNORMAL MG/DL
RBC # BLD AUTO: 3.95 MILLION/UL (ref 3.88–5.62)
RBC #/AREA URNS AUTO: ABNORMAL /HPF
SODIUM SERPL-SCNC: 135 MMOL/L (ref 135–147)
SP GR UR STRIP.AUTO: 1.02 (ref 1–1.03)
UROBILINOGEN UR STRIP-ACNC: <2 MG/DL
WBC # BLD AUTO: 11.64 THOUSAND/UL (ref 4.31–10.16)
WBC #/AREA URNS AUTO: ABNORMAL /HPF

## 2024-05-28 PROCEDURE — 81001 URINALYSIS AUTO W/SCOPE: CPT | Performed by: EMERGENCY MEDICINE

## 2024-05-28 PROCEDURE — 99285 EMERGENCY DEPT VISIT HI MDM: CPT | Performed by: EMERGENCY MEDICINE

## 2024-05-28 PROCEDURE — 96374 THER/PROPH/DIAG INJ IV PUSH: CPT

## 2024-05-28 PROCEDURE — 84145 PROCALCITONIN (PCT): CPT

## 2024-05-28 PROCEDURE — 87077 CULTURE AEROBIC IDENTIFY: CPT | Performed by: EMERGENCY MEDICINE

## 2024-05-28 PROCEDURE — 82948 REAGENT STRIP/BLOOD GLUCOSE: CPT

## 2024-05-28 PROCEDURE — 80053 COMPREHEN METABOLIC PANEL: CPT | Performed by: EMERGENCY MEDICINE

## 2024-05-28 PROCEDURE — 36415 COLL VENOUS BLD VENIPUNCTURE: CPT | Performed by: EMERGENCY MEDICINE

## 2024-05-28 PROCEDURE — 74177 CT ABD & PELVIS W/CONTRAST: CPT

## 2024-05-28 PROCEDURE — 85025 COMPLETE CBC W/AUTO DIFF WBC: CPT | Performed by: EMERGENCY MEDICINE

## 2024-05-28 PROCEDURE — 99284 EMERGENCY DEPT VISIT MOD MDM: CPT

## 2024-05-28 PROCEDURE — 83735 ASSAY OF MAGNESIUM: CPT | Performed by: EMERGENCY MEDICINE

## 2024-05-28 PROCEDURE — 87186 SC STD MICRODIL/AGAR DIL: CPT | Performed by: EMERGENCY MEDICINE

## 2024-05-28 PROCEDURE — 87086 URINE CULTURE/COLONY COUNT: CPT | Performed by: EMERGENCY MEDICINE

## 2024-05-28 RX ORDER — CEFTRIAXONE 1 G/50ML
1000 INJECTION, SOLUTION INTRAVENOUS ONCE
Status: COMPLETED | OUTPATIENT
Start: 2024-05-28 | End: 2024-05-28

## 2024-05-28 RX ORDER — MAGNESIUM SULFATE HEPTAHYDRATE 40 MG/ML
2 INJECTION, SOLUTION INTRAVENOUS ONCE
Status: COMPLETED | OUTPATIENT
Start: 2024-05-28 | End: 2024-05-29

## 2024-05-28 RX ORDER — INSULIN LISPRO 100 [IU]/ML
1-5 INJECTION, SOLUTION INTRAVENOUS; SUBCUTANEOUS
Status: DISCONTINUED | OUTPATIENT
Start: 2024-05-28 | End: 2024-05-31 | Stop reason: HOSPADM

## 2024-05-28 RX ORDER — LIDOCAINE 50 MG/G
1 PATCH TOPICAL ONCE
Status: COMPLETED | OUTPATIENT
Start: 2024-05-29 | End: 2024-05-29

## 2024-05-28 RX ORDER — TRAMADOL HYDROCHLORIDE 50 MG/1
50 TABLET ORAL EVERY 6 HOURS PRN
Status: DISCONTINUED | OUTPATIENT
Start: 2024-05-28 | End: 2024-05-31 | Stop reason: HOSPADM

## 2024-05-28 RX ORDER — HYDROMORPHONE HCL/PF 1 MG/ML
1 SYRINGE (ML) INJECTION ONCE
Status: COMPLETED | OUTPATIENT
Start: 2024-05-28 | End: 2024-05-28

## 2024-05-28 RX ORDER — DEXAMETHASONE 0.5 MG/1
0.5 TABLET ORAL DAILY
Status: DISCONTINUED | OUTPATIENT
Start: 2024-05-29 | End: 2024-05-31 | Stop reason: HOSPADM

## 2024-05-28 RX ORDER — LISINOPRIL 10 MG/1
10 TABLET ORAL DAILY
Status: DISCONTINUED | OUTPATIENT
Start: 2024-05-29 | End: 2024-05-31 | Stop reason: HOSPADM

## 2024-05-28 RX ORDER — AMOXICILLIN 250 MG
1 CAPSULE ORAL DAILY PRN
Status: DISCONTINUED | OUTPATIENT
Start: 2024-05-28 | End: 2024-05-30

## 2024-05-28 RX ORDER — LIDOCAINE 50 MG/G
1 PATCH TOPICAL ONCE
Status: COMPLETED | OUTPATIENT
Start: 2024-05-28 | End: 2024-05-29

## 2024-05-28 RX ORDER — HEPARIN SODIUM 5000 [USP'U]/ML
5000 INJECTION, SOLUTION INTRAVENOUS; SUBCUTANEOUS EVERY 8 HOURS SCHEDULED
Status: DISCONTINUED | OUTPATIENT
Start: 2024-05-28 | End: 2024-05-31 | Stop reason: HOSPADM

## 2024-05-28 RX ORDER — ACETAMINOPHEN 325 MG/1
975 TABLET ORAL EVERY 8 HOURS SCHEDULED
Status: DISCONTINUED | OUTPATIENT
Start: 2024-05-28 | End: 2024-05-31 | Stop reason: HOSPADM

## 2024-05-28 RX ORDER — POLYETHYLENE GLYCOL 3350 17 G/17G
17 POWDER, FOR SOLUTION ORAL DAILY PRN
Status: DISCONTINUED | OUTPATIENT
Start: 2024-05-28 | End: 2024-05-30

## 2024-05-28 RX ORDER — ABIRATERONE ACETATE 250 MG/1
250 TABLET ORAL DAILY
Status: DISCONTINUED | OUTPATIENT
Start: 2024-05-29 | End: 2024-05-31 | Stop reason: HOSPADM

## 2024-05-28 RX ORDER — SODIUM CHLORIDE 9 MG/ML
10 INJECTION INTRAVENOUS EVERY 8 HOURS SCHEDULED
Status: DISCONTINUED | OUTPATIENT
Start: 2024-05-28 | End: 2024-05-31 | Stop reason: HOSPADM

## 2024-05-28 RX ORDER — PRAVASTATIN SODIUM 80 MG/1
80 TABLET ORAL
Status: DISCONTINUED | OUTPATIENT
Start: 2024-05-29 | End: 2024-05-31 | Stop reason: HOSPADM

## 2024-05-28 RX ADMIN — CEFTRIAXONE 1000 MG: 1 INJECTION, SOLUTION INTRAVENOUS at 21:28

## 2024-05-28 RX ADMIN — INSULIN LISPRO 2 UNITS: 100 INJECTION, SOLUTION INTRAVENOUS; SUBCUTANEOUS at 23:23

## 2024-05-28 RX ADMIN — HYDROMORPHONE HYDROCHLORIDE 1 MG: 1 INJECTION, SOLUTION INTRAMUSCULAR; INTRAVENOUS; SUBCUTANEOUS at 17:42

## 2024-05-28 RX ADMIN — ACETAMINOPHEN 975 MG: 325 TABLET ORAL at 23:23

## 2024-05-28 RX ADMIN — LIDOCAINE 5% 1 PATCH: 700 PATCH TOPICAL at 17:42

## 2024-05-28 RX ADMIN — IOHEXOL 100 ML: 350 INJECTION, SOLUTION INTRAVENOUS at 18:47

## 2024-05-28 RX ADMIN — INSULIN DETEMIR 24 UNITS: 100 INJECTION, SOLUTION SUBCUTANEOUS at 23:25

## 2024-05-28 RX ADMIN — HEPARIN SODIUM 5000 UNITS: 5000 INJECTION, SOLUTION INTRAVENOUS; SUBCUTANEOUS at 23:27

## 2024-05-28 NOTE — ED PROVIDER NOTES
History  Chief Complaint   Patient presents with    Back Pain     States severe low back pain for 2wks. Has bilat nephrostomy tubes and prostate ca.     73-year-old male presents with lower back pain for the past couple of days nonradiating no urine retention bowel incontinence or saddle anesthesia no trauma noted.  He does mention urinary incontinence he has a history of prostate cancer and has bilateral nephrostomy tubes.  Denies any fevers chills nausea vomiting abdominal pain or any other symptoms.  Patient is [place]-speaking I used an       History provided by:  Patient   used: Yes        Prior to Admission Medications   Prescriptions Last Dose Informant Patient Reported? Taking?   Alcohol Swabs 70 % PADS   No No   Sig: To clean the skin prior to injecting insulin   Blood Glucose Monitoring Suppl (OneTouch Verio Reflect) w/Device KIT  Self, Family Member No No   Sig: Check blood sugars once daily. Please substitute with appropriate alternative as covered by patient's insurance. Dx: E11.65   Easy Touch Pen Needles 31G X 6 MM MISC  Family Member, Self No No   Sig: Use daily at bedtime   Levemir FlexPen 100 units/mL injection pen   No No   Sig: Inject 32 Units under the skin daily at bedtime   Patient taking differently: Inject 35 Units under the skin daily at bedtime   Multiple Vitamins-Minerals (Sentry) TABS  Family Member, Self Yes No   Sig: Take 1 tablet by mouth daily   OneTouch Delica Lancets 33G MISC   No No   Sig: Check blood sugars once daily. Please substitute with appropriate alternative as covered by patient's insurance. Dx: E11.65   Spacer/Aero-Holding Chambers (AEROCHAMBER MINI CHAMBER) BILLY  Family Member, Self No No   Sig: by Does not apply route see administration instructions   Patient not taking: Reported on 4/25/2024   abiraterone (ZYTIGA) 250 mg tablet  Self, Family Member No No   Sig: Take 1 tablet (250 mg total) by mouth daily With a low fat meal   albuterol  (Ventolin HFA) 90 mcg/act inhaler   No No   Sig: Inhale 2 puffs every 6 (six) hours as needed for wheezing   Patient not taking: Reported on 4/26/2024   dexamethasone (DECADRON) 0.5 mg tablet   No No   Sig: TAKE 1 TABLET (0.5 MG TOTAL) BY MOUTH DAILY WITH BREAKFAST   glimepiride (AMARYL) 2 mg tablet   No No   Sig: Take 1 tablet (2 mg total) by mouth daily with dinner   glimepiride (AMARYL) 4 mg tablet   No No   Sig: Take 1 tablet (4 mg total) by mouth daily with breakfast   glucose blood (OneTouch Verio) test strip   No No   Sig: Check blood sugars twice a daily. Please substitute with appropriate alternative as covered by patient's insurance. Dx: E11.65   lisinopril (ZESTRIL) 10 mg tablet   Yes No   Sig: NARCISO TANMAY TABLETA VIA ORAL DIARIAMENTE   metFORMIN (GLUCOPHAGE) 1000 MG tablet   No No   Sig: Take 1 tablet (1,000 mg total) by mouth 2 (two) times a day with meals   omega-3-acid ethyl esters (LOVAZA) 1 g capsule   Yes No   polyethylene glycol (MIRALAX) 17 g packet   No No   Sig: Take 17 g by mouth daily as needed (for constipation)   rosuvastatin (CRESTOR) 10 MG tablet   No No   Sig: Take 1 tablet (10 mg total) by mouth daily at bedtime   senna-docusate sodium (SENOKOT S) 8.6-50 mg per tablet   No No   Sig: Take 1 tablet by mouth daily   sodium chloride, PF, 0.9 %   No No   Sig: 10 mL by Intracatheter route daily Intracatheter flushing daily. May substitute prefilled syringe with normal saline 10 mL vials, 10 mL syringes, and 18 g blunt needles   Patient not taking: Reported on 4/25/2024      Facility-Administered Medications: None       Past Medical History:   Diagnosis Date    COVID-19 01/15/2024    Diabetes mellitus (HCC)     Elevated PSA 06/21/2023    High cholesterol     Hypertension     Prostate cancer (HCC)        Past Surgical History:   Procedure Laterality Date    IR NEPHROSTOMY TUBE CHECK/CHANGE/REPOSITION/REINSERTION/UPSIZE  9/28/2023    IR NEPHROSTOMY TUBE CHECK/CHANGE/REPOSITION/REINSERTION/UPSIZE   2023    IR NEPHROSTOMY TUBE CHECK/CHANGE/REPOSITION/REINSERTION/UPSIZE  2024    IR NEPHROSTOMY TUBE CHECK/CHANGE/REPOSITION/REINSERTION/UPSIZE  2024    IR NEPHROSTOMY TUBE CHECK/CHANGE/REPOSITION/REINSERTION/UPSIZE  3/4/2024    IR NEPHROSTOMY TUBE CHECK/CHANGE/REPOSITION/REINSERTION/UPSIZE  3/20/2024    IR NEPHROSTOMY TUBE PLACEMENT  2023    bilateral    IR OTHER  1/15/2024    US GUIDED PROSTATE BIOPSY         Family History   Problem Relation Age of Onset    Uterine cancer Mother     Liver cancer Father     No Known Problems Sister     No Known Problems Brother     No Known Problems Son     Diabetes type II Daughter     No Known Problems Daughter     Cancer Daughter      I have reviewed and agree with the history as documented.    E-Cigarette/Vaping    E-Cigarette Use Never User      E-Cigarette/Vaping Substances    Nicotine No     THC No     CBD No     Flavoring No     Other No     Unknown No      Social History     Tobacco Use    Smoking status: Former     Current packs/day: 0.00     Types: Cigarettes     Quit date:      Years since quittin.4     Passive exposure: Past    Smokeless tobacco: Never    Tobacco comments:     States he only smoked on the weekends   Vaping Use    Vaping status: Never Used   Substance Use Topics    Alcohol use: Not Currently     Comment: socially    Drug use: Never       Review of Systems   Constitutional: Negative.    HENT: Negative.     Eyes: Negative.    Respiratory: Negative.     Cardiovascular: Negative.    Gastrointestinal: Negative.    Endocrine: Negative.    Genitourinary: Negative.    Musculoskeletal:  Positive for back pain.   Skin: Negative.    Allergic/Immunologic: Negative.    Neurological: Negative.    Hematological: Negative.    Psychiatric/Behavioral: Negative.     All other systems reviewed and are negative.      Physical Exam  Physical Exam  Vitals and nursing note reviewed.   Constitutional:       Appearance: Normal appearance.   HENT:       Head: Normocephalic and atraumatic.      Nose: Nose normal.      Mouth/Throat:      Mouth: Mucous membranes are moist.   Eyes:      Extraocular Movements: Extraocular movements intact.      Pupils: Pupils are equal, round, and reactive to light.   Cardiovascular:      Rate and Rhythm: Normal rate and regular rhythm.   Pulmonary:      Effort: Pulmonary effort is normal.      Breath sounds: Normal breath sounds.   Abdominal:      General: Abdomen is flat. Bowel sounds are normal.      Palpations: Abdomen is soft.   Musculoskeletal:         General: Normal range of motion.      Cervical back: Normal range of motion and neck supple.      Comments: Diffuse lumbar paravertebral tenderness no midline tenderness no step-offs noted.  No rash noted.   Skin:     General: Skin is warm.      Capillary Refill: Capillary refill takes less than 2 seconds.   Neurological:      General: No focal deficit present.      Mental Status: He is alert and oriented to person, place, and time. Mental status is at baseline.   Psychiatric:         Mood and Affect: Mood normal.         Thought Content: Thought content normal.         Vital Signs  ED Triage Vitals [05/28/24 1716]   Temperature Pulse Respirations Blood Pressure SpO2   (!) 97.1 °F (36.2 °C) 70 18 137/82 99 %      Temp Source Heart Rate Source Patient Position - Orthostatic VS BP Location FiO2 (%)   Temporal Monitor Sitting Left arm --      Pain Score       10 - Worst Possible Pain           Vitals:    05/28/24 1716   BP: 137/82   Pulse: 70   Patient Position - Orthostatic VS: Sitting         Visual Acuity      ED Medications  Medications   lidocaine (LIDODERM) 5 % patch 1 patch (1 patch Topical Medication Applied 5/28/24 1742)   abiraterone (ZYTIGA) tablet 250 mg (has no administration in time range)   lisinopril (ZESTRIL) tablet 10 mg (has no administration in time range)   polyethylene glycol (MIRALAX) packet 17 g (has no administration in time range)   pravastatin (PRAVACHOL)  tablet 80 mg (has no administration in time range)   senna-docusate sodium (SENOKOT S) 8.6-50 mg per tablet 1 tablet (has no administration in time range)   sodium chloride (PF) 0.9 % injection 10 mL (has no administration in time range)   dexamethasone (DECADRON) tablet 0.5 mg (has no administration in time range)   magnesium sulfate 2 g/50 mL IVPB (premix) 2 g (has no administration in time range)   acetaminophen (TYLENOL) tablet 975 mg (has no administration in time range)   heparin (porcine) subcutaneous injection 5,000 Units (has no administration in time range)   traMADol (ULTRAM) tablet 50 mg (has no administration in time range)   insulin lispro (HumALOG/ADMELOG) 100 units/mL subcutaneous injection 1-5 Units (has no administration in time range)   insulin detemir (LEVEMIR) subcutaneous injection 24 Units (has no administration in time range)   HYDROmorphone (DILAUDID) injection 1 mg (1 mg Intravenous Given 5/28/24 1742)   iohexol (OMNIPAQUE) 350 MG/ML injection (MULTI-DOSE) 100 mL (100 mL Intravenous Given 5/28/24 1847)   cefTRIAXone (ROCEPHIN) IVPB (premix in dextrose) 1,000 mg 50 mL (0 mg Intravenous Stopped 5/28/24 2158)       Diagnostic Studies  Results Reviewed       Procedure Component Value Units Date/Time    Procalcitonin [[id number]] Collected: 05/28/24 1738    Lab Status: In process Specimen: Blood from Arm, Right Updated: 05/28/24 2240    Urine Microscopic [[id number]]  (Abnormal) Collected: 05/28/24 1747    Lab Status: Final result Specimen: Urine, Right Nephrostomy Updated: 05/28/24 1902     RBC, UA 4-10 /hpf      WBC, UA 20-30 /hpf      Epithelial Cells None Seen /hpf      Bacteria, UA Innumerable /hpf     Comprehensive metabolic panel [[id number]]  (Abnormal) Collected: 05/28/24 1738    Lab Status: Final result Specimen: Blood from Arm, Right Updated: 05/28/24 1759     Sodium 135 mmol/L      Potassium 4.2 mmol/L      Chloride 103 mmol/L      CO2 25 mmol/L      ANION GAP 7 mmol/L      BUN 18 mg/dL       Creatinine 1.07 mg/dL      Glucose 216 mg/dL      Calcium 9.6 mg/dL      AST 13 U/L      ALT 14 U/L      Alkaline Phosphatase 76 U/L      Total Protein 7.5 g/dL      Albumin 3.6 g/dL      Total Bilirubin 0.54 mg/dL      eGFR 69 ml/min/1.73sq m     Narrative:      National Kidney Disease Foundation guidelines for Chronic Kidney Disease (CKD):     Stage 1 with normal or high GFR (GFR > 90 mL/min/1.73 square meters)    Stage 2 Mild CKD (GFR = 60-89 mL/min/1.73 square meters)    Stage 3A Moderate CKD (GFR = 45-59 mL/min/1.73 square meters)    Stage 3B Moderate CKD (GFR = 30-44 mL/min/1.73 square meters)    Stage 4 Severe CKD (GFR = 15-29 mL/min/1.73 square meters)    Stage 5 End Stage CKD (GFR <15 mL/min/1.73 square meters)  Note: GFR calculation is accurate only with a steady state creatinine    Magnesium [[id number]]  (Abnormal) Collected: 05/28/24 1738    Lab Status: Final result Specimen: Blood from Arm, Right Updated: 05/28/24 1759     Magnesium 1.7 mg/dL     UA (URINE) with reflex to Scope [[id number]]  (Abnormal) Collected: 05/28/24 1747    Lab Status: Final result Specimen: Urine, Right Nephrostomy Updated: 05/28/24 1756     Color, UA Yellow     Clarity, UA Clear     Specific Gravity, UA 1.025     pH, UA 6.5     Leukocytes, UA Large     Nitrite, UA Positive     Protein,  (2+) mg/dl      Glucose, UA Negative mg/dl      Ketones, UA Negative mg/dl      Urobilinogen, UA <2.0 mg/dl      Bilirubin, UA Negative     Occult Blood, UA Moderate    Urine culture [[id number]] Collected: 05/28/24 1747    Lab Status: In process Specimen: Urine, Right Nephrostomy Updated: 05/28/24 1753    CBC and differential [[id number]]  (Abnormal) Collected: 05/28/24 1738    Lab Status: Final result Specimen: Blood from Arm, Right Updated: 05/28/24 1744     WBC 11.64 Thousand/uL      RBC 3.95 Million/uL      Hemoglobin 11.9 g/dL      Hematocrit 36.5 %      MCV 92 fL      MCH 30.1 pg      MCHC 32.6 g/dL      RDW 13.5 %      MPV 9.5 fL       Platelets 424 Thousands/uL      nRBC 0 /100 WBCs      Segmented % 78 %      Immature Grans % 0 %      Lymphocytes % 18 %      Monocytes % 4 %      Eosinophils Relative 0 %      Basophils Relative 0 %      Absolute Neutrophils 8.93 Thousands/µL      Absolute Immature Grans 0.05 Thousand/uL      Absolute Lymphocytes 2.07 Thousands/µL      Absolute Monocytes 0.50 Thousand/µL      Eosinophils Absolute 0.05 Thousand/µL      Basophils Absolute 0.04 Thousands/µL                    CT abdomen pelvis with contrast   Final Result by Goldie Muhammad MD (05/28 2024)      Bladder wall thickening with increased bladder wall enhancement suspicious for cystitis. Bilateral nephrostomy tubes in good position with no collecting system dilatation and no CT evidence of acute pyelonephritis.      New lytic L1 vertebral body lesion with a soft tissue component causing focal erosion of both the anterior and posterior cortex as described above. Additional sclerotic metastases are stable.      The study was marked in EPIC for immediate notification.         Workstation performed: [id number]                    Procedures  Procedures         ED Course                               SBIRT 20yo+      Flowsheet Row Most Recent Value   Initial Alcohol Screen: US AUDIT-C     1. How often do you have a drink containing alcohol? 0 Filed at: 05/28/2024 1745   2. How many drinks containing alcohol do you have on a typical day you are drinking?  0 Filed at: 05/28/2024 1745   3a. Male UNDER 65: How often do you have five or more drinks on one occasion? 0 Filed at: 05/28/2024 1745   3b. FEMALE Any Age, or MALE 65+: How often do you have 4 or more drinks on one occassion? 0 Filed at: 05/28/2024 1745   Audit-C Score 0 Filed at: 05/28/2024 1745   YANNI: How many times in the past year have you...    Used an illegal drug or used a prescription medication for non-medical reasons? Never Filed at: 05/28/2024 1745                      Medical Decision  Making  Patient evaluated in the ED with labs urinalysis and imaging which showed new L1 vertebral metastatic lesion which I discussed with the family using the .  His urine was positive for infection so I treated with ceftriaxone he was sensitive to ampicillin in the past.  Admitted to the [facility] for further evaluation and management.    Problems Addressed:  Back pain: acute illness or injury  Multiple lesions of metastatic malignancy (HCC): acute illness or injury  Urinary tract infection: acute illness or injury    Amount and/or Complexity of Data Reviewed  External Data Reviewed: notes.  Labs: ordered. Decision-making details documented in ED Course.  Radiology: ordered. Decision-making details documented in ED Course.    Risk  Prescription drug management.  Decision regarding hospitalization.             Disposition  Final diagnoses:   Back pain   Multiple lesions of metastatic malignancy (HCC)   Urinary tract infection     Time reflects when diagnosis was documented in both MDM as applicable and the Disposition within this note       Time User Action Codes Description Comment    5/28/2024  9:25 PM Ericka Dobbins [M54.9] Back pain     5/28/2024  9:26 PM Ericka Dobbins [C79.9] Multiple lesions of metastatic malignancy (HCC)     5/28/2024  9:26 PM Ericka Dobbins [N39.0] Urinary tract infection           ED Disposition       ED Disposition   Admit    Condition   Stable    Date/Time   Tue May 28, 2024 2126    Comment                 Follow-up Information    None         Patient's Medications   Discharge Prescriptions    No medications on file       No discharge procedures on file.    PDMP Review         Value Time User    PDMP Reviewed  Yes 6/26/2023  3:59 PM Rubina De Los Santos MD            ED Provider  Electronically Signed by             Ericka Dobbins DO  05/28/24 0702

## 2024-05-29 ENCOUNTER — APPOINTMENT (INPATIENT)
Dept: RADIOLOGY | Facility: HOSPITAL | Age: 74
DRG: 543 | End: 2024-05-29
Payer: COMMERCIAL

## 2024-05-29 PROBLEM — T83.512A URINARY TRACT INFECTION ASSOCIATED WITH NEPHROSTOMY CATHETER  (HCC): Status: ACTIVE | Noted: 2024-05-28

## 2024-05-29 PROBLEM — R26.2 AMBULATORY DYSFUNCTION: Status: ACTIVE | Noted: 2024-05-29

## 2024-05-29 PROBLEM — N30.90 CYSTITIS: Status: ACTIVE | Noted: 2024-05-29

## 2024-05-29 PROBLEM — E87.8 ELECTROLYTE ABNORMALITY: Status: ACTIVE | Noted: 2024-05-29

## 2024-05-29 PROBLEM — M89.9 LESION OF LUMBAR SPINE: Status: ACTIVE | Noted: 2024-05-29

## 2024-05-29 PROBLEM — N39.0 URINARY TRACT INFECTION ASSOCIATED WITH NEPHROSTOMY CATHETER  (HCC): Status: ACTIVE | Noted: 2024-05-28

## 2024-05-29 LAB
ALBUMIN SERPL BCG-MCNC: 3.4 G/DL (ref 3.5–5)
ALP SERPL-CCNC: 74 U/L (ref 34–104)
ALT SERPL W P-5'-P-CCNC: 12 U/L (ref 7–52)
ANION GAP SERPL CALCULATED.3IONS-SCNC: 8 MMOL/L (ref 4–13)
AST SERPL W P-5'-P-CCNC: 11 U/L (ref 13–39)
BACTERIA UR QL AUTO: ABNORMAL /HPF
BASOPHILS # BLD AUTO: 0.04 THOUSANDS/ÂΜL (ref 0–0.1)
BASOPHILS NFR BLD AUTO: 0 % (ref 0–1)
BILIRUB SERPL-MCNC: 0.38 MG/DL (ref 0.2–1)
BILIRUB UR QL STRIP: NEGATIVE
BUN SERPL-MCNC: 16 MG/DL (ref 5–25)
CALCIUM ALBUM COR SERPL-MCNC: 9.8 MG/DL (ref 8.3–10.1)
CALCIUM SERPL-MCNC: 9.3 MG/DL (ref 8.4–10.2)
CHLORIDE SERPL-SCNC: 104 MMOL/L (ref 96–108)
CHOLEST SERPL-MCNC: 94 MG/DL
CLARITY UR: ABNORMAL
CO2 SERPL-SCNC: 25 MMOL/L (ref 21–32)
COLOR UR: YELLOW
CREAT SERPL-MCNC: 0.83 MG/DL (ref 0.6–1.3)
EOSINOPHIL # BLD AUTO: 0.13 THOUSAND/ÂΜL (ref 0–0.61)
EOSINOPHIL NFR BLD AUTO: 1 % (ref 0–6)
ERYTHROCYTE [DISTWIDTH] IN BLOOD BY AUTOMATED COUNT: 13.6 % (ref 11.6–15.1)
GFR SERPL CREATININE-BSD FRML MDRD: 88 ML/MIN/1.73SQ M
GLUCOSE SERPL-MCNC: 116 MG/DL (ref 65–140)
GLUCOSE SERPL-MCNC: 124 MG/DL (ref 65–140)
GLUCOSE SERPL-MCNC: 130 MG/DL (ref 65–140)
GLUCOSE SERPL-MCNC: 145 MG/DL (ref 65–140)
GLUCOSE SERPL-MCNC: 169 MG/DL (ref 65–140)
GLUCOSE UR STRIP-MCNC: NEGATIVE MG/DL
HCT VFR BLD AUTO: 35.6 % (ref 36.5–49.3)
HDLC SERPL-MCNC: 27 MG/DL
HGB BLD-MCNC: 11.7 G/DL (ref 12–17)
HGB UR QL STRIP.AUTO: ABNORMAL
IMM GRANULOCYTES # BLD AUTO: 0.04 THOUSAND/UL (ref 0–0.2)
IMM GRANULOCYTES NFR BLD AUTO: 0 % (ref 0–2)
KETONES UR STRIP-MCNC: NEGATIVE MG/DL
LDLC SERPL CALC-MCNC: 27 MG/DL (ref 0–100)
LEUKOCYTE ESTERASE UR QL STRIP: ABNORMAL
LYMPHOCYTES # BLD AUTO: 3.13 THOUSANDS/ÂΜL (ref 0.6–4.47)
LYMPHOCYTES NFR BLD AUTO: 31 % (ref 14–44)
MAGNESIUM SERPL-MCNC: 2.3 MG/DL (ref 1.9–2.7)
MCH RBC QN AUTO: 30 PG (ref 26.8–34.3)
MCHC RBC AUTO-ENTMCNC: 32.9 G/DL (ref 31.4–37.4)
MCV RBC AUTO: 91 FL (ref 82–98)
MONOCYTES # BLD AUTO: 0.57 THOUSAND/ÂΜL (ref 0.17–1.22)
MONOCYTES NFR BLD AUTO: 6 % (ref 4–12)
NEUTROPHILS # BLD AUTO: 6.17 THOUSANDS/ÂΜL (ref 1.85–7.62)
NEUTS SEG NFR BLD AUTO: 62 % (ref 43–75)
NITRITE UR QL STRIP: POSITIVE
NON-SQ EPI CELLS URNS QL MICRO: ABNORMAL /HPF
NRBC BLD AUTO-RTO: 0 /100 WBCS
PH UR STRIP.AUTO: 6 [PH]
PLATELET # BLD AUTO: 409 THOUSANDS/UL (ref 149–390)
PMV BLD AUTO: 9.7 FL (ref 8.9–12.7)
POTASSIUM SERPL-SCNC: 3.7 MMOL/L (ref 3.5–5.3)
PROT SERPL-MCNC: 7.1 G/DL (ref 6.4–8.4)
PROT UR STRIP-MCNC: ABNORMAL MG/DL
RBC # BLD AUTO: 3.9 MILLION/UL (ref 3.88–5.62)
RBC #/AREA URNS AUTO: ABNORMAL /HPF
SODIUM SERPL-SCNC: 137 MMOL/L (ref 135–147)
SP GR UR STRIP.AUTO: 1.01 (ref 1–1.03)
TRIGL SERPL-MCNC: 202 MG/DL
UROBILINOGEN UR STRIP-ACNC: <2 MG/DL
WBC # BLD AUTO: 10.08 THOUSAND/UL (ref 4.31–10.16)
WBC #/AREA URNS AUTO: ABNORMAL /HPF

## 2024-05-29 PROCEDURE — A9585 GADOBUTROL INJECTION: HCPCS | Performed by: FAMILY MEDICINE

## 2024-05-29 PROCEDURE — 99223 1ST HOSP IP/OBS HIGH 75: CPT | Performed by: FAMILY MEDICINE

## 2024-05-29 PROCEDURE — 80053 COMPREHEN METABOLIC PANEL: CPT

## 2024-05-29 PROCEDURE — 97162 PT EVAL MOD COMPLEX 30 MIN: CPT

## 2024-05-29 PROCEDURE — 87086 URINE CULTURE/COLONY COUNT: CPT

## 2024-05-29 PROCEDURE — 87186 SC STD MICRODIL/AGAR DIL: CPT

## 2024-05-29 PROCEDURE — 83735 ASSAY OF MAGNESIUM: CPT

## 2024-05-29 PROCEDURE — 82948 REAGENT STRIP/BLOOD GLUCOSE: CPT

## 2024-05-29 PROCEDURE — 97110 THERAPEUTIC EXERCISES: CPT

## 2024-05-29 PROCEDURE — 72158 MRI LUMBAR SPINE W/O & W/DYE: CPT

## 2024-05-29 PROCEDURE — 80061 LIPID PANEL: CPT

## 2024-05-29 PROCEDURE — 85025 COMPLETE CBC W/AUTO DIFF WBC: CPT

## 2024-05-29 PROCEDURE — 81001 URINALYSIS AUTO W/SCOPE: CPT

## 2024-05-29 RX ORDER — CEFTRIAXONE 1 G/50ML
1000 INJECTION, SOLUTION INTRAVENOUS EVERY 24 HOURS
Status: DISCONTINUED | OUTPATIENT
Start: 2024-05-29 | End: 2024-05-29

## 2024-05-29 RX ORDER — CEFTRIAXONE 1 G/50ML
1000 INJECTION, SOLUTION INTRAVENOUS EVERY 24 HOURS
Status: DISCONTINUED | OUTPATIENT
Start: 2024-05-29 | End: 2024-05-30

## 2024-05-29 RX ORDER — VANCOMYCIN HYDROCHLORIDE 750 MG/150ML
750 INJECTION, SOLUTION INTRAVENOUS EVERY 12 HOURS
Status: DISCONTINUED | OUTPATIENT
Start: 2024-05-29 | End: 2024-05-30

## 2024-05-29 RX ORDER — CEFAZOLIN SODIUM 2 G/50ML
2000 SOLUTION INTRAVENOUS EVERY 8 HOURS
Status: DISCONTINUED | OUTPATIENT
Start: 2024-05-29 | End: 2024-05-29

## 2024-05-29 RX ORDER — GADOBUTROL 604.72 MG/ML
7 INJECTION INTRAVENOUS
Status: COMPLETED | OUTPATIENT
Start: 2024-05-29 | End: 2024-05-29

## 2024-05-29 RX ORDER — FLUCONAZOLE 100 MG/1
200 TABLET ORAL DAILY
Status: DISCONTINUED | OUTPATIENT
Start: 2024-05-29 | End: 2024-05-30

## 2024-05-29 RX ADMIN — HEPARIN SODIUM 5000 UNITS: 5000 INJECTION, SOLUTION INTRAVENOUS; SUBCUTANEOUS at 06:13

## 2024-05-29 RX ADMIN — ACETAMINOPHEN 975 MG: 325 TABLET ORAL at 06:12

## 2024-05-29 RX ADMIN — HEPARIN SODIUM 5000 UNITS: 5000 INJECTION, SOLUTION INTRAVENOUS; SUBCUTANEOUS at 21:46

## 2024-05-29 RX ADMIN — INSULIN DETEMIR 24 UNITS: 100 INJECTION, SOLUTION SUBCUTANEOUS at 21:45

## 2024-05-29 RX ADMIN — LISINOPRIL 10 MG: 10 TABLET ORAL at 08:30

## 2024-05-29 RX ADMIN — DICLOFENAC SODIUM 2 G: 10 GEL TOPICAL at 17:33

## 2024-05-29 RX ADMIN — SODIUM CHLORIDE, PRESERVATIVE FREE 10 ML: 5 INJECTION INTRAVENOUS at 14:29

## 2024-05-29 RX ADMIN — DEXAMETHASONE 0.5 MG: 0.5 TABLET ORAL at 10:25

## 2024-05-29 RX ADMIN — ACETAMINOPHEN 975 MG: 325 TABLET ORAL at 21:46

## 2024-05-29 RX ADMIN — SODIUM CHLORIDE, PRESERVATIVE FREE 10 ML: 5 INJECTION INTRAVENOUS at 06:33

## 2024-05-29 RX ADMIN — ABIRATERONE ACETATE 250 MG: 250 TABLET, FILM COATED ORAL at 10:25

## 2024-05-29 RX ADMIN — ACETAMINOPHEN 975 MG: 325 TABLET ORAL at 14:28

## 2024-05-29 RX ADMIN — VANCOMYCIN HYDROCHLORIDE 1250 MG: 1 INJECTION, POWDER, LYOPHILIZED, FOR SOLUTION INTRAVENOUS at 10:29

## 2024-05-29 RX ADMIN — DICLOFENAC SODIUM 2 G: 10 GEL TOPICAL at 21:46

## 2024-05-29 RX ADMIN — GADOBUTROL 7 ML: 604.72 INJECTION INTRAVENOUS at 17:06

## 2024-05-29 RX ADMIN — AMPICILLIN SODIUM 1000 MG: 1 INJECTION, POWDER, FOR SOLUTION INTRAMUSCULAR; INTRAVENOUS at 03:06

## 2024-05-29 RX ADMIN — INSULIN LISPRO 1 UNITS: 100 INJECTION, SOLUTION INTRAVENOUS; SUBCUTANEOUS at 17:33

## 2024-05-29 RX ADMIN — FLUCONAZOLE 200 MG: 100 TABLET ORAL at 08:30

## 2024-05-29 RX ADMIN — MAGNESIUM SULFATE HEPTAHYDRATE 2 G: 40 INJECTION, SOLUTION INTRAVENOUS at 01:07

## 2024-05-29 RX ADMIN — LIDOCAINE 5% 1 PATCH: 700 PATCH TOPICAL at 01:07

## 2024-05-29 RX ADMIN — CEFTRIAXONE 1000 MG: 1 INJECTION, SOLUTION INTRAVENOUS at 21:47

## 2024-05-29 RX ADMIN — DICLOFENAC SODIUM 2 G: 10 GEL TOPICAL at 08:31

## 2024-05-29 RX ADMIN — HEPARIN SODIUM 5000 UNITS: 5000 INJECTION, SOLUTION INTRAVENOUS; SUBCUTANEOUS at 14:28

## 2024-05-29 RX ADMIN — CEFAZOLIN SODIUM 2000 MG: 2 SOLUTION INTRAVENOUS at 08:33

## 2024-05-29 RX ADMIN — VANCOMYCIN HYDROCHLORIDE 750 MG: 750 INJECTION, SOLUTION INTRAVENOUS at 23:09

## 2024-05-29 RX ADMIN — PRAVASTATIN SODIUM 80 MG: 80 TABLET ORAL at 17:33

## 2024-05-29 RX ADMIN — SODIUM CHLORIDE, PRESERVATIVE FREE 10 ML: 5 INJECTION INTRAVENOUS at 21:45

## 2024-05-29 RX ADMIN — TRAMADOL HYDROCHLORIDE 50 MG: 50 TABLET, COATED ORAL at 08:30

## 2024-05-29 NOTE — OCCUPATIONAL THERAPY NOTE
Occupational Therapy Screen     Patient Name: Oliver Astudillo  Today's Date: 5/29/2024  Problem List  Principal Problem:    Urinary tract infection associated with nephrostomy catheter  (HCC)  Active Problems:    Type 2 diabetes mellitus, with long-term current use of insulin (HCC)    Hypertension    Prostate cancer (HCC)    Nephrostomy status (HCC)    Hyperlipidemia    L1 metastatic lesion (HCC)    Stage 3a chronic kidney disease (HCC)    Electrolyte abnormality    Past Medical History  Past Medical History:   Diagnosis Date    COVID-19 01/15/2024    Diabetes mellitus (HCC)     Elevated PSA 06/21/2023    High cholesterol     Hypertension     Prostate cancer (HCC)      Past Surgical History  Past Surgical History:   Procedure Laterality Date    IR NEPHROSTOMY TUBE CHECK/CHANGE/REPOSITION/REINSERTION/UPSIZE  9/28/2023    IR NEPHROSTOMY TUBE CHECK/CHANGE/REPOSITION/REINSERTION/UPSIZE  12/28/2023    IR NEPHROSTOMY TUBE CHECK/CHANGE/REPOSITION/REINSERTION/UPSIZE  1/31/2024    IR NEPHROSTOMY TUBE CHECK/CHANGE/REPOSITION/REINSERTION/UPSIZE  2/2/2024    IR NEPHROSTOMY TUBE CHECK/CHANGE/REPOSITION/REINSERTION/UPSIZE  3/4/2024    IR NEPHROSTOMY TUBE CHECK/CHANGE/REPOSITION/REINSERTION/UPSIZE  3/20/2024    IR NEPHROSTOMY TUBE PLACEMENT  06/22/2023    bilateral    IR OTHER  1/15/2024    US GUIDED PROSTATE BIOPSY          05/29/24 1552   OT Last Visit   OT Visit Date 05/29/24  (Wed)   Note Type   Note type Screen   Additional Comments OT orders received and chart review completed. Spoke w/ PT, E[place] and RN. Pt has been walking to / from bathroom and is presently completing ADLs at /near baseline level of I. Recommend active participation in ADLs in acute care and will screen from OT caseload. Please re-consult OT as appropriate if acute needs arise. DC OT   Discharge Recommendation   Rehab Resource Intensity Level, OT No post-acute rehabilitation needs   Licensure   [place] License Number  Jaci Martinez OTR/L [id number]      Jaci Martinez, OTR/L  [id number]  [id number]

## 2024-05-29 NOTE — ASSESSMENT & PLAN NOTE
Hx of prostate cancer with new findings on CT abd/pelvis in ED. Patient reporting recent worsening in back pain making it difficult to ambulate and get comfortable, stating that he has been taking Tylenol regularly for pain at home. Denying numbness/tingling in thighs, bowel incontinence    CT abd/pelvis in ED: Multiple sclerotic metastases redemonstrated throughout the visualized axial and appendicular skeleton,1 mostly stable, except for a new lytic lesion in the L1 vertebral body with an enhancing soft tissue component, and focal destruction of both   the anterior and posterior cortex    MRI: Multiple marrow replacing and enhancing lesions involving the lumbar spine, sacrum and iliac bones, which are suspicious for metastases. No convincing epidural involvement though there is probable encroachment on the right L1-L2 neural foramen. Of note, the L1 marrow replacing lesion results in a mild pathologic compression fracture. Recommend correlation with nuclear medicine bone scan and/or prostate-specific tracers in a PET scan, as clinically appropriate.    Plan:  PT/OT  Pain control as below   Tylenol 975 mg Q8H scheduled  Voltaren gel for back  Lidoderm patches  Tramadol 50 mg Q6H PRN for moderate, severe pain

## 2024-05-29 NOTE — PHYSICAL THERAPY NOTE
PHYSICAL THERAPY EVALUATION/TREATMENT     05/29/24 1425   PT Last Visit   PT Visit Date 05/29/24   Note Type   Note type Evaluation   Pain Assessment   Pain Assessment Tool Whatley-Baker FACES   Whatley-Baker FACES Pain Rating 6  (Low back pain)   Restrictions/Precautions   Other Precautions Fall Risk;Pain  (Nephrostomy bags/tubes)   Home Living   Type of Home House   Home Layout Two level   Home Equipment Quad cane   Additional Comments Patient reports being independent with quad cane prior to admission   Prior Function   Lives With Spouse   Receives Help From Family   Falls in the last 6 months 0   Comments Patient reports being basically independent with ADLs although wife assists.  Recently requiring more assist due to low back pain and weakness   General   Additional Pertinent History Chart reviewed, patient admitted with UTI associated with nephrostomy catheters.  Patient with extensive medical history involving prostate cancer.  Patient presents with generalized weakness and deconditioning with resulting gait dysfunction.  Patient and nursing reporting patient independent in bathroom with ADLs and has assist at home.  Patient does not require skilled OT services at this time and will be followed with PT   Family/Caregiver Present No   Cognition   Overall Cognitive Status WFL   Arousal/Participation Cooperative   Orientation Level Oriented to person;Oriented to place;Oriented to situation   Following Commands Follows multistep commands with increased time or repetition   Subjective   Subjective Patient states needing a walker at home   RLE Assessment   RLE Assessment   (Range of motion within functional limits, strength 3+/4 -)   LLE Assessment   LLE Assessment   (Range of motion within functional limits, strength 3+/4 -)   Coordination   Movements are Fluid and Coordinated 0   Coordination and Movement Description Decreased coordination with transfer and gait activity improved with use of quad cane   Bed  Mobility   Supine to Sit 5  Supervision   Sit to Supine 5  Supervision   Transfers   Sit to Stand 5  Supervision   Stand to Sit 5  Supervision   Ambulation/Elevation   Gait Assistance   (Supervision to min assist)   Assistive Device Straight cane   Distance 10 feet with change in direction, shortened stride length guarded activity due to low back pain   Balance   Static Sitting Fair   Dynamic Sitting Fair -   Static Standing Fair -   Dynamic Standing Fair -   Ambulatory Fair -   Activity Tolerance   Activity Tolerance Patient limited by fatigue;Patient limited by pain   Nurse Made Aware Yes   Assessment   Prognosis Good   Problem List Decreased strength;Decreased range of motion;Decreased endurance;Impaired balance;Decreased mobility;Decreased coordination;Pain   Goals   Patient Goals To decrease pain   STG Expiration Date 06/05/24   Short Term Goal #1 Transfers and gait with quad cane independently   Short Term Goal #2 Gait endurance to 40 feet   LTG Expiration Date 06/12/24   Long Term Goal #1 Strength bilateral lower extremities 4/5   Long Term Goal #2 Independent transfers and gait with least restrictive assistive device for functional household distances   Plan   Treatment/Interventions Functional transfer training;LE strengthening/ROM;Therapeutic exercise;Endurance training;Patient/family training;Equipment eval/education;Bed mobility;Gait training;Compensatory technique education   PT Frequency Other (Comment)  (5 times per week)   Discharge Recommendation   Rehab Resource Intensity Level, PT III (Minimum Resource Intensity)   Equipment Recommended Walker   Walker Package Recommended Wheeled walker   AM-PAC Basic Mobility Inpatient   Turning in Flat Bed Without Bedrails 4   Lying on Back to Sitting on Edge of Flat Bed Without Bedrails 3   Moving Bed to Chair 3   Standing Up From Chair Using Arms 3   Walk in Room 3   Climb 3-5 Stairs With Railing 2   Basic Mobility Inpatient Raw Score 18   Basic Mobility  Standardized Score 41.05   [facility] Highest Level Of Mobility   -HL Goal 6: Walk 10 steps or more   -HLM Achieved 6: Walk 10 steps or more   Barthel Index   Feeding 10   Bathing 0   Grooming Score 5   Dressing Score 10   Bladder Score 0   Bowels Score 10   Toilet Use Score 10   Transfers (Bed/Chair) Score 10   Mobility (Level Surface) Score 0   Stairs Score 5   Barthel Index Score 60   Additional Treatment Session   Start Time 1410   End Time 1425   Treatment Assessment s: Patient reports feeling unsteady and weak at home at times due to medical complications O: Gait training with walker with supervision.  Bilateral lower extremity exercise completed as listed below A: Patient will benefit from continued physical therapy with progression as tolerated and increasing functional mobility with clinical staff as well   Exercises   Hip Flexion Sitting;5 reps;Bilateral  (Alternating)   Hip Abduction Sitting;5 reps;AROM;Bilateral  (Alternating)   Knee AROM Long Arc Quad Sitting;5 reps;Bilateral  (Alternating)   Balance training  Sidestepping completed for balance and coordination   Licensure   [place] License Number  Ely Edwards, PT 4 0 [id number]

## 2024-05-29 NOTE — ASSESSMENT & PLAN NOTE
Patient reporting pelvic discomfort, denies dysuria, hematuria. Patient has a history of frequent complicated UTI involving b/l nephrostomy tubes. Currently, good output from b/l nephrostomy tubes, PVR in ED for 16 cc. Per wife, he is s/p 5 days of augmentin    CT abd/pelvis: Bladder wall thickening with increased bladder wall enhancement suspicious for cystitis. Bilateral nephrostomy tubes in good position with no collecting system dilatation and no CT evidence of acute pyelonephritis    - ED: Rocephin x 1   - UA right nephrostomy: positive for leukocytes, nitrites, protein, moderate occult blood, WBC, RBC, bacteria   - Ucx right nephrostomy: E coli >100K, E faecalis >30K, lactobacillus >100K  - Ucx clean catch: 40,000-49,000 cfu/ml Oxidase Positive gram negative kamini   - Received empiric Ampicillin (5/29)  - Received fluconazole & Rocephin (5/29-5/30)  - Received Vancomycin (5/29-5/31) & Cefepime (5/30-5/31)  No home antibiotics as patient is likely colonized

## 2024-05-29 NOTE — ASSESSMENT & PLAN NOTE
Chronic, following oncology, currently taking Zytiga and dexamethasone daily. Presents with new L1 lesion on CT abd/pelvis in ED. Patient reporting recent worsening in back pain making it difficult to ambulate and get comfortable, stating that he has been taking Tylenol regularly for pain at home. Denying numbness/tingling in thighs, bowel incontinence  - CT abd/pelvis in ED: Multiple sclerotic metastases redemonstrated throughout the visualized axial and appendicular skeleton,1 mostly stable, except for a new lytic lesion in the L1 vertebral body with an enhancing soft tissue component, and focal destruction of both the anterior and posterior cortex  - MRI: Multiple marrow replacing and enhancing lesions involving the lumbar spine, sacrum and iliac bones, which are suspicious for metastases. No convincing epidural involvement though there is probable encroachment on the right L1-L2 neural foramen. Of note, the L1 marrow replacing lesion results in a mild pathologic compression fracture. Recommend correlation with nuclear medicine bone scan and/or prostate-specific tracers in a PET scan, as clinically appropriate.    Home with C for continued PT  Pain control as below   Tylenol 975 mg Q8H PRN  Voltaren gel for back  Lidoderm patches  Sibebar with neurosurgery,   recs no neurosurgery intervention at this time  f/u with Onc for possible radiation  Home with CASH back brace  Ambulatory referral to Palliative Care

## 2024-05-29 NOTE — ASSESSMENT & PLAN NOTE
Lab Results   Component Value Date    EGFR 86 05/31/2024    EGFR 91 05/30/2024    EGFR 88 05/29/2024    CREATININE 0.87 05/31/2024    CREATININE 0.76 05/30/2024    CREATININE 0.83 05/29/2024     Chronic, stable. Renally dose medications as able

## 2024-05-29 NOTE — PLAN OF CARE
Problem: METABOLIC, FLUID AND ELECTROLYTES - ADULT  Goal: Electrolytes maintained within normal limits  Description: INTERVENTIONS:  - Monitor labs and assess patient for signs and symptoms of electrolyte imbalances  - Administer electrolyte replacement as ordered  - Monitor response to electrolyte replacements, including repeat lab results as appropriate  - Instruct patient on fluid and nutrition as appropriate  Outcome: Progressing  Goal: Fluid balance maintained  Description: INTERVENTIONS:  - Monitor labs   - Monitor I/O and WT  - Instruct patient on fluid and nutrition as appropriate  - Assess for signs & symptoms of volume excess or deficit  Outcome: Progressing  Goal: Glucose maintained within target range  Description: INTERVENTIONS:  - Monitor Blood Glucose as ordered  - Assess for signs and symptoms of hyperglycemia and hypoglycemia  - Administer ordered medications to maintain glucose within target range  - Assess nutritional intake and initiate nutrition service referral as needed  Outcome: Progressing     Problem: SKIN/TISSUE INTEGRITY - ADULT  Goal: Incision(s), wounds(s) or drain site(s) healing without S/S of infection  Description: INTERVENTIONS  - Assess and document dressing, incision, wound bed, drain sites and surrounding tissue  - Provide patient and family education  - Perform skin care/dressing changes every shift  Outcome: Progressing     Problem: PAIN - ADULT  Goal: Verbalizes/displays adequate comfort level or baseline comfort level  Description: Interventions:  - Encourage patient to monitor pain and request assistance  - Assess pain using appropriate pain scale  - Administer analgesics based on type and severity of pain and evaluate response  - Implement non-pharmacological measures as appropriate and evaluate response  - Consider cultural and social influences on pain and pain management  - Notify physician/advanced practitioner if interventions unsuccessful or patient reports new  pain  Outcome: Progressing     Problem: INFECTION - ADULT  Goal: Absence or prevention of progression during hospitalization  Description: INTERVENTIONS:  - Assess and monitor for signs and symptoms of infection  - Monitor lab/diagnostic results  - Monitor all insertion sites, i.e. indwelling lines, tubes, and drains  - Monitor endotracheal if appropriate and nasal secretions for changes in amount and color  - [place] appropriate cooling/warming therapies per order  - Administer medications as ordered  - Instruct and encourage patient and family to use good hand hygiene technique  - Identify and instruct in appropriate isolation precautions for identified infection/condition  Outcome: Progressing  Goal: Absence of fever/infection during neutropenic period  Description: INTERVENTIONS:  - Monitor WBC    Outcome: Progressing     Problem: SAFETY ADULT  Goal: Maintain or return to baseline ADL function  Description: INTERVENTIONS:  -  Assess patient's ability to carry out ADLs; assess patient's baseline for ADL function and identify physical deficits which impact ability to perform ADLs (bathing, care of mouth/teeth, toileting, grooming, dressing, etc.)  - Assess/evaluate cause of self-care deficits   - Assess range of motion  - Assess patient's mobility; develop plan if impaired  - Assess patient's need for assistive devices and provide as appropriate  - Encourage maximum independence but intervene and supervise when necessary  - Involve family in performance of ADLs  - Assess for home care needs following discharge   - Consider OT consult to assist with ADL evaluation and planning for discharge  - Provide patient education as appropriate  Outcome: Progressing  Goal: Maintains/Returns to pre admission functional level  Description: INTERVENTIONS:  - Perform AM-PAC 6 Click Basic Mobility/ Daily Activity assessment daily.  - Set and communicate daily mobility goal to care team and patient/family/caregiver.   -  Collaborate with rehabilitation services on mobility goals if consulted  - Perform Range of Motion 3 times a day.  - Reposition patient every 2 hours.  - Dangle patient 3 times a day  - Stand patient 3 times a day  - Ambulate patient 3 times a day  - Out of bed to chair 3 times a day   - Out of bed for meals 3 times a day  - Out of bed for toileting  - Record patient progress and toleration of activity level   Outcome: Progressing     Problem: DISCHARGE PLANNING  Goal: Discharge to home or other facility with appropriate resources  Description: INTERVENTIONS:  - Identify barriers to discharge w/patient and caregiver  - Arrange for needed discharge resources and transportation as appropriate  - Identify discharge learning needs (meds, wound care, etc.)  - Arrange for interpretive services to assist at discharge as needed  - Refer to Case Management Department for coordinating discharge planning if the patient needs post-hospital services based on physician/advanced practitioner order or complex needs related to functional status, cognitive ability, or social support system  Outcome: Progressing     Problem: Knowledge Deficit  Goal: Patient/family/caregiver demonstrates understanding of disease process, treatment plan, medications, and discharge instructions  Description: Complete learning assessment and assess knowledge base.  Interventions:  - Provide teaching at level of understanding  - Provide teaching via preferred learning methods  Outcome: Progressing

## 2024-05-29 NOTE — ASSESSMENT & PLAN NOTE
Hx of metastatic prostate cancer with urinary obstruction and bilateral nephrostomy tubes since 6/2023 requiring frequent changes and daily BID flushes. Reports current good output from bilateral tubes.     CT abd/pelvis in ED: Bilateral nephrostomy catheters are in good position. No collecting system dilatation both nephrograms are symmetric. No calculi.     Continue regular nephrostomy tube flushes

## 2024-05-29 NOTE — PROGRESS NOTES
Daily Progress Note - [facility]  Family Medicine Residency  Oliver Astudillo 73 y.o. male MRN: [id number]  Unit/Bed#: [address] [place] [address] Encounter: [id number]  Admitting Physician: Marimar Torres DO   PCP: Eugenia Rodriguez MD  Date of Admission:  5/28/2024  5:10 PM    Assessment and Plan    * Cystitis  Assessment & Plan  Patient reporting current incontinence with some pelvic discomfort, denies dysuria, hematuria. Patient has a history of frequent complicated UTI involving b/l nephrostomy tubes. Currently, good output from b/l nephrostomy tubes, PVR in ED for 16 cc. Per wife, he is s/p 5 days of augmentin    CT abd/pelvis: Bladder wall thickening with increased bladder wall enhancement suspicious for cystitis. Bilateral nephrostomy tubes in good position with no collecting system dilatation and no CT evidence of acute pyelonephritis    ED course - s/p Rocephin x 1   UA in ED drawn from right nephrostomy tube - positive for leukocytes, nitrites, protein, moderate occult blood, WBC, RBC, bacteria   Previous urine culture from right nephrostomy tube positive for candida albicans, E faecalis sensitive to Ampicillin    Plan:  Pending UA from male external catheter  Pending culture from right nephrostomy   Urinary retention protocol   Strict I&O's  Start empiric Ampicillin (5/29)  Previous urine culture showing ecoli with resistance to ampicillin  Starting Rocephin & Vancomycin (5/29- )  Start empiric fluconazole per previous urine culture (5/29 - )    Electrolyte abnormality  Assessment & Plan  Monitor and replete as needed    Stage 3a chronic kidney disease (HCC)  Assessment & Plan  Lab Results   Component Value Date    EGFR 69 05/28/2024    EGFR 59 04/29/2024    EGFR 67 03/24/2024    CREATININE 1.07 05/28/2024    CREATININE 1.22 04/29/2024    CREATININE 1.09 03/24/2024     Chronic, stable. Renally dose medications as able    L1 metastatic lesion (HCC)  Assessment & Plan  Hx of prostate cancer with new  findings on CT abd/pelvis in ED. Patient reporting recent worsening in back pain making it difficult to ambulate and get comfortable, stating that he has been taking Tylenol regularly for pain at home. Denying numbness/tingling in thighs, bowel incontinence    Denies any current numbness or tingling in thighs, denies any bowel incontinence. Does report bladder incontinence     CT abd/pelvis in ED: Multiple sclerotic metastases redemonstrated throughout the visualized axial and appendicular skeleton,1 mostly stable, except for a new lytic lesion in the L1 vertebral body with an enhancing soft tissue component, and focal destruction of both   the anterior and posterior cortex    Plan:  Consider MRI  Pain control as below   Tylenol 975 mg Q8H scheduled  Voltaren gel for back  Lidoderm patches  Tramadol 50 mg Q6H PRN for moderate, severe pain     Hyperlipidemia  Assessment & Plan  Chronic, stable. Home medications include Crestor 10 mg  - lipid panel: TC 94, , HDL 27, LDL 27  Plan:  Substitute Crestor 10 mg for pravastatin 80 mg during hospitalization    Nephrostomy status ([facility])  Assessment & Plan  Hx of metastatic prostate cancer with urinary obstruction and bilateral nephrostomy tubes since 6/2023 requiring frequent changes and daily BID flushes. Reports current good output from bilateral tubes.     CT abd/pelvis in ED: Bilateral nephrostomy catheters are in good position. No collecting system dilatation both nephrograms are symmetric. No calculi.     Plan:  -Continue regular nephrostomy tube flushes  -Strict I&O's    Prostate cancer ([facility])  Assessment & Plan  Chronic, history of prostate cancer, following with oncology. Currently taking Zytiga and dexamethasone daily.    Hypertension  Assessment & Plan  Chronic, stable. Continue home lisinopril 10 mg. Avoid hypotension    Type 2 diabetes mellitus, with long-term current use of insulin ([facility])  Assessment & Plan  Lab Results   Component Value Date    HGBA1C 9.2 (A)  2024   Home medications: metformin 500 mg BID, glimepiride 2 mg AM, glimepiride 4 mg PM, Levemir 35 U HS. Hx of hypoglycemia during hospitalization, currently reporting poor PO intake    Taking dexamethasone daily secondary to prostate cancer     Plan:  Hold oral anti-glycemic agents  Decrease Levemir to 24U HS  Insulin sliding scale  Hypoglycemic protocol  ACHS accucheck, POCT 2 AM check  Carb controlled diet         VTE Pharmacologic Prophylaxis: VTE Score: 8 High Risk (Score >/= 5) - Pharmacological DVT Prophylaxis Ordered: heparin. Sequential Compression Devices Ordered.    Patient Centered Rounds: I have performed bedside rounds with nursing staff today.    Discussions with Specialists or Other Care Team Provider: none    Education and Discussions with Family / Patient: Yes  Patient Information Sharing: With the consent of Oliver Astudillo , their loved ones were notified today by inpatient team of the patient’s condition and current plan.  All questions answered.     Time Spent for Care: 30 minutes.  More than 50% of total time spent on counseling and coordination of care as described above.    Current Length of Stay: 0 day(s)    Current Patient Status: Observation   Certification Statement: The patient will continue to require additional inpatient hospital stay due to Complicated UTI    Discharge Plan: 24-48hrs    Code Status: Level 1 - Full Code    Subjective:   Patient see and examined at bedside in NAD, AAOx3. Reports feeling better overall. Expressed concern that he's not urinating via the condom cathether, but bilateral nephrostomy tubes draining well.     Objective     Objective:   Vitals:   Temp (24hrs), Av.4 °F (36.3 °C), Min:97.1 °F (36.2 °C), Max:97.5 °F (36.4 °C)    Temp:  [97.1 °F (36.2 °C)-97.5 °F (36.4 °C)] 97.5 °F (36.4 °C)  HR:  [65-72] 72  Resp:  [18] 18  BP: (125-140)/(60-82) 125/67  SpO2:  [97 %-99 %] 99 %  Body mass index is 25.54 kg/m².     Input and Output Summary (last 24  hours):       Intake/Output Summary (Last 24 hours) at 5/29/2024 0922  Last data filed at 5/29/2024 0633  Gross per 24 hour   Intake 60 ml   Output --   Net 60 ml       Physical Exam:   Physical Exam  Constitutional:       General: He is not in acute distress.  HENT:      Mouth/Throat:      Mouth: Mucous membranes are moist.      Pharynx: Oropharynx is clear.   Eyes:      Conjunctiva/sclera: Conjunctivae normal.      Pupils: Pupils are equal, round, and reactive to light.   Cardiovascular:      Rate and Rhythm: Normal rate and regular rhythm.      Pulses: Normal pulses.      Heart sounds: Normal heart sounds.   Pulmonary:      Effort: Pulmonary effort is normal. No respiratory distress.      Breath sounds: No wheezing, rhonchi or rales.   Abdominal:      General: Bowel sounds are normal. There is no distension.      Palpations: Abdomen is soft.      Tenderness: There is abdominal tenderness (mild suprapubic).      Comments: B/L nephrostomy tubes in place, covered in clean dressing. No signs of infection    Musculoskeletal:      Right lower leg: No edema.      Left lower leg: No edema.   Skin:     General: Skin is warm and dry.      Capillary Refill: Capillary refill takes less than 2 seconds.   Neurological:      General: No focal deficit present.      Mental Status: He is alert and oriented to person, place, and time.      Motor: No weakness.       Additional Data:     Labs:  Results from last 7 days   Lab Units 05/29/24  0553   WBC Thousand/uL 10.08   HEMOGLOBIN g/dL 11.7*   HEMATOCRIT % 35.6*   PLATELETS Thousands/uL 409*   SEGS PCT % 62   LYMPHO PCT % 31   MONO PCT % 6   EOS PCT % 1     Results from last 7 days   Lab Units 05/29/24  0553   POTASSIUM mmol/L 3.7   CHLORIDE mmol/L 104   CO2 mmol/L 25   BUN mg/dL 16   CREATININE mg/dL 0.83   CALCIUM mg/dL 9.3   ALK PHOS U/L 74   ALT U/L 12   AST U/L 11*         Results from last 7 days   Lab Units 05/29/24  0640 05/28/24  2313   POC GLUCOSE mg/dl 116 234*            * I Have Reviewed All Lab Data Listed Above.  * Additional Pertinent Lab Tests Reviewed: All Labs For Current Hospital Admission Reviewed    Imaging:    Imaging Reports Reviewed Today Include: All Reports For Current Hospital Admission Reviewed    Recent Cultures (last 7 days):           Last 24 Hours Medication List:   Current Facility-Administered Medications   Medication Dose Route Frequency Provider Last Rate    abiraterone  250 mg Oral Daily Yas Webber, DO      acetaminophen  975 mg Oral Q8H [facility] Yas Webber, DO      cefTRIAXone  1,000 mg Intravenous Q24H Guanako Bell MD      dexamethasone  0.5 mg Oral Daily Yas Webber, DO      Diclofenac Sodium  2 g Topical 4x Daily Yas Webber, DO      fluconazole  200 mg Oral Daily Yas Webebr, DO      heparin (porcine)  5,000 Units Subcutaneous Q8H [facility] Yas Webber, DO      insulin detemir  24 Units Subcutaneous HS Yas Webber, DO      insulin lispro  1-5 Units Subcutaneous 4x Daily (AC & HS) Yas Webber, DO      lidocaine  1 patch Topical Once Yas Webber, DO      lisinopril  10 mg Oral Daily Yas Webber, DO      polyethylene glycol  17 g Oral Daily PRN Yas Webber, DO      pravastatin  80 mg Oral Daily With Dinner Yas Webber, DO      senna-docusate sodium  1 tablet Oral Daily PRN Yas Webber, DO      sodium chloride (PF)  10 mL Intracatheter Q8H [facility] Yas Webber, DO      traMADol  50 mg Oral Q6H PRN Yas Webber, DO               ** Please Note: Dictation voice to text software may have been used in the creation of this document. **    Guanako Bell MD  05/29/24  9:22 AM

## 2024-05-29 NOTE — ASSESSMENT & PLAN NOTE
Chronic, stable. Home medications include Crestor 10 mg  - lipid panel: TC 94, , HDL 27, LDL 27  Plan:  Resume home Crestor 10 mg

## 2024-05-29 NOTE — PROGRESS NOTES
Oliver Astudillo is a 73 y.o. male who is currently ordered Vancomycin IV with management by the Pharmacy Consult service.  Relevant clinical data and objective / subjective history reviewed.  Vancomycin Assessment:  Indication and Goal AUC/Trough: Urinary tract infection (goal -600, trough >10), -600, trough >10  Clinical Status: new  Micro:   pending  Renal Function:  SCr: 0.83 mg/dL  CrCl: 78.1 mL/min  Renal replacement: Not on dialysis  Days of Therapy: 1  Current Dose: 1250mg IV once as a loading dose  Vancomycin Plan:  New Dosinmg IV q12h  Estimated AUC: 403 mcg*hr/mL  Estimated Trough: 11.5 mcg/mL  Next Level: 24 @0600  Renal Function Monitoring: Daily BMP and UOP  Pharmacy will continue to follow closely for s/sx of nephrotoxicity, infusion reactions and appropriateness of therapy.  BMP and CBC will be ordered per protocol. We will continue to follow the patient’s culture results and clinical progress daily.    Do July Suggs, Pharmacist

## 2024-05-29 NOTE — PLAN OF CARE
Problem: GENITOURINARY - ADULT  Goal: Maintains or returns to baseline urinary function  Description: INTERVENTIONS:  - Assess urinary function  - Encourage oral fluids to ensure adequate hydration if ordered  - Administer IV fluids as ordered to ensure adequate hydration  - Administer ordered medications as needed  - Offer frequent toileting  - Follow urinary retention protocol if ordered  Outcome: Progressing  Goal: Absence of urinary retention  Description: INTERVENTIONS:  - Assess patient’s ability to void and empty bladder  - Monitor I/O  - Bladder scan as needed  - Discuss with physician/AP medications to alleviate retention as needed  - Discuss catheterization for long term situations as appropriate  Outcome: Progressing  Goal: Urinary catheter remains patent  Description: INTERVENTIONS:  - Assess patency of urinary catheter  - If patient has a chronic ceballos, consider changing catheter if non-functioning  - Follow guidelines for intermittent irrigation of non-functioning urinary catheter  Outcome: Progressing     Problem: METABOLIC, FLUID AND ELECTROLYTES - ADULT  Goal: Electrolytes maintained within normal limits  Description: INTERVENTIONS:  - Monitor labs and assess patient for signs and symptoms of electrolyte imbalances  - Administer electrolyte replacement as ordered  - Monitor response to electrolyte replacements, including repeat lab results as appropriate  - Instruct patient on fluid and nutrition as appropriate  Outcome: Progressing  Goal: Fluid balance maintained  Description: INTERVENTIONS:  - Monitor labs   - Monitor I/O and WT  - Instruct patient on fluid and nutrition as appropriate  - Assess for signs & symptoms of volume excess or deficit  Outcome: Progressing  Goal: Glucose maintained within target range  Description: INTERVENTIONS:  - Monitor Blood Glucose as ordered  - Assess for signs and symptoms of hyperglycemia and hypoglycemia  - Administer ordered medications to maintain glucose  within target range  - Assess nutritional intake and initiate nutrition service referral as needed  Outcome: Progressing     Problem: SKIN/TISSUE INTEGRITY - ADULT  Goal: Incision(s), wounds(s) or drain site(s) healing without S/S of infection  Description: INTERVENTIONS  - Assess and document dressing, incision, wound bed, drain sites and surrounding tissue  -Outcome: Progressing     Problem: MUSCULOSKELETAL - ADULT  Goal: Maintain or return mobility to safest level of function  Description: INTERVENTIONS:  - Assess patient's ability to carry out ADLs; assess patient's baseline for ADL function and identify physical deficits which impact ability to perform ADLs (bathing, care of mouth/teeth, toileting, grooming, dressing, etc.)  - Assess/evaluate cause of self-care deficits   - Assess range of motion  - Assess patient's mobility  - Assess patient's need for assistive devices and provide as appropriate  - Encourage maximum independence but intervene and supervise when necessary  - Involve family in performance of ADLs  - Assess for home care needs following discharge   - Consider OT consult to assist with ADL evaluation and planning for discharge  - Provide patient education as appropriate  Outcome: Progressing  Goal: Maintain proper alignment of affected body part  Description: INTERVENTIONS:  - Support, maintain and protect limb and body alignment  - Provide patient/ family with appropriate education  Outcome: Progressing     Problem: PAIN - ADULT  Goal: Verbalizes/displays adequate comfort level or baseline comfort level  Description: Interventions:  - Encourage patient to monitor pain and request assistance  - Assess pain using appropriate pain scale  - Administer analgesics based on type and severity of pain and evaluate response  - Implement non-pharmacological measures as appropriate and evaluate response  - Consider cultural and social influences on pain and pain management  - Notify physician/advanced  practitioner if interventions unsuccessful or patient reports new pain  Outcome: Progressing     Problem: INFECTION - ADULT  Goal: Absence or prevention of progression during hospitalization  Description: INTERVENTIONS:  - Assess and monitor for signs and symptoms of infection  - Monitor lab/diagnostic results  - Monitor all insertion sites, i.e. indwelling lines, tubes, and drains  - Monitor endotracheal if appropriate and nasal secretions for changes in amount and color  - [place] appropriate cooling/warming therapies per order  - Administer medications as ordered  - Instruct and encourage patient and family to use good hand hygiene technique  - Identify and instruct in appropriate isolation precautions for identified infection/condition  Outcome: Progressing  Goal: Absence of fever/infection during neutropenic period  Description: INTERVENTIONS:  - Monitor WBC    Outcome: Progressing     Problem: SAFETY ADULT  Goal: Maintain or return to baseline ADL function  Description: INTERVENTIONS:  -  Assess patient's ability to carry out ADLs; assess patient's baseline for ADL function and identify physical deficits which impact ability to perform ADLs (bathing, care of mouth/teeth, toileting, grooming, dressing, etc.)  - Assess/evaluate cause of self-care deficits   - Assess range of motion  - Assess patient's mobility; develop plan if impaired  - Assess patient's need for assistive devices and provide as appropriate  - Encourage maximum independence but intervene and supervise when necessary  - Involve family in performance of ADLs  - Assess for home care needs following discharge   - Consider OT consult to assist with ADL evaluation and planning for discharge  - Provide patient education as appropriate  Outcome: Progressing  Goal: Maintains/Returns to pre admission functional level  Description: INTERVENTIONS:  - Perform AM-PAC 6 Click Basic Mobility/ Daily Activity assessment daily.  - Set and communicate daily  mobility goal to care team and patient/family/caregiver.   - Collaborate with rehabilitation services on mobility goals if consulted  - Perform Range of Motion 3 times a day.  - Reposition patient every 2 hours.  - Dangle patient 3 times a day  - Stand patient 3 times a day  - Ambulate patient 3 times a day  - Out of bed to chair 3 times a day   - Out of bed for meals 3 times a day  - Out of bed for toileting  - Record patient progress and toleration of activity level   Outcome: Progressing     Problem: DISCHARGE PLANNING  Goal: Discharge to home or other facility with appropriate resources  Description: INTERVENTIONS:  - Identify barriers to discharge w/patient and caregiver  - Arrange for needed discharge resources and transportation as appropriate  - Identify discharge learning needs (meds, wound care, etc.)  - Arrange for interpretive services to assist at discharge as needed  - Refer to Case Management Department for coordinating discharge planning if the patient needs post-hospital services based on physician/advanced practitioner order or complex needs related to functional status, cognitive ability, or social support system  Outcome: Progressing     Problem: Knowledge Deficit  Goal: Patient/family/caregiver demonstrates understanding of disease process, treatment plan, medications, and discharge instructions  Description: Complete learning assessment and assess knowledge base.  Interventions:  - Provide teaching at level of understanding  - Provide teaching via preferred learning methods  Outcome: Progressing

## 2024-05-29 NOTE — PLAN OF CARE
Problem: GENITOURINARY - ADULT  Goal: Maintains or returns to baseline urinary function  Description: INTERVENTIONS:  - Assess urinary function  - Encourage oral fluids to ensure adequate hydration if ordered  - Administer IV fluids as ordered to ensure adequate hydration  - Administer ordered medications as needed  - Offer frequent toileting  - Follow urinary retention protocol if ordered  Outcome: Progressing     Problem: GENITOURINARY - ADULT  Goal: Absence of urinary retention  Description: INTERVENTIONS:  - Assess patient’s ability to void and empty bladder  - Monitor I/O  - Bladder scan as needed  - Discuss with physician/AP medications to alleviate retention as needed  - Discuss catheterization for long term situations as appropriate  Outcome: Progressing     Problem: METABOLIC, FLUID AND ELECTROLYTES - ADULT  Goal: Electrolytes maintained within normal limits  Description: INTERVENTIONS:  - Monitor labs and assess patient for signs and symptoms of electrolyte imbalances  - Administer electrolyte replacement as ordered  - Monitor response to electrolyte replacements, including repeat lab results as appropriate  - Instruct patient on fluid and nutrition as appropriate  Outcome: Progressing     Problem: SKIN/TISSUE INTEGRITY - ADULT  Goal: Incision(s), wounds(s) or drain site(s) healing without S/S of infection  Description: INTERVENTIONS  - Assess and document dressing, incision, wound bed, drain sites and surrounding tissue  - Provide patient and family education  - Perform skin care/dressing changes every shift   Problem: SAFETY ADULT  Goal: Maintain or return to baseline ADL function  Description: INTERVENTIONS:  -  Assess patient's ability to carry out ADLs; assess patient's baseline for ADL function and identify physical deficits which impact ability to perform ADLs (bathing, care of mouth/teeth, toileting, grooming, dressing, etc.)  - Assess/evaluate cause of self-care deficits   - Assess range of  motion  - Assess patient's mobility; develop plan if impaired  - Assess patient's need for assistive devices and provide as appropriate  - Encourage maximum independence but intervene and supervise when necessary  - Involve family in performance of ADLs  - Assess for home care needs following discharge   - Consider OT consult to assist with ADL evaluation and planning for discharge  - Provide patient education as appropriate  Outcome: Progressing     Problem: DISCHARGE PLANNING  Goal: Discharge to home or other facility with appropriate resources  Description: INTERVENTIONS:  - Identify barriers to discharge w/patient and caregiver  - Arrange for needed discharge resources and transportation as appropriate  - Identify discharge learning needs (meds, wound care, etc.)  - Arrange for interpretive services to assist at discharge as needed  - Refer to Case Management Department for coordinating discharge planning if the patient needs post-hospital services based on physician/advanced practitioner order or complex needs related to functional status, cognitive ability, or social support system  Outcome: Progressing     Problem: Knowledge Deficit  Goal: Patient/family/caregiver demonstrates understanding of disease process, treatment plan, medications, and discharge instructions  Description: Complete learning assessment and assess knowledge base.  Interventions:  - Provide teaching at level of understanding  - Provide teaching via preferred learning methods  Outcome: Progressing     Outcome: Progressing     Problem: INFECTION - ADULT  Goal: Absence or prevention of progression during hospitalization  Description: INTERVENTIONS:  - Assess and monitor for signs and symptoms of infection  - Monitor lab/diagnostic results  - Monitor all insertion sites, i.e. indwelling lines, tubes, and drains  - Monitor endotracheal if appropriate and nasal secretions for changes in amount and color  - [place] appropriate cooling/warming  therapies per order  - Administer medications as ordered  - Instruct and encourage patient and family to use good hand hygiene technique  - Identify and instruct in appropriate isolation precautions for identified infection/condition  Outcome: Progressing     Problem: PAIN - ADULT  Goal: Verbalizes/displays adequate comfort level or baseline comfort level  Description: Interventions:  - Encourage patient to monitor pain and request assistance  - Assess pain using appropriate pain scale  - Administer analgesics based on type and severity of pain and evaluate response  - Implement non-pharmacological measures as appropriate and evaluate response  - Consider cultural and social influences on pain and pain management  - Notify physician/advanced practitioner if interventions unsuccessful or patient reports new pain  Outcome: Progressing

## 2024-05-29 NOTE — H&P
History and Physical - [facility]  Family Medicine Residency     Patient Information: Oliver Astudillo 73 y.o. male MRN: [id number]  Unit/Bed#: [address] [place] [address] Encounter: [id number]  Admitting Physician: Marimar Torres DO   PCP: Eugenia Rodriguez MD  Date of Admission:  05/29/24     Assessment and Plan     * Cystitis  Assessment & Plan  Patient reporting current incontinence with some pelvic discomfort, denies dysuria, hematuria. Patient has a history of frequent complicated UTI involving b/l nephrostomy tubes. Currently, good output from b/l nephrostomy tubes, PVR in ED for 16 cc. Per wife, he is s/p 5 days of augmentin    CT abd/pelvis: Bladder wall thickening with increased bladder wall enhancement suspicious for cystitis. Bilateral nephrostomy tubes in good position with no collecting system dilatation and no CT evidence of acute pyelonephritis    ED course - s/p Rocephin x 1   UA in ED drawn from right nephrostomy tube - positive for leukocytes, nitrites, protein, moderate occult blood, WBC, RBC, bacteria   Previous urine culture from right nephrostomy tube positive for candida albicans, E faecalis sensitive to Ampicillin    Plan:  -Pending UA from male external catheter  -Pending culture from right nephrostomy   -Urinary retention protocol   -Strict I&O's  -Start empiric Ampicillin per previous urine culture (5/29 - )  -Start empiric fluconazole per previous urine culture (5/29 - )    L1 metastatic lesion (HCC)  Assessment & Plan  Hx of prostate cancer with new findings on CT abd/pelvis in ED. Patient reporting recent worsening in back pain making it difficult to ambulate and get comfortable, stating that he has been taking Tylenol regularly for pain at home. Denying numbness/tingling in thighs, bowel incontinence    Denies any current numbness or tingling in thighs, denies any bowel incontinence. Does report bladder incontinence     CT abd/pelvis in ED: Multiple sclerotic metastases  redemonstrated throughout the visualized axial and appendicular skeleton,1 mostly stable, except for a new lytic lesion in the L1 vertebral body with an enhancing soft tissue component, and focal destruction of both   the anterior and posterior cortex    Plan:  -Consider MRI  -Pain control as below   -Tylenol 975 mg Q8H scheduled  -Voltaren gel for back  -Lidoderm patches  -Tramadol 50 mg Q6H PRN for moderate, severe pain     Electrolyte abnormality  Assessment & Plan  Monitor and replete as needed    Stage 3a chronic kidney disease (HCC)  Assessment & Plan  Lab Results   Component Value Date    EGFR 69 05/28/2024    EGFR 59 04/29/2024    EGFR 67 03/24/2024    CREATININE 1.07 05/28/2024    CREATININE 1.22 04/29/2024    CREATININE 1.09 03/24/2024     Chronic, stable. Renally dose medications as able    Hyperlipidemia  Assessment & Plan  Chronic, stable. Home medications include Crestor 10 mg    Plan:  -Pending repeat lipid panel  -Substitute Crestor 10 mg for pravastatin 80 mg during hospitalization    Nephrostomy status ([facility])  Assessment & Plan  Hx of metastatic prostate cancer with urinary obstruction and bilateral nephrostomy tubes since 6/2023 requiring frequent changes and daily BID flushes. Reports current good output from bilateral tubes.     CT abd/pelvis in ED: Bilateral nephrostomy catheters are in good position. No collecting system dilatation both nephrograms are symmetric. No calculi.     Plan:  -Continue regular nephrostomy tube flushes  -Strict I&O's    Prostate cancer ([facility])  Assessment & Plan  Chronic, history of prostate cancer, following with oncology. Currently taking Zytiga and dexamethasone daily.    Hypertension  Assessment & Plan  Chronic, stable. Continue home lisinopril 10 mg. Avoid hypotension    Type 2 diabetes mellitus, with long-term current use of insulin ([facility])  Assessment & Plan  Lab Results   Component Value Date    HGBA1C 9.2 (A) 04/04/2024       Recent Labs     05/28/24  3705    POCGLU 234*       Blood Sugar Average: Last 72 hrs:  (P) 234    Home medications: metformin 500 mg BID, glimepiride 2 mg AM, glimepiride 4 mg PM, Levemir 35 U HS. Hx of hypoglycemia during hospitalization, currently reporting poor PO intake    Taking dexamethasone daily secondary to prostate cancer     Plan:  -Hold oral anti-glycemic agents  -Decrease Levemir to 24U HS  -Insulin sliding scale  -Hypoglycemic protocol  -ACHS accucheck, POCT 2 AM check  -Carb controlled diet         Fluids: None currently  Electrolyte repletion: Replete magnesium now, and as needed.  Nutrition:        Diet Orders   (From admission, onward)                 Start     Ordered    05/28/24 2235  Diet Rupesh/CHO Controlled; Consistent Carbohydrate Diet Level 2 (5 carb servings/75 grams CHO/meal)  Diet effective now        References:    Adult Nutrition Support Algorithm    RD Therapeutic Diet Order Protocol   Question Answer Comment   Diet Type Rupesh/CHO Controlled    Rupesh/CHO Controlled Consistent Carbohydrate Diet Level 2 (5 carb servings/75 grams CHO/meal)    RD to adjust diet per protocol? Yes        05/28/24 2234                   VTE Prophylaxis: VTE Score: 8 High Risk (Score >/= 5) - Pharmacological DVT Prophylaxis Ordered: heparin. Sequential Compression Devices Ordered.  Code Status: Level 1 - Full Code  Anticipated Length of Stay:  Patient will be admitted on an Observation basis with an anticipated length of stay of  less than 2 midnights.     Justification for Hospital Stay: Cystitis   Total Time for Visit, including Counseling / Coordination of Care: 45 mins. Greater than 50% of this total time spent on direct patient counseling and coordination of care.  Patient Information Sharing: With the consent of Oliver Astudillo, their loved ones were notified today by inpatient team of the patient’s condition and current plan. All questions answered.     Chief Complaint:     Chief Complaint   Patient presents with    Back Pain     States  severe low back pain for 2wks. Has bilat nephrostomy tubes and prostate ca.       Subjective      History of Present Illness:     Oliver Astudillo is a 73 y.o. male who presents with back pain, bladder incontinence, suprapubic pressure. Past medical history includes prostate cancer with mets to the bone, bilateral nephrostomy tubes, T2DM, CKD, HTN, HLD, hypoglycemia. Reporting that over the past week, patient has been noticing increased urinary frequency and urgency progressing to bladder incontinence, currently wearing Depends. Additionally noting mild suprapubic discomfort - denies hematuria, dysuria. Per wife, he is s/p 5 days Augmentin without improvement of symptoms. Decreased appetite and water intake per wife. Also states he has been having worsening back pain over the past several weeks. Stating having difficulty getting comfortable, having difficulty with ambulation and passing stool.  States he has bee taking Tylenol regularly with some relief. Denies fevers, chills, other acute complaints. Denies numbness/tingling in thighs, denies bowel incontinence.      Review of Systems:  Review of Systems   Constitutional:  Positive for appetite change (decreased). Negative for chills and fever.   HENT: Negative.     Respiratory:  Negative for chest tightness, shortness of breath and wheezing.    Cardiovascular:  Negative for chest pain and palpitations.   Gastrointestinal:  Positive for abdominal pain (suprapubic). Negative for diarrhea, nausea and vomiting.   Genitourinary:  Positive for frequency and urgency. Negative for difficulty urinating, dysuria, flank pain and hematuria.        Bladder incontinence   Musculoskeletal:  Positive for back pain and gait problem (difficulty ambulating with pain). Negative for neck stiffness.   Skin:  Negative for rash and wound.   Neurological:  Positive for weakness (baseline). Negative for syncope and numbness.        Denies bowel incontinence        Past Medical History:    Diagnosis Date    COVID-19 01/15/2024    Diabetes mellitus (HCC)     Elevated PSA 06/21/2023    High cholesterol     Hypertension     Prostate cancer (HCC)      Past Surgical History:   Procedure Laterality Date    IR NEPHROSTOMY TUBE CHECK/CHANGE/REPOSITION/REINSERTION/UPSIZE  9/28/2023    IR NEPHROSTOMY TUBE CHECK/CHANGE/REPOSITION/REINSERTION/UPSIZE  12/28/2023    IR NEPHROSTOMY TUBE CHECK/CHANGE/REPOSITION/REINSERTION/UPSIZE  1/31/2024    IR NEPHROSTOMY TUBE CHECK/CHANGE/REPOSITION/REINSERTION/UPSIZE  2/2/2024    IR NEPHROSTOMY TUBE CHECK/CHANGE/REPOSITION/REINSERTION/UPSIZE  3/4/2024    IR NEPHROSTOMY TUBE CHECK/CHANGE/REPOSITION/REINSERTION/UPSIZE  3/20/2024    IR NEPHROSTOMY TUBE PLACEMENT  06/22/2023    bilateral    IR OTHER  1/15/2024    US GUIDED PROSTATE BIOPSY       No Known Allergies  Prior to Admission Medications   Prescriptions Last Dose Informant Patient Reported? Taking?   Alcohol Swabs 70 % PADS   No No   Sig: To clean the skin prior to injecting insulin   Blood Glucose Monitoring Suppl (OneTouch Verio Reflect) w/Device KIT  Self, Family Member No No   Sig: Check blood sugars once daily. Please substitute with appropriate alternative as covered by patient's insurance. Dx: E11.65   Easy Touch Pen Needles 31G X 6 MM MISC  Family Member, Self No No   Sig: Use daily at bedtime   Levemir FlexPen 100 units/mL injection pen   No No   Sig: Inject 32 Units under the skin daily at bedtime   Patient taking differently: Inject 35 Units under the skin daily at bedtime   Multiple Vitamins-Minerals (Sentry) TABS  Family Member, Self Yes No   Sig: Take 1 tablet by mouth daily   OneTouch Delica Lancets 33G MISC   No No   Sig: Check blood sugars once daily. Please substitute with appropriate alternative as covered by patient's insurance. Dx: E11.65   Spacer/Aero-Holding Chambers (AEROCHAMBER MINI CHAMBER) BILLY  Family Member, Self No No   Sig: by Does not apply route see administration instructions   Patient not  taking: Reported on 4/25/2024   abiraterone (ZYTIGA) 250 mg tablet  Self, Family Member No No   Sig: Take 1 tablet (250 mg total) by mouth daily With a low fat meal   albuterol (Ventolin HFA) 90 mcg/act inhaler   No No   Sig: Inhale 2 puffs every 6 (six) hours as needed for wheezing   Patient not taking: Reported on 4/26/2024   dexamethasone (DECADRON) 0.5 mg tablet   No No   Sig: TAKE 1 TABLET (0.5 MG TOTAL) BY MOUTH DAILY WITH BREAKFAST   glimepiride (AMARYL) 2 mg tablet   No No   Sig: Take 1 tablet (2 mg total) by mouth daily with dinner   glimepiride (AMARYL) 4 mg tablet   No No   Sig: Take 1 tablet (4 mg total) by mouth daily with breakfast   glucose blood (OneTouch Verio) test strip   No No   Sig: Check blood sugars twice a daily. Please substitute with appropriate alternative as covered by patient's insurance. Dx: E11.65   lisinopril (ZESTRIL) 10 mg tablet   Yes No   Sig: NARCISO TANMAY TABLETA VIA ORAL DIARIAMENTE   metFORMIN (GLUCOPHAGE) 1000 MG tablet   No No   Sig: Take 1 tablet (1,000 mg total) by mouth 2 (two) times a day with meals   omega-3-acid ethyl esters (LOVAZA) 1 g capsule   Yes No   polyethylene glycol (MIRALAX) 17 g packet   No No   Sig: Take 17 g by mouth daily as needed (for constipation)   rosuvastatin (CRESTOR) 10 MG tablet   No No   Sig: Take 1 tablet (10 mg total) by mouth daily at bedtime   senna-docusate sodium (SENOKOT S) 8.6-50 mg per tablet   No No   Sig: Take 1 tablet by mouth daily   sodium chloride, PF, 0.9 %   No No   Sig: 10 mL by Intracatheter route daily Intracatheter flushing daily. May substitute prefilled syringe with normal saline 10 mL vials, 10 mL syringes, and 18 g blunt needles   Patient not taking: Reported on 4/25/2024      Facility-Administered Medications: None     Social History     Socioeconomic History    Marital status: /Civil Union     Spouse name: Not on file    Number of children: 3    Years of education: Not on file    Highest education level: Not on  file   Occupational History    Not on file   Tobacco Use    Smoking status: Former     Current packs/day: 0.00     Types: Cigarettes     Quit date: 2000     Years since quittin.4     Passive exposure: Past    Smokeless tobacco: Never    Tobacco comments:     States he only smoked on the weekends   Vaping Use    Vaping status: Never Used   Substance and Sexual Activity    Alcohol use: Not Currently     Comment: socially    Drug use: Never    Sexual activity: Yes     Partners: Female   Other Topics Concern    Not on file   Social History Narrative    Not on file     Social Determinants of Health     Financial Resource Strain: Low Risk  (2024)    Overall Financial Resource Strain (CARDIA)     Difficulty of Paying Living Expenses: Not hard at all   Food Insecurity: No Food Insecurity (3/19/2024)    Hunger Vital Sign     Worried About Running Out of Food in the Last Year: Never true     Ran Out of Food in the Last Year: Never true   Transportation Needs: No Transportation Needs (3/19/2024)    PRAPARE - Transportation     Lack of Transportation (Medical): No     Lack of Transportation (Non-Medical): No   Physical Activity: Not on file   Stress: Not on file   Social Connections: Feeling Socially Integrated (2023)    [facility]    OASIS : Social Isolation     Frequency of experiencing loneliness or isolation: Never   Intimate Partner Violence: Not on file   Housing Stability: Low Risk  (3/19/2024)    Housing Stability Vital Sign     Unable to Pay for Housing in the Last Year: No     Number of Places Lived in the Last Year: 1     Unstable Housing in the Last Year: No     Family History   Problem Relation Age of Onset    Uterine cancer Mother     Liver cancer Father     No Known Problems Sister     No Known Problems Brother     No Known Problems Son     Diabetes type II Daughter     No Known Problems Daughter     Cancer Daughter         Patient Pre-hospital Living  Situation: home  Patient Pre-hospital Level of Mobility: no mobility aids  Patient Pre-hospital Diet Restrictions: carb controlled         Objective     Physical Exam:   Vitals:   Blood Pressure: 130/60 (05/28/24 2343)  Pulse: 66 (05/28/24 2343)  Temperature: (!) 97.1 °F (36.2 °C) (05/28/24 1716)  Temp Source: Temporal (05/28/24 1716)  Respirations: 18 (05/28/24 2343)  SpO2: 97 % (05/28/24 2343)     Physical Exam  Vitals reviewed.   Constitutional:       General: He is not in acute distress.     Appearance: Normal appearance.   HENT:      Head: Normocephalic and atraumatic.      Right Ear: External ear normal.      Left Ear: External ear normal.   Eyes:      Conjunctiva/sclera: Conjunctivae normal.   Cardiovascular:      Rate and Rhythm: Normal rate and regular rhythm.      Heart sounds: Normal heart sounds.   Pulmonary:      Effort: Pulmonary effort is normal. No respiratory distress.      Breath sounds: Normal breath sounds.   Abdominal:      General: Abdomen is flat.      Palpations: Abdomen is soft.      Tenderness: There is abdominal tenderness (suprapubic). There is no right CVA tenderness or left CVA tenderness.   Genitourinary:     Comments: B/L nephrostomy tubes in place, covered in clean dressing. No signs of infection   Musculoskeletal:      Right lower leg: No edema.      Left lower leg: No edema.   Skin:     General: Skin is warm and dry.   Neurological:      General: No focal deficit present.      Mental Status: He is alert and oriented to person, place, and time.           Lab Results: I have personally reviewed pertinent reports.    Results from last 7 days   Lab Units 05/28/24  1738   WBC Thousand/uL 11.64*   HEMOGLOBIN g/dL 11.9*   HEMATOCRIT % 36.5   PLATELETS Thousands/uL 424*   SEGS PCT % 78*   LYMPHO PCT % 18   MONO PCT % 4   EOS PCT % 0     Results from last 7 days   Lab Units 05/28/24  1738   POTASSIUM mmol/L 4.2   CHLORIDE mmol/L 103   CO2 mmol/L 25   BUN mg/dL 18   CREATININE mg/dL 1.07    CALCIUM mg/dL 9.6   ALK PHOS U/L 76   ALT U/L 14   AST U/L 13   EGFR ml/min/1.73sq m 69   MAGNESIUM mg/dL 1.7*                      Results from last 7 days   Lab Units 05/28/24  1747   COLOR UA  Yellow   CLARITY UA  Clear   SPEC GRAV UA  1.025   PH UA  6.5   LEUKOCYTES UA  Large*   NITRITE UA  Positive*   GLUCOSE UA mg/dl Negative   KETONES UA mg/dl Negative   BILIRUBIN UA  Negative   BLOOD UA  Moderate*      Results from last 7 days   Lab Units 05/28/24  1747   RBC UA /hpf 4-10*   WBC UA /hpf 20-30*   EPITHELIAL CELLS WET PREP /hpf None Seen   BACTERIA UA /hpf Innumerable*                  Imaging: I have personally reviewed pertinent reports.    CT abdomen pelvis with contrast    Result Date: 5/28/2024  Bladder wall thickening with increased bladder wall enhancement suspicious for cystitis. Bilateral nephrostomy tubes in good position with no collecting system dilatation and no CT evidence of acute pyelonephritis. New lytic L1 vertebral body lesion with a soft tissue component causing focal erosion of both the anterior and posterior cortex as described above. Additional sclerotic metastases are stable. The study was marked in EPIC for immediate notification. Workstation performed: [id number]            ** Please Note: This note has been constructed using a voice recognition system **     Yas Webber DO  05/29/24  12:48 AM

## 2024-05-29 NOTE — UTILIZATION REVIEW
Initial Clinical Review    OBSERVATION   5/28 @   2126 CHANGED TO IP ADMISSION   5/29 @   0944   The patient will continue to require additional inpatient hospital stay due to Complicated UTI       Admission: Date/Time/Statement:   Admission Orders (From admission, onward)       Ordered        05/28/24 2125  Place in Observation  Once                          05/29/24 0944  INPATIENT ADMISSION  Once        Transfer Service: Hospitalist   Question Answer Comment   Level of Care Med Surg    Estimated length of stay More than 2 Midnights    Certification I certify that inpatient services are medically necessary for this patient for a duration of greater than two midnights. See H&P and MD Progress Notes for additional information about the patient's course of treatment.        05/29/24 0943     ED Arrival Information       Expected   -    Arrival   5/28/2024 16:59    Acuity   Urgent              Means of arrival   Walk-In    Escorted by   Spouse    Service   Hospitalist    Admission type   Emergency              Arrival complaint   back pain             Chief Complaint   Patient presents with    Back Pain     States severe low back pain for 2wks. Has bilat nephrostomy tubes and prostate ca.       Initial Presentation: 73 y.o. male presents to ED from home  with increasing urinary frequency, urgency progressing to bladder incontinence,  now  wearing depends, for past week.  Has mild  S/P discomfort, no hematuria.  Wife states patient  was on  5 days  po antibiotics without improvement. Appetite/fluid intake poor.  Has worsening back pain for past  several weeks.  Having difficulty ambulating and with bowels. Takes  tylenol regularly.  Denies  numbness/tingling/bowel incontinence.  PMH  is  prostate cancer with mets to bone, B/L nephrostomy tubes, DM2, HTN,  CKD and hypoglycemia.  Ct abdomen shows new  lytic lesion L1.    U/A  positive for LE,  nitrites, protein, WBC,  mod occult blood, abcteria and RBC.   Admit   Observation with  Cystitis, L1 metastatic  lesion  and  electrolyte  abnormality and plan is  monitor labs, urine culture, strict  I & O, pain control, EREN  possibly  MRI and continue home meds.    Date:   5/30  Day 3: Has surpassed a 2nd midnight with active treatments and services.   Ucx right nephrostomy: E coli >100K, E faecalis >30K, lactobacillus >100K    Continue   EREN.   Need strict  I & O.  Needs  PT/OT.   Continue current meds.   Feels improved overall.  B/L  nephrostomy tubes intact,  dressing  dry.  No signs of  infection.          ED Triage Vitals [05/28/24 1716]   Temperature Pulse Respirations Blood Pressure SpO2   (!) 97.1 °F (36.2 °C) 70 18 137/82 99 %      Temp Source Heart Rate Source Patient Position - Orthostatic VS BP Location FiO2 (%)   Temporal Monitor Sitting Left arm --      Pain Score       10 - Worst Possible Pain          Wt Readings from Last 1 Encounters:   05/29/24 76.2 kg (168 lb)     Additional Vital Signs:   97.5 °F (36.4 °C) 72 18 125/67 -- -- Lying    05/29/24 0020 97.5 °F (36.4 °C) 65 18 140/60 99 % None (Room air) --   05/28/24 2343 -- 66 18 130/60 97 % None (Room air) Sitting   05/28/24 1745 -- -- -- -- -- None (Room air) --   05/28/24 1716 97.1 °F (36.2 °C) Abnormal  70 18 137/82 99 % None (Room air) Sitting     Pertinent Labs/Diagnostic Test Results:   CT abdomen pelvis with contrast   Final Result by Goldie Muhammad MD (05/28 2024)      Bladder wall thickening with increased bladder wall enhancement suspicious for cystitis. Bilateral nephrostomy tubes in good position with no collecting system dilatation and no CT evidence of acute pyelonephritis.      New lytic L1 vertebral body lesion with a soft tissue component causing focal erosion of both the anterior and posterior cortex as described above. Additional sclerotic metastases are stable.      The study was marked in EPIC for immediate notification.         Workstation performed: [id number]               Results from  last 7 days   Lab Units 05/29/24  0553 05/28/24  1738   WBC Thousand/uL 10.08 11.64*   HEMOGLOBIN g/dL 11.7* 11.9*   HEMATOCRIT % 35.6* 36.5   PLATELETS Thousands/uL 409* 424*   TOTAL NEUT ABS Thousands/µL 6.17 8.93*         Results from last 7 days   Lab Units 05/29/24  0553 05/28/24  1738   SODIUM mmol/L 137 135   POTASSIUM mmol/L 3.7 4.2   CHLORIDE mmol/L 104 103   CO2 mmol/L 25 25   ANION GAP mmol/L 8 7   BUN mg/dL 16 18   CREATININE mg/dL 0.83 1.07   EGFR ml/min/1.73sq m 88 69   CALCIUM mg/dL 9.3 9.6   MAGNESIUM mg/dL 2.3 1.7*     Results from last 7 days   Lab Units 05/29/24  0553 05/28/24  1738   AST U/L 11* 13   ALT U/L 12 14   ALK PHOS U/L 74 76   TOTAL PROTEIN g/dL 7.1 7.5   ALBUMIN g/dL 3.4* 3.6   TOTAL BILIRUBIN mg/dL 0.38 0.54     Results from last 7 days   Lab Units 05/29/24  0640 05/28/24  2313   POC GLUCOSE mg/dl 116 234*     Results from last 7 days   Lab Units 05/29/24  0553 05/28/24  1738   GLUCOSE RANDOM mg/dL 130 216*           Results from last 7 days   Lab Units 05/28/24  1738   PROCALCITONIN ng/ml <0.05               Results from last 7 days   Lab Units 05/28/24  1747   CLARITY UA  Clear   COLOR UA  Yellow   SPEC GRAV UA  1.025   PH UA  6.5   GLUCOSE UA mg/dl Negative   KETONES UA mg/dl Negative   BLOOD UA  Moderate*   PROTEIN UA mg/dl 100 (2+)*   NITRITE UA  Positive*   BILIRUBIN UA  Negative   UROBILINOGEN UA (BE) mg/dl <2.0   LEUKOCYTES UA  Large*   WBC UA /hpf 20-30*   RBC UA /hpf 4-10*   BACTERIA UA /hpf Innumerable*   EPITHELIAL CELLS WET PREP /hpf None Seen         ED Treatment:   Medication Administration from 05/28/2024 1659 to 05/28/2024 2347         Date/Time Order Dose Route Action Comments     05/28/2024 1742 EDT lidocaine (LIDODERM) 5 % patch 1 patch 1 patch Topical Medication Applied --     05/28/2024 1742 EDT HYDROmorphone (DILAUDID) injection 1 mg 1 mg Intravenous Given --     05/28/2024 1847 EDT iohexol (OMNIPAQUE) 350 MG/ML injection (MULTI-DOSE) 100 mL 100 mL Intravenous  Given --     05/28/2024 2158 EDT cefTRIAXone (ROCEPHIN) IVPB (premix in dextrose) 1,000 mg 50 mL 0 mg Intravenous Stopped --     05/28/2024 2128 EDT cefTRIAXone (ROCEPHIN) IVPB (premix in dextrose) 1,000 mg 50 mL 1,000 mg Intravenous New Bag --     05/28/2024 2323 EDT acetaminophen (TYLENOL) tablet 975 mg 975 mg Oral Given --     05/28/2024 2327 EDT heparin (porcine) subcutaneous injection 5,000 Units 5,000 Units Subcutaneous Given --     05/28/2024 2323 EDT insulin lispro (HumALOG/ADMELOG) 100 units/mL subcutaneous injection 1-5 Units 2 Units Subcutaneous Given --     05/28/2024 2325 EDT insulin detemir (LEVEMIR) subcutaneous injection 24 Units 24 Units Subcutaneous Given --            Present on Admission:   Stage 3a chronic kidney disease (HCC)   Prostate cancer (HCC)   L1 metastatic lesion (HCC)   Hypertension   Hyperlipidemia   Cystitis   Electrolyte abnormality      Admitting Diagnosis: Back pain [M54.9]  Urinary tract infection [N39.0]  Multiple lesions of metastatic malignancy (HCC) [C79.9]  Age/Sex: 73 y.o. male  Admission Orders:  Scheduled Medications:  abiraterone, 250 mg, Oral, Daily  acetaminophen, 975 mg, Oral, Q8H RAJEEV  cefTRIAXone, 1,000 mg, Intravenous, Q24H  dexamethasone, 0.5 mg, Oral, Daily  Diclofenac Sodium, 2 g, Topical, 4x Daily  fluconazole, 200 mg, Oral, Daily  heparin (porcine), 5,000 Units, Subcutaneous, Q8H RAJEEV  insulin detemir, 24 Units, Subcutaneous, HS  insulin lispro, 1-5 Units, Subcutaneous, 4x Daily (AC & HS)  lidocaine, 1 patch, Topical, Once  lisinopril, 10 mg, Oral, Daily  pravastatin, 80 mg, Oral, Daily With Dinner  sodium chloride (PF), 10 mL, Intracatheter, Q8H RAJEEV  vancomycin, 15 mg/kg, Intravenous, Once      Continuous IV Infusions:     PRN Meds:  polyethylene glycol, 17 g, Oral, Daily PRN  senna-docusate sodium, 1 tablet, Oral, Daily PRN  traMADol, 50 mg, Oral, Q6H PRN        IP CONSULT TO PHARMACY    Network Utilization Review Department  ATTENTION: Please call with any  questions or concerns to [phone] and carefully listen to the prompts so that you are directed to the right person. All voicemails are confidential.   For Discharge needs, contact Care Management DC Support Team at [phone] opt. [phone]  Send all requests for admission clinical reviews, approved or denied determinations and any other requests to dedicated fax number below belonging to the [facility] where the patient is receiving treatment. List of dedicated fax numbers for the Facilities:  FACILITY NAME UR FAX NUMBER   ADMISSION DENIALS (Administrative/Medical Necessity) [phone]   DISCHARGE SUPPORT TEAM (NETWORK) [phone]   PARENT CHILD HEALTH (Maternity/NICU/Pediatrics) [phone]   [facility] [phone]   [facility] [phone]   [facility] [phone]   [facility] [phone]   [facility] [phone]   [facility] [phone]   [facility] [phone]   [facility] [phone]   [facility] [phone]   [facility] [phone]   [facility] [phone]   [facility] [phone]

## 2024-05-30 PROBLEM — C79.51 PROSTATE CANCER METASTATIC TO BONE (HCC): Status: ACTIVE | Noted: 2023-07-05

## 2024-05-30 LAB
ANION GAP SERPL CALCULATED.3IONS-SCNC: 6 MMOL/L (ref 4–13)
BASOPHILS # BLD AUTO: 0.02 THOUSANDS/ÂΜL (ref 0–0.1)
BASOPHILS NFR BLD AUTO: 0 % (ref 0–1)
BUN SERPL-MCNC: 12 MG/DL (ref 5–25)
CALCIUM SERPL-MCNC: 9.3 MG/DL (ref 8.4–10.2)
CHLORIDE SERPL-SCNC: 104 MMOL/L (ref 96–108)
CO2 SERPL-SCNC: 25 MMOL/L (ref 21–32)
CREAT SERPL-MCNC: 0.76 MG/DL (ref 0.6–1.3)
DME PARACHUTE DELIVERY DATE ACTUAL: NORMAL
DME PARACHUTE DELIVERY DATE REQUESTED: NORMAL
DME PARACHUTE ITEM DESCRIPTION: NORMAL
DME PARACHUTE ORDER STATUS: NORMAL
DME PARACHUTE SUPPLIER NAME: NORMAL
DME PARACHUTE SUPPLIER PHONE: NORMAL
EOSINOPHIL # BLD AUTO: 0.08 THOUSAND/ÂΜL (ref 0–0.61)
EOSINOPHIL NFR BLD AUTO: 1 % (ref 0–6)
ERYTHROCYTE [DISTWIDTH] IN BLOOD BY AUTOMATED COUNT: 13.4 % (ref 11.6–15.1)
GFR SERPL CREATININE-BSD FRML MDRD: 91 ML/MIN/1.73SQ M
GLUCOSE SERPL-MCNC: 137 MG/DL (ref 65–140)
GLUCOSE SERPL-MCNC: 222 MG/DL (ref 65–140)
GLUCOSE SERPL-MCNC: 236 MG/DL (ref 65–140)
GLUCOSE SERPL-MCNC: 85 MG/DL (ref 65–140)
GLUCOSE SERPL-MCNC: 99 MG/DL (ref 65–140)
HCT VFR BLD AUTO: 33.5 % (ref 36.5–49.3)
HGB BLD-MCNC: 11.4 G/DL (ref 12–17)
IMM GRANULOCYTES # BLD AUTO: 0.02 THOUSAND/UL (ref 0–0.2)
IMM GRANULOCYTES NFR BLD AUTO: 0 % (ref 0–2)
LYMPHOCYTES # BLD AUTO: 2.66 THOUSANDS/ÂΜL (ref 0.6–4.47)
LYMPHOCYTES NFR BLD AUTO: 30 % (ref 14–44)
MAGNESIUM SERPL-MCNC: 1.9 MG/DL (ref 1.9–2.7)
MCH RBC QN AUTO: 30.4 PG (ref 26.8–34.3)
MCHC RBC AUTO-ENTMCNC: 34 G/DL (ref 31.4–37.4)
MCV RBC AUTO: 89 FL (ref 82–98)
MONOCYTES # BLD AUTO: 0.4 THOUSAND/ÂΜL (ref 0.17–1.22)
MONOCYTES NFR BLD AUTO: 5 % (ref 4–12)
NEUTROPHILS # BLD AUTO: 5.62 THOUSANDS/ÂΜL (ref 1.85–7.62)
NEUTS SEG NFR BLD AUTO: 64 % (ref 43–75)
NRBC BLD AUTO-RTO: 0 /100 WBCS
PLATELET # BLD AUTO: 400 THOUSANDS/UL (ref 149–390)
PMV BLD AUTO: 9.4 FL (ref 8.9–12.7)
POTASSIUM SERPL-SCNC: 3.8 MMOL/L (ref 3.5–5.3)
RBC # BLD AUTO: 3.75 MILLION/UL (ref 3.88–5.62)
SODIUM SERPL-SCNC: 135 MMOL/L (ref 135–147)
WBC # BLD AUTO: 8.8 THOUSAND/UL (ref 4.31–10.16)

## 2024-05-30 PROCEDURE — 82948 REAGENT STRIP/BLOOD GLUCOSE: CPT

## 2024-05-30 PROCEDURE — 85025 COMPLETE CBC W/AUTO DIFF WBC: CPT

## 2024-05-30 PROCEDURE — 99232 SBSQ HOSP IP/OBS MODERATE 35: CPT | Performed by: FAMILY MEDICINE

## 2024-05-30 PROCEDURE — 97760 ORTHOTIC MGMT&TRAING 1ST ENC: CPT

## 2024-05-30 PROCEDURE — 83735 ASSAY OF MAGNESIUM: CPT

## 2024-05-30 PROCEDURE — 80048 BASIC METABOLIC PNL TOTAL CA: CPT

## 2024-05-30 PROCEDURE — 99447 NTRPROF PH1/NTRNET/EHR 11-20: CPT | Performed by: PHYSICIAN ASSISTANT

## 2024-05-30 RX ORDER — CEFEPIME HYDROCHLORIDE 1 G/50ML
1000 INJECTION, SOLUTION INTRAVENOUS EVERY 12 HOURS
Status: DISCONTINUED | OUTPATIENT
Start: 2024-05-30 | End: 2024-05-31 | Stop reason: HOSPADM

## 2024-05-30 RX ORDER — POLYETHYLENE GLYCOL 3350 17 G/17G
17 POWDER, FOR SOLUTION ORAL DAILY
Status: DISCONTINUED | OUTPATIENT
Start: 2024-05-30 | End: 2024-05-31 | Stop reason: HOSPADM

## 2024-05-30 RX ORDER — VANCOMYCIN HYDROCHLORIDE 1 G/200ML
1000 INJECTION, SOLUTION INTRAVENOUS EVERY 12 HOURS
Status: DISCONTINUED | OUTPATIENT
Start: 2024-05-30 | End: 2024-05-31

## 2024-05-30 RX ORDER — AMOXICILLIN 250 MG
1 CAPSULE ORAL
Status: DISCONTINUED | OUTPATIENT
Start: 2024-05-30 | End: 2024-05-31 | Stop reason: HOSPADM

## 2024-05-30 RX ADMIN — DEXAMETHASONE 0.5 MG: 0.5 TABLET ORAL at 08:11

## 2024-05-30 RX ADMIN — SENNOSIDES AND DOCUSATE SODIUM 1 TABLET: 50; 8.6 TABLET ORAL at 22:34

## 2024-05-30 RX ADMIN — CEFEPIME HYDROCHLORIDE 1000 MG: 1 INJECTION, SOLUTION INTRAVENOUS at 16:07

## 2024-05-30 RX ADMIN — PRAVASTATIN SODIUM 80 MG: 80 TABLET ORAL at 16:07

## 2024-05-30 RX ADMIN — DICLOFENAC SODIUM 2 G: 10 GEL TOPICAL at 11:38

## 2024-05-30 RX ADMIN — INSULIN DETEMIR 24 UNITS: 100 INJECTION, SOLUTION SUBCUTANEOUS at 22:34

## 2024-05-30 RX ADMIN — LISINOPRIL 10 MG: 10 TABLET ORAL at 08:10

## 2024-05-30 RX ADMIN — HEPARIN SODIUM 5000 UNITS: 5000 INJECTION, SOLUTION INTRAVENOUS; SUBCUTANEOUS at 06:04

## 2024-05-30 RX ADMIN — FLUCONAZOLE 200 MG: 100 TABLET ORAL at 08:10

## 2024-05-30 RX ADMIN — ACETAMINOPHEN 975 MG: 325 TABLET ORAL at 06:04

## 2024-05-30 RX ADMIN — SODIUM CHLORIDE, PRESERVATIVE FREE 10 ML: 5 INJECTION INTRAVENOUS at 14:56

## 2024-05-30 RX ADMIN — INSULIN LISPRO 2 UNITS: 100 INJECTION, SOLUTION INTRAVENOUS; SUBCUTANEOUS at 16:09

## 2024-05-30 RX ADMIN — ABIRATERONE ACETATE 250 MG: 250 TABLET, FILM COATED ORAL at 08:11

## 2024-05-30 RX ADMIN — HEPARIN SODIUM 5000 UNITS: 5000 INJECTION, SOLUTION INTRAVENOUS; SUBCUTANEOUS at 22:33

## 2024-05-30 RX ADMIN — SODIUM CHLORIDE, PRESERVATIVE FREE 10 ML: 5 INJECTION INTRAVENOUS at 06:05

## 2024-05-30 RX ADMIN — DICLOFENAC SODIUM 2 G: 10 GEL TOPICAL at 08:11

## 2024-05-30 RX ADMIN — SODIUM CHLORIDE, PRESERVATIVE FREE 10 ML: 5 INJECTION INTRAVENOUS at 22:35

## 2024-05-30 RX ADMIN — INSULIN LISPRO 2 UNITS: 100 INJECTION, SOLUTION INTRAVENOUS; SUBCUTANEOUS at 22:35

## 2024-05-30 RX ADMIN — VANCOMYCIN HYDROCHLORIDE 1000 MG: 1 INJECTION, SOLUTION INTRAVENOUS at 19:47

## 2024-05-30 RX ADMIN — TRAMADOL HYDROCHLORIDE 50 MG: 50 TABLET, COATED ORAL at 22:38

## 2024-05-30 RX ADMIN — VANCOMYCIN HYDROCHLORIDE 1000 MG: 1 INJECTION, SOLUTION INTRAVENOUS at 08:14

## 2024-05-30 RX ADMIN — POLYETHYLENE GLYCOL 3350 17 G: 17 POWDER, FOR SOLUTION ORAL at 09:11

## 2024-05-30 RX ADMIN — HEPARIN SODIUM 5000 UNITS: 5000 INJECTION, SOLUTION INTRAVENOUS; SUBCUTANEOUS at 14:48

## 2024-05-30 RX ADMIN — DICLOFENAC SODIUM 2 G: 10 GEL TOPICAL at 22:35

## 2024-05-30 NOTE — DISCHARGE SUMMARY
Discharge Summary - [facility]     Patient Information: Oliver Astudillo 73 y.o. male MRN: [id number]  Unit/Bed#: [address] [place] [address] Encounter: [id number]     Admitting Physician: Marimar Torres DO  Discharging Physician/Practitioner: Marimar Torres DO   PCP: Eugenia Rodriguez MD  Admission Date:   Admission Orders (From admission, onward)       Ordered        05/29/24 0943  INPATIENT ADMISSION  Once            05/28/24 2125  Place in Observation  Once                          Discharge Date: 05/31/24      Reason for Admission: Back pain [M54.9]  Urinary tract infection [N39.0]  Multiple lesions of metastatic malignancy (HCC) [C79.9]     Discharge Diagnoses:      Principal Problem:    Prostate cancer metastatic to bone (HCC)  Active Problems:    Type 2 diabetes mellitus, with long-term current use of insulin (HCC)    Hypertension    Nephrostomy status (HCC)    Hyperlipidemia    Stage 3a chronic kidney disease (HCC)  Resolved Problems:    Urinary tract infection associated with nephrostomy catheter  (HCC)    Electrolyte abnormality       Consultations During Hospital Stay:  -none     Procedures Performed:   -none      Significant Findings / Test Results:   5/31: BMP & CBC WNL  5/30: Hgb 11.4  5/29: K 3.7, mag WNL, Tg 202, HDL 27, Hgb 11.7, Plt 409, UA pos nitr/large leuks  5/28: WBC 11.64, hgb 11.9, platelets 424, Na 135, K 4.2, mag 1.7, procal WNL,  - Ucx clean catch: 40,000-49,000 cfu/ml Oxidase Positive gram negative kamini Abnormal  - Ucx right nephrostomy: E coli >100K, E faecalis >30K, lactobacillus >100K  - UA right nephrostomy: positive for leukocytes, nitrites, protein, moderate occult blood, WBC, RBC, bacteria  - MRI: lumbar, sacral, iliac mets lesions - encroachment L1 to L2 neural foramen with mild pathologic compression fx  - consider PET or nuclear medicine bone scan - lumbar spondylotic changes   - CT abd/pelvis: Bladder wall thickening with increased bladder  wall enhancement suspicious for cystitis. Bilateral nephrostomy tubes in good position with no collecting system dilatation and no CT evidence of acute pyelonephritis.  New lytic L1 vertebral body lesion with a soft tissue component causing focal erosion of both the anterior and posterior cortex as described above. Additional sclerotic metastases are stable.    Incidental Findings:   ·       none      Test Results Pending at Discharge (will require follow up):   -none     Outpatient Tests Requested:  ·       none     Outpatient follow-up Requested:  -PCP  -Oncology     Complications:  none     Things to address at first visit after hospitalization   - Are you having good nephrostomy tube output?  - Is your pain well controlled?  - Any new urinary symptoms?    Hospital Course:     Oliver Astudillo is a 73 y.o. male patient who originally presented to the hospital on 5/28/2024 due to uncontrolled back pain and suprapubic pressure. Past medical history includes prostate cancer with mets to the bone, hx of complicated UTI, bilateral nephrostomy tubes, T2DM, CKD, HTN, HLD, hypoglycemia. Prior to admission, pt reporting that back pain was worsening with difficulty with ambulation and becoming intractable. Pain well controlled with oral medications during admission. In ED, CT revealed concern for cystitis, negative for pyelonephritis. Patient received empiric antifungal and antibiotic management with improvement in symptoms. Antimicrobial management was tailored based on culture results. Patient is now stable and appropriate for discharge to home with recs to follow-up with oncology for reevaluation & treatment.    * Prostate cancer metastatic to bone (HCC)  Assessment & Plan  Chronic, following oncology, currently taking Zytiga and dexamethasone daily. Presents with new L1 lesion on CT abd/pelvis in ED. Patient reporting recent worsening in back pain making it difficult to ambulate and get comfortable, stating that he has  been taking Tylenol regularly for pain at home. Denying numbness/tingling in thighs, bowel incontinence  - CT abd/pelvis in ED: Multiple sclerotic metastases redemonstrated throughout the visualized axial and appendicular skeleton,1 mostly stable, except for a new lytic lesion in the L1 vertebral body with an enhancing soft tissue component, and focal destruction of both the anterior and posterior cortex  - MRI: Multiple marrow replacing and enhancing lesions involving the lumbar spine, sacrum and iliac bones, which are suspicious for metastases. No convincing epidural involvement though there is probable encroachment on the right L1-L2 neural foramen. Of note, the L1 marrow replacing lesion results in a mild pathologic compression fracture. Recommend correlation with nuclear medicine bone scan and/or prostate-specific tracers in a PET scan, as clinically appropriate.    Home with [facility] for continued PT  Pain control as below   Tylenol 975 mg Q8H PRN  Voltaren gel for back  Lidoderm patches  Sibebar with neurosurgery,   recs no neurosurgery intervention at this time  f/u with Onc for possible radiation  Home with CASH back brace  Ambulatory referral to Palliative Care    Urinary tract infection associated with nephrostomy catheter  (HCC)-resolved as of 5/31/2024  Assessment & Plan  Patient reporting pelvic discomfort, denies dysuria, hematuria. Patient has a history of frequent complicated UTI involving b/l nephrostomy tubes. Currently, good output from b/l nephrostomy tubes, PVR in ED for 16 cc. Per wife, he is s/p 5 days of augmentin    CT abd/pelvis: Bladder wall thickening with increased bladder wall enhancement suspicious for cystitis. Bilateral nephrostomy tubes in good position with no collecting system dilatation and no CT evidence of acute pyelonephritis    - ED: Rocephin x 1   - UA right nephrostomy: positive for leukocytes, nitrites, protein, moderate occult blood, WBC, RBC, bacteria   - Ucx right  nephrostomy: E coli >100K, E faecalis >30K, lactobacillus >100K  - Ucx clean catch: 40,000-49,000 cfu/ml Oxidase Positive gram negative kamini   - Received empiric Ampicillin (5/29)  - Received fluconazole & Rocephin (5/29-5/30)  - Received Vancomycin (5/29-5/31) & Cefepime (5/30-5/31)  No home antibiotics as patient is likely colonized    Stage 3a chronic kidney disease (HCC)  Assessment & Plan  Lab Results   Component Value Date    EGFR 86 05/31/2024    EGFR 91 05/30/2024    EGFR 88 05/29/2024    CREATININE 0.87 05/31/2024    CREATININE 0.76 05/30/2024    CREATININE 0.83 05/29/2024     Chronic, stable. Renally dose medications as able    Hyperlipidemia  Assessment & Plan  Chronic, stable. Home medications include Crestor 10 mg  - lipid panel: TC 94, , HDL 27, LDL 27  Plan:  Resume home Crestor 10 mg    Nephrostomy status ([facility])  Assessment & Plan  Hx of metastatic prostate cancer with urinary obstruction and bilateral nephrostomy tubes since 6/2023 requiring frequent changes and daily BID flushes. Reports current good output from bilateral tubes.     CT abd/pelvis in ED: Bilateral nephrostomy catheters are in good position. No collecting system dilatation both nephrograms are symmetric. No calculi.     Continue regular nephrostomy tube flushes    Hypertension  Assessment & Plan  Chronic, stable. Continue home lisinopril 10 mg. Avoid hypotension    Type 2 diabetes mellitus, with long-term current use of insulin ([facility])  Assessment & Plan  Lab Results   Component Value Date    HGBA1C 9.2 (A) 04/04/2024   Home medications: metformin 500 mg BID, glimepiride 2 mg AM, glimepiride 4 mg PM, Levemir 35 U HS. Hx of hypoglycemia during hospitalization, currently reporting poor PO intake    Taking dexamethasone daily secondary to prostate cancer     Plan:  Resume home oral anti-glycemic agents  Resume home Levemir to 35U HS  Carb controlled diet     Electrolyte abnormality-resolved as of 5/31/2024  Assessment & Plan  Monitor  "and replete as needed           Condition at Discharge: stable      Discharge Day Visit / Exam:      Vitals: Blood Pressure: 97/60 (05/31/24 1537)  Pulse: 89 (05/31/24 1537)  Temperature: (!) 97.4 °F (36.3 °C) (05/31/24 1537)  Temp Source: Oral (05/31/24 1537)  Respirations: 18 (05/31/24 1537)  Height: 5' 8\" (172.7 cm) (05/29/24 0020)  Weight - Scale: 76.2 kg (168 lb) (05/29/24 0020)  SpO2: 100 % (05/31/24 1537)    Exam:   Physical Exam  Constitutional:       General: He is not in acute distress.  HENT:      Mouth/Throat:      Mouth: Mucous membranes are moist.      Pharynx: Oropharynx is clear.   Eyes:      Conjunctiva/sclera: Conjunctivae normal.      Pupils: Pupils are equal, round, and reactive to light.   Cardiovascular:      Rate and Rhythm: Normal rate and regular rhythm.      Pulses: Normal pulses.      Heart sounds: Normal heart sounds.   Pulmonary:      Effort: Pulmonary effort is normal. No respiratory distress.      Breath sounds: No wheezing, rhonchi or rales.   Abdominal:      General: Bowel sounds are normal. There is no distension.      Palpations: Abdomen is soft.   Musculoskeletal:      Right lower leg: No edema.      Left lower leg: No edema.   Skin:     General: Skin is warm and dry.      Capillary Refill: Capillary refill takes less than 2 seconds.   Neurological:      General: No focal deficit present.      Mental Status: He is alert and oriented to person, place, and time.      Motor: No weakness.       Patient Information Sharing: With the consent of Oliver Astudillo, their loved ones were notified today by inpatient team of the patient’s condition and current plan.  All questions answered.      Discharge instructions/Information to patient and family:   See after visit summary for information provided to patient and family.       Discharge Medications:     Medication List      START taking these medications     acetaminophen 325 mg tablet; Commonly known as: TYLENOL; Take 3 tablets   (975 mg " total) by mouth every 8 (eight) hours   Diclofenac Sodium 1 %; Commonly known as: VOLTAREN; Apply 2 g topically   4 (four) times a day   sodium chloride (PF) 0.9 %; 10 mL by Intracatheter route daily   Intracatheter flushing daily. May substitute prefilled syringe with normal   saline 10 mL vials, 10 mL syringes, and 18 g blunt needles     CHANGE how you take these medications     Levemir FlexPen 100 units/mL injection pen; Generic drug: insulin   detemir; Inject 32 Units under the skin daily at bedtime; What changed:   how much to take     CONTINUE taking these medications     abiraterone 250 mg tablet; Commonly known as: ZYTIGA; Take 1 tablet (250   mg total) by mouth daily With a low fat meal   AeroChamber Mini Chamber Isaura; by Does not apply route see   administration instructions   albuterol 90 mcg/act inhaler; Commonly known as: Ventolin HFA; Inhale 2   puffs every 6 (six) hours as needed for wheezing   Alcohol Swabs 70 % Pads; To clean the skin prior to injecting insulin   dexamethasone 0.5 mg tablet; Commonly known as: DECADRON; TAKE 1 TABLET   (0.5 MG TOTAL) BY MOUTH DAILY WITH BREAKFAST   Easy Touch Pen Needles 31G X 6 MM Misc; Generic drug: Insulin Pen   Needle; Use daily at bedtime   * glimepiride 2 mg tablet; Commonly known as: AMARYL; Take 1 tablet (2   mg total) by mouth daily with dinner   * glimepiride 4 mg tablet; Commonly known as: AMARYL; Take 1 tablet (4   mg total) by mouth daily with breakfast   lisinopril 10 mg tablet; Commonly known as: ZESTRIL   metFORMIN 1000 MG tablet; Commonly known as: GLUCOPHAGE; Take 1 tablet   (1,000 mg total) by mouth 2 (two) times a day with meals   omega-3-acid ethyl esters 1 g capsule; Commonly known as: LOVAZA   OneTouch Delica Lancets 33G Misc; Check blood sugars once daily. Please   substitute with appropriate alternative as covered by patient's insurance.   Dx: E11.65   OneTouch Verio Reflect w/Device Kit; Check blood sugars once daily.   Please substitute  with appropriate alternative as covered by patient's   insurance. Dx: E11.65   OneTouch Verio test strip; Generic drug: glucose blood; Check blood   sugars twice a daily. Please substitute with appropriate alternative as   covered by patient's insurance. Dx: E11.65   polyethylene glycol 17 g packet; Commonly known as: MIRALAX; Take 17 g   by mouth daily as needed (for constipation)   rosuvastatin 10 MG tablet; Commonly known as: CRESTOR; Take 1 tablet (10   mg total) by mouth daily at bedtime   senna-docusate sodium 8.6-50 mg per tablet; Commonly known as: SENOKOT   S; Take 1 tablet by mouth daily   Sentry Tabs  * This list has 2 medication(s) that are the same as other medications   prescribed for you. Read the directions carefully, and ask your doctor or   other care provider to review them with you.        Disposition:   Home with VNA Services (Reminder: Complete face to face encounter)     For Discharges to [facility]:   ·       Not Applicable to this Patient - Not Applicable to this Patient     Discharge Statement:  I spent 45 minutes discharging the patient. This time was spent on the day of discharge. I had direct contact with the patient on the day of discharge. Greater than 50% of the total time was spent examining patient, answering all patient questions, arranging and discussing plan of care with patient as well as directly providing post-discharge instructions.  Additional time then spent on discharge activities.     ** Please Note: This note has been constructed using a voice recognition system **     Guanako Bell MD   05/31/24  3:56 PM

## 2024-05-30 NOTE — CASE MANAGEMENT
Case Management Assessment & Discharge Planning Note    Patient name Oliver Astudillo  Location [address] [place] [address]/[address] [place] [address]-* MRN [id number]  : 1950 Date 2024       Current Admission Date: 2024  Current Admission Diagnosis:Urinary tract infection associated with nephrostomy catheter  (HCC)   Patient Active Problem List    Diagnosis Date Noted Date Diagnosed    Electrolyte abnormality 2024     Urinary tract infection associated with nephrostomy catheter  (HCC) 2024     Stage 3a chronic kidney disease (HCC) 2024     Hypoglycemia 2024     Hypokalemia 2024     Malfunction of nephrostomy tube ([facility]) 01/15/2024     Hyperlipidemia 2024     Encounter for monitoring androgen deprivation therapy 08/10/2023     Nephrostomy status ([facility]) 08/10/2023     Hydronephrosis 2023     Prostate cancer metastatic to bone ([facility]) 2023     Type 2 diabetes mellitus, with long-term current use of insulin ([facility]) 2023     Other hydronephrosis 2023     Hypertension 2023       LOS (days): 1  Geometric Mean LOS (GMLOS) (days): 3.5  Days to GMLOS:2.3     OBJECTIVE:    Risk of Unplanned Readmission Score: 30.32         Current admission status: Inpatient       Preferred Pharmacy:   [facility] [place], [place] - [address] [place] [address]  Phone: [phone] Fax: [phone]    [facility] [address] [place] [address] - [address]  [address]  [facility] [address]  Phone: [phone] Fax: [phone]    [facility]/pharmacy #[id number] [place], [place] - [address] [facility]  [address] [facility] [address]  Phone: [phone] Fax: [phone]    Primary Care Provider: Eugenia Rodriguez MD    Primary Insurance: [facility] REP  Secondary Insurance:     ASSESSMENT:  Active Health Care Proxies    There are no active Health Care Proxies on file.       Readmission Root Cause  30 Day Readmission: No    Patient  Information  Admitted from:: Home  Mental Status: Alert  During Assessment patient was accompanied by: Not accompanied during assessment  Assessment information provided by:: Son  Primary Caregiver: Family  Caregiver's Name:: Sumit marino (son)  Caregiver's Relationship to Patient:: Family Member  Caregiver's Telephone Number:: [phone]  Support Systems: Self, Spouse/significant other, Son  County of Residence: [place]  What [facility] do you live in?: [place]  Type of Current Residence: 2 story home  Upon entering residence, is there a bedroom on the main floor (no further steps)?: Yes  Upon entering residence, is there a bathroom on the main floor (no further steps)?: Yes  Living Arrangements: Lives w/ Spouse/significant other    Activities of Daily Living Prior to Admission  Functional Status: Independent  Completes ADLs independently?: Yes  Ambulates independently?: Yes  Does patient use assisted devices?: Yes  Assisted Devices (DME) used: Shower Chair, Straight Cane  Does patient currently own DME?: Yes  What DME does the patient currently own?: Straight Cane, Shower Chair  Does patient have a history of Outpatient Therapy (PT/OT)?: No  Does the patient have a history of Short-Term Rehab?: No  Does patient have a history of HHC?: Yes  Does patient currently have HHC?: No     Patient Information Continued  Income Source: Pension/shelter  Does patient have prescription coverage?: Yes  Does patient receive dialysis treatments?: No     Means of Transportation  Means of Transport to Appts:: Drives Self      Social Determinants of Health (SDOH)      Flowsheet Row Most Recent Value   Housing Stability    In the last 12 months, was there a time when you were not able to pay the mortgage or rent on time? N   In the past 12 months, how many times have you moved where you were living? 0   At any time in the past 12 months, were you homeless or living in a shelter (including now)? N   Transportation Needs    In  the past 12 months, has lack of transportation kept you from medical appointments or from getting medications? no   In the past 12 months, has lack of transportation kept you from meetings, work, or from getting things needed for daily living? No   Food Insecurity    Within the past 12 months, you worried that your food would run out before you got the money to buy more. Never true   Within the past 12 months, the food you bought just didn't last and you didn't have money to get more. Never true   Utilities    In the past 12 months has the electric, gas, oil, or water company threatened to shut off services in your home? No            DISCHARGE DETAILS:    Discharge planning discussed with:: Patient and son Sumit  Freedom of Choice: Yes  Comments - Freedom of Choice: Son Sumit agreeable for home rehab and agreeable for CM to send blanket referral  CM contacted family/caregiver?: Yes  Were Treatment Team discharge recommendations reviewed with patient/caregiver?: Yes  Did patient/caregiver verbalize understanding of patient care needs?: Yes  Were patient/caregiver advised of the risks associated with not following Treatment Team discharge recommendations?: Yes    Contacts  Patient Contacts: Sumit Khan (son)  Relationship to Patient:: Family  Contact Method: Phone  Phone Number: [phone]  Reason/Outcome: Discharge Planning, Continuity of Care    Requested Home Health Care         Is the patient interested in HHC at discharge?: Yes  Home Health Discipline requested:: Physical Therapy  Home Health Agency Name:: Other  HHA External Referral Reason (only applicable if external HHA name selected): Services not provided in network or near patient location  Home Health Follow-Up Provider:: PCP  Home Health Services Needed:: Evaluate Functional Status and Safety, Gait/ADL Training, Strengthening/Theraputic Exercises to Improve Function  Homebound Criteria Met:: Uses an Assist Device (i.e. cane, walker,  etc)  Supporting Clincal Findings:: Limited Endurance    DME Referral Provided  Referral made for DME?: Yes  DME referral completed for the following items:: Walker  DME Supplier Name:: [employer]    Other Referral/Resources/Interventions Provided:  Interventions: DME, C  Referral Comments: CM spoke with patient at bedside and with son Sumit over phone, introduced self and role conduct assessment and discuss discharge planning. CM spoke with patient to discuss need for walker as recommended by therapy. Patient agreeable for CM to place order for walker. [facility] [place] [facility] and to deliver at bedside once order is proccessed. Son Sumit made aware of the same and discussed therapy recommendations for minimum resource intensity with outpatient rehab/ home rehab. [facility] and agreeable for CM to place blanket referral. CM sent referral in aidin to [facility] [place] [facility] for home PT, pending acceptance.     Treatment Team Recommendation: Home with Home Health Care  Discharge Destination Plan:: Home with Home Health Care  Transport at Discharge : Family

## 2024-05-30 NOTE — PROGRESS NOTES
Oliver Astudillo is a 73 y.o. male who is currently ordered Vancomycin IV with management by the Pharmacy Consult service.  Relevant clinical data and objective / subjective history reviewed.  Vancomycin Assessment:  Indication and Goal AUC/Trough: Urinary tract infection (goal -600, trough >10), -600, trough >10  Clinical Status: stable  Micro:     Renal Function:  SCr: 0.76 mg/dL  CrCl: 85.3 mL/min  Renal replacement: Not on dialysis  Days of Therapy: 2  Current Dose: 750 mg IV q12h  Vancomycin Plan:  New Dosin mg IV q12h  Estimated AUC: 496 mcg*hr/mL  Estimated Trough: 13.9 mcg/mL  Next Level: 2024 at 0600  Renal Function Monitoring: Daily BMP and UOP  Pharmacy will continue to follow closely for s/sx of nephrotoxicity, infusion reactions and appropriateness of therapy.  BMP and CBC will be ordered per protocol. We will continue to follow the patient’s culture results and clinical progress daily.    Cesario Bolanos, Pharmacist

## 2024-05-30 NOTE — PHYSICAL THERAPY NOTE
PT TREATMENT     05/30/24 1540   PT Last Visit   PT Visit Date 05/30/24   Note Type   Note Type Orthotic Management/Training   Type of Brace   Brace [employer]  (CASH brace fitted due to B nephrostomy tubes and conflict with TLSO fitting)   Education   Education Provided Yes  (patient educated in donning and doffing and wear time for CASH brace.)   End of Consult   Patient Position at End of Consult Supine   Nurse Communication Nurse aware of consult, application of brace   Pain Assessment   Pain Assessment Tool 0-10   Pain Score 5  (LS area, patient states improved comfort with application of brace.)   General   Chart Reviewed Yes   Family/Caregiver Present No   Bed Mobility   Supine to Sit 7  Independent   Sit to Supine 7  Independent   Transfers   Sit to Stand 7  Independent   Stand to Sit 7  Independent   Ambulation/Elevation   Gait Assistance 5  Supervision   Additional items Verbal cues  (cuing for body mechanics and proper posturing)   Assistive Device Rolling walker   Distance 60 feet with change in direction   Assessment   Assessment patient tolerated CASH brace fitting well. increased time required for brace fitting with complications of B nephrostomy tubes and mechanics of the CASH brace itself for proper fit due to tubes and body habitus. Patient was comfortable with brace at end of fitting and understanding of wear time.The patient's AM-[facility] Basic Mobility Inpatient Short Form Raw Score is 23. A Raw score of greater than 16 suggests the patient may benefit from discharge to home. Please also refer to the recommendation of the Physical Therapist for safe discharge planning.         Plan   Treatment/Interventions LE strengthening/ROM;Therapeutic exercise;Endurance training;Gait training;Compensatory technique education;Bed mobility;Equipment eval/education;Patient/family training   AM-PAC Basic Mobility Inpatient   Turning in Flat Bed Without Bedrails 4   Lying on Back to Sitting on Edge of  Flat Bed Without Bedrails 4   Moving Bed to Chair 4   Standing Up From Chair Using Arms 4   Walk in Room 4   Climb 3-5 Stairs With Railing 3   Basic Mobility Inpatient Raw Score 23   Basic Mobility Standardized Score 50.88   [facility] Highest Level Of Mobility   -HL Goal 7: Walk 25 feet or more   -HLM Achieved 7: Walk 25 feet or more   Licensure   [place] License Number  Ely Edwards PT [id number]

## 2024-05-30 NOTE — PLAN OF CARE
Problem: GENITOURINARY - ADULT  Goal: Maintains or returns to baseline urinary function  Description: INTERVENTIONS:  - Assess urinary function  - Encourage oral fluids to ensure adequate hydration if ordered  - Administer IV fluids as ordered to ensure adequate hydration  - Administer ordered medications as needed  - Offer frequent toileting  - Follow urinary retention protocol if ordered  Outcome: Progressing  Goal: Absence of urinary retention  Description: INTERVENTIONS:  - Assess patient’s ability to void and empty bladder  - Monitor I/O  - Bladder scan as needed  - Discuss with physician/AP medications to alleviate retention as needed  - Discuss catheterization for long term situations as appropriate  Outcome: Progressing  Goal: Urinary catheter remains patent  Description: INTERVENTIONS:  - Assess patency of urinary catheter  - If patient has a chronic ceballos, consider changing catheter if non-functioning  - Follow guidelines for intermittent irrigation of non-functioning urinary catheter  Outcome: Progressing     Problem: METABOLIC, FLUID AND ELECTROLYTES - ADULT  Goal: Electrolytes maintained within normal limits  Description: INTERVENTIONS:  - Monitor labs and assess patient for signs and symptoms of electrolyte imbalances  - Administer electrolyte replacement as ordered  - Monitor response to electrolyte replacements, including repeat lab results as appropriate  - Instruct patient on fluid and nutrition as appropriate  Outcome: Progressing  Goal: Fluid balance maintained  Description: INTERVENTIONS:  - Monitor labs   - Monitor I/O and WT  - Instruct patient on fluid and nutrition as appropriate  - Assess for signs & symptoms of volume excess or deficit  Outcome: Progressing  Goal: Glucose maintained within target range  Description: INTERVENTIONS:  - Monitor Blood Glucose as ordered  - Assess for signs and symptoms of hyperglycemia and hypoglycemia  - Administer ordered medications to maintain glucose  within target range  - Assess nutritional intake and initiate nutrition service referral as needed  Outcome: Progressing     Problem: SKIN/TISSUE INTEGRITY - ADULT  Goal: Incision(s), wounds(s) or drain site(s) healing without S/S of infection  Description: INTERVENTIONS  - Assess and document dressing, incision, wound bed, drain sites and surrounding tissue  - Provide patient and family education  - Perform skin care/dressing changes every   Outcome: Progressing     Problem: MUSCULOSKELETAL - ADULT  Goal: Maintain or return mobility to safest level of function  Description: INTERVENTIONS:  - Assess patient's ability to carry out ADLs; assess patient's baseline for ADL function and identify physical deficits which impact ability to perform ADLs (bathing, care of mouth/teeth, toileting, grooming, dressing, etc.)  - Assess/evaluate cause of self-care deficits   - Assess range of motion  - Assess patient's mobility  - Assess patient's need for assistive devices and provide as appropriate  - Encourage maximum independence but intervene and supervise when necessary  - Involve family in performance of ADLs  - Assess for home care needs following discharge   - Consider OT consult to assist with ADL evaluation and planning for discharge  - Provide patient education as appropriate  Outcome: Progressing  Goal: Maintain proper alignment of affected body part  Description: INTERVENTIONS:  - Support, maintain and protect limb and body alignment  - Provide patient/ family with appropriate education  Outcome: Progressing     Problem: PAIN - ADULT  Goal: Verbalizes/displays adequate comfort level or baseline comfort level  Description: Interventions:  - Encourage patient to monitor pain and request assistance  - Assess pain using appropriate pain scale  - Administer analgesics based on type and severity of pain and evaluate response  - Implement non-pharmacological measures as appropriate and evaluate response  - Consider cultural  and social influences on pain and pain management  - Notify physician/advanced practitioner if interventions unsuccessful or patient reports new pain  Outcome: Progressing     Problem: INFECTION - ADULT  Goal: Absence or prevention of progression during hospitalization  Description: INTERVENTIONS:  - Assess and monitor for signs and symptoms of infection  - Monitor lab/diagnostic results  - Monitor all insertion sites, i.e. indwelling lines, tubes, and drains  - Monitor endotracheal if appropriate and nasal secretions for changes in amount and color  - [place] appropriate cooling/warming therapies per order  - Administer medications as ordered  - Instruct and encourage patient and family to use good hand hygiene technique  - Identify and instruct in appropriate isolation precautions for identified infection/condition  Outcome: Progressing  Goal: Absence of fever/infection during neutropenic period  Description: INTERVENTIONS:  - Monitor WBC    Outcome: Progressing     Problem: SAFETY ADULT  Goal: Maintain or return to baseline ADL function  Description: INTERVENTIONS:  -  Assess patient's ability to carry out ADLs; assess patient's baseline for ADL function and identify physical deficits which impact ability to perform ADLs (bathing, care of mouth/teeth, toileting, grooming, dressing, etc.)  - Assess/evaluate cause of self-care deficits   - Assess range of motion  - Assess patient's mobility; develop plan if impaired  - Assess patient's need for assistive devices and provide as appropriate  - Encourage maximum independence but intervene and supervise when necessary  - Involve family in performance of ADLs  - Assess for home care needs following discharge   - Consider OT consult to assist with ADL evaluation and planning for discharge  - Provide patient education as appropriate  Outcome: Progressing  Goal: Maintains/Returns to pre admission functional level  Description: INTERVENTIONS:  - Perform AM-PAC 6 Click  Basic Mobility/ Daily Activity assessment daily.  - Set and communicate daily mobility goal to care team and patient/family/caregiver.   - Collaborate with rehabilitation services on mobility goals if consulted  - Perform Range of Motion  times a day.  - Reposition patient every  hours.  - Dangle patient  times a day  - Stand patient  times a day  - Ambulate patient  times a day  - Out of bed to chair  times a day   - Out of bed for meals times a day  - Out of bed for toileting  - Record patient progress and toleration of activity level   Outcome: Progressing     Problem: DISCHARGE PLANNING  Goal: Discharge to home or other facility with appropriate resources  Description: INTERVENTIONS:  - Identify barriers to discharge w/patient and caregiver  - Arrange for needed discharge resources and transportation as appropriate  - Identify discharge learning needs (meds, wound care, etc.)  - Arrange for interpretive services to assist at discharge as needed  - Refer to Case Management Department for coordinating discharge planning if the patient needs post-hospital services based on physician/advanced practitioner order or complex needs related to functional status, cognitive ability, or social support system  Outcome: Progressing     Problem: Knowledge Deficit  Goal: Patient/family/caregiver demonstrates understanding of disease process, treatment plan, medications, and discharge instructions  Description: Complete learning assessment and assess knowledge base.  Interventions:  - Provide teaching at level of understanding  - Provide teaching via preferred learning methods  Outcome: Progressing

## 2024-05-30 NOTE — ASSESSMENT & PLAN NOTE
- MRI: lumbar, sacral, iliac mets lesions - encroachment L1 to L2 neural foramen with mild pathologic compression fx  - CT abd/pelvis: New lytic L1 vertebral body lesion with a soft tissue component causing focal erosion of both the anterior and posterior cortex as described above. Additional sclerotic metastases are stable.    Patient ambulating well with cane. Urinary incontinence, no perineal anesthesia or fecal incontinence.     PT/OT

## 2024-05-30 NOTE — TELEMEDICINE
"e-Consult ([facility])     Consults     Contacted by family medicine resident, Dr. Jarad Thomas.    Oliver Astudillo 73 y.o. male MRN: [id number]  Unit/Bed#: [address] [place] [address] Encounter: [id number]    Reason for Consult    Per provider report, patient with history of bilateral nephrostomy tube being treated with UTI, known prostate cancer with bony metastases follows with Dr. Freeman who endorses back pain.  CT imaging demonstrated new L1 lesion.  MRI lumbar spine with multiple marrow replacing enhancing lesions in the lumbar sacrum and iliac bone suspicious for metastases.  Possible encroachment on the right L1-2 neural foramen.  Mild pathologic compression of L1.  Neurosurgical input was requested.  Pain scores low rating no pain to 4 out of 10.  Only using Tylenol for pain control.    Available past medical history,social history, surgical history, medication list, drug allergies and review of systems were reviewed.    /67   Pulse 59   Temp 98.1 °F (36.7 °C) (Temporal)   Resp 16   Ht 5' 8\" (1.727 m)   Wt 76.2 kg (168 lb)   SpO2 95%   BMI 25.54 kg/m²      Clinical exam per provider report, no current neurological deficits.  Ambulating at baseline with use of cane.    Imaging personally reviewed.   MRI lumbar spine w wo contrast  Result Date: 5/29/2024  Impression: 1. Multiple marrow replacing and enhancing lesions involving the lumbar spine, sacrum and iliac bones, which are suspicious for metastases. No convincing epidural involvement though there is probable encroachment on the right L1-L2 neural foramen. Of note, the L1 marrow replacing lesion results in a mild pathologic compression fracture. Recommend correlation with nuclear medicine bone scan and/or prostate-specific tracers in a PET scan, as clinically appropriate. 2. Multilevel lumbar spine spondylotic changes, as detailed above. The study was marked in EPIC for immediate notification. Workstation performed: [id number]     CT abdomen pelvis with " contrast  Result Date: 5/28/2024  Impression: Bladder wall thickening with increased bladder wall enhancement suspicious for cystitis. Bilateral nephrostomy tubes in good position with no collecting system dilatation and no CT evidence of acute pyelonephritis. New lytic L1 vertebral body lesion with a soft tissue component causing focal erosion of both the anterior and posterior cortex as described above. Additional sclerotic metastases are stable. The study was marked in EPIC for immediate notification. Workstation performed: [id number]       Assessment and Recommendations    Continue to monitor neurological exam.  Imaging reviewed demonstrating diffuse bony metastases.  There is no severe central or foraminal stenosis.  Can trial brace to provide support if able to tolerate given bilateral nephrostomy tubes.  Can trial CASH brace if provide support and assist with pain.  Recommend consults for radiation oncology.  At times, patient can be considered for kyphoplasty if significant pain inhibiting mobilization.  No transfer from a neurosurgical standpoint or recommended neurosurgical intervention at this juncture in the setting of no radicular complaints and non focal exam.     All questions answered. Provider is in agreement with the course of action. 11-20 minutes, >50% of the total time devoted to medical consultative verbal/EMR discussion between providers. Written report will be generated in the EMR.

## 2024-05-30 NOTE — CASE MANAGEMENT
Case Management Discharge Planning Note    Patient name Oliver Astudillo  Location [address] [place] [address]/[address] [place] [address]-* MRN [id number]  : 1950 Date 2024       Current Admission Date: 2024  Current Admission Diagnosis:Urinary tract infection associated with nephrostomy catheter  (HCC)   Patient Active Problem List    Diagnosis Date Noted Date Diagnosed    Electrolyte abnormality 2024     Urinary tract infection associated with nephrostomy catheter  (HCC) 2024     Stage 3a chronic kidney disease (HCC) 2024     Hypoglycemia 2024     Hypokalemia 2024     Malfunction of nephrostomy tube ([facility]) 01/15/2024     Hyperlipidemia 2024     Encounter for monitoring androgen deprivation therapy 08/10/2023     Nephrostomy status ([facility]) 08/10/2023     Hydronephrosis 2023     Prostate cancer metastatic to bone ([facility]) 2023     Type 2 diabetes mellitus, with long-term current use of insulin ([facility]) 2023     Other hydronephrosis 2023     Hypertension 2023       LOS (days): 1  Geometric Mean LOS (GMLOS) (days): 3.5  Days to GMLOS:2.3     OBJECTIVE:  Risk of Unplanned Readmission Score: 30.32         Current admission status: Inpatient   Preferred Pharmacy:   [facility] [place], [place] - [address] [place] [address]  Phone: [phone] Fax: [phone]    [facility] [address] [place] [address] - [address]  [address]  [facility] [address]  Phone: [phone] Fax: [phone]    [facility] #[id number] [place], [place] - [address] [facility]  [address] [facility] [address]  Phone: [phone] Fax: [phone]    Primary Care Provider: Eugenia Rodriguez MD    Primary Insurance: JERO  REP  Secondary Insurance:     DISCHARGE DETAILS:    Discharge planning discussed with:: Patient and son Sumit  Freedom of Choice: Yes  Comments - Freedom of Choice: Son Sumit agreeable for home rehab and  agreeable for CM to send blanket referral  CM contacted family/caregiver?: Yes  Were Treatment Team discharge recommendations reviewed with patient/caregiver?: Yes  Did patient/caregiver verbalize understanding of patient care needs?: Yes  Were patient/caregiver advised of the risks associated with not following Treatment Team discharge recommendations?: Yes    Contacts  Patient Contacts: Sumit Khan (son)  Relationship to Patient:: Family  Contact Method: Phone  Phone Number: [phone]  Reason/Outcome: Discharge Planning, Continuity of Care    Requested Home Health Care         Is the patient interested in HHC at discharge?: Yes  Home Health Discipline requested:: Physical Therapy  Home Health Agency Name:: Other  HHA External Referral Reason (only applicable if external HHA name selected): Services not provided in network or near patient location  Home Health Follow-Up Provider:: PCP  Home Health Services Needed:: Evaluate Functional Status and Safety, Gait/ADL Training, Strengthening/Theraputic Exercises to Improve Function  Homebound Criteria Met:: Uses an Assist Device (i.e. cane, walker, etc)  Supporting Clincal Findings:: Limited Endurance    DME Referral Provided  Referral made for DME?: Yes  DME referral completed for the following items:: Walker  DME Supplier Name:: [employer]    Other Referral/Resources/Interventions Provided:  Interventions: DME, HHC  Referral Comments: CM delivered walker at bedside and patient and wife made aware of copay $6.94. Patient agreeable for the copay. Patient signed the delivery receipt, a copy provided to patient and original placed in dme folder for [employer] and uploaded via [employer].     Treatment Team Recommendation: Home with Home Health Care  Discharge Destination Plan:: Home with Home Health Care  Transport at Discharge : Family

## 2024-05-30 NOTE — PROGRESS NOTES
Daily Progress Note - [facility]  Family Medicine Residency  Oliver Astudillo 73 y.o. male MRN: [id number]  Unit/Bed#: [address] [place] [address] Encounter: [id number]  Admitting Physician: Marimar Torres DO   PCP: Eugenia Rodriguez MD  Date of Admission:  5/28/2024  5:10 PM    Assessment and Plan    * Urinary tract infection associated with nephrostomy catheter  (HCC)  Assessment & Plan  Patient reporting pelvic discomfort, denies dysuria, hematuria. Patient has a history of frequent complicated UTI involving b/l nephrostomy tubes. Currently, good output from b/l nephrostomy tubes, PVR in ED for 16 cc. Per wife, he is s/p 5 days of augmentin    CT abd/pelvis: Bladder wall thickening with increased bladder wall enhancement suspicious for cystitis. Bilateral nephrostomy tubes in good position with no collecting system dilatation and no CT evidence of acute pyelonephritis    - ED: Rocephin x 1   - UA right nephrostomy: positive for leukocytes, nitrites, protein, moderate occult blood, WBC, RBC, bacteria   - Ucx right nephrostomy: E coli >100K, E faecalis >30K, lactobacillus >100K    Pending UA from male external catheter  Urinary retention protocol   Strict I&O's  Received empiric Ampicillin (5/29)  Previous urine culture showing ecoli with resistance to ampicillin  Rocephin & Vancomycin (5/29- )  Discontinued fluconazole (5/29 - 5/30)    Prostate cancer metastatic to bone (HCC)  Assessment & Plan  Chronic, following oncology, currently taking Zytiga and dexamethasone daily. Presents with new L1 lesion on CT abd/pelvis in ED. Patient reporting recent worsening in back pain making it difficult to ambulate and get comfortable, stating that he has been taking Tylenol regularly for pain at home. Denying numbness/tingling in thighs, bowel incontinence  - CT abd/pelvis in ED: Multiple sclerotic metastases redemonstrated throughout the visualized axial and appendicular skeleton,1 mostly stable, except for a new lytic  lesion in the L1 vertebral body with an enhancing soft tissue component, and focal destruction of both the anterior and posterior cortex  - MRI: Multiple marrow replacing and enhancing lesions involving the lumbar spine, sacrum and iliac bones, which are suspicious for metastases. No convincing epidural involvement though there is probable encroachment on the right L1-L2 neural foramen. Of note, the L1 marrow replacing lesion results in a mild pathologic compression fracture. Recommend correlation with nuclear medicine bone scan and/or prostate-specific tracers in a PET scan, as clinically appropriate.    PT/OT  Pain control as below   Tylenol 975 mg Q8H scheduled  Voltaren gel for back  Lidoderm patches  Tramadol 50 mg Q6H PRN for moderate, severe pain     Electrolyte abnormality  Assessment & Plan  Monitor and replete as needed    Stage 3a chronic kidney disease (HCC)  Assessment & Plan  Lab Results   Component Value Date    EGFR 69 05/28/2024    EGFR 59 04/29/2024    EGFR 67 03/24/2024    CREATININE 1.07 05/28/2024    CREATININE 1.22 04/29/2024    CREATININE 1.09 03/24/2024     Chronic, stable. Renally dose medications as able    Hyperlipidemia  Assessment & Plan  Chronic, stable. Home medications include Crestor 10 mg  - lipid panel: TC 94, , HDL 27, LDL 27  Plan:  Substitute Crestor 10 mg for pravastatin 80 mg during hospitalization    Nephrostomy status (HCC)  Assessment & Plan  Hx of metastatic prostate cancer with urinary obstruction and bilateral nephrostomy tubes since 6/2023 requiring frequent changes and daily BID flushes. Reports current good output from bilateral tubes.     CT abd/pelvis in ED: Bilateral nephrostomy catheters are in good position. No collecting system dilatation both nephrograms are symmetric. No calculi.     Plan:  -Continue regular nephrostomy tube flushes  -Strict I&O's    Hypertension  Assessment & Plan  Chronic, stable. Continue home lisinopril 10 mg. Avoid  hypotension    Type 2 diabetes mellitus, with long-term current use of insulin ([facility])  Assessment & Plan  Lab Results   Component Value Date    HGBA1C 9.2 (A) 2024   Home medications: metformin 500 mg BID, glimepiride 2 mg AM, glimepiride 4 mg PM, Levemir 35 U HS. Hx of hypoglycemia during hospitalization, currently reporting poor PO intake    Taking dexamethasone daily secondary to prostate cancer     Plan:  Hold oral anti-glycemic agents  Decrease Levemir to 24U HS  Insulin sliding scale  Hypoglycemic protocol  ACHS accucheck, POCT 2 AM check  Carb controlled diet         VTE Pharmacologic Prophylaxis: VTE Score: 8 High Risk (Score >/= 5) - Pharmacological DVT Prophylaxis Ordered: heparin. Sequential Compression Devices Ordered.    Patient Centered Rounds: I have performed bedside rounds with nursing staff today.    Discussions with Specialists or Other Care Team Provider: none    Education and Discussions with Family / Patient: Yes  Patient Information Sharing: With the consent of Oliver Baudilio , their loved ones were notified today by inpatient team of the patient’s condition and current plan.  All questions answered.     Time Spent for Care: 30 minutes.  More than 50% of total time spent on counseling and coordination of care as described above.    Current Length of Stay: 1 day(s)    Current Patient Status: Inpatient   Certification Statement: The patient will continue to require additional inpatient hospital stay due to Complicated UTI    Discharge Plan: 24-48hrs    Code Status: Level 1 - Full Code    Subjective:   Patient see and examined at bedside in NAD, AAOx3. Reports feeling better overall. Performing well with ADLs along side PT. Expresses concerns for cost related to STR if indicated.    Objective     Objective:   Vitals:   Temp (24hrs), Av.6 °F (36.4 °C), Min:97.1 °F (36.2 °C), Max:98.1 °F (36.7 °C)    Temp:  [97.1 °F (36.2 °C)-98.1 °F (36.7 °C)] 98.1 °F (36.7 °C)  HR:  [57-63] 59  Resp:   [16-18] 16  BP: (104-125)/(56-67) 123/67  SpO2:  [95 %-96 %] 95 %  Body mass index is 25.54 kg/m².     Input and Output Summary (last 24 hours):       Intake/Output Summary (Last 24 hours) at 5/30/2024 0854  Last data filed at 5/30/2024 0617  Gross per 24 hour   Intake --   Output 400 ml   Net -400 ml       Physical Exam:   Physical Exam  Constitutional:       General: He is not in acute distress.  HENT:      Mouth/Throat:      Mouth: Mucous membranes are moist.      Pharynx: Oropharynx is clear.   Eyes:      Conjunctiva/sclera: Conjunctivae normal.      Pupils: Pupils are equal, round, and reactive to light.   Cardiovascular:      Rate and Rhythm: Normal rate and regular rhythm.      Pulses: Normal pulses.      Heart sounds: Normal heart sounds.   Pulmonary:      Effort: Pulmonary effort is normal. No respiratory distress.      Breath sounds: No wheezing, rhonchi or rales.   Abdominal:      General: Bowel sounds are normal. There is no distension.      Palpations: Abdomen is soft.      Tenderness: There is no abdominal tenderness (improved suprapubic).      Comments: B/L nephrostomy tubes in place, covered in clean dressing. No signs of infection    Musculoskeletal:         General: Tenderness (lower back) present.      Right lower leg: No edema.      Left lower leg: No edema.   Skin:     General: Skin is warm and dry.      Capillary Refill: Capillary refill takes less than 2 seconds.   Neurological:      General: No focal deficit present.      Mental Status: He is alert and oriented to person, place, and time.      Motor: No weakness.       Additional Data:     Labs:  Results from last 7 days   Lab Units 05/30/24  0327   WBC Thousand/uL 8.80   HEMOGLOBIN g/dL 11.4*   HEMATOCRIT % 33.5*   PLATELETS Thousands/uL 400*   SEGS PCT % 64   LYMPHO PCT % 30   MONO PCT % 5   EOS PCT % 1     Results from last 7 days   Lab Units 05/30/24  0327 05/29/24  0553   POTASSIUM mmol/L 3.8 3.7   CHLORIDE mmol/L 104 104   CO2 mmol/L 25  25   BUN mg/dL 12 16   CREATININE mg/dL 0.76 0.83   CALCIUM mg/dL 9.3 9.3   ALK PHOS U/L  --  74   ALT U/L  --  12   AST U/L  --  11*         Results from last 7 days   Lab Units 05/30/24  0740 05/29/24  2026 05/29/24  1602 05/29/24  1140 05/29/24  0640   POC GLUCOSE mg/dl 85 145* 169* 124 116           * I Have Reviewed All Lab Data Listed Above.  * Additional Pertinent Lab Tests Reviewed: All Labs For Current Hospital Admission Reviewed    Imaging:    Imaging Reports Reviewed Today Include: All Reports For Current Hospital Admission Reviewed    Recent Cultures (last 7 days):     Results from last 7 days   Lab Units 05/28/24  1747   URINE CULTURE  >100,000 cfu/ml Escherichia coli*  30,000-39,000 cfu/ml Enterococcus faecalis*  >100,000 cfu/ml Lactobacillus species*       Last 24 Hours Medication List:   Current Facility-Administered Medications   Medication Dose Route Frequency Provider Last Rate    abiraterone  250 mg Oral Daily Yas Webber DO      acetaminophen  975 mg Oral Q8H [facility] Yas Webber DO      cefTRIAXone  1,000 mg Intravenous Q24H Guanako Bell MD 1,000 mg (05/29/24 2147)    dexamethasone  0.5 mg Oral Daily Yas Webber DO      Diclofenac Sodium  2 g Topical 4x Daily Yas Webber DO      heparin (porcine)  5,000 Units Subcutaneous Q8H [facility] Yas Webber DO      insulin detemir  24 Units Subcutaneous HS Yas Webber DO      insulin lispro  1-5 Units Subcutaneous 4x Daily (AC & HS) Yas Webber DO      lisinopril  10 mg Oral Daily Yas Webber DO      polyethylene glycol  17 g Oral Daily Guanako Bell MD      pravastatin  80 mg Oral Daily With Dinner Yas Webber DO      senna-docusate sodium  1 tablet Oral HS Guanako Bell MD      sodium chloride (PF)  10 mL Intracatheter Q8H [facility] Yas Webber DO      traMADol  50 mg Oral Q6H PRN Yas Webber DO      vancomycin  1,000 mg Intravenous Q12H Guanako Bell MD 1,000 mg (05/30/24 0814)             **  Please Note: Dictation voice to text software may have been used in the creation of this document. **    Guanako Bell MD  05/30/24  8:47 AM

## 2024-05-31 ENCOUNTER — TELEPHONE (OUTPATIENT)
Dept: OTHER | Facility: OTHER | Age: 74
End: 2024-05-31

## 2024-05-31 VITALS
TEMPERATURE: 97.4 F | WEIGHT: 168 LBS | RESPIRATION RATE: 18 BRPM | BODY MASS INDEX: 25.46 KG/M2 | OXYGEN SATURATION: 100 % | HEIGHT: 68 IN | SYSTOLIC BLOOD PRESSURE: 97 MMHG | DIASTOLIC BLOOD PRESSURE: 60 MMHG | HEART RATE: 89 BPM

## 2024-05-31 PROBLEM — E87.8 ELECTROLYTE ABNORMALITY: Status: RESOLVED | Noted: 2024-05-29 | Resolved: 2024-05-31

## 2024-05-31 PROBLEM — T83.512A URINARY TRACT INFECTION ASSOCIATED WITH NEPHROSTOMY CATHETER  (HCC): Status: RESOLVED | Noted: 2024-05-28 | Resolved: 2024-05-31

## 2024-05-31 PROBLEM — N39.0 URINARY TRACT INFECTION ASSOCIATED WITH NEPHROSTOMY CATHETER  (HCC): Status: RESOLVED | Noted: 2024-05-28 | Resolved: 2024-05-31

## 2024-05-31 LAB
ANION GAP SERPL CALCULATED.3IONS-SCNC: 7 MMOL/L (ref 4–13)
BACTERIA UR CULT: ABNORMAL
BASOPHILS # BLD AUTO: 0.04 THOUSANDS/ÂΜL (ref 0–0.1)
BASOPHILS NFR BLD AUTO: 0 % (ref 0–1)
BUN SERPL-MCNC: 16 MG/DL (ref 5–25)
CALCIUM SERPL-MCNC: 9.7 MG/DL (ref 8.4–10.2)
CHLORIDE SERPL-SCNC: 103 MMOL/L (ref 96–108)
CO2 SERPL-SCNC: 27 MMOL/L (ref 21–32)
CREAT SERPL-MCNC: 0.87 MG/DL (ref 0.6–1.3)
EOSINOPHIL # BLD AUTO: 0.11 THOUSAND/ÂΜL (ref 0–0.61)
EOSINOPHIL NFR BLD AUTO: 1 % (ref 0–6)
ERYTHROCYTE [DISTWIDTH] IN BLOOD BY AUTOMATED COUNT: 13.4 % (ref 11.6–15.1)
GFR SERPL CREATININE-BSD FRML MDRD: 86 ML/MIN/1.73SQ M
GLUCOSE SERPL-MCNC: 117 MG/DL (ref 65–140)
GLUCOSE SERPL-MCNC: 290 MG/DL (ref 65–140)
GLUCOSE SERPL-MCNC: 80 MG/DL (ref 65–140)
GLUCOSE SERPL-MCNC: 89 MG/DL (ref 65–140)
HCT VFR BLD AUTO: 35.9 % (ref 36.5–49.3)
HGB BLD-MCNC: 12.2 G/DL (ref 12–17)
IMM GRANULOCYTES # BLD AUTO: 0.04 THOUSAND/UL (ref 0–0.2)
IMM GRANULOCYTES NFR BLD AUTO: 0 % (ref 0–2)
LYMPHOCYTES # BLD AUTO: 3.59 THOUSANDS/ÂΜL (ref 0.6–4.47)
LYMPHOCYTES NFR BLD AUTO: 38 % (ref 14–44)
MCH RBC QN AUTO: 30.5 PG (ref 26.8–34.3)
MCHC RBC AUTO-ENTMCNC: 34 G/DL (ref 31.4–37.4)
MCV RBC AUTO: 90 FL (ref 82–98)
MONOCYTES # BLD AUTO: 0.55 THOUSAND/ÂΜL (ref 0.17–1.22)
MONOCYTES NFR BLD AUTO: 6 % (ref 4–12)
NEUTROPHILS # BLD AUTO: 5.18 THOUSANDS/ÂΜL (ref 1.85–7.62)
NEUTS SEG NFR BLD AUTO: 55 % (ref 43–75)
NRBC BLD AUTO-RTO: 0 /100 WBCS
PLATELET # BLD AUTO: 433 THOUSANDS/UL (ref 149–390)
PMV BLD AUTO: 9.6 FL (ref 8.9–12.7)
POTASSIUM SERPL-SCNC: 3.9 MMOL/L (ref 3.5–5.3)
RBC # BLD AUTO: 4 MILLION/UL (ref 3.88–5.62)
SODIUM SERPL-SCNC: 137 MMOL/L (ref 135–147)
VANCOMYCIN SERPL-MCNC: 14.9 UG/ML (ref 10–20)
WBC # BLD AUTO: 9.51 THOUSAND/UL (ref 4.31–10.16)

## 2024-05-31 PROCEDURE — 80048 BASIC METABOLIC PNL TOTAL CA: CPT

## 2024-05-31 PROCEDURE — 80202 ASSAY OF VANCOMYCIN: CPT

## 2024-05-31 PROCEDURE — 85025 COMPLETE CBC W/AUTO DIFF WBC: CPT

## 2024-05-31 PROCEDURE — 82948 REAGENT STRIP/BLOOD GLUCOSE: CPT

## 2024-05-31 PROCEDURE — 99239 HOSP IP/OBS DSCHRG MGMT >30: CPT | Performed by: FAMILY MEDICINE

## 2024-05-31 RX ORDER — ACETAMINOPHEN 325 MG/1
975 TABLET ORAL EVERY 8 HOURS PRN
Start: 2024-05-31 | End: 2024-05-31

## 2024-05-31 RX ORDER — ACETAMINOPHEN 325 MG/1
975 TABLET ORAL EVERY 8 HOURS PRN
Start: 2024-05-31 | End: 2024-07-30 | Stop reason: DRUGHIGH

## 2024-05-31 RX ORDER — TRAMADOL HYDROCHLORIDE 50 MG/1
50 TABLET ORAL EVERY 6 HOURS PRN
Qty: 20 TABLET | Refills: 0 | Status: CANCELLED | OUTPATIENT
Start: 2024-05-31 | End: 2024-06-10

## 2024-05-31 RX ORDER — ACETAMINOPHEN 325 MG/1
975 TABLET ORAL EVERY 8 HOURS SCHEDULED
Qty: 270 TABLET | Refills: 0 | Status: SHIPPED | OUTPATIENT
Start: 2024-05-31 | End: 2024-05-31

## 2024-05-31 RX ORDER — TRAMADOL HYDROCHLORIDE 50 MG/1
50 TABLET ORAL EVERY 12 HOURS PRN
Qty: 10 TABLET | Refills: 0 | Status: SHIPPED | OUTPATIENT
Start: 2024-05-31 | End: 2024-06-08

## 2024-05-31 RX ADMIN — SODIUM CHLORIDE, PRESERVATIVE FREE 10 ML: 5 INJECTION INTRAVENOUS at 06:28

## 2024-05-31 RX ADMIN — ACETAMINOPHEN 975 MG: 325 TABLET ORAL at 05:53

## 2024-05-31 RX ADMIN — INSULIN LISPRO 3 UNITS: 100 INJECTION, SOLUTION INTRAVENOUS; SUBCUTANEOUS at 16:26

## 2024-05-31 RX ADMIN — VANCOMYCIN HYDROCHLORIDE 1500 MG: 10 INJECTION, POWDER, LYOPHILIZED, FOR SOLUTION INTRAVENOUS at 08:16

## 2024-05-31 RX ADMIN — ABIRATERONE ACETATE 250 MG: 250 TABLET, FILM COATED ORAL at 08:12

## 2024-05-31 RX ADMIN — PRAVASTATIN SODIUM 80 MG: 80 TABLET ORAL at 16:27

## 2024-05-31 RX ADMIN — DEXAMETHASONE 0.5 MG: 0.5 TABLET ORAL at 08:12

## 2024-05-31 RX ADMIN — CEFEPIME HYDROCHLORIDE 1000 MG: 1 INJECTION, SOLUTION INTRAVENOUS at 16:27

## 2024-05-31 RX ADMIN — SODIUM CHLORIDE, PRESERVATIVE FREE 10 ML: 5 INJECTION INTRAVENOUS at 14:16

## 2024-05-31 RX ADMIN — ACETAMINOPHEN 975 MG: 325 TABLET ORAL at 14:15

## 2024-05-31 RX ADMIN — POLYETHYLENE GLYCOL 3350 17 G: 17 POWDER, FOR SOLUTION ORAL at 08:12

## 2024-05-31 RX ADMIN — LISINOPRIL 10 MG: 10 TABLET ORAL at 08:12

## 2024-05-31 RX ADMIN — CEFEPIME HYDROCHLORIDE 1000 MG: 1 INJECTION, SOLUTION INTRAVENOUS at 04:08

## 2024-05-31 RX ADMIN — DICLOFENAC SODIUM 2 G: 10 GEL TOPICAL at 11:49

## 2024-05-31 RX ADMIN — DICLOFENAC SODIUM 2 G: 10 GEL TOPICAL at 08:12

## 2024-05-31 RX ADMIN — HEPARIN SODIUM 5000 UNITS: 5000 INJECTION, SOLUTION INTRAVENOUS; SUBCUTANEOUS at 05:53

## 2024-05-31 NOTE — DISCHARGE INSTR - AVS FIRST PAGE
Do not drive or operate heavy machinery while taking tramadol as it can make you sleepy/be sedating

## 2024-05-31 NOTE — NURSING NOTE
Pt discharged from [address] [place] [address]. IV removed prior to discharge. Pt left with all their belongings. Pt left via wheelchair accompanied by PCA. Discharge instructions gone over with patient. No prescriptions written. Prescription sent over electronically to pt's pharmacy. All questions answered.

## 2024-05-31 NOTE — PROGRESS NOTES
Oliver Astudillo is a 73 y.o. male who is currently ordered Vancomycin IV with management by the Pharmacy Consult service.  Relevant clinical data and objective / subjective history reviewed.  Vancomycin Assessment:  Indication and Goal AUC/Trough: Urinary tract infection (goal -600, trough >10), -600, trough >10  Clinical Status: worsening  Micro:     Renal Function:  SCr: 74.5 mg/dL  CrCl: 0.87 mL/min  Renal replacement: Not on dialysis  Days of Therapy: 3  Current Dose: 1000 mg IV q12h  Vancomycin Plan:  New Dosin mg IV q24h  Estimated AUC: 458 mcg*hr/mL  Estimated Trough: 10.3 mcg/mL  Next Level: 6/3/24 at 0600  Renal Function Monitoring: Daily BMP and UOP  Pharmacy will continue to follow closely for s/sx of nephrotoxicity, infusion reactions and appropriateness of therapy.  BMP and CBC will be ordered per protocol. We will continue to follow the patient’s culture results and clinical progress daily.    Chel Alejandra, Pharmacist    Detail Level: Detailed Render Post-Care Instructions In Note?: yes Duration Of Freeze Thaw-Cycle (Seconds): 0 Post-Care Instructions: I reviewed with the patient in detail post-care instructions. Patient is to wear sunprotection, and avoid picking at any of the treated lesions. Pt may apply Vaseline to crusted or scabbing areas.\\nWHAT TO EXPECT AFTER CRYOSURGERY (LIQUID NITROGEN) TREATMENT\\n\\n\\n Liquid Nitrogen is a very cold gas. In this liquid state it has a temperature of 320 degrees below zero Fahrenheit. Dermatologists use liquid nitrogen to treat certain skin growths such as warts, seborrheic and actinic keratoses, and a whole variety of other skin tumors. These skin growths are destroyed by the freezing action of this agent. With cryosurgery we have the advantage of being able to treat many skin growths and leave very little scarring. \\n\\n From a few hours to a few days after treatment, the area may blister, turn black, or form a scab. This is a desirable result. In some, no reaction is apparent.\\n\\n 1. You are allowed to get the area wet even immediately after treatment.\\n\\n 2. Most person have little or no pain from this treatment. You may have some swelling about the eyes, if cyrotherapy is performed on the forehead or temple.\\n\\n 3. It is not necessary to cover the area with a bandage. In fact, this is undesirable. Things heal better if left open to the air. You should protect the area from injury as much as possible. \\n\\n 4. Painful large blisters (even blisters filled with blood) can occur at times. These can be opened and the fluid drained out to relieve the pain. This may be done with a needle which has been cleaned with alcohol. \\n\\n 5. As the treated area heals the unwanted skin growth will fall off. This will take several days to weeks depending on the size and nature of the growth treated, the location on the skn and the way your body heals. \\n\\n 6. Allow the growth to fall off by itself - don't pick it or pull it off.\\n\\n 7. When the growth does come off, the skin underneath will be somewhat red. As time passes it will assume the color of normal skin. Don't bandage, pick at or apply any medications to the site after the growth has fallen off. The area may be sensitive to touch and be itchy as it heals. This is normal and it may take some time before it is exactly like the skin around it once more. \\n\\n\\n If you have any questions or concerns, please contact our office:         [facility] [phone] Consent: The patient's consent was obtained including but not limited to risks of crusting, scabbing, blistering, scarring, darker or lighter pigmentary change, recurrence, incomplete removal and infection. Render Note In Bullet Format When Appropriate: No

## 2024-05-31 NOTE — PLAN OF CARE
Problem: GENITOURINARY - ADULT  Goal: Maintains or returns to baseline urinary function  Description: INTERVENTIONS:  - Assess urinary function  - Encourage oral fluids to ensure adequate hydration if ordered  - Administer IV fluids as ordered to ensure adequate hydration  - Administer ordered medications as needed  - Offer frequent toileting  - Follow urinary retention protocol if ordered  Outcome: Progressing  Goal: Absence of urinary retention  Description: INTERVENTIONS:  - Assess patient’s ability to void and empty bladder  - Monitor I/O  - Bladder scan as needed  - Discuss with physician/AP medications to alleviate retention as needed  - Discuss catheterization for long term situations as appropriate  Outcome: Progressing  Goal: Urinary catheter remains patent  Description: INTERVENTIONS:  - Assess patency of urinary catheter  - If patient has a chronic ceballos, consider changing catheter if non-functioning  - Follow guidelines for intermittent irrigation of non-functioning urinary catheter  Outcome: Progressing     Problem: METABOLIC, FLUID AND ELECTROLYTES - ADULT  Goal: Electrolytes maintained within normal limits  Description: INTERVENTIONS:  - Monitor labs and assess patient for signs and symptoms of electrolyte imbalances  - Administer electrolyte replacement as ordered  - Monitor response to electrolyte replacements, including repeat lab results as appropriate  - Instruct patient on fluid and nutrition as appropriate  Outcome: Progressing  Goal: Fluid balance maintained  Description: INTERVENTIONS:  - Monitor labs   - Monitor I/O and WT  - Instruct patient on fluid and nutrition as appropriate  - Assess for signs & symptoms of volume excess or deficit  Outcome: Progressing  Goal: Glucose maintained within target range  Description: INTERVENTIONS:  - Monitor Blood Glucose as ordered  - Assess for signs and symptoms of hyperglycemia and hypoglycemia  - Administer ordered medications to maintain glucose  within target range  - Assess nutritional intake and initiate nutrition service referral as needed  Outcome: Progressing     Problem: SKIN/TISSUE INTEGRITY - ADULT  Goal: Incision(s), wounds(s) or drain site(s) healing without S/S of infection  Description: INTERVENTIONS  - Assess and document dressing, incision, wound bed, drain sites and surrounding tissue  - Provide patient and family education  - Perform skin care/dressing changes every 2  Outcome: Progressing     Problem: MUSCULOSKELETAL - ADULT  Goal: Maintain or return mobility to safest level of function  Description: INTERVENTIONS:  - Assess patient's ability to carry out ADLs; assess patient's baseline for ADL function and identify physical deficits which impact ability to perform ADLs (bathing, care of mouth/teeth, toileting, grooming, dressing, etc.)  - Assess/evaluate cause of self-care deficits   - Assess range of motion  - Assess patient's mobility  - Assess patient's need for assistive devices and provide as appropriate  - Encourage maximum independence but intervene and supervise when necessary  - Involve family in performance of ADLs  - Assess for home care needs following discharge   - Consider OT consult to assist with ADL evaluation and planning for discharge  - Provide patient education as appropriate  Outcome: Progressing     Problem: MUSCULOSKELETAL - ADULT  Goal: Maintain proper alignment of affected body part  Description: INTERVENTIONS:  - Support, maintain and protect limb and body alignment  - Provide patient/ family with appropriate education  Outcome: Progressing     Problem: PAIN - ADULT  Goal: Verbalizes/displays adequate comfort level or baseline comfort level  Description: Interventions:  - Encourage patient to monitor pain and request assistance  - Assess pain using appropriate pain scale  - Administer analgesics based on type and severity of pain and evaluate response  - Implement non-pharmacological measures as appropriate and  evaluate response  - Consider cultural and social influences on pain and pain management  - Notify physician/advanced practitioner if interventions unsuccessful or patient reports new pain  Outcome: Progressing     Problem: INFECTION - ADULT  Goal: Absence or prevention of progression during hospitalization  Description: INTERVENTIONS:  - Assess and monitor for signs and symptoms of infection  - Monitor lab/diagnostic results  - Monitor all insertion sites, i.e. indwelling lines, tubes, and drains  - Monitor endotracheal if appropriate and nasal secretions for changes in amount and color  - [place] appropriate cooling/warming therapies per order  - Administer medications as ordered  - Instruct and encourage patient and family to use good hand hygiene technique  - Identify and instruct in appropriate isolation precautions for identified infection/condition  Outcome: Progressing  Goal: Absence of fever/infection during neutropenic period  Description: INTERVENTIONS:  - Monitor WBC    Outcome: Progressing     Problem: SAFETY ADULT  Goal: Maintain or return to baseline ADL function  Description: INTERVENTIONS:  -  Assess patient's ability to carry out ADLs; assess patient's baseline for ADL function and identify physical deficits which impact ability to perform ADLs (bathing, care of mouth/teeth, toileting, grooming, dressing, etc.)  - Assess/evaluate cause of self-care deficits   - Assess range of motion  - Assess patient's mobility; develop plan if impaired  - Assess patient's need for assistive devices and provide as appropriate  - Encourage maximum independence but intervene and supervise when necessary  - Involve family in performance of ADLs  - Assess for home care needs following discharge   - Consider OT consult to assist with ADL evaluation and planning for discharge  - Provide patient education as appropriate  Outcome: Progressing  Goal: Maintains/Returns to pre admission functional level  Description:  INTERVENTIONS:  - Perform AM-PAC 6 Click Basic Mobility/ Daily Activity assessment daily.  - Set and communicate daily mobility goal to care team and patient/family/caregiver.   - Collaborate with rehabilitation services on mobility goals if consulted  - Perform Range of Motion 2 times a day.  - Reposition patient every 2 hours.  - Dangle patient 2 times a day  - Stand patient 2 times a day  - Ambulate patient 2 times a day  - Out of bed to chair 2 times a day   - Out of bed for meals 2 times a day  - Out of bed for toileting  - Record patient progress and toleration of activity level   Outcome: Progressing     Problem: DISCHARGE PLANNING  Goal: Discharge to home or other facility with appropriate resources  Description: INTERVENTIONS:  - Identify barriers to discharge w/patient and caregiver  - Arrange for needed discharge resources and transportation as appropriate  - Identify discharge learning needs (meds, wound care, etc.)  - Arrange for interpretive services to assist at discharge as needed  - Refer to Case Management Department for coordinating discharge planning if the patient needs post-hospital services based on physician/advanced practitioner order or complex needs related to functional status, cognitive ability, or social support system  Outcome: Progressing     Problem: Knowledge Deficit  Goal: Patient/family/caregiver demonstrates understanding of disease process, treatment plan, medications, and discharge instructions  Description: Complete learning assessment and assess knowledge base.  Interventions:  - Provide teaching at level of understanding  - Provide teaching via preferred learning methods  Outcome: Progressing

## 2024-05-31 NOTE — TELEPHONE ENCOUNTER
Patient called to inform that he accidentally canceled upcoming appointment on 06/02 at 10:45am. Patient would like to keep appointment. Please assist

## 2024-05-31 NOTE — PLAN OF CARE
Problem: GENITOURINARY - ADULT  Goal: Maintains or returns to baseline urinary function  Description: INTERVENTIONS:  - Assess urinary function  - Encourage oral fluids to ensure adequate hydration if ordered  - Administer IV fluids as ordered to ensure adequate hydration  - Administer ordered medications as needed  - Offer frequent toileting  - Follow urinary retention protocol if ordered  Outcome: Progressing     Problem: METABOLIC, FLUID AND ELECTROLYTES - ADULT  Goal: Electrolytes maintained within normal limits  Description: INTERVENTIONS:  - Monitor labs and assess patient for signs and symptoms of electrolyte imbalances  - Administer electrolyte replacement as ordered  - Monitor response to electrolyte replacements, including repeat lab results as appropriate  - Instruct patient on fluid and nutrition as appropriate  Outcome: Progressing     Problem: MUSCULOSKELETAL - ADULT  Goal: Maintain or return mobility to safest level of function  Description: INTERVENTIONS:  - Assess patient's ability to carry out ADLs; assess patient's baseline for ADL function and identify physical deficits which impact ability to perform ADLs (bathing, care of mouth/teeth, toileting, grooming, dressing, etc.)  - Assess/evaluate cause of self-care deficits   - Assess range of motion  - Assess patient's mobility  - Assess patient's need for assistive devices and provide as appropriate  - Encourage maximum independence but intervene and supervise when necessary  - Involve family in performance of ADLs  - Assess for home care needs following discharge   - Consider OT consult to assist with ADL evaluation and planning for discharge  - Provide patient education as appropriate  Outcome: Progressing     Problem: PAIN - ADULT  Goal: Verbalizes/displays adequate comfort level or baseline comfort level  Description: Interventions:  - Encourage patient to monitor pain and request assistance  - Assess pain using appropriate pain scale  -  Administer analgesics based on type and severity of pain and evaluate response  - Implement non-pharmacological measures as appropriate and evaluate response  - Consider cultural and social influences on pain and pain management  - Notify physician/advanced practitioner if interventions unsuccessful or patient reports new pain  Outcome: Progressing

## 2024-06-03 ENCOUNTER — OFFICE VISIT (OUTPATIENT)
Dept: HEMATOLOGY ONCOLOGY | Facility: CLINIC | Age: 74
End: 2024-06-03
Payer: COMMERCIAL

## 2024-06-03 ENCOUNTER — DOCUMENTATION (OUTPATIENT)
Dept: HEMATOLOGY ONCOLOGY | Facility: CLINIC | Age: 74
End: 2024-06-03

## 2024-06-03 ENCOUNTER — TELEPHONE (OUTPATIENT)
Dept: HEMATOLOGY ONCOLOGY | Facility: CLINIC | Age: 74
End: 2024-06-03

## 2024-06-03 ENCOUNTER — TRANSITIONAL CARE MANAGEMENT (OUTPATIENT)
Age: 74
End: 2024-06-03

## 2024-06-03 VITALS
DIASTOLIC BLOOD PRESSURE: 64 MMHG | SYSTOLIC BLOOD PRESSURE: 108 MMHG | WEIGHT: 164.8 LBS | OXYGEN SATURATION: 99 % | HEART RATE: 65 BPM | TEMPERATURE: 97.3 F | HEIGHT: 68 IN | BODY MASS INDEX: 24.98 KG/M2 | RESPIRATION RATE: 17 BRPM

## 2024-06-03 DIAGNOSIS — C61 PROSTATE CANCER METASTATIC TO BONE (HCC): Primary | ICD-10-CM

## 2024-06-03 DIAGNOSIS — C79.51 PROSTATE CANCER METASTATIC TO BONE (HCC): Primary | ICD-10-CM

## 2024-06-03 PROCEDURE — G2211 COMPLEX E/M VISIT ADD ON: HCPCS | Performed by: INTERNAL MEDICINE

## 2024-06-03 PROCEDURE — 99215 OFFICE O/P EST HI 40 MIN: CPT | Performed by: INTERNAL MEDICINE

## 2024-06-03 NOTE — PROGRESS NOTES
[facility] [place]  [address] [facility] [address]  [facility] [address]  [phone]    Oliver Astudillo,1950, [id number]  06/03/24    Discussion:   In summary, this is a 73-year-old male with a history of prostate cancer, [place] 9 with bone metastases by PSMA PET/CT August 2023.  Firmagon July 2023, Lupron started August 2023. Zytiga 250 mg and dexamethasone 0.5 mg p.o. daily with low-fat meal.   Recent WBC 9.5, hemoglobin 12.2, platelet count 433, normal differential.  BMP normal.  Most recent PSA April 29, 2024 6.5.  CT ab pelvis 1 week ago showed bladder wall thickening suspicious for cystitis.  New lytic L1 vertebral body lesion.  MRI lumbar spine showed the same.  Additional marrow replacing lesions in the lumbar spine, sacrum, iliac bones suspicious for metastases.  Patient was recently hospitalized for lumbar pain with studies as outlined above.  Radiation therapy consultation is requested.  He is currently using tramadol once a day although he admits to moderate pain and I suspect he could be a little more liberal with tramadol.  Zytiga is discontinued at this time.  Xtandi is used as alternative.  We reviewed potential toxicities including but not limited to hot flashes, constipation, diarrhea, cytopenias, fatigue, altered taste sensation, LFT abnormalities.  We reviewed monitoring to allow for early intervention as appropriate.  Our chemotherapy nurse reviewed and provided written materials regarding this medication.  We reviewed that the goal of treatment is disease control, palliative benefit, survival benefit but that cure is not likely.  Repeat baseline studies in the near future, prior to treatment initiation.  Fractional interpretation provided by [facility].  I discussed the above with the patient.  The patient and his daughter.  Voiced understanding and agreement.  ______________________________________________________________________    Chief Complaint    Patient presents with    Follow-up       HPI:  Oncology History   Prostate cancer metastatic to bone (HCC)   5/24/2023 Initial Diagnosis    May 2023 patient presented with urinary frequency. . CT showed distended urinary bladder with bilateral hydronephrosis. Urinary bladder mass inseparable from the prostate. Extraprostatic extension noted. Bone scan showed indeterminate activity at T11 vertebral body. Bilateral nephrostomy tubes and Cazares catheter placed. Prostate biopsy showed adenocarcinoma, Zillah 9.      6/28/2023 Biopsy    A. Prostate, right lateral base:  - Prostatic adenocarcinoma, Liana score 4 + 5 = 9, Prognostic Grade Group 5,  involving 90% of 1 needle core  and measuring  11 mm in length.        B. Prostate, right medial base:  - Prostatic adenocarcinoma, Zillah score 4 + 5 = 9, Prognostic Grade Group 5,  involving 70% of 1 needle core  and measuring  10 mm in length.      C. Prostate, right lateral mid:  - Prostatic adenocarcinoma, Liana score 4 + 5 = 9, Prognostic Grade Group 5,  involving 90% of 1 needle core  and measuring  12 mm in length.      Comment: Immunohistochemistry for a prostate multiplex stain (p63, K903, and P504S) and NKX3.1 demonstrate invasive carcinoma.     D. Prostate, right medial mid:  - Prostatic adenocarcinoma, Liana score 4 + 5 = 9, Prognostic Grade Group 5,  involving 95% of 1 needle core  and measuring  15 mm in length.      E. Prostate, right lateral apex:  - Prostatic adenocarcinoma, Zillah score 4 + 5 = 9, Prognostic Grade Group 5,  involving 90% of 1 needle core  and measuring  12 mm in length.   - Perineural invasion identified.     Comment: Immunohistochemistry for a prostate multiplex stain (p63, K903, and P504S) and NKX3.1 demonstrate invasive carcinoma.     F. Prostate, right medial apex:  - Prostatic adenocarcinoma, Liana score 4 + 5 = 9, Prognostic Grade Group 5,  involving 100% of 1 needle core  and measuring  20 mm in length.      G.  Prostate, left lateral base:  - Prostatic adenocarcinoma, Liana score 4 + 5 = 9, Prognostic Grade Group 5,  involving 75% of 1 needle core  and measuring  10 mm in length.   - Perineural invasion identified.  - Lymphovascular invasion is identified.     H. Prostate, left medial base:  - Prostatic adenocarcinoma, Liana score 4 + 5 = 9, Prognostic Grade Group 5,  involving 95% of 1 needle core  and measuring  14 mm in length.   - Perineural invasion identified.     Comment: There is a focus of closely approximated gastrointestinal epithelium; NKX3.1 shows no definite invasion of the GI epithelium.      I. Prostate, left lateral mid:  - Prostatic adenocarcinoma, [place] score 4 + 5 = 9, Prognostic Grade Group 5,  involving 95% of 1 needle core  and measuring  12 mm in length.       J. Prostate, left medial mid:  - Prostatic adenocarcinoma, Liana score 4 + 5 = 9, Prognostic Grade Group 5,  involving 85% of 1 needle core  and measuring  13 mm in length.       Comment: Immunohistochemistry for a prostate multiplex stain (p63, K903, and P504S) and NKX3.1 demonstrate invasive carcinoma.     K. Prostate, left lateral apex:  - Prostatic adenocarcinoma, [place] score 4 + 5 = 9, Prognostic Grade Group 5,  involving 25% of 1 needle core  and measuring  3 mm in length.        L. Prostate, left medial apex :  - Prostatic adenocarcinoma, [place] score 4 + 5 = 9, Prognostic Grade Group 5,  involving 95% of 1 needle core  and measuring  15 mm in length.     - Perineural invasion identified.     Comment:   Cribriform glands are present within the pattern 4 component.  This feature has been associated with adverse clinical outcomes and molecular features typically seen in advanced disease.  (The 2019 Genitourinary Pathology Society (GUPS) White Paper on Contemporary Grading of Prostate Cancer. Arch Pathol Lab Med. 2021 Apr 1;145(4):461-493.)     7/5/2023 -  Hormone Therapy    Firmagon loading dose on 7/5/23, followed by Lupron       8/28/2023 -  Cancer Staged    Staging form: Prostate, AJCC 8th Edition  - Clinical: Stage IVB (cT4, cN1, cM1b, PSA: 145, Grade Group: 5) - Signed by Dov Andrea MD on 8/28/2023  Prostate specific antigen (PSA) range: 20 or greater  Histologic grading system: 5 grade system           Interval History: Back pain.  ECOG-  1 - Symptomatic but completely ambulatory    Review of Systems   Constitutional:  Negative for chills and fever.        Walking with a cane.   HENT:  Negative for nosebleeds.    Eyes:  Negative for discharge.   Respiratory:  Negative for cough and shortness of breath.    Cardiovascular:  Negative for chest pain.   Gastrointestinal:  Negative for abdominal pain, constipation and diarrhea.   Endocrine: Negative for polydipsia.   Genitourinary:  Negative for hematuria.   Musculoskeletal:  Positive for back pain. Negative for arthralgias.   Skin:  Negative for color change.   Allergic/Immunologic: Negative for immunocompromised state.   Neurological:  Negative for dizziness and headaches.   Hematological:  Negative for adenopathy.   Psychiatric/Behavioral:  Negative for agitation.        Past Medical History:   Diagnosis Date    COVID-19 01/15/2024    Diabetes mellitus (HCC)     Elevated PSA 06/21/2023    High cholesterol     Hypertension     Prostate cancer (HCC)      Patient Active Problem List   Diagnosis    Type 2 diabetes mellitus, with long-term current use of insulin (HCC)    Other hydronephrosis    Hypertension    Hydronephrosis    Prostate cancer metastatic to bone (HCC)    Encounter for monitoring androgen deprivation therapy    Nephrostomy status (HCC)    Hyperlipidemia    Malfunction of nephrostomy tube (HCC)    Hypokalemia    Stage 3a chronic kidney disease (HCC)    Hypoglycemia       Current Outpatient Medications:     abiraterone (ZYTIGA) 250 mg tablet, Take 1 tablet (250 mg total) by mouth daily With a low fat meal, Disp: 30 tablet, Rfl: 11    acetaminophen (TYLENOL) 325 mg  tablet, Take 3 tablets (975 mg total) by mouth every 8 (eight) hours as needed for mild pain or moderate pain, Disp: , Rfl:     albuterol (Ventolin HFA) 90 mcg/act inhaler, Inhale 2 puffs every 6 (six) hours as needed for wheezing, Disp: 18 g, Rfl: 0    Alcohol Swabs 70 % PADS, To clean the skin prior to injecting insulin, Disp: 100 each, Rfl: 1    Blood Glucose Monitoring Suppl (OneTouch Verio Reflect) w/Device KIT, Check blood sugars once daily. Please substitute with appropriate alternative as covered by patient's insurance. Dx: E11.65, Disp: 1 kit, Rfl: 0    dexamethasone (DECADRON) 0.5 mg tablet, TAKE 1 TABLET (0.5 MG TOTAL) BY MOUTH DAILY WITH BREAKFAST, Disp: 90 tablet, Rfl: 1    Diclofenac Sodium (VOLTAREN) 1 %, Apply 2 g topically 4 (four) times a day, Disp: 100 g, Rfl: 3    Easy Touch Pen Needles 31G X 6 MM MISC, Use daily at bedtime, Disp: 100 each, Rfl: 3    glimepiride (AMARYL) 2 mg tablet, Take 1 tablet (2 mg total) by mouth daily with dinner, Disp: 90 tablet, Rfl: 3    glimepiride (AMARYL) 4 mg tablet, Take 1 tablet (4 mg total) by mouth daily with breakfast, Disp: 90 tablet, Rfl: 3    glucose blood (OneTouch Verio) test strip, Check blood sugars twice a daily. Please substitute with appropriate alternative as covered by patient's insurance. Dx: E11.65, Disp: 200 each, Rfl: 3    Levemir FlexPen 100 units/mL injection pen, Inject 32 Units under the skin daily at bedtime, Disp: 15 mL, Rfl: 1    lisinopril (ZESTRIL) 10 mg tablet, NARCISO TANMAY TABLETA VIA ORAL DIARIAMENTE, Disp: , Rfl:     metFORMIN (GLUCOPHAGE) 1000 MG tablet, Take 1 tablet (1,000 mg total) by mouth 2 (two) times a day with meals, Disp: 180 tablet, Rfl: 1    Multiple Vitamins-Minerals (Sentry) TABS, Take 1 tablet by mouth daily, Disp: , Rfl:     naloxone (NARCAN) 4 mg/0.1 mL nasal spray, Administer 1 spray into a nostril. If no response after 2-3 minutes, give another dose in the other nostril using a new spray., Disp: 1 each, Rfl: 0     omega-3-acid ethyl esters (LOVAZA) 1 g capsule, , Disp: , Rfl:     OneTouch Delica Lancets 33G MISC, Check blood sugars once daily. Please substitute with appropriate alternative as covered by patient's insurance. Dx: E11.65, Disp: 100 each, Rfl: 3    polyethylene glycol (MIRALAX) 17 g packet, Take 17 g by mouth daily as needed (for constipation), Disp: 100 each, Rfl: 1    rosuvastatin (CRESTOR) 10 MG tablet, Take 1 tablet (10 mg total) by mouth daily at bedtime, Disp: 90 tablet, Rfl: 1    senna-docusate sodium (SENOKOT S) 8.6-50 mg per tablet, Take 1 tablet by mouth daily, Disp: 90 tablet, Rfl: 1    sodium chloride, PF, 0.9 %, 10 mL by Intracatheter route daily Intracatheter flushing daily. May substitute prefilled syringe with normal saline 10 mL vials, 10 mL syringes, and 18 g blunt needles, Disp: 600 mL, Rfl: 2    traMADol (ULTRAM) 50 mg tablet, Take 1 tablet (50 mg total) by mouth every 12 (twelve) hours as needed for severe pain for up to 10 doses, Disp: 10 tablet, Rfl: 0    Spacer/Aero-Holding Chambers (AEROCHAMBER MINI CHAMBER) BILLY, by Does not apply route see administration instructions (Patient not taking: Reported on 4/25/2024), Disp: 1 Device, Rfl: 0  No Known Allergies  Past Surgical History:   Procedure Laterality Date    IR NEPHROSTOMY TUBE CHECK/CHANGE/REPOSITION/REINSERTION/UPSIZE  9/28/2023    IR NEPHROSTOMY TUBE CHECK/CHANGE/REPOSITION/REINSERTION/UPSIZE  12/28/2023    IR NEPHROSTOMY TUBE CHECK/CHANGE/REPOSITION/REINSERTION/UPSIZE  1/31/2024    IR NEPHROSTOMY TUBE CHECK/CHANGE/REPOSITION/REINSERTION/UPSIZE  2/2/2024    IR NEPHROSTOMY TUBE CHECK/CHANGE/REPOSITION/REINSERTION/UPSIZE  3/4/2024    IR NEPHROSTOMY TUBE CHECK/CHANGE/REPOSITION/REINSERTION/UPSIZE  3/20/2024    IR NEPHROSTOMY TUBE PLACEMENT  06/22/2023    bilateral    IR OTHER  1/15/2024    US GUIDED PROSTATE BIOPSY       Social History     Objective:  Vitals:    06/03/24 1424   BP: 108/64   BP Location: Left arm   Patient Position:  "Sitting   Cuff Size: Adult   Pulse: 65   Resp: 17   Temp: (!) 97.3 °F (36.3 °C)   SpO2: 99%   Weight: 74.8 kg (164 lb 12.8 oz)   Height: 5' 8\" (1.727 m)     Physical Exam  Constitutional:       Appearance: He is well-developed.   HENT:      Head: Normocephalic and atraumatic.      Mouth/Throat:      Mouth: Mucous membranes are moist.   Eyes:      Pupils: Pupils are equal, round, and reactive to light.   Cardiovascular:      Rate and Rhythm: Normal rate and regular rhythm.      Heart sounds: No murmur heard.  Pulmonary:      Breath sounds: Normal breath sounds. No wheezing or rales.   Abdominal:      Palpations: Abdomen is soft.      Tenderness: There is no abdominal tenderness.   Musculoskeletal:         General: No tenderness. Normal range of motion.      Cervical back: Neck supple.   Lymphadenopathy:      Cervical: No cervical adenopathy.   Skin:     Findings: No erythema or rash.   Neurological:      Mental Status: He is alert and oriented to person, place, and time.      Cranial Nerves: No cranial nerve deficit.      Deep Tendon Reflexes: Reflexes are normal and symmetric.   Psychiatric:         Behavior: Behavior normal.           Labs:  I personally reviewed the labs and imaging pertinent to this patient care.  "

## 2024-06-03 NOTE — PROGRESS NOTES
Intake received/ Chart reviewed for work up completed.    6/3/24 Referral to radiation oncology by Dr Coyne for metastatic prostate cancer with osseous metastases    Imaging completed: yes  5/28/24 CT abdomen pelvis  5/29/24 MRI lumbar spine    Pathology completed:  6/28/23 at [facility] (prostate biopsy)    All records needed are in patients chart. No records retrieval needed at this time.    Message sent to radiation oncology, Kelly Huston recommending that patient can be scheduled for complex follow up with Dr Velasquez Andrea. Last seen in radiation oncology for follow up was 8/23/23.   No prior RT noted.

## 2024-06-03 NOTE — UTILIZATION REVIEW
NOTIFICATION OF ADMISSION DISCHARGE   This is a Notification of Discharge from [facility]. Please be advised that this patient has been discharge from our facility. Below you will find the admission and discharge date and time including the patient’s disposition.   UTILIZATION REVIEW CONTACT:  Fariha Garsia  Utilization   Network Utilization Review Department  Phone: [phone] x carefully listen to the prompts. All voicemails are confidential.  Email: [email or web address]     ADMISSION INFORMATION  PRESENTATION DATE: 5/28/2024  5:10 PM  OBERVATION ADMISSION DATE:   INPATIENT ADMISSION DATE: 5/29/24  9:44 AM   DISCHARGE DATE: 5/31/2024  6:30 PM   DISPOSITION:Home/Self Care    Network Utilization Review Department  ATTENTION: Please call with any questions or concerns to [phone] and carefully listen to the prompts so that you are directed to the right person. All voicemails are confidential.   For Discharge needs, contact Care Management DC Support Team at [phone] opt. [phone]  Send all requests for admission clinical reviews, approved or denied determinations and any other requests to dedicated fax number below belonging to the campus where the patient is receiving treatment. List of dedicated fax numbers for the Facilities:  FACILITY NAME UR FAX NUMBER   ADMISSION DENIALS (Administrative/Medical Necessity) [phone]   DISCHARGE SUPPORT TEAM ([facility]) [phone]   PARENT CHILD HEALTH (Maternity/NICU/Pediatrics) [phone]   [facility] [phone]   [facility] [phone]   [facility] [phone]   [facility] [phone]   [facility] [phone]   [facility] [phone]   [facility] [phone]   [facility] [phone]    [facility] [phone]   [facility] [phone]   [facility] [phone]   [facility] [phone]

## 2024-06-03 NOTE — TELEPHONE ENCOUNTER
Spoke to pt regarding hosp f/u appt. Appt scheduled for 6/20 with  at 4pm. Pt verbalized that he had a scheduled appt for today, 6/3 but had gotten cancelled. Put pt back on schedule for 2:20 p.m. Pt verbalized understanding.

## 2024-06-04 ENCOUNTER — PATIENT OUTREACH (OUTPATIENT)
Age: 74
End: 2024-06-04

## 2024-06-04 ENCOUNTER — DOCUMENTATION (OUTPATIENT)
Dept: HEMATOLOGY ONCOLOGY | Facility: CLINIC | Age: 74
End: 2024-06-04

## 2024-06-04 NOTE — PROGRESS NOTES
Received request from clinical for patient to start on Xtandi 160 MG Auth has been submitted via cover my meds and this is pending and marked urgent.    ([place]: [id number])  PA Case ID #: [id number]      Prior auth required and please note that they have his last name as Colon Baudilio with this insurance plan (my test claim didn't go through with just Baudilio)…    BIN:       [id number]  PCN:      [id number]  ID:          [id number]  GRP:      [id number]    UPDATE:  This has been approved.      [facility] is able to fill for $11.20- Clinical aware.

## 2024-06-04 NOTE — PROGRESS NOTES
Received call from Loraine [facility]. Loraine is calling to check on order for Metformin. According to the pharmacy Metformin was discontinued. According to AVS and medication list, patient is to take Metformin 1000 mg twice a day. RN CM will send in basket message to provider for clarification.     Upcoming patient appointments reviewed with Loraine.

## 2024-06-05 DIAGNOSIS — Z79.4 TYPE 2 DIABETES MELLITUS WITH OTHER SPECIFIED COMPLICATION, WITH LONG-TERM CURRENT USE OF INSULIN (HCC): Chronic | ICD-10-CM

## 2024-06-05 DIAGNOSIS — C61 PROSTATE CANCER METASTATIC TO BONE (HCC): Primary | ICD-10-CM

## 2024-06-05 DIAGNOSIS — E11.69 TYPE 2 DIABETES MELLITUS WITH OTHER SPECIFIED COMPLICATION, WITH LONG-TERM CURRENT USE OF INSULIN (HCC): Chronic | ICD-10-CM

## 2024-06-05 DIAGNOSIS — C79.51 PROSTATE CANCER METASTATIC TO BONE (HCC): Primary | ICD-10-CM

## 2024-06-05 NOTE — TELEPHONE ENCOUNTER
Dr. Springer      Patient needs  refills    Metformin  1000 mg tablet  [facility] pharmacy [facility]

## 2024-06-06 ENCOUNTER — HOSPITAL ENCOUNTER (INPATIENT)
Facility: HOSPITAL | Age: 74
LOS: 2 days | Discharge: HOME HEALTH | DRG: 699 | End: 2024-06-08
Attending: EMERGENCY MEDICINE | Admitting: INTERNAL MEDICINE
Payer: COMMERCIAL

## 2024-06-06 ENCOUNTER — APPOINTMENT (EMERGENCY)
Dept: RADIOLOGY | Facility: HOSPITAL | Age: 74
DRG: 699 | End: 2024-06-06
Payer: COMMERCIAL

## 2024-06-06 DIAGNOSIS — E11.69 TYPE 2 DIABETES MELLITUS WITH OTHER SPECIFIED COMPLICATION, WITH LONG-TERM CURRENT USE OF INSULIN (HCC): ICD-10-CM

## 2024-06-06 DIAGNOSIS — Z93.6 NEPHROSTOMY STATUS (HCC): Primary | ICD-10-CM

## 2024-06-06 DIAGNOSIS — N30.00 ACUTE CYSTITIS WITHOUT HEMATURIA: ICD-10-CM

## 2024-06-06 DIAGNOSIS — N12 PYELONEPHRITIS: ICD-10-CM

## 2024-06-06 DIAGNOSIS — Z79.4 TYPE 2 DIABETES MELLITUS WITH OTHER SPECIFIED COMPLICATION, WITH LONG-TERM CURRENT USE OF INSULIN (HCC): ICD-10-CM

## 2024-06-06 LAB
ALBUMIN SERPL BCP-MCNC: 3.8 G/DL (ref 3.5–5)
ALP SERPL-CCNC: 86 U/L (ref 34–104)
ALT SERPL W P-5'-P-CCNC: 18 U/L (ref 7–52)
ANION GAP SERPL CALCULATED.3IONS-SCNC: 11 MMOL/L (ref 4–13)
AST SERPL W P-5'-P-CCNC: 26 U/L (ref 13–39)
BACTERIA UR QL AUTO: ABNORMAL /HPF
BACTERIA UR QL AUTO: ABNORMAL /HPF
BASOPHILS # BLD AUTO: 0.05 THOUSANDS/ÂΜL (ref 0–0.1)
BASOPHILS NFR BLD AUTO: 0 % (ref 0–1)
BILIRUB SERPL-MCNC: 0.53 MG/DL (ref 0.2–1)
BILIRUB UR QL STRIP: NEGATIVE
BILIRUB UR QL STRIP: NEGATIVE
BUDDING YEAST: PRESENT
BUN SERPL-MCNC: 16 MG/DL (ref 5–25)
CALCIUM SERPL-MCNC: 9.7 MG/DL (ref 8.4–10.2)
CAOX CRY URNS QL MICRO: ABNORMAL /HPF
CHLORIDE SERPL-SCNC: 101 MMOL/L (ref 96–108)
CLARITY UR: ABNORMAL
CLARITY UR: ABNORMAL
CO2 SERPL-SCNC: 20 MMOL/L (ref 21–32)
COLOR UR: ABNORMAL
COLOR UR: YELLOW
CREAT SERPL-MCNC: 0.96 MG/DL (ref 0.6–1.3)
EOSINOPHIL # BLD AUTO: 0.11 THOUSAND/ÂΜL (ref 0–0.61)
EOSINOPHIL NFR BLD AUTO: 1 % (ref 0–6)
ERYTHROCYTE [DISTWIDTH] IN BLOOD BY AUTOMATED COUNT: 13.8 % (ref 11.6–15.1)
GFR SERPL CREATININE-BSD FRML MDRD: 79 ML/MIN/1.73SQ M
GLUCOSE SERPL-MCNC: 183 MG/DL (ref 65–140)
GLUCOSE SERPL-MCNC: 197 MG/DL (ref 65–140)
GLUCOSE SERPL-MCNC: 275 MG/DL (ref 65–140)
GLUCOSE UR STRIP-MCNC: ABNORMAL MG/DL
GLUCOSE UR STRIP-MCNC: NEGATIVE MG/DL
HCT VFR BLD AUTO: 40 % (ref 36.5–49.3)
HGB BLD-MCNC: 13.1 G/DL (ref 12–17)
HGB UR QL STRIP.AUTO: ABNORMAL
HGB UR QL STRIP.AUTO: ABNORMAL
IMM GRANULOCYTES # BLD AUTO: 0.14 THOUSAND/UL (ref 0–0.2)
IMM GRANULOCYTES NFR BLD AUTO: 1 % (ref 0–2)
KETONES UR STRIP-MCNC: NEGATIVE MG/DL
KETONES UR STRIP-MCNC: NEGATIVE MG/DL
LACTATE SERPL-SCNC: 2.5 MMOL/L (ref 0.5–2)
LACTATE SERPL-SCNC: 3.1 MMOL/L (ref 0.5–2)
LEUKOCYTE ESTERASE UR QL STRIP: ABNORMAL
LEUKOCYTE ESTERASE UR QL STRIP: ABNORMAL
LYMPHOCYTES # BLD AUTO: 2.53 THOUSANDS/ÂΜL (ref 0.6–4.47)
LYMPHOCYTES NFR BLD AUTO: 14 % (ref 14–44)
MCH RBC QN AUTO: 30.7 PG (ref 26.8–34.3)
MCHC RBC AUTO-ENTMCNC: 32.8 G/DL (ref 31.4–37.4)
MCV RBC AUTO: 94 FL (ref 82–98)
MONOCYTES # BLD AUTO: 0.88 THOUSAND/ÂΜL (ref 0.17–1.22)
MONOCYTES NFR BLD AUTO: 5 % (ref 4–12)
NEUTROPHILS # BLD AUTO: 13.93 THOUSANDS/ÂΜL (ref 1.85–7.62)
NEUTS SEG NFR BLD AUTO: 79 % (ref 43–75)
NITRITE UR QL STRIP: NEGATIVE
NITRITE UR QL STRIP: POSITIVE
NON-SQ EPI CELLS URNS QL MICRO: ABNORMAL /HPF
NON-SQ EPI CELLS URNS QL MICRO: ABNORMAL /HPF
NRBC BLD AUTO-RTO: 0 /100 WBCS
PH UR STRIP.AUTO: 6 [PH]
PH UR STRIP.AUTO: 7.5 [PH]
PLATELET # BLD AUTO: 372 THOUSANDS/UL (ref 149–390)
PMV BLD AUTO: 9.5 FL (ref 8.9–12.7)
POTASSIUM SERPL-SCNC: 5.1 MMOL/L (ref 3.5–5.3)
PROT SERPL-MCNC: 7.4 G/DL (ref 6.4–8.4)
PROT UR STRIP-MCNC: ABNORMAL MG/DL
PROT UR STRIP-MCNC: ABNORMAL MG/DL
RBC # BLD AUTO: 4.27 MILLION/UL (ref 3.88–5.62)
RBC #/AREA URNS AUTO: ABNORMAL /HPF
RBC #/AREA URNS AUTO: ABNORMAL /HPF
SODIUM SERPL-SCNC: 132 MMOL/L (ref 135–147)
SP GR UR STRIP.AUTO: 1.02 (ref 1–1.03)
SP GR UR STRIP.AUTO: 1.02 (ref 1–1.03)
URINE COMMENT: ABNORMAL
URINE COMMENT: ABNORMAL
UROBILINOGEN UR STRIP-ACNC: <2 MG/DL
UROBILINOGEN UR STRIP-ACNC: <2 MG/DL
WBC # BLD AUTO: 17.64 THOUSAND/UL (ref 4.31–10.16)
WBC #/AREA URNS AUTO: ABNORMAL /HPF
WBC #/AREA URNS AUTO: ABNORMAL /HPF

## 2024-06-06 PROCEDURE — 87086 URINE CULTURE/COLONY COUNT: CPT

## 2024-06-06 PROCEDURE — 87086 URINE CULTURE/COLONY COUNT: CPT | Performed by: EMERGENCY MEDICINE

## 2024-06-06 PROCEDURE — 87186 SC STD MICRODIL/AGAR DIL: CPT | Performed by: EMERGENCY MEDICINE

## 2024-06-06 PROCEDURE — 99223 1ST HOSP IP/OBS HIGH 75: CPT | Performed by: INTERNAL MEDICINE

## 2024-06-06 PROCEDURE — 87186 SC STD MICRODIL/AGAR DIL: CPT

## 2024-06-06 PROCEDURE — 87077 CULTURE AEROBIC IDENTIFY: CPT

## 2024-06-06 PROCEDURE — 36415 COLL VENOUS BLD VENIPUNCTURE: CPT | Performed by: EMERGENCY MEDICINE

## 2024-06-06 PROCEDURE — 85025 COMPLETE CBC W/AUTO DIFF WBC: CPT | Performed by: EMERGENCY MEDICINE

## 2024-06-06 PROCEDURE — 99285 EMERGENCY DEPT VISIT HI MDM: CPT | Performed by: EMERGENCY MEDICINE

## 2024-06-06 PROCEDURE — 87040 BLOOD CULTURE FOR BACTERIA: CPT | Performed by: EMERGENCY MEDICINE

## 2024-06-06 PROCEDURE — 83605 ASSAY OF LACTIC ACID: CPT | Performed by: EMERGENCY MEDICINE

## 2024-06-06 PROCEDURE — 81001 URINALYSIS AUTO W/SCOPE: CPT | Performed by: EMERGENCY MEDICINE

## 2024-06-06 PROCEDURE — 87077 CULTURE AEROBIC IDENTIFY: CPT | Performed by: EMERGENCY MEDICINE

## 2024-06-06 PROCEDURE — 82948 REAGENT STRIP/BLOOD GLUCOSE: CPT

## 2024-06-06 PROCEDURE — 74177 CT ABD & PELVIS W/CONTRAST: CPT

## 2024-06-06 PROCEDURE — 96365 THER/PROPH/DIAG IV INF INIT: CPT

## 2024-06-06 PROCEDURE — 80053 COMPREHEN METABOLIC PANEL: CPT | Performed by: EMERGENCY MEDICINE

## 2024-06-06 PROCEDURE — 81001 URINALYSIS AUTO W/SCOPE: CPT

## 2024-06-06 PROCEDURE — 99284 EMERGENCY DEPT VISIT MOD MDM: CPT

## 2024-06-06 RX ORDER — ACETAMINOPHEN 325 MG/1
975 TABLET ORAL EVERY 8 HOURS PRN
Status: DISCONTINUED | OUTPATIENT
Start: 2024-06-06 | End: 2024-06-07

## 2024-06-06 RX ORDER — HEPARIN SODIUM 5000 [USP'U]/ML
5000 INJECTION, SOLUTION INTRAVENOUS; SUBCUTANEOUS EVERY 8 HOURS SCHEDULED
Status: DISCONTINUED | OUTPATIENT
Start: 2024-06-06 | End: 2024-06-08 | Stop reason: HOSPADM

## 2024-06-06 RX ORDER — ALBUTEROL SULFATE 90 UG/1
2 AEROSOL, METERED RESPIRATORY (INHALATION) EVERY 6 HOURS PRN
Status: DISCONTINUED | OUTPATIENT
Start: 2024-06-06 | End: 2024-06-08 | Stop reason: HOSPADM

## 2024-06-06 RX ORDER — TRAMADOL HYDROCHLORIDE 50 MG/1
50 TABLET ORAL EVERY 12 HOURS PRN
Status: DISCONTINUED | OUTPATIENT
Start: 2024-06-06 | End: 2024-06-07

## 2024-06-06 RX ORDER — CEFEPIME HYDROCHLORIDE 2 G/50ML
2000 INJECTION, SOLUTION INTRAVENOUS EVERY 12 HOURS
Status: DISCONTINUED | OUTPATIENT
Start: 2024-06-07 | End: 2024-06-08 | Stop reason: HOSPADM

## 2024-06-06 RX ORDER — POLYETHYLENE GLYCOL 3350 17 G/17G
17 POWDER, FOR SOLUTION ORAL DAILY PRN
Status: DISCONTINUED | OUTPATIENT
Start: 2024-06-06 | End: 2024-06-08 | Stop reason: HOSPADM

## 2024-06-06 RX ORDER — INSULIN LISPRO 100 [IU]/ML
1-5 INJECTION, SOLUTION INTRAVENOUS; SUBCUTANEOUS
Status: DISCONTINUED | OUTPATIENT
Start: 2024-06-06 | End: 2024-06-08 | Stop reason: HOSPADM

## 2024-06-06 RX ORDER — SODIUM CHLORIDE 9 MG/ML
10 INJECTION INTRAVENOUS DAILY
Status: DISCONTINUED | OUTPATIENT
Start: 2024-06-07 | End: 2024-06-08 | Stop reason: HOSPADM

## 2024-06-06 RX ORDER — CEFEPIME HYDROCHLORIDE 2 G/50ML
2000 INJECTION, SOLUTION INTRAVENOUS ONCE
Status: COMPLETED | OUTPATIENT
Start: 2024-06-06 | End: 2024-06-06

## 2024-06-06 RX ORDER — SODIUM CHLORIDE 9 MG/ML
75 INJECTION, SOLUTION INTRAVENOUS CONTINUOUS
Status: DISCONTINUED | OUTPATIENT
Start: 2024-06-06 | End: 2024-06-07

## 2024-06-06 RX ORDER — DEXAMETHASONE 0.5 MG/1
0.5 TABLET ORAL
Status: DISCONTINUED | OUTPATIENT
Start: 2024-06-07 | End: 2024-06-08 | Stop reason: HOSPADM

## 2024-06-06 RX ORDER — KETOROLAC TROMETHAMINE 30 MG/ML
15 INJECTION, SOLUTION INTRAMUSCULAR; INTRAVENOUS ONCE
Status: COMPLETED | OUTPATIENT
Start: 2024-06-06 | End: 2024-06-06

## 2024-06-06 RX ORDER — AMOXICILLIN 250 MG
1 CAPSULE ORAL DAILY
Status: DISCONTINUED | OUTPATIENT
Start: 2024-06-07 | End: 2024-06-08 | Stop reason: HOSPADM

## 2024-06-06 RX ORDER — PRAVASTATIN SODIUM 80 MG/1
80 TABLET ORAL
Status: DISCONTINUED | OUTPATIENT
Start: 2024-06-06 | End: 2024-06-08 | Stop reason: HOSPADM

## 2024-06-06 RX ORDER — VANCOMYCIN HYDROCHLORIDE 750 MG/150ML
750 INJECTION, SOLUTION INTRAVENOUS EVERY 12 HOURS
Status: DISCONTINUED | OUTPATIENT
Start: 2024-06-07 | End: 2024-06-08

## 2024-06-06 RX ADMIN — INSULIN DETEMIR 26 UNITS: 100 INJECTION, SOLUTION SUBCUTANEOUS at 21:16

## 2024-06-06 RX ADMIN — KETOROLAC TROMETHAMINE 15 MG: 30 INJECTION, SOLUTION INTRAMUSCULAR; INTRAVENOUS at 19:56

## 2024-06-06 RX ADMIN — SODIUM CHLORIDE 1000 ML: 0.9 INJECTION, SOLUTION INTRAVENOUS at 15:28

## 2024-06-06 RX ADMIN — IOHEXOL 100 ML: 350 INJECTION, SOLUTION INTRAVENOUS at 14:40

## 2024-06-06 RX ADMIN — DICLOFENAC SODIUM 2 G: 10 GEL TOPICAL at 20:08

## 2024-06-06 RX ADMIN — CEFEPIME HYDROCHLORIDE 2000 MG: 2 INJECTION, SOLUTION INTRAVENOUS at 15:28

## 2024-06-06 RX ADMIN — INSULIN LISPRO 1 UNITS: 100 INJECTION, SOLUTION INTRAVENOUS; SUBCUTANEOUS at 17:46

## 2024-06-06 RX ADMIN — SODIUM CHLORIDE 75 ML/HR: 0.9 INJECTION, SOLUTION INTRAVENOUS at 20:03

## 2024-06-06 RX ADMIN — VANCOMYCIN HYDROCHLORIDE 1750 MG: 10 INJECTION, POWDER, LYOPHILIZED, FOR SOLUTION INTRAVENOUS at 16:41

## 2024-06-06 RX ADMIN — PRAVASTATIN SODIUM 80 MG: 80 TABLET ORAL at 17:32

## 2024-06-06 RX ADMIN — INSULIN LISPRO 3 UNITS: 100 INJECTION, SOLUTION INTRAVENOUS; SUBCUTANEOUS at 21:17

## 2024-06-06 RX ADMIN — HEPARIN SODIUM 5000 UNITS: 5000 INJECTION, SOLUTION INTRAVENOUS; SUBCUTANEOUS at 17:46

## 2024-06-06 NOTE — ED PROVIDER NOTES
History  Chief Complaint   Patient presents with    Painful Urination     States having painful urination for last 2 days with left side flank pain     Patient presents for evaluation of left-sided flank pain with painful urination for the last 2 days.  No reported fever or chills.  Patient has bilateral nephrostomy tubes and a history of prostate cancer with mets to the bone.      History provided by:  Patient   used: Yes ([employer] ipad)        Prior to Admission Medications   Prescriptions Last Dose Informant Patient Reported? Taking?   Alcohol Swabs 70 % PADS   No No   Sig: To clean the skin prior to injecting insulin   Blood Glucose Monitoring Suppl (OneTouch Verio Reflect) w/Device KIT  Self, Family Member No No   Sig: Check blood sugars once daily. Please substitute with appropriate alternative as covered by patient's insurance. Dx: E11.65   Diclofenac Sodium (VOLTAREN) 1 %   No No   Sig: Apply 2 g topically 4 (four) times a day   Easy Touch Pen Needles 31G X 6 MM MISC  Family Member, Self No No   Sig: Use daily at bedtime   Levemir FlexPen 100 units/mL injection pen   No No   Sig: Inject 32 Units under the skin daily at bedtime   Multiple Vitamins-Minerals (Sentry) TABS  Family Member, Self Yes No   Sig: Take 1 tablet by mouth daily   OneTouch Delica Lancets 33G MISC   No No   Sig: Check blood sugars once daily. Please substitute with appropriate alternative as covered by patient's insurance. Dx: E11.65   Spacer/Aero-Holding Chambers (AEROCHAMBER MINI CHAMBER) BILLY  Family Member, Self No No   Sig: by Does not apply route see administration instructions   Patient not taking: Reported on 4/25/2024   abiraterone (ZYTIGA) 250 mg tablet  Self, Family Member No No   Sig: Take 1 tablet (250 mg total) by mouth daily With a low fat meal   acetaminophen (TYLENOL) 325 mg tablet   No No   Sig: Take 3 tablets (975 mg total) by mouth every 8 (eight) hours as needed for mild pain or moderate pain    albuterol (Ventolin HFA) 90 mcg/act inhaler   No No   Sig: Inhale 2 puffs every 6 (six) hours as needed for wheezing   dexamethasone (DECADRON) 0.5 mg tablet   No No   Sig: TAKE 1 TABLET (0.5 MG TOTAL) BY MOUTH DAILY WITH BREAKFAST   enzalutamide (XTANDI) 40 mg capsule   No No   Sig: Take 4 capsules (160 mg total) by mouth daily   glimepiride (AMARYL) 2 mg tablet   No No   Sig: Take 1 tablet (2 mg total) by mouth daily with dinner   glimepiride (AMARYL) 4 mg tablet   No No   Sig: Take 1 tablet (4 mg total) by mouth daily with breakfast   glucose blood (OneTouch Verio) test strip   No No   Sig: Check blood sugars twice a daily. Please substitute with appropriate alternative as covered by patient's insurance. Dx: E11.65   lisinopril (ZESTRIL) 10 mg tablet   Yes No   Sig: NARCISO TANMAY TABLETA VIA ORAL DIARIAMENTE   metFORMIN (GLUCOPHAGE) 1000 MG tablet   No No   Sig: Take 1 tablet (1,000 mg total) by mouth 2 (two) times a day with meals   naloxone (NARCAN) 4 mg/0.1 mL nasal spray   No No   Sig: Administer 1 spray into a nostril. If no response after 2-3 minutes, give another dose in the other nostril using a new spray.   omega-3-acid ethyl esters (LOVAZA) 1 g capsule   Yes No   polyethylene glycol (MIRALAX) 17 g packet   No No   Sig: Take 17 g by mouth daily as needed (for constipation)   rosuvastatin (CRESTOR) 10 MG tablet   No No   Sig: Take 1 tablet (10 mg total) by mouth daily at bedtime   senna-docusate sodium (SENOKOT S) 8.6-50 mg per tablet   No No   Sig: Take 1 tablet by mouth daily   sodium chloride, PF, 0.9 %   No No   Sig: 10 mL by Intracatheter route daily Intracatheter flushing daily. May substitute prefilled syringe with normal saline 10 mL vials, 10 mL syringes, and 18 g blunt needles   traMADol (ULTRAM) 50 mg tablet   No No   Sig: Take 1 tablet (50 mg total) by mouth every 12 (twelve) hours as needed for severe pain for up to 10 doses      Facility-Administered Medications: None       Past Medical  History:   Diagnosis Date    COVID-19 01/15/2024    Diabetes mellitus (HCC)     Elevated PSA 2023    High cholesterol     Hypertension     Prostate cancer (HCC)        Past Surgical History:   Procedure Laterality Date    IR NEPHROSTOMY TUBE CHECK/CHANGE/REPOSITION/REINSERTION/UPSIZE  2023    IR NEPHROSTOMY TUBE CHECK/CHANGE/REPOSITION/REINSERTION/UPSIZE  2023    IR NEPHROSTOMY TUBE CHECK/CHANGE/REPOSITION/REINSERTION/UPSIZE  2024    IR NEPHROSTOMY TUBE CHECK/CHANGE/REPOSITION/REINSERTION/UPSIZE  2024    IR NEPHROSTOMY TUBE CHECK/CHANGE/REPOSITION/REINSERTION/UPSIZE  3/4/2024    IR NEPHROSTOMY TUBE CHECK/CHANGE/REPOSITION/REINSERTION/UPSIZE  3/20/2024    IR NEPHROSTOMY TUBE PLACEMENT  2023    bilateral    IR OTHER  1/15/2024    US GUIDED PROSTATE BIOPSY         Family History   Problem Relation Age of Onset    Uterine cancer Mother     Liver cancer Father     No Known Problems Sister     No Known Problems Brother     No Known Problems Son     Diabetes type II Daughter     No Known Problems Daughter     Cancer Daughter      I have reviewed and agree with the history as documented.    E-Cigarette/Vaping    E-Cigarette Use Never User      E-Cigarette/Vaping Substances    Nicotine No     THC No     CBD No     Flavoring No     Other No     Unknown No      Social History     Tobacco Use    Smoking status: Former     Current packs/day: 0.00     Types: Cigarettes     Quit date:      Years since quittin.4     Passive exposure: Past    Smokeless tobacco: Never    Tobacco comments:     States he only smoked on the weekends   Vaping Use    Vaping status: Never Used   Substance Use Topics    Alcohol use: Not Currently     Comment: socially    Drug use: Never       Review of Systems   Genitourinary:  Positive for dysuria and flank pain.   All other systems reviewed and are negative.      Physical Exam  Physical Exam  Vitals and nursing note reviewed.   Constitutional:       General: He is  not in acute distress.     Appearance: He is ill-appearing.   Cardiovascular:      Rate and Rhythm: Normal rate and regular rhythm.   Pulmonary:      Effort: Pulmonary effort is normal. No respiratory distress.      Breath sounds: Normal breath sounds.   Abdominal:      General: There is no distension.      Tenderness: There is no abdominal tenderness. There is left CVA tenderness. There is no right CVA tenderness or guarding.      Comments: Bilateral nephrostomy tubes in place with urine draining into both bags   Neurological:      General: No focal deficit present.      Mental Status: He is alert and oriented to person, place, and time.         Vital Signs  ED Triage Vitals   Temperature Pulse Respirations Blood Pressure SpO2   06/06/24 1306 06/06/24 1306 06/06/24 1306 06/06/24 1306 06/06/24 1306   97.5 °F (36.4 °C) 89 18 106/57 98 %      Temp Source Heart Rate Source Patient Position - Orthostatic VS BP Location FiO2 (%)   06/06/24 1612 06/06/24 1612 06/06/24 1612 06/06/24 1612 --   Oral Monitor Lying Left arm       Pain Score       06/06/24 1306       8           Vitals:    06/06/24 1612 06/06/24 1615 06/06/24 1645 06/06/24 1700   BP: 137/62 137/62 147/72 134/63   Pulse: 88 70 66 66   Patient Position - Orthostatic VS: Lying            Visual Acuity      ED Medications  Medications   acetaminophen (TYLENOL) tablet 975 mg (has no administration in time range)   albuterol (PROVENTIL HFA,VENTOLIN HFA) inhaler 2 puff (has no administration in time range)   dexamethasone (DECADRON) tablet 0.5 mg (has no administration in time range)   Diclofenac Sodium (VOLTAREN) 1 % topical gel 2 g (has no administration in time range)   enzalutamide (XTANDI) capsule 160 mg (has no administration in time range)   polyethylene glycol (MIRALAX) packet 17 g (has no administration in time range)   pravastatin (PRAVACHOL) tablet 80 mg (80 mg Oral Given 6/6/24 1732)   senna-docusate sodium (SENOKOT S) 8.6-50 mg per tablet 1 tablet (has no  administration in time range)   sodium chloride (PF) 0.9 % injection 10 mL (has no administration in time range)   traMADol (ULTRAM) tablet 50 mg (has no administration in time range)   trimethobenzamide (TIGAN) IM injection 200 mg (has no administration in time range)   heparin (porcine) subcutaneous injection 5,000 Units (5,000 Units Subcutaneous Given 6/6/24 1746)   insulin detemir (LEVEMIR) subcutaneous injection 26 Units (has no administration in time range)   insulin lispro (HumALOG/ADMELOG) 100 units/mL subcutaneous injection 1-5 Units (1 Units Subcutaneous Given 6/6/24 1746)   sodium chloride 0.9 % infusion (has no administration in time range)   iohexol (OMNIPAQUE) 350 MG/ML injection (MULTI-DOSE) 100 mL (100 mL Intravenous Given 6/6/24 1440)   cefepime (MAXIPIME) IVPB (premix in dextrose) 2,000 mg 50 mL (0 mg Intravenous Stopped 6/6/24 1612)   sodium chloride 0.9 % bolus 1,000 mL (1,000 mL Intravenous New Bag 6/6/24 1528)   vancomycin (VANCOCIN) 1,750 mg in sodium chloride 0.9 % 500 mL IVPB (1,750 mg Intravenous New Bag 6/6/24 1641)       Diagnostic Studies  Results Reviewed       Procedure Component Value Units Date/Time    Lactic acid 2 Hours [[id number]]  (Abnormal) Collected: 06/06/24 1633    Lab Status: Final result Specimen: Blood from Hand, Left Updated: 06/06/24 1653     LACTIC ACID 3.1 mmol/L     Narrative:      Result may be elevated if tourniquet was used during collection.    Lactic acid, plasma (w/reflex if result > 2.0) [[id number]]  (Abnormal) Collected: 06/06/24 1441    Lab Status: Final result Specimen: Blood from Arm, Left Updated: 06/06/24 1514     LACTIC ACID 2.5 mmol/L     Narrative:      Result may be elevated if tourniquet was used during collection.    Urine Microscopic [[id number]]  (Abnormal) Collected: 06/06/24 1349    Lab Status: Final result Specimen: Urine, Left Nephrostomy Updated: 06/06/24 1449     RBC, UA None Seen /hpf      WBC, UA Innumerable /hpf      Epithelial Cells None  Seen /hpf      Bacteria, UA Innumerable /hpf      Ca Oxalate Chanelle, UA Occasional /hpf      URINE COMMENT Less than 10mL of specimen received. Microscopic performed from concentrated specimen.     Budding Yeast Present    Urine culture [[id number]] Collected: 06/06/24 1349    Lab Status: In process Specimen: Urine, Left Nephrostomy Updated: 06/06/24 1448    UA w Reflex to Microscopic w Reflex to Culture [[id number]]  (Abnormal) Collected: 06/06/24 1349    Lab Status: Final result Specimen: Urine, Left Nephrostomy Updated: 06/06/24 1447     Color, UA Yellow     Clarity, UA Cloudy     Specific Gravity, UA 1.020     pH, UA 7.5     Leukocytes, UA Large     Nitrite, UA Negative     Protein, UA 30 (1+) mg/dl      Glucose, UA 1000 (1%) mg/dl      Ketones, UA Negative mg/dl      Urobilinogen, UA <2.0 mg/dl      Bilirubin, UA Negative     Occult Blood, UA Moderate     URINE COMMENT Less than 10mL of specimen received. Microscopic performed from concentrated specimen.    Blood culture #2 [[id number]] Collected: 06/06/24 1441    Lab Status: In process Specimen: Blood from Hand, Left Updated: 06/06/24 1447    Blood culture #1 [[id number]] Collected: 06/06/24 1441    Lab Status: In process Specimen: Blood from Arm, Left Updated: 06/06/24 1447    Comprehensive metabolic panel [[id number]]  (Abnormal) Collected: 06/06/24 1357    Lab Status: Final result Specimen: Blood from Arm, Right Updated: 06/06/24 1425     Sodium 132 mmol/L      Potassium 5.1 mmol/L      Chloride 101 mmol/L      CO2 20 mmol/L      ANION GAP 11 mmol/L      BUN 16 mg/dL      Creatinine 0.96 mg/dL      Glucose 197 mg/dL      Calcium 9.7 mg/dL      AST 26 U/L      ALT 18 U/L      Alkaline Phosphatase 86 U/L      Total Protein 7.4 g/dL      Albumin 3.8 g/dL      Total Bilirubin 0.53 mg/dL      eGFR 79 ml/min/1.73sq m     Narrative:      [employer] guidelines for Chronic Kidney Disease (CKD):     Stage 1 with normal or high GFR (GFR > 90  mL/min/1.73 square meters)    Stage 2 Mild CKD (GFR = 60-89 mL/min/1.73 square meters)    Stage 3A Moderate CKD (GFR = 45-59 mL/min/1.73 square meters)    Stage 3B Moderate CKD (GFR = 30-44 mL/min/1.73 square meters)    Stage 4 Severe CKD (GFR = 15-29 mL/min/1.73 square meters)    Stage 5 End Stage CKD (GFR <15 mL/min/1.73 square meters)  Note: GFR calculation is accurate only with a steady state creatinine    CBC and differential [[id number]]  (Abnormal) Collected: 06/06/24 1357    Lab Status: Final result Specimen: Blood from Arm, Right Updated: 06/06/24 1402     WBC 17.64 Thousand/uL      RBC 4.27 Million/uL      Hemoglobin 13.1 g/dL      Hematocrit 40.0 %      MCV 94 fL      MCH 30.7 pg      MCHC 32.8 g/dL      RDW 13.8 %      MPV 9.5 fL      Platelets 372 Thousands/uL      nRBC 0 /100 WBCs      Segmented % 79 %      Immature Grans % 1 %      Lymphocytes % 14 %      Monocytes % 5 %      Eosinophils Relative 1 %      Basophils Relative 0 %      Absolute Neutrophils 13.93 Thousands/µL      Absolute Immature Grans 0.14 Thousand/uL      Absolute Lymphocytes 2.53 Thousands/µL      Absolute Monocytes 0.88 Thousand/µL      Eosinophils Absolute 0.11 Thousand/µL      Basophils Absolute 0.05 Thousands/µL     Urine Microscopic [[id number]]  (Abnormal) Collected: 06/06/24 1328    Lab Status: Final result Specimen: Urine, Right Nephrostomy Updated: 06/06/24 1352     RBC, UA       Field obscured, unable to enumerate     /hpf     WBC, UA Innumerable /hpf      Epithelial Cells       Field obscured, unable to enumerate     /hpf     Bacteria, UA       Field obscured, unable to enumerate     /hpf    Urine culture [[id number]] Collected: 06/06/24 1328    Lab Status: In process Specimen: Urine, Right Nephrostomy Updated: 06/06/24 1352    UA w Reflex to Microscopic w Reflex to Culture [[id number]]  (Abnormal) Collected: 06/06/24 1328    Lab Status: Final result Specimen: Urine, Right Nephrostomy Updated: 06/06/24 1334     Color, UA Dark  Yellow     Clarity, UA Turbid     Specific Gravity, UA 1.025     pH, UA 6.0     Leukocytes, UA Large     Nitrite, UA Positive     Protein,  (3+) mg/dl      Glucose, UA Negative mg/dl      Ketones, UA Negative mg/dl      Urobilinogen, UA <2.0 mg/dl      Bilirubin, UA Negative     Occult Blood, UA Large                   CT abdomen pelvis with contrast   Final Result by Maxi Romero MD (06/06 1524)      1.  Slight thickening and enhancement of the left renal pelvis which may represent pyelitis given history of left flank pain. No hydronephrosis. Nephrostomy is in place.      2.  Diffusely thick-walled urinary bladder with mucosal enhancement and intraluminal gas suspicious for cystitis.      3.  Stable osseous metastasis.         Workstation performed: [id number]                    Procedures  Procedures         ED Course                               SBIRT 20yo+      Flowsheet Row Most Recent Value   Initial Alcohol Screen: US AUDIT-C     1. How often do you have a drink containing alcohol? 0 Filed at: 06/06/2024 1306   2. How many drinks containing alcohol do you have on a typical day you are drinking?  0 Filed at: 06/06/2024 1306   3a. Male UNDER 65: How often do you have five or more drinks on one occasion? 0 Filed at: 06/06/2024 1306   3b. FEMALE Any Age, or MALE 65+: How often do you have 4 or more drinks on one occassion? 0 Filed at: 06/06/2024 1306   Audit-C Score 0 Filed at: 06/06/2024 1306   YANNI: How many times in the past year have you...    Used an illegal drug or used a prescription medication for non-medical reasons? Never Filed at: 06/06/2024 1306                      Medical Decision Making  Pulse ox 97% on room air indicating adequate oxygenation.        Differential diagnose include but not limited to pyelonephritis, urinary tract infection, kidney stone, dislodgment of nephrostomy tube    CT scan and UA obtained from left nephrostomy bag consistent with urinary tract infection and  cystitis.  This is consistent with patient's symptoms.  Elevated white count at 17 will admit for IV antibiotics.  Case discussed with Dr. Mesa hospitalist on-call for admission to family practice service.    Amount and/or Complexity of Data Reviewed  Labs: ordered.  Radiology: ordered.    Risk  Prescription drug management.  Decision regarding hospitalization.             Disposition  Final diagnoses:   Pyelonephritis     Time reflects when diagnosis was documented in both MDM as applicable and the Disposition within this note       Time User Action Codes Description Comment    6/6/2024  3:35 PM Greg Muñoz Add [N12] Pyelonephritis     6/6/2024  5:02 PM aYs Webber Add [Z93.6] Nephrostomy status (HCC)           ED Disposition       ED Disposition   Admit    Condition   Stable    Date/Time   Thu Jun 6, 2024 3629    Comment   Case was discussed with Dr. Aguilar and the patient's admission status was agreed to be Admission Status: inpatient status to the service of Dr. Aguilar.               Follow-up Information    None         Current Discharge Medication List        CONTINUE these medications which have NOT CHANGED    Details   abiraterone (ZYTIGA) 250 mg tablet Take 1 tablet (250 mg total) by mouth daily With a low fat meal  Qty: 30 tablet, Refills: 11    Associated Diagnoses: Prostate cancer (HCC)      acetaminophen (TYLENOL) 325 mg tablet Take 3 tablets (975 mg total) by mouth every 8 (eight) hours as needed for mild pain or moderate pain    Associated Diagnoses: Back pain; Prostate cancer metastatic to bone (HCC)      albuterol (Ventolin HFA) 90 mcg/act inhaler Inhale 2 puffs every 6 (six) hours as needed for wheezing  Qty: 18 g, Refills: 0    Comments: Substitution to a formulary equivalent within the same pharmaceutical class is authorized.  Associated Diagnoses: SOB (shortness of breath)      Alcohol Swabs 70 % PADS To clean the skin prior to injecting insulin  Qty: 100 each, Refills: 1     Associated Diagnoses: Type 2 diabetes mellitus with hyperglycemia, with long-term current use of insulin ([facility])      Blood Glucose Monitoring Suppl (OneTouch Verio Reflect) w/Device KIT Check blood sugars once daily. Please substitute with appropriate alternative as covered by patient's insurance. Dx: E11.65  Qty: 1 kit, Refills: 0    Associated Diagnoses: Type 2 diabetes mellitus with other specified complication, with long-term current use of insulin ([facility])      dexamethasone (DECADRON) 0.5 mg tablet TAKE 1 TABLET (0.5 MG TOTAL) BY MOUTH DAILY WITH BREAKFAST  Qty: 90 tablet, Refills: 1    Comments: DX Code Needed  PATIENT ASKING FOR REFILLS.  Associated Diagnoses: Malignant neoplasm of prostate ([facility])      Diclofenac Sodium (VOLTAREN) 1 % Apply 2 g topically 4 (four) times a day  Qty: 100 g, Refills: 3    Associated Diagnoses: Prostate cancer metastatic to bone ([facility])      Easy Touch Pen Needles 31G X 6 MM MISC Use daily at bedtime  Qty: 100 each, Refills: 3    Associated Diagnoses: Type 2 diabetes mellitus with other specified complication, with long-term current use of insulin ([facility])      enzalutamide (XTANDI) 40 mg capsule Take 4 capsules (160 mg total) by mouth daily  Qty: 120 capsule, Refills: 6    Associated Diagnoses: Prostate cancer metastatic to bone ([facility])      !! glimepiride (AMARYL) 2 mg tablet Take 1 tablet (2 mg total) by mouth daily with dinner  Qty: 90 tablet, Refills: 3    Associated Diagnoses: Type 2 diabetes mellitus with other specified complication, with long-term current use of insulin ([facility])      !! glimepiride (AMARYL) 4 mg tablet Take 1 tablet (4 mg total) by mouth daily with breakfast  Qty: 90 tablet, Refills: 3    Associated Diagnoses: Type 2 diabetes mellitus with other specified complication, with long-term current use of insulin ([facility])      glucose blood (OneTouch Verio) test strip Check blood sugars twice a daily. Please substitute with appropriate alternative as covered by patient's  insurance. Dx: E11.65  Qty: 200 each, Refills: 3    Associated Diagnoses: Type 2 diabetes mellitus with other specified complication, with long-term current use of insulin ([facility])      Levemir FlexPen 100 units/mL injection pen Inject 32 Units under the skin daily at bedtime  Qty: 15 mL, Refills: 1    Associated Diagnoses: Type 2 diabetes mellitus with other specified complication, with long-term current use of insulin ([facility])      lisinopril (ZESTRIL) 10 mg tablet NARCISO TANMAY TABLETA VIA ORAL DIARIAMENTE      metFORMIN (GLUCOPHAGE) 1000 MG tablet Take 1 tablet (1,000 mg total) by mouth 2 (two) times a day with meals  Qty: 180 tablet, Refills: 1    Associated Diagnoses: Type 2 diabetes mellitus with other specified complication, with long-term current use of insulin ([facility])      Multiple Vitamins-Minerals (Sentry) TABS Take 1 tablet by mouth daily      naloxone (NARCAN) 4 mg/0.1 mL nasal spray Administer 1 spray into a nostril. If no response after 2-3 minutes, give another dose in the other nostril using a new spray.  Qty: 1 each, Refills: 0    Associated Diagnoses: Multiple lesions of metastatic malignancy ([facility])      omega-3-acid ethyl esters (LOVAZA) 1 g capsule       OneTouch Delica Lancets 33G MISC Check blood sugars once daily. Please substitute with appropriate alternative as covered by patient's insurance. Dx: E11.65  Qty: 100 each, Refills: 3    Associated Diagnoses: Type 2 diabetes mellitus with other specified complication, with long-term current use of insulin ([facility])      polyethylene glycol (MIRALAX) 17 g packet Take 17 g by mouth daily as needed (for constipation)  Qty: 100 each, Refills: 1    Associated Diagnoses: Constipation, unspecified constipation type      rosuvastatin (CRESTOR) 10 MG tablet Take 1 tablet (10 mg total) by mouth daily at bedtime  Qty: 90 tablet, Refills: 1    Associated Diagnoses: Type 2 diabetes mellitus with other specified complication, with long-term current use of insulin ([facility])       senna-docusate sodium (SENOKOT S) 8.6-50 mg per tablet Take 1 tablet by mouth daily  Qty: 90 tablet, Refills: 1    Associated Diagnoses: Constipation, unspecified constipation type      sodium chloride, PF, 0.9 % 10 mL by Intracatheter route daily Intracatheter flushing daily. May substitute prefilled syringe with normal saline 10 mL vials, 10 mL syringes, and 18 g blunt needles  Qty: 600 mL, Refills: 2    Associated Diagnoses: Nephrostomy status (HCC)      Spacer/Aero-Holding Chambers (AEROCHAMBER MINI CHAMBER) BILLY by Does not apply route see administration instructions  Qty: 1 Device, Refills: 0    Associated Diagnoses: Cough      traMADol (ULTRAM) 50 mg tablet Take 1 tablet (50 mg total) by mouth every 12 (twelve) hours as needed for severe pain for up to 10 doses  Qty: 10 tablet, Refills: 0    Associated Diagnoses: Multiple lesions of metastatic malignancy (HCC)       !! - Potential duplicate medications found. Please discuss with provider.          No discharge procedures on file.    PDMP Review         Value Time User    PDMP Reviewed  Yes 6/26/2023  3:59 PM Rubina De Los Santos MD            ED Provider  Electronically Signed by             Greg Muñoz DO  06/06/24 1369

## 2024-06-06 NOTE — PROGRESS NOTES
Dr. Springer    Called to speak to Oliver and his son said he is at the [facility] right now because of his sugar problem.    His son said he did not has any more Metformin at the house that is the reason he is asking for more but I explained to him that the pharmacy will may be not refill with the inurance because it is to early.    Maybe dr. Springer you can check at the hospital. thanks   No

## 2024-06-06 NOTE — ASSESSMENT & PLAN NOTE
Lab Results   Component Value Date    EGFR 79 06/06/2024    EGFR 86 05/31/2024    EGFR 91 05/30/2024    CREATININE 0.96 06/06/2024    CREATININE 0.87 05/31/2024    CREATININE 0.76 05/30/2024     Chronic, continue to renally dose medications, monitor I&O's

## 2024-06-06 NOTE — ASSESSMENT & PLAN NOTE
Presented with left-sided flank pain, dysuria.    WBC 17.6, UA showed large leuks, pos nitrates, numerous bacteria and WBCs    CT in ED: Slight thickening and enhancement of the left renal pelvis which may represent pyelitis given history of left flank pain. No hydronephrosis. Nephrostomy is in place  History of complicated UTI - within past year, previous urine cultures grew E. coli, Pseudomonas and Enterococcus sensitive to vancomycin      Recently admitted with UTI treated with Rocephin, cefepime x 3 days    Urine culture: Pseudomonas     Plan:  -Start Vancomycin (6/6 - )  -Start Cefepime (6/6 - )  -Pending urine cultures  -Monitor fever curve

## 2024-06-06 NOTE — ED NOTES
Right and left nephrostomy tubes intact.Wearing brace for back.Spouse assisting with changing into gown.     Dee Dee Vincent RN  06/06/24 [id number]

## 2024-06-06 NOTE — H&P
"History and Physical - [facility]  Family Medicine Residency     Patient Information: Oliver Astudillo 73 y.o. male MRN: [id number]  Unit/Bed#: [address] [place] [address] Encounter: [id number]  Admitting Physician: Luis Mesa MD   PCP: Eugenia Rodriguez MD  Date of Admission:  06/06/24     Assessment and Plan     Electrolyte abnormality  Assessment & Plan  Mild hyponatremia, start fluid hydration. Monitor electrolytes and replete     Nephrostomy status (HCC)  Assessment & Plan  Chronic, has bilateral nephrostomy tubes in place. requiring frequent changes and daily BID flushes. Reports current good output from bilateral tubes.     CT abdomen/pelvis in ED shows nephrostomy tubes in place, left pyelitis     Patient reports he missed recent tube exchange     Plan:  -Consult IR for bilateral nephrostomy tube change     Prostate cancer metastatic to bone (HCC)  Assessment & Plan  Chronic, following oncology, currently taking Xtinda and dexamethasone daily. Follows with oncology, continue pain control with tramadol and tylenol    Type 2 diabetes mellitus, with long-term current use of insulin ([facility])  Assessment & Plan  Lab Results   Component Value Date    HGBA1C 9.2 (A) 04/04/2024       No results for input(s): \"POCGLU\" in the last 72 hours.    Blood Sugar Average: Last 72 hrs:    Home medications include Metformin 1000 mg BID, Glimepiride 4mg breakfast & 2 mg HS, Levemir 32 U HS.    Plan:  -Hold oral agents  -Decrease Levemir to 26U   -Monitor for HS hypoglycemia in setting of infection  -Continue carb controlled diet     * Pyelonephritis  Assessment & Plan  Presented with left-sided flank pain, dysuria.    WBC 17.6, UA showed large leuks, pos nitrates, numerous bacteria and WBCs    CT in ED: Slight thickening and enhancement of the left renal pelvis which may represent pyelitis given history of left flank pain. No hydronephrosis. Nephrostomy is in place  History of complicated UTI - within past year, " previous urine cultures grew E. coli, Pseudomonas and Enterococcus sensitive to vancomycin      Recently admitted with UTI treated with Rocephin, cefepime x 3 days     Plan:  -Start Vancomycin (6/6 - )  -Start Cefepime (6/6 - )  -Pending urine cultures  -Monitor fever curve      Fluids: NSS 75 cc/hr  Electrolyte repletion: Replete as needed.  Nutrition:        Diet Orders   (From admission, onward)                 Start     Ordered    06/06/24 1702  Diet Rupesh/CHO Controlled; Consistent Carbohydrate Diet Level 2 (5 carb servings/75 grams CHO/meal)  Diet effective now        References:    Adult Nutrition Support Algorithm    RD Therapeutic Diet Order Protocol   Question Answer Comment   Diet Type Rupehs/CHO Controlled    Rupesh/CHO Controlled Consistent Carbohydrate Diet Level 2 (5 carb servings/75 grams CHO/meal)    RD to adjust diet per protocol? Yes        06/06/24 1701    06/06/24 1700  Room Service  Once        Question:  Type of Service  Answer:  Room Service - Appropriate with Assistance    06/06/24 1659                   VTE Prophylaxis: VTE Score: 4 Moderate Risk (Score 3-4) - Pharmacological DVT Prophylaxis Ordered: heparin.  Code Status: Level 1 - Full Code  Anticipated Length of Stay:  Patient will be admitted on an Inpatient basis with an anticipated length of stay of  greater than 2 midnights.     Justification for Hospital Stay: Pyelonephritis  Total Time for Visit, including Counseling / Coordination of Care: 45 mins. Greater than 50% of this total time spent on direct patient counseling and coordination of care.  Patient Information Sharing: With the consent of Oliver Astudillo, their loved ones were notified today by inpatient team of the patient’s condition and current plan. All questions answered.     Chief Complaint:     Chief Complaint   Patient presents with    Painful Urination     States having painful urination for last 2 days with left side flank pain       Subjective      History of Present  Illness:     Oliver Astudillo is a 73 y.o. male who presents with back pain, increased painful urination, constipation. Past medical history includes prostate cancer with mets to the bone, hx of complicated UTI, bilateral nephrostomy tubes, T2DM, CKD, HTN, HLD, hypoglycemia. Patient reports he did not get his nephrostomy tube change when he was supposed to. Noting increased discomfort since recent discharge, has been following with outpatient oncology.      Review of Systems:  Review of Systems   Constitutional:  Negative for activity change, fatigue and fever.   HENT: Negative.     Respiratory:  Negative for cough, shortness of breath, wheezing and stridor.    Cardiovascular:  Negative for chest pain and leg swelling.   Gastrointestinal:  Positive for abdominal pain and constipation. Negative for diarrhea, rectal pain and vomiting.   Genitourinary:  Positive for difficulty urinating and flank pain. Negative for frequency.   Musculoskeletal:  Positive for back pain.   Skin: Negative.         Past Medical History:   Diagnosis Date    COVID-19 01/15/2024    Diabetes mellitus (HCC)     Elevated PSA 06/21/2023    High cholesterol     Hypertension     Prostate cancer (HCC)      Past Surgical History:   Procedure Laterality Date    IR NEPHROSTOMY TUBE CHECK/CHANGE/REPOSITION/REINSERTION/UPSIZE  9/28/2023    IR NEPHROSTOMY TUBE CHECK/CHANGE/REPOSITION/REINSERTION/UPSIZE  12/28/2023    IR NEPHROSTOMY TUBE CHECK/CHANGE/REPOSITION/REINSERTION/UPSIZE  1/31/2024    IR NEPHROSTOMY TUBE CHECK/CHANGE/REPOSITION/REINSERTION/UPSIZE  2/2/2024    IR NEPHROSTOMY TUBE CHECK/CHANGE/REPOSITION/REINSERTION/UPSIZE  3/4/2024    IR NEPHROSTOMY TUBE CHECK/CHANGE/REPOSITION/REINSERTION/UPSIZE  3/20/2024    IR NEPHROSTOMY TUBE PLACEMENT  06/22/2023    bilateral    IR OTHER  1/15/2024    US GUIDED PROSTATE BIOPSY       No Known Allergies  Prior to Admission Medications   Prescriptions Last Dose Informant Patient Reported? Taking?   Alcohol Swabs 70  % PADS   No No   Sig: To clean the skin prior to injecting insulin   Blood Glucose Monitoring Suppl (OneTouch Verio Reflect) w/Device KIT  Self, Family Member No No   Sig: Check blood sugars once daily. Please substitute with appropriate alternative as covered by patient's insurance. Dx: E11.65   Diclofenac Sodium (VOLTAREN) 1 %   No No   Sig: Apply 2 g topically 4 (four) times a day   Easy Touch Pen Needles 31G X 6 MM MISC  Family Member, Self No No   Sig: Use daily at bedtime   Levemir FlexPen 100 units/mL injection pen   No No   Sig: Inject 32 Units under the skin daily at bedtime   Multiple Vitamins-Minerals (Sentry) TABS  Family Member, Self Yes No   Sig: Take 1 tablet by mouth daily   OneTouch Delica Lancets 33G MISC   No No   Sig: Check blood sugars once daily. Please substitute with appropriate alternative as covered by patient's insurance. Dx: E11.65   Spacer/Aero-Holding Chambers (AEROCHAMBER MINI CHAMBER) BILLY  Family Member, Self No No   Sig: by Does not apply route see administration instructions   Patient not taking: Reported on 4/25/2024   abiraterone (ZYTIGA) 250 mg tablet  Self, Family Member No No   Sig: Take 1 tablet (250 mg total) by mouth daily With a low fat meal   acetaminophen (TYLENOL) 325 mg tablet   No No   Sig: Take 3 tablets (975 mg total) by mouth every 8 (eight) hours as needed for mild pain or moderate pain   albuterol (Ventolin HFA) 90 mcg/act inhaler   No No   Sig: Inhale 2 puffs every 6 (six) hours as needed for wheezing   dexamethasone (DECADRON) 0.5 mg tablet   No No   Sig: TAKE 1 TABLET (0.5 MG TOTAL) BY MOUTH DAILY WITH BREAKFAST   enzalutamide (XTANDI) 40 mg capsule   No No   Sig: Take 4 capsules (160 mg total) by mouth daily   glimepiride (AMARYL) 2 mg tablet   No No   Sig: Take 1 tablet (2 mg total) by mouth daily with dinner   glimepiride (AMARYL) 4 mg tablet   No No   Sig: Take 1 tablet (4 mg total) by mouth daily with breakfast   glucose blood (OneTouch Verio) test strip    No No   Sig: Check blood sugars twice a daily. Please substitute with appropriate alternative as covered by patient's insurance. Dx: E11.65   lisinopril (ZESTRIL) 10 mg tablet   Yes No   Sig: NARCISO TANMAY TABLETA VIA ORAL DIARIAMENTE   metFORMIN (GLUCOPHAGE) 1000 MG tablet   No No   Sig: Take 1 tablet (1,000 mg total) by mouth 2 (two) times a day with meals   naloxone (NARCAN) 4 mg/0.1 mL nasal spray   No No   Sig: Administer 1 spray into a nostril. If no response after 2-3 minutes, give another dose in the other nostril using a new spray.   omega-3-acid ethyl esters (LOVAZA) 1 g capsule   Yes No   polyethylene glycol (MIRALAX) 17 g packet   No No   Sig: Take 17 g by mouth daily as needed (for constipation)   rosuvastatin (CRESTOR) 10 MG tablet   No No   Sig: Take 1 tablet (10 mg total) by mouth daily at bedtime   senna-docusate sodium (SENOKOT S) 8.6-50 mg per tablet   No No   Sig: Take 1 tablet by mouth daily   sodium chloride, PF, 0.9 %   No No   Sig: 10 mL by Intracatheter route daily Intracatheter flushing daily. May substitute prefilled syringe with normal saline 10 mL vials, 10 mL syringes, and 18 g blunt needles   traMADol (ULTRAM) 50 mg tablet   No No   Sig: Take 1 tablet (50 mg total) by mouth every 12 (twelve) hours as needed for severe pain for up to 10 doses      Facility-Administered Medications: None     Social History     Socioeconomic History    Marital status: /Civil Union     Spouse name: Not on file    Number of children: 3    Years of education: Not on file    Highest education level: Not on file   Occupational History    Not on file   Tobacco Use    Smoking status: Former     Current packs/day: 0.00     Types: Cigarettes     Quit date:      Years since quittin.4     Passive exposure: Past    Smokeless tobacco: Never    Tobacco comments:     States he only smoked on the weekends   Vaping Use    Vaping status: Never Used   Substance and Sexual Activity    Alcohol use: Not Currently  "    Comment: socially    Drug use: Never    Sexual activity: Yes     Partners: Female   Other Topics Concern    Not on file   Social History Narrative    Not on file     Social Determinants of Health     Financial Resource Strain: Low Risk  (4/12/2024)    Overall Financial Resource Strain (CARDIA)     Difficulty of Paying Living Expenses: Not hard at all   Food Insecurity: No Food Insecurity (5/30/2024)    Hunger Vital Sign     Worried About Running Out of Food in the Last Year: Never true     Ran Out of Food in the Last Year: Never true   Transportation Needs: No Transportation Needs (6/1/2024)    Received from [facility] : Transportation     Lack of Transportation (Medical): No     Lack of Transportation (Non-Medical): No     Patient Unable or Declines to Respond: No   Physical Activity: Not on file   Stress: Not on file   Social Connections: Feeling Socially Integrated (6/1/2024)    Received from [facility] : Social Isolation     Frequency of experiencing loneliness or isolation: Never   Intimate Partner Violence: Not on file   Housing Stability: Low Risk  (5/30/2024)    Housing Stability Vital Sign     Unable to Pay for Housing in the Last Year: No     Number of Times Moved in the Last Year: 0     Homeless in the Last Year: No     Family History   Problem Relation Age of Onset    Uterine cancer Mother     Liver cancer Father     No Known Problems Sister     No Known Problems Brother     No Known Problems Son     Diabetes type II Daughter     No Known Problems Daughter     Cancer Daughter            Objective     Physical Exam:   Vitals:   Blood Pressure: 134/63 (06/06/24 1700)  Pulse: 66 (06/06/24 1700)  Temperature: 97.8 °F (36.6 °C) (06/06/24 1612)  Temp Source: Oral (06/06/24 1612)  Respirations: 18 (06/06/24 1612)  Height: 5' 8\" (172.7 cm) (06/06/24 1306)  Weight - Scale: 74.4 kg (164 lb) (06/06/24 1306)  SpO2: 97 % (06/06/24 1700)     Physical Exam  Constitutional:      "  General: He is not in acute distress.  HENT:      Mouth/Throat:      Mouth: Mucous membranes are moist.      Pharynx: Oropharynx is clear.   Eyes:      Conjunctiva/sclera: Conjunctivae normal.      Pupils: Pupils are equal, round, and reactive to light.   Cardiovascular:      Rate and Rhythm: Normal rate and regular rhythm.      Pulses: Normal pulses.      Heart sounds: Normal heart sounds.   Pulmonary:      Effort: Pulmonary effort is normal. No respiratory distress.      Breath sounds: No wheezing, rhonchi or rales.   Abdominal:      General: Bowel sounds are normal. There is no distension.      Palpations: Abdomen is soft.      Tenderness: There is abdominal tenderness (mild suprapubic). There is left CVA tenderness. There is no right CVA tenderness.      Comments: B/L nephrostomy tubes in place, covered in clean dressing. No signs of infection    Musculoskeletal:      Right lower leg: No edema.      Left lower leg: No edema.   Skin:     General: Skin is warm and dry.      Capillary Refill: Capillary refill takes less than 2 seconds.   Neurological:      General: No focal deficit present.      Mental Status: He is alert and oriented to person, place, and time.      Motor: No weakness.        Lab Results: I have personally reviewed pertinent reports.    Results from last 7 days   Lab Units 06/06/24  1357   WBC Thousand/uL 17.64*   HEMOGLOBIN g/dL 13.1   HEMATOCRIT % 40.0   PLATELETS Thousands/uL 372   SEGS PCT % 79*   LYMPHO PCT % 14   MONO PCT % 5   EOS PCT % 1     Results from last 7 days   Lab Units 06/06/24  1357 05/31/24  0627   POTASSIUM mmol/L 5.1 3.9   CHLORIDE mmol/L 101 103   CO2 mmol/L 20* 27   BUN mg/dL 16 16   CREATININE mg/dL 0.96 0.87   CALCIUM mg/dL 9.7 9.7   ALK PHOS U/L 86  --    ALT U/L 18  --    AST U/L 26  --    EGFR ml/min/1.73sq m 79 86         Results from last 7 days   Lab Units 06/06/24  1633 06/06/24  1441   LACTIC ACID mmol/L 3.1* 2.5*              Results from last 7 days   Lab Units  06/06/24  1349   COLOR UA  Yellow   CLARITY UA  Cloudy   SPEC GRAV UA  1.020   PH UA  7.5   LEUKOCYTES UA  Large*   NITRITE UA  Negative   GLUCOSE UA mg/dl 1000 (1%)*   KETONES UA mg/dl Negative   BILIRUBIN UA  Negative   BLOOD UA  Moderate*      Results from last 7 days   Lab Units 06/06/24  1349   RBC UA /hpf None Seen   WBC UA /hpf Innumerable*   EPITHELIAL CELLS WET PREP /hpf None Seen   BACTERIA UA /hpf Innumerable*                  Imaging: I have personally reviewed pertinent reports.    CT abdomen pelvis with contrast    Result Date: 6/6/2024  1.  Slight thickening and enhancement of the left renal pelvis which may represent pyelitis given history of left flank pain. No hydronephrosis. Nephrostomy is in place. 2.  Diffusely thick-walled urinary bladder with mucosal enhancement and intraluminal gas suspicious for cystitis. 3.  Stable osseous metastasis. Workstation performed: [id number]         EKG, Pathology, and Other Studies Reviewed on Admission:   EKG  Result Date: 06/06/24  None performed      Entire H&P was discussed with Dr. Mesa who agreed to what is noted above     ** Please Note: This note has been constructed using a voice recognition system **     Yas Webber,   06/06/24  7:20 PM

## 2024-06-06 NOTE — ASSESSMENT & PLAN NOTE
CT in ED: Slight thickening and enhancement of the left renal pelvis which may represent pyelitis given history of left flank pain. No hydronephrosis. Nephrostomy is in place Diffusely thick-walled urinary bladder with mucosal enhancement and intraluminal gas suspicious for cystitis     History of complicated UTI - within past year, previous urine cultures grew E. coli, Pseudomonas and Enterococcus sensitive to vancomycin     Recently admitted with UTI treated with Rocephin, cefepime x 3 days    Plan:  -Start Vancomycin (6/6 - )  -Start Cefepime (6/6 - )  -Pending urine cultures  -Monitor fever curve

## 2024-06-06 NOTE — ASSESSMENT & PLAN NOTE
Chronic, has bilateral nephrostomy tubes in place. requiring frequent changes and daily BID flushes. Reports current good output from bilateral tubes.   CT abdomen/pelvis in ED shows nephrostomy tubes in place, left pyelitis   Patient reports he missed recent tube exchange   Status post nephrostomy tube change on 6/7    Plan:  -Follow-up urine output

## 2024-06-07 ENCOUNTER — TELEPHONE (OUTPATIENT)
Age: 74
End: 2024-06-07

## 2024-06-07 ENCOUNTER — PATIENT OUTREACH (OUTPATIENT)
Age: 74
End: 2024-06-07

## 2024-06-07 ENCOUNTER — APPOINTMENT (OUTPATIENT)
Dept: NON INVASIVE DIAGNOSTICS | Facility: HOSPITAL | Age: 74
DRG: 699 | End: 2024-06-07
Payer: COMMERCIAL

## 2024-06-07 LAB
ALBUMIN SERPL BCP-MCNC: 3.1 G/DL (ref 3.5–5)
ALP SERPL-CCNC: 74 U/L (ref 34–104)
ALT SERPL W P-5'-P-CCNC: 15 U/L (ref 7–52)
ANION GAP SERPL CALCULATED.3IONS-SCNC: 6 MMOL/L (ref 4–13)
AST SERPL W P-5'-P-CCNC: 13 U/L (ref 13–39)
BASOPHILS # BLD AUTO: 0.04 THOUSANDS/ÂΜL (ref 0–0.1)
BASOPHILS NFR BLD AUTO: 0 % (ref 0–1)
BILIRUB SERPL-MCNC: 0.45 MG/DL (ref 0.2–1)
BUN SERPL-MCNC: 15 MG/DL (ref 5–25)
CALCIUM ALBUM COR SERPL-MCNC: 10 MG/DL (ref 8.3–10.1)
CALCIUM SERPL-MCNC: 9.3 MG/DL (ref 8.4–10.2)
CHLORIDE SERPL-SCNC: 108 MMOL/L (ref 96–108)
CO2 SERPL-SCNC: 24 MMOL/L (ref 21–32)
CREAT SERPL-MCNC: 0.81 MG/DL (ref 0.6–1.3)
EOSINOPHIL # BLD AUTO: 0.14 THOUSAND/ÂΜL (ref 0–0.61)
EOSINOPHIL NFR BLD AUTO: 2 % (ref 0–6)
ERYTHROCYTE [DISTWIDTH] IN BLOOD BY AUTOMATED COUNT: 13.8 % (ref 11.6–15.1)
GFR SERPL CREATININE-BSD FRML MDRD: 89 ML/MIN/1.73SQ M
GLUCOSE SERPL-MCNC: 106 MG/DL (ref 65–140)
GLUCOSE SERPL-MCNC: 109 MG/DL (ref 65–140)
GLUCOSE SERPL-MCNC: 165 MG/DL (ref 65–140)
GLUCOSE SERPL-MCNC: 176 MG/DL (ref 65–140)
GLUCOSE SERPL-MCNC: 64 MG/DL (ref 65–140)
GLUCOSE SERPL-MCNC: 75 MG/DL (ref 65–140)
HCT VFR BLD AUTO: 32.7 % (ref 36.5–49.3)
HGB BLD-MCNC: 10.8 G/DL (ref 12–17)
IMM GRANULOCYTES # BLD AUTO: 0.04 THOUSAND/UL (ref 0–0.2)
IMM GRANULOCYTES NFR BLD AUTO: 0 % (ref 0–2)
LACTATE SERPL-SCNC: 1.8 MMOL/L (ref 0.5–2)
LYMPHOCYTES # BLD AUTO: 2.88 THOUSANDS/ÂΜL (ref 0.6–4.47)
LYMPHOCYTES NFR BLD AUTO: 31 % (ref 14–44)
MAGNESIUM SERPL-MCNC: 1.8 MG/DL (ref 1.9–2.7)
MCH RBC QN AUTO: 30.2 PG (ref 26.8–34.3)
MCHC RBC AUTO-ENTMCNC: 33 G/DL (ref 31.4–37.4)
MCV RBC AUTO: 91 FL (ref 82–98)
MONOCYTES # BLD AUTO: 0.55 THOUSAND/ÂΜL (ref 0.17–1.22)
MONOCYTES NFR BLD AUTO: 6 % (ref 4–12)
NEUTROPHILS # BLD AUTO: 5.66 THOUSANDS/ÂΜL (ref 1.85–7.62)
NEUTS SEG NFR BLD AUTO: 61 % (ref 43–75)
NRBC BLD AUTO-RTO: 0 /100 WBCS
PLATELET # BLD AUTO: 330 THOUSANDS/UL (ref 149–390)
PMV BLD AUTO: 9.8 FL (ref 8.9–12.7)
POTASSIUM SERPL-SCNC: 3.9 MMOL/L (ref 3.5–5.3)
PROT SERPL-MCNC: 6.3 G/DL (ref 6.4–8.4)
RBC # BLD AUTO: 3.58 MILLION/UL (ref 3.88–5.62)
SODIUM SERPL-SCNC: 138 MMOL/L (ref 135–147)
WBC # BLD AUTO: 9.31 THOUSAND/UL (ref 4.31–10.16)

## 2024-06-07 PROCEDURE — 85025 COMPLETE CBC W/AUTO DIFF WBC: CPT

## 2024-06-07 PROCEDURE — 99232 SBSQ HOSP IP/OBS MODERATE 35: CPT | Performed by: INTERNAL MEDICINE

## 2024-06-07 PROCEDURE — 0T25X0Z CHANGE DRAINAGE DEVICE IN KIDNEY, EXTERNAL APPROACH: ICD-10-PCS | Performed by: INTERNAL MEDICINE

## 2024-06-07 PROCEDURE — NC001 PR NO CHARGE: Performed by: INTERNAL MEDICINE

## 2024-06-07 PROCEDURE — 50435 EXCHANGE NEPHROSTOMY CATH: CPT | Performed by: INTERNAL MEDICINE

## 2024-06-07 PROCEDURE — C1729 CATH, DRAINAGE: HCPCS

## 2024-06-07 PROCEDURE — 83735 ASSAY OF MAGNESIUM: CPT

## 2024-06-07 PROCEDURE — 50435 EXCHANGE NEPHROSTOMY CATH: CPT

## 2024-06-07 PROCEDURE — 83605 ASSAY OF LACTIC ACID: CPT

## 2024-06-07 PROCEDURE — C1769 GUIDE WIRE: HCPCS

## 2024-06-07 PROCEDURE — 82948 REAGENT STRIP/BLOOD GLUCOSE: CPT

## 2024-06-07 PROCEDURE — 80053 COMPREHEN METABOLIC PANEL: CPT

## 2024-06-07 RX ORDER — POTASSIUM CHLORIDE 20 MEQ/1
20 TABLET, EXTENDED RELEASE ORAL ONCE
Status: COMPLETED | OUTPATIENT
Start: 2024-06-07 | End: 2024-06-07

## 2024-06-07 RX ORDER — TRAMADOL HYDROCHLORIDE 50 MG/1
50 TABLET ORAL EVERY 6 HOURS PRN
Status: DISCONTINUED | OUTPATIENT
Start: 2024-06-07 | End: 2024-06-08 | Stop reason: HOSPADM

## 2024-06-07 RX ORDER — LIDOCAINE WITH 8.4% SOD BICARB 0.9%(10ML)
SYRINGE (ML) INJECTION AS NEEDED
Status: COMPLETED | OUTPATIENT
Start: 2024-06-07 | End: 2024-06-07

## 2024-06-07 RX ORDER — ACETAMINOPHEN 325 MG/1
650 TABLET ORAL EVERY 6 HOURS SCHEDULED
Status: DISCONTINUED | OUTPATIENT
Start: 2024-06-07 | End: 2024-06-08 | Stop reason: HOSPADM

## 2024-06-07 RX ORDER — MAGNESIUM SULFATE HEPTAHYDRATE 40 MG/ML
2 INJECTION, SOLUTION INTRAVENOUS ONCE
Status: COMPLETED | OUTPATIENT
Start: 2024-06-07 | End: 2024-06-08

## 2024-06-07 RX ADMIN — SODIUM CHLORIDE 75 ML/HR: 0.9 INJECTION, SOLUTION INTRAVENOUS at 08:27

## 2024-06-07 RX ADMIN — PRAVASTATIN SODIUM 80 MG: 80 TABLET ORAL at 16:32

## 2024-06-07 RX ADMIN — CEFEPIME HYDROCHLORIDE 2000 MG: 2 INJECTION, SOLUTION INTRAVENOUS at 02:29

## 2024-06-07 RX ADMIN — ACETAMINOPHEN 650 MG: 325 TABLET ORAL at 20:57

## 2024-06-07 RX ADMIN — TRAMADOL HYDROCHLORIDE 50 MG: 50 TABLET, COATED ORAL at 21:00

## 2024-06-07 RX ADMIN — MAGNESIUM SULFATE HEPTAHYDRATE 2 G: 40 INJECTION, SOLUTION INTRAVENOUS at 10:51

## 2024-06-07 RX ADMIN — INSULIN LISPRO 1 UNITS: 100 INJECTION, SOLUTION INTRAVENOUS; SUBCUTANEOUS at 22:59

## 2024-06-07 RX ADMIN — VANCOMYCIN HYDROCHLORIDE 750 MG: 750 INJECTION, SOLUTION INTRAVENOUS at 20:56

## 2024-06-07 RX ADMIN — INSULIN DETEMIR 20 UNITS: 100 INJECTION, SOLUTION SUBCUTANEOUS at 22:58

## 2024-06-07 RX ADMIN — POTASSIUM CHLORIDE 20 MEQ: 1500 TABLET, EXTENDED RELEASE ORAL at 08:25

## 2024-06-07 RX ADMIN — Medication 10 ML: at 12:00

## 2024-06-07 RX ADMIN — HEPARIN SODIUM 5000 UNITS: 5000 INJECTION, SOLUTION INTRAVENOUS; SUBCUTANEOUS at 09:25

## 2024-06-07 RX ADMIN — SODIUM CHLORIDE, PRESERVATIVE FREE 10 ML: 5 INJECTION INTRAVENOUS at 09:29

## 2024-06-07 RX ADMIN — VANCOMYCIN HYDROCHLORIDE 750 MG: 750 INJECTION, SOLUTION INTRAVENOUS at 09:24

## 2024-06-07 RX ADMIN — CEFEPIME HYDROCHLORIDE 2000 MG: 2 INJECTION, SOLUTION INTRAVENOUS at 16:32

## 2024-06-07 RX ADMIN — TRAMADOL HYDROCHLORIDE 50 MG: 50 TABLET, COATED ORAL at 09:36

## 2024-06-07 RX ADMIN — ACETAMINOPHEN 650 MG: 325 TABLET ORAL at 08:25

## 2024-06-07 RX ADMIN — DOCUSATE SODIUM AND SENNOSIDES 1 TABLET: 8.6; 5 TABLET, FILM COATED ORAL at 08:22

## 2024-06-07 RX ADMIN — DICLOFENAC SODIUM 2 G: 10 GEL TOPICAL at 17:02

## 2024-06-07 RX ADMIN — HEPARIN SODIUM 5000 UNITS: 5000 INJECTION, SOLUTION INTRAVENOUS; SUBCUTANEOUS at 17:01

## 2024-06-07 RX ADMIN — INSULIN LISPRO 1 UNITS: 100 INJECTION, SOLUTION INTRAVENOUS; SUBCUTANEOUS at 16:53

## 2024-06-07 RX ADMIN — HEPARIN SODIUM 5000 UNITS: 5000 INJECTION, SOLUTION INTRAVENOUS; SUBCUTANEOUS at 02:01

## 2024-06-07 RX ADMIN — DEXAMETHASONE 0.5 MG: 0.5 TABLET ORAL at 08:25

## 2024-06-07 RX ADMIN — DICLOFENAC SODIUM 2 G: 10 GEL TOPICAL at 08:22

## 2024-06-07 RX ADMIN — DICLOFENAC SODIUM 2 G: 10 GEL TOPICAL at 22:58

## 2024-06-07 RX ADMIN — ACETAMINOPHEN 650 MG: 325 TABLET ORAL at 13:30

## 2024-06-07 RX ADMIN — IOHEXOL 10 ML: 350 INJECTION, SOLUTION INTRAVENOUS at 12:09

## 2024-06-07 NOTE — PLAN OF CARE
Problem: PAIN - ADULT  Goal: Verbalizes/displays adequate comfort level or baseline comfort level  Description: Interventions:  - Encourage patient to monitor pain and request assistance  - Assess pain using appropriate pain scale  - Administer analgesics based on type and severity of pain and evaluate response  - Implement non-pharmacological measures as appropriate and evaluate response  - Consider cultural and social influences on pain and pain management  - Notify physician/advanced practitioner if interventions unsuccessful or patient reports new pain  Outcome: Progressing     Problem: INFECTION - ADULT  Goal: Absence or prevention of progression during hospitalization  Description: INTERVENTIONS:  - Assess and monitor for signs and symptoms of infection  - Monitor lab/diagnostic results  - Monitor all insertion sites, i.e. indwelling lines, tubes, and drains  - Monitor endotracheal if appropriate and nasal secretions for changes in amount and color  - [place] appropriate cooling/warming therapies per order  - Administer medications as ordered  - Instruct and encourage patient and family to use good hand hygiene technique  - Identify and instruct in appropriate isolation precautions for identified infection/condition  Outcome: Progressing  Goal: Absence of fever/infection during neutropenic period  Description: INTERVENTIONS:  - Monitor WBC    Outcome: Progressing     Problem: SAFETY ADULT  Goal: Patient will remain free of falls  Description: INTERVENTIONS:  - Educate patient/family on patient safety including physical limitations  - Instruct patient to call for assistance with activity   - Consult OT/PT to assist with strengthening/mobility   - Keep Call bell within reach  - Keep bed low and locked with side rails adjusted as appropriate  - Keep care items and personal belongings within reach  - Initiate and maintain comfort rounds  - Make Fall Risk Sign visible to staff  - Offer Toileting every 2 Hours,  in advance of need  - Initiate/Maintain bed alarm  - Obtain necessary fall risk management equipment: call bell  - Apply yellow socks and bracelet for high fall risk patients  - Consider moving patient to room near nurses station  Outcome: Progressing  Goal: Maintain or return to baseline ADL function  Description: INTERVENTIONS:  -  Assess patient's ability to carry out ADLs; assess patient's baseline for ADL function and identify physical deficits which impact ability to perform ADLs (bathing, care of mouth/teeth, toileting, grooming, dressing, etc.)  - Assess/evaluate cause of self-care deficits   - Assess range of motion  - Assess patient's mobility; develop plan if impaired  - Assess patient's need for assistive devices and provide as appropriate  - Encourage maximum independence but intervene and supervise when necessary  - Involve family in performance of ADLs  - Assess for home care needs following discharge   - Consider OT consult to assist with ADL evaluation and planning for discharge  - Provide patient education as appropriate  Outcome: Progressing  Goal: Maintains/Returns to pre admission functional level  Description: INTERVENTIONS:  - Perform AM-PAC 6 Click Basic Mobility/ Daily Activity assessment daily.  - Set and communicate daily mobility goal to care team and patient/family/caregiver.   - Collaborate with rehabilitation services on mobility goals if consulted  - Perform Range of Motion 3 times a day.  - Reposition patient every 3 hours.  - Dangle patient 3 times a day  - Stand patient 3 times a day  - Ambulate patient 3 times a day  - Out of bed to chair 3 times a day   - Out of bed for meals 3 times a day  - Out of bed for toileting  - Record patient progress and toleration of activity level   Outcome: Progressing     Problem: DISCHARGE PLANNING  Goal: Discharge to home or other facility with appropriate resources  Description: INTERVENTIONS:  - Identify barriers to discharge w/patient and  caregiver  - Arrange for needed discharge resources and transportation as appropriate  - Identify discharge learning needs (meds, wound care, etc.)  - Arrange for interpretive services to assist at discharge as needed  - Refer to Case Management Department for coordinating discharge planning if the patient needs post-hospital services based on physician/advanced practitioner order or complex needs related to functional status, cognitive ability, or social support system  Outcome: Progressing     Problem: Knowledge Deficit  Goal: Patient/family/caregiver demonstrates understanding of disease process, treatment plan, medications, and discharge instructions  Description: Complete learning assessment and assess knowledge base.  Interventions:  - Provide teaching at level of understanding  - Provide teaching via preferred learning methods  Outcome: Progressing

## 2024-06-07 NOTE — UTILIZATION REVIEW
Initial Clinical Review    Admission: Date/Time/Statement:   Admission Orders (From admission, onward)       Ordered        06/06/24 1549  INPATIENT ADMISSION  Once                          Orders Placed This Encounter   Procedures    INPATIENT ADMISSION     Standing Status:   Standing     Number of Occurrences:   1     Order Specific Question:   Level of Care     Answer:   Med Surg [16]     Order Specific Question:   Estimated length of stay     Answer:   More than 2 Midnights     Order Specific Question:   Certification     Answer:   I certify that inpatient services are medically necessary for this patient for a duration of greater than two midnights. See H&P and MD Progress Notes for additional information about the patient's course of treatment.     ED Arrival Information       Expected   -    Arrival   6/6/2024 13:04    Acuity   Urgent              Means of arrival   Wheelchair    Escorted by   Spouse    Service   Hospitalist    Admission type   Emergency              Arrival complaint   back/ penile pain             Chief Complaint   Patient presents with    Painful Urination     States having painful urination for last 2 days with left side flank pain       Initial Presentation:   73 yom to ER from home for L flank pain, painful urination x 2 days. Hx bilateral nephrostomy tubes and a history of prostate cancer with mets to the bone, DM, HTN. Presents with bilateral nephrostomy tubes in place with urine draining into both bags, L CVA tenderness. Admission CT a/p: Slight thickening and enhancement of the left renal pelvis which may represent pyelitis given history of left flank pain. No hydronephrosis. Nephrostomy is in place.  Diffusely thick-walled urinary bladder with mucosal enhancement and intraluminal gas suspicious for cystitis. Stable osseous metastasis. Labs: WBC 17.64, Na 132, CO2 20, lactic aicd 2.5, u/a+blood, prot, nitrite, leuk, WBC, bacteria.   Admitted to inpatient status for pyelonephritis.  Started on IVABT, cultures pending.       Anticipated Length of Stay/Certification Statement:   Patient will be admitted on an Inpatient basis with an anticipated length of stay of  greater than 2 midnights. Justification for Hospital Stay: Pyelonephritis    Date: 6/7/24   Day 2:   Pyelonephritis POA. IVABT in progress, cultures pending. Scheduled for bilateral nephrostomy tube exchanges in IR today. Continue to monitor output, VS, follow labs, cultures.      To IR for: Bilateral PCN exchange   Findings: Exchange of bilateral PCN catheters for new 10.2 Slovenian catheters.  Given recurrent UTI, we will plan for routine exchange in 2 months.    ED Triage Vitals   Temperature Pulse Respirations Blood Pressure SpO2   06/06/24 1306 06/06/24 1306 06/06/24 1306 06/06/24 1306 06/06/24 1306   97.5 °F (36.4 °C) 89 18 106/57 98 %      Temp Source Heart Rate Source Patient Position - Orthostatic VS BP Location FiO2 (%)   06/06/24 1612 06/06/24 1612 06/06/24 1612 06/06/24 1612 --   Oral Monitor Lying Left arm       Pain Score       06/06/24 1306       8          Wt Readings from Last 1 Encounters:   06/07/24 74.4 kg (164 lb)     Additional Vital Signs:   Date/Time Temp Pulse Resp BP MAP (mmHg) SpO2 O2 Device Patient Position - Orthostatic VS   06/07/24 0700 97.8 °F (36.6 °C) 62 20 136/68 96 98 % None (Room air) Lying   06/06/24 2300 98.1 °F (36.7 °C) 60 20 142/65 94 96 % -- Lying   06/06/24 2005 98 °F (36.7 °C) 62 18 132/60 87 97 % None (Room air) Lying   06/06/24 1800 -- -- -- -- -- 98 % None (Room air) --   06/06/24 1700 98.3 °F (36.8 °C) 66 20 134/63 91 97 % None (Room air) Lying   06/06/24 1645 -- 66 -- 147/72 103 99 % -- --   06/06/24 1615 -- 70 -- 137/62 89 96 % -- --   06/06/24 1612 97.8 °F (36.6 °C) 88 18 137/62 -- 100 % None (Room air) Lying   06/06/24 1306 97.5 °F (36.4 °C) 89 18 106/57 -- 98 % -- --     Pertinent Labs/Diagnostic Test Results:   CT abdomen pelvis with contrast   Final Result  (06/06 1524)      1.  Slight  thickening and enhancement of the left renal pelvis which may represent pyelitis given history of left flank pain. No hydronephrosis. Nephrostomy is in place.      2.  Diffusely thick-walled urinary bladder with mucosal enhancement and intraluminal gas suspicious for cystitis.      3.  Stable osseous metastasis.       Results from last 7 days   Lab Units 06/07/24  0533 06/06/24  1357   WBC Thousand/uL 9.31 17.64*   HEMOGLOBIN g/dL 10.8* 13.1   HEMATOCRIT % 32.7* 40.0   PLATELETS Thousands/uL 330 372   TOTAL NEUT ABS Thousands/µL 5.66 13.93*     Results from last 7 days   Lab Units 06/07/24  0533 06/06/24  1357   SODIUM mmol/L 138 132*   POTASSIUM mmol/L 3.9 5.1   CHLORIDE mmol/L 108 101   CO2 mmol/L 24 20*   ANION GAP mmol/L 6 11   BUN mg/dL 15 16   CREATININE mg/dL 0.81 0.96   EGFR ml/min/1.73sq m 89 79   CALCIUM mg/dL 9.3 9.7   MAGNESIUM mg/dL 1.8*  --      Results from last 7 days   Lab Units 06/07/24  0533 06/06/24  1357   AST U/L 13 26   ALT U/L 15 18   ALK PHOS U/L 74 86   TOTAL PROTEIN g/dL 6.3* 7.4   ALBUMIN g/dL 3.1* 3.8   TOTAL BILIRUBIN mg/dL 0.45 0.53     Results from last 7 days   Lab Units 06/07/24  1054 06/07/24  0745 06/07/24  0701 06/06/24  2112 06/06/24  1743 05/31/24  1619   POC GLUCOSE mg/dl 106 109 64* 275* 183* 290*     Results from last 7 days   Lab Units 06/07/24  0533 06/06/24  1357   GLUCOSE RANDOM mg/dL 75 197*     Results from last 7 days   Lab Units 06/06/24  1633 06/06/24  1441   LACTIC ACID mmol/L 3.1* 2.5*     Results from last 7 days   Lab Units 06/06/24  1349 06/06/24  1328   CLARITY UA  Cloudy Turbid   COLOR UA  Yellow Dark Yellow   SPEC GRAV UA  1.020 1.025   PH UA  7.5 6.0   GLUCOSE UA mg/dl 1000 (1%)* Negative   KETONES UA mg/dl Negative Negative   BLOOD UA  Moderate* Large*   PROTEIN UA mg/dl 30 (1+)* 300 (3+)*   NITRITE UA  Negative Positive*   BILIRUBIN UA  Negative Negative   UROBILINOGEN UA (BE) mg/dl <2.0 <2.0   LEUKOCYTES UA  Large* Large*   WBC UA /hpf Innumerable*  Innumerable*   RBC UA /hpf None Seen Field obscured, unable to enumerate*   BACTERIA UA /hpf Innumerable* Field obscured, unable to enumerate*   EPITHELIAL CELLS WET PREP /hpf None Seen Field obscured, unable to enumerate*     Results from last 7 days   Lab Units 06/06/24  1441 06/06/24  1328   BLOOD CULTURE  Received in Microbiology Lab. Culture in Progress.  Received in Microbiology Lab. Culture in Progress.  --    URINE CULTURE   --  >100,000 cfu/ml Gram Negative Gary*     ED Treatment:   Medication Administration from 06/06/2024 1304 to 06/06/2024 1658         Date/Time Order Dose Route Action     06/06/2024 1440 EDT iohexol (OMNIPAQUE) 350 MG/ML injection (MULTI-DOSE) 100 mL 100 mL Intravenous Given     06/06/2024 1528 EDT cefepime (MAXIPIME) IVPB (premix in dextrose) 2,000 mg 50 mL 2,000 mg Intravenous New Bag     06/06/2024 1528 EDT sodium chloride 0.9 % bolus 1,000 mL 1,000 mL Intravenous New Bag     06/06/2024 1641 EDT vancomycin (VANCOCIN) 1,750 mg in sodium chloride 0.9 % 500 mL IVPB 1,750 mg Intravenous New Bag          Past Medical History:   Diagnosis Date    COVID-19 01/15/2024    Diabetes mellitus (HCC)     Elevated PSA 06/21/2023    High cholesterol     Hypertension     Prostate cancer (HCC)      Present on Admission:   Stage 3a chronic kidney disease (HCC)   Prostate cancer metastatic to bone (HCC)   Electrolyte abnormality      Admitting Diagnosis: Painful urination [R30.9]  Pyelonephritis [N12]  Age/Sex: 73 y.o. male  Admission Orders:  Accuchecks  Scd/foot pumps  Consult IR     Scheduled Medications:  acetaminophen, 650 mg, Oral, Q6H RAJEEV  cefepime, 2,000 mg, Intravenous, Q12H  dexamethasone, 0.5 mg, Oral, Daily With Breakfast  Diclofenac Sodium, 2 g, Topical, 4x Daily  enzalutamide, 160 mg, Oral, Daily  heparin (porcine), 5,000 Units, Subcutaneous, Q8H RAJEEV  insulin detemir, 20 Units, Subcutaneous, HS  insulin lispro, 1-5 Units, Subcutaneous, 4x Daily (AC & HS)  magnesium sulfate, 2 g,  Intravenous, Once  pravastatin, 80 mg, Oral, Daily With Dinner  senna-docusate sodium, 1 tablet, Oral, Daily  sodium chloride (PF), 10 mL, Intracatheter, Daily  vancomycin, 750 mg, Intravenous, Q12H    Continuous IV Infusions:  sodium chloride, 75 mL/hr, Intravenous, Continuous    PRN Meds:  albuterol, 2 puff, Inhalation, Q6H PRN  polyethylene glycol, 17 g, Oral, Daily PRN  traMADol, 50 mg, Oral, Q6H PRN  trimethobenzamide, 200 mg, Intramuscular, Q6H PRN    Network Utilization Review Department  ATTENTION: Please call with any questions or concerns to [phone] and carefully listen to the prompts so that you are directed to the right person. All voicemails are confidential.   For Discharge needs, contact Care Management DC Support Team at [phone] opt. 2  Send all requests for admission clinical reviews, approved or denied determinations and any other requests to dedicated fax number below belonging to the [facility] where the patient is receiving treatment. List of dedicated fax numbers for the Facilities:  FACILITY NAME UR FAX NUMBER   ADMISSION DENIALS (Administrative/Medical Necessity) [phone]   DISCHARGE SUPPORT TEAM (NETWORK) [phone]   PARENT CHILD HEALTH (Maternity/NICU/Pediatrics) [phone]   [facility] [phone]   [facility] [phone]   [facility] [phone]   General acute hospital [phone]   [facility] [phone]   [facility] [phone]   [facility] [phone]   [facility] [phone]   [facility] [phone]   [facility] [phone]   [facility] [phone]   [facility] [phone]

## 2024-06-07 NOTE — CASE MANAGEMENT
Case Management Assessment & Discharge Planning Note    Patient name Oliver Astudillo  Location 3 [place] [facility]/3 [place] [facility]-* MRN [id number]  : 1950 Date 2024       Current Admission Date: 2024  Current Admission Diagnosis:Pyelonephritis   Patient Active Problem List    Diagnosis Date Noted Date Diagnosed    Pyelonephritis 2024     Acute cystitis 2024     Electrolyte abnormality 2024     Stage 3a chronic kidney disease (HCC) 2024     Hypoglycemia 2024     Hypokalemia 2024     Malfunction of nephrostomy tube (HCC) 01/15/2024     Hyperlipidemia 2024     Encounter for monitoring androgen deprivation therapy 08/10/2023     Nephrostomy status (HCC) 08/10/2023     Hydronephrosis 2023     Prostate cancer metastatic to bone (HCC) 2023     Type 2 diabetes mellitus, with long-term current use of insulin ([facility]) 2023     Other hydronephrosis 2023     Hypertension 2023       LOS (days): 1  Geometric Mean LOS (GMLOS) (days): 2.6  Days to GMLOS:1.7     OBJECTIVE:  PATIENT READMITTED TO HOSPITAL  Risk of Unplanned Readmission Score: 30.19         Current admission status: Inpatient       Preferred Pharmacy:   [facility] [place], [place] - [address] [place] [id number]  Phone: [phone] Fax: [phone]    [facility] [address] [place] [address] - [address]  [address]  [facility] [address]  Phone: [phone] Fax: [phone]    [facility]/pharmacy #[id number] [place], [place] - [address]  [address] [facility] [address]  Phone: [phone] Fax: [phone]    Primary Care Provider: Eugenia Rodriguez MD    [facility]  Secondary Insurance:     ASSESSMENT:     Readmission Root Cause  30 Day Readmission: Yes    Patient Information  Admitted from:: Home  Mental Status: Alert  During Assessment patient was accompanied by: Not accompanied during  assessment  Assessment information provided by:: Patient  Primary Caregiver: Self  Support Systems: Self, Spouse/significant other, Son, Family members  County of Residence: [place]  What [facility] do you live in?: [place]  Home entry access options. Select all that apply.: Stairs  Number of steps to enter home.: 3  Type of Current Residence: [address] [place] home  Upon entering residence, is there a bedroom on the main floor (no further steps)?: Yes  Upon entering residence, is there a bathroom on the main floor (no further steps)?: Yes  Living Arrangements: Lives w/ Spouse/significant other  Is patient a ?: No    Activities of Daily Living Prior to Admission  Functional Status: Independent  Completes ADLs independently?: Yes  Ambulates independently?: Yes  Does patient use assisted devices?: Yes  Assisted Devices (DME) used: Bedside Commode, Straight Cane, Walker, Shower Chair  Does patient currently own DME?: Yes  What DME does the patient currently own?: Bedside Commode, Shower Chair, Straight Cane, Walker  Does patient have a history of Outpatient Therapy (PT/OT)?: Yes  Does the patient have a history of Short-Term Rehab?: No  Does patient have a history of HHC?: Yes  Does patient currently have HHC?: Yes    Current Home Health Care  Type of Current Home Care Services: Home OT, Nurse visit, Home PT  Current Home Health Agency:: [facility]  Current Home Health Follow-Up Provider:: PCP    Patient Information Continued  Income Source: Pension/intermediate  Does patient have prescription coverage?: Yes  Does patient receive dialysis treatments?: No  Does patient have a history of substance abuse?: No  Does patient have a history of Mental Health Diagnosis?: No    PHQ 2/9 Screening   Reviewed PHQ 2/9 Depression Screening Score?: No    Means of Transportation  Means of Transport to Appts:: Family transport      Social Determinants of Health (SDOH)      Flowsheet Row Most Recent Value   Housing Stability    In the  last 12 months, was there a time when you were not able to pay the mortgage or rent on time? N   In the past 12 months, how many times have you moved where you were living? 0   At any time in the past 12 months, were you homeless or living in a shelter (including now)? N   Transportation Needs    In the past 12 months, has lack of transportation kept you from medical appointments or from getting medications? no   In the past 12 months, has lack of transportation kept you from meetings, work, or from getting things needed for daily living? No   Food Insecurity    Within the past 12 months, you worried that your food would run out before you got the money to buy more. Never true   Within the past 12 months, the food you bought just didn't last and you didn't have money to get more. Never true   Utilities    In the past 12 months has the electric, gas, oil, or water company threatened to shut off services in your home? No            DISCHARGE DETAILS:    Discharge planning discussed with:: Patient  Freedom of Choice: Yes  Comments - Freedom of Choice: [facility]  CM contacted family/caregiver?: Yes  Were Treatment Team discharge recommendations reviewed with patient/caregiver?: Yes  Did patient/caregiver verbalize understanding of patient care needs?: Yes  Were patient/caregiver advised of the risks associated with not following Treatment Team discharge recommendations?: Yes    Contacts  Patient Contacts: Erlinda Astudillo  Relationship to Patient:: Family  Contact Method: In Person  Reason/Outcome: Emergency Contact, Discharge Planning    Requested Home Health Care         Is the patient interested in HHC at discharge?: Yes  Home Health Discipline requested:: Nursing, Occupational Therapy, Physical Therapy  [facility] Reason (only applicable if external [facility] name selected): Patient has established relationship with  provider  Home Health Follow-Up Provider:: PCP  Home Health Services Needed:: Diabetes Management, Evaluate Functional Status and Safety, Gait/ADL Training, Strengthening/Theraputic Exercises to Improve Function  Homebound Criteria Met:: Requires the Assistance of Another Person for Safe Ambulation or to Leave the Home, Uses an Assist Device (i.e. cane, walker, etc)  Supporting Clincal Findings:: Fatigues Easliy in Short Distances, Limited Endurance    DME Referral Provided  Referral made for DME?: No    Other Referral/Resources/Interventions Provided:  Interventions: [facility]  Referral Comments: R[facility]KA sent to [facility] via [facility] referral.    Would you like to participate in our [facility] service program?  : No - Declined    Treatment Team Recommendation: Home with Home Health Care  Discharge Destination Plan:: Home with Home Health Care (Home with [facility]KA with [facility])  Transport at Discharge : Family

## 2024-06-07 NOTE — ASSESSMENT & PLAN NOTE
Lab Results   Component Value Date    EGFR 89 06/07/2024    EGFR 79 06/06/2024    EGFR 86 05/31/2024    CREATININE 0.81 06/07/2024    CREATININE 0.96 06/06/2024    CREATININE 0.87 05/31/2024     Chronic, continue to renally dose medications

## 2024-06-07 NOTE — PLAN OF CARE
Problem: PAIN - ADULT  Goal: Verbalizes/displays adequate comfort level or baseline comfort level  Description: Interventions:  - Encourage patient to monitor pain and request assistance  - Assess pain using appropriate pain scale  - Administer analgesics based on type and severity of pain and evaluate response  - Implement non-pharmacological measures as appropriate and evaluate response  - Consider cultural and social influences on pain and pain management  - Notify physician/advanced practitioner if interventions unsuccessful or patient reports new pain  Outcome: Progressing     Problem: INFECTION - ADULT  Goal: Absence of fever/infection during neutropenic period  Description: INTERVENTIONS:  - Monitor WBC    Outcome: Progressing     Problem: INFECTION - ADULT  Goal: Absence or prevention of progression during hospitalization  Description: INTERVENTIONS:  - Assess and monitor for signs and symptoms of infection  - Monitor lab/diagnostic results  - Monitor all insertion sites, i.e. indwelling lines, tubes, and drains  - Monitor endotracheal if appropriate and nasal secretions for changes in amount and color  - [place] appropriate cooling/warming therapies per order  - Administer medications as ordered  - Instruct and encourage patient and family to use good hand hygiene technique  - Identify and instruct in appropriate isolation precautions for identified infection/condition  Outcome: Progressing     Problem: SAFETY ADULT  Goal: Maintain or return to baseline ADL function  Description: INTERVENTIONS:  -  Assess patient's ability to carry out ADLs; assess patient's baseline for ADL function and identify physical deficits which impact ability to perform ADLs (bathing, care of mouth/teeth, toileting, grooming, dressing, etc.)  - Assess/evaluate cause of self-care deficits   - Assess range of motion  - Assess patient's mobility; develop plan if impaired  - Assess patient's need for assistive devices and provide  as appropriate  - Encourage maximum independence but intervene and supervise when necessary  - Involve family in performance of ADLs  - Assess for home care needs following discharge   - Consider OT consult to assist with ADL evaluation and planning for discharge  - Provide patient education as appropriate  Outcome: Progressing

## 2024-06-07 NOTE — PROGRESS NOTES
Chart review done. Pt is currently admitted at [facility]. Metformin was ordered by provider. Pt has oncology nurse navigator.

## 2024-06-07 NOTE — TELEPHONE ENCOUNTER
Form completed and placed in red team completed form folder in the front.    Thank you for your help.

## 2024-06-07 NOTE — PROGRESS NOTES
Oliver Astudillo is a 73 y.o. male who is currently ordered Vancomycin IV with management by the Pharmacy Consult service.  Relevant clinical data and objective / subjective history reviewed.  Vancomycin Assessment:  Indication and Goal AUC/Trough: Urinary tract infection (goal -600, trough >10), -600, trough >10  Clinical Status: New  Micro:   Pending  Renal Function:  SCr: 0.96 mg/dL  CrCl: 67.5 mL/min  Renal replacement: Not on dialysis  Days of Therapy: 1  Current Dose: 1750 mg IV once  Vancomycin Plan:  New Dosin mg IV q 12 h  Estimated AUC: 471 mcg*hr/mL  Estimated Trough: 14.2 mcg/mL  Next Level: 0600 on 2024  Renal Function Monitoring: Daily BMP and UOP  Pharmacy will continue to follow closely for s/sx of nephrotoxicity, infusion reactions and appropriateness of therapy.  BMP and CBC will be ordered per protocol. We will continue to follow the patient’s culture results and clinical progress daily.    Bambi Caraballo, Pharmacist

## 2024-06-07 NOTE — SEDATION DOCUMENTATION
Procedure completed by Dr Castanon. Pt tolerated without issues. Tube sutured, dry dressings to sites. Transported back to [facility] by IR staff, bedside report given.

## 2024-06-07 NOTE — BRIEF OP NOTE (RAD/CATH)
INTERVENTIONAL RADIOLOGY PROCEDURE NOTE    Date: 6/7/2024    Procedure: Bilateral PCN exchange      Preoperative diagnosis:   1. Pyelonephritis    2. Nephrostomy status (HCC)         Postoperative diagnosis: Same.    Surgeon: Lai Castanon MD     Assistant: None. No qualified resident was available.    Blood loss: None    Specimens: None     Findings: Exchange of bilateral PCN catheters for new 10.2 Czech catheters.    Given recurrent UTI, we will plan for routine exchange in 2 months.    Complications: None immediate.    Anesthesia: local

## 2024-06-07 NOTE — TELEMEDICINE
e-Consult (IPC)  - Interventional Radiology  Oliver Astudillo 73 y.o. male MRN: [id number]  Unit/Bed#: [address] [place] [address] Encounter: [id number]          Interventional Radiology has been consulted to evaluate Oliver Astudillo    We were consulted by medicine service concerning this patient with flank pain.    Inpatient Consult to IR  Consult performed by: Lai Castanon MD  Consult ordered by: Yas Webber DO        06/07/24    Assessment/Recommendation:   73 year-old male with history of prostate cancer with chronic indwelling bilateral nephrostomy catheters in place. Patient presented to the ED due to left flank pain and burning urination, now with concern for complicated UTI.    We will plan for exchange of bilateral PCN catheters today.    >5 min, >50% time spent reviewing/analyzing record, written report will be generated in the EMR.     Thank you for allowing Interventional Radiology to participate in the care of Oliver Astudillo. Please don't hesitate to call or TigerText us with any questions.     Lai Castanon MD            
Patient/Caregiver provided printed discharge information.

## 2024-06-07 NOTE — TELEPHONE ENCOUNTER
[facility]    Scanned copy into encounter.    Placed in Dr. Springer's folder in precepting room    Fax when complete: [phone]

## 2024-06-07 NOTE — PROGRESS NOTES
Oliver Astudillo is a 73 y.o. male who is currently ordered Vancomycin IV with management by the Pharmacy Consult service.  Relevant clinical data and objective / subjective history reviewed.  Vancomycin Assessment:  Indication and Goal AUC/Trough: Urinary tract infection (goal -600, trough >10), -600, trough >10  Clinical Status: stable  Micro:   pending  Renal Function:  SCr: 0.96 mg/dL  CrCl: 68.3 mL/min  Renal replacement: Not on dialysis  Days of Therapy: 2  Current Dose: 1750 mg IV once  Vancomycin Plan:  New Dosin mg IV q 12 h  Estimated AUC: 472 mcg*hr/mL  Estimated Trough: 14.2 mcg/mL  Next Level: 24 @ 0600  Renal Function Monitoring: Daily BMP and UOP  Pharmacy will continue to follow closely for s/sx of nephrotoxicity, infusion reactions and appropriateness of therapy.  BMP and CBC will be ordered per protocol. We will continue to follow the patient’s culture results and clinical progress daily.    Traci Nguyen, Pharmacist

## 2024-06-07 NOTE — PROGRESS NOTES
Daily Progress Note - [facility]  Family Medicine Residency  Oliver Astudillo 73 y.o. male MRN: [id number]  Unit/Bed#: [address] [place] [address] Encounter: [id number]  Admitting Physician: Yas Webber DO  PCP: Eugenia Rodriguez MD  Date of Admission:  6/6/2024  1:22 PM    Summary:     IVFs: sodium chloride, Last Rate: 75 mL/hr (06/07/24 0827)      Diet: Diet Rupesh/CHO Controlled; Consistent Carbohydrate Diet Level 2 (5 carb servings/75 grams CHO/meal)  VTE:  Heparin . Mechanical prophylaxis contraindicated secondary to rule out DVT centered Rounds: I have performed bedside rounds with nursing staff today.  Specialists/Other Care Team Provider: IR    LOS: 1 day(s)  Status: Inpatient   Disposition: The patient will continue to require additional inpatient hospital stay due to pyelonephritis  Code Status: Level 1 - Full Code    Assessment and Plan    * Pyelonephritis  Assessment & Plan  Presented with left-sided flank pain, dysuria.    WBC 17.6, UA showed large leuks, pos nitrates, numerous bacteria and WBCs    CT in ED: Slight thickening and enhancement of the left renal pelvis which may represent pyelitis given history of left flank pain. No hydronephrosis. Nephrostomy is in place  History of complicated UTI - within past year, previous urine cultures grew E. coli, Pseudomonas and Enterococcus sensitive to vancomycin      Recently admitted with UTI treated with Rocephin, cefepime x 3 days     Plan:  -Start Vancomycin (6/6 - )  -Start Cefepime (6/6 - )  -Pending urine cultures  -Monitor fever curve    Nephrostomy status (HCC)  Assessment & Plan  Chronic, has bilateral nephrostomy tubes in place. requiring frequent changes and daily BID flushes. Reports current good output from bilateral tubes.   CT abdomen/pelvis in ED shows nephrostomy tubes in place, left pyelitis   Patient reports he missed recent tube exchange   Status post nephrostomy tube change on 6/7    Plan:  -Follow-up urine output    Electrolyte  abnormality  Assessment & Plan  Mild hyponatremia, start fluid hydration. Monitor electrolytes and replete     Stage 3a chronic kidney disease (HCC)  Assessment & Plan  Lab Results   Component Value Date    EGFR 89 2024    EGFR 79 2024    EGFR 86 2024    CREATININE 0.81 2024    CREATININE 0.96 2024    CREATININE 0.87 2024     Chronic, continue to renally dose medications    Prostate cancer metastatic to bone (HCC)  Assessment & Plan  Chronic, following oncology, currently taking Xtinda and dexamethasone daily. Follows with oncology, continue pain control with tramadol and tylenol    Type 2 diabetes mellitus, with long-term current use of insulin ([facility])  Assessment & Plan  Lab Results   Component Value Date    HGBA1C 9.2 (A) 2024     High risk for hypoglycemia, chronic over previous admissions    Blood Sugar Average: Last 72 hrs:    Home medications include Metformin 1000 mg BID, Glimepiride 4mg breakfast & 2 mg HS, Levemir 32 U HS.    Plan:  -Hold oral agents  -Decrease Levemir to 20U   -Monitor for HS hypoglycemia in setting of infection  -Continue carb controlled diet       Subjective:   Patient evaluated bedside, reporting that he has had some improvement in pain.  Noting that he has had improvement in urinary symptoms.    Objective     Objective:   Vitals:   Temp (24hrs), Av °F (36.7 °C), Min:97.8 °F (36.6 °C), Max:98.3 °F (36.8 °C)    Temp:  [97.8 °F (36.6 °C)-98.3 °F (36.8 °C)] 97.8 °F (36.6 °C)  HR:  [55-88] 55  Resp:  [18-20] 18  BP: (132-153)/(60-72) 153/69  SpO2:  [96 %-100 %] 97 %  Body mass index is 24.94 kg/m².     Input and Output Summary (last 24 hours):       Intake/Output Summary (Last 24 hours) at 2024 1425  Last data filed at 2024 1153  Gross per 24 hour   Intake 560 ml   Output 675 ml   Net -115 ml       Physical Exam:   Physical Exam  Constitutional:       General: He is not in acute distress.  HENT:      Mouth/Throat:      Mouth: Mucous  membranes are moist.      Pharynx: Oropharynx is clear.   Eyes:      Conjunctiva/sclera: Conjunctivae normal.      Pupils: Pupils are equal, round, and reactive to light.   Cardiovascular:      Rate and Rhythm: Normal rate and regular rhythm.      Pulses: Normal pulses.      Heart sounds: Normal heart sounds.   Pulmonary:      Effort: Pulmonary effort is normal. No respiratory distress.      Breath sounds: No wheezing, rhonchi or rales.   Abdominal:      General: Bowel sounds are normal. There is no distension.      Palpations: Abdomen is soft.      Tenderness: There is left CVA tenderness. There is no right CVA tenderness.      Comments: B/L nephrostomy tubes in place, covered in clean dressing. No signs of infection    Musculoskeletal:      Right lower leg: No edema.      Left lower leg: No edema.   Skin:     General: Skin is warm and dry.      Capillary Refill: Capillary refill takes less than 2 seconds.   Neurological:      General: No focal deficit present.      Mental Status: He is alert and oriented to person, place, and time.      Motor: No weakness.           Additional Data:     Labs:  Results from last 7 days   Lab Units 06/07/24  0533   WBC Thousand/uL 9.31   HEMOGLOBIN g/dL 10.8*   HEMATOCRIT % 32.7*   PLATELETS Thousands/uL 330   SEGS PCT % 61   LYMPHO PCT % 31   MONO PCT % 6   EOS PCT % 2     Results from last 7 days   Lab Units 06/07/24  0533   POTASSIUM mmol/L 3.9   CHLORIDE mmol/L 108   CO2 mmol/L 24   BUN mg/dL 15   CREATININE mg/dL 0.81   CALCIUM mg/dL 9.3   ALK PHOS U/L 74   ALT U/L 15   AST U/L 13         Results from last 7 days   Lab Units 06/07/24  1054 06/07/24  0745 06/07/24  0701 06/06/24  2112 06/06/24  1743   POC GLUCOSE mg/dl 106 109 64* 275* 183*           * I Have Reviewed All Lab Data Listed Above.  * Additional Pertinent Lab Tests Reviewed: All Labs Within Last 24 Hours Reviewed    Imaging:    Imaging Reports Reviewed Today Include: None  Imaging Personally Reviewed by Myself  Includes:  none    Recent Cultures (last 7 days):     Results from last 7 days   Lab Units 06/06/24  1441   BLOOD CULTURE  Received in Microbiology Lab. Culture in Progress.  Received in Microbiology Lab. Culture in Progress.       Last 24 Hours Medication List:   Current Facility-Administered Medications   Medication Dose Route Frequency Provider Last Rate    acetaminophen  650 mg Oral Q6H [facility] Yas Webber, DO      albuterol  2 puff Inhalation Q6H PRN Yas Webber, DO      cefepime  2,000 mg Intravenous Q12H Yas Webber DO 2,000 mg (06/07/24 0229)    dexamethasone  0.5 mg Oral Daily With Breakfast Yas Webber, DO      Diclofenac Sodium  2 g Topical 4x Daily Yas Webber, DO      enzalutamide  160 mg Oral Daily Yas Webber, DO      heparin (porcine)  5,000 Units Subcutaneous Q8H [facility] Yas Webber, DO      insulin detemir  20 Units Subcutaneous HS Yas Webber, DO      insulin lispro  1-5 Units Subcutaneous 4x Daily (AC & HS) Yas Webber,       polyethylene glycol  17 g Oral Daily PRN Yas Webber, DO      pravastatin  80 mg Oral Daily With Dinner Yas Webber, DO      senna-docusate sodium  1 tablet Oral Daily Yas Webber, DO      sodium chloride (PF)  10 mL Intracatheter Daily Yas Webber,       sodium chloride  75 mL/hr Intravenous Continuous Jun Alexander MD 75 mL/hr (06/07/24 0827)    traMADol  50 mg Oral Q6H PRN Yas Webber, DO      trimethobenzamide  200 mg Intramuscular Q6H PRN Yas Webber, DO      vancomycin  750 mg Intravenous Q12H Yas Webber,  mg (06/07/24 0924)               Patient Information Sharing: With the consent of Oliverlia Earlyra , their loved ones were notified today by inpatient team of the patient’s condition and current plan.  All questions answered.    Time Spent for Care: 30 minutes.  More than 50% of total time spent on counseling and coordination of care as described above.    ** Please Note:  Dictation voice to text software may have been used in the creation of this document. **    Yas Webber DO  06/07/24  2:25 PM

## 2024-06-08 VITALS
RESPIRATION RATE: 19 BRPM | DIASTOLIC BLOOD PRESSURE: 65 MMHG | OXYGEN SATURATION: 97 % | SYSTOLIC BLOOD PRESSURE: 137 MMHG | TEMPERATURE: 97.5 F | HEART RATE: 65 BPM | BODY MASS INDEX: 24.86 KG/M2 | WEIGHT: 164 LBS | HEIGHT: 68 IN

## 2024-06-08 LAB
ALBUMIN SERPL BCP-MCNC: 3.1 G/DL (ref 3.5–5)
ALP SERPL-CCNC: 67 U/L (ref 34–104)
ALT SERPL W P-5'-P-CCNC: 13 U/L (ref 7–52)
ANION GAP SERPL CALCULATED.3IONS-SCNC: 7 MMOL/L (ref 4–13)
AST SERPL W P-5'-P-CCNC: 12 U/L (ref 13–39)
BACTERIA UR CULT: ABNORMAL
BACTERIA UR CULT: ABNORMAL
BASOPHILS # BLD AUTO: 0.06 THOUSANDS/ÂΜL (ref 0–0.1)
BASOPHILS NFR BLD AUTO: 1 % (ref 0–1)
BILIRUB SERPL-MCNC: 0.59 MG/DL (ref 0.2–1)
BUN SERPL-MCNC: 11 MG/DL (ref 5–25)
CALCIUM ALBUM COR SERPL-MCNC: 9.6 MG/DL (ref 8.3–10.1)
CALCIUM SERPL-MCNC: 8.9 MG/DL (ref 8.4–10.2)
CHLORIDE SERPL-SCNC: 105 MMOL/L (ref 96–108)
CO2 SERPL-SCNC: 24 MMOL/L (ref 21–32)
CREAT SERPL-MCNC: 0.71 MG/DL (ref 0.6–1.3)
EOSINOPHIL # BLD AUTO: 0.18 THOUSAND/ÂΜL (ref 0–0.61)
EOSINOPHIL NFR BLD AUTO: 2 % (ref 0–6)
ERYTHROCYTE [DISTWIDTH] IN BLOOD BY AUTOMATED COUNT: 13.7 % (ref 11.6–15.1)
GFR SERPL CREATININE-BSD FRML MDRD: 94 ML/MIN/1.73SQ M
GLUCOSE SERPL-MCNC: 144 MG/DL (ref 65–140)
GLUCOSE SERPL-MCNC: 79 MG/DL (ref 65–140)
GLUCOSE SERPL-MCNC: 93 MG/DL (ref 65–140)
HCT VFR BLD AUTO: 34.2 % (ref 36.5–49.3)
HGB BLD-MCNC: 11.3 G/DL (ref 12–17)
IMM GRANULOCYTES # BLD AUTO: 0.05 THOUSAND/UL (ref 0–0.2)
IMM GRANULOCYTES NFR BLD AUTO: 1 % (ref 0–2)
LYMPHOCYTES # BLD AUTO: 2.57 THOUSANDS/ÂΜL (ref 0.6–4.47)
LYMPHOCYTES NFR BLD AUTO: 24 % (ref 14–44)
MAGNESIUM SERPL-MCNC: 1.8 MG/DL (ref 1.9–2.7)
MCH RBC QN AUTO: 29.9 PG (ref 26.8–34.3)
MCHC RBC AUTO-ENTMCNC: 33 G/DL (ref 31.4–37.4)
MCV RBC AUTO: 91 FL (ref 82–98)
MONOCYTES # BLD AUTO: 0.71 THOUSAND/ÂΜL (ref 0.17–1.22)
MONOCYTES NFR BLD AUTO: 7 % (ref 4–12)
NEUTROPHILS # BLD AUTO: 6.97 THOUSANDS/ÂΜL (ref 1.85–7.62)
NEUTS SEG NFR BLD AUTO: 65 % (ref 43–75)
NRBC BLD AUTO-RTO: 0 /100 WBCS
PLATELET # BLD AUTO: 334 THOUSANDS/UL (ref 149–390)
PMV BLD AUTO: 9.8 FL (ref 8.9–12.7)
POTASSIUM SERPL-SCNC: 4 MMOL/L (ref 3.5–5.3)
PROT SERPL-MCNC: 6.5 G/DL (ref 6.4–8.4)
RBC # BLD AUTO: 3.78 MILLION/UL (ref 3.88–5.62)
SODIUM SERPL-SCNC: 136 MMOL/L (ref 135–147)
VANCOMYCIN TROUGH SERPL-MCNC: 13.6 UG/ML (ref 10–20)
WBC # BLD AUTO: 10.54 THOUSAND/UL (ref 4.31–10.16)

## 2024-06-08 PROCEDURE — 99239 HOSP IP/OBS DSCHRG MGMT >30: CPT | Performed by: INTERNAL MEDICINE

## 2024-06-08 PROCEDURE — 85025 COMPLETE CBC W/AUTO DIFF WBC: CPT

## 2024-06-08 PROCEDURE — 82948 REAGENT STRIP/BLOOD GLUCOSE: CPT

## 2024-06-08 PROCEDURE — 80202 ASSAY OF VANCOMYCIN: CPT

## 2024-06-08 PROCEDURE — 83735 ASSAY OF MAGNESIUM: CPT

## 2024-06-08 PROCEDURE — 80053 COMPREHEN METABOLIC PANEL: CPT

## 2024-06-08 RX ORDER — VANCOMYCIN HYDROCHLORIDE 1 G/200ML
1000 INJECTION, SOLUTION INTRAVENOUS EVERY 12 HOURS
Status: DISCONTINUED | OUTPATIENT
Start: 2024-06-08 | End: 2024-06-08

## 2024-06-08 RX ORDER — INSULIN DETEMIR 100 [IU]/ML
20 INJECTION, SOLUTION SUBCUTANEOUS
Qty: 15 ML | Refills: 1 | Status: SHIPPED | OUTPATIENT
Start: 2024-06-08

## 2024-06-08 RX ORDER — MAGNESIUM SULFATE HEPTAHYDRATE 40 MG/ML
2 INJECTION, SOLUTION INTRAVENOUS ONCE
Status: COMPLETED | OUTPATIENT
Start: 2024-06-08 | End: 2024-06-08

## 2024-06-08 RX ORDER — CIPROFLOXACIN 500 MG/1
500 TABLET, FILM COATED ORAL EVERY 12 HOURS SCHEDULED
Qty: 14 TABLET | Refills: 0 | Status: SHIPPED | OUTPATIENT
Start: 2024-06-09 | End: 2024-06-16

## 2024-06-08 RX ADMIN — MAGNESIUM SULFATE HEPTAHYDRATE 2 G: 40 INJECTION, SOLUTION INTRAVENOUS at 07:51

## 2024-06-08 RX ADMIN — VANCOMYCIN HYDROCHLORIDE 750 MG: 750 INJECTION, SOLUTION INTRAVENOUS at 07:54

## 2024-06-08 RX ADMIN — CEFEPIME HYDROCHLORIDE 2000 MG: 2 INJECTION, SOLUTION INTRAVENOUS at 02:39

## 2024-06-08 RX ADMIN — ACETAMINOPHEN 650 MG: 325 TABLET ORAL at 07:54

## 2024-06-08 RX ADMIN — HEPARIN SODIUM 5000 UNITS: 5000 INJECTION, SOLUTION INTRAVENOUS; SUBCUTANEOUS at 02:37

## 2024-06-08 RX ADMIN — DEXAMETHASONE 0.5 MG: 0.5 TABLET ORAL at 07:55

## 2024-06-08 RX ADMIN — SODIUM CHLORIDE, PRESERVATIVE FREE 10 ML: 5 INJECTION INTRAVENOUS at 08:00

## 2024-06-08 RX ADMIN — HEPARIN SODIUM 5000 UNITS: 5000 INJECTION, SOLUTION INTRAVENOUS; SUBCUTANEOUS at 11:27

## 2024-06-08 RX ADMIN — ACETAMINOPHEN 650 MG: 325 TABLET ORAL at 02:39

## 2024-06-08 NOTE — CASE MANAGEMENT
Case Management Progress Note    Patient name Oliver Astudillo  Location [address]-* MRN [id number]  : 1950 Date 2024       LOS (days): 2  Geometric Mean LOS (GMLOS) (days): 2.6  Days to GMLOS:0.7        OBJECTIVE:    Current admission status: Inpatient  Preferred Pharmacy:   [facility] [place], [place] - [address] [address]  Phone: [phone] Fax: [phone]    [facility] [address] [place] [address] - [address]  [address]  [facility] [address]  Phone: [phone] Fax: [phone]    [facility]/pharmacy #[id number] [place], [place] - [address] [facility]  [address] [facility] [address]  Phone: [phone] Fax: [phone]    Primary Care Provider: Eugenia Rodriguez MD    Primary Insurance: [employer] REP  Secondary Insurance:     PROGRESS NOTE:      GULSHAN made [facility] aware of patient's discharge in [place].

## 2024-06-08 NOTE — DISCHARGE SUMMARY
Discharge Summary - [facility]     Patient Information: Oliver Astudillo 73 y.o. male MRN: [id number]  Unit/Bed#: [address] [place] [address] Encounter: [id number]     Admitting Physician: Luis Mesa MD  Discharging Physician/Practitioner: Luis Mesa MD   PCP: Eugenia Rodriguez MD  Admission Date:   Admission Orders (From admission, onward)       Ordered        06/06/24 1549  INPATIENT ADMISSION  Once                          Discharge Date: 06/08/24      Reason for Admission: Painful urination [R30.9]  Pyelonephritis [N12]     Discharge Diagnoses:      Principal Problem:    Pyelonephritis  Active Problems:    Type 2 diabetes mellitus, with long-term current use of insulin (HCC)    Prostate cancer metastatic to bone (HCC)    Nephrostomy status (HCC)    Stage 3a chronic kidney disease (HCC)    Electrolyte abnormality  Resolved Problems:    * No resolved hospital problems. *       Consultations During Hospital Stay:  ·       none     Procedures Performed:   ·       Bilateral nephrostomy tube exchange 6/7     Significant Findings / Test Results:   6/:8: WBC 10.54, Hgb 11.3, Na 136, K 4.0,Mg 1.8,  Cr 0.71, AST 12, ALT 13  6/7: Mg 1.8, WBC 9.31, LA 1.8  6/6: WBC 17.64,  LA 2.5--> 3  UA large leuks, pos nitrites, innumerable bacteria and WBC's    CT A/P w/ contrast:  1.  Slight thickening and enhancement of the left renal pelvis which may represent pyelitis given history of left flank pain. No hydronephrosis. Nephrostomy is in place.   2.  Diffusely thick-walled urinary bladder with mucosal enhancement and intraluminal gas suspicious for cystitis.   3.  Stable osseous metastasis.      Incidental Findings:   ·      none     Test Results Pending at Discharge (will require follow up):   ·       none     Outpatient Tests Requested:  ·       Interventional Radiology for nephrostomy tube exchange in 2 months     Outpatient follow-up Requested:  ·       Urology            Interventional Radiology     Complications:  none    Things to address at first visit after hospitalization   Has patient completed course of antibiotics ciprofloxacin 500 mg BID for 7 days?   Has patient followed up with urology and IR for tube exchange?   Is patient continuing with detemir 20 IU nightly?   Has patient had any hypoglycemic episodes?    Hospital Course:   Oliver Astudillo is a 73 y.o. male patient who originally presented to the hospital on 6/6/2024 due to left flank pain and dysuria. Had bilateral nephrostomy tube exchange done on 6/7 after patient missed exchange. Urine culture grew pseudomonas, treated with cefepime and vanc during admission. BC negative.     * Pyelonephritis  Assessment & Plan  Initially reluctant with left-sided flank pain, dysuria.  WBC 17.6, UA showed large leuks, pos nitrates, numerous bacteria and WBCs. CT in ED showed slight thickening and enhancement of the left renal pelvis which may represent pyelitis given history of left flank pain. No hydronephrosis. Nephrostomy is in place  History of complicated UTI - within past year, previous urine cultures grew E. coli, Pseudomonas and Enterococcus sensitive to vancomycin. Recently admitted with UTI treated with Rocephin, cefepime x 3 days. Urine culture taken during this admission shows Pseudomonas and patient was treated with cefepime and vancomycin during admission.  Upon discharge, continue ciprofloxacin 500 mg twice a day for 7 remaining names totaling to 10 days of antibiotics. Follow up with urology and IR for nephrostomy tube exchange in 2 months.    Electrolyte abnormality  Mild hyponatremia, fluid hydration. Monitor electrolytes and replete as necessary during admission.    Stage 3a chronic kidney disease (HCC)  Chronic, continue to renally dose medications    Nephrostomy status (HCC)  Chronic, has bilateral nephrostomy tubes in place. requiring frequent changes and daily BID flushes. Reports current good output from  "bilateral tubes.   CT abdomen/pelvis in ED shows nephrostomy tubes in place, left pyelitis   Patient reports he missed recent tube exchange   Status post nephrostomy tube change on 6/7, Follow up with Interventional Radiology for nephrostomy tube exchange in 2 months    Prostate cancer metastatic to bone (HCC)  Chronic, following oncology, currently taking Xtinda and dexamethasone daily. Follows with oncology, continue pain control with tramadol and tylenol    Type 2 diabetes mellitus, with long-term current use of insulin ([facility])  Lab Results   Component Value Date    HGBA1C 9.2 (A) 04/04/2024     High risk for hypoglycemia, chronic over previous admissions. Home medications include Metformin 1000 mg BID, Glimepiride 4mg breakfast & 2 mg HS, Levemir 32 U HS. Upon discharge, decrease detemir to 20 units overnight., and continuing glimepiride 4 mg at breakfast and 2 mg nightly. Follow up with PCP.       Condition at Discharge: stable      Discharge Day Visit / Exam:      Vitals: Blood Pressure: 137/65 (06/08/24 0700)  Pulse: 65 (06/08/24 0700)  Temperature: 97.5 °F (36.4 °C) (06/08/24 0700)  Temp Source: Oral (06/08/24 0700)  Respirations: 19 (06/08/24 0700)  Height: 5' 8\" (172.7 cm) (06/06/24 1700)  Weight - Scale: 74.4 kg (164 lb) (06/07/24 0600)  SpO2: 97 % (06/08/24 0700)  Exam:   Physical Exam  Constitutional:       Appearance: Normal appearance.   HENT:      Head: Normocephalic and atraumatic.   Eyes:      General:         Right eye: No discharge.         Left eye: No discharge.      Conjunctiva/sclera: Conjunctivae normal.   Cardiovascular:      Rate and Rhythm: Normal rate and regular rhythm.      Pulses: Normal pulses.      Heart sounds: Normal heart sounds.   Pulmonary:      Effort: Pulmonary effort is normal. No respiratory distress.      Breath sounds: Normal breath sounds.   Abdominal:      General: Bowel sounds are normal.      Palpations: Abdomen is soft.      Tenderness: There is no abdominal " tenderness. There is left CVA tenderness.      Comments: Bandage on bilateral nephrostomy tube exchange clean, dry and no discharge   Musculoskeletal:         General: Normal range of motion.      Cervical back: Normal range of motion and neck supple.   Skin:     General: Skin is warm and dry.      Capillary Refill: Capillary refill takes less than 2 seconds.   Neurological:      Mental Status: He is alert.          Discharge instructions/Information to patient and family:   See after visit summary for information provided to patient and family.       Discharge Medications:     Medication List      START taking these medications     ciprofloxacin 500 mg tablet; Commonly known as: CIPRO; Take 1 tablet   (500 mg total) by mouth every 12 (twelve) hours for 7 days Do not start   before June 9, 2024.; Start taking on: June 9, 2024     CHANGE how you take these medications     Levemir FlexPen 100 units/mL injection pen; Generic drug: insulin   detemir; Inject 20 Units under the skin daily at bedtime; What changed:   how much to take     CONTINUE taking these medications     abiraterone 250 mg tablet; Commonly known as: ZYTIGA; Take 1 tablet (250   mg total) by mouth daily With a low fat meal   acetaminophen 325 mg tablet; Commonly known as: TYLENOL; Take 3 tablets   (975 mg total) by mouth every 8 (eight) hours as needed for mild pain or   moderate pain   AeroChamber Mini Chamber Isaura; by Does not apply route see   administration instructions   albuterol 90 mcg/act inhaler; Commonly known as: Ventolin HFA; Inhale 2   puffs every 6 (six) hours as needed for wheezing   Alcohol Swabs 70 % Pads; To clean the skin prior to injecting insulin   dexamethasone 0.5 mg tablet; Commonly known as: DECADRON; TAKE 1 TABLET   (0.5 MG TOTAL) BY MOUTH DAILY WITH BREAKFAST   Diclofenac Sodium 1 %; Commonly known as: VOLTAREN; Apply 2 g topically   4 (four) times a day   Easy Touch Pen Needles 31G X 6 MM Misc; Generic drug: Insulin Pen    Needle; Use daily at bedtime   enzalutamide 40 mg capsule; Commonly known as: XTANDI; Take 4 capsules   (160 mg total) by mouth daily   * glimepiride 2 mg tablet; Commonly known as: AMARYL; Take 1 tablet (2   mg total) by mouth daily with dinner   * glimepiride 4 mg tablet; Commonly known as: AMARYL; Take 1 tablet (4   mg total) by mouth daily with breakfast   lisinopril 10 mg tablet; Commonly known as: ZESTRIL   metFORMIN 1000 MG tablet; Commonly known as: GLUCOPHAGE; Take 1 tablet   (1,000 mg total) by mouth 2 (two) times a day with meals   naloxone 4 mg/0.1 mL nasal spray; Commonly known as: NARCAN; Administer   1 spray into a nostril. If no response after 2-3 minutes, give another   dose in the other nostril using a new spray.   omega-3-acid ethyl esters 1 g capsule; Commonly known as: LOVAZA   OneTouch Delica Lancets 33G Misc; Check blood sugars once daily. Please   substitute with appropriate alternative as covered by patient's insurance.   Dx: E11.65   OneTouch Verio Reflect w/Device Kit; Check blood sugars once daily.   Please substitute with appropriate alternative as covered by patient's   insurance. Dx: E11.65   OneTouch Verio test strip; Generic drug: glucose blood; Check blood   sugars twice a daily. Please substitute with appropriate alternative as   covered by patient's insurance. Dx: E11.65   polyethylene glycol 17 g packet; Commonly known as: MIRALAX; Take 17 g   by mouth daily as needed (for constipation)   rosuvastatin 10 MG tablet; Commonly known as: CRESTOR; Take 1 tablet (10   mg total) by mouth daily at bedtime   senna-docusate sodium 8.6-50 mg per tablet; Commonly known as: SENOKOT   S; Take 1 tablet by mouth daily   Sentry Tabs   sodium chloride (PF) 0.9 %; 10 mL by Intracatheter route daily   Intracatheter flushing daily. May substitute prefilled syringe with normal   saline 10 mL vials, 10 mL syringes, and 18 g blunt needles  * This list has 2 medication(s) that are the same as other  medications   prescribed for you. Read the directions carefully, and ask your doctor or   other care provider to review them with you.     STOP taking these medications     traMADol 50 mg tablet; Commonly known as: ULTRAM        Disposition:   Home       Discharge Statement:  I spent 29 minutes discharging the patient. This time was spent on the day of discharge. I had direct contact with the patient on the day of discharge. Greater than 50% of the total time was spent examining patient, answering all patient questions, arranging and discussing plan of care with patient as well as directly providing post-discharge instructions.  Additional time then spent on discharge activities.     ** Please Note: This note has been constructed using a voice recognition system **     Chanell Foss MD   06/08/24  2:22 PM

## 2024-06-08 NOTE — PROGRESS NOTES
[facility] Progress Note - Oliver Astudillo 73 y.o. male MRN: [id number]    Unit/Bed#: [address] [place] [address] Encounter: [id number]      Assessment/Plan:  * Pyelonephritis  Assessment & Plan  Presented with left-sided flank pain, dysuria.    WBC 17.6, UA showed large leuks, pos nitrates, numerous bacteria and WBCs    CT in ED: Slight thickening and enhancement of the left renal pelvis which may represent pyelitis given history of left flank pain. No hydronephrosis. Nephrostomy is in place  History of complicated UTI - within past year, previous urine cultures grew E. coli, Pseudomonas and Enterococcus sensitive to vancomycin      Recently admitted with UTI treated with Rocephin, cefepime x 3 days    Urine culture: > 100,000 gram negative kamini     Plan:  -Start Vancomycin (6/6 - )  -Start Cefepime (6/6 - )  -Pending urine cultures  -Monitor fever curve    Electrolyte abnormality  Assessment & Plan  Mild hyponatremia, fluid hydration. Monitor electrolytes and replete     Stage 3a chronic kidney disease (HCC)  Assessment & Plan  Lab Results   Component Value Date    EGFR 89 06/07/2024    EGFR 79 06/06/2024    EGFR 86 05/31/2024    CREATININE 0.81 06/07/2024    CREATININE 0.96 06/06/2024    CREATININE 0.87 05/31/2024     Chronic, continue to renally dose medications    Nephrostomy status ([facility])  Assessment & Plan  Chronic, has bilateral nephrostomy tubes in place. requiring frequent changes and daily BID flushes. Reports current good output from bilateral tubes.   CT abdomen/pelvis in ED shows nephrostomy tubes in place, left pyelitis   Patient reports he missed recent tube exchange   Status post nephrostomy tube change on 6/7    Plan:  -Follow-up urine output    Prostate cancer metastatic to bone (HCC)  Assessment & Plan  Chronic, following oncology, currently taking Xtinda and dexamethasone daily. Follows with oncology, continue pain control with tramadol and tylenol    Type 2 diabetes mellitus, with long-term  "current use of insulin ([facility])  Assessment & Plan  Lab Results   Component Value Date    HGBA1C 9.2 (A) 04/04/2024     High risk for hypoglycemia, chronic over previous admissions    Blood Sugar Average: Last 72 hrs:    Home medications include Metformin 1000 mg BID, Glimepiride 4mg breakfast & 2 mg HS, Levemir 32 U HS.    Plan:  -Hold oral agents  -Decrease Levemir to 20U   -Monitor for HS hypoglycemia in setting of infection  -Continue carb controlled diet       Subjective:   Patient seen and examined bedside.  Patient states he is doing well.  Patient denies any abdominal pain but has 1-2 level pain while urinating.  Has not had bowel movement for the past 3 days.  Denies chest pain, shortness of breath or any other acute complaints at this time.    Objective:     Vitals: Blood pressure 137/65, pulse 65, temperature 97.5 °F (36.4 °C), temperature source Oral, resp. rate 19, height 5' 8\" (1.727 m), weight 74.4 kg (164 lb), SpO2 97%.,Body mass index is 24.94 kg/m².  Wt Readings from Last 3 Encounters:   06/07/24 74.4 kg (164 lb)   06/03/24 74.8 kg (164 lb 12.8 oz)   05/29/24 76.2 kg (168 lb)       Intake/Output Summary (Last 24 hours) at 6/8/2024 0842  Last data filed at 6/8/2024 0239  Gross per 24 hour   Intake --   Output 945 ml   Net -945 ml     Physical Exam  Vitals and nursing note reviewed.   Constitutional:       General: He is not in acute distress.     Appearance: He is well-developed.   HENT:      Head: Normocephalic and atraumatic.   Eyes:      Conjunctiva/sclera: Conjunctivae normal.   Cardiovascular:      Rate and Rhythm: Normal rate and regular rhythm.      Heart sounds: No murmur heard.  Pulmonary:      Effort: Pulmonary effort is normal. No respiratory distress.      Breath sounds: Normal breath sounds.   Abdominal:      Palpations: Abdomen is soft.      Tenderness: There is no abdominal tenderness.      Comments: B/L nephrostomy tubes in place with clean dressing in place.    Musculoskeletal:         " General: No swelling.      Cervical back: Neck supple.   Skin:     General: Skin is warm and dry.      Capillary Refill: Capillary refill takes less than 2 seconds.   Neurological:      Mental Status: He is alert.   Psychiatric:         Mood and Affect: Mood normal.           Recent Results (from the past 24 hour(s))   Fingerstick Glucose (POCT)    Collection Time: 06/07/24 10:54 AM   Result Value Ref Range    POC Glucose 106 65 - 140 mg/dl   Fingerstick Glucose (POCT)    Collection Time: 06/07/24  4:39 PM   Result Value Ref Range    POC Glucose 165 (H) 65 - 140 mg/dl   Lactic acid, plasma (w/reflex if result > 2.0)    Collection Time: 06/07/24  4:59 PM   Result Value Ref Range    LACTIC ACID 1.8 0.5 - 2.0 mmol/L   Fingerstick Glucose (POCT)    Collection Time: 06/07/24  8:36 PM   Result Value Ref Range    POC Glucose 176 (H) 65 - 140 mg/dl   Vancomycin, trough    Collection Time: 06/08/24  4:51 AM   Result Value Ref Range    Vancomycin Tr 13.6 10.0 - 20.0 ug/mL   Magnesium    Collection Time: 06/08/24  4:51 AM   Result Value Ref Range    Magnesium 1.8 (L) 1.9 - 2.7 mg/dL   Comprehensive metabolic panel    Collection Time: 06/08/24  4:51 AM   Result Value Ref Range    Sodium 136 135 - 147 mmol/L    Potassium 4.0 3.5 - 5.3 mmol/L    Chloride 105 96 - 108 mmol/L    CO2 24 21 - 32 mmol/L    ANION GAP 7 4 - 13 mmol/L    BUN 11 5 - 25 mg/dL    Creatinine 0.71 0.60 - 1.30 mg/dL    Glucose 93 65 - 140 mg/dL    Calcium 8.9 8.4 - 10.2 mg/dL    Corrected Calcium 9.6 8.3 - 10.1 mg/dL    AST 12 (L) 13 - 39 U/L    ALT 13 7 - 52 U/L    Alkaline Phosphatase 67 34 - 104 U/L    Total Protein 6.5 6.4 - 8.4 g/dL    Albumin 3.1 (L) 3.5 - 5.0 g/dL    Total Bilirubin 0.59 0.20 - 1.00 mg/dL    eGFR 94 ml/min/1.73sq m   CBC and differential    Collection Time: 06/08/24  4:51 AM   Result Value Ref Range    WBC 10.54 (H) 4.31 - 10.16 Thousand/uL    RBC 3.78 (L) 3.88 - 5.62 Million/uL    Hemoglobin 11.3 (L) 12.0 - 17.0 g/dL    Hematocrit 34.2  (L) 36.5 - 49.3 %    MCV 91 82 - 98 fL    MCH 29.9 26.8 - 34.3 pg    MCHC 33.0 31.4 - 37.4 g/dL    RDW 13.7 11.6 - 15.1 %    MPV 9.8 8.9 - 12.7 fL    Platelets 334 149 - 390 Thousands/uL    nRBC 0 /100 WBCs    Segmented % 65 43 - 75 %    Immature Grans % 1 0 - 2 %    Lymphocytes % 24 14 - 44 %    Monocytes % 7 4 - 12 %    Eosinophils Relative 2 0 - 6 %    Basophils Relative 1 0 - 1 %    Absolute Neutrophils 6.97 1.85 - 7.62 Thousands/µL    Absolute Immature Grans 0.05 0.00 - 0.20 Thousand/uL    Absolute Lymphocytes 2.57 0.60 - 4.47 Thousands/µL    Absolute Monocytes 0.71 0.17 - 1.22 Thousand/µL    Eosinophils Absolute 0.18 0.00 - 0.61 Thousand/µL    Basophils Absolute 0.06 0.00 - 0.10 Thousands/µL   Fingerstick Glucose (POCT)    Collection Time: 06/08/24  6:50 AM   Result Value Ref Range    POC Glucose 79 65 - 140 mg/dl       Current Facility-Administered Medications   Medication Dose Route Frequency Provider Last Rate Last Admin    acetaminophen (TYLENOL) tablet 650 mg  650 mg Oral Q6H RAJEEV Yas Webber DO   650 mg at 06/08/24 0754    albuterol (PROVENTIL HFA,VENTOLIN HFA) inhaler 2 puff  2 puff Inhalation Q6H PRN Yas Webber DO        cefepime (MAXIPIME) IVPB (premix in dextrose) 2,000 mg 50 mL  2,000 mg Intravenous Q12H Yas Webber  mL/hr at 06/08/24 0239 2,000 mg at 06/08/24 0239    dexamethasone (DECADRON) tablet 0.5 mg  0.5 mg Oral Daily With Breakfast Yas Webber DO   0.5 mg at 06/08/24 0755    Diclofenac Sodium (VOLTAREN) 1 % topical gel 2 g  2 g Topical 4x Daily Yas Webber DO   2 g at 06/07/24 2258    enzalutamide (XTANDI) capsule 160 mg  160 mg Oral Daily Yas Webber DO        heparin (porcine) subcutaneous injection 5,000 Units  5,000 Units Subcutaneous Q8H [facility] Yas Webber,    5,000 Units at 06/08/24 0237    insulin detemir (LEVEMIR) subcutaneous injection 20 Units  20 Units Subcutaneous  Yas Webber DO   20 Units at 06/07/24 2866    insulin lispro  (HumALOG/ADMELOG) 100 units/mL subcutaneous injection 1-5 Units  1-5 Units Subcutaneous 4x Daily (AC & HS) Yas Webber, DO   1 Units at 06/07/24 2259    magnesium sulfate 2 g/50 mL IVPB (premix) 2 g  2 g Intravenous Once Chanell Foss MD 25 mL/hr at 06/08/24 0751 2 g at 06/08/24 0751    polyethylene glycol (MIRALAX) packet 17 g  17 g Oral Daily PRN Yas Webber DO        pravastatin (PRAVACHOL) tablet 80 mg  80 mg Oral Daily With Dinner Yas Webber DO   80 mg at 06/07/24 1632    senna-docusate sodium (SENOKOT S) 8.6-50 mg per tablet 1 tablet  1 tablet Oral Daily Yas Webber DO   1 tablet at 06/07/24 0822    sodium chloride (PF) 0.9 % injection 10 mL  10 mL Intracatheter Daily Yas Webber, DO   10 mL at 06/08/24 0800    traMADol (ULTRAM) tablet 50 mg  50 mg Oral Q6H PRN Yas Webber, DO   50 mg at 06/07/24 2100    trimethobenzamide (TIGAN) IM injection 200 mg  200 mg Intramuscular Q6H PRN Yas Webber DO        vancomycin (VANCOCIN) IVPB (premix in dextrose) 1,000 mg 200 mL  1,000 mg Intravenous Q12H Yas Webber, DO           Invasive Devices       Peripheral Intravenous Line  Duration             Peripheral IV 06/06/24 Left;Ventral (anterior) Forearm 1 day              Drain  Duration             Nephrostomy Left 10.2 Fr. <1 day    Nephrostomy Right 10.2 Fr. <1 day                    Lab, Imaging and other studies: I have personally reviewed pertinent reports.    VTE Pharmacologic Prophylaxis: Heparin  VTE Mechanical Prophylaxis: sequential compression device    Chanell Foss MD

## 2024-06-08 NOTE — UTILIZATION REVIEW
*PLEASE SEE INITIAL REVIEW COMPLETED ON 6/7/24 BY BHAVIK MTZ RN    Continued Stay Review  Date: 6/8/24 Saturday                       Current Patient Class: Inpatient     Current Level of Care: Med Surg    HPI:  74 y/o. male with PMHx HTN, HLD< Left CVA, DM, Prostate Cancer with Bone Metastasis, Bilateral Nephrostomy tubes - initially admitted on 6/6/24 2nd  Pyelonephritis and started on IV Abx Vancomycin + Cefepime     6/7/24 INT RADIOLOGY:  Procedure:  Bilateral PCN exchange   Findings: Exchange of bilateral PCN catheters for new 10.2 Congolese catheters.  Given recurrent UTI, we will plan for routine exchange in 2 months.      6/8/24 DAY 3:  Has surpassed a 2nd midnight with active treatments and services.     Complicated UTI with left pyelitis-s/p bilateral nephrostomy tube exchange.  Urine cultures growing Pseudomonas.  Patient received cefepime and vancomycin later de-escalated to cefepime.  Patient to be transitioned to ciprofloxacin upon discharge to complete 10-day course.  Prostate cancer with bone metastasis-outpatient follow-up for radiation treatment next week  Diabetes mellitus type 2-blood sugar stable.  Levemir dose was decreased to 20 units nightly.  Patient will resume glimepiride and metformin upon discharge.  Patient advised to monitor blood sugars closely.    Significant Findings / Test Results:   6/:8: WBC 10.54, Hgb 11.3, Na 136, K 4.0,Mg 1.8,  Cr 0.71, AST 12, ALT 13  6/7: Mg 1.8, WBC 9.31, LA 1.8  6/6: WBC 17.64,  LA 2.5--> 3  UA large leuks, pos nitrites, innumerable bacteria and WBC's    PLAN:  Upon discharge, continue ciprofloxacin 500 mg twice a day for 7 remaining names totaling to 10 days of antibiotics. Follow up with urology and IR for nephrostomy tube exchange in 2 months.     *DISCHARGED 6/8/24 AT 1204    Vital Signs:   Date/Time Temp Pulse Resp BP MAP (mmHg) SpO2 O2 Device Patient Position - Orthostatic VS   06/08/24 0700 97.5 °F (36.4 °C) 65 19 137/65 93 97 % None (Room air)  Lying   06/07/24 2218 97.7 °F (36.5 °C) 65 19 165/74 107 98 % None (Room air) Lying   06/1950 97.9 °F (36.6 °C) 58 18 153/72 103 98 % None (Room air) Lying   06/07/24 1430 97.8 °F (36.6 °C) 60 19 139/63 92 97 % None (Room air) Lying   06/07/24 1245 -- 55 18 153/69 99 97 % None (Room air) Lying   06/07/24 0700 97.8 °F (36.6 °C) 62 20 136/68 96 98 % None (Room air) Lying   06/06/24 2300 98.1 °F (36.7 °C) 60 20 142/65 94 96 % -- Lying   06/06/24 2005 98 °F (36.7 °C) 62 18 132/60 87 97 % None (Room air) Lying   06/06/24 1800 -- -- -- -- -- 98 % None (Room air) --   06/06/24 1700 98.3 °F (36.8 °C) 66 20 134/63 91 97 % None (Room air) Lying   06/06/24 1645 -- 66 -- 147/72 103 99 % -- --   06/06/24 1615 -- 70 -- 137/62 89 96 % -- --   06/06/24 1612 97.8 °F (36.6 °C) 88 18 137/62 -- 100 % None (Room air) Lying   06/06/24 1306 97.5 °F (36.4 °C) 89 18 106/57 -- 98 % -- --      06/06 0701  06/07 0700 06/07 0701  06/08 0700   P.O. 500    I.V. (mL/kg) 10 (0.1)    IV Piggyback 50    Total Intake(mL/kg) 560 (7.5)    Urine (mL/kg/hr) 415 945 (0.5)   Total Output 415 945   Net +145 -945     Pertinent Labs/Diagnostic Results:   Results from last 7 days   Lab Units 06/08/24  0451 06/07/24  0533 06/06/24  1357   WBC Thousand/uL 10.54* 9.31 17.64*   HEMOGLOBIN g/dL 11.3* 10.8* 13.1   HEMATOCRIT % 34.2* 32.7* 40.0   PLATELETS Thousands/uL 334 330 372   TOTAL NEUT ABS Thousands/µL 6.97 5.66 13.93*     Results from last 7 days   Lab Units 06/08/24  0451 06/07/24  0533 06/06/24  1357   SODIUM mmol/L 136 138 132*   POTASSIUM mmol/L 4.0 3.9 5.1   CHLORIDE mmol/L 105 108 101   CO2 mmol/L 24 24 20*   ANION GAP mmol/L 7 6 11   BUN mg/dL 11 15 16   CREATININE mg/dL 0.71 0.81 0.96   EGFR ml/min/1.73sq m 94 89 79   CALCIUM mg/dL 8.9 9.3 9.7   MAGNESIUM mg/dL 1.8* 1.8*  --      Results from last 7 days   Lab Units 06/08/24  0451 06/07/24  0533 06/06/24  1357   AST U/L 12* 13 26   ALT U/L 13 15 18   ALK PHOS U/L 67 74 86   TOTAL PROTEIN g/dL  6.5 6.3* 7.4   ALBUMIN g/dL 3.1* 3.1* 3.8   TOTAL BILIRUBIN mg/dL 0.59 0.45 0.53     Results from last 7 days   Lab Units 06/08/24  1100 06/08/24  0650 06/07/24  2036 06/07/24  1639 06/07/24  1054 06/07/24  0745 06/07/24  0701 06/06/24  2112 06/06/24  1743   POC GLUCOSE mg/dl 144* 79 176* 165* 106 109 64* 275* 183*     Results from last 7 days   Lab Units 06/08/24  0451 06/07/24  0533 06/06/24  1357   GLUCOSE RANDOM mg/dL 93 75 197*       Results from last 7 days   Lab Units 06/07/24  1659 06/06/24  1633 06/06/24  1441   LACTIC ACID mmol/L 1.8 3.1* 2.5*     Results from last 7 days   Lab Units 06/06/24  1349 06/06/24  1328   CLARITY UA  Cloudy Turbid   COLOR UA  Yellow Dark Yellow   SPEC GRAV UA  1.020 1.025   PH UA  7.5 6.0   GLUCOSE UA mg/dl 1000 (1%)* Negative   KETONES UA mg/dl Negative Negative   BLOOD UA  Moderate* Large*   PROTEIN UA mg/dl 30 (1+)* 300 (3+)*   NITRITE UA  Negative Positive*   BILIRUBIN UA  Negative Negative   UROBILINOGEN UA (BE) mg/dl <2.0 <2.0   LEUKOCYTES UA  Large* Large*   WBC UA /hpf Innumerable* Innumerable*   RBC UA /hpf None Seen Field obscured, unable to enumerate*   BACTERIA UA /hpf Innumerable* Field obscured, unable to enumerate*   EPITHELIAL CELLS WET PREP /hpf None Seen Field obscured, unable to enumerate*       Results from last 7 days   Lab Units 06/06/24  1441 06/06/24  1349 06/06/24  1328   BLOOD CULTURE  No Growth at 24 hrs.  No Growth at 24 hrs.  --   --    URINE CULTURE   --  >100,000 cfu/ml Pseudomonas aeruginosa* >100,000 cfu/ml Pseudomonas aeruginosa*       Results from last 7 days   Lab Units 06/08/24  0451   VANCOMYCIN TR ug/mL 13.6     Diet Rupesh/CHO Controlled; Consistent Carbohydrate Diet Level 2 (5 carb servings/75 grams CHO/meal)     Scheduled Medications:  acetaminophen, 650 mg, Oral, Q6H RAJEEV  cefepime, 2,000 mg, Intravenous, Q12H  dexamethasone, 0.5 mg, Oral, Daily With Breakfast  Diclofenac Sodium, 2 g, Topical, 4x Daily  enzalutamide, 160 mg, Oral,  Daily  heparin (porcine), 5,000 Units, Subcutaneous, Q8H RAJEEV  insulin detemir, 20 Units, Subcutaneous, HS  insulin lispro, 1-5 Units, Subcutaneous, 4x Daily (AC & HS)  magnesium sulfate, 2 g, Intravenous, Once  pravastatin, 80 mg, Oral, Daily With Dinner  senna-docusate sodium, 1 tablet, Oral, Daily  sodium chloride (PF), 10 mL, Intracatheter, Daily  vancomycin, 750 mg, Intravenous, Q12H     Continuous IV Infusions: NONE   (IVF sodium chloride, 75 mL/hr, Intravenous, Continuous - DC 6/7/24 )     PRN Meds:  albuterol, 2 puff, Inhalation, Q6H PRN  polyethylene glycol, 17 g, Oral, Daily PRN  traMADol, 50 mg, Oral, Q6H PRN - 6/7 X 2  trimethobenzamide, 200 mg, Intramuscular, Q6H PRN    Discharge Plan:   To be determined   Inpatient Case Management following for all discharge needs    Network Utilization Review Department  ATTENTION: Please call with any questions or concerns to [phone] and carefully listen to the prompts so that you are directed to the right person. All voicemails are confidential.   For Discharge needs, contact Care Management DC Support Team at [phone] opt. 2  Send all requests for admission clinical reviews, approved or denied determinations and any other requests to dedicated fax number below belonging to the campus where the patient is receiving treatment. List of dedicated fax numbers for the Facilities:  FACILITY NAME UR FAX NUMBER   ADMISSION DENIALS (Administrative/Medical Necessity) [phone]   DISCHARGE SUPPORT TEAM (NETWORK) [phone]   PARENT CHILD HEALTH (Maternity/NICU/Pediatrics) [phone]   [facility] [phone]   [facility] [phone]   [facility] [phone]   General acute hospital [phone]   [facility] [phone]   [facility] [phone]   [facility] [phone]   [facility]  [facility] [phone]   [facility] [phone]   [facility] [phone]   [facility] [phone]   [facility] [phone]

## 2024-06-08 NOTE — DISCHARGE INSTR - AVS FIRST PAGE
Complete course of antibiotics for 7 more days beginning tomorrow (6/9): Ciprofloxacin 500 mg twice a day    Exchange both stents in 2 months at the beginning of August with Interventional radiology.     Decrease Detemir to 20 Units at bedtime.     Follow-up with PCP at [place].

## 2024-06-08 NOTE — PLAN OF CARE
Problem: PAIN - ADULT  Goal: Verbalizes/displays adequate comfort level or baseline comfort level  Description: Interventions:  - Encourage patient to monitor pain and request assistance  - Assess pain using appropriate pain scale  - Administer analgesics based on type and severity of pain and evaluate response  - Implement non-pharmacological measures as appropriate and evaluate response  - Consider cultural and social influences on pain and pain management  - Notify physician/advanced practitioner if interventions unsuccessful or patient reports new pain  Outcome: Progressing     Problem: INFECTION - ADULT  Goal: Absence or prevention of progression during hospitalization  Description: INTERVENTIONS:  - Assess and monitor for signs and symptoms of infection  - Monitor lab/diagnostic results  - Monitor all insertion sites, i.e. indwelling lines, tubes, and drains  - Monitor endotracheal if appropriate and nasal secretions for changes in amount and color  - [place] appropriate cooling/warming therapies per order  - Administer medications as ordered  - Instruct and encourage patient and family to use good hand hygiene technique  - Identify and instruct in appropriate isolation precautions for identified infection/condition  Outcome: Progressing  Goal: Absence of fever/infection during neutropenic period  Description: INTERVENTIONS:  - Monitor WBC    Outcome: Progressing     Problem: SAFETY ADULT  Goal: Patient will remain free of falls  Description: INTERVENTIONS:  - Educate patient/family on patient safety including physical limitations  - Instruct patient to call for assistance with activity   - Consult OT/PT to assist with strengthening/mobility   - Keep Call bell within reach  - Keep bed low and locked with side rails adjusted as appropriate  - Keep care items and personal belongings within reach  - Initiate and maintain comfort rounds  - Make Fall Risk Sign visible to staff  - Offer Toileting every 2 Hours,  in advance of need  - Initiate/Maintain alarm  - Obtain necessary fall risk management equipment  - Apply yellow socks and bracelet for high fall risk patients  - Consider moving patient to room near nurses station  Outcome: Progressing  Goal: Maintain or return to baseline ADL function  Description: INTERVENTIONS:  -  Assess patient's ability to carry out ADLs; assess patient's baseline for ADL function and identify physical deficits which impact ability to perform ADLs (bathing, care of mouth/teeth, toileting, grooming, dressing, etc.)  - Assess/evaluate cause of self-care deficits   - Assess range of motion  - Assess patient's mobility; develop plan if impaired  - Assess patient's need for assistive devices and provide as appropriate  - Encourage maximum independence but intervene and supervise when necessary  - Involve family in performance of ADLs  - Assess for home care needs following discharge   - Consider OT consult to assist with ADL evaluation and planning for discharge  - Provide patient education as appropriate  Outcome: Progressing  Goal: Maintains/Returns to pre admission functional level  Description: INTERVENTIONS:  - Perform AM-PAC 6 Click Basic Mobility/ Daily Activity assessment daily.  - Set and communicate daily mobility goal to care team and patient/family/caregiver.   - Collaborate with rehabilitation services on mobility goals if consulted  - Perform Range of Motion 3 times a day.  - Reposition patient every 2 hours.  - Dangle patient 3 times a day  - Stand patient 3 times a day  - Ambulate patient 3 times a day  - Out of bed to chair 3 times a day   - Out of bed for meals 3 times a day  - Out of bed for toileting  - Record patient progress and toleration of activity level   Outcome: Progressing     Problem: DISCHARGE PLANNING  Goal: Discharge to home or other facility with appropriate resources  Description: INTERVENTIONS:  - Identify barriers to discharge w/patient and caregiver  - Arrange  for needed discharge resources and transportation as appropriate  - Identify discharge learning needs (meds, wound care, etc.)  - Arrange for interpretive services to assist at discharge as needed  - Refer to Case Management Department for coordinating discharge planning if the patient needs post-hospital services based on physician/advanced practitioner order or complex needs related to functional status, cognitive ability, or social support system  Outcome: Progressing     Problem: Knowledge Deficit  Goal: Patient/family/caregiver demonstrates understanding of disease process, treatment plan, medications, and discharge instructions  Description: Complete learning assessment and assess knowledge base.  Interventions:  - Provide teaching at level of understanding  - Provide teaching via preferred learning methods  Outcome: Progressing

## 2024-06-08 NOTE — PLAN OF CARE
Problem: PAIN - ADULT  Goal: Verbalizes/displays adequate comfort level or baseline comfort level  Description: Interventions:  - Encourage patient to monitor pain and request assistance  - Assess pain using appropriate pain scale  - Administer analgesics based on type and severity of pain and evaluate response  - Implement non-pharmacological measures as appropriate and evaluate response  - Consider cultural and social influences on pain and pain management  - Notify physician/advanced practitioner if interventions unsuccessful or patient reports new pain  Outcome: Progressing     Problem: INFECTION - ADULT  Goal: Absence or prevention of progression during hospitalization  Description: INTERVENTIONS:  - Assess and monitor for signs and symptoms of infection  - Monitor lab/diagnostic results  - Monitor all insertion sites, i.e. indwelling lines, tubes, and drains  - Monitor endotracheal if appropriate and nasal secretions for changes in amount and color  - Bradford appropriate cooling/warming therapies per order  - Administer medications as ordered  - Instruct and encourage patient and family to use good hand hygiene technique  - Identify and instruct in appropriate isolation precautions for identified infection/condition  Outcome: Progressing  Goal: Absence of fever/infection during neutropenic period  Description: INTERVENTIONS:  - Monitor WBC    Outcome: Progressing     Problem: SAFETY ADULT  Goal: Patient will remain free of falls  Description: INTERVENTIONS:  - Educate patient/family on patient safety including physical limitations  - Instruct patient to call for assistance with activity   - Consult OT/PT to assist with strengthening/mobility   - Keep Call bell within reach  - Keep bed low and locked with side rails adjusted as appropriate  - Keep care items and personal belongings within reach  - Initiate and maintain comfort rounds  - Make Fall Risk Sign visible to staff  - Offer Toileting every 2 Hours,  in advance of need  - Initiate/Maintain bed alarm  - Obtain necessary fall risk management equipment: cane  - Apply yellow socks and bracelet for high fall risk patients  - Consider moving patient to room near nurses station  Outcome: Progressing  Goal: Maintain or return to baseline ADL function  Description: INTERVENTIONS:  -  Assess patient's ability to carry out ADLs; assess patient's baseline for ADL function and identify physical deficits which impact ability to perform ADLs (bathing, care of mouth/teeth, toileting, grooming, dressing, etc.)  - Assess/evaluate cause of self-care deficits   - Assess range of motion  - Assess patient's mobility; develop plan if impaired  - Assess patient's need for assistive devices and provide as appropriate  - Encourage maximum independence but intervene and supervise when necessary  - Involve family in performance of ADLs  - Assess for home care needs following discharge   - Consider OT consult to assist with ADL evaluation and planning for discharge  - Provide patient education as appropriate  Outcome: Progressing  Goal: Maintains/Returns to pre admission functional level  Description: INTERVENTIONS:  - Perform AM-PAC 6 Click Basic Mobility/ Daily Activity assessment daily.  - Set and communicate daily mobility goal to care team and patient/family/caregiver.   - Collaborate with rehabilitation services on mobility goals if consulted  - Perform Range of Motion 3 times a day.  - Reposition patient every 2 hours.  - Dangle patient 3 times a day  - Stand patient 3 times a day  - Ambulate patient 3 times a day  - Out of bed to chair 3 times a day   - Out of bed for meals 3 times a day  - Out of bed for toileting  - Record patient progress and toleration of activity level   Outcome: Progressing     Problem: DISCHARGE PLANNING  Goal: Discharge to home or other facility with appropriate resources  Description: INTERVENTIONS:  - Identify barriers to discharge w/patient and  caregiver  - Arrange for needed discharge resources and transportation as appropriate  - Identify discharge learning needs (meds, wound care, etc.)  - Arrange for interpretive services to assist at discharge as needed  - Refer to Case Management Department for coordinating discharge planning if the patient needs post-hospital services based on physician/advanced practitioner order or complex needs related to functional status, cognitive ability, or social support system  Outcome: Progressing     Problem: Knowledge Deficit  Goal: Patient/family/caregiver demonstrates understanding of disease process, treatment plan, medications, and discharge instructions  Description: Complete learning assessment and assess knowledge base.  Interventions:  - Provide teaching at level of understanding  - Provide teaching via preferred learning methods  Outcome: Progressing

## 2024-06-08 NOTE — CONSULTS
Vancomycin IV Pharmacy-to-Dose Consultation     Vancomycin has been discontinued.  Pharmacy will sign off.  Please contact or re-consult with questions.    Do July Suggs, Pharmacist

## 2024-06-10 ENCOUNTER — TELEPHONE (OUTPATIENT)
Age: 74
End: 2024-06-10

## 2024-06-10 NOTE — UTILIZATION REVIEW
NOTIFICATION OF ADMISSION DISCHARGE   This is a Notification of Discharge from [facility]. Please be advised that this patient has been discharge from our facility. Below you will find the admission and discharge date and time including the patient’s disposition.   UTILIZATION REVIEW CONTACT:  Fariha Garsia  Utilization   Network Utilization Review Department  Phone: [phone] x carefully listen to the prompts. All voicemails are confidential.  Email: [email or web address]     ADMISSION INFORMATION  PRESENTATION DATE: 6/6/2024  1:22 PM  OBERVATION ADMISSION DATE:   INPATIENT ADMISSION DATE: 6/6/24  3:49 PM   DISCHARGE DATE: 6/8/2024  3:08 PM   DISPOSITION:Home with Home Health Care    Network Utilization Review Department  ATTENTION: Please call with any questions or concerns to [phone] and carefully listen to the prompts so that you are directed to the right person. All voicemails are confidential.   For Discharge needs, contact Care Management DC Support Team at [phone] opt. 2  Send all requests for admission clinical reviews, approved or denied determinations and any other requests to dedicated fax number below belonging to the campus where the patient is receiving treatment. List of dedicated fax numbers for the Facilities:  FACILITY NAME UR FAX NUMBER   ADMISSION DENIALS (Administrative/Medical Necessity) [phone]   DISCHARGE SUPPORT TEAM ([facility]) [phone]   PARENT CHILD HEALTH (Maternity/NICU/Pediatrics) [phone]   [facility] [phone]   [facility] [phone]   [facility] [phone]   [facility] [phone]   [facility] [phone]   [facility] [phone]   [facility] [phone]   [facility]  [phone]   [facility] [phone]   [facility] [phone]   [facility] [phone]   [facility] [phone]

## 2024-06-10 NOTE — TELEPHONE ENCOUNTER
Fax received from [employer] for supplies. Copy of form scanned into encounter. Placed in Dr cardona's folder in precepting room.

## 2024-06-11 LAB
BACTERIA BLD CULT: NORMAL
BACTERIA BLD CULT: NORMAL

## 2024-06-12 ENCOUNTER — RADIATION ONCOLOGY FOLLOW-UP (OUTPATIENT)
Dept: RADIATION ONCOLOGY | Facility: HOSPITAL | Age: 74
End: 2024-06-12
Attending: STUDENT IN AN ORGANIZED HEALTH CARE EDUCATION/TRAINING PROGRAM
Payer: COMMERCIAL

## 2024-06-12 ENCOUNTER — TELEPHONE (OUTPATIENT)
Age: 74
End: 2024-06-12

## 2024-06-12 ENCOUNTER — PATIENT OUTREACH (OUTPATIENT)
Age: 74
End: 2024-06-12

## 2024-06-12 VITALS
TEMPERATURE: 96.2 F | RESPIRATION RATE: 18 BRPM | BODY MASS INDEX: 24.86 KG/M2 | WEIGHT: 164 LBS | DIASTOLIC BLOOD PRESSURE: 74 MMHG | OXYGEN SATURATION: 99 % | SYSTOLIC BLOOD PRESSURE: 118 MMHG | HEIGHT: 68 IN | HEART RATE: 82 BPM

## 2024-06-12 DIAGNOSIS — C79.51 PROSTATE CANCER METASTATIC TO BONE (HCC): ICD-10-CM

## 2024-06-12 DIAGNOSIS — C61 PROSTATE CANCER METASTATIC TO BONE (HCC): ICD-10-CM

## 2024-06-12 PROCEDURE — 99211 OFF/OP EST MAY X REQ PHY/QHP: CPT | Performed by: STUDENT IN AN ORGANIZED HEALTH CARE EDUCATION/TRAINING PROGRAM

## 2024-06-12 PROCEDURE — 99215 OFFICE O/P EST HI 40 MIN: CPT | Performed by: STUDENT IN AN ORGANIZED HEALTH CARE EDUCATION/TRAINING PROGRAM

## 2024-06-12 PROCEDURE — G2211 COMPLEX E/M VISIT ADD ON: HCPCS | Performed by: STUDENT IN AN ORGANIZED HEALTH CARE EDUCATION/TRAINING PROGRAM

## 2024-06-12 RX ORDER — OXYCODONE HYDROCHLORIDE 5 MG/1
5 TABLET ORAL EVERY 4 HOURS PRN
Qty: 30 TABLET | Refills: 0 | Status: SHIPPED | OUTPATIENT
Start: 2024-06-12

## 2024-06-12 RX ORDER — DEXAMETHASONE 2 MG/1
2 TABLET ORAL 2 TIMES DAILY WITH MEALS
Qty: 60 TABLET | Refills: 0 | Status: SHIPPED | OUTPATIENT
Start: 2024-06-12

## 2024-06-12 RX ORDER — PANTOPRAZOLE SODIUM 40 MG/1
40 TABLET, DELAYED RELEASE ORAL DAILY
Qty: 30 TABLET | Refills: 0 | Status: SHIPPED | OUTPATIENT
Start: 2024-06-12

## 2024-06-12 NOTE — PROGRESS NOTES
"    Clerical staff advised RN GULSHAN that she received a call from patients wife stating that she needs a \"lift\" to get patient out of bed. RN GULSHAN will reach out to patients wife.     Outreach to [facility]. Loraine reports that before most recent admission patient was able to get out of bed. Ambulates with a cane.  Advised to reach out to patients son who assists in managing care.     Outreach to patients son. Son is unaware of hat is going on. Does  not know what patient needs. Does report that patient alarcon have some difficulties getting out of bed. Suggested outreach to patients wife.     Outreach to patients wife. Wife states he needs a chair. Reports that patient has an appointment today at 2 pm. Advised that Loraine will call to schedule home visit. Will assess needs at that time. DME that is needed will be ordered once Loraine does home evaluation. Wife in agreement with this plan.     RN CM will continue to follow.     Received call from Loraine. Loraine reports that patient is in need of transport wheelchair.  RN CM will speak to provider to have one ordered through [employer].       Received another call from [facility]aine. Loraine reports that patient is experiencing severe pain. Pt is unable to ambulate. Pt is receiving tramadol one tablet twice a day, however  this RN GULSHAN does not see it on his MAR. Provided Loraine with phone number for Palliative care. Advised to reach out as soon as possible to schedule an appointment.     Loraine reports patient is also in need of a commode. Will have provider order through [facility].     High priority In basket message sent to Dr. Coyne and Dr. KATERINA Andrea advising of patients intractable pain.   "

## 2024-06-12 NOTE — TELEPHONE ENCOUNTER
Dr. Springer    Patient's wife need a chair where he can sit.  Please [phone]     please call patient  [phone]    URGENT please he can not stand and need to go to the oncology appts to have his treatment.

## 2024-06-12 NOTE — PROGRESS NOTES
Oliver Astudillo 1950 is a 73 y.o. male Presents for complex follow-up, referred by Dr. Coyne for bone metastasis.     Prior consult August 2023.   Patient has history of very high risk (cT4, GS 4+5, ) vs metastatic prostate adenocarcinoma who presented in May 2023 with urinary retention and hematuria. He was seen in initial consultation on 7/31/23 with recommendation for PSMA PET for completion of staging given his very high risk of having metastases. He returned for follow-up on 8/28/23 to review imaging. PSMA PET scan demonstrated an intensely avid prostate with bladder invasion as well as numerous osseous metastatic sites, primarily throughout his axial skeleton. Discussed that in addition to Lupron, additional systemic therapy such as novel antiandrogens or chemotherapy (e.g. docetaxel) could be considered and a referral was placed to medical oncology.  He has been on treatment with Lupron and zytiga since Sept 2023. He recently was hospitalized and imaging revealed enhancing metastatic lesions in his spine and rising PSA. Zytiga was changed to Xtandi and he is referred to discuss palliative RT.       9/20/23 Med Onc, Dr. Coyne  Pt started Firmagon in July 2023 and Lupron in August 2023.   Recommended Zytiga   Dex 0.5 mg PO daily for avoidance of adrenal sufficiency.  Discussed consideration for bone strengthening agent. Teeth are in poor repair. Dental evaluation/clearance is requested.       1/11/24 Urology  doing well on his current treatment plan  He received Lupron 45 mg today  Continue percutaneous nephrostomies with regular changes per IR  Follow-up in 6 months for his next Lupron       PSA   Latest Ref Rng 0.00 - 4.00 ng/mL   5/24/2023 145.73 (H)    8/11/2023 6.01 (H)    12/27/2023 0.16    3/18/2024 1.96    4/29/2024 6.57 (H)         5/28/24 CT abdomen pelvis w contrast  Bladder wall thickening with increased bladder wall enhancement suspicious for cystitis. Bilateral nephrostomy tubes in  good position with no collecting system dilatation and no CT evidence of acute pyelonephritis.     New lytic L1 vertebral body lesion with a soft tissue component causing focal erosion of both the anterior and posterior cortex as described above. Additional sclerotic metastases are stable.       5/29/24 MRI lumbar spine w wo contrast  1. Multiple marrow replacing and enhancing lesions involving the lumbar spine, sacrum and iliac bones, which are suspicious for metastases. No convincing epidural involvement though there is probable encroachment on the right L1-L2 neural foramen. Of note, the L1 marrow replacing lesion results in a mild pathologic compression fracture. Recommend correlation with nuclear medicine bone scan and/or prostate-specific tracers in a PET scan, as clinically appropriate.   2. Multilevel lumbar spine spondylotic changes, as detailed above.       6/3/24 Dr. Coyne  Most recent PSA April 29, 2024 6.5.  CT ab pelvis 1 week ago showed bladder wall thickening suspicious for cystitis.  New lytic L1 vertebral body lesion.  MRI lumbar spine showed the same.  Additional marrow replacing lesions in the lumbar spine, sacrum, iliac bones suspicious for metastases.  Patient was recently hospitalized for lumbar pain.  Radiation therapy consultation is requested.  He is currently using tramadol once a day although he admits to moderate pain. Zytiga is discontinued at this time.  Xtandi is used as alternative.      6/7/24 IR - Exchange of bilateral 10.2 Wolof nephrostomy tubes under fluoroscopic control      Upcoming appts:  7/11/24 Urology  7/17/24 Med Onc       Follow up visit     Oncology History   Prostate cancer metastatic to bone (HCC)   5/24/2023 Initial Diagnosis    May 2023 patient presented with urinary frequency. . CT showed distended urinary bladder with bilateral hydronephrosis. Urinary bladder mass inseparable from the prostate. Extraprostatic extension noted. Bone scan showed  indeterminate activity at T11 vertebral body. Bilateral nephrostomy tubes and Cazares catheter placed. Prostate biopsy showed adenocarcinoma, Liana 9.      6/28/2023 Biopsy    A. Prostate, right lateral base:  - Prostatic adenocarcinoma, L[place] score 4 + 5 = 9, Prognostic Grade Group 5,  involving 90% of 1 needle core  and measuring  11 mm in length.        B. Prostate, right medial base:  - Prostatic adenocarcinoma, Liana score 4 + 5 = 9, Prognostic Grade Group 5,  involving 70% of 1 needle core  and measuring  10 mm in length.      C. Prostate, right lateral mid:  - Prostatic adenocarcinoma, Liana score 4 + 5 = 9, Prognostic Grade Group 5,  involving 90% of 1 needle core  and measuring  12 mm in length.      Comment: Immunohistochemistry for a prostate multiplex stain (p63, K903, and P504S) and NKX3.1 demonstrate invasive carcinoma.     D. Prostate, right medial mid:  - Prostatic adenocarcinoma, Liana score 4 + 5 = 9, Prognostic Grade Group 5,  involving 95% of 1 needle core  and measuring  15 mm in length.      E. Prostate, right lateral apex:  - Prostatic adenocarcinoma, [place] score 4 + 5 = 9, Prognostic Grade Group 5,  involving 90% of 1 needle core  and measuring  12 mm in length.   - Perineural invasion identified.     Comment: Immunohistochemistry for a prostate multiplex stain (p63, K903, and P504S) and NKX3.1 demonstrate invasive carcinoma.     F. Prostate, right medial apex:  - Prostatic adenocarcinoma, [place] score 4 + 5 = 9, Prognostic Grade Group 5,  involving 100% of 1 needle core  and measuring  20 mm in length.      G. Prostate, left lateral base:  - Prostatic adenocarcinoma, [place] score 4 + 5 = 9, Prognostic Grade Group 5,  involving 75% of 1 needle core  and measuring  10 mm in length.   - Perineural invasion identified.  - Lymphovascular invasion is identified.     H. Prostate, left medial base:  - Prostatic adenocarcinoma, [place] score 4 + 5 = 9, Prognostic Grade Group 5,  involving  95% of 1 needle core  and measuring  14 mm in length.   - Perineural invasion identified.     Comment: There is a focus of closely approximated gastrointestinal epithelium; NKX3.1 shows no definite invasion of the GI epithelium.      I. Prostate, left lateral mid:  - Prostatic adenocarcinoma, [place] score 4 + 5 = 9, Prognostic Grade Group 5,  involving 95% of 1 needle core  and measuring  12 mm in length.       J. Prostate, left medial mid:  - Prostatic adenocarcinoma, [place] score 4 + 5 = 9, Prognostic Grade Group 5,  involving 85% of 1 needle core  and measuring  13 mm in length.       Comment: Immunohistochemistry for a prostate multiplex stain (p63, K903, and P504S) and NKX3.1 demonstrate invasive carcinoma.     K. Prostate, left lateral apex:  - Prostatic adenocarcinoma, [place] score 4 + 5 = 9, Prognostic Grade Group 5,  involving 25% of 1 needle core  and measuring  3 mm in length.        L. Prostate, left medial apex :  - Prostatic adenocarcinoma, [place] score 4 + 5 = 9, Prognostic Grade Group 5,  involving 95% of 1 needle core  and measuring  15 mm in length.     - Perineural invasion identified.     Comment:   Cribriform glands are present within the pattern 4 component.  This feature has been associated with adverse clinical outcomes and molecular features typically seen in advanced disease.  (The 2019 Genitourinary Pathology Society (GUPS) White Paper on Contemporary Grading of Prostate Cancer. Arch Pathol Lab Med. 2021 Apr 1;145(4):461-493.)     7/5/2023 -  Hormone Therapy    Firmagon loading dose on 7/5/23, followed by Lupron      8/28/2023 -  Cancer Staged    Staging form: Prostate, AJCC 8th Edition  - Clinical: Stage IVB (cT4, cN1, cM1b, PSA: 145, Grade Group: 5) - Signed by Dov Andrea MD on 8/28/2023  Prostate specific antigen (PSA) range: 20 or greater  Histologic grading system: 5 grade system       9/2023 - 6/2024 Chemotherapy    Zytiga 250 mg PO daily     6/2024 -  Chemotherapy     Xtandi          Review of Systems:  Review of Systems   Constitutional: Negative.    HENT: Negative.     Eyes: Negative.         Wears glasses    Respiratory: Negative.     Cardiovascular: Negative.    Gastrointestinal:  Positive for constipation (goes every 2-3 days).   Endocrine: Negative.    Genitourinary: Negative.         Had a bladder infection last week, symptoms resolved    Musculoskeletal:  Positive for back pain and gait problem (in wheelchair).   Skin: Negative.    Allergic/Immunologic: Negative.    Hematological: Negative.    Psychiatric/Behavioral: Negative.           Health Maintenance   Topic Date Due    COVID-19 Vaccine (1) Never done    DM Eye Exam  Never done    Zoster Vaccine (1 of 2) Never done    RSV Vaccine Age 60+ Years (1 - 1-dose 60+ series) Never done    Depression Screening  07/31/2024    Influenza Vaccine (Season Ended) 09/01/2024    HEMOGLOBIN A1C  10/04/2024    Diabetic Foot Exam  10/23/2024    Fall Risk  04/26/2025    Medicare Annual Wellness Visit (AWV)  04/26/2025    BMI: Adult  06/07/2025    Colorectal Cancer Screening  01/08/2027    Hepatitis C Screening  Completed    Pneumococcal Vaccine: 65+ Years  Completed    RSV Vaccine age 0-20 Months  Aged Out    HIB Vaccine  Aged Out    IPV Vaccine  Aged Out    Hepatitis A Vaccine  Aged Out    Meningococcal ACWY Vaccine  Aged Out    HPV Vaccine  Aged Out     Patient Active Problem List   Diagnosis    Type 2 diabetes mellitus, with long-term current use of insulin (HCC)    Other hydronephrosis    Hypertension    Hydronephrosis    Prostate cancer metastatic to bone (HCC)    Encounter for monitoring androgen deprivation therapy    Nephrostomy status (HCC)    Hyperlipidemia    Malfunction of nephrostomy tube (HCC)    Hypokalemia    Stage 3a chronic kidney disease (HCC)    Hypoglycemia    Electrolyte abnormality    Pyelonephritis    Acute cystitis     Past Medical History:   Diagnosis Date    COVID-19 01/15/2024    Diabetes mellitus (HCC)      Elevated PSA 2023    High cholesterol     Hypertension     Prostate cancer (HCC)      Past Surgical History:   Procedure Laterality Date    IR NEPHROSTOMY TUBE CHECK/CHANGE/REPOSITION/REINSERTION/UPSIZE  2023    IR NEPHROSTOMY TUBE CHECK/CHANGE/REPOSITION/REINSERTION/UPSIZE  2023    IR NEPHROSTOMY TUBE CHECK/CHANGE/REPOSITION/REINSERTION/UPSIZE  2024    IR NEPHROSTOMY TUBE CHECK/CHANGE/REPOSITION/REINSERTION/UPSIZE  2024    IR NEPHROSTOMY TUBE CHECK/CHANGE/REPOSITION/REINSERTION/UPSIZE  3/4/2024    IR NEPHROSTOMY TUBE CHECK/CHANGE/REPOSITION/REINSERTION/UPSIZE  3/20/2024    IR NEPHROSTOMY TUBE CHECK/CHANGE/REPOSITION/REINSERTION/UPSIZE  2024    IR NEPHROSTOMY TUBE PLACEMENT  2023    bilateral    IR OTHER  1/15/2024    US GUIDED PROSTATE BIOPSY       Family History   Problem Relation Age of Onset    Uterine cancer Mother     Liver cancer Father     No Known Problems Sister     No Known Problems Brother     No Known Problems Son     Diabetes type II Daughter     No Known Problems Daughter     Cancer Daughter      Social History     Socioeconomic History    Marital status: /Civil Union     Spouse name: Not on file    Number of children: 3    Years of education: Not on file    Highest education level: Not on file   Occupational History    Not on file   Tobacco Use    Smoking status: Former     Current packs/day: 0.00     Types: Cigarettes     Quit date:      Years since quittin.4     Passive exposure: Past    Smokeless tobacco: Never    Tobacco comments:     States he only smoked on the weekends   Vaping Use    Vaping status: Never Used   Substance and Sexual Activity    Alcohol use: Not Currently     Comment: socially    Drug use: Never    Sexual activity: Yes     Partners: Female   Other Topics Concern    Not on file   Social History Narrative    Not on file     Social Determinants of Health     Financial Resource Strain: Low Risk  (2024)    Overall Financial  Resource Strain (CARDIA)     Difficulty of Paying Living Expenses: Not hard at all   Food Insecurity: No Food Insecurity (6/7/2024)    Hunger Vital Sign     Worried About Running Out of Food in the Last Year: Never true     Ran Out of Food in the Last Year: Never true   Transportation Needs: No Transportation Needs (6/7/2024)    PRAPARE - Transportation     Lack of Transportation (Medical): No     Lack of Transportation (Non-Medical): No   Physical Activity: Not on file   Stress: Not on file   Social Connections: Feeling Socially Integrated (6/1/2024)    Received from [facility]    OASIS : Social Isolation     Frequency of experiencing loneliness or isolation: Never   Intimate Partner Violence: Not on file   Housing Stability: Low Risk  (6/7/2024)    Housing Stability Vital Sign     Unable to Pay for Housing in the Last Year: No     Number of Times Moved in the Last Year: 0     Homeless in the Last Year: No       Current Outpatient Medications:     abiraterone (ZYTIGA) 250 mg tablet, Take 1 tablet (250 mg total) by mouth daily With a low fat meal, Disp: 30 tablet, Rfl: 11    acetaminophen (TYLENOL) 325 mg tablet, Take 3 tablets (975 mg total) by mouth every 8 (eight) hours as needed for mild pain or moderate pain, Disp: , Rfl:     albuterol (Ventolin HFA) 90 mcg/act inhaler, Inhale 2 puffs every 6 (six) hours as needed for wheezing, Disp: 18 g, Rfl: 0    Alcohol Swabs 70 % PADS, To clean the skin prior to injecting insulin, Disp: 100 each, Rfl: 1    Blood Glucose Monitoring Suppl (OneTouch Verio Reflect) w/Device KIT, Check blood sugars once daily. Please substitute with appropriate alternative as covered by patient's insurance. Dx: E11.65, Disp: 1 kit, Rfl: 0    ciprofloxacin (CIPRO) 500 mg tablet, Take 1 tablet (500 mg total) by mouth every 12 (twelve) hours for 7 days Do not start before June 9, 2024., Disp: 14 tablet, Rfl: 0    dexamethasone (DECADRON) 0.5 mg tablet, TAKE 1 TABLET (0.5 MG TOTAL) BY  MOUTH DAILY WITH BREAKFAST, Disp: 90 tablet, Rfl: 1    Diclofenac Sodium (VOLTAREN) 1 %, Apply 2 g topically 4 (four) times a day, Disp: 100 g, Rfl: 3    Easy Touch Pen Needles 31G X 6 MM MISC, Use daily at bedtime, Disp: 100 each, Rfl: 3    enzalutamide (XTANDI) 40 mg capsule, Take 4 capsules (160 mg total) by mouth daily, Disp: 120 capsule, Rfl: 6    glimepiride (AMARYL) 2 mg tablet, Take 1 tablet (2 mg total) by mouth daily with dinner, Disp: 90 tablet, Rfl: 3    glimepiride (AMARYL) 4 mg tablet, Take 1 tablet (4 mg total) by mouth daily with breakfast, Disp: 90 tablet, Rfl: 3    glucose blood (OneTouch Verio) test strip, Check blood sugars twice a daily. Please substitute with appropriate alternative as covered by patient's insurance. Dx: E11.65, Disp: 200 each, Rfl: 3    Levemir FlexPen 100 units/mL injection pen, Inject 20 Units under the skin daily at bedtime, Disp: 15 mL, Rfl: 1    lisinopril (ZESTRIL) 10 mg tablet, NARCISO TANMAY TABLETA VIA ORAL DIARIAMENTE, Disp: , Rfl:     metFORMIN (GLUCOPHAGE) 1000 MG tablet, Take 1 tablet (1,000 mg total) by mouth 2 (two) times a day with meals, Disp: 180 tablet, Rfl: 1    Multiple Vitamins-Minerals (Sentry) TABS, Take 1 tablet by mouth daily, Disp: , Rfl:     naloxone (NARCAN) 4 mg/0.1 mL nasal spray, Administer 1 spray into a nostril. If no response after 2-3 minutes, give another dose in the other nostril using a new spray., Disp: 1 each, Rfl: 0    omega-3-acid ethyl esters (LOVAZA) 1 g capsule, , Disp: , Rfl:     OneTouch Delica Lancets 33G MISC, Check blood sugars once daily. Please substitute with appropriate alternative as covered by patient's insurance. Dx: E11.65, Disp: 100 each, Rfl: 3    polyethylene glycol (MIRALAX) 17 g packet, Take 17 g by mouth daily as needed (for constipation), Disp: 100 each, Rfl: 1    rosuvastatin (CRESTOR) 10 MG tablet, Take 1 tablet (10 mg total) by mouth daily at bedtime, Disp: 90 tablet, Rfl: 1    senna-docusate sodium (SENOKOT S)  8.6-50 mg per tablet, Take 1 tablet by mouth daily, Disp: 90 tablet, Rfl: 1    sodium chloride, PF, 0.9 %, 10 mL by Intracatheter route daily Intracatheter flushing daily. May substitute prefilled syringe with normal saline 10 mL vials, 10 mL syringes, and 18 g blunt needles, Disp: 600 mL, Rfl: 2    Spacer/Aero-Holding Chambers (AEROCHAMBER MINI CHAMBER) BILLY, by Does not apply route see administration instructions (Patient not taking: Reported on 4/25/2024), Disp: 1 Device, Rfl: 0  No Known Allergies  There were no vitals filed for this visit.

## 2024-06-13 PROCEDURE — 77263 THER RADIOLOGY TX PLNG CPLX: CPT | Performed by: STUDENT IN AN ORGANIZED HEALTH CARE EDUCATION/TRAINING PROGRAM

## 2024-06-14 LAB
DME PARACHUTE DELIVERY DATE ACTUAL: NORMAL
DME PARACHUTE DELIVERY DATE EXPECTED: NORMAL
DME PARACHUTE DELIVERY DATE REQUESTED: NORMAL
DME PARACHUTE ITEM DESCRIPTION: NORMAL
DME PARACHUTE ORDER STATUS: NORMAL
DME PARACHUTE SUPPLIER NAME: NORMAL
DME PARACHUTE SUPPLIER PHONE: NORMAL

## 2024-06-14 NOTE — PROGRESS NOTES
Follow-up - Radiation Oncology   Oliver Astudillo 1950 73 y.o. male [id number]      History of Present Illness   Cancer Staging   Prostate cancer metastatic to bone (HCC)  Staging form: Prostate, AJCC 8th Edition  - Clinical: Stage IVB (cT4, cN1, cM1b, PSA: 145, Grade Group: 5) - Signed by Dov nAdrea MD on 8/28/2023  Prostate specific antigen (PSA) range: 20 or greater  Histologic grading system: 5 grade system      Mr. Chemo Astudillo is a 73 year old man with Stage IVB ([id number]) prostate adenocarcinoma initially diagnosed in 6/2023. He was seen in initial consultation on 7/31/23 and thereafter on 8/28/23 with plan for systemic therapy alone. He returns today for consideration of palliative RT.     Interval History:  The patient was last seen in clinic on 8/28/23, at that time without substantial bone pain. Since then he has proceeded with systemic therapy, initially on Lupron/abiraterone, thereafter on enzalutaminde (since 6/5/24) due to progression of disease.     MRI Lumbar spine (5/29/24) demonstrated:  1. Multiple marrow replacing and enhancing lesions involving the lumbar spine, sacrum and iliac bones, which are suspicious for metastases. No convincing epidural involvement though there is probable encroachment on the right L1-L2 neural foramen. Of note, the L1 marrow replacing lesion results in a mild pathologic compression fracture. Recommend correlation with nuclear medicine bone scan and/or prostate-specific tracers in a PET scan, as clinically appropriate.  2. Multilevel lumbar spine spondylotic changes, as detailed above.    He was referred to our office for consideration of palliative RT. Currently he is doing poor overall. He has been using tramadol for pain relief, which has not been sufficient. Due to his pain he has been using a wheelchair and has had difficulty with movement/ambulation. He has no LE weakness or numbness.       Historical Information   Oncology History   Prostate  cancer metastatic to bone (HCC)   5/24/2023 Initial Diagnosis    May 2023 patient presented with urinary frequency. . CT showed distended urinary bladder with bilateral hydronephrosis. Urinary bladder mass inseparable from the prostate. Extraprostatic extension noted. Bone scan showed indeterminate activity at T11 vertebral body. Bilateral nephrostomy tubes and Cazares catheter placed. Prostate biopsy showed adenocarcinoma, Liana 9.      6/28/2023 Biopsy    A. Prostate, right lateral base:  - Prostatic adenocarcinoma, [place] score 4 + 5 = 9, Prognostic Grade Group 5,  involving 90% of 1 needle core  and measuring  11 mm in length.        B. Prostate, right medial base:  - Prostatic adenocarcinoma, [place] score 4 + 5 = 9, Prognostic Grade Group 5,  involving 70% of 1 needle core  and measuring  10 mm in length.      C. Prostate, right lateral mid:  - Prostatic adenocarcinoma, [place] score 4 + 5 = 9, Prognostic Grade Group 5,  involving 90% of 1 needle core  and measuring  12 mm in length.      Comment: Immunohistochemistry for a prostate multiplex stain (p63, K903, and P504S) and NKX3.1 demonstrate invasive carcinoma.     D. Prostate, right medial mid:  - Prostatic adenocarcinoma, [place] score 4 + 5 = 9, Prognostic Grade Group 5,  involving 95% of 1 needle core  and measuring  15 mm in length.      E. Prostate, right lateral apex:  - Prostatic adenocarcinoma, [place] score 4 + 5 = 9, Prognostic Grade Group 5,  involving 90% of 1 needle core  and measuring  12 mm in length.   - Perineural invasion identified.     Comment: Immunohistochemistry for a prostate multiplex stain (p63, K903, and P504S) and NKX3.1 demonstrate invasive carcinoma.     F. Prostate, right medial apex:  - Prostatic adenocarcinoma, [place] score 4 + 5 = 9, Prognostic Grade Group 5,  involving 100% of 1 needle core  and measuring  20 mm in length.      G. Prostate, left lateral base:  - Prostatic adenocarcinoma, [place] score 4 + 5 = 9,  Prognostic Grade Group 5,  involving 75% of 1 needle core  and measuring  10 mm in length.   - Perineural invasion identified.  - Lymphovascular invasion is identified.     H. Prostate, left medial base:  - Prostatic adenocarcinoma, [place] score 4 + 5 = 9, Prognostic Grade Group 5,  involving 95% of 1 needle core  and measuring  14 mm in length.   - Perineural invasion identified.     Comment: There is a focus of closely approximated gastrointestinal epithelium; NKX3.1 shows no definite invasion of the GI epithelium.      I. Prostate, left lateral mid:  - Prostatic adenocarcinoma, [place] score 4 + 5 = 9, Prognostic Grade Group 5,  involving 95% of 1 needle core  and measuring  12 mm in length.       J. Prostate, left medial mid:  - Prostatic adenocarcinoma, [place] score 4 + 5 = 9, Prognostic Grade Group 5,  involving 85% of 1 needle core  and measuring  13 mm in length.       Comment: Immunohistochemistry for a prostate multiplex stain (p63, K903, and P504S) and NKX3.1 demonstrate invasive carcinoma.     K. Prostate, left lateral apex:  - Prostatic adenocarcinoma, [place] score 4 + 5 = 9, Prognostic Grade Group 5,  involving 25% of 1 needle core  and measuring  3 mm in length.        L. Prostate, left medial apex :  - Prostatic adenocarcinoma, [place] score 4 + 5 = 9, Prognostic Grade Group 5,  involving 95% of 1 needle core  and measuring  15 mm in length.     - Perineural invasion identified.     Comment:   Cribriform glands are present within the pattern 4 component.  This feature has been associated with adverse clinical outcomes and molecular features typically seen in advanced disease.  (The 2019 Genitourinary Pathology Society (GUPS) White Paper on Contemporary Grading of Prostate Cancer. Arch Pathol Lab Med. 2021 Apr 1;145(4):461-493.)     7/5/2023 -  Hormone Therapy    Firmagon loading dose on 7/5/23, followed by Lupron      8/28/2023 -  Cancer Staged    Staging form: Prostate, AJCC 8th Edition  -  Clinical: Stage IVB (cT4, cN1, cM1b, PSA: 145, Grade Group: 5) - Signed by Dov Andrea MD on 2023  Prostate specific antigen (PSA) range: 20 or greater  Histologic grading system: 5 grade system       2023 - 2024 Chemotherapy    Zytiga 250 mg PO daily     2024 -  Chemotherapy    Xtandi          Past Medical History:   Diagnosis Date    COVID-19 01/15/2024    Diabetes mellitus (HCC)     Elevated PSA 2023    High cholesterol     Hypertension     Prostate cancer (HCC)      Past Surgical History:   Procedure Laterality Date    IR NEPHROSTOMY TUBE CHECK/CHANGE/REPOSITION/REINSERTION/UPSIZE  2023    IR NEPHROSTOMY TUBE CHECK/CHANGE/REPOSITION/REINSERTION/UPSIZE  2023    IR NEPHROSTOMY TUBE CHECK/CHANGE/REPOSITION/REINSERTION/UPSIZE  2024    IR NEPHROSTOMY TUBE CHECK/CHANGE/REPOSITION/REINSERTION/UPSIZE  2024    IR NEPHROSTOMY TUBE CHECK/CHANGE/REPOSITION/REINSERTION/UPSIZE  3/4/2024    IR NEPHROSTOMY TUBE CHECK/CHANGE/REPOSITION/REINSERTION/UPSIZE  3/20/2024    IR NEPHROSTOMY TUBE CHECK/CHANGE/REPOSITION/REINSERTION/UPSIZE  2024    IR NEPHROSTOMY TUBE PLACEMENT  2023    bilateral    IR OTHER  1/15/2024    US GUIDED PROSTATE BIOPSY         Social History   Social History     Substance and Sexual Activity   Alcohol Use Not Currently    Comment: socially     Social History     Substance and Sexual Activity   Drug Use Never     Social History     Tobacco Use   Smoking Status Former    Current packs/day: 0.00    Types: Cigarettes    Quit date:     Years since quittin.4    Passive exposure: Past   Smokeless Tobacco Never   Tobacco Comments    States he only smoked on the weekends         Meds/Allergies     Current Outpatient Medications:     abiraterone (ZYTIGA) 250 mg tablet, Take 1 tablet (250 mg total) by mouth daily With a low fat meal, Disp: 30 tablet, Rfl: 11    acetaminophen (TYLENOL) 325 mg tablet, Take 3 tablets (975 mg total) by mouth every 8 (eight)  hours as needed for mild pain or moderate pain, Disp: , Rfl:     albuterol (Ventolin HFA) 90 mcg/act inhaler, Inhale 2 puffs every 6 (six) hours as needed for wheezing, Disp: 18 g, Rfl: 0    Alcohol Swabs 70 % PADS, To clean the skin prior to injecting insulin, Disp: 100 each, Rfl: 1    Blood Glucose Monitoring Suppl (OneTouch Verio Reflect) w/Device KIT, Check blood sugars once daily. Please substitute with appropriate alternative as covered by patient's insurance. Dx: E11.65, Disp: 1 kit, Rfl: 0    ciprofloxacin (CIPRO) 500 mg tablet, Take 1 tablet (500 mg total) by mouth every 12 (twelve) hours for 7 days Do not start before June 9, 2024., Disp: 14 tablet, Rfl: 0    dexamethasone (DECADRON) 0.5 mg tablet, TAKE 1 TABLET (0.5 MG TOTAL) BY MOUTH DAILY WITH BREAKFAST, Disp: 90 tablet, Rfl: 1    dexamethasone (DECADRON) 2 mg tablet, Take 1 tablet (2 mg total) by mouth 2 (two) times a day with meals, Disp: 60 tablet, Rfl: 0    Diclofenac Sodium (VOLTAREN) 1 %, Apply 2 g topically 4 (four) times a day, Disp: 100 g, Rfl: 3    Easy Touch Pen Needles 31G X 6 MM MISC, Use daily at bedtime, Disp: 100 each, Rfl: 3    enzalutamide (XTANDI) 40 mg capsule, Take 4 capsules (160 mg total) by mouth daily, Disp: 120 capsule, Rfl: 6    glimepiride (AMARYL) 2 mg tablet, Take 1 tablet (2 mg total) by mouth daily with dinner, Disp: 90 tablet, Rfl: 3    glimepiride (AMARYL) 4 mg tablet, Take 1 tablet (4 mg total) by mouth daily with breakfast, Disp: 90 tablet, Rfl: 3    glucose blood (OneTouch Verio) test strip, Check blood sugars twice a daily. Please substitute with appropriate alternative as covered by patient's insurance. Dx: E11.65, Disp: 200 each, Rfl: 3    Levemir FlexPen 100 units/mL injection pen, Inject 20 Units under the skin daily at bedtime, Disp: 15 mL, Rfl: 1    lisinopril (ZESTRIL) 10 mg tablet, NARCISO TANMAY TABLETA VIA ORAL DIARIAMENTE, Disp: , Rfl:     metFORMIN (GLUCOPHAGE) 1000 MG tablet, Take 1 tablet (1,000 mg total)  by mouth 2 (two) times a day with meals, Disp: 180 tablet, Rfl: 1    Multiple Vitamins-Minerals (Sentry) TABS, Take 1 tablet by mouth daily, Disp: , Rfl:     naloxone (NARCAN) 4 mg/0.1 mL nasal spray, Administer 1 spray into a nostril. If no response after 2-3 minutes, give another dose in the other nostril using a new spray., Disp: 1 each, Rfl: 0    omega-3-acid ethyl esters (LOVAZA) 1 g capsule, , Disp: , Rfl:     OneTouch Delica Lancets 33G MISC, Check blood sugars once daily. Please substitute with appropriate alternative as covered by patient's insurance. Dx: E11.65, Disp: 100 each, Rfl: 3    oxyCODONE (Roxicodone) 5 immediate release tablet, Take 1 tablet (5 mg total) by mouth every 4 (four) hours as needed for moderate pain Max Daily Amount: 30 mg, Disp: 30 tablet, Rfl: 0    pantoprazole (PROTONIX) 40 mg tablet, Take 1 tablet (40 mg total) by mouth daily, Disp: 30 tablet, Rfl: 0    polyethylene glycol (MIRALAX) 17 g packet, Take 17 g by mouth daily as needed (for constipation), Disp: 100 each, Rfl: 1    rosuvastatin (CRESTOR) 10 MG tablet, Take 1 tablet (10 mg total) by mouth daily at bedtime, Disp: 90 tablet, Rfl: 1    senna-docusate sodium (SENOKOT S) 8.6-50 mg per tablet, Take 1 tablet by mouth daily, Disp: 90 tablet, Rfl: 1    sodium chloride, PF, 0.9 %, 10 mL by Intracatheter route daily Intracatheter flushing daily. May substitute prefilled syringe with normal saline 10 mL vials, 10 mL syringes, and 18 g blunt needles, Disp: 600 mL, Rfl: 2    Spacer/Aero-Holding Chambers (AEROCHAMBER MINI CHAMBER) BILLY, by Does not apply route see administration instructions (Patient not taking: Reported on 4/25/2024), Disp: 1 Device, Rfl: 0  No Known Allergies    Review of Systems   Constitutional: Negative.    HENT: Negative.     Eyes: Negative.         Wears glasses    Respiratory: Negative.     Cardiovascular: Negative.    Gastrointestinal:  Positive for constipation (goes every 2-3 days).   Endocrine: Negative.   "  Genitourinary: Negative.         Had a bladder infection last week, symptoms resolved    Musculoskeletal:  Positive for back pain and gait problem (in wheelchair).   Skin: Negative.    Allergic/Immunologic: Negative.    Hematological: Negative.    Psychiatric/Behavioral: Negative.          OBJECTIVE:   /74 (BP Location: Left arm, Patient Position: Sitting, Cuff Size: Standard)   Pulse 82   Temp (!) 96.2 °F (35.7 °C) (Temporal)   Resp 18   Ht 5' 8\" (1.727 m)   Wt 74.4 kg (164 lb)   SpO2 99%   BMI 24.94 kg/m²   Pain Assessment:  10  ECOG/Zubrod/WHO: 2 - Symptomatic, <50% confined to bed    Physical Exam   Uncomfortable appearing. NAD.   No increased work of breathing.   Extremities warm and well perfused.  Seated in wheelchair.     RESULTS    Lab Results:   Recent Results (from the past 672 hour(s))   CBC and differential    Collection Time: 05/28/24  5:38 PM   Result Value Ref Range    WBC 11.64 (H) 4.31 - 10.16 Thousand/uL    RBC 3.95 3.88 - 5.62 Million/uL    Hemoglobin 11.9 (L) 12.0 - 17.0 g/dL    Hematocrit 36.5 36.5 - 49.3 %    MCV 92 82 - 98 fL    MCH 30.1 26.8 - 34.3 pg    MCHC 32.6 31.4 - 37.4 g/dL    RDW 13.5 11.6 - 15.1 %    MPV 9.5 8.9 - 12.7 fL    Platelets 424 (H) 149 - 390 Thousands/uL    nRBC 0 /100 WBCs    Segmented % 78 (H) 43 - 75 %    Immature Grans % 0 0 - 2 %    Lymphocytes % 18 14 - 44 %    Monocytes % 4 4 - 12 %    Eosinophils Relative 0 0 - 6 %    Basophils Relative 0 0 - 1 %    Absolute Neutrophils 8.93 (H) 1.85 - 7.62 Thousands/µL    Absolute Immature Grans 0.05 0.00 - 0.20 Thousand/uL    Absolute Lymphocytes 2.07 0.60 - 4.47 Thousands/µL    Absolute Monocytes 0.50 0.17 - 1.22 Thousand/µL    Eosinophils Absolute 0.05 0.00 - 0.61 Thousand/µL    Basophils Absolute 0.04 0.00 - 0.10 Thousands/µL   Comprehensive metabolic panel    Collection Time: 05/28/24  5:38 PM   Result Value Ref Range    Sodium 135 135 - 147 mmol/L    Potassium 4.2 3.5 - 5.3 mmol/L    Chloride 103 96 - 108 " mmol/L    CO2 25 21 - 32 mmol/L    ANION GAP 7 4 - 13 mmol/L    BUN 18 5 - 25 mg/dL    Creatinine 1.07 0.60 - 1.30 mg/dL    Glucose 216 (H) 65 - 140 mg/dL    Calcium 9.6 8.4 - 10.2 mg/dL    AST 13 13 - 39 U/L    ALT 14 7 - 52 U/L    Alkaline Phosphatase 76 34 - 104 U/L    Total Protein 7.5 6.4 - 8.4 g/dL    Albumin 3.6 3.5 - 5.0 g/dL    Total Bilirubin 0.54 0.20 - 1.00 mg/dL    eGFR 69 ml/min/1.73sq m   Magnesium    Collection Time: 05/28/24  5:38 PM   Result Value Ref Range    Magnesium 1.7 (L) 1.9 - 2.7 mg/dL   Procalcitonin    Collection Time: 05/28/24  5:38 PM   Result Value Ref Range    Procalcitonin <0.05 <=0.25 ng/ml   UA (URINE) with reflex to Scope    Collection Time: 05/28/24  5:47 PM   Result Value Ref Range    Color, UA Yellow     Clarity, UA Clear     Specific Gravity, UA 1.025 1.005 - 1.030    pH, UA 6.5 4.5, 5.0, 5.5, 6.0, 6.5, 7.0, 7.5, 8.0    Leukocytes, UA Large (A) Negative    Nitrite, UA Positive (A) Negative    Protein,  (2+) (A) Negative mg/dl    Glucose, UA Negative Negative mg/dl    Ketones, UA Negative Negative mg/dl    Urobilinogen, UA <2.0 <2.0 mg/dl mg/dl    Bilirubin, UA Negative Negative    Occult Blood, UA Moderate (A) Negative   Urine culture    Collection Time: 05/28/24  5:47 PM    Specimen: Urine, Right Nephrostomy   Result Value Ref Range    Urine Culture >100,000 cfu/ml Escherichia coli (A)     Urine Culture 30,000-39,000 cfu/ml Enterococcus faecalis (A)     Urine Culture >100,000 cfu/ml Lactobacillus species (A)        Susceptibility    Enterococcus faecalis - ÓSCAR     ZID Performed Yes       Ampicillin ($$) <=2.00 Susceptible ug/ml     Levofloxacin ($) 2.00 Susceptible ug/ml     Nitrofurantoin <=32 Susceptible ug/ml     Tetracycline 8 Intermediate ug/ml     Vancomycin ($) 1.00 Susceptible ug/ml    Escherichia coli - ÓSCAR     ZID Performed Yes       Amikacin ($$) <=16 Susceptible ug/ml     Amoxicillin + Clavulanate <=8/4 Susceptible ug/ml     Ampicillin ($$) >16.00 Resistant  ug/ml     Ampicillin + Sulbactam ($) >16/8 Resistant ug/ml     Aztreonam ($$$)  <=4 Susceptible ug/ml     Cefazolin ($) 8.00 Susceptible ug/ml     Ciprofloxacin ($)  >2.00 Resistant ug/ml     Ertapenem ($$$) <=0.5 Susceptible ug/ml     Gentamicin ($$) >8 Resistant ug/ml     Levofloxacin ($) >4.00 Resistant ug/ml     Minocycline >8 Resistant ug/ml     Nitrofurantoin <=32 Susceptible ug/ml     Tetracycline >8 Resistant ug/ml     Tobramycin ($) >8 Resistant ug/ml     Trimethoprim + Sulfamethoxazole ($$$) >2/38 Resistant ug/ml    Lactobacillus species - ÓSCAR     ZID Performed Yes     Urine Microscopic    Collection Time: 05/28/24  5:47 PM   Result Value Ref Range    RBC, UA 4-10 (A) None Seen, 0-1, 1-2, 2-4, 0-5 /hpf    WBC, UA 20-30 (A) None Seen, 0-1, 1-2, 0-5, 2-4 /hpf    Epithelial Cells None Seen None Seen, Occasional /hpf    Bacteria, UA Innumerable (A) None Seen, Occasional /hpf   Fingerstick Glucose (POCT)    Collection Time: 05/28/24 11:13 PM   Result Value Ref Range    POC Glucose 234 (H) 65 - 140 mg/dl   Lipid Panel with Direct LDL reflex    Collection Time: 05/29/24 12:46 AM   Result Value Ref Range    Cholesterol 94 See Comment mg/dL    Triglycerides 202 (H) See Comment mg/dL    HDL, Direct 27 (L) >=40 mg/dL    LDL Calculated 27 0 - 100 mg/dL   Comprehensive metabolic panel    Collection Time: 05/29/24  5:53 AM   Result Value Ref Range    Sodium 137 135 - 147 mmol/L    Potassium 3.7 3.5 - 5.3 mmol/L    Chloride 104 96 - 108 mmol/L    CO2 25 21 - 32 mmol/L    ANION GAP 8 4 - 13 mmol/L    BUN 16 5 - 25 mg/dL    Creatinine 0.83 0.60 - 1.30 mg/dL    Glucose 130 65 - 140 mg/dL    Calcium 9.3 8.4 - 10.2 mg/dL    Corrected Calcium 9.8 8.3 - 10.1 mg/dL    AST 11 (L) 13 - 39 U/L    ALT 12 7 - 52 U/L    Alkaline Phosphatase 74 34 - 104 U/L    Total Protein 7.1 6.4 - 8.4 g/dL    Albumin 3.4 (L) 3.5 - 5.0 g/dL    Total Bilirubin 0.38 0.20 - 1.00 mg/dL    eGFR 88 ml/min/1.73sq m   Magnesium    Collection Time: 05/29/24   5:53 AM   Result Value Ref Range    Magnesium 2.3 1.9 - 2.7 mg/dL   CBC and differential    Collection Time: 05/29/24  5:53 AM   Result Value Ref Range    WBC 10.08 4.31 - 10.16 Thousand/uL    RBC 3.90 3.88 - 5.62 Million/uL    Hemoglobin 11.7 (L) 12.0 - 17.0 g/dL    Hematocrit 35.6 (L) 36.5 - 49.3 %    MCV 91 82 - 98 fL    MCH 30.0 26.8 - 34.3 pg    MCHC 32.9 31.4 - 37.4 g/dL    RDW 13.6 11.6 - 15.1 %    MPV 9.7 8.9 - 12.7 fL    Platelets 409 (H) 149 - 390 Thousands/uL    nRBC 0 /100 WBCs    Segmented % 62 43 - 75 %    Immature Grans % 0 0 - 2 %    Lymphocytes % 31 14 - 44 %    Monocytes % 6 4 - 12 %    Eosinophils Relative 1 0 - 6 %    Basophils Relative 0 0 - 1 %    Absolute Neutrophils 6.17 1.85 - 7.62 Thousands/µL    Absolute Immature Grans 0.04 0.00 - 0.20 Thousand/uL    Absolute Lymphocytes 3.13 0.60 - 4.47 Thousands/µL    Absolute Monocytes 0.57 0.17 - 1.22 Thousand/µL    Eosinophils Absolute 0.13 0.00 - 0.61 Thousand/µL    Basophils Absolute 0.04 0.00 - 0.10 Thousands/µL   Fingerstick Glucose (POCT)    Collection Time: 05/29/24  6:40 AM   Result Value Ref Range    POC Glucose 116 65 - 140 mg/dl   Fingerstick Glucose (POCT)    Collection Time: 05/29/24 11:40 AM   Result Value Ref Range    POC Glucose 124 65 - 140 mg/dl   UA w Reflex to Microscopic w Reflex to Culture    Collection Time: 05/29/24 12:01 PM    Specimen: Urine, Clean Catch   Result Value Ref Range    Color, UA Yellow     Clarity, UA Slightly Cloudy     Specific [place], UA 1.015 1.005 - 1.030    pH, UA 6.0 4.5, 5.0, 5.5, 6.0, 6.5, 7.0, 7.5, 8.0    Leukocytes, UA Large (A) Negative    Nitrite, UA Positive (A) Negative    Protein, UA 30 (1+) (A) Negative mg/dl    Glucose, UA Negative Negative mg/dl    Ketones, UA Negative Negative mg/dl    Urobilinogen, UA <2.0 <2.0 mg/dl mg/dl    Bilirubin, UA Negative Negative    Occult Blood, UA Small (A) Negative   Urine Microscopic    Collection Time: 05/29/24 12:01 PM   Result Value Ref Range    RBC, UA 2-4  None Seen, 0-1, 1-2, 2-4, 0-5 /hpf    WBC, UA Innumerable (A) None Seen, 0-1, 1-2, 0-5, 2-4 /hpf    Epithelial Cells Occasional None Seen, Occasional /hpf    Bacteria, UA Moderate (A) None Seen, Occasional /hpf   Urine culture    Collection Time: 05/29/24 12:01 PM    Specimen: Urine, Clean Catch   Result Value Ref Range    Urine Culture 40,000-49,000 cfu/ml Pseudomonas aeruginosa (A)     Urine Culture 10,000-19,000 cfu/ml        Susceptibility    Pseudomonas aeruginosa - ÓSCAR     ZID Performed Yes       Aztreonam ($$$)  <=4 Susceptible ug/ml     Cefepime ($) <=2.00 Susceptible ug/ml     Ceftazidime ($$) 4 Susceptible ug/ml     Ciprofloxacin ($)  <=0.25 Susceptible ug/ml     Levofloxacin ($) <=0.50 Susceptible ug/ml     Meropenem ($$) <=1.00 Susceptible ug/ml     Piperacillin + Tazobactam ($$$) <=8 Susceptible ug/ml     Tobramycin ($) <=2 Susceptible ug/ml   Fingerstick Glucose (POCT)    Collection Time: 05/29/24  4:02 PM   Result Value Ref Range    POC Glucose 169 (H) 65 - 140 mg/dl   Fingerstick Glucose (POCT)    Collection Time: 05/29/24  8:26 PM   Result Value Ref Range    POC Glucose 145 (H) 65 - 140 mg/dl   CBC and differential    Collection Time: 05/30/24  3:27 AM   Result Value Ref Range    WBC 8.80 4.31 - 10.16 Thousand/uL    RBC 3.75 (L) 3.88 - 5.62 Million/uL    Hemoglobin 11.4 (L) 12.0 - 17.0 g/dL    Hematocrit 33.5 (L) 36.5 - 49.3 %    MCV 89 82 - 98 fL    MCH 30.4 26.8 - 34.3 pg    MCHC 34.0 31.4 - 37.4 g/dL    RDW 13.4 11.6 - 15.1 %    MPV 9.4 8.9 - 12.7 fL    Platelets 400 (H) 149 - 390 Thousands/uL    nRBC 0 /100 WBCs    Segmented % 64 43 - 75 %    Immature Grans % 0 0 - 2 %    Lymphocytes % 30 14 - 44 %    Monocytes % 5 4 - 12 %    Eosinophils Relative 1 0 - 6 %    Basophils Relative 0 0 - 1 %    Absolute Neutrophils 5.62 1.85 - 7.62 Thousands/µL    Absolute Immature Grans 0.02 0.00 - 0.20 Thousand/uL    Absolute Lymphocytes 2.66 0.60 - 4.47 Thousands/µL    Absolute Monocytes 0.40 0.17 - 1.22  Thousand/µL    Eosinophils Absolute 0.08 0.00 - 0.61 Thousand/µL    Basophils Absolute 0.02 0.00 - 0.10 Thousands/µL   Magnesium    Collection Time: 05/30/24  3:27 AM   Result Value Ref Range    Magnesium 1.9 1.9 - 2.7 mg/dL   Basic metabolic panel    Collection Time: 05/30/24  3:27 AM   Result Value Ref Range    Sodium 135 135 - 147 mmol/L    Potassium 3.8 3.5 - 5.3 mmol/L    Chloride 104 96 - 108 mmol/L    CO2 25 21 - 32 mmol/L    ANION GAP 6 4 - 13 mmol/L    BUN 12 5 - 25 mg/dL    Creatinine 0.76 0.60 - 1.30 mg/dL    Glucose 99 65 - 140 mg/dL    Calcium 9.3 8.4 - 10.2 mg/dL    eGFR 91 ml/min/1.73sq m   Fingerstick Glucose (POCT)    Collection Time: 05/30/24  7:40 AM   Result Value Ref Range    POC Glucose 85 65 - 140 mg/dl   Fingerstick Glucose (POCT)    Collection Time: 05/30/24 11:06 AM   Result Value Ref Range    POC Glucose 137 65 - 140 mg/dl   Wheeled Walker    Collection Time: 05/30/24  2:00 PM   Result Value Ref Range    Supplier Name [employer]     Supplier Phone Number [phone]     Order Status Delivery Successful     Delivery Note      Delivery Request Date 05/30/2024     Date Delivered  05/30/2024     Item Description Wheeled Walker, Adult    Fingerstick Glucose (POCT)    Collection Time: 05/30/24  3:56 PM   Result Value Ref Range    POC Glucose 236 (H) 65 - 140 mg/dl   Fingerstick Glucose (POCT)    Collection Time: 05/30/24  8:57 PM   Result Value Ref Range    POC Glucose 222 (H) 65 - 140 mg/dl   Vancomycin, random    Collection Time: 05/31/24  6:27 AM   Result Value Ref Range    Vancomycin Rm 14.9 10.0 - 20.0 ug/mL   Basic metabolic panel    Collection Time: 05/31/24  6:27 AM   Result Value Ref Range    Sodium 137 135 - 147 mmol/L    Potassium 3.9 3.5 - 5.3 mmol/L    Chloride 103 96 - 108 mmol/L    CO2 27 21 - 32 mmol/L    ANION GAP 7 4 - 13 mmol/L    BUN 16 5 - 25 mg/dL    Creatinine 0.87 0.60 - 1.30 mg/dL    Glucose 89 65 - 140 mg/dL    Calcium 9.7 8.4 - 10.2 mg/dL     eGFR 86 ml/min/1.73sq m   CBC and differential    Collection Time: 05/31/24  6:27 AM   Result Value Ref Range    WBC 9.51 4.31 - 10.16 Thousand/uL    RBC 4.00 3.88 - 5.62 Million/uL    Hemoglobin 12.2 12.0 - 17.0 g/dL    Hematocrit 35.9 (L) 36.5 - 49.3 %    MCV 90 82 - 98 fL    MCH 30.5 26.8 - 34.3 pg    MCHC 34.0 31.4 - 37.4 g/dL    RDW 13.4 11.6 - 15.1 %    MPV 9.6 8.9 - 12.7 fL    Platelets 433 (H) 149 - 390 Thousands/uL    nRBC 0 /100 WBCs    Segmented % 55 43 - 75 %    Immature Grans % 0 0 - 2 %    Lymphocytes % 38 14 - 44 %    Monocytes % 6 4 - 12 %    Eosinophils Relative 1 0 - 6 %    Basophils Relative 0 0 - 1 %    Absolute Neutrophils 5.18 1.85 - 7.62 Thousands/µL    Absolute Immature Grans 0.04 0.00 - 0.20 Thousand/uL    Absolute Lymphocytes 3.59 0.60 - 4.47 Thousands/µL    Absolute Monocytes 0.55 0.17 - 1.22 Thousand/µL    Eosinophils Absolute 0.11 0.00 - 0.61 Thousand/µL    Basophils Absolute 0.04 0.00 - 0.10 Thousands/µL   Fingerstick Glucose (POCT)    Collection Time: 05/31/24  7:32 AM   Result Value Ref Range    POC Glucose 80 65 - 140 mg/dl   Fingerstick Glucose (POCT)    Collection Time: 05/31/24 10:43 AM   Result Value Ref Range    POC Glucose 117 65 - 140 mg/dl   Fingerstick Glucose (POCT)    Collection Time: 05/31/24  4:19 PM   Result Value Ref Range    POC Glucose 290 (H) 65 - 140 mg/dl   UA w Reflex to Microscopic w Reflex to Culture    Collection Time: 06/06/24  1:28 PM    Specimen: Urine, Right Nephrostomy   Result Value Ref Range    Color, UA Dark Yellow     Clarity, UA Turbid     Specific Gravity, UA 1.025 1.005 - 1.030    pH, UA 6.0 4.5, 5.0, 5.5, 6.0, 6.5, 7.0, 7.5, 8.0    Leukocytes, UA Large (A) Negative    Nitrite, UA Positive (A) Negative    Protein,  (3+) (A) Negative mg/dl    Glucose, UA Negative Negative mg/dl    Ketones, UA Negative Negative mg/dl    Urobilinogen, UA <2.0 <2.0 mg/dl mg/dl    Bilirubin, UA Negative Negative    Occult Blood, UA Large (A) Negative   Urine  Microscopic    Collection Time: 06/06/24  1:28 PM   Result Value Ref Range    RBC, UA Field obscured, unable to enumerate (A) None Seen, 0-1, 1-2, 2-4, 0-5 /hpf    WBC, UA Innumerable (A) None Seen, 0-1, 1-2, 0-5, 2-4 /hpf    Epithelial Cells Field obscured, unable to enumerate (A) None Seen, Occasional /hpf    Bacteria, UA Field obscured, unable to enumerate (A) None Seen, Occasional /hpf   Urine culture    Collection Time: 06/06/24  1:28 PM    Specimen: Urine, Right Nephrostomy   Result Value Ref Range    Urine Culture >100,000 cfu/ml Pseudomonas aeruginosa (A)        Susceptibility    Pseudomonas aeruginosa - ÓSCAR     ZID Performed Yes       Aztreonam ($$$)  <=4 Susceptible ug/ml     Cefepime ($) <=2.00 Susceptible ug/ml     Ceftazidime ($$) 4 Susceptible ug/ml     Ciprofloxacin ($)  <=0.25 Susceptible ug/ml     Levofloxacin ($) <=0.50 Susceptible ug/ml     Meropenem ($$) <=1.00 Susceptible ug/ml     Piperacillin + Tazobactam ($$$) <=8 Susceptible ug/ml     Tobramycin ($) <=2 Susceptible ug/ml   UA w Reflex to Microscopic w Reflex to Culture    Collection Time: 06/06/24  1:49 PM    Specimen: Urine, Left Nephrostomy   Result Value Ref Range    Color, UA Yellow     Clarity, UA Cloudy     Specific Gravity, UA 1.020 1.005 - 1.030    pH, UA 7.5 4.5, 5.0, 5.5, 6.0, 6.5, 7.0, 7.5, 8.0    Leukocytes, UA Large (A) Negative    Nitrite, UA Negative Negative    Protein, UA 30 (1+) (A) Negative mg/dl    Glucose, UA 1000 (1%) (A) Negative mg/dl    Ketones, UA Negative Negative mg/dl    Urobilinogen, UA <2.0 <2.0 mg/dl mg/dl    Bilirubin, UA Negative Negative    Occult Blood, UA Moderate (A) Negative    URINE COMMENT       Less than 10mL of specimen received. Microscopic performed from concentrated specimen.   Urine Microscopic    Collection Time: 06/06/24  1:49 PM   Result Value Ref Range    RBC, UA None Seen None Seen, 0-1, 1-2, 2-4, 0-5 /hpf    WBC, UA Innumerable (A) None Seen, 0-1, 1-2, 0-5, 2-4 /hpf    Epithelial Cells  None Seen None Seen, Occasional /hpf    Bacteria, UA Innumerable (A) None Seen, Occasional /hpf    Ca Oxalate Chanelle, UA Occasional (A) None Seen /hpf    URINE COMMENT       Less than 10mL of specimen received. Microscopic performed from concentrated specimen.    Budding Yeast Present    Urine culture    Collection Time: 06/06/24  1:49 PM    Specimen: Urine, Left Nephrostomy   Result Value Ref Range    Urine Culture >100,000 cfu/ml Pseudomonas aeruginosa (A)        Susceptibility    Pseudomonas aeruginosa - ÓSCAR     ZID Performed Yes       Aztreonam ($$$)  <=4 Susceptible ug/ml     Cefepime ($) <=2.00 Susceptible ug/ml     Ceftazidime ($$) <=1 Susceptible ug/ml     Ciprofloxacin ($)  <=0.25 Susceptible ug/ml     Levofloxacin ($) <=0.50 Susceptible ug/ml     Meropenem ($$) <=1.00 Susceptible ug/ml     Piperacillin + Tazobactam ($$$) <=8 Susceptible ug/ml     Tobramycin ($) <=2 Susceptible ug/ml   CBC and differential    Collection Time: 06/06/24  1:57 PM   Result Value Ref Range    WBC 17.64 (H) 4.31 - 10.16 Thousand/uL    RBC 4.27 3.88 - 5.62 Million/uL    Hemoglobin 13.1 12.0 - 17.0 g/dL    Hematocrit 40.0 36.5 - 49.3 %    MCV 94 82 - 98 fL    MCH 30.7 26.8 - 34.3 pg    MCHC 32.8 31.4 - 37.4 g/dL    RDW 13.8 11.6 - 15.1 %    MPV 9.5 8.9 - 12.7 fL    Platelets 372 149 - 390 Thousands/uL    nRBC 0 /100 WBCs    Segmented % 79 (H) 43 - 75 %    Immature Grans % 1 0 - 2 %    Lymphocytes % 14 14 - 44 %    Monocytes % 5 4 - 12 %    Eosinophils Relative 1 0 - 6 %    Basophils Relative 0 0 - 1 %    Absolute Neutrophils 13.93 (H) 1.85 - 7.62 Thousands/µL    Absolute Immature Grans 0.14 0.00 - 0.20 Thousand/uL    Absolute Lymphocytes 2.53 0.60 - 4.47 Thousands/µL    Absolute Monocytes 0.88 0.17 - 1.22 Thousand/µL    Eosinophils Absolute 0.11 0.00 - 0.61 Thousand/µL    Basophils Absolute 0.05 0.00 - 0.10 Thousands/µL   Comprehensive metabolic panel    Collection Time: 06/06/24  1:57 PM   Result Value Ref Range    Sodium 132 (L)  135 - 147 mmol/L    Potassium 5.1 3.5 - 5.3 mmol/L    Chloride 101 96 - 108 mmol/L    CO2 20 (L) 21 - 32 mmol/L    ANION GAP 11 4 - 13 mmol/L    BUN 16 5 - 25 mg/dL    Creatinine 0.96 0.60 - 1.30 mg/dL    Glucose 197 (H) 65 - 140 mg/dL    Calcium 9.7 8.4 - 10.2 mg/dL    AST 26 13 - 39 U/L    ALT 18 7 - 52 U/L    Alkaline Phosphatase 86 34 - 104 U/L    Total Protein 7.4 6.4 - 8.4 g/dL    Albumin 3.8 3.5 - 5.0 g/dL    Total Bilirubin 0.53 0.20 - 1.00 mg/dL    eGFR 79 ml/min/1.73sq m   Blood culture #1    Collection Time: 06/06/24  2:41 PM    Specimen: Arm, Left; Blood   Result Value Ref Range    Blood Culture No Growth After 5 Days.    Blood culture #2    Collection Time: 06/06/24  2:41 PM    Specimen: Hand, Left; Blood   Result Value Ref Range    Blood Culture No Growth After 5 Days.    Lactic acid, plasma (w/reflex if result > 2.0)    Collection Time: 06/06/24  2:41 PM   Result Value Ref Range    LACTIC ACID 2.5 (H) 0.5 - 2.0 mmol/L   Lactic acid 2 Hours    Collection Time: 06/06/24  4:33 PM   Result Value Ref Range    LACTIC ACID 3.1 (H) 0.5 - 2.0 mmol/L   Fingerstick Glucose (POCT)    Collection Time: 06/06/24  5:43 PM   Result Value Ref Range    POC Glucose 183 (H) 65 - 140 mg/dl   Fingerstick Glucose (POCT)    Collection Time: 06/06/24  9:12 PM   Result Value Ref Range    POC Glucose 275 (H) 65 - 140 mg/dl   Comprehensive metabolic panel    Collection Time: 06/07/24  5:33 AM   Result Value Ref Range    Sodium 138 135 - 147 mmol/L    Potassium 3.9 3.5 - 5.3 mmol/L    Chloride 108 96 - 108 mmol/L    CO2 24 21 - 32 mmol/L    ANION GAP 6 4 - 13 mmol/L    BUN 15 5 - 25 mg/dL    Creatinine 0.81 0.60 - 1.30 mg/dL    Glucose 75 65 - 140 mg/dL    Calcium 9.3 8.4 - 10.2 mg/dL    Corrected Calcium 10.0 8.3 - 10.1 mg/dL    AST 13 13 - 39 U/L    ALT 15 7 - 52 U/L    Alkaline Phosphatase 74 34 - 104 U/L    Total Protein 6.3 (L) 6.4 - 8.4 g/dL    Albumin 3.1 (L) 3.5 - 5.0 g/dL    Total Bilirubin 0.45 0.20 - 1.00 mg/dL    eGFR  89 ml/min/1.73sq m   Magnesium    Collection Time: 06/07/24  5:33 AM   Result Value Ref Range    Magnesium 1.8 (L) 1.9 - 2.7 mg/dL   CBC and differential    Collection Time: 06/07/24  5:33 AM   Result Value Ref Range    WBC 9.31 4.31 - 10.16 Thousand/uL    RBC 3.58 (L) 3.88 - 5.62 Million/uL    Hemoglobin 10.8 (L) 12.0 - 17.0 g/dL    Hematocrit 32.7 (L) 36.5 - 49.3 %    MCV 91 82 - 98 fL    MCH 30.2 26.8 - 34.3 pg    MCHC 33.0 31.4 - 37.4 g/dL    RDW 13.8 11.6 - 15.1 %    MPV 9.8 8.9 - 12.7 fL    Platelets 330 149 - 390 Thousands/uL    nRBC 0 /100 WBCs    Segmented % 61 43 - 75 %    Immature Grans % 0 0 - 2 %    Lymphocytes % 31 14 - 44 %    Monocytes % 6 4 - 12 %    Eosinophils Relative 2 0 - 6 %    Basophils Relative 0 0 - 1 %    Absolute Neutrophils 5.66 1.85 - 7.62 Thousands/µL    Absolute Immature Grans 0.04 0.00 - 0.20 Thousand/uL    Absolute Lymphocytes 2.88 0.60 - 4.47 Thousands/µL    Absolute Monocytes 0.55 0.17 - 1.22 Thousand/µL    Eosinophils Absolute 0.14 0.00 - 0.61 Thousand/µL    Basophils Absolute 0.04 0.00 - 0.10 Thousands/µL   Fingerstick Glucose (POCT)    Collection Time: 06/07/24  7:01 AM   Result Value Ref Range    POC Glucose 64 (L) 65 - 140 mg/dl   Fingerstick Glucose (POCT)    Collection Time: 06/07/24  7:45 AM   Result Value Ref Range    POC Glucose 109 65 - 140 mg/dl   Fingerstick Glucose (POCT)    Collection Time: 06/07/24 10:54 AM   Result Value Ref Range    POC Glucose 106 65 - 140 mg/dl   Fingerstick Glucose (POCT)    Collection Time: 06/07/24  4:39 PM   Result Value Ref Range    POC Glucose 165 (H) 65 - 140 mg/dl   Lactic acid, plasma (w/reflex if result > 2.0)    Collection Time: 06/07/24  4:59 PM   Result Value Ref Range    LACTIC ACID 1.8 0.5 - 2.0 mmol/L   Fingerstick Glucose (POCT)    Collection Time: 06/07/24  8:36 PM   Result Value Ref Range    POC Glucose 176 (H) 65 - 140 mg/dl   Vancomycin, trough    Collection Time: 06/08/24  4:51 AM   Result Value Ref Range    Vancomycin Tr  13.6 10.0 - 20.0 ug/mL   Magnesium    Collection Time: 06/08/24  4:51 AM   Result Value Ref Range    Magnesium 1.8 (L) 1.9 - 2.7 mg/dL   Comprehensive metabolic panel    Collection Time: 06/08/24  4:51 AM   Result Value Ref Range    Sodium 136 135 - 147 mmol/L    Potassium 4.0 3.5 - 5.3 mmol/L    Chloride 105 96 - 108 mmol/L    CO2 24 21 - 32 mmol/L    ANION GAP 7 4 - 13 mmol/L    BUN 11 5 - 25 mg/dL    Creatinine 0.71 0.60 - 1.30 mg/dL    Glucose 93 65 - 140 mg/dL    Calcium 8.9 8.4 - 10.2 mg/dL    Corrected Calcium 9.6 8.3 - 10.1 mg/dL    AST 12 (L) 13 - 39 U/L    ALT 13 7 - 52 U/L    Alkaline Phosphatase 67 34 - 104 U/L    Total Protein 6.5 6.4 - 8.4 g/dL    Albumin 3.1 (L) 3.5 - 5.0 g/dL    Total Bilirubin 0.59 0.20 - 1.00 mg/dL    eGFR 94 ml/min/1.73sq m   CBC and differential    Collection Time: 06/08/24  4:51 AM   Result Value Ref Range    WBC 10.54 (H) 4.31 - 10.16 Thousand/uL    RBC 3.78 (L) 3.88 - 5.62 Million/uL    Hemoglobin 11.3 (L) 12.0 - 17.0 g/dL    Hematocrit 34.2 (L) 36.5 - 49.3 %    MCV 91 82 - 98 fL    MCH 29.9 26.8 - 34.3 pg    MCHC 33.0 31.4 - 37.4 g/dL    RDW 13.7 11.6 - 15.1 %    MPV 9.8 8.9 - 12.7 fL    Platelets 334 149 - 390 Thousands/uL    nRBC 0 /100 WBCs    Segmented % 65 43 - 75 %    Immature Grans % 1 0 - 2 %    Lymphocytes % 24 14 - 44 %    Monocytes % 7 4 - 12 %    Eosinophils Relative 2 0 - 6 %    Basophils Relative 1 0 - 1 %    Absolute Neutrophils 6.97 1.85 - 7.62 Thousands/µL    Absolute Immature Grans 0.05 0.00 - 0.20 Thousand/uL    Absolute Lymphocytes 2.57 0.60 - 4.47 Thousands/µL    Absolute Monocytes 0.71 0.17 - 1.22 Thousand/µL    Eosinophils Absolute 0.18 0.00 - 0.61 Thousand/µL    Basophils Absolute 0.06 0.00 - 0.10 Thousands/µL   Fingerstick Glucose (POCT)    Collection Time: 06/08/24  6:50 AM   Result Value Ref Range    POC Glucose 79 65 - 140 mg/dl   Fingerstick Glucose (POCT)    Collection Time: 06/08/24 11:00 AM   Result Value Ref Range    POC Glucose 144 (H) 65 -  140 mg/dl   Standard Transport Chair + 1 more item    Collection Time: 06/12/24 12:10 PM   Result Value Ref Range    Supplier Name [employer]     Supplier Phone Number [phone]     Order Status Ready For Delivery     Delivery Note      Delivery Request Date 06/12/2024     Supplier Name 06/14/2024     Item Description Standard Transport Chair, 19''     Item Description Standard [employer] Detachable Foot Rests     Item Description 3 in 1 Commode        Imaging Studies:IR nephrostomy tube check/change/reposition/reinsertion/upsize    Result Date: 6/7/2024  Narrative: Bilateral nephrostogram and nephrostomy tube change Clinical History:  73 year-old male with history of prostate cancer with chronic indwelling bilateral nephrostomy catheters in place. Patient presented to the ED due to left flank pain and burning urination, now with concern for complicated UTI. Contrast: 10 mL of iohexol (OMNIPAQUE) Fluoro time: 2.1 MIN FL Number of Images: Multiple Radiation dose: 15 mGy Conscious sedation time: 0 Technique: The patient was brought to the interventional radiology suite and placed prone on the table. The existing bilateral nephrostomy tubes were prepped and draped in the usual sterile fashion. After a  view was obtained, contrast was injected into the existing catheter and multiple images of the urinary tract were obtained to the bladder. Findings: The bilateral nephrostomy catheters are seen to be in appropriate position, with the loop within the bilateral renal pelvises Intervention: The catheter was removed over a heavy-duty straight wire, and a new 10.2 [place] nephrostomy tube was advanced, and the loop formed within the renal pelvis. The catheter was then injected, confirming satisfactory position. This process was repeated for the contralateral side.     Impression: Impression: Exchange of bilateral 10.2 [place] nephrostomy tubes under fluoroscopic control. Workstation performed:  [id number]     CT abdomen pelvis with contrast    Result Date: 6/6/2024  Narrative: CT ABDOMEN AND PELVIS WITH IV CONTRAST INDICATION: Left flank pain. COMPARISON: 5/28/2024. TECHNIQUE: CT examination of the abdomen and pelvis was performed. Multiplanar 2D reformatted images were created from the source data. This examination, like all CT scans performed in the [facility], was performed utilizing techniques to minimize radiation dose exposure, including the use of iterative reconstruction and automated exposure control. Radiation dose length product (DLP) for this visit: 520.68 mGy-cm IV Contrast: 100 mL of iohexol (OMNIPAQUE) Enteric Contrast: Not administered. FINDINGS: ABDOMEN LOWER CHEST: There is a stable 4 mm right lower lobe nodule. LIVER/BILIARY TREE: Unremarkable. GALLBLADDER: No calcified gallstones. No pericholecystic inflammatory change. SPLEEN: Unremarkable. PANCREAS: Unremarkable. ADRENAL GLANDS: Unremarkable. KIDNEYS/URETERS: There are bilateral nephrostomy catheters in place. There is mild thickening and enhancement of the left renal pelvis no hydronephrosis. STOMACH AND BOWEL: Unremarkable. APPENDIX: No findings to suggest appendicitis. ABDOMINOPELVIC CAVITY: No ascites. No pneumoperitoneum. No lymphadenopathy. VESSELS: Unremarkable for patient's age. PELVIS REPRODUCTIVE ORGANS: Unremarkable for patient's age. URINARY BLADDER: There is stable diffuse bladder wall thickening with mucosal enhancement and intraluminal gas. ABDOMINAL WALL/INGUINAL REGIONS: Unremarkable. BONES: There is a stable lytic lesion in L1 and diffuse sclerotic metastasis.     Impression: 1.  Slight thickening and enhancement of the left renal pelvis which may represent pyelitis given history of left flank pain. No hydronephrosis. Nephrostomy is in place. 2.  Diffusely thick-walled urinary bladder with mucosal enhancement and intraluminal gas suspicious for cystitis. 3.  Stable osseous metastasis. Workstation  performed: [id number]     MRI lumbar spine w wo contrast    Result Date: 5/29/2024  Narrative: MRI LUMBAR SPINE WITH AND WITHOUT CONTRAST INDICATION: L1 lesion.   Prostate cancer. COMPARISON: CT of the abdomen and pelvis 5/28/2024. TECHNIQUE:  Multiplanar, multisequence imaging of the lumbar spine was performed before and after gadolinium administration. . IV Contrast:  7 mL of Gadobutrol injection (SINGLE-DOSE) IMAGE QUALITY: Motion-degraded. FINDINGS: For the purposes of this dictation and to maintain consistency with the prior report, the last well-formed disc space is labeled as L5-S1. Recommend correlation with dedicated thoracic spine imaging to count the ribs prior to any planned future intervention to ensure accuracy/consistency of numbering. VERTEBRAL BODIES: Mild retrolisthesis of L5 on S1. Multiple marrow replacing/enhancing lesions are identified throughout the lumbar spine, most notably at L1 which replaces the entire vertebral body and results in a mild pathologic compression fracture with less than 25% height loss. There is extension of marrow replacement to the proximal right pedicle (series 4, image 12). There appears to be encroachment upon the right neural foramen without definite abutment of the exiting nerve root at this level.  Additional vertebral bodies are involved, notably L2 posteriorly (series 4, image 6), for example. Edema is noted to be associated with these lesions. SACRUM: No acute abnormality. T1 hypointense lesion of the left hemisacrum (series 2, image 5) and left iliac bone (series 2, image 3), which are also suspicious. DISTAL CORD AND CONUS:  Normal size and signal within the distal cord and conus. PARASPINAL SOFT TISSUES: No acute abnormality. LOWER THORACIC DISC SPACES: Spondylotic changes without acute critical central canal stenosis. LUMBAR DISC SPACES: Multilevel up to moderate degenerative disc disease. L1-L2: Disc bulge. Mild facet arthropathy. Mild bilateral  neuroforaminal narrowing. No significant spinal canal stenosis. L2-L3: Disc bulge. Mild-moderate facet arthropathy. No significant neuroforaminal narrowing. Mild-moderate spinal canal stenosis. L3-L4: Disc osteophyte complex. Marked facet arthropathy, right greater than left. Mild left and moderate right neuroforaminal narrowing. Moderate spinal canal stenosis. L4-L5: Disc bulge. Moderate-marked facet arthropathy. Mild-moderate bilateral neuroforaminal narrowing. Mild-moderate spinal canal stenosis. L5-S1: Disc bulge. Moderate facet arthropathy. Mild-moderate bilateral neuroforaminal narrowing. Mild spinal canal stenosis. POSTCONTRAST IMAGING: Enhancement is seen associated with the suspicious marrow replacing lesions discussed above. No convincing enhancement within the spinal canal. OTHER FINDINGS: Please refer to dedicated body CT of the abdomen and pelvis on 5/28/2024 for full visceral findings. Partially visualized bilateral percutaneous nephrostomy tubes, noting moderate right hydroureteronephrosis that is partially visualized.     Impression: 1. Multiple marrow replacing and enhancing lesions involving the lumbar spine, sacrum and iliac bones, which are suspicious for metastases. No convincing epidural involvement though there is probable encroachment on the right L1-L2 neural foramen. Of note, the L1 marrow replacing lesion results in a mild pathologic compression fracture. Recommend correlation with nuclear medicine bone scan and/or prostate-specific tracers in a PET scan, as clinically appropriate. 2. Multilevel lumbar spine spondylotic changes, as detailed above. The study was marked in EPIC for immediate notification. Workstation performed: [id number]     CT abdomen pelvis with contrast    Result Date: 5/28/2024  Narrative: CT ABDOMEN AND PELVIS WITH IV CONTRAST INDICATION: back pain. History of prostate cancer with bilateral nephrostomy tubes. COMPARISON: Comparison is made to prior studies, the most  recent is a CT scan dated March 21, 2024. TECHNIQUE: CT examination of the abdomen and pelvis was performed. Multiplanar 2D reformatted images were created from the source data. This examination, like all CT scans performed in the [facility], was performed utilizing techniques to minimize radiation dose exposure, including the use of iterative reconstruction and automated exposure control. Radiation dose length product (DLP) for this visit: 517.37 mGy-cm IV Contrast: 100 mL of iohexol (OMNIPAQUE) Enteric Contrast: Not administered. FINDINGS: ABDOMEN LOWER CHEST: No clinically significant abnormality in the visualized lower chest. LIVER/BILIARY TREE: Unremarkable. GALLBLADDER: No calcified gallstones. No pericholecystic inflammatory change. SPLEEN: Few calcified granuloma PANCREAS: Unremarkable. ADRENAL GLANDS: Unremarkable. KIDNEYS/URETERS: Bilateral nephrostomy catheters are in good position. No collecting system dilatation both nephrograms are symmetric. No calculi. STOMACH AND BOWEL: Diverticulosis with no findings of acute diverticulitis. APPENDIX: No findings to suggest appendicitis. ABDOMINOPELVIC CAVITY: No ascites. No pneumoperitoneum. No lymphadenopathy. VESSELS: Unremarkable for patient's age. PELVIS REPRODUCTIVE ORGANS: Unremarkable for patient's age. URINARY BLADDER: Bladder wall thickening and increased bladder wall enhancement suspicious for cystitis. ABDOMINAL WALL/INGUINAL REGIONS: Unremarkable. BONES: Multiple sclerotic metastases redemonstrated throughout the visualized axial and appendicular skeleton,1 mostly stable, except for a new lytic lesion in the L1 vertebral body with an enhancing soft tissue component, and focal destruction of both the anterior and posterior cortex (series 2 image 71. Chronic lower left rib fractures.     Impression: Bladder wall thickening with increased bladder wall enhancement suspicious for cystitis. Bilateral nephrostomy tubes in good position with  "no collecting system dilatation and no CT evidence of acute pyelonephritis. New lytic L1 vertebral body lesion with a soft tissue component causing focal erosion of both the anterior and posterior cortex as described above. Additional sclerotic metastases are stable. The study was marked in EPIC for immediate notification. Workstation performed: [id number]           Assessment/Plan:  Orders Placed This Encounter   Procedures    Radiation Simulation Treatment      We reviewed the patient's recent clinical history and imaging studies, noting that his severe lower back pain correlates well to his lumbosacral osseous disease. I have recommended palliative RT to a dose of 3000cGy in 10 fractions. We reviewed side effects of RT to include fatigue, diarrhea, nausea, and skin irritation. The patient and his family were given the opportunity to ask multiple questions all of which were answered to their satisfaction. After considering options the patient was agreeable to palliative RT as recommended; an informed consent was signed at today's visit.     The patient will return for CT simulation and subsequent RT in the coming days. In the meantime, I have provided a prescription for oxycodone and dexamethasone (with associated bowel regimen and protonix) to help with pain relief.     Dov Andrea MD  6/13/2024,9:04 PM    Portions of the record may have been created with voice recognition software.  Occasional wrong word or \"sound a like\" substitutions may have occurred due to the inherent limitations of voice recognition software.  Read the chart carefully and recognize, using context, where substitutions have occurred.        "

## 2024-06-17 ENCOUNTER — PATIENT OUTREACH (OUTPATIENT)
Age: 74
End: 2024-06-17

## 2024-06-17 ENCOUNTER — TELEMEDICINE (OUTPATIENT)
Age: 74
End: 2024-06-17

## 2024-06-17 DIAGNOSIS — C61 PROSTATE CANCER METASTATIC TO BONE (HCC): Primary | ICD-10-CM

## 2024-06-17 DIAGNOSIS — K59.03 DRUG-INDUCED CONSTIPATION: ICD-10-CM

## 2024-06-17 DIAGNOSIS — C79.51 PROSTATE CANCER METASTATIC TO BONE (HCC): Primary | ICD-10-CM

## 2024-06-17 PROCEDURE — G2211 COMPLEX E/M VISIT ADD ON: HCPCS | Performed by: FAMILY MEDICINE

## 2024-06-17 PROCEDURE — 99213 OFFICE O/P EST LOW 20 MIN: CPT | Performed by: FAMILY MEDICINE

## 2024-06-17 NOTE — PROGRESS NOTES
Virtual Regular Visit    Verification of patient location:    Patient is located at Home in the following state in which I hold an active license [place]       Reason for visit is No chief complaint on file.       Encounter provider Eugenia Rodriguez MD      Recent Visits  Date Type Provider Dept   06/17/24 Telemedicine MD Rohith Anderson   06/12/24 Telephone Geeta Newberry [place] [facility]   Showing recent visits within past 7 days and meeting all other requirements  Today's Visits  Date Type Provider Dept   06/18/24 Telephone Geeta Sims   Showing today's visits and meeting all other requirements  Future Appointments  No visits were found meeting these conditions.  Showing future appointments within next 150 days and meeting all other requirements       The patient was identified by name and date of birth. Oliver Astudillo was informed that this is a telemedicine visit and that the visit is being conducted through Telephone.  My office door was closed. No one else was in the room.  He acknowledged consent and understanding of privacy and security of the video platform. The patient has agreed to participate and understands they can discontinue the visit at any time.    Patient is aware this is a billable service.     Assessment/Plan:    1. Prostate cancer metastatic to bone (HCC)  Assessment & Plan:  Patient has been having worsening back pain secondary to metastatic prostate cancer. Pain medication recently increased from tramadol to oxycodone. He is using the medication 2x day. He also reports constipation but has improved with colace. Due to patient's back pain and limited mobility, wife is having difficulty moving him and helping him to the car to make their appointments.  Case mgt on board for transportation assistance  Palliative care appt scheduled for 6/21  Continue current pain regimen for now until pt sees palliative care, advised they can use oxycodone every 4hrs as  needed  Continue colace for opioid induced constipation, will add miralax to regimen  Continue f/u with oncology  2. Drug-induced constipation  -     polyethylene glycol (MIRALAX) 17 g packet; Take 17 g by mouth daily as needed (for constipation)        Liliane Astudillo is a 73 y.o. male presenting for back pain f/u. Patient has been having worsening back pain secondary to metastatic prostate cancer. Pain medication recently increased from tramadol to oxycodone. He is using the medication 2x day. He also reports constipation but has improved with colace    Patient and wife are concerned about transportation. They have appointment for palliative chemotherapy as well as an appt with palliative care. Due to patient's back pain and limited mobility, wife is having difficulty moving him and helping him to the car to make their appointments. They are asking for assistance in this matter.        Past Medical History:   Diagnosis Date    COVID-19 01/15/2024    Diabetes mellitus (HCC)     Elevated PSA 06/21/2023    High cholesterol     Hypertension     Prostate cancer (HCC)        Past Surgical History:   Procedure Laterality Date    IR NEPHROSTOMY TUBE CHECK/CHANGE/REPOSITION/REINSERTION/UPSIZE  9/28/2023    IR NEPHROSTOMY TUBE CHECK/CHANGE/REPOSITION/REINSERTION/UPSIZE  12/28/2023    IR NEPHROSTOMY TUBE CHECK/CHANGE/REPOSITION/REINSERTION/UPSIZE  1/31/2024    IR NEPHROSTOMY TUBE CHECK/CHANGE/REPOSITION/REINSERTION/UPSIZE  2/2/2024    IR NEPHROSTOMY TUBE CHECK/CHANGE/REPOSITION/REINSERTION/UPSIZE  3/4/2024    IR NEPHROSTOMY TUBE CHECK/CHANGE/REPOSITION/REINSERTION/UPSIZE  3/20/2024    IR NEPHROSTOMY TUBE CHECK/CHANGE/REPOSITION/REINSERTION/UPSIZE  6/7/2024    IR NEPHROSTOMY TUBE PLACEMENT  06/22/2023    bilateral    IR OTHER  1/15/2024    US GUIDED PROSTATE BIOPSY         Current Outpatient Medications   Medication Sig Dispense Refill    acetaminophen (TYLENOL) 325 mg tablet Take 3 tablets (975 mg total) by mouth  every 8 (eight) hours as needed for mild pain or moderate pain      albuterol (Ventolin HFA) 90 mcg/act inhaler Inhale 2 puffs every 6 (six) hours as needed for wheezing 18 g 0    Alcohol Swabs 70 % PADS To clean the skin prior to injecting insulin 100 each 1    Blood Glucose Monitoring Suppl (OneTouch Verio Reflect) w/Device KIT Check blood sugars once daily. Please substitute with appropriate alternative as covered by patient's insurance. Dx: E11.65 1 kit 0    dexamethasone (DECADRON) 0.5 mg tablet TAKE 1 TABLET (0.5 MG TOTAL) BY MOUTH DAILY WITH BREAKFAST 90 tablet 1    dexamethasone (DECADRON) 2 mg tablet Take 1 tablet (2 mg total) by mouth 2 (two) times a day with meals 60 tablet 0    Diclofenac Sodium (VOLTAREN) 1 % Apply 2 g topically 4 (four) times a day 100 g 3    Easy Touch Pen Needles 31G X 6 MM MISC Use daily at bedtime 100 each 3    enzalutamide (XTANDI) 40 mg capsule Take 4 capsules (160 mg total) by mouth daily 120 capsule 6    glimepiride (AMARYL) 2 mg tablet Take 1 tablet (2 mg total) by mouth daily with dinner 90 tablet 3    glimepiride (AMARYL) 4 mg tablet Take 1 tablet (4 mg total) by mouth daily with breakfast 90 tablet 3    glucose blood (OneTouch Verio) test strip Check blood sugars twice a daily. Please substitute with appropriate alternative as covered by patient's insurance. Dx: E11.65 200 each 3    Levemir FlexPen 100 units/mL injection pen Inject 20 Units under the skin daily at bedtime 15 mL 1    lisinopril (ZESTRIL) 10 mg tablet NARCISO TANMAY TABLETA VIA ORAL DIARIAMENTE      metFORMIN (GLUCOPHAGE) 1000 MG tablet Take 1 tablet (1,000 mg total) by mouth 2 (two) times a day with meals 180 tablet 1    Multiple Vitamins-Minerals (Sentry) TABS Take 1 tablet by mouth daily      naloxone (NARCAN) 4 mg/0.1 mL nasal spray Administer 1 spray into a nostril. If no response after 2-3 minutes, give another dose in the other nostril using a new spray. 1 each 0    omega-3-acid ethyl esters (LOVAZA) 1 g  capsule       OneTouch Delica Lancets 33G MISC Check blood sugars once daily. Please substitute with appropriate alternative as covered by patient's insurance. Dx: E11.65 100 each 3    oxyCODONE (Roxicodone) 5 immediate release tablet Take 1 tablet (5 mg total) by mouth every 4 (four) hours as needed for moderate pain Max Daily Amount: 30 mg 30 tablet 0    pantoprazole (PROTONIX) 40 mg tablet Take 1 tablet (40 mg total) by mouth daily 30 tablet 0    polyethylene glycol (MIRALAX) 17 g packet Take 17 g by mouth daily as needed (for constipation) 100 each 1    rosuvastatin (CRESTOR) 10 MG tablet Take 1 tablet (10 mg total) by mouth daily at bedtime 90 tablet 1    senna-docusate sodium (SENOKOT S) 8.6-50 mg per tablet Take 1 tablet by mouth daily 90 tablet 1    sodium chloride, PF, 0.9 % 10 mL by Intracatheter route daily Intracatheter flushing daily. May substitute prefilled syringe with normal saline 10 mL vials, 10 mL syringes, and 18 g blunt needles 600 mL 2    Spacer/Aero-Holding Chambers (AEROCHAMBER MINI CHAMBER) BILLY by Does not apply route see administration instructions (Patient not taking: Reported on 4/25/2024) 1 Device 0     No current facility-administered medications for this visit.        No Known Allergies    Review of Systems   Constitutional:  Positive for fatigue. Negative for chills and fever.   Respiratory:  Negative for shortness of breath.    Cardiovascular:  Negative for chest pain.   Gastrointestinal:  Positive for constipation.   Musculoskeletal:  Positive for back pain.   Skin:  Negative for rash.       Video Exam    There were no vitals filed for this visit.    Physical Exam was not able to be performed due to virtual visit    Eugenia Springer MD  Family Medicine PGY-3

## 2024-06-18 ENCOUNTER — PATIENT OUTREACH (OUTPATIENT)
Age: 74
End: 2024-06-18

## 2024-06-18 ENCOUNTER — TELEPHONE (OUTPATIENT)
Age: 74
End: 2024-06-18

## 2024-06-18 RX ORDER — POLYETHYLENE GLYCOL 3350 17 G/17G
17 POWDER, FOR SOLUTION ORAL DAILY PRN
Qty: 100 EACH | Refills: 1 | Status: SHIPPED | OUTPATIENT
Start: 2024-06-18

## 2024-06-18 NOTE — PROGRESS NOTES
Case discussed with PCP. Pt is in need of transportation to radiation treatments. Wife is unable to get him into the car. Pt is receiving palliative radiation treatments for metastatic cancer.     Per provider, patient is taking the prescribed oxycodone only twice a day.     Outreach to [employer] to inquire about transportation availability for cancer patients.     Advised that transportation is available. Application  for transportation must be generated through oncology office. Application cannot be initiated through primary care.     In basket message sent to [facility] and clerical staff requesting that application be initiated.     CATHIE GAFFNEY will continue to follow.

## 2024-06-18 NOTE — PROGRESS NOTES
Received call from Loraine [facility]. Loraine is calling to report that patient has not received the commode. Only the transport chair. RN CM will investigate.     Reviewed patients current pain medication regimen. Pt is only taking the oxycodone 2 x a day. Advised Loraine to review with family that patient can take it up to 4 x a day.     Provided Loraine with an update on options for transportation. Encouraged her to discuss with family to reach out to oncologist office to get application completed for transportation.     Pt has radiation treatment and appointment with palliative care this week.     Loraine will continue to provide updates to CATHIE GAFFNEY.

## 2024-06-18 NOTE — TELEPHONE ENCOUNTER
Dr. Springer  Completed form for dispensing [facility] nurse   Faxed to [phone]  And also [facility]   Faxed [phone]    Send to [facility] system

## 2024-06-18 NOTE — ASSESSMENT & PLAN NOTE
Patient has been having worsening back pain secondary to metastatic prostate cancer. Pain medication recently increased from tramadol to oxycodone. He is using the medication 2x day. He also reports constipation but has improved with colace. Due to patient's back pain and limited mobility, wife is having difficulty moving him and helping him to the car to make their appointments.  Case mgt on board for transportation assistance  Palliative care appt scheduled for 6/21  Continue current pain regimen for now until pt sees palliative care, advised they can use oxycodone every 4hrs as needed  Continue colace for opioid induced constipation, will add miralax to regimen  Continue f/u with oncology

## 2024-06-19 ENCOUNTER — APPOINTMENT (OUTPATIENT)
Dept: RADIATION ONCOLOGY | Facility: HOSPITAL | Age: 74
End: 2024-06-19
Attending: STUDENT IN AN ORGANIZED HEALTH CARE EDUCATION/TRAINING PROGRAM
Payer: COMMERCIAL

## 2024-06-19 PROCEDURE — 77290 THER RAD SIMULAJ FIELD CPLX: CPT | Performed by: RADIOLOGY

## 2024-06-19 NOTE — TELEPHONE ENCOUNTER
Dr. Springer / and Randy    Those kind of chair they put in to the car and lift him into the wheel chair. Dont know if we can order.

## 2024-06-20 ENCOUNTER — PATIENT OUTREACH (OUTPATIENT)
Age: 74
End: 2024-06-20

## 2024-06-20 PROCEDURE — 77334 RADIATION TREATMENT AID(S): CPT | Performed by: RADIOLOGY

## 2024-06-20 PROCEDURE — 77295 3-D RADIOTHERAPY PLAN: CPT | Performed by: RADIOLOGY

## 2024-06-20 PROCEDURE — 77300 RADIATION THERAPY DOSE PLAN: CPT | Performed by: RADIOLOGY

## 2024-06-20 NOTE — PROGRESS NOTES
Outreach to patients son. Confirmed with son that [employer] has been arranged for transportation.    Discussed importance of going to palliative care appointment tomorrow. Son plans on taking patient to appointment tomorrow.     Pt received transport wheelchair, but not commode. Discussed with provider .[employer]mode reordered through [employer].     Son advised that commode was reordered. RN CM will follow up within the next two weeks.

## 2024-06-20 NOTE — TELEPHONE ENCOUNTER
Patient received transport chair but needed commode that for some reason was not delivered. Randy and I ordered the commode and spoke to pts son.

## 2024-06-21 ENCOUNTER — TELEPHONE (OUTPATIENT)
Age: 74
End: 2024-06-21

## 2024-06-21 ENCOUNTER — TELEPHONE (OUTPATIENT)
Dept: PALLIATIVE MEDICINE | Facility: CLINIC | Age: 74
End: 2024-06-21

## 2024-06-21 LAB
DME PARACHUTE DELIVERY DATE ACTUAL: NORMAL
DME PARACHUTE DELIVERY DATE REQUESTED: NORMAL
DME PARACHUTE ITEM DESCRIPTION: NORMAL
DME PARACHUTE ORDER STATUS: NORMAL
DME PARACHUTE SUPPLIER NAME: NORMAL
DME PARACHUTE SUPPLIER PHONE: NORMAL

## 2024-06-21 NOTE — TELEPHONE ENCOUNTER
Patient did not show for visit. Called patient's son and son stated that his father should be at the appointment and did not know why he wasn't there and would call back.

## 2024-06-21 NOTE — TELEPHONE ENCOUNTER
Fax received from [facility] Nurse.  Copy scanned into encounter.  Placed into PCP's folder.    Order ID:  [id number], [id number], [id number]

## 2024-06-21 NOTE — TELEPHONE ENCOUNTER
Fax received from [facility].  Copy scanned into encounter.  Placed in PCP's folder.    Effective 6/12/24 to 7/30/24  Plan of care:  [id number]

## 2024-06-21 NOTE — TELEPHONE ENCOUNTER
Fax received from [facility]    Copy scanned into encounter.  Placed in PCP's folder.

## 2024-06-24 ENCOUNTER — APPOINTMENT (OUTPATIENT)
Dept: RADIATION ONCOLOGY | Facility: HOSPITAL | Age: 74
End: 2024-06-24
Attending: STUDENT IN AN ORGANIZED HEALTH CARE EDUCATION/TRAINING PROGRAM
Payer: COMMERCIAL

## 2024-06-24 PROCEDURE — 77280 THER RAD SIMULAJ FIELD SMPL: CPT | Performed by: STUDENT IN AN ORGANIZED HEALTH CARE EDUCATION/TRAINING PROGRAM

## 2024-06-24 PROCEDURE — 77387 GUIDANCE FOR RADJ TX DLVR: CPT | Performed by: STUDENT IN AN ORGANIZED HEALTH CARE EDUCATION/TRAINING PROGRAM

## 2024-06-24 PROCEDURE — 77412 RADIATION TX DELIVERY LVL 3: CPT | Performed by: STUDENT IN AN ORGANIZED HEALTH CARE EDUCATION/TRAINING PROGRAM

## 2024-06-24 PROCEDURE — 77427 RADIATION TX MANAGEMENT X5: CPT | Performed by: STUDENT IN AN ORGANIZED HEALTH CARE EDUCATION/TRAINING PROGRAM

## 2024-06-25 ENCOUNTER — APPOINTMENT (OUTPATIENT)
Dept: RADIATION ONCOLOGY | Facility: HOSPITAL | Age: 74
End: 2024-06-25
Attending: STUDENT IN AN ORGANIZED HEALTH CARE EDUCATION/TRAINING PROGRAM
Payer: COMMERCIAL

## 2024-06-25 PROCEDURE — 77412 RADIATION TX DELIVERY LVL 3: CPT | Performed by: RADIOLOGY

## 2024-06-25 PROCEDURE — G6002 STEREOSCOPIC X-RAY GUIDANCE: HCPCS | Performed by: RADIOLOGY

## 2024-06-25 PROCEDURE — 77387 GUIDANCE FOR RADJ TX DLVR: CPT | Performed by: RADIOLOGY

## 2024-06-26 ENCOUNTER — APPOINTMENT (OUTPATIENT)
Dept: RADIATION ONCOLOGY | Facility: HOSPITAL | Age: 74
End: 2024-06-26
Attending: STUDENT IN AN ORGANIZED HEALTH CARE EDUCATION/TRAINING PROGRAM
Payer: COMMERCIAL

## 2024-06-26 PROCEDURE — 77387 GUIDANCE FOR RADJ TX DLVR: CPT | Performed by: STUDENT IN AN ORGANIZED HEALTH CARE EDUCATION/TRAINING PROGRAM

## 2024-06-26 PROCEDURE — G6002 STEREOSCOPIC X-RAY GUIDANCE: HCPCS | Performed by: STUDENT IN AN ORGANIZED HEALTH CARE EDUCATION/TRAINING PROGRAM

## 2024-06-26 PROCEDURE — 77331 SPECIAL RADIATION DOSIMETRY: CPT | Performed by: STUDENT IN AN ORGANIZED HEALTH CARE EDUCATION/TRAINING PROGRAM

## 2024-06-26 PROCEDURE — 77412 RADIATION TX DELIVERY LVL 3: CPT | Performed by: STUDENT IN AN ORGANIZED HEALTH CARE EDUCATION/TRAINING PROGRAM

## 2024-06-27 ENCOUNTER — APPOINTMENT (OUTPATIENT)
Dept: RADIATION ONCOLOGY | Facility: HOSPITAL | Age: 74
End: 2024-06-27
Attending: STUDENT IN AN ORGANIZED HEALTH CARE EDUCATION/TRAINING PROGRAM
Payer: COMMERCIAL

## 2024-06-27 ENCOUNTER — PATIENT OUTREACH (OUTPATIENT)
Age: 74
End: 2024-06-27

## 2024-06-27 PROCEDURE — 77412 RADIATION TX DELIVERY LVL 3: CPT | Performed by: RADIOLOGY

## 2024-06-27 PROCEDURE — 77387 GUIDANCE FOR RADJ TX DLVR: CPT | Performed by: RADIOLOGY

## 2024-06-27 PROCEDURE — G6002 STEREOSCOPIC X-RAY GUIDANCE: HCPCS | Performed by: RADIOLOGY

## 2024-06-27 NOTE — PROGRESS NOTES
Pt due for outreach. Chart review done. Pt did not show up for palliative care appointment.     LVM for [facility]. LVM requesting return call.     Outreach to patients son. Son reports that patient was not feeling well that day. Encouraged son to call and reschedule the palliative care appointment. Explained how palliative assists in managing patients metastatic cancer. Pt continues to go for palliative radiation.     RN CM will do one more outreach.     Received return call from [facility]. Loraine reports she has also encouraged patients son to reschedule palliative care appointment. Loraine reports patient is experiencing nausea. Does not have zofran ordered. In basket message sent to Dr. Dov Andrea to address complaints of nausea.

## 2024-06-28 ENCOUNTER — CONSULT (OUTPATIENT)
Dept: PALLIATIVE MEDICINE | Facility: CLINIC | Age: 74
End: 2024-06-28

## 2024-06-28 ENCOUNTER — CLINICAL SUPPORT (OUTPATIENT)
Dept: PALLIATIVE MEDICINE | Facility: CLINIC | Age: 74
End: 2024-06-28

## 2024-06-28 ENCOUNTER — TELEPHONE (OUTPATIENT)
Age: 74
End: 2024-06-28

## 2024-06-28 ENCOUNTER — APPOINTMENT (OUTPATIENT)
Dept: RADIATION ONCOLOGY | Facility: HOSPITAL | Age: 74
End: 2024-06-28
Attending: STUDENT IN AN ORGANIZED HEALTH CARE EDUCATION/TRAINING PROGRAM
Payer: COMMERCIAL

## 2024-06-28 VITALS
HEIGHT: 68 IN | SYSTOLIC BLOOD PRESSURE: 120 MMHG | WEIGHT: 148 LBS | HEART RATE: 88 BPM | RESPIRATION RATE: 16 BRPM | BODY MASS INDEX: 22.43 KG/M2 | DIASTOLIC BLOOD PRESSURE: 80 MMHG | TEMPERATURE: 97.2 F | OXYGEN SATURATION: 98 %

## 2024-06-28 DIAGNOSIS — R11.2 NAUSEA AND VOMITING: ICD-10-CM

## 2024-06-28 DIAGNOSIS — Z51.5 PALLIATIVE CARE BY SPECIALIST: ICD-10-CM

## 2024-06-28 DIAGNOSIS — Z71.89 ADVANCED CARE PLANNING/COUNSELING DISCUSSION: ICD-10-CM

## 2024-06-28 DIAGNOSIS — C61 PROSTATE CANCER METASTATIC TO BONE (HCC): Primary | ICD-10-CM

## 2024-06-28 DIAGNOSIS — K59.03 THERAPEUTIC OPIOID-INDUCED CONSTIPATION (OIC): ICD-10-CM

## 2024-06-28 DIAGNOSIS — G89.3 CANCER RELATED PAIN: ICD-10-CM

## 2024-06-28 DIAGNOSIS — Z71.89 COUNSELING AND COORDINATION OF CARE: Primary | ICD-10-CM

## 2024-06-28 DIAGNOSIS — C79.51 PROSTATE CANCER METASTATIC TO BONE (HCC): Primary | ICD-10-CM

## 2024-06-28 DIAGNOSIS — R26.2 AMBULATORY DYSFUNCTION: ICD-10-CM

## 2024-06-28 DIAGNOSIS — Z71.89 GOALS OF CARE, COUNSELING/DISCUSSION: ICD-10-CM

## 2024-06-28 DIAGNOSIS — T40.2X5A THERAPEUTIC OPIOID-INDUCED CONSTIPATION (OIC): ICD-10-CM

## 2024-06-28 DIAGNOSIS — N18.31 STAGE 3A CHRONIC KIDNEY DISEASE (HCC): ICD-10-CM

## 2024-06-28 PROCEDURE — 77387 GUIDANCE FOR RADJ TX DLVR: CPT | Performed by: STUDENT IN AN ORGANIZED HEALTH CARE EDUCATION/TRAINING PROGRAM

## 2024-06-28 PROCEDURE — G6002 STEREOSCOPIC X-RAY GUIDANCE: HCPCS | Performed by: STUDENT IN AN ORGANIZED HEALTH CARE EDUCATION/TRAINING PROGRAM

## 2024-06-28 PROCEDURE — 77412 RADIATION TX DELIVERY LVL 3: CPT | Performed by: STUDENT IN AN ORGANIZED HEALTH CARE EDUCATION/TRAINING PROGRAM

## 2024-06-28 PROCEDURE — 77336 RADIATION PHYSICS CONSULT: CPT | Performed by: STUDENT IN AN ORGANIZED HEALTH CARE EDUCATION/TRAINING PROGRAM

## 2024-06-28 RX ORDER — GABAPENTIN 300 MG/1
300 CAPSULE ORAL 3 TIMES DAILY
Qty: 90 CAPSULE | Refills: 2 | Status: SHIPPED | OUTPATIENT
Start: 2024-06-28

## 2024-06-28 RX ORDER — ONDANSETRON 8 MG/1
8 TABLET, ORALLY DISINTEGRATING ORAL EVERY 8 HOURS PRN
Qty: 20 TABLET | Refills: 3 | Status: SHIPPED | OUTPATIENT
Start: 2024-06-28

## 2024-06-28 RX ORDER — OXYCODONE HYDROCHLORIDE 10 MG/1
10 TABLET ORAL EVERY 4 HOURS PRN
Qty: 90 TABLET | Refills: 0 | Status: SHIPPED | OUTPATIENT
Start: 2024-06-28

## 2024-06-28 NOTE — PROGRESS NOTES
Ambulatory Visit - CONSULT  Name: Oliver Astudillo      : 1950      MRN: [id number]  Encounter Provider: Isaiah Mcdonald MD  Encounter Date: 2024   Encounter department: [facility] PALLIATIVE CARE [place]    Assessment & Plan   1. Prostate cancer metastatic to bone (HCC)  Assessment & Plan:  Prostate cancer metastatic to bone on Xtandi, receiving local radiation to the spine.  Orders:  -     Ambulatory Referral to Palliative Care  -     oxyCODONE (ROXICODONE) 10 MG TABS; Take 1 tablet (10 mg total) by mouth every 4 (four) hours as needed for moderate pain Max Daily Amount: 60 mg  -     gabapentin (Neurontin) 300 mg capsule; Take 1 capsule (300 mg total) by mouth 3 (three) times a day  2. Stage 3a chronic kidney disease (HCC)  Assessment & Plan:  Lab Results   Component Value Date    EGFR 94 2024    EGFR 89 2024    EGFR 79 2024    CREATININE 0.71 2024    CREATININE 0.81 2024    CREATININE 0.96 2024   Informs plan of care and medication selection  3. Cancer related pain  Assessment & Plan:  Patient reports oxycodone IR 5 mg tabs only provided modest relief of pain. Pain is cancer-related.  Increase oxycodone IR to 10 mg per dose, q.4 hours PRN moderate to severe cancer-related pain.  Start gabapentin 300 mg and ramp up to TID ATC dosing.  Recommend topical OTC products, local application of heat or cold, Tylenol up to 1000mg TID for chronic pain. Do not use heat on top of topical agents. Do not mix topical agents.  Do not use topical products during radiation therapies, but may use them for afterwards. Counseled to not use patches or other products with adhesive as there may be skin thinning or other related issues from radiation.  Counseled that pain may likely become worse before it gets better, due to inflammation and other radiation-related issues.  Orders:  -     oxyCODONE (ROXICODONE) 10 MG TABS; Take 1 tablet (10 mg total) by mouth every 4 (four) hours as needed  for moderate pain Max Daily Amount: 60 mg  -     gabapentin (Neurontin) 300 mg capsule; Take 1 capsule (300 mg total) by mouth 3 (three) times a day  4. Ambulatory dysfunction  Assessment & Plan:  Use DME as appropriate to help reduce pain and promote safe mobility. Patient has a transport chair, using it today. He also has a bedside commode at the house.  5. Therapeutic opioid-induced constipation (OIC)  Assessment & Plan:  Counseled today on bowel regimen for opioid-induced constipation. Thus far he has been using OTC products with some relief noted.  6. Nausea and vomiting  Assessment & Plan:  Use Zofran PRN N/V; just prescribed today by other provider.  7. Palliative care by specialist  Assessment & Plan:  Time spent introducing the concept of palliative care in general, and the [facility] offering in specific. Assessed patient's understanding of his palliative diagnosis and current plan of treatment.  Translation provided by patient's son Sumit, on speakerphone, at family request.  Emotional / psychosocial support provided today.  Reviewed notes (Care Coordination, DC Summary, PCP, Medical Oncology, Radiation Oncology), labs (6/8/24 Cr 0.71, alb 3.1, Hb 11.3), imaging + procedures (6/6/24 CTAP, 5/29/24 MRI L-spine). See below for more data.  Return in about 1 month (around 7/28/2024).  Medication safety issues addressed - no driving under the influence of narcotics (including opioids), watch for adverse effects including AMS or respiratory depression (slowed breathing), keep medications stored in a safe/locked environment, do not use alcohol while opioids or other narcotics are in one's system, do not travel with more than the minimum number of tablets or capsules required for the trip.  I have personally queried the patient's controlled substance dispensing history in the Prescription Drug Monitoring Program in compliance with regulations before I have prescribed any controlled substances. The prescription history is  consistent with prescribed therapy and our practice policies.    Orders:  -     oxyCODONE (ROXICODONE) 10 MG TABS; Take 1 tablet (10 mg total) by mouth every 4 (four) hours as needed for moderate pain Max Daily Amount: 60 mg  -     gabapentin (Neurontin) 300 mg capsule; Take 1 capsule (300 mg total) by mouth 3 (three) times a day  8. Advanced care planning/counseling discussion  Assessment & Plan:  Patient has not completed any advanced healthcare directives. Advanced directive counseling given. He accepts a copy of the [facility] Advanced Directive along with counseling. Reviewed with patient. ACP may be further reviewed next visit.  9. Goals of care, counseling/discussion  Assessment & Plan:  Continue full disease directed cares. Other than not wanting to move into a SNF, he does not place any limits on his cares.      Subjective   Chief Complaint   Patient presents with    Cancer    Cancer Pain    Counseling    Consult    Gait Problem        History of Present Illness     Oliver Astudillo is a 73 y.o. male w/ prostate cancer metastatic to bone; diabetes, HTN+HLD, urinary retention and hydronephrosis s/p b/l nephrostomy tubes. He follows w/ Dr Coyne (Medical Oncology), Dr LENORE Andrea (Radiation Oncology).    Patient is new to [facility] clinic; referred for goals, ACP, symptoms, support. Patient is joined today by his wife Erlinda, their cousin; his son Sumit attends via speaker phone and is their preferred .    Patient endorses severe back pain secondary to metastatic disease. He has been using oxycodone IR 5 mg tabs approximately every 4 hours around-the-clock and this will reduce his pain from approximately 8/10 to 6/10. Pain is interfering with quality of life. He is 1 week into his 4 weeks of radiation therapy, per family report.    Patient endorses nausea and vomiting. He has opioid-induced constipation and has been using OTC products for this. His appetite has been low in recent days, since starting  radiation.    Patient's family is very supportive. He lives with his wife and his cousin, and other family members including their son are frequently present. Family reports patient has been working on advanced directives but has been rather reticent to complete them, mostly due to concerns about considering end-of-life issues. Patient states that the only limit he would placed on his care at this time is that he would not want to transition into SNF.    The following portions of the medical history were reviewed: past medical history, surgical history, problem list, medication list, family history, and social history.    Past Medical History:   Diagnosis Date    COVID-19 01/15/2024    Diabetes mellitus (HCC)     Elevated PSA 2023    High cholesterol     Hypertension     Prostate cancer (HCC)      Past Surgical History:   Procedure Laterality Date    IR NEPHROSTOMY TUBE CHECK/CHANGE/REPOSITION/REINSERTION/UPSIZE  2023    IR NEPHROSTOMY TUBE CHECK/CHANGE/REPOSITION/REINSERTION/UPSIZE  2023    IR NEPHROSTOMY TUBE CHECK/CHANGE/REPOSITION/REINSERTION/UPSIZE  2024    IR NEPHROSTOMY TUBE CHECK/CHANGE/REPOSITION/REINSERTION/UPSIZE  2024    IR NEPHROSTOMY TUBE CHECK/CHANGE/REPOSITION/REINSERTION/UPSIZE  3/4/2024    IR NEPHROSTOMY TUBE CHECK/CHANGE/REPOSITION/REINSERTION/UPSIZE  3/20/2024    IR NEPHROSTOMY TUBE CHECK/CHANGE/REPOSITION/REINSERTION/UPSIZE  2024    IR NEPHROSTOMY TUBE PLACEMENT  2023    bilateral    IR OTHER  1/15/2024    US GUIDED PROSTATE BIOPSY       Social History     Socioeconomic History    Marital status: /Civil Union     Spouse name: None    Number of children: 3    Years of education: None    Highest education level: None   Occupational History    None   Tobacco Use    Smoking status: Former     Current packs/day: 0.00     Types: Cigarettes     Quit date: 2000     Years since quittin.5     Passive exposure: Past    Smokeless tobacco: Never    Tobacco  comments:     States he only smoked on the weekends   Vaping Use    Vaping status: Never Used   Substance and Sexual Activity    Alcohol use: Not Currently     Comment: socially    Drug use: Never    Sexual activity: Yes     Partners: Female   Other Topics Concern    None   Social History Narrative    From 6/7/724  note:    Primary Caregiver: Self    Support Systems: Self, Spouse/significant other, Son, Family members    County of Residence: [place]    What city do you live in?: [place]    Home entry access options. Select all that apply.: Stairs    Number of steps to enter home.: 3    Type of Current Residence: 2 story home    Upon entering residence, is there a bedroom on the main floor (no further steps)?: Yes    Upon entering residence, is there a bathroom on the main floor (no further steps)?: Yes    Living Arrangements: Lives w/ Spouse/significant other    Is patient a ?: No    Functional Status: Independent    Does patient use assisted devices?: Yes    Assisted Devices (DME) used: Bedside Commode, Straight Cane, Walker, Shower Chair    What DME does the patient currently own?: Bedside Commode, Shower Chair, Straight Cane, Walker    Income Source: Pension/assisted    Means of Transport to Appts:: Family transport     Social Determinants of Health     Financial Resource Strain: Low Risk  (4/12/2024)    Overall Financial Resource Strain (CARDIA)     Difficulty of Paying Living Expenses: Not hard at all   Food Insecurity: No Food Insecurity (6/7/2024)    Hunger Vital Sign     Worried About Running Out of Food in the Last Year: Never true     Ran Out of Food in the Last Year: Never true   Transportation Needs: No Transportation Needs (6/12/2024)    Received from [facility]    OASIS : Transportation     Lack of Transportation (Medical): No     Lack of Transportation (Non-Medical): No     Patient Unable or Declines to Respond: No   Physical Activity: Not on file   Stress: Not on file    Social Connections: Feeling Socially Integrated (6/12/2024)    Received from [facility]    OASIS : Social Isolation     Frequency of experiencing loneliness or isolation: Never   Intimate Partner Violence: Not on file   Housing Stability: Low Risk  (6/7/2024)    Housing Stability Vital Sign     Unable to Pay for Housing in the Last Year: No     Number of Times Moved in the Last Year: 0     Homeless in the Last Year: No     Family History   Problem Relation Age of Onset    Uterine cancer Mother     Liver cancer Father     No Known Problems Sister     No Known Problems Brother     No Known Problems Son     Diabetes type II Daughter     No Known Problems Daughter     Cancer Daughter        Current Outpatient Medications:     acetaminophen (TYLENOL) 325 mg tablet, Take 3 tablets (975 mg total) by mouth every 8 (eight) hours as needed for mild pain or moderate pain, Disp: , Rfl:     albuterol (Ventolin HFA) 90 mcg/act inhaler, Inhale 2 puffs every 6 (six) hours as needed for wheezing, Disp: 18 g, Rfl: 0    Alcohol Swabs 70 % PADS, To clean the skin prior to injecting insulin, Disp: 100 each, Rfl: 1    Blood Glucose Monitoring Suppl (OneTouch Verio Reflect) w/Device KIT, Check blood sugars once daily. Please substitute with appropriate alternative as covered by patient's insurance. Dx: E11.65, Disp: 1 kit, Rfl: 0    dexamethasone (DECADRON) 0.5 mg tablet, TAKE 1 TABLET (0.5 MG TOTAL) BY MOUTH DAILY WITH BREAKFAST, Disp: 90 tablet, Rfl: 1    dexamethasone (DECADRON) 2 mg tablet, Take 1 tablet (2 mg total) by mouth 2 (two) times a day with meals, Disp: 60 tablet, Rfl: 0    Diclofenac Sodium (VOLTAREN) 1 %, Apply 2 g topically 4 (four) times a day, Disp: 100 g, Rfl: 3    Easy Touch Pen Needles 31G X 6 MM MISC, Use daily at bedtime, Disp: 100 each, Rfl: 3    enzalutamide (XTANDI) 40 mg capsule, Take 4 capsules (160 mg total) by mouth daily, Disp: 120 capsule, Rfl: 6    gabapentin (Neurontin) 300 mg capsule, Take 1  capsule (300 mg total) by mouth 3 (three) times a day, Disp: 90 capsule, Rfl: 2    glimepiride (AMARYL) 2 mg tablet, Take 1 tablet (2 mg total) by mouth daily with dinner, Disp: 90 tablet, Rfl: 3    glimepiride (AMARYL) 4 mg tablet, Take 1 tablet (4 mg total) by mouth daily with breakfast, Disp: 90 tablet, Rfl: 3    glucose blood (OneTouch Verio) test strip, Check blood sugars twice a daily. Please substitute with appropriate alternative as covered by patient's insurance. Dx: E11.65, Disp: 200 each, Rfl: 3    Levemir FlexPen 100 units/mL injection pen, Inject 20 Units under the skin daily at bedtime, Disp: 15 mL, Rfl: 1    lisinopril (ZESTRIL) 10 mg tablet, NARCISO TANMAY TABLETA VIA ORAL DIARIAMENTE, Disp: , Rfl:     metFORMIN (GLUCOPHAGE) 1000 MG tablet, Take 1 tablet (1,000 mg total) by mouth 2 (two) times a day with meals, Disp: 180 tablet, Rfl: 1    Multiple Vitamins-Minerals (Sentry) TABS, Take 1 tablet by mouth daily, Disp: , Rfl:     omega-3-acid ethyl esters (LOVAZA) 1 g capsule, , Disp: , Rfl:     ondansetron (Zofran ODT) 8 mg disintegrating tablet, Take 1 tablet (8 mg total) by mouth every 8 (eight) hours as needed for nausea or vomiting, Disp: 20 tablet, Rfl: 3    OneTouch Delica Lancets 33G MISC, Check blood sugars once daily. Please substitute with appropriate alternative as covered by patient's insurance. Dx: E11.65, Disp: 100 each, Rfl: 3    oxyCODONE (ROXICODONE) 10 MG TABS, Take 1 tablet (10 mg total) by mouth every 4 (four) hours as needed for moderate pain Max Daily Amount: 60 mg, Disp: 90 tablet, Rfl: 0    pantoprazole (PROTONIX) 40 mg tablet, Take 1 tablet (40 mg total) by mouth daily, Disp: 30 tablet, Rfl: 0    polyethylene glycol (MIRALAX) 17 g packet, Take 17 g by mouth daily as needed (for constipation), Disp: 100 each, Rfl: 1    rosuvastatin (CRESTOR) 10 MG tablet, Take 1 tablet (10 mg total) by mouth daily at bedtime, Disp: 90 tablet, Rfl: 1    sodium chloride, PF, 0.9 %, 10 mL by  "Intracatheter route daily Intracatheter flushing daily. May substitute prefilled syringe with normal saline 10 mL vials, 10 mL syringes, and 18 g blunt needles, Disp: 600 mL, Rfl: 2    naloxone (NARCAN) 4 mg/0.1 mL nasal spray, Administer 1 spray into a nostril. If no response after 2-3 minutes, give another dose in the other nostril using a new spray. (Patient not taking: Reported on 6/28/2024), Disp: 1 each, Rfl: 0    senna-docusate sodium (SENOKOT S) 8.6-50 mg per tablet, Take 1 tablet by mouth daily (Patient not taking: Reported on 6/28/2024), Disp: 90 tablet, Rfl: 1    Spacer/Aero-Holding Chambers (AEROCHAMBER MINI CHAMBER) BILLY, by Does not apply route see administration instructions (Patient not taking: Reported on 4/25/2024), Disp: 1 Device, Rfl: 0    Objective   /80 (BP Location: Left arm, Patient Position: Sitting, Cuff Size: Adult)   Pulse 88   Temp (!) 97.2 °F (36.2 °C) (Temporal)   Resp 16   Ht 5' 8\" (1.727 m)   Wt 67.1 kg (148 lb)   SpO2 98%   BMI 22.50 kg/m²   Physical Exam  Vitals reviewed.   Constitutional:       General: He is awake. He is not in acute distress.     Appearance: He is well-groomed and underweight. He is ill-appearing (chronically). He is not toxic-appearing.   HENT:      Head: Normocephalic and atraumatic.      Right Ear: External ear normal.      Left Ear: External ear normal.   Eyes:      General: No scleral icterus.        Right eye: No discharge.         Left eye: No discharge.      Extraocular Movements: Extraocular movements intact.      Conjunctiva/sclera: Conjunctivae normal.      Pupils: Pupils are equal, round, and reactive to light.   Cardiovascular:      Rate and Rhythm: Normal rate.   Pulmonary:      Effort: Pulmonary effort is normal. No tachypnea, bradypnea, accessory muscle usage or respiratory distress.      Comments: Able to speak comfortably in short phrases on room air at rest.  Abdominal:      General: There is no distension.      Tenderness: There " "is no guarding.   Musculoskeletal:      Cervical back: Normal range of motion.      Right lower leg: No edema.      Left lower leg: No edema.   Skin:     General: Skin is dry.      Coloration: Skin is not pale.   Neurological:      Mental Status: He is alert and oriented to person, place, and time.      Cranial Nerves: No dysarthria or facial asymmetry.      Gait: Gait abnormal (seen in transport chair).   Psychiatric:         Attention and Perception: Attention normal.         Mood and Affect: Mood and affect normal.         Speech: Speech normal.         Behavior: Behavior normal. Behavior is cooperative.         Thought Content: Thought content normal.         Cognition and Memory: Cognition and memory normal.         Judgment: Judgment normal.          Recent labs:  Lab Results   Component Value Date/Time    SODIUM 136 06/08/2024 04:51 AM    K 4.0 06/08/2024 04:51 AM    BUN 11 06/08/2024 04:51 AM    CREATININE 0.71 06/08/2024 04:51 AM    GLUC 93 06/08/2024 04:51 AM    CALCIUM 8.9 06/08/2024 04:51 AM    AST 12 (L) 06/08/2024 04:51 AM    ALT 13 06/08/2024 04:51 AM    ALB 3.1 (L) 06/08/2024 04:51 AM    TP 6.5 06/08/2024 04:51 AM    EGFR 94 06/08/2024 04:51 AM     Lab Results   Component Value Date/Time    HGB 11.3 (L) 06/08/2024 04:51 AM    WBC 10.54 (H) 06/08/2024 04:51 AM     06/08/2024 04:51 AM    INR 1.03 06/22/2023 05:00 AM    PTT 33 06/22/2023 05:00 AM     No results found for: \"[id number]\"    Recent Imaging:  Procedure: IR nephrostomy tube check/change/reposition/reinsertion/upsize    Result Date: 6/7/2024  Narrative: Bilateral nephrostogram and nephrostomy tube change Clinical History:  73 year-old male with history of prostate cancer with chronic indwelling bilateral nephrostomy catheters in place. Patient presented to the ED due to left flank pain and burning urination, now with concern for complicated UTI. Contrast: 10 mL of iohexol (OMNIPAQUE) Fluoro time: 2.1 MIN FL Number of Images: Multiple " Radiation dose: 15 mGy Conscious sedation time: 0 Technique: The patient was brought to the interventional radiology suite and placed prone on the table. The existing bilateral nephrostomy tubes were prepped and draped in the usual sterile fashion. After a  view was obtained, contrast was injected into the existing catheter and multiple images of the urinary tract were obtained to the bladder. Findings: The bilateral nephrostomy catheters are seen to be in appropriate position, with the loop within the bilateral renal pelvises Intervention: The catheter was removed over a heavy-duty straight wire, and a new 10.2 Polish nephrostomy tube was advanced, and the loop formed within the renal pelvis. The catheter was then injected, confirming satisfactory position. This process was repeated for the contralateral side.     Impression: Impression: Exchange of bilateral 10.2 Polish nephrostomy tubes under fluoroscopic control. Workstation performed: [id number]     Procedure: CT abdomen pelvis with contrast    Result Date: 6/6/2024  Narrative: CT ABDOMEN AND PELVIS WITH IV CONTRAST INDICATION: Left flank pain. COMPARISON: 5/28/2024. TECHNIQUE: CT examination of the abdomen and pelvis was performed. Multiplanar 2D reformatted images were created from the source data. This examination, like all CT scans performed in the [facility], was performed utilizing techniques to minimize radiation dose exposure, including the use of iterative reconstruction and automated exposure control. Radiation dose length product (DLP) for this visit: 520.68 mGy-cm IV Contrast: 100 mL of iohexol (OMNIPAQUE) Enteric Contrast: Not administered. FINDINGS: ABDOMEN LOWER CHEST: There is a stable 4 mm right lower lobe nodule. LIVER/BILIARY TREE: Unremarkable. GALLBLADDER: No calcified gallstones. No pericholecystic inflammatory change. SPLEEN: Unremarkable. PANCREAS: Unremarkable. ADRENAL GLANDS: Unremarkable. KIDNEYS/URETERS: There  are bilateral nephrostomy catheters in place. There is mild thickening and enhancement of the left renal pelvis no hydronephrosis. STOMACH AND BOWEL: Unremarkable. APPENDIX: No findings to suggest appendicitis. ABDOMINOPELVIC CAVITY: No ascites. No pneumoperitoneum. No lymphadenopathy. VESSELS: Unremarkable for patient's age. PELVIS REPRODUCTIVE ORGANS: Unremarkable for patient's age. URINARY BLADDER: There is stable diffuse bladder wall thickening with mucosal enhancement and intraluminal gas. ABDOMINAL WALL/INGUINAL REGIONS: Unremarkable. BONES: There is a stable lytic lesion in L1 and diffuse sclerotic metastasis.     Impression: 1.  Slight thickening and enhancement of the left renal pelvis which may represent pyelitis given history of left flank pain. No hydronephrosis. Nephrostomy is in place. 2.  Diffusely thick-walled urinary bladder with mucosal enhancement and intraluminal gas suspicious for cystitis. 3.  Stable osseous metastasis. Workstation performed: [id number]     Procedure: MRI lumbar spine w wo contrast    Result Date: 5/29/2024  Narrative: MRI LUMBAR SPINE WITH AND WITHOUT CONTRAST INDICATION: L1 lesion.   Prostate cancer. COMPARISON: CT of the abdomen and pelvis 5/28/2024. TECHNIQUE:  Multiplanar, multisequence imaging of the lumbar spine was performed before and after gadolinium administration. . IV Contrast:  7 mL of Gadobutrol injection (SINGLE-DOSE) IMAGE QUALITY: Motion-degraded. FINDINGS: For the purposes of this dictation and to maintain consistency with the prior report, the last well-formed disc space is labeled as L5-S1. Recommend correlation with dedicated thoracic spine imaging to count the ribs prior to any planned future intervention to ensure accuracy/consistency of numbering. VERTEBRAL BODIES: Mild retrolisthesis of L5 on S1. Multiple marrow replacing/enhancing lesions are identified throughout the lumbar spine, most notably at L1 which replaces the entire vertebral body and  results in a mild pathologic compression fracture with less than 25% height loss. There is extension of marrow replacement to the proximal right pedicle (series 4, image 12). There appears to be encroachment upon the right neural foramen without definite abutment of the exiting nerve root at this level.  Additional vertebral bodies are involved, notably L2 posteriorly (series 4, image 6), for example. Edema is noted to be associated with these lesions. SACRUM: No acute abnormality. T1 hypointense lesion of the left hemisacrum (series 2, image 5) and left iliac bone (series 2, image 3), which are also suspicious. DISTAL CORD AND CONUS:  Normal size and signal within the distal cord and conus. PARASPINAL SOFT TISSUES: No acute abnormality. LOWER THORACIC DISC SPACES: Spondylotic changes without acute critical central canal stenosis. LUMBAR DISC SPACES: Multilevel up to moderate degenerative disc disease. L1-L2: Disc bulge. Mild facet arthropathy. Mild bilateral neuroforaminal narrowing. No significant spinal canal stenosis. L2-L3: Disc bulge. Mild-moderate facet arthropathy. No significant neuroforaminal narrowing. Mild-moderate spinal canal stenosis. L3-L4: Disc osteophyte complex. Marked facet arthropathy, right greater than left. Mild left and moderate right neuroforaminal narrowing. Moderate spinal canal stenosis. L4-L5: Disc bulge. Moderate-marked facet arthropathy. Mild-moderate bilateral neuroforaminal narrowing. Mild-moderate spinal canal stenosis. L5-S1: Disc bulge. Moderate facet arthropathy. Mild-moderate bilateral neuroforaminal narrowing. Mild spinal canal stenosis. POSTCONTRAST IMAGING: Enhancement is seen associated with the suspicious marrow replacing lesions discussed above. No convincing enhancement within the spinal canal. OTHER FINDINGS: Please refer to dedicated body CT of the abdomen and pelvis on 5/28/2024 for full visceral findings. Partially visualized bilateral percutaneous nephrostomy tubes,  noting moderate right hydroureteronephrosis that is partially visualized.     Impression: 1. Multiple marrow replacing and enhancing lesions involving the lumbar spine, sacrum and iliac bones, which are suspicious for metastases. No convincing epidural involvement though there is probable encroachment on the right L1-L2 neural foramen. Of note, the L1 marrow replacing lesion results in a mild pathologic compression fracture. Recommend correlation with nuclear medicine bone scan and/or prostate-specific tracers in a PET scan, as clinically appropriate. 2. Multilevel lumbar spine spondylotic changes, as detailed above. The study was marked in EPIC for immediate notification. Workstation performed: [id number]     Procedure: CT abdomen pelvis with contrast    Result Date: 5/28/2024  Narrative: CT ABDOMEN AND PELVIS WITH IV CONTRAST INDICATION: back pain. History of prostate cancer with bilateral nephrostomy tubes. COMPARISON: Comparison is made to prior studies, the most recent is a CT scan dated March 21, 2024. TECHNIQUE: CT examination of the abdomen and pelvis was performed. Multiplanar 2D reformatted images were created from the source data. This examination, like all CT scans performed in the [facility], was performed utilizing techniques to minimize radiation dose exposure, including the use of iterative reconstruction and automated exposure control. Radiation dose length product (DLP) for this visit: 517.37 mGy-cm IV Contrast: 100 mL of iohexol (OMNIPAQUE) Enteric Contrast: Not administered. FINDINGS: ABDOMEN LOWER CHEST: No clinically significant abnormality in the visualized lower chest. LIVER/BILIARY TREE: Unremarkable. GALLBLADDER: No calcified gallstones. No pericholecystic inflammatory change. SPLEEN: Few calcified granuloma PANCREAS: Unremarkable. ADRENAL GLANDS: Unremarkable. KIDNEYS/URETERS: Bilateral nephrostomy catheters are in good position. No collecting system dilatation both  nephrograms are symmetric. No calculi. STOMACH AND BOWEL: Diverticulosis with no findings of acute diverticulitis. APPENDIX: No findings to suggest appendicitis. ABDOMINOPELVIC CAVITY: No ascites. No pneumoperitoneum. No lymphadenopathy. VESSELS: Unremarkable for patient's age. PELVIS REPRODUCTIVE ORGANS: Unremarkable for patient's age. URINARY BLADDER: Bladder wall thickening and increased bladder wall enhancement suspicious for cystitis. ABDOMINAL WALL/INGUINAL REGIONS: Unremarkable. BONES: Multiple sclerotic metastases redemonstrated throughout the visualized axial and appendicular skeleton,1 mostly stable, except for a new lytic lesion in the L1 vertebral body with an enhancing soft tissue component, and focal destruction of both the anterior and posterior cortex (series 2 image 71. Chronic lower left rib fractures.     Impression: Bladder wall thickening with increased bladder wall enhancement suspicious for cystitis. Bilateral nephrostomy tubes in good position with no collecting system dilatation and no CT evidence of acute pyelonephritis. New lytic L1 vertebral body lesion with a soft tissue component causing focal erosion of both the anterior and posterior cortex as described above. Additional sclerotic metastases are stable. The study was marked in EPIC for immediate notification. Workstation performed: [id number]     Procedure: CT abdomen pelvis w contrast    Result Date: 3/22/2024  Narrative: CT ABDOMEN AND PELVIS WITH IV CONTRAST INDICATION: Pelvic and left abdominal pain. COMPARISON: CT abdomen pelvis March 18, 2024 TECHNIQUE: CT examination of the abdomen and pelvis was performed. Multiplanar 2D reformatted images were created from the source data. This examination, like all CT scans performed in the [facility], was performed utilizing techniques to minimize radiation dose exposure, including the use of iterative reconstruction and automated exposure control. Radiation dose length  product (DLP) for this visit: 620.06 mGy-cm IV Contrast: 100 mL of iohexol (OMNIPAQUE) Enteric Contrast: Not administered. FINDINGS: ABDOMEN LOWER CHEST: No clinically significant abnormality in the visualized lower chest. LIVER/BILIARY TREE: Unremarkable. GALLBLADDER: No calcified gallstones. No pericholecystic inflammatory change. SPLEEN: Unchanged calcifications within the spleen. PANCREAS: Unremarkable. ADRENAL GLANDS: Unremarkable. KIDNEYS/URETERS: Right percutaneous nephrostomy tube in place. Small amount of air within the intrarenal collecting system is attributable to the catheter. No hydroureteronephrosis. Enhancement of the urothelium is similar to prior study. Distal periureteral inflammation is similar to prior study. Left percutaneous nephrostomy tube in place. Mild hydronephrosis is similar to prior study. Enhancement of the urothelium is similar to prior study. Intraluminal enhancement within the distal ureter is new since prior study (series 2, image 153). Periureteral fat inflammation is similar to prior study. Small amount of air in the intrarenal collecting system can be attributable to the catheter. STOMACH AND BOWEL: Colonic diverticulosis without findings of acute diverticulitis. APPENDIX: No findings to suggest appendicitis. ABDOMINOPELVIC CAVITY: No lymph nodes greater than 1 cm in short axis. Several retroperitoneal lymph nodes are unchanged, with an aortocaval lymph node measuring up to 0.8 cm short axis (series 2, image 91). No ascites or pneumoperitoneum. VESSELS: Unremarkable for patient's age. PELVIS REPRODUCTIVE ORGANS: Unremarkable for patient's age. URINARY BLADDER: Nondistended. Bladder wall thickening is similar to prior study. Air within the urinary bladder is similar to prior study. ABDOMINAL WALL/INGUINAL REGIONS: Unremarkable. BONES: Unchanged sclerotic lesions. Degenerative changes of the spine. No acute fracture.     Impression: Since March 18, 2024: 1.  Bilateral  percutaneous nephrostomy tubes are in place. Mild left hydronephrosis is similar to prior study. New enhancement within the distal left ureter may be due to hyperemia. 2.  Unchanged bladder wall thickening likely representing cystitis. Inflammation of the intrarenal collecting systems and ureters is similar to prior study. Workstation performed: [id number]     Procedure: IR nephrostomy tube check/change/reposition/reinsertion/upsize    Result Date: 3/20/2024  Narrative: Bilateral nephrostomy tube check. Clinical History: 73-year-old male with bilateral nephrostomy tubes, report of decreased right tube output and left nephrostomy drainage catheter discomfort. Contrast: 10 mL of iohexol (OMNIPAQUE) Fluoro time: 0.8 MIN Number of Images: Multiple Radiation dose: 8 mGy Conscious sedation time: NONE Technique: The patient was brought to the interventional radiology suite and placed prone on the table. After a  view was obtained, contrast was injected into the nephrostomy drainage catheters and multiple images were obtained. Findings: The right nephrostomy tube is in appropriate position. The catheter was left in place. The left nephrostomy tube is slightly retracted and the skin suture is no longer in place. The left nephrostomy tube catheter was advanced centrally into the renal pelvis. The left back was prepped and draped in the usual sterile fashion. 1% lidocaine was used for local anesthesia. A new skin stitch was placed around the left nephrostomy tube catheter once appropriate position within the renal collecting system was confirmed.     Impression: Impression: 1.  Right nephrostomy tube check, catheter is in appropriate position. 2.  Left nephrostomy tube check demonstrates a slightly retracted catheter. The catheter was advanced centrally into the renal pelvis and a new skin stitch was placed. Workstation performed: [id number]     Procedure: Echo complete w/ contrast if indicated    Result Date:  3/19/2024  Narrative:   Left Ventricle: Left ventricular cavity size is normal. Wall thickness is mildly increased. The left ventricular ejection fraction is 60%.  GLS is -17.2%.  Systolic function is normal. Although no diagnostic regional wall motion abnormality was identified, this possibility cannot be completely excluded on the basis of this study. Diastolic function is normal for age.   Right Ventricle: Systolic function is normal.   Tricuspid Valve: There is trace regurgitation.   Pulmonic Valve: There is trace regurgitation.     Procedure: CT abdomen pelvis with contrast    Result Date: 3/18/2024  Narrative: CT ABDOMEN AND PELVIS WITH IV CONTRAST INDICATION: Bilateral flank pain.. COMPARISON: 1/30/2024. TECHNIQUE: CT examination of the abdomen and pelvis was performed. Multiplanar 2D reformatted images were created from the source data. This examination, like all CT scans performed in the [facility], was performed utilizing techniques to minimize radiation dose exposure, including the use of iterative reconstruction and automated exposure control. Radiation dose length product (DLP) for this visit: 661.02 mGy-cm IV Contrast: 100 mL of iohexol (OMNIPAQUE) Enteric Contrast: Not administered. FINDINGS: ABDOMEN LOWER CHEST: Clear lung bases. LIVER/BILIARY TREE: Unremarkable. GALLBLADDER: No calcified gallstones. No pericholecystic inflammatory change. SPLEEN: Unremarkable. PANCREAS: Unremarkable. ADRENAL GLANDS: Unremarkable. KIDNEYS/URETERS: Right percutaneous nephrostomy tube in expected position. Mild hydronephrosis. Urothelial enhancement in the right renal pelvis and ureter. Fullness of the proximal ureter without distal obstructive uropathy. Few punctate foci of gas in the renal pelvis are likely secondary to catheter manipulation. Nephrographic phase enhancement of the kidney. Left percutaneous nephrostomy tube in expected position. Mild fullness of the renal pelvis without  hydronephrosis. Urothelial enhancement of the renal pelvis and ureter. No distal obstructive uropathy. Punctate focus of gas in the left renal pelvis likely secondary to catheter manipulation. Nephrographic phase enhancement of the left renal cortex.. STOMACH AND BOWEL: Diverticulosis without evidence of diverticulitis or colitis. APPENDIX: Normal appendix. ABDOMINOPELVIC CAVITY: No ascites. No pneumoperitoneum. No lymphadenopathy. VESSELS: No abdominal aortic aneurysm. PELVIS REPRODUCTIVE ORGANS: No prostate enlargement. URINARY BLADDER: Bladder wall thickening. Gas in the nondependent portion of the bladder likely secondary to catheter manipulation. ABDOMINAL WALL/INGUINAL REGIONS: Unremarkable. BONES: Stable diffuse sclerotic lesions.     Impression: 1. Bilateral percutaneous nephrostomy tubes in expected position. Mild right hydronephrosis. 2. Bilateral pyelitis and ureteritis. No clear evidence of nephritis. 3. Findings suggestive of cystitis. Workstation performed: [id number]     Procedure: IR nephrostomy tube check/change/reposition/reinsertion/upsize    Result Date: 3/4/2024  Narrative: IR NEPHROSTOMY TUBE CHECK/CHANGE/REPOSITION/REINSERTION/UPSIZE PROCEDURE: Bilateral percutaneous nephrostomy tube exchange CLINICAL INDICATION: History of metastatic prostate cancer and chronic bilateral nephrostomy tubes for routine exchange COMPARISON: None PERFORMING PHYSICIAN: Wenceslao Ho MD ASSISTANT PHYSICIAN: None MEDICATIONS: 1% lidocaine (local). SEDATION TIME: n/a CONTRAST: 15 mL of iohexol (OMNIPAQUE) FLUOROSCOPY TIME: 0.9 min RADIATION DOSE: 8 mGy   (air Kerma). NUMBER OF IMAGES: 6 TECHNIQUE: The patient was brought to the procedure room and a time-out was performed utilizing universal protocol. The patient was identified verbally and via wrist band. The patient was placed prone on the procedure table and both flanks were prepped and draped in the usual sterile fashion. All elements of maximal sterile  "barrier technique were followed (cap, mask, sterile gown, sterile gloves, large sterile sheet, hand hygiene, and 2% chlorhexidine for cutaneous antisepsis). The skin and subcutaneous tissues of the left flank were infiltrated with local anesthetic. Contrast instilled through the indwelling nephrostomy tube demonstrating it to be in appropriate position in the renal pelvis which was decompressed. Catheter was  cut and exchanged out over a Bentson wire and replaced with identical 10 Grenadian APDL nephrostomy tube, final position of which was confirmed with contrast injection. Catheter secured to the skin with 0 Prolene suture and connected to an external drainage bag. The procedure was then repeated in identical fashion on the right side. The patient tolerated the procedure well without difficulty or complication encountered and left the section in satisfactory stable condition. FINDINGS: As above.     Impression: Uneventful routine exchange of bilateral 10 Grenadian percutaneous nephrostomy tubes. The patient will be scheduled for routine exchange in 3 months. Workstation performed: [id number]      45+ minutes total time spent on 6/28/2024 in caring for this patient including symptom assessment and management, medication review, medication adjustment, psychosocial support, chart review, imaging review, lab review, advanced directives, goals of care, opioid titration, supportive listening, and anticipatory guidance. All of the patient's questions were answered during this discussion.    Isaiah Mcdonald MD  [facility]  [phone]    Portions of this document may have been created using dictation software and as such some \"sound alike\" terms may have been generated by the system. Do not hesitate to contact me with any questions or clarifications.  "

## 2024-06-28 NOTE — ASSESSMENT & PLAN NOTE
Counseled today on bowel regimen for opioid-induced constipation. Thus far he has been using OTC products with some relief noted.

## 2024-06-28 NOTE — ASSESSMENT & PLAN NOTE
Patient has not completed any advanced healthcare directives. Advanced directive counseling given. He accepts a copy of the [facility] Advanced Directive along with counseling. Reviewed with patient. ACP may be further reviewed next visit.

## 2024-06-28 NOTE — ASSESSMENT & PLAN NOTE
Time spent introducing the concept of palliative care in general, and the [facility] offering in specific. Assessed patient's understanding of his palliative diagnosis and current plan of treatment.  Translation provided by patient's son Sumit, on speakerphone, at family request.  Emotional / psychosocial support provided today.  Reviewed notes (Care Coordination, DC Summary, PCP, Medical Oncology, Radiation Oncology), labs (6/8/24 Cr 0.71, alb 3.1, Hb 11.3), imaging + procedures (6/6/24 CTAP, 5/29/24 MRI L-spine). See below for more data.  Return in about 1 month (around 7/28/2024).  Medication safety issues addressed - no driving under the influence of narcotics (including opioids), watch for adverse effects including AMS or respiratory depression (slowed breathing), keep medications stored in a safe/locked environment, do not use alcohol while opioids or other narcotics are in one's system, do not travel with more than the minimum number of tablets or capsules required for the trip.  I have personally queried the patient's controlled substance dispensing history in the Prescription Drug Monitoring Program in compliance with regulations before I have prescribed any controlled substances. The prescription history is consistent with prescribed therapy and our practice policies.

## 2024-06-28 NOTE — ASSESSMENT & PLAN NOTE
Lab Results   Component Value Date    EGFR 94 06/08/2024    EGFR 89 06/07/2024    EGFR 79 06/06/2024    CREATININE 0.71 06/08/2024    CREATININE 0.81 06/07/2024    CREATININE 0.96 06/06/2024   Informs plan of care and medication selection

## 2024-06-28 NOTE — ASSESSMENT & PLAN NOTE
Patient reports oxycodone IR 5 mg tabs only provided modest relief of pain. Pain is cancer-related.  Increase oxycodone IR to 10 mg per dose, q.4 hours PRN moderate to severe cancer-related pain.  Start gabapentin 300 mg and ramp up to TID ATC dosing.  Recommend topical OTC products, local application of heat or cold, Tylenol up to 1000mg TID for chronic pain. Do not use heat on top of topical agents. Do not mix topical agents.  Do not use topical products during radiation therapies, but may use them for afterwards. Counseled to not use patches or other products with adhesive as there may be skin thinning or other related issues from radiation.  Counseled that pain may likely become worse before it gets better, due to inflammation and other radiation-related issues.

## 2024-06-28 NOTE — PROGRESS NOTES
Palliative Outpatient Assessment of Need    LSW completed an assessment of need which was completed with (patient, family, or both) in the office or via phone/video conference    Pt's step-son Sumit provided translation by phone at pt is primarily Montserratian-speaking    Relationship status:   Duration of relationship: 25 years   Name of significant other: Erlinda  Children and Ages: 3 sons [place]2 in [place] and [place] from prior relationship; 2 step-sons and 1 step-dtr  Pets: 1 dog  Other important family information:  Living situation (where and whom): Resides with spouse and 2 cousins    Patient's primary caregiver: [facility] and family. Sumit reports bathing specifically has been challenging for pt due to pain. Additional [facility]HA resources provided to pt/family at visit today if ever needed in the future.    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 Copy of [facility] HCR/AD document provided in Gibraltarian translation at visit today  Other medical or social work providers involved: Oncology  Patient/caregiver current level of coping: Pt appears to be coping well emotionally at this time    Understanding: Pt appears understanding of current medical status  Patient/family concerns and areas of need: Nausea/Lack of appetite; Pain;  Constipation; Pt began radiation tx's this week  Patient's interests: Watching TV     I have spent 20 minutes with Patient and family today in which greater than 50% of this time was spent in counseling/coordination of care.    *All questions may not be answered due to constraints.  Follow-up discussions may need to occur

## 2024-06-28 NOTE — PATIENT INSTRUCTIONS
It was good to see you today. Thank you for coming in.    Use ondansetron (Zofran) as needed to prevent or reduce nausea/vomiting.  Increase oxycodone to 10mg per dose, take up to every 4 hours as needed for cancer-related pain.  Start gabapentin for around-the-clock control of cancer-related pain; we recommend tapering up gradually.  Starting today, take 300mg every evening or at bedtime; do this for 3 days.  On day 4, take 300mg every 12 hours; do this for 3 days.  On Day 7, take 300mg every 8 hours.  If you experience any adverse effects during this ramp-up please call us.  Also for back pain:  Tylenol, 1000mg three times per day every day, can be a safe and effective way to reduce chronic pain.  You may consider topical products for pain as well (over-the-counter lidocaine, CBD, capsaicin +/- menthol, diclofenac). Brand names include Salonpas, Biofreeze, Aspercreme, Icy Hot, Voltaren, etc. These can be patches, creams, lotions, or roll-on gels.  Try a heating pad or ice pack (you can alternate these about 30 minutes apart) for neck and back pain. Do not use a heating pad for more than 20 minutes out of any single hour.  Do not use topical product under a heating pad; ensure skin is clean and dry. Do not use topical products when getting radiation treatments (though you can use before, or after).  When using opioids for pain control, constipation is common and patients should act to prevent it:  Drink PLENTY of water. This is important to keep the gut moving.  Some people have success w/ using prunes, prune juice, certain fruits or vegetables (apples, bananas, prunes, pears, raspberries, and vegetables like string beans, broccoli, spinach, kale, squash, lentils, peas, and beans), or fiber gummies.  Try a probiotic. This could be yogurt or kefir, or fermented beverages such as kombucha, but probiotics are also available in capsule form. Aim for 10-15 billion colony-forming-units, w/ bacteria such as Lactobacillus  "/ Saccharomyces / Actinomyces.  Osmotic laxatives (Miralax, magnesium citrate, Milk of Magnesia) can be very useful for opioid-induced constipation (OIC); take daily to prevent OIC.  Bulk laxatives (Citrucel, Metamucil, Fibercon, Benefiber, wheat germ) are useful for constipation in patients who are not taking opioids, but are not recommended if you are taking opioids.  Colace is good for softening hard stools, or preventing constipation when opioids are being used - but does not stimulate the bowel to move things along once constipation has occurred.  You can use senna, 1 to 2 tabs, once or twice daily as needed for constipation. Use as directed on the box/bottle. Senna is also available in a tea (\"Smooth Move\"). Should that not be enough for your constipation, you can try Dulcolax.  Should that not be enough, consider an enema.  All of these medications are available over-the-counter.  A copy of the [facility] Advanced Directive was provided; please review and discuss w/ your family. We can discuss it at our next visit. When complete, you may bring it in to any [facility] appointment where staff can witness your signature and scan it in to the system.  Return in about 1 month (around 7/28/2024).  Call us for refills on medications that we supply, as needed.  If something changes and you need to come in sooner, please call our office.    PRESCRIPTION REFILL REMINDER:  All medication refills should be requested prior to Noon on Friday. Any refill requests after noon on Friday would be addressed the following Monday.    MEDICATION SAFETY ISSUES:   Do not drive under the influence of narcotics (including opioids), watch for adverse effects including confusion / altered mental status / respiratory depression (slowed breathing), keep medications stored in a safe/locked environment, do not use alcohol while opioids or other narcotics are in your system. Do not travel with more than the minimum number of tablets or capsules " required for the trip.

## 2024-06-28 NOTE — ASSESSMENT & PLAN NOTE
Continue full disease directed cares. Other than not wanting to move into a SNF, he does not place any limits on his cares.

## 2024-06-28 NOTE — ASSESSMENT & PLAN NOTE
Use DME as appropriate to help reduce pain and promote safe mobility. Patient has a transport chair, using it today. He also has a bedside commode at the house.

## 2024-07-01 ENCOUNTER — APPOINTMENT (OUTPATIENT)
Dept: RADIATION ONCOLOGY | Facility: HOSPITAL | Age: 74
End: 2024-07-01
Attending: STUDENT IN AN ORGANIZED HEALTH CARE EDUCATION/TRAINING PROGRAM
Payer: COMMERCIAL

## 2024-07-01 PROCEDURE — 77412 RADIATION TX DELIVERY LVL 3: CPT | Performed by: RADIOLOGY

## 2024-07-01 PROCEDURE — 77427 RADIATION TX MANAGEMENT X5: CPT | Performed by: STUDENT IN AN ORGANIZED HEALTH CARE EDUCATION/TRAINING PROGRAM

## 2024-07-01 PROCEDURE — G6002 STEREOSCOPIC X-RAY GUIDANCE: HCPCS | Performed by: RADIOLOGY

## 2024-07-01 PROCEDURE — 77387 GUIDANCE FOR RADJ TX DLVR: CPT | Performed by: RADIOLOGY

## 2024-07-02 ENCOUNTER — APPOINTMENT (OUTPATIENT)
Dept: RADIATION ONCOLOGY | Facility: HOSPITAL | Age: 74
End: 2024-07-02
Attending: STUDENT IN AN ORGANIZED HEALTH CARE EDUCATION/TRAINING PROGRAM
Payer: COMMERCIAL

## 2024-07-02 PROCEDURE — 77412 RADIATION TX DELIVERY LVL 3: CPT | Performed by: RADIOLOGY

## 2024-07-02 PROCEDURE — 77387 GUIDANCE FOR RADJ TX DLVR: CPT | Performed by: RADIOLOGY

## 2024-07-02 PROCEDURE — G6002 STEREOSCOPIC X-RAY GUIDANCE: HCPCS | Performed by: RADIOLOGY

## 2024-07-02 NOTE — TELEPHONE ENCOUNTER
Completed form has been faxed to the number listed below copy has been attached to this encounter.     [phone]

## 2024-07-02 NOTE — TELEPHONE ENCOUNTER
Completed form has been faxed to the number listed below copy has been attached to this encounter.       [phone]

## 2024-07-03 ENCOUNTER — APPOINTMENT (OUTPATIENT)
Dept: RADIATION ONCOLOGY | Facility: HOSPITAL | Age: 74
End: 2024-07-03
Attending: STUDENT IN AN ORGANIZED HEALTH CARE EDUCATION/TRAINING PROGRAM
Payer: COMMERCIAL

## 2024-07-03 ENCOUNTER — APPOINTMENT (OUTPATIENT)
Dept: RADIATION ONCOLOGY | Facility: HOSPITAL | Age: 74
End: 2024-07-03
Attending: INTERNAL MEDICINE
Payer: COMMERCIAL

## 2024-07-03 DIAGNOSIS — C61 PROSTATE CANCER METASTATIC TO BONE (HCC): Primary | ICD-10-CM

## 2024-07-03 DIAGNOSIS — C79.51 PROSTATE CANCER METASTATIC TO BONE (HCC): Primary | ICD-10-CM

## 2024-07-03 PROCEDURE — 77387 GUIDANCE FOR RADJ TX DLVR: CPT | Performed by: RADIOLOGY

## 2024-07-03 PROCEDURE — 77412 RADIATION TX DELIVERY LVL 3: CPT | Performed by: RADIOLOGY

## 2024-07-03 PROCEDURE — G6002 STEREOSCOPIC X-RAY GUIDANCE: HCPCS | Performed by: RADIOLOGY

## 2024-07-03 RX ORDER — DEXAMETHASONE 1 MG
TABLET ORAL
Qty: 21 TABLET | Refills: 0 | Status: SHIPPED | OUTPATIENT
Start: 2024-07-09 | End: 2024-07-23

## 2024-07-04 DIAGNOSIS — C61 PROSTATE CANCER METASTATIC TO BONE (HCC): ICD-10-CM

## 2024-07-04 DIAGNOSIS — C79.51 PROSTATE CANCER METASTATIC TO BONE (HCC): ICD-10-CM

## 2024-07-05 ENCOUNTER — APPOINTMENT (OUTPATIENT)
Dept: RADIATION ONCOLOGY | Facility: HOSPITAL | Age: 74
End: 2024-07-05
Attending: INTERNAL MEDICINE
Payer: COMMERCIAL

## 2024-07-05 ENCOUNTER — APPOINTMENT (OUTPATIENT)
Dept: RADIATION ONCOLOGY | Facility: HOSPITAL | Age: 74
End: 2024-07-05
Attending: STUDENT IN AN ORGANIZED HEALTH CARE EDUCATION/TRAINING PROGRAM
Payer: COMMERCIAL

## 2024-07-05 PROCEDURE — G6002 STEREOSCOPIC X-RAY GUIDANCE: HCPCS | Performed by: RADIOLOGY

## 2024-07-05 PROCEDURE — 77412 RADIATION TX DELIVERY LVL 3: CPT | Performed by: RADIOLOGY

## 2024-07-05 PROCEDURE — 77387 GUIDANCE FOR RADJ TX DLVR: CPT | Performed by: RADIOLOGY

## 2024-07-06 PROBLEM — N12 PYELONEPHRITIS: Status: RESOLVED | Noted: 2024-06-06 | Resolved: 2024-07-06

## 2024-07-06 PROBLEM — N30.00 ACUTE CYSTITIS: Status: RESOLVED | Noted: 2024-06-06 | Resolved: 2024-07-06

## 2024-07-07 RX ORDER — PANTOPRAZOLE SODIUM 40 MG/1
40 TABLET, DELAYED RELEASE ORAL DAILY
Qty: 90 TABLET | Refills: 1 | Status: SHIPPED | OUTPATIENT
Start: 2024-07-07

## 2024-07-08 ENCOUNTER — APPOINTMENT (OUTPATIENT)
Dept: RADIATION ONCOLOGY | Facility: HOSPITAL | Age: 74
End: 2024-07-08
Attending: INTERNAL MEDICINE
Payer: COMMERCIAL

## 2024-07-08 PROCEDURE — 77336 RADIATION PHYSICS CONSULT: CPT | Performed by: STUDENT IN AN ORGANIZED HEALTH CARE EDUCATION/TRAINING PROGRAM

## 2024-07-08 PROCEDURE — G6002 STEREOSCOPIC X-RAY GUIDANCE: HCPCS | Performed by: RADIOLOGY

## 2024-07-08 PROCEDURE — 77412 RADIATION TX DELIVERY LVL 3: CPT | Performed by: RADIOLOGY

## 2024-07-08 PROCEDURE — 77387 GUIDANCE FOR RADJ TX DLVR: CPT | Performed by: RADIOLOGY

## 2024-07-10 ENCOUNTER — TELEPHONE (OUTPATIENT)
Dept: HEMATOLOGY ONCOLOGY | Facility: CLINIC | Age: 74
End: 2024-07-10

## 2024-07-11 ENCOUNTER — OFFICE VISIT (OUTPATIENT)
Dept: UROLOGY | Facility: CLINIC | Age: 74
End: 2024-07-11
Payer: COMMERCIAL

## 2024-07-11 VITALS — OXYGEN SATURATION: 96 % | HEART RATE: 80 BPM | SYSTOLIC BLOOD PRESSURE: 106 MMHG | DIASTOLIC BLOOD PRESSURE: 66 MMHG

## 2024-07-11 DIAGNOSIS — C61 PROSTATE CANCER (HCC): Primary | ICD-10-CM

## 2024-07-11 PROCEDURE — 96402 CHEMO HORMON ANTINEOPL SQ/IM: CPT

## 2024-07-11 PROCEDURE — 99213 OFFICE O/P EST LOW 20 MIN: CPT | Performed by: PHYSICIAN ASSISTANT

## 2024-07-11 NOTE — PROGRESS NOTES
UROLOGY PROGRESS NOTE   Patient Identifiers: Oliver Astudillo (MRN [id number])  Date of Service: 7/11/2024    Subjective:   73-year-old man history of [place] 9 metastatic prostate cancer.  He has bilateral percutaneous nephrostomy tubes.  He has occasional infections and has been hospitalized.  He is currently in a wheelchair and has lost some weight.  His last PSA in April was 6.57.  He is on Lupron and Xtandi.  He completed palliative radiation to his lumbar spine.    Reason for visit: Metastatic prostate cancer follow-up    Objective:     VITALS:    There were no vitals filed for this visit.        LABS:  Lab Results   Component Value Date    HGB 11.3 (L) 06/08/2024    HCT 34.2 (L) 06/08/2024    WBC 10.54 (H) 06/08/2024     06/08/2024   ]    Lab Results   Component Value Date    K 4.0 06/08/2024     06/08/2024    CO2 24 06/08/2024    BUN 11 06/08/2024    CREATININE 0.71 06/08/2024    CALCIUM 8.9 06/08/2024   ]        INPATIENT MEDS:    Current Outpatient Medications:     acetaminophen (TYLENOL) 325 mg tablet, Take 3 tablets (975 mg total) by mouth every 8 (eight) hours as needed for mild pain or moderate pain, Disp: , Rfl:     albuterol (Ventolin HFA) 90 mcg/act inhaler, Inhale 2 puffs every 6 (six) hours as needed for wheezing, Disp: 18 g, Rfl: 0    Alcohol Swabs 70 % PADS, To clean the skin prior to injecting insulin, Disp: 100 each, Rfl: 1    Blood Glucose Monitoring Suppl (OneTouch Verio Reflect) w/Device KIT, Check blood sugars once daily. Please substitute with appropriate alternative as covered by patient's insurance. Dx: E11.65, Disp: 1 kit, Rfl: 0    dexamethasone (DECADRON) 0.5 mg tablet, TAKE 1 TABLET (0.5 MG TOTAL) BY MOUTH DAILY WITH BREAKFAST, Disp: 90 tablet, Rfl: 1    dexamethasone (DECADRON) 1 mg tablet, Take 2 tablets (2 mg total) by mouth daily with breakfast for 7 days, THEN 1 tablet (1 mg total) daily with breakfast for 7 days. Do not start before July 9, 2024., Disp: 21  tablet, Rfl: 0    Diclofenac Sodium (VOLTAREN) 1 %, Apply 2 g topically 4 (four) times a day, Disp: 100 g, Rfl: 3    Easy Touch Pen Needles 31G X 6 MM MISC, Use daily at bedtime, Disp: 100 each, Rfl: 3    enzalutamide (XTANDI) 40 mg capsule, Take 4 capsules (160 mg total) by mouth daily, Disp: 120 capsule, Rfl: 6    gabapentin (Neurontin) 300 mg capsule, Take 1 capsule (300 mg total) by mouth 3 (three) times a day, Disp: 90 capsule, Rfl: 2    glimepiride (AMARYL) 2 mg tablet, Take 1 tablet (2 mg total) by mouth daily with dinner, Disp: 90 tablet, Rfl: 3    glimepiride (AMARYL) 4 mg tablet, Take 1 tablet (4 mg total) by mouth daily with breakfast, Disp: 90 tablet, Rfl: 3    glucose blood (OneTouch Verio) test strip, Check blood sugars twice a daily. Please substitute with appropriate alternative as covered by patient's insurance. Dx: E11.65, Disp: 200 each, Rfl: 3    Levemir FlexPen 100 units/mL injection pen, Inject 20 Units under the skin daily at bedtime, Disp: 15 mL, Rfl: 1    lisinopril (ZESTRIL) 10 mg tablet, NARCISO TANMAY TABLETA VIA ORAL DIARIAMENTE, Disp: , Rfl:     metFORMIN (GLUCOPHAGE) 1000 MG tablet, Take 1 tablet (1,000 mg total) by mouth 2 (two) times a day with meals, Disp: 180 tablet, Rfl: 1    Multiple Vitamins-Minerals (Sentry) TABS, Take 1 tablet by mouth daily, Disp: , Rfl:     naloxone (NARCAN) 4 mg/0.1 mL nasal spray, Administer 1 spray into a nostril. If no response after 2-3 minutes, give another dose in the other nostril using a new spray. (Patient not taking: Reported on 6/28/2024), Disp: 1 each, Rfl: 0    omega-3-acid ethyl esters (LOVAZA) 1 g capsule, , Disp: , Rfl:     ondansetron (Zofran ODT) 8 mg disintegrating tablet, Take 1 tablet (8 mg total) by mouth every 8 (eight) hours as needed for nausea or vomiting, Disp: 20 tablet, Rfl: 3    OneTouch Delica Lancets 33G MISC, Check blood sugars once daily. Please substitute with appropriate alternative as covered by patient's insurance. Dx:  E11.65, Disp: 100 each, Rfl: 3    oxyCODONE (ROXICODONE) 10 MG TABS, Take 1 tablet (10 mg total) by mouth every 4 (four) hours as needed for moderate pain Max Daily Amount: 60 mg, Disp: 90 tablet, Rfl: 0    pantoprazole (PROTONIX) 40 mg tablet, TAKE 1 TABLET BY MOUTH EVERY DAY, Disp: 90 tablet, Rfl: 1    polyethylene glycol (MIRALAX) 17 g packet, Take 17 g by mouth daily as needed (for constipation), Disp: 100 each, Rfl: 1    rosuvastatin (CRESTOR) 10 MG tablet, Take 1 tablet (10 mg total) by mouth daily at bedtime, Disp: 90 tablet, Rfl: 1    senna-docusate sodium (SENOKOT S) 8.6-50 mg per tablet, Take 1 tablet by mouth daily (Patient not taking: Reported on 6/28/2024), Disp: 90 tablet, Rfl: 1    sodium chloride, PF, 0.9 %, 10 mL by Intracatheter route daily Intracatheter flushing daily. May substitute prefilled syringe with normal saline 10 mL vials, 10 mL syringes, and 18 g blunt needles, Disp: 600 mL, Rfl: 2    Spacer/Aero-Holding Chambers (AEROCHAMBER MINI CHAMBER) BILLY, by Does not apply route see administration instructions (Patient not taking: Reported on 4/25/2024), Disp: 1 Device, Rfl: 0      Physical Exam:   There were no vitals taken for this visit.  GEN: no acute distress    RESP: breathing comfortably with no accessory muscle use    ABD: soft, non-tender, non-distended   INCISION:    EXT: no significant peripheral edema         RADIOLOGY:   none     Assessment:   #1.  Metastatic prostate cancer  #2.  Bladder outlet obstruction with bilateral nephrostomy tubes secondary to #1    Plan:   -Lupron 45 mg given today  -Follow-up in 6 months for his next injection  -  -

## 2024-07-12 ENCOUNTER — PATIENT OUTREACH (OUTPATIENT)
Age: 74
End: 2024-07-12

## 2024-07-12 NOTE — PROGRESS NOTES
Pt due for outreach today. Chart notes reviewed. Pt is now being followed by palliative care and oncology.     Outreach to Loraine BRAND [facility]. LVM requesting return call.     S/W Loraine. Pt reports patient is doing better after radiation treatments. Last treatment 7/8. Pt is ambulating a few steps with assistance and supervision.     Plan is to discharge from [facility] services in two weeks.     RN CM will continue to follow.

## 2024-07-15 ENCOUNTER — APPOINTMENT (OUTPATIENT)
Dept: LAB | Facility: HOSPITAL | Age: 74
End: 2024-07-15
Payer: COMMERCIAL

## 2024-07-15 DIAGNOSIS — C61 PROSTATE CANCER METASTATIC TO BONE (HCC): ICD-10-CM

## 2024-07-15 DIAGNOSIS — C61 PROSTATE CANCER (HCC): ICD-10-CM

## 2024-07-15 DIAGNOSIS — Z79.4 TYPE 2 DIABETES MELLITUS WITH OTHER SPECIFIED COMPLICATION, WITH LONG-TERM CURRENT USE OF INSULIN (HCC): ICD-10-CM

## 2024-07-15 DIAGNOSIS — E87.6 HYPOKALEMIA: ICD-10-CM

## 2024-07-15 DIAGNOSIS — C79.51 PROSTATE CANCER METASTATIC TO BONE (HCC): ICD-10-CM

## 2024-07-15 DIAGNOSIS — E11.69 TYPE 2 DIABETES MELLITUS WITH OTHER SPECIFIED COMPLICATION, WITH LONG-TERM CURRENT USE OF INSULIN (HCC): ICD-10-CM

## 2024-07-15 DIAGNOSIS — N18.31 STAGE 3A CHRONIC KIDNEY DISEASE (HCC): ICD-10-CM

## 2024-07-15 DIAGNOSIS — C79.51 METASTASIS TO BONE (HCC): ICD-10-CM

## 2024-07-15 LAB
ALBUMIN SERPL BCG-MCNC: 4.2 G/DL (ref 3.5–5)
ALP SERPL-CCNC: 51 U/L (ref 34–104)
ALT SERPL W P-5'-P-CCNC: 18 U/L (ref 7–52)
ANION GAP SERPL CALCULATED.3IONS-SCNC: 9 MMOL/L (ref 4–13)
AST SERPL W P-5'-P-CCNC: 19 U/L (ref 13–39)
BASOPHILS # BLD AUTO: 0.04 THOUSANDS/ÂΜL (ref 0–0.1)
BASOPHILS NFR BLD AUTO: 1 % (ref 0–1)
BILIRUB SERPL-MCNC: 0.83 MG/DL (ref 0.2–1)
BUN SERPL-MCNC: 10 MG/DL (ref 5–25)
CALCIUM SERPL-MCNC: 9.9 MG/DL (ref 8.4–10.2)
CHLORIDE SERPL-SCNC: 100 MMOL/L (ref 96–108)
CO2 SERPL-SCNC: 26 MMOL/L (ref 21–32)
CREAT SERPL-MCNC: 0.81 MG/DL (ref 0.6–1.3)
EOSINOPHIL # BLD AUTO: 0.09 THOUSAND/ÂΜL (ref 0–0.61)
EOSINOPHIL NFR BLD AUTO: 1 % (ref 0–6)
ERYTHROCYTE [DISTWIDTH] IN BLOOD BY AUTOMATED COUNT: 14.2 % (ref 11.6–15.1)
GFR SERPL CREATININE-BSD FRML MDRD: 89 ML/MIN/1.73SQ M
GLUCOSE SERPL-MCNC: 196 MG/DL (ref 65–140)
HCT VFR BLD AUTO: 40.2 % (ref 36.5–49.3)
HGB BLD-MCNC: 13.3 G/DL (ref 12–17)
IMM GRANULOCYTES # BLD AUTO: 0.04 THOUSAND/UL (ref 0–0.2)
IMM GRANULOCYTES NFR BLD AUTO: 1 % (ref 0–2)
LYMPHOCYTES # BLD AUTO: 0.36 THOUSANDS/ÂΜL (ref 0.6–4.47)
LYMPHOCYTES NFR BLD AUTO: 5 % (ref 14–44)
MCH RBC QN AUTO: 30.2 PG (ref 26.8–34.3)
MCHC RBC AUTO-ENTMCNC: 33.1 G/DL (ref 31.4–37.4)
MCV RBC AUTO: 91 FL (ref 82–98)
MONOCYTES # BLD AUTO: 0.44 THOUSAND/ÂΜL (ref 0.17–1.22)
MONOCYTES NFR BLD AUTO: 7 % (ref 4–12)
NEUTROPHILS # BLD AUTO: 5.64 THOUSANDS/ÂΜL (ref 1.85–7.62)
NEUTS SEG NFR BLD AUTO: 85 % (ref 43–75)
NRBC BLD AUTO-RTO: 0 /100 WBCS
PLATELET # BLD AUTO: 250 THOUSANDS/UL (ref 149–390)
PMV BLD AUTO: 9.5 FL (ref 8.9–12.7)
POTASSIUM SERPL-SCNC: 4.1 MMOL/L (ref 3.5–5.3)
PROT SERPL-MCNC: 7.6 G/DL (ref 6.4–8.4)
PSA SERPL-MCNC: 8.62 NG/ML (ref 0–4)
RBC # BLD AUTO: 4.41 MILLION/UL (ref 3.88–5.62)
SODIUM SERPL-SCNC: 135 MMOL/L (ref 135–147)
WBC # BLD AUTO: 6.61 THOUSAND/UL (ref 4.31–10.16)

## 2024-07-15 PROCEDURE — 84153 ASSAY OF PSA TOTAL: CPT

## 2024-07-15 PROCEDURE — 36415 COLL VENOUS BLD VENIPUNCTURE: CPT

## 2024-07-15 PROCEDURE — 80053 COMPREHEN METABOLIC PANEL: CPT

## 2024-07-15 PROCEDURE — 85025 COMPLETE CBC W/AUTO DIFF WBC: CPT

## 2024-07-17 ENCOUNTER — TELEPHONE (OUTPATIENT)
Dept: HEMATOLOGY ONCOLOGY | Facility: CLINIC | Age: 74
End: 2024-07-17

## 2024-07-17 ENCOUNTER — OFFICE VISIT (OUTPATIENT)
Dept: HEMATOLOGY ONCOLOGY | Facility: CLINIC | Age: 74
End: 2024-07-17
Payer: COMMERCIAL

## 2024-07-17 VITALS
BODY MASS INDEX: 22.61 KG/M2 | DIASTOLIC BLOOD PRESSURE: 76 MMHG | HEIGHT: 68 IN | TEMPERATURE: 97.5 F | SYSTOLIC BLOOD PRESSURE: 102 MMHG | WEIGHT: 149.2 LBS | HEART RATE: 63 BPM | RESPIRATION RATE: 17 BRPM | OXYGEN SATURATION: 100 %

## 2024-07-17 DIAGNOSIS — C79.51 PROSTATE CANCER METASTATIC TO BONE (HCC): Primary | ICD-10-CM

## 2024-07-17 DIAGNOSIS — R26.2 AMBULATORY DYSFUNCTION: ICD-10-CM

## 2024-07-17 DIAGNOSIS — C61 PROSTATE CANCER METASTATIC TO BONE (HCC): Primary | ICD-10-CM

## 2024-07-17 DIAGNOSIS — G89.3 CANCER RELATED PAIN: ICD-10-CM

## 2024-07-17 PROCEDURE — G2211 COMPLEX E/M VISIT ADD ON: HCPCS | Performed by: INTERNAL MEDICINE

## 2024-07-17 PROCEDURE — 99215 OFFICE O/P EST HI 40 MIN: CPT | Performed by: INTERNAL MEDICINE

## 2024-07-17 NOTE — PROGRESS NOTES
[facility]  [address] [facility] [address]  [place] [address]  [phone]    Oliver Astudillo,1950, [id number]  07/17/24    Discussion:   In summary, this is a 73-year-old male with a history of prostate cancer, [place] 9, bone metastases by PSMA PET August 2023.  Firmagon July 2023, Lupron started August 2023. Zytiga 250 mg and dexamethasone 0.5 mg p.o. daily with low-fat meal.  June 2024 Zytiga discontinued for PSA 6.5.  Xtandi initiated.  Recent CBC and differential are normal.  CMP shows glucose 196, otherwise normal.  PSA 8.6.  He has had 7 of 10 planned radiation treatments to the lumbosacral region.  Pain has substantially improved.  Some days he requires no analgesics.  PSA trajectory interpretation is required.  From March through April PSA increased more substantially.  Over the subsequent 3 months change has been much less.  This may indicate efficacy of Xtandi.  Repeat PSA in 6 weeks.  I encouraged him to continue to participate with physical therapy.  Steroid taper under radiation therapy.  I discussed the above with the patient.  The patient family who provided interpretation throughout the course of the interview today voiced understanding and agreement.  ______________________________________________________________________    Chief Complaint   Patient presents with    Follow-up       HPI:  Oncology History   Prostate cancer metastatic to bone (HCC)   5/24/2023 Initial Diagnosis    May 2023 patient presented with urinary frequency. . CT showed distended urinary bladder with bilateral hydronephrosis. Urinary bladder mass inseparable from the prostate. Extraprostatic extension noted. Bone scan showed indeterminate activity at T11 vertebral body. Bilateral nephrostomy tubes and Cazares catheter placed. Prostate biopsy showed adenocarcinoma, [place] 9.      6/28/2023 Biopsy    A. Prostate, right lateral base:  - Prostatic adenocarcinoma,  [place] score 4 + 5 = 9, Prognostic Grade Group 5,  involving 90% of 1 needle core  and measuring  11 mm in length.        B. Prostate, right medial base:  - Prostatic adenocarcinoma, [place] score 4 + 5 = 9, Prognostic Grade Group 5,  involving 70% of 1 needle core  and measuring  10 mm in length.      C. Prostate, right lateral mid:  - Prostatic adenocarcinoma, Liana score 4 + 5 = 9, Prognostic Grade Group 5,  involving 90% of 1 needle core  and measuring  12 mm in length.      Comment: Immunohistochemistry for a prostate multiplex stain (p63, K903, and P504S) and NKX3.1 demonstrate invasive carcinoma.     D. Prostate, right medial mid:  - Prostatic adenocarcinoma, [place] score 4 + 5 = 9, Prognostic Grade Group 5,  involving 95% of 1 needle core  and measuring  15 mm in length.      E. Prostate, right lateral apex:  - Prostatic adenocarcinoma, [place] score 4 + 5 = 9, Prognostic Grade Group 5,  involving 90% of 1 needle core  and measuring  12 mm in length.   - Perineural invasion identified.     Comment: Immunohistochemistry for a prostate multiplex stain (p63, K903, and P504S) and NKX3.1 demonstrate invasive carcinoma.     F. Prostate, right medial apex:  - Prostatic adenocarcinoma, [place] score 4 + 5 = 9, Prognostic Grade Group 5,  involving 100% of 1 needle core  and measuring  20 mm in length.      G. Prostate, left lateral base:  - Prostatic adenocarcinoma, [place] score 4 + 5 = 9, Prognostic Grade Group 5,  involving 75% of 1 needle core  and measuring  10 mm in length.   - Perineural invasion identified.  - Lymphovascular invasion is identified.     H. Prostate, left medial base:  - Prostatic adenocarcinoma, [place] score 4 + 5 = 9, Prognostic Grade Group 5,  involving 95% of 1 needle core  and measuring  14 mm in length.   - Perineural invasion identified.     Comment: There is a focus of closely approximated gastrointestinal epithelium; NKX3.1 shows no definite invasion of the GI epithelium.      I.  Prostate, left lateral mid:  - Prostatic adenocarcinoma, [place] score 4 + 5 = 9, Prognostic Grade Group 5,  involving 95% of 1 needle core  and measuring  12 mm in length.       J. Prostate, left medial mid:  - Prostatic adenocarcinoma, [place] score 4 + 5 = 9, Prognostic Grade Group 5,  involving 85% of 1 needle core  and measuring  13 mm in length.       Comment: Immunohistochemistry for a prostate multiplex stain (p63, K903, and P504S) and NKX3.1 demonstrate invasive carcinoma.     K. Prostate, left lateral apex:  - Prostatic adenocarcinoma, [place] score 4 + 5 = 9, Prognostic Grade Group 5,  involving 25% of 1 needle core  and measuring  3 mm in length.        L. Prostate, left medial apex :  - Prostatic adenocarcinoma, [place] score 4 + 5 = 9, Prognostic Grade Group 5,  involving 95% of 1 needle core  and measuring  15 mm in length.     - Perineural invasion identified.     Comment:   Cribriform glands are present within the pattern 4 component.  This feature has been associated with adverse clinical outcomes and molecular features typically seen in advanced disease.  (The 2019 Genitourinary Pathology Society (GUPS) White Paper on Contemporary Grading of Prostate Cancer. Arch Pathol Lab Med. 2021 Apr 1;145(4):461-493.)     7/5/2023 -  Hormone Therapy    Firmagon loading dose on 7/5/23, followed by Lupron      8/28/2023 -  Cancer Staged    Staging form: Prostate, AJCC 8th Edition  - Clinical: Stage IVB (cT4, cN1, cM1b, PSA: 145, Grade Group: 5) - Signed by Dov Andrea MD on 8/28/2023  Prostate specific antigen (PSA) range: 20 or greater  Histologic grading system: 5 grade system       9/2023 - 6/2024 Chemotherapy    Zytiga 250 mg PO daily     6/2024 -  Chemotherapy    Xtandi          Interval History: Clinically stable.  Strength and pain situations improving.  ECOG-  3-symptomatic, greater than 50% sedentary.    Review of Systems   Constitutional:  Negative for chills and fever.   HENT:  Negative for  nosebleeds.    Eyes:  Negative for discharge.   Respiratory:  Negative for cough and shortness of breath.    Cardiovascular:  Negative for chest pain.   Gastrointestinal:  Negative for abdominal pain, constipation and diarrhea.   Endocrine: Negative for polydipsia.   Genitourinary:  Negative for hematuria.   Musculoskeletal:  Positive for back pain. Negative for arthralgias.   Skin:  Negative for color change.   Allergic/Immunologic: Negative for immunocompromised state.   Neurological:  Negative for dizziness and headaches.   Hematological:  Negative for adenopathy.   Psychiatric/Behavioral:  Negative for agitation.        Past Medical History:   Diagnosis Date    COVID-19 01/15/2024    Diabetes mellitus (HCC)     Elevated PSA 06/21/2023    High cholesterol     Hypertension     Prostate cancer (HCC)      Patient Active Problem List   Diagnosis    Type 2 diabetes mellitus, with long-term current use of insulin (HCC)    Other hydronephrosis    Hypertension    Hydronephrosis    Prostate cancer metastatic to bone (HCC)    Encounter for monitoring androgen deprivation therapy    Nephrostomy status (HCC)    Hyperlipidemia    Malfunction of nephrostomy tube (HCC)    Hypokalemia    Stage 3a chronic kidney disease (HCC)    Hypoglycemia    Electrolyte abnormality    Cancer related pain    Palliative care by specialist    Ambulatory dysfunction    Therapeutic opioid-induced constipation (OIC)    Nausea and vomiting    Advanced care planning/counseling discussion    Goals of care, counseling/discussion       Current Outpatient Medications:     acetaminophen (TYLENOL) 325 mg tablet, Take 3 tablets (975 mg total) by mouth every 8 (eight) hours as needed for mild pain or moderate pain, Disp: , Rfl:     albuterol (Ventolin HFA) 90 mcg/act inhaler, Inhale 2 puffs every 6 (six) hours as needed for wheezing, Disp: 18 g, Rfl: 0    Alcohol Swabs 70 % PADS, To clean the skin prior to injecting insulin, Disp: 100 each, Rfl: 1    Blood  Glucose Monitoring Suppl (OneTouch Verio Reflect) w/Device KIT, Check blood sugars once daily. Please substitute with appropriate alternative as covered by patient's insurance. Dx: E11.65, Disp: 1 kit, Rfl: 0    dexamethasone (DECADRON) 0.5 mg tablet, TAKE 1 TABLET (0.5 MG TOTAL) BY MOUTH DAILY WITH BREAKFAST, Disp: 90 tablet, Rfl: 1    dexamethasone (DECADRON) 1 mg tablet, Take 2 tablets (2 mg total) by mouth daily with breakfast for 7 days, THEN 1 tablet (1 mg total) daily with breakfast for 7 days. Do not start before July 9, 2024., Disp: 21 tablet, Rfl: 0    Diclofenac Sodium (VOLTAREN) 1 %, Apply 2 g topically 4 (four) times a day, Disp: 100 g, Rfl: 3    Easy Touch Pen Needles 31G X 6 MM MISC, Use daily at bedtime, Disp: 100 each, Rfl: 3    enzalutamide (XTANDI) 40 mg capsule, Take 4 capsules (160 mg total) by mouth daily, Disp: 120 capsule, Rfl: 6    glimepiride (AMARYL) 2 mg tablet, Take 1 tablet (2 mg total) by mouth daily with dinner, Disp: 90 tablet, Rfl: 3    glimepiride (AMARYL) 4 mg tablet, Take 1 tablet (4 mg total) by mouth daily with breakfast, Disp: 90 tablet, Rfl: 3    glucose blood (OneTouch Verio) test strip, Check blood sugars twice a daily. Please substitute with appropriate alternative as covered by patient's insurance. Dx: E11.65, Disp: 200 each, Rfl: 3    Levemir FlexPen 100 units/mL injection pen, Inject 20 Units under the skin daily at bedtime, Disp: 15 mL, Rfl: 1    lisinopril (ZESTRIL) 10 mg tablet, NARCISO TANMAY TABLETA VIA ORAL DIARIAMENTE, Disp: , Rfl:     metFORMIN (GLUCOPHAGE) 1000 MG tablet, Take 1 tablet (1,000 mg total) by mouth 2 (two) times a day with meals, Disp: 180 tablet, Rfl: 1    Multiple Vitamins-Minerals (Sentry) TABS, Take 1 tablet by mouth daily, Disp: , Rfl:     omega-3-acid ethyl esters (LOVAZA) 1 g capsule, , Disp: , Rfl:     ondansetron (Zofran ODT) 8 mg disintegrating tablet, Take 1 tablet (8 mg total) by mouth every 8 (eight) hours as needed for nausea or vomiting,  Disp: 20 tablet, Rfl: 3    OneTouch Delica Lancets 33G MISC, Check blood sugars once daily. Please substitute with appropriate alternative as covered by patient's insurance. Dx: E11.65, Disp: 100 each, Rfl: 3    oxyCODONE (ROXICODONE) 10 MG TABS, Take 1 tablet (10 mg total) by mouth every 4 (four) hours as needed for moderate pain Max Daily Amount: 60 mg, Disp: 90 tablet, Rfl: 0    pantoprazole (PROTONIX) 40 mg tablet, TAKE 1 TABLET BY MOUTH EVERY DAY, Disp: 90 tablet, Rfl: 1    polyethylene glycol (MIRALAX) 17 g packet, Take 17 g by mouth daily as needed (for constipation), Disp: 100 each, Rfl: 1    rosuvastatin (CRESTOR) 10 MG tablet, Take 1 tablet (10 mg total) by mouth daily at bedtime, Disp: 90 tablet, Rfl: 1    sodium chloride, PF, 0.9 %, 10 mL by Intracatheter route daily Intracatheter flushing daily. May substitute prefilled syringe with normal saline 10 mL vials, 10 mL syringes, and 18 g blunt needles, Disp: 600 mL, Rfl: 2    gabapentin (Neurontin) 300 mg capsule, Take 1 capsule (300 mg total) by mouth 3 (three) times a day (Patient not taking: Reported on 7/17/2024), Disp: 90 capsule, Rfl: 2    naloxone (NARCAN) 4 mg/0.1 mL nasal spray, Administer 1 spray into a nostril. If no response after 2-3 minutes, give another dose in the other nostril using a new spray. (Patient not taking: Reported on 6/28/2024), Disp: 1 each, Rfl: 0    senna-docusate sodium (SENOKOT S) 8.6-50 mg per tablet, Take 1 tablet by mouth daily (Patient not taking: Reported on 6/28/2024), Disp: 90 tablet, Rfl: 1    Spacer/Aero-Holding Chambers (AEROCHAMBER MINI CHAMBER) BILLY, by Does not apply route see administration instructions (Patient not taking: Reported on 4/25/2024), Disp: 1 Device, Rfl: 0  No Known Allergies  Past Surgical History:   Procedure Laterality Date    IR NEPHROSTOMY TUBE CHECK/CHANGE/REPOSITION/REINSERTION/UPSIZE  9/28/2023    IR NEPHROSTOMY TUBE CHECK/CHANGE/REPOSITION/REINSERTION/UPSIZE  12/28/2023    IR NEPHROSTOMY  "TUBE CHECK/CHANGE/REPOSITION/REINSERTION/UPSIZE  1/31/2024    IR NEPHROSTOMY TUBE CHECK/CHANGE/REPOSITION/REINSERTION/UPSIZE  2/2/2024    IR NEPHROSTOMY TUBE CHECK/CHANGE/REPOSITION/REINSERTION/UPSIZE  3/4/2024    IR NEPHROSTOMY TUBE CHECK/CHANGE/REPOSITION/REINSERTION/UPSIZE  3/20/2024    IR NEPHROSTOMY TUBE CHECK/CHANGE/REPOSITION/REINSERTION/UPSIZE  6/7/2024    IR NEPHROSTOMY TUBE PLACEMENT  06/22/2023    bilateral    IR OTHER  1/15/2024    US GUIDED PROSTATE BIOPSY       Social History     Objective:  Vitals:    07/17/24 1051   BP: 102/76   BP Location: Left arm   Patient Position: Sitting   Cuff Size: Adult   Pulse: 63   Resp: 17   Temp: 97.5 °F (36.4 °C)   SpO2: 100%   Weight: 67.7 kg (149 lb 3.2 oz)   Height: 5' 8\" (1.727 m)     Physical Exam  Constitutional:       Appearance: He is well-developed.   HENT:      Head: Normocephalic and atraumatic.      Mouth/Throat:      Mouth: Mucous membranes are moist.   Eyes:      Pupils: Pupils are equal, round, and reactive to light.   Cardiovascular:      Rate and Rhythm: Normal rate and regular rhythm.      Heart sounds: No murmur heard.  Pulmonary:      Breath sounds: Normal breath sounds. No wheezing or rales.   Abdominal:      Palpations: Abdomen is soft.      Tenderness: There is no abdominal tenderness.   Musculoskeletal:         General: No tenderness. Normal range of motion.      Cervical back: Neck supple.   Lymphadenopathy:      Cervical: No cervical adenopathy.   Skin:     Findings: No erythema or rash.   Neurological:      Mental Status: He is alert and oriented to person, place, and time.      Cranial Nerves: No cranial nerve deficit.      Deep Tendon Reflexes: Reflexes are normal and symmetric.   Psychiatric:         Behavior: Behavior normal.           Labs:  I personally reviewed the labs and imaging pertinent to this patient care.  "

## 2024-07-18 ENCOUNTER — PATIENT OUTREACH (OUTPATIENT)
Age: 74
End: 2024-07-18

## 2024-07-18 NOTE — PROGRESS NOTES
Received call from Loraine BRAND [facility]. Loraine is calling to report that patient BS is  running on the low end. Today at her visit the blood sugar was 64. Overall his appetite is decreasing. Loraine would like to know if Metformin dose or levemir dose should be decreased as a result.    In basket message sent to Devorah Julio Endocrine NP regarding above concerns.      Loraine reports that plan to discharge patient from skilled nursing services next week.  PT  services will stay for an additional  week     RN CM will continue to follow.

## 2024-07-19 ENCOUNTER — TELEPHONE (OUTPATIENT)
Age: 74
End: 2024-07-19

## 2024-07-19 NOTE — TELEPHONE ENCOUNTER
[facility]  ORDER IDs [id number]  Scanned into encounter  Placed in pcps folder  Fax: [phone]

## 2024-07-22 ENCOUNTER — PATIENT OUTREACH (OUTPATIENT)
Age: 74
End: 2024-07-22

## 2024-07-22 NOTE — PROGRESS NOTES
Received return in basket message from Devorah Julio. Instructions to decrease levemir to 16 units.     Outreach to Loraine BR[facility]. Advised of above. She will reach out to the son to advise him of dosage change.

## 2024-07-23 ENCOUNTER — APPOINTMENT (OUTPATIENT)
Dept: LAB | Facility: HOSPITAL | Age: 74
End: 2024-07-23
Payer: COMMERCIAL

## 2024-07-23 DIAGNOSIS — C61 PROSTATE CANCER METASTATIC TO BONE (HCC): ICD-10-CM

## 2024-07-23 DIAGNOSIS — C79.51 PROSTATE CANCER METASTATIC TO BONE (HCC): ICD-10-CM

## 2024-07-23 LAB
ALBUMIN SERPL BCG-MCNC: 3.9 G/DL (ref 3.5–5)
ALP SERPL-CCNC: 53 U/L (ref 34–104)
ALT SERPL W P-5'-P-CCNC: 11 U/L (ref 7–52)
ANION GAP SERPL CALCULATED.3IONS-SCNC: 9 MMOL/L (ref 4–13)
AST SERPL W P-5'-P-CCNC: 13 U/L (ref 13–39)
BILIRUB SERPL-MCNC: 0.53 MG/DL (ref 0.2–1)
BUN SERPL-MCNC: 14 MG/DL (ref 5–25)
CALCIUM SERPL-MCNC: 9.8 MG/DL (ref 8.4–10.2)
CHLORIDE SERPL-SCNC: 101 MMOL/L (ref 96–108)
CHOLEST SERPL-MCNC: 117 MG/DL
CO2 SERPL-SCNC: 26 MMOL/L (ref 21–32)
CREAT SERPL-MCNC: 0.76 MG/DL (ref 0.6–1.3)
EST. AVERAGE GLUCOSE BLD GHB EST-MCNC: 131 MG/DL
GFR SERPL CREATININE-BSD FRML MDRD: 91 ML/MIN/1.73SQ M
GLUCOSE P FAST SERPL-MCNC: 117 MG/DL (ref 65–99)
HBA1C MFR BLD: 6.2 %
HDLC SERPL-MCNC: 32 MG/DL
LDLC SERPL CALC-MCNC: 36 MG/DL (ref 0–100)
NONHDLC SERPL-MCNC: 85 MG/DL
POTASSIUM SERPL-SCNC: 3.9 MMOL/L (ref 3.5–5.3)
PROT SERPL-MCNC: 7.1 G/DL (ref 6.4–8.4)
SODIUM SERPL-SCNC: 136 MMOL/L (ref 135–147)
TRIGL SERPL-MCNC: 244 MG/DL

## 2024-07-23 PROCEDURE — 36415 COLL VENOUS BLD VENIPUNCTURE: CPT

## 2024-07-23 PROCEDURE — 80053 COMPREHEN METABOLIC PANEL: CPT

## 2024-07-23 PROCEDURE — 83036 HEMOGLOBIN GLYCOSYLATED A1C: CPT

## 2024-07-23 PROCEDURE — 80061 LIPID PANEL: CPT

## 2024-07-26 ENCOUNTER — TELEPHONE (OUTPATIENT)
Age: 74
End: 2024-07-26

## 2024-07-26 NOTE — TELEPHONE ENCOUNTER
[facility] - Order#[id number]    Scanned copy into encounter    Placed in Dr. Bates's folder in precepting room    Fax when complete: [phone] or [phone]

## 2024-07-29 ENCOUNTER — TELEPHONE (OUTPATIENT)
Age: 74
End: 2024-07-29

## 2024-07-30 ENCOUNTER — OFFICE VISIT (OUTPATIENT)
Dept: PALLIATIVE MEDICINE | Facility: CLINIC | Age: 74
End: 2024-07-30
Payer: COMMERCIAL

## 2024-07-30 VITALS
DIASTOLIC BLOOD PRESSURE: 72 MMHG | WEIGHT: 149.6 LBS | BODY MASS INDEX: 22.67 KG/M2 | OXYGEN SATURATION: 95 % | RESPIRATION RATE: 16 BRPM | HEIGHT: 68 IN | HEART RATE: 83 BPM | SYSTOLIC BLOOD PRESSURE: 120 MMHG | TEMPERATURE: 98 F

## 2024-07-30 DIAGNOSIS — T40.2X5A THERAPEUTIC OPIOID-INDUCED CONSTIPATION (OIC): ICD-10-CM

## 2024-07-30 DIAGNOSIS — C79.51 PROSTATE CANCER METASTATIC TO BONE (HCC): Primary | ICD-10-CM

## 2024-07-30 DIAGNOSIS — R63.0 LOSS OF APPETITE: ICD-10-CM

## 2024-07-30 DIAGNOSIS — R63.4 UNINTENTIONAL WEIGHT LOSS: ICD-10-CM

## 2024-07-30 DIAGNOSIS — C61 PROSTATE CANCER METASTATIC TO BONE (HCC): Primary | ICD-10-CM

## 2024-07-30 DIAGNOSIS — N19 RENAL FAILURE SYNDROME: ICD-10-CM

## 2024-07-30 DIAGNOSIS — Z71.89 ADVANCED CARE PLANNING/COUNSELING DISCUSSION: ICD-10-CM

## 2024-07-30 DIAGNOSIS — R26.2 AMBULATORY DYSFUNCTION: ICD-10-CM

## 2024-07-30 DIAGNOSIS — Z51.5 PALLIATIVE CARE BY SPECIALIST: ICD-10-CM

## 2024-07-30 DIAGNOSIS — G89.3 CANCER RELATED PAIN: ICD-10-CM

## 2024-07-30 DIAGNOSIS — K59.03 THERAPEUTIC OPIOID-INDUCED CONSTIPATION (OIC): ICD-10-CM

## 2024-07-30 PROCEDURE — G2211 COMPLEX E/M VISIT ADD ON: HCPCS | Performed by: INTERNAL MEDICINE

## 2024-07-30 PROCEDURE — 1123F ACP DISCUSS/DSCN MKR DOCD: CPT | Performed by: INTERNAL MEDICINE

## 2024-07-30 PROCEDURE — 99214 OFFICE O/P EST MOD 30 MIN: CPT | Performed by: INTERNAL MEDICINE

## 2024-07-30 RX ORDER — ACETAMINOPHEN 500 MG
1000 TABLET ORAL 3 TIMES DAILY
Qty: 180 TABLET | Refills: 2 | Status: SHIPPED | OUTPATIENT
Start: 2024-07-30

## 2024-07-30 RX ORDER — DEXAMETHASONE 0.5 MG/1
TABLET ORAL
Qty: 30 TABLET | Refills: 1 | Status: SHIPPED | OUTPATIENT
Start: 2024-07-30

## 2024-07-30 NOTE — ASSESSMENT & PLAN NOTE
Time spent reviewing and completing advanced directives (the [facility] Advanced Directive). Time included in overall visit time below. Advanced directive counseling given. Reviewed with patient. All questions answered. Document signed and witnessed and will be scanned into the chart.

## 2024-07-30 NOTE — ASSESSMENT & PLAN NOTE
Patient wishes to continue dexamethasone might have been used before. New prescription sent. Counseled on potential adverse reactions or sequela of using glucocorticoid therapy chronically.

## 2024-07-30 NOTE — ASSESSMENT & PLAN NOTE
Lab Results   Component Value Date    EGFR 91 07/23/2024    EGFR 89 07/15/2024    EGFR 94 06/08/2024    CREATININE 0.76 07/23/2024    CREATININE 0.81 07/15/2024    CREATININE 0.71 06/08/2024   Informs plan of care and medication management.

## 2024-07-30 NOTE — ASSESSMENT & PLAN NOTE
"Patient endorses significant lower back pain which likely has significant cancer-related component.  Patient has been taking gabapentin only \"sometimes\" when pain is severe. Reinforced recommendation to take gabapentin 3 times per day around-the-clock. Once we know patient is taking this regularly we can assess whether or not increasing gabapentin would be appropriate. Patient feels that gabapentin is not contributing to any daytime sleepiness or fatigue.  On the other hand, he does note that higher doses of oxycodone (10 mg) have been causing some sedation. He is hesitant to use this often as result. He is taking this up to 2 times per day. Continue PRN, anticipate the patient would only use this for severe pain at this time.  Recommend topical OTC products, local application of heat or cold, Tylenol up to 1000mg TID for chronic pain. Do not use heat on top of topical agents. Do not mix topical agents.  "

## 2024-07-30 NOTE — ASSESSMENT & PLAN NOTE
service used, #[id number].  Emotional / psychosocial support provided today.  Reviewed notes (SW, Care Coordination, Medical Oncology, Urology), labs (7/23/24 Cr 0.76, alb 3.9, A1c 6.2; 7/15/24 Cr 0.81, PSA 8.619, Hb 13.3), imaging + procedures (6/6/24 CTAP). See below for more data.  Return in about 2 months (around 9/30/2024).  Medication safety issues addressed - no driving under the influence of narcotics (including opioids), watch for adverse effects including AMS or respiratory depression (slowed breathing), keep medications stored in a safe/locked environment, do not use alcohol while opioids or other narcotics are in one's system, do not travel with more than the minimum number of tablets or capsules required for the trip.  I have personally queried the patient's controlled substance dispensing history in the Prescription Drug Monitoring Program in compliance with regulations before I have prescribed any controlled substances. The prescription history is consistent with prescribed therapy and our practice policies.

## 2024-07-30 NOTE — PATIENT INSTRUCTIONS
It was good to see you today. Thank you for coming in.    I would not increase the gabapentin until you start taking it regularly - 300mg three times per day (about 8 hours apart), same times each day. Once we know if it is tolerated and helpful we can continue, or increase.   Use the oxycodone when you're having the most severe pain. I'm sorry it makes her sleepy.  For back pain:  Tylenol, 1000mg three times per day every day, can be a safe and effective way to reduce chronic pain.  You may consider topical products for pain as well (over-the-counter lidocaine, CBD, capsaicin +/- menthol, diclofenac). Brand names include Salonpas, Biofreeze, Aspercreme, Icy Hot, Voltaren, etc. These can be patches, creams, lotions, or roll-on gels.  Try a heating pad or ice pack (you can alternate these about 30 minutes apart) for neck and back pain. Do not use a heating pad for more than 20 minutes out of any single hour.  Do not use topical product under a heating pad; ensure skin is clean and dry.  For constipation:  Drink PLENTY of water. This is important to keep the gut moving.  Some people have success w/ using prunes, prune juice, certain fruits or vegetables (apples, bananas, prunes, pears, raspberries, and vegetables like string beans, broccoli, spinach, kale, squash, lentils, peas, and beans), or fiber gummies.  Try a probiotic. This could be yogurt or kefir, or fermented beverages such as kombucha, but probiotics are also available in capsule form. Aim for 10-15 billion colony-forming-units, w/ bacteria such as Lactobacillus / Saccharomyces / Actinomyces.  Osmotic laxatives (Miralax, magnesium citrate, Milk of Magnesia) can be very useful for opioid-induced constipation (OIC); take daily to prevent OIC.  Bulk laxatives (Citrucel, Metamucil, Fibercon, Benefiber, wheat germ) are useful for constipation in patients who are not taking opioids, but are not recommended if you are taking opioids.  Colace is good for softening  "hard stools, or preventing constipation when opioids are being used - but does not stimulate the bowel to move things along once constipation has occurred.  You can use senna, 1 to 2 tabs, once or twice daily as needed for constipation. Use as directed on the box/bottle. Senna is also available in a tea (\"Smooth Move\"). Should that not be enough for your constipation, you can try Dulcolax.  Should that not be enough, consider an enema.  All of these medications are available over-the-counter.  Resume dexamethasone to see if appetite improves.  Return in about 2 months (around 9/30/2024).  Call us for refills on medications that we supply, as needed.   If something changes and you need to come in sooner, please call our office.    PRESCRIPTION REFILL REMINDER:  All medication refills should be requested prior to Noon on Friday. Any refill requests after noon on Friday would be addressed the following Monday.    MEDICATION SAFETY ISSUES:   Do not drive under the influence of narcotics (including opioids), watch for adverse effects including confusion / altered mental status / respiratory depression (slowed breathing), keep medications stored in a safe/locked environment, do not use alcohol while opioids or other narcotics are in your system. Do not travel with more than the minimum number of tablets or capsules required for the trip.   "

## 2024-07-30 NOTE — PROGRESS NOTES
"Ambulatory Visit  Name: Oliver Astudillo      : 1950      MRN: [id number]  Encounter Provider: Isaiah Mcdonald MD  Encounter Date: 2024   Encounter department: [facility] PALLIATIVE [facility]    Assessment & Plan   1. Therapeutic opioid-induced constipation (OIC)  Assessment & Plan:  Counseled today on bowel regimen for opioid-induced constipation.  2. Prostate cancer metastatic to bone (HCC)  Assessment & Plan:  Prostate cancer metastatic to bone on Xtandi. Continue disease-directed cares.  Orders:  -     acetaminophen (TYLENOL) 500 mg tablet; Take 2 tablets (1,000 mg total) by mouth 3 (three) times a day  -     dexamethasone (DECADRON) 0.5 mg tablet; TAKE 1 TABLET (0.5 MG TOTAL) BY MOUTH DAILY WITH BREAKFAST  3. Renal failure syndrome  Assessment & Plan:  Lab Results   Component Value Date    EGFR 91 2024    EGFR 89 07/15/2024    EGFR 94 2024    CREATININE 0.76 2024    CREATININE 0.81 07/15/2024    CREATININE 0.71 2024   Informs plan of care and medication management.  4. Cancer related pain  Assessment & Plan:  Patient endorses significant lower back pain which likely has significant cancer-related component.  Patient has been taking gabapentin only \"sometimes\" when pain is severe. Reinforced recommendation to take gabapentin 3 times per day around-the-clock. Once we know patient is taking this regularly we can assess whether or not increasing gabapentin would be appropriate. Patient feels that gabapentin is not contributing to any daytime sleepiness or fatigue.  On the other hand, he does note that higher doses of oxycodone (10 mg) have been causing some sedation. He is hesitant to use this often as result. He is taking this up to 2 times per day. Continue PRN, anticipate the patient would only use this for severe pain at this time.  Recommend topical OTC products, local application of heat or cold, Tylenol up to 1000mg TID for chronic pain. Do not use heat on top of " topical agents. Do not mix topical agents.  Orders:  -     acetaminophen (TYLENOL) 500 mg tablet; Take 2 tablets (1,000 mg total) by mouth 3 (three) times a day  -     dexamethasone (DECADRON) 0.5 mg tablet; TAKE 1 TABLET (0.5 MG TOTAL) BY MOUTH DAILY WITH BREAKFAST  5. Unintentional weight loss  -     dexamethasone (DECADRON) 0.5 mg tablet; TAKE 1 TABLET (0.5 MG TOTAL) BY MOUTH DAILY WITH BREAKFAST  6. Loss of appetite  Assessment & Plan:  Patient wishes to continue dexamethasone might have been used before. New prescription sent. Counseled on potential adverse reactions or sequela of using glucocorticoid therapy chronically.  Orders:  -     dexamethasone (DECADRON) 0.5 mg tablet; TAKE 1 TABLET (0.5 MG TOTAL) BY MOUTH DAILY WITH BREAKFAST  7. Ambulatory dysfunction  Assessment & Plan:  Use DME to reduce fall risk and reduce back pain. Using 4PC today.  8. Palliative care by specialist  Assessment & Plan:   service used, #[id number].  Emotional / psychosocial support provided today.  Reviewed notes (SW, Care Coordination, Medical Oncology, Urology), labs (7/23/24 Cr 0.76, alb 3.9, A1c 6.2; 7/15/24 Cr 0.81, PSA 8.619, Hb 13.3), imaging + procedures (6/6/24 CTAP). See below for more data.  Return in about 2 months (around 9/30/2024).  Medication safety issues addressed - no driving under the influence of narcotics (including opioids), watch for adverse effects including AMS or respiratory depression (slowed breathing), keep medications stored in a safe/locked environment, do not use alcohol while opioids or other narcotics are in one's system, do not travel with more than the minimum number of tablets or capsules required for the trip.  I have personally queried the patient's controlled substance dispensing history in the Prescription Drug Monitoring Program in compliance with regulations before I have prescribed any controlled substances. The prescription history is consistent with prescribed therapy and  "our practice policies.    Orders:  -     acetaminophen (TYLENOL) 500 mg tablet; Take 2 tablets (1,000 mg total) by mouth 3 (three) times a day  -     dexamethasone (DECADRON) 0.5 mg tablet; TAKE 1 TABLET (0.5 MG TOTAL) BY MOUTH DAILY WITH BREAKFAST  9. Advanced care planning/counseling discussion  Assessment & Plan:  Time spent reviewing and completing advanced directives (the [facility] Advanced Directive). Time included in overall visit time below. Advanced directive counseling given. Reviewed with patient. All questions answered. Document signed and witnessed and will be scanned into the chart.      Subjective   Chief Complaint   Patient presents with    Follow-up    Cancer    Pain    Chronic Kidney Disease    Gait Problem    Counseling        History of Present Illness     Oliver Astudillo is a 73 y.o. male w/ prostate cancer metastatic to bone; diabetes, HTN+HLD, urinary retention and hydronephrosis s/p b/l nephrostomy tubes. He follows w/ Dr Coyne (Medical Oncology), Dr LENORE Andrea (Radiation Oncology).    Patient states that oxycodone 10 mg doses have been making him feel \"drowsy\". This is caused him to take less tablets per day than he is able. He does not feel gabapentin has been contributing to drowsiness, but is only taking it \"sometimes\" when pain is severe. Pain is 8/10 today, focused in his lower back. When an alternate opioid such as morphine is offered, patient declines.    Patient denies nausea, denies vomiting. He is using MiraLAX to help with constipation with some success noted. He has \"no appetite\" and has lost weight in recent weeks.      Current Outpatient Medications:     acetaminophen (TYLENOL) 500 mg tablet, Take 2 tablets (1,000 mg total) by mouth 3 (three) times a day, Disp: 180 tablet, Rfl: 2    Alcohol Swabs 70 % PADS, To clean the skin prior to injecting insulin, Disp: 100 each, Rfl: 1    Blood Glucose Monitoring Suppl (OneTouch Verio Reflect) w/Device KIT, Check blood sugars once daily. Please " substitute with appropriate alternative as covered by patient's insurance. Dx: E11.65, Disp: 1 kit, Rfl: 0    dexamethasone (DECADRON) 0.5 mg tablet, TAKE 1 TABLET (0.5 MG TOTAL) BY MOUTH DAILY WITH BREAKFAST, Disp: 30 tablet, Rfl: 1    Diclofenac Sodium (VOLTAREN) 1 %, Apply 2 g topically 4 (four) times a day, Disp: 100 g, Rfl: 3    Easy Touch Pen Needles 31G X 6 MM MISC, Use daily at bedtime, Disp: 100 each, Rfl: 3    enzalutamide (XTANDI) 40 mg capsule, Take 4 capsules (160 mg total) by mouth daily, Disp: 120 capsule, Rfl: 6    gabapentin (Neurontin) 300 mg capsule, Take 1 capsule (300 mg total) by mouth 3 (three) times a day, Disp: 90 capsule, Rfl: 2    glimepiride (AMARYL) 2 mg tablet, Take 1 tablet (2 mg total) by mouth daily with dinner, Disp: 90 tablet, Rfl: 3    glimepiride (AMARYL) 4 mg tablet, Take 1 tablet (4 mg total) by mouth daily with breakfast, Disp: 90 tablet, Rfl: 3    glucose blood (OneTouch Verio) test strip, Check blood sugars twice a daily. Please substitute with appropriate alternative as covered by patient's insurance. Dx: E11.65, Disp: 200 each, Rfl: 3    Levemir FlexPen 100 units/mL injection pen, Inject 20 Units under the skin daily at bedtime, Disp: 15 mL, Rfl: 1    lisinopril (ZESTRIL) 10 mg tablet, NARCISO TANMAY TABLETA VIA ORAL DIARIAMENTE, Disp: , Rfl:     metFORMIN (GLUCOPHAGE) 1000 MG tablet, Take 1 tablet (1,000 mg total) by mouth 2 (two) times a day with meals, Disp: 180 tablet, Rfl: 1    Multiple Vitamins-Minerals (Sentry) TABS, Take 1 tablet by mouth daily, Disp: , Rfl:     naloxone (NARCAN) 4 mg/0.1 mL nasal spray, Administer 1 spray into a nostril. If no response after 2-3 minutes, give another dose in the other nostril using a new spray., Disp: 1 each, Rfl: 0    omega-3-acid ethyl esters (LOVAZA) 1 g capsule, , Disp: , Rfl:     OneTouch Delica Lancets 33G MISC, Check blood sugars once daily. Please substitute with appropriate alternative as covered by patient's insurance. Dx:  "E11.65, Disp: 100 each, Rfl: 3    oxyCODONE (ROXICODONE) 10 MG TABS, Take 1 tablet (10 mg total) by mouth every 4 (four) hours as needed for moderate pain Max Daily Amount: 60 mg, Disp: 90 tablet, Rfl: 0    pantoprazole (PROTONIX) 40 mg tablet, TAKE 1 TABLET BY MOUTH EVERY DAY, Disp: 90 tablet, Rfl: 1    polyethylene glycol (MIRALAX) 17 g packet, Take 17 g by mouth daily as needed (for constipation), Disp: 100 each, Rfl: 1    rosuvastatin (CRESTOR) 10 MG tablet, Take 1 tablet (10 mg total) by mouth daily at bedtime, Disp: 90 tablet, Rfl: 1    sodium chloride, PF, 0.9 %, 10 mL by Intracatheter route daily Intracatheter flushing daily. May substitute prefilled syringe with normal saline 10 mL vials, 10 mL syringes, and 18 g blunt needles, Disp: 600 mL, Rfl: 2    Spacer/Aero-Holding Chambers (AEROCHAMBER MINI CHAMBER) BILLY, by Does not apply route see administration instructions, Disp: 1 Device, Rfl: 0    albuterol (Ventolin HFA) 90 mcg/act inhaler, Inhale 2 puffs every 6 (six) hours as needed for wheezing (Patient not taking: Reported on 7/30/2024), Disp: 18 g, Rfl: 0    ondansetron (Zofran ODT) 8 mg disintegrating tablet, Take 1 tablet (8 mg total) by mouth every 8 (eight) hours as needed for nausea or vomiting (Patient not taking: Reported on 7/30/2024), Disp: 20 tablet, Rfl: 3    senna-docusate sodium (SENOKOT S) 8.6-50 mg per tablet, Take 1 tablet by mouth daily (Patient not taking: Reported on 6/28/2024), Disp: 90 tablet, Rfl: 1    Objective   /72 (BP Location: Right arm, Patient Position: Sitting, Cuff Size: Standard)   Pulse 83   Temp 98 °F (36.7 °C) (Tympanic)   Resp 16   Ht 5' 8\" (1.727 m)   Wt 67.9 kg (149 lb 9.6 oz)   SpO2 95%   BMI 22.75 kg/m²   Physical Exam  Vitals reviewed.   Constitutional:       General: He is awake. He is not in acute distress.     Appearance: He is well-groomed and underweight. He is ill-appearing (chronically). He is not toxic-appearing.   HENT:      Head: " Normocephalic and atraumatic.      Right Ear: External ear normal.      Left Ear: External ear normal.   Eyes:      General: No scleral icterus.        Right eye: No discharge.         Left eye: No discharge.      Extraocular Movements: Extraocular movements intact.      Conjunctiva/sclera: Conjunctivae normal.      Pupils: Pupils are equal, round, and reactive to light.   Cardiovascular:      Rate and Rhythm: Normal rate.   Pulmonary:      Effort: Pulmonary effort is normal. No tachypnea, bradypnea, accessory muscle usage or respiratory distress.      Comments: Able to speak comfortably in short phrases on room air at rest.  Abdominal:      General: There is no distension.      Tenderness: There is no guarding.   Musculoskeletal:      Cervical back: Normal range of motion.      Right lower leg: No edema.      Left lower leg: No edema.   Skin:     General: Skin is dry.      Coloration: Skin is not pale.   Neurological:      Mental Status: He is alert and oriented to person, place, and time.      Cranial Nerves: No dysarthria or facial asymmetry.      Gait: Gait abnormal (slow gait; using 4PC).   Psychiatric:         Attention and Perception: Attention normal.         Mood and Affect: Mood normal.         Speech: Speech normal.         Behavior: Behavior normal. Behavior is cooperative.         Thought Content: Thought content normal.         Cognition and Memory: Cognition and memory normal.         Judgment: Judgment normal.      Comments: Reserved affect.          Recent labs:  Lab Results   Component Value Date/Time    SODIUM 136 07/23/2024 09:12 AM    K 3.9 07/23/2024 09:12 AM    BUN 14 07/23/2024 09:12 AM    CREATININE 0.76 07/23/2024 09:12 AM    GLUC 196 (H) 07/15/2024 12:22 PM    CALCIUM 9.8 07/23/2024 09:12 AM    AST 13 07/23/2024 09:12 AM    ALT 11 07/23/2024 09:12 AM    ALB 3.9 07/23/2024 09:12 AM    TP 7.1 07/23/2024 09:12 AM    EGFR 91 07/23/2024 09:12 AM     Lab Results   Component Value Date/Time     "HGB 13.3 07/15/2024 12:22 PM    WBC 6.61 07/15/2024 12:22 PM     07/15/2024 12:22 PM    INR 1.03 06/22/2023 05:00 AM    PTT 33 06/22/2023 05:00 AM     No results found for: \"[id number]\"    Recent Imaging:  Procedure: IR nephrostomy tube check/change/reposition/reinsertion/upsize    Result Date: 6/7/2024  Narrative: Bilateral nephrostogram and nephrostomy tube change Clinical History:  73 year-old male with history of prostate cancer with chronic indwelling bilateral nephrostomy catheters in place. Patient presented to the ED due to left flank pain and burning urination, now with concern for complicated UTI. Contrast: 10 mL of iohexol (OMNIPAQUE) Fluoro time: 2.1 MIN FL Number of Images: Multiple Radiation dose: 15 mGy Conscious sedation time: 0 Technique: The patient was brought to the interventional radiology suite and placed prone on the table. The existing bilateral nephrostomy tubes were prepped and draped in the usual sterile fashion. After a  view was obtained, contrast was injected into the existing catheter and multiple images of the urinary tract were obtained to the bladder. Findings: The bilateral nephrostomy catheters are seen to be in appropriate position, with the loop within the bilateral renal pelvises Intervention: The catheter was removed over a heavy-duty straight wire, and a new 10.2 Cypriot nephrostomy tube was advanced, and the loop formed within the renal pelvis. The catheter was then injected, confirming satisfactory position. This process was repeated for the contralateral side.     Impression: Impression: Exchange of bilateral 10.2 Cypriot nephrostomy tubes under fluoroscopic control. Workstation performed: [id number]     Procedure: CT abdomen pelvis with contrast    Result Date: 6/6/2024  Narrative: CT ABDOMEN AND PELVIS WITH IV CONTRAST INDICATION: Left flank pain. COMPARISON: 5/28/2024. TECHNIQUE: CT examination of the abdomen and pelvis was performed. Multiplanar 2D " reformatted images were created from the source data. This examination, like all CT scans performed in the [facility], was performed utilizing techniques to minimize radiation dose exposure, including the use of iterative reconstruction and automated exposure control. Radiation dose length product (DLP) for this visit: 520.68 mGy-cm IV Contrast: 100 mL of iohexol (OMNIPAQUE) Enteric Contrast: Not administered. FINDINGS: ABDOMEN LOWER CHEST: There is a stable 4 mm right lower lobe nodule. LIVER/BILIARY TREE: Unremarkable. GALLBLADDER: No calcified gallstones. No pericholecystic inflammatory change. SPLEEN: Unremarkable. PANCREAS: Unremarkable. ADRENAL GLANDS: Unremarkable. KIDNEYS/URETERS: There are bilateral nephrostomy catheters in place. There is mild thickening and enhancement of the left renal pelvis no hydronephrosis. STOMACH AND BOWEL: Unremarkable. APPENDIX: No findings to suggest appendicitis. ABDOMINOPELVIC CAVITY: No ascites. No pneumoperitoneum. No lymphadenopathy. VESSELS: Unremarkable for patient's age. PELVIS REPRODUCTIVE ORGANS: Unremarkable for patient's age. URINARY BLADDER: There is stable diffuse bladder wall thickening with mucosal enhancement and intraluminal gas. ABDOMINAL WALL/INGUINAL REGIONS: Unremarkable. BONES: There is a stable lytic lesion in L1 and diffuse sclerotic metastasis.     Impression: 1.  Slight thickening and enhancement of the left renal pelvis which may represent pyelitis given history of left flank pain. No hydronephrosis. Nephrostomy is in place. 2.  Diffusely thick-walled urinary bladder with mucosal enhancement and intraluminal gas suspicious for cystitis. 3.  Stable osseous metastasis. Workstation performed: [id number]     Procedure: MRI lumbar spine w wo contrast    Result Date: 5/29/2024  Narrative: MRI LUMBAR SPINE WITH AND WITHOUT CONTRAST INDICATION: L1 lesion.   Prostate cancer. COMPARISON: CT of the abdomen and pelvis 5/28/2024. TECHNIQUE:   Multiplanar, multisequence imaging of the lumbar spine was performed before and after gadolinium administration. . IV Contrast:  7 mL of Gadobutrol injection (SINGLE-DOSE) IMAGE QUALITY: Motion-degraded. FINDINGS: For the purposes of this dictation and to maintain consistency with the prior report, the last well-formed disc space is labeled as L5-S1. Recommend correlation with dedicated thoracic spine imaging to count the ribs prior to any planned future intervention to ensure accuracy/consistency of numbering. VERTEBRAL BODIES: Mild retrolisthesis of L5 on S1. Multiple marrow replacing/enhancing lesions are identified throughout the lumbar spine, most notably at L1 which replaces the entire vertebral body and results in a mild pathologic compression fracture with less than 25% height loss. There is extension of marrow replacement to the proximal right pedicle (series 4, image 12). There appears to be encroachment upon the right neural foramen without definite abutment of the exiting nerve root at this level.  Additional vertebral bodies are involved, notably L2 posteriorly (series 4, image 6), for example. Edema is noted to be associated with these lesions. SACRUM: No acute abnormality. T1 hypointense lesion of the left hemisacrum (series 2, image 5) and left iliac bone (series 2, image 3), which are also suspicious. DISTAL CORD AND CONUS:  Normal size and signal within the distal cord and conus. PARASPINAL SOFT TISSUES: No acute abnormality. LOWER THORACIC DISC SPACES: Spondylotic changes without acute critical central canal stenosis. LUMBAR DISC SPACES: Multilevel up to moderate degenerative disc disease. L1-L2: Disc bulge. Mild facet arthropathy. Mild bilateral neuroforaminal narrowing. No significant spinal canal stenosis. L2-L3: Disc bulge. Mild-moderate facet arthropathy. No significant neuroforaminal narrowing. Mild-moderate spinal canal stenosis. L3-L4: Disc osteophyte complex. Marked facet arthropathy,  right greater than left. Mild left and moderate right neuroforaminal narrowing. Moderate spinal canal stenosis. L4-L5: Disc bulge. Moderate-marked facet arthropathy. Mild-moderate bilateral neuroforaminal narrowing. Mild-moderate spinal canal stenosis. L5-S1: Disc bulge. Moderate facet arthropathy. Mild-moderate bilateral neuroforaminal narrowing. Mild spinal canal stenosis. POSTCONTRAST IMAGING: Enhancement is seen associated with the suspicious marrow replacing lesions discussed above. No convincing enhancement within the spinal canal. OTHER FINDINGS: Please refer to dedicated body CT of the abdomen and pelvis on 5/28/2024 for full visceral findings. Partially visualized bilateral percutaneous nephrostomy tubes, noting moderate right hydroureteronephrosis that is partially visualized.     Impression: 1. Multiple marrow replacing and enhancing lesions involving the lumbar spine, sacrum and iliac bones, which are suspicious for metastases. No convincing epidural involvement though there is probable encroachment on the right L1-L2 neural foramen. Of note, the L1 marrow replacing lesion results in a mild pathologic compression fracture. Recommend correlation with nuclear medicine bone scan and/or prostate-specific tracers in a PET scan, as clinically appropriate. 2. Multilevel lumbar spine spondylotic changes, as detailed above. The study was marked in EPIC for immediate notification. Workstation performed: [id number]     Procedure: CT abdomen pelvis with contrast    Result Date: 5/28/2024  Narrative: CT ABDOMEN AND PELVIS WITH IV CONTRAST INDICATION: back pain. History of prostate cancer with bilateral nephrostomy tubes. COMPARISON: Comparison is made to prior studies, the most recent is a CT scan dated March 21, 2024. TECHNIQUE: CT examination of the abdomen and pelvis was performed. Multiplanar 2D reformatted images were created from the source data. This examination, like all CT scans performed in the [facility]  [facility], was performed utilizing techniques to minimize radiation dose exposure, including the use of iterative reconstruction and automated exposure control. Radiation dose length product (DLP) for this visit: 517.37 mGy-cm IV Contrast: 100 mL of iohexol (OMNIPAQUE) Enteric Contrast: Not administered. FINDINGS: ABDOMEN LOWER CHEST: No clinically significant abnormality in the visualized lower chest. LIVER/BILIARY TREE: Unremarkable. GALLBLADDER: No calcified gallstones. No pericholecystic inflammatory change. SPLEEN: Few calcified granuloma PANCREAS: Unremarkable. ADRENAL GLANDS: Unremarkable. KIDNEYS/URETERS: Bilateral nephrostomy catheters are in good position. No collecting system dilatation both nephrograms are symmetric. No calculi. STOMACH AND BOWEL: Diverticulosis with no findings of acute diverticulitis. APPENDIX: No findings to suggest appendicitis. ABDOMINOPELVIC CAVITY: No ascites. No pneumoperitoneum. No lymphadenopathy. VESSELS: Unremarkable for patient's age. PELVIS REPRODUCTIVE ORGANS: Unremarkable for patient's age. URINARY BLADDER: Bladder wall thickening and increased bladder wall enhancement suspicious for cystitis. ABDOMINAL WALL/INGUINAL REGIONS: Unremarkable. BONES: Multiple sclerotic metastases redemonstrated throughout the visualized axial and appendicular skeleton,1 mostly stable, except for a new lytic lesion in the L1 vertebral body with an enhancing soft tissue component, and focal destruction of both the anterior and posterior cortex (series 2 image 71. Chronic lower left rib fractures.     Impression: Bladder wall thickening with increased bladder wall enhancement suspicious for cystitis. Bilateral nephrostomy tubes in good position with no collecting system dilatation and no CT evidence of acute pyelonephritis. New lytic L1 vertebral body lesion with a soft tissue component causing focal erosion of both the anterior and posterior cortex as described above. Additional  "sclerotic metastases are stable. The study was marked in EPIC for immediate notification. Workstation performed: [id number]      30 minutes total time spent on 7/30/2024 in caring for this patient including obtaining/reviewing history, symptom assessment and management, medication review / adjustment, psychosocial support, reviewing / ordering tests, medicines, imaging, procedures, advanced directives, supportive listening, anticipatory guidance, patient/family education, and documenting in the medical record. All patient/family questions were answered during this discussion.    Isaiah Mcdonald MD  [facility]  [phone]    Portions of this document may have been created using dictation software and as such some \"sound alike\" terms may have been generated by the system. Do not hesitate to contact me with any questions or clarifications.   "

## 2024-08-02 ENCOUNTER — TELEPHONE (OUTPATIENT)
Age: 74
End: 2024-08-02

## 2024-08-02 NOTE — TELEPHONE ENCOUNTER
[facility] - Order#[id number], [id number]    Scanned copy into encounter    Placed in Dr. Bates's folder in precepting room    Fax when complete: [phone] or [phone]

## 2024-08-05 ENCOUNTER — HOSPITAL ENCOUNTER (OUTPATIENT)
Dept: NON INVASIVE DIAGNOSTICS | Facility: HOSPITAL | Age: 74
Discharge: HOME | End: 2024-08-05
Attending: INTERNAL MEDICINE | Admitting: RADIOLOGY
Payer: COMMERCIAL

## 2024-08-05 DIAGNOSIS — C61 PROSTATE CANCER METASTATIC TO BONE (HCC): ICD-10-CM

## 2024-08-05 DIAGNOSIS — Z93.6 NEPHROSTOMY STATUS (HCC): Primary | ICD-10-CM

## 2024-08-05 DIAGNOSIS — C79.51 PROSTATE CANCER METASTATIC TO BONE (HCC): ICD-10-CM

## 2024-08-05 PROCEDURE — C1729 CATH, DRAINAGE: HCPCS | Performed by: RADIOLOGY

## 2024-08-05 PROCEDURE — 50435 EXCHANGE NEPHROSTOMY CATH: CPT

## 2024-08-05 PROCEDURE — C1769 GUIDE WIRE: HCPCS | Performed by: RADIOLOGY

## 2024-08-05 RX ORDER — LIDOCAINE WITH 8.4% SOD BICARB 0.9%(10ML)
SYRINGE (ML) INJECTION AS NEEDED
Status: COMPLETED | OUTPATIENT
Start: 2024-08-05 | End: 2024-08-05

## 2024-08-05 RX ADMIN — Medication 4 ML: at 09:50

## 2024-08-05 RX ADMIN — IOHEXOL 8 ML: 350 INJECTION, SOLUTION INTRAVENOUS at 09:59

## 2024-08-05 RX ADMIN — Medication 4 ML: at 09:41

## 2024-08-05 NOTE — DISCHARGE INSTRUCTIONS
Cuidado de la sonda de nefrostomía    LO QUE NECESITAS SABER:  Jasmin sonda de nefrostomía es un catéter (tubo de plástico millan) que se inserta a través de la piel hasta el riñón. La sonda de nefrostomía drena la orina de luevano riñón a jasmin bolsa colectora fuera de luevano cuerpo. Es posible que necesite jasmin sonda de nefrostomía cuando algo bloquea el flujo normal de orina. Jasmin sonda de nefrostomía se puede usar por un período de tiempo corto o markus. La sonda de nefrostomía sale de luevano espalda, por lo que necesitará que alguien le ayude a cuidarla.        INSTRUCCIONES DE DESCARGA:     Cómo limpiar la piel alrededor de la sonda de nefrostomía y cambiar el vendaje: Dado que la sonda de nefrostomía sale por la espalda, no podrá cuidarla usted mismo. Pídale a alguien que siga las instrucciones generales a continuación para revisar y cuidar luevano tubo de nefrostomía.  Reúna los elementos que necesitará.        Almohadilla desechable (de un solo uso) y jasmin toallita limpia  Jabón común, agua tibia y guantes médicos nuevos  Vendajes de gasa estériles  Vendaje adhesivo transparente o cinta médica  reed de la piel  Película protectora de la piel  Bolsa de basura  Quite el vendaje anabelle y revise el sitio de entrada del tubo. Senthil que el paciente se acueste de lado con el sitio de entrada de la sonda de nefrostomía hacia arriba. Coloque la almohadilla inferior donde atrapará el drenaje mientras trabaja con la sonda de nefrostomía.  Lávate las cleo con jabón y agua. Póngase guantes médicos nuevos.  Retire suavemente el vendaje anabelle, sin tirar del tubo. Senthil esto sujetando la piel al lado del tubo con jasmin mano. Con la otra mano, retire suavemente la cinta adhesiva y la reed cutánea tirando en la misma dirección en que crece el vello. No toque el lado del vendaje que se coloca sobre o alrededor del tubo. Deseche el vendaje y la reed cutánea en jasmin bolsa de basura.  Busque signos de infección, barron enrojecimiento e hinchazón de la  piel. Informe cualquier cambio en la piel a los proveedores de atención médica.  Limpie el sitio de entrada del tubo.  Sostenga el tubo en luevano lugar para evitar que se salga mientras limpia a luevano alrededor.  Deberá limpiar el área dos veces. Para la primera limpieza, humedezca jasmin venda de gasa nueva con agua y jabón. Comience en el sitio de entrada del tubo. Limpie la piel en círculos, alejándose del sitio de entrada. Retire la taco y cualquier otro material con la gasa. Senthil esto tantas veces barron sea necesario. Use jasmin nueva venda de gasa cada vez que limpie el área, alejándose del sitio de entrada.  Para la segunda limpieza, humedezca jasmin gasa nueva con agua. Comience en el sitio de entrada del tubo. Limpie la piel en círculos, alejándose del sitio de entrada. Use jasmin nueva venda de gasa cada vez que limpie el área, alejándose del sitio de entrada.   Acaricie suavemente la piel con un paño limpio para secarla.     Aplique la reed cutánea y los vendajes.   Enrolle un vendaje para que sea más grueso y colóquelo debajo del lugar donde el tubo entra en la piel. Colóquelo para sostener el tubo y evite que se doble o doble. Pegue el vendaje con cinta adhesiva en luevano lugar y aplique más vendajes si se lo indica un proveedor de atención médica.  Lleve el tubo hacia el frente y péguelo con cinta adhesiva a la piel. No estire demasiado la sonda, ya que esto puede sacar la sonda de nefrostomía.  Con qué frecuencia cambiar el vendaje. Cambie el vendaje alrededor del tubo cada dos días. Si rose vendajes se ensucian o mojan, cámbielos de inmediato y con la frecuencia que sea necesaria. Si la sonda de nefrostomía se va a utilizar percy un período prolongado, es necesario cambiarla cada 2 o 3 meses. Los proveedores de atención médica le dirán cuándo debe programar jasmin gabriel para que le cambien la sonda.    Cómo cuidar la bolsa de drenaje de orina:  Pregunte si necesita medir y anotar cuánta orina hay en la bolsa antes de  vaciarla. Drene la orina de la bolsa de drenaje cuando esté entre ½ y ? completo. Joey el marisol en la parte inferior de la bolsa para vaciar la orina en el inodoro.  Es posible que deba quitar la bolsa de drenaje de la sonda de nefrostomía para cambiarla. Si es así, coloque jasmin nueva bolsa de drenaje firmemente en la sonda de nefrostomía.  Cómo prevenir problemas con luevano tubo de nefrostomía:  Cambiar vendajes, dirigido. Berkley ayuda a prevenir infecciones. Deseche o limpie la bolsa de drenaje según las indicaciones de luevano proveedor de atención médica.    Limpie los extremos de conexión de la bolsa de drenaje con alcohol antes de volver a conectar la bolsa al tubo. Berkley ayuda a prevenir infecciones.    Mantenga el tubo pegado a luevano piel y conectado a jasmin bolsa de drenaje colocada debajo del nivel de rose riñones. Berkley ayuda a evitar que la orina regrese a los riñones. Puede usar jasmin pequeña bolsa de drenaje atada a la pierna para que pueda moverse más fácilmente.    Revise el catéter para asegurarse de que esté en luevano lugar después de cambiarse de ropa o realizar otras actividades. No use ropa ajustada sobre el tubo. Coloque el tubo sobre luevano muslo en lugar de debajo de él cuando esté sentado. Asegúrese de que nada tire de la sonda de nefrostomía cuando se mueva.    Cambie de posición si ve poca o ninguna orina en la bolsa de drenaje. Verifique si el tubo de orina está torcido o doblado. Asegúrese de no estar sentado o acostado    En el tubo. Si no hay torceduras y hay poca o nada de orina en la bolsa de drenaje, informe a luevano proveedor de atención médica.     Enjuague el tubo barron se indica. Algunas trompas se enjuagan jasmin vez al día con 10 ml de NSS. Se le dará jasmin receta para los enjuagues.  Para enjuagar el tubo de nefrostomía, limpie ambas conexiones con alcohol. Desenrosque el tubo de la bolsa de drenaje y gire la jeringa de solución salina en el tubo de nefrostomía y enjuague enérgicamente. Retire la jeringa y vuelva a  girar el tubo de la bolsa de drenaje en el tubo de nefrostomía.  Mantenga el sitio cubierto mientras se ducha. Pegue con cinta un trozo de plástico adhesivo transparente sobre el vendaje para mantenerlo seco mientras se ducha. No tome pema de fan.    Comuníquese con [facility] [phone] (PACIENTES DE HERNANDEZ: Comuníquese con Radiología Intervencionista al [phone]) (PACIENTES DE QUETA: Comuníquese con Radiología Intervencionista al [phone]) si:  La piel alrededor de la sonda de nefrostomía está enrojecida, hinchada, con picazón o con sarpullido.  Tiene fiebre superior a 101 o escalofríos.   Tiene dolor en la parte inferior de la espalda o en la cadera.   Hay cambios en el aspecto o el olor de la orina.   Tiene poca o ninguna orina drenando del tubo de nefrostomía.  Tiene náuseas y está vomitando.   La elida lorenza en luevano tubo se ha movido, o el tubo es más markus que cuando se lo colocó.   Tiene preguntas o inquietudes sobre luevano condición o atención.  La sonda de nefrostomía sale completamente.  Hay taco, pus o mal olor que sale del lugar donde el tubo entra en la piel.  La orina se escapa alrededor del tubo.      Las siguientes farmacias tienen jeringas de lavado.      The following pharmacies carry the flush syringes.       [facility]                     [facility]                         [facility]       [address]                     [address]                    [phone]  [place]                       [place]  Phone [phone]            Phone [phone]                 [facility]                                                                                                   [phone]  [facility]             [facility]                             [address] [place], [place] [address] [facility] [place]                      [place]                                  [phone]  Phone [phone]            Phone [phone]    [facility]                                                                         [facility] [phone]  [address]  [place]   Phone [phone]

## 2024-08-05 NOTE — SEDATION DOCUMENTATION
B/l PCN tubes exchanged without difficulty. Pt tolerated well. Tubes suture/secured with tape/gauze and abd strap

## 2024-08-06 NOTE — TELEPHONE ENCOUNTER
"Faxed completed form.     Placed in \"to be scanned\" bin     [facility] - Order#[id number], [id number]        "

## 2024-08-07 ENCOUNTER — EVALUATION (OUTPATIENT)
Facility: CLINIC | Age: 74
End: 2024-08-07
Payer: COMMERCIAL

## 2024-08-07 DIAGNOSIS — R53.81 PHYSICAL DECONDITIONING: ICD-10-CM

## 2024-08-07 DIAGNOSIS — Z74.09 IMPAIRED FUNCTIONAL MOBILITY, BALANCE, GAIT, AND ENDURANCE: Primary | ICD-10-CM

## 2024-08-07 DIAGNOSIS — R53.1 WEAKNESS: ICD-10-CM

## 2024-08-07 PROCEDURE — 97162 PT EVAL MOD COMPLEX 30 MIN: CPT

## 2024-08-07 NOTE — PROGRESS NOTES
PT Evaluation          POC expires Unit limit Auth Expiration date PT/OT + Visit Limit? Co-Insurance   10/2/24  PENDING BOMN Yes                               Visit/Unit Tracking  AUTH Status:  Date 24              Pending  Used 1               Remaining                           Today's date: 2024  Patient name: Oliver Astudillo  : 1950  MRN: [id number]  Referring provider: Dave Alexandra DO  Dx:   Encounter Diagnosis     ICD-10-CM    1. Physical deconditioning  R53.81 Ambulatory Referral to Physical Therapy            Assessment  Assessment details: Patient is a 73 y.o. 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 Patient dx with prostate cancer approx 1 year ago and reports his cancer metastasize to his spine in 2024. He is currently undergoing radiation and taking oral medication. Currently, pt reports no numbness, focal LE weakness, or  recent changes in his bowel/bladder (hx of catheter for past year). Primary complaint is low back pain which worsens with prolonged ambulation and standing; reports relief with lying down. Pt arrived to therapy with RW and reports occasional use of SPC or wheelchair dependent on distance. He reports he requires A with ADLs including bathing and dressing. On evaluation, coordiation was slow but intact, and light touch sensation was intact. MMT revealed decreased bilateral LE strength and most weakest in his hip flexors (4-/5). He is unable to complete transfers without an AD and required BUE support to complete the FSTS. He required >50 seconds to FSTS indicating HIGH fall risk and significant LE weakness. Pt also failed condition 2 of the mCTSIB indicating poor integration of the vestibular and proprioception to maintain his balance. Plan to  further to investigate with his overall mobility with 10MWT, 6MWT, TUG, and SOLOMON. Die to low back pain, plan to refer patient to ortho/cancer specialist. Patient to benefit from continued skilled PT services to improve mobility, gait dysfunction, static/dynamic balance to improve his overall independence in both the household and in the community, .       Impairments: Abnormal coordination, Abnormal gait, Abnormal muscle tone, Abnormal or restricted ROM, Activity intolerance, Impaired balance, Impaired physical strength, Lacks appropriate HEP, Poor posture, Poor body mechanics, Pain with function, Safety issue, Weight-bearing intolerance, Abnormal movement, Difficulty understanding, Abnormal muscle firing  Understanding of Dx/Px/POC: Good  Prognosis: Fair    Patient verbalized understanding of POC.         Please contact me if you have any questions or recommendations. Thank you for the referral and the opportunity to share in Oliver Astudillo's care.        Plan  Plan details: Balance PT/deconditioning (Cancer Care) and referral for ortho PT to address low back pain   Patient would benefit from: PT Eval and Skilled PT  Planned modality interventions: Biofeedback, Cryotherapy, TENS, Thermotherapy  Planned therapy interventions: Abdominal trunk stabilization, ADL training, Balance, Balance/WB training, Breathing training, Body mechanics training, Coordination, Functional ROM exercises, Gait training, HEP, Joint Mobilization, Manual Therapy, Green taping, Motor coordination training, Neuromuscular re-education, Patient education, Postural training, Strengthening, Stretching, Therapeutic activities, Therapeutic exercises, Therapeutic training, Transfer training, Activity modification, Work reintegration  Frequency: 1-2x  Duration in weeks: 8 weeks  Plan of Care beginning date: 08/7/2024  Plan of Care expiration date: 8 weeks - 10/2/2024  Treatment plan discussed with: Patient       Goals  Short Term Goals (4 weeks):     - Patient will improve time on TUG by 2.9 seconds to facilitate improved safety in all ambulation  - Patient will be independent in basic HEP 2-3 weeks  - Patient will improve 5xSTS score by 2.3 seconds to promote improved LE functional strength needed for ADLs    Long Term Goals (12 weeks):  - Patient will be independent in a comprehensive home exercise program  - Patient will improve scoring on DGI by 2.6 points to progress safety  - Patient will improve gait speed by 0.18 m/s to improve safety with community ambulation  - Patient will improve SOLOMON by 6 points in order to improve static balance and reduce risk for falls  - Patient will improve scoring on FGA by 4 points to progress safety with dynamic tasks  - Patient will be able to demonstrate HT in gait without veering  - Patient will improve 6 Minute Walk Test score by 190 feet to promote improved cardiovascular endurance  - Patient will report 50% reduction in near falls in order to improve safety with functional tasks and reduce his risk for falls  - Patient will report going on walks at least 3 days per week to promote independence and improved cardiovascular endurance  - Patient will be able to ascend/descend stairs reciprocally with 1 UE assist to promote independence and safety with ADLs  - Patient will report 50% reduction in near falls when ambulating on uneven terrain      Cut off score    All date taken from [facility] Neuro Section or Rehab Measures      Solomon/56  MDC: 6 pts  Age Norms:  60-69: M - 55   F - 55  70-79: M - 54   F - 53  80-89: M - 53   F - 50 5xSTS: Mark et al 2010  MDC: 2.3 sec  Age Norms:  60-69: 11.4 sec  70-79: 12.6 sec  80-89: 14.8 sec   TUG  MDC: 4.14 sec  Cut off score:  >13.5 sec community dwelling adults  >32.2 frail elderly  <20 I for basic transfers  >30 dependent on transfers 10 Meter Walk Test: Baltazar al 2011  MDC: 0.18 m/s  20-29: M - 1.35 m   F - 1.34 m  30-39: M - 1.43 m   F - 1.34 m  40-49: M - 1.43 m    F - 1.39 m  50-59: M - 1.43 m   F - 1.31 m  60-69: M - 1.34 m   F - 1.24 m  70-79: M - 1.26 m   F - 1.13 m  80-89: M - 0.97 m   F - 0.94 m    Household Ambulator < 0.4 m/s  Limited Community Ambulator 0.4 - 0.8 m/s  Community Ambulator 0.8 - 1.2 m/s  Safely cross the street > 1.2 m/s   FGA  MCID: 4 pts  Geriatrics/community < 22/30 fall risk  Geriatrics/community < 20/30 unexplained falls    DGI  MDC: vestibular - 4 pts  MDC: geriatric/community - 3 pts  Falls risk <19/24 mCTSIB  Norm: 20-60 yrs  Eyes open firm: norm sway 0.21-0.48  Eyes closed firm: norm sway 0.48-0.99  Eyes open foam: norm sway 0.38-0.71  Eyes closed foam: norm sway 0.70-2.22   6 Minute Walk Test  MDC: 190.98 ft  MCID: 164 ft    Age Norms  60-69: M - 1876 ft (571.80 m)  F - 1765 ft (537.98 m)  70-79: M - 1729 ft (527.00 m)  F - 1545 ft (470.92 m)  80-89: M - 1368 ft (416.97 m)  F - 1286 ft (391.97 m) ABC: Tru & Yadi, 2003  <67% increased risk for falls   Troy-Deepa Monofilaments  Evaluator Size:        Force (grams):          Hand/Dorsal Thresholds:        Plantar Thresholds:  - 1.65                       - 0.008                       - Normal                                 - Normal  - 2.36                       - 0.02                         - Normal                                 - Normal  - 2.44                       - 0.04                         - Normal                                 - Normal  - 2.83                       - 0.07                         - Normal                                 - Normal  - 3.22                       - 0.16                         - Diminished light touch          - Normal  - 3.61                       - 0.40                         - Diminished light touch          - Normal  - 3.84                       - 0.60                         - Diminished protective           - Diminished light touch  - 4.08                       - 1.00                         - Diminished protective           -  Diminished light touch  - 4.17                       - 1.40                         - Diminished protective           - Diminished light touch  - 4.31                       - 2.00                         - Diminished protective           - Diminished light touch  - 4.56                       - 4.00                         - Loss of protective sense      - Diminished protective  - 4.74                       - 6.00                         - Loss of protective sense      - Diminished protective  - 4.93                       - 8.00                         - Loss of protective sense      - Diminished protective  - 5.07                       - 10.0                         - Loss of protective sense     - Loss of protective sense  - 5.18                       - 15.0                         - Loss of protective sense     - Loss of protective sense  - 5.46                       - 26.0                         - Loss of protective sense     - Loss of protective sense  - 5.88                       - 60.0                         - Loss of protective sense     - Loss of protective sense  - 6.10                       - 100                          - Loss of protective sense     - Loss of protective sense  - 6.45                       - 180                          - Loss of protective sense     - Loss of protective sense  - 6.65                       - 300                          - Deep pressure sense only  - Deep pressure sense only         Subjective    History of Present Illness - Polish speaking used  (Devi, [id number])  - Mechanism of injury: Patient arrived to therapy with his RW with complaints of low back pain, deconditioning, BLE weakness, and impaired balance secondary to history of prostate cancer which is metastatic to the spine. Reports he has NO spinal precautions however, has not seen an orthopedic or spinal doctor for his spine; only seeing an oncologist. Reports increased low back pain with prolonged  ambulation and standing for an lower period of time. Currently, patient is undergoing radiation and oral chemotherapy. His last radiation was in 2024. Patient reports he had 1 fall in the past year due to tripping over his dog but reports unsteadiness and fear of falls.   Reports he does not have any spinal precautions however, p has not seen an neurosurgeon or orthopedic doctor.       - Primary AD: Rolling wakler in the community; sometimes use the SPC or the wheelchair depending on the distance   - Assist level at home: A with ADLs by his wife  - Decreased fine motor tasks: No    Patient goal: 1) To reduce back pain 2) Improve strength and balance    Pain  - Current pain ratin/10  - At best pain ratin/10  - At worst pain rating: 10/10  - Location: Low back pain  - Aggravating factors: Walking and standing    Social Support  - Steps to enter house: 2 TAWANA   - Stairs in house: First floor set up    - Lives in: 2SH  - Lives with: Wife and family    - Hand dominance: Right handed    Treatments  -Previous Treatment: Home PT  Current Treatment: N/A   Imaging:  Lumbar MRI - 2024  1. Multiple marrow replacing and enhancing lesions involving the lumbar spine, sacrum and iliac bones, which are suspicious for metastases. No convincing epidural involvement though there is probable encroachment on the right L1-L2 neural foramen. Of   note, the L1 marrow replacing lesion results in a mild pathologic compression fracture. Recommend correlation with nuclear medicine bone scan and/or prostate-specific tracers in a PET scan, as clinically appropriate.             Objective         LE MMT  - R Hip Flexion: 4/5  L Hip Flexion: 4/5  - R Hip Abduction: 4/5  L Hip Abduction: 4/5  - R Hip Adduction: 4/5  L Hip Adduction: 4/5  - R Knee Extension: 5/5  L Knee Extension: 5/5  - R Knee Flexion: 5/5  L Knee Flexion: 5/5  - R Ankle DF: 4/5   L Ankle DF: 4/5    Sensation  - Light touch: Intact bilateral LE     Coordination  -  Heel to Shin: Slow to complete  - Alternate Toe Taps: slow to complete  - Finger to Nose: slow to complete  - Finger to Finger: slow to complete    Reflexes/Clonus  - Clonus: No, 0 beat  - Patellar DTR: 1+: Diminished        OT Screen  - Difficulty w/ clothing fasteners: No  - Difficulty w/ bathing: Yes  - Difficulty w/ dressing: Yes  - Difficulty w/ toileting: Yes      - Difficulty w/ medication management: No      Gait  - Abnormalities: Forward posture, decreased step length, decreased trunk rotation                Outcome Measures Initial Eval  8/7/24        5xSTS 54.19 sec with BUE support        TUG  - Regular  - Cognitive   Defer        10 meter Defer        SOLOMON Defer        FGA Defer        DGI Defer        mCTSIB  - FTEO (firm)  - FTEC (firm)  - FTEO (foam)  - FTEC (foam)   30 sec  7 sec  NT sec  NT sec        6MWT Defer                                     Precautions: Prostate cancer with metastatic  to his spine, catheter bag, oral chemotherapy, radiation  Past Medical History:   Diagnosis Date    COVID-19 01/15/2024    Diabetes mellitus (HCC)     Elevated PSA 06/21/2023    High cholesterol     Hypertension     Prostate cancer (HCC)

## 2024-08-08 ENCOUNTER — TELEPHONE (OUTPATIENT)
Age: 74
End: 2024-08-08

## 2024-08-08 NOTE — TELEPHONE ENCOUNTER
[facility] calling in inquiring provider Hu Palafox's last name and spelling, reporting that they sent in a list of medications to palliative care and got Hu's signature, but wasn't sure of the last name- so they could document that the signature was received from palliative care.  I provided last name and spelling. They had no further questions or concerns.

## 2024-08-09 ENCOUNTER — OFFICE VISIT (OUTPATIENT)
Dept: RADIATION ONCOLOGY | Facility: HOSPITAL | Age: 74
End: 2024-08-09
Attending: STUDENT IN AN ORGANIZED HEALTH CARE EDUCATION/TRAINING PROGRAM
Payer: COMMERCIAL

## 2024-08-09 VITALS
BODY MASS INDEX: 22.58 KG/M2 | HEART RATE: 74 BPM | OXYGEN SATURATION: 98 % | WEIGHT: 149 LBS | TEMPERATURE: 97.5 F | SYSTOLIC BLOOD PRESSURE: 126 MMHG | HEIGHT: 68 IN | DIASTOLIC BLOOD PRESSURE: 84 MMHG | RESPIRATION RATE: 18 BRPM

## 2024-08-09 DIAGNOSIS — C79.51 PROSTATE CANCER METASTATIC TO BONE (HCC): Primary | ICD-10-CM

## 2024-08-09 DIAGNOSIS — C61 PROSTATE CANCER METASTATIC TO BONE (HCC): Primary | ICD-10-CM

## 2024-08-09 PROCEDURE — 99211 OFF/OP EST MAY X REQ PHY/QHP: CPT | Performed by: STUDENT IN AN ORGANIZED HEALTH CARE EDUCATION/TRAINING PROGRAM

## 2024-08-09 PROCEDURE — 99024 POSTOP FOLLOW-UP VISIT: CPT | Performed by: STUDENT IN AN ORGANIZED HEALTH CARE EDUCATION/TRAINING PROGRAM

## 2024-08-09 NOTE — PROGRESS NOTES
Oliver Astudillo 1950 is a 73 y.o. male   with Stage IVB prostate cancer with diffuse osseous disease with severe pain in the lumbar spine. On 24 he completed a course of palliative RT to T11-L5 to a dose of 3000cGy in 10 fractions. He presents today for 1 month EOT follow up visit.       24 Urology - NADYA Spears  His last PSA in April was 6.57.   He is on Lupron and Xtandi.   Completed palliative radiation to his lumbar spine.   Lupron 45 mg given today  -Follow-up in 6 months for his next injection    24 Hematology Oncology - Dr. Coyne  2024 Zytiga discontinued for PSA 6.5. Xtandi initiated.   Repeat PSA in 6 weeks.     Upcomin24 Dr. Coyne  10/7/24 Dr. Mcdonald  25 NADYA Spears    Follow up visit     Oncology History   Prostate cancer metastatic to bone (HCC)   2023 Initial Diagnosis    May 2023 patient presented with urinary frequency. . CT showed distended urinary bladder with bilateral hydronephrosis. Urinary bladder mass inseparable from the prostate. Extraprostatic extension noted. Bone scan showed indeterminate activity at T11 vertebral body. Bilateral nephrostomy tubes and Cazares catheter placed. Prostate biopsy showed adenocarcinoma, Liana 9.      2023 Biopsy    A. Prostate, right lateral base:  - Prostatic adenocarcinoma, Finley score 4 + 5 = 9, Prognostic Grade Group 5,  involving 90% of 1 needle core  and measuring  11 mm in length.        B. Prostate, right medial base:  - Prostatic adenocarcinoma, Liana score 4 + 5 = 9, Prognostic Grade Group 5,  involving 70% of 1 needle core  and measuring  10 mm in length.      C. Prostate, right lateral mid:  - Prostatic adenocarcinoma, Finley score 4 + 5 = 9, Prognostic Grade Group 5,  involving 90% of 1 needle core  and measuring  12 mm in length.      Comment: Immunohistochemistry for a prostate multiplex stain (p63, K903, and P504S) and NKX3.1 demonstrate invasive carcinoma.     D. Prostate, right  medial mid:  - Prostatic adenocarcinoma, Liana score 4 + 5 = 9, Prognostic Grade Group 5,  involving 95% of 1 needle core  and measuring  15 mm in length.      E. Prostate, right lateral apex:  - Prostatic adenocarcinoma, [place] score 4 + 5 = 9, Prognostic Grade Group 5,  involving 90% of 1 needle core  and measuring  12 mm in length.   - Perineural invasion identified.     Comment: Immunohistochemistry for a prostate multiplex stain (p63, K903, and P504S) and NKX3.1 demonstrate invasive carcinoma.     F. Prostate, right medial apex:  - Prostatic adenocarcinoma, [place] score 4 + 5 = 9, Prognostic Grade Group 5,  involving 100% of 1 needle core  and measuring  20 mm in length.      G. Prostate, left lateral base:  - Prostatic adenocarcinoma, Liana score 4 + 5 = 9, Prognostic Grade Group 5,  involving 75% of 1 needle core  and measuring  10 mm in length.   - Perineural invasion identified.  - Lymphovascular invasion is identified.     H. Prostate, left medial base:  - Prostatic adenocarcinoma, Liana score 4 + 5 = 9, Prognostic Grade Group 5,  involving 95% of 1 needle core  and measuring  14 mm in length.   - Perineural invasion identified.     Comment: There is a focus of closely approximated gastrointestinal epithelium; NKX3.1 shows no definite invasion of the GI epithelium.      I. Prostate, left lateral mid:  - Prostatic adenocarcinoma, [place] score 4 + 5 = 9, Prognostic Grade Group 5,  involving 95% of 1 needle core  and measuring  12 mm in length.       J. Prostate, left medial mid:  - Prostatic adenocarcinoma, Liana score 4 + 5 = 9, Prognostic Grade Group 5,  involving 85% of 1 needle core  and measuring  13 mm in length.       Comment: Immunohistochemistry for a prostate multiplex stain (p63, K903, and P504S) and NKX3.1 demonstrate invasive carcinoma.     K. Prostate, left lateral apex:  - Prostatic adenocarcinoma, [place] score 4 + 5 = 9, Prognostic Grade Group 5,  involving 25% of 1 needle core   "and measuring  3 mm in length.        L. Prostate, left medial apex :  - Prostatic adenocarcinoma, Liana score 4 + 5 = 9, Prognostic Grade Group 5,  involving 95% of 1 needle core  and measuring  15 mm in length.     - Perineural invasion identified.     Comment:   Cribriform glands are present within the pattern 4 component.  This feature has been associated with adverse clinical outcomes and molecular features typically seen in advanced disease.  (The 2019 Genitourinary Pathology Society (GUPS) White Paper on Contemporary Grading of Prostate Cancer. Arch Pathol Lab Med. 2021 Apr 1;145(4):461-493.)     7/5/2023 -  Hormone Therapy    Firmagon loading dose on 7/5/23, followed by Lupron      8/28/2023 -  Cancer Staged    Staging form: Prostate, AJCC 8th Edition  - Clinical: Stage IVB (cT4, cN1, cM1b, PSA: 145, Grade Group: 5) - Signed by Dov Andrea MD on 8/28/2023  Prostate specific antigen (PSA) range: 20 or greater  Histologic grading system: 5 grade system       9/2023 - 6/2024 Chemotherapy    Zytiga 250 mg PO daily     6/2024 -  Chemotherapy    Xtandi      6/24/2024 - 7/8/2024 Radiation    The patient saw @[facility] for radiation treatment. This is the current list of radiation treatment:    Plan ID Energy Fractions Dose per Fraction (cGy) Dose Correction (cGy) Total Dose Delivered (cGy) Elapsed Days   T11_L5 Spine 10X/6X 10 / 10 300 0 3,000 14            Review of Systems:  Review of Systems   Constitutional:  Positive for appetite change (loss of appetite).   HENT: Negative.     Eyes: Negative.         Wears glasses    Respiratory: Negative.     Cardiovascular: Negative.    Gastrointestinal:  Positive for constipation (\"feels stuck\").        Last BM on Wednesday    Endocrine: Negative.    Genitourinary:  Positive for dysuria.   Musculoskeletal:  Positive for arthralgias, back pain and gait problem (in wheelchair).   Skin: Negative.    Allergic/Immunologic: Negative.    Neurological:  Positive for " dizziness (when standing for too long).   Hematological: Negative.    Psychiatric/Behavioral: Negative.         Clinical Trial: no  Teaching N/A     Health Maintenance   Topic Date Due    COVID-19 Vaccine (1) Never done    DM Eye Exam  Never done    Zoster Vaccine (1 of 2) Never done    RSV Vaccine Age 60+ Years (1 - 1-dose 60+ series) Never done    Influenza Vaccine (1) 09/01/2024    Diabetic Foot Exam  10/23/2024    PT PLAN OF CARE  09/06/2024    HEMOGLOBIN A1C  01/23/2025    Medicare Annual Wellness Visit (AWV)  04/26/2025    Depression Screening  06/12/2025    BMI: Adult  07/30/2025    Fall Risk  08/07/2025    Colorectal Cancer Screening  01/08/2027    Hepatitis C Screening  Completed    Pneumococcal Vaccine: 65+ Years  Completed    RSV Vaccine age 0-20 Months  Aged Out    HIB Vaccine  Aged Out    IPV Vaccine  Aged Out    Hepatitis A Vaccine  Aged Out    Meningococcal ACWY Vaccine  Aged Out    HPV Vaccine  Aged Out     Patient Active Problem List   Diagnosis    Type 2 diabetes mellitus, with long-term current use of insulin (HCC)    Other hydronephrosis    Hypertension    Hydronephrosis    Prostate cancer metastatic to bone (HCC)    Encounter for monitoring androgen deprivation therapy    Nephrostomy status (HCC)    Hyperlipidemia    Malfunction of nephrostomy tube (HCC)    Hypokalemia    Stage 3a chronic kidney disease (HCC)    Hypoglycemia    Electrolyte abnormality    Cancer related pain    Palliative care by specialist    Ambulatory dysfunction    Therapeutic opioid-induced constipation (OIC)    Nausea and vomiting    Advanced care planning/counseling discussion    Goals of care, counseling/discussion    Loss of appetite    Unintentional weight loss     Past Medical History:   Diagnosis Date    COVID-19 01/15/2024    Diabetes mellitus (HCC)     Elevated PSA 06/21/2023    High cholesterol     Hypertension     Prostate cancer (HCC)      Past Surgical History:   Procedure Laterality Date    IR NEPHROSTOMY TUBE  CHECK/CHANGE/REPOSITION/REINSERTION/UPSIZE  2023    IR NEPHROSTOMY TUBE CHECK/CHANGE/REPOSITION/REINSERTION/UPSIZE  2023    IR NEPHROSTOMY TUBE CHECK/CHANGE/REPOSITION/REINSERTION/UPSIZE  2024    IR NEPHROSTOMY TUBE CHECK/CHANGE/REPOSITION/REINSERTION/UPSIZE  2024    IR NEPHROSTOMY TUBE CHECK/CHANGE/REPOSITION/REINSERTION/UPSIZE  3/4/2024    IR NEPHROSTOMY TUBE CHECK/CHANGE/REPOSITION/REINSERTION/UPSIZE  3/20/2024    IR NEPHROSTOMY TUBE CHECK/CHANGE/REPOSITION/REINSERTION/UPSIZE  2024    IR NEPHROSTOMY TUBE CHECK/CHANGE/REPOSITION/REINSERTION/UPSIZE  2024    IR NEPHROSTOMY TUBE PLACEMENT  2023    bilateral    IR OTHER  1/15/2024    US GUIDED PROSTATE BIOPSY       Family History   Problem Relation Age of Onset    Uterine cancer Mother     Liver cancer Father     No Known Problems Sister     No Known Problems Brother     No Known Problems Son     Diabetes type II Daughter     No Known Problems Daughter     Cancer Daughter      Social History     Socioeconomic History    Marital status: /Civil Union     Spouse name: Not on file    Number of children: 3    Years of education: Not on file    Highest education level: Not on file   Occupational History    Not on file   Tobacco Use    Smoking status: Former     Current packs/day: 0.00     Types: Cigarettes     Quit date: 2000     Years since quittin.6     Passive exposure: Past    Smokeless tobacco: Never    Tobacco comments:     States he only smoked on the weekends   Vaping Use    Vaping status: Never Used   Substance and Sexual Activity    Alcohol use: Not Currently     Comment: socially    Drug use: Never    Sexual activity: Yes     Partners: Female   Other Topics Concern    Not on file   Social History Narrative    From   note:    Primary Caregiver: Self    Support Systems: Self, Spouse/significant other, Son, Family members    County of Residence: [place]    What city do you live in?: [place]    Home entry  access options. Select all that apply.: Stairs    Number of steps to enter home.: 3    Type of Current Residence: 2 story home    Upon entering residence, is there a bedroom on the main floor (no further steps)?: Yes    Upon entering residence, is there a bathroom on the main floor (no further steps)?: Yes    Living Arrangements: Lives w/ Spouse/significant other    Is patient a ?: No    Functional Status: Independent    Does patient use assisted devices?: Yes    Assisted Devices (DME) used: Bedside Commode, Straight Cane, Walker, Shower Chair    What DME does the patient currently own?: Bedside Commode, Shower Chair, Straight Cane, Walker    Income Source: Pension/[employer]    Means of Transport to Appts:: Family transport     Social Determinants of Health     Financial Resource Strain: Low Risk  (4/12/2024)    Overall Financial Resource Strain (CARDIA)     Difficulty of Paying Living Expenses: Not hard at all   Food Insecurity: No Food Insecurity (6/7/2024)    Hunger Vital Sign     Worried About Running Out of Food in the Last Year: Never true     Ran Out of Food in the Last Year: Never true   Transportation Needs: No Transportation Needs (7/26/2024)    Received from [facility]    OASIS : Transportation     Lack of Transportation (Medical): No     Lack of Transportation (Non-Medical): No     Patient Unable or Declines to Respond: No   Physical Activity: Not on file   Stress: Not on file   Social Connections: Feeling Socially Integrated (7/26/2024)    Received from [facility]    OASIS : Social Isolation     Frequency of experiencing loneliness or isolation: Never   Intimate Partner Violence: Not on file   Housing Stability: Low Risk  (6/7/2024)    Housing Stability Vital Sign     Unable to Pay for Housing in the Last Year: No     Number of Times Moved in the Last Year: 0     Homeless in the Last Year: No       Current Outpatient Medications:     acetaminophen (TYLENOL) 500 mg tablet,  Take 2 tablets (1,000 mg total) by mouth 3 (three) times a day, Disp: 180 tablet, Rfl: 2    albuterol (Ventolin HFA) 90 mcg/act inhaler, Inhale 2 puffs every 6 (six) hours as needed for wheezing (Patient not taking: Reported on 7/30/2024), Disp: 18 g, Rfl: 0    Alcohol Swabs 70 % PADS, To clean the skin prior to injecting insulin, Disp: 100 each, Rfl: 1    Blood Glucose Monitoring Suppl (OneTouch Verio Reflect) w/Device KIT, Check blood sugars once daily. Please substitute with appropriate alternative as covered by patient's insurance. Dx: E11.65, Disp: 1 kit, Rfl: 0    dexamethasone (DECADRON) 0.5 mg tablet, TAKE 1 TABLET (0.5 MG TOTAL) BY MOUTH DAILY WITH BREAKFAST, Disp: 30 tablet, Rfl: 1    Diclofenac Sodium (VOLTAREN) 1 %, Apply 2 g topically 4 (four) times a day, Disp: 100 g, Rfl: 3    Easy Touch Pen Needles 31G X 6 MM MISC, Use daily at bedtime, Disp: 100 each, Rfl: 3    enzalutamide (XTANDI) 40 mg capsule, Take 4 capsules (160 mg total) by mouth daily, Disp: 120 capsule, Rfl: 6    gabapentin (Neurontin) 300 mg capsule, Take 1 capsule (300 mg total) by mouth 3 (three) times a day, Disp: 90 capsule, Rfl: 2    glimepiride (AMARYL) 2 mg tablet, Take 1 tablet (2 mg total) by mouth daily with dinner, Disp: 90 tablet, Rfl: 3    glimepiride (AMARYL) 4 mg tablet, Take 1 tablet (4 mg total) by mouth daily with breakfast, Disp: 90 tablet, Rfl: 3    glucose blood (OneTouch Verio) test strip, Check blood sugars twice a daily. Please substitute with appropriate alternative as covered by patient's insurance. Dx: E11.65, Disp: 200 each, Rfl: 3    Levemir FlexPen 100 units/mL injection pen, Inject 20 Units under the skin daily at bedtime, Disp: 15 mL, Rfl: 1    lisinopril (ZESTRIL) 10 mg tablet, NARCISO TANMAY TABLETA VIA ORAL DIARIAMENTE, Disp: , Rfl:     metFORMIN (GLUCOPHAGE) 1000 MG tablet, Take 1 tablet (1,000 mg total) by mouth 2 (two) times a day with meals, Disp: 180 tablet, Rfl: 1    Multiple Vitamins-Minerals (Sentry)  TABS, Take 1 tablet by mouth daily, Disp: , Rfl:     naloxone (NARCAN) 4 mg/0.1 mL nasal spray, Administer 1 spray into a nostril. If no response after 2-3 minutes, give another dose in the other nostril using a new spray., Disp: 1 each, Rfl: 0    omega-3-acid ethyl esters (LOVAZA) 1 g capsule, , Disp: , Rfl:     ondansetron (Zofran ODT) 8 mg disintegrating tablet, Take 1 tablet (8 mg total) by mouth every 8 (eight) hours as needed for nausea or vomiting (Patient not taking: Reported on 7/30/2024), Disp: 20 tablet, Rfl: 3    OneTouch Delica Lancets 33G MISC, Check blood sugars once daily. Please substitute with appropriate alternative as covered by patient's insurance. Dx: E11.65, Disp: 100 each, Rfl: 3    oxyCODONE (ROXICODONE) 10 MG TABS, Take 1 tablet (10 mg total) by mouth every 4 (four) hours as needed for moderate pain Max Daily Amount: 60 mg, Disp: 90 tablet, Rfl: 0    pantoprazole (PROTONIX) 40 mg tablet, TAKE 1 TABLET BY MOUTH EVERY DAY, Disp: 90 tablet, Rfl: 1    polyethylene glycol (MIRALAX) 17 g packet, Take 17 g by mouth daily as needed (for constipation), Disp: 100 each, Rfl: 1    rosuvastatin (CRESTOR) 10 MG tablet, Take 1 tablet (10 mg total) by mouth daily at bedtime, Disp: 90 tablet, Rfl: 1    senna-docusate sodium (SENOKOT S) 8.6-50 mg per tablet, Take 1 tablet by mouth daily (Patient not taking: Reported on 6/28/2024), Disp: 90 tablet, Rfl: 1    sodium chloride, PF, 0.9 %, 10 mL by Intracatheter route daily Intracatheter flushing daily. May substitute prefilled syringe with normal saline 10 mL vials, 10 mL syringes, and 18 g blunt needles, Disp: 600 mL, Rfl: 2    Spacer/Aero-Holding Chambers (AEROCHAMBER MINI CHAMBER) BILLY, by Does not apply route see administration instructions, Disp: 1 Device, Rfl: 0  No Known Allergies  There were no vitals filed for this visit.

## 2024-08-12 NOTE — PROGRESS NOTES
Follow-up - Radiation Oncology   Oliver Astudillo 1950 73 y.o. male [id number]      History of Present Illness   Cancer Staging   Prostate cancer metastatic to bone (HCC)  Staging form: Prostate, AJCC 8th Edition  - Clinical: Stage IVB (cT4, cN1, cM1b, PSA: 145, Grade Group: 5) - Signed by Dov Andrea MD on 8/28/2023  Prostate specific antigen (PSA) range: 20 or greater  Histologic grading system: 5 grade system      Mr. Chemo Astudillo is a 73 year old man with Stage IVB prostate cancer with diffuse osseous disease with severe pain in the lumbar spine. On 7/8/24 he completed a course of palliative RT to T11-L5 to a dose of 3000cGy in 10 fractions. He returns today for follow-up.     Interval History:  The patient tolerated RT well overall without substantial side effects. He did experience some moderate improvement in lumbar spine pain through treatment.     Since completion of palliative RT, the patient is continue to follow with urology and with medical oncology.  He has remained on combination Lupron and recently on Xtandi.  He additionally has follow-up scheduled palliative care.    Currently the patient is doing fair overall.  He does have significant ongoing back pain, though does feel that his pain has improved somewhat since prior to treatment.  His primary concern is for decreased appetite, for which she has been prescribed dexamethasone by palliative care.  It is unclear whether or not he has been taking this and he was encouraged to start this medication when he gets home.      Historical Information   Oncology History   Prostate cancer metastatic to bone (HCC)   5/24/2023 Initial Diagnosis    May 2023 patient presented with urinary frequency. . CT showed distended urinary bladder with bilateral hydronephrosis. Urinary bladder mass inseparable from the prostate. Extraprostatic extension noted. Bone scan showed indeterminate activity at T11 vertebral body. Bilateral nephrostomy tubes  and Cazares catheter placed. Prostate biopsy showed adenocarcinoma, Berne 9.      6/28/2023 Biopsy    A. Prostate, right lateral base:  - Prostatic adenocarcinoma, [place] score 4 + 5 = 9, Prognostic Grade Group 5,  involving 90% of 1 needle core  and measuring  11 mm in length.        B. Prostate, right medial base:  - Prostatic adenocarcinoma, [place] score 4 + 5 = 9, Prognostic Grade Group 5,  involving 70% of 1 needle core  and measuring  10 mm in length.      C. Prostate, right lateral mid:  - Prostatic adenocarcinoma, [place] score 4 + 5 = 9, Prognostic Grade Group 5,  involving 90% of 1 needle core  and measuring  12 mm in length.      Comment: Immunohistochemistry for a prostate multiplex stain (p63, K903, and P504S) and NKX3.1 demonstrate invasive carcinoma.     D. Prostate, right medial mid:  - Prostatic adenocarcinoma, [place] score 4 + 5 = 9, Prognostic Grade Group 5,  involving 95% of 1 needle core  and measuring  15 mm in length.      E. Prostate, right lateral apex:  - Prostatic adenocarcinoma, [place] score 4 + 5 = 9, Prognostic Grade Group 5,  involving 90% of 1 needle core  and measuring  12 mm in length.   - Perineural invasion identified.     Comment: Immunohistochemistry for a prostate multiplex stain (p63, K903, and P504S) and NKX3.1 demonstrate invasive carcinoma.     F. Prostate, right medial apex:  - Prostatic adenocarcinoma, [place] score 4 + 5 = 9, Prognostic Grade Group 5,  involving 100% of 1 needle core  and measuring  20 mm in length.      G. Prostate, left lateral base:  - Prostatic adenocarcinoma, [place] score 4 + 5 = 9, Prognostic Grade Group 5,  involving 75% of 1 needle core  and measuring  10 mm in length.   - Perineural invasion identified.  - Lymphovascular invasion is identified.     H. Prostate, left medial base:  - Prostatic adenocarcinoma, [place] score 4 + 5 = 9, Prognostic Grade Group 5,  involving 95% of 1 needle core  and measuring  14 mm in length.   - Perineural  invasion identified.     Comment: There is a focus of closely approximated gastrointestinal epithelium; NKX3.1 shows no definite invasion of the GI epithelium.      I. Prostate, left lateral mid:  - Prostatic adenocarcinoma, L[place] score 4 + 5 = 9, Prognostic Grade Group 5,  involving 95% of 1 needle core  and measuring  12 mm in length.       J. Prostate, left medial mid:  - Prostatic adenocarcinoma, [place] score 4 + 5 = 9, Prognostic Grade Group 5,  involving 85% of 1 needle core  and measuring  13 mm in length.       Comment: Immunohistochemistry for a prostate multiplex stain (p63, K903, and P504S) and NKX3.1 demonstrate invasive carcinoma.     K. Prostate, left lateral apex:  - Prostatic adenocarcinoma, [place] score 4 + 5 = 9, Prognostic Grade Group 5,  involving 25% of 1 needle core  and measuring  3 mm in length.        L. Prostate, left medial apex :  - Prostatic adenocarcinoma, Liana score 4 + 5 = 9, Prognostic Grade Group 5,  involving 95% of 1 needle core  and measuring  15 mm in length.     - Perineural invasion identified.     Comment:   Cribriform glands are present within the pattern 4 component.  This feature has been associated with adverse clinical outcomes and molecular features typically seen in advanced disease.  (The 2019 Genitourinary Pathology Society (GUPS) White Paper on Contemporary Grading of Prostate Cancer. Arch Pathol Lab Med. 2021 Apr 1;145(4):461-493.)     7/5/2023 -  Hormone Therapy    Firmagon loading dose on 7/5/23, followed by Lupron      8/28/2023 -  Cancer Staged    Staging form: Prostate, AJCC 8th Edition  - Clinical: Stage IVB (cT4, cN1, cM1b, PSA: 145, Grade Group: 5) - Signed by Dov Andrea MD on 8/28/2023  Prostate specific antigen (PSA) range: 20 or greater  Histologic grading system: 5 grade system       9/2023 - 6/2024 Chemotherapy    Zytiga 250 mg PO daily     6/2024 -  Chemotherapy    Xtandi      6/24/2024 - 7/8/2024 Radiation    The patient saw  "[unfilled] for radiation treatment. This is the current list of radiation treatment:    Plan ID Energy Fractions Dose per Fraction (cGy) Dose Correction (cGy) Total Dose Delivered (cGy) Elapsed Days   T11_L5 Spine 10X/6X 10 / 10 300 0 3,000 14              OBJECTIVE:   /84 (BP Location: Left arm, Patient Position: Sitting, Cuff Size: Standard)   Pulse 74   Temp 97.5 °F (36.4 °C) (Temporal)   Resp 18   Ht 5' 8\" (1.727 m)   Wt 67.6 kg (149 lb)   SpO2 98%   BMI 22.66 kg/m²   Pain Assessment:  8  ECOG/Zubrod/WHO: 3 - Symptomatic, >50% confined to bed    Physical Exam   Frail.  NAD.  Seated in wheelchair.  No increased work of breathing.  Extremities warm well-perfused.  No cyanosis or clubbing.        Assessment/Plan:  No orders of the defined types were placed in this encounter.     Approximately 1 month following completion of palliative RT, the patient is doing fair overall.  He has had some improvement in his previously very severe lower back pain, though does still have significant ongoing pain at present.  We discussed that at this point, best options for pain control are likely medical and that further radiation would not be recommended.  I have encouraged the patient to follow-up with palliative care and to start Decadron, as has been prescribed previously, for his anorexia/decreased appetite.  I remain available going forward should any questions or concerns arise or should patient need reevaluation for additional RT.    Dov Andrea MD  8/12/2024,2:31 PM    Portions of the record may have been created with voice recognition software.  Occasional wrong word or \"sound a like\" substitutions may have occurred due to the inherent limitations of voice recognition software.  Read the chart carefully and recognize, using context, where substitutions have occurred.        "

## 2024-08-13 ENCOUNTER — OFFICE VISIT (OUTPATIENT)
Facility: CLINIC | Age: 74
End: 2024-08-13
Payer: COMMERCIAL

## 2024-08-13 ENCOUNTER — APPOINTMENT (OUTPATIENT)
Dept: RADIOLOGY | Facility: CLINIC | Age: 74
End: 2024-08-13
Payer: COMMERCIAL

## 2024-08-13 ENCOUNTER — APPOINTMENT (OUTPATIENT)
Dept: PHYSICAL THERAPY | Facility: CLINIC | Age: 74
End: 2024-08-13
Payer: COMMERCIAL

## 2024-08-13 ENCOUNTER — OFFICE VISIT (OUTPATIENT)
Dept: URGENT CARE | Facility: CLINIC | Age: 74
End: 2024-08-13
Payer: COMMERCIAL

## 2024-08-13 VITALS
SYSTOLIC BLOOD PRESSURE: 114 MMHG | BODY MASS INDEX: 22.5 KG/M2 | DIASTOLIC BLOOD PRESSURE: 72 MMHG | RESPIRATION RATE: 16 BRPM | TEMPERATURE: 97.8 F | WEIGHT: 148 LBS | OXYGEN SATURATION: 97 % | HEART RATE: 84 BPM

## 2024-08-13 DIAGNOSIS — S49.91XA INJURY OF RIGHT SHOULDER, INITIAL ENCOUNTER: Primary | ICD-10-CM

## 2024-08-13 DIAGNOSIS — R53.1 WEAKNESS: ICD-10-CM

## 2024-08-13 DIAGNOSIS — Z74.09 IMPAIRED FUNCTIONAL MOBILITY, BALANCE, GAIT, AND ENDURANCE: ICD-10-CM

## 2024-08-13 DIAGNOSIS — S22.32XA CLOSED FRACTURE OF ONE RIB OF LEFT SIDE, INITIAL ENCOUNTER: ICD-10-CM

## 2024-08-13 DIAGNOSIS — R53.81 PHYSICAL DECONDITIONING: Primary | ICD-10-CM

## 2024-08-13 DIAGNOSIS — R07.81 RIB PAIN ON LEFT SIDE: ICD-10-CM

## 2024-08-13 PROCEDURE — 71100 X-RAY EXAM RIBS UNI 2 VIEWS: CPT

## 2024-08-13 PROCEDURE — 73030 X-RAY EXAM OF SHOULDER: CPT

## 2024-08-13 PROCEDURE — 99213 OFFICE O/P EST LOW 20 MIN: CPT | Performed by: PHYSICIAN ASSISTANT

## 2024-08-13 PROCEDURE — 97530 THERAPEUTIC ACTIVITIES: CPT

## 2024-08-13 NOTE — PROGRESS NOTES
Daily Note     Today's date: 2024  Patient name: Oliver Astudillo  : 1950  MRN: [id number]  Referring provider: Dave Alexandra DO  Dx:   Encounter Diagnosis     ICD-10-CM    1. Physical deconditioning  R53.81       2. Impaired functional mobility, balance, gait, and endurance  Z74.09       3. Weakness  R53.1           Start Time: 1545  Stop Time: 1625  POC expires Unit limit Auth Expiration date PT/OT + Visit Limit? Co-Insurance   10/2/24  PENDING BOMN Yes                               Visit/Unit Tracking  AUTH Status:  Date 24             Pending  Used 1 1              Remaining                  ** Use  anthony  Wife's name is Erlinda     Subjective: Patient states that he fell off his bed last night and he hurt his shoulder. He has 8/10 pain.     Objective: See treatment diary below  TA;   Education on hospital/urgent care to rule out fracture     Assessment: Tolerated treatment poorly secondary to shoulder pain. He fell off of his bed in the middle of the night and today was unable to lift his RUE above 90 degrees (full ROM in LLE) and endorsed 8/10 pain. Encouraged patient and wife to report to the hospital to rule out a fracture; patient kindly declined going to the hospital. Given urgent care address as an alternative and him and wife were in agreement to drive there for further assessment. Hold on therapy until patient has further clearance.       Plan: Next session perform the following:  Outcome measures TUG, 10MWT, 2 minute walk test, and solomon   Follow phase 1 of [facility] cancer phase model   HRR max:   35%:  45:%  Interval training between sitting/balance, focus on balance and generalized strengthening          5xSTS 54.19 sec with BUE support        TUG  - Regular  - Cognitive   Defer        10 meter Defer        SOLOMON Defer        FGA Defer        DGI Defer        mCTSIB  - FTEO (firm)  - FTEC (firm)  - FTEO (foam)  - FTEC (foam)   30 sec  7 sec  NT sec  NT sec         2MWT Defer

## 2024-08-13 NOTE — PROGRESS NOTES
"  [facility]        NAME: Oliver Astudillo is a 73 y.o. male  : 1950    MRN: [id number]  DATE: 2024  TIME: 6:14 PM    Assessment and Plan   Injury of right shoulder, initial encounter [S49.91XA]  1. Injury of right shoulder, initial encounter  XR shoulder 2+ vw right    XR ribs 2 vw left    Ambulatory referral to Orthopedic Surgery      2. Rib pain on left side        3. Closed fracture of one rib of left side, initial encounter          Possible rib fracture of rib 8.  Will call the patient with the official read.  Recommending continued follow-up with PCP/palliative care.  Orthopedics for the shoulder.    on the ipad used.     Patient Instructions     Patient Instructions   You will be called with the official radiology report.  It does appear that you broke at least one of the ribs on your left side.  Follow-up with orthopedics for the right shoulder pain and palliative care/your PCP for the ribs.  Make sure you are taking deep breaths to prevent your lung from collapsing.     Patient Education     Fracturas de davie en adultos   Conceptos Básicos   Redactado por los médicos y editores de UpToDate   ¿Qué es jasmin fractura de davie? -- \"Fractura\" es otra palabra para referirse a un hueso roto. Jasmin fractura de davie se produce cuando jasmin persona se rompe un hueso de la davie (figura 1).  Existen distintos tipos de fracturas, según la forma en que se rompa el hueso. Cuando un hueso se rompe, puede resquebrajarse, romperse por completo o astillarse.  La mayoría de las fracturas de davie sucede por jasmin lesión en el pecho. Si la lesión es grave, también puede dañar órganos del pecho o del área del estómago.  Algunas fracturas de davie, llamadas “fracturas por estrés”, no se producen por jasmin lesión sino por jasmin tos catarino o por repetir muchas veces el mismo movimiento, barron el que se hace al usar un flavia de golf.  ¿Cuáles son los síntomas de jasmin fractura de davie? " -- Los síntomas dependen del hueso que se rompe y del tipo de fractura. Los síntomas comunes pueden incluir:   Dolor, inflamación o moretones en la alpa   Dolor al respirar o  la parte superior del cuerpo o los brazos   La alpa se ve anormal, doblada o no tiene la forma habitual   Incapacidad para inspirar profundamente   Adormecimiento en la alpa del hueso roto  Jasmin fractura por estrés también provoca dolor, any monica suele comenzar gradualmente y empeorar en unas pocas semanas o meses.  ¿Existe alguna prueba para detectar jasmin fractura de davie? -- Sí. Si luevano médico o enfermero piensa que podría tener jasmin davie fracturada, le preguntará sobre rose síntomas, le hará un examen y solicitará jasmin radiografía de tórax u otro estudio de imagen. Los estudios de imagen crean imágenes del interior del cuerpo.  Jasmin radiografía permite verificar si hay fracturas de costillas y garantizar que no tenga un pulmón colapsado (llamado “neumotórax”). Algunas personas necesitan otros estudios de imagen para detectar si hay costillas fracturadas, barron jasmin tomografía, jasmin resonancia magnética o un ultrasonido. Estas pruebas también pueden servir para detectar otras lesiones.  ¿Cómo se tratan las fracturas de davie? -- El tratamiento depende, en parte, de la cantidad de costillas fracturadas, del tipo de fractura y de la gravedad de esta. El objetivo es garantizar que usted pueda respirar lo suficientemente profundo barron para que ingrese suficiente oxígeno al cuerpo y se expulse suficiente dióxido de carbono. Las personas que tienen muchas costillas rotas u otras lesiones graves suelen necesitar tratamiento en el hospital. Otros adultos también podrían tener que ir al hospital, incluso por lesiones menos graves.  En casos muy graves, el médico podría tener que realizar jasmin cirugía para volver a poner los huesos en la posición correcta y colocar placas sobre ellos. [place] recibe el nombre de “estabilización de costillas”. Reg  "vez lo necesite si:   Los extremos de las costillas rotas no están alineados.   Tiene muchas costillas rotas.   Las costillas rotas le impiden respirar profundamente.  Las personas que no necesitan recibir tratamiento en el hospital se tratan con medicinas para aliviar el dolor. [place] es importante porque, cuando las personas sienten dolor en las costillas, tratan de evitar moverlas demasiado, y no inhalan y exhalan con la profundidad que deberían. lo cual aumenta las posibilidades de sufrir jasmin infección pulmonar llamada “neumonía”.  Para tratar el dolor:   Ozora jasmin medicina para el dolor de venta sin receta. Algunas de ellas son el paracetamol (acetaminofén) (ejemplo de elida comercial: Tylenol), el ibuprofeno (ejemplos de marcas comerciales: Advil, Motrin) y el naproxeno (ejemplo de elida comercial: Aleve).   Es posible que el médico le recete jasmin medicina para el dolor más catarino para que la tome percy un período breve. Siga las instrucciones sobre cómo leroy esas medicinas.   Si va al hospital, es posible que reciba otros tratamientos para ayudar con el dolor. Algunos de ellos podrían ser jasmin inyección llamada “bloqueo nervioso” o medicinas que se dan a través de jasmin “epidural”. Jasmin epidural es un tubo pequeño (catéter) que se coloca en la espalda, cerca de los nervios de la columna.  Es probable que el médico también recomiende un tratamiento para impedir que las vías respiratorias pequeñas se cierren. Consiste en respirar profundamente dentro de un dispositivo manual varias veces por día. El dispositivo se llama \"espirómetro de incentivo\".  El tratamiento de jasmin fractura por estrés consiste en evitar la actividad que causó la fractura percy cuatro a seis semanas. Luego, puede retomar de a poco la actividad.  ¿Cuánto tardan en sanar las fracturas de davie? -- La mayoría de las fracturas de davie tardan semanas o meses en sanar. El médico o enfermero hablará con usted sobre cuándo puede reanudar " actividades barron el trabajo, los deportes y otras.  Lo que tarde en sanar la fractura también depende de la persona. Los niños saludables por lo general sanan mucho más rápido que los adultos mayores o que los adultos con otros problemas de humaira.  ¿Cómo puedo cuidarme en casa? -- Para cuidarse en casa:   Use el espirómetro de incentivo o haylee entre 10 y 15 inhalaciones profundas al menos cuatro veces al día.   Sostenga jasmin almohada contra luevano pecho para aliviar el dolor cuando inhala profundo, estornuda, tose o se ríe.   Aumente la actividad gradualmente cuando ya no sienta dolor al hacer reposo. Evite levantar objetos pesados y practicar deportes percy debra semanas barron mínimo. (Luevano médico o enfermero le dirá exactamente cuánto tiempo debe evitar esas u otras actividades).   El hielo puede ayudar con el dolor y la inflamación:   Coloque un paquete de gel frío, jasmin bolsa de hielo o jasmin bolsa de verduras congeladas en la alpa lesionada cada hora a cada 2 horas, percy 15 minutos cada vez. Coloque jasmin toalla delgada entre el hielo (o el objeto frío) y la piel.   Aplique el hielo (o cualquier objeto frío) percy al menos 6 horas después de la lesión. Algunas personas prefieren aplicar hielo percy más tiempo, incluso hasta dos días después de la lesión.   Lleve jasmin dieta saludable que incluya suficiente calcio, vitamina D y proteína (figura 2).   Si fuma, intente dejar de hacerlo. Los huesos rotos tardan más en sanar si fuma.  ¿Cuándo lissette llamar al médico? -- Llame para pedir asesoramiento si:   Cada vez le kim más respirar.   Luevano falta de aire es saray que no puede terminar las oraciones al hablar.   Comienza a tener un dolor intenso en el pecho, la espalda o el addison.   Empieza a toser taco o moco amarillo o edmund.   Tiene fiebre de 100.4 °F (38 °C) o más, o escalofríos.   Se siente muy débil o aturdido, o siente que está por desmayarse.   Tiene un dolor muy intenso que no se karlos con las medicinas.  Todos  "los artículos se actualizan a medida que se descubre nueva evidencia y culmina nuestro proceso de evaluación por homólogos   [place] artículo se recuperó de UpToDate el: Feb 26, 2024.  Artículo 16177 Versión 11.0.es-419.1  Release: 32.2.4 - C32.56  © 2024 [employer] Todos los derechos reservados.  figura 1: Costillas     En carmina dibujo se muestra la ubicación de las costillas.  Gráfico 82193 Versión 1.0  figura 2: Alimentos y bebidas con calcio y vitamina D     Entre los alimentos ricos en calcio se incluyen el helado, la leche de soya, los panes, la col rizada, el brócoli, la leche, el queso, el requesón, las almendras, el yogur, los cereales listos para comer, los frijoles y el tofu. Entre los alimentos ricos en vitamina D se incluyen la leche, las \"leches\" vegetales fortificadas (soya, almendras), el atún en mariama, el aceite de hígado de bacalao, el yogur, los cereales listos para comer, el salmón cocido, las linda en mariama, la caballa y los huevos. Algunos de estos alimentos son ricos en ambas cosas.  Gráfico 85943 Versión 4.0  Exención de responsabilidad y uso de la información del consumidor   Descargo de responsabilidad: esta información generalizada es un resumen limitado de información sobre el diagnóstico, el tratamiento y/o los medicamentos. No pretende ser exhaustiva y se debe utilizar barron herramienta para ayudar al usuario a comprender y/o evaluar las posibles opciones de diagnóstico y tratamiento. No incluye toda la información sobre afecciones, tratamientos, medicamentos, efectos secundarios o riesgos puedan ser aplicables a un paciente específico. No tiene el propósito de servir barron recomendación médica ni de sustituir la recomendación médica, el diagnóstico o el tratamiento de un profesional de atención médica que se base en el examen y la evaluación de carmina profesional de la humaira respecto a las circunstancias específicas y únicas del paciente. Los pacientes deben hablar con un profesional de " atención médica para obtener información completa sobre luevano humaira, cuestiones médicas y opciones de tratamiento, incluidos los riesgos o los beneficios relacionados con el uso de medicamentos. Esta información no certifica que los tratamientos o medicamentos shu seguros, eficaces o estén aprobados para tratar a un paciente específico. UpToDate, Inc. y rose afiliados renuncian a cualquier garantía o responsabilidad relacionada con esta información o el uso de la misma.El uso de esta información está sujeto a las Condiciones de uso, disponibles en https://www.[employer]/en/know/clinical-effectiveness-terms. 2024© UpToDate, Inc. y rose afiliados y/o licenciantes. Todos los derechos reservados.  Copyright   © 2024 UpToDate, Inc. Todos los derechos reservados.        Follow up with PCP in 3-5 days.  Proceed to  ER if symptoms worsen.    Chief Complaint     Chief Complaint   Patient presents with    Shoulder Injury     Pt c/o right shoulder pain s/p fall from bed onto floor on Sunday early am         History of Present Illness       The patient is a 73 year old man with Stage IVB prostate cancer with diffuse osseous disease with severe pain in the lumbar spine on palliative care presenting today after fall.  Reports slipping out of bed and hurting his right shoulder.  Also reports pain in his left side.  Shoulder pain is an 8 out of 10.  He reports that he slipped out of bed in the middle night.  Now is unable to move his shoulder past 90 degrees.  He was in physical therapy today who recommended he come to the urgent care for x-rays.  His wife reports that they do have an appointment with orthopedics in 2 days.  He denies chest pain or shortness of breath. He did not hit his head. No nausea, vomiting, or retrograde amnesia.                 Review of Systems   Review of Systems   Gastrointestinal:  Negative for abdominal pain.   Musculoskeletal:  Positive for arthralgias (R shoulder) and myalgias (L side pain).  Negative for back pain.         Current Medications       Current Outpatient Medications:     acetaminophen (TYLENOL) 500 mg tablet, Take 2 tablets (1,000 mg total) by mouth 3 (three) times a day, Disp: 180 tablet, Rfl: 2    albuterol (Ventolin HFA) 90 mcg/act inhaler, Inhale 2 puffs every 6 (six) hours as needed for wheezing, Disp: 18 g, Rfl: 0    Blood Glucose Monitoring Suppl (OneTouch Verio Reflect) w/Device KIT, Check blood sugars once daily. Please substitute with appropriate alternative as covered by patient's insurance. Dx: E11.65, Disp: 1 kit, Rfl: 0    dexamethasone (DECADRON) 0.5 mg tablet, TAKE 1 TABLET (0.5 MG TOTAL) BY MOUTH DAILY WITH BREAKFAST, Disp: 30 tablet, Rfl: 1    Easy Touch Pen Needles 31G X 6 MM MISC, Use daily at bedtime, Disp: 100 each, Rfl: 3    enzalutamide (XTANDI) 40 mg capsule, Take 4 capsules (160 mg total) by mouth daily, Disp: 120 capsule, Rfl: 6    gabapentin (Neurontin) 300 mg capsule, Take 1 capsule (300 mg total) by mouth 3 (three) times a day, Disp: 90 capsule, Rfl: 2    glimepiride (AMARYL) 2 mg tablet, Take 1 tablet (2 mg total) by mouth daily with dinner, Disp: 90 tablet, Rfl: 3    glimepiride (AMARYL) 4 mg tablet, Take 1 tablet (4 mg total) by mouth daily with breakfast, Disp: 90 tablet, Rfl: 3    glucose blood (OneTouch Verio) test strip, Check blood sugars twice a daily. Please substitute with appropriate alternative as covered by patient's insurance. Dx: E11.65, Disp: 200 each, Rfl: 3    Levemir FlexPen 100 units/mL injection pen, Inject 20 Units under the skin daily at bedtime, Disp: 15 mL, Rfl: 1    lisinopril (ZESTRIL) 10 mg tablet, NARCISO TANMAY TABLETA VIA ORAL DIARIAMENTE, Disp: , Rfl:     metFORMIN (GLUCOPHAGE) 1000 MG tablet, Take 1 tablet (1,000 mg total) by mouth 2 (two) times a day with meals, Disp: 180 tablet, Rfl: 1    Multiple Vitamins-Minerals (Sentry) TABS, Take 1 tablet by mouth daily, Disp: , Rfl:     ondansetron (Zofran ODT) 8 mg disintegrating tablet,  Take 1 tablet (8 mg total) by mouth every 8 (eight) hours as needed for nausea or vomiting, Disp: 20 tablet, Rfl: 3    pantoprazole (PROTONIX) 40 mg tablet, TAKE 1 TABLET BY MOUTH EVERY DAY, Disp: 90 tablet, Rfl: 1    polyethylene glycol (MIRALAX) 17 g packet, Take 17 g by mouth daily as needed (for constipation), Disp: 100 each, Rfl: 1    rosuvastatin (CRESTOR) 10 MG tablet, Take 1 tablet (10 mg total) by mouth daily at bedtime, Disp: 90 tablet, Rfl: 1    sodium chloride, PF, 0.9 %, 10 mL by Intracatheter route daily Intracatheter flushing daily. May substitute prefilled syringe with normal saline 10 mL vials, 10 mL syringes, and 18 g blunt needles, Disp: 600 mL, Rfl: 2    Spacer/Aero-Holding Chambers (AEROCHAMBER MINI CHAMBER) BILLY, by Does not apply route see administration instructions, Disp: 1 Device, Rfl: 0    Alcohol Swabs 70 % PADS, To clean the skin prior to injecting insulin, Disp: 100 each, Rfl: 1    Diclofenac Sodium (VOLTAREN) 1 %, Apply 2 g topically 4 (four) times a day, Disp: 100 g, Rfl: 3    naloxone (NARCAN) 4 mg/0.1 mL nasal spray, Administer 1 spray into a nostril. If no response after 2-3 minutes, give another dose in the other nostril using a new spray., Disp: 1 each, Rfl: 0    omega-3-acid ethyl esters (LOVAZA) 1 g capsule, , Disp: , Rfl:     OneTouch Delica Lancets 33G MISC, Check blood sugars once daily. Please substitute with appropriate alternative as covered by patient's insurance. Dx: E11.65, Disp: 100 each, Rfl: 3    oxyCODONE (ROXICODONE) 10 MG TABS, Take 1 tablet (10 mg total) by mouth every 4 (four) hours as needed for moderate pain Max Daily Amount: 60 mg (Patient not taking: Reported on 8/13/2024), Disp: 90 tablet, Rfl: 0    senna-docusate sodium (SENOKOT S) 8.6-50 mg per tablet, Take 1 tablet by mouth daily (Patient not taking: Reported on 6/28/2024), Disp: 90 tablet, Rfl: 1    Current Allergies     Allergies as of 08/13/2024    (No Known Allergies)            The following  portions of the patient's history were reviewed and updated as appropriate: allergies, current medications, past family history, past medical history, past social history, past surgical history and problem list.     Past Medical History:   Diagnosis Date    COVID-19 01/15/2024    Diabetes mellitus (HCC)     Elevated PSA 06/21/2023    High cholesterol     Hypertension     Prostate cancer (HCC)        Past Surgical History:   Procedure Laterality Date    IR NEPHROSTOMY TUBE CHECK/CHANGE/REPOSITION/REINSERTION/UPSIZE  9/28/2023    IR NEPHROSTOMY TUBE CHECK/CHANGE/REPOSITION/REINSERTION/UPSIZE  12/28/2023    IR NEPHROSTOMY TUBE CHECK/CHANGE/REPOSITION/REINSERTION/UPSIZE  1/31/2024    IR NEPHROSTOMY TUBE CHECK/CHANGE/REPOSITION/REINSERTION/UPSIZE  2/2/2024    IR NEPHROSTOMY TUBE CHECK/CHANGE/REPOSITION/REINSERTION/UPSIZE  3/4/2024    IR NEPHROSTOMY TUBE CHECK/CHANGE/REPOSITION/REINSERTION/UPSIZE  3/20/2024    IR NEPHROSTOMY TUBE CHECK/CHANGE/REPOSITION/REINSERTION/UPSIZE  6/7/2024    IR NEPHROSTOMY TUBE CHECK/CHANGE/REPOSITION/REINSERTION/UPSIZE  8/5/2024    IR NEPHROSTOMY TUBE PLACEMENT  06/22/2023    bilateral    IR OTHER  1/15/2024    US GUIDED PROSTATE BIOPSY         Family History   Problem Relation Age of Onset    Uterine cancer Mother     Liver cancer Father     No Known Problems Sister     No Known Problems Brother     No Known Problems Son     Diabetes type II Daughter     No Known Problems Daughter     Cancer Daughter          Medications have been verified.        Objective   /72   Pulse 84   Temp 97.8 °F (36.6 °C) (Temporal)   Resp 16   Wt 67.1 kg (148 lb)   SpO2 97%   BMI 22.50 kg/m²        Physical Exam     Physical Exam  Vitals and nursing note reviewed.   Constitutional:       General: He is not in acute distress.     Appearance: Normal appearance. He is normal weight. He is not ill-appearing, toxic-appearing or diaphoretic.   Cardiovascular:      Rate and Rhythm: Normal rate and regular  rhythm.      Heart sounds: No murmur heard.     No friction rub. No gallop.   Pulmonary:      Effort: Pulmonary effort is normal. No respiratory distress.      Breath sounds: Normal breath sounds. No stridor. No wheezing, rhonchi or rales.   Chest:      Chest wall: No tenderness.   Musculoskeletal:         General: Tenderness present. No swelling.      Comments: ROM limited in the right shoulder to 90 degrees flexion and extension.  Pain to palpation over the posterior right shoulder.  Pain to palpation over the patient's left side without ecchymosis, erythema or swelling.   Skin:     Findings: No bruising or erythema.   Neurological:      Mental Status: He is alert.

## 2024-08-13 NOTE — PATIENT INSTRUCTIONS
"You will be called with the official radiology report.  It does appear that you broke at least one of the ribs on your left side.  Follow-up with orthopedics for the right shoulder pain and palliative care/your PCP for the ribs.  Make sure you are taking deep breaths to prevent your lung from collapsing.     Patient Education     Fracturas de davie en adultos   Conceptos Básicos   Redactado por los médicos y editores de UpToDate   ¿Qué es jasmin fractura de davie? -- \"Fractura\" es otra palabra para referirse a un hueso roto. Jasmin fractura de davie se produce cuando jasmin persona se rompe un hueso de la davie (figura 1).  Existen distintos tipos de fracturas, según la forma en que se rompa el hueso. Cuando un hueso se rompe, puede resquebrajarse, romperse por completo o astillarse.  La mayoría de las fracturas de davie sucede por jasmin lesión en el pecho. Si la lesión es grave, también puede dañar órganos del pecho o del área del estómago.  Algunas fracturas de davie, llamadas “fracturas por estrés”, no se producen por jasmin lesión sino por jasmin tos catarino o por repetir muchas veces el mismo movimiento, barron el que se hace al usar un flavia de golf.  ¿Cuáles son los síntomas de jasmin fractura de davie? -- Los síntomas dependen del hueso que se rompe y del tipo de fractura. Los síntomas comunes pueden incluir:   Dolor, inflamación o moretones en la alpa   Dolor al respirar o  la parte superior del cuerpo o los brazos   La alpa se ve anormal, doblada o no tiene la forma habitual   Incapacidad para inspirar profundamente   Adormecimiento en la alpa del hueso roto  Jasmin fractura por estrés también provoca dolor, any monica suele comenzar gradualmente y empeorar en unas pocas semanas o meses.  ¿Existe alguna prueba para detectar jasmin fractura de davie? -- Sí. Si luevano médico o enfermero piensa que podría tener jasmin davie fracturada, le preguntará sobre rose síntomas, le hará un examen y solicitará jasmin radiografía " de tórax u otro estudio de imagen. Los estudios de imagen crean imágenes del interior del cuerpo.  Jasmin radiografía permite verificar si hay fracturas de costillas y garantizar que no tenga un pulmón colapsado (llamado “neumotórax”). Algunas personas necesitan otros estudios de imagen para detectar si hay costillas fracturadas, barron jasmin tomografía, jasmin resonancia magnética o un ultrasonido. Estas pruebas también pueden servir para detectar otras lesiones.  ¿Cómo se tratan las fracturas de davie? -- El tratamiento depende, en parte, de la cantidad de costillas fracturadas, del tipo de fractura y de la gravedad de esta. El objetivo es garantizar que usted pueda respirar lo suficientemente profundo barron para que ingrese suficiente oxígeno al cuerpo y se expulse suficiente dióxido de carbono. Las personas que tienen muchas costillas rotas u otras lesiones graves suelen necesitar tratamiento en el hospital. Otros adultos también podrían tener que ir al hospital, incluso por lesiones menos graves.  En casos muy graves, el médico podría tener que realizar jasmin cirugía para volver a poner los huesos en la posición correcta y colocar placas sobre ellos. [place] recibe el nombre de “estabilización de costillas”. Reg vez lo necesite si:   Los extremos de las costillas rotas no están alineados.   Tiene muchas costillas rotas.   Las costillas rotas le impiden respirar profundamente.  Las personas que no necesitan recibir tratamiento en el hospital se tratan con medicinas para aliviar el dolor. [place] es importante porque, cuando las personas sienten dolor en las costillas, tratan de evitar moverlas demasiado, y no inhalan y exhalan con la profundidad que deberían. lo cual aumenta las posibilidades de sufrir jasmin infección pulmonar llamada “neumonía”.  Para tratar el dolor:   [place] jasmin medicina para el dolor de venta sin receta. Algunas de ellas son el paracetamol (acetaminofén) (ejemplo de elida comercial: Tylenol), el ibuprofeno  "(ejemplos de marcas comerciales: Advil, Motrin) y el naproxeno (ejemplo de elida comercial: Aleve).   Es posible que el médico le recete jasmin medicina para el dolor más catarino para que la tome percy un período breve. Siga las instrucciones sobre cómo leroy esas medicinas.   Si va al hospital, es posible que reciba otros tratamientos para ayudar con el dolor. Algunos de ellos podrían ser jasmin inyección llamada “bloqueo nervioso” o medicinas que se dan a través de jasimn “epidural”. Jasmin epidural es un tubo pequeño (catéter) que se coloca en la espalda, cerca de los nervios de la columna.  Es probable que el médico también recomiende un tratamiento para impedir que las vías respiratorias pequeñas se cierren. Consiste en respirar profundamente dentro de un dispositivo manual varias veces por día. El dispositivo se llama \"espirómetro de incentivo\".  El tratamiento de jasmin fractura por estrés consiste en evitar la actividad que causó la fractura percy cuatro a seis semanas. Luego, puede retomar de a poco la actividad.  ¿Cuánto tardan en sanar las fracturas de davie? -- La mayoría de las fracturas de davie tardan semanas o meses en sanar. El médico o enfermero hablará con usted sobre cuándo puede reanudar actividades barron el trabajo, los deportes y otras.  Lo que tarde en sanar la fractura también depende de la persona. Los niños saludables por lo general sanan mucho más rápido que los adultos mayores o que los adultos con otros problemas de humaira.  ¿Cómo puedo cuidarme en casa? -- Para cuidarse en casa:   Use el espirómetro de incentivo o haylee entre 10 y 15 inhalaciones profundas al menos cuatro veces al día.   Sostenga jasmin almohada contra luevano pecho para aliviar el dolor cuando inhala profundo, estornuda, tose o se ríe.   Aumente la actividad gradualmente cuando ya no sienta dolor al hacer reposo. Evite levantar objetos pesados y practicar deportes percy debra semanas barron mínimo. (Luevano médico o enfermero le dirá " exactamente cuánto tiempo debe evitar esas u otras actividades).   El hielo puede ayudar con el dolor y la inflamación:   Coloque un paquete de gel frío, jasmin bolsa de hielo o jasmin bolsa de verduras congeladas en la alpa lesionada cada hora a cada 2 horas, percy 15 minutos cada vez. Coloque jasmin toalla delgada entre el hielo (o el objeto frío) y la piel.   Aplique el hielo (o cualquier objeto frío) percy al menos 6 horas después de la lesión. Algunas personas prefieren aplicar hielo percy más tiempo, incluso hasta dos días después de la lesión.   Lleve jasmin dieta saludable que incluya suficiente calcio, vitamina D y proteína (figura 2).   Si fuma, intente dejar de hacerlo. Los huesos rotos tardan más en sanar si fuma.  ¿Cuándo lissette llamar al médico? -- Llame para pedir asesoramiento si:   Cada vez le kim más respirar.   [place] falta de aire es saray que no puede terminar las oraciones al hablar.   Comienza a tener un dolor intenso en el pecho, la espalda o el addison.   Empieza a toser taco o moco amarillo o edmund.   Tiene fiebre de 100.4 °F (38 °C) o más, o escalofríos.   Se siente muy débil o aturdido, o siente que está por desmayarse.   Tiene un dolor muy intenso que no se karlos con las medicinas.  Todos los artículos se actualizan a medida que se descubre nueva evidencia y culmina nuestro proceso de evaluación por homólogos   [place] artículo se recuperó de UpToDate el: Feb 26, 2024.  Artículo 16230 Versión 11.0.es-419.1  Release: 32.2.4 - C32.56  © 2024 [facility] Todos los derechos reservados.  figura 1: Costillas     En monica dibujo se muestra la ubicación de las costillas.  Gráfico 24806 Versión 1.0  figura 2: Alimentos y bebidas con calcio y vitamina D     Entre los alimentos ricos en calcio se incluyen el helado, la leche de soya, los panes, la col rizada, el brócoli, la leche, el queso, el requesón, las almendras, el yogur, los cereales listos para comer, los frijoles y el tofu. Entre los alimentos ricos  "en vitamina D se incluyen la leche, las \"leches\" vegetales fortificadas (soya, almendras), el atún en mariama, el aceite de hígado de bacalao, el yogur, los cereales listos para comer, el salmón cocido, las linda en mariama, la caballa y los huevos. Algunos de estos alimentos son ricos en ambas cosas.  Gráfico 27807 Versión 4.0  Exención de responsabilidad y uso de la información del consumidor   Descargo de responsabilidad: esta información generalizada es un resumen limitado de información sobre el diagnóstico, el tratamiento y/o los medicamentos. No pretende ser exhaustiva y se debe utilizar barron herramienta para ayudar al usuario a comprender y/o evaluar las posibles opciones de diagnóstico y tratamiento. No incluye toda la información sobre afecciones, tratamientos, medicamentos, efectos secundarios o riesgos puedan ser aplicables a un paciente específico. No tiene el propósito de servir barron recomendación médica ni de sustituir la recomendación médica, el diagnóstico o el tratamiento de un profesional de atención médica que se base en el examen y la evaluación de monica profesional de la humaira respecto a las circunstancias específicas y únicas del paciente. Los pacientes deben hablar con un profesional de atención médica para obtener información completa sobre luevano humaira, cuestiones médicas y opciones de tratamiento, incluidos los riesgos o los beneficios relacionados con el uso de medicamentos. Esta información no certifica que los tratamientos o medicamentos shu seguros, eficaces o estén aprobados para tratar a un paciente específico. UpToDate, Inc. y rose afiliados renuncian a cualquier garantía o responsabilidad relacionada con esta información o el uso de la misma.El uso de esta información está sujeto a las Condiciones de uso, disponibles en https://www.Preventlyer.com/en/know/clinical-effectiveness-terms. 2024© UpToDate, Inc. y rose afiliados y/o licenciantes. Todos los derechos reservados.  Copyright   © " 2024 [employer] Todos los derechos reservados.

## 2024-08-15 ENCOUNTER — APPOINTMENT (OUTPATIENT)
Facility: CLINIC | Age: 74
End: 2024-08-15
Payer: COMMERCIAL

## 2024-08-16 ENCOUNTER — OFFICE VISIT (OUTPATIENT)
Dept: OBGYN CLINIC | Facility: CLINIC | Age: 74
End: 2024-08-16
Payer: COMMERCIAL

## 2024-08-16 ENCOUNTER — OFFICE VISIT (OUTPATIENT)
Dept: ENDOCRINOLOGY | Facility: CLINIC | Age: 74
End: 2024-08-16
Payer: COMMERCIAL

## 2024-08-16 VITALS
BODY MASS INDEX: 22.46 KG/M2 | SYSTOLIC BLOOD PRESSURE: 121 MMHG | WEIGHT: 148.2 LBS | OXYGEN SATURATION: 97 % | DIASTOLIC BLOOD PRESSURE: 80 MMHG | HEART RATE: 78 BPM | HEIGHT: 68 IN

## 2024-08-16 VITALS
WEIGHT: 148 LBS | BODY MASS INDEX: 22.43 KG/M2 | SYSTOLIC BLOOD PRESSURE: 101 MMHG | HEART RATE: 80 BPM | DIASTOLIC BLOOD PRESSURE: 68 MMHG | HEIGHT: 68 IN

## 2024-08-16 DIAGNOSIS — E78.5 HYPERLIPIDEMIA, UNSPECIFIED HYPERLIPIDEMIA TYPE: ICD-10-CM

## 2024-08-16 DIAGNOSIS — Z79.4 TYPE 2 DIABETES MELLITUS WITH OTHER SPECIFIED COMPLICATION, WITH LONG-TERM CURRENT USE OF INSULIN (HCC): ICD-10-CM

## 2024-08-16 DIAGNOSIS — S46.001A INJURY OF RIGHT ROTATOR CUFF, INITIAL ENCOUNTER: Primary | ICD-10-CM

## 2024-08-16 DIAGNOSIS — S49.91XA INJURY OF RIGHT SHOULDER, INITIAL ENCOUNTER: ICD-10-CM

## 2024-08-16 DIAGNOSIS — I10 PRIMARY HYPERTENSION: Primary | Chronic | ICD-10-CM

## 2024-08-16 DIAGNOSIS — R63.0 LOSS OF APPETITE: ICD-10-CM

## 2024-08-16 DIAGNOSIS — E16.2 HYPOGLYCEMIA: ICD-10-CM

## 2024-08-16 DIAGNOSIS — E11.69 TYPE 2 DIABETES MELLITUS WITH OTHER SPECIFIED COMPLICATION, WITH LONG-TERM CURRENT USE OF INSULIN (HCC): ICD-10-CM

## 2024-08-16 PROCEDURE — 95251 CONT GLUC MNTR ANALYSIS I&R: CPT | Performed by: NURSE PRACTITIONER

## 2024-08-16 PROCEDURE — 99204 OFFICE O/P NEW MOD 45 MIN: CPT | Performed by: NURSE PRACTITIONER

## 2024-08-16 PROCEDURE — 99203 OFFICE O/P NEW LOW 30 MIN: CPT | Performed by: ORTHOPAEDIC SURGERY

## 2024-08-16 PROCEDURE — 20610 DRAIN/INJ JOINT/BURSA W/O US: CPT | Performed by: ORTHOPAEDIC SURGERY

## 2024-08-16 RX ORDER — TRIAMCINOLONE ACETONIDE 40 MG/ML
40 INJECTION, SUSPENSION INTRA-ARTICULAR; INTRAMUSCULAR
Status: COMPLETED | OUTPATIENT
Start: 2024-08-16 | End: 2024-08-16

## 2024-08-16 RX ORDER — LISINOPRIL 5 MG/1
5 TABLET ORAL DAILY
COMMUNITY
Start: 2024-08-07

## 2024-08-16 RX ADMIN — TRIAMCINOLONE ACETONIDE 40 MG: 40 INJECTION, SUSPENSION INTRA-ARTICULAR; INTRAMUSCULAR at 10:30

## 2024-08-16 NOTE — PROGRESS NOTES
"Orthopedic Sports Medicine New Patient Visit     Assesment:   73 y.o. male with right rotator cuff injury    Plan:    Oliver is a pleasant 73 y.o. male who presents for initial evaluation of his right shoulder. I believe he may have sustained a rotator cuff injury of the right shoulder when he fell on 8/13/2024.  However given his extensive other medical issues at this time including metastatic prostate cancer I recommended a course of conservative treatment.  I offered him a corticosteroid injection of the right shoulder today.  I offered him a referral to physical therapy for the right shoulder, as well. He was amenable to this today. He will follow-up as needed for the right shoulder.      Large joint arthrocentesis: R subacromial bursa  Universal Protocol:  Consent: Verbal consent obtained.  Risks and benefits: risks, benefits and alternatives were discussed  Consent given by: patient  Time out: Immediately prior to procedure a \"time out\" was called to verify the correct patient, procedure, equipment, support staff and site/side marked as required.  Patient understanding: patient states understanding of the procedure being performed  Site marked: the operative site was marked  Patient identity confirmed: verbally with patient  Supporting Documentation  Indications: pain   Procedure Details  Location: shoulder - R subacromial bursa  Preparation: Patient was prepped and draped in the usual sterile fashion  Needle size: 22 G  Approach: posterolateral  Medications administered: 40 mg triamcinolone acetonide 40 mg/mL (4 mL ropivicaine 0.2%)    Patient tolerance: patient tolerated the procedure well with no immediate complications  Dressing:  Sterile dressing applied         Follow up:    No follow-ups on file.        Chief Complaint   Patient presents with    Right Shoulder - Pain       History of Present Illness:    The patient is a 73 y.o. male, who presents for initial evaluation of his right shoulder. A Stateless "  was used for today's visit. He sustained an injury to the right shoulder on 8/13/2024 after slipping out of bed. He was seen at the local urgent care, where X-rays were obtained. Today, he reports the right shoulder remains painful. He denies experiencing right shoulder pain prior to the fall. He indicates the lateral upper arm is where his pain is located. He states reaching overhead is painful. He denies experiencing any neck pain. He denies any distal paresthesias of the RUE. He has been taking gabapentin for pain control. He denies taking any over the counter NSAIDs or Tylenol.    Shoulder Surgical History:  None    Past Medical, Social and Family History:  Past Medical History:   Diagnosis Date    COVID-19 01/15/2024    Diabetes mellitus (HCC)     Elevated PSA 06/21/2023    High cholesterol     Hypertension     Prostate cancer (HCC)      Past Surgical History:   Procedure Laterality Date    IR NEPHROSTOMY TUBE CHECK/CHANGE/REPOSITION/REINSERTION/UPSIZE  9/28/2023    IR NEPHROSTOMY TUBE CHECK/CHANGE/REPOSITION/REINSERTION/UPSIZE  12/28/2023    IR NEPHROSTOMY TUBE CHECK/CHANGE/REPOSITION/REINSERTION/UPSIZE  1/31/2024    IR NEPHROSTOMY TUBE CHECK/CHANGE/REPOSITION/REINSERTION/UPSIZE  2/2/2024    IR NEPHROSTOMY TUBE CHECK/CHANGE/REPOSITION/REINSERTION/UPSIZE  3/4/2024    IR NEPHROSTOMY TUBE CHECK/CHANGE/REPOSITION/REINSERTION/UPSIZE  3/20/2024    IR NEPHROSTOMY TUBE CHECK/CHANGE/REPOSITION/REINSERTION/UPSIZE  6/7/2024    IR NEPHROSTOMY TUBE CHECK/CHANGE/REPOSITION/REINSERTION/UPSIZE  8/5/2024    IR NEPHROSTOMY TUBE PLACEMENT  06/22/2023    bilateral    IR OTHER  1/15/2024    US GUIDED PROSTATE BIOPSY       No Known Allergies  Current Outpatient Medications on File Prior to Visit   Medication Sig Dispense Refill    acetaminophen (TYLENOL) 500 mg tablet Take 2 tablets (1,000 mg total) by mouth 3 (three) times a day (Patient not taking: Reported on 8/16/2024) 180 tablet 2    albuterol (Ventolin HFA) 90  mcg/act inhaler Inhale 2 puffs every 6 (six) hours as needed for wheezing 18 g 0    Alcohol Swabs 70 % PADS To clean the skin prior to injecting insulin 100 each 1    Blood Glucose Monitoring Suppl (OneTouch Verio Reflect) w/Device KIT Check blood sugars once daily. Please substitute with appropriate alternative as covered by patient's insurance. Dx: E11.65 1 kit 0    dexamethasone (DECADRON) 0.5 mg tablet TAKE 1 TABLET (0.5 MG TOTAL) BY MOUTH DAILY WITH BREAKFAST 30 tablet 1    Diclofenac Sodium (VOLTAREN) 1 % Apply 2 g topically 4 (four) times a day (Patient not taking: Reported on 8/16/2024) 100 g 3    Easy Touch Pen Needles 31G X 6 MM MISC Use daily at bedtime 100 each 3    enzalutamide (XTANDI) 40 mg capsule Take 4 capsules (160 mg total) by mouth daily 120 capsule 6    gabapentin (Neurontin) 300 mg capsule Take 1 capsule (300 mg total) by mouth 3 (three) times a day 90 capsule 2    glimepiride (AMARYL) 2 mg tablet Take 1 tablet (2 mg total) by mouth daily with dinner 90 tablet 3    glimepiride (AMARYL) 4 mg tablet Take 1 tablet (4 mg total) by mouth daily with breakfast 90 tablet 3    glucose blood (OneTouch Verio) test strip Check blood sugars twice a daily. Please substitute with appropriate alternative as covered by patient's insurance. Dx: E11.65 200 each 3    Levemir FlexPen 100 units/mL injection pen Inject 20 Units under the skin daily at bedtime 15 mL 1    lisinopril (ZESTRIL) 10 mg tablet NARCISO TANMAY TABLETA VIA ORAL DIARIAMENTE      lisinopril (ZESTRIL) 5 mg tablet Take 5 mg by mouth daily (Patient not taking: Reported on 8/16/2024)      metFORMIN (GLUCOPHAGE) 1000 MG tablet Take 1 tablet (1,000 mg total) by mouth 2 (two) times a day with meals 180 tablet 1    Multiple Vitamins-Minerals (Sentry) TABS Take 1 tablet by mouth daily      naloxone (NARCAN) 4 mg/0.1 mL nasal spray Administer 1 spray into a nostril. If no response after 2-3 minutes, give another dose in the other nostril using a new spray.  (Patient not taking: Reported on 2024) 1 each 0    omega-3-acid ethyl esters (LOVAZA) 1 g capsule       ondansetron (Zofran ODT) 8 mg disintegrating tablet Take 1 tablet (8 mg total) by mouth every 8 (eight) hours as needed for nausea or vomiting 20 tablet 3    OneTouch Delica Lancets 33G MISC Check blood sugars once daily. Please substitute with appropriate alternative as covered by patient's insurance. Dx: E11.65 100 each 3    oxyCODONE (ROXICODONE) 10 MG TABS Take 1 tablet (10 mg total) by mouth every 4 (four) hours as needed for moderate pain Max Daily Amount: 60 mg (Patient not taking: Reported on 2024) 90 tablet 0    pantoprazole (PROTONIX) 40 mg tablet TAKE 1 TABLET BY MOUTH EVERY DAY 90 tablet 1    polyethylene glycol (MIRALAX) 17 g packet Take 17 g by mouth daily as needed (for constipation) 100 each 1    rosuvastatin (CRESTOR) 10 MG tablet Take 1 tablet (10 mg total) by mouth daily at bedtime 90 tablet 1    senna-docusate sodium (SENOKOT S) 8.6-50 mg per tablet Take 1 tablet by mouth daily (Patient not taking: Reported on 2024) 90 tablet 1    sodium chloride, PF, 0.9 % 10 mL by Intracatheter route daily Intracatheter flushing daily. May substitute prefilled syringe with normal saline 10 mL vials, 10 mL syringes, and 18 g blunt needles 600 mL 2    Spacer/Aero-Holding Chambers (AEROCHAMBER MINI CHAMBER) BILLY by Does not apply route see administration instructions 1 Device 0     No current facility-administered medications on file prior to visit.     Social History     Socioeconomic History    Marital status: /Civil Union     Spouse name: Not on file    Number of children: 3    Years of education: Not on file    Highest education level: Not on file   Occupational History    Not on file   Tobacco Use    Smoking status: Former     Current packs/day: 0.00     Types: Cigarettes     Quit date:      Years since quittin.6     Passive exposure: Past    Smokeless tobacco: Never     Tobacco comments:     States he only smoked on the weekends   Vaping Use    Vaping status: Never Used   Substance and Sexual Activity    Alcohol use: Not Currently    Drug use: Never    Sexual activity: Yes     Partners: Female   Other Topics Concern    Not on file   Social History Narrative    From 6/7/724  note:    Primary Caregiver: Self    Support Systems: Self, Spouse/significant other, Son, Family members    County of Residence: [place]    What city do you live in?: [place]    Home entry access options. Select all that apply.: Stairs    Number of steps to enter home.: 3    Type of Current Residence: 2 story home    Upon entering residence, is there a bedroom on the main floor (no further steps)?: Yes    Upon entering residence, is there a bathroom on the main floor (no further steps)?: Yes    Living Arrangements: Lives w/ Spouse/significant other    Is patient a ?: No    Functional Status: Independent    Does patient use assisted devices?: Yes    Assisted Devices (DME) used: Bedside Commode, Straight Cane, Walker, Shower Chair    What DME does the patient currently own?: Bedside Commode, Shower Chair, Straight Cane, Walker    Income Source: Pension/USP    Means of Transport to Appts:: Family transport     Social Determinants of Health     Financial Resource Strain: Low Risk  (4/12/2024)    Overall Financial Resource Strain (CARDIA)     Difficulty of Paying Living Expenses: Not hard at all   Food Insecurity: No Food Insecurity (6/7/2024)    Hunger Vital Sign     Worried About Running Out of Food in the Last Year: Never true     Ran Out of Food in the Last Year: Never true   Transportation Needs: No Transportation Needs (7/26/2024)    Received from [facility]    [place] : Transportation     Lack of Transportation (Medical): No     Lack of Transportation (Non-Medical): No     Patient Unable or Declines to Respond: No   Physical Activity: Not on file   Stress: Not on file   Social  "Connections: Feeling Socially Integrated (7/26/2024)    Received from [facility]    [place] : Social Isolation     Frequency of experiencing loneliness or isolation: Never   Intimate Partner Violence: Not on file   Housing Stability: Low Risk  (6/7/2024)    Housing Stability Vital Sign     Unable to Pay for Housing in the Last Year: No     Number of Times Moved in the Last Year: 0     Homeless in the Last Year: No         I have reviewed the past medical, surgical, social and family history, medications and allergies as documented in the EMR.    Review of systems: ROS is negative other than that noted in the HPI.  Constitutional: Negative for fatigue and fever.   HENT: Negative for sore throat.    Respiratory: Negative for shortness of breath.    Cardiovascular: Negative for chest pain.   Gastrointestinal: Negative for abdominal pain.   Endocrine: Negative for cold intolerance and heat intolerance.   Genitourinary: Negative for flank pain.   Musculoskeletal: Negative for back pain.   Skin: Negative for rash.   Allergic/Immunologic: Negative for immunocompromised state.   Neurological: Negative for dizziness.   Psychiatric/Behavioral: Negative for agitation.      Physical Exam:    Blood pressure 101/68, pulse 80, height 5' 8\" (1.727 m), weight 67.1 kg (148 lb).    General/Constitutional: NAD, well developed, well nourished  HENT: Normocephalic, atraumatic  CV: Intact distal pulses, regular rate  Resp: No respiratory distress or labored breathing  Abdomen: soft, nondistended, non tender   Lymphatic: No lymphadenopathy palpated  Neuro: Alert and Oriented x 3, no focal deficits  Psych: Normal mood, normal affect  Skin: Warm, dry, no rashes, no erythema      Shoulder Exam:      Inspection: No ecchymosis, edema, or deformity. No visualized wounds or skin lesions   Palpation: No significant tenderness  Active Motion:       FF: 80° actively         ER: 45°        IR: deferred  Strength: 4+/5 ER,  4+/5 IR   Sensory - " SILT in the Radial / Ulnar / Median / Axillary nerve distributions  Motor - AIN / PIN / Radial / Ulnar / Median / Axillary motor nerves in tact  Palpable Radial pulse  Cap refill <2secs in all digits        Imaging    I reviewed and interpreted X-rays of the right shoulder which show no significant degenerative changes. No acute fractures or dislocations.    Scribe Attestation      I,:  Marisol Sandhu am acting as a scribe while in the presence of the attending physician.:       I,:  Nam Mills MD personally performed the services described in this documentation    as scribed in my presence.:

## 2024-08-16 NOTE — PROGRESS NOTES
Ambulatory Visit  Name: Oliver Astudillo      : 1950      MRN: [id number]  Encounter Provider: ALTA Felipe  Encounter Date: 2024   Encounter department: [facility]    Assessment & Plan   1. Primary hypertension  Assessment & Plan:  BP well controlled on current regimen.  Continues on lisinopril 10 mg daily.   2. Type 2 diabetes mellitus with other specified complication, with long-term current use of insulin (HCC)  Assessment & Plan:    Lab Results   Component Value Date    HGBA1C 6.2 (H) 2024     HGA1C tightly controlled with hypoglycemia overnight and between meals. Recommend reduction of Levemir to 12 units daily. Reduce glimepiride if eating small meal or hold if not eating meal. Continue metformin 500 mg twice daily.     BGL Reviewed: Continue CGM    Discussed risks/complications associated with uncontrolled diabetes including organ involvement, heart attack, stroke, death.    Advised lifestyle modifications including attention to diet including the amount and types of carbohydrates consumed and regular activity.     Call for blood sugars less than 70 mg/dl or patterns over 250 mg/dl.     Discussed symptoms and treatment of hypoglycemia.  Reviewed risks associated with hypoglycemia. Always carry rapid acting carbohydrates and a glucometer (a way to check your blood sugar).    Recommendation for medical identification either bracelet, necklace.      Routine follow up for diabetic eye and foot exams.     Ordered blood work to complete prior to next visit.    Send glucose logs/CGM download in 1-2 weeks for review    Follow up in 3 months.     Orders:  -     Basic metabolic panel; Future; Expected date: 2024  -     Hemoglobin A1C; Future; Expected date: 2024  3. Hyperlipidemia, unspecified hyperlipidemia type  Assessment & Plan:  Continues on statin.   Orders:  -     Lipid panel; Future; Expected date: 2024  4.  Hypoglycemia  Assessment & Plan:  Reduced insulin and Glimepiride based on diet.     Continue CGM.     Reviewed identification and treatment of hypoglycemia.   5. Loss of appetite  Assessment & Plan:  Recommend discussion with palliative/pcp regarding options.       History of Present Illness     Oliver Astudillo is a 73 y.o. male who presents for history of diabetes mellitus with history of metastatic prostate cancer, hydronephrosis, CKD status post nephrostomy tube placement, hyperlipidemia, hypertension.  Initially diagnosed with diabetes mellitus in 2002 and on insulin since 2008.  Last office visit in April 2024.    Hospitalized in June 2024 for complicated UTI.  During  hospitalization, Levemir dose was decreased to 20 units nightly.      Current diabetes management includes Levemir 15 units at bedtime, metformin 500 mg twice a day, glimepiride 4 mg in the morning and 2 mg in the evening.    CGM Interpretation  Oliver Astudillo   Device used Dexcom for Personal Use  Indication: Type of Diabetes: Type 2 Diabetes  More than 72 hours of data was reviewed. Report to be scanned to chart.   Date Range: August 3-16, 2024  Analysis of data:   Average Glucose: 130 mg/dl  GMI: 6.4%  SD : 45 mg/dl  Time in Target Range: 80%  Time Above Range: 17%  Time Below Range: 3%   Interpretation of data:   Hypoglycemia overnight and due to decreased appetite.     SGLT2 discontinued to history of UTI.    Patient is UTD on diabetic eye exam and diabetic foot exam. No known history of neuropathy.      Review of Systems  Constitutional: Negative fatigue   HENT: Negative for ear pain, sore throat, trouble swallowing and voice change.    Eyes: Negative for visual disturbance.   Respiratory: Negative for cough and shortness of breath.    Cardiovascular: Negative for chest pain and palpitations.   Gastrointestinal: Negative for abdominal distention, abdominal pain, and vomiting. History of constipation.   Endocrine: Negative for cold  intolerance, heat intolerance, polydipsia and polyuria.   Musculoskeletal: Baseline back pain and shoulders.  Skin: Negative   Neurological: Negative   All other systems reviewed and are negative.       Pertinent Medical History   Current Outpatient Medications on File Prior to Visit   Medication Sig Dispense Refill    albuterol (Ventolin HFA) 90 mcg/act inhaler Inhale 2 puffs every 6 (six) hours as needed for wheezing 18 g 0    dexamethasone (DECADRON) 0.5 mg tablet TAKE 1 TABLET (0.5 MG TOTAL) BY MOUTH DAILY WITH BREAKFAST 30 tablet 1    enzalutamide (XTANDI) 40 mg capsule Take 4 capsules (160 mg total) by mouth daily 120 capsule 6    gabapentin (Neurontin) 300 mg capsule Take 1 capsule (300 mg total) by mouth 3 (three) times a day 90 capsule 2    glimepiride (AMARYL) 2 mg tablet Take 1 tablet (2 mg total) by mouth daily with dinner 90 tablet 3    glimepiride (AMARYL) 4 mg tablet Take 1 tablet (4 mg total) by mouth daily with breakfast 90 tablet 3    Levemir FlexPen 100 units/mL injection pen Inject 20 Units under the skin daily at bedtime 15 mL 1    lisinopril (ZESTRIL) 10 mg tablet NARCISO TANMAY TABLETA VIA ORAL DIARIAMENTE      metFORMIN (GLUCOPHAGE) 1000 MG tablet Take 1 tablet (1,000 mg total) by mouth 2 (two) times a day with meals 180 tablet 1    Multiple Vitamins-Minerals (Sentry) TABS Take 1 tablet by mouth daily      omega-3-acid ethyl esters (LOVAZA) 1 g capsule       ondansetron (Zofran ODT) 8 mg disintegrating tablet Take 1 tablet (8 mg total) by mouth every 8 (eight) hours as needed for nausea or vomiting 20 tablet 3    pantoprazole (PROTONIX) 40 mg tablet TAKE 1 TABLET BY MOUTH EVERY DAY 90 tablet 1    polyethylene glycol (MIRALAX) 17 g packet Take 17 g by mouth daily as needed (for constipation) 100 each 1    rosuvastatin (CRESTOR) 10 MG tablet Take 1 tablet (10 mg total) by mouth daily at bedtime 90 tablet 1    sodium chloride, PF, 0.9 % 10 mL by Intracatheter route daily Intracatheter flushing daily.  May substitute prefilled syringe with normal saline 10 mL vials, 10 mL syringes, and 18 g blunt needles 600 mL 2    Spacer/Aero-Holding Chambers (AEROCHAMBER MINI CHAMBER) BILLY by Does not apply route see administration instructions 1 Device 0    acetaminophen (TYLENOL) 500 mg tablet Take 2 tablets (1,000 mg total) by mouth 3 (three) times a day (Patient not taking: Reported on 8/16/2024) 180 tablet 2    Alcohol Swabs 70 % PADS To clean the skin prior to injecting insulin 100 each 1    Blood Glucose Monitoring Suppl (OneTouch Verio Reflect) w/Device KIT Check blood sugars once daily. Please substitute with appropriate alternative as covered by patient's insurance. Dx: E11.65 1 kit 0    Diclofenac Sodium (VOLTAREN) 1 % Apply 2 g topically 4 (four) times a day (Patient not taking: Reported on 8/16/2024) 100 g 3    Easy Touch Pen Needles 31G X 6 MM MISC Use daily at bedtime 100 each 3    glucose blood (OneTouch Verio) test strip Check blood sugars twice a daily. Please substitute with appropriate alternative as covered by patient's insurance. Dx: E11.65 200 each 3    lisinopril (ZESTRIL) 5 mg tablet Take 5 mg by mouth daily (Patient not taking: Reported on 8/16/2024)      naloxone (NARCAN) 4 mg/0.1 mL nasal spray Administer 1 spray into a nostril. If no response after 2-3 minutes, give another dose in the other nostril using a new spray. (Patient not taking: Reported on 8/16/2024) 1 each 0    OneTouch Delica Lancets 33G MISC Check blood sugars once daily. Please substitute with appropriate alternative as covered by patient's insurance. Dx: E11.65 100 each 3    oxyCODONE (ROXICODONE) 10 MG TABS Take 1 tablet (10 mg total) by mouth every 4 (four) hours as needed for moderate pain Max Daily Amount: 60 mg (Patient not taking: Reported on 8/13/2024) 90 tablet 0    senna-docusate sodium (SENOKOT S) 8.6-50 mg per tablet Take 1 tablet by mouth daily (Patient not taking: Reported on 6/28/2024) 90 tablet 1     No current  "facility-administered medications on file prior to visit.      Objective     /80 (BP Location: Right arm, Patient Position: Sitting, Cuff Size: Large)   Pulse 78   Ht 5' 8\" (1.727 m)   Wt 67.2 kg (148 lb 3.2 oz)   SpO2 97%   BMI 22.53 kg/m²        Physical Exam  Vitals reviewed.   Constitutional:       Appearance: Normal appearance.   Cardiovascular:      Rate and Rhythm: Normal rate and regular rhythm.      Pulses: Normal pulses.      Heart sounds: Normal heart sounds.   Pulmonary:      Effort: Pulmonary effort is normal.      Breath sounds: Normal breath sounds.   Skin:     General: Skin is warm and dry.      Capillary Refill: Capillary refill takes less than 2 seconds.   Neurological:      General: No focal deficit present.      Mental Status: He is alert and oriented to person, place, and time.   Psychiatric:         Mood and Affect: Mood normal.         Behavior: Behavior normal.     Administrative Statements           "

## 2024-08-16 NOTE — ASSESSMENT & PLAN NOTE
Lab Results   Component Value Date    HGBA1C 6.2 (H) 07/23/2024     HGA1C tightly controlled with hypoglycemia overnight and between meals. Recommend reduction of Levemir to 12 units daily. Reduce glimepiride if eating small meal or hold if not eating meal. Continue metformin 500 mg twice daily.     BGL Reviewed: Continue CGM    Discussed risks/complications associated with uncontrolled diabetes including organ involvement, heart attack, stroke, death.    Advised lifestyle modifications including attention to diet including the amount and types of carbohydrates consumed and regular activity.     Call for blood sugars less than 70 mg/dl or patterns over 250 mg/dl.     Discussed symptoms and treatment of hypoglycemia.  Reviewed risks associated with hypoglycemia. Always carry rapid acting carbohydrates and a glucometer (a way to check your blood sugar).    Recommendation for medical identification either bracelet, necklace.      Routine follow up for diabetic eye and foot exams.     Ordered blood work to complete prior to next visit.    Send glucose logs/CGM download in 1-2 weeks for review    Follow up in 3 months.

## 2024-08-16 NOTE — ASSESSMENT & PLAN NOTE
Reduced insulin and Glimepiride based on diet.     Continue CGM.     Reviewed identification and treatment of hypoglycemia.

## 2024-08-20 ENCOUNTER — OFFICE VISIT (OUTPATIENT)
Facility: CLINIC | Age: 74
End: 2024-08-20
Payer: COMMERCIAL

## 2024-08-20 ENCOUNTER — TELEPHONE (OUTPATIENT)
Dept: HEMATOLOGY ONCOLOGY | Facility: CLINIC | Age: 74
End: 2024-08-20

## 2024-08-20 DIAGNOSIS — R53.1 WEAKNESS: ICD-10-CM

## 2024-08-20 DIAGNOSIS — Z74.09 IMPAIRED FUNCTIONAL MOBILITY, BALANCE, GAIT, AND ENDURANCE: ICD-10-CM

## 2024-08-20 DIAGNOSIS — R53.81 PHYSICAL DECONDITIONING: Primary | ICD-10-CM

## 2024-08-20 PROCEDURE — 97530 THERAPEUTIC ACTIVITIES: CPT

## 2024-08-20 PROCEDURE — 97110 THERAPEUTIC EXERCISES: CPT

## 2024-08-20 NOTE — PROGRESS NOTES
"Daily Note     Today's date: 2024  Patient name: Oliver Astudillo  : 1950  MRN: [id number]  Referring provider: Dave Alexandra DO  Dx:   Encounter Diagnosis     ICD-10-CM    1. Physical deconditioning  R53.81       2. Impaired functional mobility, balance, gait, and endurance  Z74.09       3. Weakness  R53.1                   POC expires Unit limit Auth Expiration date PT/OT + Visit Limit? Co-Insurance   10/2/24  9/18 BOMN Yes                               Visit/Unit Tracking  AUTH Status:  Date 24            12 visits   Used 1 1 1             Remaining  11 10 9               ** Use  anthony  Wife's name is Erlinda     Subjective: Patient presents to PT w/ [facility] stating he was having \"lung pain\" at the posterior aspect of his R ribs, he states he went to the urgent care where they performed x-rays which were negative for fractures. Patient self-reports he endorsed lung pain to MD at the urgent care but they did not report anything significant from the imaging.     Objective: See treatment diary below  HR max: 157 bpm      Restin bpm  HRR max: 69 bpm  35%: 112 bpm  45:% 119 bpm  THR: 112-119 bpm    TA/ NMR:  (1-2 min on/ 1-2 min off)  - Seated LAQ: HR: 99 bpm  - Standing marches HR: 92 c/o dizziness/ faintness  BP: 107/55 mmHg, given water after 5 minutes: 107/57 mmHg asymptomatic  - Seated marching HR: 88 bpm  BP post: 107/55 mmHg asymptomatic    Assessment: Patient tolerated treatment poorly. Discussed at length complaints of posterior back pain and highly advised patient contact his PCP in regards to this. Patient with complaints of lightheadedness, dizziness, and faintness after ~1 minute of standing marches. Walked patient to chair when he had an instance of lateral LoB, blood pressure reading lower than normal range; provided patient with water and rest period which resulted in complete resolution of symptoms however, with blood pressure remaining similar. Performed " seated exercise with no onset of symptoms or change in blood pressure following. Highly advised patient continue to monitor blood pressure and contact doctor should blood pressure remain low with accompanied symptoms patient in good verbal understanding. Also advised patient report complaints of pain to doctor in conjunction with activities such as walking. PT also plans to discuss this with PCP patient verbalized consent and agreeability to relay pertinent patient information. Consider holding PT until speaking to patients PCP.    Plan: continue per PoC    Follow phase 1 of [facility] cancer phase model     Interval training between sitting/balance, focus on balance and generalized strengthening          5xSTS 54.19 sec with BUE support        TUG  - Regular  - Cognitive   Defer        10 meter Defer        SOLOMON Defer        FGA Defer        DGI Defer        mCTSIB  - FTEO (firm)  - FTEC (firm)  - FTEO (foam)  - FTEC (foam)   30 sec  7 sec  NT sec  NT sec        2MWT Defer

## 2024-08-22 ENCOUNTER — PATIENT OUTREACH (OUTPATIENT)
Age: 74
End: 2024-08-22

## 2024-08-22 NOTE — PROGRESS NOTES
Pt due for outreach. Chart review done. Pt is now going to PT for physical deconditioning.   Pt is attending all appointments.     Outreach to patients son. No answer. Unable to leave VM.     RN CM will close complex episode at this time. Will reopen if new referral is received.

## 2024-08-23 ENCOUNTER — APPOINTMENT (OUTPATIENT)
Dept: LAB | Facility: HOSPITAL | Age: 74
End: 2024-08-23
Attending: INTERNAL MEDICINE
Payer: COMMERCIAL

## 2024-08-23 DIAGNOSIS — C79.51 PROSTATE CANCER METASTATIC TO BONE (HCC): ICD-10-CM

## 2024-08-23 DIAGNOSIS — C61 PROSTATE CANCER METASTATIC TO BONE (HCC): ICD-10-CM

## 2024-08-23 LAB
ALBUMIN SERPL BCG-MCNC: 4.1 G/DL (ref 3.5–5)
ALP SERPL-CCNC: 85 U/L (ref 34–104)
ALT SERPL W P-5'-P-CCNC: 8 U/L (ref 7–52)
ANION GAP SERPL CALCULATED.3IONS-SCNC: 8 MMOL/L (ref 4–13)
AST SERPL W P-5'-P-CCNC: 12 U/L (ref 13–39)
BASOPHILS # BLD AUTO: 0.04 THOUSANDS/ÂΜL (ref 0–0.1)
BASOPHILS NFR BLD AUTO: 1 % (ref 0–1)
BILIRUB SERPL-MCNC: 0.57 MG/DL (ref 0.2–1)
BUN SERPL-MCNC: 16 MG/DL (ref 5–25)
CALCIUM SERPL-MCNC: 11.1 MG/DL (ref 8.4–10.2)
CHLORIDE SERPL-SCNC: 99 MMOL/L (ref 96–108)
CO2 SERPL-SCNC: 26 MMOL/L (ref 21–32)
CREAT SERPL-MCNC: 0.89 MG/DL (ref 0.6–1.3)
EOSINOPHIL # BLD AUTO: 0.03 THOUSAND/ÂΜL (ref 0–0.61)
EOSINOPHIL NFR BLD AUTO: 0 % (ref 0–6)
ERYTHROCYTE [DISTWIDTH] IN BLOOD BY AUTOMATED COUNT: 14 % (ref 11.6–15.1)
GFR SERPL CREATININE-BSD FRML MDRD: 86 ML/MIN/1.73SQ M
GLUCOSE P FAST SERPL-MCNC: 173 MG/DL (ref 65–99)
HCT VFR BLD AUTO: 40.4 % (ref 36.5–49.3)
HGB BLD-MCNC: 13.4 G/DL (ref 12–17)
IMM GRANULOCYTES # BLD AUTO: 0.04 THOUSAND/UL (ref 0–0.2)
IMM GRANULOCYTES NFR BLD AUTO: 1 % (ref 0–2)
LYMPHOCYTES # BLD AUTO: 0.88 THOUSANDS/ÂΜL (ref 0.6–4.47)
LYMPHOCYTES NFR BLD AUTO: 11 % (ref 14–44)
MCH RBC QN AUTO: 30.8 PG (ref 26.8–34.3)
MCHC RBC AUTO-ENTMCNC: 33.2 G/DL (ref 31.4–37.4)
MCV RBC AUTO: 93 FL (ref 82–98)
MONOCYTES # BLD AUTO: 0.45 THOUSAND/ÂΜL (ref 0.17–1.22)
MONOCYTES NFR BLD AUTO: 6 % (ref 4–12)
NEUTROPHILS # BLD AUTO: 6.58 THOUSANDS/ÂΜL (ref 1.85–7.62)
NEUTS SEG NFR BLD AUTO: 81 % (ref 43–75)
NRBC BLD AUTO-RTO: 0 /100 WBCS
PLATELET # BLD AUTO: 432 THOUSANDS/UL (ref 149–390)
PMV BLD AUTO: 9.4 FL (ref 8.9–12.7)
POTASSIUM SERPL-SCNC: 4.1 MMOL/L (ref 3.5–5.3)
PROT SERPL-MCNC: 8.2 G/DL (ref 6.4–8.4)
PSA SERPL-MCNC: 48.69 NG/ML (ref 0–4)
RBC # BLD AUTO: 4.35 MILLION/UL (ref 3.88–5.62)
SODIUM SERPL-SCNC: 133 MMOL/L (ref 135–147)
WBC # BLD AUTO: 8.02 THOUSAND/UL (ref 4.31–10.16)

## 2024-08-23 PROCEDURE — 80053 COMPREHEN METABOLIC PANEL: CPT

## 2024-08-23 PROCEDURE — 85025 COMPLETE CBC W/AUTO DIFF WBC: CPT

## 2024-08-23 PROCEDURE — 84153 ASSAY OF PSA TOTAL: CPT

## 2024-08-26 ENCOUNTER — TELEPHONE (OUTPATIENT)
Dept: HEMATOLOGY ONCOLOGY | Facility: CLINIC | Age: 74
End: 2024-08-26

## 2024-08-26 ENCOUNTER — OFFICE VISIT (OUTPATIENT)
Dept: HEMATOLOGY ONCOLOGY | Facility: CLINIC | Age: 74
End: 2024-08-26
Payer: COMMERCIAL

## 2024-08-26 VITALS
HEIGHT: 68 IN | HEART RATE: 85 BPM | DIASTOLIC BLOOD PRESSURE: 78 MMHG | TEMPERATURE: 97.4 F | OXYGEN SATURATION: 99 % | RESPIRATION RATE: 17 BRPM | SYSTOLIC BLOOD PRESSURE: 130 MMHG | BODY MASS INDEX: 21.92 KG/M2 | WEIGHT: 144.6 LBS

## 2024-08-26 DIAGNOSIS — C61 PROSTATE CANCER METASTATIC TO BONE (HCC): Primary | ICD-10-CM

## 2024-08-26 DIAGNOSIS — C79.51 PROSTATE CANCER METASTATIC TO BONE (HCC): Primary | ICD-10-CM

## 2024-08-26 PROBLEM — E83.52 HYPERCALCEMIA: Status: ACTIVE | Noted: 2024-08-26

## 2024-08-26 PROCEDURE — 99215 OFFICE O/P EST HI 40 MIN: CPT | Performed by: INTERNAL MEDICINE

## 2024-08-26 PROCEDURE — G2211 COMPLEX E/M VISIT ADD ON: HCPCS | Performed by: INTERNAL MEDICINE

## 2024-08-26 RX ORDER — SODIUM CHLORIDE 9 MG/ML
20 INJECTION, SOLUTION INTRAVENOUS ONCE
OUTPATIENT
Start: 2024-09-06

## 2024-08-26 RX ORDER — SODIUM CHLORIDE 9 MG/ML
20 INJECTION, SOLUTION INTRAVENOUS ONCE
Status: CANCELLED | OUTPATIENT
Start: 2024-08-30

## 2024-08-26 NOTE — TELEPHONE ENCOUNTER
Good Afternoon,                     Please schedule patient for infusions per provider.     Thank you

## 2024-08-26 NOTE — PROGRESS NOTES
[facility] [place]  [address] [facility] [address]  [facility] [address]  [phone]    Oliver Astudillo,1950, [id number]  08/26/24    Discussion:   In summary, this is a 73-year-old male with a history of prostate cancer, [place] 9, bone mets by PSMA PET August 2023. Firmagon July 2023, Lupron started August 2023. Zytiga 250 mg and dexamethasone 0.5 mg p.o. daily with low-fat meal.  June 2024 Zytiga discontinued for PSA 6.5.  Xtandi initiated.  Recent WBC 8.0, hemoglobin 13.4, platelet count 432.  Differential near normal.  CMP shows sodium 133, glucose 173, calcium 11.1, previously 9.8.  PSA 48.6.  We reviewed that his disease is progressive.  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  Discussed potential utility of Taxotere for disease control, palliative benefit, survival benefit.  We reviewed potential toxicities including but not limited to nausea, vomiting, diarrhea, cytopenias, alopecia, rash, infusion reaction.  We reviewed prophylactic measures taken routinely as well as monitors allow for early intervention as appropriate.  Our chemotherapy nurse reviewed and provided written materials regarding this medication.  Low weekly dosing is applicable to maximize therapeutic ratio.  He has poor appetite, weight loss, constipation, fatigue.  Hypercalcemia is likely contributing.  Xgeva monthly as recommended to improve hypercalcemia and complications.  We reviewed potential toxicities including hypocalcemia, ONJ, myalgias, arthralgias, low-grade fever.  Professional interpretation provided through [facility].  I discussed the above with the patient.  The patient and his family voiced understanding and agreement.  ______________________________________________________________________    Chief Complaint   Patient presents with    Follow-up       HPI:  Oncology History   Prostate cancer metastatic to  bone (HCC)   5/24/2023 Initial Diagnosis    May 2023 patient presented with urinary frequency. . CT showed distended urinary bladder with bilateral hydronephrosis. Urinary bladder mass inseparable from the prostate. Extraprostatic extension noted. Bone scan showed indeterminate activity at T11 vertebral body. Bilateral nephrostomy tubes and Cazares catheter placed. Prostate biopsy showed adenocarcinoma, [place] 9.      6/28/2023 Biopsy    A. Prostate, right lateral base:  - Prostatic adenocarcinoma, [place] score 4 + 5 = 9, Prognostic Grade Group 5,  involving 90% of 1 needle core  and measuring  11 mm in length.        B. Prostate, right medial base:  - Prostatic adenocarcinoma, [place]iana score 4 + 5 = 9, Prognostic Grade Group 5,  involving 70% of 1 needle core  and measuring  10 mm in length.      C. Prostate, right lateral mid:  - Prostatic adenocarcinoma, Liana score 4 + 5 = 9, Prognostic Grade Group 5,  involving 90% of 1 needle core  and measuring  12 mm in length.      Comment: Immunohistochemistry for a prostate multiplex stain (p63, K903, and P504S) and NKX3.1 demonstrate invasive carcinoma.     D. Prostate, right medial mid:  - Prostatic adenocarcinoma, [place] score 4 + 5 = 9, Prognostic Grade Group 5,  involving 95% of 1 needle core  and measuring  15 mm in length.      E. Prostate, right lateral apex:  - Prostatic adenocarcinoma, [place] score 4 + 5 = 9, Prognostic Grade Group 5,  involving 90% of 1 needle core  and measuring  12 mm in length.   - Perineural invasion identified.     Comment: Immunohistochemistry for a prostate multiplex stain (p63, K903, and P504S) and NKX3.1 demonstrate invasive carcinoma.     F. Prostate, right medial apex:  - Prostatic adenocarcinoma, [place] score 4 + 5 = 9, Prognostic Grade Group 5,  involving 100% of 1 needle core  and measuring  20 mm in length.      G. Prostate, left lateral base:  - Prostatic adenocarcinoma, [place] score 4 + 5 = 9, Prognostic Grade Group  5,  involving 75% of 1 needle core  and measuring  10 mm in length.   - Perineural invasion identified.  - Lymphovascular invasion is identified.     H. Prostate, left medial base:  - Prostatic adenocarcinoma, [place] score 4 + 5 = 9, Prognostic Grade Group 5,  involving 95% of 1 needle core  and measuring  14 mm in length.   - Perineural invasion identified.     Comment: There is a focus of closely approximated gastrointestinal epithelium; NKX3.1 shows no definite invasion of the GI epithelium.      I. Prostate, left lateral mid:  - Prostatic adenocarcinoma, [place] score 4 + 5 = 9, Prognostic Grade Group 5,  involving 95% of 1 needle core  and measuring  12 mm in length.       J. Prostate, left medial mid:  - Prostatic adenocarcinoma, [place] score 4 + 5 = 9, Prognostic Grade Group 5,  involving 85% of 1 needle core  and measuring  13 mm in length.       Comment: Immunohistochemistry for a prostate multiplex stain (p63, K903, and P504S) and NKX3.1 demonstrate invasive carcinoma.     K. Prostate, left lateral apex:  - Prostatic adenocarcinoma, Liana score 4 + 5 = 9, Prognostic Grade Group 5,  involving 25% of 1 needle core  and measuring  3 mm in length.        L. Prostate, left medial apex :  - Prostatic adenocarcinoma, [place] score 4 + 5 = 9, Prognostic Grade Group 5,  involving 95% of 1 needle core  and measuring  15 mm in length.     - Perineural invasion identified.     Comment:   Cribriform glands are present within the pattern 4 component.  This feature has been associated with adverse clinical outcomes and molecular features typically seen in advanced disease.  (The 2019 Genitourinary Pathology Society (GUPS) White Paper on Contemporary Grading of Prostate Cancer. Arch Pathol Lab Med. 2021 Apr 1;145(4):461-493.)     7/5/2023 -  Hormone Therapy    Firmagon loading dose on 7/5/23, followed by Lupron      8/28/2023 -  Cancer Staged    Staging form: Prostate, AJCC 8th Edition  - Clinical: Stage IVB (cT4,  cN1, cM1b, PSA: 145, Grade Group: 5) - Signed by Dov Andrea MD on 8/28/2023  Prostate specific antigen (PSA) range: 20 or greater  Histologic grading system: 5 grade system       9/2023 - 6/2024 Chemotherapy    Zytiga 250 mg PO daily     6/2024 -  Chemotherapy    Xtandi      6/24/2024 - 7/8/2024 Radiation    The patient saw @[facility]@ for radiation treatment. This is the current list of radiation treatment:    Plan ID Energy Fractions Dose per Fraction (cGy) Dose Correction (cGy) Total Dose Delivered (cGy) Elapsed Days   T11_L5 Spine 10X/6X 10 / 10 300 0 3,000 14            Interval History: Fatigue.  Poor appetite.  Weight loss.  ECOG-  3-symptomatic, greater than 50% sedentary.    Review of Systems   Constitutional:  Positive for appetite change, fatigue and unexpected weight change. Negative for chills and fever.   HENT:  Negative for nosebleeds.    Eyes:  Negative for discharge.   Respiratory:  Negative for cough and shortness of breath.    Cardiovascular:  Negative for chest pain.   Gastrointestinal:  Negative for abdominal pain, constipation and diarrhea.   Endocrine: Negative for polydipsia.   Genitourinary:  Negative for hematuria.   Musculoskeletal:  Negative for arthralgias.   Skin:  Negative for color change.   Allergic/Immunologic: Negative for immunocompromised state.   Neurological:  Positive for weakness. Negative for dizziness and headaches.   Hematological:  Negative for adenopathy.   Psychiatric/Behavioral:  Negative for agitation.        Past Medical History:   Diagnosis Date    COVID-19 01/15/2024    Diabetes mellitus (HCC)     Elevated PSA 06/21/2023    High cholesterol     Hypertension     Prostate cancer (HCC)      Patient Active Problem List   Diagnosis    Type 2 diabetes mellitus, with long-term current use of insulin (HCC)    Other hydronephrosis    Hypertension    Hydronephrosis    Prostate cancer metastatic to bone (HCC)    Encounter for monitoring androgen deprivation therapy     Nephrostomy status (HCC)    Hyperlipidemia    Malfunction of nephrostomy tube (HCC)    Hypokalemia    Stage 3a chronic kidney disease (HCC)    Hypoglycemia    Electrolyte abnormality    Cancer related pain    Palliative care by specialist    Ambulatory dysfunction    Therapeutic opioid-induced constipation (OIC)    Nausea and vomiting    Advanced care planning/counseling discussion    Goals of care, counseling/discussion    Loss of appetite    Unintentional weight loss       Current Outpatient Medications:     acetaminophen (TYLENOL) 500 mg tablet, Take 2 tablets (1,000 mg total) by mouth 3 (three) times a day (Patient not taking: Reported on 8/16/2024), Disp: 180 tablet, Rfl: 2    albuterol (Ventolin HFA) 90 mcg/act inhaler, Inhale 2 puffs every 6 (six) hours as needed for wheezing, Disp: 18 g, Rfl: 0    Alcohol Swabs 70 % PADS, To clean the skin prior to injecting insulin, Disp: 100 each, Rfl: 1    Blood Glucose Monitoring Suppl (OneTouch Verio Reflect) w/Device KIT, Check blood sugars once daily. Please substitute with appropriate alternative as covered by patient's insurance. Dx: E11.65, Disp: 1 kit, Rfl: 0    dexamethasone (DECADRON) 0.5 mg tablet, TAKE 1 TABLET (0.5 MG TOTAL) BY MOUTH DAILY WITH BREAKFAST, Disp: 30 tablet, Rfl: 1    Diclofenac Sodium (VOLTAREN) 1 %, Apply 2 g topically 4 (four) times a day (Patient not taking: Reported on 8/16/2024), Disp: 100 g, Rfl: 3    Easy Touch Pen Needles 31G X 6 MM MISC, Use daily at bedtime, Disp: 100 each, Rfl: 3    enzalutamide (XTANDI) 40 mg capsule, Take 4 capsules (160 mg total) by mouth daily, Disp: 120 capsule, Rfl: 6    gabapentin (Neurontin) 300 mg capsule, Take 1 capsule (300 mg total) by mouth 3 (three) times a day, Disp: 90 capsule, Rfl: 2    glimepiride (AMARYL) 2 mg tablet, Take 1 tablet (2 mg total) by mouth daily with dinner, Disp: 90 tablet, Rfl: 3    glimepiride (AMARYL) 4 mg tablet, Take 1 tablet (4 mg total) by mouth daily with breakfast, Disp: 90  tablet, Rfl: 3    glucose blood (OneTouch Verio) test strip, Check blood sugars twice a daily. Please substitute with appropriate alternative as covered by patient's insurance. Dx: E11.65, Disp: 200 each, Rfl: 3    Levemir FlexPen 100 units/mL injection pen, Inject 20 Units under the skin daily at bedtime, Disp: 15 mL, Rfl: 1    lisinopril (ZESTRIL) 10 mg tablet, NARCISO TANMAY TABLETA VIA ORAL DIARIAMENTE, Disp: , Rfl:     lisinopril (ZESTRIL) 5 mg tablet, Take 5 mg by mouth daily (Patient not taking: Reported on 8/16/2024), Disp: , Rfl:     metFORMIN (GLUCOPHAGE) 1000 MG tablet, Take 1 tablet (1,000 mg total) by mouth 2 (two) times a day with meals, Disp: 180 tablet, Rfl: 1    Multiple Vitamins-Minerals (Sentry) TABS, Take 1 tablet by mouth daily, Disp: , Rfl:     naloxone (NARCAN) 4 mg/0.1 mL nasal spray, Administer 1 spray into a nostril. If no response after 2-3 minutes, give another dose in the other nostril using a new spray. (Patient not taking: Reported on 8/16/2024), Disp: 1 each, Rfl: 0    omega-3-acid ethyl esters (LOVAZA) 1 g capsule, , Disp: , Rfl:     ondansetron (Zofran ODT) 8 mg disintegrating tablet, Take 1 tablet (8 mg total) by mouth every 8 (eight) hours as needed for nausea or vomiting, Disp: 20 tablet, Rfl: 3    OneTouch Delica Lancets 33G MISC, Check blood sugars once daily. Please substitute with appropriate alternative as covered by patient's insurance. Dx: E11.65, Disp: 100 each, Rfl: 3    oxyCODONE (ROXICODONE) 10 MG TABS, Take 1 tablet (10 mg total) by mouth every 4 (four) hours as needed for moderate pain Max Daily Amount: 60 mg (Patient not taking: Reported on 8/13/2024), Disp: 90 tablet, Rfl: 0    pantoprazole (PROTONIX) 40 mg tablet, TAKE 1 TABLET BY MOUTH EVERY DAY, Disp: 90 tablet, Rfl: 1    polyethylene glycol (MIRALAX) 17 g packet, Take 17 g by mouth daily as needed (for constipation), Disp: 100 each, Rfl: 1    rosuvastatin (CRESTOR) 10 MG tablet, Take 1 tablet (10 mg total) by mouth  "daily at bedtime, Disp: 90 tablet, Rfl: 1    senna-docusate sodium (SENOKOT S) 8.6-50 mg per tablet, Take 1 tablet by mouth daily (Patient not taking: Reported on 6/28/2024), Disp: 90 tablet, Rfl: 1    sodium chloride, PF, 0.9 %, 10 mL by Intracatheter route daily Intracatheter flushing daily. May substitute prefilled syringe with normal saline 10 mL vials, 10 mL syringes, and 18 g blunt needles, Disp: 600 mL, Rfl: 2    Spacer/Aero-Holding Chambers (AEROCHAMBER MINI CHAMBER) BILLY, by Does not apply route see administration instructions, Disp: 1 Device, Rfl: 0  No Known Allergies  Past Surgical History:   Procedure Laterality Date    IR NEPHROSTOMY TUBE CHECK/CHANGE/REPOSITION/REINSERTION/UPSIZE  9/28/2023    IR NEPHROSTOMY TUBE CHECK/CHANGE/REPOSITION/REINSERTION/UPSIZE  12/28/2023    IR NEPHROSTOMY TUBE CHECK/CHANGE/REPOSITION/REINSERTION/UPSIZE  1/31/2024    IR NEPHROSTOMY TUBE CHECK/CHANGE/REPOSITION/REINSERTION/UPSIZE  2/2/2024    IR NEPHROSTOMY TUBE CHECK/CHANGE/REPOSITION/REINSERTION/UPSIZE  3/4/2024    IR NEPHROSTOMY TUBE CHECK/CHANGE/REPOSITION/REINSERTION/UPSIZE  3/20/2024    IR NEPHROSTOMY TUBE CHECK/CHANGE/REPOSITION/REINSERTION/UPSIZE  6/7/2024    IR NEPHROSTOMY TUBE CHECK/CHANGE/REPOSITION/REINSERTION/UPSIZE  8/5/2024    IR NEPHROSTOMY TUBE PLACEMENT  06/22/2023    bilateral    IR OTHER  1/15/2024    US GUIDED PROSTATE BIOPSY       Social History     Objective:  Vitals:    08/26/24 1332   BP: 130/78   BP Location: Right arm   Patient Position: Sitting   Cuff Size: Adult   Pulse: 85   Resp: 17   Temp: (!) 97.4 °F (36.3 °C)   SpO2: 99%   Weight: 65.6 kg (144 lb 9.6 oz)   Height: 5' 8\" (1.727 m)     Physical Exam      Labs:  I personally reviewed the labs and imaging pertinent to this patient care.  "

## 2024-08-28 ENCOUNTER — TELEPHONE (OUTPATIENT)
Dept: HEMATOLOGY ONCOLOGY | Facility: CLINIC | Age: 74
End: 2024-08-28

## 2024-08-28 DIAGNOSIS — E83.52 HYPERCALCEMIA: Primary | ICD-10-CM

## 2024-08-28 DIAGNOSIS — C61 PROSTATE CANCER METASTATIC TO BONE (HCC): ICD-10-CM

## 2024-08-28 DIAGNOSIS — C79.51 PROSTATE CANCER METASTATIC TO BONE (HCC): ICD-10-CM

## 2024-08-28 RX ORDER — SODIUM CHLORIDE 9 MG/ML
20 INJECTION, SOLUTION INTRAVENOUS ONCE
Status: CANCELLED | OUTPATIENT
Start: 2024-08-30

## 2024-08-29 ENCOUNTER — TELEPHONE (OUTPATIENT)
Dept: INFUSION CENTER | Facility: HOSPITAL | Age: 74
End: 2024-08-29

## 2024-08-30 ENCOUNTER — HOSPITAL ENCOUNTER (OUTPATIENT)
Dept: INFUSION CENTER | Facility: HOSPITAL | Age: 74
End: 2024-08-30
Attending: INTERNAL MEDICINE
Payer: COMMERCIAL

## 2024-08-30 VITALS
HEIGHT: 68 IN | DIASTOLIC BLOOD PRESSURE: 55 MMHG | SYSTOLIC BLOOD PRESSURE: 113 MMHG | RESPIRATION RATE: 16 BRPM | WEIGHT: 142.86 LBS | TEMPERATURE: 97 F | OXYGEN SATURATION: 98 % | HEART RATE: 79 BPM | BODY MASS INDEX: 21.65 KG/M2

## 2024-08-30 DIAGNOSIS — C79.51 PROSTATE CANCER METASTATIC TO BONE (HCC): Primary | ICD-10-CM

## 2024-08-30 DIAGNOSIS — C61 PROSTATE CANCER METASTATIC TO BONE (HCC): Primary | ICD-10-CM

## 2024-08-30 DIAGNOSIS — E83.52 HYPERCALCEMIA: ICD-10-CM

## 2024-08-30 PROCEDURE — 96367 TX/PROPH/DG ADDL SEQ IV INF: CPT

## 2024-08-30 PROCEDURE — 96409 CHEMO IV PUSH SNGL DRUG: CPT

## 2024-08-30 PROCEDURE — 96375 TX/PRO/DX INJ NEW DRUG ADDON: CPT

## 2024-08-30 RX ORDER — FAMOTIDINE 10 MG/ML
20 INJECTION, SOLUTION INTRAVENOUS ONCE
Status: COMPLETED | OUTPATIENT
Start: 2024-08-30 | End: 2024-08-30

## 2024-08-30 RX ORDER — SODIUM CHLORIDE 9 MG/ML
20 INJECTION, SOLUTION INTRAVENOUS ONCE
Status: DISPENSED | OUTPATIENT
Start: 2024-08-30

## 2024-08-30 RX ORDER — DIPHENHYDRAMINE HYDROCHLORIDE 50 MG/ML
25 INJECTION INTRAMUSCULAR; INTRAVENOUS
Status: ACTIVE | OUTPATIENT
Start: 2024-08-30 | End: 2024-08-30

## 2024-08-30 RX ORDER — SODIUM CHLORIDE 9 MG/ML
20 INJECTION, SOLUTION INTRAVENOUS ONCE
OUTPATIENT
Start: 2024-11-22

## 2024-08-30 RX ORDER — SODIUM CHLORIDE 9 MG/ML
20 INJECTION, SOLUTION INTRAVENOUS ONCE
Status: COMPLETED | OUTPATIENT
Start: 2024-08-30 | End: 2024-08-30

## 2024-08-30 RX ADMIN — ZOLEDRONIC ACID 4 MG: 4 INJECTION, SOLUTION, CONCENTRATE INTRAVENOUS at 15:34

## 2024-08-30 RX ADMIN — DOCETAXEL 44.6 MG: 20 INJECTION, SOLUTION INTRAVENOUS at 12:28

## 2024-08-30 RX ADMIN — DIPHENHYDRAMINE HYDROCHLORIDE 25 MG: 50 INJECTION, SOLUTION INTRAMUSCULAR; INTRAVENOUS at 12:44

## 2024-08-30 RX ADMIN — SODIUM CHLORIDE 500 ML: 0.9 INJECTION, SOLUTION INTRAVENOUS at 12:46

## 2024-08-30 RX ADMIN — SODIUM CHLORIDE 20 ML/HR: 0.9 INJECTION, SOLUTION INTRAVENOUS at 11:57

## 2024-08-30 RX ADMIN — FAMOTIDINE 20 MG: 10 INJECTION, SOLUTION INTRAVENOUS at 12:45

## 2024-08-30 RX ADMIN — DEXAMETHASONE SODIUM PHOSPHATE 10 MG: 10 INJECTION, SOLUTION INTRAMUSCULAR; INTRAVENOUS at 11:58

## 2024-08-30 RX ADMIN — HYDROCORTISONE SODIUM SUCCINATE 50 MG: 100 INJECTION, POWDER, FOR SOLUTION INTRAMUSCULAR; INTRAVENOUS at 12:44

## 2024-08-30 NOTE — PROGRESS NOTES
Oliver Astudillo is aware of future appt on 9/6 at 130p.     AVS printed and given to Oliver Astudillo: Yes     Instructions reviewed using [employer] .

## 2024-08-30 NOTE — PROGRESS NOTES
1241 Pt c/o nausea. No vomiting. Noted to be flushed. Taxotere stopped. VSS. Hypersensitivity protocol initiated. Symptoms diminished. VS remain stable. 1312 pt asymptomatic. Per Dr Coyne, Taxotere resumed at half rate.

## 2024-08-30 NOTE — PLAN OF CARE
Problem: Potential for Falls  Goal: Patient will remain free of falls  Description: INTERVENTIONS:  - Educate patient/family on patient safety including physical limitations  - Instruct patient to call for assistance with activity   - Keep Call bell within reach  - Keep bed low and locked with side rails adjusted as appropriate  - Keep care items and personal belongings within reach  - Initiate and maintain comfort rounds  Outcome: Progressing     Problem: INFECTION - ADULT  Goal: Absence or prevention of progression during hospitalization  Description: INTERVENTIONS:  - Assess and monitor for signs and symptoms of infection  - Monitor lab/diagnostic results  - Monitor all insertion sites, i.e. indwelling lines, tubes, and drains  - Monitor endotracheal if appropriate and nasal secretions for changes in amount and color  - [place] appropriate cooling/warming therapies per order  - Administer medications as ordered  - Instruct and encourage patient and family to use good hand hygiene technique  - Identify and instruct in appropriate isolation precautions for identified infection/condition  Outcome: Progressing  Goal: Absence of fever/infection during neutropenic period  Description: INTERVENTIONS:  - Monitor WBC    Outcome: Progressing     Problem: DISCHARGE PLANNING  Goal: Discharge to home or other facility with appropriate resources  Description: INTERVENTIONS:  - Identify barriers to discharge w/patient and caregiver  - Arrange for needed discharge resources and transportation as appropriate  - Identify discharge learning needs (meds, wound care, etc.)  - Arrange for interpretive services to assist at discharge as needed  - Refer to Case Management Department for coordinating discharge planning if the patient needs post-hospital services based on physician/advanced practitioner order or complex needs related to functional status, cognitive ability, or social support system  Outcome: Progressing     Problem:  Knowledge Deficit  Goal: Patient/family/caregiver demonstrates understanding of disease process, treatment plan, medications, and discharge instructions  Description: Complete learning assessment and assess knowledge base.  Interventions:  - Provide teaching at level of understanding  - Provide teaching via preferred learning methods  Outcome: Progressing

## 2024-08-30 NOTE — PROGRESS NOTES
Pt tolerated remainder of treatment well. [employer]  used throughout treatment. Pt and family members verbalized understanding of treatment plan. All questions answered.

## 2024-09-04 ENCOUNTER — APPOINTMENT (OUTPATIENT)
Dept: LAB | Facility: HOSPITAL | Age: 74
End: 2024-09-04
Attending: INTERNAL MEDICINE
Payer: COMMERCIAL

## 2024-09-04 DIAGNOSIS — C79.51 PROSTATE CANCER METASTATIC TO BONE (HCC): ICD-10-CM

## 2024-09-04 DIAGNOSIS — C61 PROSTATE CANCER METASTATIC TO BONE (HCC): ICD-10-CM

## 2024-09-04 DIAGNOSIS — E83.52 HYPERCALCEMIA: ICD-10-CM

## 2024-09-04 LAB
ALBUMIN SERPL BCG-MCNC: 3.8 G/DL (ref 3.5–5)
ALP SERPL-CCNC: 97 U/L (ref 34–104)
ALT SERPL W P-5'-P-CCNC: 8 U/L (ref 7–52)
ANION GAP SERPL CALCULATED.3IONS-SCNC: 11 MMOL/L (ref 4–13)
AST SERPL W P-5'-P-CCNC: 16 U/L (ref 13–39)
BASOPHILS # BLD AUTO: 0.03 THOUSANDS/ÂΜL (ref 0–0.1)
BASOPHILS NFR BLD AUTO: 0 % (ref 0–1)
BILIRUB SERPL-MCNC: 0.57 MG/DL (ref 0.2–1)
BUN SERPL-MCNC: 18 MG/DL (ref 5–25)
CALCIUM SERPL-MCNC: 9.1 MG/DL (ref 8.4–10.2)
CHLORIDE SERPL-SCNC: 99 MMOL/L (ref 96–108)
CO2 SERPL-SCNC: 23 MMOL/L (ref 21–32)
CREAT SERPL-MCNC: 0.88 MG/DL (ref 0.6–1.3)
EOSINOPHIL # BLD AUTO: 0.05 THOUSAND/ÂΜL (ref 0–0.61)
EOSINOPHIL NFR BLD AUTO: 1 % (ref 0–6)
ERYTHROCYTE [DISTWIDTH] IN BLOOD BY AUTOMATED COUNT: 13.8 % (ref 11.6–15.1)
GFR SERPL CREATININE-BSD FRML MDRD: 85 ML/MIN/1.73SQ M
GLUCOSE SERPL-MCNC: 120 MG/DL (ref 65–140)
HCT VFR BLD AUTO: 36.9 % (ref 36.5–49.3)
HGB BLD-MCNC: 12.4 G/DL (ref 12–17)
IMM GRANULOCYTES # BLD AUTO: 0.02 THOUSAND/UL (ref 0–0.2)
IMM GRANULOCYTES NFR BLD AUTO: 0 % (ref 0–2)
LYMPHOCYTES # BLD AUTO: 0.97 THOUSANDS/ÂΜL (ref 0.6–4.47)
LYMPHOCYTES NFR BLD AUTO: 12 % (ref 14–44)
MCH RBC QN AUTO: 31.2 PG (ref 26.8–34.3)
MCHC RBC AUTO-ENTMCNC: 33.6 G/DL (ref 31.4–37.4)
MCV RBC AUTO: 93 FL (ref 82–98)
MONOCYTES # BLD AUTO: 0.39 THOUSAND/ÂΜL (ref 0.17–1.22)
MONOCYTES NFR BLD AUTO: 5 % (ref 4–12)
NEUTROPHILS # BLD AUTO: 6.47 THOUSANDS/ÂΜL (ref 1.85–7.62)
NEUTS SEG NFR BLD AUTO: 82 % (ref 43–75)
NRBC BLD AUTO-RTO: 0 /100 WBCS
PLATELET # BLD AUTO: 382 THOUSANDS/UL (ref 149–390)
PMV BLD AUTO: 9.6 FL (ref 8.9–12.7)
POTASSIUM SERPL-SCNC: 4 MMOL/L (ref 3.5–5.3)
PROT SERPL-MCNC: 7.7 G/DL (ref 6.4–8.4)
PSA SERPL-MCNC: 58.98 NG/ML (ref 0–4)
RBC # BLD AUTO: 3.98 MILLION/UL (ref 3.88–5.62)
SODIUM SERPL-SCNC: 133 MMOL/L (ref 135–147)
WBC # BLD AUTO: 7.93 THOUSAND/UL (ref 4.31–10.16)

## 2024-09-04 PROCEDURE — 85025 COMPLETE CBC W/AUTO DIFF WBC: CPT

## 2024-09-04 PROCEDURE — 80053 COMPREHEN METABOLIC PANEL: CPT

## 2024-09-04 PROCEDURE — 36415 COLL VENOUS BLD VENIPUNCTURE: CPT

## 2024-09-04 PROCEDURE — 84153 ASSAY OF PSA TOTAL: CPT

## 2024-09-05 ENCOUNTER — OFFICE VISIT (OUTPATIENT)
Age: 74
End: 2024-09-05

## 2024-09-05 VITALS
HEART RATE: 73 BPM | OXYGEN SATURATION: 99 % | WEIGHT: 142 LBS | RESPIRATION RATE: 18 BRPM | SYSTOLIC BLOOD PRESSURE: 102 MMHG | DIASTOLIC BLOOD PRESSURE: 64 MMHG | BODY MASS INDEX: 22.29 KG/M2 | HEIGHT: 67 IN | TEMPERATURE: 98.2 F

## 2024-09-05 DIAGNOSIS — C79.51 PROSTATE CANCER METASTATIC TO BONE (HCC): ICD-10-CM

## 2024-09-05 DIAGNOSIS — C61 PROSTATE CANCER METASTATIC TO BONE (HCC): ICD-10-CM

## 2024-09-05 DIAGNOSIS — I10 PRIMARY HYPERTENSION: Chronic | ICD-10-CM

## 2024-09-05 DIAGNOSIS — E11.69 TYPE 2 DIABETES MELLITUS WITH OTHER SPECIFIED COMPLICATION, WITH LONG-TERM CURRENT USE OF INSULIN (HCC): Primary | ICD-10-CM

## 2024-09-05 DIAGNOSIS — E16.2 HYPOGLYCEMIA: ICD-10-CM

## 2024-09-05 DIAGNOSIS — Z79.4 TYPE 2 DIABETES MELLITUS WITH OTHER SPECIFIED COMPLICATION, WITH LONG-TERM CURRENT USE OF INSULIN (HCC): Primary | ICD-10-CM

## 2024-09-05 DIAGNOSIS — R26.2 AMBULATORY DYSFUNCTION: ICD-10-CM

## 2024-09-05 DIAGNOSIS — E44.0 MODERATE PROTEIN-CALORIE MALNUTRITION (HCC): ICD-10-CM

## 2024-09-05 PROCEDURE — G2211 COMPLEX E/M VISIT ADD ON: HCPCS | Performed by: FAMILY MEDICINE

## 2024-09-05 PROCEDURE — 99213 OFFICE O/P EST LOW 20 MIN: CPT | Performed by: FAMILY MEDICINE

## 2024-09-05 RX ORDER — LISINOPRIL 5 MG/1
5 TABLET ORAL DAILY
Qty: 30 TABLET | Refills: 0 | Status: SHIPPED | OUTPATIENT
Start: 2024-09-05 | End: 2024-10-05

## 2024-09-05 RX ORDER — MULTIVITAMIN
1 TABLET ORAL DAILY
Qty: 30 TABLET | Refills: 2 | Status: SHIPPED | OUTPATIENT
Start: 2024-09-05

## 2024-09-05 NOTE — ASSESSMENT & PLAN NOTE
Currently undergoing treatment and has chemo scheduled for 9/6.     Continue to follow-up with urologist and oncologist

## 2024-09-05 NOTE — ASSESSMENT & PLAN NOTE
Malnutrition Findings:                                 BMI Findings:           Body mass index is 22.24 kg/m².     Patient has hx of prostate cancer and currently undergoing treatment. Has decreased appetite and does not want to eat anything. Yesterday consumed 1/4 cup soup and today ate piece of bread with jam due to his glucose levels being low.     Had extensive conversation about diet and nutrition and how important it is to have especially while he is undergoing chemo  Encouraged pt to consume 2-3 boost or ensure nutritional shakes  Consider nutrition referral at follow-up as pt not open to it at this time  Consider appetite stimulators if patient continues to not consume much

## 2024-09-05 NOTE — ASSESSMENT & PLAN NOTE
Lab Results   Component Value Date    HGBA1C 6.2 (H) 07/23/2024     Chronic; well controlled     Home medication Metformin 1000 BID, glimepiride 1 mg daily with dinner and  Levemir 20 IU at bedtime.     Per wife, glucose was 40's and ate toast with jam and some orange juice. Patient has decreased appetite and does not want to eat. He just had a little soup yesterday. He drinks 1 ensure a day.     Continue to hold diabetic medication at this time  Consume sugary substances such as juices, jam etc when sugar level drops.   Glucose tabs to be used PRN for hypoglycemic episodes  Encouraged patient to eat little consistently throughout the day so glucose does not go low   Extensive conversation about diet and nutrition as patient has decreased appetite and does not want to eat anything  Recommended consuming ensure or boost 2-3 times a day; does not have to be glucerna as long as he is consuming something

## 2024-09-05 NOTE — PROGRESS NOTES
Ambulatory Visit  Name: Oliver Astudillo      : 1950      MRN: [id number]  Encounter Provider: Chanell Foss MD  Encounter Date: 2024   Encounter department: [facility]    Assessment & Plan   1. Type 2 diabetes mellitus with other specified complication, with long-term current use of insulin (HCC)  Assessment & Plan:    Lab Results   Component Value Date    HGBA1C 6.2 (H) 2024     Chronic; well controlled     Home medication Metformin 1000 BID, glimepiride 1 mg daily with dinner and  Levemir 20 IU at bedtime.     Per wife, glucose was 40's and ate toast with jam and some orange juice. Patient has decreased appetite and does not want to eat. He just had a little soup yesterday. He drinks 1 ensure a day.     Continue to hold diabetic medication at this time  Consume sugary substances such as juices, jam etc when sugar level drops.   Glucose tabs to be used PRN for hypoglycemic episodes  Encouraged patient to eat little consistently throughout the day so glucose does not go low   Extensive conversation about diet and nutrition as patient has decreased appetite and does not want to eat anything  Recommended consuming ensure or boost 2-3 times a day; does not have to be glucerna as long as he is consuming something  Orders:  -     glucose 4-6 GM-MG; Chew 2 tablets daily as needed for low blood sugar  2. Primary hypertension  Assessment & Plan:  BP in office 102/64. Home medication lisinopril 10 mg daily.       Orders:  -     lisinopril (ZESTRIL) 5 mg tablet; Take 1 tablet (5 mg total) by mouth daily  3. Prostate cancer metastatic to bone (HCC)  Assessment & Plan:  Currently undergoing treatment and has chemo scheduled for .     Continue to follow-up with urologist and oncologist  Orders:  -     Multiple Vitamin (multivitamin) tablet; Take 1 tablet by mouth daily  4. Moderate protein-calorie malnutrition (HCC)  Assessment & Plan:  Malnutrition Findings:                                  BMI Findings:           Body mass index is 22.24 kg/m².     Patient has hx of prostate cancer and currently undergoing treatment. Has decreased appetite and does not want to eat anything. Yesterday consumed 1/4 cup soup and today ate piece of bread with jam due to his glucose levels being low.     Had extensive conversation about diet and nutrition and how important it is to have especially while he is undergoing chemo  Encouraged pt to consume 2-3 boost or ensure nutritional shakes  Consider nutrition referral at follow-up as pt not open to it at this time  Consider appetite stimulators if patient continues to not consume much  5. Hypoglycemia  -     glucose 4-6 GM-MG; Chew 2 tablets daily as needed for low blood sugar  6. Ambulatory dysfunction  Assessment & Plan:  Currently in wheelchair which he uses regularly.       History of Present Illness     Here today for 3 month diabetes follow-up. Patient has no complaints at this time. Per wife, pt has decreased appetite and does not eat much.         Review of Systems   Constitutional:  Positive for appetite change. Negative for chills and fever.   HENT:  Negative for ear pain and sore throat.    Eyes:  Negative for pain and visual disturbance.   Respiratory:  Negative for cough and shortness of breath.    Cardiovascular:  Negative for chest pain and palpitations.   Gastrointestinal:  Negative for abdominal pain and vomiting.   Genitourinary:  Negative for dysuria and hematuria.   Musculoskeletal:  Negative for arthralgias and back pain.   Skin:  Negative for color change and rash.   Neurological:  Negative for seizures and syncope.   All other systems reviewed and are negative.    Pertinent Medical History     Past Medical History   Past Medical History:   Diagnosis Date    COVID-19 01/15/2024    Diabetes mellitus (HCC)     Elevated PSA 06/21/2023    High cholesterol     Hypertension     Prostate cancer (HCC)      Past Surgical History:    Procedure Laterality Date    IR NEPHROSTOMY TUBE CHECK/CHANGE/REPOSITION/REINSERTION/UPSIZE  9/28/2023    IR NEPHROSTOMY TUBE CHECK/CHANGE/REPOSITION/REINSERTION/UPSIZE  12/28/2023    IR NEPHROSTOMY TUBE CHECK/CHANGE/REPOSITION/REINSERTION/UPSIZE  1/31/2024    IR NEPHROSTOMY TUBE CHECK/CHANGE/REPOSITION/REINSERTION/UPSIZE  2/2/2024    IR NEPHROSTOMY TUBE CHECK/CHANGE/REPOSITION/REINSERTION/UPSIZE  3/4/2024    IR NEPHROSTOMY TUBE CHECK/CHANGE/REPOSITION/REINSERTION/UPSIZE  3/20/2024    IR NEPHROSTOMY TUBE CHECK/CHANGE/REPOSITION/REINSERTION/UPSIZE  6/7/2024    IR NEPHROSTOMY TUBE CHECK/CHANGE/REPOSITION/REINSERTION/UPSIZE  8/5/2024    IR NEPHROSTOMY TUBE PLACEMENT  06/22/2023    bilateral    IR OTHER  1/15/2024    US GUIDED PROSTATE BIOPSY       Family History   Problem Relation Age of Onset    Uterine cancer Mother     Liver cancer Father     No Known Problems Sister     No Known Problems Brother     No Known Problems Son     Diabetes type II Daughter     No Known Problems Daughter     Cancer Daughter      Current Outpatient Medications on File Prior to Visit   Medication Sig Dispense Refill    Alcohol Swabs 70 % PADS To clean the skin prior to injecting insulin 100 each 1    Blood Glucose Monitoring Suppl (OneTouch Verio Reflect) w/Device KIT Check blood sugars once daily. Please substitute with appropriate alternative as covered by patient's insurance. Dx: E11.65 1 kit 0    Diclofenac Sodium (VOLTAREN) 1 % Apply 2 g topically 4 (four) times a day 100 g 3    Easy Touch Pen Needles 31G X 6 MM MISC Use daily at bedtime 100 each 3    enzalutamide (XTANDI) 40 mg capsule Take 4 capsules (160 mg total) by mouth daily 120 capsule 6    gabapentin (Neurontin) 300 mg capsule Take 1 capsule (300 mg total) by mouth 3 (three) times a day 90 capsule 2    glimepiride (AMARYL) 2 mg tablet Take 1 tablet (2 mg total) by mouth daily with dinner 90 tablet 3    glimepiride (AMARYL) 4 mg tablet Take 1 tablet (4 mg total) by mouth  daily with breakfast 90 tablet 3    glucose blood (OneTouch Verio) test strip Check blood sugars twice a daily. Please substitute with appropriate alternative as covered by patient's insurance. Dx: E11.65 200 each 3    Levemir FlexPen 100 units/mL injection pen Inject 20 Units under the skin daily at bedtime 15 mL 1    metFORMIN (GLUCOPHAGE) 1000 MG tablet Take 1 tablet (1,000 mg total) by mouth 2 (two) times a day with meals 180 tablet 1    omega-3-acid ethyl esters (LOVAZA) 1 g capsule       ondansetron (Zofran ODT) 8 mg disintegrating tablet Take 1 tablet (8 mg total) by mouth every 8 (eight) hours as needed for nausea or vomiting 20 tablet 3    OneTouch Delica Lancets 33G MISC Check blood sugars once daily. Please substitute with appropriate alternative as covered by patient's insurance. Dx: E11.65 100 each 3    pantoprazole (PROTONIX) 40 mg tablet TAKE 1 TABLET BY MOUTH EVERY DAY 90 tablet 1    polyethylene glycol (MIRALAX) 17 g packet Take 17 g by mouth daily as needed (for constipation) 100 each 1    sodium chloride, PF, 0.9 % 10 mL by Intracatheter route daily Intracatheter flushing daily. May substitute prefilled syringe with normal saline 10 mL vials, 10 mL syringes, and 18 g blunt needles 600 mL 2    Spacer/Aero-Holding Chambers (AEROCHAMBER MINI CHAMBER) BILLY by Does not apply route see administration instructions 1 Device 0    acetaminophen (TYLENOL) 500 mg tablet Take 2 tablets (1,000 mg total) by mouth 3 (three) times a day (Patient not taking: Reported on 8/16/2024) 180 tablet 2    albuterol (Ventolin HFA) 90 mcg/act inhaler Inhale 2 puffs every 6 (six) hours as needed for wheezing (Patient not taking: Reported on 9/5/2024) 18 g 0    dexamethasone (DECADRON) 0.5 mg tablet TAKE 1 TABLET (0.5 MG TOTAL) BY MOUTH DAILY WITH BREAKFAST (Patient not taking: Reported on 9/5/2024) 30 tablet 1    Multiple Vitamins-Minerals (Sentry) TABS Take 1 tablet by mouth daily (Patient not taking: Reported on 9/5/2024)       naloxone (NARCAN) 4 mg/0.1 mL nasal spray Administer 1 spray into a nostril. If no response after 2-3 minutes, give another dose in the other nostril using a new spray. (Patient not taking: Reported on 9/5/2024) 1 each 0    oxyCODONE (ROXICODONE) 10 MG TABS Take 1 tablet (10 mg total) by mouth every 4 (four) hours as needed for moderate pain Max Daily Amount: 60 mg (Patient not taking: Reported on 8/13/2024) 90 tablet 0    rosuvastatin (CRESTOR) 10 MG tablet Take 1 tablet (10 mg total) by mouth daily at bedtime (Patient not taking: Reported on 9/5/2024) 90 tablet 1    senna-docusate sodium (SENOKOT S) 8.6-50 mg per tablet Take 1 tablet by mouth daily (Patient not taking: Reported on 6/28/2024) 90 tablet 1     No current facility-administered medications on file prior to visit.   No Known Allergies   Current Outpatient Medications on File Prior to Visit   Medication Sig Dispense Refill    Alcohol Swabs 70 % PADS To clean the skin prior to injecting insulin 100 each 1    Blood Glucose Monitoring Suppl (OneTouch Verio Reflect) w/Device KIT Check blood sugars once daily. Please substitute with appropriate alternative as covered by patient's insurance. Dx: E11.65 1 kit 0    Diclofenac Sodium (VOLTAREN) 1 % Apply 2 g topically 4 (four) times a day 100 g 3    Easy Touch Pen Needles 31G X 6 MM MISC Use daily at bedtime 100 each 3    enzalutamide (XTANDI) 40 mg capsule Take 4 capsules (160 mg total) by mouth daily 120 capsule 6    gabapentin (Neurontin) 300 mg capsule Take 1 capsule (300 mg total) by mouth 3 (three) times a day 90 capsule 2    glimepiride (AMARYL) 2 mg tablet Take 1 tablet (2 mg total) by mouth daily with dinner 90 tablet 3    glimepiride (AMARYL) 4 mg tablet Take 1 tablet (4 mg total) by mouth daily with breakfast 90 tablet 3    glucose blood (OneTouch Verio) test strip Check blood sugars twice a daily. Please substitute with appropriate alternative as covered by patient's insurance. Dx: E11.65 200 each  3    Levemir FlexPen 100 units/mL injection pen Inject 20 Units under the skin daily at bedtime 15 mL 1    metFORMIN (GLUCOPHAGE) 1000 MG tablet Take 1 tablet (1,000 mg total) by mouth 2 (two) times a day with meals 180 tablet 1    omega-3-acid ethyl esters (LOVAZA) 1 g capsule       ondansetron (Zofran ODT) 8 mg disintegrating tablet Take 1 tablet (8 mg total) by mouth every 8 (eight) hours as needed for nausea or vomiting 20 tablet 3    OneTouch Delica Lancets 33G MISC Check blood sugars once daily. Please substitute with appropriate alternative as covered by patient's insurance. Dx: E11.65 100 each 3    pantoprazole (PROTONIX) 40 mg tablet TAKE 1 TABLET BY MOUTH EVERY DAY 90 tablet 1    polyethylene glycol (MIRALAX) 17 g packet Take 17 g by mouth daily as needed (for constipation) 100 each 1    sodium chloride, PF, 0.9 % 10 mL by Intracatheter route daily Intracatheter flushing daily. May substitute prefilled syringe with normal saline 10 mL vials, 10 mL syringes, and 18 g blunt needles 600 mL 2    Spacer/Aero-Holding Chambers (AEROCHAMBER MINI CHAMBER) BILLY by Does not apply route see administration instructions 1 Device 0    acetaminophen (TYLENOL) 500 mg tablet Take 2 tablets (1,000 mg total) by mouth 3 (three) times a day (Patient not taking: Reported on 8/16/2024) 180 tablet 2    albuterol (Ventolin HFA) 90 mcg/act inhaler Inhale 2 puffs every 6 (six) hours as needed for wheezing (Patient not taking: Reported on 9/5/2024) 18 g 0    dexamethasone (DECADRON) 0.5 mg tablet TAKE 1 TABLET (0.5 MG TOTAL) BY MOUTH DAILY WITH BREAKFAST (Patient not taking: Reported on 9/5/2024) 30 tablet 1    Multiple Vitamins-Minerals (Sentry) TABS Take 1 tablet by mouth daily (Patient not taking: Reported on 9/5/2024)      naloxone (NARCAN) 4 mg/0.1 mL nasal spray Administer 1 spray into a nostril. If no response after 2-3 minutes, give another dose in the other nostril using a new spray. (Patient not taking: Reported on 9/5/2024)  "1 each 0    oxyCODONE (ROXICODONE) 10 MG TABS Take 1 tablet (10 mg total) by mouth every 4 (four) hours as needed for moderate pain Max Daily Amount: 60 mg (Patient not taking: Reported on 2024) 90 tablet 0    rosuvastatin (CRESTOR) 10 MG tablet Take 1 tablet (10 mg total) by mouth daily at bedtime (Patient not taking: Reported on 2024) 90 tablet 1    senna-docusate sodium (SENOKOT S) 8.6-50 mg per tablet Take 1 tablet by mouth daily (Patient not taking: Reported on 2024) 90 tablet 1     No current facility-administered medications on file prior to visit.      Social History     Tobacco Use    Smoking status: Former     Current packs/day: 0.00     Types: Cigarettes     Quit date:      Years since quittin.6     Passive exposure: Past    Smokeless tobacco: Never    Tobacco comments:     States he only smoked on the weekends   Vaping Use    Vaping status: Never Used   Substance and Sexual Activity    Alcohol use: Not Currently    Drug use: Never    Sexual activity: Yes     Partners: Female     Objective     /64 (BP Location: Right arm, Patient Position: Sitting, Cuff Size: Standard)   Pulse 73   Temp 98.2 °F (36.8 °C) (Axillary)   Resp 18   Ht 5' 7\" (1.702 m)   Wt 64.4 kg (142 lb)   SpO2 99%   BMI 22.24 kg/m²     Physical Exam  Constitutional:       Appearance: Normal appearance. He is ill-appearing.      Comments: Sitting in wheelchair   HENT:      Head: Normocephalic and atraumatic.      Mouth/Throat:      Mouth: Mucous membranes are moist.   Eyes:      General:         Right eye: No discharge.         Left eye: No discharge.      Conjunctiva/sclera: Conjunctivae normal.   Cardiovascular:      Rate and Rhythm: Normal rate and regular rhythm.      Pulses: no weak pulses.           Dorsalis pedis pulses are 2+ on the right side and 2+ on the left side.        Posterior tibial pulses are 2+ on the right side and 2+ on the left side.      Heart sounds: Normal heart sounds.   Pulmonary: "      Effort: Pulmonary effort is normal. No respiratory distress.      Breath sounds: Normal breath sounds.   Abdominal:      General: Bowel sounds are normal.      Palpations: Abdomen is soft.      Tenderness: There is no abdominal tenderness.   Musculoskeletal:      Cervical back: Neck supple.      Right lower leg: No edema.      Left lower leg: No edema.   Feet:      Right foot:      Skin integrity: No ulcer, skin breakdown, erythema, warmth, callus or dry skin.      Left foot:      Skin integrity: No ulcer, skin breakdown, erythema, warmth, callus or dry skin.   Skin:     General: Skin is warm and dry.      Capillary Refill: Capillary refill takes less than 2 seconds.   Neurological:      Mental Status: He is alert.   Psychiatric:         Mood and Affect: Mood normal.         Behavior: Behavior normal.     Diabetic Foot Exam    Patient's shoes and socks removed.    Right Foot/Ankle   Right Foot Inspection  Skin Exam: skin normal and skin intact. No dry skin, no warmth, no callus, no erythema, no maceration, no abnormal color, no pre-ulcer, no ulcer and no callus.     Toe Exam: ROM and strength within normal limits.     Sensory   Vibration: intact  Proprioception: intact  Monofilament testing: diminished    Vascular  Capillary refills: < 3 seconds  The right DP pulse is 2+. The right PT pulse is 2+.     Left Foot/Ankle  Left Foot Inspection  Skin Exam: skin normal and skin intact. No dry skin, no warmth, no erythema, no maceration, normal color, no pre-ulcer, no ulcer and no callus.     Toe Exam: ROM and strength within normal limits.     Sensory   Vibration: intact  Proprioception: intact  Monofilament testing: diminished    Vascular  Capillary refills: < 3 seconds  The left DP pulse is 2+. The left PT pulse is 2+.     Assign Risk Category  No deformity present  Loss of protective sensation  No weak pulses  Risk: 1    Administrative Statements

## 2024-09-05 NOTE — PATIENT INSTRUCTIONS
Dr. Young was supervisor attending today.     Please continue not taking diabetic medication.     Decrease lisinopril to 5 mg daily.

## 2024-09-06 ENCOUNTER — HOSPITAL ENCOUNTER (OUTPATIENT)
Dept: INFUSION CENTER | Facility: HOSPITAL | Age: 74
End: 2024-09-06
Attending: INTERNAL MEDICINE
Payer: COMMERCIAL

## 2024-09-06 VITALS
HEIGHT: 68 IN | DIASTOLIC BLOOD PRESSURE: 54 MMHG | BODY MASS INDEX: 21.42 KG/M2 | TEMPERATURE: 98.8 F | SYSTOLIC BLOOD PRESSURE: 107 MMHG | OXYGEN SATURATION: 98 % | RESPIRATION RATE: 18 BRPM | HEART RATE: 75 BPM | WEIGHT: 141.31 LBS

## 2024-09-06 DIAGNOSIS — C79.51 PROSTATE CANCER METASTATIC TO BONE (HCC): Primary | ICD-10-CM

## 2024-09-06 DIAGNOSIS — C61 PROSTATE CANCER METASTATIC TO BONE (HCC): Primary | ICD-10-CM

## 2024-09-06 PROCEDURE — 96415 CHEMO IV INFUSION ADDL HR: CPT

## 2024-09-06 PROCEDURE — 96367 TX/PROPH/DG ADDL SEQ IV INF: CPT

## 2024-09-06 PROCEDURE — 96375 TX/PRO/DX INJ NEW DRUG ADDON: CPT

## 2024-09-06 PROCEDURE — 96413 CHEMO IV INFUSION 1 HR: CPT

## 2024-09-06 RX ORDER — FAMOTIDINE 10 MG/ML
20 INJECTION, SOLUTION INTRAVENOUS ONCE
Status: COMPLETED | OUTPATIENT
Start: 2024-09-06 | End: 2024-09-06

## 2024-09-06 RX ORDER — SODIUM CHLORIDE 9 MG/ML
20 INJECTION, SOLUTION INTRAVENOUS ONCE
Status: COMPLETED | OUTPATIENT
Start: 2024-09-06 | End: 2024-09-06

## 2024-09-06 RX ORDER — DIPHENHYDRAMINE HYDROCHLORIDE 50 MG/ML
25 INJECTION INTRAMUSCULAR; INTRAVENOUS
Status: ACTIVE | OUTPATIENT
Start: 2024-09-06 | End: 2024-09-06

## 2024-09-06 RX ADMIN — FAMOTIDINE 20 MG: 10 INJECTION, SOLUTION INTRAVENOUS at 14:51

## 2024-09-06 RX ADMIN — DOCETAXEL 44.6 MG: 20 INJECTION, SOLUTION INTRAVENOUS at 14:37

## 2024-09-06 RX ADMIN — SODIUM CHLORIDE 500 ML: 0.9 INJECTION, SOLUTION INTRAVENOUS at 14:52

## 2024-09-06 RX ADMIN — DEXAMETHASONE SODIUM PHOSPHATE 10 MG: 10 INJECTION, SOLUTION INTRAMUSCULAR; INTRAVENOUS at 14:01

## 2024-09-06 RX ADMIN — SODIUM CHLORIDE 20 ML/HR: 0.9 INJECTION, SOLUTION INTRAVENOUS at 14:06

## 2024-09-06 RX ADMIN — DIPHENHYDRAMINE HYDROCHLORIDE 25 MG: 50 INJECTION, SOLUTION INTRAMUSCULAR; INTRAVENOUS at 14:50

## 2024-09-06 RX ADMIN — HYDROCORTISONE SODIUM SUCCINATE 50 MG: 100 INJECTION, POWDER, FOR SOLUTION INTRAMUSCULAR; INTRAVENOUS at 14:49

## 2024-09-06 NOTE — PROGRESS NOTES
1448- Pt flushed in face, denies respiratory distress or other complaints, taxotere stopped, hypersensitivity protocol initiated, VSS through out  1500- facial flushing resolved, pt states he feels better, VSS.  1520- Pt retrialed per Dr. Barahona (Sole Butcher RN), order in chart.   See result note - LMOM informing patient. Rx escribed.

## 2024-09-16 RX ORDER — SODIUM CHLORIDE 9 MG/ML
20 INJECTION, SOLUTION INTRAVENOUS ONCE
Status: CANCELLED | OUTPATIENT
Start: 2024-09-20

## 2024-09-16 RX ORDER — SODIUM CHLORIDE 9 MG/ML
20 INJECTION, SOLUTION INTRAVENOUS ONCE
Status: CANCELLED | OUTPATIENT
Start: 2024-09-27

## 2024-09-18 ENCOUNTER — APPOINTMENT (OUTPATIENT)
Dept: LAB | Facility: HOSPITAL | Age: 74
End: 2024-09-18
Attending: INTERNAL MEDICINE
Payer: COMMERCIAL

## 2024-09-18 ENCOUNTER — TELEPHONE (OUTPATIENT)
Facility: CLINIC | Age: 74
End: 2024-09-18

## 2024-09-18 DIAGNOSIS — C79.51 PROSTATE CANCER METASTATIC TO BONE (HCC): ICD-10-CM

## 2024-09-18 DIAGNOSIS — C61 PROSTATE CANCER METASTATIC TO BONE (HCC): ICD-10-CM

## 2024-09-18 LAB
ALBUMIN SERPL BCG-MCNC: 3.7 G/DL (ref 3.5–5)
ALP SERPL-CCNC: 108 U/L (ref 34–104)
ALT SERPL W P-5'-P-CCNC: 7 U/L (ref 7–52)
ANION GAP SERPL CALCULATED.3IONS-SCNC: 9 MMOL/L (ref 4–13)
AST SERPL W P-5'-P-CCNC: 12 U/L (ref 13–39)
BASOPHILS # BLD AUTO: 0.03 THOUSANDS/ΜL (ref 0–0.1)
BASOPHILS NFR BLD AUTO: 1 % (ref 0–1)
BILIRUB SERPL-MCNC: 0.41 MG/DL (ref 0.2–1)
BUN SERPL-MCNC: 13 MG/DL (ref 5–25)
CALCIUM SERPL-MCNC: 8.9 MG/DL (ref 8.4–10.2)
CHLORIDE SERPL-SCNC: 103 MMOL/L (ref 96–108)
CO2 SERPL-SCNC: 24 MMOL/L (ref 21–32)
CREAT SERPL-MCNC: 0.89 MG/DL (ref 0.6–1.3)
EOSINOPHIL # BLD AUTO: 0.03 THOUSAND/ΜL (ref 0–0.61)
EOSINOPHIL NFR BLD AUTO: 1 % (ref 0–6)
ERYTHROCYTE [DISTWIDTH] IN BLOOD BY AUTOMATED COUNT: 14.3 % (ref 11.6–15.1)
GFR SERPL CREATININE-BSD FRML MDRD: 85 ML/MIN/1.73SQ M
GLUCOSE SERPL-MCNC: 129 MG/DL (ref 65–140)
HCT VFR BLD AUTO: 37.9 % (ref 36.5–49.3)
HGB BLD-MCNC: 12.2 G/DL (ref 12–17)
IMM GRANULOCYTES # BLD AUTO: 0.03 THOUSAND/UL (ref 0–0.2)
IMM GRANULOCYTES NFR BLD AUTO: 1 % (ref 0–2)
LYMPHOCYTES # BLD AUTO: 0.97 THOUSANDS/ΜL (ref 0.6–4.47)
LYMPHOCYTES NFR BLD AUTO: 16 % (ref 14–44)
MCH RBC QN AUTO: 30.2 PG (ref 26.8–34.3)
MCHC RBC AUTO-ENTMCNC: 32.2 G/DL (ref 31.4–37.4)
MCV RBC AUTO: 94 FL (ref 82–98)
MONOCYTES # BLD AUTO: 0.51 THOUSAND/ΜL (ref 0.17–1.22)
MONOCYTES NFR BLD AUTO: 9 % (ref 4–12)
NEUTROPHILS # BLD AUTO: 4.39 THOUSANDS/ΜL (ref 1.85–7.62)
NEUTS SEG NFR BLD AUTO: 72 % (ref 43–75)
NRBC BLD AUTO-RTO: 0 /100 WBCS
PLATELET # BLD AUTO: 474 THOUSANDS/UL (ref 149–390)
PMV BLD AUTO: 9.2 FL (ref 8.9–12.7)
POTASSIUM SERPL-SCNC: 3.9 MMOL/L (ref 3.5–5.3)
PROT SERPL-MCNC: 7.3 G/DL (ref 6.4–8.4)
RBC # BLD AUTO: 4.04 MILLION/UL (ref 3.88–5.62)
SODIUM SERPL-SCNC: 136 MMOL/L (ref 135–147)
WBC # BLD AUTO: 5.96 THOUSAND/UL (ref 4.31–10.16)

## 2024-09-18 PROCEDURE — 36415 COLL VENOUS BLD VENIPUNCTURE: CPT

## 2024-09-18 PROCEDURE — 85025 COMPLETE CBC W/AUTO DIFF WBC: CPT

## 2024-09-18 PROCEDURE — 80053 COMPREHEN METABOLIC PANEL: CPT

## 2024-09-20 ENCOUNTER — HOSPITAL ENCOUNTER (OUTPATIENT)
Dept: INFUSION CENTER | Facility: HOSPITAL | Age: 74
End: 2024-09-20
Attending: INTERNAL MEDICINE
Payer: COMMERCIAL

## 2024-09-20 VITALS
HEART RATE: 76 BPM | DIASTOLIC BLOOD PRESSURE: 74 MMHG | HEIGHT: 68 IN | BODY MASS INDEX: 21.05 KG/M2 | OXYGEN SATURATION: 94 % | TEMPERATURE: 97.6 F | RESPIRATION RATE: 18 BRPM | WEIGHT: 138.89 LBS | SYSTOLIC BLOOD PRESSURE: 137 MMHG

## 2024-09-20 DIAGNOSIS — C61 PROSTATE CANCER METASTATIC TO BONE (HCC): Primary | ICD-10-CM

## 2024-09-20 DIAGNOSIS — C79.51 PROSTATE CANCER METASTATIC TO BONE (HCC): Primary | ICD-10-CM

## 2024-09-20 PROCEDURE — 96367 TX/PROPH/DG ADDL SEQ IV INF: CPT

## 2024-09-20 PROCEDURE — 96361 HYDRATE IV INFUSION ADD-ON: CPT

## 2024-09-20 PROCEDURE — 96409 CHEMO IV PUSH SNGL DRUG: CPT

## 2024-09-20 PROCEDURE — 96375 TX/PRO/DX INJ NEW DRUG ADDON: CPT

## 2024-09-20 PROCEDURE — 96376 TX/PRO/DX INJ SAME DRUG ADON: CPT

## 2024-09-20 RX ORDER — SODIUM CHLORIDE 9 MG/ML
20 INJECTION, SOLUTION INTRAVENOUS ONCE
Status: COMPLETED | OUTPATIENT
Start: 2024-09-20 | End: 2024-09-20

## 2024-09-20 RX ORDER — FAMOTIDINE 10 MG/ML
20 INJECTION, SOLUTION INTRAVENOUS ONCE
Status: COMPLETED | OUTPATIENT
Start: 2024-09-20 | End: 2024-09-20

## 2024-09-20 RX ORDER — DIPHENHYDRAMINE HYDROCHLORIDE 50 MG/ML
25 INJECTION INTRAMUSCULAR; INTRAVENOUS
Status: ACTIVE | OUTPATIENT
Start: 2024-09-20 | End: 2024-09-20

## 2024-09-20 RX ADMIN — DEXAMETHASONE SODIUM PHOSPHATE 10 MG: 10 INJECTION, SOLUTION INTRAMUSCULAR; INTRAVENOUS at 13:17

## 2024-09-20 RX ADMIN — DIPHENHYDRAMINE HYDROCHLORIDE 50 MG: 50 INJECTION, SOLUTION INTRAMUSCULAR; INTRAVENOUS at 14:37

## 2024-09-20 RX ADMIN — SODIUM CHLORIDE 20 ML/HR: 0.9 INJECTION, SOLUTION INTRAVENOUS at 13:14

## 2024-09-20 RX ADMIN — SODIUM CHLORIDE 500 ML: 0.9 INJECTION, SOLUTION INTRAVENOUS at 15:10

## 2024-09-20 RX ADMIN — DIPHENHYDRAMINE HYDROCHLORIDE 25 MG: 50 INJECTION, SOLUTION INTRAMUSCULAR; INTRAVENOUS at 15:08

## 2024-09-20 RX ADMIN — DOCETAXEL 44.6 MG: 20 INJECTION, SOLUTION INTRAVENOUS at 14:56

## 2024-09-20 RX ADMIN — FAMOTIDINE 20 MG: 10 INJECTION, SOLUTION INTRAVENOUS at 15:10

## 2024-09-20 RX ADMIN — HYDROCORTISONE SODIUM SUCCINATE 50 MG: 100 INJECTION, POWDER, FOR SOLUTION INTRAMUSCULAR; INTRAVENOUS at 15:09

## 2024-09-20 RX ADMIN — FAMOTIDINE 20 MG: 10 INJECTION, SOLUTION INTRAVENOUS at 14:19

## 2024-09-20 NOTE — PROGRESS NOTES
Pt's wife stated he's been getting dizzy & feels like he has an infection. Says nephrostomy bag has white particles & time has a strong odor. Pt refuses to go to ER. Told wife if pt gets worse he will need to go to ER. VS are stable. Pt d/c

## 2024-09-20 NOTE — PROGRESS NOTES
"Pt arrived for chemo infusion. Pre-meds given & taxotere started @ 1456. @ 1506 wife alerted staff to pt having a reaction. Chemo immediately stopped & hypersensitivity protocol initiated. Notified Sole Butcher RN, per Dr. Coyne pt can be d/c when stable & \"no more cycles with this treatment\". Dr. Coyne will discuss plan with pt next week @ his appt  "

## 2024-09-25 ENCOUNTER — APPOINTMENT (OUTPATIENT)
Dept: LAB | Facility: HOSPITAL | Age: 74
End: 2024-09-25
Payer: COMMERCIAL

## 2024-09-25 ENCOUNTER — TELEPHONE (OUTPATIENT)
Age: 74
End: 2024-09-25

## 2024-09-25 DIAGNOSIS — C79.51 PROSTATE CANCER METASTATIC TO BONE (HCC): Primary | ICD-10-CM

## 2024-09-25 DIAGNOSIS — C61 PROSTATE CANCER METASTATIC TO BONE (HCC): ICD-10-CM

## 2024-09-25 DIAGNOSIS — C61 PROSTATE CANCER METASTATIC TO BONE (HCC): Primary | ICD-10-CM

## 2024-09-25 DIAGNOSIS — C79.51 PROSTATE CANCER METASTATIC TO BONE (HCC): ICD-10-CM

## 2024-09-25 LAB
ALBUMIN SERPL BCG-MCNC: 3.7 G/DL (ref 3.5–5)
ALP SERPL-CCNC: 94 U/L (ref 34–104)
ALT SERPL W P-5'-P-CCNC: 5 U/L (ref 7–52)
ANION GAP SERPL CALCULATED.3IONS-SCNC: 10 MMOL/L (ref 4–13)
AST SERPL W P-5'-P-CCNC: 11 U/L (ref 13–39)
BASOPHILS # BLD AUTO: 0.03 THOUSANDS/ΜL (ref 0–0.1)
BASOPHILS NFR BLD AUTO: 0 % (ref 0–1)
BILIRUB SERPL-MCNC: 0.54 MG/DL (ref 0.2–1)
BUN SERPL-MCNC: 17 MG/DL (ref 5–25)
CALCIUM SERPL-MCNC: 8.9 MG/DL (ref 8.4–10.2)
CHLORIDE SERPL-SCNC: 104 MMOL/L (ref 96–108)
CO2 SERPL-SCNC: 22 MMOL/L (ref 21–32)
CREAT SERPL-MCNC: 0.89 MG/DL (ref 0.6–1.3)
EOSINOPHIL # BLD AUTO: 0.03 THOUSAND/ΜL (ref 0–0.61)
EOSINOPHIL NFR BLD AUTO: 0 % (ref 0–6)
ERYTHROCYTE [DISTWIDTH] IN BLOOD BY AUTOMATED COUNT: 13.9 % (ref 11.6–15.1)
GFR SERPL CREATININE-BSD FRML MDRD: 85 ML/MIN/1.73SQ M
GLUCOSE SERPL-MCNC: 114 MG/DL (ref 65–140)
HCT VFR BLD AUTO: 37.1 % (ref 36.5–49.3)
HGB BLD-MCNC: 12 G/DL (ref 12–17)
IMM GRANULOCYTES # BLD AUTO: 0.05 THOUSAND/UL (ref 0–0.2)
IMM GRANULOCYTES NFR BLD AUTO: 1 % (ref 0–2)
LYMPHOCYTES # BLD AUTO: 1.12 THOUSANDS/ΜL (ref 0.6–4.47)
LYMPHOCYTES NFR BLD AUTO: 14 % (ref 14–44)
MCH RBC QN AUTO: 30.3 PG (ref 26.8–34.3)
MCHC RBC AUTO-ENTMCNC: 32.3 G/DL (ref 31.4–37.4)
MCV RBC AUTO: 94 FL (ref 82–98)
MONOCYTES # BLD AUTO: 0.53 THOUSAND/ΜL (ref 0.17–1.22)
MONOCYTES NFR BLD AUTO: 6 % (ref 4–12)
NEUTROPHILS # BLD AUTO: 6.55 THOUSANDS/ΜL (ref 1.85–7.62)
NEUTS SEG NFR BLD AUTO: 79 % (ref 43–75)
NRBC BLD AUTO-RTO: 0 /100 WBCS
PLATELET # BLD AUTO: 413 THOUSANDS/UL (ref 149–390)
PMV BLD AUTO: 9.4 FL (ref 8.9–12.7)
POTASSIUM SERPL-SCNC: 3.9 MMOL/L (ref 3.5–5.3)
PROT SERPL-MCNC: 7.3 G/DL (ref 6.4–8.4)
RBC # BLD AUTO: 3.96 MILLION/UL (ref 3.88–5.62)
SODIUM SERPL-SCNC: 136 MMOL/L (ref 135–147)
WBC # BLD AUTO: 8.31 THOUSAND/UL (ref 4.31–10.16)

## 2024-09-25 PROCEDURE — 85025 COMPLETE CBC W/AUTO DIFF WBC: CPT

## 2024-09-25 PROCEDURE — 80053 COMPREHEN METABOLIC PANEL: CPT

## 2024-09-25 PROCEDURE — 36415 COLL VENOUS BLD VENIPUNCTURE: CPT

## 2024-09-25 NOTE — TELEPHONE ENCOUNTER
Call received from patient son Sumit. Patient is at the lab and needs lab orders placed for upcoming appt with Dr Coyne. Lab orders placed for patient.

## 2024-09-26 ENCOUNTER — TELEMEDICINE (OUTPATIENT)
Dept: HEMATOLOGY ONCOLOGY | Facility: CLINIC | Age: 74
End: 2024-09-26
Payer: COMMERCIAL

## 2024-09-26 DIAGNOSIS — C61 PROSTATE CANCER METASTATIC TO BONE (HCC): Primary | ICD-10-CM

## 2024-09-26 DIAGNOSIS — C79.51 PROSTATE CANCER METASTATIC TO BONE (HCC): Primary | ICD-10-CM

## 2024-09-26 DIAGNOSIS — G89.3 CANCER RELATED PAIN: ICD-10-CM

## 2024-09-26 DIAGNOSIS — I10 PRIMARY HYPERTENSION: Chronic | ICD-10-CM

## 2024-09-26 DIAGNOSIS — R63.0 LOSS OF APPETITE: ICD-10-CM

## 2024-09-26 PROBLEM — E87.6 HYPOKALEMIA: Status: RESOLVED | Noted: 2024-03-23 | Resolved: 2024-09-26

## 2024-09-26 PROCEDURE — 99215 OFFICE O/P EST HI 40 MIN: CPT | Performed by: INTERNAL MEDICINE

## 2024-09-26 NOTE — ASSESSMENT & PLAN NOTE
In summary, this is a 74-year-old male with a history of prostate cancer with bone metastases.  He had severe infusion reaction after Taxotere.  We reviewed potential utility of Pluvicto in this situation.  It is approved for patients progressing or intolerance of Taxotere.  We reviewed the goal of treatment disease control, palliative benefit, survival benefit.  Potential toxicities include fatigue, cytopenias.  Potential for efficacy is moderate.    Orders:    NM consultation pluvicto; Future

## 2024-09-26 NOTE — ASSESSMENT & PLAN NOTE
Patient reports orthostatic symptoms.  I asked him to discontinue lisinopril and monitor blood pressure.  He will be in conference with his PCP in the near future.

## 2024-09-26 NOTE — ASSESSMENT & PLAN NOTE
Loss of appetite secondary to disease and treatment.  Nutritional supplementations recommended.

## 2024-09-26 NOTE — PROGRESS NOTES
How Severe Is Your Skin Lesion?: mild Virtual Brief Visit  Name: Oliver Astudillo      : 1950      MRN: [id number]  Encounter Provider: Ramone Coyne DO  Encounter Date: 2024   Encounter department: [facility] [place]    This Visit is being completed by telephone. The Patient is located at Home and in the following state in which I hold an active license [place]    The patient was identified by name and date of birth. Oliver Astudillo was informed that this is a telemedicine visit and that the visit is being conducted through the Epic Embedded platform. He agrees to proceed..  My office door was closed. No one else was in the room.  He acknowledged consent and understanding of privacy and security of the video platform. The patient has agreed to participate and understands they can discontinue the visit at any time.    Patient is aware this is a billable service.     Assessment & Plan  Prostate cancer metastatic to bone (HCC)  In summary, this is a 74-year-old male with a history of prostate cancer with bone metastases.  He had severe infusion reaction after Taxotere.  We reviewed potential utility of Pluvicto in this situation.  It is approved for patients progressing or intolerance of Taxotere.  We reviewed the goal of treatment disease control, palliative benefit, survival benefit.  Potential toxicities include fatigue, cytopenias.  Potential for efficacy is moderate.    Orders:    NM consultation pluvicto; Future    Primary hypertension    Patient reports orthostatic symptoms.  I asked him to discontinue lisinopril and monitor blood pressure.  He will be in conference with his PCP in the near future.         Cancer related pain  Pain control good at this time on current analgesic program.       Loss of appetite  Loss of appetite secondary to disease and treatment.  Nutritional supplementations recommended.           History of Present Illness   HPI 74-year-old male with a history of prostate cancer with  Has Your Skin Lesion Been Treated?: not been treated bone metastases.  Firmagon >Lupron July/August 2023. Zytiga initiated.  June 2024 PSA increased to 6.5.  Zytiga discontinued, Xtandi initiated.  August 2024 disease progression, PSA 48.6.  Late August 2024 started Taxotere, 25 mg/m² weekly, 2 on 1 off.    Visit Time  Total Visit Duration: 20 min.              Is This A New Presentation, Or A Follow-Up?: Skin Lesion

## 2024-09-27 ENCOUNTER — HOSPITAL ENCOUNTER (OUTPATIENT)
Dept: INFUSION CENTER | Facility: HOSPITAL | Age: 74
Discharge: HOME | End: 2024-09-27
Attending: INTERNAL MEDICINE

## 2024-10-03 DIAGNOSIS — C79.51 PROSTATE CANCER METASTATIC TO BONE (HCC): Primary | ICD-10-CM

## 2024-10-03 DIAGNOSIS — C61 PROSTATE CANCER METASTATIC TO BONE (HCC): Primary | ICD-10-CM

## 2024-10-04 ENCOUNTER — HOSPITAL ENCOUNTER (OUTPATIENT)
Dept: NON INVASIVE DIAGNOSTICS | Facility: HOSPITAL | Age: 74
Discharge: HOME | End: 2024-10-04
Attending: RADIOLOGY
Payer: COMMERCIAL

## 2024-10-04 ENCOUNTER — TELEPHONE (OUTPATIENT)
Dept: HEMATOLOGY ONCOLOGY | Facility: CLINIC | Age: 74
End: 2024-10-04

## 2024-10-04 DIAGNOSIS — Z93.6 NEPHROSTOMY STATUS (HCC): ICD-10-CM

## 2024-10-04 DIAGNOSIS — C79.51 PROSTATE CANCER METASTATIC TO BONE (HCC): ICD-10-CM

## 2024-10-04 DIAGNOSIS — C61 PROSTATE CANCER METASTATIC TO BONE (HCC): ICD-10-CM

## 2024-10-04 PROCEDURE — C1769 GUIDE WIRE: HCPCS

## 2024-10-04 PROCEDURE — 50435 EXCHANGE NEPHROSTOMY CATH: CPT | Performed by: RADIOLOGY

## 2024-10-04 PROCEDURE — 50435 EXCHANGE NEPHROSTOMY CATH: CPT

## 2024-10-04 PROCEDURE — C1729 CATH, DRAINAGE: HCPCS

## 2024-10-04 RX ORDER — LIDOCAINE WITH 8.4% SOD BICARB 0.9%(10ML)
SYRINGE (ML) INJECTION AS NEEDED
Status: COMPLETED | OUTPATIENT
Start: 2024-10-04 | End: 2024-10-04

## 2024-10-04 RX ADMIN — Medication 5 ML: at 12:04

## 2024-10-04 RX ADMIN — IOHEXOL 12 ML: 350 INJECTION, SOLUTION INTRAVENOUS at 12:11

## 2024-10-04 RX ADMIN — Medication 5 ML: at 11:56

## 2024-10-04 NOTE — BRIEF OP NOTE (RAD/CATH)
INTERVENTIONAL RADIOLOGY PROCEDURE NOTE    Date: 10/4/2024    Procedure:   Procedure Summary       Date: 10/04/24 Room / Location: [facility] Cardiac Cath Lab    Anesthesia Start:  Anesthesia Stop:     Procedure: IR NEPHROSTOMY TUBE CHECK/CHANGE/REPOSITION/REINSERTION/UPSIZE Diagnosis:       Nephrostomy status (HCC)      Prostate cancer metastatic to bone (HCC)      (routine bilateral PCN change)    Scheduled Providers:  Responsible Provider:     Anesthesia Type: Not recorded ASA Status: Not recorded            Preoperative diagnosis:   1. Nephrostomy status (HCC)    2. Prostate cancer metastatic to bone (HCC)         Postoperative diagnosis: Same.    Surgeon: Pro Murphy MD     Assistant: None. No qualified resident was available.    Blood loss: None    Specimens: None     Findings: Bilateral nephrostomy tube exchange.    Complications: None immediate.    Anesthesia: local

## 2024-10-04 NOTE — SEDATION DOCUMENTATION
Patient tolerated procedure well. Dressings to procedural sites. Instructions gone over and given to patient and spouse.

## 2024-10-04 NOTE — DISCHARGE INSTRUCTIONS
Cuidado de la sonda de nefrostomía    LO QUE NECESITAS SABER:  Jasmin sonda de nefrostomía es un catéter (tubo de plástico millan) que se inserta a través de la piel hasta el riñón. La sonda de nefrostomía drena la orina de luevano riñón a jasmin bolsa colectora fuera de luevano cuerpo. Es posible que necesite jasmin sonda de nefrostomía cuando algo bloquea el flujo normal de orina. Jasmin sonda de nefrostomía se puede usar por un período de tiempo corto o markus. La sonda de nefrostomía sale de luevano espalda, por lo que necesitará que alguien le ayude a cuidarla.        INSTRUCCIONES DE DESCARGA:     Cómo limpiar la piel alrededor de la sonda de nefrostomía y cambiar el vendaje: Dado que la sonda de nefrostomía sale por la espalda, no podrá cuidarla usted mismo. Pídale a alguien que siga las instrucciones generales a continuación para revisar y cuidar luevano tubo de nefrostomía.  Reúna los elementos que necesitará.        Almohadilla desechable (de un solo uso) y jasmin toallita limpia  Jabón común, agua tibia y guantes médicos nuevos  Vendajes de gasa estériles  Vendaje adhesivo transparente o cinta médica  reed de la piel  Película protectora de la piel  Bolsa de basura  Quite el vendaje anabelle y revise el sitio de entrada del tubo. Senthil que el paciente se acueste de lado con el sitio de entrada de la sonda de nefrostomía hacia arriba. Coloque la almohadilla inferior donde atrapará el drenaje mientras trabaja con la sonda de nefrostomía.  Lávate las cleo con jabón y agua. Póngase guantes médicos nuevos.  Retire suavemente el vendaje anabelle, sin tirar del tubo. Senthil esto sujetando la piel al lado del tubo con jasmin mano. Con la otra mano, retire suavemente la cinta adhesiva y la reed cutánea tirando en la misma dirección en que crece el vello. No toque el lado del vendaje que se coloca sobre o alrededor del tubo. Deseche el vendaje y la reed cutánea en jasmin bolsa de basura.  Busque signos de infección, barron enrojecimiento e hinchazón de la  piel. Informe cualquier cambio en la piel a los proveedores de atención médica.  Limpie el sitio de entrada del tubo.  Sostenga el tubo en luevano lugar para evitar que se salga mientras limpia a luevano alrededor.  Deberá limpiar el área dos veces. Para la primera limpieza, humedezca jasmin venda de gasa nueva con agua y jabón. Comience en el sitio de entrada del tubo. Limpie la piel en círculos, alejándose del sitio de entrada. Retire la taco y cualquier otro material con la gasa. Senthil esto tantas veces barron sea necesario. Use jasmin nueva venda de gasa cada vez que limpie el área, alejándose del sitio de entrada.  Para la segunda limpieza, humedezca jasmin gasa nueva con agua. Comience en el sitio de entrada del tubo. Limpie la piel en círculos, alejándose del sitio de entrada. Use jasmin nueva venda de gasa cada vez que limpie el área, alejándose del sitio de entrada.   Acaricie suavemente la piel con un paño limpio para secarla.     Aplique la reed cutánea y los vendajes.   Enrolle un vendaje para que sea más grueso y colóquelo debajo del lugar donde el tubo entra en la piel. Colóquelo para sostener el tubo y evite que se doble o doble. Pegue el vendaje con cinta adhesiva en luevano lugar y aplique más vendajes si se lo indica un proveedor de atención médica.  Lleve el tubo hacia el frente y péguelo con cinta adhesiva a la piel. No estire demasiado la sonda, ya que esto puede sacar la sonda de nefrostomía.  Con qué frecuencia cambiar el vendaje. Cambie el vendaje alrededor del tubo cada dos días. Si rose vendajes se ensucian o mojan, cámbielos de inmediato y con la frecuencia que sea necesaria. Si la sonda de nefrostomía se va a utilizar percy un período prolongado, es necesario cambiarla cada 2 o 3 meses. Los proveedores de atención médica le dirán cuándo debe programar jasmin gabriel para que le cambien la sonda.    Cómo cuidar la bolsa de drenaje de orina:  Pregunte si necesita medir y anotar cuánta orina hay en la bolsa antes de  vaciarla. Drene la orina de la bolsa de drenaje cuando esté entre ½ y ? completo. Joey el marisol en la parte inferior de la bolsa para vaciar la orina en el inodoro.  Es posible que deba quitar la bolsa de drenaje de la sonda de nefrostomía para cambiarla. Si es así, coloque jasmin nueva bolsa de drenaje firmemente en la sonda de nefrostomía.  Cómo prevenir problemas con luevano tubo de nefrostomía:  Cambiar vendajes, dirigido. [facility] ayuda a prevenir infecciones. Deseche o limpie la bolsa de drenaje según las indicaciones de luevano proveedor de atención médica.    Limpie los extremos de conexión de la bolsa de drenaje con alcohol antes de volver a conectar la bolsa al tubo. [facility] ayuda a prevenir infecciones.    Mantenga el tubo pegado a luevano piel y conectado a jasmin bolsa de drenaje colocada debajo del nivel de rose riñones. [facility] ayuda a evitar que la orina regrese a los riñones. Puede usar jasmin pequeña bolsa de drenaje atada a la pierna para que pueda moverse más fácilmente.    Revise el catéter para asegurarse de que esté en luevano lugar después de cambiarse de ropa o realizar otras actividades. No use ropa ajustada sobre el tubo. Coloque el tubo sobre luevano muslo en lugar de debajo de él cuando esté sentado. Asegúrese de que nada tire de la sonda de nefrostomía cuando se mueva.    Cambie de posición si ve poca o ninguna orina en la bolsa de drenaje. Verifique si el tubo de orina está torcido o doblado. Asegúrese de no estar sentado o acostado    En el tubo. Si no hay torceduras y hay poca o nada de orina en la bolsa de drenaje, informe a luevano proveedor de atención médica.     Enjuague el tubo barron se indica. Algunas trompas se enjuagan jasmin vez al día con 10 ml de NSS. Se le dará jasmin receta para los enjuagues.  Para enjuagar el tubo de nefrostomía, limpie ambas conexiones con alcohol. Desenrosque el tubo de la bolsa de drenaje y gire la jeringa de solución salina en el tubo de nefrostomía y enjuague enérgicamente. Retire la jeringa y vuelva a  girar el tubo de la bolsa de drenaje en el tubo de nefrostomía.  Mantenga el sitio cubierto mientras se ducha. Pegue con cinta un trozo de plástico adhesivo transparente sobre el vendaje para mantenerlo seco mientras se ducha. No tome pema de fan.    Comuníquese con [employer] [phone] (PACIENTES DE HERNANDEZ: Comuníquese con [employer]cion[employer] al [phone]) (PACIENTES DE QUETA: Comuníquese con Radiología Intervencionista al [phone]) si:  La piel alrededor de la sonda de nefrostomía está enrojecida, hinchada, con picazón o con sarpullido.  Tiene fiebre superior a 101 o escalofríos.   Tiene dolor en la parte inferior de la espalda o en la cadera.   Hay cambios en el aspecto o el olor de la orina.   Tiene poca o ninguna orina drenando del tubo de nefrostomía.  Tiene náuseas y está vomitando.   La elida lorenza en luevano tubo se ha movido, o el tubo es más markus que cuando se lo colocó.   Tiene preguntas o inquietudes sobre luevano condición o atención.  La sonda de nefrostomía sale completamente.  Hay taco, pus o mal olor que sale del lugar donde el tubo entra en la piel.  La orina se escapa alrededor del tubo.      Las siguientes farmacias tienen jeringas de lavado.      The following pharmacies carry the flush syringes.       [facility]                     [facility]                         [facility]       [address]                     [address] [place]                    [phone]  [place]                       [place]  Phone [phone]            Phone [phone]                 [facility]                                                                                                   [phone]  [facility]             [facility]                             [address] [place], [place] [address] [facility]                      [place]                                  [phone]  Phone [phone]            Phone [phone]    [facility]                                                                         [facility] [phone]  [address]  [place]   Phone [phone]

## 2024-10-04 NOTE — TELEPHONE ENCOUNTER
Spoke with Oliver, He is aware that a PET/CT has been scheduled for the 1st available on10/31/24 730am [facility]. He will receive a call from the dept to have the test moved up.

## 2024-10-07 ENCOUNTER — TELEPHONE (OUTPATIENT)
Dept: PALLIATIVE MEDICINE | Facility: CLINIC | Age: 74
End: 2024-10-07

## 2024-10-07 DIAGNOSIS — I10 PRIMARY HYPERTENSION: Chronic | ICD-10-CM

## 2024-10-08 ENCOUNTER — APPOINTMENT (EMERGENCY)
Dept: RADIOLOGY | Facility: HOSPITAL | Age: 74
DRG: 871 | End: 2024-10-08
Payer: COMMERCIAL

## 2024-10-08 ENCOUNTER — HOSPITAL ENCOUNTER (INPATIENT)
Facility: HOSPITAL | Age: 74
LOS: 3 days | Discharge: HOME HEALTH | DRG: 871 | End: 2024-10-11
Attending: EMERGENCY MEDICINE | Admitting: INTERNAL MEDICINE
Payer: COMMERCIAL

## 2024-10-08 DIAGNOSIS — C61 PROSTATE CANCER METASTATIC TO BONE (HCC): ICD-10-CM

## 2024-10-08 DIAGNOSIS — I95.9 HYPOTENSION: ICD-10-CM

## 2024-10-08 DIAGNOSIS — N39.0 UTI (URINARY TRACT INFECTION): ICD-10-CM

## 2024-10-08 DIAGNOSIS — C79.51 PROSTATE CANCER METASTATIC TO BONE (HCC): ICD-10-CM

## 2024-10-08 DIAGNOSIS — E87.8 ELECTROLYTE ABNORMALITY: ICD-10-CM

## 2024-10-08 DIAGNOSIS — R53.1 GENERALIZED WEAKNESS: Primary | ICD-10-CM

## 2024-10-08 DIAGNOSIS — R26.2 AMBULATORY DYSFUNCTION: ICD-10-CM

## 2024-10-08 DIAGNOSIS — E87.1 HYPONATREMIA: ICD-10-CM

## 2024-10-08 PROBLEM — A41.9 SEPSIS (HCC): Status: ACTIVE | Noted: 2024-10-08

## 2024-10-08 PROBLEM — R41.82 ALTERED MENTAL STATUS: Status: ACTIVE | Noted: 2024-10-08

## 2024-10-08 PROBLEM — R65.20 SEVERE SEPSIS (HCC): Status: ACTIVE | Noted: 2024-10-08

## 2024-10-08 LAB
ALBUMIN SERPL BCG-MCNC: 3.2 G/DL (ref 3.5–5)
ALP SERPL-CCNC: 142 U/L (ref 34–104)
ALT SERPL W P-5'-P-CCNC: 8 U/L (ref 7–52)
ANION GAP SERPL CALCULATED.3IONS-SCNC: 10 MMOL/L (ref 4–13)
APTT PPP: 32 SECONDS (ref 23–34)
AST SERPL W P-5'-P-CCNC: 25 U/L (ref 13–39)
ATRIAL RATE: 91 BPM
ATRIAL RATE: 94 BPM
BACTERIA UR QL AUTO: ABNORMAL /HPF
BASOPHILS # BLD AUTO: 0.01 THOUSANDS/ΜL (ref 0–0.1)
BASOPHILS NFR BLD AUTO: 0 % (ref 0–1)
BILIRUB SERPL-MCNC: 0.73 MG/DL (ref 0.2–1)
BILIRUB UR QL STRIP: NEGATIVE
BUN SERPL-MCNC: 16 MG/DL (ref 5–25)
CALCIUM ALBUM COR SERPL-MCNC: 8.9 MG/DL (ref 8.3–10.1)
CALCIUM SERPL-MCNC: 8.3 MG/DL (ref 8.4–10.2)
CHLORIDE SERPL-SCNC: 99 MMOL/L (ref 96–108)
CLARITY UR: ABNORMAL
CO2 SERPL-SCNC: 21 MMOL/L (ref 21–32)
COLOR UR: YELLOW
CREAT SERPL-MCNC: 0.88 MG/DL (ref 0.6–1.3)
EOSINOPHIL # BLD AUTO: 0 THOUSAND/ΜL (ref 0–0.61)
EOSINOPHIL NFR BLD AUTO: 0 % (ref 0–6)
ERYTHROCYTE [DISTWIDTH] IN BLOOD BY AUTOMATED COUNT: 13.8 % (ref 11.6–15.1)
EST. AVERAGE GLUCOSE BLD GHB EST-MCNC: 114 MG/DL
FLUAV AG UPPER RESP QL IA.RAPID: NEGATIVE
FLUBV AG UPPER RESP QL IA.RAPID: NEGATIVE
FOLATE SERPL-MCNC: >22.3 NG/ML
GFR SERPL CREATININE-BSD FRML MDRD: 85 ML/MIN/1.73SQ M
GLUCOSE SERPL-MCNC: 127 MG/DL (ref 65–140)
GLUCOSE SERPL-MCNC: 147 MG/DL (ref 65–140)
GLUCOSE SERPL-MCNC: 216 MG/DL (ref 65–140)
GLUCOSE UR STRIP-MCNC: NEGATIVE MG/DL
HBA1C MFR BLD: 5.6 %
HCT VFR BLD AUTO: 35.8 % (ref 36.5–49.3)
HGB BLD-MCNC: 11.8 G/DL (ref 12–17)
HGB UR QL STRIP.AUTO: ABNORMAL
IMM GRANULOCYTES # BLD AUTO: 0.1 THOUSAND/UL (ref 0–0.2)
IMM GRANULOCYTES NFR BLD AUTO: 1 % (ref 0–2)
INR PPP: 1.22 (ref 0.85–1.19)
KETONES UR STRIP-MCNC: NEGATIVE MG/DL
LACTATE SERPL-SCNC: 2 MMOL/L (ref 0.5–2)
LEUKOCYTE ESTERASE UR QL STRIP: ABNORMAL
LYMPHOCYTES # BLD AUTO: 0.61 THOUSANDS/ΜL (ref 0.6–4.47)
LYMPHOCYTES NFR BLD AUTO: 5 % (ref 14–44)
MAGNESIUM SERPL-MCNC: 1.8 MG/DL (ref 1.9–2.7)
MCH RBC QN AUTO: 30 PG (ref 26.8–34.3)
MCHC RBC AUTO-ENTMCNC: 33 G/DL (ref 31.4–37.4)
MCV RBC AUTO: 91 FL (ref 82–98)
MONOCYTES # BLD AUTO: 0.87 THOUSAND/ΜL (ref 0.17–1.22)
MONOCYTES NFR BLD AUTO: 7 % (ref 4–12)
MUCOUS THREADS UR QL AUTO: ABNORMAL
NEUTROPHILS # BLD AUTO: 11.81 THOUSANDS/ΜL (ref 1.85–7.62)
NEUTS SEG NFR BLD AUTO: 87 % (ref 43–75)
NITRITE UR QL STRIP: NEGATIVE
NON-SQ EPI CELLS URNS QL MICRO: ABNORMAL /HPF
NRBC BLD AUTO-RTO: 0 /100 WBCS
P AXIS: 30 DEGREES
P AXIS: 36 DEGREES
PH UR STRIP.AUTO: 7 [PH]
PLATELET # BLD AUTO: 410 THOUSANDS/UL (ref 149–390)
PMV BLD AUTO: 9.6 FL (ref 8.9–12.7)
POTASSIUM SERPL-SCNC: 4.4 MMOL/L (ref 3.5–5.3)
PR INTERVAL: 122 MS
PR INTERVAL: 124 MS
PROCALCITONIN SERPL-MCNC: 0.5 NG/ML
PROT SERPL-MCNC: 6.8 G/DL (ref 6.4–8.4)
PROT UR STRIP-MCNC: ABNORMAL MG/DL
PROTHROMBIN TIME: 15.9 SECONDS (ref 12.3–15)
QRS AXIS: 19 DEGREES
QRS AXIS: 9 DEGREES
QRSD INTERVAL: 130 MS
QRSD INTERVAL: 132 MS
QT INTERVAL: 370 MS
QT INTERVAL: 380 MS
QTC INTERVAL: 462 MS
QTC INTERVAL: 467 MS
RBC # BLD AUTO: 3.93 MILLION/UL (ref 3.88–5.62)
RBC #/AREA URNS AUTO: ABNORMAL /HPF
SARS-COV+SARS-COV-2 AG RESP QL IA.RAPID: NEGATIVE
SODIUM SERPL-SCNC: 130 MMOL/L (ref 135–147)
SP GR UR STRIP.AUTO: 1.02 (ref 1–1.03)
T WAVE AXIS: 24 DEGREES
T WAVE AXIS: 25 DEGREES
TSH SERPL DL<=0.05 MIU/L-ACNC: 1.47 UIU/ML (ref 0.45–4.5)
UROBILINOGEN UR STRIP-ACNC: >=12 MG/DL
VENTRICULAR RATE: 91 BPM
VENTRICULAR RATE: 94 BPM
VIT B12 SERPL-MCNC: 212 PG/ML (ref 180–914)
WBC # BLD AUTO: 13.4 THOUSAND/UL (ref 4.31–10.16)
WBC #/AREA URNS AUTO: ABNORMAL /HPF
WBC CLUMPS # UR AUTO: ABNORMAL /UL

## 2024-10-08 PROCEDURE — 85730 THROMBOPLASTIN TIME PARTIAL: CPT | Performed by: EMERGENCY MEDICINE

## 2024-10-08 PROCEDURE — 84443 ASSAY THYROID STIM HORMONE: CPT

## 2024-10-08 PROCEDURE — 85025 COMPLETE CBC W/AUTO DIFF WBC: CPT | Performed by: EMERGENCY MEDICINE

## 2024-10-08 PROCEDURE — 93010 ELECTROCARDIOGRAM REPORT: CPT | Performed by: INTERNAL MEDICINE

## 2024-10-08 PROCEDURE — 71045 X-RAY EXAM CHEST 1 VIEW: CPT

## 2024-10-08 PROCEDURE — 87186 SC STD MICRODIL/AGAR DIL: CPT | Performed by: EMERGENCY MEDICINE

## 2024-10-08 PROCEDURE — 81001 URINALYSIS AUTO W/SCOPE: CPT | Performed by: EMERGENCY MEDICINE

## 2024-10-08 PROCEDURE — 87811 SARS-COV-2 COVID19 W/OPTIC: CPT | Performed by: EMERGENCY MEDICINE

## 2024-10-08 PROCEDURE — 80053 COMPREHEN METABOLIC PANEL: CPT | Performed by: EMERGENCY MEDICINE

## 2024-10-08 PROCEDURE — 87040 BLOOD CULTURE FOR BACTERIA: CPT | Performed by: EMERGENCY MEDICINE

## 2024-10-08 PROCEDURE — 94760 N-INVAS EAR/PLS OXIMETRY 1: CPT

## 2024-10-08 PROCEDURE — 83036 HEMOGLOBIN GLYCOSYLATED A1C: CPT

## 2024-10-08 PROCEDURE — 99285 EMERGENCY DEPT VISIT HI MDM: CPT

## 2024-10-08 PROCEDURE — 36415 COLL VENOUS BLD VENIPUNCTURE: CPT | Performed by: EMERGENCY MEDICINE

## 2024-10-08 PROCEDURE — 99223 1ST HOSP IP/OBS HIGH 75: CPT | Performed by: INTERNAL MEDICINE

## 2024-10-08 PROCEDURE — 87077 CULTURE AEROBIC IDENTIFY: CPT | Performed by: EMERGENCY MEDICINE

## 2024-10-08 PROCEDURE — 96360 HYDRATION IV INFUSION INIT: CPT

## 2024-10-08 PROCEDURE — 82948 REAGENT STRIP/BLOOD GLUCOSE: CPT

## 2024-10-08 PROCEDURE — 83735 ASSAY OF MAGNESIUM: CPT | Performed by: EMERGENCY MEDICINE

## 2024-10-08 PROCEDURE — 82607 VITAMIN B-12: CPT

## 2024-10-08 PROCEDURE — 85610 PROTHROMBIN TIME: CPT | Performed by: EMERGENCY MEDICINE

## 2024-10-08 PROCEDURE — 87086 URINE CULTURE/COLONY COUNT: CPT | Performed by: EMERGENCY MEDICINE

## 2024-10-08 PROCEDURE — 84145 PROCALCITONIN (PCT): CPT | Performed by: EMERGENCY MEDICINE

## 2024-10-08 PROCEDURE — 93005 ELECTROCARDIOGRAM TRACING: CPT

## 2024-10-08 PROCEDURE — 83605 ASSAY OF LACTIC ACID: CPT | Performed by: EMERGENCY MEDICINE

## 2024-10-08 PROCEDURE — 87804 INFLUENZA ASSAY W/OPTIC: CPT | Performed by: EMERGENCY MEDICINE

## 2024-10-08 PROCEDURE — 82746 ASSAY OF FOLIC ACID SERUM: CPT

## 2024-10-08 RX ORDER — LISINOPRIL 5 MG/1
5 TABLET ORAL DAILY
Qty: 30 TABLET | Refills: 0 | Status: SHIPPED | OUTPATIENT
Start: 2024-10-08 | End: 2024-10-11

## 2024-10-08 RX ORDER — MAGNESIUM SULFATE HEPTAHYDRATE 40 MG/ML
2 INJECTION, SOLUTION INTRAVENOUS ONCE
Status: COMPLETED | OUTPATIENT
Start: 2024-10-08 | End: 2024-10-08

## 2024-10-08 RX ORDER — INSULIN LISPRO 100 [IU]/ML
1-5 INJECTION, SOLUTION INTRAVENOUS; SUBCUTANEOUS
Status: DISCONTINUED | OUTPATIENT
Start: 2024-10-08 | End: 2024-10-11 | Stop reason: HOSPADM

## 2024-10-08 RX ORDER — LANOLIN ALCOHOL/MO/W.PET/CERES
400 CREAM (GRAM) TOPICAL 2 TIMES DAILY
Status: DISCONTINUED | OUTPATIENT
Start: 2024-10-08 | End: 2024-10-11 | Stop reason: HOSPADM

## 2024-10-08 RX ORDER — ACETAMINOPHEN 10 MG/ML
1000 INJECTION, SOLUTION INTRAVENOUS EVERY 8 HOURS PRN
Status: DISCONTINUED | OUTPATIENT
Start: 2024-10-08 | End: 2024-10-09

## 2024-10-08 RX ORDER — LANOLIN ALCOHOL/MO/W.PET/CERES
400 CREAM (GRAM) TOPICAL ONCE
Status: COMPLETED | OUTPATIENT
Start: 2024-10-08 | End: 2024-10-08

## 2024-10-08 RX ORDER — CEFEPIME HYDROCHLORIDE 1 G/50ML
1000 INJECTION, SOLUTION INTRAVENOUS EVERY 12 HOURS
Status: DISCONTINUED | OUTPATIENT
Start: 2024-10-08 | End: 2024-10-11 | Stop reason: HOSPADM

## 2024-10-08 RX ORDER — ENOXAPARIN SODIUM 100 MG/ML
40 INJECTION SUBCUTANEOUS DAILY
Status: DISCONTINUED | OUTPATIENT
Start: 2024-10-08 | End: 2024-10-11 | Stop reason: HOSPADM

## 2024-10-08 RX ORDER — ONDANSETRON 2 MG/ML
4 INJECTION INTRAMUSCULAR; INTRAVENOUS EVERY 6 HOURS PRN
Status: DISCONTINUED | OUTPATIENT
Start: 2024-10-08 | End: 2024-10-11 | Stop reason: HOSPADM

## 2024-10-08 RX ORDER — SODIUM CHLORIDE 9 MG/ML
50 INJECTION, SOLUTION INTRAVENOUS CONTINUOUS
Status: DISCONTINUED | OUTPATIENT
Start: 2024-10-08 | End: 2024-10-11 | Stop reason: HOSPADM

## 2024-10-08 RX ORDER — CEFTRIAXONE 1 G/50ML
1000 INJECTION, SOLUTION INTRAVENOUS ONCE
Status: DISCONTINUED | OUTPATIENT
Start: 2024-10-08 | End: 2024-10-08

## 2024-10-08 RX ADMIN — SODIUM CHLORIDE 1000 ML: 0.9 INJECTION, SOLUTION INTRAVENOUS at 13:09

## 2024-10-08 RX ADMIN — CEFTRIAXONE 1000 MG: 1 INJECTION, SOLUTION INTRAVENOUS at 13:23

## 2024-10-08 RX ADMIN — CEFEPIME HYDROCHLORIDE 1000 MG: 1 INJECTION, SOLUTION INTRAVENOUS at 14:24

## 2024-10-08 RX ADMIN — SODIUM CHLORIDE 500 ML: 0.9 INJECTION, SOLUTION INTRAVENOUS at 12:03

## 2024-10-08 RX ADMIN — SODIUM CHLORIDE 100 ML/HR: 0.9 INJECTION, SOLUTION INTRAVENOUS at 14:32

## 2024-10-08 RX ADMIN — Medication 400 MG: at 17:04

## 2024-10-08 RX ADMIN — SODIUM CHLORIDE 100 ML/HR: 0.9 INJECTION, SOLUTION INTRAVENOUS at 16:48

## 2024-10-08 RX ADMIN — MAGNESIUM SULFATE HEPTAHYDRATE 2 G: 40 INJECTION, SOLUTION INTRAVENOUS at 20:46

## 2024-10-08 RX ADMIN — ACETAMINOPHEN 1000 MG: 10 INJECTION INTRAVENOUS at 21:57

## 2024-10-08 RX ADMIN — ENOXAPARIN SODIUM 40 MG: 40 INJECTION SUBCUTANEOUS at 14:29

## 2024-10-08 RX ADMIN — THIAMINE HYDROCHLORIDE 100 MG: 100 INJECTION, SOLUTION INTRAMUSCULAR; INTRAVENOUS at 16:47

## 2024-10-08 NOTE — H&P
H&P - [facility] Medicine   Name: Oliver Astudillo 74 y.o. male I MRN: [id number]  Unit/Bed#: ED 06 I Date of Admission: 10/8/2024   Date of Service: 10/8/2024 I Hospital Day: 0     Assessment & Plan  Severe sepsis (HCC)  POA HR 98, RR24, BP 89/53, WBC 13.4  Source of infection: UTI, UA: large leukocytes, innumerable WBC/bacteria. Previous cultures showed Pseudomonas  Has bilateral nephrostomy tubes and also urinates. Urine in bilateral nephrostomy tubes are not cloudy; nephrostomy tube change 10/4/24.  COVID/flu negative. Procal 0.5 in setting of CKD. Lactic acid 2    ED course: s/p 1500mL NS bolus    Plan:  -cefepime 1000mg q12h  -100cc/hr NS maintenance  -pending BC, urine culture, AM bloodwork  -monitor vitals, input/output  UTI (urinary tract infection)  UTI, UA: large leukocytes, innumerable WBC/bacteria  Has bilateral nephrostomy tubes and also urinates. Family notes several episodes of incontinence this week, which is unusual for him. Urine in bilateral nephrostomy tubes are not cloudy  Previous cultures showed Pseudomonas    Plan:  -cefepime 1000mg q12h   -f/u urine culture  -see sepsis above  Altered mental status  Over the past week, family has noticed increased AMS. At baseline patient ambulates with cane and has decreased appetite at baseline. Pt incontinent during this time. Pt has bilateral nephrostomy tubes. At baseline able to tell family when he has to go to the bathroom. Pt is not alert nor oriented. Hx obtained from family.     Plan:  -treat underlying infxn, see sepsis above  -monitor vitals  -falls precautions  -NPO  -IV hydration  - IV thiamine  -pending TSH, B12, folate  -delirium protocol    Nephrostomy status (HCC)  Chronic, stable. Bilateral nephrostomy tubes in place. Pt's family notes good output.   Pt urinates  and nephrostomy tube. Tube exchange 10/4/24.    Plan:  -f/u urine output via both penis and nephrostomy tube  Stage 3a chronic kidney disease (HCC)  Lab Results   Component Value Date     EGFR 85 10/08/2024    EGFR 85 09/25/2024    EGFR 85 09/18/2024    CREATININE 0.88 10/08/2024    CREATININE 0.89 09/25/2024    CREATININE 0.89 09/18/2024       Chronic, stable.    Plan:  -f/u AM BMP  -renally dose medications  Type 2 diabetes mellitus, with long-term current use of insulin (HCC)    Lab Results   Component Value Date    HGBA1C 6.2 (H) 07/23/2024     Chronic, not stable recently. Home medications include metformin 1000mg BID. PCP discontinued Levemir , Glimepiride   Patient stopped taking medications. However recently his sugars have been high, and so his caretaker gave him old diabetes medications.    Plan:  -hold oral agents  -carb controlled, cardiac diet  -ISS  -pending A1c  Hypertension  Chronic, stable. Pt's home med lisinopril was discontinued by oncology due to several episodes of hypotension.     -monitor vitals  Prostate cancer metastatic to bone (HCC)  Not on medications. Was supposed to have first appointment with palliative care 10/9/24    -consider palliative inpatient consult  Electrolyte abnormality  POA Na 130, Mg 1.8    Plan:  -f/u AM electrolytes  -replete PRN    Cancer related pain  Chronic, stable. Not taking pain medications. Was supposed to have first appointment with palliative care 10/9/24    -consider palliative inpatient consult  Ambulatory dysfunction  Uses wheelchair regularly, mainly ambulates with cane    Plan:  -PT/OT eval    Code Status: Level 1 - Full Code  Admission Status: INPATIENT   Disposition: Patient requires Med Surg    Admit to team: Teaching service    History of Present Illness   Oliver Astudillo is a 73yo M with PMHx of diabetes, hypertension, hyperlipidemia, prostate cancer with metastasis to spine and bilateral nephrostomy.  Patient presented with altered mental status and increased weakness over the past week.  Per wife and daughter, patient has had increased fatigue and generalized weakness over the past week.  On Sunday, they noted significant  decrease in appetite and was refusing food.  Additionally, patient was not getting out of bed and was brought to the hospital by family.    Per wife:   -Patient's PCP had discontinued his Levemir and glimepiride as his diabetes was well-controlled.  He had only been taking metformin 1000 mg twice daily.  She notes that his glucose monitor kept alarming that his blood sugars were increasing over 300s and she had been giving him the leftover insulin that they had available from before.  -Patient's oncologist had discontinued antihypertensive medications as patient had several episodes of hypotension and the antihypertensive was no longer necessary  -Patient had been drinking ensures, however since Sunday has been refusing all food  -Patient is not was ambulating with cane prior to most recent episode  -Patient had a nephrostomy exchange on 10/4/2024    In the ED, patient met sepsis criteria with leukocytosis (WBC 13.4), tachycardia(93), tachypnea(24) and was noted to have a UA showed large leukocytes, normal WBC and bacteria.  He was treated with 1.5 L of normal saline and was given a dose of ceftriaxone.  Per most recent urine culture, ceftriaxone switched to cefepime as it grew Pseudomonas.  Will follow-up with blood cultures and urine culture.  Admitted MedSurg on teaching service.        Review of Systems   Unable to perform ROS: Mental status change     I have reviewed the patient's PMH, PSH, Social History, Family History, Meds, and Allergies  History obtained from wife and daughter    Objective :  Temp:  [97.8 °F (36.6 °C)] 97.8 °F (36.6 °C)  HR:  [79-98] 79  BP: ()/(51-75) 111/60  Resp:  [19-24] 20  SpO2:  [93 %-100 %] 98 %  O2 Device: None (Room air)    Intake & Output:  I/O         10/06 0701  10/07 0700 10/07 0701  10/08 0700 10/08 0701  10/09 0700    IV Piggyback   500    Total Intake(mL/kg)   500 (8.9)    Net   +500                 Weights:        Body mass index is 18.81 kg/m².  Weight (last 2  days)       Date/Time Weight    10/08/24 1113 56.1 (123.68)          Physical Exam  Vitals and nursing note reviewed.   Constitutional:       General: He is not in acute distress.     Appearance: He is well-developed.   HENT:      Head: Normocephalic and atraumatic.      Right Ear: External ear normal.      Left Ear: External ear normal.      Nose: Nose normal.      Mouth/Throat:      Mouth: Mucous membranes are dry.   Eyes:      Extraocular Movements: Extraocular movements intact.      Conjunctiva/sclera: Conjunctivae normal.   Cardiovascular:      Rate and Rhythm: Normal rate and regular rhythm.      Pulses: Normal pulses.      Heart sounds: Normal heart sounds. No murmur heard.  Pulmonary:      Effort: Pulmonary effort is normal. No respiratory distress.      Breath sounds: Normal breath sounds.   Abdominal:      General: Abdomen is flat. Bowel sounds are normal.      Palpations: Abdomen is soft.      Tenderness: There is no abdominal tenderness.   Genitourinary:     Comments: Nephrostomy tubes only had pale yellow urine, no cloudiness  Musculoskeletal:         General: No swelling.      Cervical back: Normal range of motion and neck supple.   Skin:     General: Skin is warm and dry.   Neurological:      Mental Status: He is alert.           Lab Results: I have reviewed the following results:  Recent Labs     10/08/24  1143 10/08/24  1211 10/08/24  1255   WBC 13.40*  --   --    HGB 11.8*  --   --    HCT 35.8*  --   --    *  --   --    SODIUM  --  130*  --    K  --  4.4  --    CL  --  99  --    CO2  --  21  --    BUN  --  16  --    CREATININE  --  0.88  --    GLUC  --  216*  --    MG  --  1.8*  --    AST  --  25  --    ALT  --  8  --    ALB  --  3.2*  --    TBILI  --  0.73  --    ALKPHOS  --  142*  --    PTT  --   --  32   INR  --   --  1.22*   LACTICACID 2.0  --   --      Micro:  Lab Results   Component Value Date    BLOODCX No Growth After 5 Days. 06/06/2024    BLOODCX No Growth After 5 Days. 06/06/2024  "   BLOODCX No Growth After 5 Days. 03/19/2024    URINECX >100,000 cfu/ml Pseudomonas aeruginosa (A) 06/06/2024    URINECX >100,000 cfu/ml Pseudomonas aeruginosa (A) 06/06/2024    URINECX 40,000-49,000 cfu/ml Pseudomonas aeruginosa (A) 05/29/2024    URINECX 10,000-19,000 cfu/ml 05/29/2024       Imaging Results Review: I reviewed radiology reports from this admission including: chest xray.  Other Study Results Review: EKG was reviewed.     Currently Ordered Meds:   Current Facility-Administered Medications:     acetaminophen (Ofirmev) injection 1,000 mg, Q8H PRN    cefepime (MAXIPIME) IVPB (premix in dextrose) 1,000 mg 50 mL, Q12H, Last Rate: 1,000 mg (10/08/24 1424)    enoxaparin (LOVENOX) subcutaneous injection 40 mg, Daily    ondansetron (ZOFRAN) injection 4 mg, Q6H PRN    sodium chloride 0.9 % infusion, Continuous, Last Rate: 100 mL/hr (10/08/24 1432)  VTE Pharmacologic Prophylaxis: Enoxaparin (Lovenox)  VTE Mechanical Prophylaxis: SCD/Foot pump ordered    Administrative Statements   I have spent a total time of >30 minutes in caring for this patient on the day of the visit/encounter including Diagnostic results, Prognosis, Risks and benefits of tx options, Instructions for management, Patient and family education, Importance of tx compliance, Risk factor reductions, Impressions, Counseling / Coordination of care, Documenting in the medical record, Reviewing / ordering tests, medicine, procedures  , Obtaining or reviewing history  , and Communicating with other healthcare professionals .  Portions of the record may have been created with voice recognition software.  Occasional wrong word or \"sound a like\" substitutions may have occurred due to the inherent limitations of voice recognition software.  Read the chart carefully and recognize, using context, where substitutions have occurred.  ==  No Feliciano MD  [facility]  Internal Medicine Residency PGY-1  "

## 2024-10-08 NOTE — ASSESSMENT & PLAN NOTE
Not on medications. Was supposed to have first appointment with palliative care 10/9/24    -consider palliative inpatient consult

## 2024-10-08 NOTE — ASSESSMENT & PLAN NOTE
UTI, UA: large leukocytes, innumerable WBC/bacteria  Has bilateral nephrostomy tubes and also urinates. Family notes several episodes of incontinence this week, which is unusual for him. Urine in bilateral nephrostomy tubes are not cloudy  Previous cultures showed Pseudomonas    Plan:  -cefepime 1000mg q12h   -f/u urine culture  -see sepsis above

## 2024-10-08 NOTE — ASSESSMENT & PLAN NOTE
POA HR 98, RR24, BP 89/53, WBC 13.4  Source of infection: UTI, UA: large leukocytes, innumerable WBC/bacteria. Previous cultures showed Pseudomonas  Has bilateral nephrostomy tubes and also urinates. Urine in bilateral nephrostomy tubes are not cloudy; nephrostomy tube change 10/4/24.  COVID/flu negative. Procal 0.5 in setting of CKD. Lactic acid 2    ED course: s/p 1500mL NS bolus    Plan:  -cefepime 1000mg q12h  -100cc/hr NS maintenance  -pending BC, urine culture, AM bloodwork  -monitor vitals, input/output

## 2024-10-08 NOTE — SEPSIS NOTE
"  Sepsis Note   Oliver Astudillo 74 y.o. male MRN: [id number]  Unit/Bed#: ED 06 Encounter: [id number]       Initial Sepsis Screening       Row Name 10/08/24 1340                Is the patient's history suggestive of a new or worsening infection? Yes (Proceed)  -RF        Suspected source of infection urinary tract infection  -RF        Indicate SIRS criteria Tachypnea > 20 resp per min;Tachycardia > 90 bpm;Leukocytosis (WBC > 19060 IJL) OR Leukopenia (WBC <4000 IJL) OR Bandemia (WBC >10% bands)  -RF        Are two or more of the above signs & symptoms of infection both present and new to the patient? Yes (Proceed)  -RF        Assess for evidence of organ dysfunction: Are any of the below criteria present within 6 hours of suspected infection and SIRS criteria that are NOT considered to be chronic conditions? --        Date of presentation of septic shock --        Time of presentation of septic shock --        Fluid Resuscitation: 30 ml/kg IV fluid bolus will be given based on actual body weight  -RF        Is the patient is persistently hypotensive in the hour after fluid bolus administration? If yes, patient meets criteria for vasopressor use. NO  -RF        Sepsis Note: Click \"NEXT\" below (NOT \"close\") to generate sepsis note based on above information. YES (proceed by clicking \"NEXT\")  -RF                  User Key  (r) = Recorded By, (t) = Taken By, (c) = Cosigned By      Initials Name Provider Type    RF No Feliciano MD Resident                        Body mass index is 18.81 kg/m².  Wt Readings from Last 1 Encounters:   10/08/24 56.1 kg (123 lb 10.9 oz)        Ideal body weight: 68.4 kg (150 lb 12.7 oz)    "

## 2024-10-08 NOTE — ED PROVIDER NOTES
Final diagnoses:   Generalized weakness   Hypotension   Ambulatory dysfunction   Hyponatremia     ED Disposition       ED Disposition   Admit    Condition   Stable    Date/Time   Tue Oct 8, 2024 11:57 AM    Comment                  Assessment & Plan       Medical Decision Making  Pt is a 75yo M who presents with generalized weakness.     Differential diagnosis to include but not limited to infection, dehydration, electrolyte abnormality, arrhythmia, sepsis.  Will plan for labs and imaging.  Will give fluids and monitor blood pressure.  See ED course for results and details.    Plan to admit pt to FP. Pt discussed with admitting team and admission orders placed. Pt admitted without incident.       Amount and/or Complexity of Data Reviewed  Labs: ordered. Decision-making details documented in ED Course.  Radiology: ordered. Decision-making details documented in ED Course.    Risk  Prescription drug management.  Decision regarding hospitalization.        ED Course as of 10/08/24 1321   Tue Oct 08, 2024   1141 Blood Pressure(!): 89/53  Will give fluids and monitor.    1200 WBC(!): 13.40  Elevated. Non-diagnostic.    1200 CBC and differential(!)  Reviewed and without actionable derangement.    1200 Procedure Note: EKG  Date/Time: 10/08/24 12:00 PM   Interpreted by: Ni Boyce MD  Indications / Diagnosis: Generalized weakness  ECG reviewed by me, the ED Physician: yes   The EKG demonstrates:  Rhythm: normal sinus  Intervals: normal intervals  Axis: RBBB  QRS/Blocks: normal QRS  ST Changes: No acute ST Changes, no STD/[place].   1215 FLU/COVID Rapid Antigen (30 min. TAT) - Preferred screening test in ED  Negative.    1226 LACTIC ACID: 2.0  WNL   1233 Blood Pressure: 124/75  Interval improvement.    1239 Sodium(!): 130  Mild hyponatremia. Fluids in process.    1244 Procalcitonin(!): 0.50  Elevated.    1244 XR chest portable  No acute consolidation.    1313 POCT INR(!): 1.22   1313 PTT: 32   1313 FP contacted for  admission.    1317 Leukocytes, UA(!): Large   1317 Nitrite, UA: Negative   1317 Blood, UA(!): Small       Medications   cefTRIAXone (ROCEPHIN) IVPB (premix in dextrose) 1,000 mg 50 mL (has no administration in time range)   sodium chloride 0.9 % bolus 1,000 mL (1,000 mL Intravenous New Bag 10/8/24 1309)   sodium chloride 0.9 % bolus 500 mL (0 mL Intravenous Stopped 10/8/24 1303)       ED Risk Strat Scores                           SBIRT 20yo+      Flowsheet Row Most Recent Value   Initial Alcohol Screen: US AUDIT-C     1. How often do you have a drink containing alcohol? 0 Filed at: 10/08/2024 1121   Audit-C Score 0 Filed at: 10/08/2024 1121   YANNI: How many times in the past year have you...    Used an illegal drug or used a prescription medication for non-medical reasons? Never Filed at: 10/08/2024 1121                            History of Present Illness       Chief Complaint   Patient presents with    Chills     Brought by family , daughter in law translates. Patient with last chemotherapy 2 weeks ago ( prostate cancer with mets to bone ). Daughter in law states allergic to chemo and will not get anymore. Not eating for 2 days. C/O chills. Bilateral nephrostomy tubes. Very weak, cannot walk at home. SBP 89 to treatment room.     Hypotension       Past Medical History:   Diagnosis Date    COVID-19 01/15/2024    Diabetes mellitus (HCC)     Elevated PSA 06/21/2023    High cholesterol     Hypertension     Prostate cancer (HCC)       Past Surgical History:   Procedure Laterality Date    IR NEPHROSTOMY TUBE CHECK/CHANGE/REPOSITION/REINSERTION/UPSIZE  9/28/2023    IR NEPHROSTOMY TUBE CHECK/CHANGE/REPOSITION/REINSERTION/UPSIZE  12/28/2023    IR NEPHROSTOMY TUBE CHECK/CHANGE/REPOSITION/REINSERTION/UPSIZE  1/31/2024    IR NEPHROSTOMY TUBE CHECK/CHANGE/REPOSITION/REINSERTION/UPSIZE  2/2/2024    IR NEPHROSTOMY TUBE CHECK/CHANGE/REPOSITION/REINSERTION/UPSIZE  3/4/2024    IR NEPHROSTOMY TUBE  CHECK/CHANGE/REPOSITION/REINSERTION/UPSIZE  3/20/2024    IR NEPHROSTOMY TUBE CHECK/CHANGE/REPOSITION/REINSERTION/UPSIZE  2024    IR NEPHROSTOMY TUBE CHECK/CHANGE/REPOSITION/REINSERTION/UPSIZE  2024    IR NEPHROSTOMY TUBE CHECK/CHANGE/REPOSITION/REINSERTION/UPSIZE  10/4/2024    IR NEPHROSTOMY TUBE PLACEMENT  2023    bilateral    IR OTHER  1/15/2024    US GUIDED PROSTATE BIOPSY        Family History   Problem Relation Age of Onset    Uterine cancer Mother     Liver cancer Father     No Known Problems Sister     No Known Problems Brother     No Known Problems Son     Diabetes type II Daughter     No Known Problems Daughter     Cancer Daughter       Social History     Tobacco Use    Smoking status: Former     Current packs/day: 0.00     Types: Cigarettes     Quit date:      Years since quittin.     Passive exposure: Past    Smokeless tobacco: Never    Tobacco comments:     States he only smoked on the weekends   Vaping Use    Vaping status: Never Used   Substance Use Topics    Alcohol use: Not Currently    Drug use: Never      E-Cigarette/Vaping    E-Cigarette Use Never User       E-Cigarette/Vaping Substances    Nicotine No     THC No     CBD No     Flavoring No     Other No     Unknown No       I have reviewed and agree with the history as documented.     Pt is a 75yo M who presents for generalized weakness.  Most of history provided by daughter-in-law.  Daughter-in-law states that for the past 2 days, patient has had generalized weakness and subsequently inability to ambulate.  Patient has also had decreased p.o. intake and has been less interactive overall.  She reports that patient has active cancer and is undergoing chemo.  Patient is between bouts of chemo currently and is supposed to start a new chemo in the coming weeks.  Patient has not had any other recent changes to medicines.  Patient has not been complaining of any pain.  Patient has not had a cough or fevers but has had chills.   Daughter-in-law also reported that patient up until a week ago would state when his bladder felt full, however for the past week has been incontinent.  Patient does also have bilateral nephrostomy tubes.    Pt is Hungarian speaking only and  used throughout encounter.         Objective       ED Triage Vitals [10/08/24 1113]   Temperature Pulse Blood Pressure Respirations SpO2 Patient Position - Orthostatic VS   97.8 °F (36.6 °C) 98 (!) 89/53 (!) 24 93 % Sitting      Temp Source Heart Rate Source BP Location FiO2 (%) Pain Score    Tympanic Monitor Left arm -- --      Vitals      Date and Time Temp Pulse SpO2 Resp BP Pain Score FACES Pain Rating User   10/08/24 1245 -- 85 99 % 21 117/66 -- -- MD   10/08/24 1215 -- 92 98 % 22 124/75 -- -- MD   10/08/24 1200 -- 93 98 % 21 95/55 -- -- MD   10/08/24 1150 -- 88 98 % 22 93/51 -- -- MD   10/08/24 1113 97.8 °F (36.6 °C) 98 93 % 24 89/53 -- --             Physical Exam  Vitals reviewed.   Constitutional:       General: He is not in acute distress.     Appearance: He is well-developed. He is ill-appearing. He is not diaphoretic.   HENT:      Head: Normocephalic and atraumatic.      Right Ear: External ear normal.      Left Ear: External ear normal.      Nose: Nose normal.      Mouth/Throat:      Mouth: Mucous membranes are dry.   Eyes:      Extraocular Movements: Extraocular movements intact.      Pupils: Pupils are equal, round, and reactive to light.   Cardiovascular:      Rate and Rhythm: Normal rate and regular rhythm.      Heart sounds: Normal heart sounds. No murmur heard.  Pulmonary:      Effort: Pulmonary effort is normal. No respiratory distress.      Breath sounds: Normal breath sounds. No wheezing.   Abdominal:      General: Bowel sounds are normal. There is no distension.      Palpations: Abdomen is soft.      Tenderness: There is no abdominal tenderness.   Musculoskeletal:         General: Normal range of motion.      Cervical back: Normal range of motion  and neck supple.      Right lower leg: No edema.      Left lower leg: No edema.   Skin:     General: Skin is warm and dry.      Capillary Refill: Capillary refill takes less than 2 seconds.      Coloration: Skin is not pale.      Findings: No erythema or rash.   Neurological:      Comments: Patient only intermittently answering questions but has appropriate responses when he does answer  Patient following simple commands and moving all extremities  Strength in all extremities 3+ out of 5   Psychiatric:         Speech: Speech normal.         Behavior: Behavior is cooperative.         Results Reviewed       Procedure Component Value Units Date/Time    Urine culture [[id number]] Collected: 10/08/24 1318    Lab Status: No result Specimen: Urine, Left Nephrostomy     UA w Reflex to Microscopic w Reflex to Culture [[id number]]  (Abnormal) Collected: 10/08/24 1308    Lab Status: Final result Specimen: Urine, Right Nephrostomy Updated: 10/08/24 1317     Color, UA Yellow     Clarity, UA Cloudy     Specific Gravity, UA 1.020     pH, UA 7.0     Leukocytes, UA Large     Nitrite, UA Negative     Protein,  (2+) mg/dl      Glucose, UA Negative mg/dl      Ketones, UA Negative mg/dl      Urobilinogen, UA >=12.0 mg/dl      Bilirubin, UA Negative     Occult Blood, UA Small    Urine Microscopic [[id number]] Collected: 10/08/24 1308    Lab Status: In process Specimen: Urine, Right Nephrostomy Updated: 10/08/24 1317    Protime-INR [[id number]]  (Abnormal) Collected: 10/08/24 1255    Lab Status: Final result Specimen: Blood from Arm, Left Updated: 10/08/24 1312     Protime 15.9 seconds      INR 1.22    Narrative:      INR Therapeutic Range    Indication                                             INR Range      Atrial Fibrillation                                               2.0-3.0  Hypercoagulable State                                    2.0.2.3  Left Ventricular Asist Device                            2.0-3.0  Mechanical Heart Valve                                   -    Aortic(with afib, MI, embolism, HF, LA enlargement,    and/or coagulopathy)                                     2.0-3.0 (2.5-3.5)     Mitral                                                             2.5-3.5  Prosthetic/Bioprosthetic Heart Valve               2.0-3.0  Venous thromboembolism (VTE: VT, PE        2.0-3.0    APTT [[id number]]  (Normal) Collected: 10/08/24 1255    Lab Status: Final result Specimen: Blood from Arm, Left Updated: 10/08/24 1312     PTT 32 seconds     Procalcitonin [[id number]]  (Abnormal) Collected: 10/08/24 1211    Lab Status: Final result Specimen: Blood from Arm, Left Updated: 10/08/24 1244     Procalcitonin 0.50 ng/ml     Comprehensive metabolic panel [[id number]]  (Abnormal) Collected: 10/08/24 1211    Lab Status: Final result Specimen: Blood from Hand, Left Updated: 10/08/24 1235     Sodium 130 mmol/L      Potassium 4.4 mmol/L      Chloride 99 mmol/L      CO2 21 mmol/L      ANION GAP 10 mmol/L      BUN 16 mg/dL      Creatinine 0.88 mg/dL      Glucose 216 mg/dL      Calcium 8.3 mg/dL      Corrected Calcium 8.9 mg/dL      AST 25 U/L      ALT 8 U/L      Alkaline Phosphatase 142 U/L      Total Protein 6.8 g/dL      Albumin 3.2 g/dL      Total Bilirubin 0.73 mg/dL      eGFR 85 ml/min/1.73sq m     Narrative:      National Kid[employer] Disease Foundation guidelines for Chronic Kidney Disease (CKD):     Stage 1 with normal or high GFR (GFR > 90 mL/min/1.73 square meters)    Stage 2 Mild CKD (GFR = 60-89 mL/min/1.73 square meters)    Stage 3A Moderate CKD (GFR = 45-59 mL/min/1.73 square meters)    Stage 3B Moderate CKD (GFR = 30-44 mL/min/1.73 square meters)    Stage 4 Severe CKD (GFR = 15-29 mL/min/1.73 square meters)    Stage 5 End Stage CKD (GFR <15 mL/min/1.73 square meters)  Note: GFR calculation is accurate only with a steady state creatinine    Magnesium [[id number]]  (Abnormal) Collected: 10/08/24 1211    Lab Status: Final result Specimen: Blood from Hand,  Left Updated: 10/08/24 1235     Magnesium 1.8 mg/dL     Lactic acid [[id number]]  (Normal) Collected: 10/08/24 1143    Lab Status: Final result Specimen: Blood from Hand, Left Updated: 10/08/24 1225     LACTIC ACID 2.0 mmol/L     Narrative:      Result may be elevated if tourniquet was used during collection.    FLU/COVID Rapid Antigen (30 min. TAT) - Preferred screening test in ED [[id number]]  (Normal) Collected: 10/08/24 1143    Lab Status: Final result Specimen: Nares from Nose Updated: 10/08/24 1214     SARS COV Rapid Antigen Negative     Influenza A Rapid Antigen Negative     Influenza B Rapid Antigen Negative    Narrative:      This test has been performed using the [employer] Bozena 2 FLU+SARS Antigen test under the Emergency Use Authorization (EUA). This test has been validated by the  and verified by the performing laboratory. The Bozena uses lateral flow immunofluorescent sandwich assay to detect SARS-COV, Influenza A and Influenza B Antigen.     The [employer] Bozena 2 SARS Antigen test does not differentiate between SARS-CoV and SARS-CoV-2.     Negative results are presumptive and may be confirmed with a molecular assay, if necessary, for patient management. Negative results do not rule out SARS-CoV-2 or influenza infection and should not be used as the sole basis for treatment or patient management decisions. A negative test result may occur if the level of antigen in a sample is below the limit of detection of this test.     Positive results are indicative of the presence of viral antigens, but do not rule out bacterial infection or co-infection with other viruses.     All test results should be used as an adjunct to clinical observations and other information available to the provider.    FOR PEDIATRIC PATIENTS - copy/paste COVID Guidelines URL to browser: [email or web address]    CBC and differential [[id number]]  (Abnormal) Collected: 10/08/24 1143     Lab Status: Final result Specimen: Blood from Hand, Left Updated: 10/08/24 1158     WBC 13.40 Thousand/uL      RBC 3.93 Million/uL      Hemoglobin 11.8 g/dL      Hematocrit 35.8 %      MCV 91 fL      MCH 30.0 pg      MCHC 33.0 g/dL      RDW 13.8 %      MPV 9.6 fL      Platelets 410 Thousands/uL      nRBC 0 /100 WBCs      Segmented % 87 %      Immature Grans % 1 %      Lymphocytes % 5 %      Monocytes % 7 %      Eosinophils Relative 0 %      Basophils Relative 0 %      Absolute Neutrophils 11.81 Thousands/µL      Absolute Immature Grans 0.10 Thousand/uL      Absolute Lymphocytes 0.61 Thousands/µL      Absolute Monocytes 0.87 Thousand/µL      Eosinophils Absolute 0.00 Thousand/µL      Basophils Absolute 0.01 Thousands/µL     Blood culture #1 [[id number]] Collected: 10/08/24 1143    Lab Status: In process Specimen: Blood from Arm, Left Updated: 10/08/24 1154    Blood culture #2 [[id number]] Collected: 10/08/24 1143    Lab Status: In process Specimen: Blood from Hand, Left Updated: 10/08/24 1154            XR chest portable    (Results Pending)       CriticalCare Time    Date/Time: 10/8/2024 11:20 AM    Performed by: Ni Boyce MD  Authorized by: Ni Boyce MD    Critical care provider statement:     Critical care time (minutes):  31    Critical care time was exclusive of:  Separately billable procedures and treating other patients and teaching time    Critical care was necessary to treat or prevent imminent or life-threatening deterioration of the following conditions:  Shock and sepsis    Critical care was time spent personally by me on the following activities:  Obtaining history from patient or surrogate, development of treatment plan with patient or surrogate, evaluation of patient's response to treatment, examination of patient, ordering and performing treatments and interventions, ordering and review of laboratory studies, ordering and review of radiographic studies, re-evaluation of patient's  condition and review of old charts      ED Medication and Procedure Management   Prior to Admission Medications   Prescriptions Last Dose Informant Patient Reported? Taking?   Alcohol Swabs 70 % PADS  Self, Spouse/Significant Other, Child No No   Sig: To clean the skin prior to injecting insulin   Blood Glucose Monitoring Suppl (OneTouch Verio Reflect) w/Device KIT  Self, Spouse/Significant Other, Child No No   Sig: Check blood sugars once daily. Please substitute with appropriate alternative as covered by patient's insurance. Dx: E11.65   Diclofenac Sodium (VOLTAREN) 1 %  Self, Spouse/Significant Other, Child No No   Sig: Apply 2 g topically 4 (four) times a day   Easy Touch Pen Needles 31G X 6 MM MISC  Self, Spouse/Significant Other, Child No No   Sig: Use daily at bedtime   Levemir FlexPen 100 units/mL injection pen  Self, Spouse/Significant Other, Child No No   Sig: Inject 20 Units under the skin daily at bedtime   Multiple Vitamin (multivitamin) tablet   No No   Sig: Take 1 tablet by mouth daily   Multiple Vitamins-Minerals (Sentry) TABS  Self, Spouse/Significant Other, Child Yes No   Sig: Take 1 tablet by mouth daily   Patient not taking: Reported on 9/5/2024   OneTouch Delica Lancets 33G MISC  Self, Spouse/Significant Other, Child No No   Sig: Check blood sugars once daily. Please substitute with appropriate alternative as covered by patient's insurance. Dx: E11.65   Spacer/Aero-Holding Chambers (AEROCHAMBER MINI CHAMBER) BILLY  Self, Spouse/Significant Other, Child No No   Sig: by Does not apply route see administration instructions   acetaminophen (TYLENOL) 500 mg tablet   No No   Sig: Take 2 tablets (1,000 mg total) by mouth 3 (three) times a day   Patient not taking: Reported on 8/16/2024   albuterol (Ventolin HFA) 90 mcg/act inhaler  Self, Spouse/Significant Other, Child No No   Sig: Inhale 2 puffs every 6 (six) hours as needed for wheezing   Patient not taking: Reported on 9/5/2024   dexamethasone  (DECADRON) 0.5 mg tablet   No No   Sig: TAKE 1 TABLET (0.5 MG TOTAL) BY MOUTH DAILY WITH BREAKFAST   Patient not taking: Reported on 9/5/2024   gabapentin (Neurontin) 300 mg capsule   No No   Sig: Take 1 capsule (300 mg total) by mouth 3 (three) times a day   glimepiride (AMARYL) 2 mg tablet  Self, Spouse/Significant Other, Child No No   Sig: Take 1 tablet (2 mg total) by mouth daily with dinner   glimepiride (AMARYL) 4 mg tablet  Self, Spouse/Significant Other, Child No No   Sig: Take 1 tablet (4 mg total) by mouth daily with breakfast   glucose 4-6 GM-MG   No No   Sig: Chew 2 tablets daily as needed for low blood sugar   glucose blood (OneTouch Verio) test strip  Self, Spouse/Significant Other, Child No No   Sig: Check blood sugars twice a daily. Please substitute with appropriate alternative as covered by patient's insurance. Dx: E11.65   lisinopril (ZESTRIL) 5 mg tablet   No No   Sig: Take 1 tablet (5 mg total) by mouth daily   metFORMIN (GLUCOPHAGE) 1000 MG tablet  Self, Spouse/Significant Other, Child No No   Sig: Take 1 tablet (1,000 mg total) by mouth 2 (two) times a day with meals   naloxone (NARCAN) 4 mg/0.1 mL nasal spray  Self, Spouse/Significant Other, Child No No   Sig: Administer 1 spray into a nostril. If no response after 2-3 minutes, give another dose in the other nostril using a new spray.   Patient not taking: Reported on 9/5/2024   omega-3-acid ethyl esters (LOVAZA) 1 g capsule  Self, Spouse/Significant Other, Child Yes No   ondansetron (Zofran ODT) 8 mg disintegrating tablet  Self, Spouse/Significant Other, Child No No   Sig: Take 1 tablet (8 mg total) by mouth every 8 (eight) hours as needed for nausea or vomiting   oxyCODONE (ROXICODONE) 10 MG TABS   No No   Sig: Take 1 tablet (10 mg total) by mouth every 4 (four) hours as needed for moderate pain Max Daily Amount: 60 mg   Patient not taking: Reported on 8/13/2024   pantoprazole (PROTONIX) 40 mg tablet   No No   Sig: TAKE 1 TABLET BY MOUTH  EVERY DAY   polyethylene glycol (MIRALAX) 17 g packet  Self, Spouse/Significant Other, Child No No   Sig: Take 17 g by mouth daily as needed (for constipation)   rosuvastatin (CRESTOR) 10 MG tablet  Self, Spouse/Significant Other, Child No No   Sig: Take 1 tablet (10 mg total) by mouth daily at bedtime   Patient not taking: Reported on 9/5/2024   senna-docusate sodium (SENOKOT S) 8.6-50 mg per tablet  Self, Spouse/Significant Other, Child No No   Sig: Take 1 tablet by mouth daily   Patient not taking: Reported on 6/28/2024   sodium chloride, PF, 0.9 %  Self, Spouse/Significant Other, Child No No   Sig: 10 mL by Intracatheter route daily Intracatheter flushing daily. May substitute prefilled syringe with normal saline 10 mL vials, 10 mL syringes, and 18 g blunt needles      Facility-Administered Medications: None     Patient's Medications   Discharge Prescriptions    No medications on file     No discharge procedures on file.  ED SEPSIS DOCUMENTATION   Time reflects when diagnosis was documented in both MDM as applicable and the Disposition within this note       Time User Action Codes Description Comment    10/8/2024 11:57 AM Ni Boyce [R53.1] Generalized weakness     10/8/2024  1:09 PM Ni Boyce [I95.9] Hypotension     10/8/2024  1:09 PM Ni Boyce [R26.2] Ambulatory dysfunction     10/8/2024  1:09 PM Ni Boyce [E87.1] Hyponatremia                  Ni Boyce MD  10/08/24 1321

## 2024-10-08 NOTE — ASSESSMENT & PLAN NOTE
Lab Results   Component Value Date    HGBA1C 6.2 (H) 07/23/2024     Chronic, not stable recently. Home medications include metformin 1000mg BID. PCP discontinued Levemir , Glimepiride   Patient stopped taking medications. However recently his sugars have been high, and so his caretaker gave him old diabetes medications.    Plan:  -hold oral agents  -carb controlled, cardiac diet  -ISS  -pending A1c

## 2024-10-08 NOTE — ASSESSMENT & PLAN NOTE
Over the past week, family has noticed increased AMS. At baseline patient ambulates with cane and has decreased appetite at baseline. Pt incontinent during this time. Pt has bilateral nephrostomy tubes. At baseline able to tell family when he has to go to the bathroom. Pt is not alert nor oriented. Hx obtained from family.     Plan:  -treat underlying infxn, see sepsis above  -monitor vitals  -falls precautions  -NPO  -IV hydration  - IV thiamine  -pending TSH, B12, folate  -delirium protocol

## 2024-10-08 NOTE — ASSESSMENT & PLAN NOTE
Chronic, stable. Pt's home med lisinopril was discontinued by oncology due to several episodes of hypotension.     -monitor vitals

## 2024-10-08 NOTE — ASSESSMENT & PLAN NOTE
Lab Results   Component Value Date    EGFR 85 10/08/2024    EGFR 85 09/25/2024    EGFR 85 09/18/2024    CREATININE 0.88 10/08/2024    CREATININE 0.89 09/25/2024    CREATININE 0.89 09/18/2024       Chronic, stable.    Plan:  -f/u AM BMP  -renally dose medications

## 2024-10-08 NOTE — ASSESSMENT & PLAN NOTE
Chronic, stable. Not taking pain medications. Was supposed to have first appointment with palliative care 10/9/24    -consider palliative inpatient consult

## 2024-10-08 NOTE — ASSESSMENT & PLAN NOTE
Chronic, stable. Bilateral nephrostomy tubes in place. Pt's family notes good output.   Pt urinates  and nephrostomy tube. Tube exchange 10/4/24.    Plan:  -f/u urine output via both penis and nephrostomy tube

## 2024-10-09 PROBLEM — R41.82 ALTERED MENTAL STATUS: Status: RESOLVED | Noted: 2024-10-08 | Resolved: 2024-10-09

## 2024-10-09 PROBLEM — E53.8 VITAMIN B12 DEFICIENCY: Status: ACTIVE | Noted: 2024-10-09

## 2024-10-09 LAB
ANION GAP SERPL CALCULATED.3IONS-SCNC: 7 MMOL/L (ref 4–13)
BASOPHILS # BLD AUTO: 0.01 THOUSANDS/ΜL (ref 0–0.1)
BASOPHILS NFR BLD AUTO: 0 % (ref 0–1)
BUN SERPL-MCNC: 12 MG/DL (ref 5–25)
CA-I BLD-SCNC: 1.06 MMOL/L (ref 1.12–1.32)
CALCIUM SERPL-MCNC: 8.1 MG/DL (ref 8.4–10.2)
CHLORIDE SERPL-SCNC: 107 MMOL/L (ref 96–108)
CO2 SERPL-SCNC: 20 MMOL/L (ref 21–32)
CREAT SERPL-MCNC: 0.65 MG/DL (ref 0.6–1.3)
EOSINOPHIL # BLD AUTO: 0.02 THOUSAND/ΜL (ref 0–0.61)
EOSINOPHIL NFR BLD AUTO: 0 % (ref 0–6)
ERYTHROCYTE [DISTWIDTH] IN BLOOD BY AUTOMATED COUNT: 14.1 % (ref 11.6–15.1)
GFR SERPL CREATININE-BSD FRML MDRD: 97 ML/MIN/1.73SQ M
GLUCOSE SERPL-MCNC: 143 MG/DL (ref 65–140)
GLUCOSE SERPL-MCNC: 148 MG/DL (ref 65–140)
GLUCOSE SERPL-MCNC: 190 MG/DL (ref 65–140)
GLUCOSE SERPL-MCNC: 239 MG/DL (ref 65–140)
GLUCOSE SERPL-MCNC: 241 MG/DL (ref 65–140)
HCT VFR BLD AUTO: 33.5 % (ref 36.5–49.3)
HGB BLD-MCNC: 10.6 G/DL (ref 12–17)
IMM GRANULOCYTES # BLD AUTO: 0.05 THOUSAND/UL (ref 0–0.2)
IMM GRANULOCYTES NFR BLD AUTO: 1 % (ref 0–2)
LYMPHOCYTES # BLD AUTO: 0.69 THOUSANDS/ΜL (ref 0.6–4.47)
LYMPHOCYTES NFR BLD AUTO: 7 % (ref 14–44)
MAGNESIUM SERPL-MCNC: 2.4 MG/DL (ref 1.9–2.7)
MCH RBC QN AUTO: 30.1 PG (ref 26.8–34.3)
MCHC RBC AUTO-ENTMCNC: 31.6 G/DL (ref 31.4–37.4)
MCV RBC AUTO: 95 FL (ref 82–98)
MONOCYTES # BLD AUTO: 0.72 THOUSAND/ΜL (ref 0.17–1.22)
MONOCYTES NFR BLD AUTO: 7 % (ref 4–12)
NEUTROPHILS # BLD AUTO: 8.25 THOUSANDS/ΜL (ref 1.85–7.62)
NEUTS SEG NFR BLD AUTO: 85 % (ref 43–75)
NRBC BLD AUTO-RTO: 0 /100 WBCS
PLATELET # BLD AUTO: 348 THOUSANDS/UL (ref 149–390)
PMV BLD AUTO: 9.5 FL (ref 8.9–12.7)
POTASSIUM SERPL-SCNC: 3.8 MMOL/L (ref 3.5–5.3)
PROCALCITONIN SERPL-MCNC: 0.58 NG/ML
RBC # BLD AUTO: 3.52 MILLION/UL (ref 3.88–5.62)
SODIUM SERPL-SCNC: 134 MMOL/L (ref 135–147)
WBC # BLD AUTO: 9.74 THOUSAND/UL (ref 4.31–10.16)

## 2024-10-09 PROCEDURE — 82330 ASSAY OF CALCIUM: CPT

## 2024-10-09 PROCEDURE — 82948 REAGENT STRIP/BLOOD GLUCOSE: CPT

## 2024-10-09 PROCEDURE — 80048 BASIC METABOLIC PNL TOTAL CA: CPT

## 2024-10-09 PROCEDURE — 85025 COMPLETE CBC W/AUTO DIFF WBC: CPT

## 2024-10-09 PROCEDURE — 83735 ASSAY OF MAGNESIUM: CPT

## 2024-10-09 PROCEDURE — 84145 PROCALCITONIN (PCT): CPT

## 2024-10-09 PROCEDURE — 99233 SBSQ HOSP IP/OBS HIGH 50: CPT | Performed by: INTERNAL MEDICINE

## 2024-10-09 RX ORDER — CYANOCOBALAMIN 1000 UG/ML
1000 INJECTION, SOLUTION INTRAMUSCULAR; SUBCUTANEOUS ONCE
Status: COMPLETED | OUTPATIENT
Start: 2024-10-09 | End: 2024-10-09

## 2024-10-09 RX ORDER — ACETAMINOPHEN 325 MG/1
975 TABLET ORAL EVERY 6 HOURS PRN
Status: DISCONTINUED | OUTPATIENT
Start: 2024-10-09 | End: 2024-10-11 | Stop reason: HOSPADM

## 2024-10-09 RX ORDER — POTASSIUM CHLORIDE 1500 MG/1
20 TABLET, EXTENDED RELEASE ORAL ONCE
Status: COMPLETED | OUTPATIENT
Start: 2024-10-09 | End: 2024-10-09

## 2024-10-09 RX ORDER — OXYCODONE HYDROCHLORIDE 5 MG/1
5 TABLET ORAL EVERY 6 HOURS PRN
Status: DISCONTINUED | OUTPATIENT
Start: 2024-10-09 | End: 2024-10-11 | Stop reason: HOSPADM

## 2024-10-09 RX ORDER — CALCIUM GLUCONATE 20 MG/ML
2 INJECTION, SOLUTION INTRAVENOUS ONCE
Status: COMPLETED | OUTPATIENT
Start: 2024-10-09 | End: 2024-10-09

## 2024-10-09 RX ADMIN — SODIUM CHLORIDE 100 ML/HR: 0.9 INJECTION, SOLUTION INTRAVENOUS at 16:20

## 2024-10-09 RX ADMIN — CYANOCOBALAMIN 1000 MCG: 1000 INJECTION, SOLUTION INTRAMUSCULAR at 07:56

## 2024-10-09 RX ADMIN — Medication 400 MG: at 09:07

## 2024-10-09 RX ADMIN — CEFEPIME HYDROCHLORIDE 1000 MG: 1 INJECTION, SOLUTION INTRAVENOUS at 02:00

## 2024-10-09 RX ADMIN — Medication 2.5 MG: at 21:10

## 2024-10-09 RX ADMIN — CALCIUM GLUCONATE 2 G: 20 INJECTION, SOLUTION INTRAVENOUS at 11:10

## 2024-10-09 RX ADMIN — SODIUM CHLORIDE 100 ML/HR: 0.9 INJECTION, SOLUTION INTRAVENOUS at 03:26

## 2024-10-09 RX ADMIN — INSULIN LISPRO 2 UNITS: 100 INJECTION, SOLUTION INTRAVENOUS; SUBCUTANEOUS at 11:52

## 2024-10-09 RX ADMIN — POTASSIUM CHLORIDE 20 MEQ: 1500 TABLET, EXTENDED RELEASE ORAL at 09:07

## 2024-10-09 RX ADMIN — Medication 400 MG: at 18:18

## 2024-10-09 RX ADMIN — CEFEPIME HYDROCHLORIDE 1000 MG: 1 INJECTION, SOLUTION INTRAVENOUS at 13:57

## 2024-10-09 RX ADMIN — ENOXAPARIN SODIUM 40 MG: 40 INJECTION SUBCUTANEOUS at 09:14

## 2024-10-09 RX ADMIN — INSULIN LISPRO 1 UNITS: 100 INJECTION, SOLUTION INTRAVENOUS; SUBCUTANEOUS at 22:33

## 2024-10-09 RX ADMIN — OXYCODONE HYDROCHLORIDE 5 MG: 5 TABLET ORAL at 07:45

## 2024-10-09 RX ADMIN — THIAMINE HYDROCHLORIDE 100 MG: 100 INJECTION, SOLUTION INTRAMUSCULAR; INTRAVENOUS at 09:17

## 2024-10-09 RX ADMIN — INSULIN LISPRO 2 UNITS: 100 INJECTION, SOLUTION INTRAVENOUS; SUBCUTANEOUS at 16:11

## 2024-10-09 NOTE — ASSESSMENT & PLAN NOTE
Chronic, stable. Not taking pain medications. Was supposed to have first appointment with palliative care 10/9/24    -consider palliative inpatient consult  -Tylenol 975 every 8 hours for mild pain  -Meghan 2.5 mg every 6 hours for moderate pain  -Meghan 5 mg every 6 hours for severe pain

## 2024-10-09 NOTE — PLAN OF CARE
Problem: GENITOURINARY - ADULT  Goal: Maintains or returns to baseline urinary function  Description: INTERVENTIONS:  - Assess urinary function  - Encourage oral fluids to ensure adequate hydration if ordered  - Administer IV fluids as ordered to ensure adequate hydration  - Administer ordered medications as needed  - Offer frequent toileting  - Follow urinary retention protocol if ordered  Outcome: Progressing     Problem: PAIN - ADULT  Goal: Verbalizes/displays adequate comfort level or baseline comfort level  Description: Interventions:  - Encourage patient to monitor pain and request assistance  - Assess pain using appropriate pain scale  - Administer analgesics based on type and severity of pain and evaluate response  - Implement non-pharmacological measures as appropriate and evaluate response  - Consider cultural and social influences on pain and pain management  - Notify physician/advanced practitioner if interventions unsuccessful or patient reports new pain  Outcome: Progressing     Problem: INFECTION - ADULT  Goal: Absence or prevention of progression during hospitalization  Description: INTERVENTIONS:  - Assess and monitor for signs and symptoms of infection  - Monitor lab/diagnostic results  - Monitor all insertion sites, i.e. indwelling lines, tubes, and drains  - Monitor endotracheal if appropriate and nasal secretions for changes in amount and color  - [place] appropriate cooling/warming therapies per order  - Administer medications as ordered  - Instruct and encourage patient and family to use good hand hygiene technique  - Identify and instruct in appropriate isolation precautions for identified infection/condition  Outcome: Progressing  Goal: Absence of fever/infection during neutropenic period  Description: INTERVENTIONS:  - Monitor WBC    Outcome: Progressing     Problem: SAFETY ADULT  Goal: Patient will remain free of falls  Description: INTERVENTIONS:  - Educate patient/family on patient  safety including physical limitations  - Instruct patient to call for assistance with activity   - Consult OT/PT to assist with strengthening/mobility   - Keep Call bell within reach  - Keep bed low and locked with side rails adjusted as appropriate  - Keep care items and personal belongings within reach  - Initiate and maintain comfort rounds  - Make Fall Risk Sign visible to staff  - Offer Toileting every 2 Hours, in advance of need  - Initiate/Maintain bed alarm  - Obtain necessary fall risk management equipment: yellow nonskid socks, yellow bracelet  - Apply yellow socks and bracelet for high fall risk patients  - Consider moving patient to room near nurses station  Outcome: Progressing  Goal: Maintain or return to baseline ADL function  Description: INTERVENTIONS:  -  Assess patient's ability to carry out ADLs; assess patient's baseline for ADL function and identify physical deficits which impact ability to perform ADLs (bathing, care of mouth/teeth, toileting, grooming, dressing, etc.)  - Assess/evaluate cause of self-care deficits   - Assess range of motion  - Assess patient's mobility; develop plan if impaired  - Assess patient's need for assistive devices and provide as appropriate  - Encourage maximum independence but intervene and supervise when necessary  - Involve family in performance of ADLs  - Assess for home care needs following discharge   - Consider OT consult to assist with ADL evaluation and planning for discharge  - Provide patient education as appropriate  Outcome: Progressing     Problem: Prexisting or High Potential for Compromised Skin Integrity  Goal: Skin integrity is maintained or improved  Description: INTERVENTIONS:  - Identify patients at risk for skin breakdown  - Assess and monitor skin integrity  - Assess and monitor nutrition and hydration status  - Monitor labs   - Assess for incontinence   - Turn and reposition patient  - Assist with mobility/ambulation  - Relieve pressure over  bony prominences  - Avoid friction and shearing  - Provide appropriate hygiene as needed including keeping skin clean and dry  - Evaluate need for skin moisturizer/barrier cream  - Collaborate with interdisciplinary team   - Patient/family teaching  - Consider wound care consult   Outcome: Progressing

## 2024-10-09 NOTE — ASSESSMENT & PLAN NOTE
Over the past week, family has noticed increased AMS. At baseline patient ambulates with cane and has decreased appetite at baseline. Pt incontinent during this time. Pt has bilateral nephrostomy tubes. At baseline able to tell family when he has to go to the bathroom. Pt is not alert nor oriented. Hx obtained from family.     Plan:  -treat underlying infxn, see sepsis above  -monitor vitals  -falls precautions  -IV hydration  - IV thiamine  -delirium protocol

## 2024-10-09 NOTE — ASSESSMENT & PLAN NOTE
Lab Results   Component Value Date    HGBA1C 5.6 10/08/2024     Chronic, not stable recently. Home medications include metformin 1000mg BID. PCP discontinued Levemir , Glimepiride   Patient stopped taking medications. However recently his sugars have been high, and so his caretaker gave him old diabetes medications.    Plan:  -hold oral agents  -carb controlled, cardiac diet  -ISS

## 2024-10-09 NOTE — PLAN OF CARE
Problem: GENITOURINARY - ADULT  Goal: Maintains or returns to baseline urinary function  Description: INTERVENTIONS:  - Assess urinary function  - Encourage oral fluids to ensure adequate hydration if ordered  - Administer IV fluids as ordered to ensure adequate hydration  - Administer ordered medications as needed  - Offer frequent toileting  - Follow urinary retention protocol if ordered  Outcome: Progressing     Problem: PAIN - ADULT  Goal: Verbalizes/displays adequate comfort level or baseline comfort level  Description: Interventions:  - Encourage patient to monitor pain and request assistance  - Assess pain using appropriate pain scale  - Administer analgesics based on type and severity of pain and evaluate response  - Implement non-pharmacological measures as appropriate and evaluate response  - Consider cultural and social influences on pain and pain management  - Notify physician/advanced practitioner if interventions unsuccessful or patient reports new pain  Outcome: Progressing     Problem: INFECTION - ADULT  Goal: Absence or prevention of progression during hospitalization  Description: INTERVENTIONS:  - Assess and monitor for signs and symptoms of infection  - Monitor lab/diagnostic results  - Monitor all insertion sites, i.e. indwelling lines, tubes, and drains  - Monitor endotracheal if appropriate and nasal secretions for changes in amount and color  - [place] appropriate cooling/warming therapies per order  - Administer medications as ordered  - Instruct and encourage patient and family to use good hand hygiene technique  - Identify and instruct in appropriate isolation precautions for identified infection/condition  Outcome: Progressing     Problem: INFECTION - ADULT  Goal: Absence of fever/infection during neutropenic period  Description: INTERVENTIONS:  - Monitor WBC    Outcome: Progressing     Problem: SAFETY ADULT  Goal: Patient will remain free of falls  Description: INTERVENTIONS:  -  Educate patient/family on patient safety including physical limitations  - Instruct patient to call for assistance with activity   - Consult OT/PT to assist with strengthening/mobility   - Keep Call bell within reach  - Keep bed low and locked with side rails adjusted as appropriate  - Keep care items and personal belongings within reach  - Initiate and maintain comfort rounds  - Make Fall Risk Sign visible to staff  - Offer Toileting every 2 Hours, in advance of need  - Initiate/Maintain bed/chair alarm  - Obtain necessary fall risk management equipment: fall risk sign  - Apply yellow socks and bracelet for high fall risk patients  - Consider moving patient to room near nurses station  Outcome: Progressing     Problem: SAFETY ADULT  Goal: Maintain or return to baseline ADL function  Description: INTERVENTIONS:  -  Assess patient's ability to carry out ADLs; assess patient's baseline for ADL function and identify physical deficits which impact ability to perform ADLs (bathing, care of mouth/teeth, toileting, grooming, dressing, etc.)  - Assess/evaluate cause of self-care deficits   - Assess range of motion  - Assess patient's mobility; develop plan if impaired  - Assess patient's need for assistive devices and provide as appropriate  - Encourage maximum independence but intervene and supervise when necessary  - Involve family in performance of ADLs  - Assess for home care needs following discharge   - Consider OT consult to assist with ADL evaluation and planning for discharge  - Provide patient education as appropriate  Outcome: Progressing

## 2024-10-09 NOTE — PROGRESS NOTES
Progress Note - Family Medicine   Name: Oliver Astudillo 74 y.o. male I MRN: [id number]  Unit/Bed#: [address] [place] [address] I Date of Admission: 10/8/2024   Date of Service: 10/9/2024 I Hospital Day: 1     Assessment & Plan  Severe sepsis (HCC)  POA HR 98, RR24, BP 89/53, WBC 13.4  Source of infection: UTI, UA: large leukocytes, innumerable WBC/bacteria. Previous cultures showed Pseudomonas  Has bilateral nephrostomy tubes and also urinates. Urine in bilateral nephrostomy tubes are not cloudy; nephrostomy tube change 10/4/24.  COVID/flu negative. Procal 0.5 in setting of CKD. Lactic acid 2    ED course: s/p 1500mL NS bolus    Plan:  -cefepime 1000mg q12h  -100cc/hr NS maintenance  -pending BC, urine culture, AM bloodwork  -monitor vitals, input/output  UTI (urinary tract infection)  UTI, UA: large leukocytes, innumerable WBC/bacteria  Has bilateral nephrostomy tubes and also urinates. Family notes several episodes of incontinence this week, which is unusual for him. Urine in bilateral nephrostomy tubes are not cloudy  Previous cultures showed Pseudomonas    Plan:  -cefepime 1000mg q12h   -f/u urine culture  -see sepsis above  Prostate cancer metastatic to bone (HCC)  Not on medications. Was supposed to have first appointment with palliative care 10/9/24  Scheduled for PET/CT on 10/31/24  Had severe infusion reaction after Taxotere    -consider palliative inpatient consult  -Tylenol 975 every 8 hours for mild pain  -Meghan 2.5 mg every 6 hours for moderate pain  -Meghan 5 mg every 6 hours for severe pain  Type 2 diabetes mellitus, with long-term current use of insulin ([facility])    Lab Results   Component Value Date    HGBA1C 5.6 10/08/2024     Chronic, not stable recently. Home medications include metformin 1000mg BID. PCP discontinued Levemir , Glimepiride   Patient stopped taking medications. However recently his sugars have been high, and so his caretaker gave him old diabetes medications.    Plan:  -hold oral agents  -carb  controlled, cardiac diet  -ISS  Nephrostomy status (HCC)  Chronic, stable. Bilateral nephrostomy tubes in place. Pt's family notes good output.   Pt urinates  and nephrostomy tube. Tube exchange 10/4/24.    Plan:  -f/u urine output via both penis and nephrostomy tube  Stage 3a chronic kidney disease (HCC)  Lab Results   Component Value Date    EGFR 85 10/08/2024    EGFR 85 09/25/2024    EGFR 85 09/18/2024    CREATININE 0.88 10/08/2024    CREATININE 0.89 09/25/2024    CREATININE 0.89 09/18/2024       Chronic, stable.    Plan:  -f/u AM BMP  -renally dose medications  Hypertension  Chronic, stable. Pt's home med lisinopril was discontinued by oncology due to several episodes of hypotension.     -monitor vitals  Vitamin B12 deficiency  Vitamin B12 noted to be 212 on admission.    Plan  -Vitamin B12 supplementation  Electrolyte abnormality  POA Na 130, Mg 1.8    Plan:  -f/u AM electrolytes  -replete PRN    Cancer related pain  Chronic, stable. Not taking pain medications. Was supposed to have first appointment with palliative care 10/9/24    -consider palliative inpatient consult  -Tylenol 975 every 8 hours for mild pain  -Meghan 2.5 mg every 6 hours for moderate pain  -Meghan 5 mg every 6 hours for severe pain  Ambulatory dysfunction  Uses wheelchair regularly, mainly ambulates with cane    Plan:  -PT/OT yuliya  Altered mental status (Resolved: 10/9/2024)  Over the past week, family has noticed increased AMS. At baseline patient ambulates with cane and has decreased appetite at baseline. Pt incontinent during this time. Pt has bilateral nephrostomy tubes. At baseline able to tell family when he has to go to the bathroom. Pt is not alert nor oriented. Hx obtained from family.     Plan:  -treat underlying infxn, see sepsis above  -monitor vitals  -falls precautions  -IV hydration  - IV thiamine  -delirium protocol      VTE Pharmacologic Prophylaxis: VTE Score: 8 High Risk (Score >/= 5) - Pharmacological DVT Prophylaxis Ordered:  enoxaparin (Lovenox). Sequential Compression Devices Ordered.    Mobility:   Basic Mobility Inpatient Raw Score: 10  JH-HLM Goal: 4: Move to chair/commode  JH-HLM Achieved: 2: Bed activities/Dependent transfer  JH-HLM Goal achieved. Continue to encourage appropriate mobility.    Patient Centered Rounds: I performed bedside rounds with nursing staff today.   Discussions with Specialists or Other Care Team Provider: PT, OT, nursing    Education and Discussions with Family / Patient: Updated  (wife) via phone.    Current Length of Stay: 1 day(s)  Current Patient Status: Inpatient   Certification Statement: The patient will continue to require additional inpatient hospital stay due to IV antibiotics  Discharge Plan: Anticipate discharge in 24-48 hrs to home.    Code Status: Level 1 - Full Code    Subjective   74-year-old male resting comfortably in bed.  Today he appears more alert and oriented.  States that he is starting to feel better than he was prior to admission.  Notes that he has right shoulder pain and that the Tylenol did not help.  Denies any headache, nausea, vomiting, diarrhea    Objective :  Temp:  [96.6 °F (35.9 °C)-97.8 °F (36.6 °C)] 97.7 °F (36.5 °C)  HR:  [70-98] 89  BP: ()/(51-75) 100/55  Resp:  [16-24] 18  SpO2:  [93 %-100 %] 97 %  O2 Device: None (Room air)    Body mass index is 21.59 kg/m².     Input and Output Summary (last 24 hours):     Intake/Output Summary (Last 24 hours) at 10/9/2024 0650  Last data filed at 10/9/2024 0326  Gross per 24 hour   Intake 2940 ml   Output 250 ml   Net 2690 ml       Physical Exam  Vitals and nursing note reviewed.   Constitutional:       General: He is not in acute distress.     Appearance: He is well-developed. He is cachectic.   HENT:      Head: Normocephalic and atraumatic.      Right Ear: External ear normal.      Left Ear: External ear normal.      Nose: Nose normal.      Mouth/Throat:      Mouth: Mucous membranes are dry.   Eyes:       Extraocular Movements: Extraocular movements intact.      Conjunctiva/sclera: Conjunctivae normal.   Cardiovascular:      Rate and Rhythm: Normal rate and regular rhythm.      Pulses: Normal pulses.      Heart sounds: No murmur heard.     Friction rub present.   Pulmonary:      Effort: Pulmonary effort is normal. No respiratory distress.      Breath sounds: Normal breath sounds.   Abdominal:      General: Abdomen is flat. Bowel sounds are normal. There is no distension.      Palpations: Abdomen is soft. There is no mass.      Tenderness: There is no abdominal tenderness.      Hernia: No hernia is present.   Musculoskeletal:         General: No swelling.      Cervical back: Normal range of motion and neck supple.   Skin:     General: Skin is warm and dry.      Capillary Refill: Capillary refill takes less than 2 seconds.   Neurological:      Mental Status: He is alert and oriented to person, place, and time. Mental status is at baseline.   Psychiatric:         Mood and Affect: Mood normal.         Behavior: Behavior normal. Behavior is cooperative.         Thought Content: Thought content normal.         Judgment: Judgment normal.           Lines/Drains:  Lines/Drains/Airways       Active Status       Name Placement date Placement time Site Days    Nephrostomy Left 10.2 Fr. 10/04/24  1155  Left  4    Nephrostomy Right 10.2 Fr. 10/04/24  1202  Right  4    External Urinary Catheter 10/09/24  0308  -- less than 1                            Lab Results: I have reviewed the following results:   Results from last 7 days   Lab Units 10/08/24  1143   WBC Thousand/uL 13.40*   HEMOGLOBIN g/dL 11.8*   HEMATOCRIT % 35.8*   PLATELETS Thousands/uL 410*   SEGS PCT % 87*   LYMPHO PCT % 5*   MONO PCT % 7   EOS PCT % 0     Results from last 7 days   Lab Units 10/08/24  1211   SODIUM mmol/L 130*   POTASSIUM mmol/L 4.4   CHLORIDE mmol/L 99   CO2 mmol/L 21   BUN mg/dL 16   CREATININE mg/dL 0.88   ANION GAP mmol/L 10   CALCIUM mg/dL 8.3*    ALBUMIN g/dL 3.2*   TOTAL BILIRUBIN mg/dL 0.73   ALK PHOS U/L 142*   ALT U/L 8   AST U/L 25   GLUCOSE RANDOM mg/dL 216*     Results from last 7 days   Lab Units 10/08/24  1255   INR  1.22*     Results from last 7 days   Lab Units 10/08/24  2047 10/08/24  1607   POC GLUCOSE mg/dl 147* 127     Results from last 7 days   Lab Units 10/08/24  1143   HEMOGLOBIN A1C % 5.6     Results from last 7 days   Lab Units 10/08/24  1211 10/08/24  1143   LACTIC ACID mmol/L  --  2.0   PROCALCITONIN ng/ml 0.50*  --        Recent Cultures (last 7 days):         Imaging Results Review: I reviewed radiology reports from this admission including: chest xray.  Other Study Results Review: EKG was reviewed.     Last 24 Hours Medication List:     Current Facility-Administered Medications:     acetaminophen (TYLENOL) tablet 975 mg, Q6H PRN    cefepime (MAXIPIME) IVPB (premix in dextrose) 1,000 mg 50 mL, Q12H, Last Rate: 1,000 mg (10/09/24 0200)    cyanocobalamin injection 1,000 mcg, Once    enoxaparin (LOVENOX) subcutaneous injection 40 mg, Daily    insulin lispro (HumALOG/ADMELOG) 100 units/mL subcutaneous injection 1-5 Units, 4x Daily (AC & HS) **AND** Fingerstick Glucose (POCT), 4x Daily AC and at bedtime    magnesium Oxide (MAG-OX) tablet 400 mg, BID    ondansetron (ZOFRAN) injection 4 mg, Q6H PRN    oxyCODONE (ROXICODONE) IR tablet 5 mg, Q6H PRN    oxyCODONE (ROXICODONE) split tablet 2.5 mg, Q6H PRN    sodium chloride 0.9 % infusion, Continuous, Last Rate: 100 mL/hr (10/09/24 0326)    thiamine (VITAMIN B1) 100 mg in sodium chloride 0.9 % 50 mL IVPB, Daily, Last Rate: 100 mg (10/08/24 1647)    Administrative Statements   Today, Patient Was Seen By: Meenu Esposito MD  I have spent a total time of >30 minutes in caring for this patient on the day of the visit/encounter including Diagnostic results, Prognosis, Risks and benefits of tx options, Instructions for management, Patient and family education, Importance of tx compliance, Risk  factor reductions, Impressions, Counseling / Coordination of care, Documenting in the medical record, Reviewing / ordering tests, medicine, procedures  , Obtaining or reviewing history  , and Communicating with other healthcare professionals .    **Please Note: This note may have been constructed using a voice recognition system.**

## 2024-10-09 NOTE — UTILIZATION REVIEW
Initial Clinical Review    Admission: Date/Time/Statement:   Admission Orders (From admission, onward)       Ordered        10/08/24 1309  INPATIENT ADMISSION  Once                          Orders Placed This Encounter   Procedures    INPATIENT ADMISSION     Standing Status:   Standing     Number of Occurrences:   1     Order Specific Question:   Level of Care     Answer:   Med Surg [16]     Order Specific Question:   Estimated length of stay     Answer:   More than 2 Midnights     Order Specific Question:   Certification     Answer:   I certify that inpatient services are medically necessary for this patient for a duration of greater than two midnights. See H&P and MD Progress Notes for additional information about the patient's course of treatment.     ED Arrival Information       Expected   -    Arrival   10/8/2024 10:46    Acuity   Emergent              Means of arrival   Wheelchair    Escorted by   Family Member    Service   Hospitalist    Admission type   Emergency              Arrival complaint   chills, not eating             Chief Complaint   Patient presents with    Chills     Brought by family , daughter in law translates. Patient with last chemotherapy 2 weeks ago ( prostate cancer with mets to bone ). Daughter in law states allergic to chemo and will not get anymore. Not eating for 2 days. C/O chills. Bilateral nephrostomy tubes. Very weak, cannot walk at home. SBP 89 to treatment room.     Hypotension       Initial Presentation: 74 y.o. male presents to ed from home on 10-8  for evaluation and treatment of chills. Patient has been receiving chemo for prostate cancer with mets to bone. Final treatment 2 weeks ago.  Bilateral tubes in place.  Daughter states patient is not eating adequately for 2 days. SBP 89.  PMHX: DM, HTN,PSEUDOMONAS UTI.  Clinical assessment significant for altered mental status- not alert nor oriented   PROCAL 0.50, UA large leukocytes, , MAG 1.8, WBC 13.40.  Initially  treated with iv .9% ns 500 ml bolus, iv ceftriaxone x1. Admit to inpatient med surg for sepsis, UTI ( bilateral nephrostomy tubes)  continue iv fluids and iv cefepime, iv thiamine, follow up cultures.      Date: 10-9-24    Day 2: inpatient med surg   Certification Statement: The patient will continue to require additional inpatient hospital stay due to IV antibiotics  Discharge Plan: Anticipate discharge in 24-48 hrs to home.  Afebrile, VSS.  Continue iv thiamine, iv cefepime and iv fluids. Final cultures pending.     Date: 10-10-24   Day 3: Has surpassed a 2nd midnight with active treatments and services.Severe sepsis secondary to suspected UTI-evidenced by tachycardia, tachypnea with SBP of 89 and leukocytosis. UA was positive for leukocyte esterase and innumerable white cells. Prior urine cultures grew Pseudomonas.   Urine cultures now: > 100,000 E. Coli, > 100,000 Klebsiella pneumonia, > 100,000 Enterococcus faecalis. Sensitivities pending.   Patient currently on iv  cefepime. Add nitrofurantoin for enterococcal coverage pending final cultures.  Current cancer pain managed with scheduled with prn tylenol, roxicodone.  Goals of care expressed by patient to continue treatment and rehab.  Iv thiamine discontinued.  Continue iv fluids 50/hr. Trend BMP and Hb.       ED Treatment-Medication Administration from 10/08/2024 1046 to 10/08/2024 1535         Date/Time Order Dose Route Action     10/08/2024 1203 sodium chloride 0.9 % bolus 500 mL 500 mL Intravenous New Bag     10/08/2024 1323 cefTRIAXone (ROCEPHIN) IVPB (premix in dextrose) 1,000 mg 50 mL 1,000 mg Intravenous New Bag     10/08/2024 1309 sodium chloride 0.9 % bolus 1,000 mL 1,000 mL Intravenous New Bag     10/08/2024 1429 enoxaparin (LOVENOX) subcutaneous injection 40 mg 40 mg Subcutaneous Given     10/08/2024 1424 cefepime (MAXIPIME) IVPB (premix in dextrose) 1,000 mg 50 mL 1,000 mg Intravenous New Bag     10/08/2024 1432 sodium chloride 0.9 % infusion 100  mL/hr Intravenous New Bag         Scheduled Medications:    cefepime, 1,000 mg, Intravenous, Q12H  enoxaparin, 40 mg, Subcutaneous, Daily  insulin lispro, 1-5 Units, Subcutaneous, 4x Daily (AC & HS)  magnesium Oxide, 400 mg, Oral, BID  thiamine, 100 mg, Intravenous, Daily      Continuous IV Infusions:  sodium chloride, 100 mL/hr, Intravenous, Continuous      PRN Meds:  acetaminophen, 975 mg, Oral, Q6H PRN  ondansetron, 4 mg, Intravenous, Q6H PRN  oxyCODONE, 5 mg, Oral, Q6H PRN  oxyCODONE, 2.5 mg, Oral, Q6H PRN      ED Triage Vitals   Temperature Pulse Respirations Blood Pressure SpO2 Pain Score   10/08/24 1113 10/08/24 1113 10/08/24 1113 10/08/24 1113 10/08/24 1113 10/08/24 1415   97.8 °F (36.6 °C) 98 (!) 24 (!) 89/53 93 % No Pain     Weight (last 2 days)       Date/Time Weight    10/08/24 1600 64.4 (142)    10/08/24 1113 56.1 (123.68)            Vital Signs (last 3 days)       Date/Time Temp Pulse Resp BP MAP (mmHg) SpO2 O2 Device Pain    10/09/24 0745 98.1 °F (36.7 °C) 88 18 125/58 84 -- -- 7    10/08/24 2227 97.7 °F (36.5 °C) 89 18 100/55 73 97 % None (Room air) --    10/08/24 2157 -- -- -- -- -- -- -- 3    10/08/24 1904 97.5 °F (36.4 °C) 93 18 118/63 84 97 % None (Room air) --    10/08/24 1900 -- -- -- -- -- -- -- No Pain    10/08/24 1603 -- 77 18 -- -- 99 % None (Room air) --    10/08/24 1600 96.6 °F (35.9 °C) 78 18 136/63 91 99 % None (Room air) --    10/08/24 1500 -- 70 16 128/66 91 99 % None (Room air) --    10/08/24 1430 -- 79 20 111/60 80 98 % -- --    10/08/24 1415 -- 88 19 111/56 81 96 % None (Room air) No Pain    10/08/24 1400 -- 87 23 127/70 93 99 % None (Room air) --    10/08/24 1345 -- 80 20 107/59 75 99 % None (Room air) --    10/08/24 1330 -- 84 21 108/67 82 98 % -- --    10/08/24 1315 -- 80 20 114/63 82 100 % None (Room air) --    10/08/24 1245 -- 85 21 117/66 87 99 % None (Room air) --    10/08/24 1215 -- 92 22 124/75 80 98 % None (Room air) --    10/08/24 1200 -- 93 21 95/55 70 98 % None (Room  air) --    10/08/24 1150 -- 88 22 93/51 -- 98 % None (Room air) --    10/08/24 1113 97.8 °F (36.6 °C) 98 24 89/53 -- 93 % None (Room air) --              Pertinent Labs/Diagnostic Test Results:   Radiology:  XR chest portable   Final  (10/08 1526)      No acute cardiopulmonary disease.        Cardiology:  No orders to display     GI:  No orders to display           Results from last 7 days   Lab Units 10/09/24  0752 10/08/24  1143   WBC Thousand/uL 9.74 13.40*   HEMOGLOBIN g/dL 10.6* 11.8*   HEMATOCRIT % 33.5* 35.8*   PLATELETS Thousands/uL 348 410*   TOTAL NEUT ABS Thousands/µL 8.25* 11.81*         Results from last 7 days   Lab Units 10/09/24  0752 10/08/24  1211   SODIUM mmol/L 134* 130*   POTASSIUM mmol/L 3.8 4.4   CHLORIDE mmol/L 107 99   CO2 mmol/L 20* 21   ANION GAP mmol/L 7 10   BUN mg/dL 12 16   CREATININE mg/dL 0.65 0.88   EGFR ml/min/1.73sq m 97 85   CALCIUM mg/dL 8.1* 8.3*   CALCIUM, IONIZED mmol/L 1.06*  --    MAGNESIUM mg/dL 2.4 1.8*     Results from last 7 days   Lab Units 10/08/24  1211   AST U/L 25   ALT U/L 8   ALK PHOS U/L 142*   TOTAL PROTEIN g/dL 6.8   ALBUMIN g/dL 3.2*   TOTAL BILIRUBIN mg/dL 0.73     Results from last 7 days   Lab Units 10/09/24  1103 10/09/24  0729 10/08/24  2047 10/08/24  1607   POC GLUCOSE mg/dl 241* 148* 147* 127     Results from last 7 days   Lab Units 10/09/24  0752 10/08/24  1211   GLUCOSE RANDOM mg/dL 143* 216*         Results from last 7 days   Lab Units 10/08/24  1143   HEMOGLOBIN A1C % 5.6   EAG mg/dl 114       Results from last 7 days   Lab Units 10/08/24  1255   PROTIME seconds 15.9*   INR  1.22*   PTT seconds 32     Results from last 7 days   Lab Units 10/08/24  1646   TSH 3RD GENERATON uIU/mL 1.470     Results from last 7 days   Lab Units 10/09/24  0752 10/08/24  1211   PROCALCITONIN ng/ml 0.58* 0.50*     Results from last 7 days   Lab Units 10/08/24  1143   LACTIC ACID mmol/L 2.0     Results from last 7 days   Lab Units 10/08/24  1308   CLARITY UA  Cloudy    COLOR UA  Yellow   SPEC GRAV UA  1.020   PH UA  7.0   GLUCOSE UA mg/dl Negative   KETONES UA mg/dl Negative   BLOOD UA  Small*   PROTEIN UA mg/dl 100 (2+)*   NITRITE UA  Negative   BILIRUBIN UA  Negative   UROBILINOGEN UA (BE) mg/dl >=12.0*   LEUKOCYTES UA  Large*   WBC UA /hpf Innumerable*   RBC UA /hpf 0-5   BACTERIA UA /hpf Innumerable*   EPITHELIAL CELLS WET PREP /hpf None Seen   MUCUS THREADS  Innumerable*       Results from last 7 days   Lab Units 10/08/24  1318   URINE CULTURE  >100,000 cfu/ml Gram Negative Gary*         Past Medical History:   Diagnosis Date    Altered mental status 10/08/2024    COVID-19 01/15/2024    Diabetes mellitus (HCC)     Elevated PSA 06/21/2023    High cholesterol     Hypertension     Prostate cancer (HCC)      Present on Admission:   Hypertension   Prostate cancer metastatic to bone (HCC)   Stage 3a chronic kidney disease (HCC)   Cancer related pain   Ambulatory dysfunction   Electrolyte abnormality   UTI (urinary tract infection)      Admitting Diagnosis:     Hyponatremia [E87.1]  Hypotension [I95.9]  Generalized weakness [R53.1]  Flu-like symptoms [R68.89]  Ambulatory dysfunction [R26.2]    Age/Sex: 74 y.o. male    Network Utilization Review Department  ATTENTION: Please call with any questions or concerns to [phone] and carefully listen to the prompts so that you are directed to the right person. All voicemails are confidential.   For Discharge needs, contact Care Management DC Support Team at [phone] opt. 2  Send all requests for admission clinical reviews, approved or denied determinations and any other requests to dedicated fax number below belonging to the campus where the patient is receiving treatment. List of dedicated fax numbers for the Facilities:  FACILITY NAME UR FAX NUMBER   ADMISSION DENIALS (Administrative/Medical Necessity) [phone]   DISCHARGE SUPPORT TEAM (NETWORK) [phone]   PARENT CHILD HEALTH (Maternity/NICU/Pediatrics) [phone]   [facility]  [facility] [phone]   [facility] [phone]   [facility] [phone]   [facility] [phone]   [facility] [phone]   [facility] [phone]   [facility] [phone]   [facility] [phone]   [facility] [phone]   [facility] [phone]   [facility] [phone]   [facility] [phone]

## 2024-10-09 NOTE — ASSESSMENT & PLAN NOTE
Not on medications. Was supposed to have first appointment with palliative care 10/9/24  Scheduled for PET/CT on 10/31/24  Had severe infusion reaction after Taxotere    -consider palliative inpatient consult  -Tylenol 975 every 8 hours for mild pain  -Meghan 2.5 mg every 6 hours for moderate pain  -Meghan 5 mg every 6 hours for severe pain

## 2024-10-10 LAB
ANION GAP SERPL CALCULATED.3IONS-SCNC: 7 MMOL/L (ref 4–13)
BASOPHILS # BLD AUTO: 0.02 THOUSANDS/ΜL (ref 0–0.1)
BASOPHILS NFR BLD AUTO: 0 % (ref 0–1)
BUN SERPL-MCNC: 8 MG/DL (ref 5–25)
CA-I BLD-SCNC: 1.13 MMOL/L (ref 1.12–1.32)
CALCIUM SERPL-MCNC: 7.9 MG/DL (ref 8.4–10.2)
CHLORIDE SERPL-SCNC: 102 MMOL/L (ref 96–108)
CO2 SERPL-SCNC: 21 MMOL/L (ref 21–32)
CREAT SERPL-MCNC: 0.58 MG/DL (ref 0.6–1.3)
EOSINOPHIL # BLD AUTO: 0.01 THOUSAND/ΜL (ref 0–0.61)
EOSINOPHIL NFR BLD AUTO: 0 % (ref 0–6)
ERYTHROCYTE [DISTWIDTH] IN BLOOD BY AUTOMATED COUNT: 13.8 % (ref 11.6–15.1)
GFR SERPL CREATININE-BSD FRML MDRD: 101 ML/MIN/1.73SQ M
GLUCOSE SERPL-MCNC: 140 MG/DL (ref 65–140)
GLUCOSE SERPL-MCNC: 161 MG/DL (ref 65–140)
GLUCOSE SERPL-MCNC: 161 MG/DL (ref 65–140)
GLUCOSE SERPL-MCNC: 215 MG/DL (ref 65–140)
GLUCOSE SERPL-MCNC: 221 MG/DL (ref 65–140)
HCT VFR BLD AUTO: 27.7 % (ref 36.5–49.3)
HGB BLD-MCNC: 9.2 G/DL (ref 12–17)
IMM GRANULOCYTES # BLD AUTO: 0.02 THOUSAND/UL (ref 0–0.2)
IMM GRANULOCYTES NFR BLD AUTO: 0 % (ref 0–2)
LYMPHOCYTES # BLD AUTO: 0.73 THOUSANDS/ΜL (ref 0.6–4.47)
LYMPHOCYTES NFR BLD AUTO: 11 % (ref 14–44)
MAGNESIUM SERPL-MCNC: 1.8 MG/DL (ref 1.9–2.7)
MCH RBC QN AUTO: 30 PG (ref 26.8–34.3)
MCHC RBC AUTO-ENTMCNC: 33.2 G/DL (ref 31.4–37.4)
MCV RBC AUTO: 90 FL (ref 82–98)
MONOCYTES # BLD AUTO: 0.45 THOUSAND/ΜL (ref 0.17–1.22)
MONOCYTES NFR BLD AUTO: 7 % (ref 4–12)
NEUTROPHILS # BLD AUTO: 5.73 THOUSANDS/ΜL (ref 1.85–7.62)
NEUTS SEG NFR BLD AUTO: 82 % (ref 43–75)
NRBC BLD AUTO-RTO: 0 /100 WBCS
PLATELET # BLD AUTO: 351 THOUSANDS/UL (ref 149–390)
PMV BLD AUTO: 9.7 FL (ref 8.9–12.7)
POTASSIUM SERPL-SCNC: 3.8 MMOL/L (ref 3.5–5.3)
RBC # BLD AUTO: 3.07 MILLION/UL (ref 3.88–5.62)
SODIUM SERPL-SCNC: 130 MMOL/L (ref 135–147)
WBC # BLD AUTO: 6.96 THOUSAND/UL (ref 4.31–10.16)

## 2024-10-10 PROCEDURE — 82330 ASSAY OF CALCIUM: CPT

## 2024-10-10 PROCEDURE — 97110 THERAPEUTIC EXERCISES: CPT

## 2024-10-10 PROCEDURE — 97167 OT EVAL HIGH COMPLEX 60 MIN: CPT

## 2024-10-10 PROCEDURE — 83735 ASSAY OF MAGNESIUM: CPT

## 2024-10-10 PROCEDURE — 97163 PT EVAL HIGH COMPLEX 45 MIN: CPT

## 2024-10-10 PROCEDURE — 80048 BASIC METABOLIC PNL TOTAL CA: CPT

## 2024-10-10 PROCEDURE — 99232 SBSQ HOSP IP/OBS MODERATE 35: CPT | Performed by: INTERNAL MEDICINE

## 2024-10-10 PROCEDURE — 85025 COMPLETE CBC W/AUTO DIFF WBC: CPT

## 2024-10-10 PROCEDURE — 82948 REAGENT STRIP/BLOOD GLUCOSE: CPT

## 2024-10-10 RX ORDER — NITROFURANTOIN 25; 75 MG/1; MG/1
100 CAPSULE ORAL 2 TIMES DAILY WITH MEALS
Status: DISCONTINUED | OUTPATIENT
Start: 2024-10-10 | End: 2024-10-11 | Stop reason: HOSPADM

## 2024-10-10 RX ORDER — POTASSIUM CHLORIDE 1500 MG/1
20 TABLET, EXTENDED RELEASE ORAL ONCE
Status: COMPLETED | OUTPATIENT
Start: 2024-10-10 | End: 2024-10-10

## 2024-10-10 RX ORDER — CYANOCOBALAMIN 1000 UG/ML
1000 INJECTION, SOLUTION INTRAMUSCULAR; SUBCUTANEOUS ONCE
Status: COMPLETED | OUTPATIENT
Start: 2024-10-10 | End: 2024-10-10

## 2024-10-10 RX ORDER — SODIUM CHLORIDE 1 G/1
1 TABLET ORAL 2 TIMES DAILY WITH MEALS
Status: DISCONTINUED | OUTPATIENT
Start: 2024-10-10 | End: 2024-10-11 | Stop reason: HOSPADM

## 2024-10-10 RX ADMIN — SODIUM CHLORIDE 1 G: 1 TABLET ORAL at 16:00

## 2024-10-10 RX ADMIN — INSULIN LISPRO 1 UNITS: 100 INJECTION, SOLUTION INTRAVENOUS; SUBCUTANEOUS at 12:43

## 2024-10-10 RX ADMIN — SODIUM CHLORIDE 50 ML/HR: 0.9 INJECTION, SOLUTION INTRAVENOUS at 19:16

## 2024-10-10 RX ADMIN — CEFEPIME HYDROCHLORIDE 1000 MG: 1 INJECTION, SOLUTION INTRAVENOUS at 14:08

## 2024-10-10 RX ADMIN — NITROFURANTOIN (MONOHYDRATE/MACROCRYSTALS) 100 MG: 75; 25 CAPSULE ORAL at 16:00

## 2024-10-10 RX ADMIN — B-COMPLEX W/ C & FOLIC ACID TAB 1 TABLET: TAB at 16:00

## 2024-10-10 RX ADMIN — Medication 400 MG: at 09:19

## 2024-10-10 RX ADMIN — CEFEPIME HYDROCHLORIDE 1000 MG: 1 INJECTION, SOLUTION INTRAVENOUS at 01:59

## 2024-10-10 RX ADMIN — INSULIN LISPRO 1 UNITS: 100 INJECTION, SOLUTION INTRAVENOUS; SUBCUTANEOUS at 16:04

## 2024-10-10 RX ADMIN — POTASSIUM CHLORIDE 20 MEQ: 1500 TABLET, EXTENDED RELEASE ORAL at 09:19

## 2024-10-10 RX ADMIN — SODIUM CHLORIDE 1 G: 1 TABLET ORAL at 09:19

## 2024-10-10 RX ADMIN — ENOXAPARIN SODIUM 40 MG: 40 INJECTION SUBCUTANEOUS at 09:19

## 2024-10-10 RX ADMIN — OXYCODONE HYDROCHLORIDE 5 MG: 5 TABLET ORAL at 11:13

## 2024-10-10 RX ADMIN — Medication 400 MG: at 17:00

## 2024-10-10 RX ADMIN — INSULIN LISPRO 1 UNITS: 100 INJECTION, SOLUTION INTRAVENOUS; SUBCUTANEOUS at 21:08

## 2024-10-10 RX ADMIN — CYANOCOBALAMIN 1000 MCG: 1000 INJECTION, SOLUTION INTRAMUSCULAR; SUBCUTANEOUS at 09:19

## 2024-10-10 RX ADMIN — THIAMINE HYDROCHLORIDE 100 MG: 100 INJECTION, SOLUTION INTRAMUSCULAR; INTRAVENOUS at 09:19

## 2024-10-10 RX ADMIN — SODIUM CHLORIDE 100 ML/HR: 0.9 INJECTION, SOLUTION INTRAVENOUS at 02:54

## 2024-10-10 NOTE — ASSESSMENT & PLAN NOTE
Lab Results   Component Value Date    HGBA1C 5.6 10/08/2024     Chronic, not stable recently. Home medications include metformin 1000mg BID. PCP discontinued Levemir , Glimepiride   Patient stopped taking medications. However recently his sugars have been high, and so his caretaker gave him old diabetes medications.    Plan:  -hold oral agents  -carb controlled, cardiac diet  -ISS   Test was performed.  :1940  AGE:80 year old  Ordering provider: Dr. Santoyo  Diagnosis: Preop examination

## 2024-10-10 NOTE — PLAN OF CARE
Problem: GENITOURINARY - ADULT  Goal: Maintains or returns to baseline urinary function  Description: INTERVENTIONS:  - Assess urinary function  - Encourage oral fluids to ensure adequate hydration if ordered  - Administer IV fluids as ordered to ensure adequate hydration  - Administer ordered medications as needed  - Offer frequent toileting  - Follow urinary retention protocol if ordered  Outcome: Progressing     Problem: PAIN - ADULT  Goal: Verbalizes/displays adequate comfort level or baseline comfort level  Description: Interventions:  - Encourage patient to monitor pain and request assistance  - Assess pain using appropriate pain scale  - Administer analgesics based on type and severity of pain and evaluate response  - Implement non-pharmacological measures as appropriate and evaluate response  - Consider cultural and social influences on pain and pain management  - Notify physician/advanced practitioner if interventions unsuccessful or patient reports new pain  Outcome: Progressing     Problem: INFECTION - ADULT  Goal: Absence or prevention of progression during hospitalization  Description: INTERVENTIONS:  - Assess and monitor for signs and symptoms of infection  - Monitor lab/diagnostic results  - Monitor all insertion sites, i.e. indwelling lines, tubes, and drains  - Monitor endotracheal if appropriate and nasal secretions for changes in amount and color  - Tyler appropriate cooling/warming therapies per order  - Administer medications as ordered  - Instruct and encourage patient and family to use good hand hygiene technique  - Identify and instruct in appropriate isolation precautions for identified infection/condition  Outcome: Progressing        Problem: Prexisting or High Potential for Compromised Skin Integrity  Goal: Skin integrity is maintained or improved  Description: INTERVENTIONS:  - Identify patients at risk for skin breakdown  - Assess and monitor skin integrity  - Assess and monitor  nutrition and hydration status  - Monitor labs   - Assess for incontinence   - Turn and reposition patient  - Assist with mobility/ambulation  - Relieve pressure over bony prominences  - Avoid friction and shearing  - Provide appropriate hygiene as needed including keeping skin clean and dry  - Evaluate need for skin moisturizer/barrier cream  - Collaborate with interdisciplinary team   - Patient/family teaching  - Consider wound care consult   Outcome: Progressing

## 2024-10-10 NOTE — PHYSICAL THERAPY NOTE
PHYSICAL THERAPY EVALUATION/TREATMENT     10/10/24 1125   PT Last Visit   PT Visit Date 10/10/24   Note Type   Note type Evaluation   Pain Assessment   Pain Assessment Tool 0-10   Pain Score 6  (Right shoulder and left hip/lower extremity)   Restrictions/Precautions   Other Precautions Chair Alarm;Bed Alarm;Fall Risk;Pain   Home Living   Type of Home House   Home Layout Two level;Able to live on main level with bedroom/bathroom;Ramped entrance   Bathroom Equipment Shower chair   Home Equipment Walker;Quad cane   Additional Comments patient ambulating with walker most recently but quad cane just before that. Transitioned to walker recently with declined strength as per wife   Prior Function   Level of [place] Needs assistance with ADLs;Needs assistance with functional mobility;Needs assistance with IADLS   Lives With Spouse   Receives Help From Family  (Spouse is a primary caregiver, son assists at times)   IADLs Family/Friend/Other provides transportation;Family/Friend/Other provides meals;Family/Friend/Other provides medication management   Comments Wife states that she is assisting with dressing bathing and toileting all prior to admission   General   Additional Pertinent History Chart reviewed, patient admitted with severe sepsis.  Patient's with extended history of prostate cancer with mets.  Nephrostomy tubes in place recently changed on 10/4/2024.  Patient now presents as generally weak and deconditioned   Family/Caregiver Present Yes  (Spouse present)   Cognition   Overall Cognitive Status WFL   Arousal/Participation Cooperative   Attention Attends with cues to redirect   Orientation Level Oriented to person;Oriented to place;Oriented to situation   Following Commands Follows one step commands with increased time or repetition   Subjective   Subjective Patient states having right shoulder and left lower extremity pain , nurse aware and pain medication given   RLE Assessment   RLE Assessment    (Range of motion within functional limits, strength 3+/5)   LLE Assessment   LLE Assessment   (Range of motion within functional limits, strength 3+/5)   Coordination   Movements are Fluid and Coordinated 0   Coordination and Movement Description Decreased coordination with transfers, gait and balance activity   Bed Mobility   Supine to Sit 3  Moderate assistance   Additional items Assist x 1;HOB elevated;Bedrails   Sit to Supine 3  Moderate assistance   Additional items Assist x 1;HOB elevated;Verbal cues;LE management   Transfers   Sit to Stand 3  Moderate assistance   Additional items Assist x 1   Stand to Sit 3  Moderate assistance   Additional items Assist x 1   Ambulation/Elevation   Gait Assistance 3  Moderate assist   Additional items Assist x 1;Verbal cues;Tactile cues   Assistive Device Rolling walker   Distance 10 feet with change in direction, forward flexed posturing and head down although able to correct with cueing.  Shortened stride length and increased time required for completion of task   Balance   Static Sitting Fair   Dynamic Sitting Fair -   Static Standing Fair -   Dynamic Standing Poor +   Ambulatory Poor +   Activity Tolerance   Activity Tolerance Patient limited by fatigue;Treatment limited secondary to medical complications (Comment)   Nurse Made Aware Yes   Assessment   Prognosis Good   Problem List Decreased strength;Decreased range of motion;Decreased endurance;Impaired balance;Decreased mobility;Decreased coordination;Pain   Assessment Patient seen for Physical Therapy evaluation. Patient admitted with Severe sepsis (HCC).  Comorbidities affecting patient's physical performance include: .  Personal factors affecting patient at time of initial evaluation include: lives in two story house, ambulating with assistive device, inability to ambulate household distances, inability to navigate community distances, inability to navigate level surfaces without external assistance, inability to  perform dynamic tasks in community, limited home support, inability to perform physical activity, inability to perform ADLS, and inability to perform IADLS . Prior to admission, patient was requiring assist for functional mobility with walker, requiring assist for ADLS, requiring assist for IADLS, living in a multi-level home, ambulating household distance, and home with family assist.  Please find objective findings from Physical Therapy assessment regarding body systems outlined above with impairments and limitations including weakness, decreased ROM, impaired balance, decreased endurance, impaired coordination, gait deviations, pain, decreased activity tolerance, decreased functional mobility tolerance, fall risk, and SOB upon exertion.  The Barthel Index was used as a functional outcome tool presenting with a score of Barthel Index Score: 35 today indicating marked limitations of functional mobility and ADLS.  Patient's clinical presentation is currently unstable/unpredictable as seen in patient's presentation of vital sign response, changing level of pain, increased fall risk, new onset of impairment of functional mobility, decreased endurance, and new onset of weakness. Pt would benefit from continued Physical Therapy treatment to address deficits as defined above and maximize level of functional mobility. As demonstrated by objective findings, the assigned level of complexity for this evaluation is high.The patient's -[facility] Basic Mobility Inpatient Short Form Raw Score is 11. A Raw score of less than or equal to 16 suggests the patient may benefit from discharge to post-acute rehabilitation services. Please also refer to the recommendation of the Physical Therapist for safe discharge planning.   Goals   Patient Goals To return home   STG Expiration Date 10/17/24   Short Term Goal #1 Transfers and gait with roller walker with min assist   Short Term Goal #2 Gait endurance to 40 feet   LTG Expiration Date  10/24/24   Long Term Goal #1 Strength bilateral lower extremities 4 -/5   Long Term Goal #2 Transfers and gait with roller walker with supervision for functional household distances   Plan   Treatment/Interventions ADL retraining;Functional transfer training;LE strengthening/ROM;Therapeutic exercise;Endurance training;Patient/family training;Equipment eval/education;Bed mobility;Gait training;Compensatory technique education   PT Frequency Other (Comment)  (5 times per week)   Discharge Recommendation   Rehab Resource Intensity Level, PT II (Moderate Resource Intensity)   AM-PAC Basic Mobility Inpatient   Turning in Flat Bed Without Bedrails 2   Lying on Back to Sitting on Edge of Flat Bed Without Bedrails 2   Moving Bed to Chair 2   Standing Up From Chair Using Arms 2   Walk in Room 2   Climb 3-5 Stairs With Railing 1   Basic Mobility Inpatient Raw Score 11   Basic Mobility Standardized Score 30.25   [facility] Highest Level Of Mobility   [facility] Goal 4: Move to chair/commode   -[facility] Achieved 6: Walk 10 steps or more   Barthel Index   Feeding 10   Bathing 0   Grooming Score 0   Dressing Score 5   Bladder Score 0   Bowels Score 10   Toilet Use Score 5   Transfers (Bed/Chair) Score 5   Mobility (Level Surface) Score 0   Stairs Score 0   Barthel Index Score 35   Additional Treatment Session   Start Time 1135   End Time 1150   Treatment Assessment s: Patient states generalized pain all over O: Bilateral lower extremity exercise completed as listed below A: Patient requiring assist for all transfers bed mobility and gait activity for very limited distances and will benefit from continued physical therapy with progression as tolerated and increasing functional mobility with clinical staff as well   Exercises   Heelslides Supine;5 reps;Bilateral   Hip Flexion Sitting;10 reps;Bilateral  (Alternating)   Hip Abduction Sitting;10 reps;Bilateral  (Alternating)   Knee AROM Short Arc Quad Supine;5 reps;Bilateral   Knee AROM  Long Arc Quad Sitting;10 reps;Bilateral  (Alternating)   Ankle Pumps Sitting;10 reps;Bilateral   Licensure   [place] License Number  Ely Edwards PT [id number]

## 2024-10-10 NOTE — ASSESSMENT & PLAN NOTE
POA HR 98, RR24, BP 89/53, WBC 13.4  Source of infection: UTI, UA: large leukocytes, innumerable WBC/bacteria. Previous cultures showed Pseudomonas  Has bilateral nephrostomy tubes and also urinates. Urine in bilateral nephrostomy tubes are not cloudy; nephrostomy tube change 10/4/24.  COVID/flu negative. Procal 0.5 in setting of CKD. Lactic acid 2    ED course: s/p 1500mL NS bolus    Plan:  -cefepime 1000mg q12h  -100cc/hr NS maintenance  -pending BC  -monitor vitals, input/output

## 2024-10-10 NOTE — OCCUPATIONAL THERAPY NOTE
OT EVALUATION       10/10/24 1145   OT Last Visit   OT Visit Date 10/10/24   Note Type   Note type Evaluation   Pain Assessment   Pain Assessment Tool 0-10   Pain Score 7   Pain Location/Orientation Orientation: Right;Location: Shoulder   Restrictions/Precautions   Other Precautions Chair Alarm;Bed Alarm;Fall Risk  (B nephrostomy tubes)   Home Living   Type of Home House   Home Layout One level;Able to live on main level with bedroom/bathroom;Ramped entrance   Bathroom Shower/Tub Walk-in shower   Bathroom Toilet Standard   Bathroom Equipment Shower chair   Home Equipment Walker;Quad cane   Prior Function   Level of [place] Needs assistance with IADLS;Needs assistance with ADLs;Needs assistance with functional mobility   Lives With Spouse   Receives Help From Family   IADLs Family/Friend/Other provides meals;Family/Friend/Other provides transportation;Family/Friend/Other provides medication management   Comments uses the cane at baseline, few days prior to admission not able to get OOB   Lifestyle   Reciprocal Relationships wife present for session   ADL   Eating Assistance 5  Supervision/Setup   Grooming Assistance 5  Supervision/Setup   UB Bathing Assistance 4  Minimal Assistance   LB Bathing Assistance 3  Moderate Assistance   UB Dressing Assistance 4  Minimal Assistance   LB Dressing Assistance 3  Moderate Assistance   Toileting Assistance  3  Moderate Assistance   Bed Mobility   Supine to Sit 3  Moderate assistance   Additional items Assist x 1;Verbal cues   Transfers   Sit to Stand 3  Moderate assistance   Additional items Assist x 1;Verbal cues   Stand to Sit 3  Moderate assistance   Additional items Assist x 1;Verbal cues   Functional Mobility   Functional Mobility 3  Moderate assistance   Additional Comments few steps bed to chair with RW   Balance   Static Sitting Fair   Dynamic Sitting Fair -   Static Standing Poor   Dynamic Standing Poor   Activity Tolerance   Activity Tolerance Patient limited by  fatigue;Patient limited by pain   RUE Assessment   RUE Assessment WFL  (pt reports 7/10 R shoulder pain)   LUE Assessment   LUE Assessment WFL   Cognition   Overall Cognitive Status WFL   Arousal/Participation Cooperative   Attention Within functional limits   Orientation Level Oriented to person;Oriented to place;Oriented to situation   Following Commands Follows one step commands with increased time or repetition   Assessment   Limitation Decreased ADL status;Decreased UE strength;Decreased Safe judgement during ADL;Decreased endurance;Decreased high-level ADLs;Decreased self-care trans  (decreased balance and mobility)   Prognosis Good   Assessment Patient evaluated by Occupational Therapy.  Patient admitted with Severe sepsis (HCC).  The patients occupational profile, medical and therapy history includes a extensive additional review of physical, cognitive, or psychosocial history related to current functional performance.  Comorbidities affecting functional mobility and ADLS include: prostate cancer with mets to bone, diabetes, and hypertension.  Prior to admission, patient was requiring assist for functional mobility with walker/cane, requiring assist for ADLS, and requiring assist for IADLS.  The evaluation identifies the following performance deficits: weakness, impaired balance, decreased endurance, increased fall risk, new onset of impairment of functional mobility, decreased ADLS, decreased IADLS, pain, decreased activity tolerance, decreased safety awareness, impaired judgement, and decreased strength, that result in activity limitations and/or participation restrictions. This evaluation requires clinical decision making of high complexity, because the patient presents with comorbidites that affect occupational performance and required significant modification of tasks or assistance with consideration of multiple treatment options.  The Barthel Index was used as a functional outcome tool presenting with  a score of Barthel Index Score: 35, indicating marked limitations of functional mobility and ADLS.  The patient's raw score on the -PAC Daily Activity Inpatient Short Form is 16. A raw score of less than 19 suggests the patient may benefit from discharge to post-acute rehabilitation services. Please refer to the recommendation of the Occupational Therapist for safe discharge planning.  Patient will benefit from skilled Occupational Therapy services to address above deficits and facilitate a safe return to prior level of function.   Goals   Patient Goals to feel better and go home   STG Time Frame   (1-7 days)   Short Term Goal  Goals established to promote Patient Goals: to feel better and go home:  Grooming: min assist standing at sink; Bathing: min assist; Upper Body Dressing supervision; Lower Body Dressing: min assist; Toileting: min assist; Patient will increase ambulatory standard toilet transfer to min assist with rolling walker to increase performance and safety with ADLS and functional mobility; Patient will increase standing tolerance to 3 minutes during ADL task to decrease assistance level and decrease fall risk; Patient will increase bed mobility to min assist in preparation for ADLS and transfers; Patient will increase functional mobility to and from bathroom with rolling walker with min assist to increase performance with ADLS and to use a toilet; Patient will tolerate 5 minutes of UE ROM/strengthening to increase general activity tolerance and performance in ADLS/IADLS; Patient will improve functional activity tolerance to 10 minutes of sustained functional tasks to increase participation in basic self-care and decrease assistance level; Patient will increase dynamic sitting balance to fair to improve the ability to sit at edge of bed or on a chair for ADLS;  Patient will increase dynamic standing balance to poor+ to improve postural stability and decrease fall risk during standing ADLS and  transfers.   LTG Time Frame   (8-14 days)   Long Term Goal Grooming: supervision standing at sink; Bathing: supervision; Upper Body Dressing independent; Lower Body Dressing: supervision; Toileting: supervision; Patient will increase ambulatory standard toilet transfer to supervision with rolling walker to increase performance and safety with ADLS and functional mobility; Patient will increase standing tolerance to 6 minutes during ADL task to decrease assistance level and decrease fall risk; Patient will increase bed mobility to supervision in preparation for ADLS and transfers; Patient will increase functional mobility to and from bathroom with rolling walker with supervision to increase performance with ADLS and to use a toilet; Patient will tolerate 10 minutes of UE ROM/strengthening to increase general activity tolerance and performance in ADLS/IADLS; Patient will improve functional activity tolerance to 20 minutes of sustained functional tasks to increase participation in basic self-care and decrease assistance level; Patient will increase dynamic sitting balance to fair+ to improve the ability to sit at edge of bed or on a chair for ADLS;  Patient will increase dynamic standing balance to fair- to improve postural stability and decrease fall risk during standing ADLS and transfers.   Pt will score >/= 20/24 on AM-PAC Daily Activity Inpatient scale to promote safe independence with ADLs and functional mobility; Pt will score >/= 65/100 on Barthel Index in order to decrease caregiver assistance needed and increase ability to perform ADLs and functional mobility.   Plan   Treatment Interventions ADL retraining;Functional transfer training;UE strengthening/ROM;Endurance training;Patient/family training;Equipment evaluation/education;Activityengagement;Compensatory technique education   Goal Expiration Date 10/24/24   OT Frequency 3-5x/wk   Discharge Recommendation   Rehab Resource Intensity Level, OT II (Moderate  Resource Intensity)   AM-PAC Daily Activity Inpatient   Lower Body Dressing 2   Bathing 2   Toileting 2   Upper Body Dressing 3   Grooming 3   Eating 4   Daily Activity Raw Score 16   Daily Activity Standardized Score (Calc for Raw Score >=11) 35.96   AM-PAC Applied Cognition Inpatient   Following a Speech/Presentation 4   Understanding Ordinary Conversation 4   Taking Medications 4   Remembering Where Things Are Placed or Put Away 4   Remembering List of 4-5 Errands 3   Taking Care of Complicated Tasks 3   Applied Cognition Raw Score 22   Applied Cognition Standardized Score 47.83   Barthel Index   Feeding 10   Bathing 0   Grooming Score 0   Dressing Score 5   Bladder Score 0   Bowels Score 10   Toilet Use Score 5   Transfers (Bed/Chair) Score 5   Mobility (Level Surface) Score 0   Stairs Score 0   Barthel Index Score 35   Licensure   [place] License Number  Goldie Bustos MS OTR/L [id number]

## 2024-10-10 NOTE — CASE MANAGEMENT
Case Management Assessment & Discharge Planning Note    Patient name Oliver Astudillo  Location [address] [place] [address]/[address] [place] [address]-* MRN [id number]  : 1950 Date 10/10/2024       Current Admission Date: 10/8/2024  Current Admission Diagnosis:Severe sepsis (HCC)   Patient Active Problem List    Diagnosis Date Noted Date Diagnosed    Vitamin B12 deficiency 10/09/2024     Severe sepsis (HCC) 10/08/2024     Moderate protein-calorie malnutrition (HCC) 2024     Hypercalcemia 2024     Loss of appetite 2024     Unintentional weight loss 2024     Cancer related pain 2024     Palliative care by specialist 2024     Ambulatory dysfunction 2024     Therapeutic opioid-induced constipation (OIC) 2024     Nausea and vomiting 2024     Advanced care planning/counseling discussion 2024     Goals of care, counseling/discussion 2024     Electrolyte abnormality 2024     Stage 3a chronic kidney disease (HCC) 2024     Hypoglycemia 2024     UTI (urinary tract infection) 2024     Malfunction of nephrostomy tube ([facility]) 01/15/2024     Hyperlipidemia 2024     Encounter for monitoring androgen deprivation therapy 08/10/2023     Nephrostomy status ([facility]) 08/10/2023     Hydronephrosis 2023     Prostate cancer metastatic to bone ([facility]) 2023     Type 2 diabetes mellitus, with long-term current use of insulin (HCC) 2023     Other hydronephrosis 2023     Hypertension 2023       LOS (days): 2  Geometric Mean LOS (GMLOS) (days): 3.5  Days to GMLOS:1.3     OBJECTIVE:    Risk of Unplanned Readmission Score: 34.24       Current admission status: Inpatient  Referral Reason: Other (Discharge planning)    Preferred Pharmacy:   [facility]/pharmacy #[id number] - [place], [place] - [address] [place] [id number]  Phone: [phone] Fax: [phone]    Primary Care Provider: Karen Bates MD    Primary Insurance:  [employer] REP  Secondary Insurance:     ASSESSMENT:  Active Health Care Proxies    There are no active Health Care Proxies on file.          Readmission Root Cause  30 Day Readmission: No    Patient Information  Admitted from:: Home  Mental Status: Alert  During Assessment patient was accompanied by: Spouse, Other-Comment (step-son Sumit present by phone)  Assessment information provided by:: Spouse, Other - please comment (step-son Sumit)  Primary Caregiver: Spouse  Caregiver's Name:: Erlinda Astudillo (wife)  Caregiver's Relationship to Patient:: Family Member  Caregiver's Telephone Number:: [phone]  Support Systems: Spouse/significant other, Son, Family members  County of Residence: [place]  What city do you live in?: [place]  Home entry access options. Select all that apply.: Stairs  Number of steps to enter home.: 3  Type of Current Residence: 2 story home  Upon entering residence, is there a bedroom on the main floor (no further steps)?: Yes  Upon entering residence, is there a bathroom on the main floor (no further steps)?: Yes  Living Arrangements: Lives w/ Spouse/significant other    Activities of Daily Living Prior to Admission  Functional Status: Assistance  Completes ADLs independently?: No  Level of ADL dependence: Assistance  Ambulates independently?: No  Level of ambulatory dependence: Assistance  Does patient use assisted devices?: Yes  Assisted Devices (DME) used: Wheelchair, Bedside Commode, Walker, Straight Cane  Does patient currently own DME?: Yes  What DME does the patient currently own?: Bedside Commode, Straight Cane, Walker, Wheelchair  Does patient have a history of Outpatient Therapy (PT/OT)?: Yes  Does the patient have a history of Short-Term Rehab?: No  Does patient have a history of HHC?: Yes ([facility])  Does patient currently have HHC?: No    Current Home Health Care  Home Health Agency Name:: [facility]    Patient Information Continued  Income Source:  Pension/group home  Does patient have prescription coverage?: Yes  Does patient receive dialysis treatments?: No    Means of Transportation  Means of Transport to App:: Family transport      Social Determinants of Health (SDOH)      Flowsheet Row Most Recent Value   Housing Stability    In the last 12 months, was there a time when you were not able to pay the mortgage or rent on time? N   In the past 12 months, how many times have you moved where you were living? 0   At any time in the past 12 months, were you homeless or living in a shelter (including now)? N   Transportation Needs    In the past 12 months, has lack of transportation kept you from medical appointments or from getting medications? no   In the past 12 months, has lack of transportation kept you from meetings, work, or from getting things needed for daily living? No   Food Insecurity    Within the past 12 months, you worried that your food would run out before you got the money to buy more. Never true   Within the past 12 months, the food you bought just didn't last and you didn't have money to get more. Never true   Utilities    In the past 12 months has the electric, gas, oil, or water company threatened to shut off services in your home? No            DISCHARGE DETAILS:    Discharge planning discussed with:: Patient, wife Erlinda, step-son Sumit  Freedom of Choice: Yes    Comments - Freedom of Choice: ERIKA spoke with patient and wife Erlinda at bedside, initially with  service translating.   was ineffective and unable to clearly communicate and patient became tearful during discussion.  It was agreed that Erlinda would contact her son Sumit to assist with translating.      It had initially been reported during rounds that patient and family were interested in STR but Sumit clarified with patient and Erlinda that their preference is for patient to return home at discharge.  Per Sumit, patient had become tearful  during the discussion with the  because he believed he was being sent to a nursing home.      SW reviewed scope of [facility] services with Sumit and he explained to his mother and patient.  All are in agreement that their plan is for patient to go home with [facility].  Patient was active with [facility] previously and their preference is to use the same agency if possible.  SW placed referral in [facility] and will continue to follow.      CM contacted family/caregiver?: Yes  Were Treatment Team discharge recommendations reviewed with patient/caregiver?: Yes  Did patient/caregiver verbalize understanding of patient care needs?: Yes  Were patient/caregiver advised of the risks associated with not following Treatment Team discharge recommendations?: Yes    Contacts  Patient Contacts: Erlinda Astudillo (spouse)  Relationship to Patient:: Family  Contact Method: In Person  Reason/Outcome: Emergency Contact, Referral, Discharge Planning, Continuity of Care    Requested Home Health Care         Is the patient interested in [facility] at discharge?: Yes  Home Health Discipline requested:: Nursing, Occupational Therapy, Physical Therapy, Home Health Aide  Home Health Agency Name:: [facility] External Referral Reason (only applicable if external HHA name selected): Services not provided in network or near patient location  Home Health Follow-Up Provider:: PCP  Home Health Services Needed:: Evaluate Functional Status and Safety, Gait/ADL Training, Strengthening/Theraputic Exercises to Improve Function, Other (comment) (nephrostomy tube care)  Homebound Criteria Met:: Requires the Assistance of Another Person for Safe Ambulation or to Leave the Home, Uses an Assist Device (i.e. cane, walker, etc)  Supporting Clincal Findings:: Bed Bound or Wheelchair Bound, Fatigues Easliy in Short Distances, Limited Endurance    Other Referral/Resources/Interventions Provided:  Interventions: [facility]    Would you like to participate in our  [facility] service program?  : No - Declined    Treatment Team Recommendation: Short Term Rehab  Discharge Destination Plan:: Home with Home Health Care         IMM Given (Date):: 10/10/24  IMM Given to:: Family (IMM reviewed with wife Erlinda and step-son Sumit and signed by wife.  Azeri-language copy given to wife and copy placed in scan bin for chart.)

## 2024-10-10 NOTE — PROGRESS NOTES
Progress Note - Family Medicine   Name: Oliver Astudillo 74 y.o. male I MRN: [id number]  Unit/Bed#: [address] [place] [address] Date of Admission: 10/8/2024   Date of Service: 10/10/2024 I Hospital Day: 2     Assessment & Plan  Severe sepsis (HCC)  POA HR 98, RR24, BP 89/53, WBC 13.4  Source of infection: UTI, UA: large leukocytes, innumerable WBC/bacteria. Previous cultures showed Pseudomonas  Has bilateral nephrostomy tubes and also urinates. Urine in bilateral nephrostomy tubes are not cloudy; nephrostomy tube change 10/4/24.  COVID/flu negative. Procal 0.5 in setting of CKD. Lactic acid 2    ED course: s/p 1500mL NS bolus    Plan:  -cefepime 1000mg q12h  -100cc/hr NS maintenance  -pending BC  -monitor vitals, input/output  UTI (urinary tract infection)  Improving: UTI, UA: large leukocytes, innumerable WBC/bacteria  Has bilateral nephrostomy tubes and also urinates. Family notes several episodes of incontinence this week, which is unusual for him. Urine in bilateral nephrostomy tubes are not cloudy  Previous cultures showed Pseudomonas    Plan:  -cefepime 1000mg q12h   -urine culture   > 100,000 E. Coli, > 100,000 Klebsiella pneumonia, > 100,000 Enterococcus faecalis   Sensitivities pending  -see sepsis above  Prostate cancer metastatic to bone (HCC)  Not on medications. Was supposed to have first appointment with palliative care 10/9/24  Scheduled for PET/CT on 10/31/24  Had severe infusion reaction after Taxotere    -consider palliative inpatient consult  -Tylenol 975 every 8 hours for mild pain  -Meghan 2.5 mg every 6 hours for moderate pain  -Meghan 5 mg every 6 hours for severe pain  Type 2 diabetes mellitus, with long-term current use of insulin ([facility])    Lab Results   Component Value Date    HGBA1C 5.6 10/08/2024     Chronic, not stable recently. Home medications include metformin 1000mg BID. PCP discontinued Levemir , Glimepiride   Patient stopped taking medications. However recently his sugars have been high, and so  his caretaker gave him old diabetes medications.    Plan:  -hold oral agents  -carb controlled, cardiac diet  -ISS  Nephrostomy status (HCC)  Chronic, stable. Bilateral nephrostomy tubes in place. Pt's family notes good output.   Pt urinates  and nephrostomy tube. Tube exchange 10/4/24.    Plan:  -f/u urine output via both penis and nephrostomy tube  Stage 3a chronic kidney disease (HCC)  Lab Results   Component Value Date    EGFR 101 10/10/2024    EGFR 97 10/09/2024    EGFR 85 10/08/2024    CREATININE 0.58 (L) 10/10/2024    CREATININE 0.65 10/09/2024    CREATININE 0.88 10/08/2024       Chronic, stable.    Plan:  -f/u AM BMP  -renally dose medications  Hypertension  Chronic, stable. Pt's home med lisinopril was discontinued by oncology due to several episodes of hypotension.     -monitor vitals  Vitamin B12 deficiency  Vitamin B12 noted to be 212 on admission.    Plan  -Vitamin B12 supplementation  Electrolyte abnormality  POA Na 130, Mg 1.8    Plan:  -f/u AM electrolytes  -replete PRN    Cancer related pain  Chronic, stable. Not taking pain medications. Was supposed to have first appointment with palliative care 10/9/24    -consider palliative inpatient consult  -Tylenol 975 every 8 hours for mild pain  -Meghan 2.5 mg every 6 hours for moderate pain  -Meghan 5 mg every 6 hours for severe pain  Ambulatory dysfunction  Uses wheelchair regularly, mainly ambulates with cane    Plan:  -PT/OT yuliya  Altered mental status (Resolved: 10/9/2024)  Over the past week, family has noticed increased AMS. At baseline patient ambulates with cane and has decreased appetite at baseline. Pt incontinent during this time. Pt has bilateral nephrostomy tubes. At baseline able to tell family when he has to go to the bathroom. Pt is not alert nor oriented. Hx obtained from family.     Metabolic encephalopathy likely secondary to sepsis as evidenced by confusion and change in mental status from baseline on admission.  Was treated with IV  ceftriaxone and then switched to IV cefepime.  He underwent close neuro monitoring and was under the delirium protocol.  This has since resolved.    Plan:  -treat underlying infxn, see sepsis above  -monitor vitals  -falls precautions  -IV hydration  - IV thiamine  -delirium protocol      VTE Pharmacologic Prophylaxis: VTE Score: 8 High Risk (Score >/= 5) - Pharmacological DVT Prophylaxis Ordered: enoxaparin (Lovenox). Sequential Compression Devices Ordered.    Mobility:   Basic Mobility Inpatient Raw Score: 10  [facility] Goal: 4: Move to chair/commode  [facility] Achieved: 1: Laying in bed  [facility] Goal achieved. Continue to encourage appropriate mobility.    Patient Centered Rounds: I performed bedside rounds with nursing staff today.   Discussions with Specialists or Other Care Team Provider: PT/OT    Education and Discussions with Family / Patient: Updated  (wife) via phone.    Current Length of Stay: 2 day(s)  Current Patient Status: Inpatient   Certification Statement: The patient will continue to require additional inpatient hospital stay due to IV antibiotics  Discharge Plan: Anticipate discharge in 24-48 hrs to home.    Code Status: Level 1 - Full Code    Subjective   Pleasant 74-year-old male resting comfortably in bed.  Denies any headache, nausea, vomiting.  Notes that his pain is overall well-tolerated.    Objective :  Temp:  [98.1 °F (36.7 °C)-98.7 °F (37.1 °C)] 98.5 °F (36.9 °C)  HR:  [74-99] 74  BP: (116-149)/(58-67) 116/67  Resp:  [16-18] 16  SpO2:  [96 %-98 %] 96 %  O2 Device: None (Room air)    Body mass index is 21.59 kg/m².     Input and Output Summary (last 24 hours):     Intake/Output Summary (Last 24 hours) at 10/10/2024 0724  Last data filed at 10/10/2024 0531  Gross per 24 hour   Intake 2016.67 ml   Output 2675 ml   Net -658.33 ml       Physical Exam      Lines/Drains:  Lines/Drains/Airways       Active Status       Name Placement date Placement time Site Days    Nephrostomy Left 10.2  Fr. 10/04/24  1155  Left  5    Nephrostomy Right 10.2 Fr. 10/04/24  1202  Right  5    External Urinary Catheter 10/09/24  0308  -- 1                            Lab Results: I have reviewed the following results:   Results from last 7 days   Lab Units 10/10/24  0541   WBC Thousand/uL 6.96   HEMOGLOBIN g/dL 9.2*   HEMATOCRIT % 27.7*   PLATELETS Thousands/uL 351   SEGS PCT % 82*   LYMPHO PCT % 11*   MONO PCT % 7   EOS PCT % 0     Results from last 7 days   Lab Units 10/10/24  0541 10/09/24  0752 10/08/24  1211   SODIUM mmol/L 130*   < > 130*   POTASSIUM mmol/L 3.8   < > 4.4   CHLORIDE mmol/L 102   < > 99   CO2 mmol/L 21   < > 21   BUN mg/dL 8   < > 16   CREATININE mg/dL 0.58*   < > 0.88   ANION GAP mmol/L 7   < > 10   CALCIUM mg/dL 7.9*   < > 8.3*   ALBUMIN g/dL  --   --  3.2*   TOTAL BILIRUBIN mg/dL  --   --  0.73   ALK PHOS U/L  --   --  142*   ALT U/L  --   --  8   AST U/L  --   --  25   GLUCOSE RANDOM mg/dL 161*   < > 216*    < > = values in this interval not displayed.     Results from last 7 days   Lab Units 10/08/24  1255   INR  1.22*     Results from last 7 days   Lab Units 10/10/24  0711 10/09/24  2114 10/09/24  1601 10/09/24  1103 10/09/24  0729 10/08/24  2047 10/08/24  1607   POC GLUCOSE mg/dl 140 190* 239* 241* 148* 147* 127     Results from last 7 days   Lab Units 10/08/24  1143   HEMOGLOBIN A1C % 5.6     Results from last 7 days   Lab Units 10/09/24  0752 10/08/24  1211 10/08/24  1143   LACTIC ACID mmol/L  --   --  2.0   PROCALCITONIN ng/ml 0.58* 0.50*  --        Recent Cultures (last 7 days):   Results from last 7 days   Lab Units 10/08/24  1318 10/08/24  1308 10/08/24  1143   BLOOD CULTURE   --   --  Received in Microbiology Lab. Culture in Progress.  Received in Microbiology Lab. Culture in Progress.   URINE CULTURE  >100,000 cfu/ml Escherichia coli*  >100,000 cfu/ml Klebsiella pneumoniae*  >100,000 cfu/ml Enterococcus faecalis* >100,000 cfu/ml Escherichia coli*  >100,000 cfu/ml Morganella  morganii*  >100,000 cfu/ml Enterococcus faecalis*  >100,000 cfu/ml - Presumptive Aerococcus urinae*  --        Imaging Results Review: No pertinent imaging studies reviewed.  Other Study Results Review: No additional pertinent studies reviewed.    Last 24 Hours Medication List:     Current Facility-Administered Medications:     acetaminophen (TYLENOL) tablet 975 mg, Q6H PRN    cefepime (MAXIPIME) IVPB (premix in dextrose) 1,000 mg 50 mL, Q12H, Last Rate: 1,000 mg (10/10/24 0159)    cyanocobalamin injection 1,000 mcg, Once    enoxaparin (LOVENOX) subcutaneous injection 40 mg, Daily    insulin lispro (HumALOG/ADMELOG) 100 units/mL subcutaneous injection 1-5 Units, 4x Daily (AC & HS) **AND** Fingerstick Glucose (POCT), 4x Daily AC and at bedtime    magnesium Oxide (MAG-OX) tablet 400 mg, BID    ondansetron (ZOFRAN) injection 4 mg, Q6H PRN    oxyCODONE (ROXICODONE) IR tablet 5 mg, Q6H PRN    oxyCODONE (ROXICODONE) split tablet 2.5 mg, Q6H PRN    potassium chloride (Klor-Con M20) CR tablet 20 mEq, Once    sodium chloride 0.9 % infusion, Continuous, Last Rate: 100 mL/hr (10/10/24 0254)    thiamine (VITAMIN B1) 100 mg in sodium chloride 0.9 % 50 mL IVPB, Daily, Last Rate: 100 mg (10/09/24 0917)    Administrative Statements   Today, Patient Was Seen By: Meenu Esposito MD  I have spent a total time of >30 minutes in caring for this patient on the day of the visit/encounter including Diagnostic results, Prognosis, Risks and benefits of tx options, Instructions for management, Patient and family education, Importance of tx compliance, Risk factor reductions, Impressions, Counseling / Coordination of care, Documenting in the medical record, Reviewing / ordering tests, medicine, procedures  , Obtaining or reviewing history  , and Communicating with other healthcare professionals .    **Please Note: This note may have been constructed using a voice recognition system.**

## 2024-10-10 NOTE — PLAN OF CARE
Problem: PAIN - ADULT  Goal: Verbalizes/displays adequate comfort level or baseline comfort level  Description: Interventions:  - Encourage patient to monitor pain and request assistance  - Assess pain using appropriate pain scale  - Administer analgesics based on type and severity of pain and evaluate response  - Implement non-pharmacological measures as appropriate and evaluate response  - Consider cultural and social influences on pain and pain management  - Notify physician/advanced practitioner if interventions unsuccessful or patient reports new pain  Outcome: Progressing     Problem: SAFETY ADULT  Goal: Maintain or return to baseline ADL function  Description: INTERVENTIONS:  -  Assess patient's ability to carry out ADLs; assess patient's baseline for ADL function and identify physical deficits which impact ability to perform ADLs (bathing, care of mouth/teeth, toileting, grooming, dressing, etc.)  - Assess/evaluate cause of self-care deficits   - Assess range of motion  - Assess patient's mobility; develop plan if impaired  - Assess patient's need for assistive devices and provide as appropriate  - Encourage maximum independence but intervene and supervise when necessary  - Involve family in performance of ADLs  - Assess for home care needs following discharge   - Consider OT consult to assist with ADL evaluation and planning for discharge  - Provide patient education as appropriate  Outcome: Progressing     Problem: GENITOURINARY - ADULT  Goal: Maintains or returns to baseline urinary function  Description: INTERVENTIONS:  - Assess urinary function  - Encourage oral fluids to ensure adequate hydration if ordered  - Administer IV fluids as ordered to ensure adequate hydration  - Administer ordered medications as needed  - Offer frequent toileting  - Follow urinary retention protocol if ordered  Outcome: Progressing

## 2024-10-10 NOTE — PLAN OF CARE
Problem: GENITOURINARY - ADULT  Goal: Maintains or returns to baseline urinary function  Description: INTERVENTIONS:  - Assess urinary function  - Encourage oral fluids to ensure adequate hydration if ordered  - Administer IV fluids as ordered to ensure adequate hydration  - Administer ordered medications as needed  - Offer frequent toileting  - Follow urinary retention protocol if ordered  Outcome: Progressing     Problem: PAIN - ADULT  Goal: Verbalizes/displays adequate comfort level or baseline comfort level  Description: Interventions:  - Encourage patient to monitor pain and request assistance  - Assess pain using appropriate pain scale  - Administer analgesics based on type and severity of pain and evaluate response  - Implement non-pharmacological measures as appropriate and evaluate response  - Consider cultural and social influences on pain and pain management  - Notify physician/advanced practitioner if interventions unsuccessful or patient reports new pain  Outcome: Progressing     Problem: INFECTION - ADULT  Goal: Absence or prevention of progression during hospitalization  Description: INTERVENTIONS:  - Assess and monitor for signs and symptoms of infection  - Monitor lab/diagnostic results  - Monitor all insertion sites, i.e. indwelling lines, tubes, and drains  - Monitor endotracheal if appropriate and nasal secretions for changes in amount and color  - [place] appropriate cooling/warming therapies per order  - Administer medications as ordered  - Instruct and encourage patient and family to use good hand hygiene technique  - Identify and instruct in appropriate isolation precautions for identified infection/condition  Outcome: Progressing  Goal: Absence of fever/infection during neutropenic period  Description: INTERVENTIONS:  - Monitor WBC    Outcome: Progressing     Problem: Nutrition/Hydration-ADULT  Goal: Nutrient/Hydration intake appropriate for improving, restoring or maintaining  nutritional needs  Description: Monitor and assess patient's nutrition/hydration status for malnutrition. Collaborate with interdisciplinary team and initiate plan and interventions as ordered.  Monitor patient's weight and dietary intake as ordered or per policy. Utilize nutrition screening tool and intervene as necessary. Determine patient's food preferences and provide high-protein, high-caloric foods as appropriate.     INTERVENTIONS:  - Monitor oral intake, urinary output, labs, and treatment plans  - Assess nutrition and hydration status and recommend course of action  - Evaluate amount of meals eaten  - Assist patient with eating if necessary   - Allow adequate time for meals  - Recommend/ encourage appropriate diets, oral nutritional supplements, and vitamin/mineral supplements  - Order, calculate, and assess calorie counts as needed  - Recommend, monitor, and adjust tube feedings and TPN/PPN based on assessed needs  - Assess need for intravenous fluids  - Provide specific nutrition/hydration education as appropriate  - Include patient/family/caregiver in decisions related to nutrition  Outcome: Progressing

## 2024-10-10 NOTE — QUICK NOTE
Goals of care on 10/10/2024    Participants:  Patient: Oliver Astudillo  Wife: Erlinda Astudillo  Physician: Meenu Esposito MD    Current health status:  74-year-old male with diagnosis of metastatic prostate cancer  Patient unable to tolerate chemotherapy infusion (Taxotere)  Possibility need to start Pluvicto due to severe infusion reaction to Taxotere  Palliative referral outpatient    Patient's goals and wishes:  Patient expressed a strong desire to continue his current treatment plan as above  He wishes to pursue rehabilitation to maintain and possibly improve his functional status and quality of life    Treatment plan  Treatment plan as per oncology  PET scan on 10/31/2024  PT/OT evaluation pending    Advance care planning  Discussed the importance of advance care planning and understanding potential future scenarios related to his condition  Patient has been encouraged to complete or update advanced directives and to communicate his wishes his family

## 2024-10-10 NOTE — ASSESSMENT & PLAN NOTE
Improving: UTI, UA: large leukocytes, innumerable WBC/bacteria  Has bilateral nephrostomy tubes and also urinates. Family notes several episodes of incontinence this week, which is unusual for him. Urine in bilateral nephrostomy tubes are not cloudy  Previous cultures showed Pseudomonas    Plan:  -cefepime 1000mg q12h   -urine culture   > 100,000 E. Coli, > 100,000 Klebsiella pneumonia, > 100,000 Enterococcus faecalis   Sensitivities pending  -see sepsis above

## 2024-10-10 NOTE — ASSESSMENT & PLAN NOTE
Over the past week, family has noticed increased AMS. At baseline patient ambulates with cane and has decreased appetite at baseline. Pt incontinent during this time. Pt has bilateral nephrostomy tubes. At baseline able to tell family when he has to go to the bathroom. Pt is not alert nor oriented. Hx obtained from family.     Metabolic encephalopathy likely secondary to sepsis as evidenced by confusion and change in mental status from baseline on admission.  Was treated with IV ceftriaxone and then switched to IV cefepime.  He underwent close neuro monitoring and was under the delirium protocol.  This has since resolved.    Plan:  -treat underlying infxn, see sepsis above  -monitor vitals  -falls precautions  -IV hydration  - IV thiamine  -delirium protocol

## 2024-10-10 NOTE — ASSESSMENT & PLAN NOTE
Lab Results   Component Value Date    EGFR 101 10/10/2024    EGFR 97 10/09/2024    EGFR 85 10/08/2024    CREATININE 0.58 (L) 10/10/2024    CREATININE 0.65 10/09/2024    CREATININE 0.88 10/08/2024       Chronic, stable.    Plan:  -f/u AM BMP  -renally dose medications

## 2024-10-11 VITALS
BODY MASS INDEX: 21.52 KG/M2 | HEART RATE: 77 BPM | RESPIRATION RATE: 18 BRPM | SYSTOLIC BLOOD PRESSURE: 126 MMHG | TEMPERATURE: 98.4 F | HEIGHT: 68 IN | WEIGHT: 142 LBS | OXYGEN SATURATION: 96 % | DIASTOLIC BLOOD PRESSURE: 68 MMHG

## 2024-10-11 PROBLEM — A41.9 SEVERE SEPSIS (HCC): Status: RESOLVED | Noted: 2024-10-08 | Resolved: 2024-10-11

## 2024-10-11 PROBLEM — R65.20 SEVERE SEPSIS (HCC): Status: RESOLVED | Noted: 2024-10-08 | Resolved: 2024-10-11

## 2024-10-11 LAB
ANION GAP SERPL CALCULATED.3IONS-SCNC: 5 MMOL/L (ref 4–13)
BASOPHILS # BLD AUTO: 0.02 THOUSANDS/ΜL (ref 0–0.1)
BASOPHILS NFR BLD AUTO: 0 % (ref 0–1)
BUN SERPL-MCNC: 11 MG/DL (ref 5–25)
CA-I BLD-SCNC: 1.04 MMOL/L (ref 1.12–1.32)
CALCIUM SERPL-MCNC: 7.2 MG/DL (ref 8.4–10.2)
CHLORIDE SERPL-SCNC: 106 MMOL/L (ref 96–108)
CO2 SERPL-SCNC: 22 MMOL/L (ref 21–32)
CREAT SERPL-MCNC: 0.48 MG/DL (ref 0.6–1.3)
EOSINOPHIL # BLD AUTO: 0.03 THOUSAND/ΜL (ref 0–0.61)
EOSINOPHIL NFR BLD AUTO: 1 % (ref 0–6)
ERYTHROCYTE [DISTWIDTH] IN BLOOD BY AUTOMATED COUNT: 13.6 % (ref 11.6–15.1)
GFR SERPL CREATININE-BSD FRML MDRD: 110 ML/MIN/1.73SQ M
GLUCOSE SERPL-MCNC: 181 MG/DL (ref 65–140)
GLUCOSE SERPL-MCNC: 223 MG/DL (ref 65–140)
GLUCOSE SERPL-MCNC: 280 MG/DL (ref 65–140)
HCT VFR BLD AUTO: 25.7 % (ref 36.5–49.3)
HGB BLD-MCNC: 8.4 G/DL (ref 12–17)
IMM GRANULOCYTES # BLD AUTO: 0.04 THOUSAND/UL (ref 0–0.2)
IMM GRANULOCYTES NFR BLD AUTO: 1 % (ref 0–2)
LYMPHOCYTES # BLD AUTO: 0.59 THOUSANDS/ΜL (ref 0.6–4.47)
LYMPHOCYTES NFR BLD AUTO: 10 % (ref 14–44)
MAGNESIUM SERPL-MCNC: 1.6 MG/DL (ref 1.9–2.7)
MCH RBC QN AUTO: 29.5 PG (ref 26.8–34.3)
MCHC RBC AUTO-ENTMCNC: 32.7 G/DL (ref 31.4–37.4)
MCV RBC AUTO: 90 FL (ref 82–98)
MONOCYTES # BLD AUTO: 0.35 THOUSAND/ΜL (ref 0.17–1.22)
MONOCYTES NFR BLD AUTO: 6 % (ref 4–12)
NEUTROPHILS # BLD AUTO: 4.97 THOUSANDS/ΜL (ref 1.85–7.62)
NEUTS SEG NFR BLD AUTO: 82 % (ref 43–75)
NRBC BLD AUTO-RTO: 0 /100 WBCS
PLATELET # BLD AUTO: 362 THOUSANDS/UL (ref 149–390)
PMV BLD AUTO: 9.5 FL (ref 8.9–12.7)
POTASSIUM SERPL-SCNC: 3.5 MMOL/L (ref 3.5–5.3)
RBC # BLD AUTO: 2.85 MILLION/UL (ref 3.88–5.62)
SODIUM SERPL-SCNC: 133 MMOL/L (ref 135–147)
WBC # BLD AUTO: 6 THOUSAND/UL (ref 4.31–10.16)

## 2024-10-11 PROCEDURE — 80048 BASIC METABOLIC PNL TOTAL CA: CPT

## 2024-10-11 PROCEDURE — 85025 COMPLETE CBC W/AUTO DIFF WBC: CPT

## 2024-10-11 PROCEDURE — 83735 ASSAY OF MAGNESIUM: CPT

## 2024-10-11 PROCEDURE — 82330 ASSAY OF CALCIUM: CPT

## 2024-10-11 PROCEDURE — 82948 REAGENT STRIP/BLOOD GLUCOSE: CPT

## 2024-10-11 PROCEDURE — 99239 HOSP IP/OBS DSCHRG MGMT >30: CPT | Performed by: INTERNAL MEDICINE

## 2024-10-11 RX ORDER — LANOLIN ALCOHOL/MO/W.PET/CERES
400 CREAM (GRAM) TOPICAL 2 TIMES DAILY
Qty: 60 TABLET | Refills: 1 | Status: SHIPPED | OUTPATIENT
Start: 2024-10-11

## 2024-10-11 RX ORDER — POTASSIUM CHLORIDE 750 MG/1
10 TABLET, EXTENDED RELEASE ORAL ONCE
Status: COMPLETED | OUTPATIENT
Start: 2024-10-11 | End: 2024-10-11

## 2024-10-11 RX ORDER — CEFPODOXIME PROXETIL 200 MG/1
200 TABLET, FILM COATED ORAL 2 TIMES DAILY
Qty: 8 TABLET | Refills: 0 | Status: SHIPPED | OUTPATIENT
Start: 2024-10-11 | End: 2024-10-15

## 2024-10-11 RX ORDER — POTASSIUM CHLORIDE 1500 MG/1
40 TABLET, EXTENDED RELEASE ORAL ONCE
Status: COMPLETED | OUTPATIENT
Start: 2024-10-11 | End: 2024-10-11

## 2024-10-11 RX ORDER — MAGNESIUM SULFATE HEPTAHYDRATE 40 MG/ML
4 INJECTION, SOLUTION INTRAVENOUS ONCE
Status: COMPLETED | OUTPATIENT
Start: 2024-10-11 | End: 2024-10-11

## 2024-10-11 RX ORDER — CALCIUM GLUCONATE 20 MG/ML
2 INJECTION, SOLUTION INTRAVENOUS ONCE
Status: COMPLETED | OUTPATIENT
Start: 2024-10-11 | End: 2024-10-11

## 2024-10-11 RX ORDER — SODIUM CHLORIDE 1 G/1
1 TABLET ORAL 2 TIMES DAILY WITH MEALS
Qty: 60 TABLET | Refills: 0 | Status: SHIPPED | OUTPATIENT
Start: 2024-10-11

## 2024-10-11 RX ADMIN — POTASSIUM CHLORIDE 10 MEQ: 750 TABLET, EXTENDED RELEASE ORAL at 09:09

## 2024-10-11 RX ADMIN — MAGNESIUM SULFATE HEPTAHYDRATE 4 G: 40 INJECTION, SOLUTION INTRAVENOUS at 07:51

## 2024-10-11 RX ADMIN — CEFEPIME HYDROCHLORIDE 1000 MG: 1 INJECTION, SOLUTION INTRAVENOUS at 01:53

## 2024-10-11 RX ADMIN — CALCIUM GLUCONATE 2 G: 20 INJECTION, SOLUTION INTRAVENOUS at 09:09

## 2024-10-11 RX ADMIN — CEFEPIME HYDROCHLORIDE 1000 MG: 1 INJECTION, SOLUTION INTRAVENOUS at 13:51

## 2024-10-11 RX ADMIN — NITROFURANTOIN (MONOHYDRATE/MACROCRYSTALS) 100 MG: 75; 25 CAPSULE ORAL at 07:50

## 2024-10-11 RX ADMIN — Medication 400 MG: at 08:00

## 2024-10-11 RX ADMIN — B-COMPLEX W/ C & FOLIC ACID TAB 1 TABLET: TAB at 08:00

## 2024-10-11 RX ADMIN — POTASSIUM CHLORIDE 40 MEQ: 1500 TABLET, EXTENDED RELEASE ORAL at 07:50

## 2024-10-11 RX ADMIN — INSULIN LISPRO 2 UNITS: 100 INJECTION, SOLUTION INTRAVENOUS; SUBCUTANEOUS at 11:26

## 2024-10-11 RX ADMIN — INSULIN LISPRO 1 UNITS: 100 INJECTION, SOLUTION INTRAVENOUS; SUBCUTANEOUS at 07:50

## 2024-10-11 RX ADMIN — ENOXAPARIN SODIUM 40 MG: 40 INJECTION SUBCUTANEOUS at 08:00

## 2024-10-11 RX ADMIN — SODIUM CHLORIDE 1 G: 1 TABLET ORAL at 07:50

## 2024-10-11 NOTE — ASSESSMENT & PLAN NOTE
Chronic, stable. Not taking pain medications. Was supposed to have first appointment with palliative care 10/9/24    Follow-up with outpatient palliative medicine

## 2024-10-11 NOTE — ASSESSMENT & PLAN NOTE
Not on medications. Was supposed to have first appointment with palliative care 10/9/24  Scheduled for PET/CT on 10/31/24  Had severe infusion reaction after Taxotere    Follow-up with outpatient palliative medicine

## 2024-10-11 NOTE — DISCHARGE SUMMARY
"Discharge Summary - Family Medicine   Name: Oliver Astudillo 74 y.o. male I MRN: [id number]  Unit/Bed#: [address] [place] [address] Date of Admission: 10/8/2024   Date of Service: 10/11/2024 I Hospital Day: 3    Medical Problems       Resolved Problems  Date Reviewed: 9/5/2024            Resolved    Altered mental status 10/9/2024     Resolved by  Meenu Esposito MD    * (Principal) Severe sepsis (HCC) 10/11/2024     Resolved by  Meenu Esposito MD        Discharging Physician / Practitioner: Meenu Esposito MD  PCP: Karne Bates MD  Admission Date:   Admission Orders (From admission, onward)       Ordered        10/08/24 1309  INPATIENT ADMISSION  Once                          Discharge Date: 10/11/24    Consultations During Hospital Stay:  PT, OT    Procedures Performed:   None    Significant Findings / Test Results:   10/11: Mag 1.6, , ionized calcium 1.04, Hgb 8.4, K3.5  10/10: cNa 131, Ca7.9, Mg 1.8, Hgb 9.2  10/9: ICa: 1.06, Hgb 10.6, Vitamin B12 212, Urine cultures > 100,00 cfu/ml E coli, Klebsiella pneumoniae and Morganella morganii  10/8: Na 130, Mg 1.8, Procal 0.5, PT 15.9, INR 1.22, B12 212, folate> 22.3, A1c 5.6    Incidental Findings:   None    Test Results Pending at Discharge (will require follow up):   None     Outpatient Tests Requested:  CMP, mag    Complications: None    Reason for Admission: Generalized weakness.    Things to address :  Did follow-up with palliative?  Do you complete your antibiotics Augmentin and cefpodoxime?  Did you get your blood work done (CMP and mag)?  What have your blood sugars been?    Hospital Course:   Oliver Astudillo is a 74 y.o. male patient who originally presented to the hospital on 10/8/2024 due to generalized weakness.  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  " "Patient has been eating poorly over the past 2 days.  Patient denies any fever or chills. Vital signs are stable.  Patient's blood pressure was on the soft side in triage but later improved.  White count is 13,000 with a normal lactic acid level.  Sodium level 130 with a magnesium level of 1.8.  Chest x-ray showed no acute cardiopulmonary disease\"    UTI (urinary tract infection)  Assessment & Plan  Improving: UTI, UA: large leukocytes, innumerable WBC/bacteria  Has bilateral nephrostomy tubes and also urinates. Family notes several episodes of incontinence this week, which is unusual for him. Urine in bilateral nephrostomy tubes are not cloudy  Previous cultures showed Pseudomonas    Treated with  cefepime 1000mg q12h     At discharge, transitioned to Augmentin (x4 days) and cefpodoxime (x4 days)     Type 2 diabetes mellitus, with long-term current use of insulin ([facility])  Assessment & Plan    Lab Results   Component Value Date    HGBA1C 5.6 10/08/2024     Chronic, not stable recently. Home medications include metformin 1000mg BID. PCP discontinued Levemir , Glimepiride   Patient stopped taking medications. However recently his sugars have been high, and so his caretaker gave him old diabetes medications.     Discharged home on no medications. Recommend following up with PCP    Hypertension  Assessment & Plan  Chronic, stable. Pt's home med lisinopril was discontinued by oncology due to several episodes of hypotension. Discharged home on no medications. Recommend following up with PCP       Vitamin B12 deficiency  Assessment & Plan  Vitamin B12 noted to be 212 on admission.    At discharge recommend vitamin B12 supplementation    Ambulatory dysfunction  Assessment & Plan  Uses wheelchair regularly, mainly ambulates with cane    Cancer related pain  Assessment & Plan  Chronic, stable. Not taking pain medications. Was supposed to have first appointment with palliative care 10/9/24    Follow-up with outpatient palliative " medicine     Electrolyte abnormality  Assessment & Plan  CMP and magnesium in 1 week      Stage 3a chronic kidney disease (HCC)  Assessment & Plan  Lab Results   Component Value Date    EGFR 110 10/11/2024    EGFR 101 10/10/2024    EGFR 97 10/09/2024    CREATININE 0.48 (L) 10/11/2024    CREATININE 0.58 (L) 10/10/2024    CREATININE 0.65 10/09/2024       Chronic, stable.    Nephrostomy status (HCC)  Assessment & Plan  Chronic, stable. Bilateral nephrostomy tubes in place. Pt's family notes good output.   Pt urinates  and nephrostomy tube. Tube exchange 10/4/24.    Prostate cancer metastatic to bone (HCC)  Assessment & Plan  Not on medications. Was supposed to have first appointment with palliative care 10/9/24  Scheduled for PET/CT on 10/31/24  Had severe infusion reaction after Taxotere    Follow-up with outpatient palliative medicine    Altered mental status-resolved as of 10/9/2024  Assessment & Plan  Over the past week, family has noticed increased AMS. At baseline patient ambulates with cane and has decreased appetite at baseline. Pt incontinent during this time. Pt has bilateral nephrostomy tubes. At baseline able to tell family when he has to go to the bathroom. Pt is not alert nor oriented. Hx obtained from family.     Metabolic encephalopathy likely secondary to sepsis as evidenced by confusion and change in mental status from baseline on admission.  Was treated with IV ceftriaxone and then switched to IV cefepime.  He underwent close neuro monitoring and was under the delirium protocol.  This has since resolved.    Plan:  -treat underlying infxn, see sepsis above  -monitor vitals  -falls precautions  -IV hydration  - IV thiamine  -delirium protocol      * Severe sepsis (HCC)-resolved as of 10/11/2024  Assessment & Plan  POA HR 98, RR24, BP 89/53, WBC 13.4  Source of infection: UTI, UA: large leukocytes, innumerable WBC/bacteria. Previous cultures showed Pseudomonas  Has bilateral nephrostomy tubes and also  "urinates. Urine in bilateral nephrostomy tubes are not cloudy; nephrostomy tube change 10/4/24.  COVID/flu negative. Procal 0.5 in setting of CKD. Lactic acid 2    ED course: s/p 1500mL NS bolus    Plan:  -cefepime 1000mg q12h  -100cc/hr NS maintenance  -pending BC  -monitor vitals, input/output        Please see above list of diagnoses and related plan for additional information.     Condition at Discharge: stable    Discharge Day Visit / Exam:     Vitals: Blood Pressure: 126/68 (10/11/24 0753)  Pulse: 77 (10/11/24 0753)  Temperature: 98.4 °F (36.9 °C) (10/11/24 0753)  Temp Source: Oral (10/11/24 0753)  Respirations: 18 (10/11/24 0753)  Height: 5' 8\" (172.7 cm) (10/08/24 1600)  Weight - Scale: 64.4 kg (142 lb) (10/08/24 1600)  SpO2: 96 % (10/11/24 0753)  Physical Exam  Vitals and nursing note reviewed.   Constitutional:       General: He is not in acute distress.     Appearance: He is well-developed.   HENT:      Head: Normocephalic and atraumatic.      Right Ear: External ear normal.      Left Ear: External ear normal.      Nose: Nose normal.      Mouth/Throat:      Mouth: Mucous membranes are dry.   Eyes:      Extraocular Movements: Extraocular movements intact.      Conjunctiva/sclera: Conjunctivae normal.   Cardiovascular:      Rate and Rhythm: Normal rate and regular rhythm.      Pulses: Normal pulses.      Heart sounds: Normal heart sounds. No murmur heard.  Pulmonary:      Effort: Pulmonary effort is normal. No respiratory distress.      Breath sounds: Normal breath sounds.   Abdominal:      General: Abdomen is flat. Bowel sounds are normal.      Palpations: Abdomen is soft.      Tenderness: There is no abdominal tenderness.   Musculoskeletal:         General: No swelling. Normal range of motion.      Cervical back: Normal range of motion and neck supple.   Skin:     General: Skin is warm and dry.      Capillary Refill: Capillary refill takes less than 2 seconds.   Neurological:      General: No focal " deficit present.      Mental Status: He is alert and oriented to person, place, and time. Mental status is at baseline.   Psychiatric:         Mood and Affect: Mood normal.          Discussion with Family: Updated  (wife) at bedside.    Discharge instructions/Information to patient and family:   See after visit summary for information provided to patient and family.      Provisions for Follow-Up Care:  See after visit summary for information related to follow-up care and any pertinent home health orders.      Mobility at time of Discharge:   Basic Mobility Inpatient Raw Score: 10  JH-HLM Goal: 4: Move to chair/commode  JH-HLM Achieved: 2: Bed activities/Dependent transfer  HLM Goal achieved. Continue to encourage appropriate mobility.     Disposition:   Home with VNA Services (Reminder: Complete face to face encounter)    Planned Readmission: n/a    Discharge Medications:  See after visit summary for reconciled discharge medications provided to patient and/or family.      Administrative Statements   Discharge Statement:  I have spent a total time of >30 minutes in caring for this patient on the day of the visit/encounter. >30 minutes of time was spent on: Diagnostic results, Prognosis, Risks and benefits of tx options, Instructions for management, Patient and family education, Importance of tx compliance, Risk factor reductions, Impressions, Counseling / Coordination of care, Documenting in the medical record, Reviewing / ordering tests, medicine, procedures  , and Communicating with other healthcare professionals .    **Please Note: This note may have been constructed using a voice recognition system**

## 2024-10-11 NOTE — PLAN OF CARE
Problem: GENITOURINARY - ADULT  Goal: Maintains or returns to baseline urinary function  Description: INTERVENTIONS:  - Assess urinary function  - Encourage oral fluids to ensure adequate hydration if ordered  - Administer IV fluids as ordered to ensure adequate hydration  - Administer ordered medications as needed  - Offer frequent toileting  - Follow urinary retention protocol if ordered  Outcome: Progressing     Problem: PAIN - ADULT  Goal: Verbalizes/displays adequate comfort level or baseline comfort level  Description: Interventions:  - Encourage patient to monitor pain and request assistance  - Assess pain using appropriate pain scale  - Administer analgesics based on type and severity of pain and evaluate response  - Implement non-pharmacological measures as appropriate and evaluate response  - Consider cultural and social influences on pain and pain management  - Notify physician/advanced practitioner if interventions unsuccessful or patient reports new pain  Outcome: Progressing     Problem: INFECTION - ADULT  Goal: Absence or prevention of progression during hospitalization  Description: INTERVENTIONS:  - Assess and monitor for signs and symptoms of infection  - Monitor lab/diagnostic results  - Monitor all insertion sites, i.e. indwelling lines, tubes, and drains  - Administer medications as ordered  - Instruct and encourage patient and family to use good hand hygiene technique  Outcome: Progressing  Goal: Absence of fever/infection during neutropenic period  Description: INTERVENTIONS:  - Monitor WBC    Outcome: Progressing      Problem: SAFETY ADULT  Goal: Patient will remain free of falls  Description: INTERVENTIONS:  - Educate patient/family on patient safety including physical limitations  - Instruct patient to call for assistance with activity   - Consult OT/PT to assist with strengthening/mobility   - Keep Call bell within reach  - Keep bed low and locked with side rails adjusted as  appropriate  - Keep care items and personal belongings within reach  - Initiate and maintain comfort rounds  - Make Fall Risk Sign visible to staff  - Offer Toileting every 2 Hours, in advance of need  - Initiate/Maintain bed alarm  - Obtain necessary fall risk management equipment  - Apply yellow socks and bracelet for high fall risk patients  - Consider moving patient to room near nurses station  Outcome: Progressing

## 2024-10-11 NOTE — PLAN OF CARE
Problem: GENITOURINARY - ADULT  Goal: Maintains or returns to baseline urinary function  Description: INTERVENTIONS:  - Assess urinary function  - Encourage oral fluids to ensure adequate hydration if ordered  - Administer IV fluids as ordered to ensure adequate hydration  - Administer ordered medications as needed  - Offer frequent toileting  - Follow urinary retention protocol if ordered  10/11/2024 1450 by Carolann Vieira RN  Outcome: Adequate for Discharge  10/11/2024 0940 by Carolann Vieira RN  Outcome: Progressing     Problem: PAIN - ADULT  Goal: Verbalizes/displays adequate comfort level or baseline comfort level  Description: Interventions:  - Encourage patient to monitor pain and request assistance  - Assess pain using appropriate pain scale  - Administer analgesics based on type and severity of pain and evaluate response  - Implement non-pharmacological measures as appropriate and evaluate response  - Consider cultural and social influences on pain and pain management  - Notify physician/advanced practitioner if interventions unsuccessful or patient reports new pain  10/11/2024 1450 by Carolann Vieira RN  Outcome: Adequate for Discharge  10/11/2024 0940 by Carolann Vieira RN  Outcome: Progressing     Problem: INFECTION - ADULT  Goal: Absence or prevention of progression during hospitalization  Description: INTERVENTIONS:  - Assess and monitor for signs and symptoms of infection  - Monitor lab/diagnostic results  - Monitor all insertion sites, i.e. indwelling lines, tubes, and drains  - Monitor endotracheal if appropriate and nasal secretions for changes in amount and color  - [place] appropriate cooling/warming therapies per order  - Administer medications as ordered  - Instruct and encourage patient and family to use good hand hygiene technique  - Identify and instruct in appropriate isolation precautions for identified infection/condition  10/11/2024 1450 by Carolann Vieira  RN  Outcome: Adequate for Discharge  10/11/2024 0940 by Carolann Vieira RN  Outcome: Progressing  Goal: Absence of fever/infection during neutropenic period  Description: INTERVENTIONS:  - Monitor WBC    10/11/2024 1450 by Carolann Vieira RN  Outcome: Adequate for Discharge  10/11/2024 0940 by Carolann Vieira RN  Outcome: Progressing     Problem: SAFETY ADULT  Goal: Patient will remain free of falls  Description: INTERVENTIONS:  - Educate patient/family on patient safety including physical limitations  - Instruct patient to call for assistance with activity   - Consult OT/PT to assist with strengthening/mobility   - Keep Call bell within reach  - Keep bed low and locked with side rails adjusted as appropriate  - Keep care items and personal belongings within reach  - Initiate and maintain comfort rounds  - Make Fall Risk Sign visible to staff  - Offer Toileting every 2 Hours, in advance of need  - Initiate/Maintain bed alarm  - Obtain necessary fall risk management equipment  - Apply yellow socks and bracelet for high fall risk patients  - Consider moving patient to room near nurses station  10/11/2024 1450 by Carolann Vieira RN  Outcome: Adequate for Discharge  10/11/2024 0940 by Carolann Vieira RN  Outcome: Progressing  Goal: Maintain or return to baseline ADL function  Description: INTERVENTIONS:  -  Assess patient's ability to carry out ADLs; assess patient's baseline for ADL function and identify physical deficits which impact ability to perform ADLs (bathing, care of mouth/teeth, toileting, grooming, dressing, etc.)  - Assess/evaluate cause of self-care deficits   - Assess range of motion  - Assess patient's mobility; develop plan if impaired  - Assess patient's need for assistive devices and provide as appropriate  - Encourage maximum independence but intervene and supervise when necessary  - Involve family in performance of ADLs  - Assess for home care needs following discharge   -  Consider OT consult to assist with ADL evaluation and planning for discharge  - Provide patient education as appropriate  10/11/2024 1450 by Carolann Vieira RN  Outcome: Adequate for Discharge  10/11/2024 0940 by Carolann Vieira RN  Outcome: Progressing     Problem: Prexisting or High Potential for Compromised Skin Integrity  Goal: Skin integrity is maintained or improved  Description: INTERVENTIONS:  - Identify patients at risk for skin breakdown  - Assess and monitor skin integrity  - Assess and monitor nutrition and hydration status  - Monitor labs   - Assess for incontinence   - Turn and reposition patient  - Assist with mobility/ambulation  - Relieve pressure over bony prominences  - Avoid friction and shearing  - Provide appropriate hygiene as needed including keeping skin clean and dry  - Evaluate need for skin moisturizer/barrier cream  - Collaborate with interdisciplinary team   - Patient/family teaching  - Consider wound care consult   10/11/2024 1450 by Carolann Vieira RN  Outcome: Adequate for Discharge  10/11/2024 0940 by Carolann Vieira RN  Outcome: Progressing     Problem: Nutrition/Hydration-ADULT  Goal: Nutrient/Hydration intake appropriate for improving, restoring or maintaining nutritional needs  Description: Monitor and assess patient's nutrition/hydration status for malnutrition. Collaborate with interdisciplinary team and initiate plan and interventions as ordered.  Monitor patient's weight and dietary intake as ordered or per policy. Utilize nutrition screening tool and intervene as necessary. Determine patient's food preferences and provide high-protein, high-caloric foods as appropriate.     INTERVENTIONS:  - Monitor oral intake, urinary output, labs, and treatment plans  - Assess nutrition and hydration status and recommend course of action  - Evaluate amount of meals eaten  - Assist patient with eating if necessary   - Allow adequate time for meals  - Recommend/ encourage  appropriate diets, oral nutritional supplements, and vitamin/mineral supplements  - Order, calculate, and assess calorie counts as needed  - Recommend, monitor, and adjust tube feedings and TPN/PPN based on assessed needs  - Assess need for intravenous fluids  - Provide specific nutrition/hydration education as appropriate  - Include patient/family/caregiver in decisions related to nutrition  10/11/2024 1450 by Carolann Vieira, RN  Outcome: Adequate for Discharge  10/11/2024 0940 by Carolann Vieira RN  Outcome: Progressing

## 2024-10-11 NOTE — ASSESSMENT & PLAN NOTE
Chronic, stable. Bilateral nephrostomy tubes in place. Pt's family notes good output.   Pt urinates  and nephrostomy tube. Tube exchange 10/4/24.

## 2024-10-11 NOTE — NURSING NOTE
Patient discharged home. AVS reviewed with patient's wife and printed in Angolan. IV removed and all belongings sent with the patient.

## 2024-10-11 NOTE — CASE MANAGEMENT
Case Management Progress Note    Patient name Oliver Astudillo  Location [address] [address]-* MRN [id number]  : 1950 Date 10/11/2024       LOS (days): 3  Geometric Mean LOS (GMLOS) (days): 4.9  Days to GMLOS:1.8        OBJECTIVE:      Current admission status: Inpatient  Preferred Pharmacy:   [facility]/pharmacy #[id number] - [facility] [address] [address]  Phone: [phone] Fax: [phone]    Primary Care Provider: Karen Bates MD    Primary Insurance: [facility] REP  Secondary Insurance:     PROGRESS NOTE:    Patient has been accepted by [facility] for [facility] services and agency was notified in AIDIN that patient will discharge home today.  SW spoke with patient's wife at bedside and her preference is to transport patient home herself.

## 2024-10-11 NOTE — ASSESSMENT & PLAN NOTE
Improving: UTI, UA: large leukocytes, innumerable WBC/bacteria  Has bilateral nephrostomy tubes and also urinates. Family notes several episodes of incontinence this week, which is unusual for him. Urine in bilateral nephrostomy tubes are not cloudy  Previous cultures showed Pseudomonas    Treated with  cefepime 1000mg q12h     At discharge, transitioned to Augmentin (x4 days) and cefpodoxime (x4 days)

## 2024-10-11 NOTE — ASSESSMENT & PLAN NOTE
Lab Results   Component Value Date    HGBA1C 5.6 10/08/2024     Chronic, not stable recently. Home medications include metformin 1000mg BID. PCP discontinued Levemir , Glimepiride   Patient stopped taking medications. However recently his sugars have been high, and so his caretaker gave him old diabetes medications.     Discharged home on no medications. Recommend following up with PCP

## 2024-10-11 NOTE — [place] UNIVERSAL TRANSFER FORM
"[place] UNIVERSAL TRANSFER FORM  (ALL ITEMS MUST BE COMPLETED)    1. TRANSFER FROM: [facility]      TRANSFER TO: home with home services    2. DATE OF TRANSFER: 10/11/2024                        TIME OF TRANSFER: 1300    3. PATIENT NAME: Oliver Astudillo,        YOB: 1950                             GENDER: male    4. LANGUAGE:   Hebrew    5. PHYSICIAN NAME:  Luis Mesa MD                   PHONE: [phone]    6. CODE STATUS: Level 1 - Full Code        Out of Hospital DNR Attached: No    7. :                                      :  Extended Emergency Contact Information  Primary Emergency Contact: Sumit Cardoza   [place]  Mobile Phone: [phone]  Relation: Son  Secondary Emergency Contact: Erlinda Astudillo  Mobile Phone: [phone]  Relation: Spouse           Health Care Representative/Proxy:  Yes           Legal Guardian:  No             NAME OF:           HEALTH CARE REPRESENTATIVE/PROXY:                                         OR           LEGAL GUARDIAN, IF NOT :                                               PHONE:  (Day)           (Night)                        (Cell)    8. REASON FOR TRANSFER: (Must include brief medical history and recent changes in physical function or cognition.) home with home services            V/S: /68 (BP Location: Left arm)   Pulse 77   Temp 98.4 °F (36.9 °C) (Oral)   Resp 18   Ht 5' 8\" (1.727 m)   Wt 64.4 kg (142 lb)   SpO2 96%   BMI 21.59 kg/m²           PAIN: None    9. PRIMARY DIAGNOSIS: Severe sepsis (HCC)      Secondary Diagnosis:         Pacemaker: Yes      Internal Defib: Yes          Mental Health Diagnosis (if Applicable):    10. RESTRAINTS: No     11. RESPIRATORY NEEDS: None    12. ISOLATION/PRECAUTION: None    13. ALLERGY: Docetaxel    14. SENSORY:           15. SKIN CONDITION: No Wounds    16. DIET: Regular    17. IV ACCESS: " None    18. PERSONAL ITEMS SENT WITH PATIENT:     19. ATTACHED DOCUMENTS: MUST ATTACH CURRENT MEDICATION INFORMATION Other AVS     20. AT RISK ALERTS:None        HARM TO: N/A    21. WEIGHT BEARING STATUS:         Left Leg: Limited        Right Leg: Limited    22. MENTAL STATUS:Alert and Oriented    23. FUNCTION:        Walk: With Help        Transfer: With Help        Toilet: With Help        Feed: Self    24. IMMUNIZATIONS/SCREENING:     Immunization History   Administered Date(s) Administered    Pneumococcal Conjugate Vaccine 20-valent (Pcv20), Polysace 04/26/2024       25. BOWEL: Continent    26. BLADDER: Continent    27. SENDING FACILITY CONTACT: home with home services                   Title:         Unit:         Phone:           REC'G FACILITY CONTACT (if known):        Title:        Unit:         Phone:         FORM PREFILLED BY (if applicable)       Title:       Unit:        Phone:         FORM COMPLETED BY Carolann Vieira RN      Title: CATHIE      Phone: [phone]

## 2024-10-11 NOTE — PROGRESS NOTES
Patient:  FELIPA CELESTE    MRN:  [id number]    Aidin Request ID:  [id number]    Level of care reserved:  Home Health Agency    Partner Reserved:  [facility], [place] [id number] [phone]    Clinical needs requested:    Geography searched:  10 miles around [address]    Start of Service:    Request sent:  5:03pm EDT on 10/10/2024 by Vidhi Prakash    Partner reserved:  10:42am EDT on 10/11/2024 by Vidhi Prakash    Choice list shared:  10:42am EDT on 10/11/2024 by Vidhi Prakash

## 2024-10-11 NOTE — ASSESSMENT & PLAN NOTE
Lab Results   Component Value Date    EGFR 110 10/11/2024    EGFR 101 10/10/2024    EGFR 97 10/09/2024    CREATININE 0.48 (L) 10/11/2024    CREATININE 0.58 (L) 10/10/2024    CREATININE 0.65 10/09/2024       Chronic, stable.

## 2024-10-11 NOTE — ASSESSMENT & PLAN NOTE
Chronic, stable. Pt's home med lisinopril was discontinued by oncology due to several episodes of hypotension. Discharged home on no medications. Recommend following up with PCP

## 2024-10-12 LAB
BACTERIA UR CULT: ABNORMAL

## 2024-10-14 ENCOUNTER — RA CDI HCC (OUTPATIENT)
Dept: OTHER | Facility: HOSPITAL | Age: 74
End: 2024-10-14

## 2024-10-14 LAB
BACTERIA BLD CULT: NORMAL
BACTERIA BLD CULT: NORMAL

## 2024-10-15 ENCOUNTER — TRANSITIONAL CARE MANAGEMENT (OUTPATIENT)
Age: 74
End: 2024-10-15

## 2024-10-15 ENCOUNTER — HOSPITAL ENCOUNTER (OUTPATIENT)
Dept: RADIOLOGY | Age: 74
Discharge: HOME | End: 2024-10-15
Payer: COMMERCIAL

## 2024-10-15 ENCOUNTER — TELEPHONE (OUTPATIENT)
Age: 74
End: 2024-10-15

## 2024-10-15 DIAGNOSIS — C61 PROSTATE CANCER METASTATIC TO BONE (HCC): ICD-10-CM

## 2024-10-15 DIAGNOSIS — C79.51 PROSTATE CANCER METASTATIC TO BONE (HCC): ICD-10-CM

## 2024-10-15 PROCEDURE — 78815 PET IMAGE W/CT SKULL-THIGH: CPT

## 2024-10-15 PROCEDURE — A9596 HB ILLUCCIX - GA 68 PSMA -11, PER MCI: HCPCS

## 2024-10-15 NOTE — TELEPHONE ENCOUNTER
Patient's son Sumit calling in to speak with Dr Coyne regarding Chemo's PET scan results. He did not want to speak with CTS.

## 2024-10-15 NOTE — UTILIZATION REVIEW
NOTIFICATION OF ADMISSION DISCHARGE   This is a Notification of Discharge from [facility]. Please be advised that this patient has been discharge from our facility. Below you will find the admission and discharge date and time including the patient’s disposition.   UTILIZATION REVIEW CONTACT:  Fariha Garsia  Utilization   Network Utilization Review Department  Phone: [phone] x carefully listen to the prompts. All voicemails are confidential.  Email: [email or web address]     ADMISSION INFORMATION  PRESENTATION DATE: 10/8/2024 11:05 AM  OBERVATION ADMISSION DATE: N/A  INPATIENT ADMISSION DATE: 10/8/24  1:09 PM   DISCHARGE DATE: 10/11/2024  1:00 PM   DISPOSITION:Home with Home Health Care    Network Utilization Review Department  ATTENTION: Please call with any questions or concerns to [phone] and carefully listen to the prompts so that you are directed to the right person. All voicemails are confidential.   For Discharge needs, contact Care Management DC Support Team at [phone] opt. [phone]  Send all requests for admission clinical reviews, approved or denied determinations and any other requests to dedicated fax number below belonging to the campus where the patient is receiving treatment. List of dedicated fax numbers for the Facilities:  FACILITY NAME UR FAX NUMBER   ADMISSION DENIALS (Administrative/Medical Necessity) [phone]   DISCHARGE SUPPORT TEAM ([facility]) [phone]   PARENT CHILD HEALTH (Maternity/NICU/Pediatrics) [phone]   [facility] [phone]   [facility] [phone]   [facility] [phone]   [facility] [phone]   [facility] [phone]   [facility] [phone]   [facility] [phone]   [facility]  [phone]   [facility] [phone]   [facility] [phone]   [facility] [phone]   [facility] [phone]

## 2024-10-16 ENCOUNTER — APPOINTMENT (OUTPATIENT)
Dept: LAB | Facility: HOSPITAL | Age: 74
End: 2024-10-16
Payer: COMMERCIAL

## 2024-10-16 DIAGNOSIS — C79.51 PROSTATE CANCER METASTATIC TO BONE (HCC): ICD-10-CM

## 2024-10-16 DIAGNOSIS — E87.1 HYPONATREMIA: ICD-10-CM

## 2024-10-16 DIAGNOSIS — C61 PROSTATE CANCER METASTATIC TO BONE (HCC): ICD-10-CM

## 2024-10-16 DIAGNOSIS — E87.8 ELECTROLYTE ABNORMALITY: ICD-10-CM

## 2024-10-16 LAB
ALBUMIN SERPL BCG-MCNC: 3.8 G/DL (ref 3.5–5)
ALP SERPL-CCNC: 135 U/L (ref 34–104)
ALT SERPL W P-5'-P-CCNC: 13 U/L (ref 7–52)
ANION GAP SERPL CALCULATED.3IONS-SCNC: 10 MMOL/L (ref 4–13)
AST SERPL W P-5'-P-CCNC: 15 U/L (ref 13–39)
BILIRUB SERPL-MCNC: 0.47 MG/DL (ref 0.2–1)
BUN SERPL-MCNC: 16 MG/DL (ref 5–25)
CALCIUM SERPL-MCNC: 9.7 MG/DL (ref 8.4–10.2)
CHLORIDE SERPL-SCNC: 95 MMOL/L (ref 96–108)
CO2 SERPL-SCNC: 26 MMOL/L (ref 21–32)
CREAT SERPL-MCNC: 0.72 MG/DL (ref 0.6–1.3)
GFR SERPL CREATININE-BSD FRML MDRD: 93 ML/MIN/1.73SQ M
GLUCOSE SERPL-MCNC: 208 MG/DL (ref 65–140)
MAGNESIUM SERPL-MCNC: 2.1 MG/DL (ref 1.9–2.7)
POTASSIUM SERPL-SCNC: 4 MMOL/L (ref 3.5–5.3)
PROT SERPL-MCNC: 8.1 G/DL (ref 6.4–8.4)
PSA SERPL-MCNC: 125.36 NG/ML (ref 0–4)
SODIUM SERPL-SCNC: 131 MMOL/L (ref 135–147)

## 2024-10-16 PROCEDURE — 36415 COLL VENOUS BLD VENIPUNCTURE: CPT

## 2024-10-16 PROCEDURE — 80053 COMPREHEN METABOLIC PANEL: CPT

## 2024-10-16 PROCEDURE — 84153 ASSAY OF PSA TOTAL: CPT

## 2024-10-16 PROCEDURE — 83735 ASSAY OF MAGNESIUM: CPT

## 2024-10-17 ENCOUNTER — OFFICE VISIT (OUTPATIENT)
Dept: HEMATOLOGY ONCOLOGY | Facility: CLINIC | Age: 74
End: 2024-10-17
Payer: COMMERCIAL

## 2024-10-17 ENCOUNTER — TELEPHONE (OUTPATIENT)
Dept: HEMATOLOGY ONCOLOGY | Facility: CLINIC | Age: 74
End: 2024-10-17

## 2024-10-17 ENCOUNTER — TELEPHONE (OUTPATIENT)
Dept: PALLIATIVE MEDICINE | Facility: CLINIC | Age: 74
End: 2024-10-17

## 2024-10-17 VITALS
RESPIRATION RATE: 17 BRPM | HEART RATE: 62 BPM | OXYGEN SATURATION: 100 % | WEIGHT: 141.6 LBS | SYSTOLIC BLOOD PRESSURE: 108 MMHG | BODY MASS INDEX: 21.46 KG/M2 | TEMPERATURE: 98.3 F | HEIGHT: 68 IN | DIASTOLIC BLOOD PRESSURE: 80 MMHG

## 2024-10-17 DIAGNOSIS — C61 PROSTATE CANCER METASTATIC TO BONE (HCC): Primary | ICD-10-CM

## 2024-10-17 DIAGNOSIS — C79.51 PROSTATE CANCER METASTATIC TO BONE (HCC): Primary | ICD-10-CM

## 2024-10-17 DIAGNOSIS — R63.4 UNINTENTIONAL WEIGHT LOSS: ICD-10-CM

## 2024-10-17 PROCEDURE — 99214 OFFICE O/P EST MOD 30 MIN: CPT | Performed by: INTERNAL MEDICINE

## 2024-10-17 PROCEDURE — G2211 COMPLEX E/M VISIT ADD ON: HCPCS | Performed by: INTERNAL MEDICINE

## 2024-10-17 NOTE — TELEPHONE ENCOUNTER
Called to make f/u appt which was canceled due to hospitalization. Follow up appt made for 10/24/24 @3:30 pm.

## 2024-10-17 NOTE — PROGRESS NOTES
[facility] [place]  [address] [address]  [phone]    Oliver Astudillo,1950, [id number]  10/17/24    Discussion:   In summary, this is a 74-year-old male with a history of prostate cancer with bone metastases.  He had progressive disease after several lines of hormonal blockade.  Severe infusion reaction after Taxotere.  Recent PET/CT Shows multiple hepatic and bony lesions.  Decreased PSMA activity in the prostate gland and bladder.  New compression deformity of L1 vertebral body.  New lytic lesion in the T2 vertebral body compared to prior study of a year ago.  Recent WBC 6.0, hemoglobin 8.4, platelet count 362.  CMP shows sodium 131, glucose 208, alk phos 135, otherwise normal.  .3.  He was recently hospitalized for urosepsis.  Responded with antibiotics and exchange of percutaneous nephrostomies.  Appetite is poor though some days he does have a reasonable appetite.  Participates in physical therapy as best possible.  Given abnormalities on PET/CT, should be acceptable to proceed with Pluvicto as previously scheduled.  I discussed the above with the patient.  The patient and his wife and son voiced understanding and agreement.  ______________________________________________________________________    Chief Complaint   Patient presents with    Follow-up       HPI:  Oncology History   Prostate cancer metastatic to bone (HCC)   5/24/2023 Initial Diagnosis    May 2023 patient presented with urinary frequency. . CT showed distended urinary bladder with bilateral hydronephrosis. Urinary bladder mass inseparable from the prostate. Extraprostatic extension noted. Bone scan showed indeterminate activity at T11 vertebral body. Bilateral nephrostomy tubes and Cazares catheter placed. Prostate biopsy showed adenocarcinoma, Liana 9.      6/28/2023 Biopsy    A. Prostate, right lateral base:  - Prostatic adenocarcinoma, Noble score  4 + 5 = 9, Prognostic Grade Group 5,  involving 90% of 1 needle core  and measuring  11 mm in length.        B. Prostate, right medial base:  - Prostatic adenocarcinoma, Seattle score 4 + 5 = 9, Prognostic Grade Group 5,  involving 70% of 1 needle core  and measuring  10 mm in length.      C. Prostate, right lateral mid:  - Prostatic adenocarcinoma, [place] score 4 + 5 = 9, Prognostic Grade Group 5,  involving 90% of 1 needle core  and measuring  12 mm in length.      Comment: Immunohistochemistry for a prostate multiplex stain (p63, K903, and P504S) and NKX3.1 demonstrate invasive carcinoma.     D. Prostate, right medial mid:  - Prostatic adenocarcinoma, Seattle score 4 + 5 = 9, Prognostic Grade Group 5,  involving 95% of 1 needle core  and measuring  15 mm in length.      E. Prostate, right lateral apex:  - Prostatic adenocarcinoma, [place] score 4 + 5 = 9, Prognostic Grade Group 5,  involving 90% of 1 needle core  and measuring  12 mm in length.   - Perineural invasion identified.     Comment: Immunohistochemistry for a prostate multiplex stain (p63, K903, and P504S) and NKX3.1 demonstrate invasive carcinoma.     F. Prostate, right medial apex:  - Prostatic adenocarcinoma, Seattle score 4 + 5 = 9, Prognostic Grade Group 5,  involving 100% of 1 needle core  and measuring  20 mm in length.      G. Prostate, left lateral base:  - Prostatic adenocarcinoma, [place] score 4 + 5 = 9, Prognostic Grade Group 5,  involving 75% of 1 needle core  and measuring  10 mm in length.   - Perineural invasion identified.  - Lymphovascular invasion is identified.     H. Prostate, left medial base:  - Prostatic adenocarcinoma, Seattle score 4 + 5 = 9, Prognostic Grade Group 5,  involving 95% of 1 needle core  and measuring  14 mm in length.   - Perineural invasion identified.     Comment: There is a focus of closely approximated gastrointestinal epithelium; NKX3.1 shows no definite invasion of the GI epithelium.      I. Prostate, left  lateral mid:  - Prostatic adenocarcinoma, Liana score 4 + 5 = 9, Prognostic Grade Group 5,  involving 95% of 1 needle core  and measuring  12 mm in length.       J. Prostate, left medial mid:  - Prostatic adenocarcinoma, Liana score 4 + 5 = 9, Prognostic Grade Group 5,  involving 85% of 1 needle core  and measuring  13 mm in length.       Comment: Immunohistochemistry for a prostate multiplex stain (p63, K903, and P504S) and NKX3.1 demonstrate invasive carcinoma.     K. Prostate, left lateral apex:  - Prostatic adenocarcinoma, [place] score 4 + 5 = 9, Prognostic Grade Group 5,  involving 25% of 1 needle core  and measuring  3 mm in length.        L. Prostate, left medial apex :  - Prostatic adenocarcinoma, [place] score 4 + 5 = 9, Prognostic Grade Group 5,  involving 95% of 1 needle core  and measuring  15 mm in length.     - Perineural invasion identified.     Comment:   Cribriform glands are present within the pattern 4 component.  This feature has been associated with adverse clinical outcomes and molecular features typically seen in advanced disease.  (The 2019 Genitourinary Pathology Society (GUPS) White Paper on Contemporary Grading of Prostate Cancer. Arch Pathol Lab Med. 2021 Apr 1;145(4):461-493.)     7/5/2023 -  Hormone Therapy    Firmagon loading dose on 7/5/23, followed by Lupron      8/28/2023 -  Cancer Staged    Staging form: Prostate, AJCC 8th Edition  - Clinical: Stage IVB (cT4, cN1, cM1b, PSA: 145, Grade Group: 5) - Signed by Dov Andrea MD on 8/28/2023  Prostate specific antigen (PSA) range: 20 or greater  Histologic grading system: 5 grade system       9/2023 - 6/2024 Chemotherapy    Zytiga 250 mg PO daily     6/2024 -  Chemotherapy    Xtandi      6/24/2024 - 7/8/2024 Radiation    The patient saw @[facility] for radiation treatment. This is the current list of radiation treatment:    Plan ID Energy Fractions Dose per Fraction (cGy) Dose Correction (cGy) Total Dose Delivered (cGy)  Elapsed Days   T11_L5 Spine 10X/6X 10 / 10 300 0 3,000 14        8/30/2024 - 9/20/2024 Chemotherapy    alteplase (CATHFLO), 2 mg, Intracatheter, Every 1 Minute as needed, 2 of 3 cycles  DOCEtaxel (TAXOTERE) chemo infusion, 25 mg/m2 = 44.6 mg (83.3 % of original dose 30 mg/m2), Intravenous, Once, 2 of 3 cycles  Dose modification: 25 mg/m2 (original dose 30 mg/m2, Cycle 1, Reason: Anticipated Tolerance)  Administration: 44.6 mg (8/30/2024), 44.6 mg (9/6/2024), 44.6 mg (9/20/2024)         Interval History: Clinically stable.  Fatigue.  Generalized weakness.  ECOG-  3-Symptomatic, greater than 50% sedentary.    Review of Systems   Constitutional:  Positive for fatigue. Negative for chills and fever.   HENT:  Negative for nosebleeds.    Eyes:  Negative for discharge.   Respiratory:  Negative for cough and shortness of breath.    Cardiovascular:  Negative for chest pain.   Gastrointestinal:  Negative for abdominal pain, constipation and diarrhea.   Endocrine: Negative for polydipsia.   Genitourinary:  Negative for hematuria.   Musculoskeletal:  Negative for arthralgias.   Skin:  Negative for color change.   Allergic/Immunologic: Negative for immunocompromised state.   Neurological:  Positive for weakness. Negative for dizziness and headaches.   Hematological:  Negative for adenopathy.   Psychiatric/Behavioral:  Negative for agitation.        Past Medical History:   Diagnosis Date    Altered mental status 10/08/2024    COVID-19 01/15/2024    Diabetes mellitus (HCC)     Elevated PSA 06/21/2023    High cholesterol     Hypertension     Prostate cancer (HCC)     Severe sepsis (HCC) 10/08/2024     Patient Active Problem List   Diagnosis    Type 2 diabetes mellitus, with long-term current use of insulin (HCC)    Other hydronephrosis    Hypertension    Hydronephrosis    Prostate cancer metastatic to bone (HCC)    Encounter for monitoring androgen deprivation therapy    Nephrostomy status (HCC)    Hyperlipidemia    Malfunction of  nephrostomy tube (HCC)    UTI (urinary tract infection)    Stage 3a chronic kidney disease (HCC)    Hypoglycemia    Electrolyte abnormality    Cancer related pain    Palliative care by specialist    Ambulatory dysfunction    Therapeutic opioid-induced constipation (OIC)    Nausea and vomiting    Advanced care planning/counseling discussion    Goals of care, counseling/discussion    Loss of appetite    Unintentional weight loss    Hypercalcemia    Moderate protein-calorie malnutrition (HCC)    Vitamin B12 deficiency       Current Outpatient Medications:     acetaminophen (TYLENOL) 500 mg tablet, Take 2 tablets (1,000 mg total) by mouth 3 (three) times a day, Disp: 180 tablet, Rfl: 2    Alcohol Swabs 70 % PADS, To clean the skin prior to injecting insulin, Disp: 100 each, Rfl: 1    Blood Glucose Monitoring Suppl (OneTouch Verio Reflect) w/Device KIT, Check blood sugars once daily. Please substitute with appropriate alternative as covered by patient's insurance. Dx: E11.65, Disp: 1 kit, Rfl: 0    Diclofenac Sodium (VOLTAREN) 1 %, Apply 2 g topically 4 (four) times a day, Disp: 100 g, Rfl: 3    Easy Touch Pen Needles 31G X 6 MM MISC, Use daily at bedtime, Disp: 100 each, Rfl: 3    gabapentin (Neurontin) 300 mg capsule, Take 1 capsule (300 mg total) by mouth 3 (three) times a day, Disp: 90 capsule, Rfl: 2    glucose blood (OneTouch Verio) test strip, Check blood sugars twice a daily. Please substitute with appropriate alternative as covered by patient's insurance. Dx: E11.65, Disp: 200 each, Rfl: 3    magnesium Oxide (MAG-OX) 400 mg TABS, Take 1 tablet (400 mg total) by mouth 2 (two) times a day, Disp: 60 tablet, Rfl: 1    metFORMIN (GLUCOPHAGE) 1000 MG tablet, Take 1 tablet (1,000 mg total) by mouth 2 (two) times a day with meals, Disp: 180 tablet, Rfl: 1    Multiple Vitamin (multivitamin) tablet, Take 1 tablet by mouth daily, Disp: 30 tablet, Rfl: 2    omega-3-acid ethyl esters (LOVAZA) 1 g capsule, , Disp: , Rfl:      SaraTouch Delica Lancets 33G MISC, Check blood sugars once daily. Please substitute with appropriate alternative as covered by patient's insurance. Dx: E11.65, Disp: 100 each, Rfl: 3    pantoprazole (PROTONIX) 40 mg tablet, TAKE 1 TABLET BY MOUTH EVERY DAY, Disp: 90 tablet, Rfl: 1    polyethylene glycol (MIRALAX) 17 g packet, Take 17 g by mouth daily as needed (for constipation), Disp: 100 each, Rfl: 1    Spacer/Aero-Holding Chambers (AEROCHAMBER MINI CHAMBER) BILLY, by Does not apply route see administration instructions, Disp: 1 Device, Rfl: 0    albuterol (Ventolin HFA) 90 mcg/act inhaler, Inhale 2 puffs every 6 (six) hours as needed for wheezing (Patient not taking: Reported on 9/5/2024), Disp: 18 g, Rfl: 0    dexamethasone (DECADRON) 0.5 mg tablet, TAKE 1 TABLET (0.5 MG TOTAL) BY MOUTH DAILY WITH BREAKFAST (Patient not taking: Reported on 9/5/2024), Disp: 30 tablet, Rfl: 1    glucose 4-6 GM-MG, Chew 2 tablets daily as needed for low blood sugar, Disp: 90 tablet, Rfl: 3    Multiple Vitamins-Minerals (Sentry) TABS, Take 1 tablet by mouth daily (Patient not taking: Reported on 9/5/2024), Disp: , Rfl:     naloxone (NARCAN) 4 mg/0.1 mL nasal spray, Administer 1 spray into a nostril. If no response after 2-3 minutes, give another dose in the other nostril using a new spray. (Patient not taking: Reported on 9/5/2024), Disp: 1 each, Rfl: 0    ondansetron (Zofran ODT) 8 mg disintegrating tablet, Take 1 tablet (8 mg total) by mouth every 8 (eight) hours as needed for nausea or vomiting (Patient not taking: Reported on 10/8/2024), Disp: 20 tablet, Rfl: 3    oxyCODONE (ROXICODONE) 10 MG TABS, Take 1 tablet (10 mg total) by mouth every 4 (four) hours as needed for moderate pain Max Daily Amount: 60 mg (Patient not taking: Reported on 8/13/2024), Disp: 90 tablet, Rfl: 0    senna-docusate sodium (SENOKOT S) 8.6-50 mg per tablet, Take 1 tablet by mouth daily (Patient not taking: Reported on 6/28/2024), Disp: 90 tablet, Rfl: 1     "sodium chloride 1 g tablet, Take 1 tablet (1 g total) by mouth 2 (two) times a day with meals (Patient not taking: Reported on 10/17/2024), Disp: 60 tablet, Rfl: 0    sodium chloride, PF, 0.9 %, 10 mL by Intracatheter route daily Intracatheter flushing daily. May substitute prefilled syringe with normal saline 10 mL vials, 10 mL syringes, and 18 g blunt needles, Disp: 600 mL, Rfl: 2  Allergies   Allergen Reactions    Docetaxel Anaphylaxis     Past Surgical History:   Procedure Laterality Date    IR NEPHROSTOMY TUBE CHECK/CHANGE/REPOSITION/REINSERTION/UPSIZE  9/28/2023    IR NEPHROSTOMY TUBE CHECK/CHANGE/REPOSITION/REINSERTION/UPSIZE  12/28/2023    IR NEPHROSTOMY TUBE CHECK/CHANGE/REPOSITION/REINSERTION/UPSIZE  1/31/2024    IR NEPHROSTOMY TUBE CHECK/CHANGE/REPOSITION/REINSERTION/UPSIZE  2/2/2024    IR NEPHROSTOMY TUBE CHECK/CHANGE/REPOSITION/REINSERTION/UPSIZE  3/4/2024    IR NEPHROSTOMY TUBE CHECK/CHANGE/REPOSITION/REINSERTION/UPSIZE  3/20/2024    IR NEPHROSTOMY TUBE CHECK/CHANGE/REPOSITION/REINSERTION/UPSIZE  6/7/2024    IR NEPHROSTOMY TUBE CHECK/CHANGE/REPOSITION/REINSERTION/UPSIZE  8/5/2024    IR NEPHROSTOMY TUBE CHECK/CHANGE/REPOSITION/REINSERTION/UPSIZE  10/4/2024    IR NEPHROSTOMY TUBE PLACEMENT  06/22/2023    bilateral    IR OTHER  1/15/2024    US GUIDED PROSTATE BIOPSY       Social History     Objective:  Vitals:    10/17/24 1514   BP: 108/80   BP Location: Left arm   Patient Position: Sitting   Cuff Size: Adult   Pulse: 62   Resp: 17   Temp: 98.3 °F (36.8 °C)   SpO2: 100%   Weight: 64.2 kg (141 lb 9.6 oz)   Height: 5' 8\" (1.727 m)     Physical Exam  Constitutional:       Appearance: He is well-developed.   HENT:      Head: Normocephalic and atraumatic.      Mouth/Throat:      Mouth: Mucous membranes are moist.   Eyes:      Pupils: Pupils are equal, round, and reactive to light.   Cardiovascular:      Rate and Rhythm: Normal rate and regular rhythm.      Heart sounds: No murmur heard.  Pulmonary:      Breath " sounds: Normal breath sounds. No wheezing or rales.   Abdominal:      Palpations: Abdomen is soft.      Tenderness: There is no abdominal tenderness.   Musculoskeletal:         General: No tenderness. Normal range of motion.      Cervical back: Neck supple.   Lymphadenopathy:      Cervical: No cervical adenopathy.   Skin:     Findings: No erythema or rash.   Neurological:      Mental Status: He is alert and oriented to person, place, and time.      Cranial Nerves: No cranial nerve deficit.      Deep Tendon Reflexes: Reflexes are normal and symmetric.   Psychiatric:         Behavior: Behavior normal.           Labs:  I personally reviewed the labs and imaging pertinent to this patient care.

## 2024-10-22 ENCOUNTER — HOSPITAL ENCOUNTER (OUTPATIENT)
Dept: RADIOLOGY | Age: 74
Discharge: HOME | End: 2024-10-22

## 2024-10-22 DIAGNOSIS — C61 PROSTATE CANCER METASTATIC TO BONE (HCC): ICD-10-CM

## 2024-10-22 DIAGNOSIS — C79.51 PROSTATE CANCER METASTATIC TO BONE (HCC): ICD-10-CM

## 2024-10-23 DIAGNOSIS — Z93.6 NEPHROSTOMY STATUS (HCC): ICD-10-CM

## 2024-10-23 RX ORDER — SODIUM CHLORIDE 9 MG/ML
10 INJECTION, SOLUTION INTRAMUSCULAR; INTRAVENOUS; SUBCUTANEOUS DAILY
Qty: 600 ML | Refills: 2 | OUTPATIENT
Start: 2024-10-23 | End: 2025-04-21

## 2024-10-24 ENCOUNTER — RA CDI HCC (OUTPATIENT)
Dept: OTHER | Facility: HOSPITAL | Age: 74
End: 2024-10-24

## 2024-10-25 ENCOUNTER — OFFICE VISIT (OUTPATIENT)
Age: 74
End: 2024-10-25

## 2024-10-25 ENCOUNTER — TELEPHONE (OUTPATIENT)
Age: 74
End: 2024-10-25

## 2024-10-25 VITALS
OXYGEN SATURATION: 99 % | TEMPERATURE: 97.3 F | RESPIRATION RATE: 16 BRPM | BODY MASS INDEX: 20.56 KG/M2 | DIASTOLIC BLOOD PRESSURE: 70 MMHG | SYSTOLIC BLOOD PRESSURE: 107 MMHG | HEART RATE: 88 BPM | WEIGHT: 135.2 LBS

## 2024-10-25 DIAGNOSIS — C61 PROSTATE CANCER METASTATIC TO BONE (HCC): ICD-10-CM

## 2024-10-25 DIAGNOSIS — C61 PROSTATE CANCER METASTATIC TO BONE (HCC): Primary | ICD-10-CM

## 2024-10-25 DIAGNOSIS — Z93.6 NEPHROSTOMY STATUS (HCC): ICD-10-CM

## 2024-10-25 DIAGNOSIS — R63.0 ANOREXIA: Primary | ICD-10-CM

## 2024-10-25 DIAGNOSIS — Z79.4 TYPE 2 DIABETES MELLITUS WITH OTHER SPECIFIED COMPLICATION, WITH LONG-TERM CURRENT USE OF INSULIN (HCC): Chronic | ICD-10-CM

## 2024-10-25 DIAGNOSIS — C79.51 PROSTATE CANCER METASTATIC TO BONE (HCC): Primary | ICD-10-CM

## 2024-10-25 DIAGNOSIS — Z23 ENCOUNTER FOR IMMUNIZATION: ICD-10-CM

## 2024-10-25 DIAGNOSIS — E11.69 TYPE 2 DIABETES MELLITUS WITH OTHER SPECIFIED COMPLICATION, WITH LONG-TERM CURRENT USE OF INSULIN (HCC): Chronic | ICD-10-CM

## 2024-10-25 DIAGNOSIS — C79.51 PROSTATE CANCER METASTATIC TO BONE (HCC): ICD-10-CM

## 2024-10-25 RX ORDER — OMEGA-3-ACID ETHYL ESTERS 1 G/1
1 CAPSULE, LIQUID FILLED ORAL DAILY
Qty: 30 CAPSULE | Refills: 3 | Status: SHIPPED | OUTPATIENT
Start: 2024-10-25

## 2024-10-25 RX ORDER — SODIUM CHLORIDE 9 MG/ML
10 INJECTION, SOLUTION INTRAMUSCULAR; INTRAVENOUS; SUBCUTANEOUS DAILY
Qty: 600 ML | Refills: 2 | Status: SHIPPED | OUTPATIENT
Start: 2024-10-25 | End: 2025-04-23

## 2024-10-25 RX ORDER — MULTIVITAMIN
1 TABLET ORAL DAILY
Qty: 30 TABLET | Refills: 2 | Status: SHIPPED | OUTPATIENT
Start: 2024-10-25

## 2024-10-25 RX ORDER — PANTOPRAZOLE SODIUM 40 MG/1
40 TABLET, DELAYED RELEASE ORAL DAILY
Qty: 90 TABLET | Refills: 1 | Status: SHIPPED | OUTPATIENT
Start: 2024-10-25

## 2024-10-25 NOTE — ASSESSMENT & PLAN NOTE
Follows oncology and palliative care. Significant weight loss. Awaiting follow-up treatment plan with oncology  Orders:    Multiple Vitamin (multivitamin) tablet; Take 1 tablet by mouth daily    pantoprazole (PROTONIX) 40 mg tablet; Take 1 tablet (40 mg total) by mouth daily    Cholecalciferol (VITAMIN D3) 1,000 units tablet; Take 1 tablet (1,000 Units total) by mouth daily

## 2024-10-25 NOTE — PATIENT INSTRUCTIONS
Medical Marijuana information      Introduction: Medical marijuana is legally allowed in limited cases for treatment of certain medical conditions as listed below. Patients can go to one of the listed centers, where they will have to establish a relationship with a medical doctor there in order to receive medical marijuana. Some of these centers accept only cash.      Medical conditions  for which medical marijuana is allowed  A physician must certify that a patient has an approved debilitating medical condition to participate in the Medicinal Marijuana Program. Approved debilitating medical conditions include (especially if severe and resistant to other treatment):  Amyotrophic lateral sclerosis  Anxiety   Chronic pain related to musculoskeletal disorders  Chronic pain of visceral origin  Migraine  Multiple sclerosis  Terminal cancer  Muscular dystrophy  Inflammatory bowel disease, including Crohn’s disease  Terminal illness, if the physician has determined a prognosis of less than 12 months of life  Tourette's Syndrome  The following conditions apply, if resistant to, or if the patient is intolerant to, conventional therapy:  Seizure disorder, including epilepsy  Intractable skeletal muscular spasticity  Glaucoma  Post-Traumatic Stress Disorder (PTSD)  The following conditions apply, if severe or chronic pain, severe nausea or vomiting, cachexia or wasting syndrome results from the condition or treatment thereof:  Positive status for human immunodeficiency virus  Acquired immune deficiency syndrome  Cancer    Useful sites:   [facility] Dept. of Health Medical Marijuana program: https://www.[facility]/medicalmarSymptom.ly/  FAQ’s for patients: https://www.[facility]/[place]/pat_faqs.shtml#1  Alternate Treatment sites (sites that dispense medical marijuana): https://www.[facility]/medicalOMEGA MORGAN/alt-treatment-centers/      [facility]   For information on the [facility]'s medical marijuana  program, go to [place]ides[place]medical[place]program.    Medical dispensary in [place], [place]: [facility]. Address[address] [facility] Hospital[facility]: [email or web address] or go to [email or web address]  Apply for a medical marijuana ID card: [email or web address]  [facility]  [address], [place] [address]        Tel:  [phone]              [facility]:   [place] [address] Tel. [phone]

## 2024-10-25 NOTE — PROGRESS NOTES
Ambulatory Visit  Name: Oliver Astudillo      : 1950      MRN: [id number]  Encounter Provider: Guanako Bell MD  Encounter Date: 10/25/2024   Encounter department: [facility]    Assessment & Plan  Anorexia  Reports significant loss of appetite, eating 1-2x per day of predominantly soup and smoothies. Approximately 35lbs weight loss in the past 6 months.  Encouraged increased protein supplementation with ensure-plus and consider whey protein in smoothies with fruit/veggies  Discussed possibility of starting medical marijuana to boost appetite and suppress nausea, patient will discuss with palliative care at upcoming appointment  Orders:    Cholecalciferol (VITAMIN D3) 1,000 units tablet; Take 1 tablet (1,000 Units total) by mouth daily    Ambulatory Referral to Nutrition Services for Oncology; Future    Nutritional Supplements (Ensure Plus High Protein) LIQD; Take 237 mL by mouth 3 (three) times a day    Encounter for immunization    Orders:    influenza vaccine, high-dose, PF 0.5 mL (Fluzone High Dose)    Prostate cancer metastatic to bone (HCC)  Follows oncology and palliative care. Significant weight loss. Awaiting follow-up treatment plan with oncology  Orders:    Multiple Vitamin (multivitamin) tablet; Take 1 tablet by mouth daily    pantoprazole (PROTONIX) 40 mg tablet; Take 1 tablet (40 mg total) by mouth daily    Cholecalciferol (VITAMIN D3) 1,000 units tablet; Take 1 tablet (1,000 Units total) by mouth daily    Type 2 diabetes mellitus with other specified complication, with long-term current use of insulin (HCC)    Lab Results   Component Value Date    HGBA1C 5.6 10/08/2024   Chronic, well controlled, continue current regimen  F/U DM eye exam    Orders:    metFORMIN (GLUCOPHAGE) 1000 MG tablet; Take 1 tablet (1,000 mg total) by mouth 2 (two) times a day with meals    omega-3-acid ethyl esters (LOVAZA) 1 g capsule; Take 1 capsule (1 g total) by mouth daily     IRIS Diabetic eye exam    Nephrostomy status (HCC)    Orders:    sodium chloride, PF, 0.9 %; 10 mL by Intracatheter route daily Intracatheter flushing daily. May substitute prefilled syringe with normal saline 10 mL vials, 10 mL syringes, and 18 g blunt needles       History of Present Illness     Patient seen and examined in office for complaints of significant weight loss and anorexia in setting of prostate cancer with mets to bone. Patient report minimal nausea or vomiting, but has lost his appetite for most foods. Wife has been encouraging and providing soups and blended beverages with fruits and vegetables to provide some source of nutrients.          Review of Systems        Objective     /70 (BP Location: Right arm, Patient Position: Sitting, Cuff Size: Adult)   Pulse 88   Temp (!) 97.3 °F (36.3 °C) (Temporal)   Resp 16   Wt 61.3 kg (135 lb 3.2 oz)   SpO2 99%   BMI 20.56 kg/m²     Physical Exam

## 2024-10-25 NOTE — ASSESSMENT & PLAN NOTE
Orders:    sodium chloride, PF, 0.9 %; 10 mL by Intracatheter route daily Intracatheter flushing daily. May substitute prefilled syringe with normal saline 10 mL vials, 10 mL syringes, and 18 g blunt needles

## 2024-10-25 NOTE — TELEPHONE ENCOUNTER
[facility] - Order#[id number], [id number], [id number], [id number]    Scanned copy into encounter    Placed in Dr. Bates's folder in precepting room.    Fax when complete: [phone]

## 2024-10-25 NOTE — ASSESSMENT & PLAN NOTE
Reports significant loss of appetite, eating 1-2x per day of predominantly soup and smoothies. Approximately 35lbs weight loss in the past 6 months.  Encouraged increased protein supplementation with ensure-plus and consider whey protein in smoothies with fruit/veggies  Discussed possibility of starting medical marijuana to boost appetite and suppress nausea, patient will discuss with palliative care at upcoming appointment  Orders:    Cholecalciferol (VITAMIN D3) 1,000 units tablet; Take 1 tablet (1,000 Units total) by mouth daily    Ambulatory Referral to Nutrition Services for Oncology; Future    Nutritional Supplements (Ensure Plus High Protein) LIQD; Take 237 mL by mouth 3 (three) times a day

## 2024-10-25 NOTE — ASSESSMENT & PLAN NOTE
Lab Results   Component Value Date    HGBA1C 5.6 10/08/2024   Chronic, well controlled, continue current regimen  F/U DM eye exam    Orders:    metFORMIN (GLUCOPHAGE) 1000 MG tablet; Take 1 tablet (1,000 mg total) by mouth 2 (two) times a day with meals    omega-3-acid ethyl esters (LOVAZA) 1 g capsule; Take 1 capsule (1 g total) by mouth daily    IRIS Diabetic eye exam

## 2024-10-29 ENCOUNTER — TELEPHONE (OUTPATIENT)
Dept: NUTRITION | Facility: HOSPITAL | Age: 74
End: 2024-10-29

## 2024-10-29 NOTE — TELEPHONE ENCOUNTER
Received notification by  Guanaok Bell MD  on 10/25 that pt has triggered for oncology nutrition care (reason for referral: Ambulatory referral for oncology nutrition services ). Noted to have weight loss and anorexia.     Contacted [facility] and spoke to son. I introduced self and explained the reason for today's call.      Discussed oncology nutrition services available (options for in-person and phone consultation) and the benefits of meeting for a consultation.    Initial RD phone consultation set up for 11/8 at 1:00pm. Son will be available for translation.       Provided this RD’s contact information asking that [facility] reach out prn.  All questions/concerns addressed at this time.

## 2024-10-31 ENCOUNTER — OFFICE VISIT (OUTPATIENT)
Dept: PALLIATIVE MEDICINE | Facility: CLINIC | Age: 74
End: 2024-10-31
Payer: COMMERCIAL

## 2024-10-31 VITALS
BODY MASS INDEX: 20.95 KG/M2 | DIASTOLIC BLOOD PRESSURE: 60 MMHG | TEMPERATURE: 98.4 F | HEART RATE: 94 BPM | HEIGHT: 68 IN | OXYGEN SATURATION: 98 % | SYSTOLIC BLOOD PRESSURE: 132 MMHG | WEIGHT: 138.2 LBS

## 2024-10-31 DIAGNOSIS — G89.3 CANCER RELATED PAIN: ICD-10-CM

## 2024-10-31 DIAGNOSIS — R63.0 ANOREXIA: ICD-10-CM

## 2024-10-31 DIAGNOSIS — R63.4 UNINTENTIONAL WEIGHT LOSS: ICD-10-CM

## 2024-10-31 DIAGNOSIS — R26.2 AMBULATORY DYSFUNCTION: ICD-10-CM

## 2024-10-31 DIAGNOSIS — Z51.5 PALLIATIVE CARE BY SPECIALIST: ICD-10-CM

## 2024-10-31 DIAGNOSIS — C79.51 PROSTATE CANCER METASTATIC TO BONE (HCC): Primary | ICD-10-CM

## 2024-10-31 DIAGNOSIS — R63.0 LOSS OF APPETITE: ICD-10-CM

## 2024-10-31 DIAGNOSIS — T40.2X5A THERAPEUTIC OPIOID-INDUCED CONSTIPATION (OIC): ICD-10-CM

## 2024-10-31 DIAGNOSIS — K59.03 THERAPEUTIC OPIOID-INDUCED CONSTIPATION (OIC): ICD-10-CM

## 2024-10-31 DIAGNOSIS — C61 PROSTATE CANCER METASTATIC TO BONE (HCC): Primary | ICD-10-CM

## 2024-10-31 PROCEDURE — 99214 OFFICE O/P EST MOD 30 MIN: CPT | Performed by: INTERNAL MEDICINE

## 2024-10-31 PROCEDURE — G2211 COMPLEX E/M VISIT ADD ON: HCPCS | Performed by: INTERNAL MEDICINE

## 2024-10-31 RX ORDER — GABAPENTIN 300 MG/1
300 CAPSULE ORAL 3 TIMES DAILY
Qty: 90 CAPSULE | Refills: 2 | Status: SHIPPED | OUTPATIENT
Start: 2024-10-31

## 2024-10-31 RX ORDER — OXYCODONE HYDROCHLORIDE 10 MG/1
10 TABLET ORAL EVERY 4 HOURS PRN
Qty: 90 TABLET | Refills: 0 | Status: SHIPPED | OUTPATIENT
Start: 2024-10-31

## 2024-10-31 RX ORDER — DEXAMETHASONE 1 MG
1 TABLET ORAL
Qty: 60 TABLET | Refills: 2 | Status: SHIPPED | OUTPATIENT
Start: 2024-10-31

## 2024-10-31 NOTE — ASSESSMENT & PLAN NOTE
Increase dexamethasone to 1mg BID (breakfast + lunch).  Patient may benefit from MMJ or recreational marijuana (for appetite, pain, and other issues); as he is a [place] resident [facility] is unable to certify. Provided information on obtaining MMJ / legal recreational marijuana in [place].

## 2024-10-31 NOTE — ASSESSMENT & PLAN NOTE
Continue disease-directed cares. Patient not currently receiving treatment but plan is to initiate Pluvicto therapy, if he is weight can increase and his anemia improves.

## 2024-10-31 NOTE — PATIENT INSTRUCTIONS
"It was good to see you today. Thank you for coming in.    Increase dexamethasone to 1mg twice daily; take with or before breakfast and lunch.  I think that you could benefit from medical marijuana (MMJ) or recreational marijuana (now legal in [place]), to assist with symptoms such as pain, loss of appetite, mood issues.  For legal recreational marijuana or MMJ, there is a dispensary called \"[facility]\" located on [facility] in [place].  The [place] MMJ website can be found at [email or web address]/[email or web address]/.  To register for MMJ, go to the [place] website ([place]://[email or web address]).  You will need a computer (a public computer is fine, such as in a [place]), and your own personal email address (free email like [employer] should be acceptable).  You will apply at that website; there may be a fee.  Once you have the registration number, call one of the [place] MMJ providers' offices to set up an MMJ certification appointment.  You can find an up-to-date provider [place] at [email or web address]  Use gabapentin on a scheduled basis, three times per day every day.  It is okay to take oxycodone as needed for pain, even if it is at the same time as the gabapentin. Use as directed on the bottle.  Return in about 8 weeks (around 12/26/2024).  Call us for refills on medications that we supply, as needed.   If something changes and you need to come in sooner, please call our office.    PRESCRIPTION REFILL REMINDER:  All medication refills should be requested prior to Noon on Friday. Any refill requests after noon on Friday would be addressed the following Monday.    MEDICATION SAFETY ISSUES:   Do not drive under the influence of narcotics (including opioids), watch for adverse effects including confusion / altered mental status / respiratory depression (slowed breathing), keep medications stored in a safe/locked environment, do not use alcohol while opioids or other narcotics are " in your system. Do not travel with more than the minimum number of tablets or capsules required for the trip.

## 2024-10-31 NOTE — ASSESSMENT & PLAN NOTE
Used  #[id number].   Emotional / psychosocial support provided today. Patient's wife feels the patient is having some mood issues, patient disagrees and he declines offer of mood medications.  ACP: On file.  Reviewed notes (RD, PCP, Medical Oncology, DC Summary, PT, Radiation Oncology, Orthopedics, Endocrinology), labs (10/16/24 creatinine 0.72, albumin 3.8, .364; 9/25/24 Cr 0.89, alb 3.7, Hb 12.0; 9/4/24 PSA 58.981; 7/15/24 PSA 8.619), imaging + procedures (10/15/24 PET/CT showing progression of metastatic disease along with some response of primary disease in and around the prostate gland and bladder; 10/8/24 CXR; 8/13/24 XR R ribs + XR R shoulder). See below for more data.  Return in about 4 weeks (around 11/28/2024).  Medication safety issues addressed - no driving under the influence of narcotics (including opioids), watch for adverse effects including AMS or respiratory depression (slowed breathing), keep medications stored in a safe/locked environment, do not use alcohol while opioids or other narcotics are in one's system, do not travel with more than the minimum number of tablets or capsules required for the trip.  I have personally queried the patient's controlled substance dispensing history in the Prescription Drug Monitoring Program in compliance with regulations before I have prescribed any controlled substances. The prescription history is consistent with prescribed therapy and our practice policies.

## 2024-10-31 NOTE — PROGRESS NOTES
Ambulatory Visit  Name: Oliver Astudillo      : 1950      MRN: [id number]  Encounter Provider: Isaiah Mcdonald MD  Encounter Date: 10/31/2024   Encounter department: [facility] PALLIATIVE [facility]    Assessment & Plan   1. Prostate cancer metastatic to bone (HCC)  Assessment & Plan:  Continue disease-directed cares. Patient not currently receiving treatment but plan is to initiate Pluvicto therapy, if he is weight can increase and his anemia improves.  Orders:  -     gabapentin (Neurontin) 300 mg capsule; Take 1 capsule (300 mg total) by mouth 3 (three) times a day  -     oxyCODONE (ROXICODONE) 10 MG TABS; Take 1 tablet (10 mg total) by mouth every 4 (four) hours as needed for moderate pain Max Daily Amount: 60 mg  -     dexamethasone (DECADRON) 1 mg tablet; Take 1 tablet (1 mg total) by mouth 2 (two) times a day before breakfast and lunch TAKE 1 TABLET (0.5 MG TOTAL) BY MOUTH DAILY WITH BREAKFAST  2. Cancer related pain  Assessment & Plan:  Patient endorses ongoing chronic significant back and hip pain, likely cancer related.   Patient has been taking gabapentin around-the-clock but has been avoiding oxycodone; his wife states that they were unsure if they could take both of them together. Counseled again that it is safe to take both gabapentin and oxycodone in the same day, even at the same time.   Continue gabapentin ATC, continue oxycodone PRN.  Recommend topical OTC products, local application of heat or cold, Tylenol up to 1000mg TID for chronic pain. Do not use heat on top of topical agents. Do not mix topical agents.  Orders:  -     gabapentin (Neurontin) 300 mg capsule; Take 1 capsule (300 mg total) by mouth 3 (three) times a day  -     oxyCODONE (ROXICODONE) 10 MG TABS; Take 1 tablet (10 mg total) by mouth every 4 (four) hours as needed for moderate pain Max Daily Amount: 60 mg  -     dexamethasone (DECADRON) 1 mg tablet; Take 1 tablet (1 mg total) by mouth 2 (two) times a day before breakfast  "and lunch TAKE 1 TABLET (0.5 MG TOTAL) BY MOUTH DAILY WITH BREAKFAST  3. Therapeutic opioid-induced constipation (OIC)  Assessment & Plan:  Counseled today on bowel regimen for opioid-induced constipation.  4. Unintentional weight loss  Assessment & Plan:  See \"anorexia\".  Orders:  -     dexamethasone (DECADRON) 1 mg tablet; Take 1 tablet (1 mg total) by mouth 2 (two) times a day before breakfast and lunch TAKE 1 TABLET (0.5 MG TOTAL) BY MOUTH DAILY WITH BREAKFAST  5. Anorexia  Assessment & Plan:  Increase dexamethasone to 1mg BID (breakfast + lunch).  Patient may benefit from MMJ or recreational marijuana (for appetite, pain, and other issues); as he is a [place] resident [facility] is unable to certify. Provided information on obtaining MMJ / legal recreational marijuana in [place].  6. Ambulatory dysfunction  Assessment & Plan:  Use DME to reduce fall risk and help control pain. Using transport chair today.  7. Palliative care by specialist  Assessment & Plan:  Used  #[id number].   Emotional / psychosocial support provided today. Patient's wife feels the patient is having some mood issues, patient disagrees and he declines offer of mood medications.  ACP: On file.  Reviewed notes (RD, PCP, Medical Oncology, DC Summary, PT, Radiation Oncology, Orthopedics, Endocrinology), labs (10/16/24 creatinine 0.72, albumin 3.8, .364; 9/25/24 Cr 0.89, alb 3.7, Hb 12.0; 9/4/24 PSA 58.981; 7/15/24 PSA 8.619), imaging + procedures (10/15/24 PET/CT showing progression of metastatic disease along with some response of primary disease in and around the prostate gland and bladder; 10/8/24 CXR; 8/13/24 XR R ribs + XR R shoulder). See below for more data.  Return in about 4 weeks (around 11/28/2024).  Medication safety issues addressed - no driving under the influence of narcotics (including opioids), watch for adverse effects including AMS or respiratory depression (slowed breathing), keep medications stored in a safe/locked " environment, do not use alcohol while opioids or other narcotics are in one's system, do not travel with more than the minimum number of tablets or capsules required for the trip.  I have personally queried the patient's controlled substance dispensing history in the Prescription Drug Monitoring Program in compliance with regulations before I have prescribed any controlled substances. The prescription history is consistent with prescribed therapy and our practice policies.    Orders:  -     gabapentin (Neurontin) 300 mg capsule; Take 1 capsule (300 mg total) by mouth 3 (three) times a day  -     oxyCODONE (ROXICODONE) 10 MG TABS; Take 1 tablet (10 mg total) by mouth every 4 (four) hours as needed for moderate pain Max Daily Amount: 60 mg  -     dexamethasone (DECADRON) 1 mg tablet; Take 1 tablet (1 mg total) by mouth 2 (two) times a day before breakfast and lunch TAKE 1 TABLET (0.5 MG TOTAL) BY MOUTH DAILY WITH BREAKFAST  8. Loss of appetite  -     dexamethasone (DECADRON) 1 mg tablet; Take 1 tablet (1 mg total) by mouth 2 (two) times a day before breakfast and lunch TAKE 1 TABLET (0.5 MG TOTAL) BY MOUTH DAILY WITH BREAKFAST      Subjective   Chief Complaint   Patient presents with    Follow-up    Cancer    Pain    Counseling    Weight Loss        History of Present Illness     Oliver Astudillo is a 74 y.o. male w/ prostate cancer metastatic to bone; diabetes, HTN+HLD, urinary retention and hydronephrosis s/p b/l nephrostomy tubes. He follows w/ Dr Coyne (Medical Oncology), Dr LENORE Andrea (Radiation Oncology). Admitted 10/8/24 through 10/11/24 for sepsis in the setting of UTI.    Patient defers to his wife for the majority of history, though he will participate from time to time during today's visit.  used.    Patient experiencing 8/10 pain at time of visit, focused on his back and his hips. He has not been taking oxycodone recently as family was unsure that it was okay to take that along with  "gabapentin. He is taking gabapentin ATC as prescribed.    Patient and family are concerned about patient's low weight and low appetite. Family is trying to supplement p.o. intake with Ensure shakes. Sometimes patient will refuse food. They state that the patient was recommended to discuss MMJ with this provider. Counseled that we cannot certify or prescribe MMJ products in [place], but that we can provide information on how they can obtain it. Counseled that it could help with symptoms such as pain and loss of appetite. Patient's wife reports the dexamethasone has not been helpful for patient's appetite.    Patient is sleeping for significant portion of the day, and is often awake at night. His wife states his mood is \"not good\" though patient disagrees. He states he is not interested in medicines for mood.    Patient endorses cold intolerance (generalized). Also noting significant fatigue. He is having a bowel movement approximately twice per week, though he has \"not eating much\".      Current Outpatient Medications:     acetaminophen (TYLENOL) 500 mg tablet, Take 2 tablets (1,000 mg total) by mouth 3 (three) times a day, Disp: 180 tablet, Rfl: 2    Alcohol Swabs 70 % PADS, To clean the skin prior to injecting insulin, Disp: 100 each, Rfl: 1    Blood Glucose Monitoring Suppl (OneTouch Verio Reflect) w/Device KIT, Check blood sugars once daily. Please substitute with appropriate alternative as covered by patient's insurance. Dx: E11.65, Disp: 1 kit, Rfl: 0    Cholecalciferol (VITAMIN D3) 1,000 units tablet, Take 1 tablet (1,000 Units total) by mouth daily, Disp: 90 tablet, Rfl: 3    dexamethasone (DECADRON) 1 mg tablet, Take 1 tablet (1 mg total) by mouth 2 (two) times a day before breakfast and lunch TAKE 1 TABLET (0.5 MG TOTAL) BY MOUTH DAILY WITH BREAKFAST, Disp: 60 tablet, Rfl: 2    Diclofenac Sodium (VOLTAREN) 1 %, Apply 2 g topically 4 (four) times a day, Disp: 100 g, Rfl: 3    Easy Touch Pen Needles 31G X " 6 MM MISC, Use daily at bedtime, Disp: 100 each, Rfl: 3    gabapentin (Neurontin) 300 mg capsule, Take 1 capsule (300 mg total) by mouth 3 (three) times a day, Disp: 90 capsule, Rfl: 2    glucose blood (OneTouch Verio) test strip, Check blood sugars twice a daily. Please substitute with appropriate alternative as covered by patient's insurance. Dx: E11.65, Disp: 200 each, Rfl: 3    magnesium Oxide (MAG-OX) 400 mg TABS, Take 1 tablet (400 mg total) by mouth 2 (two) times a day, Disp: 60 tablet, Rfl: 1    metFORMIN (GLUCOPHAGE) 1000 MG tablet, Take 1 tablet (1,000 mg total) by mouth 2 (two) times a day with meals, Disp: 180 tablet, Rfl: 1    Nutritional Supplements (Ensure Plus High Protein) LIQD, Take 237 mL by mouth 3 (three) times a day, Disp: 237 mL, Rfl: 9    Nutritional Supplements (Ensure Plus High Protein) LIQD, , Disp: , Rfl:     omega-3-acid ethyl esters (LOVAZA) 1 g capsule, Take 1 capsule (1 g total) by mouth daily, Disp: 30 capsule, Rfl: 3    OneTouch Delica Lancets 33G MISC, Check blood sugars once daily. Please substitute with appropriate alternative as covered by patient's insurance. Dx: E11.65, Disp: 100 each, Rfl: 3    oxyCODONE (ROXICODONE) 10 MG TABS, Take 1 tablet (10 mg total) by mouth every 4 (four) hours as needed for moderate pain Max Daily Amount: 60 mg, Disp: 90 tablet, Rfl: 0    pantoprazole (PROTONIX) 40 mg tablet, Take 1 tablet (40 mg total) by mouth daily, Disp: 90 tablet, Rfl: 1    polyethylene glycol (MIRALAX) 17 g packet, Take 17 g by mouth daily as needed (for constipation), Disp: 100 each, Rfl: 1    sodium chloride, PF, 0.9 %, 10 mL by Intracatheter route daily Intracatheter flushing daily. May substitute prefilled syringe with normal saline 10 mL vials, 10 mL syringes, and 18 g blunt needles, Disp: 600 mL, Rfl: 2    Spacer/Aero-Holding Chambers (AEROCHAMBER MINI CHAMBER) BILLY, by Does not apply route see administration instructions, Disp: 1 Device, Rfl: 0    albuterol (Ventolin  "HFA) 90 mcg/act inhaler, Inhale 2 puffs every 6 (six) hours as needed for wheezing (Patient not taking: Reported on 9/5/2024), Disp: 18 g, Rfl: 0    glucose 4-6 GM-MG, Chew 2 tablets daily as needed for low blood sugar, Disp: 90 tablet, Rfl: 3    Multiple Vitamin (multivitamin) tablet, Take 1 tablet by mouth daily (Patient not taking: Reported on 10/31/2024), Disp: 30 tablet, Rfl: 2    Multiple Vitamins-Minerals (Sentry) TABS, Take 1 tablet by mouth daily (Patient not taking: Reported on 9/5/2024), Disp: , Rfl:     naloxone (NARCAN) 4 mg/0.1 mL nasal spray, Administer 1 spray into a nostril. If no response after 2-3 minutes, give another dose in the other nostril using a new spray. (Patient not taking: Reported on 9/5/2024), Disp: 1 each, Rfl: 0    ondansetron (Zofran ODT) 8 mg disintegrating tablet, Take 1 tablet (8 mg total) by mouth every 8 (eight) hours as needed for nausea or vomiting (Patient not taking: Reported on 10/8/2024), Disp: 20 tablet, Rfl: 3    senna-docusate sodium (SENOKOT S) 8.6-50 mg per tablet, Take 1 tablet by mouth daily (Patient not taking: Reported on 6/28/2024), Disp: 90 tablet, Rfl: 1    sodium chloride 1 g tablet, Take 1 tablet (1 g total) by mouth 2 (two) times a day with meals (Patient not taking: Reported on 10/17/2024), Disp: 60 tablet, Rfl: 0    Objective   /60 (BP Location: Right arm, Patient Position: Sitting, Cuff Size: Standard)   Pulse 94   Temp 98.4 °F (36.9 °C) (Temporal)   Ht 5' 8\" (1.727 m)   Wt 62.7 kg (138 lb 3.2 oz)   SpO2 98%   BMI 21.01 kg/m²   Physical Exam  Vitals reviewed.   Constitutional:       General: He is awake. He is not in acute distress.     Appearance: He is well-groomed and underweight. He is ill-appearing. He is not toxic-appearing.   HENT:      Head: Normocephalic and atraumatic.      Right Ear: External ear normal.      Left Ear: External ear normal.   Eyes:      General: No scleral icterus.        Right eye: No discharge.         Left eye: " No discharge.      Extraocular Movements: Extraocular movements intact.      Conjunctiva/sclera: Conjunctivae normal.      Pupils: Pupils are equal, round, and reactive to light.   Cardiovascular:      Rate and Rhythm: Normal rate.   Pulmonary:      Effort: Pulmonary effort is normal. No tachypnea, bradypnea, accessory muscle usage or respiratory distress.      Comments: Able to speak comfortably in short phrases on room air at rest.  Abdominal:      General: There is no distension.      Tenderness: There is no guarding.   Musculoskeletal:      Cervical back: Normal range of motion.      Right lower leg: No edema.      Left lower leg: No edema.   Skin:     General: Skin is dry.      Coloration: Skin is not pale.   Neurological:      Mental Status: He is alert and oriented to person, place, and time.      Cranial Nerves: No dysarthria or facial asymmetry.      Motor: Weakness (generalized) present.      Gait: Gait abnormal (seen in transport chair).   Psychiatric:         Attention and Perception: Attention normal.         Mood and Affect: Mood normal.         Speech: Speech normal.         Behavior: Behavior normal. Behavior is cooperative.         Thought Content: Thought content normal.         Cognition and Memory: Cognition and memory normal.         Judgment: Judgment normal.      Comments: Reserved affect.          Recent labs:  Lab Results   Component Value Date/Time    SODIUM 131 (L) 10/16/2024 09:54 AM    K 4.0 10/16/2024 09:54 AM    BUN 16 10/16/2024 09:54 AM    CREATININE 0.72 10/16/2024 09:54 AM    GLUC 208 (H) 10/16/2024 09:54 AM    CALCIUM 9.7 10/16/2024 09:54 AM    AST 15 10/16/2024 09:54 AM    ALT 13 10/16/2024 09:54 AM    ALB 3.8 10/16/2024 09:54 AM    TP 8.1 10/16/2024 09:54 AM    EGFR 93 10/16/2024 09:54 AM     Lab Results   Component Value Date/Time    HGB 8.4 (L) 10/11/2024 05:16 AM    WBC 6.00 10/11/2024 05:16 AM     10/11/2024 05:16 AM    INR 1.22 (H) 10/08/2024 12:55 PM    PTT 32  10/08/2024 12:55 PM     Lab Results   Component Value Date/Time    OJM2TQDVZZPO 1.470 10/08/2024 04:46 PM       Recent Imaging:  Procedure: NM consultation pluvicto    Result Date: 10/22/2024  Narrative: A Nuclear Medicine Consultation was performed. Please refer to the Technologist Notes for consultation discussion details.     Procedure: NM PET CT skull base to mid thigh    Result Date: 10/15/2024  Narrative: ILLUCCIX PSMA PET/CT SCAN INDICATION:  C61: Malignant neoplasm of prostate C79.51: Secondary malignant neoplasm of bone.  Elevated PSA of 58.98 (9/4/2024). MODIFIER: PS COMPARISON: PET/CT 8/17/2023; CT abdomen pelvis 6/6/2024 CELL TYPE:  prostatic adenocarcinoma TECHNIQUE:   5.4 mCi Ga-68 Illuccix PSMA administered IV. Multiplanar attenuation corrected and non attenuation corrected PET images were acquired 60 minutes post injection. Contiguous, low dose, axial CT sections were obtained from the vertex through the femurs .   Intravenous or oral contrast was not utilized.  This examination, like all CT scans performed in the [facility], was performed utilizing techniques to minimize radiation dose exposure, including the use of iterative reconstruction and automated exposure control. FINDINGS: VISUALIZED BRAIN: No acute abnormalities are seen. HEAD/NECK: There is a physiologic distribution of the radiotracer. No PSMA avid cervical adenopathy is seen. CT images: Unremarkable. CHEST: - Multiple PSMA avid foci appearing in the right lower lung lobe are from the liver and represents hepatic metastasis. These appear to be in the pulmonary parenchyma likely due to respiratory motion and misregistration. - Focus of PSMA activity in the medial left lung base lateral to the distal descending thoracic aorta demonstrating SUV max of 3.4 (PET image 135). No discrete finding on CT correlate. This appears to be pleural-based and likely represents metastasis. CT images: Coronary calcifications. Small hiatal  hernia. No suspicious lymphadenopathy. ABDOMEN: New multiple PSMA avid hepatic foci, greatest uptake in segment IVB demonstrating SUV max of 23. Although evaluation is limited, many of these foci correlate with hypoattenuating hepatic lesions on low-dose CT. CT images: Scattered colonic diverticulosis. Stable splenic calcifications. Bilateral percutaneous nephrostomy tubes. No hydronephrosis. Atherosclerotic calcifications of the abdominal aorta. PELVIS: There is interval decrease in PSMA activity and extent involving the prostate gland and bladder. Persistent nodular activity involving the posterior bladder wall (SUV max of 10) as well as in the inferior bladder/superior prostate (SUV max of 6.9), suspicious for residual versus recurrent. No obvious PSMA activity involving the bilateral seminal vesicles. Interval resolution of PSMA activity involving the left pelvic sidewall lymph nodes. CT images: Persistent focus of air within the bladder lumen. OSSEOUS STRUCTURES: Innumerable PSMA avid osseous lesions, which have progressed since 8/17/2023. Some of these lesions correspond to sclerotic foci CT correlate. Representative osseous lesions as described: *  New PSMA osseous lesion involving the clivus demonstrates SUV max of 7.3. *  New right calvarial lesion demonstrates SUV max 3.0. *  New right occipital condyle lesion demonstrates SUV max of 20. *  New right coronoid process lesion demonstrates SUV max of 45. New left glenoid lesion demonstrates SUV max of 50. *  New right inferior scapular lesion demonstrates SUV max of 41. *  New sternal lesion demonstrates SUV max of 41. *  Right anterior lateral third rib demonstrates SUV max of 63. *  T2 vertebral body demonstrates SUV max of 51. This is associated with a lytic lesion on CT correlate. *  T9 vertebral body demonstrates SUV max of 15. *  Previously demonstrated L1 lytic lesion now appears sclerotic. New compression deformity of L1 with approximately 30% loss of  vertebral body height. SUV max of 2.2 (previously 7.3). *  Diffuse PSMA avid osseous lesions involving the sacrum with SUV max of 56. *  Diffuse PSMA avid osseous lesions involving the bilateral ilium, pubis, and ischium. *  Right femoral head lesion demonstrates SUV max of 24. *  Left femoral intertrochanteric region demonstrates SUV max of 46. CT images: Compression deformity of L1 vertebral body as described above. Chronic deformity of left posterior ribs 9-12. Spinal degenerative changes.     Impression: 1. New multiple PSMA avid hepatic foci, also suspicious for metastasis. 2. New focus of PSMA activity in the medial left lung base adjacent to the descending thoracic aorta, likely represents metastasis. 3. Interval decrease in PSMA activity and extensive of the prostate gland and bladder. There is persistent nodular activity in the region of the posterior bladder wall at the inferior bladder/superior prostate, suspicious for residual versus recurrent disease. Urinary activity is felt to be less likely. 4. Innumerable PSMA avid osseous lesions, which have progressed since 8/17/2023, compatible with metastasis. 5. New compression deformity of L1 vertebral body with approximately 30% loss of vertebral body height. 6. New lytic lesion in the T2 vertebral body with increased PSMA activity, which may predispose patient to pathologic fracture. Resident: MADDISON Enrique I, the attending radiologist, have reviewed the images and agree with the final report above. Workstation performed: [id number]     Procedure: XR chest portable    Result Date: 10/8/2024  Narrative: XR CHEST PORTABLE INDICATION: Chills, hypotension. COMPARISON: 8/13/2024 FINDINGS: Low lung volumes. Clear lungs. No pneumothorax or pleural effusion. Normal cardiomediastinal silhouette. Bones are unremarkable for age. Bilateral nephrostomy tubes noted.     Impression: No acute cardiopulmonary disease. Workstation performed: [id number]     Procedure: IR  nephrostomy tube check/change/reposition/reinsertion/upsize    Result Date: 10/4/2024  Narrative: Bilateral nephrostomy tube check and exchange Clinical History: Chronic bilateral nephrostomy tubes, here for routine exchange Contrast: 12 mL of iohexol (OMNIPAQUE) Fluoro time: 3.1 MIN Number of Images: Multiple Radiation dose: 15 mGy Conscious sedation time: NONE Technique/intervention: The patient was brought to the interventional radiology suite and placed prone on the table. The right and left back was prepped and draped in the usual sterile fashion. A timeout performed per protocol. 1% lidocaine was used for local anesthesia. Contrast injected through both catheters confirmed appropriate position within the renal collecting system. The pigtail locking mechanism was released and the skin sutures were removed. Guidewires were advanced  through the catheter and curled in the renal collecting system. The catheters were removed over a wire. New 10 [place]cu[place]orean all-purpose drainage catheters were advanced over the wire with the pigtails curled in the renal collecting system. The pigtail locking  mechanisms were engaged and a single stitch was placed to secure the catheter to the skin. Contrast injected through both catheters confirmed appropriate position.     Impression: Impression: 1.  Bilateral nephrostomy tube check and exchange. Workstation performed: [id number]     Procedure: XR ribs 2 vw left    Result Date: 8/14/2024  Narrative: XR RIBS 2 VW LEFT INDICATION: S49.91XA: Unspecified injury of right shoulder and upper arm, initial encounter. COMPARISON: None FINDINGS: No evidence of a rib fracture. No pneumothorax or pleural effusion. Clear lungs. Normal cardiomediastinal silhouette. Bilateral percutaneous nephrostomy tubes are present.     Impression: No evidence of a rib fracture. No acute cardiopulmonary disease. Computerized Assisted Algorithm (CAA) may have been used to analyze all applicable images. Workstation  performed: [id number]     Procedure: XR shoulder 2+ vw right    Result Date: 8/14/2024  Narrative: XR SHOULDER 2+ VW RIGHT INDICATION: S49.91XA: Unspecified injury of right shoulder and upper arm, initial encounter. COMPARISON: None FINDINGS: No acute fracture or dislocation. No significant degenerative changes. No lytic or blastic osseous lesion. Unremarkable soft tissues.     Impression: No acute osseous abnormality. Computerized Assisted Algorithm (CAA) may have been used to analyze all applicable images. Workstation performed: [id number]     Procedure: IR nephrostomy tube check/change/reposition/reinsertion/upsize    Result Date: 8/5/2024  Narrative: IR NEPHROSTOMY TUBE CHECK/CHANGE/REPOSITION/REINSERTION/UPSIZE PROCEDURE: Routine bilateral percutaneous nephrostomy tube change CLINICAL INDICATION: History of prostate cancer with chronic bilateral indwelling percutaneous nephrostomy tubes necessitating routine change COMPARISON: 6/7/2024 PERFORMING PHYSICIAN: Wenceslao Ho MD ASSISTANT PHYSICIAN: None MEDICATIONS: 1% lidocaine (local). SEDATION TIME: NONE CONTRAST: 8 mL of iohexol (OMNIPAQUE) FLUOROSCOPY TIME: 1.7 MIN RADIATION DOSE: 13 mGy   (air Kerma). NUMBER OF IMAGES: 8 TECHNIQUE: The patient was brought to the procedure room and a time-out was performed utilizing universal protocol. The patient was identified verbally and via wrist band. The patient was placed prone on the procedure table and both flanks were prepped and draped in the usual sterile fashion. All elements of maximal sterile barrier technique were followed (cap, mask, sterile gown, sterile gloves, large sterile sheet, hand hygiene, and 2% chlorhexidine for cutaneous antisepsis). Initially, the left nephrostomy tube was changed. The skin and subcutaneous tissues were infiltrated with local anesthetic. Contrast instilled demonstrating catheter locking loop positioned within decompressed renal pelvis. Contrast flows down the ureter.  "Catheter exchanged out over an Amplatz wire and replaced with an identical 10 Nigerien APDL formed within the renal pelvis confirmed with contrast injection. Catheter was secured to the skin with 0 Prolene suture. It was connected to a gravity drainage bag. Site dressed and bandaged. The procedure was then repeated in identical fashion on the contralateral right side. The patient tolerated the procedure well without difficulty or complication encountered and left the section in satisfactory stable condition. FINDINGS: As above.     Impression: Uneventful bilateral percutaneous nephrostomy tube exchange as described. Next exchange in 2 months (given frequent issues, including dislodgment, of the patient's tubes). Workstation performed: [id number]      32 minutes total time spent on 10/31/2024 in caring for this patient including obtaining/reviewing history, symptom assessment and management, medication review / adjustment, psychosocial support, reviewing / ordering tests, medicines, imaging, procedures, medical marijuana, supportive listening, anticipatory guidance, patient/family education, instructions for management, importance of adhering to treatment plan, risks/benefits of treatment(s), risk factor reduction, and documenting in the medical record. All patient/family questions were answered during this discussion.    Isaiah Mcdonald MD  [facility]  [phone]    Portions of this document may have been created using dictation software and as such some \"sound alike\" terms may have been generated by the system. Do not hesitate to contact me with any questions or clarifications.   "

## 2024-10-31 NOTE — ASSESSMENT & PLAN NOTE
Patient endorses ongoing chronic significant back and hip pain, likely cancer related.   Patient has been taking gabapentin around-the-clock but has been avoiding oxycodone; his wife states that they were unsure if they could take both of them together. Counseled again that it is safe to take both gabapentin and oxycodone in the same day, even at the same time.   Continue gabapentin ATC, continue oxycodone PRN.  Recommend topical OTC products, local application of heat or cold, Tylenol up to 1000mg TID for chronic pain. Do not use heat on top of topical agents. Do not mix topical agents.

## 2024-11-01 ENCOUNTER — TELEPHONE (OUTPATIENT)
Age: 74
End: 2024-11-01

## 2024-11-01 ENCOUNTER — TELEPHONE (OUTPATIENT)
Dept: PALLIATIVE MEDICINE | Facility: CLINIC | Age: 74
End: 2024-11-01

## 2024-11-01 NOTE — TELEPHONE ENCOUNTER
[facility] Form Plan of Care-[id number] Placed in PCP's Folder. Copy has been scanned into this encounter

## 2024-11-04 DIAGNOSIS — E87.8 ELECTROLYTE ABNORMALITY: ICD-10-CM

## 2024-11-04 RX ORDER — LANOLIN ALCOHOL/MO/W.PET/CERES
400 CREAM (GRAM) TOPICAL 2 TIMES DAILY
Qty: 60 TABLET | Refills: 1 | Status: ON HOLD | OUTPATIENT
Start: 2024-11-04

## 2024-11-04 NOTE — TELEPHONE ENCOUNTER
Completed form has been faxed to the number listed below copy has been attached to this encounter.

## 2024-11-06 ENCOUNTER — TELEPHONE (OUTPATIENT)
Age: 74
End: 2024-11-06

## 2024-11-06 NOTE — TELEPHONE ENCOUNTER
Fax received from [facility] - Order ID's [id number] & [id number]     Copy scanned into encounter.    Placed in Dr. Bates's folder.    Fax completed form to: [phone]

## 2024-11-07 PROBLEM — N39.0 UTI (URINARY TRACT INFECTION): Status: RESOLVED | Noted: 2024-02-01 | Resolved: 2024-11-07

## 2024-11-08 ENCOUNTER — TELEPHONE (OUTPATIENT)
Age: 74
End: 2024-11-08

## 2024-11-08 ENCOUNTER — NUTRITION (OUTPATIENT)
Dept: NUTRITION | Facility: CLINIC | Age: 74
End: 2024-11-08

## 2024-11-08 DIAGNOSIS — Z71.3 NUTRITIONAL COUNSELING: Primary | ICD-10-CM

## 2024-11-08 NOTE — PROGRESS NOTES
Outpatient Oncology Nutrition Consultation   Type of Consult: Initial Consult  Care Location: Telephone Call  Translation provided by patient's son    Reason for referral: Received notification by  Guanako Bell MD  on 10/25 that pt has triggered for oncology nutrition care (reason for referral: Ambulatory referral for oncology nutrition services ). Noted to have weight loss and anorexia.     Nutrition Assessment:   Oncology Diagnosis & Treatments:   5/2023 prostate cancer Dx    Oncology History   Prostate cancer metastatic to bone (HCC)   5/24/2023 Initial Diagnosis    May 2023 patient presented with urinary frequency. . CT showed distended urinary bladder with bilateral hydronephrosis. Urinary bladder mass inseparable from the prostate. Extraprostatic extension noted. Bone scan showed indeterminate activity at T11 vertebral body. Bilateral nephrostomy tubes and Cazares catheter placed. Prostate biopsy showed adenocarcinoma, Liana 9.      6/28/2023 Biopsy    A. Prostate, right lateral base:  - Prostatic adenocarcinoma, Liana score 4 + 5 = 9, Prognostic Grade Group 5,  involving 90% of 1 needle core  and measuring  11 mm in length.        B. Prostate, right medial base:  - Prostatic adenocarcinoma, Liana score 4 + 5 = 9, Prognostic Grade Group 5,  involving 70% of 1 needle core  and measuring  10 mm in length.      C. Prostate, right lateral mid:  - Prostatic adenocarcinoma, Liana score 4 + 5 = 9, Prognostic Grade Group 5,  involving 90% of 1 needle core  and measuring  12 mm in length.      Comment: Immunohistochemistry for a prostate multiplex stain (p63, K903, and P504S) and NKX3.1 demonstrate invasive carcinoma.     D. Prostate, right medial mid:  - Prostatic adenocarcinoma, Helper score 4 + 5 = 9, Prognostic Grade Group 5,  involving 95% of 1 needle core  and measuring  15 mm in length.      E. Prostate, right lateral apex:  - Prostatic adenocarcinoma, Helper score 4 + 5 = 9, Prognostic Grade  Group 5,  involving 90% of 1 needle core  and measuring  12 mm in length.   - Perineural invasion identified.     Comment: Immunohistochemistry for a prostate multiplex stain (p63, K903, and P504S) and NKX3.1 demonstrate invasive carcinoma.     F. Prostate, right medial apex:  - Prostatic adenocarcinoma, Liana score 4 + 5 = 9, Prognostic Grade Group 5,  involving 100% of 1 needle core  and measuring  20 mm in length.      G. Prostate, left lateral base:  - Prostatic adenocarcinoma, Menifee score 4 + 5 = 9, Prognostic Grade Group 5,  involving 75% of 1 needle core  and measuring  10 mm in length.   - Perineural invasion identified.  - Lymphovascular invasion is identified.     H. Prostate, left medial base:  - Prostatic adenocarcinoma, Menifee score 4 + 5 = 9, Prognostic Grade Group 5,  involving 95% of 1 needle core  and measuring  14 mm in length.   - Perineural invasion identified.     Comment: There is a focus of closely approximated gastrointestinal epithelium; NKX3.1 shows no definite invasion of the GI epithelium.      I. Prostate, left lateral mid:  - Prostatic adenocarcinoma, Liana score 4 + 5 = 9, Prognostic Grade Group 5,  involving 95% of 1 needle core  and measuring  12 mm in length.       J. Prostate, left medial mid:  - Prostatic adenocarcinoma, Liana score 4 + 5 = 9, Prognostic Grade Group 5,  involving 85% of 1 needle core  and measuring  13 mm in length.       Comment: Immunohistochemistry for a prostate multiplex stain (p63, K903, and P504S) and NKX3.1 demonstrate invasive carcinoma.     K. Prostate, left lateral apex:  - Prostatic adenocarcinoma, Liana score 4 + 5 = 9, Prognostic Grade Group 5,  involving 25% of 1 needle core  and measuring  3 mm in length.        L. Prostate, left medial apex :  - Prostatic adenocarcinoma, Liana score 4 + 5 = 9, Prognostic Grade Group 5,  involving 95% of 1 needle core  and measuring  15 mm in length.     - Perineural invasion identified.     Comment:    Cribriform glands are present within the pattern 4 component.  This feature has been associated with adverse clinical outcomes and molecular features typically seen in advanced disease.  (The 2019 Genitourinary Pathology Society (GUPS) White Paper on Contemporary Grading of Prostate Cancer. Arch Pathol Lab Med. 2021 Apr 1;145(2):075-899.)     7/5/2023 -  Hormone Therapy    Firmagon loading dose on 7/5/23, followed by Lupron      8/28/2023 -  Cancer Staged    Staging form: Prostate, AJCC 8th Edition  - Clinical: Stage IVB (cT4, cN1, cM1b, PSA: 145, Grade Group: 5) - Signed by Dov Andrea MD on 8/28/2023  Prostate specific antigen (PSA) range: 20 or greater  Histologic grading system: 5 grade system       9/2023 - 6/2024 Chemotherapy    Zytiga 250 mg PO daily     6/2024 -  Chemotherapy    Xtandi      6/24/2024 - 7/8/2024 Radiation    [facility] This is the current list of radiation treatment:    Plan ID Energy Fractions Dose per Fraction (cGy) Dose Correction (cGy) Total Dose Delivered (cGy) Elapsed Days   T11_L5 Spine 10X/6X 10 / 10 300 0 3,000 14        8/30/2024 - 9/20/2024 Chemotherapy    alteplase (CATHFLO), 2 mg, Intracatheter, Every 1 Minute as needed, 2 of 3 cycles  DOCEtaxel (TAXOTERE) chemo infusion, 25 mg/m2 = 44.6 mg (83.3 % of original dose 30 mg/m2), Intravenous, Once, 2 of 3 cycles  Dose modification: 25 mg/m2 (original dose 30 mg/m2, Cycle 1, Reason: Anticipated Tolerance)  Administration: 44.6 mg (8/30/2024), 44.6 mg (9/6/2024), 44.6 mg (9/20/2024)       Past Medical & Surgical Hx:   Patient Active Problem List   Diagnosis    Type 2 diabetes mellitus, with long-term current use of insulin (HCC)    Other hydronephrosis    Hypertension    Hydronephrosis    Prostate cancer metastatic to bone (HCC)    Encounter for monitoring androgen deprivation therapy    Nephrostomy status (HCC)    Hyperlipidemia    Malfunction of nephrostomy tube (HCC)    Stage 3a  chronic kidney disease (HCC)    Hypoglycemia    Electrolyte abnormality    Cancer related pain    Palliative care by specialist    Ambulatory dysfunction    Therapeutic opioid-induced constipation (OIC)    Nausea and vomiting    Advanced care planning/counseling discussion    Goals of care, counseling/discussion    Anorexia    Unintentional weight loss    Hypercalcemia    Moderate protein-calorie malnutrition (HCC)    Vitamin B12 deficiency     Past Medical History:   Diagnosis Date    Altered mental status 10/08/2024    COVID-19 01/15/2024    Diabetes mellitus (HCC)     Elevated PSA 06/21/2023    High cholesterol     Hypertension     Prostate cancer (HCC)     Severe sepsis (HCC) 10/08/2024     Past Surgical History:   Procedure Laterality Date    IR NEPHROSTOMY TUBE CHECK/CHANGE/REPOSITION/REINSERTION/UPSIZE  9/28/2023    IR NEPHROSTOMY TUBE CHECK/CHANGE/REPOSITION/REINSERTION/UPSIZE  12/28/2023    IR NEPHROSTOMY TUBE CHECK/CHANGE/REPOSITION/REINSERTION/UPSIZE  1/31/2024    IR NEPHROSTOMY TUBE CHECK/CHANGE/REPOSITION/REINSERTION/UPSIZE  2/2/2024    IR NEPHROSTOMY TUBE CHECK/CHANGE/REPOSITION/REINSERTION/UPSIZE  3/4/2024    IR NEPHROSTOMY TUBE CHECK/CHANGE/REPOSITION/REINSERTION/UPSIZE  3/20/2024    IR NEPHROSTOMY TUBE CHECK/CHANGE/REPOSITION/REINSERTION/UPSIZE  6/7/2024    IR NEPHROSTOMY TUBE CHECK/CHANGE/REPOSITION/REINSERTION/UPSIZE  8/5/2024    IR NEPHROSTOMY TUBE CHECK/CHANGE/REPOSITION/REINSERTION/UPSIZE  10/4/2024    IR NEPHROSTOMY TUBE PLACEMENT  06/22/2023    bilateral    IR OTHER  1/15/2024    US GUIDED PROSTATE BIOPSY         Review of Medications:   Vitamins, Supplements and Herbals: No, pt denies taking supplements  On and off insulin - hasn't been eating and dropping low    Current Outpatient Medications:     acetaminophen (TYLENOL) 500 mg tablet, Take 2 tablets (1,000 mg total) by mouth 3 (three) times a day, Disp: 180 tablet, Rfl: 2    albuterol (Ventolin HFA) 90 mcg/act inhaler, Inhale 2 puffs every  6 (six) hours as needed for wheezing (Patient not taking: Reported on 9/5/2024), Disp: 18 g, Rfl: 0    Alcohol Swabs 70 % PADS, To clean the skin prior to injecting insulin, Disp: 100 each, Rfl: 1    Blood Glucose Monitoring Suppl (OneTouch Verio Reflect) w/Device KIT, Check blood sugars once daily. Please substitute with appropriate alternative as covered by patient's insurance. Dx: E11.65, Disp: 1 kit, Rfl: 0    Cholecalciferol (VITAMIN D3) 1,000 units tablet, Take 1 tablet (1,000 Units total) by mouth daily, Disp: 90 tablet, Rfl: 3    dexamethasone (DECADRON) 1 mg tablet, Take 1 tablet (1 mg total) by mouth 2 (two) times a day before breakfast and lunch TAKE 1 TABLET (0.5 MG TOTAL) BY MOUTH DAILY WITH BREAKFAST, Disp: 60 tablet, Rfl: 2    Diclofenac Sodium (VOLTAREN) 1 %, Apply 2 g topically 4 (four) times a day, Disp: 100 g, Rfl: 3    Easy Touch Pen Needles 31G X 6 MM MISC, Use daily at bedtime, Disp: 100 each, Rfl: 3    gabapentin (Neurontin) 300 mg capsule, Take 1 capsule (300 mg total) by mouth 3 (three) times a day, Disp: 90 capsule, Rfl: 2    glucose 4-6 GM-MG, Chew 2 tablets daily as needed for low blood sugar, Disp: 90 tablet, Rfl: 3    glucose blood (OneTouch Verio) test strip, Check blood sugars twice a daily. Please substitute with appropriate alternative as covered by patient's insurance. Dx: E11.65, Disp: 200 each, Rfl: 3    magnesium Oxide (MAG-OX) 400 mg TABS, Take 1 tablet (400 mg total) by mouth 2 (two) times a day, Disp: 60 tablet, Rfl: 1    metFORMIN (GLUCOPHAGE) 1000 MG tablet, Take 1 tablet (1,000 mg total) by mouth 2 (two) times a day with meals, Disp: 180 tablet, Rfl: 1    Multiple Vitamin (multivitamin) tablet, Take 1 tablet by mouth daily (Patient not taking: Reported on 10/31/2024), Disp: 30 tablet, Rfl: 2    Multiple Vitamins-Minerals (Sentry) TABS, Take 1 tablet by mouth daily (Patient not taking: Reported on 9/5/2024), Disp: , Rfl:     naloxone (NARCAN) 4 mg/0.1 mL nasal spray,  Administer 1 spray into a nostril. If no response after 2-3 minutes, give another dose in the other nostril using a new spray. (Patient not taking: Reported on 9/5/2024), Disp: 1 each, Rfl: 0    Nutritional Supplements (Ensure Plus High Protein) LIQD, Take 237 mL by mouth 3 (three) times a day, Disp: 237 mL, Rfl: 9    Nutritional Supplements (Ensure Plus High Protein) LIQD, , Disp: , Rfl:     omega-3-acid ethyl esters (LOVAZA) 1 g capsule, Take 1 capsule (1 g total) by mouth daily, Disp: 30 capsule, Rfl: 3    ondansetron (Zofran ODT) 8 mg disintegrating tablet, Take 1 tablet (8 mg total) by mouth every 8 (eight) hours as needed for nausea or vomiting (Patient not taking: Reported on 10/8/2024), Disp: 20 tablet, Rfl: 3    OneTouch Delica Lancets 33G MISC, Check blood sugars once daily. Please substitute with appropriate alternative as covered by patient's insurance. Dx: E11.65, Disp: 100 each, Rfl: 3    oxyCODONE (ROXICODONE) 10 MG TABS, Take 1 tablet (10 mg total) by mouth every 4 (four) hours as needed for moderate pain Max Daily Amount: 60 mg, Disp: 90 tablet, Rfl: 0    pantoprazole (PROTONIX) 40 mg tablet, Take 1 tablet (40 mg total) by mouth daily, Disp: 90 tablet, Rfl: 1    polyethylene glycol (MIRALAX) 17 g packet, Take 17 g by mouth daily as needed (for constipation), Disp: 100 each, Rfl: 1    senna-docusate sodium (SENOKOT S) 8.6-50 mg per tablet, Take 1 tablet by mouth daily (Patient not taking: Reported on 6/28/2024), Disp: 90 tablet, Rfl: 1    sodium chloride 1 g tablet, Take 1 tablet (1 g total) by mouth 2 (two) times a day with meals (Patient not taking: Reported on 10/17/2024), Disp: 60 tablet, Rfl: 0    sodium chloride, PF, 0.9 %, 10 mL by Intracatheter route daily Intracatheter flushing daily. May substitute prefilled syringe with normal saline 10 mL vials, 10 mL syringes, and 18 g blunt needles, Disp: 600 mL, Rfl: 2    Spacer/Aero-Holding Chambers (AEROCHAMBER MINI CHAMBER) BILLY, by Does not apply  "route see administration instructions, Disp: 1 Device, Rfl: 0    Most Recent Lab Results:   Lab Results   Component Value Date    WBC 6.00 10/11/2024    NEUTROABS 4.97 10/11/2024    CHOLESTEROL 117 07/23/2024    TRIG 244 (H) 07/23/2024    HDL 32 (L) 07/23/2024    LDLCALC 36 07/23/2024    ALT 13 10/16/2024    AST 15 10/16/2024    ALB 3.8 10/16/2024    SODIUM 131 (L) 10/16/2024    SODIUM 133 (L) 10/11/2024    K 4.0 10/16/2024    K 3.5 10/11/2024    CL 95 (L) 10/16/2024    BUN 16 10/16/2024    BUN 11 10/11/2024    CREATININE 0.72 10/16/2024    CREATININE 0.48 (L) 10/11/2024    EGFR 93 10/16/2024    POCGLU 280 (H) 10/11/2024    GLUF 173 (H) 08/23/2024    GLUF 117 (H) 07/23/2024    GLUC 208 (H) 10/16/2024    HGBA1C 5.6 10/08/2024    HGBA1C 6.2 (H) 07/23/2024    HGBA1C 9.2 (A) 04/04/2024    CALCIUM 9.7 10/16/2024    FOLATE >22.3 10/08/2024    MG 2.1 10/16/2024       Anthropometric Measurements:   Height: 68\"  Ht Readings from Last 1 Encounters:   10/31/24 5' 8\" (1.727 m)     Wt Readings from Last 25 Encounters:   10/31/24 62.7 kg (138 lb 3.2 oz)   10/25/24 61.3 kg (135 lb 3.2 oz)   10/17/24 64.2 kg (141 lb 9.6 oz)   10/08/24 64.4 kg (142 lb)   09/20/24 63 kg (138 lb 14.2 oz)   09/06/24 64.1 kg (141 lb 5 oz)   09/05/24 64.4 kg (142 lb)   08/30/24 64.8 kg (142 lb 13.7 oz)   08/26/24 65.6 kg (144 lb 9.6 oz)   08/16/24 67.1 kg (148 lb)   08/16/24 67.2 kg (148 lb 3.2 oz)   08/13/24 67.1 kg (148 lb)   08/09/24 67.6 kg (149 lb)   07/30/24 67.9 kg (149 lb 9.6 oz)   07/17/24 67.7 kg (149 lb 3.2 oz)   06/28/24 67.1 kg (148 lb)   06/12/24 74.4 kg (164 lb)   06/07/24 74.4 kg (164 lb)   06/03/24 74.8 kg (164 lb 12.8 oz)   05/29/24 76.2 kg (168 lb)   04/26/24 77.1 kg (170 lb)   04/25/24 77.6 kg (171 lb)   04/12/24 78.1 kg (172 lb 1.6 oz)   04/04/24 76.7 kg (169 lb)   04/01/24 77.6 kg (171 lb)       Weight History:   Usual Weight: 200# before diagnosis  Progressive loss; maintaining 175-180#     Oncology Nutrition-Anthropometrics  "     Flowsheet Row Nutrition from 11/8/2024 in [facility] Oncology Dietitian Brian Telephone from 8/1/2023 in [facility] Oncology Dietitian Services   Patient age (years): 74 years 72 years   Patient (male) height (in): 68 in 69 in   Current weight (lbs): 138.2 lbs 161.66 lbs   Current weight to be used for anthropometric calculations (kg) 62.8 kg 73.5 kg   BMI: 21 23.9   IBW male 154 lb 160 lb   IBW (kg) male 70 kg 72.7 kg   IBW % (male) 89.7 % 101 %   Adjusted BW (male): 150.1 lbs 160.4 lbs   Adjusted BW in kg (male): 68.2 kg 72.9 kg   % weight change after 1 week: -- 2.5 %   Weight change after 1 week (lbs) -- 4 lbs   % weight change after 1 month: -0.5 % 3.8 %   Weight change after 1 month (lbs) -0.7 lbs 6 lbs   % weight change after 3 months: -7.6 % -8 %   Weight change after 3 months (lbs) -11.4 lbs -14 lbs   % weight change after 6 months: -18.7 % --   Weight change after 6 months (lbs) -31.8 lbs --            Nutrition-Focused Physical Findings: n/a due to telephone call    Food/Nutrition-Related History & Client/Social History:    Current Nutrition Impact Symptoms:  [] Nausea  [x] Reduced Appetite   X3 months  [] Acid Reflux    [] Vomiting  [] Unintended Wt Loss  [] Malabsorption    [] Diarrhea  [] Unintended Wt Gain  [] Dumping Syndrome    [] Constipation  [] Thick Mucous/Secretions  [] Abdominal Pain    [] Dysgeusia (Altered Taste)  [] Xerostomia (Dry Mouth)  [] Gas    [] Dysosmia (Altered Smell)  [] Thrush  [] Difficulty Chewing    [] Oral Mucositis (Sore Mouth)  [x] Fatigue  [] Hyperglycemia   Lab Results   Component Value Date    HGBA1C 5.6 10/08/2024      [] Odynophagia  [] Esophagitis  [] Other:    [] Dysphagia  [] Early Satiety  [] No Problems Eating      Food Allergies & Intolerances: no     Current Diet: Regular Diet, No Restrictions  Nutrition Route: PO  Activity level: N/A    24 Hr Diet Recall:   Breakfast: eggs, toast, fruit  Snack: Glucerna  Dinner: rice, chicken - small  portions    Wife making smoothies, soups - more like the broth side ()    Beverages: water, coffee, sparkling water   Supplements:   Glucerna Shake varies, 1-2x/day       Oncology Nutrition-Estimated Needs      Flowsheet Row Nutrition from 2024 in [facility] Oncology Dietitian Brian Telephone from 2023 in [facility] Oncology Dietitian Services   Weight type used Actual weight Actual weight   Weight in kilograms (kg) used for estimated needs 62.8 kg --   Weight in kilograms (kg) used for estimated needs -- 73.5 kg   Energy needs formula:  30-35 kcal/kg Other (range)   Minumum range value (kcal/kg): -- 27   Maximum range value (kcal/kg): -- 30   Energy needs based on 30 kcal/k kcal --   Energy needs based on 35 kcal/k kcal --   Energy needs based on minimum value: -- 1985 kcal   Energy needs based on maximum value: -- 2205 kcal   Protein needs formula: 1.2-1.5 g/kg 1-1.2 g/kg   Protein needs based on 1 g/kg -- 73 g   Protein needs based on 1.2 g/kg: -- 88 g   Protein needs based on 1.2 g/k g --   Protein needs based on 1.5 g/kg 94 g --   Fluid needs formula: 30-35 mL/kg 30-35 mL/kg   Fluid needs based on 30 mL/kg 1881 mL 2208 mL   Fluid needs in ounces 64 oz 75 oz   Fluid needs based on 35 mL/kg 2195 mL 2576 mL   Fluid needs in ounces 74 oz 87 oz             Discussion & Intervention:   Oliver was evaluated today for an initial RD consultation regarding unintended weight loss.  Oliver is currently undergoing tx for metastatic prostate cancer .  RD spoke with son today for nutrition appt. He reports that patient has been having a low appetite for 3 months and rarely wants to eat or drink. Reports he is getting weak and continues to lose weight.He reports sometimes pt will have appetite in AM but very inconsistent. He is drinking Glucerna shakes but also varies. States wife is trying to make smoothies and or soups that are easier to consume. We discussed additional  high calorie beverages and ways to fortify intake. Son interested in medical marijuana and would like more information. Will reach out to palliative care.     Reviewed 24 hour recall, which revealed an inadequate po intake, and discussed ways to increase kcal, protein, and fluid intakes and optimize nutrient intake.  Also reviewed the importance of wt management throughout the tx process and the role of a high kcal/ high protein diet in managing wt and overall health.  Based on today's assessment, discussion included: MNT for: reduced appetite, weight loss, liquid kcal, how to modify foods for anticipated nutrition impact symptoms pt may experience during CA tx, a high kcal/protein diet & food choices to include at all meals & snacks (Examples of high kcal foods: cheese, full-fat dairy products, nut butter, plant-based fats, coconut oil/milk, avocado, butter, cream soup, etc. Examples of high protein foods: eggs, chicken, fish, beans/legumes, nuts/nut butters, bone broth, etc.) , fortifying foods for added kcal and protein (examples include: adding cheese to foods such as eggs, mashed potatoes, casseroles, etc.; Making oatmeal with whole milk rather than water; Making fortified mashed potatoes with cream, butter, dry milk powder, plain Greek yogurt, and cheese.), sipping on calorie containing beverages (examples include: adding whole milk or cream to coffee, oral nutrition supplements, juice, electrolyte replacement beverages, milk, etc.), eating smaller more frequent meals every 2-3 hours (5-6 small meals/day), continuing oral nutrition supplements, and adding extra fat to foods while cooking such as butter, plant-based oil, coconut oil/milk, avocado, nut butters, etc..   Moving forward, Oliver was encouraged to increase kcal, protein, and fluid intakes   Materials Provided: all e-mailed to pt (@ '[email or web address]'): , [facility] Eating Hints book, High-Calorie, High-Protein Diet handout, and [facility]  Nutrition Milkshake & Smoothie Recipes  All questions and concerns addressed during today’s visit.  Oliver has RD contact information.    Nutrition Diagnosis:   Inadequate Energy Intake related to physiological causes, disease state and treatment related issues as evidenced by food recall, wt loss and discussion with pt and/or family.  Increased Nutrient Needs (kcal & pro) related to increased demand for nutrients and disease state as evidenced by cancer dx and pt undergoing tx for cancer.  Monitoring & Evaluation:   Goals:  weight maintenance/stabilization  adequate nutrition impact symptom management  pt to meet >/=75% estimated nutrition needs daily    Progress Towards Goals: Initiated    Nutrition Rx & Recommendations:  Diet: High Calorie, High Protein (for high calorie foods see pages 52-53, and for high protein foods see pages 49-51 in your Eating Hints book)  Small, frequent meals/snacks may be easier to tolerate than 3 large daily meals.  Aim for 5-6 small meals per day (every 2-3 hours).  Include protein at all meals/snacks.  Liquid nutrition may be better tolerated than solids at times.  Alter food choices and eating patterns to accommodate changing needs.  Refer to Eating Hints book for other meal/snack ideas and symptom management.  For appetite loss: try powdered or liquid nutrition supplements; eating by the clock every 2-3 hours; set a timer to remind yourself to eat, keep snacks nearby; add extra protein & calories to your diet; drink liquids in between meals, choose liquids that add calories; eat a bedtime snack; eat soft, cool or frozen foods; eat a larger meal when you feel well & rested; only sip small amounts of liquids during meals (see pages 10-12 in your Eating Hints book).  For weight loss: monitor your weight at home at least 2x/week, record your weight, start by adding 250-500 extra calories per day, eat 5-6 small scheduled meals every 2-3 hrs, choose foods that are high in protein and  "calories (see pages 49-53 in your Eating Hints book), drink liquids with calories for example: milkshakes/smoothies/juice/soup/whole milk/chocolate milk, cook with protein fortified milk (see recipe on page 36 in your Eating Hints book), consider ready-to-drink oral nutrition supplements such as Ensure Plus, Ensure Enlive, Boost Plus, or Boost Very High Calorie, avoid \"diet\" and \"light\" foods when possible, avoid drinking too much with meals, contact your dietitian with any continued weight loss over the course of 1 week.  For more info see pages 35-37 in your Eating Hints book.  Choose liquids with calories: whole milk, Fairlife milk (higher protein/lactose-free milk), chocolate milk, drinkable yogurt, kefir, 100% fruit juice, diluted juice, bone broth (higher protein broth), creamy soups, sports drinks (Gatorade, Poweraide, Pedialyte, etc.), Italian ice, popsicles, milkshakes, smoothies, oral nutrition supplements (Ensure, Boost, etc.), gelatin/Jello, etc.  Utilize unflavored whey protein powder to increase protein content in soups and smoothies.  Continue Glucerna shakes 3x/day as able. Add to  with favorite fruits, nut butter, heavy cream, ice cream etc. to increase calories.     Follow Up Plan:  6 weeks 12/20 1pm  Recommend Referral to Other Providers: none at this time  "

## 2024-11-08 NOTE — PATIENT INSTRUCTIONS
"Nutrition Rx & Recommendations:  Diet: High Calorie, High Protein (for high calorie foods see pages 52-53, and for high protein foods see pages 49-51 in your Eating Hints book)  Small, frequent meals/snacks may be easier to tolerate than 3 large daily meals.  Aim for 5-6 small meals per day (every 2-3 hours).  Include protein at all meals/snacks.  Liquid nutrition may be better tolerated than solids at times.  Alter food choices and eating patterns to accommodate changing needs.  Refer to Eating Hints book for other meal/snack ideas and symptom management.  For appetite loss: try powdered or liquid nutrition supplements; eating by the clock every 2-3 hours; set a timer to remind yourself to eat, keep snacks nearby; add extra protein & calories to your diet; drink liquids in between meals, choose liquids that add calories; eat a bedtime snack; eat soft, cool or frozen foods; eat a larger meal when you feel well & rested; only sip small amounts of liquids during meals (see pages 10-12 in your Eating Hints book).  For weight loss: monitor your weight at home at least 2x/week, record your weight, start by adding 250-500 extra calories per day, eat 5-6 small scheduled meals every 2-3 hrs, choose foods that are high in protein and calories (see pages 49-53 in your Eating Hints book), drink liquids with calories for example: milkshakes/smoothies/juice/soup/whole milk/chocolate milk, cook with protein fortified milk (see recipe on page 36 in your Eating Hints book), consider ready-to-drink oral nutrition supplements such as Ensure Plus, Ensure Enlive, Boost Plus, or Boost Very High Calorie, avoid \"diet\" and \"light\" foods when possible, avoid drinking too much with meals, contact your dietitian with any continued weight loss over the course of 1 week.  For more info see pages 35-37 in your Eating Hints book.  Choose liquids with calories: whole milk, Fairlife milk (higher protein/lactose-free milk), chocolate milk, drinkable " yogurt, kefir, 100% fruit juice, diluted juice, bone broth (higher protein broth), creamy soups, sports drinks (Gatorade, Poweraide, Pedialyte, etc.), Italian ice, popsicles, milkshakes, smoothies, oral nutrition supplements (Ensure, Boost, etc.), gelatin/Jello, etc.  Utilize unflavored whey protein powder to increase protein content in soups and smoothies.  Continue Glucerna shakes 3x/day as able. Add to  with favorite fruits, nut butter, heavy cream, ice cream etc. to increase calories.     Follow Up Plan: 6 weeks 12/20 1pm  Recommend Referral to Other Providers: none at this time

## 2024-11-08 NOTE — TELEPHONE ENCOUNTER
Fax received from [facility] - Additional Orders ID [id number]     Scanned copy into encounter.    Placed in Dr. Bates's folder.    Fax completed form:  [phone]

## 2024-11-11 ENCOUNTER — APPOINTMENT (OUTPATIENT)
Dept: LAB | Facility: HOSPITAL | Age: 74
End: 2024-11-11
Payer: COMMERCIAL

## 2024-11-11 DIAGNOSIS — C79.51 PROSTATE CANCER METASTATIC TO BONE (HCC): ICD-10-CM

## 2024-11-11 DIAGNOSIS — Z79.4 TYPE 2 DIABETES MELLITUS WITH OTHER SPECIFIED COMPLICATION, WITH LONG-TERM CURRENT USE OF INSULIN (HCC): ICD-10-CM

## 2024-11-11 DIAGNOSIS — E78.5 HYPERLIPIDEMIA, UNSPECIFIED HYPERLIPIDEMIA TYPE: ICD-10-CM

## 2024-11-11 DIAGNOSIS — E11.69 TYPE 2 DIABETES MELLITUS WITH OTHER SPECIFIED COMPLICATION, WITH LONG-TERM CURRENT USE OF INSULIN (HCC): ICD-10-CM

## 2024-11-11 DIAGNOSIS — C61 PROSTATE CANCER METASTATIC TO BONE (HCC): ICD-10-CM

## 2024-11-11 LAB
ALBUMIN SERPL BCG-MCNC: 3.8 G/DL (ref 3.5–5)
ALP SERPL-CCNC: 178 U/L (ref 34–104)
ALT SERPL W P-5'-P-CCNC: 12 U/L (ref 7–52)
ANION GAP SERPL CALCULATED.3IONS-SCNC: 8 MMOL/L (ref 4–13)
AST SERPL W P-5'-P-CCNC: 21 U/L (ref 13–39)
BASOPHILS # BLD AUTO: 0.02 THOUSANDS/ÂΜL (ref 0–0.1)
BASOPHILS NFR BLD AUTO: 0 % (ref 0–1)
BILIRUB SERPL-MCNC: 0.45 MG/DL (ref 0.2–1)
BUN SERPL-MCNC: 17 MG/DL (ref 5–25)
CALCIUM SERPL-MCNC: 12.1 MG/DL (ref 8.4–10.2)
CHLORIDE SERPL-SCNC: 95 MMOL/L (ref 96–108)
CO2 SERPL-SCNC: 29 MMOL/L (ref 21–32)
CREAT SERPL-MCNC: 0.8 MG/DL (ref 0.6–1.3)
EOSINOPHIL # BLD AUTO: 0.01 THOUSAND/ÂΜL (ref 0–0.61)
EOSINOPHIL NFR BLD AUTO: 0 % (ref 0–6)
ERYTHROCYTE [DISTWIDTH] IN BLOOD BY AUTOMATED COUNT: 14.1 % (ref 11.6–15.1)
GFR SERPL CREATININE-BSD FRML MDRD: 89 ML/MIN/1.73SQ M
GLUCOSE P FAST SERPL-MCNC: 138 MG/DL (ref 65–99)
HCT VFR BLD AUTO: 39.4 % (ref 36.5–49.3)
HGB BLD-MCNC: 12.7 G/DL (ref 12–17)
IMM GRANULOCYTES # BLD AUTO: 0.09 THOUSAND/UL (ref 0–0.2)
IMM GRANULOCYTES NFR BLD AUTO: 1 % (ref 0–2)
LYMPHOCYTES # BLD AUTO: 1.32 THOUSANDS/ÂΜL (ref 0.6–4.47)
LYMPHOCYTES NFR BLD AUTO: 12 % (ref 14–44)
MCH RBC QN AUTO: 28.7 PG (ref 26.8–34.3)
MCHC RBC AUTO-ENTMCNC: 32.2 G/DL (ref 31.4–37.4)
MCV RBC AUTO: 89 FL (ref 82–98)
MONOCYTES # BLD AUTO: 0.8 THOUSAND/ÂΜL (ref 0.17–1.22)
MONOCYTES NFR BLD AUTO: 7 % (ref 4–12)
NEUTROPHILS # BLD AUTO: 8.71 THOUSANDS/ÂΜL (ref 1.85–7.62)
NEUTS SEG NFR BLD AUTO: 80 % (ref 43–75)
NRBC BLD AUTO-RTO: 0 /100 WBCS
PLATELET # BLD AUTO: 557 THOUSANDS/UL (ref 149–390)
PMV BLD AUTO: 9.7 FL (ref 8.9–12.7)
POTASSIUM SERPL-SCNC: 4.1 MMOL/L (ref 3.5–5.3)
PROT SERPL-MCNC: 8.2 G/DL (ref 6.4–8.4)
RBC # BLD AUTO: 4.42 MILLION/UL (ref 3.88–5.62)
SODIUM SERPL-SCNC: 132 MMOL/L (ref 135–147)
WBC # BLD AUTO: 10.95 THOUSAND/UL (ref 4.31–10.16)

## 2024-11-11 PROCEDURE — 80053 COMPREHEN METABOLIC PANEL: CPT

## 2024-11-11 PROCEDURE — 36415 COLL VENOUS BLD VENIPUNCTURE: CPT

## 2024-11-11 PROCEDURE — 85025 COMPLETE CBC W/AUTO DIFF WBC: CPT

## 2024-11-13 DIAGNOSIS — C61 PROSTATE CANCER METASTATIC TO BONE (HCC): ICD-10-CM

## 2024-11-13 DIAGNOSIS — C79.51 PROSTATE CANCER METASTATIC TO BONE (HCC): ICD-10-CM

## 2024-11-13 DIAGNOSIS — E83.52 HYPERCALCEMIA: Primary | ICD-10-CM

## 2024-11-13 RX ORDER — SODIUM CHLORIDE 9 MG/ML
20 INJECTION, SOLUTION INTRAVENOUS ONCE
Status: CANCELLED | OUTPATIENT
Start: 2024-11-14

## 2024-11-14 ENCOUNTER — HOSPITAL ENCOUNTER (OUTPATIENT)
Dept: NUCLEAR MEDICINE | Facility: HOSPITAL | Age: 74
End: 2024-11-14
Payer: COMMERCIAL

## 2024-11-14 ENCOUNTER — TELEPHONE (OUTPATIENT)
Age: 74
End: 2024-11-14

## 2024-11-14 ENCOUNTER — HOSPITAL ENCOUNTER (OUTPATIENT)
Dept: INFUSION CENTER | Facility: HOSPITAL | Age: 74
Discharge: HOME | End: 2024-11-14

## 2024-11-14 DIAGNOSIS — C61 MALIGNANT NEOPLASM OF PROSTATE (HCC): ICD-10-CM

## 2024-11-14 PROCEDURE — 79101 NUCLEAR RX IV ADMIN: CPT

## 2024-11-14 PROCEDURE — A9607 HB LUTETIUM LU 177 VIPIVOTIDE TETRAXETAN, THERAPEUTIC, 1 MILLICURIE: HCPCS

## 2024-11-14 NOTE — TELEPHONE ENCOUNTER
Pts son, Sumit, called to report that pt was just seen at [facility] for Pluvicto and went to infusion center for zometa, however, the infusion nurse reported that pt is unable to be seen today due to be radioactive. Please advise,

## 2024-11-14 NOTE — TELEPHONE ENCOUNTER
Returned call to pts step son Sumit, advised infusion did just reach out to me to let me know per nuc med pt is radioactive for 3 days post pluvicto and really should limit exposure to other people during that time. Per Sumit, pt does not have any current sx of hypercalcemia. Advised him that as long as pt remains asymptomatic, Dr Coyne is ok with pt getting zometa Monday. Reviewed sx including but not limited to increased thirst, increased urination, headaches, abd pain, constipation, change in mental status, muscle pain that pt and family should look out for and if experiencing them pt should report to the ER for evaluation. Sumit voiced understanding. He is aware someone from infusion will reach out to let him know about appt time for Monday.

## 2024-11-15 ENCOUNTER — HOSPITAL ENCOUNTER (OUTPATIENT)
Dept: NUCLEAR MEDICINE | Facility: HOSPITAL | Age: 74
Discharge: HOME | End: 2024-11-15
Payer: COMMERCIAL

## 2024-11-15 ENCOUNTER — TELEPHONE (OUTPATIENT)
Age: 74
End: 2024-11-15

## 2024-11-15 DIAGNOSIS — C61 MALIGNANT NEOPLASM OF PROSTATE (HCC): ICD-10-CM

## 2024-11-15 PROCEDURE — 78832 RP LOCLZJ TUM SPECT W/CT 2: CPT

## 2024-11-15 NOTE — TELEPHONE ENCOUNTER
[facility] - [phone]  Scanned into encounter  Placed in pcps folder  Fax:[phone]

## 2024-11-16 ENCOUNTER — HOSPITAL ENCOUNTER (INPATIENT)
Facility: HOSPITAL | Age: 74
LOS: 2 days | Discharge: HOME HEALTH | DRG: 699 | End: 2024-11-19
Attending: EMERGENCY MEDICINE | Admitting: STUDENT IN AN ORGANIZED HEALTH CARE EDUCATION/TRAINING PROGRAM
Payer: COMMERCIAL

## 2024-11-16 ENCOUNTER — APPOINTMENT (EMERGENCY)
Dept: RADIOLOGY | Facility: HOSPITAL | Age: 74
DRG: 699 | End: 2024-11-16
Payer: COMMERCIAL

## 2024-11-16 DIAGNOSIS — T83.098A MALFUNCTION OF NEPHROSTOMY TUBE (HCC): ICD-10-CM

## 2024-11-16 DIAGNOSIS — Z93.6 NEPHROSTOMY STATUS (HCC): ICD-10-CM

## 2024-11-16 DIAGNOSIS — K59.00 CONSTIPATION: ICD-10-CM

## 2024-11-16 DIAGNOSIS — E83.52 HYPERCALCEMIA: ICD-10-CM

## 2024-11-16 DIAGNOSIS — T83.092A OBSTRUCTED NEPHROSTOMY TUBE (HCC): Primary | ICD-10-CM

## 2024-11-16 PROBLEM — R82.90 ABNORMAL URINALYSIS: Status: ACTIVE | Noted: 2024-11-16

## 2024-11-16 PROBLEM — N18.2 STAGE 2 CHRONIC KIDNEY DISEASE: Status: ACTIVE | Noted: 2024-04-25

## 2024-11-16 PROBLEM — E11.69 TYPE 2 DIABETES MELLITUS WITH OTHER SPECIFIED COMPLICATION (HCC): Status: ACTIVE | Noted: 2023-06-21

## 2024-11-16 LAB
ALBUMIN SERPL BCG-MCNC: 3.7 G/DL (ref 3.5–5)
ALP SERPL-CCNC: 188 U/L (ref 34–104)
ALT SERPL W P-5'-P-CCNC: 9 U/L (ref 7–52)
AMORPH URATE CRY URNS QL MICRO: ABNORMAL
ANION GAP SERPL CALCULATED.3IONS-SCNC: 6 MMOL/L (ref 4–13)
AST SERPL W P-5'-P-CCNC: 26 U/L (ref 13–39)
ATRIAL RATE: 72 BPM
BACTERIA UR QL AUTO: ABNORMAL /HPF
BACTERIA UR QL AUTO: ABNORMAL /HPF
BASOPHILS # BLD AUTO: 0.02 THOUSANDS/ÂΜL (ref 0–0.1)
BASOPHILS NFR BLD AUTO: 0 % (ref 0–1)
BILIRUB SERPL-MCNC: 0.57 MG/DL (ref 0.2–1)
BILIRUB UR QL STRIP: NEGATIVE
BILIRUB UR QL STRIP: NEGATIVE
BUN SERPL-MCNC: 22 MG/DL (ref 5–25)
CA-I BLD-SCNC: 1.45 MMOL/L (ref 1.12–1.32)
CALCIUM SERPL-MCNC: 12.5 MG/DL (ref 8.4–10.2)
CAOX CRY URNS QL MICRO: ABNORMAL /HPF
CHLORIDE SERPL-SCNC: 97 MMOL/L (ref 96–108)
CLARITY UR: ABNORMAL
CLARITY UR: ABNORMAL
CO2 SERPL-SCNC: 32 MMOL/L (ref 21–32)
COLOR UR: ABNORMAL
COLOR UR: YELLOW
CREAT SERPL-MCNC: 0.92 MG/DL (ref 0.6–1.3)
EOSINOPHIL # BLD AUTO: 0.02 THOUSAND/ÂΜL (ref 0–0.61)
EOSINOPHIL NFR BLD AUTO: 0 % (ref 0–6)
ERYTHROCYTE [DISTWIDTH] IN BLOOD BY AUTOMATED COUNT: 14.5 % (ref 11.6–15.1)
GFR SERPL CREATININE-BSD FRML MDRD: 82 ML/MIN/1.73SQ M
GLUCOSE SERPL-MCNC: 133 MG/DL (ref 65–140)
GLUCOSE SERPL-MCNC: 180 MG/DL (ref 65–140)
GLUCOSE SERPL-MCNC: 205 MG/DL (ref 65–140)
GLUCOSE UR STRIP-MCNC: NEGATIVE MG/DL
GLUCOSE UR STRIP-MCNC: NEGATIVE MG/DL
HCT VFR BLD AUTO: 41.1 % (ref 36.5–49.3)
HGB BLD-MCNC: 12.9 G/DL (ref 12–17)
HGB UR QL STRIP.AUTO: ABNORMAL
HGB UR QL STRIP.AUTO: NEGATIVE
IMM GRANULOCYTES # BLD AUTO: 0.04 THOUSAND/UL (ref 0–0.2)
IMM GRANULOCYTES NFR BLD AUTO: 1 % (ref 0–2)
KETONES UR STRIP-MCNC: NEGATIVE MG/DL
KETONES UR STRIP-MCNC: NEGATIVE MG/DL
LEUKOCYTE ESTERASE UR QL STRIP: ABNORMAL
LEUKOCYTE ESTERASE UR QL STRIP: ABNORMAL
LYMPHOCYTES # BLD AUTO: 0.52 THOUSANDS/ÂΜL (ref 0.6–4.47)
LYMPHOCYTES NFR BLD AUTO: 7 % (ref 14–44)
MAGNESIUM SERPL-MCNC: 2.5 MG/DL (ref 1.9–2.7)
MCH RBC QN AUTO: 28 PG (ref 26.8–34.3)
MCHC RBC AUTO-ENTMCNC: 31.4 G/DL (ref 31.4–37.4)
MCV RBC AUTO: 89 FL (ref 82–98)
MONOCYTES # BLD AUTO: 0.38 THOUSAND/ÂΜL (ref 0.17–1.22)
MONOCYTES NFR BLD AUTO: 5 % (ref 4–12)
MUCOUS THREADS UR QL AUTO: ABNORMAL
NEUTROPHILS # BLD AUTO: 6.34 THOUSANDS/ÂΜL (ref 1.85–7.62)
NEUTS SEG NFR BLD AUTO: 87 % (ref 43–75)
NITRITE UR QL STRIP: NEGATIVE
NITRITE UR QL STRIP: NEGATIVE
NON-SQ EPI CELLS URNS QL MICRO: ABNORMAL /HPF
NON-SQ EPI CELLS URNS QL MICRO: ABNORMAL /HPF
NRBC BLD AUTO-RTO: 0 /100 WBCS
P AXIS: 28 DEGREES
PH UR STRIP.AUTO: 7.5 [PH]
PH UR STRIP.AUTO: 8 [PH]
PLATELET # BLD AUTO: 443 THOUSANDS/UL (ref 149–390)
PMV BLD AUTO: 9.4 FL (ref 8.9–12.7)
POTASSIUM SERPL-SCNC: 4.4 MMOL/L (ref 3.5–5.3)
PR INTERVAL: 138 MS
PROT SERPL-MCNC: 8.1 G/DL (ref 6.4–8.4)
PROT UR STRIP-MCNC: ABNORMAL MG/DL
PROT UR STRIP-MCNC: ABNORMAL MG/DL
QRS AXIS: 8 DEGREES
QRSD INTERVAL: 140 MS
QT INTERVAL: 422 MS
QTC INTERVAL: 462 MS
RBC # BLD AUTO: 4.6 MILLION/UL (ref 3.88–5.62)
RBC #/AREA URNS AUTO: ABNORMAL /HPF
RBC #/AREA URNS AUTO: ABNORMAL /HPF
SODIUM SERPL-SCNC: 135 MMOL/L (ref 135–147)
SP GR UR STRIP.AUTO: 1.01 (ref 1–1.03)
SP GR UR STRIP.AUTO: 1.02 (ref 1–1.03)
T WAVE AXIS: 44 DEGREES
TRI-PHOS CRY URNS QL MICRO: ABNORMAL /HPF
TRI-PHOS CRY URNS QL MICRO: ABNORMAL /HPF
UROBILINOGEN UR STRIP-ACNC: <2 MG/DL
UROBILINOGEN UR STRIP-ACNC: <2 MG/DL
VENTRICULAR RATE: 72 BPM
WBC # BLD AUTO: 7.32 THOUSAND/UL (ref 4.31–10.16)
WBC #/AREA URNS AUTO: ABNORMAL /HPF
WBC #/AREA URNS AUTO: ABNORMAL /HPF

## 2024-11-16 PROCEDURE — 74177 CT ABD & PELVIS W/CONTRAST: CPT

## 2024-11-16 PROCEDURE — 93005 ELECTROCARDIOGRAM TRACING: CPT

## 2024-11-16 PROCEDURE — 81001 URINALYSIS AUTO W/SCOPE: CPT | Performed by: EMERGENCY MEDICINE

## 2024-11-16 PROCEDURE — 99285 EMERGENCY DEPT VISIT HI MDM: CPT | Performed by: EMERGENCY MEDICINE

## 2024-11-16 PROCEDURE — 82330 ASSAY OF CALCIUM: CPT | Performed by: EMERGENCY MEDICINE

## 2024-11-16 PROCEDURE — 96361 HYDRATE IV INFUSION ADD-ON: CPT

## 2024-11-16 PROCEDURE — 87086 URINE CULTURE/COLONY COUNT: CPT

## 2024-11-16 PROCEDURE — 81001 URINALYSIS AUTO W/SCOPE: CPT

## 2024-11-16 PROCEDURE — NC001 PR NO CHARGE: Performed by: RADIOLOGY

## 2024-11-16 PROCEDURE — 36415 COLL VENOUS BLD VENIPUNCTURE: CPT | Performed by: EMERGENCY MEDICINE

## 2024-11-16 PROCEDURE — 85025 COMPLETE CBC W/AUTO DIFF WBC: CPT | Performed by: EMERGENCY MEDICINE

## 2024-11-16 PROCEDURE — 99223 1ST HOSP IP/OBS HIGH 75: CPT | Performed by: INTERNAL MEDICINE

## 2024-11-16 PROCEDURE — 80053 COMPREHEN METABOLIC PANEL: CPT | Performed by: EMERGENCY MEDICINE

## 2024-11-16 PROCEDURE — 99284 EMERGENCY DEPT VISIT MOD MDM: CPT

## 2024-11-16 PROCEDURE — 83735 ASSAY OF MAGNESIUM: CPT | Performed by: EMERGENCY MEDICINE

## 2024-11-16 PROCEDURE — 82948 REAGENT STRIP/BLOOD GLUCOSE: CPT

## 2024-11-16 PROCEDURE — 96374 THER/PROPH/DIAG INJ IV PUSH: CPT

## 2024-11-16 RX ORDER — GABAPENTIN 300 MG/1
300 CAPSULE ORAL 3 TIMES DAILY
Status: DISCONTINUED | OUTPATIENT
Start: 2024-11-16 | End: 2024-11-19 | Stop reason: HOSPADM

## 2024-11-16 RX ORDER — INSULIN LISPRO 100 [IU]/ML
1-5 INJECTION, SOLUTION INTRAVENOUS; SUBCUTANEOUS
Status: DISCONTINUED | OUTPATIENT
Start: 2024-11-16 | End: 2024-11-19 | Stop reason: HOSPADM

## 2024-11-16 RX ORDER — PANTOPRAZOLE SODIUM 40 MG/1
40 TABLET, DELAYED RELEASE ORAL DAILY
Status: DISCONTINUED | OUTPATIENT
Start: 2024-11-17 | End: 2024-11-19 | Stop reason: HOSPADM

## 2024-11-16 RX ORDER — DOCUSATE SODIUM 100 MG/1
100 CAPSULE, LIQUID FILLED ORAL 2 TIMES DAILY
Status: DISCONTINUED | OUTPATIENT
Start: 2024-11-16 | End: 2024-11-17

## 2024-11-16 RX ORDER — KETOROLAC TROMETHAMINE 30 MG/ML
15 INJECTION, SOLUTION INTRAMUSCULAR; INTRAVENOUS ONCE
Status: COMPLETED | OUTPATIENT
Start: 2024-11-16 | End: 2024-11-16

## 2024-11-16 RX ORDER — AMOXICILLIN 250 MG
2 CAPSULE ORAL
Status: DISCONTINUED | OUTPATIENT
Start: 2024-11-16 | End: 2024-11-18

## 2024-11-16 RX ORDER — SODIUM CHLORIDE 9 MG/ML
100 INJECTION, SOLUTION INTRAVENOUS CONTINUOUS
Status: DISCONTINUED | OUTPATIENT
Start: 2024-11-16 | End: 2024-11-17

## 2024-11-16 RX ORDER — OXYCODONE HYDROCHLORIDE 10 MG/1
10 TABLET ORAL EVERY 4 HOURS PRN
Refills: 0 | Status: DISCONTINUED | OUTPATIENT
Start: 2024-11-16 | End: 2024-11-19 | Stop reason: HOSPADM

## 2024-11-16 RX ORDER — DEXAMETHASONE 2 MG/1
1 TABLET ORAL
Status: DISCONTINUED | OUTPATIENT
Start: 2024-11-17 | End: 2024-11-19 | Stop reason: HOSPADM

## 2024-11-16 RX ORDER — POLYETHYLENE GLYCOL 3350 17 G/17G
17 POWDER, FOR SOLUTION ORAL DAILY
Status: DISCONTINUED | OUTPATIENT
Start: 2024-11-17 | End: 2024-11-19 | Stop reason: HOSPADM

## 2024-11-16 RX ORDER — ENOXAPARIN SODIUM 100 MG/ML
40 INJECTION SUBCUTANEOUS DAILY
Status: DISCONTINUED | OUTPATIENT
Start: 2024-11-17 | End: 2024-11-18

## 2024-11-16 RX ORDER — CEFEPIME HYDROCHLORIDE 2 G/50ML
2000 INJECTION, SOLUTION INTRAVENOUS ONCE
Status: COMPLETED | OUTPATIENT
Start: 2024-11-16 | End: 2024-11-16

## 2024-11-16 RX ORDER — ACETAMINOPHEN 325 MG/1
650 TABLET ORAL EVERY 6 HOURS PRN
Status: DISCONTINUED | OUTPATIENT
Start: 2024-11-16 | End: 2024-11-19 | Stop reason: HOSPADM

## 2024-11-16 RX ORDER — POLYETHYLENE GLYCOL 3350 17 G/17G
17 POWDER, FOR SOLUTION ORAL DAILY
Status: DISCONTINUED | OUTPATIENT
Start: 2024-11-17 | End: 2024-11-16

## 2024-11-16 RX ADMIN — GABAPENTIN 300 MG: 300 CAPSULE ORAL at 17:50

## 2024-11-16 RX ADMIN — SENNOSIDES AND DOCUSATE SODIUM 2 TABLET: 50; 8.6 TABLET ORAL at 22:39

## 2024-11-16 RX ADMIN — SODIUM CHLORIDE 75 ML/HR: 0.9 INJECTION, SOLUTION INTRAVENOUS at 22:58

## 2024-11-16 RX ADMIN — INSULIN LISPRO 1 UNITS: 100 INJECTION, SOLUTION INTRAVENOUS; SUBCUTANEOUS at 17:51

## 2024-11-16 RX ADMIN — IOHEXOL 85 ML: 350 INJECTION, SOLUTION INTRAVENOUS at 12:16

## 2024-11-16 RX ADMIN — KETOROLAC TROMETHAMINE 15 MG: 30 INJECTION, SOLUTION INTRAMUSCULAR; INTRAVENOUS at 11:05

## 2024-11-16 RX ADMIN — SODIUM CHLORIDE 1000 ML: 0.9 INJECTION, SOLUTION INTRAVENOUS at 11:05

## 2024-11-16 RX ADMIN — GABAPENTIN 300 MG: 300 CAPSULE ORAL at 22:39

## 2024-11-16 RX ADMIN — DOCUSATE SODIUM 100 MG: 100 CAPSULE, LIQUID FILLED ORAL at 17:50

## 2024-11-16 RX ADMIN — SODIUM CHLORIDE 75 ML/HR: 0.9 INJECTION, SOLUTION INTRAVENOUS at 17:17

## 2024-11-16 RX ADMIN — CEFEPIME HYDROCHLORIDE 2000 MG: 2 INJECTION, SOLUTION INTRAVENOUS at 16:03

## 2024-11-16 NOTE — ASSESSMENT & PLAN NOTE
Patient presents with complaint of left nephrostomy tube malfunction since 2 days.  Mild bilateral flank pain.  No fevers or leukocytosis present on admission  CT scan showing findings suggestive of cystitis with bilateral pyeloureteritis.   UA on admission positive for leukocytes    Low suspicion for urinary tract infection as patient is afebrile with no leukocytosis.  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

## 2024-11-16 NOTE — ASSESSMENT & PLAN NOTE
Patient has history of hypercalcemia likely secondary to metastatic prostate cancer. Hypercalcemia is likely contributing.  He has been following up with hematology oncology and has been receiving Xgeva monthly as recommended to improve hypercalcemia.  Patient has poor appetite, weight loss, constipation, fatigue likely from hypercalcemia.   Ionized calcium on admission 1.45    In the ER, patient received 1 L IV fluid bolus    Plan  Patient started on normal saline 75 mL/h  If no improvement, consider starting calcitonin or bisphosphonates

## 2024-11-16 NOTE — TELEMEDICINE
"e-Consult ([facility])  - Interventional Radiology  Oliver Astudillo 74 y.o. male MRN: [id number]  Unit/Bed#: [address] [place] [address] Encounter: [id number]          Interventional Radiology has been consulted to evaluate Oliver Astudillo      Inpatient Consult to IR  Consult performed by: Guille Lucero MD  Consult ordered by: No Feliciano MD        11/16/24    Assessment/Recommendation:   \"Left nephrostomy tube malposition \"    Left PCN check  order placed.              11-20 minutes, >50% of the total time devoted to medical consultative verbal/EMR discussion between providers. Written report will be generated in the EMR.     Thank you for allowing Interventional Radiology to participate in the care of Oliver Astudillo. Please don't hesitate to call or TigerText us with any questions.     Guille Lucero MD            "

## 2024-11-16 NOTE — H&P
History and Physical - [facility]  Family Medicine Residency     Patient Information: Oliver Astudillo 74 y.o. male MRN: [id number]  Unit/Bed#: [address] [place] [address] Encounter: [id number]  Admitting Physician: Luis Mesa MD   PCP: Karen Bates MD  Date of Admission:  11/16/24     Assessment and Plan     * Malfunction of nephrostomy tube (HCC)  Assessment & Plan  Patient presents with complaint of decreased urine output in left nephrostomy bag since 2 days  Pt has h/o prostate cancer leading to urinary obstruction treated with bilateral percutaneous nephrostomy tubes since 6/22/23.  Pt has had recurrent nephrostomy tube dysfunction and urinary tract infections.  Patient recently had nephrostomy tube repositioning on 10/4/2024 . Pt states that he irrigates tubes at least twice daily  CT scan showing cystitis with bilateral pyeloureteritis.      Plan  IR consulted for possible nephrostomy tube exchange    Hypercalcemia  Assessment & Plan  Patient has history of hypercalcemia likely secondary to metastatic prostate cancer. Hypercalcemia is likely contributing.  He has been following up with hematology oncology and has been receiving Xgeva monthly as recommended to improve hypercalcemia.  Patient has poor appetite, weight loss, constipation, fatigue likely from hypercalcemia.   Ionized calcium on admission 1.45    In the ER, patient received 1 L IV fluid bolus    Plan  Patient started on normal saline 75 mL/h  If no improvement, consider starting calcitonin or bisphosphonates    Abnormal urinalysis  Assessment & Plan  Patient presents with complaint of left nephrostomy tube malfunction since 2 days.  Mild bilateral flank pain.  No fevers or leukocytosis present on admission  CT scan showing findings suggestive of cystitis with bilateral pyeloureteritis.   UA on admission positive for leukocytes    Low suspicion for urinary tract infection as patient is afebrile with no leukocytosis.  UA likely due to  colonization from chronic nephrostomy tube placement    Plan  One-time dose of cefepime, hold off on further antibiotics  IR consulted   Monitor WBC count and fever curve      Stage 2 chronic kidney disease  Assessment & Plan  Lab Results   Component Value Date    EGFR 82 11/16/2024    EGFR 89 11/11/2024    EGFR 93 10/16/2024    CREATININE 0.92 11/16/2024    CREATININE 0.80 11/11/2024    CREATININE 0.72 10/16/2024     Chronic, stable.     Plan  Follow up AM BMP    Prostate cancer metastatic to bone (HCC)  Assessment & Plan  History of prostate cancer with metastasis to bone.  Patient has been following up with palliative care.  Patient is currently on Gabapentin 300 mg twice daily, oxycodone 10 mg every 4 hours as needed for moderate pain and Decadron 1 mg twice daily    Plan  Continue with home gabapentin, oxycodone and Decadron    Ambulatory dysfunction  Assessment & Plan  Patient is wheelchair dependent as per wife     Plan:  PT/OT eval    Constipation  Assessment & Plan  Patient presents with constipation has not had a bowel movement since 5 days.   Patient is on MiraLAX and Senokot-S at home    In the ER, patient received soap suds enema    Likely opioid-induced constipation    Plan  Continue with MiraLAX and Senokot-S    Hypertension  Assessment & Plan  Chronic, stable. Pt's home med lisinopril was discontinued by oncology due to several episodes of hypotension.     Plan  Monitor BP and vitals    Type 2 diabetes mellitus with other specified complication (HCC)  Assessment & Plan  Lab Results   Component Value Date    HGBA1C 5.6 10/08/2024       Chronic.  Home medications include metformin 1000mg BID. Patient was previously on glimepiride 1 mg daily  and Levemir 20 IU which were discontinued.       Plan:  Hold home metformin  Patient started on insulin sliding scale and Accu-Cheks ACHS  Carbohydrate controlled diet  Hypoglycemia protocol  Monitor blood sugars                Electrolyte repletion: High calcium,  giving fluids   Nutrition:        Diet Orders   (From admission, onward)                 Start     Ordered    11/16/24 1620  Diet Rupesh/CHO Controlled; Consistent Carbohydrate Diet Level 2 (5 carb servings/75 grams CHO/meal)  Diet effective now        References:    Adult Nutrition Support Algorithm    RD Therapeutic Diet Order Protocol   Question Answer Comment   Diet Type Rupesh/CHO Controlled    Rupesh/CHO Controlled Consistent Carbohydrate Diet Level 2 (5 carb servings/75 grams CHO/meal)    RD to adjust diet per protocol? Yes        11/16/24 1624    11/16/24 1606  Dietary nutrition supplements  Once        Question Answer Comment   Select Supplement: Banatrol Plus Banana Flakes    Select Supplement: Beneprotein    Mix with: Applesauce    Frequency Breakfast, Lunch, Dinner, HS        11/16/24 1624                   VTE Prophylaxis: VTE Score: 7 High Risk (Score >/= 5) - Pharmacological DVT Prophylaxis Ordered: enoxaparin (Lovenox). Sequential Compression Devices Ordered.  Code Status: Level 1 - Full Code  Anticipated Length of Stay:  Patient will be admitted on an Observation basis 1 or 2 midnights depending on IR tube placement  Justification for Hospital Stay: concern for UTI  Total Time for Visit, including Counseling / Coordination of Care: 40 mins. Greater than 50% of this total time spent on direct patient counseling and coordination of care.  Patient Information Sharing: With the consent of Oliver Astudillo, their loved ones were notified today by inpatient team of the patient’s condition and current plan. All questions answered. Wife at bedside    Chief Complaint:     Chief Complaint   Patient presents with    Constipation     Patient c/o abdominal pain and constipation.  Per wife patient has not had a BM x 5 days and has a decreased appetite.  Wife also states left nephrostomy stopped working 2 days ago.       Subjective      History of Present Illness:     Oliver Astudillo is a 74 y.o. male [pmhx metastatic  prostate cancer, diabetes, hypertension, bilateral nephrostomy] who presents with constipation,  back pain. Patient has had constipation for about 5 days. He has also had about 2 days of blockage of left nephrostomy tube. Patient's wife has been unable to flush left nephrostomy tube and has noted lack of drainage. Patient has also had constipation. Miralax and senakot has not worked for him. The last 2 days the patient also had nausea without vomiting.  Hypercalcemia worsened to 12.5 today. Hypercalcemia has been an issue for this patient, in the setting of malignancy. He has metastatic prostate cancer, metastatic to the bone. He is on palliative care. Patient was supposed to be due for calcium-lowering treatment on Monday. Patient's significant other is agreeable to fluids for treatment of hypercalcemia in the hospital, instead of scheduled outpatient treatment. Patient's significant other is also agreeable to IR nephrostomy tube check on Monday.   ED course: 1L NS, 15mg ketorolac  In the ER, patient received 1 soapsuds enemas and then had profuse bowel movements.   Patient's significant other and also the patient denied any AMS. Patient is wheelchair bound.           Review of Systems:  Review of Systems   Constitutional:  Negative for chills and fever.   HENT:  Negative for ear pain and sore throat.    Eyes:  Negative for pain and visual disturbance.   Respiratory:  Negative for cough and shortness of breath.    Cardiovascular:  Negative for chest pain and palpitations.   Gastrointestinal:  Negative for abdominal pain and vomiting.   Genitourinary:  Negative for dysuria and hematuria.   Musculoskeletal:  Negative for arthralgias and back pain.   Skin:  Negative for color change and rash.   Neurological:  Negative for seizures and syncope.   All other systems reviewed and are negative.       Past Medical History:   Diagnosis Date    Altered mental status 10/08/2024    COVID-19 01/15/2024    Diabetes mellitus (HCC)      Elevated PSA 06/21/2023    High cholesterol     Hypertension     Prostate cancer (HCC)     Severe sepsis (HCC) 10/08/2024     Past Surgical History:   Procedure Laterality Date    IR NEPHROSTOMY TUBE CHECK/CHANGE/REPOSITION/REINSERTION/UPSIZE  9/28/2023    IR NEPHROSTOMY TUBE CHECK/CHANGE/REPOSITION/REINSERTION/UPSIZE  12/28/2023    IR NEPHROSTOMY TUBE CHECK/CHANGE/REPOSITION/REINSERTION/UPSIZE  1/31/2024    IR NEPHROSTOMY TUBE CHECK/CHANGE/REPOSITION/REINSERTION/UPSIZE  2/2/2024    IR NEPHROSTOMY TUBE CHECK/CHANGE/REPOSITION/REINSERTION/UPSIZE  3/4/2024    IR NEPHROSTOMY TUBE CHECK/CHANGE/REPOSITION/REINSERTION/UPSIZE  3/20/2024    IR NEPHROSTOMY TUBE CHECK/CHANGE/REPOSITION/REINSERTION/UPSIZE  6/7/2024    IR NEPHROSTOMY TUBE CHECK/CHANGE/REPOSITION/REINSERTION/UPSIZE  8/5/2024    IR NEPHROSTOMY TUBE CHECK/CHANGE/REPOSITION/REINSERTION/UPSIZE  10/4/2024    IR NEPHROSTOMY TUBE PLACEMENT  06/22/2023    bilateral    IR OTHER  1/15/2024    US GUIDED PROSTATE BIOPSY       Allergies   Allergen Reactions    Docetaxel Anaphylaxis     Prior to Admission Medications   Prescriptions Last Dose Informant Patient Reported? Taking?   Alcohol Swabs 70 % PADS  Self, Spouse/Significant Other, Child No No   Sig: To clean the skin prior to injecting insulin   Blood Glucose Monitoring Suppl (OneTouch Verio Reflect) w/Device KIT  Self, Spouse/Significant Other, Child No No   Sig: Check blood sugars once daily. Please substitute with appropriate alternative as covered by patient's insurance. Dx: E11.65   Cholecalciferol (VITAMIN D3) 1,000 units tablet   No No   Sig: Take 1 tablet (1,000 Units total) by mouth daily   Diclofenac Sodium (VOLTAREN) 1 %  Self, Spouse/Significant Other, Child No No   Sig: Apply 2 g topically 4 (four) times a day   Easy Touch Pen Needles 31G X 6 MM MISC  Self, Spouse/Significant Other, Child No No   Sig: Use daily at bedtime   Multiple Vitamin (multivitamin) tablet   No No   Sig: Take 1 tablet by mouth  daily   Patient not taking: Reported on 10/31/2024   Multiple Vitamins-Minerals (Sentry) TABS  Self, Spouse/Significant Other, Child Yes No   Sig: Take 1 tablet by mouth daily   Patient not taking: Reported on 9/5/2024   Nutritional Supplements (Ensure Plus High Protein) LIQD   No No   Sig: Take 237 mL by mouth 3 (three) times a day   Nutritional Supplements (Ensure Plus High Protein) LIQD   Yes No   OneTouch Delica Lancets 33G MISC  Self, Spouse/Significant Other, Child No No   Sig: Check blood sugars once daily. Please substitute with appropriate alternative as covered by patient's insurance. Dx: E11.65   Spacer/Aero-Holding Chambers (AEROCHAMBER MINI CHAMBER) BILLY  Self, Spouse/Significant Other, Child No No   Sig: by Does not apply route see administration instructions   acetaminophen (TYLENOL) 500 mg tablet   No No   Sig: Take 2 tablets (1,000 mg total) by mouth 3 (three) times a day   albuterol (Ventolin HFA) 90 mcg/act inhaler  Self, Spouse/Significant Other, Child No No   Sig: Inhale 2 puffs every 6 (six) hours as needed for wheezing   Patient not taking: Reported on 9/5/2024   dexamethasone (DECADRON) 1 mg tablet   No No   Sig: Take 1 tablet (1 mg total) by mouth 2 (two) times a day before breakfast and lunch TAKE 1 TABLET (0.5 MG TOTAL) BY MOUTH DAILY WITH BREAKFAST   gabapentin (Neurontin) 300 mg capsule   No No   Sig: Take 1 capsule (300 mg total) by mouth 3 (three) times a day   glucose 4-6 GM-MG   No No   Sig: Chew 2 tablets daily as needed for low blood sugar   glucose blood (OneTouch Verio) test strip  Self, Spouse/Significant Other, Child No No   Sig: Check blood sugars twice a daily. Please substitute with appropriate alternative as covered by patient's insurance. Dx: E11.65   magnesium Oxide (MAG-OX) 400 mg TABS   No No   Sig: Take 1 tablet (400 mg total) by mouth 2 (two) times a day   metFORMIN (GLUCOPHAGE) 1000 MG tablet   No No   Sig: Take 1 tablet (1,000 mg total) by mouth 2 (two) times a day  with meals   naloxone (NARCAN) 4 mg/0.1 mL nasal spray  Self, Spouse/Significant Other, Child No No   Sig: Administer 1 spray into a nostril. If no response after 2-3 minutes, give another dose in the other nostril using a new spray.   Patient not taking: Reported on 9/5/2024   omega-3-acid ethyl esters (LOVAZA) 1 g capsule   No No   Sig: Take 1 capsule (1 g total) by mouth daily   ondansetron (Zofran ODT) 8 mg disintegrating tablet  Self, Spouse/Significant Other, Child No No   Sig: Take 1 tablet (8 mg total) by mouth every 8 (eight) hours as needed for nausea or vomiting   Patient not taking: Reported on 10/8/2024   oxyCODONE (ROXICODONE) 10 MG TABS   No No   Sig: Take 1 tablet (10 mg total) by mouth every 4 (four) hours as needed for moderate pain Max Daily Amount: 60 mg   pantoprazole (PROTONIX) 40 mg tablet   No No   Sig: Take 1 tablet (40 mg total) by mouth daily   polyethylene glycol (MIRALAX) 17 g packet  Self, Spouse/Significant Other, Child No No   Sig: Take 17 g by mouth daily as needed (for constipation)   senna-docusate sodium (SENOKOT S) 8.6-50 mg per tablet  Self, Spouse/Significant Other, Child No No   Sig: Take 1 tablet by mouth daily   Patient not taking: Reported on 6/28/2024   sodium chloride 1 g tablet   No No   Sig: Take 1 tablet (1 g total) by mouth 2 (two) times a day with meals   Patient not taking: Reported on 10/17/2024   sodium chloride, PF, 0.9 %   No No   Sig: 10 mL by Intracatheter route daily Intracatheter flushing daily. May substitute prefilled syringe with normal saline 10 mL vials, 10 mL syringes, and 18 g blunt needles      Facility-Administered Medications: None     Social History     Socioeconomic History    Marital status: /Civil Union     Spouse name: Not on file    Number of children: 3    Years of education: Not on file    Highest education level: Not on file   Occupational History    Not on file   Tobacco Use    Smoking status: Former     Current packs/day: 0.00      Types: Cigarettes     Quit date: 2000     Years since quittin.8     Passive exposure: Past    Smokeless tobacco: Never    Tobacco comments:     States he only smoked on the weekends   Vaping Use    Vaping status: Never Used   Substance and Sexual Activity    Alcohol use: Not Currently    Drug use: Never    Sexual activity: Yes     Partners: Female   Other Topics Concern    Not on file   Social History Narrative    From   note:    Primary Caregiver: Self    Support Systems: Self, Spouse/significant other, Son, Family members    County of Residence: [place]    What city do you live in?: [place]    Home entry access options. Select all that apply.: Stairs    Number of steps to enter home.: 3    Type of Current Residence: 2 story home    Upon entering residence, is there a bedroom on the main floor (no further steps)?: Yes    Upon entering residence, is there a bathroom on the main floor (no further steps)?: Yes    Living Arrangements: Lives w/ Spouse/significant other    Is patient a ?: No    Functional Status: Independent    Does patient use assisted devices?: Yes    Assisted Devices (DME) used: Bedside Commode, Straight Cane, Walker, Shower Chair    What DME does the patient currently own?: Bedside Commode, Shower Chair, Straight Cane, Walker    Income Source: Pension/senior living    Means of Transport to Appts:: Family transport     Social Drivers of Health     Financial Resource Strain: Low Risk  (2024)    Overall Financial Resource Strain (CARDIA)     Difficulty of Paying Living Expenses: Not hard at all   Food Insecurity: No Food Insecurity (10/10/2024)    Nursing - Inadequate Food Risk Classification     Worried About Running Out of Food in the Last Year: Never true     Ran Out of Food in the Last Year: Never true     Ran Out of Food in the Last Year: Not on file   Transportation Needs: No Transportation Needs (10/12/2024)    Received from [facility]    OASIS :  "Transportation     Lack of Transportation (Medical): No     Lack of Transportation (Non-Medical): No     Patient Unable or Declines to Respond: No   Physical Activity: Not on file   Stress: Not on file   Social Connections: Feeling Socially Integrated (10/12/2024)    [facility] from [facility]    OASIS : Social Isolation     Frequency of experiencing loneliness or isolation: Never   Intimate Partner Violence: Not on file   Housing Stability: Low Risk  (10/10/2024)    Housing Stability Vital Sign     Unable to Pay for Housing in the Last Year: No     Number of Times Moved in the Last Year: 0     Homeless in the Last Year: No     Family History   Problem Relation Age of Onset    Uterine cancer Mother     Liver cancer Father     No Known Problems Sister     No Known Problems Brother     No Known Problems Son     Diabetes type II Daughter     No Known Problems Daughter     Cancer Daughter             Objective     Physical Exam:   Vitals:   Blood Pressure: 145/72 (11/16/24 1642)  Pulse: 60 (11/16/24 1632)  Temperature: (!) 96.9 °F (36.1 °C) (11/16/24 1642)  Temp Source: Oral (11/16/24 1632)  Respirations: 18 (11/16/24 1632)  Height: 5' 8\" (172.7 cm) (11/16/24 1632)  Weight - Scale: 59.7 kg (131 lb 11.2 oz) (11/16/24 1632)  SpO2: 100 % (11/16/24 1610)     Physical Exam  Constitutional:       Appearance: He is ill-appearing.   Cardiovascular:      Rate and Rhythm: Normal rate and regular rhythm.      Pulses: Normal pulses.      Heart sounds: Normal heart sounds. No murmur heard.     No friction rub. No gallop.   Pulmonary:      Effort: Pulmonary effort is normal.      Breath sounds: Normal breath sounds.   Abdominal:      General: Bowel sounds are normal.      Palpations: Abdomen is soft.      Tenderness: There is abdominal tenderness.   Genitourinary:     Comments: Bilateral nephrostomy tubes in place; Urine present in right bag, no urine present in left bag  Musculoskeletal:      Right lower leg: No edema.      " Left lower leg: No edema.   Skin:     General: Skin is warm.      Capillary Refill: Capillary refill takes less than 2 seconds.   Neurological:      General: No focal deficit present.      Mental Status: He is alert and oriented to person, place, and time. Mental status is at baseline.             Lab Results:  Results from last 7 days   Lab Units 11/16/24  1104   WBC Thousand/uL 7.32   HEMOGLOBIN g/dL 12.9   HEMATOCRIT % 41.1   PLATELETS Thousands/uL 443*   SEGS PCT % 87*   LYMPHO PCT % 7*   MONO PCT % 5   EOS PCT % 0     Results from last 7 days   Lab Units 11/16/24  1104 11/11/24  0910   POTASSIUM mmol/L 4.4 4.1   CHLORIDE mmol/L 97 95*   CO2 mmol/L 32 29   BUN mg/dL 22 17   CREATININE mg/dL 0.92 0.80   CALCIUM mg/dL 12.5* 12.1*   ALK PHOS U/L 188* 178*   ALT U/L 9 12   AST U/L 26 21   EGFR ml/min/1.73sq m 82 89   MAGNESIUM mg/dL 2.5  --                       Results from last 7 days   Lab Units 11/16/24  1512   COLOR UA  Yellow   CLARITY UA  Slightly Cloudy   SPEC GRAV UA  1.025   PH UA  8.0   LEUKOCYTES UA  Large*   NITRITE UA  Negative   GLUCOSE UA mg/dl Negative   KETONES UA mg/dl Negative   BILIRUBIN UA  Negative   BLOOD UA  Negative      Results from last 7 days   Lab Units 11/16/24  1512   RBC UA /hpf 1-2   WBC UA /hpf 1-2   EPITHELIAL CELLS WET PREP /hpf Occasional   BACTERIA UA /hpf Innumerable*                  Imaging:   CT abdomen pelvis with contrast  Result Date: 11/16/2024  1. Findings suggestive of cystitis with bilateral pyeloureteritis. 2. Multiple metastatic liver lesions and widespread osseous metastasis, similar to recent PSMA PET/CT, but progressed from 6/6/2024. 3. Multiple small nodular enhancing foci in the prostate gland and bladder wall, likely corresponding to the PSMA avid lesions seen on recent PET/CT. The study was marked in EPIC for immediate notification. Workstation performed: [id number]     NM pluvicto infusion  Result Date: 11/14/2024  203.7 mCi Lutetium 177 Pluvicto was  administered intravenously without immediate complication. Workstation performed: [id number]         EKG, Pathology, and Other Studies Reviewed on Admission:   EKG  Result Date: 11/16/24  Impression:  Normal sinus rhythm  Right bundle branch block  Pending official read           Entire H&P was discussed with Dr. Mesa who agreed to what is noted above     ** Please Note: This note has been constructed using a voice recognition system **     No Feliciano MD  11/16/24  5:11 PM

## 2024-11-16 NOTE — ASSESSMENT & PLAN NOTE
Patient presents with complaint of decreased urine output in left nephrostomy bag since 2 days  Pt has h/o prostate cancer leading to urinary obstruction treated with bilateral percutaneous nephrostomy tubes since 6/22/23.  Pt has had recurrent nephrostomy tube dysfunction and urinary tract infections.  Patient recently had nephrostomy tube repositioning on 10/4/2024 . Pt states that he irrigates tubes at least twice daily  CT scan showing cystitis with bilateral pyeloureteritis.      Plan  IR consulted for possible nephrostomy tube exchange

## 2024-11-16 NOTE — ASSESSMENT & PLAN NOTE
History of prostate cancer with metastasis to bone.  Patient has been following up with palliative care.  Patient is currently on Gabapentin 300 mg twice daily, oxycodone 10 mg every 4 hours as needed for moderate pain and Decadron 1 mg twice daily    Plan  Continue with home gabapentin, oxycodone and Decadron

## 2024-11-16 NOTE — ASSESSMENT & PLAN NOTE
Lab Results   Component Value Date    EGFR 82 11/16/2024    EGFR 89 11/11/2024    EGFR 93 10/16/2024    CREATININE 0.92 11/16/2024    CREATININE 0.80 11/11/2024    CREATININE 0.72 10/16/2024     Chronic, stable.     Plan  Follow up AM BMP

## 2024-11-16 NOTE — ED PROVIDER NOTES
Time reflects when diagnosis was documented in both MDM as applicable and the Disposition within this note       Time User Action Codes Description Comment    11/16/2024  2:20 PM Austen Ni Add [K59.00] Constipation     11/16/2024  2:20 PM Ni Boyce Add [E83.52] Hypercalcemia     11/16/2024  2:21 PM Austen Ni Add [T83.092A] Obstructed nephrostomy tube (HCC)     11/16/2024  2:21 PM Austen Ni Modify [K59.00] Constipation     11/16/2024  2:21 PM Ni Boyce Modify [T83.092A] Obstructed nephrostomy tube (HCC)     11/16/2024  2:21 PM Ni Boyce Modify [E83.52] Hypercalcemia     11/16/2024  2:21 PM Austen Ni Modify [T83.092A] Obstructed nephrostomy tube (HCC)           ED Disposition       ED Disposition   Admit    Condition   Stable    Date/Time   Sat Nov 16, 2024  2:20 PM    Comment   Case was discussed with FP and the patient's admission status was agreed to be Admission Status: observation status to the service of Dr. Mesa.               Assessment & Plan       Medical Decision Making  Pt is a 73yo M who presents with abdominal pain and constipation.    Differential diagnosis to include but not limited to electrolyte abnormality, dehydration, intra-abdominal pathology, constipation.  Will plan for labs and imaging.  Will treat symptomatically and reassess.  See ED course for results and details.    Plan to admit pt to FP. Pt discussed with admitting team and admission orders placed. Pt admitted without incident.       Amount and/or Complexity of Data Reviewed  Labs: ordered. Decision-making details documented in ED Course.  Radiology: ordered. Decision-making details documented in ED Course.  ECG/medicine tests:  Decision-making details documented in ED Course.    Risk  Prescription drug management.  Decision regarding hospitalization.        ED Course as of 11/16/24 1426   Sat Nov 16, 2024   1055 ECG 12 lead  Procedure Note: EKG  Date/Time: 11/16/24 10:55 AM    Interpreted by: Ni Boyce MD  Indications / Diagnosis: Electrolyte abnormalities  ECG reviewed by me, the ED Physician: yes   The EKG demonstrates:  Rhythm: normal sinus  Intervals: normal intervals  Axis: normal axis  QRS/Blocks: RBBB  ST Changes: No acute ST Changes, no STD/[place].  No significant change when compared to prior.    1110 CBC and differential(!)  Reviewed and without actionable derangement.    1112 Calcium, Ionized(!): 1.45  Elevated.    1131 Calcium(!): 12.5  Hypercalcemia increased from prior. Fluids in process.    1131 MAGNESIUM: 2.5  WNL   1325 CT being read.    1341 CT abdomen pelvis with contrast  Findings suggestive of cystitis with bilateral pyeloureteritis.       Medications   sodium chloride 0.9 % bolus 1,000 mL (1,000 mL Intravenous New Bag 11/16/24 1105)   ketorolac (TORADOL) injection 15 mg (15 mg Intravenous Given 11/16/24 1105)   iohexol (OMNIPAQUE) 350 MG/ML injection (MULTI-DOSE) 85 mL (85 mL Intravenous Given 11/16/24 1216)       ED Risk Strat Scores                           SBIRT 22yo+      Flowsheet Row Most Recent Value   Initial Alcohol Screen: US AUDIT-C     1. How often do you have a drink containing alcohol? 0 Filed at: 11/16/2024 1033   2. How many drinks containing alcohol do you have on a typical day you are drinking?  0 Filed at: 11/16/2024 1033   3a. Male UNDER 65: How often do you have five or more drinks on one occasion? 0 Filed at: 11/16/2024 1033   3b. FEMALE Any Age, or MALE 65+: How often do you have 4 or more drinks on one occassion? 0 Filed at: 11/16/2024 1033   Audit-C Score 0 Filed at: 11/16/2024 1033   YANNI: How many times in the past year have you...    Used an illegal drug or used a prescription medication for non-medical reasons? Never Filed at: 11/16/2024 1033                            History of Present Illness       Chief Complaint   Patient presents with    Constipation     Patient c/o abdominal pain and constipation.  Per wife patient has not  had a BM x 5 days and has a decreased appetite.  Wife also states left nephrostomy stopped working 2 days ago.       Past Medical History:   Diagnosis Date    Altered mental status 10/08/2024    COVID-19 01/15/2024    Diabetes mellitus (HCC)     Elevated PSA 2023    High cholesterol     Hypertension     Prostate cancer (HCC)     Severe sepsis (HCC) 10/08/2024      Past Surgical History:   Procedure Laterality Date    IR NEPHROSTOMY TUBE CHECK/CHANGE/REPOSITION/REINSERTION/UPSIZE  2023    IR NEPHROSTOMY TUBE CHECK/CHANGE/REPOSITION/REINSERTION/UPSIZE  2023    IR NEPHROSTOMY TUBE CHECK/CHANGE/REPOSITION/REINSERTION/UPSIZE  2024    IR NEPHROSTOMY TUBE CHECK/CHANGE/REPOSITION/REINSERTION/UPSIZE  2024    IR NEPHROSTOMY TUBE CHECK/CHANGE/REPOSITION/REINSERTION/UPSIZE  3/4/2024    IR NEPHROSTOMY TUBE CHECK/CHANGE/REPOSITION/REINSERTION/UPSIZE  3/20/2024    IR NEPHROSTOMY TUBE CHECK/CHANGE/REPOSITION/REINSERTION/UPSIZE  2024    IR NEPHROSTOMY TUBE CHECK/CHANGE/REPOSITION/REINSERTION/UPSIZE  2024    IR NEPHROSTOMY TUBE CHECK/CHANGE/REPOSITION/REINSERTION/UPSIZE  10/4/2024    IR NEPHROSTOMY TUBE PLACEMENT  2023    bilateral    IR OTHER  1/15/2024    US GUIDED PROSTATE BIOPSY        Family History   Problem Relation Age of Onset    Uterine cancer Mother     Liver cancer Father     No Known Problems Sister     No Known Problems Brother     No Known Problems Son     Diabetes type II Daughter     No Known Problems Daughter     Cancer Daughter       Social History     Tobacco Use    Smoking status: Former     Current packs/day: 0.00     Types: Cigarettes     Quit date:      Years since quittin.8     Passive exposure: Past    Smokeless tobacco: Never    Tobacco comments:     States he only smoked on the weekends   Vaping Use    Vaping status: Never Used   Substance Use Topics    Alcohol use: Not Currently    Drug use: Never      E-Cigarette/Vaping    E-Cigarette Use Never User        E-Cigarette/Vaping Substances    Nicotine No     THC No     CBD No     Flavoring No     Other No     Unknown No       I have reviewed and agree with the history as documented.     Pt is a 73yo M who presents for abdominal pain and constipation.  Patient reports that it has been 2 weeks since her symptoms started, however wife states that it is only been 1 week.  Patient reports diffuse abdominal pain and back pain.  He also reports that he has not had a bowel movement in 2 weeks.  Wife states it is been approximately 1 week.  Patient has also had decreased p.o. intake.  Wife also reports that patient's left nephrostomy tube stopped working 2 days ago.  Patient does have bilateral nephrostomy tubes secondary to prostate cancer.  Patient is currently undergoing treatment and had a nuclear medicine study and infusion in the past 2 days.  Per chart review, patient's most recent low blood work also showed hypercalcemia, however as patient was asymptomatic was scheduled for appointment Monday for [facility].  At the time of the call 2 days ago, patient's son reported no abdominal pain or constipation.            Objective       ED Triage Vitals [11/16/24 1030]   Temperature Pulse Blood Pressure Respirations SpO2 Patient Position - Orthostatic VS   98.2 °F (36.8 °C) 78 145/70 17 100 % Sitting      Temp Source Heart Rate Source BP Location FiO2 (%) Pain Score    Tympanic Monitor Left arm -- 9      Vitals      Date and Time Temp Pulse SpO2 Resp BP Pain Score FACES Pain Rating User   11/16/24 1315 -- 58 100 % 18 151/67 -- -- CS   11/16/24 1245 -- 58 100 % 18 137/63 -- -- CS   11/16/24 1145 -- 64 100 % 16 146/75 -- -- CS   11/16/24 1139 -- 69 100 % 16 146/75 -- -- CS   11/16/24 1134 -- -- -- -- -- No Pain -- CS   11/16/24 1030 98.2 °F (36.8 °C) 78 100 % 17 145/70 9 -- AC            Physical Exam  Vitals reviewed.   Constitutional:       General: He is not in acute distress.     Appearance: He is well-developed. He is not  toxic-appearing or diaphoretic.   HENT:      Head: Normocephalic and atraumatic.      Right Ear: External ear normal.      Left Ear: External ear normal.      Nose: Nose normal.      Mouth/Throat:      Pharynx: Oropharynx is clear.   Eyes:      Extraocular Movements: Extraocular movements intact.      Pupils: Pupils are equal, round, and reactive to light.   Cardiovascular:      Rate and Rhythm: Normal rate and regular rhythm.      Heart sounds: Normal heart sounds.   Pulmonary:      Effort: Pulmonary effort is normal. No respiratory distress.      Breath sounds: Normal breath sounds.   Abdominal:      General: Bowel sounds are normal. There is no distension.      Palpations: Abdomen is soft.      Tenderness: There is abdominal tenderness (diffuse, mild). There is no guarding or rebound.   Genitourinary:     Comments: Bilateral nephrostomy tubes in place; Urine present in R sided bag, no urine present in L sided bag  Musculoskeletal:         General: Normal range of motion.      Cervical back: Normal range of motion and neck supple.      Right lower leg: No edema.      Left lower leg: No edema.   Skin:     General: Skin is warm and dry.      Capillary Refill: Capillary refill takes less than 2 seconds.      Coloration: Skin is not pale.      Findings: No erythema or rash.   Neurological:      General: No focal deficit present.      Mental Status: He is alert. Mental status is at baseline.   Psychiatric:         Speech: Speech normal.         Behavior: Behavior is cooperative.         Results Reviewed       Procedure Component Value Units Date/Time    UA (URINE) with reflex to Scope [[id number]]     Lab Status: No result Specimen: Urine     Urine culture [[id number]]     Lab Status: No result Specimen: Urine     Comprehensive metabolic panel [[id number]]  (Abnormal) Collected: 11/16/24 1104    Lab Status: Final result Specimen: Blood from Arm, Right Updated: 11/16/24 1130     Sodium 135 mmol/L      Potassium 4.4 mmol/L       Chloride 97 mmol/L      CO2 32 mmol/L      ANION GAP 6 mmol/L      BUN 22 mg/dL      Creatinine 0.92 mg/dL      Glucose 205 mg/dL      Calcium 12.5 mg/dL      AST 26 U/L      ALT 9 U/L      Alkaline Phosphatase 188 U/L      Total Protein 8.1 g/dL      Albumin 3.7 g/dL      Total Bilirubin 0.57 mg/dL      eGFR 82 ml/min/1.73sq m     Narrative:      National Kid[employer] Disease Foundation guidelines for Chronic Kidney Disease (CKD):     Stage 1 with normal or high GFR (GFR > 90 mL/min/1.73 square meters)    Stage 2 Mild CKD (GFR = 60-89 mL/min/1.73 square meters)    Stage 3A Moderate CKD (GFR = 45-59 mL/min/1.73 square meters)    Stage 3B Moderate CKD (GFR = 30-44 mL/min/1.73 square meters)    Stage 4 Severe CKD (GFR = 15-29 mL/min/1.73 square meters)    Stage 5 End Stage CKD (GFR <15 mL/min/1.73 square meters)  Note: GFR calculation is accurate only with a steady state creatinine    Magnesium [[id number]]  (Normal) Collected: 11/16/24 1104    Lab Status: Final result Specimen: Blood from Arm, Right Updated: 11/16/24 1130     Magnesium 2.5 mg/dL     Calcium, ionized [[id number]]  (Abnormal) Collected: 11/16/24 1104    Lab Status: Final result Specimen: Blood from Arm, Right Updated: 11/16/24 1110     Calcium, Ionized 1.45 mmol/L     CBC and differential [[id number]]  (Abnormal) Collected: 11/16/24 1104    Lab Status: Final result Specimen: Blood from Arm, Right Updated: 11/16/24 1110     WBC 7.32 Thousand/uL      RBC 4.60 Million/uL      Hemoglobin 12.9 g/dL      Hematocrit 41.1 %      MCV 89 fL      MCH 28.0 pg      MCHC 31.4 g/dL      RDW 14.5 %      MPV 9.4 fL      Platelets 443 Thousands/uL      nRBC 0 /100 WBCs      Segmented % 87 %      Immature Grans % 1 %      Lymphocytes % 7 %      Monocytes % 5 %      Eosinophils Relative 0 %      Basophils Relative 0 %      Absolute Neutrophils 6.34 Thousands/µL      Absolute Immature Grans 0.04 Thousand/uL      Absolute Lymphocytes 0.52 Thousands/µL      Absolute Monocytes  0.38 Thousand/µL      Eosinophils Absolute 0.02 Thousand/µL      Basophils Absolute 0.02 Thousands/µL             CT abdomen pelvis with contrast   Final Interpretation by Sancho Simms MD (11/16 4013)      1. Findings suggestive of cystitis with bilateral pyeloureteritis.      2. Multiple metastatic liver lesions and widespread osseous metastasis, similar to recent PSMA PET/CT, but progressed from 6/6/2024.      3. Multiple small nodular enhancing foci in the prostate gland and bladder wall, likely corresponding to the PSMA avid lesions seen on recent PET/CT.      The study was marked in EPIC for immediate notification.      Workstation performed: [id number]             Procedures    ED Medication and Procedure Management   Prior to Admission Medications   Prescriptions Last Dose Informant Patient Reported? Taking?   Alcohol Swabs 70 % PADS  Self, Spouse/Significant Other, Child No No   Sig: To clean the skin prior to injecting insulin   Blood Glucose Monitoring Suppl (OneTouch Verio Reflect) w/Device KIT  Self, Spouse/Significant Other, Child No No   Sig: Check blood sugars once daily. Please substitute with appropriate alternative as covered by patient's insurance. Dx: E11.65   Cholecalciferol (VITAMIN D3) 1,000 units tablet   No No   Sig: Take 1 tablet (1,000 Units total) by mouth daily   Diclofenac Sodium (VOLTAREN) 1 %  Self, Spouse/Significant Other, Child No No   Sig: Apply 2 g topically 4 (four) times a day   Easy Touch Pen Needles 31G X 6 MM MISC  Self, Spouse/Significant Other, Child No No   Sig: Use daily at bedtime   Multiple Vitamin (multivitamin) tablet   No No   Sig: Take 1 tablet by mouth daily   Patient not taking: Reported on 10/31/2024   Multiple Vitamins-Minerals (Sentry) TABS  Self, Spouse/Significant Other, Child Yes No   Sig: Take 1 tablet by mouth daily   Patient not taking: Reported on 9/5/2024   Nutritional Supplements (Ensure Plus High Protein) LIQD   No No   Sig: Take 237 mL by  mouth 3 (three) times a day   Nutritional Supplements (Ensure Plus High Protein) LIQD   Yes No   OneTouch Delica Lancets 33G MISC  Self, Spouse/Significant Other, Child No No   Sig: Check blood sugars once daily. Please substitute with appropriate alternative as covered by patient's insurance. Dx: E11.65   Spacer/Aero-Holding Chambers (AEROCHAMBER MINI CHAMBER) BILLY  Self, Spouse/Significant Other, Child No No   Sig: by Does not apply route see administration instructions   acetaminophen (TYLENOL) 500 mg tablet   No No   Sig: Take 2 tablets (1,000 mg total) by mouth 3 (three) times a day   albuterol (Ventolin HFA) 90 mcg/act inhaler  Self, Spouse/Significant Other, Child No No   Sig: Inhale 2 puffs every 6 (six) hours as needed for wheezing   Patient not taking: Reported on 9/5/2024   dexamethasone (DECADRON) 1 mg tablet   No No   Sig: Take 1 tablet (1 mg total) by mouth 2 (two) times a day before breakfast and lunch TAKE 1 TABLET (0.5 MG TOTAL) BY MOUTH DAILY WITH BREAKFAST   gabapentin (Neurontin) 300 mg capsule   No No   Sig: Take 1 capsule (300 mg total) by mouth 3 (three) times a day   glucose 4-6 GM-MG   No No   Sig: Chew 2 tablets daily as needed for low blood sugar   glucose blood (OneTouch Verio) test strip  Self, Spouse/Significant Other, Child No No   Sig: Check blood sugars twice a daily. Please substitute with appropriate alternative as covered by patient's insurance. Dx: E11.65   magnesium Oxide (MAG-OX) 400 mg TABS   No No   Sig: Take 1 tablet (400 mg total) by mouth 2 (two) times a day   metFORMIN (GLUCOPHAGE) 1000 MG tablet   No No   Sig: Take 1 tablet (1,000 mg total) by mouth 2 (two) times a day with meals   naloxone (NARCAN) 4 mg/0.1 mL nasal spray  Self, Spouse/Significant Other, Child No No   Sig: Administer 1 spray into a nostril. If no response after 2-3 minutes, give another dose in the other nostril using a new spray.   Patient not taking: Reported on 9/5/2024   omega-3-acid ethyl esters  (LOVAZA) 1 g capsule   No No   Sig: Take 1 capsule (1 g total) by mouth daily   ondansetron (Zofran ODT) 8 mg disintegrating tablet  Self, Spouse/Significant Other, Child No No   Sig: Take 1 tablet (8 mg total) by mouth every 8 (eight) hours as needed for nausea or vomiting   Patient not taking: Reported on 10/8/2024   oxyCODONE (ROXICODONE) 10 MG TABS   No No   Sig: Take 1 tablet (10 mg total) by mouth every 4 (four) hours as needed for moderate pain Max Daily Amount: 60 mg   pantoprazole (PROTONIX) 40 mg tablet   No No   Sig: Take 1 tablet (40 mg total) by mouth daily   polyethylene glycol (MIRALAX) 17 g packet  Self, Spouse/Significant Other, Child No No   Sig: Take 17 g by mouth daily as needed (for constipation)   senna-docusate sodium (SENOKOT S) 8.6-50 mg per tablet  Self, Spouse/Significant Other, Child No No   Sig: Take 1 tablet by mouth daily   Patient not taking: Reported on 6/28/2024   sodium chloride 1 g tablet   No No   Sig: Take 1 tablet (1 g total) by mouth 2 (two) times a day with meals   Patient not taking: Reported on 10/17/2024   sodium chloride, PF, 0.9 %   No No   Sig: 10 mL by Intracatheter route daily Intracatheter flushing daily. May substitute prefilled syringe with normal saline 10 mL vials, 10 mL syringes, and 18 g blunt needles      Facility-Administered Medications: None     Patient's Medications   Discharge Prescriptions    No medications on file     No discharge procedures on file.  ED SEPSIS DOCUMENTATION   Time reflects when diagnosis was documented in both MDM as applicable and the Disposition within this note       Time User Action Codes Description Comment    11/16/2024  2:20 PM Ni Boyce Add [K59.00] Constipation     11/16/2024  2:20 PM Ni Boyce Add [E83.52] Hypercalcemia     11/16/2024  2:21 PM Ni Boyce Add [T83.092A] Obstructed nephrostomy tube (HCC)     11/16/2024  2:21 PM Ni Boyce Modify [K59.00] Constipation     11/16/2024  2:21 PM  Ni Boyce Modify [T83.092A] Obstructed nephrostomy tube (HCC)     11/16/2024  2:21 PM Ni Boyce Modify [E83.52] Hypercalcemia     11/16/2024  2:21 PM Ni Boyce Modify [T83.092A] Obstructed nephrostomy tube (HCC)                  Ni Boyce MD  11/16/24 1426

## 2024-11-16 NOTE — ASSESSMENT & PLAN NOTE
Patient presents with constipation has not had a bowel movement since 5 days.   Patient is on MiraLAX and Senokot-S at home    In the ER, patient received soap suds enema    Likely opioid-induced constipation    Plan  Continue with MiraLAX and Senokot-S

## 2024-11-16 NOTE — PLAN OF CARE
Problem: Potential for Falls  Goal: Patient will remain free of falls  Description: INTERVENTIONS:  - Educate patient/family on patient safety including physical limitations  - Instruct patient to call for assistance with activity   - Consult OT/PT to assist with strengthening/mobility   - Keep Call bell within reach  - Keep bed low and locked with side rails adjusted as appropriate  - Keep care items and personal belongings within reach  - Initiate and maintain comfort rounds  - Make Fall Risk Sign visible to staff  - Offer Toileting every hour  - Initiate/Maintain bed alarm  - Obtain necessary fall risk management equipment: yellow socks and nonskid socks  Problem: Nutrition/Hydration-ADULT  Goal: Nutrient/Hydration intake appropriate for improving, restoring or maintaining nutritional needs  Description: Monitor and assess patient's nutrition/hydration status for malnutrition. Collaborate with interdisciplinary team and initiate plan and interventions as ordered.  Monitor patient's weight and dietary intake as ordered or per policy. Utilize nutrition screening tool and intervene as necessary. Determine patient's food preferences and provide high-protein, high-caloric foods as appropriate.     INTERVENTIONS:  - Monitor oral intake, urinary output, labs, and treatment plans  - Assess nutrition and hydration status and recommend course of action  - Evaluate amount of meals eaten  - Assist patient with eating if necessary   - Allow adequate time for meals  - Recommend/ encourage appropriate diets, oral nutritional supplements, and vitamin/mineral supplements  - Order, calculate, and assess calorie counts as needed  - Recommend, monitor, and adjust tube feedings and TPN/PPN based on assessed needs  - Assess need for intravenous fluids  - Provide specific nutrition/hydration education as appropriate  - Include patient/family/caregiver in decisions related to nutrition  Outcome: Progressing     Problem: PAIN -  ADULT  Goal: Verbalizes/displays adequate comfort level or baseline comfort level  Description: Interventions:  - Encourage patient to monitor pain and request assistance  - Assess pain using appropriate pain scale  - Administer analgesics based on type and severity of pain and evaluate response  - Implement non-pharmacological measures as appropriate and evaluate response  - Consider cultural and social influences on pain and pain management  - Notify physician/advanced practitioner if interventions unsuccessful or patient reports new pain  Outcome: Progressing     Problem: INFECTION - ADULT  Goal: Absence or prevention of progression during hospitalization  Description: INTERVENTIONS:  - Assess and monitor for signs and symptoms of infection  - Monitor lab/diagnostic results  - Monitor all insertion sites, i.e. indwelling lines, tubes, and drains  - Monitor endotracheal if appropriate and nasal secretions for changes in amount and color  - [place] appropriate cooling/warming therapies per order  - Administer medications as ordered  - Instruct and encourage patient and family to use good hand hygiene technique  - Identify and instruct in appropriate isolation precautions for identified infection/condition  Outcome: Progressing  Goal: Absence of fever/infection during neutropenic period  Description: INTERVENTIONS:  - Monitor WBC    Outcome: Progressing     Problem: SAFETY ADULT  Goal: Patient will remain free of falls  Description: INTERVENTIONS:  - Educate patient/family on patient safety including physical limitations  - Instruct patient to call for assistance with activity   - Consult OT/PT to assist with strengthening/mobility   - Keep Call bell within reach  - Keep bed low and locked with side rails adjusted as appropriate  - Keep care items and personal belongings within reach  - Initiate and maintain comfort rounds  - Make Fall Risk Sign visible to staff  - Apply yellow socks and bracelet for high fall  risk patients  - Consider moving patient to room near nurses station  11/16/2024 1736 by Sirisha Foy RN  Outcome: Progressing  11/16/2024 1735 by Sirisha Foy RN  Outcome: Progressing  Goal: Maintain or return to baseline ADL function  Description: INTERVENTIONS:  -  Assess patient's ability to carry out ADLs; assess patient's baseline for ADL function and identify physical deficits which impact ability to perform ADLs (bathing, care of mouth/teeth, toileting, grooming, dressing, etc.)  - Assess/evaluate cause of self-care deficits   - Assess range of motion  - Assess patient's mobility; develop plan if impaired  - Assess patient's need for assistive devices and provide as appropriate  - Encourage maximum independence but intervene and supervise when necessary  - Involve family in performance of ADLs  - Assess for home care needs following discharge   - Consider OT consult to assist with ADL evaluation and planning for discharge  - Provide patient education as appropriate  Outcome: Progressing  Goal: Maintains/Returns to pre admission functional level  Description: INTERVENTIONS:  - Perform AM-PAC 6 Click Basic Mobility/ Daily Activity assessment daily.  - Set and communicate daily mobility goal to care team and patient/family/caregiver.   - Collaborate with rehabilitation services on mobility goals if consulted  - Reposition patient every 2 hours.    Problem: DISCHARGE PLANNING  Goal: Discharge to home or other facility with appropriate resources  Description: INTERVENTIONS:  - Identify barriers to discharge w/patient and caregiver  - Arrange for needed discharge resources and transportation as appropriate  - Identify discharge learning needs (meds, wound care, etc.)  - Arrange for interpretive services to assist at discharge as needed  - Refer to Case Management Department for coordinating discharge planning if the patient needs post-hospital services based on physician/advanced practitioner order or  complex needs related to functional status, cognitive ability, or social support system  Outcome: Progressing     Problem: Knowledge Deficit  Goal: Patient/family/caregiver demonstrates understanding of disease process, treatment plan, medications, and discharge instructions  Description: Complete learning assessment and assess knowledge base.  Interventions:  - Provide teaching at level of understanding  - Provide teaching via preferred learning methods  Outcome: Progressing   - Record patient progress and toleration of activity level   Outcome: Progressing     - Apply yellow socks and bracelet for high fall risk patients  - Consider moving patient to room near nurses station  11/16/2024 1736 by Sirisha Foy RN  Outcome: Progressing  11/16/2024 1735 by Sirisha Foy RN  Outcome: Progressing     Problem: Prexisting or High Potential for Compromised Skin Integrity  Goal: Skin integrity is maintained or improved  Description: INTERVENTIONS:  - Identify patients at risk for skin breakdown  - Assess and monitor skin integrity  - Assess and monitor nutrition and hydration status  - Monitor labs   - Assess for incontinence   - Turn and reposition patient  - Assist with mobility/ambulation  - Relieve pressure over bony prominences  - Avoid friction and shearing  - Provide appropriate hygiene as needed including keeping skin clean and dry  - Evaluate need for skin moisturizer/barrier cream  - Collaborate with interdisciplinary team   - Patient/family teaching  - Consider wound care consult   11/16/2024 1736 by Sirisha Foy RN  Outcome: Progressing  11/16/2024 1735 by Sirisha Foy RN  Outcome: Progressing

## 2024-11-16 NOTE — ASSESSMENT & PLAN NOTE
Chronic, stable. Pt's home med lisinopril was discontinued by oncology due to several episodes of hypotension.     Plan  Monitor BP and vitals

## 2024-11-16 NOTE — ASSESSMENT & PLAN NOTE
Lab Results   Component Value Date    HGBA1C 5.6 10/08/2024       Chronic.  Home medications include metformin 1000mg BID. Patient was previously on glimepiride 1 mg daily  and Levemir 20 IU which were discontinued.       Plan:  Hold home metformin  Patient started on insulin sliding scale and Accu-Cheks ACHS  Carbohydrate controlled diet  Hypoglycemia protocol  Monitor blood sugars   (P) 156.5

## 2024-11-17 ENCOUNTER — APPOINTMENT (OUTPATIENT)
Dept: RADIOLOGY | Facility: HOSPITAL | Age: 74
DRG: 699 | End: 2024-11-17
Payer: COMMERCIAL

## 2024-11-17 PROBLEM — M79.672 HEEL PAIN, BILATERAL: Status: ACTIVE | Noted: 2024-11-17

## 2024-11-17 PROBLEM — M79.671 HEEL PAIN, BILATERAL: Status: ACTIVE | Noted: 2024-11-17

## 2024-11-17 LAB
ANION GAP SERPL CALCULATED.3IONS-SCNC: 4 MMOL/L (ref 4–13)
BASOPHILS # BLD AUTO: 0.02 THOUSANDS/ÂΜL (ref 0–0.1)
BASOPHILS NFR BLD AUTO: 0 % (ref 0–1)
BUN SERPL-MCNC: 21 MG/DL (ref 5–25)
CALCIUM SERPL-MCNC: 10 MG/DL (ref 8.4–10.2)
CHLORIDE SERPL-SCNC: 100 MMOL/L (ref 96–108)
CO2 SERPL-SCNC: 26 MMOL/L (ref 21–32)
CREAT SERPL-MCNC: 0.68 MG/DL (ref 0.6–1.3)
EOSINOPHIL # BLD AUTO: 0.05 THOUSAND/ÂΜL (ref 0–0.61)
EOSINOPHIL NFR BLD AUTO: 1 % (ref 0–6)
ERYTHROCYTE [DISTWIDTH] IN BLOOD BY AUTOMATED COUNT: 14.4 % (ref 11.6–15.1)
GFR SERPL CREATININE-BSD FRML MDRD: 95 ML/MIN/1.73SQ M
GLUCOSE P FAST SERPL-MCNC: 93 MG/DL (ref 65–99)
GLUCOSE SERPL-MCNC: 147 MG/DL (ref 65–140)
GLUCOSE SERPL-MCNC: 169 MG/DL (ref 65–140)
GLUCOSE SERPL-MCNC: 181 MG/DL (ref 65–140)
GLUCOSE SERPL-MCNC: 89 MG/DL (ref 65–140)
GLUCOSE SERPL-MCNC: 93 MG/DL (ref 65–140)
HCT VFR BLD AUTO: 32 % (ref 36.5–49.3)
HGB BLD-MCNC: 10.3 G/DL (ref 12–17)
IMM GRANULOCYTES # BLD AUTO: 0.02 THOUSAND/UL (ref 0–0.2)
IMM GRANULOCYTES NFR BLD AUTO: 0 % (ref 0–2)
LYMPHOCYTES # BLD AUTO: 0.68 THOUSANDS/ÂΜL (ref 0.6–4.47)
LYMPHOCYTES NFR BLD AUTO: 11 % (ref 14–44)
MCH RBC QN AUTO: 28.5 PG (ref 26.8–34.3)
MCHC RBC AUTO-ENTMCNC: 32.2 G/DL (ref 31.4–37.4)
MCV RBC AUTO: 89 FL (ref 82–98)
MONOCYTES # BLD AUTO: 0.37 THOUSAND/ÂΜL (ref 0.17–1.22)
MONOCYTES NFR BLD AUTO: 6 % (ref 4–12)
NEUTROPHILS # BLD AUTO: 4.86 THOUSANDS/ÂΜL (ref 1.85–7.62)
NEUTS SEG NFR BLD AUTO: 82 % (ref 43–75)
NRBC BLD AUTO-RTO: 0 /100 WBCS
PLATELET # BLD AUTO: 348 THOUSANDS/UL (ref 149–390)
PMV BLD AUTO: 9.3 FL (ref 8.9–12.7)
POTASSIUM SERPL-SCNC: 3.7 MMOL/L (ref 3.5–5.3)
RBC # BLD AUTO: 3.61 MILLION/UL (ref 3.88–5.62)
SODIUM SERPL-SCNC: 130 MMOL/L (ref 135–147)
WBC # BLD AUTO: 6 THOUSAND/UL (ref 4.31–10.16)

## 2024-11-17 PROCEDURE — 73620 X-RAY EXAM OF FOOT: CPT

## 2024-11-17 PROCEDURE — 85025 COMPLETE CBC W/AUTO DIFF WBC: CPT

## 2024-11-17 PROCEDURE — 80048 BASIC METABOLIC PNL TOTAL CA: CPT

## 2024-11-17 PROCEDURE — 82948 REAGENT STRIP/BLOOD GLUCOSE: CPT

## 2024-11-17 PROCEDURE — 99232 SBSQ HOSP IP/OBS MODERATE 35: CPT | Performed by: STUDENT IN AN ORGANIZED HEALTH CARE EDUCATION/TRAINING PROGRAM

## 2024-11-17 RX ORDER — SODIUM CHLORIDE 9 MG/ML
75 INJECTION, SOLUTION INTRAVENOUS CONTINUOUS
Status: DISCONTINUED | OUTPATIENT
Start: 2024-11-17 | End: 2024-11-19 | Stop reason: HOSPADM

## 2024-11-17 RX ORDER — SODIUM CHLORIDE 1 G/1
1 TABLET ORAL 2 TIMES DAILY WITH MEALS
Status: DISCONTINUED | OUTPATIENT
Start: 2024-11-17 | End: 2024-11-19 | Stop reason: HOSPADM

## 2024-11-17 RX ADMIN — GABAPENTIN 300 MG: 300 CAPSULE ORAL at 09:50

## 2024-11-17 RX ADMIN — POLYETHYLENE GLYCOL 3350 17 G: 17 POWDER, FOR SOLUTION ORAL at 09:49

## 2024-11-17 RX ADMIN — INSULIN LISPRO 1 UNITS: 100 INJECTION, SOLUTION INTRAVENOUS; SUBCUTANEOUS at 21:39

## 2024-11-17 RX ADMIN — DEXAMETHASONE 1 MG: 2 TABLET ORAL at 07:43

## 2024-11-17 RX ADMIN — SENNOSIDES AND DOCUSATE SODIUM 2 TABLET: 50; 8.6 TABLET ORAL at 21:32

## 2024-11-17 RX ADMIN — GABAPENTIN 300 MG: 300 CAPSULE ORAL at 21:32

## 2024-11-17 RX ADMIN — ENOXAPARIN SODIUM 40 MG: 40 INJECTION SUBCUTANEOUS at 09:49

## 2024-11-17 RX ADMIN — SODIUM CHLORIDE 1 G: 1 TABLET ORAL at 17:46

## 2024-11-17 RX ADMIN — GABAPENTIN 300 MG: 300 CAPSULE ORAL at 17:46

## 2024-11-17 RX ADMIN — DEXAMETHASONE 1 MG: 2 TABLET ORAL at 10:46

## 2024-11-17 RX ADMIN — SODIUM CHLORIDE 50 ML/HR: 0.9 INJECTION, SOLUTION INTRAVENOUS at 10:51

## 2024-11-17 RX ADMIN — INSULIN LISPRO 1 UNITS: 100 INJECTION, SOLUTION INTRAVENOUS; SUBCUTANEOUS at 17:48

## 2024-11-17 RX ADMIN — SODIUM CHLORIDE 50 ML/HR: 0.9 INJECTION, SOLUTION INTRAVENOUS at 18:04

## 2024-11-17 RX ADMIN — PANTOPRAZOLE SODIUM 40 MG: 40 TABLET, DELAYED RELEASE ORAL at 09:50

## 2024-11-17 NOTE — PLAN OF CARE
Problem: Potential for Falls  Goal: Patient will remain free of falls  Description: INTERVENTIONS:  - Educate patient/family on patient safety including physical limitations  - Instruct patient to call for assistance with activity   - Consult OT/PT to assist with strengthening/mobility   - Keep Call bell within reach  - Keep bed low and locked with side rails adjusted as appropriate  - Keep care items and personal belongings within reach  - Initiate and maintain comfort rounds  - Make Fall Risk Sign visible to staff  - Offer Toileting every 2 Hours, in advance of need  - Initiate/Maintain bed alarm  - Obtain necessary fall risk management equipment:   - Apply yellow socks and bracelet for high fall risk patients  - Consider moving patient to room near nurses station  Outcome: Progressing     Problem: Prexisting or High Potential for Compromised Skin Integrity  Goal: Skin integrity is maintained or improved  Description: INTERVENTIONS:  - Identify patients at risk for skin breakdown  - Assess and monitor skin integrity  - Assess and monitor nutrition and hydration status  - Monitor labs   - Assess for incontinence   - Turn and reposition patient  - Assist with mobility/ambulation  - Relieve pressure over bony prominences  - Avoid friction and shearing  - Provide appropriate hygiene as needed including keeping skin clean and dry  - Evaluate need for skin moisturizer/barrier cream  - Collaborate with interdisciplinary team   - Patient/family teaching  - Consider wound care consult   Outcome: Progressing     Problem: Nutrition/Hydration-ADULT  Goal: Nutrient/Hydration intake appropriate for improving, restoring or maintaining nutritional needs  Description: Monitor and assess patient's nutrition/hydration status for malnutrition. Collaborate with interdisciplinary team and initiate plan and interventions as ordered.  Monitor patient's weight and dietary intake as ordered or per policy. Utilize nutrition screening tool  and intervene as necessary. Determine patient's food preferences and provide high-protein, high-caloric foods as appropriate.     INTERVENTIONS:  - Monitor oral intake, urinary output, labs, and treatment plans  - Assess nutrition and hydration status and recommend course of action  - Evaluate amount of meals eaten  - Assist patient with eating if necessary   - Allow adequate time for meals  - Recommend/ encourage appropriate diets, oral nutritional supplements, and vitamin/mineral supplements  - Order, calculate, and assess calorie counts as needed  - Recommend, monitor, and adjust tube feedings and TPN/PPN based on assessed needs  - Assess need for intravenous fluids  - Provide specific nutrition/hydration education as appropriate  - Include patient/family/caregiver in decisions related to nutrition  Outcome: Progressing     Problem: PAIN - ADULT  Goal: Verbalizes/displays adequate comfort level or baseline comfort level  Description: Interventions:  - Encourage patient to monitor pain and request assistance  - Assess pain using appropriate pain scale  - Administer analgesics based on type and severity of pain and evaluate response  - Implement non-pharmacological measures as appropriate and evaluate response  - Consider cultural and social influences on pain and pain management  - Notify physician/advanced practitioner if interventions unsuccessful or patient reports new pain  Outcome: Progressing     Problem: INFECTION - ADULT  Goal: Absence or prevention of progression during hospitalization  Description: INTERVENTIONS:  - Assess and monitor for signs and symptoms of infection  - Monitor lab/diagnostic results  - Monitor all insertion sites, i.e. indwelling lines, tubes, and drains  - Monitor endotracheal if appropriate and nasal secretions for changes in amount and color  - [place] appropriate cooling/warming therapies per order  - Administer medications as ordered  - Instruct and encourage patient and  family to use good hand hygiene technique  - Identify and instruct in appropriate isolation precautions for identified infection/condition  Outcome: Progressing  Goal: Absence of fever/infection during neutropenic period  Description: INTERVENTIONS:  - Monitor WBC    Outcome: Progressing     Problem: SAFETY ADULT  Goal: Patient will remain free of falls  Description: INTERVENTIONS:  - Educate patient/family on patient safety including physical limitations  - Instruct patient to call for assistance with activity   - Consult OT/PT to assist with strengthening/mobility   - Keep Call bell within reach  - Keep bed low and locked with side rails adjusted as appropriate  - Keep care items and personal belongings within reach  - Initiate and maintain comfort rounds  - Make Fall Risk Sign visible to staff  - Offer Toileting every 2 Hours, in advance of need  - Initiate/Maintain bed alarm  - Obtain necessary fall risk management equipment:   - Apply yellow socks and bracelet for high fall risk patients  - Consider moving patient to room near nurses station  Outcome: Progressing  Goal: Maintain or return to baseline ADL function  Description: INTERVENTIONS:  -  Assess patient's ability to carry out ADLs; assess patient's baseline for ADL function and identify physical deficits which impact ability to perform ADLs (bathing, care of mouth/teeth, toileting, grooming, dressing, etc.)  - Assess/evaluate cause of self-care deficits   - Assess range of motion  - Assess patient's mobility; develop plan if impaired  - Assess patient's need for assistive devices and provide as appropriate  - Encourage maximum independence but intervene and supervise when necessary  - Involve family in performance of ADLs  - Assess for home care needs following discharge   - Consider OT consult to assist with ADL evaluation and planning for discharge  - Provide patient education as appropriate  Outcome: Progressing  Goal: Maintains/Returns to pre  admission functional level  Description: INTERVENTIONS:  - Perform AM-PAC 6 Click Basic Mobility/ Daily Activity assessment daily.  - Set and communicate daily mobility goal to care team and patient/family/caregiver.   - Collaborate with rehabilitation services on mobility goals if consulted  - Perform Range of Motion 3 times a day.  - Reposition patient every 2 hours.  - Dangle patient 3 times a day  - Stand patient 3 times a day  - Ambulate patient 3 times a day  - Out of bed to chair 3 times a day   - Out of bed for meals 3 times a day  - Out of bed for toileting  - Record patient progress and toleration of activity level   Outcome: Progressing     Problem: DISCHARGE PLANNING  Goal: Discharge to home or other facility with appropriate resources  Description: INTERVENTIONS:  - Identify barriers to discharge w/patient and caregiver  - Arrange for needed discharge resources and transportation as appropriate  - Identify discharge learning needs (meds, wound care, etc.)  - Arrange for interpretive services to assist at discharge as needed  - Refer to Case Management Department for coordinating discharge planning if the patient needs post-hospital services based on physician/advanced practitioner order or complex needs related to functional status, cognitive ability, or social support system  Outcome: Progressing     Problem: Knowledge Deficit  Goal: Patient/family/caregiver demonstrates understanding of disease process, treatment plan, medications, and discharge instructions  Description: Complete learning assessment and assess knowledge base.  Interventions:  - Provide teaching at level of understanding  - Provide teaching via preferred learning methods  Outcome: Progressing

## 2024-11-17 NOTE — ASSESSMENT & PLAN NOTE
Patient has history of hypercalcemia likely secondary to metastatic prostate cancer. Hypercalcemia is likely contributing.  He has been following up with hematology oncology and has been receiving Xgeva monthly as recommended to improve hypercalcemia.  Patient has poor appetite, weight loss, constipation, fatigue likely from hypercalcemia.   Ionized calcium on admission 1.45     In the ER, patient received 1 L IV fluid bolus     Plan  Patient started on normal saline 75 mL/h  Calcium normalized, will continue NS IVF

## 2024-11-17 NOTE — ASSESSMENT & PLAN NOTE
Patient presents with complaint of left nephrostomy tube malfunction since 2 days.  Mild bilateral flank pain.  No fevers or leukocytosis present on admission  CT scan showing findings suggestive of cystitis with bilateral pyeloureteritis.   UA on admission positive for leukocytes     Low suspicion for urinary tract infection as patient is afebrile with no leukocytosis.  UA likely due to colonization from chronic nephrostomy tube placement     Plan  One-time dose of cefepime, hold off on further antibiotics  IR consulted   Monitor WBC count and vitals

## 2024-11-17 NOTE — ASSESSMENT & PLAN NOTE
Patient presents with complaint of decreased urine output in left nephrostomy bag since 2 days  Pt has h/o prostate cancer leading to urinary obstruction treated with bilateral percutaneous nephrostomy tubes since 6/22/23.  Pt has had recurrent nephrostomy tube dysfunction and urinary tract infections.  Patient recently had nephrostomy tube repositioning on 10/4/2024 . Pt states that he irrigates tubes at least twice daily  CT scan showing cystitis with bilateral pyeloureteritis.      Plan  IR consulted for possible nephrostomy tube exchange  Will hold off of on further Abx, low suspicion of infection.

## 2024-11-17 NOTE — ASSESSMENT & PLAN NOTE
Lab Results   Component Value Date    HGBA1C 5.6 10/08/2024       Recent Labs     11/16/24  1637 11/16/24  2115 11/17/24  0722   POCGLU 180* 133 89       Blood Sugar Average: Last 72 hrs:  (P) 134    Chronic. Well controlled. Home medications include metformin 1000mg BID. Patient was previously on glimepiride 1 mg daily  and Levemir 20 IU which were discontinued.       Plan:  Hold home metformin  Patient started on insulin sliding scale and Accu-Cheks ACHS  Carbohydrate controlled diet  Hypoglycemia protocol  Monitor blood sugars

## 2024-11-17 NOTE — UTILIZATION REVIEW
OBSERVATION 11-16-24 CHANGED TO INPATIENT 11-17-24 FOR CONTINUED IV FLUIDS, L NEPHROSTOMY MALFUNCTION.     Initial Clinical Review    Admission: Date/Time/Statement:   Admission Orders (From admission, onward)       Ordered        11/17/24 1021  INPATIENT ADMISSION  Once            11/16/24 1421  Place in Observation  Once                            ED Arrival Information       Expected   -    Arrival   11/16/2024 10:07    Acuity   Urgent              Means of arrival   Wheelchair    Escorted by   Spouse    Service   Family Medicine    Admission type   Emergency              Arrival complaint   constipation             Chief Complaint   Patient presents with    Constipation     Patient c/o abdominal pain and constipation.  Per wife patient has not had a BM x 5 days and has a decreased appetite.  Wife also states left nephrostomy stopped working 2 days ago.       Initial Presentation: 74 y.o. male presents to ed from home on 11-16-24 for evaluation and treatment of abdominal pain and constipation. Decreased po intake. Wife also reports that patient's left nephrostomy tube stopped working 2 days ago. Patient does have bilateral nephrostomy tubes secondary to prostate cancer.   Wife also reports that patient's left nephrostomy tube stopped working 2 days ago. Patient is currently undergoing treatment and had a nuclear medicine study and infusion in the past 2 days.   PMHX: DM, HTN, COVID 19    Clinical assessment significant for abdominal and back pain.  NO urine in L sided nephrostomy bag.       EKG: nsr.  Calcium 12.5.  imaging shows cystitis, bilateral pyeloureteritis, multiple metastatic liver lesions.   Initially treated with iv toradol, iv .9% ns bolus 1L .  Admit to observation  for malfunction of nephrostomy tube.    IR : plan IR nephrostomy tube check.      Anticipated Length of Stay/Certification Statement: Patient will be admitted on an Observation basis 1 or 2 midnights depending on IR tube placement      Date: 11- 17-24    Day 2: observation changed to inpatient  Monitor without antibiotics. Low suspicion of infection.  X rays of bilateral feet due to bilateral heel pain.  Possibly cancer related osteolytic pain.  Start scheduled neurontin and prn oxycodone /tylenol. Continue iv ns 75/hr .  Calcium normalized.       Date: 11-18-24    Day 3: Has surpassed a 2nd midnight with active treatments and services for malfunction of L nephrostomy tube with bilateral pyeloureteritis.     Holding one dose of Lovenox this morning for the possible nephrostomy tube exchange today will give after the procedure.  Patient continues to report bilateral foot pain. X rays without acute finding.  Continue iv .9% ns 50/hr, neurontin, po decadron.  IR nephrostomy tube exchange planned for today.      ED Treatment-Medication Administration from 11/16/2024 1006 to 11/16/2024 1527         Date/Time Order Dose Route Action     11/16/2024 1105 sodium chloride 0.9 % bolus 1,000 mL 1,000 mL Intravenous New Bag     11/16/2024 1105 ketorolac (TORADOL) injection 15 mg 15 mg Intravenous Given            Scheduled Medications:    dexamethasone, 1 mg, Oral, BID before breakfast/lunch  enoxaparin, 40 mg, Subcutaneous, Daily  gabapentin, 300 mg, Oral, TID  insulin lispro, 1-5 Units, Subcutaneous, TID AC  insulin lispro, 1-5 Units, Subcutaneous, HS  pantoprazole, 40 mg, Oral, Daily  polyethylene glycol, 17 g, Oral, Daily  senna-docusate sodium, 2 tablet, Oral, HS  sodium chloride, 1 g, Oral, BID With Meals      Continuous IV Infusions:  sodium chloride, 50 mL/hr, Intravenous, Continuous      PRN Meds:  acetaminophen, 650 mg, Oral, Q6H PRN  oxyCODONE, 10 mg, Oral, Q4H PRN      ED Triage Vitals [11/16/24 1030]   Temperature Pulse Respirations Blood Pressure SpO2 Pain Score   98.2 °F (36.8 °C) 78 17 145/70 100 % 9     Weight (last 2 days)       Date/Time Weight    11/16/24 1632 59.7 (131.7)            Vital Signs (last 3 days)       Date/Time Temp Pulse  Resp BP MAP (mmHg) SpO2 O2 Device Pain    11/17/24 07:13:54 98.8 °F (37.1 °C) -- 18 106/66 79 -- -- --    11/17/24 03:45:12 97.5 °F (36.4 °C) 63 18 105/67 80 99 % None (Room air) --    11/16/24 2200 -- -- -- -- -- -- -- No Pain    11/16/24 20:20:25 97.4 °F (36.3 °C) 69 18 139/75 96 99 % None (Room air) No Pain    11/16/24 16:42:44 96.9 °F (36.1 °C) -- -- 145/72 96 -- -- --    11/16/24 16:41:06 96.9 °F (36.1 °C) -- -- -- -- -- -- --    11/16/24 1632 96.9 °F (36.1 °C) 60 18 145/72 -- -- -- --    11/16/24 1610 -- -- -- -- -- 100 % None (Room air) 8    11/16/24 15:44:50 96.2 °F (35.7 °C) -- 20 145/66 92 -- -- --    11/16/24 1515 -- 62 18 157/71 102 100 % None (Room air) --    11/16/24 1445 -- 62 18 156/72 104 100 % None (Room air) --    11/16/24 1315 -- 58 18 151/67 96 100 % None (Room air) --    11/16/24 1245 -- 58 18 137/63 91 100 % None (Room air) --    11/16/24 1145 -- 64 16 146/75 102 100 % None (Room air) --    11/16/24 1139 -- 69 16 146/75 -- 100 % None (Room air) --    11/16/24 1137 -- -- -- -- -- -- None (Room air) --    11/16/24 1134 -- -- -- -- -- -- -- No Pain    11/16/24 1030 98.2 °F (36.8 °C) 78 17 145/70 -- 100 % None (Room air) 9         Pertinent Labs/Diagnostic Test Results:   Radiology:  CT abdomen pelvis with contrast   Final(11/16 1338)      1. Findings suggestive of cystitis with bilateral pyeloureteritis.      2. Multiple metastatic liver lesions and widespread osseous metastasis, similar to recent PSMA PET/CT, but progressed from 6/6/2024.      3. Multiple small nodular enhancing foci in the prostate gland and bladder wall, likely corresponding to the PSMA avid lesions seen on recent PET/CT.         IR nephrostomy tube check/change/reposition/reinsertion/upsize    (Results Pending)   XR foot 2 vw left    (Results Pending)   XR foot 2 vw right    (Results Pending)     Cardiology:  No orders to display     GI:  No orders to display           Results from last 7 days   Lab Units 11/17/24  8428  11/16/24  1104 11/11/24  0910   WBC Thousand/uL 6.00 7.32 10.95*   HEMOGLOBIN g/dL 10.3* 12.9 12.7   HEMATOCRIT % 32.0* 41.1 39.4   PLATELETS Thousands/uL 348 443* 557*   TOTAL NEUT ABS Thousands/µL 4.86 6.34 8.71*         Results from last 7 days   Lab Units 11/17/24  0547 11/16/24  1104 11/11/24  0910   SODIUM mmol/L 130* 135 132*   POTASSIUM mmol/L 3.7 4.4 4.1   CHLORIDE mmol/L 100 97 95*   CO2 mmol/L 26 32 29   ANION GAP mmol/L 4 6 8   BUN mg/dL 21 22 17   CREATININE mg/dL 0.68 0.92 0.80   EGFR ml/min/1.73sq m 95 82 89   CALCIUM mg/dL 10.0 12.5* 12.1*   CALCIUM, IONIZED mmol/L  --  1.45*  --    MAGNESIUM mg/dL  --  2.5  --      Results from last 7 days   Lab Units 11/16/24  1104 11/11/24  0910   AST U/L 26 21   ALT U/L 9 12   ALK PHOS U/L 188* 178*   TOTAL PROTEIN g/dL 8.1 8.2   ALBUMIN g/dL 3.7 3.8   TOTAL BILIRUBIN mg/dL 0.57 0.45     Results from last 7 days   Lab Units 11/17/24  1148 11/17/24  0722 11/16/24  2115 11/16/24  1637   POC GLUCOSE mg/dl 147* 89 133 180*     Results from last 7 days   Lab Units 11/17/24  0547 11/16/24  1104   GLUCOSE RANDOM mg/dL 93 205*       Results from last 7 days   Lab Units 11/16/24  2026 11/16/24  1512   CLARITY UA  Cloudy Slightly Cloudy   COLOR UA  Light Orange Yellow   SPEC GRAV UA  1.015 1.025   PH UA  7.5 8.0   GLUCOSE UA mg/dl Negative Negative   KETONES UA mg/dl Negative Negative   BLOOD UA  Large* Negative   PROTEIN UA mg/dl 30 (1+)* 30 (1+)*   NITRITE UA  Negative Negative   BILIRUBIN UA  Negative Negative   UROBILINOGEN UA (BE) mg/dl <2.0 <2.0   LEUKOCYTES UA  Large* Large*   WBC UA /hpf Innumerable* 1-2   RBC UA /hpf Innumerable* 1-2   BACTERIA UA /hpf Innumerable* Innumerable*   EPITHELIAL CELLS WET PREP /hpf Field obscured, unable to enumerate* Occasional   MUCUS THREADS   --  Occasional*         Past Medical History:   Diagnosis Date    Altered mental status 10/08/2024    COVID-19 01/15/2024    Diabetes mellitus (HCC)     Elevated PSA 06/21/2023    High  cholesterol     Hypertension     Prostate cancer (HCC)     Severe sepsis (HCC) 10/08/2024     Present on Admission:   Prostate cancer metastatic to bone (HCC)   Type 2 diabetes mellitus with other specified complication (HCC)   Hypertension   Ambulatory dysfunction   Malfunction of nephrostomy tube (HCC)   Constipation   Hypercalcemia   Stage 2 chronic kidney disease   Abnormal urinalysis   Chronic hyponatremia      Admitting Diagnosis:     Hypercalcemia [E83.52]  Constipation [K59.00]  Obstructed nephrostomy tube (HCC) [T83.092A]    Age/Sex: 74 y.o. male    Network Utilization Review Department  ATTENTION: Please call with any questions or concerns to [phone] and carefully listen to the prompts so that you are directed to the right person. All voicemails are confidential.   For Discharge needs, contact Care Management DC Support Team at [phone] opt. 2  Send all requests for admission clinical reviews, approved or denied determinations and any other requests to dedicated fax number below belonging to the campus where the patient is receiving treatment. List of dedicated fax numbers for the Facilities:  FACILITY NAME UR FAX NUMBER   ADMISSION DENIALS (Administrative/Medical Necessity) [phone]   DISCHARGE SUPPORT TEAM (NETWORK) [phone]   PARENT CHILD HEALTH (Maternity/NICU/Pediatrics) [phone]   [facility] [phone]   [facility] [phone]   Counts include 234 beds at the [facility] [phone]   [facility] [phone]   [facility] [phone]   [facility] [phone]   [facility] [phone]   [facility] [phone]   [facility] [phone]   [facility] [phone]   [facility] [phone]   [facility]  [facility] [phone]

## 2024-11-17 NOTE — HOSPITAL COURSE
Oliver Astudillo is a 74 y.o. male [pmhx metastatic prostate cancer to bone on palliative care, hypercalcemia, diabetes, hypertension, bilateral nephrostomy] who presented with constipation, back pain, blockage of left nephrostomy tube. Wife noted that she was unable to flush left nephrostomy tube, and that his usual miralax and senakot were not helping him with making bowel movements. In the ER, patient received one soapsuds enema causing him profuse bowel movements. Patient received IV fluids for hypercalcemia as well which normalized. Patient had both nephrostomy tube replacement by IR and was stable for discharge and Pt already has visiting nurse at home. Pt needs to follow up with Heme/onc and palliative care for his metastatic prostate cancer. Pt also needs to follow up with IR for monthly nephrostomy tube exchange due to frequent blockage of the tubes

## 2024-11-17 NOTE — PROGRESS NOTES
Progress Note - Family Medicine   Name: Oliver Astudillo 74 y.o. male I MRN: [id number]  Unit/Bed#: 2 [address] Date of Admission: 11/16/2024   Date of Service: 11/17/2024 I Hospital Day: 0    Assessment & Plan  Malfunction of nephrostomy tube (HCC)  Patient presents with complaint of decreased urine output in left nephrostomy bag since 2 days  Pt has h/o prostate cancer leading to urinary obstruction treated with bilateral percutaneous nephrostomy tubes since 6/22/23.  Pt has had recurrent nephrostomy tube dysfunction and urinary tract infections.  Patient recently had nephrostomy tube repositioning on 10/4/2024 . Pt states that he irrigates tubes at least twice daily  CT scan showing cystitis with bilateral pyeloureteritis.      Plan  IR consulted for possible nephrostomy tube exchange  Will hold off of on further Abx, low suspicion of infection.  Abnormal urinalysis  Patient presents with complaint of left nephrostomy tube malfunction since 2 days.  Mild bilateral flank pain.  No fevers or leukocytosis present on admission  CT scan showing findings suggestive of cystitis with bilateral pyeloureteritis.   UA on admission positive for leukocytes     Low suspicion for urinary tract infection as patient is afebrile with no leukocytosis.  UA likely due to colonization from chronic nephrostomy tube placement     Plan  One-time dose of cefepime, hold off on further antibiotics  IR consulted   Monitor WBC count and vitals  Type 2 diabetes mellitus with other specified complication (HCC)  Lab Results   Component Value Date    HGBA1C 5.6 10/08/2024       Recent Labs     11/16/24  1637 11/16/24  2115 11/17/24  0722   POCGLU 180* 133 89       Blood Sugar Average: Last 72 hrs:  (P) 134    Chronic. Well controlled. Home medications include metformin 1000mg BID. Patient was previously on glimepiride 1 mg daily  and Levemir 20 IU which were discontinued.       Plan:  Hold home metformin  Patient started on insulin sliding scale  and Accu-Cheks ACHS  Carbohydrate controlled diet  Hypoglycemia protocol  Monitor blood sugars      Hypertension  Chronic, stable. Pt's home med lisinopril was discontinued by oncology due to several episodes of hypotension.      Plan  Monitor BP and vitals  Prostate cancer metastatic to bone (HCC)  History of prostate cancer with metastasis to bone.  Patient has been following up with palliative care.  Patient is currently on Gabapentin 300 mg twice daily, oxycodone 10 mg every 4 hours as needed for moderate pain and Decadron 1 mg twice daily     Plan  Continue with home gabapentin, oxycodone and Decadron  Constipation  Patient presents with constipation has not had a bowel movement since 5 days.   Patient is on MiraLAX and Senokot-S at home     In the ER, patient received soap suds enema     Likely opioid-induced constipation  Pt has large BM yesterday after enema     Plan  Continue with MiraLAX and Senokot-S  Monitor BM  Stage 2 chronic kidney disease  Lab Results   Component Value Date    EGFR 95 11/17/2024    EGFR 82 11/16/2024    EGFR 89 11/11/2024    CREATININE 0.68 11/17/2024    CREATININE 0.92 11/16/2024    CREATININE 0.80 11/11/2024     Chronic, stable.     Plan  Trend Creatinine  Avoid nephrotoxic agents  Ambulatory dysfunction  Patient is wheelchair dependent     Plan:  PT/OT eval  Hypercalcemia  Patient has history of hypercalcemia likely secondary to metastatic prostate cancer. Hypercalcemia is likely contributing.  He has been following up with hematology oncology and has been receiving Xgeva monthly as recommended to improve hypercalcemia.  Patient has poor appetite, weight loss, constipation, fatigue likely from hypercalcemia.   Ionized calcium on admission 1.45     In the ER, patient received 1 L IV fluid bolus     Plan  Patient started on normal saline 75 mL/h  Calcium normalized, will continue NS IVF  Heel pain, bilateral  Pt complains of Bilateral heel pain. Not erythema or rash on the heels on  examination. Pt has metastatic prostate cancer, could be osteolytic pain     Plan  Will order bilateral feet xray.  Pt already has scheduled gabapentin and PRN oxycodone and tylenol for pain.     Disposition: 24-48 hrs     Team: VICTORIA QUINTANILLA Resident     Subjective   Patient seen and examined. No acute events overnight. Pt states Pt has new b/l heel pain today, but denies fever or chills. Pt did not have any other acute complaints. Pt has large BM yesterday.     Objective :  Temp:  [96.2 °F (35.7 °C)-98.8 °F (37.1 °C)] 98.8 °F (37.1 °C)  HR:  [58-78] 63  BP: (105-157)/(63-75) 106/66  Resp:  [16-20] 18  SpO2:  [99 %-100 %] 99 %  O2 Device: None (Room air)    I/O         11/15 0701  11/16 0700 11/16 0701  11/17 0700    I.V. (mL/kg)  1400 (23.5)    IV Piggyback  1000    Total Intake(mL/kg)  2400 (40.2)    Urine (mL/kg/hr)  850    Total Output  850    Net  +1550                Weights:   IBW (Ideal Body Weight): 68.4 kg    Body mass index is 20.02 kg/m².  Weight (last 2 days)       Date/Time Weight    11/16/24 1632 59.7 (131.7)            Physical Exam  Vitals reviewed.   Constitutional:       General: He is not in acute distress.     Appearance: Normal appearance. He is not ill-appearing.   HENT:      Head: Normocephalic and atraumatic.      Right Ear: External ear normal.      Left Ear: External ear normal.      Nose: Nose normal. No congestion or rhinorrhea.      Mouth/Throat:      Mouth: Mucous membranes are moist.      Pharynx: Oropharynx is clear. No oropharyngeal exudate or posterior oropharyngeal erythema.   Eyes:      General:         Right eye: No discharge.         Left eye: No discharge.      Conjunctiva/sclera: Conjunctivae normal.   Cardiovascular:      Rate and Rhythm: Normal rate and regular rhythm.      Pulses: Normal pulses.      Heart sounds: Normal heart sounds. No murmur heard.     No friction rub. No gallop.   Pulmonary:      Effort: Pulmonary effort is normal. No respiratory distress.      Breath sounds:  Normal breath sounds. No stridor. No wheezing, rhonchi or rales.   Chest:      Chest wall: No tenderness.   Abdominal:      General: Abdomen is flat. Bowel sounds are normal. There is no distension.      Palpations: Abdomen is soft.      Tenderness: There is no abdominal tenderness. There is no guarding.      Comments: Bilateral nephrostomy tube. Left nephrostomy tube not working   Musculoskeletal:         General: Tenderness (b/l heels, no erythema, rash or signs of infection) present. No swelling, deformity or signs of injury. Normal range of motion.      Cervical back: Normal range of motion and neck supple. No rigidity or tenderness.      Right lower leg: No edema.      Left lower leg: No edema.   Lymphadenopathy:      Cervical: No cervical adenopathy.   Skin:     General: Skin is warm and dry.      Capillary Refill: Capillary refill takes less than 2 seconds.      Findings: No bruising, erythema, lesion or rash.   Neurological:      General: No focal deficit present.      Mental Status: He is alert and oriented to person, place, and time. Mental status is at baseline.      Motor: No weakness.      Gait: Gait abnormal (uses wheelchair due to abulatory dysfunction).      Deep Tendon Reflexes: Reflexes normal.   Psychiatric:         Mood and Affect: Mood normal.         Behavior: Behavior normal.         Thought Content: Thought content normal.           Lab Results: I have reviewed the following results:  Recent Labs     11/16/24  1104 11/17/24  0547   WBC 7.32 6.00   HGB 12.9 10.3*   HCT 41.1 32.0*   * 348   SODIUM 135 130*   K 4.4 3.7   CL 97 100   CO2 32 26   BUN 22 21   CREATININE 0.92 0.68   GLUC 205* 93   CAIONIZED 1.45*  --    MG 2.5  --    AST 26  --    ALT 9  --    ALB 3.7  --    TBILI 0.57  --    ALKPHOS 188*  --        Imaging Results Review: I reviewed radiology reports from this admission including: CT abdomen/pelvis.  Other Study Results Review: No additional pertinent studies  reviewed.    Currently Ordered Meds:   Current Facility-Administered Medications:     acetaminophen (TYLENOL) tablet 650 mg, Q6H PRN    dexamethasone (DECADRON) tablet 1 mg, BID before breakfast/lunch    enoxaparin (LOVENOX) subcutaneous injection 40 mg, Daily    gabapentin (NEURONTIN) capsule 300 mg, TID    insulin lispro (HumALOG/ADMELOG) 100 units/mL subcutaneous injection 1-5 Units, TID AC **AND** Fingerstick Glucose (POCT), 4x Daily AC and at bedtime    insulin lispro (HumALOG/ADMELOG) 100 units/mL subcutaneous injection 1-5 Units, HS    oxyCODONE (ROXICODONE) immediate release tablet 10 mg, Q4H PRN    pantoprazole (PROTONIX) EC tablet 40 mg, Daily    polyethylene glycol (MIRALAX) packet 17 g, Daily    senna-docusate sodium (SENOKOT S) 8.6-50 mg per tablet 2 tablet, HS    sodium chloride 0.9 % infusion, Continuous, Last Rate: 75 mL/hr (11/16/24 3364)  VTE Pharmacologic Prophylaxis: Enoxaparin (Lovenox)  VTE Mechanical Prophylaxis: sequential compression device    Administrative Statements     Portions of the record may have been created with voice recognition software.     Purvi Richard MD

## 2024-11-17 NOTE — ASSESSMENT & PLAN NOTE
Lab Results   Component Value Date    EGFR 95 11/17/2024    EGFR 82 11/16/2024    EGFR 89 11/11/2024    CREATININE 0.68 11/17/2024    CREATININE 0.92 11/16/2024    CREATININE 0.80 11/11/2024     Chronic, stable.     Plan  Trend Creatinine  Avoid nephrotoxic agents

## 2024-11-17 NOTE — ASSESSMENT & PLAN NOTE
Pt complains of Bilateral heel pain. Not erythema or rash on the heels on examination. Pt has metastatic prostate cancer, could be osteolytic pain     Plan  Will order bilateral feet xray.  Pt already has scheduled gabapentin and PRN oxycodone and tylenol for pain.

## 2024-11-17 NOTE — ASSESSMENT & PLAN NOTE
Patient presents with constipation has not had a bowel movement since 5 days.   Patient is on MiraLAX and Senokot-S at home     In the ER, patient received soap suds enema     Likely opioid-induced constipation  Pt has large BM yesterday after enema     Plan  Continue with MiraLAX and Senokot-S  Monitor BM

## 2024-11-18 ENCOUNTER — HOSPITAL ENCOUNTER (OUTPATIENT)
Dept: INFUSION CENTER | Facility: HOSPITAL | Age: 74
Discharge: HOME | End: 2024-11-18
Attending: INTERNAL MEDICINE

## 2024-11-18 ENCOUNTER — APPOINTMENT (INPATIENT)
Dept: NON INVASIVE DIAGNOSTICS | Facility: HOSPITAL | Age: 74
DRG: 699 | End: 2024-11-18
Attending: RADIOLOGY
Payer: COMMERCIAL

## 2024-11-18 PROBLEM — M79.672 HEEL PAIN, BILATERAL: Status: RESOLVED | Noted: 2024-11-17 | Resolved: 2024-11-18

## 2024-11-18 PROBLEM — T83.098A MALFUNCTION OF NEPHROSTOMY TUBE (HCC): Status: RESOLVED | Noted: 2024-01-15 | Resolved: 2024-11-18

## 2024-11-18 PROBLEM — M79.671 HEEL PAIN, BILATERAL: Status: RESOLVED | Noted: 2024-11-17 | Resolved: 2024-11-18

## 2024-11-18 PROBLEM — R82.90 ABNORMAL URINALYSIS: Status: RESOLVED | Noted: 2024-11-16 | Resolved: 2024-11-18

## 2024-11-18 LAB
ALBUMIN SERPL BCG-MCNC: 3.2 G/DL (ref 3.5–5)
ALP SERPL-CCNC: 137 U/L (ref 34–104)
ALT SERPL W P-5'-P-CCNC: 7 U/L (ref 7–52)
ANION GAP SERPL CALCULATED.3IONS-SCNC: 6 MMOL/L (ref 4–13)
AST SERPL W P-5'-P-CCNC: 19 U/L (ref 13–39)
ATRIAL RATE: 72 BPM
BACTERIA UR CULT: NORMAL
BASOPHILS # BLD AUTO: 0.01 THOUSANDS/ÂΜL (ref 0–0.1)
BASOPHILS NFR BLD AUTO: 0 % (ref 0–1)
BILIRUB SERPL-MCNC: 0.37 MG/DL (ref 0.2–1)
BUN SERPL-MCNC: 14 MG/DL (ref 5–25)
CALCIUM ALBUM COR SERPL-MCNC: 10.3 MG/DL (ref 8.3–10.1)
CALCIUM SERPL-MCNC: 9.7 MG/DL (ref 8.4–10.2)
CHLORIDE SERPL-SCNC: 104 MMOL/L (ref 96–108)
CO2 SERPL-SCNC: 26 MMOL/L (ref 21–32)
CREAT SERPL-MCNC: 0.68 MG/DL (ref 0.6–1.3)
EOSINOPHIL # BLD AUTO: 0.04 THOUSAND/ÂΜL (ref 0–0.61)
EOSINOPHIL NFR BLD AUTO: 1 % (ref 0–6)
ERYTHROCYTE [DISTWIDTH] IN BLOOD BY AUTOMATED COUNT: 14.4 % (ref 11.6–15.1)
GFR SERPL CREATININE-BSD FRML MDRD: 95 ML/MIN/1.73SQ M
GLUCOSE SERPL-MCNC: 146 MG/DL (ref 65–140)
GLUCOSE SERPL-MCNC: 180 MG/DL (ref 65–140)
GLUCOSE SERPL-MCNC: 189 MG/DL (ref 65–140)
GLUCOSE SERPL-MCNC: 92 MG/DL (ref 65–140)
GLUCOSE SERPL-MCNC: 97 MG/DL (ref 65–140)
HCT VFR BLD AUTO: 37 % (ref 36.5–49.3)
HGB BLD-MCNC: 12 G/DL (ref 12–17)
IMM GRANULOCYTES # BLD AUTO: 0.02 THOUSAND/UL (ref 0–0.2)
IMM GRANULOCYTES NFR BLD AUTO: 0 % (ref 0–2)
LYMPHOCYTES # BLD AUTO: 0.73 THOUSANDS/ÂΜL (ref 0.6–4.47)
LYMPHOCYTES NFR BLD AUTO: 14 % (ref 14–44)
MAGNESIUM SERPL-MCNC: 2.4 MG/DL (ref 1.9–2.7)
MCH RBC QN AUTO: 28.7 PG (ref 26.8–34.3)
MCHC RBC AUTO-ENTMCNC: 32.4 G/DL (ref 31.4–37.4)
MCV RBC AUTO: 89 FL (ref 82–98)
MONOCYTES # BLD AUTO: 0.32 THOUSAND/ÂΜL (ref 0.17–1.22)
MONOCYTES NFR BLD AUTO: 6 % (ref 4–12)
NEUTROPHILS # BLD AUTO: 4.14 THOUSANDS/ÂΜL (ref 1.85–7.62)
NEUTS SEG NFR BLD AUTO: 79 % (ref 43–75)
NRBC BLD AUTO-RTO: 0 /100 WBCS
P AXIS: 28 DEGREES
PLATELET # BLD AUTO: 374 THOUSANDS/UL (ref 149–390)
PMV BLD AUTO: 9.5 FL (ref 8.9–12.7)
POTASSIUM SERPL-SCNC: 3.8 MMOL/L (ref 3.5–5.3)
PR INTERVAL: 138 MS
PROT SERPL-MCNC: 6.5 G/DL (ref 6.4–8.4)
QRS AXIS: 8 DEGREES
QRSD INTERVAL: 140 MS
QT INTERVAL: 422 MS
QTC INTERVAL: 462 MS
RBC # BLD AUTO: 4.18 MILLION/UL (ref 3.88–5.62)
SODIUM SERPL-SCNC: 136 MMOL/L (ref 135–147)
T WAVE AXIS: 44 DEGREES
VENTRICULAR RATE: 72 BPM
WBC # BLD AUTO: 5.26 THOUSAND/UL (ref 4.31–10.16)

## 2024-11-18 PROCEDURE — 93010 ELECTROCARDIOGRAM REPORT: CPT | Performed by: INTERNAL MEDICINE

## 2024-11-18 PROCEDURE — 85025 COMPLETE CBC W/AUTO DIFF WBC: CPT

## 2024-11-18 PROCEDURE — 80053 COMPREHEN METABOLIC PANEL: CPT

## 2024-11-18 PROCEDURE — 99232 SBSQ HOSP IP/OBS MODERATE 35: CPT | Performed by: INTERNAL MEDICINE

## 2024-11-18 PROCEDURE — C1729 CATH, DRAINAGE: HCPCS | Performed by: RADIOLOGY

## 2024-11-18 PROCEDURE — 83735 ASSAY OF MAGNESIUM: CPT

## 2024-11-18 PROCEDURE — 82948 REAGENT STRIP/BLOOD GLUCOSE: CPT

## 2024-11-18 PROCEDURE — 50435 EXCHANGE NEPHROSTOMY CATH: CPT

## 2024-11-18 PROCEDURE — C1769 GUIDE WIRE: HCPCS | Performed by: RADIOLOGY

## 2024-11-18 PROCEDURE — 0T25X0Z CHANGE DRAINAGE DEVICE IN KIDNEY, EXTERNAL APPROACH: ICD-10-PCS | Performed by: RADIOLOGY

## 2024-11-18 RX ORDER — DOCUSATE SODIUM 100 MG/1
100 CAPSULE, LIQUID FILLED ORAL 2 TIMES DAILY
Status: DISCONTINUED | OUTPATIENT
Start: 2024-11-18 | End: 2024-11-19 | Stop reason: HOSPADM

## 2024-11-18 RX ORDER — ENOXAPARIN SODIUM 100 MG/ML
40 INJECTION SUBCUTANEOUS DAILY
Status: DISCONTINUED | OUTPATIENT
Start: 2024-11-18 | End: 2024-11-19 | Stop reason: HOSPADM

## 2024-11-18 RX ORDER — SODIUM CHLORIDE 9 MG/ML
10 INJECTION, SOLUTION INTRAMUSCULAR; INTRAVENOUS; SUBCUTANEOUS DAILY
Qty: 300 ML | Refills: 3 | Status: SHIPPED | OUTPATIENT
Start: 2024-11-18 | End: 2025-03-18

## 2024-11-18 RX ORDER — LIDOCAINE WITH 8.4% SOD BICARB 0.9%(10ML)
SYRINGE (ML) INJECTION AS NEEDED
Status: DISCONTINUED | OUTPATIENT
Start: 2024-11-18 | End: 2024-11-19 | Stop reason: HOSPADM

## 2024-11-18 RX ORDER — SENNOSIDES 8.6 MG
1 TABLET ORAL 2 TIMES DAILY
Status: DISCONTINUED | OUTPATIENT
Start: 2024-11-18 | End: 2024-11-19 | Stop reason: HOSPADM

## 2024-11-18 RX ADMIN — ENOXAPARIN SODIUM 40 MG: 40 INJECTION SUBCUTANEOUS at 18:57

## 2024-11-18 RX ADMIN — SENNOSIDES 8.6 MG: 8.6 TABLET, FILM COATED ORAL at 18:57

## 2024-11-18 RX ADMIN — DEXAMETHASONE 1 MG: 2 TABLET ORAL at 08:27

## 2024-11-18 RX ADMIN — SENNOSIDES 8.6 MG: 8.6 TABLET, FILM COATED ORAL at 10:54

## 2024-11-18 RX ADMIN — DOCUSATE SODIUM 100 MG: 100 CAPSULE, LIQUID FILLED ORAL at 18:57

## 2024-11-18 RX ADMIN — IOHEXOL 15 ML: 350 INJECTION, SOLUTION INTRAVENOUS at 15:17

## 2024-11-18 RX ADMIN — DOCUSATE SODIUM 100 MG: 100 CAPSULE, LIQUID FILLED ORAL at 10:54

## 2024-11-18 RX ADMIN — INSULIN LISPRO 1 UNITS: 100 INJECTION, SOLUTION INTRAVENOUS; SUBCUTANEOUS at 18:57

## 2024-11-18 RX ADMIN — GABAPENTIN 300 MG: 300 CAPSULE ORAL at 21:39

## 2024-11-18 RX ADMIN — GABAPENTIN 300 MG: 300 CAPSULE ORAL at 08:30

## 2024-11-18 RX ADMIN — SODIUM CHLORIDE 1 G: 1 TABLET ORAL at 08:27

## 2024-11-18 RX ADMIN — PANTOPRAZOLE SODIUM 40 MG: 40 TABLET, DELAYED RELEASE ORAL at 08:27

## 2024-11-18 RX ADMIN — POLYETHYLENE GLYCOL 3350 17 G: 17 POWDER, FOR SOLUTION ORAL at 08:31

## 2024-11-18 RX ADMIN — DEXAMETHASONE 1 MG: 2 TABLET ORAL at 11:57

## 2024-11-18 RX ADMIN — Medication 10 ML: at 14:42

## 2024-11-18 NOTE — ASSESSMENT & PLAN NOTE
History of prostate cancer with metastasis to bone.  Patient has been following up with palliative care.  Patient is currently on Gabapentin 300 mg twice daily, oxycodone 10 mg every 4 hours as needed for moderate pain and Decadron 1 mg twice daily     Plan  Continued with home gabapentin, oxycodone and Decadron at discharge.  Follow up heme/onc outpatient

## 2024-11-18 NOTE — ASSESSMENT & PLAN NOTE
Pt complains of Bilateral heel pain. Not erythema or rash on the heels on examination. Pt has metastatic prostate cancer, could be osteolytic pain.  Pt still has pain today but improved.    Plan  Bilateral feet xray normal.  Continued with home gabapentin and PRN oxycodone at discharge.

## 2024-11-18 NOTE — ASSESSMENT & PLAN NOTE
Lab Results   Component Value Date    HGBA1C 5.6 10/08/2024       Recent Labs     11/17/24  1551 11/17/24  2049 11/18/24  0741 11/18/24  1054   POCGLU 181* 169* 92 146*         Blood Sugar Average: Last 72 hrs:  (P) 142.125    Chronic. Well controlled. Home medications include metformin 1000mg BID. Patient was previously on glimepiride 1 mg daily  and Levemir 20 IU which were discontinued.       Plan:  Continued home metformin at discharge.

## 2024-11-18 NOTE — DISCHARGE SUMMARY
Discharge Summary - Family Medicine   Name: Oliver Astudillo 74 y.o. male I MRN: [id number]  Unit/Bed#: [address] [place] [address] I Date of Admission: 11/16/2024   Date of Service: 11/19/2024 I Hospital Day: 2    Medical Problems       Resolved Problems  Date Reviewed: 10/31/2024          Resolved    * (Principal) Malfunction of nephrostomy tube (HCC) 11/19/2024     Resolved by  Purvi Richard MD    Abnormal urinalysis 11/18/2024     Resolved by  Purvi Ricahrd MD    Heel pain, bilateral 11/18/2024     Resolved by  Purvi Richard MD        Discharging Physician / Practitioner: Purvi Richard MD  PCP: Karen Bates MD  Admission Date:   Admission Orders (From admission, onward)       Ordered        11/17/24 1021  INPATIENT ADMISSION  Once            11/16/24 1421  Place in Observation  Once                          Discharge Date: 11/19/24    Consultations During Hospital Stay:  IR    Procedures Performed:   Bilateral IR nephrostomy tube exchange     Significant Findings / Test Results:   11/19: Pt refused Labs  11/18:   11/17: , Ca 10.3, Hb 10.3  11/16: Ca 12.5, AlkPhos 188, Plt 443, ionized calcium 1.45  UA positive for leukocytes  B/L Feet xray: normal    CT ab/pelvis: Findings suggestive of cystitis with bilateral pyeloureteritis. Multiple metastatic liver lesions and widespread osseous metastasis, similar to recent PSMA PET/CT, but progressed from 6/6/2024. Multiple small nodular enhancing foci in the prostate gland and bladder wall, likely corresponding to the PSMA avid lesions seen on recent PET/CT.     Incidental Findings:   CT abdomen pelvis with contrast: 1. Findings suggestive of cystitis with bilateral pyeloureteritis., 2. Multiple metastatic liver lesions and widespread osseous metastasis, similar to recent PSMA PET/CT, but progressed from 6/6/2024., 3. Multiple small nodular enhancing foci in the prostate gland and bladder wall, likely corresponding to the PSMA avid lesions seen on recent PET/CT.   I  reviewed the above mentioned incidental findings with the patient and/or family and they expressed understanding.    Test Results Pending at Discharge (will require follow up):   none     Outpatient Tests Requested:  none    Complications:  none    Things to follow up  Did you follow up with Heme/Onc?  Did you follow up with IR?  Did you follow up with PCP?  How is your constipation?  Are you going for monthly xgeva infusion?    Reason for Admission: left nephrostomy dysfunction     Hospital Course:   Oliver Astudillo is a 74 y.o. male patient who originally presented to the hospital on 11/16/2024 due to constipation and left nephrostomy dysfunction.     Hospital Course: Oliver Astudillo is a 74 y.o. male [pmhx metastatic prostate cancer to bone on palliative care, hypercalcemia, diabetes, hypertension, bilateral nephrostomy] who presented with constipation, back pain, blockage of left nephrostomy tube. Wife noted that she was unable to flush left nephrostomy tube, and that his usual miralax and senakot were not helping him with making bowel movements. In the ER, patient received one soapsuds enema causing him profuse bowel movements. Patient received IV fluids for hypercalcemia as well which normalized. Patient had both nephrostomy tube replacement by IR and was stable for discharge and Pt already has visiting nurse at home. Pt needs to follow up with Heme/onc and palliative care for his metastatic prostate cancer. Pt also needs to follow up with IR for monthly nephrostomy tube exchange due to frequent blockage of the tubes    * Malfunction of nephrostomy tube (HCC)-resolved as of 11/19/2024  Assessment & Plan  Patient presents with complaint of decreased urine output in left nephrostomy bag since 2 days  Pt has h/o prostate cancer leading to urinary obstruction treated with bilateral percutaneous nephrostomy tubes since 6/22/23.  Pt has had recurrent nephrostomy tube dysfunction and urinary tract infections.  Patient  recently had nephrostomy tube repositioning on 10/4/2024 . Pt states that he irrigates tubes at least twice daily  CT scan showing cystitis with bilateral pyeloureteritis.      Plan  IR consulted, nephrostomy tube exchanged   -Completely obstructed left PCN exchanged out for new 10F PCN.  -Subtotally obstructed right PCN exchanged out for new 10F PCN.  -Will require monthly exchanges due to considerable encrustration, as well as daily flushes.  S/p 1 dose of cefepime, low suspicion of infection.  IR recommended monthly nephrostomy exchange due to frequent blockage. IR referral made    Hypercalcemia  Assessment & Plan  Patient has history of hypercalcemia likely secondary to metastatic prostate cancer. Hypercalcemia is likely contributing.  He has been following up with hematology oncology and has been receiving Xgeva monthly as recommended to improve hypercalcemia. Next infusion on 12/2/24. Patient has poor appetite, weight loss, constipation, fatigue likely from hypercalcemia.   Ionized calcium on admission 1.45     In the ER, patient received 1 L IV fluid bolus ans was on IVF during hospitalization      Plan  Calcium normalized, continue infusion treatments   Patient poor appetite and fatigue improved after calcium mormalized    Ambulatory dysfunction  Assessment & Plan  Patient is wheelchair dependent at baseline     Plan:  Pt has VNS service at home    Stage 2 chronic kidney disease  Assessment & Plan  Lab Results   Component Value Date    EGFR 95 11/18/2024    EGFR 95 11/17/2024    EGFR 82 11/16/2024    CREATININE 0.68 11/18/2024    CREATININE 0.68 11/17/2024    CREATININE 0.92 11/16/2024     Chronic, stable.     Plan  Avoid nephrotoxic agents    Constipation  Assessment & Plan  Patient presents with constipation has not had a bowel movement since 5 days.   Patient is on MiraLAX and Senokot-S at home     In the ER, patient received soap suds enema     Likely opioid-induced constipation  Pt has large BM  yesterday after enema     Plan  Continued with MiraLAX daily, colace BID and Senna BID at discharge Instead of miralax and Senakot-s at home      Chronic hyponatremia  Assessment & Plan  Chronic hyponatremia. . Pt is on salt tablet 1g BID at home     continued home salt tablet at discharge.    Prostate cancer metastatic to bone (HCC)  Assessment & Plan  History of prostate cancer with metastasis to bone.  Patient has been following up with palliative care.  Patient is currently on Gabapentin 300 mg twice daily, oxycodone 10 mg every 4 hours as needed for moderate pain and Decadron 1 mg twice daily     Plan  Continued with home gabapentin, oxycodone and Decadron at discharge.  Follow up heme/onc outpatient    Hypertension  Assessment & Plan  Chronic, stable. Pt's home med lisinopril was discontinued by oncology due to several episodes of hypotension.   BP has been stable without lisinopril     Plan  Continued without lisinopril at discharge.    Type 2 diabetes mellitus with other specified complication (HCC)  Assessment & Plan  Lab Results   Component Value Date    HGBA1C 5.6 10/08/2024       Recent Labs     11/17/24  1551 11/17/24  2049 11/18/24  0741 11/18/24  1054   POCGLU 181* 169* 92 146*         Blood Sugar Average: Last 72 hrs:  (P) 142.125    Chronic. Well controlled. Home medications include metformin 1000mg BID. Patient was previously on glimepiride 1 mg daily  and Levemir 20 IU which were discontinued.       Plan:  Continued home metformin at discharge.       Heel pain, bilateral-resolved as of 11/18/2024  Assessment & Plan  Pt complains of Bilateral heel pain. Not erythema or rash on the heels on examination. Pt has metastatic prostate cancer, could be osteolytic pain.  Pt still has pain today but improved.    Plan  Bilateral feet xray normal.  Continued with home gabapentin and PRN oxycodone at discharge.    Abnormal urinalysis-resolved as of 11/18/2024  Assessment & Plan  Patient presents with  "complaint of left nephrostomy tube malfunction since 2 days.  Mild bilateral flank pain.  No fevers or leukocytosis present on admission  CT scan showing findings suggestive of cystitis with bilateral pyeloureteritis.   UA on admission positive for leukocytes     Low suspicion for urinary tract infection as patient is afebrile with no leukocytosis.  UA likely due to colonization from chronic nephrostomy tube placement     Plan  S/p One-time dose of cefepime, held off on further antibiotics due to low suspicion of infection          Please see above list of diagnoses and related plan for additional information.     Condition at Discharge: stable    Discharge Day Visit / Exam:   Subjective: Pt was seen and examined at the bedside, NAD. Pt is doing well today. Pt did not have any acute complaints. Pt has not have BM since very large BM 2 days ago.    Vitals: Blood Pressure: 140/63 (11/19/24 0841)  Pulse: 66 (11/19/24 0841)  Temperature: 98 °F (36.7 °C) (11/19/24 0841)  Temp Source: Oral (11/19/24 0841)  Respirations: 16 (11/19/24 0841)  Height: 5' 8\" (172.7 cm) (11/16/24 1632)  Weight - Scale: 59.7 kg (131 lb 11.2 oz) (11/16/24 1632)  SpO2: 100 % (11/19/24 0841)  Physical Exam  Vitals reviewed.   Constitutional:       General: He is not in acute distress.     Appearance: Normal appearance. He is not ill-appearing.   HENT:      Head: Normocephalic and atraumatic.      Right Ear: External ear normal.      Left Ear: External ear normal.      Nose: Nose normal. No congestion or rhinorrhea.      Mouth/Throat:      Mouth: Mucous membranes are moist.      Pharynx: Oropharynx is clear. No oropharyngeal exudate or posterior oropharyngeal erythema.   Eyes:      General:         Right eye: No discharge.         Left eye: No discharge.      Conjunctiva/sclera: Conjunctivae normal.   Cardiovascular:      Rate and Rhythm: Normal rate and regular rhythm.      Pulses: Normal pulses.      Heart sounds: Normal heart sounds. No murmur " heard.     No friction rub. No gallop.   Pulmonary:      Effort: Pulmonary effort is normal. No respiratory distress.      Breath sounds: Normal breath sounds. No stridor. No wheezing, rhonchi or rales.   Chest:      Chest wall: No tenderness.   Abdominal:      General: Abdomen is flat. Bowel sounds are normal. There is no distension.      Palpations: Abdomen is soft.      Tenderness: There is no abdominal tenderness. There is no guarding.      Comments: Bilateral nephrostomy tubes, working well   Musculoskeletal:         General: No swelling, tenderness, deformity or signs of injury. Normal range of motion.      Cervical back: Normal range of motion and neck supple. No rigidity or tenderness.      Right lower leg: No edema.      Left lower leg: No edema.   Lymphadenopathy:      Cervical: No cervical adenopathy.   Skin:     General: Skin is warm and dry.      Capillary Refill: Capillary refill takes less than 2 seconds.      Findings: No bruising, erythema, lesion or rash.   Neurological:      General: No focal deficit present.      Mental Status: He is alert and oriented to person, place, and time. Mental status is at baseline.      Motor: No weakness.      Gait: Gait abnormal (uses wheelchair at baseline due to ambulatory dysfuction).      Deep Tendon Reflexes: Reflexes normal.   Psychiatric:         Mood and Affect: Mood normal.         Behavior: Behavior normal.         Thought Content: Thought content normal.          Discussion with Family: Updated  (wife) at bedside.    Discharge instructions/Information to patient and family:   See after visit summary for information provided to patient and family.      Provisions for Follow-Up Care:  See after visit summary for information related to follow-up care and any pertinent home health orders.      Mobility at time of Discharge:   Basic Mobility Inpatient Raw Score: 12  -[facility] Goal: 4: Move to chair/commode  -[facility] Achieved: 4: Move to  chair/commode  HLM Goal achieved. Continue to encourage appropriate mobility.  Pt is wheelchair dependent at baseline     Disposition:   Home with VNA Services (Reminder: Complete face to face encounter) Pt already has VNA services before admission.    Planned Readmission: none    Discharge Medications:  See after visit summary for reconciled discharge medications provided to patient and/or family.      Administrative Statements   Discharge Statement:  I have spent a total time of >45 minutes in caring for this patient on the day of the visit/encounter. .    **Please Note: This note may have been constructed using a voice recognition system**

## 2024-11-18 NOTE — ASSESSMENT & PLAN NOTE
Lab Results   Component Value Date    EGFR 95 11/18/2024    EGFR 95 11/17/2024    EGFR 82 11/16/2024    CREATININE 0.68 11/18/2024    CREATININE 0.68 11/17/2024    CREATININE 0.92 11/16/2024     Chronic, stable.     Plan  Avoid nephrotoxic agents

## 2024-11-18 NOTE — BRIEF OP NOTE (RAD/CATH)
INTERVENTIONAL RADIOLOGY PROCEDURE NOTE    Date: 11/18/2024    Procedure:   Procedure Summary       Date:  Room / Location:     Anesthesia Start:  Anesthesia Stop:     Procedure:  Diagnosis:     Scheduled Providers:  Responsible Provider:     Anesthesia Type: Not recorded ASA Status: Not recorded            Preoperative diagnosis:   1. Hypercalcemia    2. Constipation    3. Obstructed nephrostomy tube (HCC)    4. Malfunction of nephrostomy tube (HCC)         Postoperative diagnosis: Same.    Surgeon: Wenceslao Ho MD     Assistant: None. No qualified resident was available.     Blood loss: None    Specimens: None     Findings:   (1) Completely obstructed left PCN exchanged out for new 10F PCN.  (2) Subtotally obstructed right PCN exchanged out for new 10F PCN.  (3) Will require monthly exchanges due to considerable encrustration, as well as daily flushes.    Complications: None immediate.    Anesthesia: local

## 2024-11-18 NOTE — ASSESSMENT & PLAN NOTE
Patient presents with constipation has not had a bowel movement since 5 days.   Patient is on MiraLAX and Senokot-S at home     In the ER, patient received soap suds enema     Likely opioid-induced constipation  Pt has large BM yesterday after enema     Plan  Continued with MiraLAX daily, colace BID and Senna BID at discharge Instead of miralax and Senakot-s at home

## 2024-11-18 NOTE — ASSESSMENT & PLAN NOTE
Patient has history of hypercalcemia likely secondary to metastatic prostate cancer. Hypercalcemia is likely contributing.  He has been following up with hematology oncology and has been receiving Xgeva monthly as recommended to improve hypercalcemia. Next infusion on 12/2/24. Patient has poor appetite, weight loss, constipation, fatigue likely from hypercalcemia.   Ionized calcium on admission 1.45     In the ER, patient received 1 L IV fluid bolus ans was on IVF during hospitalization      Plan  Calcium normalized, continue infusion treatments   Patient poor appetite and fatigue improved after calcium mormalized

## 2024-11-18 NOTE — CASE MANAGEMENT
Case Management Discharge Planning Note    Patient name Oliver Astudillo  Location 3 [place] [facility]/3 [place] [facility]-* MRN [id number]  : 1950 Date 2024       Current Admission Date: 2024  Current Admission Diagnosis:Malfunction of nephrostomy tube (HCC)   Patient Active Problem List    Diagnosis Date Noted Date Diagnosed    Vitamin B12 deficiency 10/09/2024     Moderate protein-calorie malnutrition (HCC) 2024     Hypercalcemia 2024     Anorexia 2024     Unintentional weight loss 2024     Cancer related pain 2024     Palliative care by specialist 2024     Ambulatory dysfunction 2024     Therapeutic opioid-induced constipation (OIC) 2024     Nausea and vomiting 2024     Advanced care planning/counseling discussion 2024     Goals of care, counseling/discussion 2024     Electrolyte abnormality 2024     Stage 2 chronic kidney disease 2024     Hypoglycemia 2024     Constipation 2024     Malfunction of nephrostomy tube (HCC) 01/15/2024     Chronic hyponatremia 2024     Hyperlipidemia 2024     Encounter for monitoring androgen deprivation therapy 08/10/2023     Nephrostomy status (HCC) 08/10/2023     Hydronephrosis 2023     Prostate cancer metastatic to bone (HCC) 2023     Type 2 diabetes mellitus with other specified complication (HCC) 2023     Other hydronephrosis 2023     Hypertension 2023       LOS (days): 1  Geometric Mean LOS (GMLOS) (days): 3.2  Days to GMLOS:1.9     OBJECTIVE:  Risk of Unplanned Readmission Score: 31.66         Current admission status: Inpatient   Preferred Pharmacy:   [facility]/pharmacy #[id number] - [place], [place] - [address] [address]  Phone: [phone] Fax: [phone]    Primary Care Provider: Karen Bates MD    Primary Insurance: [employer] REP  Secondary Insurance:     DISCHARGE DETAILS:    Requested Home  Health Care         Is the patient interested in HHC at discharge?: Yes  Home Health Discipline requested:: Nursing  Home Health Agency Name:: [facility]  HHA External Referral Reason (only applicable if external HHA name selected): Patient has established relationship with provider  Home Health Follow-Up Provider:: PCP  Home Health Services Needed:: Other (comment) (Nephrostomy tube care)  Homebound Criteria Met:: Requires the Assistance of Another Person for Safe Ambulation or to Leave the Home, Uses an Assist Device (i.e. cane, walker, etc)  Supporting Clincal Findings:: Limited Endurance    DME Referral Provided  Referral made for DME?: No    Other Referral/Resources/Interventions Provided:  Interventions: HHC  Referral Comments: Referral sent in aidin to [facility] for [facility], pending acceptance.     Treatment Team Recommendation: Home  Discharge Destination Plan:: Home, Home with Home Health Care  Transport at Discharge : Wheelchair van, BLS Ambulance

## 2024-11-18 NOTE — PLAN OF CARE
Problem: Potential for Falls  Goal: Patient will remain free of falls  Description: INTERVENTIONS:  - Educate patient/family on patient safety including physical limitations  - Instruct patient to call for assistance with activity   - Consult OT/PT to assist with strengthening/mobility   - Keep Call bell within reach  - Keep bed low and locked with side rails adjusted as appropriate  - Keep care items and personal belongings within reach  - Initiate and maintain comfort rounds  - Make Fall Risk Sign visible to staff  - Offer Toileting every 2 Hours, in advance of need  - Initiate/Maintain bedalarm  - Obtain necessary fall risk management equipment:   - Apply yellow socks and bracelet for high fall risk patients  - Consider moving patient to room near nurses station  Outcome: Progressing     Problem: Prexisting or High Potential for Compromised Skin Integrity  Goal: Skin integrity is maintained or improved  Description: INTERVENTIONS:  - Identify patients at risk for skin breakdown  - Assess and monitor skin integrity  - Assess and monitor nutrition and hydration status  - Monitor labs   - Assess for incontinence   - Turn and reposition patient  - Assist with mobility/ambulation  - Relieve pressure over bony prominences  - Avoid friction and shearing  - Provide appropriate hygiene as needed including keeping skin clean and dry  - Evaluate need for skin moisturizer/barrier cream  - Collaborate with interdisciplinary team   - Patient/family teaching  - Consider wound care consult   Outcome: Progressing     Problem: Nutrition/Hydration-ADULT  Goal: Nutrient/Hydration intake appropriate for improving, restoring or maintaining nutritional needs  Description: Monitor and assess patient's nutrition/hydration status for malnutrition. Collaborate with interdisciplinary team and initiate plan and interventions as ordered.  Monitor patient's weight and dietary intake as ordered or per policy. Utilize nutrition screening tool  and intervene as necessary. Determine patient's food preferences and provide high-protein, high-caloric foods as appropriate.     INTERVENTIONS:  - Monitor oral intake, urinary output, labs, and treatment plans  - Assess nutrition and hydration status and recommend course of action  - Evaluate amount of meals eaten  - Assist patient with eating if necessary   - Allow adequate time for meals  - Recommend/ encourage appropriate diets, oral nutritional supplements, and vitamin/mineral supplements  - Order, calculate, and assess calorie counts as needed  - Recommend, monitor, and adjust tube feedings and TPN/PPN based on assessed needs  - Assess need for intravenous fluids  - Provide specific nutrition/hydration education as appropriate  - Include patient/family/caregiver in decisions related to nutrition  Outcome: Progressing     Problem: PAIN - ADULT  Goal: Verbalizes/displays adequate comfort level or baseline comfort level  Description: Interventions:  - Encourage patient to monitor pain and request assistance  - Assess pain using appropriate pain scale  - Administer analgesics based on type and severity of pain and evaluate response  - Implement non-pharmacological measures as appropriate and evaluate response  - Consider cultural and social influences on pain and pain management  - Notify physician/advanced practitioner if interventions unsuccessful or patient reports new pain  Outcome: Progressing     Problem: INFECTION - ADULT  Goal: Absence or prevention of progression during hospitalization  Description: INTERVENTIONS:  - Assess and monitor for signs and symptoms of infection  - Monitor lab/diagnostic results  - Monitor all insertion sites, i.e. indwelling lines, tubes, and drains  - Monitor endotracheal if appropriate and nasal secretions for changes in amount and color  - Union appropriate cooling/warming therapies per order  - Administer medications as ordered  - Instruct and encourage patient and  family to use good hand hygiene technique  - Identify and instruct in appropriate isolation precautions for identified infection/condition  Outcome: Progressing  Goal: Absence of fever/infection during neutropenic period  Description: INTERVENTIONS:  - Monitor WBC    Outcome: Progressing     Problem: SAFETY ADULT  Goal: Patient will remain free of falls  Description: INTERVENTIONS:  - Educate patient/family on patient safety including physical limitations  - Instruct patient to call for assistance with activity   - Consult OT/PT to assist with strengthening/mobility   - Keep Call bell within reach  - Keep bed low and locked with side rails adjusted as appropriate  - Keep care items and personal belongings within reach  - Initiate and maintain comfort rounds  - Make Fall Risk Sign visible to staff  - Offer Toileting every 2 Hours, in advance of need  - Initiate/Maintain bedalarm  - Obtain necessary fall risk management equipment:   - Apply yellow socks and bracelet for high fall risk patients  - Consider moving patient to room near nurses station  Outcome: Progressing  Goal: Maintain or return to baseline ADL function  Description: INTERVENTIONS:  -  Assess patient's ability to carry out ADLs; assess patient's baseline for ADL function and identify physical deficits which impact ability to perform ADLs (bathing, care of mouth/teeth, toileting, grooming, dressing, etc.)  - Assess/evaluate cause of self-care deficits   - Assess range of motion  - Assess patient's mobility; develop plan if impaired  - Assess patient's need for assistive devices and provide as appropriate  - Encourage maximum independence but intervene and supervise when necessary  - Involve family in performance of ADLs  - Assess for home care needs following discharge   - Consider OT consult to assist with ADL evaluation and planning for discharge  - Provide patient education as appropriate  Outcome: Progressing  Goal: Maintains/Returns to pre  admission functional level  Description: INTERVENTIONS:  - Perform AM-PAC 6 Click Basic Mobility/ Daily Activity assessment daily.  - Set and communicate daily mobility goal to care team and patient/family/caregiver.   - Collaborate with rehabilitation services on mobility goals if consulted  - Perform Range of Motion 3 times a day.  - Reposition patient every 2 hours.  - Dangle patient 3 times a day  - Stand patient 3 times a day  - Ambulate patient 3 times a day  - Out of bed to chair 3 times a day   - Out of bed for meals 3 times a day  - Out of bed for toileting  - Record patient progress and toleration of activity level   Outcome: Progressing     Problem: DISCHARGE PLANNING  Goal: Discharge to home or other facility with appropriate resources  Description: INTERVENTIONS:  - Identify barriers to discharge w/patient and caregiver  - Arrange for needed discharge resources and transportation as appropriate  - Identify discharge learning needs (meds, wound care, etc.)  - Arrange for interpretive services to assist at discharge as needed  - Refer to Case Management Department for coordinating discharge planning if the patient needs post-hospital services based on physician/advanced practitioner order or complex needs related to functional status, cognitive ability, or social support system  Outcome: Progressing     Problem: Knowledge Deficit  Goal: Patient/family/caregiver demonstrates understanding of disease process, treatment plan, medications, and discharge instructions  Description: Complete learning assessment and assess knowledge base.  Interventions:  - Provide teaching at level of understanding  - Provide teaching via preferred learning methods  Outcome: Progressing

## 2024-11-18 NOTE — ASSESSMENT & PLAN NOTE
Chronic, stable. Pt's home med lisinopril was discontinued by oncology due to several episodes of hypotension.   BP has been stable without lisinopril     Plan  Continued without lisinopril at discharge.

## 2024-11-18 NOTE — DISCHARGE INSTR - AVS FIRST PAGE
Please follow up with Heme/Onc.  Please follow up with IR for monthly nephrostomy tube exchange    Please stop taking Senakot-S and start taking colace BID and Senna BID with Miralax daily.

## 2024-11-18 NOTE — ASSESSMENT & PLAN NOTE
Chronic, stable. Pt's home med lisinopril was discontinued by oncology due to several episodes of hypotension.   BP has been stable without lisinopril     Plan  Monitor BP and vitals

## 2024-11-18 NOTE — PROGRESS NOTES
Progress Note - Family Medicine   Name: Oliver Astudillo 74 y.o. male I MRN: [id number]  Unit/Bed#: [address] [place] [address] Date of Admission: 11/16/2024   Date of Service: 11/18/2024 I Hospital Day: 1     Assessment & Plan  Malfunction of nephrostomy tube (HCC)  Patient presents with complaint of decreased urine output in left nephrostomy bag since 2 days  Pt has h/o prostate cancer leading to urinary obstruction treated with bilateral percutaneous nephrostomy tubes since 6/22/23.  Pt has had recurrent nephrostomy tube dysfunction and urinary tract infections.  Patient recently had nephrostomy tube repositioning on 10/4/2024 . Pt states that he irrigates tubes at least twice daily  CT scan showing cystitis with bilateral pyeloureteritis.      Plan  IR consulted for possible nephrostomy tube exchange  Will hold off of on further Abx, low suspicion of infection.  Abnormal urinalysis  Patient presents with complaint of left nephrostomy tube malfunction since 2 days.  Mild bilateral flank pain.  No fevers or leukocytosis present on admission  CT scan showing findings suggestive of cystitis with bilateral pyeloureteritis.   UA on admission positive for leukocytes     Low suspicion for urinary tract infection as patient is afebrile with no leukocytosis.  UA likely due to colonization from chronic nephrostomy tube placement     Plan  One-time dose of cefepime, hold off on further antibiotics  IR consulted   Monitor WBC count and vitals  Type 2 diabetes mellitus with other specified complication (HCC)  Lab Results   Component Value Date    HGBA1C 5.6 10/08/2024       Recent Labs     11/17/24  1551 11/17/24  2049 11/18/24  0741 11/18/24  1054   POCGLU 181* 169* 92 146*       Blood Sugar Average: Last 72 hrs:  (P) 142.125    Chronic. Well controlled. Home medications include metformin 1000mg BID. Patient was previously on glimepiride 1 mg daily  and Levemir 20 IU which were discontinued.       Plan:  Hold home metformin  Patient  started on insulin sliding scale and Accu-Cheks ACHS  Carbohydrate controlled diet  Hypoglycemia protocol  Monitor blood sugars      Hypertension  Chronic, stable. Pt's home med lisinopril was discontinued by oncology due to several episodes of hypotension.   BP has been stable without lisinopril     Plan  Monitor BP and vitals  Prostate cancer metastatic to bone (HCC)  History of prostate cancer with metastasis to bone.  Patient has been following up with palliative care.  Patient is currently on Gabapentin 300 mg twice daily, oxycodone 10 mg every 4 hours as needed for moderate pain and Decadron 1 mg twice daily     Plan  Continue with home gabapentin, oxycodone and Decadron  Constipation  Patient presents with constipation has not had a bowel movement since 5 days.   Patient is on MiraLAX and Senokot-S at home     In the ER, patient received soap suds enema     Likely opioid-induced constipation  Pt has large BM yesterday after enema     Plan  Continue with MiraLAX daily, colace BID and Senna BID   Monitor BM  Stage 2 chronic kidney disease  Lab Results   Component Value Date    EGFR 95 11/18/2024    EGFR 95 11/17/2024    EGFR 82 11/16/2024    CREATININE 0.68 11/18/2024    CREATININE 0.68 11/17/2024    CREATININE 0.92 11/16/2024     Chronic, stable.     Plan  Trend Creatinine  Avoid nephrotoxic agents  Ambulatory dysfunction  Patient is wheelchair dependent at baseline     Plan:  PT/OT eval  Hypercalcemia  Patient has history of hypercalcemia likely secondary to metastatic prostate cancer. Hypercalcemia is likely contributing.  He has been following up with hematology oncology and has been receiving Xgeva monthly as recommended to improve hypercalcemia. Next infusion on 11/18/24. Patient has poor appetite, weight loss, constipation, fatigue likely from hypercalcemia.   Ionized calcium on admission 1.45     In the ER, patient received 1 L IV fluid bolus     Plan  Continue on normal saline 50 mL/h  Calcium  normalized, will continue NS IVF  Heel pain, bilateral  Pt complains of Bilateral heel pain. Not erythema or rash on the heels on examination. Pt has metastatic prostate cancer, could be osteolytic pain.  Pt still has pain today but improved.    Plan  bilateral feet xray pending reading.  Pt already has scheduled gabapentin and PRN oxycodone and tylenol for pain.   Chronic hyponatremia  Chronic hyponatremia. . Pt is on salt tablet 1g BID at home     Will continue home salt tablet    VTE Pharmacologic Prophylaxis: VTE Score: 7 High Risk (Score >/= 5) - Pharmacological DVT Prophylaxis Ordered: enoxaparin (Lovenox). Sequential Compression Devices Ordered. Holding one dose of Lovenox this morning for the possible nephrostomy tube exchange today will give after the procedure.     Mobility:   Basic Mobility Inpatient Raw Score: 10  JH-HLM Goal: 4: Move to chair/commode  JH-HLM Achieved: 4: Move to chair/commode  JH-HLM Goal achieved. Continue to encourage appropriate mobility. Pt is wheelchair dependent baseline    Patient Centered Rounds: I performed bedside rounds with nursing staff today.   Discussions with Specialists or Other Care Team Provider: IR    Education and Discussions with Family / Patient: Updated  (wife) at bedside.    Current Length of Stay: 1 day(s)  Current Patient Status: Inpatient   Certification Statement: The patient will continue to require additional inpatient hospital stay due to IR procedure  Discharge Plan: Anticipate discharge tomorrow to home with home services.    Code Status: Level 1 - Full Code    Subjective   Pt was seen and examined at the bedside, NAD. Pt states that b/l foot pain is still there, but improving. Pt did not have any other acute complaints. Pt's last BM was two days ago.      Objective :  Temp:  [97.4 °F (36.3 °C)-98.8 °F (37.1 °C)] 98.1 °F (36.7 °C)  HR:  [54-70] 54  BP: (121-138)/(63-67) 121/63  Resp:  [18] 18  SpO2:  [98 %] 98 %  O2 Device: None  (Room air)    Body mass index is 20.02 kg/m².     Input and Output Summary (last 24 hours):     Intake/Output Summary (Last 24 hours) at 11/18/2024 1450  Last data filed at 11/18/2024 0500  Gross per 24 hour   Intake --   Output 1300 ml   Net -1300 ml       Physical Exam  Vitals reviewed.   Constitutional:       General: He is not in acute distress.     Appearance: Normal appearance. He is not ill-appearing.   HENT:      Head: Normocephalic and atraumatic.      Right Ear: External ear normal.      Left Ear: External ear normal.      Nose: Nose normal. No congestion or rhinorrhea.      Mouth/Throat:      Mouth: Mucous membranes are moist.      Pharynx: Oropharynx is clear. No oropharyngeal exudate or posterior oropharyngeal erythema.   Eyes:      General:         Right eye: No discharge.         Left eye: No discharge.      Conjunctiva/sclera: Conjunctivae normal.   Cardiovascular:      Rate and Rhythm: Normal rate and regular rhythm.      Pulses: Normal pulses.      Heart sounds: Normal heart sounds. No murmur heard.     No friction rub. No gallop.   Pulmonary:      Effort: Pulmonary effort is normal. No respiratory distress.      Breath sounds: Normal breath sounds. No stridor. No wheezing, rhonchi or rales.   Chest:      Chest wall: No tenderness.   Abdominal:      General: Abdomen is flat. Bowel sounds are normal. There is no distension.      Palpations: Abdomen is soft.      Tenderness: There is no abdominal tenderness. There is no guarding.      Comments: B/L nephrostomy tube. Left one is not working   Musculoskeletal:         General: Tenderness (mild bilateral feet tenderness, improving) present. No swelling, deformity or signs of injury. Normal range of motion.      Cervical back: Normal range of motion and neck supple. No rigidity or tenderness.      Right lower leg: No edema.      Left lower leg: No edema.   Lymphadenopathy:      Cervical: No cervical adenopathy.   Skin:     General: Skin is warm and dry.       Capillary Refill: Capillary refill takes less than 2 seconds.      Findings: No bruising, erythema, lesion or rash.   Neurological:      General: No focal deficit present.      Mental Status: He is alert and oriented to person, place, and time. Mental status is at baseline.      Motor: No weakness.      Gait: Gait normal.      Deep Tendon Reflexes: Reflexes normal.   Psychiatric:         Mood and Affect: Mood normal.         Behavior: Behavior normal.         Thought Content: Thought content normal.         Lines/Drains:  Lines/Drains/Airways       Active Status       Name Placement date Placement time Site Days    External Urinary Catheter 10/11/24  0619  -- 38                            Lab Results: I have reviewed the following results:   Results from last 7 days   Lab Units 11/18/24  0612   WBC Thousand/uL 5.26   HEMOGLOBIN g/dL 12.0   HEMATOCRIT % 37.0   PLATELETS Thousands/uL 374   SEGS PCT % 79*   LYMPHO PCT % 14   MONO PCT % 6   EOS PCT % 1     Results from last 7 days   Lab Units 11/18/24  0612   SODIUM mmol/L 136   POTASSIUM mmol/L 3.8   CHLORIDE mmol/L 104   CO2 mmol/L 26   BUN mg/dL 14   CREATININE mg/dL 0.68   ANION GAP mmol/L 6   CALCIUM mg/dL 9.7   ALBUMIN g/dL 3.2*   TOTAL BILIRUBIN mg/dL 0.37   ALK PHOS U/L 137*   ALT U/L 7   AST U/L 19   GLUCOSE RANDOM mg/dL 97         Results from last 7 days   Lab Units 11/18/24  1054 11/18/24  0741 11/17/24  2049 11/17/24  1551 11/17/24  1148 11/17/24  0722 11/16/24  2115 11/16/24  1637   POC GLUCOSE mg/dl 146* 92 169* 181* 147* 89 133 180*               Recent Cultures (last 7 days):   Results from last 7 days   Lab Units 11/16/24  2026   URINE CULTURE  >100,000 cfu/ml       Imaging Results Review: I personally reviewed the following image studies in PACS and associated radiology reports: xray(s). My interpretation of the radiology images/reports is: B/L feet xray seems to be normal, but waiting on official reading.  Other Study Results Review: No  additional pertinent studies reviewed.    Last 24 Hours Medication List:     Current Facility-Administered Medications:     acetaminophen (TYLENOL) tablet 650 mg, Q6H PRN    dexamethasone (DECADRON) tablet 1 mg, BID before breakfast/lunch    docusate sodium (COLACE) capsule 100 mg, BID    [Held by provider] enoxaparin (LOVENOX) subcutaneous injection 40 mg, Daily    gabapentin (NEURONTIN) capsule 300 mg, TID    insulin lispro (HumALOG/ADMELOG) 100 units/mL subcutaneous injection 1-5 Units, TID AC **AND** Fingerstick Glucose (POCT), 4x Daily AC and at bedtime    insulin lispro (HumALOG/ADMELOG) 100 units/mL subcutaneous injection 1-5 Units, HS    lidocaine 1% buffered, PRN    oxyCODONE (ROXICODONE) immediate release tablet 10 mg, Q4H PRN    pantoprazole (PROTONIX) EC tablet 40 mg, Daily    polyethylene glycol (MIRALAX) packet 17 g, Daily    senna (SENOKOT) tablet 8.6 mg, BID    sodium chloride 0.9 % infusion, Continuous, Last Rate: 50 mL/hr (11/17/24 1804)    sodium chloride tablet 1 g, BID With Meals    Administrative Statements   Today, Patient Was Seen By: Purvi Richard MD      **Please Note: This note may have been constructed using a voice recognition system.**

## 2024-11-18 NOTE — ASSESSMENT & PLAN NOTE
Patient presents with complaint of decreased urine output in left nephrostomy bag since 2 days  Pt has h/o prostate cancer leading to urinary obstruction treated with bilateral percutaneous nephrostomy tubes since 6/22/23.  Pt has had recurrent nephrostomy tube dysfunction and urinary tract infections.  Patient recently had nephrostomy tube repositioning on 10/4/2024 . Pt states that he irrigates tubes at least twice daily  CT scan showing cystitis with bilateral pyeloureteritis.      Plan  IR consulted, nephrostomy tube exchanged   -Completely obstructed left PCN exchanged out for new 10F PCN.  -Subtotally obstructed right PCN exchanged out for new 10F PCN.  -Will require monthly exchanges due to considerable encrustration, as well as daily flushes.  S/p 1 dose of cefepime, low suspicion of infection.  IR recommended monthly nephrostomy exchange due to frequent blockage. IR referral made

## 2024-11-18 NOTE — ASSESSMENT & PLAN NOTE
Patient presents with constipation has not had a bowel movement since 5 days.   Patient is on MiraLAX and Senokot-S at home     In the ER, patient received soap suds enema     Likely opioid-induced constipation  Pt has large BM yesterday after enema     Plan  Continue with MiraLAX daily, colace BID and Senna BID   Monitor BM

## 2024-11-18 NOTE — ASSESSMENT & PLAN NOTE
Lab Results   Component Value Date    EGFR 95 11/18/2024    EGFR 95 11/17/2024    EGFR 82 11/16/2024    CREATININE 0.68 11/18/2024    CREATININE 0.68 11/17/2024    CREATININE 0.92 11/16/2024     Chronic, stable.     Plan  Trend Creatinine  Avoid nephrotoxic agents

## 2024-11-18 NOTE — SEDATION DOCUMENTATION
Pt tolerated b/l pcn change, tubes were blocked, pt needs to continue flushing tubes with 10 ml NSS daily.  Pt back to room in stable condition.

## 2024-11-18 NOTE — ASSESSMENT & PLAN NOTE
Patient presents with complaint of left nephrostomy tube malfunction since 2 days.  Mild bilateral flank pain.  No fevers or leukocytosis present on admission  CT scan showing findings suggestive of cystitis with bilateral pyeloureteritis.   UA on admission positive for leukocytes     Low suspicion for urinary tract infection as patient is afebrile with no leukocytosis.  UA likely due to colonization from chronic nephrostomy tube placement     Plan  S/p One-time dose of cefepime, held off on further antibiotics due to low suspicion of infection

## 2024-11-18 NOTE — ASSESSMENT & PLAN NOTE
Pt complains of Bilateral heel pain. Not erythema or rash on the heels on examination. Pt has metastatic prostate cancer, could be osteolytic pain.  Pt still has pain today but improved.    Plan  bilateral feet xray pending reading.  Pt already has scheduled gabapentin and PRN oxycodone and tylenol for pain.

## 2024-11-18 NOTE — DISCHARGE INSTRUCTIONS
Nephrostomy Tube Care     WHAT YOU NEED TO KNOW:   A nephrostomy tube is a catheter (thin plastic tube) that is inserted through your skin and into your kidney. The nephrostomy tube drains urine from your kidney into a collecting bag outside your body. You may need a nephrostomy tube when something is blocking the normal flow of urine. A nephrostomy tube may be used for a short or a long period of time. The nephrostomy tube comes out of your back, so you will need someone to help care for your nephrostomy tube.          DISCHARGE INSTRUCTIONS:      How to clean the skin around the nephrostomy tube and change the bandage:  Since the nephrostomy tube comes out of your back, you will not be able to care for it by yourself. Ask someone to follow the general directions below to check and care for your nephrostomy tube.   Gather the items you will need.          Disposable (single use) under-pad, and a clean washcloth  Plain soap, warm water, and new medical gloves  Sterile gauze bandages  Clear adhesive dressing or medical tape  Skin barrier  Protective skin film  Trash bag  Remove the old bandage, and check the tube entry site.    Have the patient lie on his side with the nephrostomy tube entry site facing up. Place the under-pad where it will catch drainage as you are working with the nephrostomy tube.   Wash your hands with soap and water. Put on new medical gloves.  Gently remove the old bandage, without pulling on the tube. Do this by holding the skin beside the tube with one hand. With the other hand, gently remove sticky tape and the skin barrier by pulling in the same direction as hair growth. Do not touch the side of the bandage that is placed over or around the tube. Throw the bandage and skin barrier away in a trash bag.  Look for signs of infection, such as skin redness and swelling. Report any skin changes to healthcare providers.  Clean the tube entry site.    Hold the tube in place to keep it from  being pulled out while you are cleaning around it.  You will need to clean the area twice. For the first cleaning, wet a new gauze bandage with soap and water.  Begin at the entry site of the tube. Wipe the skin in circles, moving away from the entry site. Remove blood and any other material with the gauze. Do this as often as needed. Use a new gauze bandage each time you clean the area, moving away from the entry site.   For the second cleaning, wet a new gauze bandage with water. Begin at the entry site of the tube. Wipe the skin in circles, moving away from the entry site. Use a new gauze bandage each time you clean the area, moving away from the entry site.   Gently pat the skin with a clean washcloth to dry it.    Apply the skin barrier and bandages.    Roll up a bandage to make it thick, and place it under  the place where the tube enters the skin. Place it to support the tube, and stop it from kinking or bending. Tape the bandage in place, and apply more bandages if directed by a healthcare provider.   Bring the tubing forward to the front and tape it to the skin. Do not stretch the tube tight, because this may pull the nephrostomy tube out.  How often to change the bandage.  Change the bandage around the tube, every other day. If your bandages  get dirty or wet, change them right away, and as often as needed. If your nephrostomy tube is to be used for a long period of time, the tube needs to be changed every 2 to 3 months. Healthcare providers will tell you when you need to make an appointment to have your tube changed.     How to care for the urine drainage bag:   Ask if you need to measure and write down how much urine is in the bag before you empty it. Drain urine out of the drainage bag when it is ½ to ? full. Open the spout at the bottom of the bag to empty the urine into the toilet.   You may need to detach the drainage bag from the nephrostomy tube to change it.. If so, attach a new drainage bag  tightly to the nephrostomy tube.     How to prevent problems with your nephrostomy tube:   Change bandages, directed.  This helps to prevent infection. Throw away or clean your drainage bag as directed by your healthcare provider.    Wipe the connecting ends of the drainage bag with alcohol before you reconnect the bag to the tube.  This helps prevent infection.     Keep the tube taped to your skin and connected to a drainage bag placed below the level of your kidneys.  This helps prevent urine from backing up into your kidneys. You may wear a small drainage bag strapped to your leg to let you move around more easily.    Check the catheter to be sure it is in place after you change your clothes or do other activities.  Do not wear tight clothing over the tube. Place the tubing over your thigh rather than under it when you are sitting down. Be sure that nothing is pulling on the nephrostomy tube when you move around.    Change positions if you see little or no urine in your drainage bag.  Check to see if the urine tube is twisted or bent. Be sure that you are not sitting or lying on the tube. If there are no kinks and there is little or no urine in the drainage bag, tell your healthcare provider.    Flush out the tube as directed. Some tubes get flushed one time a day with 10 mls of NSS You will be given a prescription for the flushes.  To flush the nephrostomy tube, clean both connections with alcohol swap. Twist off the drainage bag tube and twist the saline syringe into the nephrostomy tube and flush briskly. Remove the syringe and twist the drainage bag tube back into the nephrostomy tube.  Keep the site covered while you shower.  Tape a piece of clear adhesive plastic over the dressing to keep it dry while you shower. Do not take tub baths.    Contact Interventional Radiology at [phone] ([facility] PATIENTS: Contact Interventional Radiology at [phone]) ([facility] PATIENTS: Contact Interventional Radiology at  [phone]) if:  The skin around the nephrostomy tube is red, swollen, itches, or has a rash.   You have a fever greater than 101 or chills.  You have lower back or hip pain.  There are changes in how your urine looks or smells.  You have little or no urine draining from the nephrostomy tube.   You have nausea and are vomiting.  The black deb on your tube has moved, or the tube is longer than when it was put in.   You have questions or concerns about your condition or care.  The nephrostomy tube comes out completely.   There is blood, pus, or a bad smell coming from the place where the tube enters your skin.  Urine is leaking around the tube.        The following pharmacies carry the flush syringes.       [facility]                     [address]                     [address] [facility]                    [phone]  [place]                       [place]  Phone [phone]            Phone [phone]                 [facility]                                                                                                   [phone]  [facility]             [facility]                             [address] [place], [place] [address] [facility]   Yoselyn LARSON                                 [phone]  Phone [phone]            Phone [phone]    [facility]                                                                         [facility] [phone]  Marie Carter.  [place]   Phone [phone]

## 2024-11-18 NOTE — ASSESSMENT & PLAN NOTE
Patient has history of hypercalcemia likely secondary to metastatic prostate cancer. Hypercalcemia is likely contributing.  He has been following up with hematology oncology and has been receiving Xgeva monthly as recommended to improve hypercalcemia. Next infusion on 11/18/24. Patient has poor appetite, weight loss, constipation, fatigue likely from hypercalcemia.   Ionized calcium on admission 1.45     In the ER, patient received 1 L IV fluid bolus     Plan  Continue on normal saline 50 mL/h  Calcium normalized, will continue NS IVF

## 2024-11-18 NOTE — INCIDENTAL FINDINGS
The following findings require follow up:  Radiographic finding  Finding: CT abdomen pelvis with contrast: 1. Findings suggestive of cystitis with bilateral pyeloureteritis., 2. Multiple metastatic liver lesions and widespread osseous metastasis, similar to recent PSMA PET/CT, but progressed from 6/6/2024., 3. Multiple small nodular enhancing foci in the prostate gland and bladder wall, likely corresponding to the PSMA avid lesions seen on recent PET/CT.   Follow up required: PT is already following heme/onc and palliative care.  Follow up should be done: Pt has heme/onc follow up on 12/12/24    Please notify the following clinician to assist with the follow up:   Dr. Karen Bates MD, PCP ([facility]    Incidental finding results were discussed with the Patient's POA by Purvi Richard MD on 11/18/24.   They expressed understanding and all questions answered.

## 2024-11-18 NOTE — ASSESSMENT & PLAN NOTE
Chronic hyponatremia. . Pt is on salt tablet 1g BID at home     continued home salt tablet at discharge.

## 2024-11-18 NOTE — ASSESSMENT & PLAN NOTE
Lab Results   Component Value Date    HGBA1C 5.6 10/08/2024       Recent Labs     11/17/24  1551 11/17/24  2049 11/18/24  0741 11/18/24  1054   POCGLU 181* 169* 92 146*       Blood Sugar Average: Last 72 hrs:  (P) 142.125    Chronic. Well controlled. Home medications include metformin 1000mg BID. Patient was previously on glimepiride 1 mg daily  and Levemir 20 IU which were discontinued.       Plan:  Hold home metformin  Patient started on insulin sliding scale and Accu-Cheks ACHS  Carbohydrate controlled diet  Hypoglycemia protocol  Monitor blood sugars

## 2024-11-19 ENCOUNTER — TELEPHONE (OUTPATIENT)
Age: 74
End: 2024-11-19

## 2024-11-19 VITALS
BODY MASS INDEX: 19.96 KG/M2 | WEIGHT: 131.7 LBS | TEMPERATURE: 98 F | DIASTOLIC BLOOD PRESSURE: 63 MMHG | HEIGHT: 68 IN | HEART RATE: 66 BPM | OXYGEN SATURATION: 100 % | SYSTOLIC BLOOD PRESSURE: 140 MMHG | RESPIRATION RATE: 16 BRPM

## 2024-11-19 DIAGNOSIS — E83.52 HYPERCALCEMIA: Primary | ICD-10-CM

## 2024-11-19 DIAGNOSIS — C79.51 PROSTATE CANCER METASTATIC TO BONE (HCC): ICD-10-CM

## 2024-11-19 DIAGNOSIS — C61 PROSTATE CANCER METASTATIC TO BONE (HCC): ICD-10-CM

## 2024-11-19 PROBLEM — T83.098A MALFUNCTION OF NEPHROSTOMY TUBE (HCC): Status: RESOLVED | Noted: 2024-01-15 | Resolved: 2024-11-19

## 2024-11-19 LAB
GLUCOSE SERPL-MCNC: 135 MG/DL (ref 65–140)
GLUCOSE SERPL-MCNC: 92 MG/DL (ref 65–140)

## 2024-11-19 PROCEDURE — 82948 REAGENT STRIP/BLOOD GLUCOSE: CPT

## 2024-11-19 PROCEDURE — 99239 HOSP IP/OBS DSCHRG MGMT >30: CPT | Performed by: INTERNAL MEDICINE

## 2024-11-19 RX ORDER — SENNOSIDES 8.6 MG
8.6 TABLET ORAL 2 TIMES DAILY
Qty: 60 TABLET | Refills: 0 | Status: SHIPPED | OUTPATIENT
Start: 2024-11-19

## 2024-11-19 RX ORDER — DOCUSATE SODIUM 100 MG/1
100 CAPSULE, LIQUID FILLED ORAL 2 TIMES DAILY
Qty: 60 CAPSULE | Refills: 0 | Status: SHIPPED | OUTPATIENT
Start: 2024-11-19

## 2024-11-19 RX ORDER — SODIUM CHLORIDE 9 MG/ML
20 INJECTION, SOLUTION INTRAVENOUS ONCE
Status: CANCELLED | OUTPATIENT
Start: 2024-11-21

## 2024-11-19 RX ADMIN — DEXAMETHASONE 1 MG: 2 TABLET ORAL at 08:44

## 2024-11-19 RX ADMIN — DOCUSATE SODIUM 100 MG: 100 CAPSULE, LIQUID FILLED ORAL at 08:46

## 2024-11-19 RX ADMIN — PANTOPRAZOLE SODIUM 40 MG: 40 TABLET, DELAYED RELEASE ORAL at 08:44

## 2024-11-19 RX ADMIN — SODIUM CHLORIDE 1 G: 1 TABLET ORAL at 08:43

## 2024-11-19 RX ADMIN — GABAPENTIN 300 MG: 300 CAPSULE ORAL at 08:43

## 2024-11-19 RX ADMIN — SODIUM CHLORIDE 75 ML/HR: 0.9 INJECTION, SOLUTION INTRAVENOUS at 08:57

## 2024-11-19 NOTE — TELEPHONE ENCOUNTER
please make 14 day TCM visit for this patient. Pt was discharged today. thank you.       Attempted to contact patient - unable to reach patient - unable to leave .

## 2024-11-19 NOTE — CASE MANAGEMENT
Case Management Discharge Planning Note    Patient name Oliver Astudillo  Location [facility] [place] [facility] [place] [facility]-* MRN [id number]  : 1950 Date 2024       Current Admission Date: 2024  Current Admission Diagnosis:Prostate cancer metastatic to bone (HCC)   Patient Active Problem List    Diagnosis Date Noted Date Diagnosed    Vitamin B12 deficiency 10/09/2024     Moderate protein-calorie malnutrition (HCC) 2024     Hypercalcemia 2024     Anorexia 2024     Unintentional weight loss 2024     Cancer related pain 2024     Palliative care by specialist 2024     Ambulatory dysfunction 2024     Therapeutic opioid-induced constipation (OIC) 2024     Nausea and vomiting 2024     Advanced care planning/counseling discussion 2024     Goals of care, counseling/discussion 2024     Electrolyte abnormality 2024     Stage 2 chronic kidney disease 2024     Hypoglycemia 2024     Constipation 2024     Chronic hyponatremia 2024     Hyperlipidemia 2024     Encounter for monitoring androgen deprivation therapy 08/10/2023     Nephrostomy status (HCC) 08/10/2023     Hydronephrosis 2023     Prostate cancer metastatic to bone (HCC) 2023     Type 2 diabetes mellitus with other specified complication (HCC) 2023     Other hydronephrosis 2023     Hypertension 2023       LOS (days): 2  Geometric Mean LOS (GMLOS) (days): 3.2  Days to GMLOS:1.2     OBJECTIVE:  Risk of Unplanned Readmission Score: 31.91         Current admission status: Inpatient   Preferred Pharmacy:   [id number] - [place], [place] - [address] [address]  Phone: [phone] Fax: [phone]    Primary Care Provider: Karen Bates MD    Primary Insurance: JERO  REP  Secondary Insurance:     DISCHARGE DETAILS:    Discharge planning discussed with:: Step-son Sumit  Freedom of Choice:  Yes    Comments - Freedom of Choice: Family's preference is for patient to return home with [facility] by [facility].  Referral placed in [facility] and remains pending.  SW e-mailed and left a voice mail for [facility] liaison Patty Behre to request confirmation of agency's ability to accept patient back.      CM contacted family/caregiver?: Yes  Were Treatment Team discharge recommendations reviewed with patient/caregiver?: Yes  Did patient/caregiver verbalize understanding of patient care needs?: Yes  Were patient/caregiver advised of the risks associated with not following Treatment Team discharge recommendations?: Yes    Contacts  Patient Contacts: Sumit Guevarafaith (step-son)  Relationship to Patient:: Family  Contact Method: Phone  Phone Number: [phone]  Reason/Outcome: Emergency Contact, Discharge Planning    DME Referral Provided  Referral made for DME?: No    Other Referral/Resources/Interventions Provided:  Interventions: HHC, Transportation    Would you like to participate in our [facility] service program?  : No - Declined    Treatment Team Recommendation: Home with Home Health Care  Discharge Destination Plan:: Home with Home Health Care  Transport at Discharge : Wheelchair van     Number/Name of Dispatcher: [employer]  Transported by (Company and Unit #): [place]  ETA of Transport (Date): 11/19/24  ETA of Transport (Time): 1200

## 2024-11-19 NOTE — NURSING NOTE
Patient's IV removed. AVS reviewed with patient, spouse, and grandsons. Patient, spouse and grandsons verbalized understanding of AVS. Patient left with all belongings in stable condition.

## 2024-11-19 NOTE — PLAN OF CARE
Problem: Potential for Falls  Goal: Patient will remain free of falls  Description: INTERVENTIONS:  - Educate patient/family on patient safety including physical limitations  - Instruct patient to call for assistance with activity   - Consult OT/PT to assist with strengthening/mobility   - Keep Call bell within reach  - Keep bed low and locked with side rails adjusted as appropriate  - Keep care items and personal belongings within reach  - Initiate and maintain comfort rounds  - Make Fall Risk Sign visible to staff  - Offer Toileting every 2 Hours, in advance of need  - Initiate/Maintain bed/chair alarm  - Obtain necessary fall risk management equipment: fall risk sign  - Apply yellow socks and bracelet for high fall risk patients  - Consider moving patient to room near nurses station  Outcome: Progressing     Problem: PAIN - ADULT  Goal: Verbalizes/displays adequate comfort level or baseline comfort level  Description: Interventions:  - Encourage patient to monitor pain and request assistance  - Assess pain using appropriate pain scale  - Administer analgesics based on type and severity of pain and evaluate response  - Implement non-pharmacological measures as appropriate and evaluate response  - Consider cultural and social influences on pain and pain management  - Notify physician/advanced practitioner if interventions unsuccessful or patient reports new pain  Outcome: Progressing     Problem: INFECTION - ADULT  Goal: Absence or prevention of progression during hospitalization  Description: INTERVENTIONS:  - Assess and monitor for signs and symptoms of infection  - Monitor lab/diagnostic results  - Monitor all insertion sites, i.e. indwelling lines, tubes, and drains  - Monitor endotracheal if appropriate and nasal secretions for changes in amount and color  - [place] appropriate cooling/warming therapies per order  - Administer medications as ordered  - Instruct and encourage patient and family to use good  hand hygiene technique  - Identify and instruct in appropriate isolation precautions for identified infection/condition  Outcome: Progressing

## 2024-11-19 NOTE — PLAN OF CARE
Problem: Potential for Falls  Goal: Patient will remain free of falls  Description: INTERVENTIONS:  - Educate patient/family on patient safety including physical limitations  - Instruct patient to call for assistance with activity   - Consult OT/PT to assist with strengthening/mobility   - Keep Call bell within reach  - Keep bed low and locked with side rails adjusted as appropriate  - Keep care items and personal belongings within reach  - Initiate and maintain comfort rounds  - Make Fall Risk Sign visible to staff  - Offer Toileting every 2 Hours, in advance of need  - Initiate/Maintain bed alarm  - Obtain necessary fall risk management equipment: alarm  - Apply yellow socks and bracelet for high fall risk patients  - Consider moving patient to room near nurses station  Outcome: Progressing     Problem: Prexisting or High Potential for Compromised Skin Integrity  Goal: Skin integrity is maintained or improved  Description: INTERVENTIONS:  - Identify patients at risk for skin breakdown  - Assess and monitor skin integrity  - Assess and monitor nutrition and hydration status  - Monitor labs   - Assess for incontinence   - Turn and reposition patient  - Assist with mobility/ambulation  - Relieve pressure over bony prominences  - Avoid friction and shearing  - Provide appropriate hygiene as needed including keeping skin clean and dry  - Evaluate need for skin moisturizer/barrier cream  - Collaborate with interdisciplinary team   - Patient/family teaching  - Consider wound care consult   Outcome: Progressing     Problem: Nutrition/Hydration-ADULT  Goal: Nutrient/Hydration intake appropriate for improving, restoring or maintaining nutritional needs  Description: Monitor and assess patient's nutrition/hydration status for malnutrition. Collaborate with interdisciplinary team and initiate plan and interventions as ordered.  Monitor patient's weight and dietary intake as ordered or per policy. Utilize nutrition screening  tool and intervene as necessary. Determine patient's food preferences and provide high-protein, high-caloric foods as appropriate.     INTERVENTIONS:  - Monitor oral intake, urinary output, labs, and treatment plans  - Assess nutrition and hydration status and recommend course of action  - Evaluate amount of meals eaten  - Assist patient with eating if necessary   - Allow adequate time for meals  - Recommend/ encourage appropriate diets, oral nutritional supplements, and vitamin/mineral supplements  - Order, calculate, and assess calorie counts as needed  - Recommend, monitor, and adjust tube feedings and TPN/PPN based on assessed needs  - Assess need for intravenous fluids  - Provide specific nutrition/hydration education as appropriate  - Include patient/family/caregiver in decisions related to nutrition  Outcome: Progressing     Problem: PAIN - ADULT  Goal: Verbalizes/displays adequate comfort level or baseline comfort level  Description: Interventions:  - Encourage patient to monitor pain and request assistance  - Assess pain using appropriate pain scale  - Administer analgesics based on type and severity of pain and evaluate response  - Implement non-pharmacological measures as appropriate and evaluate response  - Consider cultural and social influences on pain and pain management  - Notify physician/advanced practitioner if interventions unsuccessful or patient reports new pain  Outcome: Progressing     Problem: INFECTION - ADULT  Goal: Absence or prevention of progression during hospitalization  Description: INTERVENTIONS:  - Assess and monitor for signs and symptoms of infection  - Monitor lab/diagnostic results  - Monitor all insertion sites, i.e. indwelling lines, tubes, and drains  - Monitor endotracheal if appropriate and nasal secretions for changes in amount and color  - [place] appropriate cooling/warming therapies per order  - Administer medications as ordered  - Instruct and encourage patient and  family to use good hand hygiene technique  - Identify and instruct in appropriate isolation precautions for identified infection/condition  Outcome: Progressing  Goal: Absence of fever/infection during neutropenic period  Description: INTERVENTIONS:  - Monitor WBC    Outcome: Progressing     Problem: SAFETY ADULT  Goal: Patient will remain free of falls  Description: INTERVENTIONS:  - Educate patient/family on patient safety including physical limitations  - Instruct patient to call for assistance with activity   - Consult OT/PT to assist with strengthening/mobility   - Keep Call bell within reach  - Keep bed low and locked with side rails adjusted as appropriate  - Keep care items and personal belongings within reach  - Initiate and maintain comfort rounds  - Make Fall Risk Sign visible to staff  - Offer Toileting every 2 Hours, in advance of need  - Initiate/Maintain bed alarm  - Obtain necessary fall risk management equipment: alarm  - Apply yellow socks and bracelet for high fall risk patients  - Consider moving patient to room near nurses station  Outcome: Progressing  Goal: Maintain or return to baseline ADL function  Description: INTERVENTIONS:  -  Assess patient's ability to carry out ADLs; assess patient's baseline for ADL function and identify physical deficits which impact ability to perform ADLs (bathing, care of mouth/teeth, toileting, grooming, dressing, etc.)  - Assess/evaluate cause of self-care deficits   - Assess range of motion  - Assess patient's mobility; develop plan if impaired  - Assess patient's need for assistive devices and provide as appropriate  - Encourage maximum independence but intervene and supervise when necessary  - Involve family in performance of ADLs  - Assess for home care needs following discharge   - Consider OT consult to assist with ADL evaluation and planning for discharge  - Provide patient education as appropriate  Outcome: Progressing  Goal: Maintains/Returns to pre  admission functional level  Description: INTERVENTIONS:  - Perform AM-PAC 6 Click Basic Mobility/ Daily Activity assessment daily.  - Set and communicate daily mobility goal to care team and patient/family/caregiver.   - Collaborate with rehabilitation services on mobility goals if consulted  - Perform Range of Motion 3 times a day.  - Reposition patient every 2 hours.  - Dangle patient  3 times a day  - Stand patient 3 times a day  - Ambulate patient 3 times a day  - Out of bed to chair 3 times a day   - Out of bed for meals 3 times a day  - Out of bed for toileting  - Record patient progress and toleration of activity level   Outcome: Progressing     Problem: DISCHARGE PLANNING  Goal: Discharge to home or other facility with appropriate resources  Description: INTERVENTIONS:  - Identify barriers to discharge w/patient and caregiver  - Arrange for needed discharge resources and transportation as appropriate  - Identify discharge learning needs (meds, wound care, etc.)  - Arrange for interpretive services to assist at discharge as needed  - Refer to Case Management Department for coordinating discharge planning if the patient needs post-hospital services based on physician/advanced practitioner order or complex needs related to functional status, cognitive ability, or social support system  Outcome: Progressing     Problem: Knowledge Deficit  Goal: Patient/family/caregiver demonstrates understanding of disease process, treatment plan, medications, and discharge instructions  Description: Complete learning assessment and assess knowledge base.  Interventions:  - Provide teaching at level of understanding  - Provide teaching via preferred learning methods  Outcome: Progressing

## 2024-11-19 NOTE — CASE MANAGEMENT
Case Management Progress Note    Patient name Oliver Astudillo  Location [address] [place] [address]-* MRN [id number]  : 1950 Date 2024       LOS (days): 2  Geometric Mean LOS (GMLOS) (days): 3.2  Days to GMLOS:1.1        OBJECTIVE:        Current admission status: Inpatient  Preferred Pharmacy:   [id number] [address] [address]  Phone: [phone] Fax: [phone]    Primary Care Provider: Karen Bates MD    Primary Insurance: [employer] MC REP  Secondary Insurance:     PROGRESS NOTE:    SW was notified by nursing that patient's family now plans to take him home themselves rather than via  van.  SW called [facility] at 1110 and spoke with Ursula to cancel transport.  Roundtrip reservation was cancelled by [facility] dispatch as SW was in rounds at the time of the call.      Patient was confirmed as a [facility] by [place] [facility] via e-mail from [facility] and agency was reserved in [facility].  AVS was faxed to agency via Epic.

## 2024-11-20 ENCOUNTER — TRANSITIONAL CARE MANAGEMENT (OUTPATIENT)
Age: 74
End: 2024-11-20

## 2024-11-20 ENCOUNTER — TELEPHONE (OUTPATIENT)
Age: 74
End: 2024-11-20

## 2024-11-21 ENCOUNTER — HOSPITAL ENCOUNTER (OUTPATIENT)
Dept: INFUSION CENTER | Facility: HOSPITAL | Age: 74
Discharge: HOME | End: 2024-11-21
Attending: INTERNAL MEDICINE
Payer: COMMERCIAL

## 2024-11-21 VITALS
OXYGEN SATURATION: 98 % | TEMPERATURE: 98 F | DIASTOLIC BLOOD PRESSURE: 65 MMHG | WEIGHT: 136.69 LBS | HEART RATE: 75 BPM | RESPIRATION RATE: 18 BRPM | SYSTOLIC BLOOD PRESSURE: 102 MMHG | BODY MASS INDEX: 20.78 KG/M2

## 2024-11-21 DIAGNOSIS — C61 PROSTATE CANCER METASTATIC TO BONE (HCC): Primary | ICD-10-CM

## 2024-11-21 DIAGNOSIS — C79.51 PROSTATE CANCER METASTATIC TO BONE (HCC): Primary | ICD-10-CM

## 2024-11-21 DIAGNOSIS — E83.52 HYPERCALCEMIA: ICD-10-CM

## 2024-11-21 PROCEDURE — 96365 THER/PROPH/DIAG IV INF INIT: CPT

## 2024-11-21 RX ORDER — SODIUM CHLORIDE 9 MG/ML
20 INJECTION, SOLUTION INTRAVENOUS ONCE
OUTPATIENT
Start: 2025-02-13

## 2024-11-21 RX ORDER — SODIUM CHLORIDE 9 MG/ML
20 INJECTION, SOLUTION INTRAVENOUS ONCE
Status: COMPLETED | OUTPATIENT
Start: 2024-11-21 | End: 2024-11-21

## 2024-11-21 RX ADMIN — ZOLEDRONIC ACID 4 MG: 4 INJECTION, SOLUTION, CONCENTRATE INTRAVENOUS at 11:58

## 2024-11-21 RX ADMIN — SODIUM CHLORIDE 20 ML/HR: 9 INJECTION, SOLUTION INTRAVENOUS at 11:57

## 2024-11-21 NOTE — PROGRESS NOTES
Oliver Astudillo  tolerated treatment well with no complications.      Oliver Astudillo is aware of future appt on 2/13 at 1030.     AVS printed and given to Oliver Astudillo: yes

## 2024-11-22 ENCOUNTER — PREP FOR PROCEDURE (OUTPATIENT)
Dept: INTERVENTIONAL RADIOLOGY/VASCULAR | Facility: CLINIC | Age: 74
End: 2024-11-22

## 2024-11-22 ENCOUNTER — TELEPHONE (OUTPATIENT)
Age: 74
End: 2024-11-22

## 2024-11-22 ENCOUNTER — HOSPITAL ENCOUNTER (OUTPATIENT)
Dept: INFUSION CENTER | Facility: HOSPITAL | Age: 74
Discharge: HOME | End: 2024-11-22
Attending: INTERNAL MEDICINE

## 2024-11-22 DIAGNOSIS — N13.39 OTHER HYDRONEPHROSIS: Primary | ICD-10-CM

## 2024-11-22 DIAGNOSIS — R33.9 URINARY RETENTION: ICD-10-CM

## 2024-11-22 NOTE — TELEPHONE ENCOUNTER
Vm on clinical line:    Y eso también. Hello, buenas tardes. Mi nombre es Erlinda argueta. Usted le llamaron a Oliver ellie a mi esposo y quería saber para qué el número de teléfono es [phone]. Muchas kenny. Buenas tardes. Yo te vuelven a llamar. ¿Y eso que informa?    English  And that too. Hello, good afternoon. My name is Erlinda Argueta. You called Oliver Ellie to my  and I wanted to know what the phone number is [phone] for. Thanks a lot. Good afternoon. They call you back. And what does that inform?    You received a voice mail from [employer] [place]    Spoke with patients son, rescheduled appt that was canceled because the provider will not be in office. .

## 2024-11-25 ENCOUNTER — OFFICE VISIT (OUTPATIENT)
Dept: PALLIATIVE MEDICINE | Facility: CLINIC | Age: 74
End: 2024-11-25
Payer: COMMERCIAL

## 2024-11-25 VITALS — BODY MASS INDEX: 21.29 KG/M2 | TEMPERATURE: 96.6 F | WEIGHT: 140 LBS

## 2024-11-25 DIAGNOSIS — T40.2X5A THERAPEUTIC OPIOID-INDUCED CONSTIPATION (OIC): ICD-10-CM

## 2024-11-25 DIAGNOSIS — R63.0 ANOREXIA: ICD-10-CM

## 2024-11-25 DIAGNOSIS — R26.2 AMBULATORY DYSFUNCTION: ICD-10-CM

## 2024-11-25 DIAGNOSIS — C79.51 PROSTATE CANCER METASTATIC TO BONE (HCC): Primary | ICD-10-CM

## 2024-11-25 DIAGNOSIS — R63.4 UNINTENTIONAL WEIGHT LOSS: ICD-10-CM

## 2024-11-25 DIAGNOSIS — G89.3 CANCER RELATED PAIN: ICD-10-CM

## 2024-11-25 DIAGNOSIS — C61 PROSTATE CANCER METASTATIC TO BONE (HCC): Primary | ICD-10-CM

## 2024-11-25 DIAGNOSIS — K59.03 THERAPEUTIC OPIOID-INDUCED CONSTIPATION (OIC): ICD-10-CM

## 2024-11-25 DIAGNOSIS — Z51.5 PALLIATIVE CARE BY SPECIALIST: ICD-10-CM

## 2024-11-25 PROCEDURE — 99213 OFFICE O/P EST LOW 20 MIN: CPT | Performed by: INTERNAL MEDICINE

## 2024-11-25 PROCEDURE — G2211 COMPLEX E/M VISIT ADD ON: HCPCS | Performed by: INTERNAL MEDICINE

## 2024-11-25 RX ORDER — LACTOSE-REDUCED FOOD 0.05 G-1.5
LIQUID (ML) ORAL 3 TIMES DAILY
COMMUNITY
Start: 2024-11-22

## 2024-11-25 NOTE — ASSESSMENT & PLAN NOTE
While appetite is still relatively low, patient is eating 3 meals a day per his wife. She is supplementing his intake with Ensure. She and the patient are pleased with the dexamethasone and wish to continue current dose. Counseled today about the risks of glucocorticoid therapy; patient wishes to continue.

## 2024-11-25 NOTE — PROGRESS NOTES
"Name: Oliver Astudillo      : 1950      MRN: [id number]  Encounter Provider: Isaiah Mcdonald MD  Encounter Date: 2024   Encounter department: [facility] PALLIATIVE CARE QUETA  :  Assessment & Plan  Prostate cancer metastatic to bone (HCC)  Continue disease-directed cares. Pluvicto infusion done 24.         Cancer related pain  Patient reports his chronic pain including cancer-related pain is adequately controlled on his current regimen. He is only taking 1 tablet of oxycodone per day on average, usually at night.  Recommend topical OTC products, local application of heat or cold, Tylenol up to 1000mg TID for chronic pain. Do not use heat on top of topical agents. Do not mix topical agents.  Continue oxycodone IR PRN. Use as directed.  Continue gabapentin TID ATC.         Therapeutic opioid-induced constipation (OIC)  Counseled today on bowel regimen for opioid-induced constipation.         Anorexia  While appetite is still relatively low, patient is eating 3 meals a day per his wife. She is supplementing his intake with Ensure. She and the patient are pleased with the dexamethasone and wish to continue current dose. Counseled today about the risks of glucocorticoid therapy; patient wishes to continue.         Unintentional weight loss  Patient's weight has been relatively stable in recent weeks, though he has lost 28 pounds across the past 6 months. See \"anorexia\".         Ambulatory dysfunction  Use DME as appropriate to control pain and to reduce fall risk.         Palliative care by specialist   used, #[id number].  Emotional / psychosocial support provided today.  ACP: On file.  Reviewed notes (DC Summary, IR, RD), labs (24 Cr 0.68, alb 3.2, Hb 12.0), imaging + procedures (24 nephrostomy tube exchange, 24 CTAP, 24 Pluvicto infusion). See below for more data.  Return in about 2 months (around 2025).  Medication safety issues addressed - no driving " under the influence of narcotics (including opioids), watch for adverse effects including AMS or respiratory depression (slowed breathing), keep medications stored in a safe/locked environment, do not use alcohol while opioids or other narcotics are in one's system, do not travel with more than the minimum number of tablets or capsules required for the trip.  I have personally queried the patient's controlled substance dispensing history in the Prescription Drug Monitoring Program in compliance with regulations before I have prescribed any controlled substances. The prescription history is consistent with prescribed therapy and our practice policies.                   PDMP Review: I have reviewed the patient's controlled substance dispensing history in the Prescription Drug Monitoring Program in compliance with the [facility] regulations before prescribing any controlled substances.    History of Present Illness   Oliver Astudillo is a 74 y.o. male w/ prostate cancer metastatic to bone; diabetes, HTN+HLD, urinary retention + obstructive uropathy + hydronephrosis s/p b/l nephrostomy tubes. He follows w/ Dr Coyne (Medical Oncology), Dr LENORE Andrea (Radiation Oncology). Admitted 11/16-19/24 for nephrostomy tube exchange, constipation, hypercalcemia.     used, majority of history provided by patient's wife Erlinda.    Patient has been taking gabapentin around-the-clock, and has been taking about 1 tab of oxycodone IR 10 mg/day. Usually this is taken at night. He feels his pain is adequately controlled on his current regimen.    [facility] reports the patient has been eating 3 meals per day, and has been supplementing intake with protein shakes including Ensure. His appetite is low, but she feels that the dexamethasone has been helpful in getting him to this point. He is taking the dexamethasone with breakfast and lunch.    Patient has ongoing constipation, moving his bowels approximately every 3 days. He has been  "drinking less water than recommended (\"2 glasses/day\", \"though he should be drinking 3 bottles at least\"). Is using some over-the-counter products PRN to help with constipation. He denies nausea, denies vomiting.     Objective   Temp (!) 96.6 °F (35.9 °C) (Temporal)   Wt 63.5 kg (140 lb)   BMI 21.29 kg/m²     Physical Exam  Vitals reviewed.   Constitutional:       General: He is awake. He is not in acute distress.     Appearance: He is well-groomed and underweight. He is ill-appearing (chronically). He is not toxic-appearing.   HENT:      Head: Normocephalic and atraumatic.      Right Ear: External ear normal.      Left Ear: External ear normal.   Eyes:      General: No scleral icterus.        Right eye: No discharge.         Left eye: No discharge.      Extraocular Movements: Extraocular movements intact.      Conjunctiva/sclera: Conjunctivae normal.      Pupils: Pupils are equal, round, and reactive to light.   Pulmonary:      Effort: Pulmonary effort is normal. No tachypnea, bradypnea, accessory muscle usage or respiratory distress.      Comments: Able to speak comfortably in short phrases on room air at rest.  Abdominal:      General: There is no distension.      Tenderness: There is no guarding.   Musculoskeletal:      Cervical back: Normal range of motion.      Right lower leg: No edema.      Left lower leg: No edema.   Skin:     General: Skin is dry.      Coloration: Skin is not pale.   Neurological:      Mental Status: He is alert and oriented to person, place, and time.      Cranial Nerves: No dysarthria or facial asymmetry.      Gait: Gait abnormal (seen in wheelchair).   Psychiatric:         Attention and Perception: Attention normal.         Mood and Affect: Mood and affect normal.         Speech: Speech normal.         Behavior: Behavior normal. Behavior is cooperative.         Thought Content: Thought content normal.         Cognition and Memory: Cognition and memory normal.         Judgment: " Judgment normal.         Recent labs:  Lab Results   Component Value Date/Time    SODIUM 136 11/18/2024 06:12 AM    K 3.8 11/18/2024 06:12 AM    BUN 14 11/18/2024 06:12 AM    CREATININE 0.68 11/18/2024 06:12 AM    GLUC 97 11/18/2024 06:12 AM    CALCIUM 9.7 11/18/2024 06:12 AM    AST 19 11/18/2024 06:12 AM    ALT 7 11/18/2024 06:12 AM    ALB 3.2 (L) 11/18/2024 06:12 AM    TP 6.5 11/18/2024 06:12 AM    EGFR 95 11/18/2024 06:12 AM     Lab Results   Component Value Date/Time    HGB 12.0 11/18/2024 06:12 AM    WBC 5.26 11/18/2024 06:12 AM     11/18/2024 06:12 AM    INR 1.22 (H) 10/08/2024 12:55 PM    PTT 32 10/08/2024 12:55 PM     Lab Results   Component Value Date/Time    GXS3BMWIHQKT 1.470 10/08/2024 04:46 PM       Recent Imaging:  Procedure: IR nephrostomy tube check/change/reposition/reinsertion/upsize  Result Date: 11/18/2024  Narrative: IR NEPHROSTOMY TUBE CHECK/CHANGE/REPOSITION/REINSERTION/UPSIZE PROCEDURE: Exchange of bilateral percutaneous nephrostomy tube CLINICAL INDICATION: Patient with history of prostate carcinoma and chronic indwelling bilateral percutaneous nephrostomy tubes due to obstructive uropathy, with decreased output from left nephrostomy tube and retraction of both tube COMPARISON: CT 11/16/2024; nephrostomy tube exchange 10/4/2024 PERFORMING PHYSICIAN: Wenceslao Ho MD ASSISTANT PHYSICIAN: None MEDICATIONS: 1% lidocaine (local). SEDATION TIME: NA CONTRAST: 15 mL of iohexol (OMNIPAQUE) FLUOROSCOPY TIME: 5.4 min RADIATION DOSE: 29 mGy   (air Kerma). NUMBER OF IMAGES: 6 TECHNIQUE: Patient placed prone on the procedure table.  radiograph demonstrates percutaneous nephrostomy tubes overlying both flanks in stable position. The left tube was evaluated initially. Contrast could not be instilled due to obstruction of the tube by sediment. Kumpe catheter and Glidewire were used to negotiate alongside the tube until the renal pelvis was entered confirmed by contrast injection.  Nephrostomy tube removed. Glidewire exchanged for Amplatz wire and a new 10 Serbian APDL nephrostomy tube advanced over the wire with locking loop formed in the renal pelvis confirmed by contrast injection. Catheter secured to the skin with 0 Prolene suture and connected to an external drainage bag. The right tube was then evaluated. Contrast instilled demonstrates the catheter within the collecting system. It was decided to change the catheter due to frequent encrustation issues. Amplatz wire could not be advanced through the catheter due to partial encrustation. Glidewire was therefore used to exchanged out the catheter and using a Kumpe catheter, the Glidewire was exchanged for an Amplatz wire over which a new 10 Serbian APDL PCN was advanced with locking loop formed in the renal pelvis confirmed by contrast injection. Catheter secured to the skin with 0 Prolene suture and connected to an external drainage bag. The patient tolerated the procedure well without difficulty or complication encountered and left the section in satisfactory stable condition. FINDINGS: As above.     Impression: 1. Encrustation of left greater than right percutaneous nephrostomy tubes which were exchanged for a new 10 Serbian APDL catheters. 2. Catheter should be exchanged on a monthly basis due to frequent encrustation and obstruction. Patient is scheduled for next appointment 12/19/2024. 3. Catheters should be flushed on a daily basis as well. Workstation performed: [id number]     Procedure: XR foot 2 vw right  Result Date: 11/18/2024  Narrative: XR FOOT 2 VW RIGHT INDICATION: b/l heel pain. COMPARISON: None FINDINGS: No acute fracture or dislocation. Hallux valgus. No lytic or blastic osseous lesion. Atherosclerotic calcifications. Otherwise unremarkable soft tissues.     Impression: No acute osseous abnormality. Workstation performed: [id number]     Procedure: XR foot 2 vw left  Result Date: 11/18/2024  Narrative: XR FOOT 2 VW LEFT  INDICATION: b/l heel pain. COMPARISON: None FINDINGS: No acute fracture or dislocation. Hallux valgus. Flexion deformities of the lesser toes. No lytic or blastic osseous lesion. Atherosclerotic calcifications. Otherwise unremarkable soft tissues.     Impression: No acute osseous abnormality. Workstation performed: [id number]     Procedure: NM tumor loc spect/ct multi areas/days  Result Date: 11/18/2024  Narrative: PLUVICTO THERAPY SPECT CT INDICATION:  C61: Malignant neoplasm of prostate COMPARISON: PET CT SCAN 10/15/2024 TECHNIQUE: The study was performed approximately 24 hours following the intravenous administration of 203.7 mCi Lutetium 177 Pluvicto. Whole body planar images were obtained in the anterior posterior projection. SPECT CT images were obtained of the chest, abdomen and pelvis and reconstructed in the axial, sagittal and coronal planes. FINDINGS: Multiple foci of increased radiotracer uptake compatible with known soft tissue and osseous metastasis. These are most numerous in the axial and appendicular skeleton compatible with known osseous metastasis. Scattered hepatic foci compatible with known hepatic metastasis. There is physiological renal, splenic and liver activity. Low-dose CT demonstrates bilateral nephrostomy tubes. Fecal retention in the colon with stool ball in the rectum. Small amount of gas in the bladder lumen, previously present.     Impression: 1. Post therapy imaging demonstrates multiple foci of increased radiotracer uptake compatible with known soft tissue and osseous metastasis compatible with successful targeting of lesions. Workstation performed: [id number]     Procedure: CT abdomen pelvis with contrast  Result Date: 11/16/2024  Narrative: CT ABDOMEN AND PELVIS WITH IV CONTRAST INDICATION: Abd pain; L nephrostomy not draining. . COMPARISON: CT abdomen and pelvis dated June 6, 2024 TECHNIQUE: CT examination of the abdomen and pelvis was performed. Multiplanar 2D reformatted  images were created from the source data. This examination, like all CT scans performed in the [facility], was performed utilizing techniques to minimize radiation dose exposure, including the use of iterative reconstruction and automated exposure control. Radiation dose length product (DLP) for this visit: 848 mGy-cm IV Contrast: 85 mL of iohexol (OMNIPAQUE) Enteric Contrast: Not administered. FINDINGS: ABDOMEN LOWER CHEST: Mild bibasilar atelectasis. No pleural effusion. Coronary artery calcifications. LIVER/BILIARY TREE: Multiple (greater than 30) hypoattenuating lesions in the right and left hepatic lobes, new from 6/6/2024. All lesions were PSMA avid on recent PET/CT, and are in keeping with metastasis. Representative examples include: - Segment VIII lesion measuring 2.2 x 1.9 cm (2/20) - Segment VII lesion measuring 1.6 x 1.5 cm (2/45) - Segment V lesion measuring 1.4 x 1.3 cm (2/62) GALLBLADDER: No calcified gallstones. No pericholecystic inflammatory change. SPLEEN: Normal size. Stable linear parenchymal calcification. PANCREAS: Unremarkable. ADRENAL GLANDS: Unremarkable. KIDNEYS/URETERS: Bilateral nephrostomy catheters in place with pigtail tips in the renal pelves. No hydronephrosis. Mild wall thickening and urothelial enhancement of the ureters and renal pelves, suggestive of pyeloureteritis. STOMACH AND BOWEL: Unremarkable. APPENDIX: No findings to suggest appendicitis. ABDOMINOPELVIC CAVITY: No ascites. No pneumoperitoneum. No lymphadenopathy. VESSELS: Unremarkable for patient's age. PELVIS REPRODUCTIVE ORGANS: Small nodular enhancing foci in the prostate gland, likely corresponding to the PSMA avid lesion seen on recent PET/CT. URINARY BLADDER: Mild wall thickening and urothelial enhancement, suggestive of cystitis. Small volume intravesical air, likely from recent instrumentation/catheterization. Nodular enhancing foci in the bladder neck (2/169, 61/65), likely corresponding to the  PSMA avid lesions seen on prior PET/CT. ABDOMINAL WALL/INGUINAL REGIONS: Stable small fat-containing bilateral inguinal hernias. BONES: Numerous sclerotic, lytic and mixed lytic/sclerotic metastatic lesions in the thoracolumbar spine, bony pelvis and visualized ribs, progressed from 6/6/2024, but similar to recent PSMA PET/CT. There is increased sclerosis and mild compression deformity of L1 vertebral body with loss of approximately 30% height, similar to recent PET/CT. There is a lytic lesion with mild loss of height in T7 vertebral body, new/progressed from 6/6/2024.     Impression: 1. Findings suggestive of cystitis with bilateral pyeloureteritis. 2. Multiple metastatic liver lesions and widespread osseous metastasis, similar to recent PSMA PET/CT, but progressed from 6/6/2024. 3. Multiple small nodular enhancing foci in the prostate gland and bladder wall, likely corresponding to the PSMA avid lesions seen on recent PET/CT. The study was marked in EPIC for immediate notification. Workstation performed: [id number]     Procedure: NM pluvicto infusion  Result Date: 11/14/2024  Narrative: PLUVICTO THERAPY INDICATION:  C61: Malignant neoplasm of prostate RADIOPHARMACEUTICAL: 203.7 mCi Lutetium 177 PLUVICTO COMPARISON: PET/CT 10/15/2024 FINDINGS: Patient is a 74   year old male with history of metastatic prostate cancer. Laboratory blood work was drawn on 11/11/2024: Hemoglobin 12.7. WBC 10.95. Platelets 557. eGFR 89. Creatinine 0.80. Albumin 3.8. The benefits, risks and alternatives of this therapy were explained. Written informed consent was obtained. The patient was also given written and oral instructions regarding posttherapy care. 203.7 mCi Lutetium 177 Pluvicto was administered intravenously without incident.     Impression: 203.7 mCi Lutetium 177 Pluvicto was administered intravenously without immediate complication. Workstation performed: [id number]     Procedure: NM consultation pluvicto  Result Date:  10/22/2024  Narrative: A Nuclear Medicine Consultation was performed. Please refer to the Technologist Notes for consultation discussion details.     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  Elevated PSA of 58.98 (9/4/2024). MODIFIER: PS COMPARISON: PET/CT 8/17/2023; CT abdomen pelvis 6/6/2024 CELL TYPE:  prostatic adenocarcinoma TECHNIQUE:   5.4 mCi Ga-68 Illuccix PSMA administered IV. Multiplanar attenuation corrected and non attenuation corrected PET images were acquired 60 minutes post injection. Contiguous, low dose, axial CT sections were obtained from the vertex through the femurs .   Intravenous or oral contrast was not utilized.  This examination, like all CT scans performed in the [facility], was performed utilizing techniques to minimize radiation dose exposure, including the use of iterative reconstruction and automated exposure control. FINDINGS: VISUALIZED BRAIN: No acute abnormalities are seen. HEAD/NECK: There is a physiologic distribution of the radiotracer. No PSMA avid cervical adenopathy is seen. CT images: Unremarkable. CHEST: - Multiple PSMA avid foci appearing in the right lower lung lobe are from the liver and represents hepatic metastasis. These appear to be in the pulmonary parenchyma likely due to respiratory motion and misregistration. - Focus of PSMA activity in the medial left lung base lateral to the distal descending thoracic aorta demonstrating SUV max of 3.4 (PET image 135). No discrete finding on CT correlate. This appears to be pleural-based and likely represents metastasis. CT images: Coronary calcifications. Small hiatal hernia. No suspicious lymphadenopathy. ABDOMEN: New multiple PSMA avid hepatic foci, greatest uptake in segment IVB demonstrating SUV max of 23. Although evaluation is limited, many of  these foci correlate with hypoattenuating hepatic lesions on low-dose CT. CT images: Scattered colonic diverticulosis. Stable splenic calcifications. Bilateral percutaneous nephrostomy tubes. No hydronephrosis. Atherosclerotic calcifications of the abdominal aorta. PELVIS: There is interval decrease in PSMA activity and extent involving the prostate gland and bladder. Persistent nodular activity involving the posterior bladder wall (SUV max of 10) as well as in the inferior bladder/superior prostate (SUV max of 6.9), suspicious for residual versus recurrent. No obvious PSMA activity involving the bilateral seminal vesicles. Interval resolution of PSMA activity involving the left pelvic sidewall lymph nodes. CT images: Persistent focus of air within the bladder lumen. OSSEOUS STRUCTURES: Innumerable PSMA avid osseous lesions, which have progressed since 8/17/2023. Some of these lesions correspond to sclerotic foci CT correlate. Representative osseous lesions as described: *  New PSMA osseous lesion involving the clivus demonstrates SUV max of 7.3. *  New right calvarial lesion demonstrates SUV max 3.0. *  New right occipital condyle lesion demonstrates SUV max of 20. *  New right coronoid process lesion demonstrates SUV max of 45. New left glenoid lesion demonstrates SUV max of 50. *  New right inferior scapular lesion demonstrates SUV max of 41. *  New sternal lesion demonstrates SUV max of 41. *  Right anterior lateral third rib demonstrates SUV max of 63. *  T2 vertebral body demonstrates SUV max of 51. This is associated with a lytic lesion on CT correlate. *  T9 vertebral body demonstrates SUV max of 15. *  Previously demonstrated L1 lytic lesion now appears sclerotic. New compression deformity of L1 with approximately 30% loss of vertebral body height. SUV max of 2.2 (previously 7.3). *  Diffuse PSMA avid osseous lesions involving the sacrum with SUV max of 56. *  Diffuse PSMA avid osseous lesions involving the  bilateral ilium, pubis, and ischium. *  Right femoral head lesion demonstrates SUV max of 24. *  Left femoral intertrochanteric region demonstrates SUV max of 46. CT images: Compression deformity of L1 vertebral body as described above. Chronic deformity of left posterior ribs 9-12. Spinal degenerative changes.     Impression: 1. New multiple PSMA avid hepatic foci, also suspicious for metastasis. 2. New focus of PSMA activity in the medial left lung base adjacent to the descending thoracic aorta, likely represents metastasis. 3. Interval decrease in PSMA activity and extensive of the prostate gland and bladder. There is persistent nodular activity in the region of the posterior bladder wall at the inferior bladder/superior prostate, suspicious for residual versus recurrent disease. Urinary activity is felt to be less likely. 4. Innumerable PSMA avid osseous lesions, which have progressed since 8/17/2023, compatible with metastasis. 5. New compression deformity of L1 vertebral body with approximately 30% loss of vertebral body height. 6. New lytic lesion in the T2 vertebral body with increased PSMA activity, which may predispose patient to pathologic fracture. Resident: MADDISON Enrique I, the attending radiologist, have reviewed the images and agree with the final report above. Workstation performed: [id number]     Procedure: XR chest portable  Result Date: 10/8/2024  Narrative: XR CHEST PORTABLE INDICATION: Chills, hypotension. COMPARISON: 8/13/2024 FINDINGS: Low lung volumes. Clear lungs. No pneumothorax or pleural effusion. Normal cardiomediastinal silhouette. Bones are unremarkable for age. Bilateral nephrostomy tubes noted.     Impression: No acute cardiopulmonary disease. Workstation performed: [id number]     Procedure: IR nephrostomy tube check/change/reposition/reinsertion/upsize  Result Date: 10/4/2024  Narrative: Bilateral nephrostomy tube check and exchange Clinical History: Chronic bilateral nephrostomy  tubes, here for routine exchange Contrast: 12 mL of iohexol (OMNIPAQUE) Fluoro time: 3.1 MIN Number of Images: Multiple Radiation dose: 15 mGy Conscious sedation time: NONE Technique/intervention: The patient was brought to the interventional radiology suite and placed prone on the table. The right and left back was prepped and draped in the usual sterile fashion. A timeout performed per protocol. 1% lidocaine was used for local anesthesia. Contrast injected through both catheters confirmed appropriate position within the renal collecting system. The pigtail locking mechanism was released and the skin sutures were removed. Guidewires were advanced  through the catheter and curled in the renal collecting system. The catheters were removed over a wire. New 10 Maltese all-purpose drainage catheters were advanced over the wire with the pigtails curled in the renal collecting system. The pigtail locking  mechanisms were engaged and a single stitch was placed to secure the catheter to the skin. Contrast injected through both catheters confirmed appropriate position.     Impression: Impression: 1.  Bilateral nephrostomy tube check and exchange. Workstation performed: [id number]     Procedure: XR ribs 2 vw left  Result Date: 8/14/2024  Narrative: XR RIBS 2 VW LEFT INDICATION: S49.91XA: Unspecified injury of right shoulder and upper arm, initial encounter. COMPARISON: None FINDINGS: No evidence of a rib fracture. No pneumothorax or pleural effusion. Clear lungs. Normal cardiomediastinal silhouette. Bilateral percutaneous nephrostomy tubes are present.     Impression: No evidence of a rib fracture. No acute cardiopulmonary disease. Computerized Assisted Algorithm (CAA) may have been used to analyze all applicable images. Workstation performed: [id number]     Procedure: XR shoulder 2+ vw right  Result Date: 8/14/2024  Narrative: XR SHOULDER 2+ VW RIGHT INDICATION: S49.91XA: Unspecified injury of right shoulder and upper  arm, initial encounter. COMPARISON: None FINDINGS: No acute fracture or dislocation. No significant degenerative changes. No lytic or blastic osseous lesion. Unremarkable soft tissues.     Impression: No acute osseous abnormality. Computerized Assisted Algorithm (CAA) may have been used to analyze all applicable images. Workstation performed: [id number]     Procedure: IR nephrostomy tube check/change/reposition/reinsertion/upsize  Result Date: 8/5/2024  Narrative: IR NEPHROSTOMY TUBE CHECK/CHANGE/REPOSITION/REINSERTION/UPSIZE PROCEDURE: Routine bilateral percutaneous nephrostomy tube change CLINICAL INDICATION: History of prostate cancer with chronic bilateral indwelling percutaneous nephrostomy tubes necessitating routine change COMPARISON: 6/7/2024 PERFORMING PHYSICIAN: Wenceslao Ho MD ASSISTANT PHYSICIAN: None MEDICATIONS: 1% lidocaine (local). SEDATION TIME: NONE CONTRAST: 8 mL of iohexol (OMNIPAQUE) FLUOROSCOPY TIME: 1.7 MIN RADIATION DOSE: 13 mGy   (air Kerma). NUMBER OF IMAGES: 8 TECHNIQUE: The patient was brought to the procedure room and a time-out was performed utilizing universal protocol. The patient was identified verbally and via wrist band. The patient was placed prone on the procedure table and both flanks were prepped and draped in the usual sterile fashion. All elements of maximal sterile barrier technique were followed (cap, mask, sterile gown, sterile gloves, large sterile sheet, hand hygiene, and 2% chlorhexidine for cutaneous antisepsis). Initially, the left nephrostomy tube was changed. The skin and subcutaneous tissues were infiltrated with local anesthetic. Contrast instilled demonstrating catheter locking loop positioned within decompressed renal pelvis. Contrast flows down the ureter. Catheter exchanged out over an Amplatz wire and replaced with an identical 10 [place] APDL formed within the renal pelvis confirmed with contrast injection. Catheter was secured to the skin with 0  Prolene suture. It was connected to a gravity drainage bag. Site dressed and bandaged. The procedure was then repeated in identical fashion on the contralateral right side. The patient tolerated the procedure well without difficulty or complication encountered and left the section in satisfactory stable condition. FINDINGS: As above.     Impression: Uneventful bilateral percutaneous nephrostomy tube exchange as described. Next exchange in 2 months (given frequent issues, including dislodgment, of the patient's tubes). Workstation performed: [id number]       Administrative Statements   I have spent a total time of 25 minutes in caring for this patient on the day of the visit/encounter including Risks and benefits of tx options, Instructions for management, Patient and family education, Risk factor reductions, Impressions, Counseling / Coordination of care, Documenting in the medical record, Reviewing / ordering tests, medicine, procedures  , and Obtaining or reviewing history  .

## 2024-11-25 NOTE — ASSESSMENT & PLAN NOTE
Patient reports his chronic pain including cancer-related pain is adequately controlled on his current regimen. He is only taking 1 tablet of oxycodone per day on average, usually at night.  Recommend topical OTC products, local application of heat or cold, Tylenol up to 1000mg TID for chronic pain. Do not use heat on top of topical agents. Do not mix topical agents.  Continue oxycodone IR PRN. Use as directed.  Continue gabapentin TID ATC.

## 2024-11-25 NOTE — ASSESSMENT & PLAN NOTE
used, #[id number].  Emotional / psychosocial support provided today.  ACP: On file.  Reviewed notes (DC Summary, IR, RD), labs (11/18/24 Cr 0.68, alb 3.2, Hb 12.0), imaging + procedures (11/18/24 nephrostomy tube exchange, 11/16/24 CTAP, 11/14/24 Pluvicto infusion). See below for more data.  Return in about 2 months (around 1/25/2025).  Medication safety issues addressed - no driving under the influence of narcotics (including opioids), watch for adverse effects including AMS or respiratory depression (slowed breathing), keep medications stored in a safe/locked environment, do not use alcohol while opioids or other narcotics are in one's system, do not travel with more than the minimum number of tablets or capsules required for the trip.  I have personally queried the patient's controlled substance dispensing history in the Prescription Drug Monitoring Program in compliance with regulations before I have prescribed any controlled substances. The prescription history is consistent with prescribed therapy and our practice policies.

## 2024-11-25 NOTE — ASSESSMENT & PLAN NOTE
"Patient's weight has been relatively stable in recent weeks, though he has lost 28 pounds across the past 6 months. See \"anorexia\".         "

## 2024-11-25 NOTE — PATIENT INSTRUCTIONS
"It was good to see you today. Thank you for coming in.    When using opioids for pain control, constipation is common and patients should act to prevent it:  Drink PLENTY of water. This is important to keep the gut moving.  Some people have success w/ using prunes, prune juice, certain fruits or vegetables (apples, bananas, prunes, pears, raspberries, and vegetables like string beans, broccoli, spinach, kale, squash, lentils, peas, and beans), or fiber gummies.  Try a probiotic. This could be yogurt or kefir, or fermented beverages such as kombucha, but probiotics are also available in capsule form. Aim for 10-15 billion colony-forming-units, w/ bacteria such as Lactobacillus / Saccharomyces / Actinomyces.  Osmotic laxatives (Miralax, magnesium citrate, Milk of Magnesia) can be very useful for opioid-induced constipation (OIC); take daily to prevent OIC.  Bulk laxatives (Citrucel, Metamucil, Fibercon, Benefiber, wheat germ) are useful for constipation in patients who are not taking opioids, but are not recommended if you are taking opioids.  Colace is good for softening hard stools, or preventing constipation when opioids are being used - but does not stimulate the bowel to move things along once constipation has occurred.  You can use senna, 1 to 2 tabs, once or twice daily as needed for constipation. Use as directed on the box/bottle. Senna is also available in a tea (\"Smooth Move\"). Should that not be enough for your constipation, you can try Dulcolax.  Should that not be enough, consider an enema.  All of these medications are available over-the-counter.  Return in about 2 months (around 1/25/2025). [facility].  Call us for refills on medications that we supply, as needed.   If something changes and you need to come in sooner, please call our office.    PRESCRIPTION REFILL REMINDER:  All medication refills should be requested prior to Noon on Friday. Any refill requests after noon on Friday would be " addressed the following Monday.    MEDICATION SAFETY ISSUES:   Do not drive under the influence of narcotics (including opioids), watch for adverse effects including confusion / altered mental status / respiratory depression (slowed breathing), keep medications stored in a safe/locked environment, do not use alcohol while opioids or other narcotics are in your system. Do not travel with more than the minimum number of tablets or capsules required for the trip.

## 2024-11-27 ENCOUNTER — TELEPHONE (OUTPATIENT)
Dept: ENDOCRINOLOGY | Facility: CLINIC | Age: 74
End: 2024-11-27

## 2024-11-27 NOTE — TELEPHONE ENCOUNTER
Contacted patient provider [facility]- will call to reschedule in case time opens patient aware and understanding.

## 2024-11-29 DIAGNOSIS — Z93.6 NEPHROSTOMY STATUS (HCC): ICD-10-CM

## 2024-11-29 RX ORDER — SODIUM CHLORIDE 9 MG/ML
INJECTION, SOLUTION INTRAMUSCULAR; INTRAVENOUS; SUBCUTANEOUS
Qty: 600 ML | Refills: 2 | Status: SHIPPED | OUTPATIENT
Start: 2024-11-29 | End: 2024-12-03

## 2024-12-02 ENCOUNTER — HOSPITAL ENCOUNTER (OUTPATIENT)
Dept: INFUSION CENTER | Facility: HOSPITAL | Age: 74
Discharge: HOME | End: 2024-12-02
Attending: INTERNAL MEDICINE

## 2024-12-03 ENCOUNTER — OFFICE VISIT (OUTPATIENT)
Age: 74
End: 2024-12-03

## 2024-12-03 ENCOUNTER — OFFICE VISIT (OUTPATIENT)
Dept: ENDOCRINOLOGY | Facility: CLINIC | Age: 74
End: 2024-12-03
Payer: COMMERCIAL

## 2024-12-03 ENCOUNTER — PATIENT OUTREACH (OUTPATIENT)
Age: 74
End: 2024-12-03

## 2024-12-03 VITALS
DIASTOLIC BLOOD PRESSURE: 80 MMHG | WEIGHT: 140 LBS | HEIGHT: 68 IN | OXYGEN SATURATION: 99 % | BODY MASS INDEX: 21.22 KG/M2 | SYSTOLIC BLOOD PRESSURE: 133 MMHG | HEART RATE: 61 BPM

## 2024-12-03 VITALS
DIASTOLIC BLOOD PRESSURE: 70 MMHG | BODY MASS INDEX: 21.32 KG/M2 | WEIGHT: 140.2 LBS | HEART RATE: 72 BPM | OXYGEN SATURATION: 100 % | SYSTOLIC BLOOD PRESSURE: 111 MMHG

## 2024-12-03 DIAGNOSIS — Z93.6 NEPHROSTOMY STATUS (HCC): Primary | ICD-10-CM

## 2024-12-03 DIAGNOSIS — C79.51 PROSTATE CANCER METASTATIC TO BONE (HCC): ICD-10-CM

## 2024-12-03 DIAGNOSIS — I10 PRIMARY HYPERTENSION: Chronic | ICD-10-CM

## 2024-12-03 DIAGNOSIS — F11.20 OPIOID DEPENDENCE, UNCOMPLICATED (HCC): ICD-10-CM

## 2024-12-03 DIAGNOSIS — Z79.4 TYPE 2 DIABETES MELLITUS WITH OTHER SPECIFIED COMPLICATION, WITH LONG-TERM CURRENT USE OF INSULIN (HCC): Primary | ICD-10-CM

## 2024-12-03 DIAGNOSIS — E78.5 HYPERLIPIDEMIA, UNSPECIFIED HYPERLIPIDEMIA TYPE: ICD-10-CM

## 2024-12-03 DIAGNOSIS — K59.00 CONSTIPATION: ICD-10-CM

## 2024-12-03 DIAGNOSIS — E11.69 TYPE 2 DIABETES MELLITUS WITH OTHER SPECIFIED COMPLICATION, WITH LONG-TERM CURRENT USE OF INSULIN (HCC): ICD-10-CM

## 2024-12-03 DIAGNOSIS — Z93.6 NEPHROSTOMY STATUS (HCC): ICD-10-CM

## 2024-12-03 DIAGNOSIS — Z79.4 TYPE 2 DIABETES MELLITUS WITH OTHER SPECIFIED COMPLICATION, WITH LONG-TERM CURRENT USE OF INSULIN (HCC): ICD-10-CM

## 2024-12-03 DIAGNOSIS — E11.69 TYPE 2 DIABETES MELLITUS WITH OTHER SPECIFIED COMPLICATION, WITH LONG-TERM CURRENT USE OF INSULIN (HCC): Primary | ICD-10-CM

## 2024-12-03 DIAGNOSIS — M25.50 ARTHRALGIA, UNSPECIFIED JOINT: ICD-10-CM

## 2024-12-03 DIAGNOSIS — C61 PROSTATE CANCER METASTATIC TO BONE (HCC): ICD-10-CM

## 2024-12-03 DIAGNOSIS — R26.2 AMBULATORY DYSFUNCTION: ICD-10-CM

## 2024-12-03 PROBLEM — E16.2 HYPOGLYCEMIA: Status: RESOLVED | Noted: 2024-04-25 | Resolved: 2024-12-03

## 2024-12-03 PROBLEM — E87.8 ELECTROLYTE ABNORMALITY: Status: RESOLVED | Noted: 2024-05-29 | Resolved: 2024-12-03

## 2024-12-03 PROCEDURE — 99204 OFFICE O/P NEW MOD 45 MIN: CPT | Performed by: NURSE PRACTITIONER

## 2024-12-03 PROCEDURE — 99495 TRANSJ CARE MGMT MOD F2F 14D: CPT | Performed by: FAMILY MEDICINE

## 2024-12-03 RX ORDER — SODIUM CHLORIDE 9 MG/ML
10 INJECTION, SOLUTION INTRAMUSCULAR; INTRAVENOUS; SUBCUTANEOUS DAILY
Qty: 600 ML | Refills: 2 | Status: SHIPPED | OUTPATIENT
Start: 2024-12-03 | End: 2024-12-04 | Stop reason: SDUPTHER

## 2024-12-03 RX ORDER — SODIUM CHLORIDE 9 MG/ML
10 INJECTION, SOLUTION INTRAMUSCULAR; INTRAVENOUS; SUBCUTANEOUS DAILY
Qty: 600 ML | Refills: 2 | Status: SHIPPED | OUTPATIENT
Start: 2024-12-03 | End: 2024-12-03

## 2024-12-03 RX ORDER — PANTOPRAZOLE SODIUM 40 MG/1
40 TABLET, DELAYED RELEASE ORAL DAILY
Qty: 90 TABLET | Refills: 1 | Status: SHIPPED | OUTPATIENT
Start: 2024-12-03

## 2024-12-03 RX ORDER — SENNOSIDES 8.6 MG
8.6 TABLET ORAL 2 TIMES DAILY
Qty: 60 TABLET | Refills: 3 | Status: SHIPPED | OUTPATIENT
Start: 2024-12-03

## 2024-12-03 NOTE — PROGRESS NOTES
Name: Oliver Astudillo      : 1950      MRN: [id number]  Encounter Provider: ALTA Felipe  Encounter Date: 12/3/2024   Encounter department: [facility]  :  Assessment & Plan  Type 2 diabetes mellitus with other specified complication, with long-term current use of insulin (HCC)    Lab Results   Component Value Date    HGBA1C 5.6 10/08/2024     HGA1C very tightly controlled only on metformin. Recommend bring in blood sugars for review as wife is concerned blood sugars running higher on current regimen after meals. Will recommend repeat hemoglobin A1c on or after 2025.     Discussed risks/complications associated with uncontrolled diabetes including organ involvement, heart attack, stroke, death.    Advised lifestyle modifications including attention to diet including the amount and types of carbohydrates consumed and regular activity.     Call for blood sugars less than 70 mg/dl or patterns over 250 mg/dl.     Discussed symptoms and treatment of hypoglycemia.  Reviewed risks associated with hypoglycemia. Always carry rapid acting carbohydrates and a glucometer (a way to check your blood sugar).    Recommendation for medical identification either bracelet, necklace.    Routine follow up for diabetic eye and foot exams.     Ordered blood work to complete prior to next visit.    Send glucose logs/CGM download in 1-2 weeks for review    Follow up in 3 months.     Orders:    Hemoglobin A1C; Future    Comprehensive metabolic panel; Future    Hyperlipidemia, unspecified hyperlipidemia type  Recommend repeat lipid panel.    Orders:    Lipid panel; Future    Primary hypertension   Under good control on current regimen.   Managed by PCP.             History of Present Illness     HPI  Oliver Astudillo is a 74 y.o. male who presents  who presents for history of diabetes mellitus with history of metastatic prostate cancer, hydronephrosis, CKD status post  nephrostomy tube placement, hyperlipidemia, hypertension. Last office visit in August 2024.     Hospitalized in November 2024 for malfunction of b/l nephrostomy tube.  Discontinued on metformin only.    Wife concerned blood sugars are running higher on metformin alone but no blood sugars/CGM for review at appointment today. Will bring in for review.      Current diabetes management includes metformin 1000 mg twice daily.     SGLT2 discontinued to history of UTI.     Patient is UTD on diabetic eye exam and diabetic foot exam. No known history of neuropathy.     History obtained from: patient and patient's Significant Other    Review of Systems Unable to provide    Medical History Reviewed by provider this encounter:  Tobacco  Allergies  Meds  Problems  Med Hx  Surg Hx  Fam Hx     .  Current Outpatient Medications on File Prior to Visit   Medication Sig Dispense Refill    acetaminophen (TYLENOL) 500 mg tablet Take 2 tablets (1,000 mg total) by mouth 3 (three) times a day 180 tablet 2    albuterol (Ventolin HFA) 90 mcg/act inhaler Inhale 2 puffs every 6 (six) hours as needed for wheezing 18 g 0    Alcohol Swabs 70 % PADS To clean the skin prior to injecting insulin 100 each 1    Blood Glucose Monitoring Suppl (OneTouch Verio Reflect) w/Device KIT Check blood sugars once daily. Please substitute with appropriate alternative as covered by patient's insurance. Dx: E11.65 1 kit 0    Cholecalciferol (VITAMIN D3) 1,000 units tablet Take 1 tablet (1,000 Units total) by mouth daily 90 tablet 3    dexamethasone (DECADRON) 1 mg tablet Take 1 tablet (1 mg total) by mouth 2 (two) times a day before breakfast and lunch TAKE 1 TABLET (0.5 MG TOTAL) BY MOUTH DAILY WITH BREAKFAST 60 tablet 2    docusate sodium (COLACE) 100 mg capsule Take 1 capsule (100 mg total) by mouth 2 (two) times a day 60 capsule 0    Easy Touch Pen Needles 31G X 6 MM MISC Use daily at bedtime 100 each 3    gabapentin (Neurontin) 300 mg capsule Take 1  capsule (300 mg total) by mouth 3 (three) times a day 90 capsule 2    glucose 4-6 GM-MG Chew 2 tablets daily as needed for low blood sugar 90 tablet 3    glucose blood (OneTouch Verio) test strip Check blood sugars twice a daily. Please substitute with appropriate alternative as covered by patient's insurance. Dx: E11.65 200 each 3    magnesium Oxide (MAG-OX) 400 mg TABS Take 1 tablet (400 mg total) by mouth 2 (two) times a day 60 tablet 1    metFORMIN (GLUCOPHAGE) 1000 MG tablet Take 1 tablet (1,000 mg total) by mouth 2 (two) times a day with meals 180 tablet 1    Multiple Vitamin (multivitamin) tablet Take 1 tablet by mouth daily 30 tablet 2    naloxone (NARCAN) 4 mg/0.1 mL nasal spray Administer 1 spray into a nostril. If no response after 2-3 minutes, give another dose in the other nostril using a new spray. 1 each 0    Nutritional Supplements (Ensure Plus High Protein) LIQD Take 237 mL by mouth 3 (three) times a day 237 mL 9    Nutritional Supplements (Ensure Plus High Protein) LIQD       Nutritional Supplements (Glucerna Hunger Smart Shake) LIQD Take by mouth Three times a day      omega-3-acid ethyl esters (LOVAZA) 1 g capsule Take 1 capsule (1 g total) by mouth daily 30 capsule 3    ondansetron (Zofran ODT) 8 mg disintegrating tablet Take 1 tablet (8 mg total) by mouth every 8 (eight) hours as needed for nausea or vomiting 20 tablet 3    OneTouch Delica Lancets 33G MISC Check blood sugars once daily. Please substitute with appropriate alternative as covered by patient's insurance. Dx: E11.65 100 each 3    oxyCODONE (ROXICODONE) 10 MG TABS Take 1 tablet (10 mg total) by mouth every 4 (four) hours as needed for moderate pain Max Daily Amount: 60 mg 90 tablet 0    polyethylene glycol (MIRALAX) 17 g packet Take 17 g by mouth daily as needed (for constipation) 100 each 1    sodium chloride 1 g tablet Take 1 tablet (1 g total) by mouth 2 (two) times a day with meals 60 tablet 0    Spacer/Aero-Holding Chambers  "(AEROCHAMBER MINI CHAMBER) BILLY by Does not apply route see administration instructions 1 Device 0    Diclofenac Sodium (VOLTAREN) 1 % Apply 2 g topically 4 (four) times a day 100 g 3    pantoprazole (PROTONIX) 40 mg tablet Take 1 tablet (40 mg total) by mouth daily 90 tablet 1    senna (SENOKOT) 8.6 mg Take 1 tablet (8.6 mg total) by mouth 2 (two) times a day 60 tablet 3     No current facility-administered medications on file prior to visit.         Objective   /80 (BP Location: Left arm, Patient Position: Sitting, Cuff Size: Large)   Pulse 61   Ht 5' 8\" (1.727 m)   Wt 63.5 kg (140 lb)   SpO2 99%   BMI 21.29 kg/m²         Physical Exam  Vitals reviewed.   Constitutional:       Appearance: Normal appearance.   Cardiovascular:      Rate and Rhythm: Normal rate and regular rhythm.      Pulses: Normal pulses.      Heart sounds: Normal heart sounds.   Pulmonary:      Effort: Pulmonary effort is normal.      Breath sounds: Normal breath sounds.   Skin:     General: Skin is warm and dry.      Capillary Refill: Capillary refill takes less than 2 seconds.   Neurological:      General: No focal deficit present.      Mental Status: He is alert and oriented to person, place, and time.   Psychiatric:         Mood and Affect: Mood normal.         Behavior: Behavior normal.     Administrative Statements     "

## 2024-12-03 NOTE — ASSESSMENT & PLAN NOTE
Recent hospitalization for clogged nephrostomy tubes;  Changed by IR  Now scheduled to have tubes changed every month.  Appt scheduled for 12/19  Today, mild pain. Urine is appropriate color. No signs of infection.   Discussed with Randy Santos regarding obtaining saline flushes since they are not covered by insurance.

## 2024-12-03 NOTE — ASSESSMENT & PLAN NOTE
Improved with Senna 8.6 mg BID and as need miralax.  Discussed increasing hydration. Patient does not like to drink regular water and prefers to drink carbonated.  We discussed limiting carbonated water to once daily.

## 2024-12-03 NOTE — ASSESSMENT & PLAN NOTE
Lab Results   Component Value Date    HGBA1C 5.6 10/08/2024     Well controlled;  Was not eating prior to hospitalization which is likely why his A1c was normal.  Notes that now his BG levels are in the 200s.  Follows with endocrinology who continued medications since BG are elevated.  Current diabetes management includes Levemir 15 units at bedtime, metformin 500 mg twice a day, glimepiride 4 mg in the morning and 2 mg in the evening.

## 2024-12-03 NOTE — ASSESSMENT & PLAN NOTE
Using walker at home  Completed home PT.  Discussed with Complex Care manager, Randy Santos RN regarding possibly having more sessions if insurance allows.

## 2024-12-03 NOTE — PROGRESS NOTES
"Dr. Benito met with CATHIE GAFFNEY to discuss patients needs.   Per Dr. Benito, patients PT is ending tomorrow. Pt is receiving services through [facility]. Pt is also concerned about the cost of saline flushes for his nephrostomy tubes.     RN CM spoke with Tuesday PT from [facility]. Plan is for patient to discharge from services on 12/10. Tuesday believes that patient would benefit from on going PT services through a home PT company. Pt needs \" caregiver training for transfers and exercise training\"    CATHIE Baron from [facility] confirms above. As a result, patient will no longer be receiving HHA.     Discussed above with Nelsy duffy from [facility].     S/W IR regarding flushes. Advised that flushes should be ordered through [facility], operated by [facility].   "

## 2024-12-03 NOTE — PROGRESS NOTES
Transition of Care  Follow-up After Hospitalization    Oliver Astudillo 74 y.o. male   Date:  12/3/2024    Branchly Turkish interpretation utilized: #[id number]      Assessment and Plan:    Hypertension  /70;  Stopped all antihypertensives due to hypotension by heme/onc.    Type 2 diabetes mellitus with other specified complication (HCC)    Lab Results   Component Value Date    HGBA1C 5.6 10/08/2024     Well controlled;  Was not eating prior to hospitalization which is likely why his A1c was normal.  Notes that now his BG levels are in the 200s.  Follows with endocrinology who continued medications since BG are elevated.  Current diabetes management includes Levemir 15 units at bedtime, metformin 500 mg twice a day, glimepiride 4 mg in the morning and 2 mg in the evening.     Prostate cancer metastatic to bone (HCC)  Continue current disease directed care;  Follows with urology and heme/onc.    Nephrostomy status (HCC)  Recent hospitalization for clogged nephrostomy tubes;  Changed by IR  Now scheduled to have tubes changed every month.  Appt scheduled for 12/19  Today, mild pain. Urine is appropriate color. No signs of infection.   Discussed with Randy Santos regarding obtaining saline flushes since they are not covered by insurance.    Opioid dependence, uncomplicated (HCC)  On Opioids long term due to cancer related pain.  Side effects of constipation  Using senna BID with improved bowel function.    Ambulatory dysfunction  Using walker at home  Completed home PT.  Discussed with Complex Care manager, Randy Santos, RN regarding possibly having more sessions if insurance allows.    Constipation  Improved with Senna 8.6 mg BID and as need miralax.  Discussed increasing hydration. Patient does not like to drink regular water and prefers to drink carbonated.  We discussed limiting carbonated water to once daily.      Oliver was seen today for transition of care management.    Diagnoses and all orders for this  visit:    Nephrostomy status (HCC)  -     Discontinue: sodium chloride, PF, 0.9 %; 10 mL by Intracatheter route daily Intracatheter flushing daily. May substitute prefilled syringe with normal saline 10 mL vials, 10 mL syringes, and 18 g blunt needles  -     Ambulatory Referral to Complex Care Management Program; Future    Type 2 diabetes mellitus with other specified complication, with long-term current use of insulin ([facility])  -     Ambulatory Referral to Complex Care Management Program; Future    Prostate cancer metastatic to bone ([facility])  -     pantoprazole (PROTONIX) 40 mg tablet; Take 1 tablet (40 mg total) by mouth daily  -     Diclofenac Sodium (VOLTAREN) 1 %; Apply 2 g topically 4 (four) times a day  -     Ambulatory Referral to Complex Care Management Program; Future    Constipation  -     senna (SENOKOT) 8.6 mg; Take 1 tablet (8.6 mg total) by mouth 2 (two) times a day  -     Ambulatory Referral to Complex Care Management Program; Future    Opioid dependence, uncomplicated ([facility])    Arthralgia, unspecified joint  Comments:  In back and multiple joints.   Improved with voltaren gel.  Refill provided.  Orders:  -     Diclofenac Sodium (VOLTAREN) 1 %; Apply 2 g topically 4 (four) times a day  -     Ambulatory Referral to Complex Care Management Program; Future    Primary hypertension    Ambulatory dysfunction        TCM Call       Date and time call was made  11/20/2024  1:53 AM    Hospital care reviewed  Records reviewed    Patient was hospitialized at  [facility]    Date of Admission  11/16/24    Date of discharge  11/19/24    Diagnosis  malfunction of nephrostomy tube    Disposition  Home; Home health services    Were the patients medications reviewed and updated  Yes    Current Symptoms  None    Back pain, left side, severity  Moderate    Back pain, left side, onset  Ongoing          TCM Call       Post hospital issues  None    Should patient be enrolled in anticoag monitoring?  No    Scheduled for  follow up?  Yes  12/3 1100    Referrals needed  no    Did you obtain your prescribed medications  Yes  abx received not mg or sodium    Do you need help managing your prescriptions or medications  No    Is transportation to your appointment needed  No    I have advised the patient to call PCP with any new or worsening symptoms  Linda Tucker, RN    Living Arrangements  Family members    Support System  Family    The type of support provided  Emotional; Physical    Do you have social support  Yes, as much as I need    Are you recieving any outpatient services  Yes    What type of services  Palliative Services    Are you recieving home care services  Yes    Types of home care services  Home health aid; Nurse visit    Are you using any community resources  Yes    Current waiver services  No    Have you fallen in the last 12 months  No    Interperter language line needed  No    Counseling  Patient    Counseling topics  Importance of RX compliance          Hospital records were reviewed. Medications upon discharge reviewed/updated.   Discharge Disposition:  Home  Follow up visits with other specialists: Heme/Onc, Urology., IR - All scheduled.      HPI:  Patient presents for TCM after hospitalization where patient had blocked nephrostomy tube.  Since hospitalization, he feels much better.  Has all appointments scheduled with specialists as recommended during hospitalization.        ROS: Review of Systems   Constitutional:  Positive for fatigue.   HENT:  Negative for congestion and sore throat.    Respiratory:  Negative for chest tightness, shortness of breath and wheezing.    Cardiovascular:  Negative for chest pain and leg swelling.   Gastrointestinal:  Negative for abdominal pain, constipation, diarrhea and vomiting.   Musculoskeletal:  Positive for arthralgias, back pain and myalgias.   Skin:  Negative for rash.   Neurological:  Positive for weakness.       Past Medical History:   Diagnosis Date    Altered mental  status 10/08/2024    COVID-19 01/15/2024    Diabetes mellitus (HCC)     Elevated PSA 06/21/2023    High cholesterol     Hypertension     Prostate cancer (HCC)     Severe sepsis (HCC) 10/08/2024       Past Surgical History:   Procedure Laterality Date    IR NEPHROSTOMY TUBE CHECK/CHANGE/REPOSITION/REINSERTION/UPSIZE  9/28/2023    IR NEPHROSTOMY TUBE CHECK/CHANGE/REPOSITION/REINSERTION/UPSIZE  12/28/2023    IR NEPHROSTOMY TUBE CHECK/CHANGE/REPOSITION/REINSERTION/UPSIZE  1/31/2024    IR NEPHROSTOMY TUBE CHECK/CHANGE/REPOSITION/REINSERTION/UPSIZE  2/2/2024    IR NEPHROSTOMY TUBE CHECK/CHANGE/REPOSITION/REINSERTION/UPSIZE  3/4/2024    IR NEPHROSTOMY TUBE CHECK/CHANGE/REPOSITION/REINSERTION/UPSIZE  3/20/2024    IR NEPHROSTOMY TUBE CHECK/CHANGE/REPOSITION/REINSERTION/UPSIZE  6/7/2024    IR NEPHROSTOMY TUBE CHECK/CHANGE/REPOSITION/REINSERTION/UPSIZE  8/5/2024    IR NEPHROSTOMY TUBE CHECK/CHANGE/REPOSITION/REINSERTION/UPSIZE  10/4/2024    IR NEPHROSTOMY TUBE CHECK/CHANGE/REPOSITION/REINSERTION/UPSIZE  11/18/2024    IR NEPHROSTOMY TUBE PLACEMENT  06/22/2023    bilateral    IR OTHER  1/15/2024    US GUIDED PROSTATE BIOPSY         Social History     Socioeconomic History    Marital status: /Civil Union     Spouse name: None    Number of children: 3    Years of education: None    Highest education level: None   Occupational History    None   Tobacco Use    Smoking status: Former     Current packs/day: 24.90     Average packs/day: 24.9 packs/day for 24.9 years (620.5 ttl pk-yrs)     Types: Cigarettes     Start date: 2000     Passive exposure: Past    Smokeless tobacco: Never    Tobacco comments:     States he only smoked on the weekends   Vaping Use    Vaping status: Never Used   Substance and Sexual Activity    Alcohol use: Not Currently     Comment: doesn't drink anymore    Drug use: Never    Sexual activity: Yes     Partners: Female     Birth control/protection: None   Other Topics Concern    None   Social History  Narrative    From 6/7/724  note:    Primary Caregiver: Self    Support Systems: Self, Spouse/significant other, Son, Family members    County of Residence: [place]    What city do you live in?: [place]    Home entry access options. Select all that apply.: Stairs    Number of steps to enter home.: 3    Type of Current Residence: 2 story home    Upon entering residence, is there a bedroom on the main floor (no further steps)?: Yes    Upon entering residence, is there a bathroom on the main floor (no further steps)?: Yes    Living Arrangements: Lives w/ Spouse/significant other    Is patient a ?: No    Functional Status: Independent    Does patient use assisted devices?: Yes    Assisted Devices (DME) used: Bedside Commode, Straight Cane, Walker, Shower Chair    What DME does the patient currently own?: Bedside Commode, Shower Chair, Straight Cane, Walker    Income Source: Pension/group home    Means of Transport to Appts:: Family transport     Social Drivers of Health     Financial Resource Strain: Low Risk  (4/12/2024)    Overall Financial Resource Strain (CARDIA)     Difficulty of Paying Living Expenses: Not hard at all   Food Insecurity: No Food Insecurity (11/16/2024)    Nursing - Inadequate Food Risk Classification     Worried About Running Out of Food in the Last Year: Never true     Ran Out of Food in the Last Year: Never true     Ran Out of Food in the Last Year: 1   Transportation Needs: No Transportation Needs (11/20/2024)    Received from [facility]    OASIS : Transportation     Lack of Transportation (Medical): No     Lack of Transportation (Non-Medical): No     Patient Unable or Declines to Respond: No   Physical Activity: Not on file   Stress: Not on file   Social Connections: Feeling Socially Integrated (11/20/2024)    Received from [facility]    OASIS : Social Isolation     Frequency of experiencing loneliness or isolation: Never   Intimate Partner Violence: Unknown  (2024)    Nursing IPS     Feels Physically and Emotionally Safe: Not on file     Physically Hurt by Someone: Not on file     Humiliated or Emotionally Abused by Someone: Not on file     Physically Hurt by Someone: 2     Hurt or Threatened by Someone: 2   Housing Stability: Unknown (2024)    Nursing: Inadequate Housing Risk Classification     Has Housing: Not on file     Worried About Losing Housing: Not on file     Unable to Get Utilities: Not on file     Unable to Pay for Housing in the Last Year: 2     Has Housin       Family History   Problem Relation Age of Onset    Uterine cancer Mother     Liver cancer Father     No Known Problems Sister     No Known Problems Brother     No Known Problems Son     Diabetes type II Daughter     No Known Problems Daughter     Cancer Daughter        Allergies   Allergen Reactions    Docetaxel Anaphylaxis         Current Outpatient Medications:     Diclofenac Sodium (VOLTAREN) 1 %, Apply 2 g topically 4 (four) times a day, Disp: 100 g, Rfl: 3    pantoprazole (PROTONIX) 40 mg tablet, Take 1 tablet (40 mg total) by mouth daily, Disp: 90 tablet, Rfl: 1    senna (SENOKOT) 8.6 mg, Take 1 tablet (8.6 mg total) by mouth 2 (two) times a day, Disp: 60 tablet, Rfl: 3    acetaminophen (TYLENOL) 500 mg tablet, Take 2 tablets (1,000 mg total) by mouth 3 (three) times a day, Disp: 180 tablet, Rfl: 2    albuterol (Ventolin HFA) 90 mcg/act inhaler, Inhale 2 puffs every 6 (six) hours as needed for wheezing, Disp: 18 g, Rfl: 0    Alcohol Swabs 70 % PADS, To clean the skin prior to injecting insulin, Disp: 100 each, Rfl: 1    Blood Glucose Monitoring Suppl (OneTouch Verio Reflect) w/Device KIT, Check blood sugars once daily. Please substitute with appropriate alternative as covered by patient's insurance. Dx: E11.65, Disp: 1 kit, Rfl: 0    Cholecalciferol (VITAMIN D3) 1,000 units tablet, Take 1 tablet (1,000 Units total) by mouth daily, Disp: 90 tablet, Rfl: 3    dexamethasone  (DECADRON) 1 mg tablet, Take 1 tablet (1 mg total) by mouth 2 (two) times a day before breakfast and lunch TAKE 1 TABLET (0.5 MG TOTAL) BY MOUTH DAILY WITH BREAKFAST, Disp: 60 tablet, Rfl: 2    docusate sodium (COLACE) 100 mg capsule, Take 1 capsule (100 mg total) by mouth 2 (two) times a day, Disp: 60 capsule, Rfl: 0    Easy Touch Pen Needles 31G X 6 MM MISC, Use daily at bedtime, Disp: 100 each, Rfl: 3    gabapentin (Neurontin) 300 mg capsule, Take 1 capsule (300 mg total) by mouth 3 (three) times a day, Disp: 90 capsule, Rfl: 2    glucose 4-6 GM-MG, Chew 2 tablets daily as needed for low blood sugar, Disp: 90 tablet, Rfl: 3    glucose blood (OneTouch Verio) test strip, Check blood sugars twice a daily. Please substitute with appropriate alternative as covered by patient's insurance. Dx: E11.65, Disp: 200 each, Rfl: 3    magnesium Oxide (MAG-OX) 400 mg TABS, Take 1 tablet (400 mg total) by mouth 2 (two) times a day, Disp: 60 tablet, Rfl: 1    metFORMIN (GLUCOPHAGE) 1000 MG tablet, Take 1 tablet (1,000 mg total) by mouth 2 (two) times a day with meals, Disp: 180 tablet, Rfl: 1    Multiple Vitamin (multivitamin) tablet, Take 1 tablet by mouth daily, Disp: 30 tablet, Rfl: 2    naloxone (NARCAN) 4 mg/0.1 mL nasal spray, Administer 1 spray into a nostril. If no response after 2-3 minutes, give another dose in the other nostril using a new spray., Disp: 1 each, Rfl: 0    Nutritional Supplements (Ensure Plus High Protein) LIQD, Take 237 mL by mouth 3 (three) times a day, Disp: 237 mL, Rfl: 9    Nutritional Supplements (Ensure Plus High Protein) LIQD, , Disp: , Rfl:     Nutritional Supplements (Glucerna Hunger Smart Shake) LIQD, Take by mouth Three times a day, Disp: , Rfl:     omega-3-acid ethyl esters (LOVAZA) 1 g capsule, Take 1 capsule (1 g total) by mouth daily, Disp: 30 capsule, Rfl: 3    ondansetron (Zofran ODT) 8 mg disintegrating tablet, Take 1 tablet (8 mg total) by mouth every 8 (eight) hours as needed for  nausea or vomiting, Disp: 20 tablet, Rfl: 3    OneTouch Delica Lancets 33G MISC, Check blood sugars once daily. Please substitute with appropriate alternative as covered by patient's insurance. Dx: E11.65, Disp: 100 each, Rfl: 3    oxyCODONE (ROXICODONE) 10 MG TABS, Take 1 tablet (10 mg total) by mouth every 4 (four) hours as needed for moderate pain Max Daily Amount: 60 mg, Disp: 90 tablet, Rfl: 0    polyethylene glycol (MIRALAX) 17 g packet, Take 17 g by mouth daily as needed (for constipation), Disp: 100 each, Rfl: 1    sodium chloride 1 g tablet, Take 1 tablet (1 g total) by mouth 2 (two) times a day with meals, Disp: 60 tablet, Rfl: 0    sodium chloride, PF, 0.9 %, 10 ML BY INTRACATHETER ROUTE DAILY INTRACATHETER FLUSHING DAILY. MAY SUBSTITUTE PREFILLED SYRINGE WITH NORMAL SALINE 10 ML VIALS, 10 ML SYRINGES, AND 18 G BLUNT NEEDLES, Disp: 600 mL, Rfl: 2    Spacer/Aero-Holding Chambers (AEROCHAMBER MINI CHAMBER) BILLY, by Does not apply route see administration instructions, Disp: 1 Device, Rfl: 0      Physical Exam:  /70 (BP Location: Left arm, Patient Position: Sitting)   Pulse 72   Wt 63.6 kg (140 lb 3.2 oz)   SpO2 100%   BMI 21.32 kg/m²     Physical Exam  Vitals reviewed.   Constitutional:       General: He is not in acute distress.     Appearance: Normal appearance. He is not ill-appearing, toxic-appearing or diaphoretic.   HENT:      Head: Normocephalic.      Right Ear: External ear normal.      Left Ear: External ear normal.      Nose: Nose normal. No congestion or rhinorrhea.      Mouth/Throat:      Mouth: Mucous membranes are moist.      Pharynx: Oropharynx is clear. No oropharyngeal exudate or posterior oropharyngeal erythema.   Eyes:      General: No scleral icterus.        Right eye: No discharge.         Left eye: No discharge.      Extraocular Movements: Extraocular movements intact.      Conjunctiva/sclera: Conjunctivae normal.   Cardiovascular:      Rate and Rhythm: Normal rate and  regular rhythm.      Pulses: Normal pulses.      Heart sounds: Normal heart sounds. No murmur heard.     No gallop.   Pulmonary:      Effort: Pulmonary effort is normal. No respiratory distress.      Breath sounds: Normal breath sounds. No wheezing.   Abdominal:      General: Abdomen is flat. Bowel sounds are normal. There is no distension.      Palpations: Abdomen is soft.      Tenderness: There is no abdominal tenderness.   Genitourinary:     Comments: B/L nephrostomy tubes in place.   No signs of infection   Urine light yellow in color.  Musculoskeletal:         General: Normal range of motion.      Cervical back: Normal range of motion.      Right lower leg: No edema.      Left lower leg: No edema.   Skin:     General: Skin is warm.      Findings: No rash.   Neurological:      General: No focal deficit present.      Mental Status: He is alert.      Motor: No weakness.      Gait: Gait normal.   Psychiatric:         Mood and Affect: Mood normal.         Behavior: Behavior normal.         Thought Content: Thought content normal.         Judgment: Judgment normal.       Labs:  Lab Results   Component Value Date    WBC 5.26 11/18/2024    HGB 12.0 11/18/2024    HCT 37.0 11/18/2024    MCV 89 11/18/2024     11/18/2024     Lab Results   Component Value Date    K 3.8 11/18/2024     11/18/2024    CO2 26 11/18/2024    BUN 14 11/18/2024    CREATININE 0.68 11/18/2024    GLUF 93 11/17/2024    CALCIUM 9.7 11/18/2024    CORRECTEDCA 10.3 (H) 11/18/2024    AST 19 11/18/2024    ALT 7 11/18/2024    ALKPHOS 137 (H) 11/18/2024    EGFR 95 11/18/2024

## 2024-12-03 NOTE — ASSESSMENT & PLAN NOTE
On Opioids long term due to cancer related pain.  Side effects of constipation  Using senna BID with improved bowel function.

## 2024-12-04 ENCOUNTER — PATIENT OUTREACH (OUTPATIENT)
Age: 74
End: 2024-12-04

## 2024-12-04 DIAGNOSIS — R26.2 AMBULATORY DYSFUNCTION: Primary | ICD-10-CM

## 2024-12-04 DIAGNOSIS — Z93.6 NEPHROSTOMY STATUS (HCC): Primary | ICD-10-CM

## 2024-12-04 RX ORDER — SODIUM CHLORIDE 9 MG/ML
10 INJECTION, SOLUTION INTRAMUSCULAR; INTRAVENOUS; SUBCUTANEOUS DAILY
Qty: 600 ML | Refills: 2 | Status: SHIPPED | OUTPATIENT
Start: 2024-12-04 | End: 2025-06-02

## 2024-12-04 NOTE — PROGRESS NOTES
Email sent to Shannan Joseph from [facility]. Request for home PT services after patient is discharged from [facility].     Order placed in Epic.     RN CM will continue to follow.

## 2024-12-06 ENCOUNTER — TELEPHONE (OUTPATIENT)
Age: 74
End: 2024-12-06

## 2024-12-06 NOTE — TELEPHONE ENCOUNTER
[facility] forms were received and placed in pcp folder to be completed.   Please Review, Thank you

## 2024-12-06 NOTE — ASSESSMENT & PLAN NOTE
Lab Results   Component Value Date    HGBA1C 5.6 10/08/2024     HGA1C very tightly controlled only on metformin. Recommend bring in blood sugars for review as wife is concerned blood sugars running higher on current regimen after meals. Will recommend repeat hemoglobin A1c on or after January 8, 2025.     Discussed risks/complications associated with uncontrolled diabetes including organ involvement, heart attack, stroke, death.    Advised lifestyle modifications including attention to diet including the amount and types of carbohydrates consumed and regular activity.     Call for blood sugars less than 70 mg/dl or patterns over 250 mg/dl.     Discussed symptoms and treatment of hypoglycemia.  Reviewed risks associated with hypoglycemia. Always carry rapid acting carbohydrates and a glucometer (a way to check your blood sugar).    Recommendation for medical identification either bracelet, necklace.    Routine follow up for diabetic eye and foot exams.     Ordered blood work to complete prior to next visit.    Send glucose logs/CGM download in 1-2 weeks for review    Follow up in 3 months.     Orders:    Hemoglobin A1C; Future    Comprehensive metabolic panel; Future

## 2024-12-09 NOTE — ASSESSMENT & PLAN NOTE
Porter Regional Lab Results   Component Value Date    HGBA1C 9.2 (A) 04/04/2024     High risk for hypoglycemia, chronic over previous admissions    Blood Sugar Average: Last 72 hrs:    Home medications include Metformin 1000 mg BID, Glimepiride 4mg breakfast & 2 mg HS, Levemir 32 U HS.    Plan:  -Hold oral agents  -Decrease Levemir to 20U   -Monitor for HS hypoglycemia in setting of infection  -Continue carb controlled diet    LARA Mckeon II, MD;  Location: West Boca 12/08/24 0846)  Pulse: 97 (12/08/24 0900)  Resp: (!) 21 (12/08/24 0900)  BP: (!) 106/58 (12/08/24 0900)  SpO2: (!) 94 % (12/08/24 0900) ZACKARY (acute kidney injury)  ZACKARY Vega NG  DO Benjamin  Department of Hospital Medicine  [place] - Intensive Care

## 2024-12-10 ENCOUNTER — TELEPHONE (OUTPATIENT)
Dept: HEMATOLOGY ONCOLOGY | Facility: CLINIC | Age: 74
End: 2024-12-10

## 2024-12-10 NOTE — TELEPHONE ENCOUNTER
Unable to reach patient on both of his numbers on file to have him complete his labs prior to his appointment with Dr. Coyne My chart message was sent.

## 2024-12-11 ENCOUNTER — RESULTS FOLLOW-UP (OUTPATIENT)
Dept: ENDOCRINOLOGY | Facility: CLINIC | Age: 74
End: 2024-12-11

## 2024-12-11 ENCOUNTER — APPOINTMENT (OUTPATIENT)
Dept: LAB | Facility: HOSPITAL | Age: 74
End: 2024-12-11
Attending: INTERNAL MEDICINE
Payer: COMMERCIAL

## 2024-12-11 DIAGNOSIS — C61 PROSTATE CANCER METASTATIC TO BONE (HCC): ICD-10-CM

## 2024-12-11 DIAGNOSIS — C79.51 PROSTATE CANCER METASTATIC TO BONE (HCC): ICD-10-CM

## 2024-12-11 LAB
ALBUMIN SERPL BCG-MCNC: 3.7 G/DL (ref 3.5–5)
ALP SERPL-CCNC: 121 U/L (ref 34–104)
ALT SERPL W P-5'-P-CCNC: 7 U/L (ref 7–52)
ANION GAP SERPL CALCULATED.3IONS-SCNC: 6 MMOL/L (ref 4–13)
AST SERPL W P-5'-P-CCNC: 14 U/L (ref 13–39)
BASOPHILS # BLD AUTO: 0.03 THOUSANDS/ÂΜL (ref 0–0.1)
BASOPHILS NFR BLD AUTO: 1 % (ref 0–1)
BILIRUB SERPL-MCNC: 0.49 MG/DL (ref 0.2–1)
BUN SERPL-MCNC: 12 MG/DL (ref 5–25)
CALCIUM SERPL-MCNC: 8.1 MG/DL (ref 8.4–10.2)
CHLORIDE SERPL-SCNC: 103 MMOL/L (ref 96–108)
CHOLEST SERPL-MCNC: 202 MG/DL (ref ?–200)
CO2 SERPL-SCNC: 26 MMOL/L (ref 21–32)
CREAT SERPL-MCNC: 0.59 MG/DL (ref 0.6–1.3)
EOSINOPHIL # BLD AUTO: 0.06 THOUSAND/ÂΜL (ref 0–0.61)
EOSINOPHIL NFR BLD AUTO: 1 % (ref 0–6)
ERYTHROCYTE [DISTWIDTH] IN BLOOD BY AUTOMATED COUNT: 17 % (ref 11.6–15.1)
EST. AVERAGE GLUCOSE BLD GHB EST-MCNC: 146 MG/DL
GFR SERPL CREATININE-BSD FRML MDRD: 101 ML/MIN/1.73SQ M
GLUCOSE P FAST SERPL-MCNC: 115 MG/DL (ref 65–99)
HBA1C MFR BLD: 6.7 %
HCT VFR BLD AUTO: 38 % (ref 36.5–49.3)
HDLC SERPL-MCNC: 37 MG/DL
HGB BLD-MCNC: 12 G/DL (ref 12–17)
IMM GRANULOCYTES # BLD AUTO: 0.03 THOUSAND/UL (ref 0–0.2)
IMM GRANULOCYTES NFR BLD AUTO: 1 % (ref 0–2)
LDLC SERPL CALC-MCNC: 130 MG/DL (ref 0–100)
LYMPHOCYTES # BLD AUTO: 0.82 THOUSANDS/ÂΜL (ref 0.6–4.47)
LYMPHOCYTES NFR BLD AUTO: 14 % (ref 14–44)
MCH RBC QN AUTO: 28.8 PG (ref 26.8–34.3)
MCHC RBC AUTO-ENTMCNC: 31.6 G/DL (ref 31.4–37.4)
MCV RBC AUTO: 91 FL (ref 82–98)
MONOCYTES # BLD AUTO: 0.41 THOUSAND/ÂΜL (ref 0.17–1.22)
MONOCYTES NFR BLD AUTO: 7 % (ref 4–12)
NEUTROPHILS # BLD AUTO: 4.69 THOUSANDS/ÂΜL (ref 1.85–7.62)
NEUTS SEG NFR BLD AUTO: 76 % (ref 43–75)
NONHDLC SERPL-MCNC: 165 MG/DL
NRBC BLD AUTO-RTO: 0 /100 WBCS
PLATELET # BLD AUTO: 276 THOUSANDS/UL (ref 149–390)
PMV BLD AUTO: 9.2 FL (ref 8.9–12.7)
POTASSIUM SERPL-SCNC: 3.3 MMOL/L (ref 3.5–5.3)
PROT SERPL-MCNC: 6.6 G/DL (ref 6.4–8.4)
RBC # BLD AUTO: 4.16 MILLION/UL (ref 3.88–5.62)
SODIUM SERPL-SCNC: 135 MMOL/L (ref 135–147)
TRIGL SERPL-MCNC: 175 MG/DL (ref ?–150)
WBC # BLD AUTO: 6.04 THOUSAND/UL (ref 4.31–10.16)

## 2024-12-11 PROCEDURE — 83036 HEMOGLOBIN GLYCOSYLATED A1C: CPT

## 2024-12-11 PROCEDURE — 85025 COMPLETE CBC W/AUTO DIFF WBC: CPT

## 2024-12-11 PROCEDURE — 80061 LIPID PANEL: CPT

## 2024-12-12 ENCOUNTER — OFFICE VISIT (OUTPATIENT)
Dept: HEMATOLOGY ONCOLOGY | Facility: CLINIC | Age: 74
End: 2024-12-12
Payer: COMMERCIAL

## 2024-12-12 ENCOUNTER — RESULTS FOLLOW-UP (OUTPATIENT)
Dept: ENDOCRINOLOGY | Facility: CLINIC | Age: 74
End: 2024-12-12

## 2024-12-12 VITALS
OXYGEN SATURATION: 100 % | RESPIRATION RATE: 17 BRPM | TEMPERATURE: 97.6 F | HEIGHT: 68 IN | DIASTOLIC BLOOD PRESSURE: 86 MMHG | SYSTOLIC BLOOD PRESSURE: 128 MMHG | HEART RATE: 67 BPM | BODY MASS INDEX: 21.44 KG/M2 | WEIGHT: 141.5 LBS

## 2024-12-12 DIAGNOSIS — C79.51 PROSTATE CANCER METASTATIC TO BONE (HCC): Primary | ICD-10-CM

## 2024-12-12 DIAGNOSIS — C61 PROSTATE CANCER METASTATIC TO BONE (HCC): Primary | ICD-10-CM

## 2024-12-12 PROCEDURE — G2211 COMPLEX E/M VISIT ADD ON: HCPCS | Performed by: INTERNAL MEDICINE

## 2024-12-12 PROCEDURE — 99214 OFFICE O/P EST MOD 30 MIN: CPT | Performed by: INTERNAL MEDICINE

## 2024-12-12 NOTE — PROGRESS NOTES
[facility] [place]  [address] [address]  [phone]    Oliver Astudillo,1950, [id number]  12/12/24    Discussion:   In summary, this is a 74-year-old male with a history of prostate cancer with bone metastases.  Progressive disease after hormonal blockade.  Severe infusion reaction with docetaxel.  Mid November 2024 started lutetium 177.  Baseline PSA 1 month prior was 125.  He has experienced substantial improvement in quality of life.  He is currently taking gabapentin at bedtime, no other analgesics.  He is undergoing physical therapy.  He is gained about 10 pounds.  Appetite has improved.  It is difficult to interpret that his disease is not responding to treatment.  Recent CBC and differential are normal.  CMP near normal.  Continuation is recommended.  Follow-up in 2 months with repeat PSA.    I discussed the above with the patient.  The patient  voiced understanding and agreement.  ______________________________________________________________________    Chief Complaint   Patient presents with    Follow-up       HPI:  Oncology History   Prostate cancer metastatic to bone (HCC)   5/24/2023 Initial Diagnosis    May 2023 patient presented with urinary frequency. . CT showed distended urinary bladder with bilateral hydronephrosis. Urinary bladder mass inseparable from the prostate. Extraprostatic extension noted. Bone scan showed indeterminate activity at T11 vertebral body. Bilateral nephrostomy tubes and Cazares catheter placed. Prostate biopsy showed adenocarcinoma, Liana 9.      6/28/2023 Biopsy    A. Prostate, right lateral base:  - Prostatic adenocarcinoma, [place] score 4 + 5 = 9, Prognostic Grade Group 5,  involving 90% of 1 needle core  and measuring  11 mm in length.        B. Prostate, right medial base:  - Prostatic adenocarcinoma, [place] score 4 + 5 = 9, Prognostic Grade Group 5,  involving 70% of 1 needle core   and measuring  10 mm in length.      C. Prostate, right lateral mid:  - Prostatic adenocarcinoma, [place] score 4 + 5 = 9, Prognostic Grade Group 5,  involving 90% of 1 needle core  and measuring  12 mm in length.      Comment: Immunohistochemistry for a prostate multiplex stain (p63, K903, and P504S) and NKX3.1 demonstrate invasive carcinoma.     D. Prostate, right medial mid:  - Prostatic adenocarcinoma, [place] score 4 + 5 = 9, Prognostic Grade Group 5,  involving 95% of 1 needle core  and measuring  15 mm in length.      E. Prostate, right lateral apex:  - Prostatic adenocarcinoma, [place] score 4 + 5 = 9, Prognostic Grade Group 5,  involving 90% of 1 needle core  and measuring  12 mm in length.   - Perineural invasion identified.     Comment: Immunohistochemistry for a prostate multiplex stain (p63, K903, and P504S) and NKX3.1 demonstrate invasive carcinoma.     F. Prostate, right medial apex:  - Prostatic adenocarcinoma, [place] score 4 + 5 = 9, Prognostic Grade Group 5,  involving 100% of 1 needle core  and measuring  20 mm in length.      G. Prostate, left lateral base:  - Prostatic adenocarcinoma, [place] score 4 + 5 = 9, Prognostic Grade Group 5,  involving 75% of 1 needle core  and measuring  10 mm in length.   - Perineural invasion identified.  - Lymphovascular invasion is identified.     H. Prostate, left medial base:  - Prostatic adenocarcinoma, [place] score 4 + 5 = 9, Prognostic Grade Group 5,  involving 95% of 1 needle core  and measuring  14 mm in length.   - Perineural invasion identified.     Comment: There is a focus of closely approximated gastrointestinal epithelium; NKX3.1 shows no definite invasion of the GI epithelium.      I. Prostate, left lateral mid:  - Prostatic adenocarcinoma, [place] score 4 + 5 = 9, Prognostic Grade Group 5,  involving 95% of 1 needle core  and measuring  12 mm in length.       J. Prostate, left medial mid:  - Prostatic adenocarcinoma, [place] score 4 + 5 = 9,  Prognostic Grade Group 5,  involving 85% of 1 needle core  and measuring  13 mm in length.       Comment: Immunohistochemistry for a prostate multiplex stain (p63, K903, and P504S) and NKX3.1 demonstrate invasive carcinoma.     K. Prostate, left lateral apex:  - Prostatic adenocarcinoma, [place] score 4 + 5 = 9, Prognostic Grade Group 5,  involving 25% of 1 needle core  and measuring  3 mm in length.        L. Prostate, left medial apex :  - Prostatic adenocarcinoma, [place] score 4 + 5 = 9, Prognostic Grade Group 5,  involving 95% of 1 needle core  and measuring  15 mm in length.     - Perineural invasion identified.     Comment:   Cribriform glands are present within the pattern 4 component.  This feature has been associated with adverse clinical outcomes and molecular features typically seen in advanced disease.  (The 2019 Genitourinary Pathology Society (GUPS) White Paper on Contemporary Grading of Prostate Cancer. Arch Pathol Lab Med. 2021 Apr 1;145(4):461-493.)     7/5/2023 -  Hormone Therapy    Firmagon loading dose on 7/5/23, followed by Lupron      8/28/2023 -  Cancer Staged    Staging form: Prostate, AJCC 8th Edition  - Clinical: Stage IVB (cT4, cN1, cM1b, PSA: 145, Grade Group: 5) - Signed by Dov Andrea MD on 8/28/2023  Prostate specific antigen (PSA) range: 20 or greater  Histologic grading system: 5 grade system       9/2023 - 6/2024 Chemotherapy    Zytiga 250 mg PO daily     6/2024 -  Chemotherapy    Xtandi      6/24/2024 - 7/8/2024 Radiation    The patient saw @[facility] for radiation treatment. This is the current list of radiation treatment:    Plan ID Energy Fractions Dose per Fraction (cGy) Dose Correction (cGy) Total Dose Delivered (cGy) Elapsed Days   T11_L5 Spine 10X/6X 10 / 10 300 0 3,000 14        8/30/2024 - 9/20/2024 Chemotherapy    alteplase (CATHFLO), 2 mg, Intracatheter, Every 1 Minute as needed, 2 of 3 cycles  DOCEtaxel (TAXOTERE) chemo infusion, 25 mg/m2 = 44.6 mg (83.3 % of  original dose 30 mg/m2), Intravenous, Once, 2 of 3 cycles  Dose modification: 25 mg/m2 (original dose 30 mg/m2, Cycle 1, Reason: Anticipated Tolerance)  Administration: 44.6 mg (8/30/2024), 44.6 mg (9/6/2024), 44.6 mg (9/20/2024)         Interval History: Clinically stable.  ECOG-  -Symptomatic, greater than 50% sedentary.    Review of Systems   Constitutional:  Positive for fatigue. Negative for chills and fever.   HENT:  Negative for nosebleeds.    Eyes:  Negative for discharge.   Respiratory:  Negative for cough and shortness of breath.    Cardiovascular:  Negative for chest pain.   Gastrointestinal:  Negative for abdominal pain, constipation and diarrhea.   Endocrine: Negative for polydipsia.   Genitourinary:  Negative for hematuria.   Musculoskeletal:  Negative for arthralgias.   Skin:  Negative for color change.   Allergic/Immunologic: Negative for immunocompromised state.   Neurological:  Positive for weakness. Negative for dizziness and headaches.   Hematological:  Negative for adenopathy.   Psychiatric/Behavioral:  Negative for agitation.        Past Medical History:   Diagnosis Date    Altered mental status 10/08/2024    COVID-19 01/15/2024    Diabetes mellitus (HCC)     Elevated PSA 06/21/2023    High cholesterol     Hypertension     Prostate cancer (HCC)     Severe sepsis (HCC) 10/08/2024     Patient Active Problem List   Diagnosis    Type 2 diabetes mellitus with other specified complication (HCC)    Other hydronephrosis    Hypertension    Hydronephrosis    Prostate cancer metastatic to bone (HCC)    Encounter for monitoring androgen deprivation therapy    Nephrostomy status (HCC)    Chronic hyponatremia    Hyperlipidemia    Constipation    Stage 2 chronic kidney disease    Cancer related pain    Palliative care by specialist    Ambulatory dysfunction    Therapeutic opioid-induced constipation (OIC)    Nausea and vomiting    Advanced care planning/counseling discussion    Goals of care,  counseling/discussion    Anorexia    Unintentional weight loss    Hypercalcemia    Moderate protein-calorie malnutrition (HCC)    Vitamin B12 deficiency    Opioid dependence, uncomplicated (HCC)       Current Outpatient Medications:     acetaminophen (TYLENOL) 500 mg tablet, Take 2 tablets (1,000 mg total) by mouth 3 (three) times a day, Disp: 180 tablet, Rfl: 2    albuterol (Ventolin HFA) 90 mcg/act inhaler, Inhale 2 puffs every 6 (six) hours as needed for wheezing, Disp: 18 g, Rfl: 0    Alcohol Swabs 70 % PADS, To clean the skin prior to injecting insulin, Disp: 100 each, Rfl: 1    Blood Glucose Monitoring Suppl (OneTouch Verio Reflect) w/Device KIT, Check blood sugars once daily. Please substitute with appropriate alternative as covered by patient's insurance. Dx: E11.65, Disp: 1 kit, Rfl: 0    Cholecalciferol (VITAMIN D3) 1,000 units tablet, Take 1 tablet (1,000 Units total) by mouth daily, Disp: 90 tablet, Rfl: 3    dexamethasone (DECADRON) 1 mg tablet, Take 1 tablet (1 mg total) by mouth 2 (two) times a day before breakfast and lunch TAKE 1 TABLET (0.5 MG TOTAL) BY MOUTH DAILY WITH BREAKFAST, Disp: 60 tablet, Rfl: 2    Diclofenac Sodium (VOLTAREN) 1 %, Apply 2 g topically 4 (four) times a day, Disp: 100 g, Rfl: 3    docusate sodium (COLACE) 100 mg capsule, Take 1 capsule (100 mg total) by mouth 2 (two) times a day, Disp: 60 capsule, Rfl: 0    Easy Touch Pen Needles 31G X 6 MM MISC, Use daily at bedtime, Disp: 100 each, Rfl: 3    gabapentin (Neurontin) 300 mg capsule, Take 1 capsule (300 mg total) by mouth 3 (three) times a day, Disp: 90 capsule, Rfl: 2    glucose 4-6 GM-MG, Chew 2 tablets daily as needed for low blood sugar, Disp: 90 tablet, Rfl: 3    glucose blood (OneTouch Verio) test strip, Check blood sugars twice a daily. Please substitute with appropriate alternative as covered by patient's insurance. Dx: E11.65, Disp: 200 each, Rfl: 3    magnesium Oxide (MAG-OX) 400 mg TABS, Take 1 tablet (400 mg total)  by mouth 2 (two) times a day, Disp: 60 tablet, Rfl: 1    metFORMIN (GLUCOPHAGE) 1000 MG tablet, Take 1 tablet (1,000 mg total) by mouth 2 (two) times a day with meals, Disp: 180 tablet, Rfl: 1    Multiple Vitamin (multivitamin) tablet, Take 1 tablet by mouth daily, Disp: 30 tablet, Rfl: 2    naloxone (NARCAN) 4 mg/0.1 mL nasal spray, Administer 1 spray into a nostril. If no response after 2-3 minutes, give another dose in the other nostril using a new spray., Disp: 1 each, Rfl: 0    Nutritional Supplements (Ensure Plus High Protein) LIQD, Take 237 mL by mouth 3 (three) times a day, Disp: 237 mL, Rfl: 9    Nutritional Supplements (Ensure Plus High Protein) LIQD, , Disp: , Rfl:     Nutritional Supplements (Glucerna Hunger Smart Shake) LIQD, Take by mouth Three times a day, Disp: , Rfl:     omega-3-acid ethyl esters (LOVAZA) 1 g capsule, Take 1 capsule (1 g total) by mouth daily, Disp: 30 capsule, Rfl: 3    ondansetron (Zofran ODT) 8 mg disintegrating tablet, Take 1 tablet (8 mg total) by mouth every 8 (eight) hours as needed for nausea or vomiting, Disp: 20 tablet, Rfl: 3    OneTouch Delica Lancets 33G MISC, Check blood sugars once daily. Please substitute with appropriate alternative as covered by patient's insurance. Dx: E11.65, Disp: 100 each, Rfl: 3    oxyCODONE (ROXICODONE) 10 MG TABS, Take 1 tablet (10 mg total) by mouth every 4 (four) hours as needed for moderate pain Max Daily Amount: 60 mg, Disp: 90 tablet, Rfl: 0    pantoprazole (PROTONIX) 40 mg tablet, Take 1 tablet (40 mg total) by mouth daily, Disp: 90 tablet, Rfl: 1    polyethylene glycol (MIRALAX) 17 g packet, Take 17 g by mouth daily as needed (for constipation), Disp: 100 each, Rfl: 1    senna (SENOKOT) 8.6 mg, Take 1 tablet (8.6 mg total) by mouth 2 (two) times a day, Disp: 60 tablet, Rfl: 3    sodium chloride 1 g tablet, Take 1 tablet (1 g total) by mouth 2 (two) times a day with meals, Disp: 60 tablet, Rfl: 0    sodium chloride, PF, 0.9 %, 10 mL by  "Intracatheter route daily Intracatheter flushing daily. May substitute prefilled syringe with normal saline 10 mL vials, 10 mL syringes, and 18 g blunt needles, Disp: 600 mL, Rfl: 2    Spacer/Aero-Holding Chambers (AEROCHAMBER MINI CHAMBER) BILLY, by Does not apply route see administration instructions, Disp: 1 Device, Rfl: 0  Allergies   Allergen Reactions    Docetaxel Anaphylaxis     Past Surgical History:   Procedure Laterality Date    IR NEPHROSTOMY TUBE CHECK/CHANGE/REPOSITION/REINSERTION/UPSIZE  9/28/2023    IR NEPHROSTOMY TUBE CHECK/CHANGE/REPOSITION/REINSERTION/UPSIZE  12/28/2023    IR NEPHROSTOMY TUBE CHECK/CHANGE/REPOSITION/REINSERTION/UPSIZE  1/31/2024    IR NEPHROSTOMY TUBE CHECK/CHANGE/REPOSITION/REINSERTION/UPSIZE  2/2/2024    IR NEPHROSTOMY TUBE CHECK/CHANGE/REPOSITION/REINSERTION/UPSIZE  3/4/2024    IR NEPHROSTOMY TUBE CHECK/CHANGE/REPOSITION/REINSERTION/UPSIZE  3/20/2024    IR NEPHROSTOMY TUBE CHECK/CHANGE/REPOSITION/REINSERTION/UPSIZE  6/7/2024    IR NEPHROSTOMY TUBE CHECK/CHANGE/REPOSITION/REINSERTION/UPSIZE  8/5/2024    IR NEPHROSTOMY TUBE CHECK/CHANGE/REPOSITION/REINSERTION/UPSIZE  10/4/2024    IR NEPHROSTOMY TUBE CHECK/CHANGE/REPOSITION/REINSERTION/UPSIZE  11/18/2024    IR NEPHROSTOMY TUBE PLACEMENT  06/22/2023    bilateral    IR OTHER  1/15/2024    US GUIDED PROSTATE BIOPSY       Social History     Objective:  Vitals:    12/12/24 1358   BP: 128/86   BP Location: Left arm   Patient Position: Sitting   Cuff Size: Adult   Pulse: 67   Resp: 17   Temp: 97.6 °F (36.4 °C)   TempSrc: Temporal   SpO2: 100%   Weight: 64.2 kg (141 lb 8 oz)   Height: 5' 8\" (1.727 m)     Physical Exam  Constitutional:       Appearance: He is well-developed.   HENT:      Head: Normocephalic and atraumatic.      Mouth/Throat:      Mouth: Mucous membranes are moist.   Eyes:      Pupils: Pupils are equal, round, and reactive to light.   Cardiovascular:      Rate and Rhythm: Normal rate and regular rhythm.      Heart sounds: No " murmur heard.  Pulmonary:      Breath sounds: Normal breath sounds. No wheezing or rales.   Abdominal:      Palpations: Abdomen is soft.      Tenderness: There is no abdominal tenderness.   Musculoskeletal:         General: No tenderness. Normal range of motion.      Cervical back: Neck supple.   Lymphadenopathy:      Cervical: No cervical adenopathy.   Skin:     Findings: No erythema or rash.   Neurological:      Mental Status: He is alert and oriented to person, place, and time.      Cranial Nerves: No cranial nerve deficit.      Deep Tendon Reflexes: Reflexes are normal and symmetric.   Psychiatric:         Behavior: Behavior normal.           Labs:  I personally reviewed the labs and imaging pertinent to this patient care.

## 2024-12-12 NOTE — LETTER
December 12, 2024     Lai Davis MD  [address] [place] [address]  [address]  [place] [address]    Patient: Oliver Astudillo   YOB: 1950   Date of Visit: 12/12/2024       Dear Dr. Davis:    Thank you for referring Oliver Astudillo to me for evaluation. Below are my notes for this consultation.    If you have questions, please do not hesitate to call me. I look forward to following your patient along with you.         Sincerely,        Ramone Coyne DO        CC: No Recipients    Ramone Coyne DO  12/12/2024  2:43 PM  Sign when Signing Visit  [facility] [place]  [address]  [facility] [address]  [phone]  [phone]    Oliver Astudillo,1950, [id number]  12/12/24    Discussion:   In summary, this is a 74-year-old male with a history of prostate cancer with bone metastases.  Progressive disease after hormonal blockade.  Severe infusion reaction with docetaxel.  Mid November 2024 started lutetium 177.  Baseline PSA 1 month prior was 125.  He has experienced substantial improvement in quality of life.  He is currently taking gabapentin at bedtime, no other analgesics.  He is undergoing physical therapy.  He is gained about 10 pounds.  Appetite has improved.  It is difficult to interpret that his disease is not responding to treatment.  Recent CBC and differential are normal.  CMP near normal.  Continuation is recommended.  Follow-up in 2 months with repeat PSA.    I discussed the above with the patient.  The patient  voiced understanding and agreement.  ______________________________________________________________________    Chief Complaint   Patient presents with   • Follow-up       HPI:  Oncology History   Prostate cancer metastatic to bone (HCC)   5/24/2023 Initial Diagnosis    May 2023 patient presented with urinary frequency. . CT showed distended urinary bladder with bilateral hydronephrosis. Urinary bladder mass inseparable from the  prostate. Extraprostatic extension noted. Bone scan showed indeterminate activity at T11 vertebral body. Bilateral nephrostomy tubes and Cazares catheter placed. Prostate biopsy showed adenocarcinoma, Chili 9.      6/28/2023 Biopsy    A. Prostate, right lateral base:  - Prostatic adenocarcinoma, Liana score 4 + 5 = 9, Prognostic Grade Group 5,  involving 90% of 1 needle core  and measuring  11 mm in length.        B. Prostate, right medial base:  - Prostatic adenocarcinoma, Liana score 4 + 5 = 9, Prognostic Grade Group 5,  involving 70% of 1 needle core  and measuring  10 mm in length.      C. Prostate, right lateral mid:  - Prostatic adenocarcinoma, [place] score 4 + 5 = 9, Prognostic Grade Group 5,  involving 90% of 1 needle core  and measuring  12 mm in length.      Comment: Immunohistochemistry for a prostate multiplex stain (p63, K903, and P504S) and NKX3.1 demonstrate invasive carcinoma.     D. Prostate, right medial mid:  - Prostatic adenocarcinoma, Liana score 4 + 5 = 9, Prognostic Grade Group 5,  involving 95% of 1 needle core  and measuring  15 mm in length.      E. Prostate, right lateral apex:  - Prostatic adenocarcinoma, [place]ili score 4 + 5 = 9, Prognostic Grade Group 5,  involving 90% of 1 needle core  and measuring  12 mm in length.   - Perineural invasion identified.     Comment: Immunohistochemistry for a prostate multiplex stain (p63, K903, and P504S) and NKX3.1 demonstrate invasive carcinoma.     F. Prostate, right medial apex:  - Prostatic adenocarcinoma, [place] score 4 + 5 = 9, Prognostic Grade Group 5,  involving 100% of 1 needle core  and measuring  20 mm in length.      G. Prostate, left lateral base:  - Prostatic adenocarcinoma, Liana score 4 + 5 = 9, Prognostic Grade Group 5,  involving 75% of 1 needle core  and measuring  10 mm in length.   - Perineural invasion identified.  - Lymphovascular invasion is identified.     H. Prostate, left medial base:  - Prostatic adenocarcinoma,  Liana score 4 + 5 = 9, Prognostic Grade Group 5,  involving 95% of 1 needle core  and measuring  14 mm in length.   - Perineural invasion identified.     Comment: There is a focus of closely approximated gastrointestinal epithelium; NKX3.1 shows no definite invasion of the GI epithelium.      I. Prostate, left lateral mid:  - Prostatic adenocarcinoma, [place] score 4 + 5 = 9, Prognostic Grade Group 5,  involving 95% of 1 needle core  and measuring  12 mm in length.       J. Prostate, left medial mid:  - Prostatic adenocarcinoma, [place] score 4 + 5 = 9, Prognostic Grade Group 5,  involving 85% of 1 needle core  and measuring  13 mm in length.       Comment: Immunohistochemistry for a prostate multiplex stain (p63, K903, and P504S) and NKX3.1 demonstrate invasive carcinoma.     K. Prostate, left lateral apex:  - Prostatic adenocarcinoma, Liana score 4 + 5 = 9, Prognostic Grade Group 5,  involving 25% of 1 needle core  and measuring  3 mm in length.        L. Prostate, left medial apex :  - Prostatic adenocarcinoma, Liana score 4 + 5 = 9, Prognostic Grade Group 5,  involving 95% of 1 needle core  and measuring  15 mm in length.     - Perineural invasion identified.     Comment:   Cribriform glands are present within the pattern 4 component.  This feature has been associated with adverse clinical outcomes and molecular features typically seen in advanced disease.  (The 2019 Genitourinary Pathology Society (GUPS) White Paper on Contemporary Grading of Prostate Cancer. Arch Pathol Lab Med. 2021 Apr 1;145(4):461-493.)     7/5/2023 -  Hormone Therapy    Firmagon loading dose on 7/5/23, followed by Lupron      8/28/2023 -  Cancer Staged    Staging form: Prostate, AJCC 8th Edition  - Clinical: Stage IVB (cT4, cN1, cM1b, PSA: 145, Grade Group: 5) - Signed by Dov Andrea MD on 8/28/2023  Prostate specific antigen (PSA) range: 20 or greater  Histologic grading system: 5 grade system       9/2023 - 6/2024  Chemotherapy    Zytiga 250 mg PO daily     6/2024 -  Chemotherapy    Xtandi      6/24/2024 - 7/8/2024 Radiation    The patient saw @[facility] for radiation treatment. This is the current list of radiation treatment:    Plan ID Energy Fractions Dose per Fraction (cGy) Dose Correction (cGy) Total Dose Delivered (cGy) Elapsed Days   T11_L5 Spine 10X/6X 10 / 10 300 0 3,000 14        8/30/2024 - 9/20/2024 Chemotherapy    alteplase (CATHFLO), 2 mg, Intracatheter, Every 1 Minute as needed, 2 of 3 cycles  DOCEtaxel (TAXOTERE) chemo infusion, 25 mg/m2 = 44.6 mg (83.3 % of original dose 30 mg/m2), Intravenous, Once, 2 of 3 cycles  Dose modification: 25 mg/m2 (original dose 30 mg/m2, Cycle 1, Reason: Anticipated Tolerance)  Administration: 44.6 mg (8/30/2024), 44.6 mg (9/6/2024), 44.6 mg (9/20/2024)         Interval History: Clinically stable.  ECOG-  -Symptomatic, greater than 50% sedentary.    Review of Systems   Constitutional:  Positive for fatigue. Negative for chills and fever.   HENT:  Negative for nosebleeds.    Eyes:  Negative for discharge.   Respiratory:  Negative for cough and shortness of breath.    Cardiovascular:  Negative for chest pain.   Gastrointestinal:  Negative for abdominal pain, constipation and diarrhea.   Endocrine: Negative for polydipsia.   Genitourinary:  Negative for hematuria.   Musculoskeletal:  Negative for arthralgias.   Skin:  Negative for color change.   Allergic/Immunologic: Negative for immunocompromised state.   Neurological:  Positive for weakness. Negative for dizziness and headaches.   Hematological:  Negative for adenopathy.   Psychiatric/Behavioral:  Negative for agitation.        Past Medical History:   Diagnosis Date   • Altered mental status 10/08/2024   • COVID-19 01/15/2024   • Diabetes mellitus (HCC)    • Elevated PSA 06/21/2023   • High cholesterol    • Hypertension    • Prostate cancer (HCC)    • Severe sepsis (HCC) 10/08/2024     Patient Active Problem List   Diagnosis   •  Type 2 diabetes mellitus with other specified complication (HCC)   • Other hydronephrosis   • Hypertension   • Hydronephrosis   • Prostate cancer metastatic to bone (HCC)   • Encounter for monitoring androgen deprivation therapy   • Nephrostomy status (HCC)   • Chronic hyponatremia   • Hyperlipidemia   • Constipation   • Stage 2 chronic kidney disease   • Cancer related pain   • Palliative care by specialist   • Ambulatory dysfunction   • Therapeutic opioid-induced constipation (OIC)   • Nausea and vomiting   • Advanced care planning/counseling discussion   • Goals of care, counseling/discussion   • Anorexia   • Unintentional weight loss   • Hypercalcemia   • Moderate protein-calorie malnutrition (HCC)   • Vitamin B12 deficiency   • Opioid dependence, uncomplicated (HCC)       Current Outpatient Medications:   •  acetaminophen (TYLENOL) 500 mg tablet, Take 2 tablets (1,000 mg total) by mouth 3 (three) times a day, Disp: 180 tablet, Rfl: 2  •  albuterol (Ventolin HFA) 90 mcg/act inhaler, Inhale 2 puffs every 6 (six) hours as needed for wheezing, Disp: 18 g, Rfl: 0  •  Alcohol Swabs 70 % PADS, To clean the skin prior to injecting insulin, Disp: 100 each, Rfl: 1  •  Blood Glucose Monitoring Suppl (OneTouch Verio Reflect) w/Device KIT, Check blood sugars once daily. Please substitute with appropriate alternative as covered by patient's insurance. Dx: E11.65, Disp: 1 kit, Rfl: 0  •  Cholecalciferol (VITAMIN D3) 1,000 units tablet, Take 1 tablet (1,000 Units total) by mouth daily, Disp: 90 tablet, Rfl: 3  •  dexamethasone (DECADRON) 1 mg tablet, Take 1 tablet (1 mg total) by mouth 2 (two) times a day before breakfast and lunch TAKE 1 TABLET (0.5 MG TOTAL) BY MOUTH DAILY WITH BREAKFAST, Disp: 60 tablet, Rfl: 2  •  Diclofenac Sodium (VOLTAREN) 1 %, Apply 2 g topically 4 (four) times a day, Disp: 100 g, Rfl: 3  •  docusate sodium (COLACE) 100 mg capsule, Take 1 capsule (100 mg total) by mouth 2 (two) times a day, Disp: 60  capsule, Rfl: 0  •  Easy Touch Pen Needles 31G X 6 MM MISC, Use daily at bedtime, Disp: 100 each, Rfl: 3  •  gabapentin (Neurontin) 300 mg capsule, Take 1 capsule (300 mg total) by mouth 3 (three) times a day, Disp: 90 capsule, Rfl: 2  •  glucose 4-6 GM-MG, Chew 2 tablets daily as needed for low blood sugar, Disp: 90 tablet, Rfl: 3  •  glucose blood (OneTouch Verio) test strip, Check blood sugars twice a daily. Please substitute with appropriate alternative as covered by patient's insurance. Dx: E11.65, Disp: 200 each, Rfl: 3  •  magnesium Oxide (MAG-OX) 400 mg TABS, Take 1 tablet (400 mg total) by mouth 2 (two) times a day, Disp: 60 tablet, Rfl: 1  •  metFORMIN (GLUCOPHAGE) 1000 MG tablet, Take 1 tablet (1,000 mg total) by mouth 2 (two) times a day with meals, Disp: 180 tablet, Rfl: 1  •  Multiple Vitamin (multivitamin) tablet, Take 1 tablet by mouth daily, Disp: 30 tablet, Rfl: 2  •  naloxone (NARCAN) 4 mg/0.1 mL nasal spray, Administer 1 spray into a nostril. If no response after 2-3 minutes, give another dose in the other nostril using a new spray., Disp: 1 each, Rfl: 0  •  Nutritional Supplements (Ensure Plus High Protein) LIQD, Take 237 mL by mouth 3 (three) times a day, Disp: 237 mL, Rfl: 9  •  Nutritional Supplements (Ensure Plus High Protein) LIQD, , Disp: , Rfl:   •  Nutritional Supplements (Glucerna Hunger Smart Shake) LIQD, Take by mouth Three times a day, Disp: , Rfl:   •  omega-3-acid ethyl esters (LOVAZA) 1 g capsule, Take 1 capsule (1 g total) by mouth daily, Disp: 30 capsule, Rfl: 3  •  ondansetron (Zofran ODT) 8 mg disintegrating tablet, Take 1 tablet (8 mg total) by mouth every 8 (eight) hours as needed for nausea or vomiting, Disp: 20 tablet, Rfl: 3  •  OneTouch Delica Lancets 33G MISC, Check blood sugars once daily. Please substitute with appropriate alternative as covered by patient's insurance. Dx: E11.65, Disp: 100 each, Rfl: 3  •  oxyCODONE (ROXICODONE) 10 MG TABS, Take 1 tablet (10 mg  total) by mouth every 4 (four) hours as needed for moderate pain Max Daily Amount: 60 mg, Disp: 90 tablet, Rfl: 0  •  pantoprazole (PROTONIX) 40 mg tablet, Take 1 tablet (40 mg total) by mouth daily, Disp: 90 tablet, Rfl: 1  •  polyethylene glycol (MIRALAX) 17 g packet, Take 17 g by mouth daily as needed (for constipation), Disp: 100 each, Rfl: 1  •  senna (SENOKOT) 8.6 mg, Take 1 tablet (8.6 mg total) by mouth 2 (two) times a day, Disp: 60 tablet, Rfl: 3  •  sodium chloride 1 g tablet, Take 1 tablet (1 g total) by mouth 2 (two) times a day with meals, Disp: 60 tablet, Rfl: 0  •  sodium chloride, PF, 0.9 %, 10 mL by Intracatheter route daily Intracatheter flushing daily. May substitute prefilled syringe with normal saline 10 mL vials, 10 mL syringes, and 18 g blunt needles, Disp: 600 mL, Rfl: 2  •  Spacer/Aero-Holding Chambers (AEROCHAMBER MINI CHAMBER) BILLY, by Does not apply route see administration instructions, Disp: 1 Device, Rfl: 0  Allergies   Allergen Reactions   • Docetaxel Anaphylaxis     Past Surgical History:   Procedure Laterality Date   • IR NEPHROSTOMY TUBE CHECK/CHANGE/REPOSITION/REINSERTION/UPSIZE  9/28/2023   • IR NEPHROSTOMY TUBE CHECK/CHANGE/REPOSITION/REINSERTION/UPSIZE  12/28/2023   • IR NEPHROSTOMY TUBE CHECK/CHANGE/REPOSITION/REINSERTION/UPSIZE  1/31/2024   • IR NEPHROSTOMY TUBE CHECK/CHANGE/REPOSITION/REINSERTION/UPSIZE  2/2/2024   • IR NEPHROSTOMY TUBE CHECK/CHANGE/REPOSITION/REINSERTION/UPSIZE  3/4/2024   • IR NEPHROSTOMY TUBE CHECK/CHANGE/REPOSITION/REINSERTION/UPSIZE  3/20/2024   • IR NEPHROSTOMY TUBE CHECK/CHANGE/REPOSITION/REINSERTION/UPSIZE  6/7/2024   • IR NEPHROSTOMY TUBE CHECK/CHANGE/REPOSITION/REINSERTION/UPSIZE  8/5/2024   • IR NEPHROSTOMY TUBE CHECK/CHANGE/REPOSITION/REINSERTION/UPSIZE  10/4/2024   • IR NEPHROSTOMY TUBE CHECK/CHANGE/REPOSITION/REINSERTION/UPSIZE  11/18/2024   • IR NEPHROSTOMY TUBE PLACEMENT  06/22/2023    bilateral   • IR OTHER  1/15/2024   • US GUIDED PROSTATE  "BIOPSY       Social History    Objective:  Vitals:    12/12/24 1358   BP: 128/86   BP Location: Left arm   Patient Position: Sitting   Cuff Size: Adult   Pulse: 67   Resp: 17   Temp: 97.6 °F (36.4 °C)   TempSrc: Temporal   SpO2: 100%   Weight: 64.2 kg (141 lb 8 oz)   Height: 5' 8\" (1.727 m)     Physical Exam  Constitutional:       Appearance: He is well-developed.   HENT:      Head: Normocephalic and atraumatic.      Mouth/Throat:      Mouth: Mucous membranes are moist.   Eyes:      Pupils: Pupils are equal, round, and reactive to light.   Cardiovascular:      Rate and Rhythm: Normal rate and regular rhythm.      Heart sounds: No murmur heard.  Pulmonary:      Breath sounds: Normal breath sounds. No wheezing or rales.   Abdominal:      Palpations: Abdomen is soft.      Tenderness: There is no abdominal tenderness.   Musculoskeletal:         General: No tenderness. Normal range of motion.      Cervical back: Neck supple.   Lymphadenopathy:      Cervical: No cervical adenopathy.   Skin:     Findings: No erythema or rash.   Neurological:      Mental Status: He is alert and oriented to person, place, and time.      Cranial Nerves: No cranial nerve deficit.      Deep Tendon Reflexes: Reflexes are normal and symmetric.   Psychiatric:         Behavior: Behavior normal.           Labs:  I personally reviewed the labs and imaging pertinent to this patient care.  "

## 2024-12-17 ENCOUNTER — PATIENT OUTREACH (OUTPATIENT)
Age: 74
End: 2024-12-17

## 2024-12-17 NOTE — PROGRESS NOTES
"Pt due for outreach. LVM for [facility]. Requested return call.     S/W patients son. Son reports patient was discharged from [facility], [facility] has not started yet. CATHIE GAFFNEY will investigate.     Email sent to Shannan Joseph requesting update.     CATHIE GAFFNEY will continue to follow.     Received return call from [facility]. Advised that patient will be placed on the schedule for home PT shortly.     Attempted to reach son with updated information. Recording received \"call can not be completed as dialed. \" Two attempts made. Same recording received.   "

## 2024-12-20 ENCOUNTER — TELEPHONE (OUTPATIENT)
Age: 74
End: 2024-12-20

## 2024-12-20 NOTE — TELEPHONE ENCOUNTER
Fax received from [facility] Plan of Care.  Effective from:  11/20/24 to 12/10/24.    Scanned copy into encounter.    Placed in Dr. Bates's folder.    Fax completed form to:  [phone] or [phone]

## 2024-12-22 ENCOUNTER — RA CDI HCC (OUTPATIENT)
Dept: OTHER | Facility: HOSPITAL | Age: 74
End: 2024-12-22

## 2024-12-24 ENCOUNTER — TELEPHONE (OUTPATIENT)
Age: 74
End: 2024-12-24

## 2024-12-24 NOTE — TELEPHONE ENCOUNTER
Patient son calling in, stated that his father needs transportation for the PET ordered by Dr Coyne on 12/26. Please call him back at [phone] to discuss this once it is set up.

## 2024-12-26 ENCOUNTER — HOSPITAL ENCOUNTER (OUTPATIENT)
Dept: NUCLEAR MEDICINE | Facility: HOSPITAL | Age: 74
End: 2024-12-26
Payer: COMMERCIAL

## 2024-12-26 ENCOUNTER — APPOINTMENT (OUTPATIENT)
Dept: LAB | Facility: HOSPITAL | Age: 74
End: 2024-12-26
Payer: COMMERCIAL

## 2024-12-26 DIAGNOSIS — C79.51 PROSTATE CANCER METASTATIC TO BONE (HCC): ICD-10-CM

## 2024-12-26 DIAGNOSIS — C61 MALIGNANT NEOPLASM OF PROSTATE (HCC): ICD-10-CM

## 2024-12-26 DIAGNOSIS — C61 PROSTATE CANCER METASTATIC TO BONE (HCC): ICD-10-CM

## 2024-12-26 LAB
ALBUMIN SERPL BCG-MCNC: 3.9 G/DL (ref 3.5–5)
ALP SERPL-CCNC: 163 U/L (ref 34–104)
ALT SERPL W P-5'-P-CCNC: 11 U/L (ref 7–52)
ANION GAP SERPL CALCULATED.3IONS-SCNC: 9 MMOL/L (ref 4–13)
AST SERPL W P-5'-P-CCNC: 14 U/L (ref 13–39)
BASOPHILS # BLD AUTO: 0.02 THOUSANDS/ÂΜL (ref 0–0.1)
BASOPHILS NFR BLD AUTO: 0 % (ref 0–1)
BILIRUB SERPL-MCNC: 0.5 MG/DL (ref 0.2–1)
BUN SERPL-MCNC: 15 MG/DL (ref 5–25)
CALCIUM SERPL-MCNC: 9.5 MG/DL (ref 8.4–10.2)
CHLORIDE SERPL-SCNC: 101 MMOL/L (ref 96–108)
CO2 SERPL-SCNC: 27 MMOL/L (ref 21–32)
CREAT SERPL-MCNC: 0.77 MG/DL (ref 0.6–1.3)
EOSINOPHIL # BLD AUTO: 0.05 THOUSAND/ÂΜL (ref 0–0.61)
EOSINOPHIL NFR BLD AUTO: 1 % (ref 0–6)
ERYTHROCYTE [DISTWIDTH] IN BLOOD BY AUTOMATED COUNT: 16.6 % (ref 11.6–15.1)
GFR SERPL CREATININE-BSD FRML MDRD: 90 ML/MIN/1.73SQ M
GLUCOSE P FAST SERPL-MCNC: 198 MG/DL (ref 65–99)
HCT VFR BLD AUTO: 37.2 % (ref 36.5–49.3)
HGB BLD-MCNC: 12.1 G/DL (ref 12–17)
IMM GRANULOCYTES # BLD AUTO: 0.05 THOUSAND/UL (ref 0–0.2)
IMM GRANULOCYTES NFR BLD AUTO: 1 % (ref 0–2)
LYMPHOCYTES # BLD AUTO: 0.74 THOUSANDS/ÂΜL (ref 0.6–4.47)
LYMPHOCYTES NFR BLD AUTO: 12 % (ref 14–44)
MCH RBC QN AUTO: 30.5 PG (ref 26.8–34.3)
MCHC RBC AUTO-ENTMCNC: 32.5 G/DL (ref 31.4–37.4)
MCV RBC AUTO: 94 FL (ref 82–98)
MONOCYTES # BLD AUTO: 0.55 THOUSAND/ÂΜL (ref 0.17–1.22)
MONOCYTES NFR BLD AUTO: 9 % (ref 4–12)
NEUTROPHILS # BLD AUTO: 4.73 THOUSANDS/ÂΜL (ref 1.85–7.62)
NEUTS SEG NFR BLD AUTO: 77 % (ref 43–75)
NRBC BLD AUTO-RTO: 0 /100 WBCS
PLATELET # BLD AUTO: 311 THOUSANDS/UL (ref 149–390)
PMV BLD AUTO: 9.3 FL (ref 8.9–12.7)
POTASSIUM SERPL-SCNC: 4 MMOL/L (ref 3.5–5.3)
PROT SERPL-MCNC: 7.1 G/DL (ref 6.4–8.4)
RBC # BLD AUTO: 3.97 MILLION/UL (ref 3.88–5.62)
SODIUM SERPL-SCNC: 137 MMOL/L (ref 135–147)
WBC # BLD AUTO: 6.14 THOUSAND/UL (ref 4.31–10.16)

## 2024-12-26 PROCEDURE — 79101 NUCLEAR RX IV ADMIN: CPT

## 2024-12-26 PROCEDURE — A9607 HB LUTETIUM LU 177 VIPIVOTIDE TETRAXETAN, THERAPEUTIC, 1 MILLICURIE: HCPCS

## 2024-12-26 PROCEDURE — 36415 COLL VENOUS BLD VENIPUNCTURE: CPT

## 2024-12-26 PROCEDURE — 80053 COMPREHEN METABOLIC PANEL: CPT

## 2024-12-26 PROCEDURE — 85025 COMPLETE CBC W/AUTO DIFF WBC: CPT

## 2024-12-27 ENCOUNTER — TELEPHONE (OUTPATIENT)
Dept: NUTRITION | Facility: HOSPITAL | Age: 74
End: 2024-12-27

## 2024-12-27 ENCOUNTER — TELEPHONE (OUTPATIENT)
Age: 74
End: 2024-12-27

## 2024-12-27 NOTE — TELEPHONE ENCOUNTER
Fax received from [facility] - Plan of Care [phone] [facility] Plan of Care 11/20/24     Copy scanned into encounter.    Placed in Dr. Bates's folder.    Fax completed form to:  [phone]

## 2024-12-27 NOTE — TELEPHONE ENCOUNTER
RD and pt/son spoke on 11/8 and pt was scheduled for phone call follow up on 12/20 and this RD noticed today that appt was not properly scheduled or cancelled. This RD contacted son, Sumit today to apologize for confusion and see if he had any questions or wanted to reschedule. He reports that his dad is actually doing really well nutrition-wise. He reports the new treatment is much better tolerated, his appetite is great, and has put on some weight. He reports he is drinking Glucerna still to assist with calories. Son reports he is not with his dad but will ask him if he has any questions and give me a call back if he needs assistance. Sumit was understanding and appreciative of my phone call. Encouraged to please call as needed.

## 2024-12-31 ENCOUNTER — HOSPITAL ENCOUNTER (OUTPATIENT)
Dept: NON INVASIVE DIAGNOSTICS | Facility: HOSPITAL | Age: 74
Discharge: HOME | End: 2024-12-31
Attending: RADIOLOGY
Payer: COMMERCIAL

## 2024-12-31 DIAGNOSIS — N13.30 HYDRONEPHROSIS, UNSPECIFIED HYDRONEPHROSIS TYPE: Primary | ICD-10-CM

## 2024-12-31 DIAGNOSIS — N13.39 OTHER HYDRONEPHROSIS: ICD-10-CM

## 2024-12-31 PROCEDURE — C1769 GUIDE WIRE: HCPCS

## 2024-12-31 PROCEDURE — 50435 EXCHANGE NEPHROSTOMY CATH: CPT

## 2024-12-31 PROCEDURE — C1729 CATH, DRAINAGE: HCPCS

## 2024-12-31 PROCEDURE — 50435 EXCHANGE NEPHROSTOMY CATH: CPT | Performed by: STUDENT IN AN ORGANIZED HEALTH CARE EDUCATION/TRAINING PROGRAM

## 2024-12-31 RX ORDER — LIDOCAINE WITH 8.4% SOD BICARB 0.9%(10ML)
SYRINGE (ML) INJECTION AS NEEDED
Status: COMPLETED | OUTPATIENT
Start: 2024-12-31 | End: 2024-12-31

## 2024-12-31 RX ADMIN — Medication 5 ML: at 09:00

## 2024-12-31 RX ADMIN — IOHEXOL 15 ML: 350 INJECTION, SOLUTION INTRAVENOUS at 09:13

## 2024-12-31 RX ADMIN — Medication 5 ML: at 08:52

## 2024-12-31 NOTE — DISCHARGE INSTRUCTIONS
Nephrostomy Tube Care     WHAT YOU NEED TO KNOW:   A nephrostomy tube is a catheter (thin plastic tube) that is inserted through your skin and into your kidney. The nephrostomy tube drains urine from your kidney into a collecting bag outside your body. You may need a nephrostomy tube when something is blocking the normal flow of urine. A nephrostomy tube may be used for a short or a long period of time. The nephrostomy tube comes out of your back, so you will need someone to help care for your nephrostomy tube.          DISCHARGE INSTRUCTIONS:      How to clean the skin around the nephrostomy tube and change the bandage:  Since the nephrostomy tube comes out of your back, you will not be able to care for it by yourself. Ask someone to follow the general directions below to check and care for your nephrostomy tube.   Gather the items you will need.          Disposable (single use) under-pad, and a clean washcloth  Plain soap, warm water, and new medical gloves  Sterile gauze bandages  Clear adhesive dressing or medical tape  Skin barrier  Protective skin film  Trash bag  Remove the old bandage, and check the tube entry site.    Have the patient lie on his side with the nephrostomy tube entry site facing up. Place the under-pad where it will catch drainage as you are working with the nephrostomy tube.   Wash your hands with soap and water. Put on new medical gloves.  Gently remove the old bandage, without pulling on the tube. Do this by holding the skin beside the tube with one hand. With the other hand, gently remove sticky tape and the skin barrier by pulling in the same direction as hair growth. Do not touch the side of the bandage that is placed over or around the tube. Throw the bandage and skin barrier away in a trash bag.  Look for signs of infection, such as skin redness and swelling. Report any skin changes to healthcare providers.  Clean the tube entry site.    Hold the tube in place to keep it from  being pulled out while you are cleaning around it.  You will need to clean the area twice. For the first cleaning, wet a new gauze bandage with soap and water.  Begin at the entry site of the tube. Wipe the skin in circles, moving away from the entry site. Remove blood and any other material with the gauze. Do this as often as needed. Use a new gauze bandage each time you clean the area, moving away from the entry site.   For the second cleaning, wet a new gauze bandage with water. Begin at the entry site of the tube. Wipe the skin in circles, moving away from the entry site. Use a new gauze bandage each time you clean the area, moving away from the entry site.   Gently pat the skin with a clean washcloth to dry it.    Apply the skin barrier and bandages.    Roll up a bandage to make it thick, and place it under  the place where the tube enters the skin. Place it to support the tube, and stop it from kinking or bending. Tape the bandage in place, and apply more bandages if directed by a healthcare provider.   Bring the tubing forward to the front and tape it to the skin. Do not stretch the tube tight, because this may pull the nephrostomy tube out.  How often to change the bandage.  Change the bandage around the tube, every other day. If your bandages  get dirty or wet, change them right away, and as often as needed. If your nephrostomy tube is to be used for a long period of time, the tube needs to be changed every 2 to 3 months. Healthcare providers will tell you when you need to make an appointment to have your tube changed.     How to care for the urine drainage bag:   Ask if you need to measure and write down how much urine is in the bag before you empty it. Drain urine out of the drainage bag when it is ½ to ? full. Open the spout at the bottom of the bag to empty the urine into the toilet.   You may need to detach the drainage bag from the nephrostomy tube to change it.. If so, attach a new drainage bag  tightly to the nephrostomy tube.     How to prevent problems with your nephrostomy tube:   Change bandages, directed.  This helps to prevent infection. Throw away or clean your drainage bag as directed by your healthcare provider.    Wipe the connecting ends of the drainage bag with alcohol before you reconnect the bag to the tube.  This helps prevent infection.     Keep the tube taped to your skin and connected to a drainage bag placed below the level of your kidneys.  This helps prevent urine from backing up into your kidneys. You may wear a small drainage bag strapped to your leg to let you move around more easily.    Check the catheter to be sure it is in place after you change your clothes or do other activities.  Do not wear tight clothing over the tube. Place the tubing over your thigh rather than under it when you are sitting down. Be sure that nothing is pulling on the nephrostomy tube when you move around.    Change positions if you see little or no urine in your drainage bag.  Check to see if the urine tube is twisted or bent. Be sure that you are not sitting or lying on the tube. If there are no kinks and there is little or no urine in the drainage bag, tell your healthcare provider.    Flush out the tube as directed. Some tubes get flushed one time a day with 10 mls of NSS You will be given a prescription for the flushes.  To flush the nephrostomy tube, clean both connections with alcohol swap. Twist off the drainage bag tube and twist the saline syringe into the nephrostomy tube and flush briskly. Remove the syringe and twist the drainage bag tube back into the nephrostomy tube.  Keep the site covered while you shower.  Tape a piece of clear adhesive plastic over the dressing to keep it dry while you shower. Do not take tub baths.    Contact Interventional Radiology at [phone] (DAVID PATIENTS: Contact Interventional Radiology at [phone]) (QUETA PATIENTS: Contact Interventional Radiology at  [phone]) if:  The skin around the nephrostomy tube is red, swollen, itches, or has a rash.   You have a fever greater than 101 or chills.  You have lower back or hip pain.  There are changes in how your urine looks or smells.  You have little or no urine draining from the nephrostomy tube.   You have nausea and are vomiting.  The black deb on your tube has moved, or the tube is longer than when it was put in.   You have questions or concerns about your condition or care.  The nephrostomy tube comes out completely.   There is blood, pus, or a bad smell coming from the place where the tube enters your skin.  Urine is leaking around the tube.        The following pharmacies carry the flush syringes.       [facility]                     [facility]                         [facility]       [address]                     [address] [facility]                    [phone]  [place]                       [place]  Phone [phone]            Phone [phone]                 [facility]                                                                                                   [phone]  [facility]             [facility]                             [address] [place], [place] [address] [facility]   [place]                      [facility]                                 [phone]  Phone [phone]            Phone [phone]    [facility]                                                                         [facility] [phone]  [address]  [place]   Phone [phone]  Cuidado de la sonda de nefrostomía    LO QUE NECESITAS SABER:  Kate sonda de nefrostomía es un catéter (tubo de plástico millan) que se inserta a través de la piel hasta el riñón. La sonda de nefrostomía drena la orina de luevano riñón a kate bolsa colectora fuera de luevano cuerpo. Es posible que necesite kate sonda de nefrostomía cuando  algo bloquea el flujo normal de orina. Jasmin sonda de nefrostomía se puede usar por un período de tiempo corto o markus. La sonda de nefrostomía sale de luevano espalda, por lo que necesitará que alguien le ayude a cuidarla.        INSTRUCCIONES DE DESCARGA:     Cómo limpiar la piel alrededor de la sonda de nefrostomía y cambiar el vendaje: Dado que la sonda de nefrostomía sale por la espalda, no podrá cuidarla usted mismo. Pídale a alguien que siga las instrucciones generales a continuación para revisar y cuidar luevano tubo de nefrostomía.  Reúna los elementos que necesitará.        Almohadilla desechable (de un solo uso) y jasmin toallita limpia  Jabón común, agua tibia y guantes médicos nuevos  Vendajes de gasa estériles  Vendaje adhesivo transparente o cinta médica  reed de la piel  Película protectora de la piel  Bolsa de basura  Quite el vendaje anabelle y revise el sitio de entrada del tubo. Senthil que el paciente se acueste de lado con el sitio de entrada de la sonda de nefrostomía hacia arriba. Coloque la almohadilla inferior donde atrapará el drenaje mientras trabaja con la sonda de nefrostomía.  Lávate las cleo con jabón y agua. Póngase guantes médicos nuevos.  Retire suavemente el vendaje anabelle, sin tirar del tubo. Senthil esto sujetando la piel al lado del tubo con jasmin mano. Con la otra mano, retire suavemente la cinta adhesiva y la reed cutánea tirando en la misma dirección en que crece el vello. No toque el lado del vendaje que se coloca sobre o alrededor del tubo. Deseche el vendaje y la reed cutánea en jasmin bolsa de basura.  Busque signos de infección, barron enrojecimiento e hinchazón de la piel. Informe cualquier cambio en la piel a los proveedores de atención médica.  Limpie el sitio de entrada del tubo.  Sostenga el tubo en luevano lugar para evitar que se salga mientras limpia a luevano alrededor.  Deberá limpiar el área dos veces. Para la primera limpieza, humedezca jasmin venda de gasa nueva con agua y jabón. Comience en  el sitio de entrada del tubo. Limpie la piel en círculos, alejándose del sitio de entrada. Retire la taco y cualquier otro material con la gasa. Senthil esto tantas veces barron sea necesario. Use jasmin nueva venda de gasa cada vez que limpie el área, alejándose del sitio de entrada.  Para la segunda limpieza, humedezca jasmin gasa nueva con agua. Comience en el sitio de entrada del tubo. Limpie la piel en círculos, alejándose del sitio de entrada. Use jasmin nueva venda de gasa cada vez que limpie el área, alejándose del sitio de entrada.   Acaricie suavemente la piel con un paño limpio para secarla.     Aplique la reed cutánea y los vendajes.   Enrolle un vendaje para que sea más grueso y colóquelo debajo del lugar donde el tubo entra en la piel. Colóquelo para sostener el tubo y evite que se doble o doble. Pegue el vendaje con cinta adhesiva en luevano lugar y aplique más vendajes si se lo indica un proveedor de atención médica.  Lleve el tubo hacia el frente y péguelo con cinta adhesiva a la piel. No estire demasiado la sonda, ya que esto puede sacar la sonda de nefrostomía.  Con qué frecuencia cambiar el vendaje. Cambie el vendaje alrededor del tubo cada dos días. Si rose vendajes se ensucian o mojan, cámbielos de inmediato y con la frecuencia que sea necesaria. Si la sonda de nefrostomía se va a utilizar percy un período prolongado, es necesario cambiarla cada 2 o 3 meses. Los proveedores de atención médica le dirán cuándo debe programar jasmin gabriel para que le cambien la sonda.    Cómo cuidar la bolsa de drenaje de orina:  Pregunte si necesita medir y anotar cuánta orina hay en la bolsa antes de vaciarla. Drene la orina de la bolsa de drenaje cuando esté entre ½ y ? completo. Joey el marisol en la parte inferior de la bolsa para vaciar la orina en el inodoro.  Es posible que deba quitar la bolsa de drenaje de la sonda de nefrostomía para cambiarla. Si es así, coloque jasmin nueva bolsa de drenaje firmemente en la sonda de  nefrostomía.  Cómo prevenir problemas con luevano tubo de nefrostomía:  Cambiar vendajes, dirigido. Branch ayuda a prevenir infecciones. Deseche o limpie la bolsa de drenaje según las indicaciones de luevano proveedor de atención médica.    Limpie los extremos de conexión de la bolsa de drenaje con alcohol antes de volver a conectar la bolsa al tubo. Branch ayuda a prevenir infecciones.    Mantenga el tubo pegado a luevano piel y conectado a jasmin bolsa de drenaje colocada debajo del nivel de rose riñones. Branch ayuda a evitar que la orina regrese a los riñones. Puede usar jasmin pequeña bolsa de drenaje atada a la pierna para que pueda moverse más fácilmente.    Revise el catéter para asegurarse de que esté en luevano lugar después de cambiarse de ropa o realizar otras actividades. No use ropa ajustada sobre el tubo. Coloque el tubo sobre luevano muslo en lugar de debajo de él cuando esté sentado. Asegúrese de que nada tire de la sonda de nefrostomía cuando se mueva.    Cambie de posición si ve poca o ninguna orina en la bolsa de drenaje. Verifique si el tubo de orina está torcido o doblado. Asegúrese de no estar sentado o acostado    En el tubo. Si no hay torceduras y hay poca o nada de orina en la bolsa de drenaje, informe a luevano proveedor de atención médica.     Enjuague el tubo barron se indica. Algunas trompas se enjuagan jasmin vez al día con 10 ml de NSS. Se le dará jasmin receta para los enjuagues.  Para enjuagar el tubo de nefrostomía, limpie ambas conexiones con alcohol. Desenrosque el tubo de la bolsa de drenaje y gire la jeringa de solución salina en el tubo de nefrostomía y enjuague enérgicamente. Retire la jeringa y vuelva a girar el tubo de la bolsa de drenaje en el tubo de nefrostomía.  Mantenga el sitio cubierto mientras se ducha. Pegue con cinta un trozo de plástico adhesivo transparente sobre el vendaje para mantenerlo seco mientras se ducha. No tome pema de fan.    Comuníquese con [facility] al [phone] (PACIENTES DE  HERNANDEZ: Comuníquese con Radiología Intervencionista al [phone]) (PACIENTES DE QUETA: Comuníquese con Radiología Intervencionista al [phone]) si:  La piel alrededor de la sonda de nefrostomía está enrojecida, hinchada, con picazón o con sarpullido.  Tiene fiebre superior a 101 o escalofríos.   Tiene dolor en la parte inferior de la espalda o en la cadera.   Hay cambios en el aspecto o el olor de la orina.   Tiene poca o ninguna orina drenando del tubo de nefrostomía.  Tiene náuseas y está vomitando.   La elida lorenza en luevano tubo se ha movido, o el tubo es más markus que cuando se lo colocó.   Tiene preguntas o inquietudes sobre luevano condición o atención.  La sonda de nefrostomía sale completamente.  Hay taco, pus o mal olor que sale del lugar donde el tubo entra en la piel.  La orina se escapa alrededor del tubo.      Las siguientes farmacias tienen jeringas de lavado.      The following pharmacies carry the flush syringes.       [facility]                     [facility]                         [facility]       [address]                     [address] [facility]                    [phone]  [place]                       [place]  Phone [phone]            Phone [phone]                 [facility]                                                                                                   [phone]  [facility]             [facility]                             [facility] [address]                      [address] [facility]   [place]                                 [phone]  Phone [phone]            Phone [phone]    [facility]                                                                         [facility] [phone]  [address]  [place]   Phone [phone]

## 2024-12-31 NOTE — SEDATION DOCUMENTATION
Bilateral nephrostomy exchange complete by Dr. San. Patient tolerated procedure well. Dry dressings in place. Instructions given to patient.

## 2024-12-31 NOTE — BRIEF OP NOTE (RAD/CATH)
INTERVENTIONAL RADIOLOGY PROCEDURE NOTE    Date: 12/31/2024    Procedure:   Procedure Summary       Date: 12/31/24 Room / Location: [facility] Cardiac Cath Lab    Anesthesia Start:  Anesthesia Stop:     Procedure: IR NEPHROSTOMY TUBE CHECK/CHANGE/REPOSITION/REINSERTION/UPSIZE Diagnosis:       Other hydronephrosis      (routine)    Scheduled Providers:  Responsible Provider:     Anesthesia Type: Not recorded ASA Status: Not recorded            Preoperative diagnosis:   1. Other hydronephrosis         Postoperative diagnosis: Same.    Surgeon: Kilo San MD     Assistant: None. No qualified resident was available.    Blood loss: Minimal    Specimens: None     Findings:   Uncomplicated bilateral nephrostomy tube exchange.  Mild encrustation on the left, moderate on the right.    Next exchange in 4-6 weeks given frequent encrustation/tube dysfunction recently.    Complications: None immediate.    Anesthesia: local

## 2025-01-01 ENCOUNTER — TELEPHONE (OUTPATIENT)
Age: 75
End: 2025-01-01

## 2025-01-01 ENCOUNTER — TELEPHONE (OUTPATIENT)
Dept: RADIOLOGY | Facility: HOSPITAL | Age: 75
End: 2025-01-01

## 2025-01-01 DIAGNOSIS — R63.4 UNINTENTIONAL WEIGHT LOSS: ICD-10-CM

## 2025-01-01 DIAGNOSIS — R63.0 LOSS OF APPETITE: ICD-10-CM

## 2025-01-01 DIAGNOSIS — G89.3 CANCER RELATED PAIN: ICD-10-CM

## 2025-01-01 DIAGNOSIS — Z51.5 PALLIATIVE CARE BY SPECIALIST: ICD-10-CM

## 2025-01-01 DIAGNOSIS — K59.00 CONSTIPATION: ICD-10-CM

## 2025-01-01 DIAGNOSIS — C79.51 PROSTATE CANCER METASTATIC TO BONE (HCC): ICD-10-CM

## 2025-01-01 DIAGNOSIS — C61 PROSTATE CANCER METASTATIC TO BONE (HCC): ICD-10-CM

## 2025-01-01 RX ORDER — LANOLIN ALCOHOL/MO/W.PET/CERES
400 CREAM (GRAM) TOPICAL DAILY
Qty: 30 TABLET | Refills: 0 | Status: SHIPPED | OUTPATIENT
Start: 2025-01-01 | End: 2025-05-08

## 2025-01-01 RX ORDER — DEXAMETHASONE 1 MG
1 TABLET ORAL
Qty: 30 TABLET | Refills: 0 | OUTPATIENT
Start: 2025-01-01

## 2025-01-06 ENCOUNTER — PATIENT OUTREACH (OUTPATIENT)
Age: 75
End: 2025-01-06

## 2025-01-06 NOTE — PROGRESS NOTES
Pt due for outreach. Chart review done. Pt has appointment with palliative care on 1/28. Pt also follows with oncology.         RN CM will close episode at this time. Will reopen if new referral is received.

## 2025-01-19 DIAGNOSIS — Z79.4 TYPE 2 DIABETES MELLITUS WITH OTHER SPECIFIED COMPLICATION, WITH LONG-TERM CURRENT USE OF INSULIN (HCC): Chronic | ICD-10-CM

## 2025-01-19 DIAGNOSIS — E11.69 TYPE 2 DIABETES MELLITUS WITH OTHER SPECIFIED COMPLICATION, WITH LONG-TERM CURRENT USE OF INSULIN (HCC): Chronic | ICD-10-CM

## 2025-01-20 RX ORDER — OMEGA-3-ACID ETHYL ESTERS 1 G/1
1 CAPSULE, LIQUID FILLED ORAL DAILY
Qty: 90 CAPSULE | Refills: 1 | Status: SHIPPED | OUTPATIENT
Start: 2025-01-20

## 2025-01-27 ENCOUNTER — TELEPHONE (OUTPATIENT)
Dept: UROLOGY | Facility: CLINIC | Age: 75
End: 2025-01-27

## 2025-01-27 ENCOUNTER — OFFICE VISIT (OUTPATIENT)
Dept: UROLOGY | Facility: CLINIC | Age: 75
End: 2025-01-27
Payer: COMMERCIAL

## 2025-01-27 VITALS
OXYGEN SATURATION: 100 % | HEART RATE: 97 BPM | SYSTOLIC BLOOD PRESSURE: 132 MMHG | HEIGHT: 68 IN | WEIGHT: 143 LBS | DIASTOLIC BLOOD PRESSURE: 90 MMHG | BODY MASS INDEX: 21.67 KG/M2

## 2025-01-27 DIAGNOSIS — C61 PROSTATE CANCER (HCC): Primary | ICD-10-CM

## 2025-01-27 PROCEDURE — 96402 CHEMO HORMON ANTINEOPL SQ/IM: CPT

## 2025-01-27 PROCEDURE — 99213 OFFICE O/P EST LOW 20 MIN: CPT | Performed by: PHYSICIAN ASSISTANT

## 2025-01-27 NOTE — TELEPHONE ENCOUNTER
Ramirez Avila,    Good day,    Please kindly assist,    Can you please schedule patient for a Return in about 6 months on 7/28/2025 @ 9:00 AM as a FU in the NP slot for Sulma with [facility].    Appointment already confirmed with patient.    Thank you in advance.

## 2025-01-27 NOTE — PROGRESS NOTES
UROLOGY PROGRESS NOTE   Patient Identifiers: Oliver Astudillo (MRN [id number])  Date of Service: 1/27/2025  Patient is seen with the assistance of an online   Subjective:   74-year-old man history of metastatic Liana 9 prostate cancer.  He has bilateral nephrostomy tubes and has been hormone refractory.  He is on Lupron and Xtandi.  Previously on Zytiga and had a severe reaction to docetaxel.  His last PSA in October was 125.  He denies any weight loss or bone pain.  Lupron 45 mg given today.    Reason for visit: Prostate cancer follow-up    Objective:     VITALS:    Vitals:    01/27/25 1049   BP: 132/90   Pulse: 97   SpO2: 100%           LABS:  Lab Results   Component Value Date    HGB 12.1 12/26/2024    HCT 37.2 12/26/2024    WBC 6.14 12/26/2024     12/26/2024   ]    Lab Results   Component Value Date    K 4.0 12/26/2024     12/26/2024    CO2 27 12/26/2024    BUN 15 12/26/2024    CREATININE 0.77 12/26/2024    CALCIUM 9.5 12/26/2024   ]        INPATIENT MEDS:    Current Outpatient Medications:     acetaminophen (TYLENOL) 500 mg tablet, Take 2 tablets (1,000 mg total) by mouth 3 (three) times a day, Disp: 180 tablet, Rfl: 2    albuterol (Ventolin HFA) 90 mcg/act inhaler, Inhale 2 puffs every 6 (six) hours as needed for wheezing, Disp: 18 g, Rfl: 0    Alcohol Swabs 70 % PADS, To clean the skin prior to injecting insulin, Disp: 100 each, Rfl: 1    Blood Glucose Monitoring Suppl (OneTouch Verio Reflect) w/Device KIT, Check blood sugars once daily. Please substitute with appropriate alternative as covered by patient's insurance. Dx: E11.65, Disp: 1 kit, Rfl: 0    Cholecalciferol (VITAMIN D3) 1,000 units tablet, Take 1 tablet (1,000 Units total) by mouth daily, Disp: 90 tablet, Rfl: 3    dexamethasone (DECADRON) 1 mg tablet, Take 1 tablet (1 mg total) by mouth 2 (two) times a day before breakfast and lunch TAKE 1 TABLET (0.5 MG TOTAL) BY MOUTH DAILY WITH BREAKFAST, Disp: 60 tablet, Rfl:  2    Diclofenac Sodium (VOLTAREN) 1 %, Apply 2 g topically 4 (four) times a day, Disp: 100 g, Rfl: 3    docusate sodium (COLACE) 100 mg capsule, Take 1 capsule (100 mg total) by mouth 2 (two) times a day, Disp: 60 capsule, Rfl: 0    Easy Touch Pen Needles 31G X 6 MM MISC, Use daily at bedtime, Disp: 100 each, Rfl: 3    gabapentin (Neurontin) 300 mg capsule, Take 1 capsule (300 mg total) by mouth 3 (three) times a day, Disp: 90 capsule, Rfl: 2    glucose 4-6 GM-MG, Chew 2 tablets daily as needed for low blood sugar, Disp: 90 tablet, Rfl: 3    glucose blood (OneTouch Verio) test strip, Check blood sugars twice a daily. Please substitute with appropriate alternative as covered by patient's insurance. Dx: E11.65, Disp: 200 each, Rfl: 3    magnesium Oxide (MAG-OX) 400 mg TABS, Take 1 tablet (400 mg total) by mouth 2 (two) times a day, Disp: 60 tablet, Rfl: 1    metFORMIN (GLUCOPHAGE) 1000 MG tablet, Take 1 tablet (1,000 mg total) by mouth 2 (two) times a day with meals, Disp: 180 tablet, Rfl: 1    Multiple Vitamin (multivitamin) tablet, Take 1 tablet by mouth daily, Disp: 30 tablet, Rfl: 2    naloxone (NARCAN) 4 mg/0.1 mL nasal spray, Administer 1 spray into a nostril. If no response after 2-3 minutes, give another dose in the other nostril using a new spray., Disp: 1 each, Rfl: 0    Nutritional Supplements (Ensure Plus High Protein) LIQD, Take 237 mL by mouth 3 (three) times a day, Disp: 237 mL, Rfl: 9    Nutritional Supplements (Ensure Plus High Protein) LIQD, , Disp: , Rfl:     Nutritional Supplements (Glucerna Hunger Smart Shake) LIQD, Take by mouth Three times a day, Disp: , Rfl:     omega-3-acid ethyl esters (LOVAZA) 1 g capsule, TAKE 1 CAPSULE BY MOUTH DAILY., Disp: 90 capsule, Rfl: 1    ondansetron (Zofran ODT) 8 mg disintegrating tablet, Take 1 tablet (8 mg total) by mouth every 8 (eight) hours as needed for nausea or vomiting, Disp: 20 tablet, Rfl: 3    OneTouch Delica Lancets 33G MISC, Check blood sugars once  "daily. Please substitute with appropriate alternative as covered by patient's insurance. Dx: E11.65, Disp: 100 each, Rfl: 3    oxyCODONE (ROXICODONE) 10 MG TABS, Take 1 tablet (10 mg total) by mouth every 4 (four) hours as needed for moderate pain Max Daily Amount: 60 mg, Disp: 90 tablet, Rfl: 0    pantoprazole (PROTONIX) 40 mg tablet, Take 1 tablet (40 mg total) by mouth daily, Disp: 90 tablet, Rfl: 1    polyethylene glycol (MIRALAX) 17 g packet, Take 17 g by mouth daily as needed (for constipation), Disp: 100 each, Rfl: 1    senna (SENOKOT) 8.6 mg, Take 1 tablet (8.6 mg total) by mouth 2 (two) times a day, Disp: 60 tablet, Rfl: 3    sodium chloride 1 g tablet, Take 1 tablet (1 g total) by mouth 2 (two) times a day with meals, Disp: 60 tablet, Rfl: 0    sodium chloride, PF, 0.9 %, 10 mL by Intracatheter route daily Intracatheter flushing daily. May substitute prefilled syringe with normal saline 10 mL vials, 10 mL syringes, and 18 g blunt needles, Disp: 600 mL, Rfl: 2    Spacer/Aero-Holding Chambers (AEROCHAMBER MINI CHAMBER) BILLY, by Does not apply route see administration instructions, Disp: 1 Device, Rfl: 0      Physical Exam:   /90 (BP Location: Left arm, Patient Position: Sitting, Cuff Size: Adult)   Pulse 97   Ht 5' 8\" (1.727 m)   Wt 64.9 kg (143 lb)   SpO2 100%   BMI 21.74 kg/m²   GEN: no acute distress    RESP: breathing comfortably with no accessory muscle use    ABD: soft, non-tender, non-distended   INCISION:    EXT: no significant peripheral edema       RADIOLOGY:   IMPRESSION:     1. Findings suggestive of cystitis with bilateral pyeloureteritis.     2. Multiple metastatic liver lesions and widespread osseous metastasis, similar to recent PSMA PET/CT, but progressed from 6/6/2024.     3. Multiple small nodular enhancing foci in the prostate gland and bladder wall, likely corresponding to the PSMA avid lesions seen on recent PET/CT.     Assessment:   #1.  Hormone refractory metastatic [place] " 9 prostate cancer    Plan:   -Lupron 45 mg given today  -He sees medical oncology  -Will follow-up in 6 months for his next Lupron injection  -

## 2025-01-28 ENCOUNTER — OFFICE VISIT (OUTPATIENT)
Dept: PALLIATIVE MEDICINE | Facility: CLINIC | Age: 75
End: 2025-01-28
Payer: COMMERCIAL

## 2025-01-28 VITALS
BODY MASS INDEX: 21.67 KG/M2 | SYSTOLIC BLOOD PRESSURE: 120 MMHG | DIASTOLIC BLOOD PRESSURE: 82 MMHG | HEIGHT: 68 IN | WEIGHT: 143 LBS | TEMPERATURE: 98.3 F

## 2025-01-28 DIAGNOSIS — Z51.5 PALLIATIVE CARE BY SPECIALIST: Primary | ICD-10-CM

## 2025-01-28 DIAGNOSIS — R63.4 UNINTENTIONAL WEIGHT LOSS: ICD-10-CM

## 2025-01-28 DIAGNOSIS — C61 PROSTATE CANCER METASTATIC TO BONE (HCC): ICD-10-CM

## 2025-01-28 DIAGNOSIS — M25.50 ARTHRALGIA, UNSPECIFIED JOINT: ICD-10-CM

## 2025-01-28 DIAGNOSIS — R63.0 LOSS OF APPETITE: ICD-10-CM

## 2025-01-28 DIAGNOSIS — C79.51 PROSTATE CANCER METASTATIC TO BONE (HCC): ICD-10-CM

## 2025-01-28 DIAGNOSIS — G89.3 CANCER RELATED PAIN: ICD-10-CM

## 2025-01-28 DIAGNOSIS — K59.00 CONSTIPATION: ICD-10-CM

## 2025-01-28 PROCEDURE — G2211 COMPLEX E/M VISIT ADD ON: HCPCS | Performed by: STUDENT IN AN ORGANIZED HEALTH CARE EDUCATION/TRAINING PROGRAM

## 2025-01-28 PROCEDURE — 99214 OFFICE O/P EST MOD 30 MIN: CPT | Performed by: STUDENT IN AN ORGANIZED HEALTH CARE EDUCATION/TRAINING PROGRAM

## 2025-01-28 RX ORDER — DOCUSATE SODIUM 100 MG/1
100 CAPSULE, LIQUID FILLED ORAL 2 TIMES DAILY PRN
Qty: 60 CAPSULE | Refills: 0 | Status: SHIPPED | OUTPATIENT
Start: 2025-01-28

## 2025-01-28 RX ORDER — DEXAMETHASONE 1 MG
1 TABLET ORAL
Qty: 30 TABLET | Refills: 0 | Status: SHIPPED | OUTPATIENT
Start: 2025-01-28

## 2025-01-28 RX ORDER — GABAPENTIN 300 MG/1
300 CAPSULE ORAL 2 TIMES DAILY
Qty: 60 CAPSULE | Refills: 1 | Status: SHIPPED | OUTPATIENT
Start: 2025-01-28

## 2025-01-28 NOTE — PROGRESS NOTES
Name: Oliver Astudillo      : 1950      MRN: [id number]  Encounter Provider: Carter Garcia DO  Encounter Date: 2025   Encounter department: [facility] PALLIATIVE CARE [facility]  :  Assessment & Plan  Loss of appetite  [email or web address]  for [place] utilized during visit -   Patient accompanied by spouse today, Erlinda.    Patient has been on Dexamethasone 1mg BID for the past months.  Appetite has improved with gain of weight.    Discussed risks with long term steroids with understanding from patient and spouse  -plan to decrease to 1mg daily and see how he does in another 4 weeks.  If he continues to do well with his appetite and maintain weight, will plan to try to wean off steroids as able given long-term sequelae reviewed including but not limited to increased risk of infection and bone weakening.     Patient is on Protonix 40mg daily and is to continue for GI prophylaxis.    Summary of plan-  1) dexamethasone decreased to 1 mg once a day with breakfast  2) continue Protonix 40 mg daily for GI prophylaxis    Orders:    dexamethasone (DECADRON) 1 mg tablet; Take 1 tablet (1 mg total) by mouth daily with breakfast    Palliative care by specialist  Psychosocial   Supportive listening provided  Normalized experience of patient/family  Provided anxiety containment     Referrals Placed / Medical Equipment Ordered  -None    Follow-Up Recommendations  -Follow-up with PCP and current medical specialists  -Follow-up with palliative care: 4 weeks    Orders:    dexamethasone (DECADRON) 1 mg tablet; Take 1 tablet (1 mg total) by mouth daily with breakfast    gabapentin (Neurontin) 300 mg capsule; Take 1 capsule (300 mg total) by mouth 2 (two) times a day    Prostate cancer metastatic to bone (HCC)  Following Dr. Freeman of medical oncology  Patient with previous reactions with docetaxel.  Patient is currently with ongoing follow-up with repeat PSA and blood work under the observation of medical  oncology.    Orders:    Diclofenac Sodium (VOLTAREN) 1 %; Apply 2 g topically 4 (four) times a day as needed (joint pain)    dexamethasone (DECADRON) 1 mg tablet; Take 1 tablet (1 mg total) by mouth daily with breakfast    gabapentin (Neurontin) 300 mg capsule; Take 1 capsule (300 mg total) by mouth 2 (two) times a day    Arthralgia, unspecified joint    Orders:    Diclofenac Sodium (VOLTAREN) 1 %; Apply 2 g topically 4 (four) times a day as needed (joint pain)    Cancer related pain  Currently on Gabapentin 300mg but takes it BID at this time.    Voltaren 1% topical for joint aches to back and hip.    Orders:    dexamethasone (DECADRON) 1 mg tablet; Take 1 tablet (1 mg total) by mouth daily with breakfast    gabapentin (Neurontin) 300 mg capsule; Take 1 capsule (300 mg total) by mouth 2 (two) times a day    Unintentional weight loss  Patient has been gaining weight as appetite has improved since he has been on the dexamethasone.  No nausea or vomiting that is new.  Orders:    dexamethasone (DECADRON) 1 mg tablet; Take 1 tablet (1 mg total) by mouth daily with breakfast    Constipation  Helpful with bowel regimen.  Orders:    docusate sodium (COLACE) 100 mg capsule; Take 1 capsule (100 mg total) by mouth 2 (two) times a day as needed for constipation        PDMP Review: I have reviewed the patient's controlled substance dispensing history in the Prescription Drug Monitoring Program in compliance with the [facility] regulations before prescribing any controlled substances.    History of Present Illness   Oliver Astudillo is a 74 y.o. male who presents for follow-up.    Patient seen examined accompanied by his spouse.  Patient is doing well since his last visit with my partner, Dr. Mcdonald.  Patient is here for transfer of care today.  Denies any new issues, however, does request refills with his Voltaren, Colace, and dexamethasone.  We did review the dexamethasone is a steroid that can help with appetite stimulation and  energy, however, long-term effects can cause increased risk for infections as well as bone thinning.  Discussed options of trying to taper at least 1 mg daily for the next month and see how he is doing.  Hopefully we can potentially taper off his steroids so that we do not do this long-term.  Patient has prostate cancer with metastatic to the bone and therefore would like to limit any potential for weakening of his bones/osseous structures long-term.    Patient and spouse agreeable to plan at this time.  Will follow-up in 4 weeks to see how he is doing.    He otherwise does not have any intolerable pain or discomfort.    All questions and concerns answered today.    CT AP 11/16/2024  1. Findings suggestive of cystitis with bilateral pyeloureteritis.  2. Multiple metastatic liver lesions and widespread osseous metastasis, similar to recent PSMA PET/CT, but progressed from 6/6/2024.  3. Multiple small nodular enhancing foci in the prostate gland and bladder wall, likely corresponding to the PSMA avid lesions seen on recent PET/CT.      Medical History Reviewed by provider this encounter:  Tobacco  Allergies  Meds  Problems  Med Hx  Surg Hx  Fam Hx     .  Past Medical History   Past Medical History:   Diagnosis Date    Altered mental status 10/08/2024    COVID-19 01/15/2024    Diabetes mellitus (HCC)     Elevated PSA 06/21/2023    High cholesterol     Hypertension     Prostate cancer (HCC)     Severe sepsis (HCC) 10/08/2024     Past Surgical History:   Procedure Laterality Date    IR NEPHROSTOMY TUBE CHECK/CHANGE/REPOSITION/REINSERTION/UPSIZE  9/28/2023    IR NEPHROSTOMY TUBE CHECK/CHANGE/REPOSITION/REINSERTION/UPSIZE  12/28/2023    IR NEPHROSTOMY TUBE CHECK/CHANGE/REPOSITION/REINSERTION/UPSIZE  1/31/2024    IR NEPHROSTOMY TUBE CHECK/CHANGE/REPOSITION/REINSERTION/UPSIZE  2/2/2024    IR NEPHROSTOMY TUBE CHECK/CHANGE/REPOSITION/REINSERTION/UPSIZE  3/4/2024    IR NEPHROSTOMY TUBE  CHECK/CHANGE/REPOSITION/REINSERTION/UPSIZE  3/20/2024    IR NEPHROSTOMY TUBE CHECK/CHANGE/REPOSITION/REINSERTION/UPSIZE  6/7/2024    IR NEPHROSTOMY TUBE CHECK/CHANGE/REPOSITION/REINSERTION/UPSIZE  8/5/2024    IR NEPHROSTOMY TUBE CHECK/CHANGE/REPOSITION/REINSERTION/UPSIZE  10/4/2024    IR NEPHROSTOMY TUBE CHECK/CHANGE/REPOSITION/REINSERTION/UPSIZE  11/18/2024    IR NEPHROSTOMY TUBE CHECK/CHANGE/REPOSITION/REINSERTION/UPSIZE  12/31/2024    IR NEPHROSTOMY TUBE PLACEMENT  06/22/2023    bilateral    IR OTHER  1/15/2024    US GUIDED PROSTATE BIOPSY       Family History   Problem Relation Age of Onset    Uterine cancer Mother     Liver cancer Father     No Known Problems Sister     No Known Problems Brother     No Known Problems Son     Diabetes type II Daughter     No Known Problems Daughter     Cancer Daughter       reports that he has quit smoking. His smoking use included cigarettes. He started smoking about 25 years ago. He has a 624.5 pack-year smoking history. He has been exposed to tobacco smoke. He has never used smokeless tobacco. He reports that he does not currently use alcohol. He reports that he does not use drugs.  Current Outpatient Medications on File Prior to Visit   Medication Sig Dispense Refill    acetaminophen (TYLENOL) 500 mg tablet Take 2 tablets (1,000 mg total) by mouth 3 (three) times a day 180 tablet 2    albuterol (Ventolin HFA) 90 mcg/act inhaler Inhale 2 puffs every 6 (six) hours as needed for wheezing 18 g 0    Alcohol Swabs 70 % PADS To clean the skin prior to injecting insulin 100 each 1    Blood Glucose Monitoring Suppl (OneTouch Verio Reflect) w/Device KIT Check blood sugars once daily. Please substitute with appropriate alternative as covered by patient's insurance. Dx: E11.65 1 kit 0    Cholecalciferol (VITAMIN D3) 1,000 units tablet Take 1 tablet (1,000 Units total) by mouth daily 90 tablet 3    Easy Touch Pen Needles 31G X 6 MM MISC Use daily at bedtime 100 each 3    magnesium Oxide  (MAG-OX) 400 mg TABS Take 1 tablet (400 mg total) by mouth 2 (two) times a day 60 tablet 1    metFORMIN (GLUCOPHAGE) 1000 MG tablet Take 1 tablet (1,000 mg total) by mouth 2 (two) times a day with meals 180 tablet 1    Nutritional Supplements (Ensure Plus High Protein) LIQD Take 237 mL by mouth 3 (three) times a day 237 mL 9    Nutritional Supplements (Ensure Plus High Protein) LIQD       Nutritional Supplements (Glucerna Hunger Smart Shake) LIQD Take by mouth Three times a day      omega-3-acid ethyl esters (LOVAZA) 1 g capsule TAKE 1 CAPSULE BY MOUTH DAILY. 90 capsule 1    pantoprazole (PROTONIX) 40 mg tablet Take 1 tablet (40 mg total) by mouth daily 90 tablet 1    polyethylene glycol (MIRALAX) 17 g packet Take 17 g by mouth daily as needed (for constipation) 100 each 1    senna (SENOKOT) 8.6 mg Take 1 tablet (8.6 mg total) by mouth 2 (two) times a day 60 tablet 3    sodium chloride 1 g tablet Take 1 tablet (1 g total) by mouth 2 (two) times a day with meals 60 tablet 0    sodium chloride, PF, 0.9 % 10 mL by Intracatheter route daily Intracatheter flushing daily. May substitute prefilled syringe with normal saline 10 mL vials, 10 mL syringes, and 18 g blunt needles 600 mL 2    glucose 4-6 GM-MG Chew 2 tablets daily as needed for low blood sugar 90 tablet 3    glucose blood (OneTouch Verio) test strip Check blood sugars twice a daily. Please substitute with appropriate alternative as covered by patient's insurance. Dx: E11.65 (Patient not taking: Reported on 1/28/2025) 200 each 3    Multiple Vitamin (multivitamin) tablet Take 1 tablet by mouth daily (Patient not taking: Reported on 1/28/2025) 30 tablet 2    naloxone (NARCAN) 4 mg/0.1 mL nasal spray Administer 1 spray into a nostril. If no response after 2-3 minutes, give another dose in the other nostril using a new spray. (Patient not taking: Reported on 1/28/2025) 1 each 0    ondansetron (Zofran ODT) 8 mg disintegrating tablet Take 1 tablet (8 mg total) by mouth  every 8 (eight) hours as needed for nausea or vomiting (Patient not taking: Reported on 1/28/2025) 20 tablet 3    OneTouch Delica Lancets 33G MISC Check blood sugars once daily. Please substitute with appropriate alternative as covered by patient's insurance. Dx: E11.65 (Patient not taking: Reported on 1/28/2025) 100 each 3    oxyCODONE (ROXICODONE) 10 MG TABS Take 1 tablet (10 mg total) by mouth every 4 (four) hours as needed for moderate pain Max Daily Amount: 60 mg (Patient not taking: Reported on 1/28/2025) 90 tablet 0    Spacer/Aero-Holding Chambers (AEROCHAMBER MINI CHAMBER) BILLY by Does not apply route see administration instructions (Patient not taking: Reported on 1/28/2025) 1 Device 0     No current facility-administered medications on file prior to visit.     Allergies   Allergen Reactions    Docetaxel Anaphylaxis      Current Outpatient Medications on File Prior to Visit   Medication Sig Dispense Refill    acetaminophen (TYLENOL) 500 mg tablet Take 2 tablets (1,000 mg total) by mouth 3 (three) times a day 180 tablet 2    albuterol (Ventolin HFA) 90 mcg/act inhaler Inhale 2 puffs every 6 (six) hours as needed for wheezing 18 g 0    Alcohol Swabs 70 % PADS To clean the skin prior to injecting insulin 100 each 1    Blood Glucose Monitoring Suppl (OneTouch Verio Reflect) w/Device KIT Check blood sugars once daily. Please substitute with appropriate alternative as covered by patient's insurance. Dx: E11.65 1 kit 0    Cholecalciferol (VITAMIN D3) 1,000 units tablet Take 1 tablet (1,000 Units total) by mouth daily 90 tablet 3    Easy Touch Pen Needles 31G X 6 MM MISC Use daily at bedtime 100 each 3    magnesium Oxide (MAG-OX) 400 mg TABS Take 1 tablet (400 mg total) by mouth 2 (two) times a day 60 tablet 1    metFORMIN (GLUCOPHAGE) 1000 MG tablet Take 1 tablet (1,000 mg total) by mouth 2 (two) times a day with meals 180 tablet 1    Nutritional Supplements (Ensure Plus High Protein) LIQD Take 237 mL by mouth 3  (three) times a day 237 mL 9    Nutritional Supplements (Ensure Plus High Protein) LIQD       Nutritional Supplements (Glucerna Hunger Smart Shake) LIQD Take by mouth Three times a day      omega-3-acid ethyl esters (LOVAZA) 1 g capsule TAKE 1 CAPSULE BY MOUTH DAILY. 90 capsule 1    pantoprazole (PROTONIX) 40 mg tablet Take 1 tablet (40 mg total) by mouth daily 90 tablet 1    polyethylene glycol (MIRALAX) 17 g packet Take 17 g by mouth daily as needed (for constipation) 100 each 1    senna (SENOKOT) 8.6 mg Take 1 tablet (8.6 mg total) by mouth 2 (two) times a day 60 tablet 3    sodium chloride 1 g tablet Take 1 tablet (1 g total) by mouth 2 (two) times a day with meals 60 tablet 0    sodium chloride, PF, 0.9 % 10 mL by Intracatheter route daily Intracatheter flushing daily. May substitute prefilled syringe with normal saline 10 mL vials, 10 mL syringes, and 18 g blunt needles 600 mL 2    glucose 4-6 GM-MG Chew 2 tablets daily as needed for low blood sugar 90 tablet 3    glucose blood (OneTouch Verio) test strip Check blood sugars twice a daily. Please substitute with appropriate alternative as covered by patient's insurance. Dx: E11.65 (Patient not taking: Reported on 1/28/2025) 200 each 3    Multiple Vitamin (multivitamin) tablet Take 1 tablet by mouth daily (Patient not taking: Reported on 1/28/2025) 30 tablet 2    naloxone (NARCAN) 4 mg/0.1 mL nasal spray Administer 1 spray into a nostril. If no response after 2-3 minutes, give another dose in the other nostril using a new spray. (Patient not taking: Reported on 1/28/2025) 1 each 0    ondansetron (Zofran ODT) 8 mg disintegrating tablet Take 1 tablet (8 mg total) by mouth every 8 (eight) hours as needed for nausea or vomiting (Patient not taking: Reported on 1/28/2025) 20 tablet 3    OneTouch Delica Lancets 33G MISC Check blood sugars once daily. Please substitute with appropriate alternative as covered by patient's insurance. Dx: E11.65 (Patient not taking:  "Reported on 1/28/2025) 100 each 3    oxyCODONE (ROXICODONE) 10 MG TABS Take 1 tablet (10 mg total) by mouth every 4 (four) hours as needed for moderate pain Max Daily Amount: 60 mg (Patient not taking: Reported on 1/28/2025) 90 tablet 0    Spacer/Aero-Holding Chambers (AEROCHAMBER MINI CHAMBER) BILLY by Does not apply route see administration instructions (Patient not taking: Reported on 1/28/2025) 1 Device 0     No current facility-administered medications on file prior to visit.      Social History     Tobacco Use    Smoking status: Former     Current packs/day: 24.90     Average packs/day: 24.9 packs/day for 25.1 years (624.5 ttl pk-yrs)     Types: Cigarettes     Start date: 2000     Passive exposure: Past    Smokeless tobacco: Never    Tobacco comments:     States he only smoked on the weekends   Vaping Use    Vaping status: Never Used   Substance and Sexual Activity    Alcohol use: Not Currently     Comment: doesn't drink anymore    Drug use: Never    Sexual activity: Yes     Partners: Female     Birth control/protection: None        Objective   /82 (BP Location: Left arm, Patient Position: Sitting, Cuff Size: Standard)   Temp 98.3 °F (36.8 °C) (Temporal)   Ht 5' 8\" (1.727 m)   Wt 64.9 kg (143 lb)   BMI 21.74 kg/m²     Physical Exam    Recent labs:  Lab Results   Component Value Date/Time    SODIUM 137 12/26/2024 07:50 AM    K 4.0 12/26/2024 07:50 AM    BUN 15 12/26/2024 07:50 AM    CREATININE 0.77 12/26/2024 07:50 AM    GLUC 97 11/18/2024 06:12 AM    CALCIUM 9.5 12/26/2024 07:50 AM    AST 14 12/26/2024 07:50 AM    ALT 11 12/26/2024 07:50 AM    ALB 3.9 12/26/2024 07:50 AM    TP 7.1 12/26/2024 07:50 AM    EGFR 90 12/26/2024 07:50 AM     Lab Results   Component Value Date/Time    HGB 12.1 12/26/2024 07:50 AM    WBC 6.14 12/26/2024 07:50 AM     12/26/2024 07:50 AM    INR 1.22 (H) 10/08/2024 12:55 PM    PTT 32 10/08/2024 12:55 PM     Lab Results   Component Value Date/Time    VUE1KCNPDOHB 1.470 " 10/08/2024 04:46 PM       Recent Imaging:  Procedure: IR nephrostomy tube check/change/reposition/reinsertion/upsize  Result Date: 12/31/2024  Narrative: PROCEDURE: Bilateral nephrostomy tube exchange Procedural Personnel Attending physician(s): Kilo San MD Pre-procedure diagnosis: Obstructive uropathy Post-procedure diagnosis: Same Clinical History: 74-year-old man with history of prostate cancer and bilateral obstructive nephropathy managed with chronic indwelling nephrostomy tubes which have been complicated with recurrent encrustation, occlusion, and malposition. He presents today for a routine exchange approximately 6 weeks after last exchange. PROCEDURE DETAILS: Pre-procedure Consent: Informed consent for the procedure including risks, benefits and alternatives was obtained and time-out was performed prior to the procedure. Preparation: The site was prepared and draped using maximal sterile barrier technique including cutaneous antisepsis. Anesthesia/sedation Level of anesthesia/sedation: No sedation Technique and Findings:  fluoroscopic images and gentle contrast injection demonstrated well-positioned nephrostomy catheters bilaterally. As before, there were multifocal strictures on the left and a long segment stricture on the right, with trace passage of contrast into  the bladder on each side. Both catheters were exchanged over guidewires for new, same sized locking pigtail drainage catheter was formed in the renal pelves. The left catheter was mildly encrusted and the right was moderately encrusted. The new catheters were secured with nonabsorbable sutures and returned to gravity drainage. Contrast Contrast dose: 15 mL of iohexol (OMNIPAQUE) Radiation Dose Fluoroscopy time: 1.9 min Reference air kerma: 8 mGy Additional Details Specimens removed: None Estimated blood loss (mL): Less than 10 Complications: No immediate complications.     Impression: Uncomplicated bilateral nephrostomy  tube exchange. Plan: Next exchange in 4 to 6 weeks given frequent encrustation and tube dysfunction. Workstation performed: [id number]     Procedure: NM pluvicto infusion  Result Date: 12/26/2024  Narrative: PLUVICTO THERAPY INDICATION:  C61: Malignant neoplasm of prostate RADIOPHARMACEUTICAL: 204.7 mCi Lutetium 177 PLUVICTO COMPARISON: 11/14/2024 FINDINGS: Patient is a 74   year old male with history of metastatic prostate cancer. Laboratory blood work was drawn on 12/26/2024: Hemoglobin 12.1. WBC 6.14. Platelets 311. eGFR 90. Creatinine 0.77. Albumin 3.9. The benefits, risks and alternatives of this therapy were explained. Written informed consent was obtained. The patient was also given written and oral instructions regarding posttherapy care. 204.7 mCi Lutetium 177 Pluvicto was administered intravenously without incident.     Impression: 204.7 mCi Lutetium 177 Pluvicto was administered intravenously without immediate complication. Workstation performed: [id number]     Procedure: IR nephrostomy tube check/change/reposition/reinsertion/upsize  Result Date: 11/18/2024  Narrative: IR NEPHROSTOMY TUBE CHECK/CHANGE/REPOSITION/REINSERTION/UPSIZE PROCEDURE: Exchange of bilateral percutaneous nephrostomy tube CLINICAL INDICATION: Patient with history of prostate carcinoma and chronic indwelling bilateral percutaneous nephrostomy tubes due to obstructive uropathy, with decreased output from left nephrostomy tube and retraction of both tube COMPARISON: CT 11/16/2024; nephrostomy tube exchange 10/4/2024 PERFORMING PHYSICIAN: Wenceslao Ho MD ASSISTANT PHYSICIAN: None MEDICATIONS: 1% lidocaine (local). SEDATION TIME: NA CONTRAST: 15 mL of iohexol (OMNIPAQUE) FLUOROSCOPY TIME: 5.4 min RADIATION DOSE: 29 mGy   (air Kerma). NUMBER OF IMAGES: 6 TECHNIQUE: Patient placed prone on the procedure table.  radiograph demonstrates percutaneous nephrostomy tubes overlying both flanks in stable position. The left tube was  evaluated initially. Contrast could not be instilled due to obstruction of the tube by sediment. Kumpe catheter and Glidewire were used to negotiate alongside the tube until the renal pelvis was entered confirmed by contrast injection. Nephrostomy tube removed. Glidewire exchanged for Amplatz wire and a new 10 [place] APDL nephrostomy tube advanced over the wire with locking loop formed in the renal pelvis confirmed by contrast injection. Catheter secured to the skin with 0 Prolene suture and connected to an external drainage bag. The right tube was then evaluated. Contrast instilled demonstrates the catheter within the collecting system. It was decided to change the catheter due to frequent encrustation issues. Amplatz wire could not be advanced through the catheter due to partial encrustation. Glidewire was therefore used to exchanged out the catheter and using a Kumpe catheter, the Glidewire was exchanged for an Amplatz wire over which a new 10 [place] APDL PCN was advanced with locking loop formed in the renal pelvis confirmed by contrast injection. Catheter secured to the skin with 0 Prolene suture and connected to an external drainage bag. The patient tolerated the procedure well without difficulty or complication encountered and left the section in satisfactory stable condition. FINDINGS: As above.     Impression: 1. Encrustation of left greater than right percutaneous nephrostomy tubes which were exchanged for a new 10 [place] APDL catheters. 2. Catheter should be exchanged on a monthly basis due to frequent encrustation and obstruction. Patient is scheduled for next appointment 12/19/2024. 3. Catheters should be flushed on a daily basis as well. Workstation performed: [id number]     Procedure: XR foot 2 vw right  Result Date: 11/18/2024  Narrative: XR FOOT 2 VW RIGHT INDICATION: b/l heel pain. COMPARISON: None FINDINGS: No acute fracture or dislocation. Hallux valgus. No lytic or blastic osseous lesion.  Atherosclerotic calcifications. Otherwise unremarkable soft tissues.     Impression: No acute osseous abnormality. Workstation performed: [id number]     Procedure: XR foot 2 vw left  Result Date: 11/18/2024  Narrative: XR FOOT 2 VW LEFT INDICATION: b/l heel pain. COMPARISON: None FINDINGS: No acute fracture or dislocation. Hallux valgus. Flexion deformities of the lesser toes. No lytic or blastic osseous lesion. Atherosclerotic calcifications. Otherwise unremarkable soft tissues.     Impression: No acute osseous abnormality. Workstation performed: [id number]     Procedure: NM tumor loc spect/ct multi areas/days  Result Date: 11/18/2024  Narrative: PLUVICTO THERAPY SPECT CT INDICATION:  C61: Malignant neoplasm of prostate COMPARISON: PET CT SCAN 10/15/2024 TECHNIQUE: The study was performed approximately 24 hours following the intravenous administration of 203.7 mCi Lutetium 177 Pluvicto. Whole body planar images were obtained in the anterior posterior projection. SPECT CT images were obtained of the chest, abdomen and pelvis and reconstructed in the axial, sagittal and coronal planes. FINDINGS: Multiple foci of increased radiotracer uptake compatible with known soft tissue and osseous metastasis. These are most numerous in the axial and appendicular skeleton compatible with known osseous metastasis. Scattered hepatic foci compatible with known hepatic metastasis. There is physiological renal, splenic and liver activity. Low-dose CT demonstrates bilateral nephrostomy tubes. Fecal retention in the colon with stool ball in the rectum. Small amount of gas in the bladder lumen, previously present.     Impression: 1. Post therapy imaging demonstrates multiple foci of increased radiotracer uptake compatible with known soft tissue and osseous metastasis compatible with successful targeting of lesions. Workstation performed: [id number]     Procedure: CT abdomen pelvis with contrast  Result Date:  11/16/2024  Narrative: CT ABDOMEN AND PELVIS WITH IV CONTRAST INDICATION: Abd pain; L nephrostomy not draining. . COMPARISON: CT abdomen and pelvis dated June 6, 2024 TECHNIQUE: CT examination of the abdomen and pelvis was performed. Multiplanar 2D reformatted images were created from the source data. This examination, like all CT scans performed in the [facility], was performed utilizing techniques to minimize radiation dose exposure, including the use of iterative reconstruction and automated exposure control. Radiation dose length product (DLP) for this visit: 848 mGy-cm IV Contrast: 85 mL of iohexol (OMNIPAQUE) Enteric Contrast: Not administered. FINDINGS: ABDOMEN LOWER CHEST: Mild bibasilar atelectasis. No pleural effusion. Coronary artery calcifications. LIVER/BILIARY TREE: Multiple (greater than 30) hypoattenuating lesions in the right and left hepatic lobes, new from 6/6/2024. All lesions were PSMA avid on recent PET/CT, and are in keeping with metastasis. Representative examples include: - Segment VIII lesion measuring 2.2 x 1.9 cm (2/20) - Segment VII lesion measuring 1.6 x 1.5 cm (2/45) - Segment V lesion measuring 1.4 x 1.3 cm (2/62) GALLBLADDER: No calcified gallstones. No pericholecystic inflammatory change. SPLEEN: Normal size. Stable linear parenchymal calcification. PANCREAS: Unremarkable. ADRENAL GLANDS: Unremarkable. KIDNEYS/URETERS: Bilateral nephrostomy catheters in place with pigtail tips in the renal pelves. No hydronephrosis. Mild wall thickening and urothelial enhancement of the ureters and renal pelves, suggestive of pyeloureteritis. STOMACH AND BOWEL: Unremarkable. APPENDIX: No findings to suggest appendicitis. ABDOMINOPELVIC CAVITY: No ascites. No pneumoperitoneum. No lymphadenopathy. VESSELS: Unremarkable for patient's age. PELVIS REPRODUCTIVE ORGANS: Small nodular enhancing foci in the prostate gland, likely corresponding to the PSMA avid lesion seen on recent PET/CT.  URINARY BLADDER: Mild wall thickening and urothelial enhancement, suggestive of cystitis. Small volume intravesical air, likely from recent instrumentation/catheterization. Nodular enhancing foci in the bladder neck (2/169, 61/65), likely corresponding to the PSMA avid lesions seen on prior PET/CT. ABDOMINAL WALL/INGUINAL REGIONS: Stable small fat-containing bilateral inguinal hernias. BONES: Numerous sclerotic, lytic and mixed lytic/sclerotic metastatic lesions in the thoracolumbar spine, bony pelvis and visualized ribs, progressed from 6/6/2024, but similar to recent PSMA PET/CT. There is increased sclerosis and mild compression deformity of L1 vertebral body with loss of approximately 30% height, similar to recent PET/CT. There is a lytic lesion with mild loss of height in T7 vertebral body, new/progressed from 6/6/2024.     Impression: 1. Findings suggestive of cystitis with bilateral pyeloureteritis. 2. Multiple metastatic liver lesions and widespread osseous metastasis, similar to recent PSMA PET/CT, but progressed from 6/6/2024. 3. Multiple small nodular enhancing foci in the prostate gland and bladder wall, likely corresponding to the PSMA avid lesions seen on recent PET/CT. The study was marked in EPIC for immediate notification. Workstation performed: [id number]     Procedure: NM pluvicto infusion  Result Date: 11/14/2024  Narrative: PLUVICTO THERAPY INDICATION:  C61: Malignant neoplasm of prostate RADIOPHARMACEUTICAL: 203.7 mCi Lutetium 177 PLUVICTO COMPARISON: PET/CT 10/15/2024 FINDINGS: Patient is a 74   year old male with history of metastatic prostate cancer. Laboratory blood work was drawn on 11/11/2024: Hemoglobin 12.7. WBC 10.95. Platelets 557. eGFR 89. Creatinine 0.80. Albumin 3.8. The benefits, risks and alternatives of this therapy were explained. Written informed consent was obtained. The patient was also given written and oral instructions regarding posttherapy care. 203.7 mCi Lutetium 177  Pluvicto was administered intravenously without incident.     Impression: 203.7 mCi Lutetium 177 Pluvicto was administered intravenously without immediate complication. Workstation performed: [id number]     Procedure: NM consultation pluvicto  Result Date: 10/22/2024  Narrative: A Nuclear Medicine Consultation was performed. Please refer to the Technologist Notes for consultation discussion details.     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  Elevated PSA of 58.98 (9/4/2024). MODIFIER: PS COMPARISON: PET/CT 8/17/2023; CT abdomen pelvis 6/6/2024 CELL TYPE:  prostatic adenocarcinoma TECHNIQUE:   5.4 mCi Ga-68 Illuccix PSMA administered IV. Multiplanar attenuation corrected and non attenuation corrected PET images were acquired 60 minutes post injection. Contiguous, low dose, axial CT sections were obtained from the vertex through the femurs .   Intravenous or oral contrast was not utilized.  This examination, like all CT scans performed in the [facility], was performed utilizing techniques to minimize radiation dose exposure, including the use of iterative reconstruction and automated exposure control. FINDINGS: VISUALIZED BRAIN: No acute abnormalities are seen. HEAD/NECK: There is a physiologic distribution of the radiotracer. No PSMA avid cervical adenopathy is seen. CT images: Unremarkable. CHEST: - Multiple PSMA avid foci appearing in the right lower lung lobe are from the liver and represents hepatic metastasis. These appear to be in the pulmonary parenchyma likely due to respiratory motion and misregistration. - Focus of PSMA activity in the medial left lung base lateral to the distal descending thoracic aorta demonstrating SUV max of 3.4 (PET image 135). No discrete finding on CT correlate. This appears to be pleural-based and  likely represents metastasis. CT images: Coronary calcifications. Small hiatal hernia. No suspicious lymphadenopathy. ABDOMEN: New multiple PSMA avid hepatic foci, greatest uptake in segment IVB demonstrating SUV max of 23. Although evaluation is limited, many of these foci correlate with hypoattenuating hepatic lesions on low-dose CT. CT images: Scattered colonic diverticulosis. Stable splenic calcifications. Bilateral percutaneous nephrostomy tubes. No hydronephrosis. Atherosclerotic calcifications of the abdominal aorta. PELVIS: There is interval decrease in PSMA activity and extent involving the prostate gland and bladder. Persistent nodular activity involving the posterior bladder wall (SUV max of 10) as well as in the inferior bladder/superior prostate (SUV max of 6.9), suspicious for residual versus recurrent. No obvious PSMA activity involving the bilateral seminal vesicles. Interval resolution of PSMA activity involving the left pelvic sidewall lymph nodes. CT images: Persistent focus of air within the bladder lumen. OSSEOUS STRUCTURES: Innumerable PSMA avid osseous lesions, which have progressed since 8/17/2023. Some of these lesions correspond to sclerotic foci CT correlate. Representative osseous lesions as described: *  New PSMA osseous lesion involving the clivus demonstrates SUV max of 7.3. *  New right calvarial lesion demonstrates SUV max 3.0. *  New right occipital condyle lesion demonstrates SUV max of 20. *  New right coronoid process lesion demonstrates SUV max of 45. New left glenoid lesion demonstrates SUV max of 50. *  New right inferior scapular lesion demonstrates SUV max of 41. *  New sternal lesion demonstrates SUV max of 41. *  Right anterior lateral third rib demonstrates SUV max of 63. *  T2 vertebral body demonstrates SUV max of 51. This is associated with a lytic lesion on CT correlate. *  T9 vertebral body demonstrates SUV max of 15. *  Previously demonstrated L1 lytic lesion now  appears sclerotic. New compression deformity of L1 with approximately 30% loss of vertebral body height. SUV max of 2.2 (previously 7.3). *  Diffuse PSMA avid osseous lesions involving the sacrum with SUV max of 56. *  Diffuse PSMA avid osseous lesions involving the bilateral ilium, pubis, and ischium. *  Right femoral head lesion demonstrates SUV max of 24. *  Left femoral intertrochanteric region demonstrates SUV max of 46. CT images: Compression deformity of L1 vertebral body as described above. Chronic deformity of left posterior ribs 9-12. Spinal degenerative changes.     Impression: 1. New multiple PSMA avid hepatic foci, also suspicious for metastasis. 2. New focus of PSMA activity in the medial left lung base adjacent to the descending thoracic aorta, likely represents metastasis. 3. Interval decrease in PSMA activity and extensive of the prostate gland and bladder. There is persistent nodular activity in the region of the posterior bladder wall at the inferior bladder/superior prostate, suspicious for residual versus recurrent disease. Urinary activity is felt to be less likely. 4. Innumerable PSMA avid osseous lesions, which have progressed since 8/17/2023, compatible with metastasis. 5. New compression deformity of L1 vertebral body with approximately 30% loss of vertebral body height. 6. New lytic lesion in the T2 vertebral body with increased PSMA activity, which may predispose patient to pathologic fracture. Resident: MADDISON Enrique I, the attending radiologist, have reviewed the images and agree with the final report above. Workstation performed: [id number]     Procedure: XR chest portable  Result Date: 10/8/2024  Narrative: XR CHEST PORTABLE INDICATION: Chills, hypotension. COMPARISON: 8/13/2024 FINDINGS: Low lung volumes. Clear lungs. No pneumothorax or pleural effusion. Normal cardiomediastinal silhouette. Bones are unremarkable for age. Bilateral nephrostomy tubes noted.     Impression: No acute  cardiopulmonary disease. Workstation performed: [id number]     Procedure: IR nephrostomy tube check/change/reposition/reinsertion/upsize  Result Date: 10/4/2024  Narrative: Bilateral nephrostomy tube check and exchange Clinical History: Chronic bilateral nephrostomy tubes, here for routine exchange Contrast: 12 mL of iohexol (OMNIPAQUE) Fluoro time: 3.1 MIN Number of Images: Multiple Radiation dose: 15 mGy Conscious sedation time: NONE Technique/intervention: The patient was brought to the interventional radiology suite and placed prone on the table. The right and left back was prepped and draped in the usual sterile fashion. A timeout performed per protocol. 1% lidocaine was used for local anesthesia. Contrast injected through both catheters confirmed appropriate position within the renal collecting system. The pigtail locking mechanism was released and the skin sutures were removed. Guidewires were advanced  through the catheter and curled in the renal collecting system. The catheters were removed over a wire. New 10 [place] all-purpose drainage catheters were advanced over the wire with the pigtails curled in the renal collecting system. The pigtail locking  mechanisms were engaged and a single stitch was placed to secure the catheter to the skin. Contrast injected through both catheters confirmed appropriate position.     Impression: Impression: 1.  Bilateral nephrostomy tube check and exchange. Workstation performed: [id number]       Administrative Statements   I have spent a total time of 32 minutes in caring for this patient on the day of the visit/encounter including Risks and benefits of tx options, Instructions for management, Patient and family education, Importance of tx compliance, Risk factor reductions, Impressions, Counseling / Coordination of care, Documenting in the medical record, Reviewing / ordering tests, medicine, procedures  , and Obtaining or reviewing history  . Topics discussed with  the patient / family include symptom assessment and management, medication review, medication adjustment, psychosocial support, supportive listening, and anticipatory guidance.

## 2025-01-28 NOTE — PATIENT INSTRUCTIONS
It was a pleasure speaking with you today.    As discussed:  -Continue your medications as prescribed.  -Continue with a bowel regimen to avoid constipation.    Follow-up in: 4 Weeks    Please do not hesitate to call me sooner should you have any questions/concerns or needs.    Medication safety issues - Do not drive under the influence of narcotics (including opioids), watch for adverse effects including confusion / altered mental status / respiratory depression (slowed breathing), keep medications stored in a safe/locked environment, do not use alcohol while opioids or other narcotics are in your system. Do not travel with more than the minimum number of tablets or capsules required for the trip.    ER Precautions  Watch for red flag symptoms including, but not limited to fevers, chills, chest pain, shortness of breath, intractable nausea/vomiting/diarrhea, or acute intractable pain (especially if pain is new or has changed).

## 2025-01-28 NOTE — ASSESSMENT & PLAN NOTE
Psychosocial   Supportive listening provided  Normalized experience of patient/family  Provided anxiety containment     Referrals Placed / Medical Equipment Ordered  -None    Follow-Up Recommendations  -Follow-up with PCP and current medical specialists  -Follow-up with palliative care: 4 weeks    Orders:    dexamethasone (DECADRON) 1 mg tablet; Take 1 tablet (1 mg total) by mouth daily with breakfast    gabapentin (Neurontin) 300 mg capsule; Take 1 capsule (300 mg total) by mouth 2 (two) times a day

## 2025-01-28 NOTE — ASSESSMENT & PLAN NOTE
Following Dr. Freeman of medical oncology  Patient with previous reactions with docetaxel.  Patient is currently with ongoing follow-up with repeat PSA and blood work under the observation of medical oncology.    Orders:    Diclofenac Sodium (VOLTAREN) 1 %; Apply 2 g topically 4 (four) times a day as needed (joint pain)    dexamethasone (DECADRON) 1 mg tablet; Take 1 tablet (1 mg total) by mouth daily with breakfast    gabapentin (Neurontin) 300 mg capsule; Take 1 capsule (300 mg total) by mouth 2 (two) times a day

## 2025-01-28 NOTE — ASSESSMENT & PLAN NOTE
Helpful with bowel regimen.  Orders:    docusate sodium (COLACE) 100 mg capsule; Take 1 capsule (100 mg total) by mouth 2 (two) times a day as needed for constipation

## 2025-01-28 NOTE — ASSESSMENT & PLAN NOTE
Patient has been gaining weight as appetite has improved since he has been on the dexamethasone.  No nausea or vomiting that is new.  Orders:    dexamethasone (DECADRON) 1 mg tablet; Take 1 tablet (1 mg total) by mouth daily with breakfast

## 2025-01-28 NOTE — ASSESSMENT & PLAN NOTE
Currently on Gabapentin 300mg but takes it BID at this time.    Voltaren 1% topical for joint aches to back and hip.    Orders:    dexamethasone (DECADRON) 1 mg tablet; Take 1 tablet (1 mg total) by mouth daily with breakfast    gabapentin (Neurontin) 300 mg capsule; Take 1 capsule (300 mg total) by mouth 2 (two) times a day

## 2025-01-31 ENCOUNTER — OFFICE VISIT (OUTPATIENT)
Dept: ENDOCRINOLOGY | Facility: CLINIC | Age: 75
End: 2025-01-31
Payer: COMMERCIAL

## 2025-01-31 ENCOUNTER — TELEPHONE (OUTPATIENT)
Dept: HEMATOLOGY ONCOLOGY | Facility: CLINIC | Age: 75
End: 2025-01-31

## 2025-01-31 ENCOUNTER — HOSPITAL ENCOUNTER (OUTPATIENT)
Dept: NON INVASIVE DIAGNOSTICS | Facility: HOSPITAL | Age: 75
Discharge: HOME | End: 2025-01-31
Attending: STUDENT IN AN ORGANIZED HEALTH CARE EDUCATION/TRAINING PROGRAM
Payer: COMMERCIAL

## 2025-01-31 ENCOUNTER — TELEPHONE (OUTPATIENT)
Dept: ENDOCRINOLOGY | Facility: CLINIC | Age: 75
End: 2025-01-31

## 2025-01-31 VITALS
DIASTOLIC BLOOD PRESSURE: 80 MMHG | HEART RATE: 86 BPM | SYSTOLIC BLOOD PRESSURE: 121 MMHG | OXYGEN SATURATION: 100 % | BODY MASS INDEX: 21.74 KG/M2 | HEIGHT: 68 IN

## 2025-01-31 DIAGNOSIS — E11.69 TYPE 2 DIABETES MELLITUS WITH OTHER SPECIFIED COMPLICATION, WITH LONG-TERM CURRENT USE OF INSULIN (HCC): Primary | ICD-10-CM

## 2025-01-31 DIAGNOSIS — Z79.4 TYPE 2 DIABETES MELLITUS WITH OTHER SPECIFIED COMPLICATION, WITH LONG-TERM CURRENT USE OF INSULIN (HCC): ICD-10-CM

## 2025-01-31 DIAGNOSIS — Z79.4 TYPE 2 DIABETES MELLITUS WITH OTHER SPECIFIED COMPLICATION, WITH LONG-TERM CURRENT USE OF INSULIN (HCC): Primary | ICD-10-CM

## 2025-01-31 DIAGNOSIS — N13.30 HYDRONEPHROSIS, UNSPECIFIED HYDRONEPHROSIS TYPE: Primary | ICD-10-CM

## 2025-01-31 DIAGNOSIS — E11.69 TYPE 2 DIABETES MELLITUS WITH OTHER SPECIFIED COMPLICATION, WITH LONG-TERM CURRENT USE OF INSULIN (HCC): ICD-10-CM

## 2025-01-31 PROBLEM — N18.31 CHRONIC KIDNEY DISEASE, STAGE 3A (HCC): Status: ACTIVE | Noted: 2025-01-31

## 2025-01-31 PROCEDURE — C1729 CATH, DRAINAGE: HCPCS

## 2025-01-31 PROCEDURE — 50435 EXCHANGE NEPHROSTOMY CATH: CPT | Performed by: RADIOLOGY

## 2025-01-31 PROCEDURE — 99203 OFFICE O/P NEW LOW 30 MIN: CPT | Performed by: NURSE PRACTITIONER

## 2025-01-31 PROCEDURE — C1769 GUIDE WIRE: HCPCS

## 2025-01-31 PROCEDURE — 50435 EXCHANGE NEPHROSTOMY CATH: CPT

## 2025-01-31 RX ORDER — INSULIN LISPRO 100 [IU]/ML
INJECTION, SOLUTION INTRAVENOUS; SUBCUTANEOUS
Qty: 15 ML | Refills: 0 | Status: SHIPPED | OUTPATIENT
Start: 2025-01-31

## 2025-01-31 RX ORDER — SODIUM CHLORIDE 9 MG/ML
10 INJECTION, SOLUTION INTRAMUSCULAR; INTRAVENOUS; SUBCUTANEOUS DAILY
Qty: 900 ML | Refills: 0 | Status: SHIPPED | OUTPATIENT
Start: 2025-01-31 | End: 2025-05-01

## 2025-01-31 RX ORDER — LIDOCAINE WITH 8.4% SOD BICARB 0.9%(10ML)
SYRINGE (ML) INJECTION AS NEEDED
Status: DISCONTINUED | OUTPATIENT
Start: 2025-01-31 | End: 2025-02-01 | Stop reason: HOSPADM

## 2025-01-31 RX ORDER — INSULIN LISPRO 100 [IU]/ML
INJECTION, SOLUTION INTRAVENOUS; SUBCUTANEOUS
Refills: 0 | OUTPATIENT
Start: 2025-01-31

## 2025-01-31 RX ORDER — SODIUM CHLORIDE 9 MG/ML
10 INJECTION, SOLUTION INTRAMUSCULAR; INTRAVENOUS; SUBCUTANEOUS DAILY
Qty: 900 ML | Refills: 0 | Status: SHIPPED | OUTPATIENT
Start: 2025-01-31

## 2025-01-31 RX ADMIN — Medication 20 ML: at 09:47

## 2025-01-31 RX ADMIN — IOHEXOL 15 ML: 350 INJECTION, SOLUTION INTRAVENOUS at 10:00

## 2025-01-31 NOTE — TELEPHONE ENCOUNTER
Pharmacy comment: Alternative Requested:NOT ON FORMULARY.     Reason for call:   [] Refill   [x] Prior Auth  [] Other:     Office:   [] PCP/Provider -   [x] Specialty/Provider - Endo    Medication: insulin lispro (HumaLOG KwikPen) 100 units/mL injection pen     Dose/Frequency:  Humalog 1 unit before meal if blood sugars is 200-249 mg/dL Humalog 2 units before meal if blood sugar is 250 mg/dL +     Quantity: 15 ml    Pharmacy: [facility]/pharmacy #[id number] - [address]     Does the patient have enough for 3 days?   [] Yes   [] No - Send as HP to POD

## 2025-01-31 NOTE — PATIENT INSTRUCTIONS
"Humalog 1 unit before meal if blood sugars is 200-249 mg/dL  Humalog 2 units before meal if blood sugar is 250 mg/dL +    In 1-2 weeks send in blood sugar logs for review or sooner if blood sugar patterns are less than 70 or over 250 mg/dl.      Low blood sugar in people without diabetes   The Basics   Written by the doctors and editors at [facility]   What is low blood sugar? -- This is a condition that happens when the level of sugar in a person's blood gets too low. Some symptoms of low blood sugar are mild, such as sweating or feeling hungry. Others are severe, such as passing out. Another word for low blood sugar is \"hypoglycemia.\"  Low blood sugar happens most often in people with diabetes. It is uncommon in people who do not have diabetes. People without diabetes might feel like they have low blood sugar sometimes, but their symptoms most likely have another cause.  What causes low blood sugar in people without diabetes? -- Causes include:   Certain medicines   Drinking alcohol, especially drinking a lot over a few days without eating enough   Certain illnesses that affect the liver or kidneys   Anorexia nervosa - This is an eating disorder that makes people lose more weight than is healthy.   Growths or other problems in the pancreas - The pancreas is an organ that sits behind the stomach (figure 1). It makes hormones and juices that control sugar levels in the blood and help the body break down food.   As a side effect of weight loss surgery   Hormone conditions - These include conditions that some babies are born with, and others that can develop over time.  What are the symptoms of low blood sugar? -- Symptoms can include:   Sweating or shaking   Feeling hungry   Feeling worried  If low blood sugar levels are not treated, severe symptoms can occur. These can include:   Headache, blurry vision, or feeling dizzy   Feeling weak or having trouble walking   Acting confused or not thinking clearly   Passing " out  Some people have symptoms within a few hours of eating a meal. Other people have symptoms when they haven't eaten for many hours.  How do I know if I have low blood sugar? -- To be diagnosed with low blood sugar, you must meet certain conditions. You must:   Have symptoms of low blood sugar   Have a low blood sugar level when you have the symptoms   Feel better after you eat something that raises your blood sugar level to normal  To check if you meet these conditions, your doctor will do blood tests when you have symptoms of low blood sugar. Or they might ask you to check your blood sugar at home. Your doctor might have you do things to cause symptoms so your blood sugar can be measured at the right time. For example, they might have you stop eating for a certain amount of time.  After your doctor confirms that you get low blood sugar, they will look for the cause. To do this, they might order tests, including:   Other blood tests   CT scan, MRI scan, or ultrasound - These are imaging tests that create pictures of the inside of the body.  How is low blood sugar treated? -- Treatment involves both raising your blood sugar and treating the cause of your low blood sugar.  Until the cause is found and treated, your doctor or nurse will teach you how you can raise your blood sugar when it gets low. People usually raise their blood sugar by eating or drinking quick sources of sugar (table 1). Depending on your condition, your doctor might recommend that you carry a quick source of sugar with you at all times in case you need it.  Your doctor will also treat the condition that's causing your low blood sugar, if it can be treated. For example, if a medicine is causing your low blood sugar, your doctor can change or stop your medicine. For low blood sugar after weight loss surgery, sometimes making changes to your diet is enough to fix the problem. If you have a growth in your pancreas, your doctor might recommend  surgery to remove the growth.  When should I get medical help? -- Someone should take you to a hospital or call for an ambulance (in the [place] and [place], call 9-1-1) if you still have low blood sugar or severe symptoms after having a quick source of sugar.  If you have low blood sugar, do not try to drive yourself to the hospital. Driving with low blood sugar can be dangerous. Once you are in the hospital or ambulance, you will be given fluids with sugar into your vein. This will raise your blood sugar level immediately.  All topics are updated as new evidence becomes available and our peer review process is complete.  This topic retrieved from [employer] on: Mar 22, 2024.  Topic 89113 Version 15.0  Release: 32.2.4 - C32.80  © 2024 UpToDate, Inc. and/or its affiliates. All rights reserved.  figure 1: Pancreas     The pancreas is an organ that is found behind the stomach. It makes hormones and juices that help the body break down food.  Graphic 19463 Version 9.0  table 1: Quick sources of sugar to treat low blood sugar  3 or 4 glucose tablets   ½ cup of juice or regular soda (not sugar-free)   2 tablespoons of raisins   4 or 5 saltine crackers   1 tablespoon of sugar   1 tablespoon of honey or corn syrup   6 to 8 hard candies   These sources of sugar act quickly to treat low blood sugar levels.  Graphic 78965 Version 1.0  Consumer Information Use and Disclaimer   Disclaimer: This generalized information is a limited summary of diagnosis, treatment, and/or medication information. It is not meant to be comprehensive and should be used as a tool to help the user understand and/or assess potential diagnostic and treatment options. It does NOT include all information about conditions, treatments, medications, side effects, or risks that may apply to a specific patient. It is not intended to be medical advice or a substitute for the medical advice, diagnosis, or treatment of a health care provider based on the health care  provider's examination and assessment of a patient's specific and unique circumstances. Patients must speak with a health care provider for complete information about their health, medical questions, and treatment options, including any risks or benefits regarding use of medications. This information does not endorse any treatments or medications as safe, effective, or approved for treating a specific patient. UpToDate, Inc. and its affiliates disclaim any warranty or liability relating to this information or the use thereof.The use of this information is governed by the Terms of Use, available at https://www.w[employer]DoCircuitsuwer.com/en/know/clinical-effectiveness-terms. 2024© UpToDate, Inc. and its affiliates and/or licensors. All rights reserved.  Copyright   © 2024 UpToDate, Inc. and/or its affiliates. All rights reserved.

## 2025-01-31 NOTE — ASSESSMENT & PLAN NOTE
Lab Results   Component Value Date    HGBA1C 6.7 (H) 12/11/2024     HGA1C reasonable given age and comorbidity. Is on palliative care. Continues on metformin 1000 mg twice daily, eGFR 101.     Recommend gentle sliding scale while on dexamethasone or appetite. For wife's comfort. I do not recommend long-acting insulin at this time. Has not used long acting insulin since dexamethasone dose reduction 2-3 days ago.     Humalog 1 unit before meal if blood sugars is 200-249 mg/dL  Humalog 2 units before meal if blood sugar is 250 mg/dL +    In 1-2 weeks send in blood sugar logs for review or sooner if blood sugar patterns are less than 70 or over 250 mg/dl.      Discussed risks/complications associated with uncontrolled diabetes including organ involvement, heart attack, stroke, death.      Advised lifestyle modifications including attention to diet including the amount and types of carbohydrates consumed and regular activity.     Call for blood sugars less than 70 mg/dl or patterns over mg/dl.     Monitor blood glucose levels at least 2 times a day, if on insulin ideally before all insulin administration times.     Discussed use of CGM to collect additional blood glucose data to reveal patterns that can be used to improve glucose levels and adjust insulin regimen.    Discussed symptoms and treatment of hypoglycemia.  Reviewed risks associated with hypoglycemia. Always carry rapid acting carbohydrates and a glucometer (a way to check your blood sugar).    Recommendation for medical identification either bracelet, necklace.    Routine follow up for diabetic eye and foot exams.     Ordered blood work to complete prior to next visit.    Send glucose logs/CGM download in 1-2 weeks for review    Follow up in 3 months.       Orders:    insulin lispro (HumaLOG KwikPen) 100 units/mL injection pen; Humalog 1 unit before meal if blood sugars is 200-249 mg/dL Humalog 2 units before meal if blood sugar is 250 mg/dL +

## 2025-01-31 NOTE — ASSESSMENT & PLAN NOTE
Lab Results   Component Value Date    EGFR 90 12/26/2024    EGFR 101 12/11/2024    EGFR 95 11/18/2024    CREATININE 0.77 12/26/2024    CREATININE 0.59 (L) 12/11/2024    CREATININE 0.68 11/18/2024

## 2025-01-31 NOTE — DISCHARGE INSTRUCTIONS
Nephrostomy Tube Care     WHAT YOU NEED TO KNOW:   A nephrostomy tube is a catheter (thin plastic tube) that is inserted through your skin and into your kidney. The nephrostomy tube drains urine from your kidney into a collecting bag outside your body. You may need a nephrostomy tube when something is blocking the normal flow of urine. A nephrostomy tube may be used for a short or a long period of time. The nephrostomy tube comes out of your back, so you will need someone to help care for your nephrostomy tube.          DISCHARGE INSTRUCTIONS:      How to clean the skin around the nephrostomy tube and change the bandage:  Since the nephrostomy tube comes out of your back, you will not be able to care for it by yourself. Ask someone to follow the general directions below to check and care for your nephrostomy tube.   Gather the items you will need.          Disposable (single use) under-pad, and a clean washcloth  Plain soap, warm water, and new medical gloves  Sterile gauze bandages  Clear adhesive dressing or medical tape  Skin barrier  Protective skin film  Trash bag  Remove the old bandage, and check the tube entry site.    Have the patient lie on his side with the nephrostomy tube entry site facing up. Place the under-pad where it will catch drainage as you are working with the nephrostomy tube.   Wash your hands with soap and water. Put on new medical gloves.  Gently remove the old bandage, without pulling on the tube. Do this by holding the skin beside the tube with one hand. With the other hand, gently remove sticky tape and the skin barrier by pulling in the same direction as hair growth. Do not touch the side of the bandage that is placed over or around the tube. Throw the bandage and skin barrier away in a trash bag.  Look for signs of infection, such as skin redness and swelling. Report any skin changes to healthcare providers.  Clean the tube entry site.    Hold the tube in place to keep it from  being pulled out while you are cleaning around it.  You will need to clean the area twice. For the first cleaning, wet a new gauze bandage with soap and water.  Begin at the entry site of the tube. Wipe the skin in circles, moving away from the entry site. Remove blood and any other material with the gauze. Do this as often as needed. Use a new gauze bandage each time you clean the area, moving away from the entry site.   For the second cleaning, wet a new gauze bandage with water. Begin at the entry site of the tube. Wipe the skin in circles, moving away from the entry site. Use a new gauze bandage each time you clean the area, moving away from the entry site.   Gently pat the skin with a clean washcloth to dry it.    Apply the skin barrier and bandages.    Roll up a bandage to make it thick, and place it under  the place where the tube enters the skin. Place it to support the tube, and stop it from kinking or bending. Tape the bandage in place, and apply more bandages if directed by a healthcare provider.   Bring the tubing forward to the front and tape it to the skin. Do not stretch the tube tight, because this may pull the nephrostomy tube out.  How often to change the bandage.  Change the bandage around the tube, every other day. If your bandages  get dirty or wet, change them right away, and as often as needed. If your nephrostomy tube is to be used for a long period of time, the tube needs to be changed every 2 to 3 months. Healthcare providers will tell you when you need to make an appointment to have your tube changed.     How to care for the urine drainage bag:   Ask if you need to measure and write down how much urine is in the bag before you empty it. Drain urine out of the drainage bag when it is ½ to ? full. Open the spout at the bottom of the bag to empty the urine into the toilet.   You may need to detach the drainage bag from the nephrostomy tube to change it.. If so, attach a new drainage bag  tightly to the nephrostomy tube.     How to prevent problems with your nephrostomy tube:   Change bandages, directed.  This helps to prevent infection. Throw away or clean your drainage bag as directed by your healthcare provider.    Wipe the connecting ends of the drainage bag with alcohol before you reconnect the bag to the tube.  This helps prevent infection.     Keep the tube taped to your skin and connected to a drainage bag placed below the level of your kidneys.  This helps prevent urine from backing up into your kidneys. You may wear a small drainage bag strapped to your leg to let you move around more easily.    Check the catheter to be sure it is in place after you change your clothes or do other activities.  Do not wear tight clothing over the tube. Place the tubing over your thigh rather than under it when you are sitting down. Be sure that nothing is pulling on the nephrostomy tube when you move around.    Change positions if you see little or no urine in your drainage bag.  Check to see if the urine tube is twisted or bent. Be sure that you are not sitting or lying on the tube. If there are no kinks and there is little or no urine in the drainage bag, tell your healthcare provider.    Flush out the tube as directed. Some tubes get flushed one time a day with 10 mls of NSS You will be given a prescription for the flushes.  To flush the nephrostomy tube, clean both connections with alcohol swap. Twist off the drainage bag tube and twist the saline syringe into the nephrostomy tube and flush briskly. Remove the syringe and twist the drainage bag tube back into the nephrostomy tube.  Keep the site covered while you shower.  Tape a piece of clear adhesive plastic over the dressing to keep it dry while you shower. Do not take tub baths.    Contact Interventional Radiology at [phone] ([facility] PATIENTS: Contact Interventional Radiology at [phone]) ([facility] PATIENTS: Contact Interventional Radiology at  [phone]) if:  The skin around the nephrostomy tube is red, swollen, itches, or has a rash.   You have a fever greater than 101 or chills.  You have lower back or hip pain.  There are changes in how your urine looks or smells.  You have little or no urine draining from the nephrostomy tube.   You have nausea and are vomiting.  The black deb on your tube has moved, or the tube is longer than when it was put in.   You have questions or concerns about your condition or care.  The nephrostomy tube comes out completely.   There is blood, pus, or a bad smell coming from the place where the tube enters your skin.  Urine is leaking around the tube.        The following pharmacies carry the flush syringes.       [facility]                     [address]                     [address] [facility]                    [phone]  [place]                       [place]  Phone [phone]            Phone [phone]                 [facility]                                                                                                   [phone]  [facility]             [facility]                             [address] [place], [place] [address] [facility]   Yoselyn LARSON                                 [phone]  Phone [phone]            Phone [phone]    [facility]                                                                         [facility] [phone]  Marie Carter.  [place]   Phone [phone]

## 2025-01-31 NOTE — PROGRESS NOTES
Name: Oliver Astudillo      : 1950      MRN: [id number]  Encounter Provider: ALTA Felipe  Encounter Date: 2025   Encounter department: [facility]  :  Assessment & Plan  Type 2 diabetes mellitus with other specified complication, with long-term current use of insulin (HCC)    Lab Results   Component Value Date    HGBA1C 6.7 (H) 2024     HGA1C reasonable given age and comorbidity. Is on palliative care. Continues on metformin 1000 mg twice daily, eGFR 101.     Recommend gentle sliding scale while on dexamethasone or appetite. For wife's comfort. I do not recommend long-acting insulin at this time. Has not used long acting insulin since dexamethasone dose reduction 2-3 days ago.     Humalog 1 unit before meal if blood sugars is 200-249 mg/dL  Humalog 2 units before meal if blood sugar is 250 mg/dL +    In 1-2 weeks send in blood sugar logs for review or sooner if blood sugar patterns are less than 70 or over 250 mg/dl.      Discussed risks/complications associated with uncontrolled diabetes including organ involvement, heart attack, stroke, death.      Advised lifestyle modifications including attention to diet including the amount and types of carbohydrates consumed and regular activity.     Call for blood sugars less than 70 mg/dl or patterns over mg/dl.     Monitor blood glucose levels at least 2 times a day, if on insulin ideally before all insulin administration times.     Discussed use of CGM to collect additional blood glucose data to reveal patterns that can be used to improve glucose levels and adjust insulin regimen.    Discussed symptoms and treatment of hypoglycemia.  Reviewed risks associated with hypoglycemia. Always carry rapid acting carbohydrates and a glucometer (a way to check your blood sugar).    Recommendation for medical identification either bracelet, necklace.    Routine follow up for diabetic eye and foot exams.      Ordered blood work to complete prior to next visit.    Send glucose logs/CGM download in 1-2 weeks for review    Follow up in 3 months.       Orders:    insulin lispro (HumaLOG KwikPen) 100 units/mL injection pen; Humalog 1 unit before meal if blood sugars is 200-249 mg/dL Humalog 2 units before meal if blood sugar is 250 mg/dL +        History of Present Illness   TONG Astudillo is a 74 y.o. male who presents who presents for history of diabetes mellitus with history of metastatic prostate cancer.Followed by palliative care with recent dose reduction of dexamethasone to 1 mg daily.     Presents for concern regarding blood sugar patterns.      [id number].     Very weak with improved appetite since dexamethasone.     CGM Interpretation  Oliver Astudillo   Device used Dexcom for Personal Use  Indication: Type of Diabetes: Type 2 Diabetes  More than 72 hours of data was reviewed. Report to be scanned to chart.   Date Range: January 18-31, 2025  Analysis of data:   Average Glucose: 190 mg/dl  Coefficient of Variation: 24.5%  SD : 47mg/dl  GMI: 7.95  Time in Target Range: 42%  Time Above Range: 58%  Time Below Range: 0%   Interpretation of data:   Post-prandial hyperglycemia.     Current diabetes management includes metformin 1000 mg twice daily.    Wife has been giving up to 20 units of levemir overnight but has noticed some reduction in glucose levels since dexamathasone reduced 2-3 days ago. Still very concerned by glucose levels in 2-300s after eating.      History obtained from: patient and patient's Significant Other     Review of Systems Unable to provide     History obtained from: patient    Medical History Reviewed by provider this encounter:  Tobacco  Allergies  Meds  Problems  Med Hx  Surg Hx  Fam Hx     .  Current Outpatient Medications on File Prior to Visit   Medication Sig Dispense Refill    acetaminophen (TYLENOL) 500 mg tablet Take 2 tablets (1,000 mg total) by mouth 3 (three) times a  day 180 tablet 2    albuterol (Ventolin HFA) 90 mcg/act inhaler Inhale 2 puffs every 6 (six) hours as needed for wheezing 18 g 0    Alcohol Swabs 70 % PADS To clean the skin prior to injecting insulin 100 each 1    Blood Glucose Monitoring Suppl (OneTouch Verio Reflect) w/Device KIT Check blood sugars once daily. Please substitute with appropriate alternative as covered by patient's insurance. Dx: E11.65 1 kit 0    Cholecalciferol (VITAMIN D3) 1,000 units tablet Take 1 tablet (1,000 Units total) by mouth daily 90 tablet 3    dexamethasone (DECADRON) 1 mg tablet Take 1 tablet (1 mg total) by mouth daily with breakfast 30 tablet 0    Diclofenac Sodium (VOLTAREN) 1 % Apply 2 g topically 4 (four) times a day as needed (joint pain) 100 g 1    docusate sodium (COLACE) 100 mg capsule Take 1 capsule (100 mg total) by mouth 2 (two) times a day as needed for constipation 60 capsule 0    Easy Touch Pen Needles 31G X 6 MM MISC Use daily at bedtime 100 each 3    gabapentin (Neurontin) 300 mg capsule Take 1 capsule (300 mg total) by mouth 2 (two) times a day 60 capsule 1    magnesium Oxide (MAG-OX) 400 mg TABS Take 1 tablet (400 mg total) by mouth 2 (two) times a day 60 tablet 1    metFORMIN (GLUCOPHAGE) 1000 MG tablet Take 1 tablet (1,000 mg total) by mouth 2 (two) times a day with meals 180 tablet 1    Nutritional Supplements (Ensure Plus High Protein) LIQD Take 237 mL by mouth 3 (three) times a day 237 mL 9    Nutritional Supplements (Glucerna Hunger Smart Shake) LIQD Take by mouth Three times a day      omega-3-acid ethyl esters (LOVAZA) 1 g capsule TAKE 1 CAPSULE BY MOUTH DAILY. 90 capsule 1    pantoprazole (PROTONIX) 40 mg tablet Take 1 tablet (40 mg total) by mouth daily 90 tablet 1    polyethylene glycol (MIRALAX) 17 g packet Take 17 g by mouth daily as needed (for constipation) 100 each 1    senna (SENOKOT) 8.6 mg Take 1 tablet (8.6 mg total) by mouth 2 (two) times a day 60 tablet 3    sodium chloride 1 g tablet Take 1  tablet (1 g total) by mouth 2 (two) times a day with meals 60 tablet 0    sodium chloride, PF, 0.9 % 10 mL by Intracatheter route daily Intracatheter flushing daily. May substitute prefilled syringe with normal saline 10 mL vials, 10 mL syringes, and 18 g blunt needles 600 mL 2    glucose 4-6 GM-MG Chew 2 tablets daily as needed for low blood sugar 90 tablet 3    glucose blood (OneTouch Verio) test strip Check blood sugars twice a daily. Please substitute with appropriate alternative as covered by patient's insurance. Dx: E11.65 (Patient not taking: Reported on 1/28/2025) 200 each 3    Multiple Vitamin (multivitamin) tablet Take 1 tablet by mouth daily (Patient not taking: Reported on 1/28/2025) 30 tablet 2    naloxone (NARCAN) 4 mg/0.1 mL nasal spray Administer 1 spray into a nostril. If no response after 2-3 minutes, give another dose in the other nostril using a new spray. (Patient not taking: Reported on 1/28/2025) 1 each 0    Nutritional Supplements (Ensure Plus High Protein) LIQD  (Patient not taking: Reported on 1/31/2025)      ondansetron (Zofran ODT) 8 mg disintegrating tablet Take 1 tablet (8 mg total) by mouth every 8 (eight) hours as needed for nausea or vomiting (Patient not taking: Reported on 1/28/2025) 20 tablet 3    OneTouch Delica Lancets 33G MISC Check blood sugars once daily. Please substitute with appropriate alternative as covered by patient's insurance. Dx: E11.65 (Patient not taking: Reported on 1/28/2025) 100 each 3    oxyCODONE (ROXICODONE) 10 MG TABS Take 1 tablet (10 mg total) by mouth every 4 (four) hours as needed for moderate pain Max Daily Amount: 60 mg (Patient not taking: Reported on 1/28/2025) 90 tablet 0    Spacer/Aero-Holding Chambers (AEROCHAMBER MINI CHAMBER) BILLY by Does not apply route see administration instructions (Patient not taking: Reported on 1/28/2025) 1 Device 0     Current Facility-Administered Medications on File Prior to Visit   Medication Dose Route Frequency  "Provider Last Rate Last Admin    [COMPLETED] iohexol (OMNIPAQUE) 350 MG/ML injection (SINGLE-DOSE) 15 mL  15 mL Other Once in imaging Hao Christina MD   15 mL at 01/31/25 1000    lidocaine 1% buffered   Infiltration PRN Hao Christina MD   20 mL at 01/31/25 0947         Objective   /80 (BP Location: Left arm, Patient Position: Sitting, Cuff Size: Large)   Pulse 86   Ht 5' 8\" (1.727 m)   SpO2 100%   BMI 21.74 kg/m²        Physical Exam   Constitutional: He is oriented to person, place, and time. No acute distress.   HENT:   Head: Normocephalic and atraumatic.   Neck: Normal range of motion.   Pulmonary/Chest: Effort normal.   Musculoskeletal: Normal range of motion.   Neurological: He is alert and oriented to person, place, and time.   Skin: He is not diaphoretic.   Psychiatric: He has a normal mood and affect. His behavior is normal.       Administrative Statements     "

## 2025-02-03 ENCOUNTER — RESULTS FOLLOW-UP (OUTPATIENT)
Dept: ENDOCRINOLOGY | Facility: CLINIC | Age: 75
End: 2025-02-03

## 2025-02-03 ENCOUNTER — APPOINTMENT (OUTPATIENT)
Dept: LAB | Facility: HOSPITAL | Age: 75
End: 2025-02-03
Attending: INTERNAL MEDICINE
Payer: COMMERCIAL

## 2025-02-03 DIAGNOSIS — E78.5 HYPERLIPIDEMIA, UNSPECIFIED HYPERLIPIDEMIA TYPE: ICD-10-CM

## 2025-02-03 DIAGNOSIS — C61 PROSTATE CANCER METASTATIC TO BONE (HCC): ICD-10-CM

## 2025-02-03 DIAGNOSIS — E11.69 TYPE 2 DIABETES MELLITUS WITH OTHER SPECIFIED COMPLICATION, WITH LONG-TERM CURRENT USE OF INSULIN (HCC): ICD-10-CM

## 2025-02-03 DIAGNOSIS — C79.51 PROSTATE CANCER METASTATIC TO BONE (HCC): ICD-10-CM

## 2025-02-03 DIAGNOSIS — E83.52 HYPERCALCEMIA: ICD-10-CM

## 2025-02-03 DIAGNOSIS — Z79.4 TYPE 2 DIABETES MELLITUS WITH OTHER SPECIFIED COMPLICATION, WITH LONG-TERM CURRENT USE OF INSULIN (HCC): ICD-10-CM

## 2025-02-03 LAB
ALBUMIN SERPL BCG-MCNC: 4 G/DL (ref 3.5–5)
ALP SERPL-CCNC: 146 U/L (ref 34–104)
ALT SERPL W P-5'-P-CCNC: 12 U/L (ref 7–52)
ANION GAP SERPL CALCULATED.3IONS-SCNC: 11 MMOL/L (ref 4–13)
AST SERPL W P-5'-P-CCNC: 16 U/L (ref 13–39)
BASOPHILS # BLD AUTO: 0.03 THOUSANDS/ΜL (ref 0–0.1)
BASOPHILS NFR BLD AUTO: 1 % (ref 0–1)
BILIRUB SERPL-MCNC: 0.52 MG/DL (ref 0.2–1)
BUN SERPL-MCNC: 17 MG/DL (ref 5–25)
CALCIUM SERPL-MCNC: 11.4 MG/DL (ref 8.4–10.2)
CHLORIDE SERPL-SCNC: 98 MMOL/L (ref 96–108)
CHOLEST SERPL-MCNC: 204 MG/DL (ref ?–200)
CO2 SERPL-SCNC: 25 MMOL/L (ref 21–32)
CREAT SERPL-MCNC: 0.74 MG/DL (ref 0.6–1.3)
EOSINOPHIL # BLD AUTO: 0.06 THOUSAND/ΜL (ref 0–0.61)
EOSINOPHIL NFR BLD AUTO: 1 % (ref 0–6)
ERYTHROCYTE [DISTWIDTH] IN BLOOD BY AUTOMATED COUNT: 14.2 % (ref 11.6–15.1)
EST. AVERAGE GLUCOSE BLD GHB EST-MCNC: 157 MG/DL
GFR SERPL CREATININE-BSD FRML MDRD: 92 ML/MIN/1.73SQ M
GLUCOSE P FAST SERPL-MCNC: 139 MG/DL (ref 65–99)
HBA1C MFR BLD: 7.1 %
HCT VFR BLD AUTO: 41 % (ref 36.5–49.3)
HDLC SERPL-MCNC: 37 MG/DL
HGB BLD-MCNC: 13.8 G/DL (ref 12–17)
IMM GRANULOCYTES # BLD AUTO: 0.04 THOUSAND/UL (ref 0–0.2)
IMM GRANULOCYTES NFR BLD AUTO: 1 % (ref 0–2)
LDLC SERPL CALC-MCNC: 127 MG/DL (ref 0–100)
LYMPHOCYTES # BLD AUTO: 0.79 THOUSANDS/ΜL (ref 0.6–4.47)
LYMPHOCYTES NFR BLD AUTO: 15 % (ref 14–44)
MCH RBC QN AUTO: 31.3 PG (ref 26.8–34.3)
MCHC RBC AUTO-ENTMCNC: 33.7 G/DL (ref 31.4–37.4)
MCV RBC AUTO: 93 FL (ref 82–98)
MONOCYTES # BLD AUTO: 0.36 THOUSAND/ΜL (ref 0.17–1.22)
MONOCYTES NFR BLD AUTO: 7 % (ref 4–12)
NEUTROPHILS # BLD AUTO: 4.05 THOUSANDS/ΜL (ref 1.85–7.62)
NEUTS SEG NFR BLD AUTO: 75 % (ref 43–75)
NONHDLC SERPL-MCNC: 167 MG/DL
NRBC BLD AUTO-RTO: 0 /100 WBCS
PLATELET # BLD AUTO: 329 THOUSANDS/UL (ref 149–390)
PMV BLD AUTO: 9.9 FL (ref 8.9–12.7)
POTASSIUM SERPL-SCNC: 4.2 MMOL/L (ref 3.5–5.3)
PROT SERPL-MCNC: 7.2 G/DL (ref 6.4–8.4)
RBC # BLD AUTO: 4.41 MILLION/UL (ref 3.88–5.62)
SODIUM SERPL-SCNC: 134 MMOL/L (ref 135–147)
TRIGL SERPL-MCNC: 198 MG/DL (ref ?–150)
WBC # BLD AUTO: 5.33 THOUSAND/UL (ref 4.31–10.16)

## 2025-02-03 PROCEDURE — 36415 COLL VENOUS BLD VENIPUNCTURE: CPT

## 2025-02-03 PROCEDURE — 83036 HEMOGLOBIN GLYCOSYLATED A1C: CPT

## 2025-02-03 PROCEDURE — 80061 LIPID PANEL: CPT

## 2025-02-03 PROCEDURE — 80053 COMPREHEN METABOLIC PANEL: CPT

## 2025-02-03 PROCEDURE — 84153 ASSAY OF PSA TOTAL: CPT

## 2025-02-03 PROCEDURE — 85025 COMPLETE CBC W/AUTO DIFF WBC: CPT

## 2025-02-04 LAB — PSA SERPL-MCNC: 285.47 NG/ML (ref 0–4)

## 2025-02-04 NOTE — TELEPHONE ENCOUNTER
PA for (HumaLOG KwikPen) 100 units/mL injectionSUBMITTED to [facility]     via    [x]CMM-KEY: [id number]        [x]PA sent as URGENT    All office notes, labs and other pertaining documents and studies sent. Clinical questions answered. Awaiting determination from insurance company.     Turnaround time for your insurance to make a decision on your Prior Authorization can take 7-21 business days.

## 2025-02-05 ENCOUNTER — HOSPITAL ENCOUNTER (OUTPATIENT)
Dept: INFUSION CENTER | Facility: HOSPITAL | Age: 75
Discharge: HOME | End: 2025-02-05
Attending: INTERNAL MEDICINE
Payer: COMMERCIAL

## 2025-02-05 ENCOUNTER — OFFICE VISIT (OUTPATIENT)
Dept: HEMATOLOGY ONCOLOGY | Facility: CLINIC | Age: 75
End: 2025-02-05
Payer: COMMERCIAL

## 2025-02-05 VITALS
RESPIRATION RATE: 17 BRPM | OXYGEN SATURATION: 100 % | HEART RATE: 95 BPM | BODY MASS INDEX: 21.22 KG/M2 | DIASTOLIC BLOOD PRESSURE: 72 MMHG | HEIGHT: 68 IN | SYSTOLIC BLOOD PRESSURE: 118 MMHG | TEMPERATURE: 98.5 F | WEIGHT: 140 LBS

## 2025-02-05 VITALS
TEMPERATURE: 97.7 F | SYSTOLIC BLOOD PRESSURE: 123 MMHG | RESPIRATION RATE: 18 BRPM | DIASTOLIC BLOOD PRESSURE: 64 MMHG | HEART RATE: 82 BPM

## 2025-02-05 DIAGNOSIS — R63.0 ANOREXIA: ICD-10-CM

## 2025-02-05 DIAGNOSIS — C61 PROSTATE CANCER METASTATIC TO BONE (HCC): Primary | ICD-10-CM

## 2025-02-05 DIAGNOSIS — C79.51 PROSTATE CANCER METASTATIC TO BONE (HCC): Primary | ICD-10-CM

## 2025-02-05 DIAGNOSIS — R26.2 AMBULATORY DYSFUNCTION: ICD-10-CM

## 2025-02-05 DIAGNOSIS — G89.3 CANCER RELATED PAIN: ICD-10-CM

## 2025-02-05 DIAGNOSIS — E83.52 HYPERCALCEMIA: ICD-10-CM

## 2025-02-05 PROBLEM — R11.2 NAUSEA AND VOMITING: Status: RESOLVED | Noted: 2024-06-28 | Resolved: 2025-02-05

## 2025-02-05 PROCEDURE — 99215 OFFICE O/P EST HI 40 MIN: CPT | Performed by: INTERNAL MEDICINE

## 2025-02-05 RX ORDER — SODIUM CHLORIDE 9 MG/ML
20 INJECTION, SOLUTION INTRAVENOUS ONCE
Status: COMPLETED | OUTPATIENT
Start: 2025-02-05 | End: 2025-02-05

## 2025-02-05 RX ORDER — SODIUM CHLORIDE 9 MG/ML
20 INJECTION, SOLUTION INTRAVENOUS ONCE
OUTPATIENT
Start: 2025-02-13

## 2025-02-05 RX ADMIN — ZOLEDRONIC ACID 4 MG: 4 INJECTION, SOLUTION, CONCENTRATE INTRAVENOUS at 14:10

## 2025-02-05 RX ADMIN — SODIUM CHLORIDE 20 ML/HR: 0.9 INJECTION, SOLUTION INTRAVENOUS at 14:10

## 2025-02-05 NOTE — PLAN OF CARE
Problem: Potential for Falls  Goal: Patient will remain free of falls  Description: INTERVENTIONS:  - Educate patient/family on patient safety including physical limitations  - Instruct patient to call for assistance with activity   - Keep Call bell within reach  - Keep care items and personal belongings within reach

## 2025-02-05 NOTE — PROGRESS NOTES
Oliver Astudillo  tolerated treatment well with no complications.      Oliver Astudillo is aware of future appt on 4/30 at 1230.     AVS printed and given to Oliver Astudillo:  Yes

## 2025-02-05 NOTE — PROGRESS NOTES
[facility]ATOLOG[facility] ON[facility] [place]  [address] [facility] [address]  [place] [address]  [phone]    Oliver Astudillo,1950, [id number]  02/05/25    Discussion:   In summary, this is a 74-year-old male with history of prostate cancer with bone metastases.  Progressive disease after hormonal blockade.  Severe infusion reaction with docetaxel.  November 2024 started lutetium 177.  Recent CBC and differential normal.  CMP shows glucose 139, calcium 11.4, alk phos 146, sodium 134, otherwise normal.  .  He has some pain at percutaneous nephrostomy site.  None elsewhere.  Requires no analgesics at this time.  Decadron per palliative care.  Previously had Zometa in November 2024.  Rising calcium likely correlates with decreased appetite and energy at this time.  Scheduled for Zometa next week.  Will move up to expedite improvement in calcium.  PSA elevation is somewhat difficult to interpret.  Reference was 125 in mid October.  Did not start Pluvicto until mid November.  Further, a minority of patients can have PSA and elevation in the first 4-8 weeks after Pluvicto initiation.  Given all of this, my inclination is to continue therapy for the moment, reevaluate.  Professional interpretation provided through [facility].  I discussed the above with the patient.  The patient and his wife voiced understanding and agreement.  ______________________________________________________________________    Chief Complaint   Patient presents with    Follow-up       HPI:  Oncology History   Prostate cancer metastatic to bone (HCC)   5/24/2023 Initial Diagnosis    May 2023 patient presented with urinary frequency. . CT showed distended urinary bladder with bilateral hydronephrosis. Urinary bladder mass inseparable from the prostate. Extraprostatic extension noted. Bone scan showed indeterminate activity at T11 vertebral body. Bilateral nephrostomy tubes and Cazares catheter placed. Prostate biopsy  showed adenocarcinoma, Natick 9.      6/28/2023 Biopsy    A. Prostate, right lateral base:  - Prostatic adenocarcinoma, Liana score 4 + 5 = 9, Prognostic Grade Group 5,  involving 90% of 1 needle core  and measuring  11 mm in length.        B. Prostate, right medial base:  - Prostatic adenocarcinoma, Natick score 4 + 5 = 9, Prognostic Grade Group 5,  involving 70% of 1 needle core  and measuring  10 mm in length.      C. Prostate, right lateral mid:  - Prostatic adenocarcinoma, Liana score 4 + 5 = 9, Prognostic Grade Group 5,  involving 90% of 1 needle core  and measuring  12 mm in length.      Comment: Immunohistochemistry for a prostate multiplex stain (p63, K903, and P504S) and NKX3.1 demonstrate invasive carcinoma.     D. Prostate, right medial mid:  - Prostatic adenocarcinoma, Natick score 4 + 5 = 9, Prognostic Grade Group 5,  involving 95% of 1 needle core  and measuring  15 mm in length.      E. Prostate, right lateral apex:  - Prostatic adenocarcinoma, Natick score 4 + 5 = 9, Prognostic Grade Group 5,  involving 90% of 1 needle core  and measuring  12 mm in length.   - Perineural invasion identified.     Comment: Immunohistochemistry for a prostate multiplex stain (p63, K903, and P504S) and NKX3.1 demonstrate invasive carcinoma.     F. Prostate, right medial apex:  - Prostatic adenocarcinoma, Natick score 4 + 5 = 9, Prognostic Grade Group 5,  involving 100% of 1 needle core  and measuring  20 mm in length.      G. Prostate, left lateral base:  - Prostatic adenocarcinoma, Natick score 4 + 5 = 9, Prognostic Grade Group 5,  involving 75% of 1 needle core  and measuring  10 mm in length.   - Perineural invasion identified.  - Lymphovascular invasion is identified.     H. Prostate, left medial base:  - Prostatic adenocarcinoma, Natick score 4 + 5 = 9, Prognostic Grade Group 5,  involving 95% of 1 needle core  and measuring  14 mm in length.   - Perineural invasion identified.     Comment: There is a  focus of closely approximated gastrointestinal epithelium; NKX3.1 shows no definite invasion of the GI epithelium.      I. Prostate, left lateral mid:  - Prostatic adenocarcinoma, Force score 4 + 5 = 9, Prognostic Grade Group 5,  involving 95% of 1 needle core  and measuring  12 mm in length.       J. Prostate, left medial mid:  - Prostatic adenocarcinoma, Liana score 4 + 5 = 9, Prognostic Grade Group 5,  involving 85% of 1 needle core  and measuring  13 mm in length.       Comment: Immunohistochemistry for a prostate multiplex stain (p63, K903, and P504S) and NKX3.1 demonstrate invasive carcinoma.     K. Prostate, left lateral apex:  - Prostatic adenocarcinoma, Liana score 4 + 5 = 9, Prognostic Grade Group 5,  involving 25% of 1 needle core  and measuring  3 mm in length.        L. Prostate, left medial apex :  - Prostatic adenocarcinoma, Liana score 4 + 5 = 9, Prognostic Grade Group 5,  involving 95% of 1 needle core  and measuring  15 mm in length.     - Perineural invasion identified.     Comment:   Cribriform glands are present within the pattern 4 component.  This feature has been associated with adverse clinical outcomes and molecular features typically seen in advanced disease.  (The 2019 Genitourinary Pathology Society (GUPS) White Paper on Contemporary Grading of Prostate Cancer. Arch Pathol Lab Med. 2021 Apr 1;145(4):461-493.)     7/5/2023 -  Hormone Therapy    Firmagon loading dose on 7/5/23, followed by Lupron      8/28/2023 -  Cancer Staged    Staging form: Prostate, AJCC 8th Edition  - Clinical: Stage IVB (cT4, cN1, cM1b, PSA: 145, Grade Group: 5) - Signed by Dov Andrea MD on 8/28/2023  Prostate specific antigen (PSA) range: 20 or greater  Histologic grading system: 5 grade system       9/2023 - 6/2024 Chemotherapy    Zytiga 250 mg PO daily     6/2024 -  Chemotherapy    Xtandi      6/24/2024 - 7/8/2024 Radiation    The patient saw @[facility]@ for radiation treatment. This is the  current list of radiation treatment:    Plan ID Energy Fractions Dose per Fraction (cGy) Dose Correction (cGy) Total Dose Delivered (cGy) Elapsed Days   T11_L5 Spine 10X/6X 10 / 10 300 0 3,000 14        8/30/2024 - 9/20/2024 Chemotherapy    alteplase (CATHFLO), 2 mg, Intracatheter, Every 1 Minute as needed, 2 of 3 cycles  DOCEtaxel (TAXOTERE) chemo infusion, 25 mg/m2 = 44.6 mg (83.3 % of original dose 30 mg/m2), Intravenous, Once, 2 of 3 cycles  Dose modification: 25 mg/m2 (original dose 30 mg/m2, Cycle 1, Reason: Anticipated Tolerance)  Administration: 44.6 mg (8/30/2024), 44.6 mg (9/6/2024), 44.6 mg (9/20/2024)         Interval History: Increased fatigue, weakness, decreased appetite over the past 4-7 days.  ECOG-  3-Symptomatic, greater than 50% sedentary.    Review of Systems   Constitutional:  Positive for appetite change and fatigue. Negative for chills and fever.   HENT:  Negative for nosebleeds.    Eyes:  Negative for discharge.   Respiratory:  Negative for cough and shortness of breath.    Cardiovascular:  Negative for chest pain.   Gastrointestinal:  Negative for abdominal pain, constipation and diarrhea.   Endocrine: Negative for polydipsia.   Genitourinary:  Negative for hematuria.   Musculoskeletal:  Negative for arthralgias.   Skin:  Negative for color change.   Allergic/Immunologic: Negative for immunocompromised state.   Neurological:  Positive for weakness. Negative for dizziness and headaches.   Hematological:  Negative for adenopathy.   Psychiatric/Behavioral:  Negative for agitation.        Past Medical History:   Diagnosis Date    Altered mental status 10/08/2024    COVID-19 01/15/2024    Diabetes mellitus (HCC)     Elevated PSA 06/21/2023    High cholesterol     Hypertension     Prostate cancer (HCC)     Severe sepsis (HCC) 10/08/2024     Patient Active Problem List   Diagnosis    Type 2 diabetes mellitus with other specified complication (HCC)    Other hydronephrosis    Hypertension     Hydronephrosis    Prostate cancer metastatic to bone (HCC)    Encounter for monitoring androgen deprivation therapy    Nephrostomy status (HCC)    Chronic hyponatremia    Hyperlipidemia    Constipation    Stage 2 chronic kidney disease    Cancer related pain    Palliative care by specialist    Ambulatory dysfunction    Therapeutic opioid-induced constipation (OIC)    Nausea and vomiting    Advanced care planning/counseling discussion    Goals of care, counseling/discussion    Anorexia    Unintentional weight loss    Hypercalcemia    Moderate protein-calorie malnutrition (HCC)    Vitamin B12 deficiency    Opioid dependence, uncomplicated (HCC)    Chronic kidney disease, stage 3a (HCC)       Current Outpatient Medications:     acetaminophen (TYLENOL) 500 mg tablet, Take 2 tablets (1,000 mg total) by mouth 3 (three) times a day, Disp: 180 tablet, Rfl: 2    albuterol (Ventolin HFA) 90 mcg/act inhaler, Inhale 2 puffs every 6 (six) hours as needed for wheezing, Disp: 18 g, Rfl: 0    Alcohol Swabs 70 % PADS, To clean the skin prior to injecting insulin, Disp: 100 each, Rfl: 1    Blood Glucose Monitoring Suppl (OneTouch Verio Reflect) w/Device KIT, Check blood sugars once daily. Please substitute with appropriate alternative as covered by patient's insurance. Dx: E11.65, Disp: 1 kit, Rfl: 0    Cholecalciferol (VITAMIN D3) 1,000 units tablet, Take 1 tablet (1,000 Units total) by mouth daily, Disp: 90 tablet, Rfl: 3    dexamethasone (DECADRON) 1 mg tablet, Take 1 tablet (1 mg total) by mouth daily with breakfast, Disp: 30 tablet, Rfl: 0    Diclofenac Sodium (VOLTAREN) 1 %, Apply 2 g topically 4 (four) times a day as needed (joint pain), Disp: 100 g, Rfl: 1    docusate sodium (COLACE) 100 mg capsule, Take 1 capsule (100 mg total) by mouth 2 (two) times a day as needed for constipation, Disp: 60 capsule, Rfl: 0    Easy Touch Pen Needles 31G X 6 MM MISC, Use daily at bedtime, Disp: 100 each, Rfl: 3    gabapentin (Neurontin) 300  mg capsule, Take 1 capsule (300 mg total) by mouth 2 (two) times a day, Disp: 60 capsule, Rfl: 1    insulin lispro (HumaLOG KwikPen) 100 units/mL injection pen, Humalog 1 unit before meal if blood sugars is 200-249 mg/dL Humalog 2 units before meal if blood sugar is 250 mg/dL +, Disp: 15 mL, Rfl: 0    magnesium Oxide (MAG-OX) 400 mg TABS, Take 1 tablet (400 mg total) by mouth 2 (two) times a day, Disp: 60 tablet, Rfl: 1    metFORMIN (GLUCOPHAGE) 1000 MG tablet, Take 1 tablet (1,000 mg total) by mouth 2 (two) times a day with meals, Disp: 180 tablet, Rfl: 1    Nutritional Supplements (Ensure Plus High Protein) LIQD, Take 237 mL by mouth 3 (three) times a day, Disp: 237 mL, Rfl: 9    Nutritional Supplements (Glucerna Hunger Smart Shake) LIQD, Take by mouth Three times a day, Disp: , Rfl:     omega-3-acid ethyl esters (LOVAZA) 1 g capsule, TAKE 1 CAPSULE BY MOUTH DAILY., Disp: 90 capsule, Rfl: 1    pantoprazole (PROTONIX) 40 mg tablet, Take 1 tablet (40 mg total) by mouth daily, Disp: 90 tablet, Rfl: 1    polyethylene glycol (MIRALAX) 17 g packet, Take 17 g by mouth daily as needed (for constipation), Disp: 100 each, Rfl: 1    senna (SENOKOT) 8.6 mg, Take 1 tablet (8.6 mg total) by mouth 2 (two) times a day, Disp: 60 tablet, Rfl: 3    sodium chloride 1 g tablet, Take 1 tablet (1 g total) by mouth 2 (two) times a day with meals, Disp: 60 tablet, Rfl: 0    sodium chloride, PF, 0.9 %, 10 mL by Intracatheter route daily Intracatheter flushing daily. May substitute prefilled syringe with normal saline 10 mL vials, 10 mL syringes, and 18 g blunt needles, Disp: 600 mL, Rfl: 2    sodium chloride, PF, 0.9 %, 10 mL by Intracatheter route daily Intracatheter flushing daily. May substitute prefilled syringe with normal saline 10 mL vials, 10 mL syringes, and 18 g blunt needles, Disp: 900 mL, Rfl: 0    sodium chloride, PF, 0.9 %, 10 mL by Intracatheter route daily Intracatheter flushing daily. May substitute prefilled syringe with  normal saline 10 mL vials, 10 mL syringes, and 18 g blunt needles, Disp: 900 mL, Rfl: 0    glucose 4-6 GM-MG, Chew 2 tablets daily as needed for low blood sugar, Disp: 90 tablet, Rfl: 3    glucose blood (OneTouch Verio) test strip, Check blood sugars twice a daily. Please substitute with appropriate alternative as covered by patient's insurance. Dx: E11.65 (Patient not taking: Reported on 1/28/2025), Disp: 200 each, Rfl: 3    Multiple Vitamin (multivitamin) tablet, Take 1 tablet by mouth daily (Patient not taking: Reported on 2/5/2025), Disp: 30 tablet, Rfl: 2    naloxone (NARCAN) 4 mg/0.1 mL nasal spray, Administer 1 spray into a nostril. If no response after 2-3 minutes, give another dose in the other nostril using a new spray. (Patient not taking: Reported on 1/28/2025), Disp: 1 each, Rfl: 0    Nutritional Supplements (Ensure Plus High Protein) LIQD, , Disp: , Rfl:     ondansetron (Zofran ODT) 8 mg disintegrating tablet, Take 1 tablet (8 mg total) by mouth every 8 (eight) hours as needed for nausea or vomiting (Patient not taking: Reported on 1/28/2025), Disp: 20 tablet, Rfl: 3    OneTouch Delica Lancets 33G MISC, Check blood sugars once daily. Please substitute with appropriate alternative as covered by patient's insurance. Dx: E11.65 (Patient not taking: Reported on 1/28/2025), Disp: 100 each, Rfl: 3    oxyCODONE (ROXICODONE) 10 MG TABS, Take 1 tablet (10 mg total) by mouth every 4 (four) hours as needed for moderate pain Max Daily Amount: 60 mg (Patient not taking: Reported on 2/5/2025), Disp: 90 tablet, Rfl: 0    Spacer/Aero-Holding Chambers (AEROCHAMBER MINI CHAMBER) BILLY, by Does not apply route see administration instructions (Patient not taking: Reported on 1/28/2025), Disp: 1 Device, Rfl: 0  Allergies   Allergen Reactions    Docetaxel Anaphylaxis     Past Surgical History:   Procedure Laterality Date    IR NEPHROSTOMY TUBE CHECK/CHANGE/REPOSITION/REINSERTION/UPSIZE  9/28/2023    IR NEPHROSTOMY TUBE  "CHECK/CHANGE/REPOSITION/REINSERTION/UPSIZE  12/28/2023    IR NEPHROSTOMY TUBE CHECK/CHANGE/REPOSITION/REINSERTION/UPSIZE  1/31/2024    IR NEPHROSTOMY TUBE CHECK/CHANGE/REPOSITION/REINSERTION/UPSIZE  2/2/2024    IR NEPHROSTOMY TUBE CHECK/CHANGE/REPOSITION/REINSERTION/UPSIZE  3/4/2024    IR NEPHROSTOMY TUBE CHECK/CHANGE/REPOSITION/REINSERTION/UPSIZE  3/20/2024    IR NEPHROSTOMY TUBE CHECK/CHANGE/REPOSITION/REINSERTION/UPSIZE  6/7/2024    IR NEPHROSTOMY TUBE CHECK/CHANGE/REPOSITION/REINSERTION/UPSIZE  8/5/2024    IR NEPHROSTOMY TUBE CHECK/CHANGE/REPOSITION/REINSERTION/UPSIZE  10/4/2024    IR NEPHROSTOMY TUBE CHECK/CHANGE/REPOSITION/REINSERTION/UPSIZE  11/18/2024    IR NEPHROSTOMY TUBE CHECK/CHANGE/REPOSITION/REINSERTION/UPSIZE  12/31/2024    IR NEPHROSTOMY TUBE CHECK/CHANGE/REPOSITION/REINSERTION/UPSIZE  1/31/2025    IR NEPHROSTOMY TUBE PLACEMENT  06/22/2023    bilateral    IR OTHER  1/15/2024    US GUIDED PROSTATE BIOPSY       Social History     Objective:  Vitals:    02/05/25 1034   Resp: 17   Temp: 98.5 °F (36.9 °C)   TempSrc: Temporal   Weight: 63.5 kg (140 lb)   Height: 5' 8\" (1.727 m)     Physical Exam  Constitutional:       Appearance: He is well-developed.   HENT:      Head: Normocephalic and atraumatic.      Mouth/Throat:      Mouth: Mucous membranes are moist.   Eyes:      Pupils: Pupils are equal, round, and reactive to light.   Cardiovascular:      Rate and Rhythm: Normal rate and regular rhythm.      Heart sounds: No murmur heard.  Pulmonary:      Breath sounds: Normal breath sounds. No wheezing or rales.   Abdominal:      Palpations: Abdomen is soft.      Tenderness: There is no abdominal tenderness.   Musculoskeletal:         General: No tenderness. Normal range of motion.      Cervical back: Neck supple.   Lymphadenopathy:      Cervical: No cervical adenopathy.   Skin:     Findings: No erythema or rash.   Neurological:      Mental Status: He is alert and oriented to person, place, and time.      Cranial " Nerves: No cranial nerve deficit.      Deep Tendon Reflexes: Reflexes are normal and symmetric.   Psychiatric:         Behavior: Behavior normal.           Labs:  I personally reviewed the labs and imaging pertinent to this patient care.

## 2025-02-11 ENCOUNTER — APPOINTMENT (OUTPATIENT)
Dept: LAB | Facility: HOSPITAL | Age: 75
End: 2025-02-11
Attending: INTERNAL MEDICINE
Payer: COMMERCIAL

## 2025-02-11 DIAGNOSIS — C61 PROSTATE CANCER METASTATIC TO BONE (HCC): ICD-10-CM

## 2025-02-11 DIAGNOSIS — C79.51 PROSTATE CANCER METASTATIC TO BONE (HCC): ICD-10-CM

## 2025-02-11 LAB
ALBUMIN SERPL BCG-MCNC: 4 G/DL (ref 3.5–5)
ALP SERPL-CCNC: 153 U/L (ref 34–104)
ALT SERPL W P-5'-P-CCNC: 23 U/L (ref 7–52)
ANION GAP SERPL CALCULATED.3IONS-SCNC: 12 MMOL/L (ref 4–13)
AST SERPL W P-5'-P-CCNC: 35 U/L (ref 13–39)
BASOPHILS # BLD AUTO: 0.02 THOUSANDS/ÂΜL (ref 0–0.1)
BASOPHILS NFR BLD AUTO: 0 % (ref 0–1)
BILIRUB SERPL-MCNC: 0.8 MG/DL (ref 0.2–1)
BUN SERPL-MCNC: 15 MG/DL (ref 5–25)
CALCIUM SERPL-MCNC: 10.9 MG/DL (ref 8.4–10.2)
CHLORIDE SERPL-SCNC: 99 MMOL/L (ref 96–108)
CO2 SERPL-SCNC: 22 MMOL/L (ref 21–32)
CREAT SERPL-MCNC: 0.75 MG/DL (ref 0.6–1.3)
EOSINOPHIL # BLD AUTO: 0.01 THOUSAND/ÂΜL (ref 0–0.61)
EOSINOPHIL NFR BLD AUTO: 0 % (ref 0–6)
ERYTHROCYTE [DISTWIDTH] IN BLOOD BY AUTOMATED COUNT: 14 % (ref 11.6–15.1)
GFR SERPL CREATININE-BSD FRML MDRD: 91 ML/MIN/1.73SQ M
GLUCOSE P FAST SERPL-MCNC: 153 MG/DL (ref 65–99)
HCT VFR BLD AUTO: 39.6 % (ref 36.5–49.3)
HGB BLD-MCNC: 13.3 G/DL (ref 12–17)
IMM GRANULOCYTES # BLD AUTO: 0.04 THOUSAND/UL (ref 0–0.2)
IMM GRANULOCYTES NFR BLD AUTO: 1 % (ref 0–2)
LYMPHOCYTES # BLD AUTO: 0.79 THOUSANDS/ÂΜL (ref 0.6–4.47)
LYMPHOCYTES NFR BLD AUTO: 11 % (ref 14–44)
MCH RBC QN AUTO: 31 PG (ref 26.8–34.3)
MCHC RBC AUTO-ENTMCNC: 33.6 G/DL (ref 31.4–37.4)
MCV RBC AUTO: 92 FL (ref 82–98)
MONOCYTES # BLD AUTO: 0.63 THOUSAND/ÂΜL (ref 0.17–1.22)
MONOCYTES NFR BLD AUTO: 9 % (ref 4–12)
NEUTROPHILS # BLD AUTO: 5.74 THOUSANDS/ÂΜL (ref 1.85–7.62)
NEUTS SEG NFR BLD AUTO: 79 % (ref 43–75)
NRBC BLD AUTO-RTO: 0 /100 WBCS
PLATELET # BLD AUTO: 363 THOUSANDS/UL (ref 149–390)
PMV BLD AUTO: 9 FL (ref 8.9–12.7)
POTASSIUM SERPL-SCNC: 3.9 MMOL/L (ref 3.5–5.3)
PROT SERPL-MCNC: 7.7 G/DL (ref 6.4–8.4)
RBC # BLD AUTO: 4.29 MILLION/UL (ref 3.88–5.62)
SODIUM SERPL-SCNC: 133 MMOL/L (ref 135–147)
WBC # BLD AUTO: 7.23 THOUSAND/UL (ref 4.31–10.16)

## 2025-02-11 PROCEDURE — 85025 COMPLETE CBC W/AUTO DIFF WBC: CPT

## 2025-02-11 PROCEDURE — 80053 COMPREHEN METABOLIC PANEL: CPT

## 2025-02-11 PROCEDURE — 36415 COLL VENOUS BLD VENIPUNCTURE: CPT

## 2025-02-12 DIAGNOSIS — E11.69 TYPE 2 DIABETES MELLITUS WITH OTHER SPECIFIED COMPLICATION, WITH LONG-TERM CURRENT USE OF INSULIN (HCC): Primary | ICD-10-CM

## 2025-02-12 DIAGNOSIS — Z79.4 TYPE 2 DIABETES MELLITUS WITH OTHER SPECIFIED COMPLICATION, WITH LONG-TERM CURRENT USE OF INSULIN (HCC): Primary | ICD-10-CM

## 2025-02-12 RX ORDER — INSULIN ASPART 100 [IU]/ML
INJECTION, SOLUTION INTRAVENOUS; SUBCUTANEOUS
Qty: 15 ML | Refills: 2 | Status: SHIPPED | OUTPATIENT
Start: 2025-02-12

## 2025-02-12 NOTE — TELEPHONE ENCOUNTER
PA for  (HumaLOG KwikPen) 100 units/mL  DENIED    Reason:(Screenshot if applicable)        Message sent to office clinical pool Yes    Denial letter scanned into Media Yes    Appeal started No (Provider will need to decide if appeal is warranted and send clinical documentation to Prior Authorization Team for initiation.)    **Please follow up with your patient regarding denial and next steps**

## 2025-02-13 ENCOUNTER — HOSPITAL ENCOUNTER (OUTPATIENT)
Dept: NON INVASIVE DIAGNOSTICS | Facility: CLINIC | Age: 75
Discharge: HOME | End: 2025-02-13
Payer: COMMERCIAL

## 2025-02-13 DIAGNOSIS — C61 MALIGNANT NEOPLASM OF PROSTATE (HCC): ICD-10-CM

## 2025-02-13 PROCEDURE — A9607 HB LUTETIUM LU 177 VIPIVOTIDE TETRAXETAN, THERAPEUTIC, 1 MILLICURIE: HCPCS

## 2025-02-13 PROCEDURE — 79101 NUCLEAR RX IV ADMIN: CPT

## 2025-02-17 ENCOUNTER — HOSPITAL ENCOUNTER (EMERGENCY)
Facility: HOSPITAL | Age: 75
Discharge: HOME | End: 2025-02-17
Attending: EMERGENCY MEDICINE | Admitting: EMERGENCY MEDICINE
Payer: COMMERCIAL

## 2025-02-17 ENCOUNTER — APPOINTMENT (EMERGENCY)
Dept: RADIOLOGY | Facility: HOSPITAL | Age: 75
End: 2025-02-17
Payer: COMMERCIAL

## 2025-02-17 VITALS
TEMPERATURE: 97.2 F | WEIGHT: 140 LBS | RESPIRATION RATE: 15 BRPM | SYSTOLIC BLOOD PRESSURE: 145 MMHG | HEIGHT: 68 IN | DIASTOLIC BLOOD PRESSURE: 82 MMHG | HEART RATE: 70 BPM | BODY MASS INDEX: 21.22 KG/M2 | OXYGEN SATURATION: 98 %

## 2025-02-17 DIAGNOSIS — R26.2 UNABLE TO WALK: ICD-10-CM

## 2025-02-17 DIAGNOSIS — K59.00 CONSTIPATION: ICD-10-CM

## 2025-02-17 DIAGNOSIS — G89.3 PAIN FROM BONE METASTASES (HCC): ICD-10-CM

## 2025-02-17 DIAGNOSIS — R53.1 GENERALIZED WEAKNESS: ICD-10-CM

## 2025-02-17 DIAGNOSIS — C79.51 PAIN FROM BONE METASTASES (HCC): ICD-10-CM

## 2025-02-17 DIAGNOSIS — N39.0 UTI (URINARY TRACT INFECTION): ICD-10-CM

## 2025-02-17 DIAGNOSIS — R26.2 AMBULATORY DYSFUNCTION: ICD-10-CM

## 2025-02-17 DIAGNOSIS — R10.9 ABDOMINAL PAIN: Primary | ICD-10-CM

## 2025-02-17 DIAGNOSIS — M54.9 BACK PAIN: ICD-10-CM

## 2025-02-17 LAB
ALBUMIN SERPL BCG-MCNC: 4.3 G/DL (ref 3.5–5)
ALP SERPL-CCNC: 188 U/L (ref 34–104)
ALT SERPL W P-5'-P-CCNC: 12 U/L (ref 7–52)
AMORPH URATE CRY URNS QL MICRO: ABNORMAL
ANION GAP SERPL CALCULATED.3IONS-SCNC: 13 MMOL/L (ref 4–13)
APTT PPP: 34 SECONDS (ref 23–34)
AST SERPL W P-5'-P-CCNC: 22 U/L (ref 13–39)
BACTERIA UR QL AUTO: ABNORMAL /HPF
BASOPHILS # BLD AUTO: 0.01 THOUSANDS/ΜL (ref 0–0.1)
BASOPHILS NFR BLD AUTO: 0 % (ref 0–1)
BILIRUB SERPL-MCNC: 0.67 MG/DL (ref 0.2–1)
BILIRUB UR QL STRIP: NEGATIVE
BUN SERPL-MCNC: 13 MG/DL (ref 5–25)
CALCIUM SERPL-MCNC: 11.9 MG/DL (ref 8.4–10.2)
CARDIAC TROPONIN I PNL SERPL HS: 3 NG/L (ref ?–50)
CHLORIDE SERPL-SCNC: 97 MMOL/L (ref 96–108)
CLARITY UR: CLEAR
CO2 SERPL-SCNC: 21 MMOL/L (ref 21–32)
COLOR UR: YELLOW
CREAT SERPL-MCNC: 0.79 MG/DL (ref 0.6–1.3)
EOSINOPHIL # BLD AUTO: 0 THOUSAND/ΜL (ref 0–0.61)
EOSINOPHIL NFR BLD AUTO: 0 % (ref 0–6)
ERYTHROCYTE [DISTWIDTH] IN BLOOD BY AUTOMATED COUNT: 13.6 % (ref 11.6–15.1)
GFR SERPL CREATININE-BSD FRML MDRD: 89 ML/MIN/1.73SQ M
GLUCOSE SERPL-MCNC: 196 MG/DL (ref 65–140)
GLUCOSE UR STRIP-MCNC: NEGATIVE MG/DL
HCT VFR BLD AUTO: 40.6 % (ref 36.5–49.3)
HGB BLD-MCNC: 13.7 G/DL (ref 12–17)
HGB UR QL STRIP.AUTO: NEGATIVE
IMM GRANULOCYTES # BLD AUTO: 0.04 THOUSAND/UL (ref 0–0.2)
IMM GRANULOCYTES NFR BLD AUTO: 1 % (ref 0–2)
INR PPP: 0.98 (ref 0.85–1.19)
KETONES UR STRIP-MCNC: NEGATIVE MG/DL
LEUKOCYTE ESTERASE UR QL STRIP: ABNORMAL
LYMPHOCYTES # BLD AUTO: 0.52 THOUSANDS/ΜL (ref 0.6–4.47)
LYMPHOCYTES NFR BLD AUTO: 8 % (ref 14–44)
MCH RBC QN AUTO: 31.1 PG (ref 26.8–34.3)
MCHC RBC AUTO-ENTMCNC: 33.7 G/DL (ref 31.4–37.4)
MCV RBC AUTO: 92 FL (ref 82–98)
MONOCYTES # BLD AUTO: 0.42 THOUSAND/ΜL (ref 0.17–1.22)
MONOCYTES NFR BLD AUTO: 6 % (ref 4–12)
NEUTROPHILS # BLD AUTO: 5.8 THOUSANDS/ΜL (ref 1.85–7.62)
NEUTS SEG NFR BLD AUTO: 85 % (ref 43–75)
NITRITE UR QL STRIP: POSITIVE
NON-SQ EPI CELLS URNS QL MICRO: ABNORMAL /HPF
NRBC BLD AUTO-RTO: 0 /100 WBCS
PH UR STRIP.AUTO: 6.5 [PH]
PLATELET # BLD AUTO: 458 THOUSANDS/UL (ref 149–390)
PMV BLD AUTO: 9.1 FL (ref 8.9–12.7)
POTASSIUM SERPL-SCNC: 4.4 MMOL/L (ref 3.5–5.3)
PROT SERPL-MCNC: 8.2 G/DL (ref 6.4–8.4)
PROT UR STRIP-MCNC: ABNORMAL MG/DL
PROTHROMBIN TIME: 13.4 SECONDS (ref 12.3–15)
RBC # BLD AUTO: 4.41 MILLION/UL (ref 3.88–5.62)
RBC #/AREA URNS AUTO: ABNORMAL /HPF
SODIUM SERPL-SCNC: 131 MMOL/L (ref 135–147)
SP GR UR STRIP.AUTO: 1.02 (ref 1–1.03)
UROBILINOGEN UR STRIP-ACNC: <2 MG/DL
WBC # BLD AUTO: 6.79 THOUSAND/UL (ref 4.31–10.16)
WBC #/AREA URNS AUTO: ABNORMAL /HPF

## 2025-02-17 PROCEDURE — 96365 THER/PROPH/DIAG IV INF INIT: CPT

## 2025-02-17 PROCEDURE — 99285 EMERGENCY DEPT VISIT HI MDM: CPT | Performed by: EMERGENCY MEDICINE

## 2025-02-17 PROCEDURE — 36415 COLL VENOUS BLD VENIPUNCTURE: CPT | Performed by: EMERGENCY MEDICINE

## 2025-02-17 PROCEDURE — 74177 CT ABD & PELVIS W/CONTRAST: CPT

## 2025-02-17 PROCEDURE — 85025 COMPLETE CBC W/AUTO DIFF WBC: CPT | Performed by: EMERGENCY MEDICINE

## 2025-02-17 PROCEDURE — 85610 PROTHROMBIN TIME: CPT | Performed by: EMERGENCY MEDICINE

## 2025-02-17 PROCEDURE — 85730 THROMBOPLASTIN TIME PARTIAL: CPT | Performed by: EMERGENCY MEDICINE

## 2025-02-17 PROCEDURE — 96375 TX/PRO/DX INJ NEW DRUG ADDON: CPT

## 2025-02-17 PROCEDURE — 84484 ASSAY OF TROPONIN QUANT: CPT | Performed by: EMERGENCY MEDICINE

## 2025-02-17 PROCEDURE — 80053 COMPREHEN METABOLIC PANEL: CPT | Performed by: EMERGENCY MEDICINE

## 2025-02-17 PROCEDURE — 81001 URINALYSIS AUTO W/SCOPE: CPT | Performed by: EMERGENCY MEDICINE

## 2025-02-17 PROCEDURE — 99285 EMERGENCY DEPT VISIT HI MDM: CPT

## 2025-02-17 PROCEDURE — 87086 URINE CULTURE/COLONY COUNT: CPT | Performed by: EMERGENCY MEDICINE

## 2025-02-17 RX ORDER — POLYETHYLENE GLYCOL 3350 17 G/17G
17 POWDER, FOR SOLUTION ORAL DAILY
Qty: 10 G | Refills: 0 | Status: SHIPPED | OUTPATIENT
Start: 2025-02-17

## 2025-02-17 RX ORDER — OXYCODONE HYDROCHLORIDE 5 MG/1
5 TABLET ORAL 3 TIMES DAILY PRN
Qty: 10 TABLET | Refills: 0 | Status: SHIPPED | OUTPATIENT
Start: 2025-02-17 | End: 2025-02-17

## 2025-02-17 RX ORDER — OXYCODONE HYDROCHLORIDE 5 MG/1
5 TABLET ORAL 3 TIMES DAILY PRN
Qty: 20 TABLET | Refills: 0 | Status: SHIPPED | OUTPATIENT
Start: 2025-02-17 | End: 2025-02-27

## 2025-02-17 RX ORDER — HYDROMORPHONE HCL/PF 1 MG/ML
0.5 SYRINGE (ML) INJECTION ONCE
Refills: 0 | Status: COMPLETED | OUTPATIENT
Start: 2025-02-17 | End: 2025-02-17

## 2025-02-17 RX ORDER — POLYETHYLENE GLYCOL 3350 17 G/17G
17 POWDER, FOR SOLUTION ORAL DAILY
Qty: 5 G | Refills: 1 | Status: SHIPPED | OUTPATIENT
Start: 2025-02-17 | End: 2025-02-17

## 2025-02-17 RX ORDER — CEPHALEXIN 500 MG/1
500 CAPSULE ORAL EVERY 12 HOURS SCHEDULED
Qty: 14 CAPSULE | Refills: 0 | Status: SHIPPED | OUTPATIENT
Start: 2025-02-17 | End: 2025-02-24

## 2025-02-17 RX ORDER — CEFTRIAXONE 1 G/50ML
1000 INJECTION, SOLUTION INTRAVENOUS ONCE
Status: COMPLETED | OUTPATIENT
Start: 2025-02-17 | End: 2025-02-17

## 2025-02-17 RX ADMIN — IOHEXOL 100 ML: 350 INJECTION, SOLUTION INTRAVENOUS at 19:06

## 2025-02-17 RX ADMIN — HYDROMORPHONE HYDROCHLORIDE 0.5 MG: 1 INJECTION, SOLUTION INTRAMUSCULAR; INTRAVENOUS; SUBCUTANEOUS at 18:07

## 2025-02-17 RX ADMIN — CEFTRIAXONE 1000 MG: 1 INJECTION, SOLUTION INTRAVENOUS at 19:17

## 2025-02-17 NOTE — ED PROVIDER NOTES
Time reflects when diagnosis was documented in both MDM as applicable and the Disposition within this note       Time User Action Codes Description Comment    2/17/2025  8:36 PM TallickRamonaan A Add [R10.9] Abdominal pain     2/17/2025  8:36 PM Tallick, Helen A Add [M54.9] Back pain     2/17/2025  8:36 PM Tallick, Helen A Add [N39.0] UTI (urinary tract infection)     2/17/2025  8:37 PM Tallick, Helen A Add [G89.3,  C79.51] Pain from bone metastases (HCC)     2/17/2025  8:37 PM Tallick, Helen A Add [K59.00] Constipation     2/17/2025  8:57 PM Tallick, Helen A Add [R26.2] Unable to walk     2/17/2025  8:57 PM Tallick, Helen A Add [R53.1] Generalized weakness     2/17/2025  9:37 PM Heber Lantigua Add [R26.2] Ambulatory dysfunction           ED Disposition       ED Disposition   Discharge    Condition   Stable    Date/Time   Mon Feb 17, 2025  9:37 PM    Comment   Case was discussed with COLLEEN and the patient's admission status was agreed to be Admission Status: observation status .               Assessment & Plan       Medical Decision Making  Prior records reviewed.  Pt. Last saw Oncology 2/5/2025, last nephrostomy change by IR 1/31/2024 and last abdominal CT scan done 11/16/2024.  2000 - awaiting CT report from radiology.  2045 - CT report reviewed.  Discussed situation with family.  Initially pt. Wanted to try to go home on oral medicine but when we tried to get him up he was too weak and can't support his weight, can't walk.  Wife says this has been getting worse over a few weeks and he has been in wheelchair.  He has no appetite. Will admit for further treatment, PT/OT evaluation, may need palliative/hospice care.  2105 - [facility] residents here to see pt.  2135 - [facility] residents spoke with pt. And his wife at great length.  Wife and pt. Refuse [facility] and hospice.  They have wheelchair at home and they prefer to go home.  [facility] doesn't feel he needs admission and they will place consults for Home Health and Home PT and their  office  to follow up with them. Will discharge on pain medicine, Miralax, Keflex.      Amount and/or Complexity of Data Reviewed  Labs: ordered.  Radiology: ordered.    Risk  OTC drugs.  Prescription drug management.  Decision regarding hospitalization.             Medications   HYDROmorphone (DILAUDID) injection 0.5 mg (0.5 mg Intravenous Given 2/17/25 1807)   cefTRIAXone (ROCEPHIN) IVPB (premix in dextrose) 1,000 mg 50 mL (0 mg Intravenous Stopped 2/17/25 2037)   iohexol (OMNIPAQUE) 350 MG/ML injection (MULTI-DOSE) 100 mL (100 mL Intravenous Given 2/17/25 1906)       ED Risk Strat Scores   HEART Risk Score      Flowsheet Row Most Recent Value   Heart Score Risk Calculator    History 0 Filed at: 02/17/2025 1924   ECG 1 Filed at: 02/17/2025 1924   Age 2 Filed at: 02/17/2025 1924   Risk Factors 1 Filed at: 02/17/2025 1924   Troponin 0 Filed at: 02/17/2025 1924   HEART Score 4 Filed at: 02/17/2025 1924          HEART Risk Score      Flowsheet Row Most Recent Value   Heart Score Risk Calculator    History 0 Filed at: 02/17/2025 1924   ECG 1 Filed at: 02/17/2025 1924   Age 2 Filed at: 02/17/2025 1924   Risk Factors 1 Filed at: 02/17/2025 1924   Troponin 0 Filed at: 02/17/2025 1924   HEART Score 4 Filed at: 02/17/2025 1924                              SBIRT 20yo+      Flowsheet Row Most Recent Value   Initial Alcohol Screen: US AUDIT-C     1. How often do you have a drink containing alcohol? 0 Filed at: 02/17/2025 1736   2. How many drinks containing alcohol do you have on a typical day you are drinking?  0 Filed at: 02/17/2025 1736   3a. Male UNDER 65: How often do you have five or more drinks on one occasion? 0 Filed at: 02/17/2025 1736   3b. FEMALE Any Age, or MALE 65+: How often do you have 4 or more drinks on one occassion? 0 Filed at: 02/17/2025 1736   Audit-C Score 0 Filed at: 02/17/2025 1735   YANNI: How many times in the past year have you...    Used an illegal drug or used a prescription medication  for non-medical reasons? Never Filed at: 02/17/2025 1736                            History of Present Illness       Chief Complaint   Patient presents with    Abdominal Pain     Abd pain for 3 days, Brazilian speaking       Past Medical History:   Diagnosis Date    Altered mental status 10/08/2024    COVID-19 01/15/2024    Diabetes mellitus (HCC)     Elevated PSA 06/21/2023    High cholesterol     Hypertension     Prostate cancer (HCC)     Severe sepsis (HCC) 10/08/2024      Past Surgical History:   Procedure Laterality Date    IR NEPHROSTOMY TUBE CHECK/CHANGE/REPOSITION/REINSERTION/UPSIZE  9/28/2023    IR NEPHROSTOMY TUBE CHECK/CHANGE/REPOSITION/REINSERTION/UPSIZE  12/28/2023    IR NEPHROSTOMY TUBE CHECK/CHANGE/REPOSITION/REINSERTION/UPSIZE  1/31/2024    IR NEPHROSTOMY TUBE CHECK/CHANGE/REPOSITION/REINSERTION/UPSIZE  2/2/2024    IR NEPHROSTOMY TUBE CHECK/CHANGE/REPOSITION/REINSERTION/UPSIZE  3/4/2024    IR NEPHROSTOMY TUBE CHECK/CHANGE/REPOSITION/REINSERTION/UPSIZE  3/20/2024    IR NEPHROSTOMY TUBE CHECK/CHANGE/REPOSITION/REINSERTION/UPSIZE  6/7/2024    IR NEPHROSTOMY TUBE CHECK/CHANGE/REPOSITION/REINSERTION/UPSIZE  8/5/2024    IR NEPHROSTOMY TUBE CHECK/CHANGE/REPOSITION/REINSERTION/UPSIZE  10/4/2024    IR NEPHROSTOMY TUBE CHECK/CHANGE/REPOSITION/REINSERTION/UPSIZE  11/18/2024    IR NEPHROSTOMY TUBE CHECK/CHANGE/REPOSITION/REINSERTION/UPSIZE  12/31/2024    IR NEPHROSTOMY TUBE CHECK/CHANGE/REPOSITION/REINSERTION/UPSIZE  1/31/2025    IR NEPHROSTOMY TUBE PLACEMENT  06/22/2023    bilateral    IR OTHER  1/15/2024    US GUIDED PROSTATE BIOPSY        Family History   Problem Relation Age of Onset    Uterine cancer Mother     Liver cancer Father     No Known Problems Sister     No Known Problems Brother     No Known Problems Son     Diabetes type II Daughter     No Known Problems Daughter     Cancer Daughter       Social History     Tobacco Use    Smoking status: Former     Current packs/day: 24.90     Average packs/day:  24.9 packs/day for 25.1 years (625.7 ttl pk-yrs)     Types: Cigarettes     Start date: 2000     Passive exposure: Past    Smokeless tobacco: Never    Tobacco comments:     States he only smoked on the weekends   Vaping Use    Vaping status: Never Used   Substance Use Topics    Alcohol use: Not Currently     Comment: doesn't drink anymore    Drug use: Never      E-Cigarette/Vaping    E-Cigarette Use Never User       E-Cigarette/Vaping Substances    Nicotine No     THC No     CBD No     Flavoring No     Other No     Unknown No       I have reviewed and agree with the history as documented.     73 yo male with history of metastatic prostate cancer c/o right sided back pain and abdominal pain x 3 days.  Says the pain is constant but he has not taken any medicine for it.  The pain in abdomen is all over.  The pain in back is worse when he takes a deep breath.  No fever, cough, vomiting, diarrhea.  He has not had a BM for 4 days.      History provided by:  Patient   used: Yes    Abdominal Pain  Associated symptoms: constipation    Associated symptoms: no chest pain, no chills, no cough, no diarrhea, no dysuria, no fever, no hematuria, no nausea, no shortness of breath, no sore throat and no vomiting        Review of Systems   Constitutional: Negative.  Negative for chills and fever.   HENT: Negative.  Negative for congestion and sore throat.    Eyes: Negative.    Respiratory: Negative.  Negative for cough and shortness of breath.    Cardiovascular: Negative.  Negative for chest pain and leg swelling.   Gastrointestinal:  Positive for abdominal pain and constipation. Negative for diarrhea, nausea and vomiting.   Genitourinary:  Positive for flank pain. Negative for dysuria and hematuria.   Musculoskeletal:  Negative for back pain and myalgias.   Skin: Negative.  Negative for rash and wound.   Neurological: Negative.  Negative for dizziness and headaches.   Psychiatric/Behavioral: Negative.  Negative for  confusion and hallucinations. The patient is not nervous/anxious.    All other systems reviewed and are negative.          Objective       ED Triage Vitals   Temperature Pulse Blood Pressure Respirations SpO2 Patient Position - Orthostatic VS   02/17/25 1736 02/17/25 1736 02/17/25 1736 02/17/25 1736 02/17/25 1739 02/17/25 2040   (!) 97.2 °F (36.2 °C) 87 127/79 18 99 % Lying      Temp src Heart Rate Source BP Location FiO2 (%) Pain Score    -- 02/17/25 1930 02/17/25 2040 -- 02/17/25 1807     Monitor Right arm  4      Vitals      Date and Time Temp Pulse SpO2 Resp BP Pain Score FACES Pain Rating User   02/17/25 2100 -- 70 98 % 15 145/82 -- -- MB   02/17/25 2040 -- 71 99 % 15 142/75 -- -- MB   02/17/25 1930 -- 68 97 % 15 -- -- -- MB   02/17/25 1807 -- -- -- -- -- 4 -- CG   02/17/25 1739 -- -- 99 % -- -- -- -- SERGE   02/17/25 1736 97.2 °F (36.2 °C) 87 -- 18 127/79 -- -- SERGE            Physical Exam  Vitals and nursing note reviewed.   Constitutional:       General: He is not in acute distress.     Appearance: He is well-developed. He is not ill-appearing or diaphoretic.   HENT:      Head: Normocephalic and atraumatic.      Mouth/Throat:      Mouth: Mucous membranes are dry.   Eyes:      General: No scleral icterus.     Conjunctiva/sclera: Conjunctivae normal.   Cardiovascular:      Rate and Rhythm: Normal rate and regular rhythm.      Heart sounds: Normal heart sounds. No murmur heard.  Pulmonary:      Effort: Pulmonary effort is normal. No respiratory distress.      Breath sounds: Normal breath sounds.   Abdominal:      General: Bowel sounds are normal. There is no distension.      Palpations: Abdomen is soft.      Tenderness: There is generalized abdominal tenderness.   Genitourinary:     Comments: Pt. With bilateral nephrostomy tubes in place  Musculoskeletal:         General: No deformity. Normal range of motion.      Cervical back: Normal range of motion and neck supple.      Right lower leg: No edema.      Left lower  leg: No edema.   Skin:     General: Skin is warm and dry.      Coloration: Skin is not pale.      Findings: No erythema or rash.   Neurological:      General: No focal deficit present.      Mental Status: He is alert and oriented to person, place, and time.      Cranial Nerves: No cranial nerve deficit.   Psychiatric:         Mood and Affect: Mood normal.         Behavior: Behavior normal.         Results Reviewed       Procedure Component Value Units Date/Time    Urine Microscopic [[id number]]  (Abnormal) Collected: 02/17/25 1829    Lab Status: Final result Specimen: Urine, Right Nephrostomy Updated: 02/17/25 1905     RBC, UA 0-1 /hpf      WBC, UA 20-30 /hpf      Epithelial Cells None Seen /hpf      Bacteria, UA Innumerable /hpf      Amorphous Crystals, UA Moderate    UA (URINE) with reflex to Scope [[id number]]  (Abnormal) Collected: 02/17/25 1829    Lab Status: Final result Specimen: Urine, Right Nephrostomy Updated: 02/17/25 1852     Color, UA Yellow     Clarity, UA Clear     Specific Gravity, UA 1.020     pH, UA 6.5     Leukocytes, UA Large     Nitrite, UA Positive     Protein, UA 30 (1+) mg/dl      Glucose, UA Negative mg/dl      Ketones, UA Negative mg/dl      Urobilinogen, UA <2.0 mg/dl      Bilirubin, UA Negative     Occult Blood, UA Negative    Urine culture [[id number]] Collected: 02/17/25 1830    Lab Status: In process Specimen: Urine, Right Nephrostomy Updated: 02/17/25 1849    Comprehensive metabolic panel [[id number]]  (Abnormal) Collected: 02/17/25 1803    Lab Status: Final result Specimen: Blood from Arm, Left Updated: 02/17/25 1842     Sodium 131 mmol/L      Potassium 4.4 mmol/L      Chloride 97 mmol/L      CO2 21 mmol/L      ANION GAP 13 mmol/L      BUN 13 mg/dL      Creatinine 0.79 mg/dL      Glucose 196 mg/dL      Calcium 11.9 mg/dL      AST 22 U/L      ALT 12 U/L      Alkaline Phosphatase 188 U/L      Total Protein 8.2 g/dL      Albumin 4.3 g/dL      Total Bilirubin 0.67 mg/dL      eGFR 89  ml/min/1.73sq m     Narrative:      National Kid[employer] Foundation guidelines for Chronic Kidney Disease (CKD):     Stage 1 with normal or high GFR (GFR > 90 mL/min/1.73 square meters)    Stage 2 Mild CKD (GFR = 60-89 mL/min/1.73 square meters)    Stage 3A Moderate CKD (GFR = 45-59 mL/min/1.73 square meters)    Stage 3B Moderate CKD (GFR = 30-44 mL/min/1.73 square meters)    Stage 4 Severe CKD (GFR = 15-29 mL/min/1.73 square meters)    Stage 5 End Stage CKD (GFR <15 mL/min/1.73 square meters)  Note: GFR calculation is accurate only with a steady state creatinine    HS Troponin 0hr (reflex protocol) [[id number]]  (Normal) Collected: 02/17/25 1803    Lab Status: Final result Specimen: Blood from Arm, Left Updated: 02/17/25 1832     hs TnI 0hr 3 ng/L     Protime-INR [[id number]]  (Normal) Collected: 02/17/25 1803    Lab Status: Final result Specimen: Blood from Arm, Left Updated: 02/17/25 1829     Protime 13.4 seconds      INR 0.98    Narrative:      INR Therapeutic Range    Indication                                             INR Range      Atrial Fibrillation                                               2.0-3.0  Hypercoagulable State                                    2.0.2.3  Left Ventricular Asist Device                            2.0-3.0  Mechanical Heart Valve                                  -    Aortic(with afib, MI, embolism, HF, LA enlargement,    and/or coagulopathy)                                     2.0-3.0 (2.5-3.5)     Mitral                                                             2.5-3.5  Prosthetic/Bioprosthetic Heart Valve               2.0-3.0  Venous thromboembolism (VTE: VT, PE        2.0-3.0    APTT [[id number]]  (Normal) Collected: 02/17/25 1803    Lab Status: Final result Specimen: Blood from Arm, Left Updated: 02/17/25 1829     PTT 34 seconds     CBC and differential [[id number]]  (Abnormal) Collected: 02/17/25 1803    Lab Status: Final result Specimen: Blood from Arm, Left Updated:  02/17/25 1808     WBC 6.79 Thousand/uL      RBC 4.41 Million/uL      Hemoglobin 13.7 g/dL      Hematocrit 40.6 %      MCV 92 fL      MCH 31.1 pg      MCHC 33.7 g/dL      RDW 13.6 %      MPV 9.1 fL      Platelets 458 Thousands/uL      nRBC 0 /100 WBCs      Segmented % 85 %      Immature Grans % 1 %      Lymphocytes % 8 %      Monocytes % 6 %      Eosinophils Relative 0 %      Basophils Relative 0 %      Absolute Neutrophils 5.80 Thousands/µL      Absolute Immature Grans 0.04 Thousand/uL      Absolute Lymphocytes 0.52 Thousands/µL      Absolute Monocytes 0.42 Thousand/µL      Eosinophils Absolute 0.00 Thousand/µL      Basophils Absolute 0.01 Thousands/µL             CT abdomen pelvis with contrast   Final Interpretation by Pro Carroll MD (02/17 2028)      Interval progression of hepatic metastatic disease.      Stable severe bladder wall thickening most consistent with cystitis.      Stable diffuse osseous metastatic disease.         Workstation performed: [id number]             ECG 12 Lead Documentation Only    Date/Time: 2/17/2025 6:14 PM    Performed by: Helen Florez MD  Authorized by: Helen Florez MD    Indications / Diagnosis:  Abdominal/back pain  ECG reviewed by me, the ED Provider: yes    Patient location:  ED  Previous ECG:     Previous ECG:  Compared to current    Comparison ECG info:  10/2024    Similarity:  No change  Interpretation:     Interpretation: abnormal    Rate:     ECG rate:  83    ECG rate assessment: normal    Rhythm:     Rhythm: sinus rhythm    QRS:     QRS axis:  Normal  Conduction:     Conduction: abnormal      Abnormal conduction: complete RBBB    ST segments:     ST segments:  Non-specific  T waves:     T waves: non-specific        ED Medication and Procedure Management   Prior to Admission Medications   Prescriptions Last Dose Informant Patient Reported? Taking?   Alcohol Swabs 70 % PADS  Self, Spouse/Significant Other, Child No No   Sig: To clean the skin prior to injecting  insulin   Blood Glucose Monitoring Suppl (OneTouch Verio Reflect) w/Device KIT  Self, Spouse/Significant Other, Child No No   Sig: Check blood sugars once daily. Please substitute with appropriate alternative as covered by patient's insurance. Dx: E11.65   Cholecalciferol (VITAMIN D3) 1,000 units tablet   No No   Sig: Take 1 tablet (1,000 Units total) by mouth daily   Diclofenac Sodium (VOLTAREN) 1 %   No No   Sig: Apply 2 g topically 4 (four) times a day as needed (joint pain)   Easy Touch Pen Needles 31G X 6 MM MISC  Self, Spouse/Significant Other, Child No No   Sig: Use daily at bedtime   Multiple Vitamin (multivitamin) tablet   No No   Sig: Take 1 tablet by mouth daily   Patient not taking: Reported on 2/5/2025   Nutritional Supplements (Ensure Plus High Protein) LIQD   No No   Sig: Take 237 mL by mouth 3 (three) times a day   Nutritional Supplements (Ensure Plus High Protein) LIQD   Yes No   Patient not taking: Reported on 2/5/2025   Nutritional Supplements (Glucerna Hunger Smart Shake) LIQD   Yes No   Sig: Take by mouth Three times a day   OneTouch Delica Lancets 33G MISC  Self, Spouse/Significant Other, Child No No   Sig: Check blood sugars once daily. Please substitute with appropriate alternative as covered by patient's insurance. Dx: E11.65   Patient not taking: Reported on 1/28/2025   Spacer/Aero-Holding Chambers (AEROCHAMBER MINI CHAMBER) BILLY  Self, Spouse/Significant Other, Child No No   Sig: by Does not apply route see administration instructions   Patient not taking: Reported on 1/28/2025   acetaminophen (TYLENOL) 500 mg tablet   No No   Sig: Take 2 tablets (1,000 mg total) by mouth 3 (three) times a day   albuterol (Ventolin HFA) 90 mcg/act inhaler  Self, Spouse/Significant Other, Child No No   Sig: Inhale 2 puffs every 6 (six) hours as needed for wheezing   dexamethasone (DECADRON) 1 mg tablet   No No   Sig: Take 1 tablet (1 mg total) by mouth daily with breakfast   docusate sodium (COLACE) 100 mg  capsule   No No   Sig: Take 1 capsule (100 mg total) by mouth 2 (two) times a day as needed for constipation   gabapentin (Neurontin) 300 mg capsule   No No   Sig: Take 1 capsule (300 mg total) by mouth 2 (two) times a day   glucose 4-6 GM-MG   No No   Sig: Chew 2 tablets daily as needed for low blood sugar   glucose blood (OneTouch Verio) test strip  Self, Spouse/Significant Other, Child No No   Sig: Check blood sugars twice a daily. Please substitute with appropriate alternative as covered by patient's insurance. Dx: E11.65   Patient not taking: Reported on 1/28/2025   insulin aspart (NovoLOG FlexPen) 100 UNIT/ML injection pen   No No   Sig: Novolog 1 unit before meal if blood sugars is 200-249 mg/dL novolog 2 units before meal if blood sugar is 250 mg/dL +   magnesium Oxide (MAG-OX) 400 mg TABS   No No   Sig: Take 1 tablet (400 mg total) by mouth 2 (two) times a day   metFORMIN (GLUCOPHAGE) 1000 MG tablet   No No   Sig: Take 1 tablet (1,000 mg total) by mouth 2 (two) times a day with meals   naloxone (NARCAN) 4 mg/0.1 mL nasal spray  Self, Spouse/Significant Other, Child No No   Sig: Administer 1 spray into a nostril. If no response after 2-3 minutes, give another dose in the other nostril using a new spray.   Patient not taking: Reported on 1/28/2025   omega-3-acid ethyl esters (LOVAZA) 1 g capsule   No No   Sig: TAKE 1 CAPSULE BY MOUTH DAILY.   ondansetron (Zofran ODT) 8 mg disintegrating tablet  Self, Spouse/Significant Other, Child No No   Sig: Take 1 tablet (8 mg total) by mouth every 8 (eight) hours as needed for nausea or vomiting   Patient not taking: Reported on 1/28/2025   pantoprazole (PROTONIX) 40 mg tablet   No No   Sig: Take 1 tablet (40 mg total) by mouth daily   polyethylene glycol (MIRALAX) 17 g packet  Self, Spouse/Significant Other, Child No No   Sig: Take 17 g by mouth daily as needed (for constipation)   senna (SENOKOT) 8.6 mg   No No   Sig: Take 1 tablet (8.6 mg total) by mouth 2 (two) times  a day   sodium chloride 1 g tablet   No No   Sig: Take 1 tablet (1 g total) by mouth 2 (two) times a day with meals   sodium chloride, PF, 0.9 %   No No   Sig: 10 mL by Intracatheter route daily Intracatheter flushing daily. May substitute prefilled syringe with normal saline 10 mL vials, 10 mL syringes, and 18 g blunt needles   sodium chloride, PF, 0.9 %   No No   Sig: 10 mL by Intracatheter route daily Intracatheter flushing daily. May substitute prefilled syringe with normal saline 10 mL vials, 10 mL syringes, and 18 g blunt needles   sodium chloride, PF, 0.9 %   No No   Sig: 10 mL by Intracatheter route daily Intracatheter flushing daily. May substitute prefilled syringe with normal saline 10 mL vials, 10 mL syringes, and 18 g blunt needles      Facility-Administered Medications: None     Patient's Medications   Discharge Prescriptions    CEPHALEXIN (KEFLEX) 500 MG CAPSULE    Take 1 capsule (500 mg total) by mouth every 12 (twelve) hours for 7 days       Start Date: 2/17/2025 End Date: 2/24/2025       Order Dose: 500 mg       Quantity: 14 capsule    Refills: 0    OXYCODONE (ROXICODONE) 5 IMMEDIATE RELEASE TABLET    Take 1 tablet (5 mg total) by mouth 3 (three) times a day as needed for moderate pain or severe pain for up to 10 days Max Daily Amount: 15 mg       Start Date: 2/17/2025 End Date: 2/27/2025       Order Dose: 5 mg       Quantity: 20 tablet    Refills: 0    POLYETHYLENE GLYCOL (MIRALAX) 17 G PACKET    Take 17 g by mouth daily       Start Date: 2/17/2025 End Date: --       Order Dose: 17 g       Quantity: 10 g    Refills: 0       ED SEPSIS DOCUMENTATION   Time reflects when diagnosis was documented in both MDM as applicable and the Disposition within this note       Time User Action Codes Description Comment    2/17/2025  8:36 PM Helen Florez Add [R10.9] Abdominal pain     2/17/2025  8:36 PM Helen Florez Add [M54.9] Back pain     2/17/2025  8:36 PM Helen Florez Add [N39.0] UTI (urinary tract  infection)     2/17/2025  8:37 PM Helen Florez Add [G89.3,  C79.51] Pain from bone metastases (HCC)     2/17/2025  8:37 PM Helen Florez Add [K59.00] Constipation     2/17/2025  8:57 PM Helen Florez Add [R26.2] Unable to walk     2/17/2025  8:57 PM Helen Florez Add [R53.1] Generalized weakness     2/17/2025  9:37 PM Heber Lantigua Add [R26.2] Ambulatory dysfunction                  Helen Florez MD  02/17/25 2139       Helen Florez MD  02/17/25 2151

## 2025-02-17 NOTE — Clinical Note
Case was discussed with COLLEEN and the patient's admission status was agreed to be Admission Status: observation status .

## 2025-02-18 DIAGNOSIS — R26.2 AMBULATORY DYSFUNCTION: Primary | ICD-10-CM

## 2025-02-18 LAB
ATRIAL RATE: 83 BPM
P AXIS: 28 DEGREES
PR INTERVAL: 138 MS
QRS AXIS: 33 DEGREES
QRSD INTERVAL: 136 MS
QT INTERVAL: 396 MS
QTC INTERVAL: 465 MS
T WAVE AXIS: 51 DEGREES
VENTRICULAR RATE: 83 BPM

## 2025-02-18 PROCEDURE — 93010 ELECTROCARDIOGRAM REPORT: CPT | Performed by: INTERNAL MEDICINE

## 2025-02-18 NOTE — DISCHARGE INSTRUCTIONS
Take the antibiotic as prescribed.  I gave you a few pain pills but he will likely need more - the cancer doctor should be the one to prescribe these again.  Take the miralax for constipation.  Try to drink more fluids.    Return to ER if worsening pain, fever, vomiting.

## 2025-02-18 NOTE — ED NOTES
This RN at bedside using iPad interpretor to talk with the patient and his wife. The patient is sleepy during the interaction and his eyes are closed for most of the conversation. This RN is unable to stand the patient and assess mobility as he is tired and very weak at this time. Wife at bedside states this is how he has been over the past week at home. She said he has lost a lot of his mobility and they mostly use a wheelchair to get him around. Dr. Florez made aware of the conversation.      Sirisha Willis RN  02/17/25 2890

## 2025-02-19 ENCOUNTER — PATIENT OUTREACH (OUTPATIENT)
Dept: CASE MANAGEMENT | Facility: HOSPITAL | Age: 75
End: 2025-02-19

## 2025-02-19 LAB — BACTERIA UR CULT: NORMAL

## 2025-02-20 ENCOUNTER — PATIENT OUTREACH (OUTPATIENT)
Age: 75
End: 2025-02-20

## 2025-02-20 NOTE — PROGRESS NOTES
Received in basket message from provider. Provider is requesting home PT for patient due to weakness.     Email referral sent to [facility].     [facility] confirms receipt of referral. [facility] will follow up.

## 2025-02-27 ENCOUNTER — OFFICE VISIT (OUTPATIENT)
Age: 75
End: 2025-02-27

## 2025-02-27 VITALS
TEMPERATURE: 97.5 F | DIASTOLIC BLOOD PRESSURE: 67 MMHG | HEART RATE: 87 BPM | RESPIRATION RATE: 17 BRPM | OXYGEN SATURATION: 99 % | SYSTOLIC BLOOD PRESSURE: 101 MMHG

## 2025-02-27 DIAGNOSIS — E11.69 TYPE 2 DIABETES MELLITUS WITH OTHER SPECIFIED COMPLICATION, WITH LONG-TERM CURRENT USE OF INSULIN (HCC): Primary | ICD-10-CM

## 2025-02-27 DIAGNOSIS — R26.2 AMBULATORY DYSFUNCTION: ICD-10-CM

## 2025-02-27 DIAGNOSIS — R63.0 ANOREXIA: ICD-10-CM

## 2025-02-27 DIAGNOSIS — Z79.4 TYPE 2 DIABETES MELLITUS WITH OTHER SPECIFIED COMPLICATION, WITH LONG-TERM CURRENT USE OF INSULIN (HCC): Primary | ICD-10-CM

## 2025-02-27 DIAGNOSIS — K59.00 CONSTIPATION: ICD-10-CM

## 2025-02-27 DIAGNOSIS — L89.629 PRESSURE INJURY OF SKIN OF LEFT HEEL, UNSPECIFIED INJURY STAGE: ICD-10-CM

## 2025-02-27 DIAGNOSIS — C79.51 PROSTATE CANCER METASTATIC TO BONE (HCC): ICD-10-CM

## 2025-02-27 DIAGNOSIS — C61 PROSTATE CANCER METASTATIC TO BONE (HCC): ICD-10-CM

## 2025-02-27 PROCEDURE — 99213 OFFICE O/P EST LOW 20 MIN: CPT | Performed by: FAMILY MEDICINE

## 2025-02-27 PROCEDURE — G2211 COMPLEX E/M VISIT ADD ON: HCPCS | Performed by: FAMILY MEDICINE

## 2025-02-27 RX ORDER — SENNOSIDES 8.6 MG
8.6 TABLET ORAL 2 TIMES DAILY
Qty: 60 TABLET | Refills: 3 | Status: SHIPPED | OUTPATIENT
Start: 2025-02-27

## 2025-02-27 RX ORDER — LANOLIN ALCOHOL/MO/W.PET/CERES
400 CREAM (GRAM) TOPICAL 2 TIMES DAILY
Qty: 60 TABLET | Refills: 1 | Status: SHIPPED | OUTPATIENT
Start: 2025-02-27

## 2025-02-27 RX ORDER — MULTIVITAMIN
1 TABLET ORAL DAILY
Qty: 30 TABLET | Refills: 2 | Status: SHIPPED | OUTPATIENT
Start: 2025-02-27

## 2025-02-27 NOTE — ASSESSMENT & PLAN NOTE
Per wife reports limited PO intake, due to decrease appetite.  Advised to continue Ensure supplementation when patient can't tolerate meals  Advised to supplement with addition of Whey protein powder  Orders:    Cholecalciferol (VITAMIN D3) 1,000 units tablet; Take 1 tablet (1,000 Units total) by mouth daily

## 2025-02-27 NOTE — ASSESSMENT & PLAN NOTE
Using walker at home, was responding well to home PT, which ended in Decemeber. Reports since December patient has started to decompensate again.  Discussed with Complex Care manager, Randy Santos RN regarding possibly having more sessions if insurance allows.  Pending follow-up with Shannan Joseph from [facility]   Patient requested calls be coordinated via their son

## 2025-02-27 NOTE — ASSESSMENT & PLAN NOTE
Lab Results   Component Value Date    HGBA1C 7.1 (H) 02/03/2025   Chronic, still within acceptable range without use of insulin. Limited diet due to loss of appetite in setting of metastatic cancer.   Continue to monitor home glucose.

## 2025-02-27 NOTE — PROGRESS NOTES
Name: Oliver Astudillo      : 1950      MRN: [id number]  Encounter Provider: Guanako Bell MD  Encounter Date: 2025   Encounter department: [facility]  :  Assessment & Plan  Type 2 diabetes mellitus with other specified complication, with long-term current use of insulin (HCC)  Lab Results   Component Value Date    HGBA1C 7.1 (H) 2025   Chronic, still within acceptable range without use of insulin. Limited diet due to loss of appetite in setting of metastatic cancer.   Continue to monitor home glucose.          Prostate cancer metastatic to bone (HCC)  Continue current disease directed care;  Follows with urology, palliative care and heme/onc.    Orders:    Cholecalciferol (VITAMIN D3) 1,000 units tablet; Take 1 tablet (1,000 Units total) by mouth daily    Multiple Vitamin (multivitamin) tablet; Take 1 tablet by mouth daily    Anorexia  Per wife reports limited PO intake, due to decrease appetite.  Advised to continue Ensure supplementation when patient can't tolerate meals  Advised to supplement with addition of Whey protein powder  Orders:    Cholecalciferol (VITAMIN D3) 1,000 units tablet; Take 1 tablet (1,000 Units total) by mouth daily    Ambulatory dysfunction  Using walker at home, was responding well to home PT, which ended in Decemeber. Reports since December patient has started to decompensate again.  Discussed with Complex Care manager, Randy Santos RN regarding possibly having more sessions if insurance allows.  Pending follow-up with Shannan Joseph from [facility]   Patient requested calls be coordinated via their son         Constipation  Chronic, stable  Continue Miralax daily, Mg 400mg BID, and Senna BID PRN  Orders:    magnesium Oxide (MAG-OX) 400 mg TABS; Take 1 tablet (400 mg total) by mouth 2 (two) times a day    senna (SENOKOT) 8.6 mg; Take 1 tablet (8.6 mg total) by mouth 2 (two) times a day    Pressure injury of skin of left heel,  unspecified injury stage  Presents with 1 month of pressure injury to L heel. Appear scabbed over without Hx of metastatic cancer, ambulatory dysfunction, cachexia.  Advised to offload from heels when sedentary  Cover area with Vaseline to protect the skin  Referral to wound care  Orders:    Ambulatory Referral to Wound Care; Future           History of Present Illness   Patient seen and examined in office for evaluation of progression of his functional abilities in setting of prostate cancer with mets to bone/liver. Patient was doing much better while he was receiving guided physical therapy, but since it was discontinued, patient has began to decompensate again per wife. Per wife she is beginning to feel caregiver burnout and needs assistance with her 's care. Patient reports fatiguing very easily after 10mins on seated bicycle which their son recently bought for them. Patient reports desire to continue living without SI.       Review of Systems   Constitutional:  Positive for chills (occasional) and fatigue. Negative for fever.   HENT:  Negative for ear pain and sore throat.    Eyes:  Negative for pain and visual disturbance.   Respiratory:  Negative for cough and shortness of breath.    Cardiovascular:  Negative for chest pain and palpitations.   Gastrointestinal:  Positive for constipation. Negative for abdominal pain and vomiting.   Genitourinary:  Negative for dysuria and hematuria.   Musculoskeletal:  Positive for back pain and gait problem. Negative for arthralgias.   Skin:  Positive for wound (L heel ulcer). Negative for color change and rash.   Neurological:  Positive for weakness (generalized, significant). Negative for seizures and syncope.   Psychiatric/Behavioral:  Negative for suicidal ideas.    All other systems reviewed and are negative.      Objective   /67 (BP Location: Left arm, Patient Position: Sitting, Cuff Size: Adult) Comment (Cuff Size): small adult  Pulse 87   Temp 97.5 °F  (36.4 °C)   Resp 17   SpO2 99%      Physical Exam  Vitals and nursing note reviewed.   Constitutional:       General: He is not in acute distress.     Appearance: He is well-developed. He is ill-appearing.      Comments: cachectic   HENT:      Head: Normocephalic and atraumatic.   Eyes:      Conjunctiva/sclera: Conjunctivae normal.   Cardiovascular:      Rate and Rhythm: Normal rate and regular rhythm.      Pulses: Normal pulses.      Heart sounds: Normal heart sounds. No murmur heard.  Pulmonary:      Effort: Pulmonary effort is normal. No respiratory distress.      Breath sounds: Normal breath sounds.   Abdominal:      Palpations: Abdomen is soft.      Tenderness: There is no abdominal tenderness.   Musculoskeletal:         General: No swelling.      Cervical back: Neck supple.   Skin:     General: Skin is warm and dry.      Capillary Refill: Capillary refill takes less than 2 seconds.      Findings: Lesion (unstageable pressure ulcer of left heel) present.   Neurological:      General: No focal deficit present.      Mental Status: He is alert and oriented to person, place, and time.      Cranial Nerves: No cranial nerve deficit.      Motor: Weakness (generalized weakness) present.   Psychiatric:         Mood and Affect: Mood normal.

## 2025-02-27 NOTE — ASSESSMENT & PLAN NOTE
Continue current disease directed care;  Follows with urology, palliative care and heme/onc.    Orders:    Cholecalciferol (VITAMIN D3) 1,000 units tablet; Take 1 tablet (1,000 Units total) by mouth daily    Multiple Vitamin (multivitamin) tablet; Take 1 tablet by mouth daily

## 2025-02-27 NOTE — ASSESSMENT & PLAN NOTE
Chronic, stable  Continue Miralax daily, Mg 400mg BID, and Senna BID PRN  Orders:    magnesium Oxide (MAG-OX) 400 mg TABS; Take 1 tablet (400 mg total) by mouth 2 (two) times a day    senna (SENOKOT) 8.6 mg; Take 1 tablet (8.6 mg total) by mouth 2 (two) times a day

## 2025-02-28 ENCOUNTER — TELEPHONE (OUTPATIENT)
Dept: PALLIATIVE MEDICINE | Facility: CLINIC | Age: 75
End: 2025-02-28

## 2025-03-03 ENCOUNTER — HOSPITAL ENCOUNTER (OUTPATIENT)
Dept: NON INVASIVE DIAGNOSTICS | Facility: HOSPITAL | Age: 75
Discharge: HOME | End: 2025-03-03
Attending: RADIOLOGY
Payer: COMMERCIAL

## 2025-03-03 DIAGNOSIS — N13.30 HYDRONEPHROSIS, UNSPECIFIED HYDRONEPHROSIS TYPE: Primary | ICD-10-CM

## 2025-03-03 PROCEDURE — 50435 EXCHANGE NEPHROSTOMY CATH: CPT

## 2025-03-03 PROCEDURE — C1769 GUIDE WIRE: HCPCS | Performed by: RADIOLOGY

## 2025-03-03 PROCEDURE — C1729 CATH, DRAINAGE: HCPCS | Performed by: RADIOLOGY

## 2025-03-03 RX ORDER — LIDOCAINE WITH 8.4% SOD BICARB 0.9%(10ML)
SYRINGE (ML) INJECTION AS NEEDED
Status: COMPLETED | OUTPATIENT
Start: 2025-03-03 | End: 2025-03-03

## 2025-03-03 RX ADMIN — Medication 10 ML: at 10:16

## 2025-03-03 RX ADMIN — IOHEXOL 10 ML: 350 INJECTION, SOLUTION INTRAVENOUS at 10:27

## 2025-03-03 NOTE — BRIEF OP NOTE (RAD/CATH)
INTERVENTIONAL RADIOLOGY PROCEDURE NOTE    Date: 3/3/2025    Procedure:   Procedure Summary       Date: 03/03/25 Room / Location: [facility]    Anesthesia Start:  Anesthesia Stop:     Procedure: IR NEPHROSTOMY TUBE CHECK/CHANGE/REPOSITION/REINSERTION/UPSIZE Diagnosis:       Hydronephrosis, unspecified hydronephrosis type      (Bilateral PNC monthly change due to encrustation)    Scheduled Providers: Wenceslao Ho MD Responsible Provider:     Anesthesia Type: Not recorded ASA Status: Not recorded            Preoperative diagnosis:   1. Hydronephrosis, unspecified hydronephrosis type         Postoperative diagnosis: Same.    Surgeon: Wenceslao Ho MD     Assistant: None. No qualified resident was available.    Blood loss: None    Specimens: None     Findings: Uneventful routine exchange of bilateral 10F PCNs. No hydronephrosis. Opacification of the proximal - mid ureters bilaterally. Mild tube encrustration. Next exchange in 4 weeks.    Complications: None immediate.    Anesthesia: local

## 2025-03-03 NOTE — SEDATION DOCUMENTATION
Procedure completed by Dr Ho. Pt tolerated without issues. Education provided to pt prior to and throughout procedure, questions answered as offered. New tubes sutured, dry dressings to sites.

## 2025-03-03 NOTE — DISCHARGE INSTRUCTIONS
Cuidado de la sonda de nefrostomía    LO QUE NECESITAS SABER:  Jasmin sonda de nefrostomía es un catéter (tubo de plástico millan) que se inserta a través de la piel hasta el riñón. La sonda de nefrostomía drena la orina de luevano riñón a jasmin bolsa colectora fuera de luevano cuerpo. Es posible que necesite jasmin sonda de nefrostomía cuando algo bloquea el flujo normal de orina. Jasmin sonda de nefrostomía se puede usar por un período de tiempo corto o markus. La sonda de nefrostomía sale de luevano espalda, por lo que necesitará que alguien le ayude a cuidarla.        INSTRUCCIONES DE DESCARGA:     Cómo limpiar la piel alrededor de la sonda de nefrostomía y cambiar el vendaje: Dado que la sonda de nefrostomía sale por la espalda, no podrá cuidarla usted mismo. Pídale a alguien que siga las instrucciones generales a continuación para revisar y cuidar luevano tubo de nefrostomía.  Reúna los elementos que necesitará.        Almohadilla desechable (de un solo uso) y jasmin toallita limpia  Jabón común, agua tibia y guantes médicos nuevos  Vendajes de gasa estériles  Vendaje adhesivo transparente o cinta médica  reed de la piel  Película protectora de la piel  Bolsa de basura  Quite el vendaje anabelle y revise el sitio de entrada del tubo. Senthil que el paciente se acueste de lado con el sitio de entrada de la sonda de nefrostomía hacia arriba. Coloque la almohadilla inferior donde atrapará el drenaje mientras trabaja con la sonda de nefrostomía.  Lávate las cleo con jabón y agua. Póngase guantes médicos nuevos.  Retire suavemente el vendaje anabelle, sin tirar del tubo. Senthil esto sujetando la piel al lado del tubo con jasmin mano. Con la otra mano, retire suavemente la cinta adhesiva y la reed cutánea tirando en la misma dirección en que crece el vello. No toque el lado del vendaje que se coloca sobre o alrededor del tubo. Deseche el vendaje y la reed cutánea en jasmin bolsa de basura.  Busque signos de infección, barron enrojecimiento e hinchazón de la  piel. Informe cualquier cambio en la piel a los proveedores de atención médica.  Limpie el sitio de entrada del tubo.  Sostenga el tubo en luevano lugar para evitar que se salga mientras limpia a luevano alrededor.  Deberá limpiar el área dos veces. Para la primera limpieza, humedezca jasmin venda de gasa nueva con agua y jabón. Comience en el sitio de entrada del tubo. Limpie la piel en círculos, alejándose del sitio de entrada. Retire la taco y cualquier otro material con la gasa. Senthil esto tantas veces barron sea necesario. Use jasmin nueva venda de gasa cada vez que limpie el área, alejándose del sitio de entrada.  Para la segunda limpieza, humedezca jasmin gasa nueva con agua. Comience en el sitio de entrada del tubo. Limpie la piel en círculos, alejándose del sitio de entrada. Use jasmin nueva venda de gasa cada vez que limpie el área, alejándose del sitio de entrada.   Acaricie suavemente la piel con un paño limpio para secarla.     Aplique la reed cutánea y los vendajes.   Enrolle un vendaje para que sea más grueso y colóquelo debajo del lugar donde el tubo entra en la piel. Colóquelo para sostener el tubo y evite que se doble o doble. Pegue el vendaje con cinta adhesiva en luevano lugar y aplique más vendajes si se lo indica un proveedor de atención médica.  Lleve el tubo hacia el frente y péguelo con cinta adhesiva a la piel. No estire demasiado la sonda, ya que esto puede sacar la sonda de nefrostomía.  Con qué frecuencia cambiar el vendaje. Cambie el vendaje alrededor del tubo cada dos días. Si rose vendajes se ensucian o mojan, cámbielos de inmediato y con la frecuencia que sea necesaria. Si la sonda de nefrostomía se va a utilizar percy un período prolongado, es necesario cambiarla cada 2 o 3 meses. Los proveedores de atención médica le dirán cuándo debe programar jasmin gabriel para que le cambien la sonda.    Cómo cuidar la bolsa de drenaje de orina:  Pregunte si necesita medir y anotar cuánta orina hay en la bolsa antes de  vaciarla. Drene la orina de la bolsa de drenaje cuando esté entre ½ y ? completo. Joey el marisol en la parte inferior de la bolsa para vaciar la orina en el inodoro.  Es posible que deba quitar la bolsa de drenaje de la sonda de nefrostomía para cambiarla. Si es así, coloque jasmin nueva bolsa de drenaje firmemente en la sonda de nefrostomía.  Cómo prevenir problemas con luevano tubo de nefrostomía:  Cambiar vendajes, dirigido. [place] ayuda a prevenir infecciones. Deseche o limpie la bolsa de drenaje según las indicaciones de luevano proveedor de atención médica.    Limpie los extremos de conexión de la bolsa de drenaje con alcohol antes de volver a conectar la bolsa al tubo. [place] ayuda a prevenir infecciones.    Mantenga el tubo pegado a luevano piel y conectado a jasmin bolsa de drenaje colocada debajo del nivel de rose riñones. [place] ayuda a evitar que la orina regrese a los riñones. Puede usar jasmin pequeña bolsa de drenaje atada a la pierna para que pueda moverse más fácilmente.    Revise el catéter para asegurarse de que esté en luevano lugar después de cambiarse de ropa o realizar otras actividades. No use ropa ajustada sobre el tubo. Coloque el tubo sobre luevano muslo en lugar de debajo de él cuando esté sentado. Asegúrese de que nada tire de la sonda de nefrostomía cuando se mueva.    Cambie de posición si ve poca o ninguna orina en la bolsa de drenaje. Verifique si el tubo de orina está torcido o doblado. Asegúrese de no estar sentado o acostado    En el tubo. Si no hay torceduras y hay poca o nada de orina en la bolsa de drenaje, informe a luevano proveedor de atención médica.     Enjuague el tubo barron se indica. Algunas trompas se enjuagan jasmin vez al día con 10 ml de NSS. Se le dará jasmin receta para los enjuagues.  Para enjuagar el tubo de nefrostomía, limpie ambas conexiones con alcohol. Desenrosque el tubo de la bolsa de drenaje y gire la jeringa de solución salina en el tubo de nefrostomía y enjuague enérgicamente. Retire la jeringa y vuelva a  girar el tubo de la bolsa de drenaje en el tubo de nefrostomía.  Mantenga el sitio cubierto mientras se ducha. Pegue con cinta un trozo de plástico adhesivo transparente sobre el vendaje para mantenerlo seco mientras se ducha. No tome pema de fan.    Comuníquese con Radiología Intervencion[facility] al [phone] (PACIENTES DE HERNANDEZ: Comuníquese con Radiología Intervencionista al [phone]) (PACIENTES DE QUETA: Comuníquese con Radiología Intervencionista al [phone]) si:  La piel alrededor de la sonda de nefrostomía está enrojecida, hinchada, con picazón o con sarpullido.  Tiene fiebre superior a 101 o escalofríos.   Tiene dolor en la parte inferior de la espalda o en la cadera.   Hay cambios en el aspecto o el olor de la orina.   Tiene poca o ninguna orina drenando del tubo de nefrostomía.  Tiene náuseas y está vomitando.   La elida lorenza en luevano tubo se ha movido, o el tubo es más markus que cuando se lo colocó.   Tiene preguntas o inquietudes sobre luevano condición o atención.  La sonda de nefrostomía sale completamente.  Hay taco, pus o mal olor que sale del lugar donde el tubo entra en la piel.  La orina se escapa alrededor del tubo.      Las siguientes farmacias tienen jeringas de lavado.      The following pharmacies carry the flush syringes.       [facility]                     [facility]                         [facility]       [address]                     [address]                    [phone]  [place]                       [place]  Phone [phone]            Phone [phone]                 [facility]                                                                                                   [phone]  [facility]             [facility]                             [address] [place], [place] [address] [facility]                      [place]                                  [phone]  Phone [phone]            Phone [phone]    [facility]                                                                         [facility] [phone]  [facility] [place]   Phone [phone]    Nephrostomy Tube Care     WHAT YOU NEED TO KNOW:   A nephrostomy tube is a catheter (thin plastic tube) that is inserted through your skin and into your kidney. The nephrostomy tube drains urine from your kidney into a collecting bag outside your body. You may need a nephrostomy tube when something is blocking the normal flow of urine. A nephrostomy tube may be used for a short or a long period of time. The nephrostomy tube comes out of your back, so you will need someone to help care for your nephrostomy tube.          DISCHARGE INSTRUCTIONS:      How to clean the skin around the nephrostomy tube and change the bandage:  Since the nephrostomy tube comes out of your back, you will not be able to care for it by yourself. Ask someone to follow the general directions below to check and care for your nephrostomy tube.   Gather the items you will need.          Disposable (single use) under-pad, and a clean washcloth  Plain soap, warm water, and new medical gloves  Sterile gauze bandages  Clear adhesive dressing or medical tape  Skin barrier  Protective skin film  Trash bag  Remove the old bandage, and check the tube entry site.    Have the patient lie on his side with the nephrostomy tube entry site facing up. Place the under-pad where it will catch drainage as you are working with the nephrostomy tube.   Wash your hands with soap and water. Put on new medical gloves.  Gently remove the old bandage, without pulling on the tube. Do this by holding the skin beside the tube with one hand. With the other hand, gently remove sticky tape and the skin barrier by pulling in the same direction as hair growth. Do not touch the side of the bandage that is placed over or around the tube. Throw the  bandage and skin barrier away in a trash bag.  Look for signs of infection, such as skin redness and swelling. Report any skin changes to healthcare providers.  Clean the tube entry site.    Hold the tube in place to keep it from being pulled out while you are cleaning around it.  You will need to clean the area twice. For the first cleaning, wet a new gauze bandage with soap and water.  Begin at the entry site of the tube. Wipe the skin in circles, moving away from the entry site. Remove blood and any other material with the gauze. Do this as often as needed. Use a new gauze bandage each time you clean the area, moving away from the entry site.   For the second cleaning, wet a new gauze bandage with water. Begin at the entry site of the tube. Wipe the skin in circles, moving away from the entry site. Use a new gauze bandage each time you clean the area, moving away from the entry site.   Gently pat the skin with a clean washcloth to dry it.    Apply the skin barrier and bandages.    Roll up a bandage to make it thick, and place it under  the place where the tube enters the skin. Place it to support the tube, and stop it from kinking or bending. Tape the bandage in place, and apply more bandages if directed by a healthcare provider.   Bring the tubing forward to the front and tape it to the skin. Do not stretch the tube tight, because this may pull the nephrostomy tube out.  How often to change the bandage.  Change the bandage around the tube, every other day. If your bandages  get dirty or wet, change them right away, and as often as needed. If your nephrostomy tube is to be used for a long period of time, the tube needs to be changed every 2 to 3 months. Healthcare providers will tell you when you need to make an appointment to have your tube changed.     How to care for the urine drainage bag:   Ask if you need to measure and write down how much urine is in the bag before you empty it. Drain urine out of the  drainage bag when it is ½ to ? full. Open the spout at the bottom of the bag to empty the urine into the toilet.   You may need to detach the drainage bag from the nephrostomy tube to change it.. If so, attach a new drainage bag tightly to the nephrostomy tube.     How to prevent problems with your nephrostomy tube:   Change bandages, directed.  This helps to prevent infection. Throw away or clean your drainage bag as directed by your healthcare provider.    Wipe the connecting ends of the drainage bag with alcohol before you reconnect the bag to the tube.  This helps prevent infection.     Keep the tube taped to your skin and connected to a drainage bag placed below the level of your kidneys.  This helps prevent urine from backing up into your kidneys. You may wear a small drainage bag strapped to your leg to let you move around more easily.    Check the catheter to be sure it is in place after you change your clothes or do other activities.  Do not wear tight clothing over the tube. Place the tubing over your thigh rather than under it when you are sitting down. Be sure that nothing is pulling on the nephrostomy tube when you move around.    Change positions if you see little or no urine in your drainage bag.  Check to see if the urine tube is twisted or bent. Be sure that you are not sitting or lying on the tube. If there are no kinks and there is little or no urine in the drainage bag, tell your healthcare provider.    Flush out the tube as directed. Some tubes get flushed one time a day with 10 mls of NSS You will be given a prescription for the flushes.  To flush the nephrostomy tube, clean both connections with alcohol swap. Twist off the drainage bag tube and twist the saline syringe into the nephrostomy tube and flush briskly. Remove the syringe and twist the drainage bag tube back into the nephrostomy tube.  Keep the site covered while you shower.  Tape a piece of clear adhesive plastic over the dressing  to keep it dry while you shower. Do not take tub baths.    Contact Interventional Radiology at [phone] (DAVID PATIENTS: Contact Interventional Radiology at [phone]) (QUETA PATIENTS: Contact Interventional Radiology at [phone]) if:  The skin around the nephrostomy tube is red, swollen, itches, or has a rash.   You have a fever greater than 101 or chills.  You have lower back or hip pain.  There are changes in how your urine looks or smells.  You have little or no urine draining from the nephrostomy tube.   You have nausea and are vomiting.  The black deb on your tube has moved, or the tube is longer than when it was put in.   You have questions or concerns about your condition or care.  The nephrostomy tube comes out completely.   There is blood, pus, or a bad smell coming from the place where the tube enters your skin.  Urine is leaking around the tube.

## 2025-03-06 ENCOUNTER — TELEPHONE (OUTPATIENT)
Dept: HEMATOLOGY ONCOLOGY | Facility: CLINIC | Age: 75
End: 2025-03-06

## 2025-03-06 ENCOUNTER — OFFICE VISIT (OUTPATIENT)
Dept: HEMATOLOGY ONCOLOGY | Facility: CLINIC | Age: 75
End: 2025-03-06
Payer: COMMERCIAL

## 2025-03-06 VITALS
HEIGHT: 68 IN | SYSTOLIC BLOOD PRESSURE: 106 MMHG | TEMPERATURE: 97.2 F | RESPIRATION RATE: 17 BRPM | HEART RATE: 93 BPM | WEIGHT: 135 LBS | DIASTOLIC BLOOD PRESSURE: 70 MMHG | BODY MASS INDEX: 20.46 KG/M2 | OXYGEN SATURATION: 100 %

## 2025-03-06 DIAGNOSIS — C61 PROSTATE CANCER METASTATIC TO BONE (HCC): ICD-10-CM

## 2025-03-06 DIAGNOSIS — C61 PROSTATE CANCER METASTATIC TO BONE (HCC): Primary | ICD-10-CM

## 2025-03-06 DIAGNOSIS — E83.52 HYPERCALCEMIA: ICD-10-CM

## 2025-03-06 DIAGNOSIS — K59.01 SLOW TRANSIT CONSTIPATION: ICD-10-CM

## 2025-03-06 DIAGNOSIS — R63.0 ANOREXIA: ICD-10-CM

## 2025-03-06 DIAGNOSIS — E83.52 HYPERCALCEMIA: Primary | ICD-10-CM

## 2025-03-06 DIAGNOSIS — C79.51 PROSTATE CANCER METASTATIC TO BONE (HCC): ICD-10-CM

## 2025-03-06 DIAGNOSIS — C79.51 PROSTATE CANCER METASTATIC TO BONE (HCC): Primary | ICD-10-CM

## 2025-03-06 PROCEDURE — 99215 OFFICE O/P EST HI 40 MIN: CPT | Performed by: INTERNAL MEDICINE

## 2025-03-06 PROCEDURE — G2211 COMPLEX E/M VISIT ADD ON: HCPCS | Performed by: INTERNAL MEDICINE

## 2025-03-06 RX ORDER — CALCITONIN SALMON 200 [USP'U]/ML
4 INJECTION, SOLUTION INTRAMUSCULAR; SUBCUTANEOUS DAILY
Qty: 30 ML | Refills: 0 | Status: SHIPPED | OUTPATIENT
Start: 2025-03-06 | End: 2025-04-05

## 2025-03-06 RX ORDER — CALCITONIN SALMON 200 [USP'U]/ML
4 INJECTION, SOLUTION INTRAMUSCULAR; SUBCUTANEOUS ONCE
Status: CANCELLED
Start: 2025-03-07 | End: 2025-03-07

## 2025-03-06 NOTE — ASSESSMENT & PLAN NOTE
I think the root cause of his hypercalcemia is prostate cancer.  He is under treatment with Pluvicto.  He has received 3 of 4 planned treatments, the last is anticipated on March 27.  We reviewed that if this is not effective, further treatment options are associated with poor risk-benefit ratio and further treatment would likely be more harmful than helpful.  Understandably, the patient and his family are hopeful for response.  Supportive measures continue at this time.  Orders:  •  CBC and differential; Standing  •  Comprehensive metabolic panel; Standing  •  PSA Total, Diagnostic; Future

## 2025-03-06 NOTE — TELEPHONE ENCOUNTER
Called [facility] and reviewed scheduled infusion appointment for tomorrow to start calcitonin injections. Advised him infusion staff can provide them with a print out of all appointments. Also advised that a script was sent to [facility] pharmacy but availability and cost may be a limiting factor to pt being able to self inject at home and we will keep them updated on that. He voiced understanding.

## 2025-03-06 NOTE — PROGRESS NOTES
Name: Oliver Astudillo      : 1950      MRN: [id number]  Encounter Provider: Ramone Coyne DO  Encounter Date: 3/6/2025   Encounter department: [facility] [place]  :  Assessment & Plan  Prostate cancer metastatic to bone (HCC)  I think the root cause of his hypercalcemia is prostate cancer.  He is under treatment with Pluvicto.  He has received 3 of 4 planned treatments, the last is anticipated on .  We reviewed that if this is not effective, further treatment options are associated with poor risk-benefit ratio and further treatment would likely be more harmful than helpful.  Understandably, the patient and his family are hopeful for response.  Supportive measures continue at this time.  Orders:  •  CBC and differential; Standing  •  Comprehensive metabolic panel; Standing  •  PSA Total, Diagnostic; Future    Anorexia  See comments under hypercalcemia.       Slow transit constipation  See comments under hypercalcemia.       Hypercalcemia  Complicated by constipation, anorexia, intermittent confusion, dehydration.  IV fluid supplementation is arranged.  He had responded to Zometa, albeit briefly.  Will add calcitonin 4 units/kg daily.  Continue zoledronic acid.    Orders:  •  CBC and differential; Standing  •  Comprehensive metabolic panel; Standing  •  PSA Total, Diagnostic; Future  •  calcitonin, salmon, (MIACALCIN); Inject 1.22 mL (244 Units total) into a muscle daily        Return in about 6 weeks (around 2025) for Labs - See orders.    History of Present Illness   Chief Complaint   Patient presents with   • Follow-up       Oncology History   Cancer Staging   Prostate cancer metastatic to bone (HCC)  Staging form: Prostate, AJCC 8th Edition  - Clinical: Stage IVB (cT4, cN1, cM1b, PSA: 145, Grade Group: 5) - Signed by Dov Andrea MD on 2023  Prostate specific antigen (PSA) range: 20 or greater  Histologic grading system: 5 grade  system  Oncology History   Prostate cancer metastatic to bone (HCC)   5/24/2023 Initial Diagnosis    May 2023 patient presented with urinary frequency. . CT showed distended urinary bladder with bilateral hydronephrosis. Urinary bladder mass inseparable from the prostate. Extraprostatic extension noted. Bone scan showed indeterminate activity at T11 vertebral body. Bilateral nephrostomy tubes and Cazares catheter placed. Prostate biopsy showed adenocarcinoma, Liana 9.      6/28/2023 Biopsy    A. Prostate, right lateral base:  - Prostatic adenocarcinoma, [place] score 4 + 5 = 9, Prognostic Grade Group 5,  involving 90% of 1 needle core  and measuring  11 mm in length.        B. Prostate, right medial base:  - Prostatic adenocarcinoma, [place] score 4 + 5 = 9, Prognostic Grade Group 5,  involving 70% of 1 needle core  and measuring  10 mm in length.      C. Prostate, right lateral mid:  - Prostatic adenocarcinoma, [place]iana score 4 + 5 = 9, Prognostic Grade Group 5,  involving 90% of 1 needle core  and measuring  12 mm in length.      Comment: Immunohistochemistry for a prostate multiplex stain (p63, K903, and P504S) and NKX3.1 demonstrate invasive carcinoma.     D. Prostate, right medial mid:  - Prostatic adenocarcinoma, Liana score 4 + 5 = 9, Prognostic Grade Group 5,  involving 95% of 1 needle core  and measuring  15 mm in length.      E. Prostate, right lateral apex:  - Prostatic adenocarcinoma, Liana score 4 + 5 = 9, Prognostic Grade Group 5,  involving 90% of 1 needle core  and measuring  12 mm in length.   - Perineural invasion identified.     Comment: Immunohistochemistry for a prostate multiplex stain (p63, K903, and P504S) and NKX3.1 demonstrate invasive carcinoma.     F. Prostate, right medial apex:  - Prostatic adenocarcinoma, [place] score 4 + 5 = 9, Prognostic Grade Group 5,  involving 100% of 1 needle core  and measuring  20 mm in length.      G. Prostate, left lateral base:  - Prostatic  adenocarcinoma, Liana score 4 + 5 = 9, Prognostic Grade Group 5,  involving 75% of 1 needle core  and measuring  10 mm in length.   - Perineural invasion identified.  - Lymphovascular invasion is identified.     H. Prostate, left medial base:  - Prostatic adenocarcinoma, [place]iana score 4 + 5 = 9, Prognostic Grade Group 5,  involving 95% of 1 needle core  and measuring  14 mm in length.   - Perineural invasion identified.     Comment: There is a focus of closely approximated gastrointestinal epithelium; NKX3.1 shows no definite invasion of the GI epithelium.      I. Prostate, left lateral mid:  - Prostatic adenocarcinoma, [place] score 4 + 5 = 9, Prognostic Grade Group 5,  involving 95% of 1 needle core  and measuring  12 mm in length.       J. Prostate, left medial mid:  - Prostatic adenocarcinoma, Liana score 4 + 5 = 9, Prognostic Grade Group 5,  involving 85% of 1 needle core  and measuring  13 mm in length.       Comment: Immunohistochemistry for a prostate multiplex stain (p63, K903, and P504S) and NKX3.1 demonstrate invasive carcinoma.     K. Prostate, left lateral apex:  - Prostatic adenocarcinoma, [place] score 4 + 5 = 9, Prognostic Grade Group 5,  involving 25% of 1 needle core  and measuring  3 mm in length.        L. Prostate, left medial apex :  - Prostatic adenocarcinoma, [place] score 4 + 5 = 9, Prognostic Grade Group 5,  involving 95% of 1 needle core  and measuring  15 mm in length.     - Perineural invasion identified.     Comment:   Cribriform glands are present within the pattern 4 component.  This feature has been associated with adverse clinical outcomes and molecular features typically seen in advanced disease.  (The 2019 Genitourinary Pathology Society (GUPS) White Paper on Contemporary Grading of Prostate Cancer. Arch Pathol Lab Med. 2021 Apr 1;145(4):461-493.)     7/5/2023 -  Hormone Therapy    Firmagon loading dose on 7/5/23, followed by Lupron      8/28/2023 -  Cancer Staged    Staging  form: Prostate, AJCC 8th Edition  - Clinical: Stage IVB (cT4, cN1, cM1b, PSA: 145, Grade Group: 5) - Signed by Dov Andrea MD on 8/28/2023  Prostate specific antigen (PSA) range: 20 or greater  Histologic grading system: 5 grade system       9/2023 - 6/2024 Chemotherapy    Zytiga 250 mg PO daily     6/2024 -  Chemotherapy    Xtandi      6/24/2024 - 7/8/2024 Radiation    The patient saw @[facility] [place] for radiation treatment. This is the current list of radiation treatment:    Plan ID Energy Fractions Dose per Fraction (cGy) Dose Correction (cGy) Total Dose Delivered (cGy) Elapsed Days   T11_L5 Spine 10X/6X 10 / 10 300 0 3,000 14      8/30/2024 - 9/20/2024 Chemotherapy    alteplase (CATHFLO), 2 mg, Intracatheter, Every 1 Minute as needed, 2 of 3 cycles  DOCEtaxel (TAXOTERE) chemo infusion, 25 mg/m2 = 44.6 mg (83.3 % of original dose 30 mg/m2), Intravenous, Once, 2 of 3 cycles  Dose modification: 25 mg/m2 (original dose 30 mg/m2, Cycle 1, Reason: Anticipated Tolerance)  Administration: 44.6 mg (8/30/2024), 44.6 mg (9/6/2024), 44.6 mg (9/20/2024)        Pertinent Medical History   Prostate cancer with bone metastases.  Progressive disease after sequential hormonal blockade.  Docetaxel complicated by severe infusion reaction.    November 2024 started lutetium 177.     Review of Systems   Constitutional:  Positive for fatigue. Negative for chills and fever.   HENT:  Negative for nosebleeds.    Eyes:  Negative for discharge.   Respiratory:  Negative for cough and shortness of breath.    Cardiovascular:  Negative for chest pain.   Gastrointestinal:  Negative for abdominal pain, constipation and diarrhea.   Endocrine: Negative for polydipsia.   Genitourinary:  Negative for hematuria.   Musculoskeletal:  Negative for arthralgias.   Skin:  Negative for color change.   Allergic/Immunologic: Negative for immunocompromised state.   Neurological:  Positive for weakness. Negative for dizziness and headaches.   Hematological:   "Negative for adenopathy.   Psychiatric/Behavioral:  Negative for agitation.      Medical History Reviewed by provider this encounter:     .      Objective   /70 (BP Location: Left arm, Patient Position: Sitting, Cuff Size: Adult)   Pulse 93   Temp (!) 97.2 °F (36.2 °C) (Temporal)   Resp 17   Ht 5' 8\" (1.727 m)   Wt 61.2 kg (135 lb)   SpO2 100%   BMI 20.53 kg/m²     Pain Screening:  Pain Score:   5  ECOG ECOG Performance Status: 4 - Completely disabled.  Cannot carry on any selfcare.  Totally confined to bed or chair   Physical Exam  Constitutional:       Appearance: He is well-developed.   HENT:      Head: Normocephalic and atraumatic.      Mouth/Throat:      Mouth: Mucous membranes are moist.   Eyes:      Pupils: Pupils are equal, round, and reactive to light.   Cardiovascular:      Rate and Rhythm: Normal rate and regular rhythm.      Heart sounds: No murmur heard.  Pulmonary:      Breath sounds: Normal breath sounds. No wheezing or rales.   Abdominal:      Palpations: Abdomen is soft.      Tenderness: There is no abdominal tenderness.   Musculoskeletal:         General: No tenderness. Normal range of motion.      Cervical back: Neck supple.   Lymphadenopathy:      Cervical: No cervical adenopathy.   Skin:     Findings: No erythema or rash.   Neurological:      Mental Status: He is alert and oriented to person, place, and time.      Cranial Nerves: No cranial nerve deficit.      Deep Tendon Reflexes: Reflexes are normal and symmetric.   Psychiatric:         Behavior: Behavior normal.         Labs: I have reviewed the following labs:  Lab Results   Component Value Date/Time    WBC 6.79 02/17/2025 06:03 PM    RBC 4.41 02/17/2025 06:03 PM    Hemoglobin 13.7 02/17/2025 06:03 PM    Hematocrit 40.6 02/17/2025 06:03 PM    MCV 92 02/17/2025 06:03 PM    MCH 31.1 02/17/2025 06:03 PM    RDW 13.6 02/17/2025 06:03 PM    Platelets 458 (H) 02/17/2025 06:03 PM    Segmented % 85 (H) 02/17/2025 06:03 PM    Lymphocytes " % 8 (L) 02/17/2025 06:03 PM    Monocytes % 6 02/17/2025 06:03 PM    Eosinophils Relative 0 02/17/2025 06:03 PM    Basophils Relative 0 02/17/2025 06:03 PM    Immature Grans % 1 02/17/2025 06:03 PM    Absolute Neutrophils 5.80 02/17/2025 06:03 PM     Lab Results   Component Value Date/Time    Potassium 4.4 02/17/2025 06:03 PM    Chloride 97 02/17/2025 06:03 PM    CO2 21 02/17/2025 06:03 PM    BUN 13 02/17/2025 06:03 PM    Creatinine 0.79 02/17/2025 06:03 PM    Glucose, Fasting 153 (H) 02/11/2025 10:37 AM    Calcium 11.9 (H) 02/17/2025 06:03 PM    Corrected Calcium 10.3 (H) 11/18/2024 06:12 AM    AST 22 02/17/2025 06:03 PM    ALT 12 02/17/2025 06:03 PM    Alkaline Phosphatase 188 (H) 02/17/2025 06:03 PM    Total Protein 8.2 02/17/2025 06:03 PM    Albumin 4.3 02/17/2025 06:03 PM    Total Bilirubin 0.67 02/17/2025 06:03 PM    eGFR 89 02/17/2025 06:03 PM

## 2025-03-06 NOTE — ASSESSMENT & PLAN NOTE
Complicated by constipation, anorexia, intermittent confusion, dehydration.  IV fluid supplementation is arranged.  He had responded to Zometa, albeit briefly.  Will add calcitonin 4 units/kg daily.  Continue zoledronic acid.    Orders:  •  CBC and differential; Standing  •  Comprehensive metabolic panel; Standing  •  PSA Total, Diagnostic; Future  •  calcitonin, salmon, (MIACALCIN); Inject 1.22 mL (244 Units total) into a muscle daily

## 2025-03-07 ENCOUNTER — OFFICE VISIT (OUTPATIENT)
Dept: WOUND CARE | Facility: HOSPITAL | Age: 75
End: 2025-03-07
Payer: COMMERCIAL

## 2025-03-07 ENCOUNTER — HOSPITAL ENCOUNTER (OUTPATIENT)
Dept: INFUSION CENTER | Facility: HOSPITAL | Age: 75
End: 2025-03-07
Attending: INTERNAL MEDICINE
Payer: COMMERCIAL

## 2025-03-07 VITALS
BODY MASS INDEX: 20.53 KG/M2 | DIASTOLIC BLOOD PRESSURE: 84 MMHG | TEMPERATURE: 97.4 F | HEART RATE: 74 BPM | SYSTOLIC BLOOD PRESSURE: 139 MMHG | RESPIRATION RATE: 15 BRPM | WEIGHT: 135 LBS

## 2025-03-07 VITALS
RESPIRATION RATE: 18 BRPM | SYSTOLIC BLOOD PRESSURE: 108 MMHG | TEMPERATURE: 98.8 F | DIASTOLIC BLOOD PRESSURE: 59 MMHG | HEART RATE: 88 BPM

## 2025-03-07 DIAGNOSIS — E11.621 DIABETIC ULCER OF LEFT HEEL ASSOCIATED WITH TYPE 2 DIABETES MELLITUS, UNSPECIFIED ULCER STAGE (HCC): Primary | ICD-10-CM

## 2025-03-07 DIAGNOSIS — L97.429 DIABETIC ULCER OF LEFT HEEL ASSOCIATED WITH TYPE 2 DIABETES MELLITUS, UNSPECIFIED ULCER STAGE (HCC): Primary | ICD-10-CM

## 2025-03-07 DIAGNOSIS — E83.52 HYPERCALCEMIA: Primary | ICD-10-CM

## 2025-03-07 DIAGNOSIS — C79.51 PROSTATE CANCER METASTATIC TO BONE (HCC): ICD-10-CM

## 2025-03-07 DIAGNOSIS — C61 PROSTATE CANCER METASTATIC TO BONE (HCC): ICD-10-CM

## 2025-03-07 LAB
ALBUMIN SERPL BCG-MCNC: 4 G/DL (ref 3.5–5)
ALP SERPL-CCNC: 303 U/L (ref 34–104)
ALT SERPL W P-5'-P-CCNC: 26 U/L (ref 7–52)
ANION GAP SERPL CALCULATED.3IONS-SCNC: 13 MMOL/L (ref 4–13)
AST SERPL W P-5'-P-CCNC: 44 U/L (ref 13–39)
BILIRUB SERPL-MCNC: 0.44 MG/DL (ref 0.2–1)
BUN SERPL-MCNC: 12 MG/DL (ref 5–25)
CALCIUM SERPL-MCNC: 13.3 MG/DL (ref 8.4–10.2)
CHLORIDE SERPL-SCNC: 98 MMOL/L (ref 96–108)
CO2 SERPL-SCNC: 22 MMOL/L (ref 21–32)
CREAT SERPL-MCNC: 0.73 MG/DL (ref 0.6–1.3)
GFR SERPL CREATININE-BSD FRML MDRD: 92 ML/MIN/1.73SQ M
GLUCOSE SERPL-MCNC: 147 MG/DL (ref 65–140)
POTASSIUM SERPL-SCNC: 4.6 MMOL/L (ref 3.5–5.3)
PROT SERPL-MCNC: 8.1 G/DL (ref 6.4–8.4)
SODIUM SERPL-SCNC: 133 MMOL/L (ref 135–147)

## 2025-03-07 PROCEDURE — 80053 COMPREHEN METABOLIC PANEL: CPT | Performed by: INTERNAL MEDICINE

## 2025-03-07 PROCEDURE — 96365 THER/PROPH/DIAG IV INF INIT: CPT

## 2025-03-07 PROCEDURE — 99204 OFFICE O/P NEW MOD 45 MIN: CPT | Performed by: STUDENT IN AN ORGANIZED HEALTH CARE EDUCATION/TRAINING PROGRAM

## 2025-03-07 PROCEDURE — 99213 OFFICE O/P EST LOW 20 MIN: CPT | Performed by: STUDENT IN AN ORGANIZED HEALTH CARE EDUCATION/TRAINING PROGRAM

## 2025-03-07 PROCEDURE — 96372 THER/PROPH/DIAG INJ SC/IM: CPT

## 2025-03-07 RX ORDER — SODIUM CHLORIDE 9 MG/ML
20 INJECTION, SOLUTION INTRAVENOUS ONCE
Status: COMPLETED | OUTPATIENT
Start: 2025-03-07 | End: 2025-03-07

## 2025-03-07 RX ORDER — LIDOCAINE 40 MG/G
CREAM TOPICAL ONCE
Status: COMPLETED | OUTPATIENT
Start: 2025-03-07 | End: 2025-03-07

## 2025-03-07 RX ORDER — CALCITONIN SALMON 200 [USP'U]/ML
4 INJECTION, SOLUTION INTRAMUSCULAR; SUBCUTANEOUS ONCE
Status: COMPLETED | OUTPATIENT
Start: 2025-03-07 | End: 2025-03-07

## 2025-03-07 RX ORDER — SODIUM CHLORIDE 9 MG/ML
20 INJECTION, SOLUTION INTRAVENOUS ONCE
Status: CANCELLED | OUTPATIENT
Start: 2025-05-30

## 2025-03-07 RX ORDER — MULTIVIT WITH MINERALS/LUTEIN
250 TABLET ORAL DAILY
COMMUNITY

## 2025-03-07 RX ORDER — CALCITONIN SALMON 200 [USP'U]/ML
4 INJECTION, SOLUTION INTRAMUSCULAR; SUBCUTANEOUS ONCE
Status: CANCELLED
Start: 2025-03-10 | End: 2025-03-10

## 2025-03-07 RX ORDER — SODIUM CHLORIDE 9 MG/ML
20 INJECTION, SOLUTION INTRAVENOUS ONCE
Status: CANCELLED | OUTPATIENT
Start: 2025-03-07

## 2025-03-07 RX ADMIN — SODIUM CHLORIDE 20 ML/HR: 0.9 INJECTION, SOLUTION INTRAVENOUS at 16:34

## 2025-03-07 RX ADMIN — LIDOCAINE: 40 CREAM TOPICAL at 14:53

## 2025-03-07 RX ADMIN — CALCITONIN SALMON 244 UNITS: 200 INJECTION, SOLUTION INTRAMUSCULAR; SUBCUTANEOUS at 16:28

## 2025-03-07 RX ADMIN — SODIUM CHLORIDE 1000 ML: 0.9 INJECTION, SOLUTION INTRAVENOUS at 16:25

## 2025-03-07 RX ADMIN — ZOLEDRONIC ACID 4 MG: 4 INJECTION, SOLUTION, CONCENTRATE INTRAVENOUS at 16:46

## 2025-03-07 NOTE — PROGRESS NOTES
Ca+ 13.3 Per Dr. Coyne, pt will receive calcitonin injection, zometa and hydration today. Pt agreeable.

## 2025-03-07 NOTE — PROGRESS NOTES
Oliver Astudillo  tolerated treatment well with no complications.      Oliver Astudillo is aware of future appt on 03/10 at 1500.     AVS printed and given to Oliver Astudillo:  Yes

## 2025-03-07 NOTE — PROGRESS NOTES
Wound Procedure Treatment Diabetic Ulcer Left Heel    Performed by: Kaykay Weiss RN  Authorized by: Dano Chi DPM  Associated wounds:   Wound 03/07/25 Diabetic Ulcer Heel Left    Applied topical:  Betadine

## 2025-03-07 NOTE — PATIENT INSTRUCTIONS
Orders Placed This Encounter   Procedures    Wound cleansing and dressings Diabetic Ulcer Left Heel     LEFT HEEL WOUND:    Wash your hands with soap and water.  Remove old dressing, discard into plastic bag and place in trash.  Cleanse the wound with soap and water prior to applying a clean dressing. Do not use tissue or cotton balls. Do not scrub the wound. Pat dry using gauze.  Shower : No; please bathe at bedside. You may wash the wound with soap and water and pat dry, but do not take shower or bath; do not soak the wound.  Apply moisturizer to skin surrounding wound  Apply Betadine to the heel wound daily.       Protein: Eat protein with each meal to promote healing.  Examples of protein are fish, meat, chicken, nuts, peanut butter, eggs, lentils, edamame or a protein shake.    Wound infection:  If you have signs of infection please call the wound center.  If the wound center is closed- please go to the Emergency department.  Some signs of infection:  fever, chills, increased redness, red streaks, increase in pain, increased drainage.  Drainage with an odor, Change in drainage color: white/milky/green/tan/yellow,  an increase in swelling, chest pain and/or shortness of breath.     Elevate his left heel off of the bed with pillows under his calf to keep pressure off of the heel wound.    Dr. Chi's office will call you to schedule an appointment at his [place] office. He wants to see him to put a special brace (multi podus boot) on his foot to take pressure off of his heel.  You do not need to return to the Wound Center.  Dr. Chi will see you at his office.    Please have Vascular studies done as ordered.     Standing Status:   Future     Expiration Date:   3/14/2025

## 2025-03-09 NOTE — PROGRESS NOTES
Patient ID: Oliver Astudillo is a 74 y.o. male Date of Birth 1950     My role is Foot, Ankle and Wound Specialist    PLAN:    -Educated patient on their condition.   -The patient's wound is not currently infected. We discussed the importance of recognizing systemic and local signs of infection and going directly to the emergency department should any of these occur  -No debridement was performed today given well adhered eschar  -Dressings: Betadine, open to air  -Discussed proper care of dressings, patient is not to get them wet at all. If the dressings do get wet, they must be removed and redressed.  -Given that this is an eschar and that no debridements are necessary, the patient will see me in the office next week.    Wound Instructions    Orders Placed This Encounter   Procedures    Wound cleansing and dressings Diabetic Ulcer Left Heel     LEFT HEEL WOUND:    Wash your hands with soap and water.  Remove old dressing, discard into plastic bag and place in trash.  Cleanse the wound with soap and water prior to applying a clean dressing. Do not use tissue or cotton balls. Do not scrub the wound. Pat dry using gauze.  Shower : No; please bathe at bedside. You may wash the wound with soap and water and pat dry, but do not take shower or bath; do not soak the wound.  Apply moisturizer to skin surrounding wound  Apply Betadine to the heel wound daily.       Protein: Eat protein with each meal to promote healing.  Examples of protein are fish, meat, chicken, nuts, peanut butter, eggs, lentils, edamame or a protein shake.    Wound infection:  If you have signs of infection please call the wound center.  If the wound center is closed- please go to the Emergency department.  Some signs of infection:  fever, chills, increased redness, red streaks, increase in pain, increased drainage.  Drainage with an odor, Change in drainage color: white/milky/green/tan/yellow,  an increase in swelling, chest pain and/or shortness of  breath.     Elevate his left heel off of the bed with pillows under his calf to keep pressure off of the heel wound.    Dr. Chi's office will call you to schedule an appointment at his [place] office. He wants to see him to put a special brace (multi podus boot) on his foot to take pressure off of his heel.  You do not need to return to the Wound Center.  Dr. Chi will see you at his office.    Please have Vascular studies done as ordered.     Standing Status:   Future     Expiration Date:   3/14/2025    Wound Procedure Treatment Diabetic Ulcer Left Heel     This order was created via procedure documentation         SUBJECTIVE:    Chief complaint  Left heel wound    3/7/2025: Oliver is a pleasant 74-year-old male with a complicated past medical history significant for metastatic prostate cancer, stage II chronic kidney disease, type 2 diabetes who presents today with his wife for evaluation.  He has had a small eschar to the heel for the past few months.  He denies any systemic signs of infection but does state that this area is painful.        The following portions of the patient's history were reviewed and updated as appropriate:   Patient Active Problem List   Diagnosis    Type 2 diabetes mellitus with other specified complication (HCC)    Other hydronephrosis    Hypertension    Hydronephrosis    Prostate cancer metastatic to bone (HCC)    Encounter for monitoring androgen deprivation therapy    Nephrostomy status (HCC)    Chronic hyponatremia    Hyperlipidemia    Constipation    Stage 2 chronic kidney disease    Cancer related pain    Palliative care by specialist    Ambulatory dysfunction    Therapeutic opioid-induced constipation (OIC)    Advanced care planning/counseling discussion    Goals of care, counseling/discussion    Anorexia    Unintentional weight loss    Hypercalcemia    Moderate protein-calorie malnutrition (HCC)    Vitamin B12 deficiency    Opioid dependence, uncomplicated (HCC)     Chronic kidney disease, stage 3a (HCC)     Past Medical History:   Diagnosis Date    Altered mental status 10/08/2024    COVID-19 01/15/2024    Diabetes mellitus (HCC)     Elevated PSA 06/21/2023    High cholesterol     Hypertension     Prostate cancer (HCC)     Severe sepsis (HCC) 10/08/2024     Past Surgical History:   Procedure Laterality Date    IR NEPHROSTOMY TUBE CHECK/CHANGE/REPOSITION/REINSERTION/UPSIZE  9/28/2023    IR NEPHROSTOMY TUBE CHECK/CHANGE/REPOSITION/REINSERTION/UPSIZE  12/28/2023    IR NEPHROSTOMY TUBE CHECK/CHANGE/REPOSITION/REINSERTION/UPSIZE  1/31/2024    IR NEPHROSTOMY TUBE CHECK/CHANGE/REPOSITION/REINSERTION/UPSIZE  2/2/2024    IR NEPHROSTOMY TUBE CHECK/CHANGE/REPOSITION/REINSERTION/UPSIZE  3/4/2024    IR NEPHROSTOMY TUBE CHECK/CHANGE/REPOSITION/REINSERTION/UPSIZE  3/20/2024    IR NEPHROSTOMY TUBE CHECK/CHANGE/REPOSITION/REINSERTION/UPSIZE  6/7/2024    IR NEPHROSTOMY TUBE CHECK/CHANGE/REPOSITION/REINSERTION/UPSIZE  8/5/2024    IR NEPHROSTOMY TUBE CHECK/CHANGE/REPOSITION/REINSERTION/UPSIZE  10/4/2024    IR NEPHROSTOMY TUBE CHECK/CHANGE/REPOSITION/REINSERTION/UPSIZE  11/18/2024    IR NEPHROSTOMY TUBE CHECK/CHANGE/REPOSITION/REINSERTION/UPSIZE  12/31/2024    IR NEPHROSTOMY TUBE CHECK/CHANGE/REPOSITION/REINSERTION/UPSIZE  1/31/2025    IR NEPHROSTOMY TUBE CHECK/CHANGE/REPOSITION/REINSERTION/UPSIZE  3/3/2025    IR NEPHROSTOMY TUBE PLACEMENT  06/22/2023    bilateral    IR OTHER  1/15/2024    US GUIDED PROSTATE BIOPSY       Social History     Socioeconomic History    Marital status: /Civil Union     Spouse name: None    Number of children: 3    Years of education: None    Highest education level: None   Occupational History    None   Tobacco Use    Smoking status: Former     Current packs/day: 24.90     Average packs/day: 24.9 packs/day for 25.2 years (627.0 ttl pk-yrs)     Types: Cigarettes     Start date: 2000     Passive exposure: Past    Smokeless tobacco: Never    Tobacco comments:      States he only smoked on the weekends   Vaping Use    Vaping status: Never Used   Substance and Sexual Activity    Alcohol use: Yes     Comment: doesn't drink anymore    Drug use: Never    Sexual activity: Not Currently     Partners: Male     Birth control/protection: None   Other Topics Concern    None   Social History Narrative    From 6/7/724  note:    Primary Caregiver: Self    Support Systems: Self, Spouse/significant other, Son, Family members    County of Residence: [place]    What city do you live in?: [place]    Home entry access options. Select all that apply.: Stairs    Number of steps to enter home.: 3    Type of Current Residence: 2 story home    Upon entering residence, is there a bedroom on the main floor (no further steps)?: Yes    Upon entering residence, is there a bathroom on the main floor (no further steps)?: Yes    Living Arrangements: Lives w/ Spouse/significant other    Is patient a ?: No    Functional Status: Independent    Does patient use assisted devices?: Yes    Assisted Devices (DME) used: Bedside Commode, Straight Cane, Walker, Shower Chair    What DME does the patient currently own?: Bedside Commode, Shower Chair, Straight Cane, Walker    Income Source: Pension/MCC    Means of Transport to Appts:: Family transport     Social Drivers of Health     Financial Resource Strain: Low Risk  (4/12/2024)    Overall Financial Resource Strain (CARDIA)     Difficulty of Paying Living Expenses: Not hard at all   Food Insecurity: No Food Insecurity (11/16/2024)    Nursing - Inadequate Food Risk Classification     Worried About Running Out of Food in the Last Year: Never true     Ran Out of Food in the Last Year: Never true     Ran Out of Food in the Last Year: Never true   Transportation Needs: No Transportation Needs (12/9/2024)    Received [facility]    OASIS : Transportation     Lack of Transportation (Medical): No     Lack of Transportation (Non-Medical): No      Patient Unable or Declines to Respond: No   Physical Activity: Not on file   Stress: Not on file   Social Connections: Feeling Socially Integrated (2024)    Received from [facility]    OASIS : Social Isolation     Frequency of experiencing loneliness or isolation: Never   Intimate Partner Violence: Unknown (2024)    Nursing IPS     Feels Physically and Emotionally Safe: Not on file     Physically Hurt by Someone: Not on file     Humiliated or Emotionally Abused by Someone: Not on file     Physically Hurt by Someone: No     Hurt or Threatened by Someone: No   Housing Stability: Unknown (2024)    Nursing: Inadequate Housing Risk Classification     Has Housing: Not on file     Worried About Losing Housing: Not on file     Unable to Get Utilities: Not on file     Unable to Pay for Housing in the Last Year: No     Has Housin        Current Outpatient Medications:     ascorbic acid (VITAMIN C) 250 mg tablet, Take 250 mg by mouth daily Unsure of dose, Disp: , Rfl:     Cholecalciferol (VITAMIN D3) 1,000 units tablet, Take 1 tablet (1,000 Units total) by mouth daily, Disp: 90 tablet, Rfl: 3    gabapentin (Neurontin) 300 mg capsule, Take 1 capsule (300 mg total) by mouth 2 (two) times a day, Disp: 60 capsule, Rfl: 1    insulin aspart (NovoLOG FlexPen) 100 UNIT/ML injection pen, Novolog 1 unit before meal if blood sugars is 200-249 mg/dL novolog 2 units before meal if blood sugar is 250 mg/dL +, Disp: 15 mL, Rfl: 2    magnesium Oxide (MAG-OX) 400 mg TABS, Take 1 tablet (400 mg total) by mouth 2 (two) times a day, Disp: 60 tablet, Rfl: 1    metFORMIN (GLUCOPHAGE) 1000 MG tablet, Take 1 tablet (1,000 mg total) by mouth 2 (two) times a day with meals, Disp: 180 tablet, Rfl: 1    Multiple Vitamin (multivitamin) tablet, Take 1 tablet by mouth daily, Disp: 30 tablet, Rfl: 2    omega-3-acid ethyl esters (LOVAZA) 1 g capsule, TAKE 1 CAPSULE BY MOUTH DAILY., Disp: 90 capsule, Rfl: 1    pantoprazole  (PROTONIX) 40 mg tablet, Take 1 tablet (40 mg total) by mouth daily, Disp: 90 tablet, Rfl: 1    polyethylene glycol (MIRALAX) 17 g packet, Take 17 g by mouth daily as needed (for constipation), Disp: 100 each, Rfl: 1    senna (SENOKOT) 8.6 mg, Take 1 tablet (8.6 mg total) by mouth 2 (two) times a day, Disp: 60 tablet, Rfl: 3    acetaminophen (TYLENOL) 500 mg tablet, Take 2 tablets (1,000 mg total) by mouth 3 (three) times a day (Patient not taking: Reported on 3/7/2025), Disp: 180 tablet, Rfl: 2    albuterol (Ventolin HFA) 90 mcg/act inhaler, Inhale 2 puffs every 6 (six) hours as needed for wheezing (Patient not taking: Reported on 3/7/2025), Disp: 18 g, Rfl: 0    Alcohol Swabs 70 % PADS, To clean the skin prior to injecting insulin, Disp: 100 each, Rfl: 1    Blood Glucose Monitoring Suppl (OneTouch Verio Reflect) w/Device KIT, Check blood sugars once daily. Please substitute with appropriate alternative as covered by patient's insurance. Dx: E11.65, Disp: 1 kit, Rfl: 0    calcitonin, salmon, (MIACALCIN), Inject 1.22 mL (244 Units total) into a muscle daily (Patient not taking: Reported on 3/7/2025), Disp: 30 mL, Rfl: 0    dexamethasone (DECADRON) 1 mg tablet, Take 1 tablet (1 mg total) by mouth daily with breakfast (Patient not taking: Reported on 2/27/2025), Disp: 30 tablet, Rfl: 0    Diclofenac Sodium (VOLTAREN) 1 %, Apply 2 g topically 4 (four) times a day as needed (joint pain), Disp: 100 g, Rfl: 1    docusate sodium (COLACE) 100 mg capsule, Take 1 capsule (100 mg total) by mouth 2 (two) times a day as needed for constipation (Patient not taking: Reported on 2/27/2025), Disp: 60 capsule, Rfl: 0    Easy Touch Pen Needles 31G X 6 MM MISC, Use daily at bedtime, Disp: 100 each, Rfl: 3    glucose 4-6 GM-MG, Chew 2 tablets daily as needed for low blood sugar, Disp: 90 tablet, Rfl: 3    glucose blood (OneTouch Verio) test strip, Check blood sugars twice a daily. Please substitute with appropriate alternative as  covered by patient's insurance. Dx: E11.65 (Patient not taking: Reported on 1/28/2025), Disp: 200 each, Rfl: 3    naloxone (NARCAN) 4 mg/0.1 mL nasal spray, Administer 1 spray into a nostril. If no response after 2-3 minutes, give another dose in the other nostril using a new spray. (Patient not taking: Reported on 1/28/2025), Disp: 1 each, Rfl: 0    Nutritional Supplements (Ensure Plus High Protein) LIQD, Take 237 mL by mouth 3 (three) times a day, Disp: 237 mL, Rfl: 9    Nutritional Supplements (Ensure Plus High Protein) LIQD, , Disp: , Rfl:     Nutritional Supplements (Glucerna Hunger Smart Shake) LIQD, Take by mouth Three times a day, Disp: , Rfl:     ondansetron (Zofran ODT) 8 mg disintegrating tablet, Take 1 tablet (8 mg total) by mouth every 8 (eight) hours as needed for nausea or vomiting (Patient not taking: Reported on 1/28/2025), Disp: 20 tablet, Rfl: 3    OneTouch Delica Lancets 33G MISC, Check blood sugars once daily. Please substitute with appropriate alternative as covered by patient's insurance. Dx: E11.65 (Patient not taking: Reported on 1/28/2025), Disp: 100 each, Rfl: 3    polyethylene glycol (MIRALAX) 17 g packet, Take 17 g by mouth daily, Disp: 10 g, Rfl: 0    sodium chloride 1 g tablet, Take 1 tablet (1 g total) by mouth 2 (two) times a day with meals (Patient not taking: Reported on 3/7/2025), Disp: 60 tablet, Rfl: 0    sodium chloride, PF, 0.9 %, 10 mL by Intracatheter route daily Intracatheter flushing daily. May substitute prefilled syringe with normal saline 10 mL vials, 10 mL syringes, and 18 g blunt needles, Disp: 600 mL, Rfl: 2    sodium chloride, PF, 0.9 %, 10 mL by Intracatheter route daily Intracatheter flushing daily. May substitute prefilled syringe with normal saline 10 mL vials, 10 mL syringes, and 18 g blunt needles, Disp: 900 mL, Rfl: 0    sodium chloride, PF, 0.9 %, 10 mL by Intracatheter route daily Intracatheter flushing daily. May substitute prefilled syringe with normal  saline 10 mL vials, 10 mL syringes, and 18 g blunt needles, Disp: 900 mL, Rfl: 0    Spacer/Aero-Holding Chambers (AEROCHAMBER MINI CHAMBER) BILLY, by Does not apply route see administration instructions (Patient not taking: Reported on 1/28/2025), Disp: 1 Device, Rfl: 0  No current facility-administered medications for this visit.    Facility-Administered Medications Ordered in Other Visits:     alteplase (CATHFLO) injection 2 mg, 2 mg, Intracatheter, Q1MIN JILLIANN, Ramone Coyne DO  Family History   Problem Relation Age of Onset    Uterine cancer Mother     Liver cancer Father     Cancer Father     No Known Problems Sister     No Known Problems Brother     No Known Problems Son     Diabetes type II Daughter     No Known Problems Daughter     Cancer Daughter     Cancer Daughter       Review of Systems   Constitutional:  Negative for appetite change, chills, diaphoresis, fatigue and fever.   HENT: Negative.     Gastrointestinal: Negative.    Skin:  Positive for color change and wound.       Allergies  Docetaxel    OBJECTIVE:  /84   Pulse 74   Temp (!) 97.4 °F (36.3 °C)   Resp 15   Wt 61.2 kg (135 lb)   BMI 20.53 kg/m²     Physical Exam  Constitutional:       Appearance: Normal appearance. He is not ill-appearing or diaphoretic.   HENT:      Head: Normocephalic and atraumatic.   Eyes:      General:         Right eye: No discharge.         Left eye: No discharge.   Pulmonary:      Effort: Pulmonary effort is normal. No respiratory distress.   Skin:     Capillary Refill: Capillary refill takes less than 2 seconds.      Comments: See wound assessment below   Neurological:      Mental Status: He is alert.   Psychiatric:         Mood and Affect: Mood normal.         Diagnosis:  1. Diabetic ulcer of left heel associated with type 2 diabetes mellitus, unspecified ulcer stage (HCC)  -     lidocaine (LMX) 4 % cream  -     Wound cleansing and dressings Diabetic Ulcer Left Heel; Future  -     Wound Procedure Treatment  Diabetic Ulcer Left Heel      Diagnosis ICD-10-CM Associated Orders   1. Diabetic ulcer of left heel associated with type 2 diabetes mellitus, unspecified ulcer stage (HCC)  E11.621 lidocaine (LMX) 4 % cream    L97.429 Wound cleansing and dressings Diabetic Ulcer Left Heel     Wound Procedure Treatment Diabetic Ulcer Left Heel           ASSESSMENT:    Left heel diabetic ulceration with well adhered eschar, Mena unstageable  Type 2 diabetes

## 2025-03-10 ENCOUNTER — HOSPITAL ENCOUNTER (OUTPATIENT)
Dept: INFUSION CENTER | Facility: HOSPITAL | Age: 75
Discharge: HOME | End: 2025-03-10
Attending: INTERNAL MEDICINE
Payer: COMMERCIAL

## 2025-03-10 VITALS
OXYGEN SATURATION: 95 % | TEMPERATURE: 97.2 F | DIASTOLIC BLOOD PRESSURE: 59 MMHG | HEART RATE: 92 BPM | SYSTOLIC BLOOD PRESSURE: 105 MMHG | RESPIRATION RATE: 18 BRPM

## 2025-03-10 DIAGNOSIS — C79.51 PROSTATE CANCER METASTATIC TO BONE (HCC): ICD-10-CM

## 2025-03-10 DIAGNOSIS — C61 PROSTATE CANCER METASTATIC TO BONE (HCC): ICD-10-CM

## 2025-03-10 DIAGNOSIS — E83.52 HYPERCALCEMIA: Primary | ICD-10-CM

## 2025-03-10 LAB
ALBUMIN SERPL BCG-MCNC: 4.1 G/DL (ref 3.5–5)
ALP SERPL-CCNC: 266 U/L (ref 34–104)
ALT SERPL W P-5'-P-CCNC: 18 U/L (ref 7–52)
ANION GAP SERPL CALCULATED.3IONS-SCNC: 12 MMOL/L (ref 4–13)
AST SERPL W P-5'-P-CCNC: 36 U/L (ref 13–39)
BILIRUB SERPL-MCNC: 0.48 MG/DL (ref 0.2–1)
BUN SERPL-MCNC: 15 MG/DL (ref 5–25)
CALCIUM SERPL-MCNC: 11.4 MG/DL (ref 8.4–10.2)
CHLORIDE SERPL-SCNC: 100 MMOL/L (ref 96–108)
CO2 SERPL-SCNC: 21 MMOL/L (ref 21–32)
CREAT SERPL-MCNC: 0.93 MG/DL (ref 0.6–1.3)
GFR SERPL CREATININE-BSD FRML MDRD: 81 ML/MIN/1.73SQ M
GLUCOSE SERPL-MCNC: 247 MG/DL (ref 65–140)
POTASSIUM SERPL-SCNC: 4.2 MMOL/L (ref 3.5–5.3)
PROT SERPL-MCNC: 8 G/DL (ref 6.4–8.4)
SODIUM SERPL-SCNC: 133 MMOL/L (ref 135–147)

## 2025-03-10 PROCEDURE — 96372 THER/PROPH/DIAG INJ SC/IM: CPT

## 2025-03-10 PROCEDURE — 80053 COMPREHEN METABOLIC PANEL: CPT | Performed by: INTERNAL MEDICINE

## 2025-03-10 RX ORDER — CALCITONIN SALMON 200 [USP'U]/ML
4 INJECTION, SOLUTION INTRAMUSCULAR; SUBCUTANEOUS ONCE
Status: CANCELLED
Start: 2025-03-12 | End: 2025-03-12

## 2025-03-10 RX ORDER — CALCITONIN SALMON 200 [USP'U]/ML
4 INJECTION, SOLUTION INTRAMUSCULAR; SUBCUTANEOUS ONCE
Status: COMPLETED | OUTPATIENT
Start: 2025-03-10 | End: 2025-03-10

## 2025-03-10 RX ADMIN — CALCITONIN SALMON 244 UNITS: 200 INJECTION, SOLUTION INTRAMUSCULAR; SUBCUTANEOUS at 14:39

## 2025-03-10 NOTE — PROGRESS NOTES
Oliver Astudillo  tolerated treatment well with no complications.      Oliver Astudillo is aware of future appt on 3/12 at 230p.     AVS printed and given to Oliver Astudillo:    No (Declined by Oliver Astudillo)

## 2025-03-11 ENCOUNTER — TELEPHONE (OUTPATIENT)
Dept: OBGYN CLINIC | Facility: CLINIC | Age: 75
End: 2025-03-11

## 2025-03-12 ENCOUNTER — HOSPITAL ENCOUNTER (OUTPATIENT)
Dept: INFUSION CENTER | Facility: HOSPITAL | Age: 75
Discharge: HOME | End: 2025-03-12
Attending: INTERNAL MEDICINE
Payer: COMMERCIAL

## 2025-03-12 VITALS
HEART RATE: 96 BPM | OXYGEN SATURATION: 99 % | RESPIRATION RATE: 18 BRPM | TEMPERATURE: 97.2 F | SYSTOLIC BLOOD PRESSURE: 105 MMHG | DIASTOLIC BLOOD PRESSURE: 63 MMHG

## 2025-03-12 DIAGNOSIS — E83.52 HYPERCALCEMIA: Primary | ICD-10-CM

## 2025-03-12 DIAGNOSIS — C79.51 PROSTATE CANCER METASTATIC TO BONE (HCC): ICD-10-CM

## 2025-03-12 DIAGNOSIS — C61 PROSTATE CANCER METASTATIC TO BONE (HCC): ICD-10-CM

## 2025-03-12 LAB
ALBUMIN SERPL BCG-MCNC: 3.8 G/DL (ref 3.5–5)
ALP SERPL-CCNC: 297 U/L (ref 34–104)
ALT SERPL W P-5'-P-CCNC: 24 U/L (ref 7–52)
ANION GAP SERPL CALCULATED.3IONS-SCNC: 8 MMOL/L (ref 4–13)
AST SERPL W P-5'-P-CCNC: 73 U/L (ref 13–39)
BILIRUB SERPL-MCNC: 0.71 MG/DL (ref 0.2–1)
BUN SERPL-MCNC: 14 MG/DL (ref 5–25)
CALCIUM SERPL-MCNC: 12.3 MG/DL (ref 8.4–10.2)
CHLORIDE SERPL-SCNC: 101 MMOL/L (ref 96–108)
CO2 SERPL-SCNC: 24 MMOL/L (ref 21–32)
CREAT SERPL-MCNC: 0.81 MG/DL (ref 0.6–1.3)
GFR SERPL CREATININE-BSD FRML MDRD: 88 ML/MIN/1.73SQ M
GLUCOSE SERPL-MCNC: 229 MG/DL (ref 65–140)
POTASSIUM SERPL-SCNC: 4.2 MMOL/L (ref 3.5–5.3)
PROT SERPL-MCNC: 7.8 G/DL (ref 6.4–8.4)
SODIUM SERPL-SCNC: 133 MMOL/L (ref 135–147)

## 2025-03-12 PROCEDURE — 80053 COMPREHEN METABOLIC PANEL: CPT | Performed by: INTERNAL MEDICINE

## 2025-03-12 PROCEDURE — 96372 THER/PROPH/DIAG INJ SC/IM: CPT

## 2025-03-12 RX ORDER — CALCITONIN SALMON 200 [USP'U]/ML
4 INJECTION, SOLUTION INTRAMUSCULAR; SUBCUTANEOUS ONCE
Status: COMPLETED | OUTPATIENT
Start: 2025-03-12 | End: 2025-03-12

## 2025-03-12 RX ORDER — CALCITONIN SALMON 200 [USP'U]/ML
4 INJECTION, SOLUTION INTRAMUSCULAR; SUBCUTANEOUS ONCE
Status: CANCELLED
Start: 2025-03-14 | End: 2025-03-14

## 2025-03-12 RX ADMIN — CALCITONIN SALMON 244 UNITS: 200 INJECTION, SOLUTION INTRAMUSCULAR; SUBCUTANEOUS at 15:28

## 2025-03-12 NOTE — PLAN OF CARE
Problem: Potential for Falls  Goal: Patient will remain free of falls  Description: INTERVENTIONS:  - Educate patient/family on patient safety including physical limitations  - Instruct patient to call for assistance with activity   - Keep Call bell within reach  - Keep care items and personal belongings within reach  - Initiate and maintain comfort rounds  Outcome: Progressing     Problem: SAFETY ADULT  Goal: Patient will remain free of falls  Description: INTERVENTIONS:  - Educate patient/family on patient safety including physical limitations  - Instruct patient to call for assistance with activity  - Keep Call bell within reach  - Keep care items and personal belongings within reach  - Initiate and maintain comfort rounds  Outcome: Progressing     Problem: DISCHARGE PLANNING  Goal: Discharge to home or other facility with appropriate resources  Description: INTERVENTIONS:  - Identify barriers to discharge w/patient and caregiver  - Arrange for needed discharge resources and transportation as appropriate  - Identify discharge learning needs (meds, wound care, etc.)  - Arrange for interpretive services to assist at discharge as needed  - Refer to Case Management Department for coordinating discharge planning if the patient needs post-hospital services based on physician/advanced practitioner order or complex needs related to functional status, cognitive ability, or social support system  Outcome: Progressing     Problem: Knowledge Deficit  Goal: Patient/family/caregiver demonstrates understanding of disease process, treatment plan, medications, and discharge instructions  Description: Complete learning assessment and assess knowledge base.  Interventions:  - Provide teaching at level of understanding  - Provide teaching via preferred learning methods  Outcome: Progressing

## 2025-03-12 NOTE — PROGRESS NOTES
Oliver Atsudillo  tolerated treatment well with no complications.      Oliver Astudillo is aware of future appt on 03/14 at 1230.     AVS printed and given to Oliver Astudillo:  No (Declined by Oliver Astudillo)

## 2025-03-13 ENCOUNTER — OFFICE VISIT (OUTPATIENT)
Age: 75
End: 2025-03-13
Payer: COMMERCIAL

## 2025-03-13 ENCOUNTER — HOSPITAL ENCOUNTER (OUTPATIENT)
Dept: INFUSION CENTER | Facility: HOSPITAL | Age: 75
Discharge: HOME | End: 2025-03-13
Attending: INTERNAL MEDICINE
Payer: COMMERCIAL

## 2025-03-13 VITALS — BODY MASS INDEX: 20.46 KG/M2 | WEIGHT: 135 LBS | HEIGHT: 68 IN

## 2025-03-13 VITALS
WEIGHT: 136.69 LBS | OXYGEN SATURATION: 90 % | HEIGHT: 68 IN | DIASTOLIC BLOOD PRESSURE: 65 MMHG | SYSTOLIC BLOOD PRESSURE: 95 MMHG | RESPIRATION RATE: 18 BRPM | BODY MASS INDEX: 20.72 KG/M2 | HEART RATE: 96 BPM | TEMPERATURE: 97.9 F

## 2025-03-13 DIAGNOSIS — E83.52 HYPERCALCEMIA: Primary | ICD-10-CM

## 2025-03-13 DIAGNOSIS — E13.621 DIABETIC ULCER OF LEFT HEEL ASSOCIATED WITH DIABETES MELLITUS OF OTHER TYPE, UNSPECIFIED ULCER STAGE (HCC): Primary | ICD-10-CM

## 2025-03-13 DIAGNOSIS — C61 PROSTATE CANCER METASTATIC TO BONE (HCC): ICD-10-CM

## 2025-03-13 DIAGNOSIS — C79.51 PROSTATE CANCER METASTATIC TO BONE (HCC): ICD-10-CM

## 2025-03-13 DIAGNOSIS — L97.429 DIABETIC ULCER OF LEFT HEEL ASSOCIATED WITH DIABETES MELLITUS OF OTHER TYPE, UNSPECIFIED ULCER STAGE (HCC): Primary | ICD-10-CM

## 2025-03-13 LAB
ALBUMIN SERPL BCG-MCNC: 3.7 G/DL (ref 3.5–5)
ALP SERPL-CCNC: 264 U/L (ref 34–104)
ALT SERPL W P-5'-P-CCNC: 20 U/L (ref 7–52)
ANION GAP SERPL CALCULATED.3IONS-SCNC: 10 MMOL/L (ref 4–13)
AST SERPL W P-5'-P-CCNC: 52 U/L (ref 13–39)
BILIRUB SERPL-MCNC: 0.81 MG/DL (ref 0.2–1)
BUN SERPL-MCNC: 15 MG/DL (ref 5–25)
CALCIUM SERPL-MCNC: 12.2 MG/DL (ref 8.4–10.2)
CHLORIDE SERPL-SCNC: 99 MMOL/L (ref 96–108)
CO2 SERPL-SCNC: 22 MMOL/L (ref 21–32)
CREAT SERPL-MCNC: 0.78 MG/DL (ref 0.6–1.3)
GFR SERPL CREATININE-BSD FRML MDRD: 90 ML/MIN/1.73SQ M
GLUCOSE SERPL-MCNC: 247 MG/DL (ref 65–140)
POTASSIUM SERPL-SCNC: 4.8 MMOL/L (ref 3.5–5.3)
PROT SERPL-MCNC: 7.6 G/DL (ref 6.4–8.4)
SODIUM SERPL-SCNC: 131 MMOL/L (ref 135–147)

## 2025-03-13 PROCEDURE — 96360 HYDRATION IV INFUSION INIT: CPT

## 2025-03-13 PROCEDURE — 80053 COMPREHEN METABOLIC PANEL: CPT | Performed by: INTERNAL MEDICINE

## 2025-03-13 PROCEDURE — 99213 OFFICE O/P EST LOW 20 MIN: CPT | Performed by: STUDENT IN AN ORGANIZED HEALTH CARE EDUCATION/TRAINING PROGRAM

## 2025-03-13 RX ORDER — CALCITONIN SALMON 200 [USP'U]/ML
4 INJECTION, SOLUTION INTRAMUSCULAR; SUBCUTANEOUS ONCE
Status: CANCELLED
Start: 2025-03-13 | End: 2025-03-13

## 2025-03-13 RX ORDER — CALCITONIN SALMON 200 [USP'U]/ML
4 INJECTION, SOLUTION INTRAMUSCULAR; SUBCUTANEOUS ONCE
Status: CANCELLED
Start: 2025-03-14 | End: 2025-03-14

## 2025-03-13 RX ORDER — CALCITONIN SALMON 200 [USP'U]/ML
4 INJECTION, SOLUTION INTRAMUSCULAR; SUBCUTANEOUS ONCE
Status: DISCONTINUED | OUTPATIENT
Start: 2025-03-13 | End: 2025-03-16 | Stop reason: HOSPADM

## 2025-03-13 RX ADMIN — SODIUM CHLORIDE 1000 ML: 0.9 INJECTION, SOLUTION INTRAVENOUS at 12:52

## 2025-03-13 NOTE — PROGRESS NOTES
Oliver Astudillo  tolerated treatment well with no complications.      Oliver Astudillo is aware of future appt on 03/14 at 1230.     AVS printed and given to Oliver Astudillo:  No (Declined by Oliver Astudillo)

## 2025-03-13 NOTE — PROGRESS NOTES
[facility] Office Visit    ASSESSMENT     Diagnoses and all orders for this visit:    Diabetic ulcer of left heel associated with diabetes mellitus of other type, unspecified ulcer stage (HCC)  -     Podous Boot         Problem List Items Addressed This Visit          Endocrine    Diabetic ulcer of left heel associated with diabetes mellitus of other type, unspecified ulcer stage (HCC) - Primary    Relevant Orders    Podous Boot       PLAN  Oliver, his wife, and I discussed his left heel.  I did provide the patient with a Multi-Podus boot which should be worn at all times to offload the patient's area of eschar/ulceration.  He should continue self examining feet daily to look for new cuts or bruises and should call my office should any of these present.  He will return to my office in 3 weeks for evaluation of his eschar.  If he experiences any signs of infection he is to go immediately to the emergency department, we discussed that these would include the foot becoming more red, hot, swollen, painful as well as if he experiences any nausea, vomiting, fevers, chills, shortness of breath, chest pain, diaphoresis.    SUBJECTIVE    Chief Complaint:  Left heel wound     Patient ID: Oliver Boyd is a pleasant 74 year old male who presents today for a follow-up from Wound Care.  He has a left heel eschar which has been present for a while, it is currently stable with no local signs of infection and although it is a high risk area there is no current cause for concern.  We did discuss signs of infection at today's visit and he understands that he should go to the emergency department for evaluation should any of these present.        The following portions of the patient's history were reviewed and updated as appropriate: allergies, current medications, past family history, past medical history, past social history, past surgical history and problem list.    Review of Systems  "  Constitutional:  Negative for appetite change, chills, diaphoresis, fatigue and fever.   HENT: Negative.     Gastrointestinal: Negative.    Skin:  Positive for color change and wound.         OBJECTIVE      Ht 5' 8\" (1.727 m)   Wt 61.2 kg (135 lb)   BMI 20.53 kg/m²        Physical Exam  Constitutional:       Appearance: Normal appearance. He is not ill-appearing or diaphoretic.   HENT:      Head: Normocephalic and atraumatic.   Eyes:      General:         Right eye: No discharge.         Left eye: No discharge.   Pulmonary:      Effort: Pulmonary effort is normal. No respiratory distress.   Skin:     Capillary Refill: Capillary refill takes less than 2 seconds.      Comments: Stable eschar of the left heel which is well adhered with no drainage or local signs of infection noted.  It measures approximately 1.0 cm x 0.5 cm.  Surrounding skin is normal temperature and color.   Neurological:      Mental Status: He is alert.   Psychiatric:         Mood and Affect: Mood normal.           Vascular:  -Dorsalis pedis and posterior tibial pulses are weak to the bilateral foot  -Digital hair growth: Absent  -Skin temp: WNL    MSK:  -Pain on palpation positive to the left heel  -Manual muscle testing not assessed today given patient mental state  -Ankle dorsiflexion >10 degrees with knee extended and knee flexed b/l    "

## 2025-03-13 NOTE — PROGRESS NOTES
Per Dr. Coyne's office nurse, do not give Calcitonin or Xgeva today. Awaiting decision from finance department. Hydration only today.

## 2025-03-14 ENCOUNTER — HOSPITAL ENCOUNTER (OUTPATIENT)
Dept: INFUSION CENTER | Facility: HOSPITAL | Age: 75
End: 2025-03-14
Attending: INTERNAL MEDICINE
Payer: COMMERCIAL

## 2025-03-14 VITALS
TEMPERATURE: 96.7 F | OXYGEN SATURATION: 100 % | DIASTOLIC BLOOD PRESSURE: 59 MMHG | HEART RATE: 79 BPM | HEIGHT: 68 IN | BODY MASS INDEX: 20.72 KG/M2 | RESPIRATION RATE: 18 BRPM | WEIGHT: 136.69 LBS | SYSTOLIC BLOOD PRESSURE: 110 MMHG

## 2025-03-14 DIAGNOSIS — E83.52 HYPERCALCEMIA: Primary | ICD-10-CM

## 2025-03-14 DIAGNOSIS — C61 PROSTATE CANCER METASTATIC TO BONE (HCC): ICD-10-CM

## 2025-03-14 DIAGNOSIS — C79.51 PROSTATE CANCER METASTATIC TO BONE (HCC): ICD-10-CM

## 2025-03-14 PROCEDURE — 96360 HYDRATION IV INFUSION INIT: CPT

## 2025-03-14 PROCEDURE — 96372 THER/PROPH/DIAG INJ SC/IM: CPT

## 2025-03-14 RX ADMIN — DENOSUMAB 120 MG: 120 INJECTION SUBCUTANEOUS at 14:26

## 2025-03-14 RX ADMIN — SODIUM CHLORIDE 1000 ML: 0.9 INJECTION, SOLUTION INTRAVENOUS at 13:25

## 2025-03-14 NOTE — PROGRESS NOTES
Oliver Astudillo  tolerated treatment well with no complications.      Oliver Astudillo is aware of future appt on 3/18 at 1230.     AVS printed and given to Oliver Astudillo: yes

## 2025-03-18 ENCOUNTER — HOSPITAL ENCOUNTER (OUTPATIENT)
Dept: INFUSION CENTER | Facility: HOSPITAL | Age: 75
Discharge: HOME | End: 2025-03-18
Attending: INTERNAL MEDICINE
Payer: COMMERCIAL

## 2025-03-18 VITALS
RESPIRATION RATE: 18 BRPM | TEMPERATURE: 97.5 F | SYSTOLIC BLOOD PRESSURE: 116 MMHG | OXYGEN SATURATION: 97 % | DIASTOLIC BLOOD PRESSURE: 62 MMHG | HEART RATE: 81 BPM

## 2025-03-18 DIAGNOSIS — C79.51 PROSTATE CANCER METASTATIC TO BONE (HCC): Primary | ICD-10-CM

## 2025-03-18 DIAGNOSIS — E83.52 HYPERCALCEMIA: ICD-10-CM

## 2025-03-18 DIAGNOSIS — C61 PROSTATE CANCER METASTATIC TO BONE (HCC): Primary | ICD-10-CM

## 2025-03-18 LAB
ALBUMIN SERPL BCG-MCNC: 3.5 G/DL (ref 3.5–5)
ALP SERPL-CCNC: 289 U/L (ref 34–104)
ALT SERPL W P-5'-P-CCNC: 20 U/L (ref 7–52)
ANION GAP SERPL CALCULATED.3IONS-SCNC: 8 MMOL/L (ref 4–13)
AST SERPL W P-5'-P-CCNC: 49 U/L (ref 13–39)
BILIRUB SERPL-MCNC: 0.7 MG/DL (ref 0.2–1)
BUN SERPL-MCNC: 14 MG/DL (ref 5–25)
CALCIUM SERPL-MCNC: 11.5 MG/DL (ref 8.4–10.2)
CHLORIDE SERPL-SCNC: 102 MMOL/L (ref 96–108)
CO2 SERPL-SCNC: 20 MMOL/L (ref 21–32)
CREAT SERPL-MCNC: 0.76 MG/DL (ref 0.6–1.3)
GFR SERPL CREATININE-BSD FRML MDRD: 91 ML/MIN/1.73SQ M
GLUCOSE SERPL-MCNC: 182 MG/DL (ref 65–140)
POTASSIUM SERPL-SCNC: 5 MMOL/L (ref 3.5–5.3)
PROT SERPL-MCNC: 7.4 G/DL (ref 6.4–8.4)
SODIUM SERPL-SCNC: 130 MMOL/L (ref 135–147)

## 2025-03-18 PROCEDURE — 80053 COMPREHEN METABOLIC PANEL: CPT | Performed by: INTERNAL MEDICINE

## 2025-03-18 RX ADMIN — SODIUM CHLORIDE 1000 ML: 0.9 INJECTION, SOLUTION INTRAVENOUS at 13:36

## 2025-03-18 NOTE — PROGRESS NOTES
Oliver Astudillo  tolerated treatment well with no complications.      Oliver Astudillo is aware of future appt on 3/20 at 1330.     AVS printed and given to Oliver Astudillo:  No (Declined by Oliver Astudillo)

## 2025-03-19 ENCOUNTER — OFFICE VISIT (OUTPATIENT)
Dept: PALLIATIVE MEDICINE | Facility: CLINIC | Age: 75
End: 2025-03-19
Payer: COMMERCIAL

## 2025-03-19 VITALS
TEMPERATURE: 97.1 F | OXYGEN SATURATION: 98 % | HEART RATE: 83 BPM | SYSTOLIC BLOOD PRESSURE: 110 MMHG | DIASTOLIC BLOOD PRESSURE: 58 MMHG

## 2025-03-19 DIAGNOSIS — R26.2 AMBULATORY DYSFUNCTION: Primary | ICD-10-CM

## 2025-03-19 DIAGNOSIS — R63.0 LOSS OF APPETITE: ICD-10-CM

## 2025-03-19 DIAGNOSIS — R63.4 UNINTENTIONAL WEIGHT LOSS: ICD-10-CM

## 2025-03-19 DIAGNOSIS — C79.51 PROSTATE CANCER METASTATIC TO BONE (HCC): ICD-10-CM

## 2025-03-19 DIAGNOSIS — R53.1 GENERALIZED WEAKNESS: ICD-10-CM

## 2025-03-19 DIAGNOSIS — C61 PROSTATE CANCER METASTATIC TO BONE (HCC): ICD-10-CM

## 2025-03-19 DIAGNOSIS — Z51.5 PALLIATIVE CARE BY SPECIALIST: ICD-10-CM

## 2025-03-19 DIAGNOSIS — Z71.89 GOALS OF CARE, COUNSELING/DISCUSSION: ICD-10-CM

## 2025-03-19 DIAGNOSIS — G89.3 CANCER RELATED PAIN: ICD-10-CM

## 2025-03-19 PROCEDURE — G2211 COMPLEX E/M VISIT ADD ON: HCPCS | Performed by: STUDENT IN AN ORGANIZED HEALTH CARE EDUCATION/TRAINING PROGRAM

## 2025-03-19 PROCEDURE — 99214 OFFICE O/P EST MOD 30 MIN: CPT | Performed by: STUDENT IN AN ORGANIZED HEALTH CARE EDUCATION/TRAINING PROGRAM

## 2025-03-19 RX ORDER — DEXAMETHASONE 1 MG
1 TABLET ORAL
Qty: 30 TABLET | Refills: 0 | Status: SHIPPED | OUTPATIENT
Start: 2025-03-19

## 2025-03-19 NOTE — ASSESSMENT & PLAN NOTE
We reviewed the topic of goals of care today.  Patient states he wants to pursue all treatments and if there is something offered, he would want to pursue this. He / family state they have a follow-up with Oncology for re-evaluation and to discuss other potential treatment options.    He does endorse his ongoing weakness and poor appetite as a problem and wishes to restart steroids to see if this makes him feel better.    I did have a katrina conversation regarding my concerns given his performance status today (weight loss from my last visit 1/28/2025 to most recent weight check on 3/14/2025 is 7 pounds) - patient could not get his weight checked today due to difficulty in standing on scale.    I tried to discuss the topic of comfort care, however, patient/family remains focused on any potential treatment options at this time.

## 2025-03-19 NOTE — ASSESSMENT & PLAN NOTE
Ongoing poor appetite and progressive weakness.    Patient requesting to restart steroids as he felt improvement with this. Spouse agrees that they wish to proceed with this steroids despite understanding the risks and benefits.    He is hoping to have some improvement in his energy and appetite to assist with quality of life at this time as his weakness has been debilitating.    Orders:    dexamethasone (DECADRON) 1 mg tablet; Take 1 tablet (1 mg total) by mouth daily with breakfast    Ambulatory Referral to Palliative Care; Future

## 2025-03-19 NOTE — ASSESSMENT & PLAN NOTE
[employer]  used - Dhara Link, ID# [id number]    Psychosocial   Supportive listening provided  Normalized experience of patient/family  Provided anxiety containment     Referrals Placed / Medical Equipment Ordered  -Home palliative care program discussed. Patient and spouse agrees to this option given patient's difficulty with getting in and out of bed/chair and essentially getting out of the home is very difficulty due to his progressive weakness and decline.    Follow-Up Recommendations  -Follow-up with PCP and current medical specialists  -Follow-up with palliative care: 4 weeks    Orders:    dexamethasone (DECADRON) 1 mg tablet; Take 1 tablet (1 mg total) by mouth daily with breakfast    Ambulatory Referral to Palliative Care; Future

## 2025-03-19 NOTE — ASSESSMENT & PLAN NOTE
Following Medical Oncology - Dr. Coyne.  Plans for follow-up scheduled on 4/17/2025.  Current treatment focus with patient's Hypercalcemia with calcitonin.    Orders:    dexamethasone (DECADRON) 1 mg tablet; Take 1 tablet (1 mg total) by mouth daily with breakfast    Ambulatory Referral to Palliative Care; Future

## 2025-03-19 NOTE — ASSESSMENT & PLAN NOTE
Patient having severe difficulty in getting in and out of the home due to his progressive weakness.  Notable muscle wasting and thin body habitus.  Orders:    Ambulatory Referral to Palliative Care; Future

## 2025-03-19 NOTE — ASSESSMENT & PLAN NOTE
Patient's spouse endorsing needing to discontinue the daytime dose of Gabapentin 300 mg since I last saw them because patient was becoming too fatigued with that dosing.  They have kept the Gabapentin 300 mg qHS, however, patient sleeps until 10-11 am still while on the Gabapentin.    Spouse discussed concerns regarding the Gabapentin and patient with episodes of confusion at times. Concerned it may be from the Gabapentin as a potential contributor and therefore, we made the decision with the patient today to discontinue the Gabapentin given that this is likely contributing to his symptoms including fatigue and sleepiness.    They wish to restart steroids as this was helpful. Patient and spouse understand the potential side effects and concerns with long-term steroids not limited to increased infection risk in the effort to hopefully help his appetite and energy in some way.    He is having difficulty with his stability and balance due to his progressive weakness and particularly to the lower extremities. Spouse does state patient will fall if there is no support with him.    I did voice my concerns that beyond the medications, his progressive decline is also from his malignancy.    During our exam, patient was needing assistance with repositioning from his spouse and family member in his wheelchair.    Plan:  1) Dexamethasone 1mg daily  2) discussed restarting pantoprazole while he is taking steroids for GI prophylaxis     Medication safety issues - Do not drive under the influence of narcotics (including opioids), watch for adverse effects including confusion / altered mental status / respiratory depression (slowed breathing), keep medications stored in a safe/locked environment, do not use alcohol while opioids or other narcotics are in your system. Do not travel with more than the minimum number of tablets or capsules required for the trip.      Orders:    dexamethasone (DECADRON) 1 mg tablet; Take 1 tablet (1  mg total) by mouth daily with breakfast    Ambulatory Referral to Palliative Care; Future

## 2025-03-19 NOTE — PROGRESS NOTES
Name: Oliver Astudillo      : 1950      MRN: [id number]  Encounter Provider: Carter Garcia DO  Encounter Date: 3/19/2025   Encounter department: [facility]  :  Assessment & Plan  Prostate cancer metastatic to bone (HCC)  Following Medical Oncology - Dr. Coyne.  Plans for follow-up scheduled on 2025.  Current treatment focus with patient's Hypercalcemia with calcitonin.    Orders:    dexamethasone (DECADRON) 1 mg tablet; Take 1 tablet (1 mg total) by mouth daily with breakfast    Ambulatory Referral to Palliative Care; Future    Cancer related pain  Patient's spouse endorsing needing to discontinue the daytime dose of Gabapentin 300 mg since I last saw them because patient was becoming too fatigued with that dosing.  They have kept the Gabapentin 300 mg qHS, however, patient sleeps until 10-11 am still while on the Gabapentin.    Spouse discussed concerns regarding the Gabapentin and patient with episodes of confusion at times. Concerned it may be from the Gabapentin as a potential contributor and therefore, we made the decision with the patient today to discontinue the Gabapentin given that this is likely contributing to his symptoms including fatigue and sleepiness.    They wish to restart steroids as this was helpful. Patient and spouse understand the potential side effects and concerns with long-term steroids not limited to increased infection risk in the effort to hopefully help his appetite and energy in some way.    He is having difficulty with his stability and balance due to his progressive weakness and particularly to the lower extremities. Spouse does state patient will fall if there is no support with him.    I did voice my concerns that beyond the medications, his progressive decline is also from his malignancy.    During our exam, patient was needing assistance with repositioning from his spouse and family member in his wheelchair.    Plan:  1) Dexamethasone 1mg  daily  2) discussed restarting pantoprazole while he is taking steroids for GI prophylaxis     Medication safety issues - Do not drive under the influence of narcotics (including opioids), watch for adverse effects including confusion / altered mental status / respiratory depression (slowed breathing), keep medications stored in a safe/locked environment, do not use alcohol while opioids or other narcotics are in your system. Do not travel with more than the minimum number of tablets or capsules required for the trip.      Orders:    dexamethasone (DECADRON) 1 mg tablet; Take 1 tablet (1 mg total) by mouth daily with breakfast    Ambulatory Referral to Palliative Care; Future    Ambulatory dysfunction  Patient having severe difficulty in getting in and out of the home due to his progressive weakness.  Notable muscle wasting and thin body habitus.  Orders:    Ambulatory Referral to Palliative Care; Future    Goals of care, counseling/discussion  We reviewed the topic of goals of care today.  Patient states he wants to pursue all treatments and if there is something offered, he would want to pursue this. He / family state they have a follow-up with Oncology for re-evaluation and to discuss other potential treatment options.    He does endorse his ongoing weakness and poor appetite as a problem and wishes to restart steroids to see if this makes him feel better.    I did have a katrina conversation regarding my concerns given his performance status today (weight loss from my last visit 1/28/2025 to most recent weight check on 3/14/2025 is 7 pounds) - patient could not get his weight checked today due to difficulty in standing on scale.    I tried to discuss the topic of comfort care, however, patient/family remains focused on any potential treatment options at this time.       Unintentional weight loss  Ongoing poor appetite and progressive weakness.    Patient requesting to restart steroids as he felt improvement with  this. Spouse agrees that they wish to proceed with this steroids despite understanding the risks and benefits.    He is hoping to have some improvement in his energy and appetite to assist with quality of life at this time as his weakness has been debilitating.    Orders:    dexamethasone (DECADRON) 1 mg tablet; Take 1 tablet (1 mg total) by mouth daily with breakfast    Ambulatory Referral to Palliative Care; Future    Palliative care by specialist  [facility]  used - Dhara Link, ID# [id number]    Psychosocial   Supportive listening provided  Normalized experience of patient/family  Provided anxiety containment     Referrals Placed / Medical Equipment Ordered  -Home palliative care program discussed. Patient and spouse agrees to this option given patient's difficulty with getting in and out of bed/chair and essentially getting out of the home is very difficulty due to his progressive weakness and decline.    Follow-Up Recommendations  -Follow-up with PCP and current medical specialists  -Follow-up with palliative care: 4 weeks    Orders:    dexamethasone (DECADRON) 1 mg tablet; Take 1 tablet (1 mg total) by mouth daily with breakfast    Ambulatory Referral to Palliative Care; Future    Loss of appetite    Orders:    dexamethasone (DECADRON) 1 mg tablet; Take 1 tablet (1 mg total) by mouth daily with breakfast    Ambulatory Referral to Palliative Care; Future    Generalized weakness  Difficulty with getting in and out of bed, chair, and inability to stand without two person support/assistance.    Patient seen today in wheelchair requiring spouse and family member to help lift him and reposition into the wheelchair as he is not strong enough to reposition himself. He has his right foot in Podous Boot with to ongoing wound care to his right diabetic foot ulcer with Podiatry.     Patient inquiring about a hospital bed at home to help him change positions as it is difficult for him to reposition himself without help  from spouse/family. Patient has difficulty in standing due to weakness. Patient has had a history of falls in the past due to his weakness.      Orders:    Ambulatory Referral to Palliative Care; Future        PDMP Review: I have reviewed the patient's controlled substance dispensing history in the Prescription Drug Monitoring Program in compliance with the [facility] regulations before prescribing any controlled substances.    History of Present Illness   Oliver Astudillo is a 74 y.o. male who presents for follow-up.    [facility]  used for today's discussion - Dhara Link, ID# [id number]      Medical History Reviewed by provider this encounter:     .  Past Medical History   Past Medical History:   Diagnosis Date    Altered mental status 10/08/2024    COVID-19 01/15/2024    Diabetes mellitus (HCC)     Elevated PSA 06/21/2023    High cholesterol     Hypertension     Prostate cancer (HCC)     Severe sepsis (HCC) 10/08/2024     Past Surgical History:   Procedure Laterality Date    IR NEPHROSTOMY TUBE CHECK/CHANGE/REPOSITION/REINSERTION/UPSIZE  9/28/2023    IR NEPHROSTOMY TUBE CHECK/CHANGE/REPOSITION/REINSERTION/UPSIZE  12/28/2023    IR NEPHROSTOMY TUBE CHECK/CHANGE/REPOSITION/REINSERTION/UPSIZE  1/31/2024    IR NEPHROSTOMY TUBE CHECK/CHANGE/REPOSITION/REINSERTION/UPSIZE  2/2/2024    IR NEPHROSTOMY TUBE CHECK/CHANGE/REPOSITION/REINSERTION/UPSIZE  3/4/2024    IR NEPHROSTOMY TUBE CHECK/CHANGE/REPOSITION/REINSERTION/UPSIZE  3/20/2024    IR NEPHROSTOMY TUBE CHECK/CHANGE/REPOSITION/REINSERTION/UPSIZE  6/7/2024    IR NEPHROSTOMY TUBE CHECK/CHANGE/REPOSITION/REINSERTION/UPSIZE  8/5/2024    IR NEPHROSTOMY TUBE CHECK/CHANGE/REPOSITION/REINSERTION/UPSIZE  10/4/2024    IR NEPHROSTOMY TUBE CHECK/CHANGE/REPOSITION/REINSERTION/UPSIZE  11/18/2024    IR NEPHROSTOMY TUBE CHECK/CHANGE/REPOSITION/REINSERTION/UPSIZE  12/31/2024    IR NEPHROSTOMY TUBE CHECK/CHANGE/REPOSITION/REINSERTION/UPSIZE  1/31/2025    IR NEPHROSTOMY TUBE  CHECK/CHANGE/REPOSITION/REINSERTION/UPSIZE  3/3/2025    IR NEPHROSTOMY TUBE PLACEMENT  06/22/2023    bilateral    IR OTHER  1/15/2024    US GUIDED PROSTATE BIOPSY       Family History   Problem Relation Age of Onset    Uterine cancer Mother     Liver cancer Father     Cancer Father     No Known Problems Sister     No Known Problems Brother     No Known Problems Son     Diabetes type II Daughter     No Known Problems Daughter     Cancer Daughter     Cancer Daughter       reports that he has quit smoking. His smoking use included cigarettes. He started smoking about 25 years ago. He has a 627.8 pack-year smoking history. He has been exposed to tobacco smoke. He has never used smokeless tobacco. He reports current alcohol use. He reports that he does not use drugs.  Current Outpatient Medications   Medication Instructions    acetaminophen (TYLENOL) 1,000 mg, Oral, 3 times daily    albuterol (Ventolin HFA) 90 mcg/act inhaler 2 puffs, Inhalation, Every 6 hours PRN    Alcohol Swabs 70 % PADS To clean the skin prior to injecting insulin    ascorbic acid (VITAMIN C) 250 mg, Daily    Blood Glucose Monitoring Suppl (OneTouch Verio Reflect) w/Device KIT Check blood sugars once daily. Please substitute with appropriate alternative as covered by patient's insurance. Dx: E11.65    calcitonin (salmon) (MIACALCIN) 4 Units/kg, Intramuscular, Daily    Cholecalciferol (VITAMIN D3) 1,000 Units, Oral, Daily    dexamethasone (DECADRON) 1 mg, Oral, Daily with breakfast    Diclofenac Sodium (VOLTAREN) 2 g, Topical, 4 times daily PRN    docusate sodium (COLACE) 100 mg, Oral, 2 times daily PRN    Easy Touch Pen Needles 31G X 6 MM MISC Other, Daily at bedtime    glucose 4-6 GM-MG 2 tablets, Oral, Daily PRN    glucose blood (OneTouch Verio) test strip Check blood sugars twice a daily. Please substitute with appropriate alternative as covered by patient's insurance. Dx: E11.65    insulin aspart (NovoLOG FlexPen) 100 UNIT/ML injection pen  Novolog 1 unit before meal if blood sugars is 200-249 mg/dL novolog 2 units before meal if blood sugar is 250 mg/dL +    magnesium Oxide (MAG-OX) 400 mg, Oral, 2 times daily    metFORMIN (GLUCOPHAGE) 1,000 mg, Oral, 2 times daily with meals    Multiple Vitamin (multivitamin) tablet 1 tablet, Oral, Daily    naloxone (NARCAN) 4 mg/0.1 mL nasal spray Administer 1 spray into a nostril. If no response after 2-3 minutes, give another dose in the other nostril using a new spray.    Nutritional Supplements (Ensure Plus High Protein) LIQD 237 mL, Oral, 3 times daily    Nutritional Supplements (Ensure Plus High Protein) LIQD     Nutritional Supplements (Glucerna Hunger Smart Shake) LIQD 3 times daily    omega-3-acid ethyl esters (LOVAZA) 1 g, Oral, Daily    ondansetron (ZOFRAN ODT) 8 mg, Oral, Every 8 hours PRN    OneTouch Delica Lancets 33G MISC Check blood sugars once daily. Please substitute with appropriate alternative as covered by patient's insurance. Dx: E11.65    pantoprazole (PROTONIX) 40 mg, Oral, Daily    polyethylene glycol (MIRALAX) 17 g, Oral, Daily PRN    polyethylene glycol (MIRALAX) 17 g, Oral, Daily    senna (SENOKOT) 8.6 mg, Oral, 2 times daily    sodium chloride, PF, 0.9 % 10 mL, Intracatheter, Daily, Intracatheter flushing daily. May substitute prefilled syringe with normal saline 10 mL vials, 10 mL syringes, and 18 g blunt needles    sodium chloride, PF, 0.9 % 10 mL, Intracatheter, Daily, Intracatheter flushing daily. May substitute prefilled syringe with normal saline 10 mL vials, 10 mL syringes, and 18 g blunt needles    sodium chloride, PF, 0.9 % 10 mL, Intracatheter, Daily, Intracatheter flushing daily. May substitute prefilled syringe with normal saline 10 mL vials, 10 mL syringes, and 18 g blunt needles    sodium chloride 1 g, Oral, 2 times daily with meals    Spacer/Aero-Holding Chambers (AEROCHAMBER MINI CHAMBER) BILLY Does not apply, See admin instructions     Allergies   Allergen Reactions     Docetaxel Anaphylaxis      Current Outpatient Medications on File Prior to Visit   Medication Sig Dispense Refill    Alcohol Swabs 70 % PADS To clean the skin prior to injecting insulin 100 each 1    ascorbic acid (VITAMIN C) 250 mg tablet Take 250 mg by mouth daily Unsure of dose      Cholecalciferol (VITAMIN D3) 1,000 units tablet Take 1 tablet (1,000 Units total) by mouth daily 90 tablet 3    Diclofenac Sodium (VOLTAREN) 1 % Apply 2 g topically 4 (four) times a day as needed (joint pain) 100 g 1    docusate sodium (COLACE) 100 mg capsule Take 1 capsule (100 mg total) by mouth 2 (two) times a day as needed for constipation 60 capsule 0    insulin aspart (NovoLOG FlexPen) 100 UNIT/ML injection pen Novolog 1 unit before meal if blood sugars is 200-249 mg/dL novolog 2 units before meal if blood sugar is 250 mg/dL + 15 mL 2    magnesium Oxide (MAG-OX) 400 mg TABS Take 1 tablet (400 mg total) by mouth 2 (two) times a day 60 tablet 1    metFORMIN (GLUCOPHAGE) 1000 MG tablet Take 1 tablet (1,000 mg total) by mouth 2 (two) times a day with meals 180 tablet 1    Multiple Vitamin (multivitamin) tablet Take 1 tablet by mouth daily 30 tablet 2    Nutritional Supplements (Ensure Plus High Protein) LIQD Take 237 mL by mouth 3 (three) times a day 237 mL 9    omega-3-acid ethyl esters (LOVAZA) 1 g capsule TAKE 1 CAPSULE BY MOUTH DAILY. 90 capsule 1    polyethylene glycol (MIRALAX) 17 g packet Take 17 g by mouth daily as needed (for constipation) 100 each 1    senna (SENOKOT) 8.6 mg Take 1 tablet (8.6 mg total) by mouth 2 (two) times a day 60 tablet 3    sodium chloride, PF, 0.9 % 10 mL by Intracatheter route daily Intracatheter flushing daily. May substitute prefilled syringe with normal saline 10 mL vials, 10 mL syringes, and 18 g blunt needles 600 mL 2    acetaminophen (TYLENOL) 500 mg tablet Take 2 tablets (1,000 mg total) by mouth 3 (three) times a day (Patient not taking: Reported on 3/19/2025) 180 tablet 2    albuterol  (Ventolin HFA) 90 mcg/act inhaler Inhale 2 puffs every 6 (six) hours as needed for wheezing (Patient not taking: Reported on 3/7/2025) 18 g 0    Blood Glucose Monitoring Suppl (OneTouch Verio Reflect) w/Device KIT Check blood sugars once daily. Please substitute with appropriate alternative as covered by patient's insurance. Dx: E11.65 (Patient not taking: Reported on 3/19/2025) 1 kit 0    calcitonin, salmon, (MIACALCIN) Inject 1.22 mL (244 Units total) into a muscle daily (Patient not taking: Reported on 3/19/2025) 30 mL 0    Easy Touch Pen Needles 31G X 6 MM MISC Use daily at bedtime (Patient not taking: Reported on 3/19/2025) 100 each 3    glucose 4-6 GM-MG Chew 2 tablets daily as needed for low blood sugar 90 tablet 3    glucose blood (OneTouch Verio) test strip Check blood sugars twice a daily. Please substitute with appropriate alternative as covered by patient's insurance. Dx: E11.65 (Patient not taking: Reported on 1/28/2025) 200 each 3    naloxone (NARCAN) 4 mg/0.1 mL nasal spray Administer 1 spray into a nostril. If no response after 2-3 minutes, give another dose in the other nostril using a new spray. (Patient not taking: Reported on 1/28/2025) 1 each 0    Nutritional Supplements (Ensure Plus High Protein) LIQD  (Patient not taking: Reported on 3/19/2025)      Nutritional Supplements (Glucerna Hunger Smart Shake) LIQD Take by mouth Three times a day (Patient not taking: Reported on 3/19/2025)      ondansetron (Zofran ODT) 8 mg disintegrating tablet Take 1 tablet (8 mg total) by mouth every 8 (eight) hours as needed for nausea or vomiting (Patient not taking: Reported on 1/28/2025) 20 tablet 3    OneTouch Delica Lancets 33G MISC Check blood sugars once daily. Please substitute with appropriate alternative as covered by patient's insurance. Dx: E11.65 (Patient not taking: Reported on 1/28/2025) 100 each 3    pantoprazole (PROTONIX) 40 mg tablet Take 1 tablet (40 mg total) by mouth daily (Patient not  taking: Reported on 3/19/2025) 90 tablet 1    polyethylene glycol (MIRALAX) 17 g packet Take 17 g by mouth daily (Patient not taking: Reported on 3/19/2025) 10 g 0    sodium chloride 1 g tablet Take 1 tablet (1 g total) by mouth 2 (two) times a day with meals (Patient not taking: Reported on 3/19/2025) 60 tablet 0    sodium chloride, PF, 0.9 % 10 mL by Intracatheter route daily Intracatheter flushing daily. May substitute prefilled syringe with normal saline 10 mL vials, 10 mL syringes, and 18 g blunt needles (Patient not taking: Reported on 3/19/2025) 900 mL 0    sodium chloride, PF, 0.9 % 10 mL by Intracatheter route daily Intracatheter flushing daily. May substitute prefilled syringe with normal saline 10 mL vials, 10 mL syringes, and 18 g blunt needles (Patient not taking: Reported on 3/19/2025) 900 mL 0    Spacer/Aero-Holding Chambers (AEROCHAMBER MINI CHAMBER) BILLY by Does not apply route see administration instructions (Patient not taking: Reported on 1/28/2025) 1 Device 0     Current Facility-Administered Medications on File Prior to Visit   Medication Dose Route Frequency Provider Last Rate Last Admin    alteplase (CATHFLO) injection 2 mg  2 mg Intracatheter Q1MIN PRN Ramone Coyne DO             Objective   /58 (BP Location: Left arm, Patient Position: Sitting, Cuff Size: Standard)   Pulse 83   Temp (!) 97.1 °F (36.2 °C) (Temporal)   SpO2 98%     Physical Exam  Vitals reviewed.   Constitutional:       General: He is not in acute distress.     Appearance: He is ill-appearing (chronically). He is not toxic-appearing or diaphoretic.      Comments: Temporal muscle wasting. Thin body habitus. Patient requiring assistance to reposition in wheelchair with spouse and family member. Patient awake, alert, and able to participate in conversation, however, weak in appearance.   HENT:      Head: Normocephalic and atraumatic.      Nose: Nose normal.      Mouth/Throat:      Mouth: Mucous membranes are dry.    Eyes:      General:         Right eye: No discharge.         Left eye: No discharge.   Cardiovascular:      Rate and Rhythm: Normal rate.   Pulmonary:      Effort: Pulmonary effort is normal. No respiratory distress.   Abdominal:      General: Abdomen is flat. There is no distension.   Musculoskeletal:         General: No swelling.   Skin:     General: Skin is warm and dry.      Coloration: Skin is not jaundiced or pale.      Comments: Could not exam right foot as it was in offloading boot. Did review recent note from patient's Podiatries for ongoing wound management and monitoring.   Neurological:      Mental Status: He is alert.   Psychiatric:         Mood and Affect: Mood normal.         Behavior: Behavior normal.         Thought Content: Thought content normal.         Judgment: Judgment normal.         Recent labs:  Lab Results   Component Value Date/Time    SODIUM 130 (L) 03/18/2025 01:09 PM    K 5.0 03/18/2025 01:09 PM    BUN 14 03/18/2025 01:09 PM    CREATININE 0.76 03/18/2025 01:09 PM    GLUC 182 (H) 03/18/2025 01:09 PM    CALCIUM 11.5 (H) 03/18/2025 01:09 PM    AST 49 (H) 03/18/2025 01:09 PM    ALT 20 03/18/2025 01:09 PM    ALB 3.5 03/18/2025 01:09 PM    TP 7.4 03/18/2025 01:09 PM    EGFR 91 03/18/2025 01:09 PM     Lab Results   Component Value Date/Time    HGB 13.7 02/17/2025 06:03 PM    WBC 6.79 02/17/2025 06:03 PM     (H) 02/17/2025 06:03 PM    INR 0.98 02/17/2025 06:03 PM    PTT 34 02/17/2025 06:03 PM     Lab Results   Component Value Date/Time    BHX9FGBMMQER 1.470 10/08/2024 04:46 PM       Recent Imaging:  Procedure: IR nephrostomy tube check/change/reposition/reinsertion/upsize  Result Date: 3/3/2025  Impression: Uneventful routine bilateral PCN exchange as noted. Next exchange will be scheduled in approximately 4 weeks. Workstation performed: [id number]     Procedure: CT abdomen pelvis with contrast  Result Date: 2/17/2025  Impression: Interval progression of hepatic metastatic disease.  Stable severe bladder wall thickening most consistent with cystitis. Stable diffuse osseous metastatic disease. Workstation performed: [id number]     Procedure: NM pluvicto infusion  Result Date: 2/13/2025  Impression: 195.8 mCi Lutetium 177 Pluvicto was administered intravenously without immediate complication. Resident: Filiberto Reyna I, the attending radiologist, have reviewed the images and agree with the final report above. Workstation performed: [id number]     Procedure: IR nephrostomy tube check/change/reposition/reinsertion/upsize  Result Date: 1/31/2025  Impression: Impression: Successful replacement of the bilateral nephrostomy tubes for same size 10 Setswana tubes placed into the renal pelvis. The existing tubes were patent and in satisfactory position. Plan: Tube to bag drainage. Flush q. day with 10 cc normal saline solution. Return in 1 month for another routine bilateral nephrostomy tube change since these tubes become encrusted if replaced longer than 4-week intervals. Workstation performed: [id number]     Procedure: IR nephrostomy tube check/change/reposition/reinsertion/upsize  Result Date: 12/31/2024  Impression: Uncomplicated bilateral nephrostomy tube exchange. Plan: Next exchange in 4 to 6 weeks given frequent encrustation and tube dysfunction. Workstation performed: [id number]     Procedure: NM pluvicto infusion  Result Date: 12/26/2024  Impression: 204.7 mCi Lutetium 177 Pluvicto was administered intravenously without immediate complication. Workstation performed: [id number]       Administrative Statements   I have spent a total time of 35 minutes in caring for this patient on the day of the visit/encounter including Risks and benefits of tx options, Instructions for management, Patient and family education, Importance of tx compliance, Risk factor reductions, Impressions, Counseling / Coordination of care, Documenting in the medical record, Reviewing/placing orders in the medical record  (including tests, medications, and/or procedures), and Obtaining or reviewing history  .   Topics discussed with the patient / family include symptom assessment and management, medication review, medication adjustment, psychosocial support, goals of care, supportive listening, and anticipatory guidance.

## 2025-03-20 ENCOUNTER — HOSPITAL ENCOUNTER (OUTPATIENT)
Dept: INFUSION CENTER | Facility: HOSPITAL | Age: 75
Discharge: HOME | End: 2025-03-20
Attending: INTERNAL MEDICINE
Payer: COMMERCIAL

## 2025-03-20 VITALS
TEMPERATURE: 97.5 F | OXYGEN SATURATION: 98 % | RESPIRATION RATE: 18 BRPM | SYSTOLIC BLOOD PRESSURE: 112 MMHG | HEART RATE: 88 BPM | DIASTOLIC BLOOD PRESSURE: 70 MMHG

## 2025-03-20 DIAGNOSIS — E83.52 HYPERCALCEMIA: ICD-10-CM

## 2025-03-20 DIAGNOSIS — C61 PROSTATE CANCER METASTATIC TO BONE (HCC): Primary | ICD-10-CM

## 2025-03-20 DIAGNOSIS — C79.51 PROSTATE CANCER METASTATIC TO BONE (HCC): Primary | ICD-10-CM

## 2025-03-20 PROBLEM — R53.1 GENERALIZED WEAKNESS: Status: ACTIVE | Noted: 2025-03-20

## 2025-03-20 PROBLEM — R63.0 LOSS OF APPETITE: Status: ACTIVE | Noted: 2025-03-20

## 2025-03-20 LAB
ALBUMIN SERPL BCG-MCNC: 3.4 G/DL (ref 3.5–5)
ALP SERPL-CCNC: 309 U/L (ref 34–104)
ALT SERPL W P-5'-P-CCNC: 20 U/L (ref 7–52)
ANION GAP SERPL CALCULATED.3IONS-SCNC: 8 MMOL/L (ref 4–13)
AST SERPL W P-5'-P-CCNC: 34 U/L (ref 13–39)
BILIRUB SERPL-MCNC: 0.47 MG/DL (ref 0.2–1)
BUN SERPL-MCNC: 15 MG/DL (ref 5–25)
CALCIUM ALBUM COR SERPL-MCNC: 11.5 MG/DL (ref 8.3–10.1)
CALCIUM SERPL-MCNC: 11 MG/DL (ref 8.4–10.2)
CHLORIDE SERPL-SCNC: 104 MMOL/L (ref 96–108)
CO2 SERPL-SCNC: 19 MMOL/L (ref 21–32)
CREAT SERPL-MCNC: 0.73 MG/DL (ref 0.6–1.3)
DME PARACHUTE DELIVERY DATE REQUESTED: NORMAL
DME PARACHUTE ITEM DESCRIPTION: NORMAL
DME PARACHUTE ORDER STATUS: NORMAL
DME PARACHUTE SUPPLIER NAME: NORMAL
DME PARACHUTE SUPPLIER PHONE: NORMAL
GFR SERPL CREATININE-BSD FRML MDRD: 92 ML/MIN/1.73SQ M
GLUCOSE SERPL-MCNC: 202 MG/DL (ref 65–140)
POTASSIUM SERPL-SCNC: 4.4 MMOL/L (ref 3.5–5.3)
PROT SERPL-MCNC: 6.9 G/DL (ref 6.4–8.4)
SODIUM SERPL-SCNC: 131 MMOL/L (ref 135–147)

## 2025-03-20 PROCEDURE — 80053 COMPREHEN METABOLIC PANEL: CPT | Performed by: INTERNAL MEDICINE

## 2025-03-20 PROCEDURE — 96360 HYDRATION IV INFUSION INIT: CPT

## 2025-03-20 RX ADMIN — SODIUM CHLORIDE 1000 ML: 0.9 INJECTION, SOLUTION INTRAVENOUS at 14:06

## 2025-03-20 NOTE — ASSESSMENT & PLAN NOTE
Difficulty with getting in and out of bed, chair, and inability to stand without two person support/assistance.    Patient seen today in wheelchair requiring spouse and family member to help lift him and reposition into the wheelchair as he is not strong enough to reposition himself. He has his right foot in Podous Boot with to ongoing wound care to his right diabetic foot ulcer with Podiatry.     Patient inquiring about a hospital bed at home to help him change positions as it is difficult for him to reposition himself without help from spouse/family. Patient has difficulty in standing due to weakness. Patient has had a history of falls in the past due to his weakness.      Orders:    Ambulatory Referral to Palliative Care; Future

## 2025-03-20 NOTE — ASSESSMENT & PLAN NOTE
Orders:    dexamethasone (DECADRON) 1 mg tablet; Take 1 tablet (1 mg total) by mouth daily with breakfast    Ambulatory Referral to Palliative Care; Future

## 2025-03-20 NOTE — PATIENT INSTRUCTIONS
It was a pleasure speaking with you today.    As discussed:  -Continue your medications as prescribed.  -Continue with a bowel regimen to avoid constipation.    Follow-up in: 4 weeks (if patient is evaluated by Home Palliative Care program and transition of care is complete, can cancel our appointment. Will need to ensure patient is established with Home Palliative Program first, prior to cancelling this appointment with my office).    Please do not hesitate to call me sooner should you have any questions/concerns or needs.    Medication safety issues - Do not drive under the influence of narcotics (including opioids), watch for adverse effects including confusion / altered mental status / respiratory depression (slowed breathing), keep medications stored in a safe/locked environment, do not use alcohol while opioids or other narcotics are in your system. Do not travel with more than the minimum number of tablets or capsules required for the trip.    ER Precautions  Watch for red flag symptoms including, but not limited to fevers, chills, chest pain, shortness of breath, intractable nausea/vomiting/diarrhea, or acute intractable pain (especially if pain is new or has changed).

## 2025-03-20 NOTE — PROGRESS NOTES
Oliver Astudillo  tolerated treatment well with no complications.      Oliver Astudillo is aware of future appt on 3/25 at 1300.     AVS printed and given to Oliver Astudillo:   No (Declined by Oliver Astudillo)

## 2025-03-25 ENCOUNTER — HOSPITAL ENCOUNTER (OUTPATIENT)
Dept: INFUSION CENTER | Facility: HOSPITAL | Age: 75
Discharge: HOME | End: 2025-03-25
Attending: INTERNAL MEDICINE
Payer: COMMERCIAL

## 2025-03-25 ENCOUNTER — APPOINTMENT (OUTPATIENT)
Dept: LAB | Facility: HOSPITAL | Age: 75
End: 2025-03-25
Attending: INTERNAL MEDICINE
Payer: COMMERCIAL

## 2025-03-25 VITALS
OXYGEN SATURATION: 90 % | DIASTOLIC BLOOD PRESSURE: 58 MMHG | HEART RATE: 89 BPM | TEMPERATURE: 97.5 F | RESPIRATION RATE: 20 BRPM | SYSTOLIC BLOOD PRESSURE: 109 MMHG

## 2025-03-25 DIAGNOSIS — C79.51 PROSTATE CANCER METASTATIC TO BONE (HCC): Primary | ICD-10-CM

## 2025-03-25 DIAGNOSIS — C79.51 PROSTATE CANCER METASTATIC TO BONE (HCC): ICD-10-CM

## 2025-03-25 DIAGNOSIS — E83.52 HYPERCALCEMIA: ICD-10-CM

## 2025-03-25 DIAGNOSIS — C61 PROSTATE CANCER METASTATIC TO BONE (HCC): Primary | ICD-10-CM

## 2025-03-25 DIAGNOSIS — C61 PROSTATE CANCER METASTATIC TO BONE (HCC): ICD-10-CM

## 2025-03-25 LAB
ALBUMIN SERPL BCG-MCNC: 3.8 G/DL (ref 3.5–5)
ALP SERPL-CCNC: 352 U/L (ref 34–104)
ALT SERPL W P-5'-P-CCNC: 20 U/L (ref 7–52)
ANION GAP SERPL CALCULATED.3IONS-SCNC: 9 MMOL/L (ref 4–13)
AST SERPL W P-5'-P-CCNC: 35 U/L (ref 13–39)
BASOPHILS # BLD AUTO: 0.02 THOUSANDS/ÂΜL (ref 0–0.1)
BASOPHILS NFR BLD AUTO: 0 % (ref 0–1)
BILIRUB SERPL-MCNC: 0.65 MG/DL (ref 0.2–1)
BUN SERPL-MCNC: 19 MG/DL (ref 5–25)
CALCIUM SERPL-MCNC: 11.7 MG/DL (ref 8.4–10.2)
CHLORIDE SERPL-SCNC: 101 MMOL/L (ref 96–108)
CO2 SERPL-SCNC: 21 MMOL/L (ref 21–32)
CREAT SERPL-MCNC: 0.71 MG/DL (ref 0.6–1.3)
EOSINOPHIL # BLD AUTO: 0.01 THOUSAND/ÂΜL (ref 0–0.61)
EOSINOPHIL NFR BLD AUTO: 0 % (ref 0–6)
ERYTHROCYTE [DISTWIDTH] IN BLOOD BY AUTOMATED COUNT: 14.7 % (ref 11.6–15.1)
GFR SERPL CREATININE-BSD FRML MDRD: 93 ML/MIN/1.73SQ M
GLUCOSE SERPL-MCNC: 162 MG/DL (ref 65–140)
HCT VFR BLD AUTO: 40 % (ref 36.5–49.3)
HGB BLD-MCNC: 13.1 G/DL (ref 12–17)
IMM GRANULOCYTES # BLD AUTO: 0.04 THOUSAND/UL (ref 0–0.2)
IMM GRANULOCYTES NFR BLD AUTO: 1 % (ref 0–2)
LYMPHOCYTES # BLD AUTO: 1.01 THOUSANDS/ÂΜL (ref 0.6–4.47)
LYMPHOCYTES NFR BLD AUTO: 16 % (ref 14–44)
MCH RBC QN AUTO: 30.5 PG (ref 26.8–34.3)
MCHC RBC AUTO-ENTMCNC: 32.8 G/DL (ref 31.4–37.4)
MCV RBC AUTO: 93 FL (ref 82–98)
MONOCYTES # BLD AUTO: 0.42 THOUSAND/ÂΜL (ref 0.17–1.22)
MONOCYTES NFR BLD AUTO: 7 % (ref 4–12)
NEUTROPHILS # BLD AUTO: 4.84 THOUSANDS/ÂΜL (ref 1.85–7.62)
NEUTS SEG NFR BLD AUTO: 76 % (ref 43–75)
NRBC BLD AUTO-RTO: 0 /100 WBCS
PLATELET # BLD AUTO: 468 THOUSANDS/UL (ref 149–390)
PMV BLD AUTO: 9.1 FL (ref 8.9–12.7)
POTASSIUM SERPL-SCNC: 4.5 MMOL/L (ref 3.5–5.3)
PROT SERPL-MCNC: 7.6 G/DL (ref 6.4–8.4)
RBC # BLD AUTO: 4.29 MILLION/UL (ref 3.88–5.62)
SODIUM SERPL-SCNC: 131 MMOL/L (ref 135–147)
WBC # BLD AUTO: 6.34 THOUSAND/UL (ref 4.31–10.16)

## 2025-03-25 PROCEDURE — 85025 COMPLETE CBC W/AUTO DIFF WBC: CPT

## 2025-03-25 PROCEDURE — 36415 COLL VENOUS BLD VENIPUNCTURE: CPT

## 2025-03-25 PROCEDURE — 80053 COMPREHEN METABOLIC PANEL: CPT

## 2025-03-25 RX ADMIN — SODIUM CHLORIDE 1000 ML: 0.9 INJECTION, SOLUTION INTRAVENOUS at 13:06

## 2025-03-25 NOTE — PROGRESS NOTES
Oliver Astudillo  tolerated treatment well with no complications.      Oliver Astudillo is aware of future appt on 3/28 at 1230.     AVS printed and given to Oliver Astudillo:    Yes

## 2025-03-25 NOTE — PLAN OF CARE
Problem: Potential for Falls  Goal: Patient will remain free of falls  Description: INTERVENTIONS:- Educate patient/family on patient safety including physical limitations- Instruct patient to call for assistance with activity - Consult OT/PT to assist with strengthening/mobility - Keep Call bell within reach  Outcome: Progressing     Problem: Knowledge Deficit  Goal: Patient/family/caregiver demonstrates understanding of disease process, treatment plan, medications, and discharge instructions  Description: Complete learning assessment and assess knowledge base.Interventions:- Provide teaching at level of understanding- Provide teaching via preferred learning methods  Outcome: Progressing

## 2025-03-26 ENCOUNTER — TELEPHONE (OUTPATIENT)
Age: 75
End: 2025-03-26

## 2025-03-26 LAB
DME PARACHUTE DELIVERY DATE EXPECTED: NORMAL
DME PARACHUTE DELIVERY DATE REQUESTED: NORMAL
DME PARACHUTE ITEM DESCRIPTION: NORMAL
DME PARACHUTE ORDER STATUS: NORMAL
DME PARACHUTE SUPPLIER NAME: NORMAL
DME PARACHUTE SUPPLIER PHONE: NORMAL

## 2025-03-26 NOTE — TELEPHONE ENCOUNTER
Patients son leonie called, confirming he has transport scheduled for tomorrows infusion treatment.  He is still waiting for a callback to confirm transportation

## 2025-03-27 ENCOUNTER — HOSPITAL ENCOUNTER (OUTPATIENT)
Dept: NON INVASIVE DIAGNOSTICS | Facility: CLINIC | Age: 75
Discharge: HOME | End: 2025-03-27
Payer: COMMERCIAL

## 2025-03-27 DIAGNOSIS — C61 MALIGNANT NEOPLASM OF PROSTATE (HCC): ICD-10-CM

## 2025-03-27 PROCEDURE — A9607 HB LUTETIUM LU 177 VIPIVOTIDE TETRAXETAN, THERAPEUTIC, 1 MILLICURIE: HCPCS

## 2025-03-27 PROCEDURE — 79101 NUCLEAR RX IV ADMIN: CPT

## 2025-03-28 ENCOUNTER — APPOINTMENT (EMERGENCY)
Dept: RADIOLOGY | Facility: HOSPITAL | Age: 75
End: 2025-03-28
Payer: COMMERCIAL

## 2025-03-28 ENCOUNTER — HOSPITAL ENCOUNTER (OUTPATIENT)
Dept: INFUSION CENTER | Facility: HOSPITAL | Age: 75
Discharge: HOME | End: 2025-03-28
Attending: INTERNAL MEDICINE

## 2025-03-28 ENCOUNTER — HOSPITAL ENCOUNTER (EMERGENCY)
Facility: HOSPITAL | Age: 75
Discharge: HOME | End: 2025-03-28
Attending: EMERGENCY MEDICINE
Payer: COMMERCIAL

## 2025-03-28 VITALS
OXYGEN SATURATION: 98 % | RESPIRATION RATE: 14 BRPM | TEMPERATURE: 98.9 F | HEART RATE: 80 BPM | DIASTOLIC BLOOD PRESSURE: 63 MMHG | BODY MASS INDEX: 20.46 KG/M2 | HEIGHT: 68 IN | WEIGHT: 135 LBS | SYSTOLIC BLOOD PRESSURE: 116 MMHG

## 2025-03-28 DIAGNOSIS — C79.51 PROSTATE CANCER METASTATIC TO BONE (HCC): ICD-10-CM

## 2025-03-28 DIAGNOSIS — C61 PROSTATE CANCER METASTATIC TO BONE (HCC): ICD-10-CM

## 2025-03-28 DIAGNOSIS — C61 PROSTATE CANCER (HCC): Primary | ICD-10-CM

## 2025-03-28 DIAGNOSIS — E83.52 HYPERCALCEMIA: ICD-10-CM

## 2025-03-28 DIAGNOSIS — E83.52 HYPERCALCEMIA: Primary | ICD-10-CM

## 2025-03-28 LAB
2HR DELTA HS TROPONIN: 0 NG/L
ALBUMIN SERPL BCG-MCNC: 3.7 G/DL (ref 3.5–5)
ALP SERPL-CCNC: 382 U/L (ref 34–104)
ALT SERPL W P-5'-P-CCNC: 41 U/L (ref 7–52)
ANION GAP SERPL CALCULATED.3IONS-SCNC: 8 MMOL/L (ref 4–13)
AST SERPL W P-5'-P-CCNC: 111 U/L (ref 13–39)
BASOPHILS # BLD AUTO: 0.01 THOUSANDS/ÂΜL (ref 0–0.1)
BASOPHILS NFR BLD AUTO: 0 % (ref 0–1)
BILIRUB SERPL-MCNC: 0.75 MG/DL (ref 0.2–1)
BNP SERPL-MCNC: 57 PG/ML (ref 0–100)
BUN SERPL-MCNC: 20 MG/DL (ref 5–25)
CALCIUM SERPL-MCNC: 11.2 MG/DL (ref 8.4–10.2)
CARDIAC TROPONIN I PNL SERPL HS: 5 NG/L (ref ?–50)
CARDIAC TROPONIN I PNL SERPL HS: 5 NG/L (ref ?–50)
CHLORIDE SERPL-SCNC: 102 MMOL/L (ref 96–108)
CO2 SERPL-SCNC: 20 MMOL/L (ref 21–32)
CREAT SERPL-MCNC: 0.77 MG/DL (ref 0.6–1.3)
EOSINOPHIL # BLD AUTO: 0 THOUSAND/ÂΜL (ref 0–0.61)
EOSINOPHIL NFR BLD AUTO: 0 % (ref 0–6)
ERYTHROCYTE [DISTWIDTH] IN BLOOD BY AUTOMATED COUNT: 14.8 % (ref 11.6–15.1)
FLUAV AG UPPER RESP QL IA.RAPID: NEGATIVE
FLUBV AG UPPER RESP QL IA.RAPID: NEGATIVE
GFR SERPL CREATININE-BSD FRML MDRD: 90 ML/MIN/1.73SQ M
GLUCOSE SERPL-MCNC: 225 MG/DL (ref 65–140)
HCT VFR BLD AUTO: 37.3 % (ref 36.5–49.3)
HGB BLD-MCNC: 12.1 G/DL (ref 12–17)
IMM GRANULOCYTES # BLD AUTO: 0.06 THOUSAND/UL (ref 0–0.2)
IMM GRANULOCYTES NFR BLD AUTO: 1 % (ref 0–2)
LYMPHOCYTES # BLD AUTO: 0.37 THOUSANDS/ÂΜL (ref 0.6–4.47)
LYMPHOCYTES NFR BLD AUTO: 4 % (ref 14–44)
MAGNESIUM SERPL-MCNC: 2.3 MG/DL (ref 1.9–2.7)
MCH RBC QN AUTO: 30.5 PG (ref 26.8–34.3)
MCHC RBC AUTO-ENTMCNC: 32.4 G/DL (ref 31.4–37.4)
MCV RBC AUTO: 94 FL (ref 82–98)
MONOCYTES # BLD AUTO: 0.69 THOUSAND/ÂΜL (ref 0.17–1.22)
MONOCYTES NFR BLD AUTO: 7 % (ref 4–12)
NEUTROPHILS # BLD AUTO: 9.28 THOUSANDS/ÂΜL (ref 1.85–7.62)
NEUTS SEG NFR BLD AUTO: 88 % (ref 43–75)
NRBC BLD AUTO-RTO: 0 /100 WBCS
PLATELET # BLD AUTO: 345 THOUSANDS/UL (ref 149–390)
PMV BLD AUTO: 9.3 FL (ref 8.9–12.7)
POTASSIUM SERPL-SCNC: 4.9 MMOL/L (ref 3.5–5.3)
PROT SERPL-MCNC: 7.1 G/DL (ref 6.4–8.4)
RBC # BLD AUTO: 3.97 MILLION/UL (ref 3.88–5.62)
SARS-COV+SARS-COV-2 AG RESP QL IA.RAPID: NEGATIVE
SODIUM SERPL-SCNC: 130 MMOL/L (ref 135–147)
WBC # BLD AUTO: 10.41 THOUSAND/UL (ref 4.31–10.16)

## 2025-03-28 PROCEDURE — 80053 COMPREHEN METABOLIC PANEL: CPT | Performed by: EMERGENCY MEDICINE

## 2025-03-28 PROCEDURE — 87804 INFLUENZA ASSAY W/OPTIC: CPT | Performed by: EMERGENCY MEDICINE

## 2025-03-28 PROCEDURE — 85025 COMPLETE CBC W/AUTO DIFF WBC: CPT | Performed by: EMERGENCY MEDICINE

## 2025-03-28 PROCEDURE — 84484 ASSAY OF TROPONIN QUANT: CPT | Performed by: EMERGENCY MEDICINE

## 2025-03-28 PROCEDURE — 96360 HYDRATION IV INFUSION INIT: CPT

## 2025-03-28 PROCEDURE — 36415 COLL VENOUS BLD VENIPUNCTURE: CPT | Performed by: EMERGENCY MEDICINE

## 2025-03-28 PROCEDURE — 71045 X-RAY EXAM CHEST 1 VIEW: CPT

## 2025-03-28 PROCEDURE — 87811 SARS-COV-2 COVID19 W/OPTIC: CPT | Performed by: EMERGENCY MEDICINE

## 2025-03-28 PROCEDURE — 83735 ASSAY OF MAGNESIUM: CPT | Performed by: EMERGENCY MEDICINE

## 2025-03-28 PROCEDURE — 83880 ASSAY OF NATRIURETIC PEPTIDE: CPT | Performed by: EMERGENCY MEDICINE

## 2025-03-28 PROCEDURE — 99284 EMERGENCY DEPT VISIT MOD MDM: CPT

## 2025-03-28 PROCEDURE — 99284 EMERGENCY DEPT VISIT MOD MDM: CPT | Performed by: EMERGENCY MEDICINE

## 2025-03-28 RX ADMIN — SODIUM CHLORIDE 500 ML: 0.9 INJECTION, SOLUTION INTRAVENOUS at 15:50

## 2025-03-28 NOTE — ED PROVIDER NOTES
Time reflects when diagnosis was documented in both MDM as applicable and the Disposition within this note       Time User Action Codes Description Comment    3/28/2025  5:09 PM ShilpiEricka Add [C61] Prostate cancer (HCC)           ED Disposition       ED Disposition   Discharge    Condition   Stable    Date/Time   Fri Mar 28, 2025  5:09 PM    Comment   Oliver Baudilio discharge to home/self care.                   Assessment & Plan       Medical Decision Making  Patient evaluated EKG labs imaging discussed the results at length with using a  with the wife at bedside.  She verbalized understanding had no further questions at the time of discharge    Problems Addressed:  Prostate cancer (HCC): acute illness or injury    Amount and/or Complexity of Data Reviewed  External Data Reviewed: notes.  Labs: ordered. Decision-making details documented in ED Course.  Radiology: ordered. Decision-making details documented in ED Course.  ECG/medicine tests: ordered and independent interpretation performed. Decision-making details documented in ED Course.             Medications   sodium chloride 0.9 % bolus 500 mL (0 mL Intravenous Stopped 3/28/25 1650)       ED Risk Strat Scores                            SBIRT 22yo+      Flowsheet Row Most Recent Value   Initial Alcohol Screen: US AUDIT-C     1. How often do you have a drink containing alcohol? 0 Filed at: 03/28/2025 1257   2. How many drinks containing alcohol do you have on a typical day you are drinking?  0 Filed at: 03/28/2025 1257   3a. Male UNDER 65: How often do you have five or more drinks on one occasion? 0 Filed at: 03/28/2025 1257   3b. FEMALE Any Age, or MALE 65+: How often do you have 4 or more drinks on one occassion? 0 Filed at: 03/28/2025 1257   Audit-C Score 0 Filed at: 03/28/2025 1257   YANNI: How many times in the past year have you...    Used an illegal drug or used a prescription medication for non-medical reasons? Never Filed at:  03/28/2025 1257                            History of Present Illness       Chief Complaint   Patient presents with    Decreased Oxygen Level     Sent by infusion after wife called to say he had low O2       Past Medical History:   Diagnosis Date    Altered mental status 10/08/2024    COVID-19 01/15/2024    Diabetes mellitus (HCC)     Elevated PSA 06/21/2023    High cholesterol     Hypertension     Prostate cancer (HCC)     Severe sepsis (HCC) 10/08/2024      Past Surgical History:   Procedure Laterality Date    IR NEPHROSTOMY TUBE CHECK/CHANGE/REPOSITION/REINSERTION/UPSIZE  9/28/2023    IR NEPHROSTOMY TUBE CHECK/CHANGE/REPOSITION/REINSERTION/UPSIZE  12/28/2023    IR NEPHROSTOMY TUBE CHECK/CHANGE/REPOSITION/REINSERTION/UPSIZE  1/31/2024    IR NEPHROSTOMY TUBE CHECK/CHANGE/REPOSITION/REINSERTION/UPSIZE  2/2/2024    IR NEPHROSTOMY TUBE CHECK/CHANGE/REPOSITION/REINSERTION/UPSIZE  3/4/2024    IR NEPHROSTOMY TUBE CHECK/CHANGE/REPOSITION/REINSERTION/UPSIZE  3/20/2024    IR NEPHROSTOMY TUBE CHECK/CHANGE/REPOSITION/REINSERTION/UPSIZE  6/7/2024    IR NEPHROSTOMY TUBE CHECK/CHANGE/REPOSITION/REINSERTION/UPSIZE  8/5/2024    IR NEPHROSTOMY TUBE CHECK/CHANGE/REPOSITION/REINSERTION/UPSIZE  10/4/2024    IR NEPHROSTOMY TUBE CHECK/CHANGE/REPOSITION/REINSERTION/UPSIZE  11/18/2024    IR NEPHROSTOMY TUBE CHECK/CHANGE/REPOSITION/REINSERTION/UPSIZE  12/31/2024    IR NEPHROSTOMY TUBE CHECK/CHANGE/REPOSITION/REINSERTION/UPSIZE  1/31/2025    IR NEPHROSTOMY TUBE CHECK/CHANGE/REPOSITION/REINSERTION/UPSIZE  3/3/2025    IR NEPHROSTOMY TUBE PLACEMENT  06/22/2023    bilateral    IR OTHER  1/15/2024    US GUIDED PROSTATE BIOPSY        Family History   Problem Relation Age of Onset    Uterine cancer Mother     Liver cancer Father     Cancer Father     No Known Problems Sister     No Known Problems Brother     No Known Problems Son     Diabetes type II Daughter     No Known Problems Daughter     Cancer Daughter     Cancer Daughter       Social  History     Tobacco Use    Smoking status: Former     Current packs/day: 24.90     Average packs/day: 24.9 packs/day for 25.2 years (628.4 ttl pk-yrs)     Types: Cigarettes     Start date: 2000     Passive exposure: Past    Smokeless tobacco: Never    Tobacco comments:     States he only smoked on the weekends   Vaping Use    Vaping status: Never Used   Substance Use Topics    Alcohol use: Yes     Comment: doesn't drink anymore    Drug use: Never      E-Cigarette/Vaping    E-Cigarette Use Never User       E-Cigarette/Vaping Substances    Nicotine No     THC No     CBD No     Flavoring No     Other No     Unknown No       I have reviewed and agree with the history as documented.     74-year-old male presents from his infusion center where they noted to have low oxygen.  He has metastatic prostate cancer.  Patient currently is not sleeping wife says he has been very tired he is mostly bedbound she takes care of him and nothing is new as far as his appearance or symptoms however the reason he is here in the ED is because of his low oxygen currently he is 98% on room air.      History provided by:  Patient   used: Yes        Review of Systems   Constitutional: Negative.    HENT: Negative.     Eyes: Negative.    Respiratory: Negative.     Cardiovascular: Negative.    Gastrointestinal: Negative.    Endocrine: Negative.    Genitourinary: Negative.    Musculoskeletal: Negative.    Skin: Negative.    Allergic/Immunologic: Negative.    Neurological: Negative.    Hematological: Negative.    Psychiatric/Behavioral: Negative.     All other systems reviewed and are negative.          Objective       ED Triage Vitals   Temperature Pulse Blood Pressure Respirations SpO2 Patient Position - Orthostatic VS   03/28/25 1257 03/28/25 1259 03/28/25 1259 03/28/25 1257 03/28/25 1259 03/28/25 1344   97.7 °F (36.5 °C) 99 135/72 14 100 % Lying      Temp Source Heart Rate Source BP Location FiO2 (%) Pain Score    03/28/25  1344 03/28/25 1344 03/28/25 1344 -- 03/28/25 1257    Tympanic Monitor Left arm  No Pain      Vitals      Date and Time Temp Pulse SpO2 Resp BP Pain Score FACES Pain Rating User   03/28/25 1700 -- 80 98 % -- -- -- -- SB   03/28/25 1344 98.9 °F (37.2 °C) 93 99 % -- 116/63 10 - Worst Possible Pain -- FO   03/28/25 1259 -- 99 100 % -- 135/72 -- -- SERGE   03/28/25 1257 97.7 °F (36.5 °C) -- -- 14 -- No Pain -- SERGE            Physical Exam  Vitals and nursing note reviewed.   Constitutional:       Appearance: Normal appearance.   HENT:      Head: Normocephalic and atraumatic.      Nose: Nose normal.      Mouth/Throat:      Mouth: Mucous membranes are moist.   Eyes:      Extraocular Movements: Extraocular movements intact.      Pupils: Pupils are equal, round, and reactive to light.   Cardiovascular:      Rate and Rhythm: Normal rate and regular rhythm.   Pulmonary:      Effort: Pulmonary effort is normal.      Breath sounds: Normal breath sounds.   Abdominal:      General: Abdomen is flat. Bowel sounds are normal.      Palpations: Abdomen is soft.   Musculoskeletal:         General: Normal range of motion.      Cervical back: Normal range of motion and neck supple.   Skin:     General: Skin is warm.      Capillary Refill: Capillary refill takes less than 2 seconds.   Neurological:      General: No focal deficit present.      Mental Status: He is alert and oriented to person, place, and time. Mental status is at baseline.   Psychiatric:         Mood and Affect: Mood normal.         Thought Content: Thought content normal.         Results Reviewed       Procedure Component Value Units Date/Time    HS Troponin I 2hr [[id number]]  (Normal) Collected: 03/28/25 1623    Lab Status: Final result Specimen: Blood from Arm, Right Updated: 03/28/25 1652     hs TnI 2hr 5 ng/L      Delta 2hr hsTnI 0 ng/L     Comprehensive metabolic panel [[id number]]  (Abnormal) Collected: 03/28/25 1422    Lab Status: Final result Specimen: Blood from Arm,  Right Updated: 03/28/25 1508     Sodium 130 mmol/L      Potassium 4.9 mmol/L      Chloride 102 mmol/L      CO2 20 mmol/L      ANION GAP 8 mmol/L      BUN 20 mg/dL      Creatinine 0.77 mg/dL      Glucose 225 mg/dL      Calcium 11.2 mg/dL       U/L      ALT 41 U/L      Alkaline Phosphatase 382 U/L      Total Protein 7.1 g/dL      Albumin 3.7 g/dL      Total Bilirubin 0.75 mg/dL      eGFR 90 ml/min/1.73sq m     Narrative:      National Kidney Disease Foundation guidelines for Chronic Kidney Disease (CKD):     Stage 1 with normal or high GFR (GFR > 90 mL/min/1.73 square meters)    Stage 2 Mild CKD (GFR = 60-89 mL/min/1.73 square meters)    Stage 3A Moderate CKD (GFR = 45-59 mL/min/1.73 square meters)    Stage 3B Moderate CKD (GFR = 30-44 mL/min/1.73 square meters)    Stage 4 Severe CKD (GFR = 15-29 mL/min/1.73 square meters)    Stage 5 End Stage CKD (GFR <15 mL/min/1.73 square meters)  Note: GFR calculation is accurate only with a steady state creatinine    Magnesium [[id number]]  (Normal) Collected: 03/28/25 1422    Lab Status: Final result Specimen: Blood from Arm, Right Updated: 03/28/25 1508     Magnesium 2.3 mg/dL     HS Troponin 0hr (reflex protocol) [[id number]]  (Normal) Collected: 03/28/25 1422    Lab Status: Final result Specimen: Blood from Arm, Right Updated: 03/28/25 1505     hs TnI 0hr 5 ng/L     B-Type Natriuretic Peptide(BNP) [[id number]]  (Normal) Collected: 03/28/25 1422    Lab Status: Final result Specimen: Blood from Arm, Right Updated: 03/28/25 1504     BNP 57 pg/mL     FLU/COVID Rapid Antigen (30 min. TAT) - Preferred screening test in ED [[id number]]  (Normal) Collected: 03/28/25 1422    Lab Status: Final result Specimen: Nares from Nose Updated: 03/28/25 1457     SARS COV Rapid Antigen Negative     Influenza A Rapid Antigen Negative     Influenza B Rapid Antigen Negative    Narrative:      This test has been performed using the [employer] Bozena 2 FLU+SARS Antigen test under the Emergency Use  Authorization (EUA). This test has been validated by the  and verified by the performing laboratory. The Bozena uses lateral flow immunofluorescent sandwich assay to detect SARS-COV, Influenza A and Influenza B Antigen.     The [employer] Bozena 2 SARS Antigen test does not differentiate between SARS-CoV and SARS-CoV-2.     Negative results are presumptive and may be confirmed with a molecular assay, if necessary, for patient management. Negative results do not rule out SARS-CoV-2 or influenza infection and should not be used as the sole basis for treatment or patient management decisions. A negative test result may occur if the level of antigen in a sample is below the limit of detection of this test.     Positive results are indicative of the presence of viral antigens, but do not rule out bacterial infection or co-infection with other viruses.     All test results should be used as an adjunct to clinical observations and other information available to the provider.    FOR PEDIATRIC PATIENTS - copy/paste COVID Guidelines URL to browser: [email or web address]    CBC and differential [[id number]]  (Abnormal) Collected: 03/28/25 1422    Lab Status: Final result Specimen: Blood from Arm, Right Updated: 03/28/25 1442     WBC 10.41 Thousand/uL      RBC 3.97 Million/uL      Hemoglobin 12.1 g/dL      Hematocrit 37.3 %      MCV 94 fL      MCH 30.5 pg      MCHC 32.4 g/dL      RDW 14.8 %      MPV 9.3 fL      Platelets 345 Thousands/uL      nRBC 0 /100 WBCs      Segmented % 88 %      Immature Grans % 1 %      Lymphocytes % 4 %      Monocytes % 7 %      Eosinophils Relative 0 %      Basophils Relative 0 %      Absolute Neutrophils 9.28 Thousands/µL      Absolute Immature Grans 0.06 Thousand/uL      Absolute Lymphocytes 0.37 Thousands/µL      Absolute Monocytes 0.69 Thousand/µL      Eosinophils Absolute 0.00 Thousand/µL      Basophils Absolute 0.01 Thousands/µL              XR chest 1 view portable   Final Interpretation by Isaiah Walton MD (03/28 8737)      No acute cardiopulmonary disease.            Workstation performed: [id number]             ECG 12 Lead Documentation Only    Date/Time: 3/28/2025 9:46 PM    Performed by: Ericka Dobbins DO  Authorized by: Ericka Dobbins DO    ECG reviewed by me, the ED Provider: yes    Patient location:  ED  Previous ECG:     Previous ECG:  Unavailable    Comparison to cardiac monitor: Yes    Interpretation:     Interpretation: non-specific    Rate:     ECG rate assessment: normal    Rhythm:     Rhythm: sinus rhythm    Ectopy:     Ectopy: none    QRS:     QRS axis:  Normal  Conduction:     Conduction: normal    ST segments:     ST segments:  Non-specific  T waves:     T waves: non-specific        ED Medication and Procedure Management   Prior to Admission Medications   Prescriptions Last Dose Informant Patient Reported? Taking?   Alcohol Swabs 70 % PADS  Self, Spouse/Significant Other, Child No No   Sig: To clean the skin prior to injecting insulin   Blood Glucose Monitoring Suppl (OneTouch Verio Reflect) w/Device KIT  Self, Spouse/Significant Other, Child No No   Sig: Check blood sugars once daily. Please substitute with appropriate alternative as covered by patient's insurance. Dx: E11.65   Patient not taking: Reported on 3/19/2025   Cholecalciferol (VITAMIN D3) 1,000 units tablet   No No   Sig: Take 1 tablet (1,000 Units total) by mouth daily   Diclofenac Sodium (VOLTAREN) 1 %   No No   Sig: Apply 2 g topically 4 (four) times a day as needed (joint pain)   Easy Touch Pen Needles 31G X 6 MM MISC  Self, Spouse/Significant Other, Child No No   Sig: Use daily at bedtime   Patient not taking: Reported on 3/19/2025   Multiple Vitamin (multivitamin) tablet   No No   Sig: Take 1 tablet by mouth daily   Nutritional Supplements (Ensure Plus High Protein) LIQD   No No   Sig: Take 237 mL by mouth 3 (three) times a day   Nutritional Supplements  (Ensure Plus High Protein) LIQD   Yes No   Patient not taking: Reported on 3/19/2025   Nutritional Supplements (Glucerna Hunger Smart Shake) LIQD   Yes No   Sig: Take by mouth Three times a day   Patient not taking: Reported on 3/19/2025   OneTouch Delica Lancets 33G MISC  Self, Spouse/Significant Other, Child No No   Sig: Check blood sugars once daily. Please substitute with appropriate alternative as covered by patient's insurance. Dx: E11.65   Patient not taking: Reported on 1/28/2025   Spacer/Aero-Holding Chambers (AEROCHAMBER MINI CHAMBER) BILLY  Self, Spouse/Significant Other, Child No No   Sig: by Does not apply route see administration instructions   Patient not taking: Reported on 1/28/2025   acetaminophen (TYLENOL) 500 mg tablet   No No   Sig: Take 2 tablets (1,000 mg total) by mouth 3 (three) times a day   Patient not taking: Reported on 3/19/2025   albuterol (Ventolin HFA) 90 mcg/act inhaler  Self, Spouse/Significant Other, Child No No   Sig: Inhale 2 puffs every 6 (six) hours as needed for wheezing   Patient not taking: Reported on 3/7/2025   ascorbic acid (VITAMIN C) 250 mg tablet   Yes No   Sig: Take 250 mg by mouth daily Unsure of dose   calcitonin, salmon, (MIACALCIN)   No No   Sig: Inject 1.22 mL (244 Units total) into a muscle daily   Patient not taking: Reported on 3/19/2025   dexamethasone (DECADRON) 1 mg tablet   No No   Sig: Take 1 tablet (1 mg total) by mouth daily with breakfast   docusate sodium (COLACE) 100 mg capsule   No No   Sig: Take 1 capsule (100 mg total) by mouth 2 (two) times a day as needed for constipation   glucose 4-6 GM-MG   No No   Sig: Chew 2 tablets daily as needed for low blood sugar   glucose blood (OneTouch Verio) test strip  Self, Spouse/Significant Other, Child No No   Sig: Check blood sugars twice a daily. Please substitute with appropriate alternative as covered by patient's insurance. Dx: E11.65   Patient not taking: Reported on 1/28/2025   insulin aspart (NovoLOG  FlexPen) 100 UNIT/ML injection pen   No No   Sig: Novolog 1 unit before meal if blood sugars is 200-249 mg/dL novolog 2 units before meal if blood sugar is 250 mg/dL +   magnesium Oxide (MAG-OX) 400 mg TABS   No No   Sig: Take 1 tablet (400 mg total) by mouth 2 (two) times a day   metFORMIN (GLUCOPHAGE) 1000 MG tablet   No No   Sig: Take 1 tablet (1,000 mg total) by mouth 2 (two) times a day with meals   naloxone (NARCAN) 4 mg/0.1 mL nasal spray  Self, Spouse/Significant Other, Child No No   Sig: Administer 1 spray into a nostril. If no response after 2-3 minutes, give another dose in the other nostril using a new spray.   Patient not taking: Reported on 1/28/2025   omega-3-acid ethyl esters (LOVAZA) 1 g capsule   No No   Sig: TAKE 1 CAPSULE BY MOUTH DAILY.   ondansetron (Zofran ODT) 8 mg disintegrating tablet  Self, Spouse/Significant Other, Child No No   Sig: Take 1 tablet (8 mg total) by mouth every 8 (eight) hours as needed for nausea or vomiting   Patient not taking: Reported on 1/28/2025   pantoprazole (PROTONIX) 40 mg tablet   No No   Sig: Take 1 tablet (40 mg total) by mouth daily   Patient not taking: Reported on 3/19/2025   polyethylene glycol (MIRALAX) 17 g packet  Self, Spouse/Significant Other, Child No No   Sig: Take 17 g by mouth daily as needed (for constipation)   polyethylene glycol (MIRALAX) 17 g packet   No No   Sig: Take 17 g by mouth daily   Patient not taking: Reported on 3/19/2025   senna (SENOKOT) 8.6 mg   No No   Sig: Take 1 tablet (8.6 mg total) by mouth 2 (two) times a day   sodium chloride 1 g tablet   No No   Sig: Take 1 tablet (1 g total) by mouth 2 (two) times a day with meals   Patient not taking: Reported on 3/19/2025   sodium chloride, PF, 0.9 %   No No   Sig: 10 mL by Intracatheter route daily Intracatheter flushing daily. May substitute prefilled syringe with normal saline 10 mL vials, 10 mL syringes, and 18 g blunt needles   sodium chloride, PF, 0.9 %   No No   Sig: 10 mL by  Intracatheter route daily Intracatheter flushing daily. May substitute prefilled syringe with normal saline 10 mL vials, 10 mL syringes, and 18 g blunt needles   Patient not taking: Reported on 3/19/2025   sodium chloride, PF, 0.9 %   No No   Sig: 10 mL by Intracatheter route daily Intracatheter flushing daily. May substitute prefilled syringe with normal saline 10 mL vials, 10 mL syringes, and 18 g blunt needles   Patient not taking: Reported on 3/19/2025      Facility-Administered Medications: None     Discharge Medication List as of 3/28/2025  5:10 PM        CONTINUE these medications which have NOT CHANGED    Details   acetaminophen (TYLENOL) 500 mg tablet Take 2 tablets (1,000 mg total) by mouth 3 (three) times a day, Starting Tue 7/30/2024, Normal      albuterol (Ventolin HFA) 90 mcg/act inhaler Inhale 2 puffs every 6 (six) hours as needed for wheezing, Starting Fri 4/12/2024, Normal      Alcohol Swabs 70 % PADS To clean the skin prior to injecting insulin, Normal      ascorbic acid (VITAMIN C) 250 mg tablet Take 250 mg by mouth daily Unsure of dose, Historical Med      Blood Glucose Monitoring Suppl (OneTouch Verio Reflect) w/Device KIT Check blood sugars once daily. Please substitute with appropriate alternative as covered by patient's insurance. Dx: E11.65, Normal      calcitonin, salmon, (MIACALCIN) Inject 1.22 mL (244 Units total) into a muscle daily, Starting Thu 3/6/2025, Until Sat 4/5/2025, Normal      Cholecalciferol (VITAMIN D3) 1,000 units tablet Take 1 tablet (1,000 Units total) by mouth daily, Starting Thu 2/27/2025, Normal      dexamethasone (DECADRON) 1 mg tablet Take 1 tablet (1 mg total) by mouth daily with breakfast, Starting Wed 3/19/2025, Normal      Diclofenac Sodium (VOLTAREN) 1 % Apply 2 g topically 4 (four) times a day as needed (joint pain), Starting Tue 1/28/2025, Normal      docusate sodium (COLACE) 100 mg capsule Take 1 capsule (100 mg total) by mouth 2 (two) times a day as needed  for constipation, Starting Tue 1/28/2025, Normal      Easy Touch Pen Needles 31G X 6 MM MISC Use daily at bedtime, Starting Mon 1/8/2024, Normal      glucose 4-6 GM-MG Chew 2 tablets daily as needed for low blood sugar, Starting Thu 9/5/2024, Until Mon 1/27/2025 at 2359, Normal      glucose blood (OneTouch Verio) test strip Check blood sugars twice a daily. Please substitute with appropriate alternative as covered by patient's insurance. Dx: E11.65, Normal      insulin aspart (NovoLOG FlexPen) 100 UNIT/ML injection pen Novolog 1 unit before meal if blood sugars is 200-249 mg/dL novolog 2 units before meal if blood sugar is 250 mg/dL +, Normal      magnesium Oxide (MAG-OX) 400 mg TABS Take 1 tablet (400 mg total) by mouth 2 (two) times a day, Starting Thu 2/27/2025, Normal      metFORMIN (GLUCOPHAGE) 1000 MG tablet Take 1 tablet (1,000 mg total) by mouth 2 (two) times a day with meals, Starting Fri 10/25/2024, Normal      Multiple Vitamin (multivitamin) tablet Take 1 tablet by mouth daily, Starting Thu 2/27/2025, Normal      naloxone (NARCAN) 4 mg/0.1 mL nasal spray Administer 1 spray into a nostril. If no response after 2-3 minutes, give another dose in the other nostril using a new spray., Normal      !! Nutritional Supplements (Ensure Plus High Protein) LIQD Take 237 mL by mouth 3 (three) times a day, Starting Fri 10/25/2024, Normal      !! Nutritional Supplements (Ensure Plus High Protein) LIQD Historical Med      !! Nutritional Supplements (Glucerna Hunger Smart Shake) LIQD Take by mouth Three times a day, Starting Fri 11/22/2024, Historical Med      omega-3-acid ethyl esters (LOVAZA) 1 g capsule TAKE 1 CAPSULE BY MOUTH DAILY., Starting Mon 1/20/2025, Normal      ondansetron (Zofran ODT) 8 mg disintegrating tablet Take 1 tablet (8 mg total) by mouth every 8 (eight) hours as needed for nausea or vomiting, Starting Fri 6/28/2024, Normal      OneTouch Delica Lancets 33G MISC Check blood sugars once daily. Please  substitute with appropriate alternative as covered by patient's insurance. Dx: E11.65, Normal      pantoprazole (PROTONIX) 40 mg tablet Take 1 tablet (40 mg total) by mouth daily, Starting Tue 12/3/2024, Normal      !! polyethylene glycol (MIRALAX) 17 g packet Take 17 g by mouth daily as needed (for constipation), Starting Tue 6/18/2024, Normal      !! polyethylene glycol (MIRALAX) 17 g packet Take 17 g by mouth daily, Starting Mon 2/17/2025, Normal      senna (SENOKOT) 8.6 mg Take 1 tablet (8.6 mg total) by mouth 2 (two) times a day, Starting Thu 2/27/2025, Normal      sodium chloride 1 g tablet Take 1 tablet (1 g total) by mouth 2 (two) times a day with meals, Starting Fri 10/11/2024, Normal      !! sodium chloride, PF, 0.9 % 10 mL by Intracatheter route daily Intracatheter flushing daily. May substitute prefilled syringe with normal saline 10 mL vials, 10 mL syringes, and 18 g blunt needles, Starting Wed 12/4/2024, Until Mon 6/2/2025, Normal      !! sodium chloride, PF, 0.9 % 10 mL by Intracatheter route daily Intracatheter flushing daily. May substitute prefilled syringe with normal saline 10 mL vials, 10 mL syringes, and 18 g blunt needles, Starting Fri 1/31/2025, Until Thu 5/1/2025, Normal      !! sodium chloride, PF, 0.9 % 10 mL by Intracatheter route daily Intracatheter flushing daily. May substitute prefilled syringe with normal saline 10 mL vials, 10 mL syringes, and 18 g blunt needles, Starting Fri 1/31/2025, Normal      Spacer/Aero-Holding Chambers (AEROCHAMBER MINI CHAMBER) BILLY by Does not apply route see administration instructions, Starting Fri 3/6/2020, Normal       !! - Potential duplicate medications found. Please discuss with provider.        No discharge procedures on file.  ED SEPSIS DOCUMENTATION   Time reflects when diagnosis was documented in both MDM as applicable and the Disposition within this note       Time User Action Codes Description Comment    3/28/2025  5:09 PM Ericka Dobbins  [C61] Prostate cancer (HCC)                  Ericka Dobbins, DO  03/28/25 2147       Ericka Dobbins, DO  03/28/25 2147

## 2025-04-01 ENCOUNTER — HOSPITAL ENCOUNTER (OUTPATIENT)
Dept: INFUSION CENTER | Facility: HOSPITAL | Age: 75
Discharge: HOME | End: 2025-04-01
Attending: INTERNAL MEDICINE
Payer: COMMERCIAL

## 2025-04-01 ENCOUNTER — TELEMEDICINE (OUTPATIENT)
Dept: HEMATOLOGY ONCOLOGY | Facility: CLINIC | Age: 75
End: 2025-04-01
Payer: COMMERCIAL

## 2025-04-01 VITALS
SYSTOLIC BLOOD PRESSURE: 106 MMHG | RESPIRATION RATE: 16 BRPM | HEART RATE: 80 BPM | DIASTOLIC BLOOD PRESSURE: 62 MMHG | TEMPERATURE: 97 F

## 2025-04-01 DIAGNOSIS — E83.52 HYPERCALCEMIA: ICD-10-CM

## 2025-04-01 DIAGNOSIS — C61 PROSTATE CANCER METASTATIC TO BONE (HCC): Primary | ICD-10-CM

## 2025-04-01 DIAGNOSIS — C79.51 PROSTATE CANCER METASTATIC TO BONE (HCC): Primary | ICD-10-CM

## 2025-04-01 LAB
ALBUMIN SERPL BCG-MCNC: 3.6 G/DL (ref 3.5–5)
ALP SERPL-CCNC: 313 U/L (ref 34–104)
ALT SERPL W P-5'-P-CCNC: 23 U/L (ref 7–52)
ANION GAP SERPL CALCULATED.3IONS-SCNC: 13 MMOL/L (ref 4–13)
AST SERPL W P-5'-P-CCNC: 29 U/L (ref 13–39)
BILIRUB SERPL-MCNC: 0.74 MG/DL (ref 0.2–1)
BUN SERPL-MCNC: 21 MG/DL (ref 5–25)
CALCIUM SERPL-MCNC: 11.2 MG/DL (ref 8.4–10.2)
CHLORIDE SERPL-SCNC: 104 MMOL/L (ref 96–108)
CO2 SERPL-SCNC: 20 MMOL/L (ref 21–32)
CREAT SERPL-MCNC: 0.57 MG/DL (ref 0.6–1.3)
GFR SERPL CREATININE-BSD FRML MDRD: 102 ML/MIN/1.73SQ M
GLUCOSE SERPL-MCNC: 167 MG/DL (ref 65–140)
POTASSIUM SERPL-SCNC: 4.4 MMOL/L (ref 3.5–5.3)
PROT SERPL-MCNC: 6.9 G/DL (ref 6.4–8.4)
SODIUM SERPL-SCNC: 137 MMOL/L (ref 135–147)

## 2025-04-01 PROCEDURE — 96360 HYDRATION IV INFUSION INIT: CPT

## 2025-04-01 PROCEDURE — 84153 ASSAY OF PSA TOTAL: CPT | Performed by: INTERNAL MEDICINE

## 2025-04-01 PROCEDURE — 99215 OFFICE O/P EST HI 40 MIN: CPT | Performed by: INTERNAL MEDICINE

## 2025-04-01 PROCEDURE — 80053 COMPREHEN METABOLIC PANEL: CPT | Performed by: INTERNAL MEDICINE

## 2025-04-01 RX ADMIN — SODIUM CHLORIDE 1000 ML: 0.9 INJECTION, SOLUTION INTRAVENOUS at 12:57

## 2025-04-01 NOTE — PROGRESS NOTES
Virtual Regular VisitName: Oliver Astudillo      : 1950      MRN: [id number]  Encounter Provider: Ramone Coyne DO  Encounter Date: 2025   Encounter department: [facility] [place]  :  Assessment & Plan  Prostate cancer metastatic to bone (HCC)  Lutetium 177.  He has had 4 treatments.  Last PSA was 2025.  Repeat PSA is requested at this time.  If PSA has diminished, consider hospitalization to evaluate for reversible contributors to current poor performance status.  If PSA has increased, hospice is most appropriate, in my opinion.  I reviewed the above with the patient and his son.  They voiced understanding and agreement.       Hypercalcemia  This has been refractory to bisphosphonates.  Recently started on denosumab.  Continues to get intermittent calcitonin injections.  Calcium is fluctuating in the 11-11.5 range.  He receives supplemental IV fluids for obligate fluid loss.  Limited other options for treatment of refractory hypercalcemia.  I suspect this was related to his prostate cancer.           History of Present Illness     HPI Prostate cancer with bone metastases diagnosed May 2023.  Progressive disease after sequential hormonal blockade.  Docetaxel complicated by severe infusion reaction.  2024 started lutetium 177.    Review of Systems   Constitutional:  Positive for appetite change, fatigue and unexpected weight change. Negative for chills and fever.   HENT:  Negative for nosebleeds.    Eyes:  Negative for discharge.   Respiratory:  Negative for cough and shortness of breath.    Cardiovascular:  Negative for chest pain.   Gastrointestinal:  Negative for abdominal pain, constipation and diarrhea.   Endocrine: Negative for polydipsia.   Genitourinary:  Negative for hematuria.   Musculoskeletal:  Negative for arthralgias.   Skin:  Negative for color change.   Allergic/Immunologic: Negative for immunocompromised state.   Neurological:   Positive for weakness and light-headedness. Negative for dizziness and headaches.   Hematological:  Negative for adenopathy.   Psychiatric/Behavioral:  Negative for agitation.        Objective   There were no vitals taken for this visit.    Physical Exam  Constitutional:       General: He is not in acute distress.     Appearance: He is well-developed. He is ill-appearing (chronic).   HENT:      Head: Atraumatic.      Nose: No congestion.   Eyes:      Conjunctiva/sclera: Conjunctivae normal.   Pulmonary:      Effort: Pulmonary effort is normal.   Abdominal:      General: There is no distension.   Musculoskeletal:         General: No deformity.      Cervical back: Normal range of motion.   Skin:     Findings: No rash.   Neurological:      Mental Status: He is alert and oriented to person, place, and time.         Administrative Statements   Encounter provider Ramone Coyne, DO    The Patient is located at Home and in the following state in which I hold an active license [place].    The patient was identified by name and date of birth. Oliver Astudillo was informed that this is a telemedicine visit and that the visit is being conducted through the Epic Embedded platform. He agrees to proceed..  My office door was closed. No one else was in the room.  He acknowledged consent and understanding of privacy and security of the video platform. The patient has agreed to participate and understands they can discontinue the visit at any time.    I have spent a total time of 20  minutes in caring for this patient on the day of the visit/encounter including Diagnostic results, Impressions, Documenting in the medical record, Reviewing/placing orders in the medical record (including tests, medications, and/or procedures), and Obtaining or reviewing history  , not including the time spent for establishing the audio/video connection.

## 2025-04-01 NOTE — ASSESSMENT & PLAN NOTE
This has been refractory to bisphosphonates.  Recently started on denosumab.  Continues to get intermittent calcitonin injections.  Calcium is fluctuating in the 11-11.5 range.  He receives supplemental IV fluids for obligate fluid loss.  Limited other options for treatment of refractory hypercalcemia.  I suspect this was related to his prostate cancer.

## 2025-04-01 NOTE — ASSESSMENT & PLAN NOTE
Lutetium 177.  He has had 4 treatments.  Last PSA was February 2025.  Repeat PSA is requested at this time.  If PSA has diminished, consider hospitalization to evaluate for reversible contributors to current poor performance status.  If PSA has increased, hospice is most appropriate, in my opinion.  I reviewed the above with the patient and his son.  They voiced understanding and agreement.

## 2025-04-02 ENCOUNTER — HOSPITAL ENCOUNTER (OUTPATIENT)
Dept: NON INVASIVE DIAGNOSTICS | Facility: HOSPITAL | Age: 75
Discharge: HOME | End: 2025-04-02
Attending: RADIOLOGY | Admitting: RADIOLOGY
Payer: COMMERCIAL

## 2025-04-02 DIAGNOSIS — N13.30 HYDRONEPHROSIS, UNSPECIFIED HYDRONEPHROSIS TYPE: ICD-10-CM

## 2025-04-02 LAB — PSA SERPL-MCNC: 534.35 NG/ML (ref 0–4)

## 2025-04-02 PROCEDURE — 50435 EXCHANGE NEPHROSTOMY CATH: CPT | Performed by: RADIOLOGY

## 2025-04-02 PROCEDURE — C1729 CATH, DRAINAGE: HCPCS

## 2025-04-02 PROCEDURE — 50435 EXCHANGE NEPHROSTOMY CATH: CPT

## 2025-04-02 PROCEDURE — C1769 GUIDE WIRE: HCPCS

## 2025-04-02 RX ORDER — LIDOCAINE WITH 8.4% SOD BICARB 0.9%(10ML)
SYRINGE (ML) INJECTION AS NEEDED
Status: COMPLETED | OUTPATIENT
Start: 2025-04-02 | End: 2025-04-02

## 2025-04-02 RX ADMIN — Medication 3 ML: at 13:20

## 2025-04-02 RX ADMIN — IOHEXOL 20 ML: 350 INJECTION, SOLUTION INTRAVENOUS at 13:47

## 2025-04-02 RX ADMIN — Medication 5 ML: at 13:26

## 2025-04-02 NOTE — DISCHARGE INSTRUCTIONS
Nephrostomy Tube Care     WHAT YOU NEED TO KNOW:   A nephrostomy tube is a catheter (thin plastic tube) that is inserted through your skin and into your kidney. The nephrostomy tube drains urine from your kidney into a collecting bag outside your body. You may need a nephrostomy tube when something is blocking the normal flow of urine. A nephrostomy tube may be used for a short or a long period of time. The nephrostomy tube comes out of your back, so you will need someone to help care for your nephrostomy tube.          DISCHARGE INSTRUCTIONS:      How to clean the skin around the nephrostomy tube and change the bandage:  Since the nephrostomy tube comes out of your back, you will not be able to care for it by yourself. Ask someone to follow the general directions below to check and care for your nephrostomy tube.   Gather the items you will need.          Disposable (single use) under-pad, and a clean washcloth  Plain soap, warm water, and new medical gloves  Sterile gauze bandages  Clear adhesive dressing or medical tape  Skin barrier  Protective skin film  Trash bag  Remove the old bandage, and check the tube entry site.    Have the patient lie on his side with the nephrostomy tube entry site facing up. Place the under-pad where it will catch drainage as you are working with the nephrostomy tube.   Wash your hands with soap and water. Put on new medical gloves.  Gently remove the old bandage, without pulling on the tube. Do this by holding the skin beside the tube with one hand. With the other hand, gently remove sticky tape and the skin barrier by pulling in the same direction as hair growth. Do not touch the side of the bandage that is placed over or around the tube. Throw the bandage and skin barrier away in a trash bag.  Look for signs of infection, such as skin redness and swelling. Report any skin changes to healthcare providers.  Clean the tube entry site.    Hold the tube in place to keep it from  being pulled out while you are cleaning around it.  You will need to clean the area twice. For the first cleaning, wet a new gauze bandage with soap and water.  Begin at the entry site of the tube. Wipe the skin in circles, moving away from the entry site. Remove blood and any other material with the gauze. Do this as often as needed. Use a new gauze bandage each time you clean the area, moving away from the entry site.   For the second cleaning, wet a new gauze bandage with water. Begin at the entry site of the tube. Wipe the skin in circles, moving away from the entry site. Use a new gauze bandage each time you clean the area, moving away from the entry site.   Gently pat the skin with a clean washcloth to dry it.    Apply the skin barrier and bandages.    Roll up a bandage to make it thick, and place it under  the place where the tube enters the skin. Place it to support the tube, and stop it from kinking or bending. Tape the bandage in place, and apply more bandages if directed by a healthcare provider.   Bring the tubing forward to the front and tape it to the skin. Do not stretch the tube tight, because this may pull the nephrostomy tube out.  How often to change the bandage.  Change the bandage around the tube, every other day. If your bandages  get dirty or wet, change them right away, and as often as needed. If your nephrostomy tube is to be used for a long period of time, the tube needs to be changed every 2 to 3 months. Healthcare providers will tell you when you need to make an appointment to have your tube changed.     How to care for the urine drainage bag:   Ask if you need to measure and write down how much urine is in the bag before you empty it. Drain urine out of the drainage bag when it is ½ to ? full. Open the spout at the bottom of the bag to empty the urine into the toilet.   You may need to detach the drainage bag from the nephrostomy tube to change it.. If so, attach a new drainage bag  tightly to the nephrostomy tube.     How to prevent problems with your nephrostomy tube:   Change bandages, directed.  This helps to prevent infection. Throw away or clean your drainage bag as directed by your healthcare provider.    Wipe the connecting ends of the drainage bag with alcohol before you reconnect the bag to the tube.  This helps prevent infection.     Keep the tube taped to your skin and connected to a drainage bag placed below the level of your kidneys.  This helps prevent urine from backing up into your kidneys. You may wear a small drainage bag strapped to your leg to let you move around more easily.    Check the catheter to be sure it is in place after you change your clothes or do other activities.  Do not wear tight clothing over the tube. Place the tubing over your thigh rather than under it when you are sitting down. Be sure that nothing is pulling on the nephrostomy tube when you move around.    Change positions if you see little or no urine in your drainage bag.  Check to see if the urine tube is twisted or bent. Be sure that you are not sitting or lying on the tube. If there are no kinks and there is little or no urine in the drainage bag, tell your healthcare provider.    Flush out the tube as directed. Some tubes get flushed one time a day with 10 mls of NSS You will be given a prescription for the flushes.  To flush the nephrostomy tube, clean both connections with alcohol swap. Twist off the drainage bag tube and twist the saline syringe into the nephrostomy tube and flush briskly. Remove the syringe and twist the drainage bag tube back into the nephrostomy tube.  Keep the site covered while you shower.  Tape a piece of clear adhesive plastic over the dressing to keep it dry while you shower. Do not take tub baths.    Contact Interventional Radiology at [phone] ([facility] PATIENTS: Contact Interventional Radiology at [phone]) ([facility] PATIENTS: Contact Interventional Radiology at  [phone]) if:  The skin around the nephrostomy tube is red, swollen, itches, or has a rash.   You have a fever greater than 101 or chills.  You have lower back or hip pain.  There are changes in how your urine looks or smells.  You have little or no urine draining from the nephrostomy tube.   You have nausea and are vomiting.  The black deb on your tube has moved, or the tube is longer than when it was put in.   You have questions or concerns about your condition or care.  The nephrostomy tube comes out completely.   There is blood, pus, or a bad smell coming from the place where the tube enters your skin.  Urine is leaking around the tube.        The following pharmacies carry the flush syringes.       [facility]                     [address]                     [address] [facility]                    [phone]  [place]                       [place]  Phone [phone]            Phone [phone]                 [facility]                                                                                                   [phone]  [facility]             [facility]                             [address] [place], [place] [address] [facility]   Yoselyn LARSON                                 [phone]  Phone [phone]            Phone [phone]    [facility]                                                                         [facility] [phone]  Marie Carter.  [place]   Phone [phone]

## 2025-04-02 NOTE — BRIEF OP NOTE (RAD/CATH)
INTERVENTIONAL RADIOLOGY PROCEDURE NOTE    Date: 4/2/2025    Procedure:   Procedure Summary       Date: 04/02/25 Room / Location: [facility] Cardiac Cath Lab    Anesthesia Start:  Anesthesia Stop:     Procedure: IR NEPHROSTOMY TUBE CHECK/CHANGE/REPOSITION/REINSERTION/UPSIZE Diagnosis:       Hydronephrosis, unspecified hydronephrosis type      (routine 4 week change bilateral PCN)    Scheduled Providers:  Responsible Provider:     Anesthesia Type: Not recorded ASA Status: Not recorded            Preoperative diagnosis:   1. Hydronephrosis, unspecified hydronephrosis type         Postoperative diagnosis: Same.    Surgeon: Pro Murphy MD     Assistant: None. No qualified resident was available.    Blood loss: none    Specimens: none     Findings: bilateral nephrostomy tube exchange    Complications: None immediate.    Anesthesia: local

## 2025-04-03 ENCOUNTER — DOCUMENTATION (OUTPATIENT)
Age: 75
End: 2025-04-03

## 2025-04-03 ENCOUNTER — OFFICE VISIT (OUTPATIENT)
Dept: ENDOCRINOLOGY | Facility: CLINIC | Age: 75
End: 2025-04-03
Payer: COMMERCIAL

## 2025-04-03 VITALS
HEIGHT: 68 IN | DIASTOLIC BLOOD PRESSURE: 60 MMHG | OXYGEN SATURATION: 100 % | SYSTOLIC BLOOD PRESSURE: 123 MMHG | HEART RATE: 80 BPM | BODY MASS INDEX: 20.53 KG/M2

## 2025-04-03 DIAGNOSIS — N18.31 CHRONIC KIDNEY DISEASE, STAGE 3A (HCC): ICD-10-CM

## 2025-04-03 DIAGNOSIS — Z79.4 TYPE 2 DIABETES MELLITUS WITH OTHER SPECIFIED COMPLICATION, WITH LONG-TERM CURRENT USE OF INSULIN (HCC): Primary | ICD-10-CM

## 2025-04-03 DIAGNOSIS — E11.69 TYPE 2 DIABETES MELLITUS WITH OTHER SPECIFIED COMPLICATION, WITH LONG-TERM CURRENT USE OF INSULIN (HCC): Primary | ICD-10-CM

## 2025-04-03 PROCEDURE — 99213 OFFICE O/P EST LOW 20 MIN: CPT | Performed by: NURSE PRACTITIONER

## 2025-04-03 RX ORDER — INSULIN ASPART 100 [IU]/ML
INJECTION, SOLUTION INTRAVENOUS; SUBCUTANEOUS
Qty: 15 ML | Refills: 2 | Status: SHIPPED | OUTPATIENT
Start: 2025-04-03

## 2025-04-03 NOTE — ASSESSMENT & PLAN NOTE
Lab Results   Component Value Date    HGBA1C 7.1 (H) 02/03/2025     Discussed diabetes goal with patient, son, and wife in detail. Reviewed with patient that hemoglobin A1c goal is relaxed with his medical condition recommend ~8% as family wants to continue with treatment. Goal for blood sugar readings none less than 100 mg/dL, fine with glucose levels ranging up to 200-250 mg/dL. Patient still receives insulin by wife for glucose levels over 200 mg/dL once daily. Family has no desire to discontinue oral, injectable medications or blood glucose monitoring at this time. Family is aware that we can relax goals to make him more comfortable.

## 2025-04-03 NOTE — PROGRESS NOTES
Established Patient Progress Note    Chief Complaint:  Diabetes follow up visit    Impression & Plan:    Problem List Items Addressed This Visit          Endocrine    Type 2 diabetes mellitus with other specified complication (HCC) - Primary      Lab Results   Component Value Date    HGBA1C 7.1 (H) 02/03/2025     Discussed diabetes goal with patient, son, and wife in detail. Reviewed with patient that hemoglobin A1c goal is relaxed with his medical condition recommend ~8% as family wants to continue with treatment. Goal for blood sugar readings none less than 100 mg/dL, fine with glucose levels ranging up to 200-250 mg/dL. Patient still receives insulin by wife for glucose levels over 200 mg/dL once daily. Family has no desire to discontinue oral, injectable medications or blood glucose monitoring at this time. Family is aware that we can relax goals to make him more comfortable.             History of Present Illness:   Oliver Astudillo is a 74 y.o. male withmale who presents who presents for history of diabetes mellitus with history of metastatic prostate cancer. Followed by palliative care and oncology.      Presents for concern regarding blood sugar patterns.       [id number].      Very weak with improved appetite since dexamethasone.      CGM Interpretation  Oliver Astudillo   Device used Dexcom for Personal Use  Indication: Type of Diabetes: Type 2 Diabetes  More than 72 hours of data was reviewed. Report to be scanned to chart.   Date Range: March 21-April 3, 2025  Analysis of data:   Average Glucose: 171 mg/dl  Coefficient of Variation: 28.1%  SD : 48 mg/dl  GMI: 7.4%  Time in Target Range: 57%  Time Above Range: 43%  Time Below Range: 0%   Interpretation of data:   Elevated patterns between 11 am and 6 pm.      Current diabetes management includes metformin 1000 mg twice daily.    History obtained from: patient and patient's Significant Other    Patient Active Problem List   Diagnosis    Type 2  diabetes mellitus with other specified complication (HCC)    Other hydronephrosis    Hypertension    Hydronephrosis    Prostate cancer metastatic to bone (HCC)    Encounter for monitoring androgen deprivation therapy    Nephrostomy status (HCC)    Chronic hyponatremia    Hyperlipidemia    Constipation    Stage 2 chronic kidney disease    Cancer related pain    Palliative care by specialist    Ambulatory dysfunction    Therapeutic opioid-induced constipation (OIC)    Advanced care planning/counseling discussion    Goals of care, counseling/discussion    Anorexia    Unintentional weight loss    Hypercalcemia    Moderate protein-calorie malnutrition (HCC)    Vitamin B12 deficiency    Opioid dependence, uncomplicated (HCC)    Chronic kidney disease, stage 3a (HCC)    Diabetic ulcer of left heel associated with diabetes mellitus of other type, unspecified ulcer stage (HCC)    Loss of appetite    Generalized weakness      Past Medical History:   Diagnosis Date    Altered mental status 10/08/2024    Cancer (HCC)     COVID-19 01/15/2024    Diabetes mellitus (HCC)     Elevated PSA 06/21/2023    High cholesterol     Hypertension     Prostate cancer (HCC)     Severe sepsis (HCC) 10/08/2024      Past Surgical History:   Procedure Laterality Date    IR NEPHROSTOMY TUBE CHECK/CHANGE/REPOSITION/REINSERTION/UPSIZE  9/28/2023    IR NEPHROSTOMY TUBE CHECK/CHANGE/REPOSITION/REINSERTION/UPSIZE  12/28/2023    IR NEPHROSTOMY TUBE CHECK/CHANGE/REPOSITION/REINSERTION/UPSIZE  1/31/2024    IR NEPHROSTOMY TUBE CHECK/CHANGE/REPOSITION/REINSERTION/UPSIZE  2/2/2024    IR NEPHROSTOMY TUBE CHECK/CHANGE/REPOSITION/REINSERTION/UPSIZE  3/4/2024    IR NEPHROSTOMY TUBE CHECK/CHANGE/REPOSITION/REINSERTION/UPSIZE  3/20/2024    IR NEPHROSTOMY TUBE CHECK/CHANGE/REPOSITION/REINSERTION/UPSIZE  6/7/2024    IR NEPHROSTOMY TUBE CHECK/CHANGE/REPOSITION/REINSERTION/UPSIZE  8/5/2024    IR NEPHROSTOMY TUBE CHECK/CHANGE/REPOSITION/REINSERTION/UPSIZE  10/4/2024    IR  NEPHROSTOMY TUBE CHECK/CHANGE/REPOSITION/REINSERTION/UPSIZE  11/18/2024    IR NEPHROSTOMY TUBE CHECK/CHANGE/REPOSITION/REINSERTION/UPSIZE  12/31/2024    IR NEPHROSTOMY TUBE CHECK/CHANGE/REPOSITION/REINSERTION/UPSIZE  1/31/2025    IR NEPHROSTOMY TUBE CHECK/CHANGE/REPOSITION/REINSERTION/UPSIZE  3/3/2025    IR NEPHROSTOMY TUBE PLACEMENT  06/22/2023    bilateral    IR OTHER  1/15/2024    US GUIDED PROSTATE BIOPSY        Family History   Problem Relation Age of Onset    Uterine cancer Mother     Liver cancer Father     Cancer Father     No Known Problems Sister     No Known Problems Brother     No Known Problems Son     Diabetes type II Daughter     No Known Problems Daughter     Cancer Daughter     Cancer Daughter      Social History     Tobacco Use    Smoking status: Former     Current packs/day: 24.90     Average packs/day: 24.9 packs/day for 25.3 years (628.8 ttl pk-yrs)     Types: Cigarettes     Start date: 2000     Passive exposure: Past    Smokeless tobacco: Never    Tobacco comments:     States he only smoked on the weekends   Substance Use Topics    Alcohol use: Yes     Comment: doesn't drink anymore     Allergies   Allergen Reactions    Docetaxel Anaphylaxis         Current Outpatient Medications:     Alcohol Swabs 70 % PADS, To clean the skin prior to injecting insulin, Disp: 100 each, Rfl: 1    ascorbic acid (VITAMIN C) 250 mg tablet, Take 250 mg by mouth daily Unsure of dose, Disp: , Rfl:     Blood Glucose Monitoring Suppl (OneTouch Verio Reflect) w/Device KIT, Check blood sugars once daily. Please substitute with appropriate alternative as covered by patient's insurance. Dx: E11.65, Disp: 1 kit, Rfl: 0    Cholecalciferol (VITAMIN D3) 1,000 units tablet, Take 1 tablet (1,000 Units total) by mouth daily, Disp: 90 tablet, Rfl: 3    dexamethasone (DECADRON) 1 mg tablet, Take 1 tablet (1 mg total) by mouth daily with breakfast, Disp: 30 tablet, Rfl: 0    Diclofenac Sodium (VOLTAREN) 1 %, Apply 2 g topically 4  (four) times a day as needed (joint pain), Disp: 100 g, Rfl: 1    docusate sodium (COLACE) 100 mg capsule, Take 1 capsule (100 mg total) by mouth 2 (two) times a day as needed for constipation, Disp: 60 capsule, Rfl: 0    insulin aspart (NovoLOG FlexPen) 100 UNIT/ML injection pen, Novolog 1 unit before meal if blood sugars is 200-249 mg/dL novolog 2 units before meal if blood sugar is 250 mg/dL +, Disp: 15 mL, Rfl: 2    magnesium Oxide (MAG-OX) 400 mg TABS, Take 1 tablet (400 mg total) by mouth 2 (two) times a day, Disp: 60 tablet, Rfl: 1    metFORMIN (GLUCOPHAGE) 1000 MG tablet, Take 1 tablet (1,000 mg total) by mouth 2 (two) times a day with meals, Disp: 180 tablet, Rfl: 1    Multiple Vitamin (multivitamin) tablet, Take 1 tablet by mouth daily, Disp: 30 tablet, Rfl: 2    Nutritional Supplements (Ensure Plus High Protein) LIQD, Take 237 mL by mouth 3 (three) times a day, Disp: 237 mL, Rfl: 9    omega-3-acid ethyl esters (LOVAZA) 1 g capsule, TAKE 1 CAPSULE BY MOUTH DAILY., Disp: 90 capsule, Rfl: 1    polyethylene glycol (MIRALAX) 17 g packet, Take 17 g by mouth daily as needed (for constipation), Disp: 100 each, Rfl: 1    senna (SENOKOT) 8.6 mg, Take 1 tablet (8.6 mg total) by mouth 2 (two) times a day, Disp: 60 tablet, Rfl: 3    sodium chloride, PF, 0.9 %, 10 mL by Intracatheter route daily Intracatheter flushing daily. May substitute prefilled syringe with normal saline 10 mL vials, 10 mL syringes, and 18 g blunt needles, Disp: 600 mL, Rfl: 2    acetaminophen (TYLENOL) 500 mg tablet, Take 2 tablets (1,000 mg total) by mouth 3 (three) times a day (Patient not taking: Reported on 3/19/2025), Disp: 180 tablet, Rfl: 2    albuterol (Ventolin HFA) 90 mcg/act inhaler, Inhale 2 puffs every 6 (six) hours as needed for wheezing (Patient not taking: Reported on 3/7/2025), Disp: 18 g, Rfl: 0    calcitonin, salmon, (MIACALCIN), Inject 1.22 mL (244 Units total) into a muscle daily (Patient not taking: Reported on 3/19/2025),  Disp: 30 mL, Rfl: 0    Easy Touch Pen Needles 31G X 6 MM MISC, Use daily at bedtime (Patient not taking: Reported on 3/19/2025), Disp: 100 each, Rfl: 3    glucose 4-6 GM-MG, Chew 2 tablets daily as needed for low blood sugar, Disp: 90 tablet, Rfl: 3    glucose blood (OneTouch Verio) test strip, Check blood sugars twice a daily. Please substitute with appropriate alternative as covered by patient's insurance. Dx: E11.65 (Patient not taking: Reported on 1/28/2025), Disp: 200 each, Rfl: 3    naloxone (NARCAN) 4 mg/0.1 mL nasal spray, Administer 1 spray into a nostril. If no response after 2-3 minutes, give another dose in the other nostril using a new spray. (Patient not taking: Reported on 1/28/2025), Disp: 1 each, Rfl: 0    Nutritional Supplements (Ensure Plus High Protein) LIQD, , Disp: , Rfl:     Nutritional Supplements (Glucerna Hunger Smart Shake) LIQD, Take by mouth Three times a day (Patient not taking: Reported on 3/19/2025), Disp: , Rfl:     ondansetron (Zofran ODT) 8 mg disintegrating tablet, Take 1 tablet (8 mg total) by mouth every 8 (eight) hours as needed for nausea or vomiting (Patient not taking: Reported on 1/28/2025), Disp: 20 tablet, Rfl: 3    OneTouch Delica Lancets 33G MISC, Check blood sugars once daily. Please substitute with appropriate alternative as covered by patient's insurance. Dx: E11.65 (Patient not taking: Reported on 1/28/2025), Disp: 100 each, Rfl: 3    pantoprazole (PROTONIX) 40 mg tablet, Take 1 tablet (40 mg total) by mouth daily (Patient not taking: Reported on 3/19/2025), Disp: 90 tablet, Rfl: 1    polyethylene glycol (MIRALAX) 17 g packet, Take 17 g by mouth daily (Patient not taking: Reported on 3/19/2025), Disp: 10 g, Rfl: 0    sodium chloride 1 g tablet, Take 1 tablet (1 g total) by mouth 2 (two) times a day with meals (Patient not taking: Reported on 3/19/2025), Disp: 60 tablet, Rfl: 0    sodium chloride, PF, 0.9 %, 10 mL by Intracatheter route daily Intracatheter flushing  "daily. May substitute prefilled syringe with normal saline 10 mL vials, 10 mL syringes, and 18 g blunt needles (Patient not taking: Reported on 3/19/2025), Disp: 900 mL, Rfl: 0    sodium chloride, PF, 0.9 %, 10 mL by Intracatheter route daily Intracatheter flushing daily. May substitute prefilled syringe with normal saline 10 mL vials, 10 mL syringes, and 18 g blunt needles (Patient not taking: Reported on 3/19/2025), Disp: 900 mL, Rfl: 0    Spacer/Aero-Holding Chambers (AEROCHAMBER MINI CHAMBER) BILLY, by Does not apply route see administration instructions (Patient not taking: Reported on 1/28/2025), Disp: 1 Device, Rfl: 0  No current facility-administered medications for this visit.    Facility-Administered Medications Ordered in Other Visits:     alteplase (CATHFLO) injection 2 mg, 2 mg, Intracatheter, Q1MIN PRN, Ramone Coyne, DO    Review of Systems    Physical Exam:  Body mass index is 20.53 kg/m².  /60 (BP Location: Left arm, Patient Position: Sitting, Cuff Size: Standard)   Pulse 80   Ht 5' 8\" (1.727 m)   SpO2 100%   BMI 20.53 kg/m²    Wt Readings from Last 3 Encounters:   03/28/25 61.2 kg (135 lb)   03/14/25 62 kg (136 lb 11 oz)   03/13/25 62 kg (136 lb 11 oz)       Physical Exam   Constitutional: He is oriented to person, place, and time. Appears in pain in wheelchair.   HENT:   Head: Normocephalic and atraumatic.   Neck: Normal range of motion.   Pulmonary/Chest: Effort normal.   Musculoskeletal: Normal range of motion.   Neurological: He is alert and oriented to person, place, and time.   Skin: He is not diaphoretic.   Psychiatric: He was flat affect and mildly interactive in conversation.     Labs:   Lab Results   Component Value Date    HGBA1C 7.1 (H) 02/03/2025    HGBA1C 6.7 (H) 12/11/2024    HGBA1C 5.6 10/08/2024     Lab Results   Component Value Date    CREATININE 0.57 (L) 04/01/2025    CREATININE 0.77 03/28/2025    CREATININE 0.71 03/25/2025    BUN 21 04/01/2025    K 4.4 04/01/2025    " " 04/01/2025    CO2 20 (L) 04/01/2025     eGFR   Date Value Ref Range Status   04/01/2025 102 ml/min/1.73sq m Final     Lab Results   Component Value Date    HDL 37 (L) 02/03/2025    TRIG 198 (H) 02/03/2025     Lab Results   Component Value Date    ALT 23 04/01/2025    AST 29 04/01/2025    ALKPHOS 313 (H) 04/01/2025     Lab Results   Component Value Date    BRT5HPYQUIGC 1.470 10/08/2024     No results found for: \"FREET4\", \"TSI\"    Discussed with the patient and all questioned fully answered. He will call me if any problems arise.    Follow-up appointment in 6 months.     Counseled patient on diagnostic results, prognosis, risk and benefit of treatment options, instruction for management, importance of treatment compliance, Risk  factor reduction and impressions    ALTA Felipe    "

## 2025-04-03 NOTE — PROGRESS NOTES
Received request from clinical for patient to start on CALCITONIN-SALMON 400 UNIT/2ML. This has been submitted via cover my meds and this did come back as approved    BIN:       [id number]  PCN:      [id number]  ID:          [id number]  GRP:      [id number]    Request Reference Number: [id number]. CALCITONIN /ML is approved through 04/10/2025. Your patient may now fill this prescription and it will be covered.  Effective Date: 4/3/2025  Authorization Expiration Date: 4/10/2025

## 2025-04-04 ENCOUNTER — RA CDI HCC (OUTPATIENT)
Dept: OTHER | Facility: HOSPITAL | Age: 75
End: 2025-04-04

## 2025-04-04 ENCOUNTER — HOSPITAL ENCOUNTER (OUTPATIENT)
Dept: INFUSION CENTER | Facility: HOSPITAL | Age: 75
End: 2025-04-04
Attending: INTERNAL MEDICINE
Payer: COMMERCIAL

## 2025-04-04 VITALS
RESPIRATION RATE: 18 BRPM | TEMPERATURE: 97.8 F | HEART RATE: 79 BPM | DIASTOLIC BLOOD PRESSURE: 63 MMHG | SYSTOLIC BLOOD PRESSURE: 121 MMHG | OXYGEN SATURATION: 92 %

## 2025-04-04 DIAGNOSIS — E83.52 HYPERCALCEMIA: ICD-10-CM

## 2025-04-04 DIAGNOSIS — C61 PROSTATE CANCER METASTATIC TO BONE (HCC): Primary | ICD-10-CM

## 2025-04-04 DIAGNOSIS — C79.51 PROSTATE CANCER METASTATIC TO BONE (HCC): Primary | ICD-10-CM

## 2025-04-04 LAB
ALBUMIN SERPL BCG-MCNC: 3.2 G/DL (ref 3.5–5)
ALP SERPL-CCNC: 298 U/L (ref 34–104)
ALT SERPL W P-5'-P-CCNC: 23 U/L (ref 7–52)
ANION GAP SERPL CALCULATED.3IONS-SCNC: 8 MMOL/L (ref 4–13)
AST SERPL W P-5'-P-CCNC: 45 U/L (ref 13–39)
BILIRUB SERPL-MCNC: 0.51 MG/DL (ref 0.2–1)
BUN SERPL-MCNC: 13 MG/DL (ref 5–25)
CALCIUM ALBUM COR SERPL-MCNC: 11.8 MG/DL (ref 8.3–10.1)
CALCIUM SERPL-MCNC: 11.2 MG/DL (ref 8.4–10.2)
CHLORIDE SERPL-SCNC: 102 MMOL/L (ref 96–108)
CO2 SERPL-SCNC: 22 MMOL/L (ref 21–32)
CREAT SERPL-MCNC: 0.6 MG/DL (ref 0.6–1.3)
GFR SERPL CREATININE-BSD FRML MDRD: 100 ML/MIN/1.73SQ M
GLUCOSE SERPL-MCNC: 155 MG/DL (ref 65–140)
POTASSIUM SERPL-SCNC: 4.1 MMOL/L (ref 3.5–5.3)
PROT SERPL-MCNC: 6.5 G/DL (ref 6.4–8.4)
SODIUM SERPL-SCNC: 132 MMOL/L (ref 135–147)

## 2025-04-04 PROCEDURE — 96360 HYDRATION IV INFUSION INIT: CPT

## 2025-04-04 PROCEDURE — 80053 COMPREHEN METABOLIC PANEL: CPT | Performed by: INTERNAL MEDICINE

## 2025-04-04 RX ADMIN — SODIUM CHLORIDE 1000 ML: 0.9 INJECTION, SOLUTION INTRAVENOUS at 13:05

## 2025-04-04 NOTE — PROGRESS NOTES
Oliver Astudillo  tolerated treatment well with no complications.      Oliver Astudillo is aware of future appt on 4/8/25.     AVS printed and given to Oliver Astudillo:  Yes

## 2025-04-04 NOTE — PLAN OF CARE
Problem: Potential for Falls  Goal: Patient will remain free of falls  Description: INTERVENTIONS:- Educate patient/family on patient safety including physical limitations- Instruct patient to call for assistance with activity - Consult OT/PT to assist with strengthening/mobility - Keep Call bell within reach-Outcome: Progressing     Problem: Knowledge Deficit  Goal: Patient/family/caregiver demonstrates understanding of disease process, treatment plan, medications, and discharge instructions  Description: Complete learning assessment and assess knowledge base.Interventions:- Provide teaching at level of understanding- Provide teaching via preferred learning methods  Outcome: Progressing

## 2025-04-04 NOTE — PROGRESS NOTES
HCC coding opportunities          Chart Reviewed number of suggestions sent to Provider: 1     Patients Insurance  E11.22   Medicare Insurance: [employer] Medicare Complete

## 2025-04-07 ENCOUNTER — OFFICE VISIT (OUTPATIENT)
Age: 75
End: 2025-04-07

## 2025-04-07 VITALS
OXYGEN SATURATION: 98 % | BODY MASS INDEX: 20.53 KG/M2 | HEART RATE: 82 BPM | SYSTOLIC BLOOD PRESSURE: 97 MMHG | DIASTOLIC BLOOD PRESSURE: 60 MMHG | TEMPERATURE: 97 F | WEIGHT: 135 LBS

## 2025-04-07 DIAGNOSIS — C61 PROSTATE CANCER METASTATIC TO BONE (HCC): Primary | ICD-10-CM

## 2025-04-07 DIAGNOSIS — K59.00 CONSTIPATION: ICD-10-CM

## 2025-04-07 DIAGNOSIS — R63.0 ANOREXIA: ICD-10-CM

## 2025-04-07 DIAGNOSIS — Z93.6 NEPHROSTOMY STATUS (HCC): ICD-10-CM

## 2025-04-07 DIAGNOSIS — C79.51 PROSTATE CANCER METASTATIC TO BONE (HCC): Primary | ICD-10-CM

## 2025-04-07 PROCEDURE — 99213 OFFICE O/P EST LOW 20 MIN: CPT | Performed by: FAMILY MEDICINE

## 2025-04-07 RX ORDER — MULTIVIT WITH MINERALS/LUTEIN
250 TABLET ORAL DAILY
Qty: 90 TABLET | Refills: 1 | Status: SHIPPED | OUTPATIENT
Start: 2025-04-07

## 2025-04-07 RX ORDER — LANOLIN ALCOHOL/MO/W.PET/CERES
400 CREAM (GRAM) TOPICAL 2 TIMES DAILY
Qty: 60 TABLET | Refills: 1 | Status: SHIPPED | OUTPATIENT
Start: 2025-04-07

## 2025-04-07 RX ORDER — MULTIVITAMIN
1 TABLET ORAL DAILY
Qty: 30 TABLET | Refills: 2 | Status: SHIPPED | OUTPATIENT
Start: 2025-04-07

## 2025-04-07 RX ORDER — SODIUM CHLORIDE 9 MG/ML
10 INJECTION, SOLUTION INTRAMUSCULAR; INTRAVENOUS; SUBCUTANEOUS DAILY
Qty: 600 ML | Refills: 2 | Status: SHIPPED | OUTPATIENT
Start: 2025-04-07 | End: 2025-10-04

## 2025-04-07 NOTE — ASSESSMENT & PLAN NOTE
NS flushes ordered  Orders:    sodium chloride, PF, 0.9 %; 10 mL by Intracatheter route daily Intracatheter flushing daily. May substitute prefilled syringe with normal saline 10 mL vials, 10 mL syringes, and 18 g blunt needles

## 2025-04-07 NOTE — PROGRESS NOTES
Name: Oliver Astudillo      : 1950      MRN: [id number]  Encounter Provider: Meenu Esposito MD  Encounter Date: 2025   Encounter department: [facility]  :  Assessment & Plan  Prostate cancer metastatic to bone (HCC)  Chronic, currently only receiving infusion therapy  Failed radiation therapy  Looks malnourished, cachetic   Notes that he wants to keep fighting   Does not want hospice  Poor insight (family appears to have poor insight)  Orders:    Cholecalciferol (VITAMIN D3) 1,000 units tablet; Take 1 tablet (1,000 Units total) by mouth daily    Multiple Vitamin (multivitamin) tablet; Take 1 tablet by mouth daily    ascorbic acid (VITAMIN C) 250 mg tablet; Take 1 tablet (250 mg total) by mouth daily Unsure of dose    Anorexia  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 May substitute prefilled syringe with normal saline 10 mL vials, 10 mL syringes, and 18 g blunt needles    Constipation  Medication refilled  Orders:    magnesium Oxide (MAG-OX) 400 mg TABS; Take 1 tablet (400 mg total) by mouth 2 (two) times a day           History of Present Illness   Oliver is a 74 year old male with a PMH prostate cancer, CKD, T2DM, cachexia secondary to protein-calorie malnutrition and cancer. Patient is ill appearing. We discussed the possibility of Hospice given his diagnosis and decline, however, patient adamantly states that he wants to continue fighting  his cancer.         Review of Systems   Constitutional:  Negative for chills and fever.   HENT:  Negative for ear pain and sore throat.    Eyes:  Negative for pain and visual disturbance.   Respiratory:  Negative for cough and shortness of breath.    Cardiovascular:  Negative for chest pain and palpitations.   Gastrointestinal:  Negative for abdominal pain and vomiting.   Genitourinary:  Negative for dysuria and hematuria.   Musculoskeletal:  Negative for arthralgias and back pain.   Skin:  Negative for color change and rash.   Neurological:  Negative for seizures and syncope.   All other systems reviewed and are negative.      Objective   BP 97/60 (BP Location: Right arm, Patient Position: Sitting, Cuff Size: Standard)   Pulse 82   Temp (!) 97 °F (36.1 °C) (Tympanic)   Wt 61.2 kg (135 lb)   SpO2 98%   BMI 20.53 kg/m²      Physical Exam  Vitals and nursing note reviewed.   Constitutional:       General: He is not in acute distress.     Appearance: He is cachectic. He is ill-appearing.   HENT:      Head: Normocephalic and atraumatic.      Right Ear: External ear normal.      Left Ear: External ear normal.      Nose: Nose normal.      Mouth/Throat:      Mouth: Mucous membranes are moist.   Eyes:      Extraocular Movements: Extraocular movements intact.      Conjunctiva/sclera: Conjunctivae normal.   Cardiovascular:      Rate and Rhythm: Normal rate and regular rhythm.      Pulses: Normal pulses.      Heart sounds: Normal heart sounds. No murmur heard.  Pulmonary:      Effort: Pulmonary effort is normal. No respiratory distress.      Breath sounds: Normal breath sounds.   Abdominal:      Palpations: Abdomen is soft.      Tenderness: There is no abdominal tenderness.   Musculoskeletal:         General: No swelling.      Cervical back: Normal range of motion and neck supple.   Skin:     General: Skin is warm and dry.      Capillary Refill: Capillary refill takes less than 2 seconds.   Neurological:      Mental  Status: He is alert and oriented to person, place, and time.   Psychiatric:         Mood and Affect: Mood normal.         Behavior: Behavior normal. Behavior is cooperative.

## 2025-04-07 NOTE — ASSESSMENT & PLAN NOTE
Anorexia likely secondary to metastatic cancer  Currently receiving ensure twice daily  Looks malnourished, cachetic  Notes that he wants to keep fighting   Does not want hospice  Supplements refilled   Orders:    Cholecalciferol (VITAMIN D3) 1,000 units tablet; Take 1 tablet (1,000 Units total) by mouth daily    ascorbic acid (VITAMIN C) 250 mg tablet; Take 1 tablet (250 mg total) by mouth daily Unsure of dose

## 2025-04-07 NOTE — ASSESSMENT & PLAN NOTE
Chronic, currently only receiving infusion therapy  Failed radiation therapy  Looks malnourished, cachetic   Notes that he wants to keep fighting   Does not want hospice  Poor insight (family appears to have poor insight)  Orders:    Cholecalciferol (VITAMIN D3) 1,000 units tablet; Take 1 tablet (1,000 Units total) by mouth daily    Multiple Vitamin (multivitamin) tablet; Take 1 tablet by mouth daily    ascorbic acid (VITAMIN C) 250 mg tablet; Take 1 tablet (250 mg total) by mouth daily Unsure of dose

## 2025-04-07 NOTE — ASSESSMENT & PLAN NOTE
Medication refilled  Orders:    magnesium Oxide (MAG-OX) 400 mg TABS; Take 1 tablet (400 mg total) by mouth 2 (two) times a day

## 2025-04-08 ENCOUNTER — HOSPITAL ENCOUNTER (OUTPATIENT)
Dept: INFUSION CENTER | Facility: HOSPITAL | Age: 75
Discharge: HOME | End: 2025-04-08
Attending: INTERNAL MEDICINE
Payer: COMMERCIAL

## 2025-04-08 VITALS
DIASTOLIC BLOOD PRESSURE: 68 MMHG | TEMPERATURE: 97.8 F | HEART RATE: 79 BPM | SYSTOLIC BLOOD PRESSURE: 108 MMHG | RESPIRATION RATE: 16 BRPM | OXYGEN SATURATION: 100 %

## 2025-04-08 DIAGNOSIS — E83.52 HYPERCALCEMIA: ICD-10-CM

## 2025-04-08 DIAGNOSIS — C61 PROSTATE CANCER METASTATIC TO BONE (HCC): Primary | ICD-10-CM

## 2025-04-08 DIAGNOSIS — C79.51 PROSTATE CANCER METASTATIC TO BONE (HCC): Primary | ICD-10-CM

## 2025-04-08 LAB
ALBUMIN SERPL BCG-MCNC: 3.8 G/DL (ref 3.5–5)
ALP SERPL-CCNC: 450 U/L (ref 34–104)
ALT SERPL W P-5'-P-CCNC: 85 U/L (ref 7–52)
ANION GAP SERPL CALCULATED.3IONS-SCNC: 9 MMOL/L (ref 4–13)
AST SERPL W P-5'-P-CCNC: 120 U/L (ref 13–39)
BILIRUB SERPL-MCNC: 0.62 MG/DL (ref 0.2–1)
BUN SERPL-MCNC: 13 MG/DL (ref 5–25)
CALCIUM SERPL-MCNC: 11.7 MG/DL (ref 8.4–10.2)
CHLORIDE SERPL-SCNC: 104 MMOL/L (ref 96–108)
CO2 SERPL-SCNC: 19 MMOL/L (ref 21–32)
CREAT SERPL-MCNC: 0.54 MG/DL (ref 0.6–1.3)
GFR SERPL CREATININE-BSD FRML MDRD: 104 ML/MIN/1.73SQ M
GLUCOSE SERPL-MCNC: 160 MG/DL (ref 65–140)
POTASSIUM SERPL-SCNC: 4.3 MMOL/L (ref 3.5–5.3)
PROT SERPL-MCNC: 7.1 G/DL (ref 6.4–8.4)
SODIUM SERPL-SCNC: 132 MMOL/L (ref 135–147)

## 2025-04-08 PROCEDURE — 80053 COMPREHEN METABOLIC PANEL: CPT

## 2025-04-08 PROCEDURE — 96360 HYDRATION IV INFUSION INIT: CPT

## 2025-04-08 RX ADMIN — SODIUM CHLORIDE 1000 ML: 0.9 INJECTION, SOLUTION INTRAVENOUS at 12:54

## 2025-04-08 NOTE — PROGRESS NOTES
Patient presents for treatment. Labs drawn. Sodium slightly low. AST, ALT and alk phos high. Provider notified. Per Emily PRUETT RN, no treatment changes. Will discuss results at follow up. Oliver Astudillo  tolerated treatment well with no complications.      Oliver Astudillo is aware of future appt on 4/11 at 1230.     AVS printed and given to Oliver Astudillo:  Yes

## 2025-04-08 NOTE — PLAN OF CARE
Problem: Potential for Falls  Goal: Patient will remain free of falls  Description: INTERVENTIONS:- Educate patient/family on patient safety including physical limitations- Instruct patient to call for assistance with activity - Consult OT/PT to assist with strengthening/mobility - Keep Call bell within reach  Outcome: Progressing     Problem: METABOLIC, FLUID AND ELECTROLYTES - ADULT  Goal: Fluid balance maintained  Description: INTERVENTIONS:- Monitor labs - Monitor I/O and WT- Instruct patient on fluid and nutrition as appropriate- Assess for signs & symptoms of volume excess or deficit  Outcome: Progressing

## 2025-04-11 ENCOUNTER — HOSPITAL ENCOUNTER (OUTPATIENT)
Dept: INFUSION CENTER | Facility: HOSPITAL | Age: 75
End: 2025-04-11
Attending: INTERNAL MEDICINE
Payer: COMMERCIAL

## 2025-04-11 VITALS
BODY MASS INDEX: 20.38 KG/M2 | WEIGHT: 134.48 LBS | SYSTOLIC BLOOD PRESSURE: 101 MMHG | HEIGHT: 68 IN | OXYGEN SATURATION: 91 % | HEART RATE: 91 BPM | TEMPERATURE: 97.8 F | RESPIRATION RATE: 18 BRPM | DIASTOLIC BLOOD PRESSURE: 69 MMHG

## 2025-04-11 DIAGNOSIS — C79.51 PROSTATE CANCER METASTATIC TO BONE (HCC): Primary | ICD-10-CM

## 2025-04-11 DIAGNOSIS — E83.52 HYPERCALCEMIA: ICD-10-CM

## 2025-04-11 DIAGNOSIS — C61 PROSTATE CANCER METASTATIC TO BONE (HCC): Primary | ICD-10-CM

## 2025-04-11 LAB
ALBUMIN SERPL BCG-MCNC: 3.7 G/DL (ref 3.5–5)
ALP SERPL-CCNC: 543 U/L (ref 34–104)
ALT SERPL W P-5'-P-CCNC: 81 U/L (ref 7–52)
ANION GAP SERPL CALCULATED.3IONS-SCNC: 11 MMOL/L (ref 4–13)
AST SERPL W P-5'-P-CCNC: 115 U/L (ref 13–39)
BASOPHILS # BLD AUTO: 0.02 THOUSANDS/ÂΜL (ref 0–0.1)
BASOPHILS NFR BLD AUTO: 0 % (ref 0–1)
BILIRUB SERPL-MCNC: 0.72 MG/DL (ref 0.2–1)
BUN SERPL-MCNC: 11 MG/DL (ref 5–25)
CALCIUM SERPL-MCNC: 12.2 MG/DL (ref 8.4–10.2)
CHLORIDE SERPL-SCNC: 99 MMOL/L (ref 96–108)
CO2 SERPL-SCNC: 20 MMOL/L (ref 21–32)
CREAT SERPL-MCNC: 0.61 MG/DL (ref 0.6–1.3)
EOSINOPHIL # BLD AUTO: 0.16 THOUSAND/ÂΜL (ref 0–0.61)
EOSINOPHIL NFR BLD AUTO: 3 % (ref 0–6)
ERYTHROCYTE [DISTWIDTH] IN BLOOD BY AUTOMATED COUNT: 15.2 % (ref 11.6–15.1)
GFR SERPL CREATININE-BSD FRML MDRD: 99 ML/MIN/1.73SQ M
GLUCOSE SERPL-MCNC: 161 MG/DL (ref 65–140)
HCT VFR BLD AUTO: 38.9 % (ref 36.5–49.3)
HGB BLD-MCNC: 12.9 G/DL (ref 12–17)
IMM GRANULOCYTES # BLD AUTO: 0.03 THOUSAND/UL (ref 0–0.2)
IMM GRANULOCYTES NFR BLD AUTO: 1 % (ref 0–2)
LYMPHOCYTES # BLD AUTO: 0.55 THOUSANDS/ÂΜL (ref 0.6–4.47)
LYMPHOCYTES NFR BLD AUTO: 9 % (ref 14–44)
MCH RBC QN AUTO: 30.9 PG (ref 26.8–34.3)
MCHC RBC AUTO-ENTMCNC: 33.2 G/DL (ref 31.4–37.4)
MCV RBC AUTO: 93 FL (ref 82–98)
MONOCYTES # BLD AUTO: 0.45 THOUSAND/ÂΜL (ref 0.17–1.22)
MONOCYTES NFR BLD AUTO: 7 % (ref 4–12)
NEUTROPHILS # BLD AUTO: 4.87 THOUSANDS/ÂΜL (ref 1.85–7.62)
NEUTS SEG NFR BLD AUTO: 80 % (ref 43–75)
NRBC BLD AUTO-RTO: 0 /100 WBCS
PLATELET # BLD AUTO: 406 THOUSANDS/UL (ref 149–390)
PMV BLD AUTO: 9.1 FL (ref 8.9–12.7)
POTASSIUM SERPL-SCNC: 4.5 MMOL/L (ref 3.5–5.3)
PROT SERPL-MCNC: 7.3 G/DL (ref 6.4–8.4)
RBC # BLD AUTO: 4.17 MILLION/UL (ref 3.88–5.62)
SODIUM SERPL-SCNC: 130 MMOL/L (ref 135–147)
WBC # BLD AUTO: 6.08 THOUSAND/UL (ref 4.31–10.16)

## 2025-04-11 PROCEDURE — 96372 THER/PROPH/DIAG INJ SC/IM: CPT

## 2025-04-11 PROCEDURE — 85025 COMPLETE CBC W/AUTO DIFF WBC: CPT

## 2025-04-11 PROCEDURE — 80053 COMPREHEN METABOLIC PANEL: CPT | Performed by: INTERNAL MEDICINE

## 2025-04-11 PROCEDURE — 96360 HYDRATION IV INFUSION INIT: CPT

## 2025-04-11 RX ADMIN — SODIUM CHLORIDE 1000 ML: 0.9 INJECTION, SOLUTION INTRAVENOUS at 13:04

## 2025-04-11 RX ADMIN — DENOSUMAB 120 MG: 120 INJECTION SUBCUTANEOUS at 14:12

## 2025-04-15 ENCOUNTER — HOSPITAL ENCOUNTER (OUTPATIENT)
Dept: INFUSION CENTER | Facility: HOSPITAL | Age: 75
Discharge: HOME | End: 2025-04-15
Attending: INTERNAL MEDICINE
Payer: COMMERCIAL

## 2025-04-15 VITALS
TEMPERATURE: 97.8 F | SYSTOLIC BLOOD PRESSURE: 93 MMHG | HEART RATE: 86 BPM | DIASTOLIC BLOOD PRESSURE: 53 MMHG | RESPIRATION RATE: 18 BRPM

## 2025-04-15 DIAGNOSIS — C61 PROSTATE CANCER METASTATIC TO BONE (HCC): Primary | ICD-10-CM

## 2025-04-15 DIAGNOSIS — C79.51 PROSTATE CANCER METASTATIC TO BONE (HCC): Primary | ICD-10-CM

## 2025-04-15 DIAGNOSIS — E83.52 HYPERCALCEMIA: ICD-10-CM

## 2025-04-15 LAB
ALBUMIN SERPL BCG-MCNC: 3.2 G/DL (ref 3.5–5)
ALP SERPL-CCNC: 392 U/L (ref 34–104)
ALT SERPL W P-5'-P-CCNC: 49 U/L (ref 7–52)
ANION GAP SERPL CALCULATED.3IONS-SCNC: 9 MMOL/L (ref 4–13)
AST SERPL W P-5'-P-CCNC: 63 U/L (ref 13–39)
BILIRUB SERPL-MCNC: 0.74 MG/DL (ref 0.2–1)
BUN SERPL-MCNC: 12 MG/DL (ref 5–25)
CALCIUM ALBUM COR SERPL-MCNC: 10.9 MG/DL (ref 8.3–10.1)
CALCIUM SERPL-MCNC: 10.3 MG/DL (ref 8.4–10.2)
CHLORIDE SERPL-SCNC: 103 MMOL/L (ref 96–108)
CO2 SERPL-SCNC: 20 MMOL/L (ref 21–32)
CREAT SERPL-MCNC: 0.65 MG/DL (ref 0.6–1.3)
GFR SERPL CREATININE-BSD FRML MDRD: 97 ML/MIN/1.73SQ M
GLUCOSE SERPL-MCNC: 118 MG/DL (ref 65–140)
POTASSIUM SERPL-SCNC: 4.3 MMOL/L (ref 3.5–5.3)
PROT SERPL-MCNC: 6.4 G/DL (ref 6.4–8.4)
SODIUM SERPL-SCNC: 132 MMOL/L (ref 135–147)

## 2025-04-15 PROCEDURE — 80053 COMPREHEN METABOLIC PANEL: CPT

## 2025-04-15 RX ADMIN — SODIUM CHLORIDE 1000 ML: 0.9 INJECTION, SOLUTION INTRAVENOUS at 12:44

## 2025-04-15 NOTE — PROGRESS NOTES
Oliver Astudillo  tolerated treatment well with no complications.      Oliver Astudillo is aware of future appt on 4/18 at 1230.     AVS printed and given to Oliver Astudillo:    No (Declined by Oliver Astudillo)

## 2025-04-16 ENCOUNTER — APPOINTMENT (OUTPATIENT)
Dept: LAB | Facility: HOSPITAL | Age: 75
End: 2025-04-16
Attending: INTERNAL MEDICINE
Payer: COMMERCIAL

## 2025-04-16 DIAGNOSIS — C79.51 PROSTATE CANCER METASTATIC TO BONE (HCC): ICD-10-CM

## 2025-04-16 DIAGNOSIS — C61 PROSTATE CANCER METASTATIC TO BONE (HCC): ICD-10-CM

## 2025-04-16 DIAGNOSIS — E83.52 HYPERCALCEMIA: ICD-10-CM

## 2025-04-16 LAB
ALBUMIN SERPL BCG-MCNC: 3.7 G/DL (ref 3.5–5)
ALP SERPL-CCNC: 429 U/L (ref 34–104)
ALT SERPL W P-5'-P-CCNC: 51 U/L (ref 7–52)
ANION GAP SERPL CALCULATED.3IONS-SCNC: 9 MMOL/L (ref 4–13)
AST SERPL W P-5'-P-CCNC: 69 U/L (ref 13–39)
BASOPHILS # BLD AUTO: 0.03 THOUSANDS/ÂΜL (ref 0–0.1)
BASOPHILS NFR BLD AUTO: 1 % (ref 0–1)
BILIRUB SERPL-MCNC: 0.8 MG/DL (ref 0.2–1)
BUN SERPL-MCNC: 12 MG/DL (ref 5–25)
CALCIUM SERPL-MCNC: 10.4 MG/DL (ref 8.4–10.2)
CHLORIDE SERPL-SCNC: 104 MMOL/L (ref 96–108)
CO2 SERPL-SCNC: 19 MMOL/L (ref 21–32)
CREAT SERPL-MCNC: 0.52 MG/DL (ref 0.6–1.3)
EOSINOPHIL # BLD AUTO: 0.01 THOUSAND/ÂΜL (ref 0–0.61)
EOSINOPHIL NFR BLD AUTO: 0 % (ref 0–6)
ERYTHROCYTE [DISTWIDTH] IN BLOOD BY AUTOMATED COUNT: 15.6 % (ref 11.6–15.1)
GFR SERPL CREATININE-BSD FRML MDRD: 106 ML/MIN/1.73SQ M
GLUCOSE SERPL-MCNC: 84 MG/DL (ref 65–140)
HCT VFR BLD AUTO: 36.6 % (ref 36.5–49.3)
HGB BLD-MCNC: 12.1 G/DL (ref 12–17)
IMM GRANULOCYTES # BLD AUTO: 0.02 THOUSAND/UL (ref 0–0.2)
IMM GRANULOCYTES NFR BLD AUTO: 0 % (ref 0–2)
LYMPHOCYTES # BLD AUTO: 0.62 THOUSANDS/ÂΜL (ref 0.6–4.47)
LYMPHOCYTES NFR BLD AUTO: 10 % (ref 14–44)
MCH RBC QN AUTO: 31.1 PG (ref 26.8–34.3)
MCHC RBC AUTO-ENTMCNC: 33.1 G/DL (ref 31.4–37.4)
MCV RBC AUTO: 94 FL (ref 82–98)
MONOCYTES # BLD AUTO: 0.52 THOUSAND/ÂΜL (ref 0.17–1.22)
MONOCYTES NFR BLD AUTO: 8 % (ref 4–12)
NEUTROPHILS # BLD AUTO: 5.16 THOUSANDS/ÂΜL (ref 1.85–7.62)
NEUTS SEG NFR BLD AUTO: 81 % (ref 43–75)
NRBC BLD AUTO-RTO: 0 /100 WBCS
PLATELET # BLD AUTO: 327 THOUSANDS/UL (ref 149–390)
PMV BLD AUTO: 9.1 FL (ref 8.9–12.7)
POTASSIUM SERPL-SCNC: 3.9 MMOL/L (ref 3.5–5.3)
PROT SERPL-MCNC: 7.1 G/DL (ref 6.4–8.4)
RBC # BLD AUTO: 3.89 MILLION/UL (ref 3.88–5.62)
SODIUM SERPL-SCNC: 132 MMOL/L (ref 135–147)
WBC # BLD AUTO: 6.36 THOUSAND/UL (ref 4.31–10.16)

## 2025-04-16 PROCEDURE — 85025 COMPLETE CBC W/AUTO DIFF WBC: CPT

## 2025-04-16 PROCEDURE — 80053 COMPREHEN METABOLIC PANEL: CPT

## 2025-04-16 PROCEDURE — 36415 COLL VENOUS BLD VENIPUNCTURE: CPT

## 2025-04-17 ENCOUNTER — TELEPHONE (OUTPATIENT)
Dept: HEMATOLOGY ONCOLOGY | Facility: CLINIC | Age: 75
End: 2025-04-17

## 2025-04-17 ENCOUNTER — TELEMEDICINE (OUTPATIENT)
Dept: HEMATOLOGY ONCOLOGY | Facility: CLINIC | Age: 75
End: 2025-04-17
Payer: COMMERCIAL

## 2025-04-17 DIAGNOSIS — C79.51 PROSTATE CANCER METASTATIC TO BONE (HCC): Primary | ICD-10-CM

## 2025-04-17 DIAGNOSIS — C61 PROSTATE CANCER METASTATIC TO BONE (HCC): Primary | ICD-10-CM

## 2025-04-17 PROCEDURE — 99215 OFFICE O/P EST HI 40 MIN: CPT | Performed by: INTERNAL MEDICINE

## 2025-04-17 NOTE — ASSESSMENT & PLAN NOTE
PSA has increased to 500+.  His cancer has largely been resistant to treatment, and remains so.  Performance status remains poor.  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  I think that further treatment for his cancer is unlikely to be effective and more likely to bring harm than benefit.  Hospice was reviewed as an alternative option for further care.  Patient and family were agreeable to proceed as outlined.  We made arrangements accordingly.  Orders:  •  Ambulatory Referral to Hospice; Future

## 2025-04-17 NOTE — PROGRESS NOTES
Virtual Brief Visit  Name: Oliver Astudillo      : 1950      MRN: [id number]  Encounter Provider: Ramone Coyne DO  Encounter Date: 2025   Encounter department: [facility] [place]  :  Assessment & Plan  Prostate cancer metastatic to bone (HCC)  PSA has increased to 500+.  His cancer has largely been resistant to treatment, and remains so.  Performance status remains poor.  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  I think that further treatment for his cancer is unlikely to be effective and more likely to bring harm than benefit.  Hospice was reviewed as an alternative option for further care.  Patient and family were agreeable to proceed as outlined.  We made arrangements accordingly.  Orders:  •  Ambulatory Referral to Hospice; Future        History of Present Illness   HPI Prostate cancer with bone metastases diagnosed May 2023.  Progressive disease after sequential hormonal blockade.  Docetaxel complicated by severe infusion reaction.  2024 started lutetium 177.    Administrative Statements   Encounter provider Ramone Coyne DO    The Patient is located at Home and in the following state in which I hold an active license [place].    The patient was identified by name and date of birth. Oliver Astudillo was informed that this is a telemedicine visit and that the visit is being conducted through the Epic Embedded platform. He agrees to proceed..  My office door was closed. No one else was in the room.  He acknowledged consent and understanding of privacy and security of the video platform. The patient has agreed to participate and understands they can discontinue the visit at any time.    I have spent a total time of  minutes in caring for this patient on the day of the visit/encounter including Diagnostic results, Prognosis, Impressions, Documenting in the medical record, Reviewing/placing  orders in the medical record (including tests, medications, and/or procedures), and Obtaining or reviewing history  , not including the time spent for establishing the audio/video connection.

## 2025-04-17 NOTE — TELEPHONE ENCOUNTER
Infusion- Please cancel infusions. Patient transitioning to hospice.     Clerical- Please cancel pluvicto

## 2025-04-17 NOTE — LETTER
2025     Karen Bates MD  [address]  [address]  [place] [address]    Patient: Oliver Astudillo   YOB: 1950   Date of Visit: 2025       Dear MD Dov Medrano MD Daren Shiu, DO:    Thank you for referring Oliver Astudillo to me for evaluation. Below are my notes for this consultation.    If you have questions, please do not hesitate to call me. I look forward to following your patient along with you.         Sincerely,        Ramone Coyne DO        CC: MD Carter Hernandez DO Neil David Belman, DO  2025  2:05 PM  Sign when Signing Visit  Virtual Brief Visit  Name: Oliver Astudillo      : 1950      MRN: [id number]  Encounter Provider: Ramone Coyne DO  Encounter Date: 2025   Encounter department: [facility] [place]  :  Assessment & Plan  Prostate cancer metastatic to bone (HCC)  PSA has increased to 500+.  His cancer has largely been resistant to treatment, and remains so.  Performance status remains poor.  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  I think that further treatment for his cancer is unlikely to be effective and more likely to bring harm than benefit.  Hospice was reviewed as an alternative option for further care.  Patient and family were agreeable to proceed as outlined.  We made arrangements accordingly.           History of Present Illness  HPI Prostate cancer with bone metastases diagnosed May 2023.  Progressive disease after sequential hormonal blockade.  Docetaxel complicated by severe infusion reaction.  2024 started lutetium 177.    Administrative Statements  Encounter provider Ramone Coyne DO    The Patient is located at Home and in the following state in which I hold an active license [place].    The patient was identified by name and date of birth. Oliver Astudillo was  informed that this is a telemedicine visit and that the visit is being conducted through the Epic Embedded platform. He agrees to proceed..  My office door was closed. No one else was in the room.  He acknowledged consent and understanding of privacy and security of the video platform. The patient has agreed to participate and understands they can discontinue the visit at any time.    I have spent a total time of  minutes in caring for this patient on the day of the visit/encounter including Diagnostic results, Prognosis, Impressions, Documenting in the medical record, Reviewing/placing orders in the medical record (including tests, medications, and/or procedures), and Obtaining or reviewing history  , not including the time spent for establishing the audio/video connection.

## 2025-04-18 ENCOUNTER — TELEPHONE (OUTPATIENT)
Dept: HEMATOLOGY ONCOLOGY | Facility: CLINIC | Age: 75
End: 2025-04-18

## 2025-04-18 ENCOUNTER — HOSPITAL ENCOUNTER (OUTPATIENT)
Dept: INFUSION CENTER | Facility: HOSPITAL | Age: 75
Discharge: HOME | End: 2025-04-18
Attending: INTERNAL MEDICINE

## 2025-04-18 ENCOUNTER — PATIENT OUTREACH (OUTPATIENT)
Dept: CASE MANAGEMENT | Facility: OTHER | Age: 75
End: 2025-04-18

## 2025-04-18 NOTE — PROGRESS NOTES
[facility] received a staff message requesting assistance with the hospice referral that was placed. This writer placed a referral through [facility] and is awaiting acceptance. [facility] will continue to follow.     ADDENDUM:  [facility] received verification from [facility] that they have accepted patient onto service. This writer provided contact information for his spouse and son.

## 2025-04-30 ENCOUNTER — HOSPITAL ENCOUNTER (OUTPATIENT)
Dept: INFUSION CENTER | Facility: HOSPITAL | Age: 75
End: 2025-04-30
Attending: INTERNAL MEDICINE

## 2025-04-30 ENCOUNTER — TELEPHONE (OUTPATIENT)
Age: 75
End: 2025-04-30

## 2025-04-30 NOTE — TELEPHONE ENCOUNTER
House call visit summary from [facility]  Scanned into encounter    Please review

## 2025-05-02 ENCOUNTER — HOSPITAL ENCOUNTER (OUTPATIENT)
Dept: NON INVASIVE DIAGNOSTICS | Facility: HOSPITAL | Age: 75
Discharge: HOME | End: 2025-05-02
Payer: COMMERCIAL

## 2025-05-02 DIAGNOSIS — N13.30 HYDRONEPHROSIS, UNSPECIFIED HYDRONEPHROSIS TYPE: Primary | ICD-10-CM

## 2025-05-02 PROCEDURE — C1769 GUIDE WIRE: HCPCS

## 2025-05-02 PROCEDURE — C1729 CATH, DRAINAGE: HCPCS

## 2025-05-02 PROCEDURE — 50435 EXCHANGE NEPHROSTOMY CATH: CPT

## 2025-05-02 PROCEDURE — 50435 EXCHANGE NEPHROSTOMY CATH: CPT | Performed by: RADIOLOGY

## 2025-05-02 RX ORDER — LIDOCAINE WITH 8.4% SOD BICARB 0.9%(10ML)
SYRINGE (ML) INJECTION AS NEEDED
Status: COMPLETED | OUTPATIENT
Start: 2025-05-02 | End: 2025-05-02

## 2025-05-02 RX ADMIN — Medication 2 ML: at 09:43

## 2025-05-02 RX ADMIN — Medication 2 ML: at 09:39

## 2025-05-02 RX ADMIN — IOHEXOL 15 ML: 350 INJECTION, SOLUTION INTRAVENOUS at 10:42

## 2025-05-02 NOTE — SEDATION DOCUMENTATION
Bilateral nephrostomy tube exchange completed by Dr. Stokes. Both tubes to bag drainage with new dry dressings applied. Patient tolerated well. Return in 4 weeks for routine exchange.

## 2025-05-02 NOTE — DISCHARGE INSTRUCTIONS
Return in 4 weeks for routine exchange.    Nephrostomy Tube Care     WHAT YOU NEED TO KNOW:   A nephrostomy tube is a catheter (thin plastic tube) that is inserted through your skin and into your kidney. The nephrostomy tube drains urine from your kidney into a collecting bag outside your body. You may need a nephrostomy tube when something is blocking the normal flow of urine. A nephrostomy tube may be used for a short or a long period of time. The nephrostomy tube comes out of your back, so you will need someone to help care for your nephrostomy tube.          DISCHARGE INSTRUCTIONS:      How to clean the skin around the nephrostomy tube and change the bandage:  Since the nephrostomy tube comes out of your back, you will not be able to care for it by yourself. Ask someone to follow the general directions below to check and care for your nephrostomy tube.   Gather the items you will need.          Disposable (single use) under-pad, and a clean washcloth  Plain soap, warm water, and new medical gloves  Sterile gauze bandages  Clear adhesive dressing or medical tape  Skin barrier  Protective skin film  Trash bag  Remove the old bandage, and check the tube entry site.    Have the patient lie on his side with the nephrostomy tube entry site facing up. Place the under-pad where it will catch drainage as you are working with the nephrostomy tube.   Wash your hands with soap and water. Put on new medical gloves.  Gently remove the old bandage, without pulling on the tube. Do this by holding the skin beside the tube with one hand. With the other hand, gently remove sticky tape and the skin barrier by pulling in the same direction as hair growth. Do not touch the side of the bandage that is placed over or around the tube. Throw the bandage and skin barrier away in a trash bag.  Look for signs of infection, such as skin redness and swelling. Report any skin changes to healthcare providers.  Clean the tube entry site.    Hold  the tube in place to keep it from being pulled out while you are cleaning around it.  You will need to clean the area twice. For the first cleaning, wet a new gauze bandage with soap and water.  Begin at the entry site of the tube. Wipe the skin in circles, moving away from the entry site. Remove blood and any other material with the gauze. Do this as often as needed. Use a new gauze bandage each time you clean the area, moving away from the entry site.   For the second cleaning, wet a new gauze bandage with water. Begin at the entry site of the tube. Wipe the skin in circles, moving away from the entry site. Use a new gauze bandage each time you clean the area, moving away from the entry site.   Gently pat the skin with a clean washcloth to dry it.    Apply the skin barrier and bandages.    Roll up a bandage to make it thick, and place it under  the place where the tube enters the skin. Place it to support the tube, and stop it from kinking or bending. Tape the bandage in place, and apply more bandages if directed by a healthcare provider.   Bring the tubing forward to the front and tape it to the skin. Do not stretch the tube tight, because this may pull the nephrostomy tube out.  How often to change the bandage.  Change the bandage around the tube, every other day. If your bandages  get dirty or wet, change them right away, and as often as needed. If your nephrostomy tube is to be used for a long period of time, the tube needs to be changed every 2 to 3 months. Healthcare providers will tell you when you need to make an appointment to have your tube changed.     How to care for the urine drainage bag:   Ask if you need to measure and write down how much urine is in the bag before you empty it. Drain urine out of the drainage bag when it is ½ to ? full. Open the spout at the bottom of the bag to empty the urine into the toilet.   You may need to detach the drainage bag from the nephrostomy tube to change it.. If  so, attach a new drainage bag tightly to the nephrostomy tube.     How to prevent problems with your nephrostomy tube:   Change bandages, directed.  This helps to prevent infection. Throw away or clean your drainage bag as directed by your healthcare provider.    Wipe the connecting ends of the drainage bag with alcohol before you reconnect the bag to the tube.  This helps prevent infection.     Keep the tube taped to your skin and connected to a drainage bag placed below the level of your kidneys.  This helps prevent urine from backing up into your kidneys. You may wear a small drainage bag strapped to your leg to let you move around more easily.    Check the catheter to be sure it is in place after you change your clothes or do other activities.  Do not wear tight clothing over the tube. Place the tubing over your thigh rather than under it when you are sitting down. Be sure that nothing is pulling on the nephrostomy tube when you move around.    Change positions if you see little or no urine in your drainage bag.  Check to see if the urine tube is twisted or bent. Be sure that you are not sitting or lying on the tube. If there are no kinks and there is little or no urine in the drainage bag, tell your healthcare provider.    Flush out the tube as directed. Some tubes get flushed one time a day with 10 mls of NSS You will be given a prescription for the flushes.  To flush the nephrostomy tube, clean both connections with alcohol swap. Twist off the drainage bag tube and twist the saline syringe into the nephrostomy tube and flush briskly. Remove the syringe and twist the drainage bag tube back into the nephrostomy tube.  Keep the site covered while you shower.  Tape a piece of clear adhesive plastic over the dressing to keep it dry while you shower. Do not take tub baths.    Contact Interventional Radiology at [phone] ([facility] PATIENTS: Contact Interventional Radiology at [phone]) ([facility] PATIENTS:  Contact Interventional Radiology at [phone]) if:  The skin around the nephrostomy tube is red, swollen, itches, or has a rash.   You have a fever greater than 101 or chills.  You have lower back or hip pain.  There are changes in how your urine looks or smells.  You have little or no urine draining from the nephrostomy tube.   You have nausea and are vomiting.  The black deb on your tube has moved, or the tube is longer than when it was put in.   You have questions or concerns about your condition or care.  The nephrostomy tube comes out completely.   There is blood, pus, or a bad smell coming from the place where the tube enters your skin.  Urine is leaking around the tube.        The following pharmacies carry the flush syringes.       [facility]                     [facility]                         [facility]       [address]                     [address]                    [phone]  [place]                       [place]  Phone [phone]            Phone [phone]                 [facility]                                                                                                   [phone]  [facility]             [facility]                             [address] [place], [place] [address] [facility]   Yoselyn LARSON                                 [phone]  Phone [phone]            Phone [phone]    [facility]                                                                         [facility] [phone]  [address]  [place]   Phone [phone]

## 2025-05-07 NOTE — TELEPHONE ENCOUNTER
[facility] Dr[facility] Frank Lombardi could sing pts death certificate.   I let them know that it looks like pt was seeing someone at [facility].

## 2025-05-07 NOTE — TELEPHONE ENCOUNTER
Called pts son to schedule his next nephrostomy tube change. Son let me know his father had passed away this morning. Documenting and updating chart.

## 2025-05-08 ENCOUNTER — TELEPHONE (OUTPATIENT)
Age: 75
End: 2025-05-08

## 2025-05-08 NOTE — TELEPHONE ENCOUNTER
Vm on provider line:    Good afternoon. My name is Bright Macdonald. I'm a  up here on [facility]. here in [place]. I'm calling about a patient Doctor Abhishek who passed away and I need to get the death certificate signed if we can leave a message for the doctor or one of the associates to do that. The name of the patient is Oliver Boyd, last name is Marguerite CELESTE. Date of birth was . The case ID number on the electronic death certificate system is [id number], so the doctor can pull it up relatively quickly and take care of that for us. Again, my name is Bright Macdonald and my cell number is [phone]. I appreciate your time. Thank you very much. Bye bye.  You received a voice mail from [phone].      In different encounter it looks like Dr. Young took care of this.

## 2025-05-08 NOTE — TELEPHONE ENCOUNTER
[facility],    This patient passed away yesterday 5/7/25.  Case ID: [id number]    They need the death certifcate signed.    Thank you.

## 2025-05-30 ENCOUNTER — TELEPHONE (OUTPATIENT)
Age: 75
End: 2025-05-30

## 2025-05-30 NOTE — TELEPHONE ENCOUNTER
PT Re certification -powerback   Scanned into encounter  Placed in pcps folder  Fax: [phone]

## 2025-07-28 ENCOUNTER — TELEPHONE (OUTPATIENT)
Age: 75
End: 2025-07-28